# Patient Record
Sex: MALE | Race: WHITE | URBAN - METROPOLITAN AREA
[De-identification: names, ages, dates, MRNs, and addresses within clinical notes are randomized per-mention and may not be internally consistent; named-entity substitution may affect disease eponyms.]

---

## 2018-10-10 ENCOUNTER — APPOINTMENT (RX ONLY)
Dept: URBAN - METROPOLITAN AREA CLINIC 4 | Facility: CLINIC | Age: 53
Setting detail: DERMATOLOGY
End: 2018-10-10

## 2018-10-10 DIAGNOSIS — L81.4 OTHER MELANIN HYPERPIGMENTATION: ICD-10-CM

## 2018-10-10 DIAGNOSIS — L82.1 OTHER SEBORRHEIC KERATOSIS: ICD-10-CM

## 2018-10-10 DIAGNOSIS — D22 MELANOCYTIC NEVI: ICD-10-CM

## 2018-10-10 DIAGNOSIS — D18.0 HEMANGIOMA: ICD-10-CM

## 2018-10-10 DIAGNOSIS — L57.0 ACTINIC KERATOSIS: ICD-10-CM

## 2018-10-10 PROBLEM — D22.61 MELANOCYTIC NEVI OF RIGHT UPPER LIMB, INCLUDING SHOULDER: Status: ACTIVE | Noted: 2018-10-10

## 2018-10-10 PROBLEM — D18.01 HEMANGIOMA OF SKIN AND SUBCUTANEOUS TISSUE: Status: ACTIVE | Noted: 2018-10-10

## 2018-10-10 PROBLEM — D22.62 MELANOCYTIC NEVI OF LEFT UPPER LIMB, INCLUDING SHOULDER: Status: ACTIVE | Noted: 2018-10-10

## 2018-10-10 PROBLEM — D22.5 MELANOCYTIC NEVI OF TRUNK: Status: ACTIVE | Noted: 2018-10-10

## 2018-10-10 PROBLEM — D22.71 MELANOCYTIC NEVI OF RIGHT LOWER LIMB, INCLUDING HIP: Status: ACTIVE | Noted: 2018-10-10

## 2018-10-10 PROBLEM — D22.72 MELANOCYTIC NEVI OF LEFT LOWER LIMB, INCLUDING HIP: Status: ACTIVE | Noted: 2018-10-10

## 2018-10-10 PROCEDURE — ? SUNSCREEN RECOMMENDATIONS

## 2018-10-10 PROCEDURE — 99213 OFFICE O/P EST LOW 20 MIN: CPT | Mod: 25

## 2018-10-10 PROCEDURE — 17000 DESTRUCT PREMALG LESION: CPT

## 2018-10-10 PROCEDURE — ? COUNSELING

## 2018-10-10 PROCEDURE — 17003 DESTRUCT PREMALG LES 2-14: CPT

## 2018-10-10 PROCEDURE — ? LIQUID NITROGEN

## 2018-10-10 ASSESSMENT — LOCATION SIMPLE DESCRIPTION DERM
LOCATION SIMPLE: NOSE
LOCATION SIMPLE: RIGHT THIGH
LOCATION SIMPLE: RIGHT LOWER BACK
LOCATION SIMPLE: LEFT POSTERIOR UPPER ARM
LOCATION SIMPLE: LEFT HAND
LOCATION SIMPLE: ABDOMEN
LOCATION SIMPLE: LEFT EAR
LOCATION SIMPLE: RIGHT POSTERIOR UPPER ARM
LOCATION SIMPLE: LEFT THIGH
LOCATION SIMPLE: LEFT ANTERIOR NECK
LOCATION SIMPLE: LEFT UPPER BACK
LOCATION SIMPLE: RIGHT FOREARM
LOCATION SIMPLE: RIGHT HAND
LOCATION SIMPLE: LEFT FOREARM
LOCATION SIMPLE: LEFT CHEEK
LOCATION SIMPLE: RIGHT CHEEK
LOCATION SIMPLE: UPPER BACK

## 2018-10-10 ASSESSMENT — LOCATION DETAILED DESCRIPTION DERM
LOCATION DETAILED: RIGHT SUPERIOR MEDIAL MIDBACK
LOCATION DETAILED: LEFT INFERIOR ANTERIOR NECK
LOCATION DETAILED: RIGHT ANTERIOR PROXIMAL THIGH
LOCATION DETAILED: RIGHT CENTRAL MALAR CHEEK
LOCATION DETAILED: RIGHT DISTAL POSTERIOR UPPER ARM
LOCATION DETAILED: RIGHT RIB CAGE
LOCATION DETAILED: SUPERIOR THORACIC SPINE
LOCATION DETAILED: LEFT SUPERIOR MEDIAL UPPER BACK
LOCATION DETAILED: LEFT CENTRAL MALAR CHEEK
LOCATION DETAILED: RIGHT MEDIAL MALAR CHEEK
LOCATION DETAILED: LEFT ULNAR DORSAL HAND
LOCATION DETAILED: RIGHT RADIAL DORSAL HAND
LOCATION DETAILED: PERIUMBILICAL SKIN
LOCATION DETAILED: RIGHT PROXIMAL DORSAL FOREARM
LOCATION DETAILED: LEFT ANTERIOR PROXIMAL THIGH
LOCATION DETAILED: LEFT SUPERIOR MEDIAL MALAR CHEEK
LOCATION DETAILED: LEFT TRAGUS
LOCATION DETAILED: NASAL TIP
LOCATION DETAILED: LEFT PROXIMAL DORSAL FOREARM
LOCATION DETAILED: LEFT PROXIMAL POSTERIOR UPPER ARM

## 2018-10-10 ASSESSMENT — LOCATION ZONE DERM
LOCATION ZONE: EAR
LOCATION ZONE: ARM
LOCATION ZONE: TRUNK
LOCATION ZONE: HAND
LOCATION ZONE: NECK
LOCATION ZONE: FACE
LOCATION ZONE: NOSE
LOCATION ZONE: LEG

## 2018-10-10 NOTE — PROCEDURE: LIQUID NITROGEN
Render Post-Care Instructions In Note?: no
Detail Level: Simple
Post-Care Instructions: I reviewed with the patient in detail post-care instructions. Patient is to wear sunprotection, and avoid picking at any of the treated lesions. Pt may apply Vaseline to crusted or scabbing areas.
Consent: The patient's consent was obtained including but not limited to risks of crusting, scabbing, blistering, scarring, darker or lighter pigmentary change, recurrence, incomplete removal and infection.
Number Of Freeze-Thaw Cycles: 2 freeze-thaw cycles
Duration Of Freeze Thaw-Cycle (Seconds): 3

## 2019-09-04 ENCOUNTER — APPOINTMENT (RX ONLY)
Dept: URBAN - METROPOLITAN AREA CLINIC 4 | Facility: CLINIC | Age: 54
Setting detail: DERMATOLOGY
End: 2019-09-04

## 2019-09-04 DIAGNOSIS — L82.1 OTHER SEBORRHEIC KERATOSIS: ICD-10-CM

## 2019-09-04 PROCEDURE — ? COUNSELING

## 2019-09-04 PROCEDURE — 99212 OFFICE O/P EST SF 10 MIN: CPT

## 2019-09-04 PROCEDURE — ? ADDITIONAL NOTES

## 2019-09-04 ASSESSMENT — LOCATION SIMPLE DESCRIPTION DERM: LOCATION SIMPLE: RIGHT PRETIBIAL REGION

## 2019-09-04 ASSESSMENT — LOCATION ZONE DERM: LOCATION ZONE: LEG

## 2019-09-04 ASSESSMENT — LOCATION DETAILED DESCRIPTION DERM: LOCATION DETAILED: RIGHT DISTAL PRETIBIAL REGION

## 2019-09-04 NOTE — PROCEDURE: ADDITIONAL NOTES
Additional Notes: Lesion of concern on the leg, clinically consistent with a seborrheic keratosis. Patient will follow up with Blanca Francis as scheduled, he agrees to RTC sooner with any significant changes. Discussed biopsy, patient declines today.
Detail Level: Detailed

## 2019-09-04 NOTE — HPI: SKIN LESIONS
Is This A New Presentation, Or A Follow-Up?: Skin Lesion
How Severe Is Your Skin Lesion?: mild
Have Your Skin Lesions Been Treated?: not been treated
Additional History: Patient has scheduled FBE in December with RAJENDRA

## 2019-12-11 ENCOUNTER — APPOINTMENT (RX ONLY)
Dept: URBAN - METROPOLITAN AREA CLINIC 4 | Facility: CLINIC | Age: 54
Setting detail: DERMATOLOGY
End: 2019-12-11

## 2019-12-11 DIAGNOSIS — L98.8 OTHER SPECIFIED DISORDERS OF THE SKIN AND SUBCUTANEOUS TISSUE: ICD-10-CM

## 2019-12-11 DIAGNOSIS — L82.1 OTHER SEBORRHEIC KERATOSIS: ICD-10-CM

## 2019-12-11 DIAGNOSIS — D22 MELANOCYTIC NEVI: ICD-10-CM

## 2019-12-11 DIAGNOSIS — D18.0 HEMANGIOMA: ICD-10-CM

## 2019-12-11 DIAGNOSIS — L81.4 OTHER MELANIN HYPERPIGMENTATION: ICD-10-CM

## 2019-12-11 PROBLEM — D22.62 MELANOCYTIC NEVI OF LEFT UPPER LIMB, INCLUDING SHOULDER: Status: ACTIVE | Noted: 2019-12-11

## 2019-12-11 PROBLEM — D22.72 MELANOCYTIC NEVI OF LEFT LOWER LIMB, INCLUDING HIP: Status: ACTIVE | Noted: 2019-12-11

## 2019-12-11 PROBLEM — D22.5 MELANOCYTIC NEVI OF TRUNK: Status: ACTIVE | Noted: 2019-12-11

## 2019-12-11 PROBLEM — D18.01 HEMANGIOMA OF SKIN AND SUBCUTANEOUS TISSUE: Status: ACTIVE | Noted: 2019-12-11

## 2019-12-11 PROBLEM — D22.71 MELANOCYTIC NEVI OF RIGHT LOWER LIMB, INCLUDING HIP: Status: ACTIVE | Noted: 2019-12-11

## 2019-12-11 PROBLEM — D22.61 MELANOCYTIC NEVI OF RIGHT UPPER LIMB, INCLUDING SHOULDER: Status: ACTIVE | Noted: 2019-12-11

## 2019-12-11 PROCEDURE — 99213 OFFICE O/P EST LOW 20 MIN: CPT

## 2019-12-11 PROCEDURE — ? SUNSCREEN RECOMMENDATIONS

## 2019-12-11 PROCEDURE — ? COUNSELING

## 2019-12-11 PROCEDURE — ? PATIENT SPECIFIC COUNSELING

## 2019-12-11 ASSESSMENT — LOCATION ZONE DERM
LOCATION ZONE: ARM
LOCATION ZONE: FACE
LOCATION ZONE: FINGER
LOCATION ZONE: HAND
LOCATION ZONE: NECK
LOCATION ZONE: LEG
LOCATION ZONE: TRUNK

## 2019-12-11 ASSESSMENT — LOCATION SIMPLE DESCRIPTION DERM
LOCATION SIMPLE: LEFT CHEEK
LOCATION SIMPLE: LEFT HAND
LOCATION SIMPLE: RIGHT FOREARM
LOCATION SIMPLE: RIGHT HAND
LOCATION SIMPLE: RIGHT CHEEK
LOCATION SIMPLE: LEFT FOREARM
LOCATION SIMPLE: RIGHT THUMB
LOCATION SIMPLE: LEFT ANTERIOR NECK
LOCATION SIMPLE: ABDOMEN
LOCATION SIMPLE: LEFT UPPER BACK
LOCATION SIMPLE: RIGHT POSTERIOR UPPER ARM
LOCATION SIMPLE: UPPER BACK
LOCATION SIMPLE: LEFT POSTERIOR UPPER ARM
LOCATION SIMPLE: RIGHT THIGH
LOCATION SIMPLE: LEFT THIGH
LOCATION SIMPLE: RIGHT LOWER BACK

## 2019-12-11 ASSESSMENT — LOCATION DETAILED DESCRIPTION DERM
LOCATION DETAILED: RIGHT ANTERIOR PROXIMAL THIGH
LOCATION DETAILED: LEFT ULNAR DORSAL HAND
LOCATION DETAILED: PERIUMBILICAL SKIN
LOCATION DETAILED: RIGHT DISTAL POSTERIOR UPPER ARM
LOCATION DETAILED: LEFT INFERIOR ANTERIOR NECK
LOCATION DETAILED: RIGHT CENTRAL MALAR CHEEK
LOCATION DETAILED: RIGHT RIB CAGE
LOCATION DETAILED: LEFT SUPERIOR MEDIAL UPPER BACK
LOCATION DETAILED: RIGHT SUPERIOR MEDIAL MIDBACK
LOCATION DETAILED: SUPERIOR THORACIC SPINE
LOCATION DETAILED: RIGHT DISTAL DORSAL THUMB
LOCATION DETAILED: LEFT ANTERIOR PROXIMAL THIGH
LOCATION DETAILED: LEFT PROXIMAL DORSAL FOREARM
LOCATION DETAILED: RIGHT PROXIMAL DORSAL FOREARM
LOCATION DETAILED: LEFT PROXIMAL POSTERIOR UPPER ARM
LOCATION DETAILED: RIGHT RADIAL DORSAL HAND
LOCATION DETAILED: LEFT CENTRAL MALAR CHEEK

## 2020-09-27 ENCOUNTER — OFFICE VISIT (OUTPATIENT)
Dept: URGENT CARE | Facility: CLINIC | Age: 55
End: 2020-09-27
Payer: OTHER GOVERNMENT

## 2020-09-27 ENCOUNTER — HOSPITAL ENCOUNTER (OUTPATIENT)
Facility: MEDICAL CENTER | Age: 55
End: 2020-09-27
Attending: FAMILY MEDICINE
Payer: OTHER GOVERNMENT

## 2020-09-27 VITALS
RESPIRATION RATE: 16 BRPM | TEMPERATURE: 98 F | OXYGEN SATURATION: 96 % | DIASTOLIC BLOOD PRESSURE: 72 MMHG | HEART RATE: 91 BPM | SYSTOLIC BLOOD PRESSURE: 128 MMHG

## 2020-09-27 DIAGNOSIS — R68.89 FLU-LIKE SYMPTOMS: ICD-10-CM

## 2020-09-27 DIAGNOSIS — Z20.822 EXPOSURE TO COVID-19 VIRUS: ICD-10-CM

## 2020-09-27 DIAGNOSIS — R19.7 DIARRHEA, UNSPECIFIED TYPE: ICD-10-CM

## 2020-09-27 DIAGNOSIS — R51.9 NONINTRACTABLE HEADACHE, UNSPECIFIED CHRONICITY PATTERN, UNSPECIFIED HEADACHE TYPE: ICD-10-CM

## 2020-09-27 DIAGNOSIS — R53.83 FATIGUE, UNSPECIFIED TYPE: ICD-10-CM

## 2020-09-27 LAB
FLUAV+FLUBV AG SPEC QL IA: NORMAL
INT CON NEG: NEGATIVE
INT CON POS: POSITIVE

## 2020-09-27 PROCEDURE — 99203 OFFICE O/P NEW LOW 30 MIN: CPT | Mod: CS | Performed by: FAMILY MEDICINE

## 2020-09-27 PROCEDURE — U0003 INFECTIOUS AGENT DETECTION BY NUCLEIC ACID (DNA OR RNA); SEVERE ACUTE RESPIRATORY SYNDROME CORONAVIRUS 2 (SARS-COV-2) (CORONAVIRUS DISEASE [COVID-19]), AMPLIFIED PROBE TECHNIQUE, MAKING USE OF HIGH THROUGHPUT TECHNOLOGIES AS DESCRIBED BY CMS-2020-01-R: HCPCS

## 2020-09-27 PROCEDURE — 87804 INFLUENZA ASSAY W/OPTIC: CPT | Mod: CS | Performed by: FAMILY MEDICINE

## 2020-09-27 RX ORDER — LOSARTAN POTASSIUM 25 MG/1
25 TABLET ORAL DAILY
COMMUNITY
End: 2021-03-31

## 2020-09-27 RX ORDER — THYROID 180 MG
90 TABLET ORAL 2 TIMES DAILY
Status: ON HOLD | COMMUNITY
Start: 2020-09-14 | End: 2021-04-29

## 2020-09-28 DIAGNOSIS — R53.83 FATIGUE, UNSPECIFIED TYPE: ICD-10-CM

## 2020-09-28 DIAGNOSIS — R51.9 NONINTRACTABLE HEADACHE, UNSPECIFIED CHRONICITY PATTERN, UNSPECIFIED HEADACHE TYPE: ICD-10-CM

## 2020-09-28 DIAGNOSIS — R19.7 DIARRHEA, UNSPECIFIED TYPE: ICD-10-CM

## 2020-09-28 DIAGNOSIS — Z20.822 EXPOSURE TO COVID-19 VIRUS: ICD-10-CM

## 2020-09-28 LAB
COVID ORDER STATUS COVID19: NORMAL
SARS-COV-2 RNA RESP QL NAA+PROBE: NOTDETECTED
SPECIMEN SOURCE: NORMAL

## 2020-09-28 NOTE — PROGRESS NOTES
Chief Complaint:    Chief Complaint   Patient presents with   • Flu Like Symptoms     started tuesday with stomach pain and diarrhea states he feels warm but no active fever        History of Present Illness:    This is a new problem. On 9/22/2020, he started with stomach upset and diarrhea. These symptoms have fluctuated and persisted. He has used Pepto-Bismol with some temporary help. However, last night, he started with fatigue, fever up to 100 F, headache, and lower back starting to ache. No nasal symptoms, sore throat, nausea, or vomiting. Rare non-productive cough. No close contacts with similar symptoms. He feels like he could have influenza.      Review of Systems:    Constitutional: See HPI.   Eyes: Negative for change in vision, photophobia, pain, redness, and discharge.  ENT: Negative for ear pain, ear discharge, hearing loss, tinnitus, nasal congestion, nosebleeds, and sore throat.    Respiratory: See HPI.   Cardiovascular: Negative for chest pain, palpitations, orthopnea, claudication, leg swelling, and PND.   Gastrointestinal: See HPI.  Genitourinary: Negative for dysuria, urinary urgency, urinary frequency, hematuria, and flank pain.   Musculoskeletal: See HPI.  Skin: Negative for rash and itching.   Neurological: See HPI.   Endo: Negative for polydipsia.   Heme: Does not bruise/bleed easily.   Psychiatric/Behavioral: Negative for depression, suicidal ideas, hallucinations, memory loss and substance abuse. The patient is not nervous/anxious and does not have insomnia.        Past Medical History:    Past Medical History:   Diagnosis Date   • High cholesterol      Past Surgical History:    Past Surgical History:   Procedure Laterality Date   • KNEE RECONSTRUCTION      left knee orthoscopic     Social History:    Social History     Socioeconomic History   • Marital status: Single     Spouse name: Not on file   • Number of children: Not on file   • Years of education: Not on file   • Highest education  level: Not on file   Occupational History   • Not on file   Social Needs   • Financial resource strain: Not on file   • Food insecurity     Worry: Not on file     Inability: Not on file   • Transportation needs     Medical: Not on file     Non-medical: Not on file   Tobacco Use   • Smoking status: Never Smoker   • Smokeless tobacco: Never Used   Substance and Sexual Activity   • Alcohol use: Yes   • Drug use: No   • Sexual activity: Not on file   Lifestyle   • Physical activity     Days per week: Not on file     Minutes per session: Not on file   • Stress: Not on file   Relationships   • Social connections     Talks on phone: Not on file     Gets together: Not on file     Attends Hindu service: Not on file     Active member of club or organization: Not on file     Attends meetings of clubs or organizations: Not on file     Relationship status: Not on file   • Intimate partner violence     Fear of current or ex partner: Not on file     Emotionally abused: Not on file     Physically abused: Not on file     Forced sexual activity: Not on file   Other Topics Concern   • Not on file   Social History Narrative   • Not on file     Family History:    History reviewed. No pertinent family history.    Medications:    Current Outpatient Medications on File Prior to Visit   Medication Sig Dispense Refill   • ARMOUR THYROID 180 MG Tab Take 90 mg by mouth 2 Times a Day.     • losartan (COZAAR) 25 MG Tab Take 25 mg by mouth every day.       No current facility-administered medications on file prior to visit.      Allergies:    No Known Allergies      Vitals:    Vitals:    09/27/20 1921   BP: 128/72   Pulse: 91   Resp: 16   Temp: 36.7 °C (98 °F)   TempSrc: Temporal   SpO2: 96%       Physical Exam:    Constitutional: Vital signs reviewed. Appears well-developed and well-nourished. No acute distress.   Eyes: Sclera white, conjunctivae clear. PERRLA.  ENT: External ears normal. External auditory canals normal without discharge.  TMs translucent and non-bulging. Hearing normal. Nasal mucosa pink. Lips/teeth are normal. Oral mucosa pink and moist. Posterior pharynx: WNL.  Neck: Neck supple.   Cardiovascular: Regular rate and rhythm. No murmur.  Pulmonary/Chest: Respirations non-labored. Clear to auscultation bilaterally.  Abdomen: Bowel sounds are normal active. Soft, non-distended, and non-tender to palpation.  Lymph: Cervical nodes without tenderness or enlargement.  Musculoskeletal: Normal gait. Normal range of motion. No muscular atrophy or weakness.  Neurological: Alert and oriented to person, place, and time. CN 2-12 intact. Muscle tone normal. Coordination normal.   Skin: No rashes or lesions. Warm, dry, normal turgor.  Psychiatric: Normal mood and affect. Behavior is normal. Judgment and thought content normal.       Diagnostics:    POCT Influenza A/B  Order: [id number]  Status:  Final result   Visible to patient:  No (not released) Next appt:  None Dx:  Flu-like symptoms  Component 8:00 PM   Rapid Influenza A-B NEG    Internal Control Positive Positive    Internal Control Negative Negative          Specimen Collected: 09/27/20  8:00 PM Last Resulted: 09/27/20  8:30 PM             Assessment / Plan:    1. Flu-like symptoms  - POCT Influenza A/B    2. Fatigue, unspecified type  - COVID/SARS COV-2 PCR; Future    3. Nonintractable headache, unspecified chronicity pattern, unspecified headache type  - COVID/SARS COV-2 PCR; Future    4. Diarrhea, unspecified type  - COVID/SARS COV-2 PCR; Future    5. Exposure to COVID-19 virus  - COVID/SARS COV-2 PCR; Future      Discussed with him DDX, management options, and risks, benefits, and alternatives to treatment plan agreed upon.    Patient is clinically stable.    Declines Rx medication for symptomatic treatment.    May use OTC meds for symptoms prn.    Agreeable to COVID-19 test obtained.    Advised test result will show in [facility].    Patient will follow-up if needed while waiting for test  result.

## 2021-01-11 ENCOUNTER — APPOINTMENT (RX ONLY)
Dept: URBAN - METROPOLITAN AREA CLINIC 4 | Facility: CLINIC | Age: 56
Setting detail: DERMATOLOGY
End: 2021-01-11

## 2021-01-11 DIAGNOSIS — D18.0 HEMANGIOMA: ICD-10-CM

## 2021-01-11 DIAGNOSIS — D22 MELANOCYTIC NEVI: ICD-10-CM

## 2021-01-11 DIAGNOSIS — L81.4 OTHER MELANIN HYPERPIGMENTATION: ICD-10-CM

## 2021-01-11 DIAGNOSIS — L57.0 ACTINIC KERATOSIS: ICD-10-CM

## 2021-01-11 DIAGNOSIS — L82.1 OTHER SEBORRHEIC KERATOSIS: ICD-10-CM

## 2021-01-11 DIAGNOSIS — Z71.89 OTHER SPECIFIED COUNSELING: ICD-10-CM

## 2021-01-11 DIAGNOSIS — B07.8 OTHER VIRAL WARTS: ICD-10-CM

## 2021-01-11 PROBLEM — D22.9 MELANOCYTIC NEVI, UNSPECIFIED: Status: ACTIVE | Noted: 2021-01-11

## 2021-01-11 PROBLEM — D18.01 HEMANGIOMA OF SKIN AND SUBCUTANEOUS TISSUE: Status: ACTIVE | Noted: 2021-01-11

## 2021-01-11 PROCEDURE — ? COUNSELING

## 2021-01-11 PROCEDURE — ? LIQUID NITROGEN

## 2021-01-11 PROCEDURE — 17003 DESTRUCT PREMALG LES 2-14: CPT

## 2021-01-11 PROCEDURE — ? SUNSCREEN RECOMMENDATIONS

## 2021-01-11 PROCEDURE — 99213 OFFICE O/P EST LOW 20 MIN: CPT | Mod: 25

## 2021-01-11 PROCEDURE — 17000 DESTRUCT PREMALG LESION: CPT

## 2021-01-11 PROCEDURE — ? ADDITIONAL NOTES

## 2021-01-11 ASSESSMENT — LOCATION SIMPLE DESCRIPTION DERM
LOCATION SIMPLE: RIGHT ZYGOMA
LOCATION SIMPLE: RIGHT CHEEK
LOCATION SIMPLE: RIGHT THUMB
LOCATION SIMPLE: LEFT FOREHEAD
LOCATION SIMPLE: NOSE
LOCATION SIMPLE: LEFT CHEEK

## 2021-01-11 ASSESSMENT — LOCATION DETAILED DESCRIPTION DERM
LOCATION DETAILED: RIGHT CENTRAL ZYGOMA
LOCATION DETAILED: NASAL DORSUM
LOCATION DETAILED: RIGHT DISTAL ULNAR THUMB
LOCATION DETAILED: RIGHT SUPERIOR MEDIAL MALAR CHEEK
LOCATION DETAILED: RIGHT CENTRAL MALAR CHEEK
LOCATION DETAILED: LEFT CENTRAL MALAR CHEEK
LOCATION DETAILED: LEFT SUPERIOR FOREHEAD

## 2021-01-11 ASSESSMENT — LOCATION ZONE DERM
LOCATION ZONE: NOSE
LOCATION ZONE: FINGER
LOCATION ZONE: FACE

## 2021-03-18 ENCOUNTER — OFFICE VISIT (OUTPATIENT)
Dept: URGENT CARE | Facility: PHYSICIAN GROUP | Age: 56
End: 2021-03-18

## 2021-03-18 ENCOUNTER — HOSPITAL ENCOUNTER (OUTPATIENT)
Facility: MEDICAL CENTER | Age: 56
End: 2021-03-18
Attending: PHYSICIAN ASSISTANT
Payer: OTHER MISCELLANEOUS

## 2021-03-18 VITALS
SYSTOLIC BLOOD PRESSURE: 160 MMHG | BODY MASS INDEX: 25.05 KG/M2 | WEIGHT: 175 LBS | RESPIRATION RATE: 14 BRPM | TEMPERATURE: 97.7 F | HEART RATE: 77 BPM | HEIGHT: 70 IN | DIASTOLIC BLOOD PRESSURE: 96 MMHG | OXYGEN SATURATION: 96 %

## 2021-03-18 DIAGNOSIS — Z20.818 EXPOSURE TO STREP THROAT: ICD-10-CM

## 2021-03-18 DIAGNOSIS — Z20.822 CLOSE EXPOSURE TO COVID-19 VIRUS: ICD-10-CM

## 2021-03-18 DIAGNOSIS — R68.89 FLU-LIKE SYMPTOMS: ICD-10-CM

## 2021-03-18 LAB
COVID ORDER STATUS COVID19: NORMAL
INT CON NEG: NEGATIVE
INT CON POS: POSITIVE
S PYO AG THROAT QL: NEGATIVE
SARS-COV-2 RNA RESP QL NAA+PROBE: DETECTED
SPECIMEN SOURCE: ABNORMAL

## 2021-03-18 PROCEDURE — U0005 INFEC AGEN DETEC AMPLI PROBE: HCPCS

## 2021-03-18 PROCEDURE — U0003 INFECTIOUS AGENT DETECTION BY NUCLEIC ACID (DNA OR RNA); SEVERE ACUTE RESPIRATORY SYNDROME CORONAVIRUS 2 (SARS-COV-2) (CORONAVIRUS DISEASE [COVID-19]), AMPLIFIED PROBE TECHNIQUE, MAKING USE OF HIGH THROUGHPUT TECHNOLOGIES AS DESCRIBED BY CMS-2020-01-R: HCPCS

## 2021-03-18 PROCEDURE — 87880 STREP A ASSAY W/OPTIC: CPT | Performed by: PHYSICIAN ASSISTANT

## 2021-03-18 PROCEDURE — 99213 OFFICE O/P EST LOW 20 MIN: CPT | Mod: CS | Performed by: PHYSICIAN ASSISTANT

## 2021-03-18 RX ORDER — LEVOTHYROXINE, LIOTHYRONINE 57; 13.5 UG/1; UG/1
90 TABLET ORAL
COMMUNITY
Start: 2021-01-21 | End: 2021-03-31

## 2021-03-18 ASSESSMENT — ENCOUNTER SYMPTOMS
HEADACHES: 1
CHILLS: 1
VOMITING: 0
COUGH: 1
FEVER: 1
SHORTNESS OF BREATH: 0
DIARRHEA: 0
SORE THROAT: 1
NAUSEA: 0
MYALGIAS: 1
SPUTUM PRODUCTION: 0
ABDOMINAL PAIN: 0
DIZZINESS: 0

## 2021-03-18 NOTE — PATIENT INSTRUCTIONS
INSTRUCTIONS FOR COVID-19 OR ANY OTHER INFECTIOUS RESPIRATORY ILLNESSES    The Centers for Disease Control and Prevention (CDC) states that early indications for COVID-19 include cough, shortness of breath, difficulty breathing, or at least two of the following symptoms: chills, shaking with chills, muscle pain, headache, sore throat, and loss of taste or smell. Symptoms can range from mild to severe and may appear up to two weeks after exposure to the virus.    The practice of self-isolation and quarantine helps protect the public and your family by  preventing exposure to people who have or may have a contagious disease. Please follow the prevention steps below as based on CDC guidelines:    WHEN TO STOP ISOLATION: Persons with COVID-19 or any other infectious respiratory illness who have symptoms and were advised to care for themselves at home may discontinue home isolation under the following conditions:  · At least 24 hours have passed since recovery defined as resolution of fever without the use of fever-reducing medications; AND,  · Improvement in respiratory symptoms (e.g., cough, shortness of breath); AND,  · At least 10 days have passed since symptoms first appeared and have had no subsequent illness.    MONITOR YOUR SYMPTOMS: If your illness is worsening, seek prompt medical attention. If you have a medical emergency and need to call 911, notify the dispatch personnel that you have, or are being evaluated for confirmed or suspected COVID-19 or another infectious respiratory illness. Wear a facemask if possible.    ACTIVITY RESTRICTION: restrict activities outside your home, except for getting medical care. Do not go to work, school, or public areas. Avoid using public transportation, ride-sharing, or taxis.    SCHEDULED MEDICAL APPOINTMENTS: Notify your provider that you have, or are being evaluated for, confirmed or suspected COVID-19 or another infectious respiratory. This will help the healthcare  provider’s office safely take care of you and keep other people from getting exposed or infected.    FACEMASKS, when to wear: Anytime you are away from your home or around other people or pets. If you are unable to wear one, maintain a minimum of 6 feet distancing from others.    LIVING ENVIRONMENT: Stay in a separate room from other people and pets. If possible, use a separate bathroom, have someone else care for your pets and avoid sharing household items. Any items used should be washed thoroughly with soap and water. Clean all “high-touch” surfaces every day. Use a household cleaning spray or wipe, according to the label instructions. High touch surfaces include (but are not limited to) counters, tabletops, doorknobs, bathroom fixtures, toilets, phones, keyboards, tablets, and bedside tables.     HAND WASHING: Frequently wash hands with soap and water for at least 20 seconds,  especially after blowing your nose, coughing, or sneezing; going to the bathroom; before and after interacting with pets; and before and after eating or preparing food. If hands are visibly dirty use soap and water. If soap and water are not available, use an alcohol-based hand  with at least 60% alcohol. Avoid touching your eyes, nose, and mouth with unwashed hands. Cover your coughs and sneezes with a tissue. Throw used tissues in a lined trash can. Immediately wash your hands.    ACTIVE/FACILITATED SELF-MONITORING: Follow instructions provided by your local health department or health professionals, as appropriate. When working with your local health department check their available hours.    [facility]   Phone Number   [facility] [phone]   [facility] [place] [phone]   [place] Call 211   [place] [phone]     IF YOU HAVE CONFIRMED POSITIVE COVID-19:    Those who have completely recovered from COVID-19 may have immune-boosting antibodies in their plasma--called “convalescent plasma”--that could be  used to treat critically ill COVID19 patients.    [facility] is excited to begin working with [facility] on collecting convalescent plasma from  people who have recovered from COVID-19 as part of a program to treat patients infected with the virus. This FDA-approved “emergency investigational new drug” is a special blood product containing antibodies that may give patients an extra boost to fight the virus.    To be eligible to donate convalescent plasma, you must have a prior COVID-19 diagnosis documented by a laboratory test (or a positive test result for SARS-CoV-2 antibodies) and meet additional eligibility requirements.    If you are interested in donating convalescent plasma or have any additional questions, please contact the [facility] Convalescent Plasma  at [phone] or via e-mail at [email or web address].

## 2021-03-18 NOTE — PROGRESS NOTES
"Subjective:      Thong Arzola is a 55 y.o. male who presents with Headache (fever, chills, body aches, fatigue, wife tested positive for covid, x3 days )            HPI  Patient presents to urgent care reporting reporting headaches, body aches, chills, intermittent fevers, fatigue, sore throat, and dry cough starting yesterday. His wife tested positive for strep and covid-19 a few days ago. Highest measured fever around 100 F. No chest pain, SOB, or loss of taste/smell. No history of chronic lung disease or smoking.       Review of Systems   Constitutional: Positive for chills, fever and malaise/fatigue.   HENT: Positive for sore throat. Negative for congestion and ear pain.    Respiratory: Positive for cough. Negative for sputum production and shortness of breath.    Cardiovascular: Negative for chest pain.   Gastrointestinal: Negative for abdominal pain, diarrhea, nausea and vomiting.   Genitourinary: Negative.    Musculoskeletal: Positive for myalgias.   Neurological: Positive for headaches. Negative for dizziness.        Objective:     /96 (BP Location: Right arm, Patient Position: Sitting, BP Cuff Size: Adult)   Pulse 77   Temp 36.5 °C (97.7 °F) (Temporal)   Resp 14   Ht 1.778 m (5' 10\")   Wt 79.4 kg (175 lb)   SpO2 96%   BMI 25.11 kg/m²      Physical Exam  Vitals and nursing note reviewed.   Constitutional:       General: He is not in acute distress.     Appearance: Normal appearance. He is well-developed.   HENT:      Head: Normocephalic and atraumatic.      Right Ear: External ear normal.      Left Ear: External ear normal.      Nose: Nose normal.   Eyes:      Conjunctiva/sclera: Conjunctivae normal.   Cardiovascular:      Rate and Rhythm: Normal rate and regular rhythm.      Heart sounds: Normal heart sounds. No murmur.   Pulmonary:      Effort: Pulmonary effort is normal.      Breath sounds: Normal breath sounds. No wheezing or rales.   Musculoskeletal:         General: Normal range of " motion.      Cervical back: Normal range of motion.   Skin:     General: Skin is warm and dry.   Neurological:      Mental Status: He is alert and oriented to person, place, and time.   Psychiatric:         Behavior: Behavior normal.          PMH:  has a past medical history of High cholesterol.  MEDS:   Current Outpatient Medications:   •  NP THYROID 90 MG Tab, Take 90 mg by mouth., Disp: , Rfl:   •  ARMOUR THYROID 180 MG Tab, Take 90 mg by mouth 2 Times a Day., Disp: , Rfl:   •  losartan (COZAAR) 25 MG Tab, Take 25 mg by mouth every day., Disp: , Rfl:   ALLERGIES: No Known Allergies  SURGHX:   Past Surgical History:   Procedure Laterality Date   • KNEE RECONSTRUCTION      left knee orthoscopic     SOCHX:  reports that he has never smoked. He has never used smokeless tobacco. He reports current alcohol use. He reports that he does not use drugs.  FH: family history is not on file.       Assessment/Plan:        1. Flu-like symptoms      2. Close exposure to COVID-19 virus    - SARS-CoV-2 PCR (24 hour In-House): Collect NP swab in VTM; Future    3. Exposure to strep throat    - POCT Rapid Strep A: NEGATIVE     Advised patient symptoms are most likely viral in etiology. Increased fluids and rest. Discussed use of OTC cough and cold medication and Tylenol/Motrin for symptomatic relief.  Return for reevaluation or follow-up with PCP if symptoms persist or worsen. Supportive care, differential diagnoses, and indications for immediate follow-up discussed with patient.   Pathogenesis of diagnosis discussed including typical length and natural progression.  The patient demonstrated a good understanding and agreed with the treatment plan and has no further questions regarding care.   Vital signs stable, patient in no acute respiratory distress. Patient instructed to self-isolate/quarantine. Discharge handout given.      Discussed with patient at length importance of communal effort to help decrease the infection rate of  COVID 19. Patient advised to avoid large gatherings of people and practice good hand hygiene and respiratory precautions.       This patient is evaluated under [facility] isolation protocols in urgent care.  Out of an abundance of caution I am wearing a N95 mask, protective eye gear, gloves and gown through all interaction with patient.

## 2021-03-31 ENCOUNTER — APPOINTMENT (OUTPATIENT)
Dept: RADIOLOGY | Facility: MEDICAL CENTER | Age: 56
DRG: 023 | End: 2021-03-31
Attending: EMERGENCY MEDICINE
Payer: OTHER MISCELLANEOUS

## 2021-03-31 ENCOUNTER — HOSPITAL ENCOUNTER (INPATIENT)
Facility: MEDICAL CENTER | Age: 56
LOS: 29 days | DRG: 023 | End: 2021-04-29
Attending: EMERGENCY MEDICINE | Admitting: INTERNAL MEDICINE
Payer: OTHER MISCELLANEOUS

## 2021-03-31 DIAGNOSIS — G93.6 CEREBRAL EDEMA (HCC): ICD-10-CM

## 2021-03-31 DIAGNOSIS — I63.511 ACUTE RIGHT MCA STROKE (HCC): ICD-10-CM

## 2021-03-31 DIAGNOSIS — I63.511 CEREBROVASCULAR ACCIDENT (CVA) DUE TO OCCLUSION OF RIGHT MIDDLE CEREBRAL ARTERY (HCC): ICD-10-CM

## 2021-03-31 PROBLEM — I48.0 PAROXYSMAL ATRIAL FIBRILLATION (HCC): Status: ACTIVE | Noted: 2021-03-31

## 2021-03-31 PROBLEM — Z86.16 HISTORY OF COVID-19: Chronic | Status: ACTIVE | Noted: 2021-03-31

## 2021-03-31 PROBLEM — E03.9 ACQUIRED HYPOTHYROIDISM: Chronic | Status: ACTIVE | Noted: 2021-03-31

## 2021-03-31 LAB
ABO + RH BLD: NORMAL
ABO GROUP BLD: NORMAL
ALBUMIN SERPL BCP-MCNC: 3.5 G/DL (ref 3.2–4.9)
ALBUMIN/GLOB SERPL: 1.5 G/DL
ALP SERPL-CCNC: 58 U/L (ref 30–99)
ALT SERPL-CCNC: 31 U/L (ref 2–50)
ANION GAP SERPL CALC-SCNC: 9 MMOL/L (ref 7–16)
APTT PPP: 24.1 SEC (ref 24.7–36)
AST SERPL-CCNC: 23 U/L (ref 12–45)
BASOPHILS # BLD AUTO: 0.1 % (ref 0–1.8)
BASOPHILS # BLD: 0.01 K/UL (ref 0–0.12)
BILIRUB SERPL-MCNC: 0.4 MG/DL (ref 0.1–1.5)
BLD GP AB SCN SERPL QL: NORMAL
BUN SERPL-MCNC: 14 MG/DL (ref 8–22)
CALCIUM SERPL-MCNC: 8.6 MG/DL (ref 8.5–10.5)
CHLORIDE SERPL-SCNC: 110 MMOL/L (ref 96–112)
CO2 SERPL-SCNC: 21 MMOL/L (ref 20–33)
CREAT SERPL-MCNC: 1.03 MG/DL (ref 0.5–1.4)
EKG IMPRESSION: NORMAL
EOSINOPHIL # BLD AUTO: 0.01 K/UL (ref 0–0.51)
EOSINOPHIL NFR BLD: 0.1 % (ref 0–6.9)
ERYTHROCYTE [DISTWIDTH] IN BLOOD BY AUTOMATED COUNT: 35.7 FL (ref 35.9–50)
FLUAV AG SPEC QL IA: NEGATIVE
FLUBV AG SPEC QL IA: NEGATIVE
GLOBULIN SER CALC-MCNC: 2.4 G/DL (ref 1.9–3.5)
GLUCOSE BLD-MCNC: 106 MG/DL (ref 65–99)
GLUCOSE SERPL-MCNC: 108 MG/DL (ref 65–99)
HCT VFR BLD AUTO: 39.6 % (ref 42–52)
HGB BLD-MCNC: 14 G/DL (ref 14–18)
IMM GRANULOCYTES # BLD AUTO: 0.02 K/UL (ref 0–0.11)
IMM GRANULOCYTES NFR BLD AUTO: 0.2 % (ref 0–0.9)
INR PPP: 1.04 (ref 0.87–1.13)
LYMPHOCYTES # BLD AUTO: 0.79 K/UL (ref 1–4.8)
LYMPHOCYTES NFR BLD: 8.5 % (ref 22–41)
MAGNESIUM SERPL-MCNC: 2 MG/DL (ref 1.5–2.5)
MCH RBC QN AUTO: 29.4 PG (ref 27–33)
MCHC RBC AUTO-ENTMCNC: 35.4 G/DL (ref 33.7–35.3)
MCV RBC AUTO: 83.2 FL (ref 81.4–97.8)
MONOCYTES # BLD AUTO: 0.37 K/UL (ref 0–0.85)
MONOCYTES NFR BLD AUTO: 4 % (ref 0–13.4)
NEUTROPHILS # BLD AUTO: 8.13 K/UL (ref 1.82–7.42)
NEUTROPHILS NFR BLD: 87.1 % (ref 44–72)
NRBC # BLD AUTO: 0 K/UL
NRBC BLD-RTO: 0 /100 WBC
PLATELET # BLD AUTO: 200 K/UL (ref 164–446)
PMV BLD AUTO: 9.1 FL (ref 9–12.9)
POTASSIUM SERPL-SCNC: 4 MMOL/L (ref 3.6–5.5)
PROT SERPL-MCNC: 5.9 G/DL (ref 6–8.2)
PROTHROMBIN TIME: 14 SEC (ref 12–14.6)
RBC # BLD AUTO: 4.76 M/UL (ref 4.7–6.1)
RH BLD: NORMAL
SARS-COV+SARS-COV-2 AG RESP QL IA.RAPID: NOTDETECTED
SODIUM SERPL-SCNC: 140 MMOL/L (ref 135–145)
SPECIMEN SOURCE: NORMAL
TROPONIN T SERPL-MCNC: 11 NG/L (ref 6–19)
WBC # BLD AUTO: 9.3 K/UL (ref 4.8–10.8)

## 2021-03-31 PROCEDURE — U0005 INFEC AGEN DETEC AMPLI PROBE: HCPCS

## 2021-03-31 PROCEDURE — 86850 RBC ANTIBODY SCREEN: CPT

## 2021-03-31 PROCEDURE — 70498 CT ANGIOGRAPHY NECK: CPT

## 2021-03-31 PROCEDURE — 84484 ASSAY OF TROPONIN QUANT: CPT

## 2021-03-31 PROCEDURE — 0042T CT-CEREBRAL PERFUSION ANALYSIS: CPT

## 2021-03-31 PROCEDURE — 770022 HCHG ROOM/CARE - ICU (200)

## 2021-03-31 PROCEDURE — 99291 CRITICAL CARE FIRST HOUR: CPT | Performed by: INTERNAL MEDICINE

## 2021-03-31 PROCEDURE — 86901 BLOOD TYPING SEROLOGIC RH(D): CPT

## 2021-03-31 PROCEDURE — 71045 X-RAY EXAM CHEST 1 VIEW: CPT

## 2021-03-31 PROCEDURE — 700102 HCHG RX REV CODE 250 W/ 637 OVERRIDE(OP): Performed by: PSYCHIATRY & NEUROLOGY

## 2021-03-31 PROCEDURE — C9803 HOPD COVID-19 SPEC COLLECT: HCPCS | Performed by: EMERGENCY MEDICINE

## 2021-03-31 PROCEDURE — 80053 COMPREHEN METABOLIC PANEL: CPT

## 2021-03-31 PROCEDURE — 82962 GLUCOSE BLOOD TEST: CPT

## 2021-03-31 PROCEDURE — 700117 HCHG RX CONTRAST REV CODE 255: Performed by: EMERGENCY MEDICINE

## 2021-03-31 PROCEDURE — 85610 PROTHROMBIN TIME: CPT

## 2021-03-31 PROCEDURE — 96375 TX/PRO/DX INJ NEW DRUG ADDON: CPT

## 2021-03-31 PROCEDURE — 70450 CT HEAD/BRAIN W/O DYE: CPT

## 2021-03-31 PROCEDURE — 99291 CRITICAL CARE FIRST HOUR: CPT

## 2021-03-31 PROCEDURE — 70496 CT ANGIOGRAPHY HEAD: CPT

## 2021-03-31 PROCEDURE — 93005 ELECTROCARDIOGRAM TRACING: CPT | Performed by: EMERGENCY MEDICINE

## 2021-03-31 PROCEDURE — 87426 SARSCOV CORONAVIRUS AG IA: CPT

## 2021-03-31 PROCEDURE — 87400 INFLUENZA A/B EACH AG IA: CPT | Mod: 91

## 2021-03-31 PROCEDURE — 99291 CRITICAL CARE FIRST HOUR: CPT | Performed by: PSYCHIATRY & NEUROLOGY

## 2021-03-31 PROCEDURE — 700111 HCHG RX REV CODE 636 W/ 250 OVERRIDE (IP): Performed by: PSYCHIATRY & NEUROLOGY

## 2021-03-31 PROCEDURE — U0003 INFECTIOUS AGENT DETECTION BY NUCLEIC ACID (DNA OR RNA); SEVERE ACUTE RESPIRATORY SYNDROME CORONAVIRUS 2 (SARS-COV-2) (CORONAVIRUS DISEASE [COVID-19]), AMPLIFIED PROBE TECHNIQUE, MAKING USE OF HIGH THROUGHPUT TECHNOLOGIES AS DESCRIBED BY CMS-2020-01-R: HCPCS

## 2021-03-31 PROCEDURE — 83735 ASSAY OF MAGNESIUM: CPT

## 2021-03-31 PROCEDURE — A9270 NON-COVERED ITEM OR SERVICE: HCPCS | Performed by: PSYCHIATRY & NEUROLOGY

## 2021-03-31 PROCEDURE — 96365 THER/PROPH/DIAG IV INF INIT: CPT

## 2021-03-31 PROCEDURE — 86900 BLOOD TYPING SEROLOGIC ABO: CPT

## 2021-03-31 PROCEDURE — 85025 COMPLETE CBC W/AUTO DIFF WBC: CPT

## 2021-03-31 PROCEDURE — 85730 THROMBOPLASTIN TIME PARTIAL: CPT

## 2021-03-31 RX ORDER — PROCHLORPERAZINE EDISYLATE 5 MG/ML
5-10 INJECTION INTRAMUSCULAR; INTRAVENOUS EVERY 4 HOURS PRN
Status: DISCONTINUED | OUTPATIENT
Start: 2021-03-31 | End: 2021-04-29 | Stop reason: HOSPADM

## 2021-03-31 RX ORDER — ASPIRIN 81 MG/1
324 TABLET, CHEWABLE ORAL DAILY
Status: DISCONTINUED | OUTPATIENT
Start: 2021-04-01 | End: 2021-03-31

## 2021-03-31 RX ORDER — DIPHENHYDRAMINE HYDROCHLORIDE 50 MG/ML
25 INJECTION INTRAMUSCULAR; INTRAVENOUS ONCE
Status: COMPLETED | OUTPATIENT
Start: 2021-03-31 | End: 2021-03-31

## 2021-03-31 RX ORDER — METOCLOPRAMIDE HYDROCHLORIDE 5 MG/ML
10 INJECTION INTRAMUSCULAR; INTRAVENOUS ONCE
Status: COMPLETED | OUTPATIENT
Start: 2021-03-31 | End: 2021-03-31

## 2021-03-31 RX ORDER — AMOXICILLIN 250 MG
2 CAPSULE ORAL 2 TIMES DAILY
Status: DISCONTINUED | OUTPATIENT
Start: 2021-03-31 | End: 2021-04-03

## 2021-03-31 RX ORDER — ASPIRIN 81 MG/1
81 TABLET, CHEWABLE ORAL DAILY
Status: DISCONTINUED | OUTPATIENT
Start: 2021-04-01 | End: 2021-04-04

## 2021-03-31 RX ORDER — CARVEDILOL 12.5 MG/1
1 TABLET ORAL 2 TIMES DAILY
Status: ON HOLD | COMMUNITY
End: 2021-04-29

## 2021-03-31 RX ORDER — ACETAMINOPHEN 325 MG/1
650 TABLET ORAL EVERY 6 HOURS PRN
Status: DISCONTINUED | OUTPATIENT
Start: 2021-03-31 | End: 2021-04-03

## 2021-03-31 RX ORDER — ASPIRIN 300 MG/1
300 SUPPOSITORY RECTAL ONCE
Status: COMPLETED | OUTPATIENT
Start: 2021-03-31 | End: 2021-03-31

## 2021-03-31 RX ORDER — ATORVASTATIN CALCIUM 40 MG/1
80 TABLET, FILM COATED ORAL EVERY EVENING
Status: DISCONTINUED | OUTPATIENT
Start: 2021-04-01 | End: 2021-04-03

## 2021-03-31 RX ORDER — LABETALOL HYDROCHLORIDE 5 MG/ML
10-20 INJECTION, SOLUTION INTRAVENOUS EVERY 4 HOURS PRN
Status: DISCONTINUED | OUTPATIENT
Start: 2021-03-31 | End: 2021-04-03

## 2021-03-31 RX ORDER — AZITHROMYCIN 250 MG/1
250-500 TABLET, FILM COATED ORAL DAILY
Status: ON HOLD | COMMUNITY
End: 2021-04-29

## 2021-03-31 RX ORDER — PROMETHAZINE HYDROCHLORIDE 25 MG/1
12.5-25 TABLET ORAL EVERY 4 HOURS PRN
Status: DISCONTINUED | OUTPATIENT
Start: 2021-03-31 | End: 2021-04-03

## 2021-03-31 RX ORDER — BISACODYL 10 MG
10 SUPPOSITORY, RECTAL RECTAL
Status: DISCONTINUED | OUTPATIENT
Start: 2021-03-31 | End: 2021-04-03

## 2021-03-31 RX ORDER — SODIUM CHLORIDE 9 MG/ML
50 INJECTION, SOLUTION INTRAVENOUS ONCE
Status: CANCELLED | OUTPATIENT
Start: 2021-03-31 | End: 2021-04-01

## 2021-03-31 RX ORDER — ASPIRIN 325 MG
325 TABLET ORAL DAILY
Status: DISCONTINUED | OUTPATIENT
Start: 2021-04-01 | End: 2021-03-31

## 2021-03-31 RX ORDER — MAGNESIUM SULFATE HEPTAHYDRATE 40 MG/ML
2 INJECTION, SOLUTION INTRAVENOUS ONCE
Status: COMPLETED | OUTPATIENT
Start: 2021-03-31 | End: 2021-03-31

## 2021-03-31 RX ORDER — PROMETHAZINE HYDROCHLORIDE 25 MG/1
12.5-25 SUPPOSITORY RECTAL EVERY 4 HOURS PRN
Status: DISCONTINUED | OUTPATIENT
Start: 2021-03-31 | End: 2021-04-29 | Stop reason: HOSPADM

## 2021-03-31 RX ORDER — ATORVASTATIN CALCIUM 20 MG/1
40 TABLET, FILM COATED ORAL EVERY EVENING
Status: DISCONTINUED | OUTPATIENT
Start: 2021-03-31 | End: 2021-03-31

## 2021-03-31 RX ORDER — ONDANSETRON 2 MG/ML
4 INJECTION INTRAMUSCULAR; INTRAVENOUS EVERY 4 HOURS PRN
Status: DISCONTINUED | OUTPATIENT
Start: 2021-03-31 | End: 2021-04-29 | Stop reason: HOSPADM

## 2021-03-31 RX ORDER — ONDANSETRON 4 MG/1
4 TABLET, ORALLY DISINTEGRATING ORAL EVERY 4 HOURS PRN
Status: DISCONTINUED | OUTPATIENT
Start: 2021-03-31 | End: 2021-04-03

## 2021-03-31 RX ORDER — ASPIRIN 300 MG/1
300 SUPPOSITORY RECTAL DAILY
Status: DISCONTINUED | OUTPATIENT
Start: 2021-04-01 | End: 2021-04-02

## 2021-03-31 RX ORDER — MULTIVIT WITH MINERALS/LUTEIN
1 TABLET ORAL DAILY
Status: ON HOLD | COMMUNITY
End: 2021-04-29

## 2021-03-31 RX ORDER — POLYETHYLENE GLYCOL 3350 17 G/17G
1 POWDER, FOR SOLUTION ORAL
Status: DISCONTINUED | OUTPATIENT
Start: 2021-03-31 | End: 2021-04-03

## 2021-03-31 RX ORDER — HYDRALAZINE HYDROCHLORIDE 20 MG/ML
20 INJECTION INTRAMUSCULAR; INTRAVENOUS
Status: DISCONTINUED | OUTPATIENT
Start: 2021-03-31 | End: 2021-04-03

## 2021-03-31 RX ADMIN — IOHEXOL 80 ML: 350 INJECTION, SOLUTION INTRAVENOUS at 19:44

## 2021-03-31 RX ADMIN — ASPIRIN 300 MG: 300 SUPPOSITORY RECTAL at 21:32

## 2021-03-31 RX ADMIN — METOCLOPRAMIDE 10 MG: 5 INJECTION, SOLUTION INTRAMUSCULAR; INTRAVENOUS at 21:32

## 2021-03-31 RX ADMIN — DIPHENHYDRAMINE HYDROCHLORIDE 25 MG: 50 INJECTION INTRAMUSCULAR; INTRAVENOUS at 23:40

## 2021-03-31 RX ADMIN — MAGNESIUM SULFATE 2 G: 2 INJECTION INTRAVENOUS at 21:32

## 2021-03-31 RX ADMIN — IOHEXOL 40 ML: 350 INJECTION, SOLUTION INTRAVENOUS at 19:42

## 2021-03-31 ASSESSMENT — FIBROSIS 4 INDEX: FIB4 SCORE: 1.16

## 2021-04-01 ENCOUNTER — APPOINTMENT (OUTPATIENT)
Dept: CARDIOLOGY | Facility: MEDICAL CENTER | Age: 56
DRG: 023 | End: 2021-04-01
Attending: INTERNAL MEDICINE
Payer: OTHER MISCELLANEOUS

## 2021-04-01 ENCOUNTER — APPOINTMENT (OUTPATIENT)
Dept: RADIOLOGY | Facility: MEDICAL CENTER | Age: 56
DRG: 023 | End: 2021-04-01
Attending: PSYCHIATRY & NEUROLOGY
Payer: OTHER MISCELLANEOUS

## 2021-04-01 PROBLEM — R33.9 URINARY RETENTION: Status: ACTIVE | Noted: 2021-04-01

## 2021-04-01 LAB
ALBUMIN SERPL BCP-MCNC: 3.8 G/DL (ref 3.2–4.9)
ALBUMIN/GLOB SERPL: 1.5 G/DL
ALP SERPL-CCNC: 66 U/L (ref 30–99)
ALT SERPL-CCNC: 30 U/L (ref 2–50)
ANION GAP SERPL CALC-SCNC: 11 MMOL/L (ref 7–16)
AST SERPL-CCNC: 22 U/L (ref 12–45)
BASOPHILS # BLD AUTO: 0 % (ref 0–1.8)
BASOPHILS # BLD: 0 K/UL (ref 0–0.12)
BILIRUB SERPL-MCNC: 0.6 MG/DL (ref 0.1–1.5)
BUN SERPL-MCNC: 14 MG/DL (ref 8–22)
CALCIUM SERPL-MCNC: 9.2 MG/DL (ref 8.5–10.5)
CHLORIDE SERPL-SCNC: 106 MMOL/L (ref 96–112)
CHOLEST SERPL-MCNC: 169 MG/DL (ref 100–199)
CO2 SERPL-SCNC: 22 MMOL/L (ref 20–33)
CREAT SERPL-MCNC: 1.02 MG/DL (ref 0.5–1.4)
EOSINOPHIL # BLD AUTO: 0 K/UL (ref 0–0.51)
EOSINOPHIL NFR BLD: 0 % (ref 0–6.9)
ERYTHROCYTE [DISTWIDTH] IN BLOOD BY AUTOMATED COUNT: 36.5 FL (ref 35.9–50)
EST. AVERAGE GLUCOSE BLD GHB EST-MCNC: 123 MG/DL
GLOBULIN SER CALC-MCNC: 2.5 G/DL (ref 1.9–3.5)
GLUCOSE SERPL-MCNC: 106 MG/DL (ref 65–99)
HBA1C MFR BLD: 5.9 % (ref 4–5.6)
HCT VFR BLD AUTO: 41.6 % (ref 42–52)
HDLC SERPL-MCNC: 32 MG/DL
HGB BLD-MCNC: 14.8 G/DL (ref 14–18)
IMM GRANULOCYTES # BLD AUTO: 0.02 K/UL (ref 0–0.11)
IMM GRANULOCYTES NFR BLD AUTO: 0.3 % (ref 0–0.9)
LDLC SERPL CALC-MCNC: 112 MG/DL
LV EJECT FRACT  99904: 75
LYMPHOCYTES # BLD AUTO: 0.89 K/UL (ref 1–4.8)
LYMPHOCYTES NFR BLD: 12 % (ref 22–41)
MCH RBC QN AUTO: 29.5 PG (ref 27–33)
MCHC RBC AUTO-ENTMCNC: 35.6 G/DL (ref 33.7–35.3)
MCV RBC AUTO: 82.9 FL (ref 81.4–97.8)
MONOCYTES # BLD AUTO: 0.41 K/UL (ref 0–0.85)
MONOCYTES NFR BLD AUTO: 5.5 % (ref 0–13.4)
NEUTROPHILS # BLD AUTO: 6.12 K/UL (ref 1.82–7.42)
NEUTROPHILS NFR BLD: 82.2 % (ref 44–72)
NRBC # BLD AUTO: 0 K/UL
NRBC BLD-RTO: 0 /100 WBC
PLATELET # BLD AUTO: 262 K/UL (ref 164–446)
PMV BLD AUTO: 9.1 FL (ref 9–12.9)
POTASSIUM SERPL-SCNC: 4.6 MMOL/L (ref 3.6–5.5)
PROT SERPL-MCNC: 6.3 G/DL (ref 6–8.2)
RBC # BLD AUTO: 5.02 M/UL (ref 4.7–6.1)
SARS-COV-2 RNA RESP QL NAA+PROBE: DETECTED
SODIUM SERPL-SCNC: 139 MMOL/L (ref 135–145)
SPECIMEN SOURCE: ABNORMAL
T4 FREE SERPL-MCNC: 1.52 NG/DL (ref 0.93–1.7)
TRIGL SERPL-MCNC: 126 MG/DL (ref 0–149)
TSH SERPL DL<=0.005 MIU/L-ACNC: <0.005 UIU/ML (ref 0.38–5.33)
WBC # BLD AUTO: 7.4 K/UL (ref 4.8–10.8)

## 2021-04-01 PROCEDURE — 770001 HCHG ROOM/CARE - MED/SURG/GYN PRIV*

## 2021-04-01 PROCEDURE — 92610 EVALUATE SWALLOWING FUNCTION: CPT

## 2021-04-01 PROCEDURE — 70551 MRI BRAIN STEM W/O DYE: CPT

## 2021-04-01 PROCEDURE — 93306 TTE W/DOPPLER COMPLETE: CPT | Mod: 26 | Performed by: INTERNAL MEDICINE

## 2021-04-01 PROCEDURE — 51798 US URINE CAPACITY MEASURE: CPT

## 2021-04-01 PROCEDURE — 99291 CRITICAL CARE FIRST HOUR: CPT | Performed by: NURSE PRACTITIONER

## 2021-04-01 PROCEDURE — 700111 HCHG RX REV CODE 636 W/ 250 OVERRIDE (IP): Performed by: NURSE PRACTITIONER

## 2021-04-01 PROCEDURE — A9270 NON-COVERED ITEM OR SERVICE: HCPCS | Performed by: INTERNAL MEDICINE

## 2021-04-01 PROCEDURE — 700111 HCHG RX REV CODE 636 W/ 250 OVERRIDE (IP): Performed by: INTERNAL MEDICINE

## 2021-04-01 PROCEDURE — 700105 HCHG RX REV CODE 258: Performed by: NURSE PRACTITIONER

## 2021-04-01 PROCEDURE — 306565 RIGID MIT RESTRAINT(PAIR): Performed by: INTERNAL MEDICINE

## 2021-04-01 PROCEDURE — 83036 HEMOGLOBIN GLYCOSYLATED A1C: CPT

## 2021-04-01 PROCEDURE — 85025 COMPLETE CBC W/AUTO DIFF WBC: CPT

## 2021-04-01 PROCEDURE — 97166 OT EVAL MOD COMPLEX 45 MIN: CPT

## 2021-04-01 PROCEDURE — 84443 ASSAY THYROID STIM HORMONE: CPT

## 2021-04-01 PROCEDURE — 97163 PT EVAL HIGH COMPLEX 45 MIN: CPT

## 2021-04-01 PROCEDURE — 80061 LIPID PANEL: CPT

## 2021-04-01 PROCEDURE — 99233 SBSQ HOSP IP/OBS HIGH 50: CPT | Performed by: PSYCHIATRY & NEUROLOGY

## 2021-04-01 PROCEDURE — 700102 HCHG RX REV CODE 250 W/ 637 OVERRIDE(OP): Performed by: INTERNAL MEDICINE

## 2021-04-01 PROCEDURE — 93306 TTE W/DOPPLER COMPLETE: CPT

## 2021-04-01 PROCEDURE — 84439 ASSAY OF FREE THYROXINE: CPT

## 2021-04-01 PROCEDURE — 80053 COMPREHEN METABOLIC PANEL: CPT

## 2021-04-01 RX ORDER — DIPHENHYDRAMINE HYDROCHLORIDE 50 MG/ML
25 INJECTION INTRAMUSCULAR; INTRAVENOUS ONCE
Status: DISCONTINUED | OUTPATIENT
Start: 2021-04-01 | End: 2021-04-02

## 2021-04-01 RX ORDER — SODIUM CHLORIDE 9 MG/ML
INJECTION, SOLUTION INTRAVENOUS CONTINUOUS
Status: DISCONTINUED | OUTPATIENT
Start: 2021-04-01 | End: 2021-04-04

## 2021-04-01 RX ORDER — MAGNESIUM SULFATE HEPTAHYDRATE 40 MG/ML
2 INJECTION, SOLUTION INTRAVENOUS ONCE
Status: COMPLETED | OUTPATIENT
Start: 2021-04-01 | End: 2021-04-01

## 2021-04-01 RX ADMIN — SODIUM CHLORIDE 500 MG: 9 INJECTION, SOLUTION INTRAVENOUS at 12:30

## 2021-04-01 RX ADMIN — PROCHLORPERAZINE EDISYLATE 5 MG: 5 INJECTION INTRAMUSCULAR; INTRAVENOUS at 10:35

## 2021-04-01 RX ADMIN — SODIUM CHLORIDE: 9 INJECTION, SOLUTION INTRAVENOUS at 11:40

## 2021-04-01 RX ADMIN — MAGNESIUM SULFATE 2 G: 2 INJECTION INTRAVENOUS at 10:35

## 2021-04-01 RX ADMIN — ASPIRIN 300 MG: 300 SUPPOSITORY RECTAL at 05:13

## 2021-04-01 ASSESSMENT — ENCOUNTER SYMPTOMS
BACK PAIN: 0
FLANK PAIN: 0
CHILLS: 0
BLURRED VISION: 0
SENSORY CHANGE: 1
PALPITATIONS: 0
WEAKNESS: 1
DOUBLE VISION: 0
FOCAL WEAKNESS: 1
NAUSEA: 0
COUGH: 0
VOMITING: 0
HEADACHES: 1
FEVER: 0
SHORTNESS OF BREATH: 0
ABDOMINAL PAIN: 0

## 2021-04-01 ASSESSMENT — COGNITIVE AND FUNCTIONAL STATUS - GENERAL
HELP NEEDED FOR BATHING: A LOT
DAILY ACTIVITIY SCORE: 15
HELP NEEDED FOR BATHING: A LOT
CLIMB 3 TO 5 STEPS WITH RAILING: TOTAL
TURNING FROM BACK TO SIDE WHILE IN FLAT BAD: A LITTLE
TOILETING: A LOT
SUGGESTED CMS G CODE MODIFIER DAILY ACTIVITY: CK
STANDING UP FROM CHAIR USING ARMS: A LOT
PERSONAL GROOMING: A LITTLE
TURNING FROM BACK TO SIDE WHILE IN FLAT BAD: UNABLE
PERSONAL GROOMING: A LITTLE
DRESSING REGULAR UPPER BODY CLOTHING: A LOT
DRESSING REGULAR LOWER BODY CLOTHING: A LOT
EATING MEALS: A LITTLE
EATING MEALS: A LITTLE
MOVING TO AND FROM BED TO CHAIR: UNABLE
SUGGESTED CMS G CODE MODIFIER MOBILITY: CL
MOBILITY SCORE: 11
DRESSING REGULAR LOWER BODY CLOTHING: A LOT
SUGGESTED CMS G CODE MODIFIER MOBILITY: CM
MOVING FROM LYING ON BACK TO SITTING ON SIDE OF FLAT BED: A LOT
TOILETING: A LITTLE
WALKING IN HOSPITAL ROOM: TOTAL
WALKING IN HOSPITAL ROOM: TOTAL
STANDING UP FROM CHAIR USING ARMS: A LOT
MOVING FROM LYING ON BACK TO SITTING ON SIDE OF FLAT BED: UNABLE
DAILY ACTIVITIY SCORE: 14
MOBILITY SCORE: 7
SUGGESTED CMS G CODE MODIFIER DAILY ACTIVITY: CK
CLIMB 3 TO 5 STEPS WITH RAILING: TOTAL
MOVING TO AND FROM BED TO CHAIR: A LOT
DRESSING REGULAR UPPER BODY CLOTHING: A LOT

## 2021-04-01 ASSESSMENT — PATIENT HEALTH QUESTIONNAIRE - PHQ9
2. FEELING DOWN, DEPRESSED, IRRITABLE, OR HOPELESS: NOT AT ALL
SUM OF ALL RESPONSES TO PHQ9 QUESTIONS 1 AND 2: 0
1. LITTLE INTEREST OR PLEASURE IN DOING THINGS: NOT AT ALL

## 2021-04-01 ASSESSMENT — ACTIVITIES OF DAILY LIVING (ADL): TOILETING: INDEPENDENT

## 2021-04-01 ASSESSMENT — PAIN DESCRIPTION - PAIN TYPE
TYPE: ACUTE PAIN
TYPE: ACUTE PAIN

## 2021-04-01 ASSESSMENT — GAIT ASSESSMENTS: GAIT LEVEL OF ASSIST: UNABLE TO PARTICIPATE

## 2021-04-01 NOTE — ASSESSMENT & PLAN NOTE
-Diagnosed about 9 months ago  -Was reportedly taking Coreg, however stopped taking it when he went back into sinus rhythm  -has not been on anticoag  -has been treating holistically OP. Taking vitamins, including Vitamin K  -ECHO unremarkable  -telemetry - currently NSR  -Chadsvasc2 score is 2 with stroke, needs anticoagulation long term once stable. ASA started. Per Neurology, will consider anticoagulation with NOAC in ~2 weeks (around 4/14- repeat head CT prior to initiation), or outside acute edema development phase

## 2021-04-01 NOTE — ED PROVIDER NOTES
"ED Provider Note    CHIEF COMPLAINT  Chief Complaint   Patient presents with   • Possible Stroke     BIB [facility] for stroke like symptoms. Pt was last seen well by wife at 0630. He texted his wife at 1500 then took a bath. Pt was trying to get out of the bath when symptoms started. Presents with L hemipalegia, signifigant facial paralysis, R gaze deviation. Pt has hx afib and stated, \"my heart's been in afib since Fri.\" Seen by PCP on Mon for afib and started on metoprolol. Presents in SR-ST. + COVID test on 3/18 after flu like s/s x 3/15.       HPI  Thong Arzola is a 56 y.o. male who presents to the emergency room today with complaints of onset of left sided weakness possible CVA.  Patient was last seen normal in person by his wife at 6:30 AM, he had text messaged her at 3 PM.  Patient was found by family with left-sided weakness difficulty speaking facial droop and right lateral deviation of his eyes.  NIH scale of 21.  He has history apparently recent atrial fibrillation and was placed on metoprolol by his primary care physician he is not currently on any blood thinners.  Appear to be in severe distress secondary suspected CVA.  He was taken emergently to CT scan but was outside window for TPA and not considered a candidate as he is greater than 4.5 from suspected time of onset of symptoms.  He also had recent Covid infection approximately 2 weeks ago.    REVIEW OF SYSTEMS  See HPI for further details. All other systems are negative.     PAST MEDICAL HISTORY  Past Medical History:   Diagnosis Date   • Afib (HCC)    • High cholesterol        FAMILY HISTORY  [unfilled]    SOCIAL HISTORY  Social History     Socioeconomic History   • Marital status: Single     Spouse name: Not on file   • Number of children: Not on file   • Years of education: Not on file   • Highest education level: Not on file   Occupational History   • Not on file   Tobacco Use   • Smoking status: Never Smoker   • Smokeless tobacco: Never " "Used   Substance and Sexual Activity   • Alcohol use: Yes     Comment: occ   • Drug use: No   • Sexual activity: Not on file   Other Topics Concern   • Not on file   Social History Narrative   • Not on file     Social Determinants of Health     Financial Resource Strain:    • Difficulty of Paying Living Expenses:    Food Insecurity:    • Worried About Running Out of Food in the Last Year:    • Ran Out of Food in the Last Year:    Transportation Needs:    • Lack of Transportation (Medical):    • Lack of Transportation (Non-Medical):    Physical Activity:    • Days of Exercise per Week:    • Minutes of Exercise per Session:    Stress:    • Feeling of Stress :    Social Connections:    • Frequency of Communication with Friends and Family:    • Frequency of Social Gatherings with Friends and Family:    • Attends Religion Services:    • Active Member of Clubs or Organizations:    • Attends Club or Organization Meetings:    • Marital Status:    Intimate Partner Violence:    • Fear of Current or Ex-Partner:    • Emotionally Abused:    • Physically Abused:    • Sexually Abused:        SURGICAL HISTORY  Past Surgical History:   Procedure Laterality Date   • KNEE RECONSTRUCTION      left knee orthoscopic       CURRENT MEDICATIONS  Home Medications     Reviewed by Du Carias (Pharmacy Tech) on 03/31/21 at 2039  Med List Status: Complete   Medication Last Dose Status   ARMOUR THYROID 180 MG Tab 3/31/2021 Active   Ascorbic Acid (VITAMIN C) 1000 MG Tab 3/31/2021 Active   azithromycin (ZITHROMAX) 250 MG Tab 3/26/2021 Active   B Complex Vitamins (B COMPLEX 1 PO) 3/31/2021 Active   carvedilol (COREG) 12.5 MG Tab 3/31/2021 Active   metoprolol tartrate (LOPRESSOR) 25 MG Tab 3/31/2021 Active   VITAMIN D PO 3/31/2021 Active   VITAMIN K PO 3/31/2021 Active                ALLERGIES  No Known Allergies    PHYSICAL EXAM  VITAL SIGNS: /58   Pulse 81   Temp 36.5 °C (97.7 °F) (Temporal)   Resp 15   Ht 1.778 m (5' 10\")   " Wt 79.6 kg (175 lb 7.8 oz)   SpO2 95%   BMI 25.18 kg/m²       Constitutional: Well developed, Well nourished, severe distress, Non-toxic appearance.   HENT: Normocephalic, Atraumatic, Bilateral external ears normal, Oropharynx moist, No oral exudates, Nose normal.   Eyes: Right lateral gaze preference, neglect on the left side noted.  Neck: Normal range of motion, No tenderness, Supple, No stridor.   Lymphatic: No lymphadenopathy noted.   Cardiovascular: Normal heart rate, Normal rhythm, No murmurs, No rubs, No gallops.   Thorax & Lungs: Normal breath sounds, No respiratory distress, No wheezing, No chest tenderness.   Abdomen: Bowel sounds normal, Soft, No tenderness, No masses, No pulsatile masses.   Skin: Warm, Dry, No erythema, No rash.   Back: No tenderness, No CVA tenderness.   Genitalia: External genitalia appear normal, No masses or lesions. No discharge.      Extremities: Intact distal pulses, No edema, No tenderness, No cyanosis, No clubbing.   Musculoskeletal: Good range of motion in all major joints. No tenderness to palpation or major deformities noted.   Neurologic: Alert & oriented x 2 difficult as patient has some expressive aphasia dysarthria, he has right facial droop right lateral gaze preference, hemiparesis on the left NIH scale of 21.  Patient was not considered a candidate for TPA as he is outside the window of 4.5 hours.  Psychiatric:  unable to fully assess at this time    EKG  Normal sinus rhythm at 80 bpm, no ST elevation or depression, no widening of the QRS complex, good R wave progression, OR interval's are normal, this is twelve-lead EKG no previous EKG available to staff for comparison.    RADIOLOGY/PROCEDURES  DX-CHEST-PORTABLE (1 VIEW)   Final Result         1.  Interstitial pulmonary parenchymal prominence, compatible with interstitial edema and/or infiltrates.      CT-CTA NECK WITH & W/O-POST PROCESSING   Final Result      Acute occlusion of the right internal carotid artery  shortly after bifurcation. It is occluded up to the level of the carotid terminus.      CT-CTA HEAD WITH & W/O-POST PROCESS   Final Result      Acute right M1 occlusion.      Findings were discussed with RHONDA DIAZ on 3/31/2021 7:54 PM.      CT-CEREBRAL PERFUSION ANALYSIS   Final Result      1.  Cerebral blood flow less than 30% likely representing completed infarct = 134 mL.      2.  T Max more than 6 seconds likely representing combination of completed infarct and ischemia = 210 mL.      3.  Mismatched volume likely representing ischemic brain/penumbra = 76 mL.      4.  Please note that the cerebral perfusion was performed on the limited brain tissue around the basal ganglia region. Infarct/ischemia outside the CT perfusion sections can be missed in this study.      CT-HEAD W/O   Final Result   Addendum 1 of 1   In retrospect, there is subtle loss of gray-white matter differentiation    in the right MCA distribution, consistent with acute infarct.      Final      1.  No CT evidence of acute infarct, hemorrhage or mass.   2.  Mild paranasal sinus disease.      EC-ECHOCARDIOGRAM COMPLETE W/O CONT    (Results Pending)   MR-BRAIN-W/O    (Results Pending)         COURSE & MEDICAL DECISION MAKING  Pertinent Labs & Imaging studies reviewed. (See chart for details)  Patient has a large clot in the right MCA with extension into the carotid bifurcation on the right and was not considered a candidate for clot retraction because of this.  This may be from his recent atrial fibrillation or recent Covid infection causing to be thrombotic.  He will be admitted to the intensive care unit as I discussed case with neurologist Dr. Cruz, neurosurgery was notified Dr. Payton as well as the intensivist and hospitalist.  Patient in severe distress secondary to the MCV right thrombotic CVA with extension to the carotid.  Neurologist called for permissive hypertension, control of MCA edema and carotid watch.  Please see orders for  further planning care.    FINAL IMPRESSION  1.  Acute CVA, right middle cerebral artery with extension to the right carotid  2.  Acute left hemiplegia secondary #1  3.  History of proximal atrial fibrillation  4.  Patient history of Covid 19 infection  5.  Critical care time of 65 minutes; includes multiple conversations with hospitalist, discussions with neurosurgery, multiple discussions with neurologist, discussions with intensivist, review of records and discussion of treatment plan, interventions as discussed above, this does not include any procedures.  Please see orders for further plan and care.         Electronically signed by: Kerwin Davila D.O., 4/1/2021 2:03 AM

## 2021-04-01 NOTE — CONSULTS
"Neurology STROKE IR CODE H&P  Neurohospitalist Service, [facility]    Referring Physician: Kerwin Davila D.O.    STROKE CODE:   Chief Complaint   Patient presents with   • Possible Stroke     BIB [facility] for stroke like symptoms. Pt was last seen well by wife at 0630. He texted his wife at 1500 then took a bath. Pt was trying to get out of the bath when symptoms started. Presents with L hemipalegia, signifigant facial paralysis, R gaze deviation. Pt has hx afib and stated, \"my heart's been in afib since Fri.\" Seen by PCP on Mon for afib and started on metoprolol. Presents in SR-ST. + COVID test on 3/18 after flu like s/s x 3/15.     HPI: Thong Arzola is a 56 y.o. man with a history of afib off AC, HTN, and HLD presenting for whom neurology has been consulted for acute onset left sided face, arm, and leg weakness with right gaze deviation.  The patient was seen by wife at 0630 3/31/21 with symptoms discovered around 3pm during a bath, thus 1500 registered as LKN.  Wife describes that the patient was not on anticoagulation as he was being treated by a homeopathic \"doctor.\"  The patient admits headache.  It is located right frontally, dull in quality, and mild to moderate in intensity, with associated nausea and blurred vision, lasting hours.  The patient denies vertigo.  Is not aware of his deficits.    Review of systems: In addition to what is detailed in the HPI above, all other systems reviewed and are negative.    Past Medical History:    has a past medical history of Afib (HCC) and High cholesterol.    FHx:  No history of stroke in the young    SHx:   reports that he has never smoked. He has never used smokeless tobacco. He reports current alcohol use. He reports that he does not use drugs.    Allergies:  No Known Allergies    Medications:  No current facility-administered medications for this encounter.    Current Outpatient Medications:   •  Metoprolol-hydroCHLOROthiazide " "(METOPROLOL-HCTZ ER PO), Take  by mouth., Disp: , Rfl:   •  CARVEDILOL PO, Take  by mouth., Disp: , Rfl:   •  NP THYROID 90 MG Tab, Take 90 mg by mouth., Disp: , Rfl:   •  ARMOUR THYROID 180 MG Tab, Take 90 mg by mouth 2 Times a Day., Disp: , Rfl:   •  losartan (COZAAR) 25 MG Tab, Take 25 mg by mouth every day., Disp: , Rfl:     Physical Examination:    Vitals:    03/31/21 1900 03/31/21 1948 03/31/21 1950   BP:  160/96    Pulse:  100 98   Resp:  20 (!) 24   SpO2:  100% 97%   Weight: 79.4 kg (175 lb)     Height: 1.778 m (5' 10\")         General: Patient is lethargic but rousable  Eyes: examination of optic disks not indicated at this time given acuity of consult  CV: RRR    NEUROLOGICAL EXAM:     Mental status: Awake, alert and oriented, follows simple but not commands  Speech and language: speech is mildly dysarthric. The patient is able to name and repeat without aphasia  Cranial nerve exam: Pupils are equal, round and reactive to light bilaterally. Visual fields: L HHA. Extraocular muscles: forced right deviation, approximates but does not cross midline on tracking. Sensation in the face is diminished to light touch on the left. Face is notable for UMN pattern of weakness on the left. Due to mental status, unable to appropriately assess sensation in the face, hearing to finger rub, palate elevation, shoulder shrug, or tongue.  Motor exam: left sided hemiplegia, 1/5 in arm and 3/5 leg. Tone is mildly spastic on the left. No abnormal movements were seen on exam.  Sensory exam: triple flexion to noxious stim LLE, dense hemineglect with somatagnosia  Deep tendon reflexes: 2+ and symmetric. Toes down-going on right and UP on the left  Coordination: no ataxia   Gait: deferred given patient preference    NIH Stroke Scale:    1a. Level of Consciousness (Alert, drowsy, etc): 1= Drowsy    1b. LOC Questions (Month, age): 2= Incorrect    1c. LOC Commands (Open/close eyes make fist/let go): 2= Incorrect    2.   Best Gaze (Eyes " open - patient follows examiner's finger on face): 2= Forced deviation    3.   Visual Fields (introduce visual stimulus/threat to patient's field quadrants): 2= Complete Hemianopia  4.   Facial Paresis (Show teeth, raise eyebrows and squeeze eyes shut): 2 = Partial     5a. Motor Arm - Left (Elevate arm to 90 degrees if patient is sitting, 45 degrees if  supine): 4= No movement    5b. Motor Arm - Right (Elevate arm to 90 degrees if patient is sitting, 45 degrees if supine): 0= No drift    6a. Motor Leg - Left (Elevate leg 30 degrees with patient supine): 2= Can't resist gravity    6b. Motor Leg - Right  (Elevate leg 30 degrees with patient supine): 0= No drift    7.   Limb Ataxia (Finger-nose, heel down shin): 0= No ataxia    8.   Sensory (Pin prick to face, arm, trunk and leg - compare side to side): 2- Severe loss    9.  Best Language (Name item, describe a picture and read sentences): 0= No aphasia    10. Dysarthria (Evaluate speech clarity by patient repeating listed words): 1= Mild to moderate slurring    11. Extinction and Inattention (Use information from prior testing to identify neglect or  double simultaneous stimuli testing): 2= Complete neglect    Total NIH Score: 18    Modified Meghan Scale (MRS): 0 = No symptoms    Objective Data:    Labs:  Lab Results   Component Value Date/Time    PROTHROMBTM 10.7 (L) 04/03/2008 09:30 AM    INR 0.93 04/03/2008 09:30 AM      Lab Results   Component Value Date/Time    WBC 4.7 (L) 04/04/2008 03:05 AM    RBC 4.82 04/04/2008 03:05 AM    HEMOGLOBIN 14.8 04/04/2008 03:05 AM    HEMATOCRIT 40.0 (L) 04/04/2008 03:05 AM    MCV 83.1 04/04/2008 03:05 AM    MCH 30.7 04/04/2008 03:05 AM    MCHC 36.9 (H) 04/04/2008 03:05 AM    MPV 6.4 (L) 04/04/2008 03:05 AM    NEUTSPOLYS 55.4 04/04/2008 03:05 AM    LYMPHOCYTES 37.5 04/04/2008 03:05 AM    MONOCYTES 5.2 04/04/2008 03:05 AM    EOSINOPHILS 1.8 04/04/2008 03:05 AM    BASOPHILS 0.1 04/04/2008 03:05 AM      Lab Results   Component Value  Date/Time    SODIUM 139 04/04/2008 03:05 AM    POTASSIUM 4.4 04/04/2008 03:05 AM    CHLORIDE 106 04/04/2008 03:05 AM    CO2 27 04/04/2008 03:05 AM    GLUCOSE 98 04/04/2008 03:05 AM    BUN 19 04/04/2008 03:05 AM    CREATININE 1.3 04/04/2008 03:05 AM      Lab Results   Component Value Date/Time    CHOLSTRLTOT 197 04/04/2008 03:05 AM     (H) 04/04/2008 03:05 AM    HDL 34 (L) 04/04/2008 03:05 AM    TRIGLYCERIDE 164 (H) 04/04/2008 03:05 AM       No results found for: ALKPHOSPHAT, ASTSGOT, ALTSGPT, TBILIRUBIN     Imaging/Testing:    I interpreted and/or reviewed the patient's neuroimaging    CT-CTA NECK WITH & W/O-POST PROCESSING   Final Result      Acute occlusion of the right internal carotid artery shortly after bifurcation. It is occluded up to the level of the carotid terminus.      CT-CTA HEAD WITH & W/O-POST PROCESS   Final Result      Acute right M1 occlusion.      Findings were discussed with RHONDA DIAZ on 3/31/2021 7:54 PM.      CT-CEREBRAL PERFUSION ANALYSIS   Final Result      1.  Cerebral blood flow less than 30% likely representing completed infarct = 134 mL.      2.  T Max more than 6 seconds likely representing combination of completed infarct and ischemia = 210 mL.      3.  Mismatched volume likely representing ischemic brain/penumbra = 76 mL.      4.  Please note that the cerebral perfusion was performed on the limited brain tissue around the basal ganglia region. Infarct/ischemia outside the CT perfusion sections can be missed in this study.      CT-HEAD W/O   Final Result   Addendum 1 of 1   In retrospect, there is subtle loss of gray-white matter differentiation    in the right MCA distribution, consistent with acute infarct.      Final      1.  No CT evidence of acute infarct, hemorrhage or mass.   2.  Mild paranasal sinus disease.      DX-CHEST-PORTABLE (1 VIEW)    (Results Pending)       Assessment and Plan:    Thong Arzola is a 56 y.o. man with a history of afib off AC, HTN,  and HLD presenting for whom neurology has been consulted for acute onset left sided weakness and right gaze deviation.  The patient is out of window to be considered a candidate for tPA (beyond 4.5hr).  The CTA shows a R ICA complete occlussion; discussed with neuro-IR Pranav; thus not amendable to IR intervention.  Anticipate that the patient will suffer malignant R MCA edema with peak swelling window 3-5 days from now 3/31/21 ie 4/3-4/5/21.  The patient meets inclusion criteria for LUISITO DEVINE DESTINY, and thus should have neurosurgical expertise consult patient for haylie-craniectomy watch.  Intensivist service to guide indication for osmotic therapy pending decline in neurological examination, and/or radiographic evidence of significant mass effect.  The differential diagnosis as it pertains to etiology is likely cardioembolic ie afib off AC.  To be admitted to ICU in guarded condition.    Plan:    1. Acute R ICA territory infarct / stroke  - Admit to the hospital for further workup of etiology and to provide secondary stroke prevention measures  - NEUROSURGERY consult for hemicraniectomy watch; peak window for swelling is 4/3-4/5/21  - Neurology checks and vital signs per protocol  - Permissive HTN  - obtain normoglycemia and avoid hypo- or hyper -natremia; aim for normothermia  - secondary stroke prevention therapy with ASA qd and high intensity statin per SPARCL Trial  - serum studies for stroke risk factors: lipid panel & hemoglobin A1C  - To identify stroke territory, obtain MRI brain  - the results of the brain MRI AND neurosurgical candidacy for potential hemicraniectomy will guide appropriate timing for a transition from aspirin to anticoagulation for secondary stroke prevention  - consideration of osmotic therapy pending progression of clinical examination and serial imaging  - evaluate and treat with PT/OT/ST; physiatry consult    2. Secondary headache  - headache cocktail x1: 2g IV Magnesium,  10mg IV Reglan, 25mg IV Benadryl    The evaluation of the patient, and recommended management, was discussed with Dr. Davila (ERP), Dr. Rock (neuro-IR), and Dr. Long (ICU)    Mc Calvillo MD  Neurohospitalist, Acute Care Services   of Neurology    CRITICAL CARE    Upon my evaluation, this patient had a high probability of imminent or life-threatening deterioration due to cerebrovascular accident which required my direct attention, intervention, and personal management.  I personally provided 45 minutes of total critical care time outside of time spent on separately billable/documented procedures. Time includes: review of laboratory data, review of radiology studies, discussion with consultants, discussion with family/patient, determining candidacy for thrombolytics and/or IR intervention, and monitoring for potential decompensation.  Interventions were performed as documented in detail in the chart.

## 2021-04-01 NOTE — ASSESSMENT & PLAN NOTE
-Presented with left side weakness and facial paralysis. CT imaging showed Acute right [place]AM[place] territory stroke  -Outside the window for alteplase, not a candidate for thrombectomy per IR  -Underwent hemicraniotomy on 04/03 for increased ICP   -Patient had worsening facial edema due to cerebral edema with evolving infarct on 04/04. Received hypertonic saline bolus on 04/05   -Echo from 04/01 showed normal EF and no shunt     Plan:  -q4h neuro checks  -SBP goal acutely is 120-160, long-term goal is 110-130/60-80  -continue to wean hypertonic saline, normal Na goal 135-145  -Maintain normothermia, normoglycemia  -Enteral nutrition through tube feedings  -Elevate head of bed  -PT/OT/SLP  -PMNR  -Restarted aspirin and DVT prophylaxis as per neurology. No need for prophylactic anti-epileptics. However he needs long term anticoagulation for his atrial fibrillation; per Neurology, will consider anticoagulation with NOAC in ~2 weeks (around 4/14- repeat head CT prior to initiation), or outside acute edema development phase

## 2021-04-01 NOTE — PROGRESS NOTES
Patient transported from ED to R113 via gurney with this ACLS RN and critical care tech, VSS. Wife updated regarding POC for the night. MD notified of arrival to unit.

## 2021-04-01 NOTE — CONSULTS
Critical Care Consultation    Date of consult: 3/31/2021    Referring Physician  Gia Forman M.D.    Reason for Consultation  Acute right ICA territory stroke    History of Presenting Illness  56 y.o. male who presented 3/31/2021 with acute onset left-sided weakness, left facial droop and right gaze deviation.  Last known well around 1500.  At that time he had difficulty getting out of the bath and noted hemiplegia.  His wife got back to check on him around 1630 and found him in a strange position with facial droop and altered speech as well as hemiplegia and EMS was activated.  CTA showed a right ICA complete occlusion.  He was out of the window for alteplase and this was not amenable to IR thrombectomy per discussion with IR.  He has a past medical history of atrial fibrillation.  Apparently he had just returned from [place] and he had gone back into atrial fibrillation.  He was given some metoprolol by his PCP.  He was not on anticoagulation.  He has a recent history of COVID-19 infection with positive    Code Status  Full Code    Review of Systems  Review of Systems   Unable to perform ROS: Mental status change       Past Medical History   has a past medical history of Afib (HCC) and High cholesterol.    Surgical History   has a past surgical history that includes knee reconstruction.    Family History  family history is not on file.    Social History   reports that he has never smoked. He has never used smokeless tobacco. He reports current alcohol use. He reports that he does not use drugs.    Medications  Home Medications     Reviewed by Du Carias (Pharmacy Tech) on 03/31/21 at 2039  Med List Status: Complete   Medication Last Dose Status   ARMOUR THYROID 180 MG Tab 3/31/2021 Active   Ascorbic Acid (VITAMIN C) 1000 MG Tab 3/31/2021 Active   azithromycin (ZITHROMAX) 250 MG Tab 3/26/2021 Active   B Complex Vitamins (B COMPLEX 1 PO) 3/31/2021 Active   carvedilol (COREG) 12.5 MG Tab 3/31/2021 Active    metoprolol tartrate (LOPRESSOR) 25 MG Tab 3/31/2021 Active   VITAMIN D PO 3/31/2021 Active   VITAMIN K PO 3/31/2021 Active              Current Facility-Administered Medications   Medication Dose Route Frequency Provider Last Rate Last Admin   • [START ON 4/1/2021] aspirin (ASA) chewable tab 81 mg  81 mg Oral DAILY Mc Calvillo M.D.       • Pharmacy consult request - Allow for permissive hypertension: SBP up to 220 mmHg/DBP up to 120 mmHg x 48 hours   Other PHARMACY TO DOSE Gia Forman M.D.       • senna-docusate (PERICOLACE or SENOKOT S) 8.6-50 MG per tablet 2 tablet  2 tablet Oral BID Gia Forman M.D.        And   • polyethylene glycol/lytes (MIRALAX) PACKET 1 Packet  1 Packet Oral QDAY PRN Gia Forman M.D.        And   • magnesium hydroxide (MILK OF MAGNESIA) suspension 30 mL  30 mL Oral QDAY PRN Gia Forman M.D.        And   • bisacodyl (DULCOLAX) suppository 10 mg  10 mg Rectal QDAY PRN Gia Forman M.D.       • ondansetron (ZOFRAN) syringe/vial injection 4 mg  4 mg Intravenous Q4HRS PRN Gia Forman M.D.       • ondansetron (ZOFRAN ODT) dispertab 4 mg  4 mg Oral Q4HRS PRN Gia Forman M.D.       • promethazine (PHENERGAN) tablet 12.5-25 mg  12.5-25 mg Oral Q4HRS PRN Gia oFrman M.D.       • promethazine (PHENERGAN) suppository 12.5-25 mg  12.5-25 mg Rectal Q4HRS PRN Gia Forman M.D.       • prochlorperazine (COMPAZINE) injection 5-10 mg  5-10 mg Intravenous Q4HRS PRN Gia Forman M.D.       • acetaminophen (Tylenol) tablet 650 mg  650 mg Oral Q6HRS PRN Gia Forman M.D.       • [START ON 4/1/2021] atorvastatin (LIPITOR) tablet 80 mg  80 mg Oral Q EVENING Gia Forman M.D.       • [START ON 4/1/2021] aspirin (ASA) suppository 300 mg  300 mg Rectal DAILY Gia Forman M.D.       • magnesium sulfate IVPB premix 2 g  2 g Intravenous Once Mc Calvillo M.D. 25 mL/hr at 03/31/21 2132 2 g at 03/31/21 2132   • diphenhydrAMINE (BENADRYL) injection 25 mg  25 mg Intravenous Once Mc Calvillo M.D.          Current Outpatient Medications   Medication Sig Dispense Refill   • carvedilol (COREG) 12.5 MG Tab Take 1 tablet by mouth 2 times a day.     • metoprolol tartrate (LOPRESSOR) 25 MG Tab Take 25 mg by mouth 2 times a day.     • azithromycin (ZITHROMAX) 250 MG Tab Take 250-500 mg by mouth every day. Take as directed     • VITAMIN D PO Take 1 capsule by mouth every day.     • B Complex Vitamins (B COMPLEX 1 PO) Take 1 tablet by mouth every day.     • VITAMIN K PO Take 1 tablet by mouth every day.     • Ascorbic Acid (VITAMIN C) 1000 MG Tab Take 1 tablet by mouth every day.     • ARMOUR THYROID 180 MG Tab Take 90 mg by mouth 2 Times a Day.         Allergies  No Known Allergies    Vital Signs last 24 hours  Pulse:  [] 98  Resp:  [20-24] 24  BP: (160)/(96) 160/96  SpO2:  [97 %-100 %] 97 %    Physical Exam  Physical Exam  Vitals and nursing note reviewed.   HENT:      Head: Normocephalic and atraumatic.      Nose: Nose normal.      Mouth/Throat:      Mouth: Mucous membranes are moist.   Eyes:      Pupils: Pupils are equal, round, and reactive to light.   Cardiovascular:      Rate and Rhythm: Regular rhythm. Tachycardia present.   Pulmonary:      Effort: No respiratory distress.      Breath sounds: No wheezing or rales.   Abdominal:      General: There is no distension.      Palpations: Abdomen is soft.      Tenderness: There is no abdominal tenderness.   Musculoskeletal:         General: No swelling or deformity.      Cervical back: Neck supple.   Skin:     General: Skin is warm and dry.      Capillary Refill: Capillary refill takes 2 to 3 seconds.   Neurological:      Mental Status: He is alert.      Comments: Dense left hemiparesis, right hemineglect, left facial droop, arouses, follows commands appropriately and answers questions appropriately with dysarthria         Fluids  No intake or output data in the 24 hours ending 03/31/21 3259    Laboratory  Recent Results (from the past 48 hour(s))   CBC WITH  DIFFERENTIAL    Collection Time: 03/31/21  7:29 PM   Result Value Ref Range    WBC 9.3 4.8 - 10.8 K/uL    RBC 4.76 4.70 - 6.10 M/uL    Hemoglobin 14.0 14.0 - 18.0 g/dL    Hematocrit 39.6 (L) 42.0 - 52.0 %    MCV 83.2 81.4 - 97.8 fL    MCH 29.4 27.0 - 33.0 pg    MCHC 35.4 (H) 33.7 - 35.3 g/dL    RDW 35.7 (L) 35.9 - 50.0 fL    Platelet Count 200 164 - 446 K/uL    MPV 9.1 9.0 - 12.9 fL    Neutrophils-Polys 87.10 (H) 44.00 - 72.00 %    Lymphocytes 8.50 (L) 22.00 - 41.00 %    Monocytes 4.00 0.00 - 13.40 %    Eosinophils 0.10 0.00 - 6.90 %    Basophils 0.10 0.00 - 1.80 %    Immature Granulocytes 0.20 0.00 - 0.90 %    Nucleated RBC 0.00 /100 WBC    Neutrophils (Absolute) 8.13 (H) 1.82 - 7.42 K/uL    Lymphs (Absolute) 0.79 (L) 1.00 - 4.80 K/uL    Monos (Absolute) 0.37 0.00 - 0.85 K/uL    Eos (Absolute) 0.01 0.00 - 0.51 K/uL    Baso (Absolute) 0.01 0.00 - 0.12 K/uL    Immature Granulocytes (abs) 0.02 0.00 - 0.11 K/uL    NRBC (Absolute) 0.00 K/uL   COMP METABOLIC PANEL    Collection Time: 03/31/21  7:29 PM   Result Value Ref Range    Sodium 140 135 - 145 mmol/L    Potassium 4.0 3.6 - 5.5 mmol/L    Chloride 110 96 - 112 mmol/L    Co2 21 20 - 33 mmol/L    Anion Gap 9.0 7.0 - 16.0    Glucose 108 (H) 65 - 99 mg/dL    Bun 14 8 - 22 mg/dL    Creatinine 1.03 0.50 - 1.40 mg/dL    Calcium 8.6 8.5 - 10.5 mg/dL    AST(SGOT) 23 12 - 45 U/L    ALT(SGPT) 31 2 - 50 U/L    Alkaline Phosphatase 58 30 - 99 U/L    Total Bilirubin 0.4 0.1 - 1.5 mg/dL    Albumin 3.5 3.2 - 4.9 g/dL    Total Protein 5.9 (L) 6.0 - 8.2 g/dL    Globulin 2.4 1.9 - 3.5 g/dL    A-G Ratio 1.5 g/dL   PROTHROMBIN TIME    Collection Time: 03/31/21  7:29 PM   Result Value Ref Range    PT 14.0 12.0 - 14.6 sec    INR 1.04 0.87 - 1.13   APTT    Collection Time: 03/31/21  7:29 PM   Result Value Ref Range    APTT 24.1 (L) 24.7 - 36.0 sec   COD (ADULT)    Collection Time: 03/31/21  7:29 PM   Result Value Ref Range    ABO Grouping Only A     Rh Grouping Only POS     Antibody  Screen-Cod NEG    TROPONIN    Collection Time: 21  7:29 PM   Result Value Ref Range    Troponin T 11 6 - 19 ng/L   Magnesium    Collection Time: 21  7:29 PM   Result Value Ref Range    Magnesium 2.0 1.5 - 2.5 mg/dL   ESTIMATED GFR    Collection Time: 21  7:29 PM   Result Value Ref Range    GFR If African American >60 >60 mL/min/1.73 m 2    GFR If Non African American >60 >60 mL/min/1.73 m 2   ACCU-CHEK GLUCOSE    Collection Time: 21  7:30 PM   Result Value Ref Range    Glucose - Accu-Ck 106 (H) 65 - 99 mg/dL   EKG (NOW)    Collection Time: 21  7:49 PM   Result Value Ref Range    Report       [facility]    Test Date:  2021  Pt Name:    LUCY JOHNSON                 Department: ER  MRN:        [id number]                      Room:       [facility]  Gender:     Male                         Technician: [id number]  :        1965                   Requested By:RHONDA DIAZ  Order #:    [id number]                    Reading MD:    Measurements  Intervals                                Axis  Rate:       97                           P:          61  WI:         188                          QRS:        -14  QRSD:       84                           T:          19  QT:         380  QTc:        483    Interpretive Statements  SINUS RHYTHM  BORDERLINE LOW VOLTAGE IN FRONTAL LEADS  BORDERLINE PROLONGED QT INTERVAL  Compared to ECG 2008 09:01:33  Ventricular premature complex(es) no longer present     CoV, Flu A/B RAPID ANTIGEN: Collect one dry nasal swab AND NP swab in VTM    Collection Time: 21  8:05 PM    Specimen: Nasopharyngeal; Respirate   Result Value Ref Range    Influenza A AG by CRISTIAN Negative Negative    Influenza B AG by CRISTIAN Negative Negative    SARS-COV ANTIGEN CRISTIAN NotDetected Not-Detected    SARS-CoV-2 Source Nasal Swab    SARS-CoV-2 PCR (24 hour In-House): Collect NP swab in VTM    Collection Time: 21  8:05 PM    Specimen: Respirate    Result Value Ref Range    SARS-CoV-2 Source NP Swab    ABO Rh Confirm    Collection Time: 03/31/21  8:15 PM   Result Value Ref Range    ABO Rh Confirm A POS        Imaging  DX-CHEST-PORTABLE (1 VIEW)   Final Result         1.  Interstitial pulmonary parenchymal prominence, compatible with interstitial edema and/or infiltrates.      CT-CTA NECK WITH & W/O-POST PROCESSING   Final Result      Acute occlusion of the right internal carotid artery shortly after bifurcation. It is occluded up to the level of the carotid terminus.      CT-CTA HEAD WITH & W/O-POST PROCESS   Final Result      Acute right M1 occlusion.      Findings were discussed with RHONDA DIAZ on 3/31/2021 7:54 PM.      CT-CEREBRAL PERFUSION ANALYSIS   Final Result      1.  Cerebral blood flow less than 30% likely representing completed infarct = 134 mL.      2.  T Max more than 6 seconds likely representing combination of completed infarct and ischemia = 210 mL.      3.  Mismatched volume likely representing ischemic brain/penumbra = 76 mL.      4.  Please note that the cerebral perfusion was performed on the limited brain tissue around the basal ganglia region. Infarct/ischemia outside the CT perfusion sections can be missed in this study.      CT-HEAD W/O   Final Result   Addendum 1 of 1   In retrospect, there is subtle loss of gray-white matter differentiation    in the right MCA distribution, consistent with acute infarct.      Final      1.  No CT evidence of acute infarct, hemorrhage or mass.   2.  Mild paranasal sinus disease.      EC-ECHOCARDIOGRAM COMPLETE W/O CONT    (Results Pending)       Assessment/Plan  * Acute right MCA stroke (HCC)- (present on admission)  Assessment & Plan  Acute right LAMONT territory stroke  Outside the window for alteplase, not a candidate for thrombectomy per IR  Admit to neuro ICU, frequent neuro checks  Permissive hypertension  Follow-up CT, MRI brain  Neurosurgery consulted in ED, continue to follow daily CT  or stat with neuro change  Maximal risk for malignant edema 3-5 days, monitor closely need for decompressive hemicraniectomy  Continue glycemic control, maintain normal sodium level  Keep HOB elevated  Initiate anticoagulation antiplatelet therapy  Early initiation of osmotic therapy if needed  PT OT SLP as appropriate    Paroxysmal atrial fibrillation (HCC)  Assessment & Plan  Currently in sinus rhythm with recent history of AF not on chronic anticoagulation  Follow-up echocardiogram  Monitor on telemetry    History of COVID-19- (present on admission)  Assessment & Plan  Recent COVID-19 infection, positive PCR 3/18/2021   He is currently on room air, no respiratory distress  Repeat PCR, will be kept on COVID-19 isolation precautions at this time      Acquired hypothyroidism- (present on admission)  Assessment & Plan  On [place] Thyroid, follow-up TFTs, continue replacement as appropriate      Discussed patient condition and risk of morbidity and/or mortality with Hospitalist, RN, RT, Pharmacy and ERP, neurology.    The patient remains critically ill.  Critical care time = 45 minutes in directly providing and coordinating critical care and extensive data review.  No time overlap and excludes procedures.

## 2021-04-01 NOTE — CARE PLAN
Problem: Safety:  Goal: Risk of aspiration will decrease  Outcome: PROGRESSING AS EXPECTED  Note: Patient's head of bed at 30 degrees, currently NPO pending SLP eval in order to decrease risk of aspiration.     Problem: Mobility:  Goal: Capacity to carry out activities will improve  Outcome: PROGRESSING SLOWER THAN EXPECTED  Note: Patient currently displaying hemiplegia due to recent stroke. Minimal movement observed in left lower, left upper remains flaccid. Will continue to encourage participation in mobility with x2 assistance.

## 2021-04-01 NOTE — FLOWSHEET NOTE
Patient demonstrates severe left sided neglect, denies any sensation in left extremities. However, with minor stimulation forcefully withdraws left upper overcoming full resistance. Light touch to bottom of left foot also elicits significantly more movement than patient is capable of performing voluntarily.

## 2021-04-01 NOTE — PROGRESS NOTES
Patient scheduled for FEES tomorrow by Speech. Per Neuro NP, ok to hold off on Cortrak placement for today.

## 2021-04-01 NOTE — THERAPY
"Occupational Therapy   Initial Evaluation     Patient Name: Thong Arzola  Age:  56 y.o., Sex:  male  Medical Record #: [id number]  Today's Date: 4/1/2021     Precautions  Precautions: (P) Fall Risk, Swallow Precautions ( See Comments)  Comments: (P) L sided neglect    Assessment  Patient is 56 y.o. male who presents to acute w/ L sided weakness and facial droop. Pt was outside window for TPA. Pt found to have L sided neglect, required max verbal and tactile cueing to turn head to midline, could not track past midline. Pt's L UE flaccid, w/ sensory deficits. Pt required mod A for seated grooming, max A for LB dressing, and Max A for sit<>stand. Will continue to follow     Plan    Recommend Occupational Therapy 5 times per week until therapy goals are met for the following treatments:  Adaptive Equipment, Neuro Re-Education / Balance, Self Care/Activities of Daily Living, Therapeutic Activities and Therapeutic Exercises.    DC Equipment Recommendations: (P) Unable to determine at this time  Discharge Recommendations: (P) Recommend post-acute placement for additional occupational therapy services prior to discharge home . Please consider PM&R consult     Subjective    \"I hike and walk a lot\"     Objective       04/01/21 1603   Total Time Spent   Total Time Spent (Mins) 30   Charge Group   OT Evaluation OT Evaluation Mod   Initial Contact Note    Initial Contact Note Order Received and Verified, Occupational Therapy Evaluation in Progress with Full Report to Follow.   Prior Living Situation   Prior Services None   Housing / Facility 2 Story House   Bathroom Set up Bathtub / Shower Combination   Equipment Owned None   Lives with - Patient's Self Care Capacity Spouse   Comments spouse works during the day, reports she could take time off to assist in recovery   Prior Level of ADL Function   Self Feeding Independent   Grooming / Hygiene Independent   Bathing Independent   Dressing Independent   Toileting Independent "   Prior Level of IADL Function   Medication Management Independent   Laundry Independent   Kitchen Mobility Independent   Finances Independent   Home Management Independent   Shopping Independent   Prior Level Of Mobility Independent Without Device in Community;Independent Without Device in Home   Driving / Transportation Driving Independent   Occupation (Pre-Hospital Vocational) Not Employed  (reports in between jobs)   Precautions   Precautions Fall Risk;Swallow Precautions ( See Comments)   Comments L sided neglect   Vitals   O2 (LPM) 0   O2 Delivery Device None - Room Air   Pain 0 - 10 Group   Therapist Pain Assessment Post Activity Pain Same as Prior to Activity;Nurse Notified   Cognition    Cognition / Consciousness X   Speech/ Communication Delayed Responses   Level of Consciousness Alert   Comments increased processing time, L sided neglect   Passive ROM Upper Body   Passive ROM Upper Body WDL   Active ROM Upper Body   Active ROM Upper Body  X   Dominant Hand Right   Comments L UE flaccid   Strength Upper Body   Comments L UE flaccid   Sensation Upper Body   Upper Extremity Sensation  X   Lt Upper Extremity Light Touch Absent   Lt Upper Extremity Sharp / Dull Sensation Absent   Lt Upper Extremity Deep Pressure Sensation Absent   Lt Upper Extremity Proprioception Absent   Lt Upper Extremity Stereognosis Absent   Comments no reaction to pain    Coordination Upper Body   Coordination X   Fine Motor Coordination impaired L UE    Gross Motor Coordination impaired L UE   Balance Assessment   Sitting Balance (Static) Trace +   Sitting Balance (Dynamic) Trace   Standing Balance (Static) Trace   Standing Balance (Dynamic) Trace   Weight Shift Sitting Poor   Weight Shift Standing Absent   Comments pushed w/ R UE to L    Bed Mobility    Supine to Sit Maximal Assist   Sit to Supine Maximal Assist   Scooting Maximal Assist   ADL Assessment   Grooming Moderate Assist;Seated   Lower Body Dressing Maximal Assist  (socks)    How much help from another person does the patient currently need...   Putting on and taking off regular lower body clothing? 2   Bathing (including washing, rinsing, and drying)? 2   Toileting, which includes using a toilet, bedpan, or urinal? 2   Putting on and taking off regular upper body clothing? 2   Taking care of personal grooming such as brushing teeth? 3   Eating meals? 3   6 Clicks Daily Activity Score 14   Functional Mobility   Sit to Stand Maximal Assist   Mobility supine<>sit EOB   ICU Target Mobility Level   ICU Mobility - Targeted Level Level 3A   Visual Perception   Visual Perception  X   Comments L sided neglect   Activity Tolerance   Sitting in Chair NT   Sitting Edge of Bed 10 min   Standing 2 min   Patient / Family Goals   Patient / Family Goal #1 To return to PLOF   Short Term Goals   Short Term Goal # 1 Pt will sit EOB unsupported and perform grooming w/ SPV   Short Term Goal # 2 Pt will attend 3 objects on L side w/ only v/c's   Short Term Goal # 3 Pt will increase L UE strength to 2/5 grossly   Short Term Goal # 4 Pt will perform ADL txf w/ min A   Education Group   Role of Occupational Therapist Patient Response Patient;Acceptance;Explanation;Demonstration;Verbal Demonstration;Action Demonstration;Family   Problem List   Problem List Decreased Active Daily Living Skills;Decreased Homemaking Skills;Decreased Functional Mobility;Decreased Activity Tolerance;Impaired Postural Control / Balance;Decreased Upper Extremity Strength Left;Decreased Upper Extremity AROM Left;Impaired Coordination Left Upper Extremity;Impaired Sensation Left Upper Extremity   Anticipated Discharge Equipment and Recommendations   DC Equipment Recommendations Unable to determine at this time   Discharge Recommendations Recommend post-acute placement for additional occupational therapy services prior to discharge home   Interdisciplinary Plan of Care Collaboration   IDT Collaboration with  Nursing;Physical Therapist    Patient Position at End of Therapy Call Light within Reach;Tray Table within Reach;Phone within Reach;Bed Alarm On;In Bed   Collaboration Comments report given    Session Information   Date / Session Number  4/1, 1 (1/5, 4/7)   Priority 3

## 2021-04-01 NOTE — CONSULTS
DATE OF SERVICE:  04/01/2021     NEUROSURGERY CONSULT NOTE     REASON FOR CONSULTATION:  Right MCA stroke.     HISTORY OF PRESENT ILLNESS:  This is a 56-year-old gentleman who presented to   [facility] yesterday with a dense right MCA stroke who is   too far past the time for TPA and apparently was not a good candidate for   thrombectomy.  He is now in the ICU.  He has a dense left paraplegia and   otherwise is intact.  His CT scan did not show any shift yesterday.    Neurosurgery was consulted for hemicraniectomy watch.     REVIEW OF SYSTEMS:  A 12-point review of systems was negative for any form of   seizure or loss of consciousness.  He is a left-sided plegic.     PAST MEDICAL HISTORY:  Unknown.     PAST SURGICAL HISTORY:  Unknown.     MEDICATIONS:  Please see EMR.     PHYSICAL EXAMINATION:    GENERAL:  He is alert and oriented x3.  He is able to answer questions   appropriately.  No signs of dysarthria or aphasia.  HEENT:  He is atraumatic, normocephalic.  PULMONARY:  Normal work of breathing.  CARDIOVASCULAR:  2+ pulses x4.  NEUROLOGIC:  Motor examination:  He has full strength on the right side, he is   flaccid on the left side in the lower extremities, left upper extremity has   very minimal tone in the hand.     DIAGNOSTIC DATA:  CT head without contrast demonstrates right MCA   hyperdensity.  There is no significant vasogenic edema throughout the cerebral   cortex.     ASSESSMENT AND PLAN:  At this time, we would follow the patient with CT scans   daily.  We will follow remotely and leave all medical management up to ICU and   neurology staff.  The patient has findings of increased cerebral edema, this   is not appropriately treated with hyperosmolar therapy and begins to have   decline, then we would move forward with a right decompressive hemicraniectomy   if he develops large stroke territories.     A total of 50 minutes was spent in direct patient care, coordination and    consultation.        ______________________________  MD NINI Meneses/AYAN/KAYA    DD:  04/01/2021 09:35  DT:  04/01/2021 11:37    Job#:  [id number]

## 2021-04-01 NOTE — THERAPY
Physical Therapy   Initial Evaluation     Patient Name: Thong Arzola  Age:  56 y.o., Sex:  male  Medical Record #: [id number]  Today's Date: 4/1/2021     Precautions: Fall Risk, Swallow Precautions ( See Comments)    Assessment  Pt presents to PT s/p R LAMONT CVA with L deficits. He was able to demonstrate bed mobility with max A, EOB activity with physical assist, sit<>stands with mod A and pre-gait with max A and facilitation/cueing. He is primarily limited 2' to L neglect and L sided deficits, though was notable engaged and participatory throughout visit. Will continue to visit with details/recs as below.     Plan    Recommend Physical Therapy 5 times per week until therapy goals are met for the following treatments:  Bed Mobility, Community Re-integration, Equipment, Gait Training, Manual Therapy, Neuro Re-Education / Balance, Self Care/Home Evaluation, Stair Training, Therapeutic Activities and Therapeutic Exercises    DC Equipment Recommendations: Unable to determine at this time  Discharge Recommendations:  Recommend post-acute placement for additional physical therapy services prior to discharge home        04/01/21 1602   Prior Living Situation   Prior Services None   Housing / Facility 2 Story House   Steps In Home   (FOS with rails)   Bathroom Set up Bathtub / Shower Combination   Equipment Owned None   Lives with - Patient's Self Care Capacity Spouse   Comments spouse able to assist though does usually work   Prior Level of Functional Mobility   Bed Mobility Independent   Transfer Status Independent   Ambulation Independent   Distance Ambulation (Feet)   (community)   Assistive Devices Used None   Stairs Independent   Comments I with IADls and ADls prior   History of Falls   History of Falls No   Cognition    Cognition / Consciousness X   Speech/ Communication Delayed Responses  (1-2 word responses)   Level of Consciousness Alert   Safety Awareness Impaired   Attention Impaired   Comments notable L  neglect; does not track past midline and needs notable facilitation to attend to L side; delayed processing though as expected given nature of insult   Passive ROM Lower Body   Passive ROM Lower Body X   Comments grossly WFL, though at times slightly spastic; note L ankle to ~neutral and clonus initially   Active ROM Lower Body    Active ROM Lower Body  X   Comments RLE grossly WFL; LLE no AROM though did note trace push at hip extension with standing activity   Strength Lower Body   Lower Body Strength  X   Comments as above; RLE grossly WFL; slight trace hip movement after standing/approximation activities   Sensation Lower Body   Lower Extremity Sensation   X   Comments absent light touch/pressure at L side   Neurological Concerns   Neurological Concerns Yes   Comments given L deficits   Balance Assessment   Sitting Balance (Static) Poor -   Sitting Balance (Dynamic) Trace +   Standing Balance (Static) Trace +   Standing Balance (Dynamic) Dependent   Weight Shift Sitting Poor   Weight Shift Standing Absent   Comments sitting EOB with L lateral lean initially; noted after multiple stands, pt begins to push to L side; did best with external cueing to correct; standing wtih max A and total assist with pre-gait (with LLE stance with knee blocked); possible slight trace engagement of LLE with facilitation/approximation activities in standing   Gait Analysis   Gait Level Of Assist Unable to Participate   Weight Bearing Status no restriction   Bed Mobility    Supine to Sit Maximal Assist   Sit to Supine Total Assist   Scooting Maximal Assist   Comments HOB elevated + assist with trunk LE/facilitation and intiaition   Functional Mobility   Sit to Stand Moderate Assist  (x2 for safety)   Bed, Chair, Wheelchair Transfer Unable to Participate   Mobility bed mobility, EOB activities, sit<>stands, pre-gait as able   ICU Target Mobility Level   ICU Mobility - Targeted Level Level 2   How much difficulty does the patient  currently have...   Turning over in bed (including adjusting bedclothes, sheets and blankets)? 1   Sitting down on and standing up from a chair with arms (e.g., wheelchair, bedside commode, etc.) 1   Moving from lying on back to sitting on the side of the bed? 1   How much help from another person does the patient currently need...   Moving to and from a bed to a chair (including a wheelchair)? 2   Need to walk in a hospital room? 1   Climbing 3-5 steps with a railing? 1   6 clicks Mobility Score 7   Activity Tolerance   Sitting in Chair NT   Sitting Edge of Bed at least 15 mins   Standing ~2 mins total   Comments fatigues towards end of activity after ~25 mins or so; though notably particpatory and engaged/motivated prior to fatigue   Edema / Skin Assessment   Edema / Skin  Not Assessed   Patient / Family Goals    Patient / Family Goal #1 to return home   Short Term Goals    Short Term Goal # 1 Pt will be able to perform supine<>sit with bed features with min A in 6tx to promote fnx progression towards I    Short Term Goal # 2 Pt will be able to perform sit<>stand/transfers with hemiwalker with Taz in 6tx to promote fnx progression towards I    Short Term Goal # 3 Pt will be able to ambulate 25ft with hemiwalker with min A in 6tx to promote fnx progression towards I    Education Group   Education Provided Role of Physical Therapist;Cerebral Vascular Accident   Role of Physical Therapist Patient Response Patient;Significant Other;Acceptance;Explanation;Verbal Demonstration;Action Demonstration   CVA Patient Response Patient;Significant Other;Acceptance;Explanation;Verbal Demonstration;Action Demonstration;Reinforcement Needed   Additional Comments education re: positioing recs, acute healing process, activity/therex and sensory input recs to facilitate L attention/dec neglect and engage L side as able/tolerating

## 2021-04-01 NOTE — CONSULTS
Patient has dense right MCA stroke not a candidate for clot retrieval or TPA.  Neurosurgery notified due to the fact patient has a likely complete right MCA stroke and will swell.    At this time CT shows no cerebral edema and no midline shift.    Assessment and plan management per neurology please reconsult once patient has midline shift if there is concern for need for decompression.    We will follow remotely at this time no neurosurgical input no neurosurgical need at this time.    Arthur Payton MD     we will write a full consult note once neurosurgery is needed and we will evaluate the patient for neurosurgery intervention once midline shift developes decline. Please use maximum medical management prior to consideration for decompression and obtain daily Ct head to follow possible need for decompression

## 2021-04-01 NOTE — ED NOTES
Med rec complete per interview with pt at bedside and phone call with pt home pharmacies. Pt was started on metoprolol 25 mg 1 tab twice daily on Monday. Pt states that he had already been taking Carvedilol 12.5 mg 1 tab twice daily as a maintenance medication and states that he continued to take the carvedilol after starting the metoprolol taking both this morning. Allergies reviewed. Pt states that he finished a z-tete on 03/26/21.

## 2021-04-01 NOTE — ASSESSMENT & PLAN NOTE
-Positive PCR 3/18/2021  -Patient currently is asymptomatic, on room air, in no respiratory distress.  No indications to treat  -Repeat PCR 3/31 remains positive.  Per hospital infectious prevention, no longer requires isolation

## 2021-04-01 NOTE — H&P
"Hospital Medicine History & Physical Note    Date of Service  3/31/2021    Primary Care Physician  Venkat Cantu M.D.    Consultants  Neuro  Neurosurgery  ICU     Code Status  Full Code    Chief Complaint  Chief Complaint   Patient presents with   • Possible Stroke     BIB [facility] for stroke like symptoms. Pt was last seen well by wife at 0630. He texted his wife at 1500 then took a bath. Pt was trying to get out of the bath when symptoms started. Presents with L hemipalegia, signifigant facial paralysis, R gaze deviation. Pt has hx afib and stated, \"my heart's been in afib since Fri.\" Seen by PCP on Mon for afib and started on metoprolol. Presents in SR-ST. + COVID test on 3/18 after flu like s/s x 3/15.       History of Presenting Illness  56 y.o. male who presented 3/31/2021 as transfer from outside facility due to stroke like symptoms. Patient was last seen well by his wife at 0630 am. He texted his wife around 3PM then took a bath and when he tried to get out of the bath, it apparently seems like that's when his symptoms started. Time is unclear at this point. Patient noted to have left sided hemiplegia with left facial paralysis and was sent to [facility]. Due to unknown time, patient was not a candidate for tPA. Patient in ED appears confused. He states that he is in the ED due to his atrial fibrillation, which he does have history of, however is currently in sinus rhythm. CT head done shows complete occlusion of the right MCA and it was discussed with IR who states that he is not a candidate for thrombectomy. Neurology evaluated the patient and recommends ICU care as there is high risk for brain edema at this time.     Munira, his wife, seen at bedside. She states that they were in [place] last week where he drank cold water and went into afib. He was seen by a physician there and was given some medication. They returned last Friday and this Monday, he was seen by his PCP who prescribed him more " "medication for his afib. This morning as she was leaving for work, she did not notice anything unusual. She heard from the patient around 3PM that he was going to take a bath. Around 4:30 she found it unusual that she did not hear from him so went home to check on him and found him in the bathtub without any water in a \"strange\" position. She states that his right lip was dropped and his speech was altered. She tried to help him up however he was hemiplegic to the left so she called the EMS. Confirmed with wife that patient is FULL code.     Review of Systems  Review of Systems   Unable to perform ROS: Medical condition       Past Medical History   has a past medical history of Afib (HCC) and High cholesterol. hypohtyroidism    Surgical History   has a past surgical history that includes knee reconstruction.     Family History  Grandfather passed away from Heart attack at age 57, father passed away from heart attack at age 64    Social History   reports that he has never smoked. He has never used smokeless tobacco. He reports current alcohol use. He reports that he does not use drugs.    Allergies  No Known Allergies    Medications  Prior to Admission Medications   Prescriptions Last Dose Informant Patient Reported? Taking?   ARMOUR THYROID 180 MG Tab 3/31/2021 at AM  Yes No   Sig: Take 90 mg by mouth 2 Times a Day.   carvedilol (COREG) 12.5 MG Tab 3/31/2021 at AM  Yes Yes   Sig: Take 1 tablet by mouth every day.   metoprolol tartrate (LOPRESSOR) 25 MG Tab 3/31/2021 at AM  Yes Yes   Sig: Take 25 mg by mouth 2 times a day.      Facility-Administered Medications: None       Physical Exam  Pulse:  [] 98  Resp:  [20-24] 24  BP: (160)/(96) 160/96  SpO2:  [97 %-100 %] 97 %    Physical Exam  Vitals and nursing note reviewed.   Constitutional:       General: He is not in acute distress.     Appearance: Normal appearance. He is normal weight. He is not ill-appearing.   HENT:      Head: Normocephalic and atraumatic.     "  Mouth/Throat:      Mouth: Mucous membranes are moist.      Pharynx: Oropharynx is clear.   Eyes:      General: No scleral icterus.     Extraocular Movements: Extraocular movements intact.      Conjunctiva/sclera: Conjunctivae normal.      Pupils: Pupils are equal, round, and reactive to light.   Cardiovascular:      Rate and Rhythm: Regular rhythm. Tachycardia present.      Pulses: Normal pulses.      Heart sounds: Normal heart sounds.   Pulmonary:      Effort: Pulmonary effort is normal.      Breath sounds: Normal breath sounds. No rhonchi.   Abdominal:      General: Abdomen is flat. Bowel sounds are normal. There is no distension.      Palpations: Abdomen is soft.      Tenderness: There is no abdominal tenderness.   Musculoskeletal:         General: No swelling or tenderness.      Cervical back: Neck supple. No rigidity or tenderness.      Comments: Left side no range of motion   Skin:     General: Skin is warm and dry.   Neurological:      Mental Status: He is alert. He is disoriented.      Comments: Left side upper and lower extremity plegic, no power, right gaze preference, sensation is decreased in left side, appears to have left side neglect         Laboratory:  CBC normal H/H and wbc, platelet at 200  BMP shows sodium 140, K 4.0, Cl 110, CO2 21, Glucose 108, BUN/CR 14/1.03  Imaging:  DX-CHEST-PORTABLE (1 VIEW)   Final Result         1.  Interstitial pulmonary parenchymal prominence, compatible with interstitial edema and/or infiltrates.      CT-CTA NECK WITH & W/O-POST PROCESSING   Final Result      Acute occlusion of the right internal carotid artery shortly after bifurcation. It is occluded up to the level of the carotid terminus.      CT-CTA HEAD WITH & W/O-POST PROCESS   Final Result      Acute right M1 occlusion.      Findings were discussed with RHONDA DIAZ on 3/31/2021 7:54 PM.      CT-CEREBRAL PERFUSION ANALYSIS   Final Result      1.  Cerebral blood flow less than 30% likely representing  completed infarct = 134 mL.      2.  T Max more than 6 seconds likely representing combination of completed infarct and ischemia = 210 mL.      3.  Mismatched volume likely representing ischemic brain/penumbra = 76 mL.      4.  Please note that the cerebral perfusion was performed on the limited brain tissue around the basal ganglia region. Infarct/ischemia outside the CT perfusion sections can be missed in this study.      CT-HEAD W/O   Final Result   Addendum 1 of 1   In retrospect, there is subtle loss of gray-white matter differentiation    in the right MCA distribution, consistent with acute infarct.      Final      1.  No CT evidence of acute infarct, hemorrhage or mass.   2.  Mild paranasal sinus disease.      EC-ECHOCARDIOGRAM COMPLETE W/O CONT    (Results Pending)         Assessment/Plan:  I anticipate this patient will require at least two midnights for appropriate medical management, necessitating inpatient admission.    * Acute right MCA stroke (HCC)- (present on admission)  Assessment & Plan  -Q1 neuro check  -MRI brain in AM  -ECHO  -Permissive HTN  -Not a candidate for tPA or thrombectomy per neurology  -Neurosurgery consultation was requested in ED and they will follow, no immediate need for decompression  -Keep HOB elevated  -Monitor BMP q4 hours for sodium  -PT/OT eval  -SLP, NPO until SLP can evaluate  -Lipid and HbA1C  -ASA and lipitor once patient can swallow  -Will admit to ICU for neuro q1 hours checks due to high risk of edema    History of COVID-19- (present on admission)  Assessment & Plan  -Will repeat Covid swab as he was positive on the 18th  -Droplet, contact, and eye protection for now    Acquired hypothyroidism- (present on admission)  Assessment & Plan  -Resume home meds once he can tolerate PO intake      Critical care time spent: 30 minutes.

## 2021-04-01 NOTE — THERAPY
"Speech Language Pathology   Clinical Swallow Evaluation     Patient Name: Thong Arzola  AGE:  56 y.o., SEX:  male  Medical Record #: [id number]  Today's Date: 4/1/2021     Precautions  Precautions: (P) Swallow Precautions ( See Comments)    Assessment    55 YO male admitted 3/31 from OSH 3/3 stroke-like symptoms. PMHx: afib, high cholesterol and hypothyroidism. CMHx: acute R MCA CVA, PAF, hx of COVID-19, acquired hypothyroidism. CXR 3/31 \"Interstitial pulmonary parenchymal prominence, compatible with interstitial edema and/or infiltrates.\"    Pt seen this date for clinical swallow evaluation 2/2 R MCA CVA and L facial droop. Oral mech exam revealed left facial droop, mod-sev restricted ROM of left sided labial structures, and lingual deviation to the left upon protrusion. Dentition intact. Pt with preference for head turn to right, but able to rotate to midline with mod verbal cues. PO trials of ice, MTL, LQ3, PU4, SB6, and thins assessed. Hyolaryngeal elevation palpated as complete, timely to mildly delayed initiation of swallow appreciated. Anterior spillage from left labial structures appreciated with trials of MTL and thins due to impaired labial seal. Throat clearing appreciated in 1/10 trials of MTL, vocal quality remained clear. Prolonged mastication (<45 seconds) with substantial coughing fit while masticating appreciated with trials of soft and bite sized, which is concerning for loss of bolus control and subsequent possible penetration/aspiration. Wet vocal quality and throat clearing appreciated in 75% of trials of thins, which is concerning for possible penetration/aspiration.     Given left facial droop and overt s/sx of aspiration, recommend pt remain NPO pending FEES. Pt okay for 5-7 single ice chips per hour with nursing or trained family with HOB at 90* to reduce xerostomia. Pt and spouse verbalized good understanding of results/recs. RN and NP aware. SLP following.     Plan    Recommend Speech " Therapy 5 times per week until therapy goals are met for the following treatments:  Dysphagia Training and Patient / Family / Caregiver Education.    Discharge Recommendations: (P) Recommend post-acute placement for additional speech therapy services prior to discharge home    Subjective    Pt was alert, followed directions and participated fully in evaluation. Spouse present and supportive throughout evaluation.      Objective       04/01/21 1520   Oral Motor Eval    Is Patient Able to Complete Oral Motor Eval Yes but Impaired   Labial Function   Labial Structure At Rest Severe   Labial Vowel Production / I /, / U / Minimal   Labial Sequence / I /, / U / Minimal   Frown, Pucker Minimal   Lingual Function   Lingual Structure At Rest Within Functional Limits   Lingual Protrude Moderate   Lingual Retract Within Functional Limits   Elevate In Mouth Within Functional Limits   Elevate Outside Mouth Minimal   Lateralization Minimal Left;No Impairment Right   / Pa / 5X's Minimal   / Ta / 5X's Within Functional Limits   / Ka / 5X's Within Functional Limits   / [place] / 5X's Within Functional Limits   Jaw   Jaw Structure At Rest Within Functional Limits   Bite ([place]) Not Tested   Chew ([place]) Severe   Jaw Open / Resist Within Functional Limits   Jaw Close / Resist Within Functional Limits   Velar Function   Velar Structure At Rest Within Functional Limits   / A / Prolonged Within Functional Limits   / A /  5X's Within Functional Limits   Laryngeal Function   Voice Quality Within Functional Limits   Volutional Cough Minimal   Excursion Upon Swallow Complete   Oral Food Presentation   Ice Chips Within Functional Limits   Single Swallow Mildly Thick (2) - (Nectar Thick)  Minimal   Single Swallow Thin (0) Moderate   Liquidised (3) Within Functional Limits   Pureed (4) Within Functional Limits   Soft & Bite-Sized (6) - (Dysphagia III) Severe   Self Feeding Needs Assistance   Tracheostomy   Tracheostomy  No   Dysphagia  "Strategies / Recommendations   Strategies / Interventions Recommended (Yes / No) Yes   Compensatory Strategies Strict 1:1 Feeding;Head of Bed 90 Degrees During Eating / Drinking;To Be Assessed   Diet / Liquid Recommendation NPO;Pre-Feeding Trials with SLP Only   Medication Administration  Other (See Comments)  (non-oral method)   Therapy Interventions Dysphagia Therapy By Speech Language Pathologist;FEES Evaluation   Dysphagia Rating   Nutritional Liquid Intake Rating Scale Nothing by mouth   Nutritional Food Intake Rating Scale Nothing by mouth  (okay for 5-7 single ice chips per hour)   Patient / Family Goals   Patient / Family Goal #1 \"Yes, water\"   Short Term Goals   Short Term Goal # 1 Pt will participate in prefeeding trials with SLP 1:1 with no s/sx of aspiration and min cues.          "

## 2021-04-01 NOTE — INFECTION CONTROL
This patient is considered COVID RECOVERED.    Patient initially tested positive for COVID on 3/18/2021.  Patient meets non-hospitalized criteria for COVID recovery. Patient is at least 10 days from symptom(s) onset, no fever for at least 24 hours and COVID-related symptoms have improved.     Per the CDC guidance, this patient no longer requires transmission based precautions.  Patient may be placed on any unit per the bed assignment policy, including placement in a semi-private room with a roommate.

## 2021-04-01 NOTE — ASSESSMENT & PLAN NOTE
-TSH low, normal T4  -restart home [place] thyroid today, dose adjusted   -Needs outpatient follow up

## 2021-04-01 NOTE — PROGRESS NOTES
Neuro NP (Radha) notified of urine output trending down since dye placement. Per NP, patient was just started on IV fluids after not having any intake overnight and this AM, continue to monitor for now. No changes to plan of care.

## 2021-04-01 NOTE — PROGRESS NOTES
"Neurology Progress Note  Neurohospitalist Service, [facility]    Referring Physician: Gia Forman M.D.    Chief Complaint   Patient presents with   • Possible Stroke     [facility] for stroke like symptoms. Pt was last seen well by wife at 0630. He texted his wife at 1500 then took a bath. Pt was trying to get out of the bath when symptoms started. Presents with L hemipalegia, signifigant facial paralysis, R gaze deviation. Pt has hx afib and stated, \"my heart's been in afib since Fri.\" Seen by PCP on Mon for afib and started on metoprolol. Presents in SR-ST. + COVID test on 3/18 after flu like s/s x 3/15.       HPI: Refer to initial documented Neurology H&P, as detailed in the patient's chart.    Interval History April 1, 2021: No new events overnight.    Review of systems: In addition to what is detailed in the HPI and/or updated in the interval history, all other systems reviewed and are negative.    Past Medical History:    has a past medical history of Afib (HCC) and High cholesterol.    FHx:  family history is not on file.    SHx:   reports that he has never smoked. He has never used smokeless tobacco. He reports current alcohol use. He reports that he does not use drugs.    Medications:    Current Facility-Administered Medications:   •  aspirin (ASA) chewable tab 81 mg, 81 mg, Oral, DAILY, Mc Calvillo M.D., Stopped at 04/01/21 0600  •  Pharmacy consult request - Allow for permissive hypertension: SBP up to 220 mmHg/DBP up to 120 mmHg x 48 hours, , Other, PHARMACY TO DOSE, Gia Forman M.D.  •  senna-docusate (PERICOLACE or SENOKOT S) 8.6-50 MG per tablet 2 tablet, 2 tablet, Oral, BID, Stopped at 03/31/21 2100 **AND** polyethylene glycol/lytes (MIRALAX) PACKET 1 Packet, 1 Packet, Oral, QDAY PRN **AND** magnesium hydroxide (MILK OF MAGNESIA) suspension 30 mL, 30 mL, Oral, QDAY PRN **AND** bisacodyl (DULCOLAX) suppository 10 mg, 10 mg, Rectal, QDAY PRN, Gia Forman M.D.  •  " ondansetron (ZOFRAN) syringe/vial injection 4 mg, 4 mg, Intravenous, Q4HRS PRN, Gia Forman M.D.  •  ondansetron (ZOFRAN ODT) dispertab 4 mg, 4 mg, Oral, Q4HRS PRN, Gia Forman M.D.  •  promethazine (PHENERGAN) tablet 12.5-25 mg, 12.5-25 mg, Oral, Q4HRS PRN, Gia Forman M.D.  •  promethazine (PHENERGAN) suppository 12.5-25 mg, 12.5-25 mg, Rectal, Q4HRS PRN, Gia Forman M.D.  •  prochlorperazine (COMPAZINE) injection 5-10 mg, 5-10 mg, Intravenous, Q4HRS PRN, Gia Forman M.D.  •  acetaminophen (Tylenol) tablet 650 mg, 650 mg, Oral, Q6HRS PRN, Gia Forman M.D.  •  atorvastatin (LIPITOR) tablet 80 mg, 80 mg, Oral, Q EVENING, Gia Forman M.D.  •  [DISCONTINUED] aspirin (ASA) tablet 325 mg, 325 mg, Oral, DAILY **OR** [DISCONTINUED] aspirin (ASA) chewable tab 324 mg, 324 mg, Oral, DAILY **OR** aspirin (ASA) suppository 300 mg, 300 mg, Rectal, DAILY, Gia Forman M.D., 300 mg at 04/01/21 0513  •  labetalol (NORMODYNE/TRANDATE) injection 10-20 mg, 10-20 mg, Intravenous, Q4HRS PRN **OR** hydrALAZINE (APRESOLINE) injection 20 mg, 20 mg, Intravenous, Q2HRS PRN, Glenn Long M.D.    Physical Examination:     Vitals:    04/01/21 0300 04/01/21 0400 04/01/21 0500 04/01/21 0600   BP: 104/58 117/69 105/64 114/64   Pulse: 87 81 86 95   Resp: 15 16 17 16   Temp:  36.5 °C (97.7 °F)  36.6 °C (97.8 °F)   TempSrc:  Temporal  Temporal   SpO2: 94% 94% 94% 93%   Weight:       Height:           General: Patient is awake and in no acute distress  Eyes: examination of optic disks not indicated at this time  CV: RRR    NEUROLOGICAL EXAM:     Patient is awake and alert.  He knows he is in [facility].  Is able to tell me that he had a stroke.  No signs of aphasia.  Pupils are equal reactive.  However has a dense right gaze preference he is unable to overcome.  There is a left facial droop.  Motor examination he is plegic on the left side arm and leg.  Sensation is nonexistent on the left side.  He has a very dense neglect.  Does not recognize  his own hand.  The right side appears to be normal.    Objective Data:    Labs:  Lab Results   Component Value Date/Time    PROTHROMBTM 14.0 03/31/2021 07:29 PM    INR 1.04 03/31/2021 07:29 PM      Lab Results   Component Value Date/Time    WBC 7.4 04/01/2021 02:45 AM    RBC 5.02 04/01/2021 02:45 AM    HEMOGLOBIN 14.8 04/01/2021 02:45 AM    HEMATOCRIT 41.6 (L) 04/01/2021 02:45 AM    MCV 82.9 04/01/2021 02:45 AM    MCH 29.5 04/01/2021 02:45 AM    MCHC 35.6 (H) 04/01/2021 02:45 AM    MPV 9.1 04/01/2021 02:45 AM    NEUTSPOLYS 82.20 (H) 04/01/2021 02:45 AM    LYMPHOCYTES 12.00 (L) 04/01/2021 02:45 AM    MONOCYTES 5.50 04/01/2021 02:45 AM    EOSINOPHILS 0.00 04/01/2021 02:45 AM    BASOPHILS 0.00 04/01/2021 02:45 AM      Lab Results   Component Value Date/Time    SODIUM 139 04/01/2021 02:45 AM    POTASSIUM 4.6 04/01/2021 02:45 AM    CHLORIDE 106 04/01/2021 02:45 AM    CO2 22 04/01/2021 02:45 AM    GLUCOSE 106 (H) 04/01/2021 02:45 AM    BUN 14 04/01/2021 02:45 AM    CREATININE 1.02 04/01/2021 02:45 AM    CREATININE 1.3 04/04/2008 03:05 AM      Lab Results   Component Value Date/Time    CHOLSTRLTOT 197 04/04/2008 03:05 AM     (H) 04/04/2008 03:05 AM    HDL 34 (L) 04/04/2008 03:05 AM    TRIGLYCERIDE 164 (H) 04/04/2008 03:05 AM       Lab Results   Component Value Date/Time    ALKPHOSPHAT 66 04/01/2021 02:45 AM    ASTSGOT 22 04/01/2021 02:45 AM    ALTSGPT 30 04/01/2021 02:45 AM    TBILIRUBIN 0.6 04/01/2021 02:45 AM        Imaging/Testing:  DX-CHEST-PORTABLE (1 VIEW)   Final Result         1.  Interstitial pulmonary parenchymal prominence, compatible with interstitial edema and/or infiltrates.      CT-CTA NECK WITH & W/O-POST PROCESSING   Final Result      Acute occlusion of the right internal carotid artery shortly after bifurcation. It is occluded up to the level of the carotid terminus.      CT-CTA HEAD WITH & W/O-POST PROCESS   Final Result      Acute right M1 occlusion.      Findings were discussed with RHONDA RAMAN  EMILY on 3/31/2021 7:54 PM.      CT-CEREBRAL PERFUSION ANALYSIS   Final Result      1.  Cerebral blood flow less than 30% likely representing completed infarct = 134 mL.      2.  T Max more than 6 seconds likely representing combination of completed infarct and ischemia = 210 mL.      3.  Mismatched volume likely representing ischemic brain/penumbra = 76 mL.      4.  Please note that the cerebral perfusion was performed on the limited brain tissue around the basal ganglia region. Infarct/ischemia outside the CT perfusion sections can be missed in this study.      CT-HEAD W/O   Final Result   Addendum 1 of 1   In retrospect, there is subtle loss of gray-white matter differentiation    in the right MCA distribution, consistent with acute infarct.      Final      1.  No CT evidence of acute infarct, hemorrhage or mass.   2.  Mild paranasal sinus disease.      EC-ECHOCARDIOGRAM COMPLETE W/O CONT    (Results Pending)   MR-BRAIN-W/O    (Results Pending)         Assessment and Plan:    56-year-old male presenting with right MCA infarct.  Was not a TPA candidate due to outside the window.  Was not an IR candidate due to already having CT changes in stroke completion.  He does have a M1 thrombus on CTA.  Currently he is stable.  He is fully awake and alert.  He has a dense left hemiplegia and severe neglect.  He understands he had a stroke.  I informed him that there is risk of swelling in the next few days which he understood.  Went over the possibility of craniectomy with him.  I explained to him that his most likely outcome will be plegic on the left side.  He understood this and said he still would want to have a craniectomy if needed.  In the meantime we will closely monitor the patient if he does develop swelling we will initiate 3% saline at that time.  And if the swelling progresses despite best medical management we will reconsult neurosurgery.  Etiology most likely due to cardioembolic event from underlying  atrial fibrillation.    Plan:  1.  MRI brain is pending  2.  Stat CT head if change in overall status.  3.  Aspirin 81 mg daily  4.  Sodium is currently 139.  Okay with fluid restriction try to get the sodium in the 140s  5.  Patient most likely will need to be anticoagulated however this most likely will not be initiated until next week at the earliest.  Given the large territory infarct on CT head.          The evaluation of the patient, and recommended management, was discussed with the resident staff. I have performed a physical exam and reviewed and updated ROS and Plan today (4/1/2021). In review of yesterday's note (3/31/2021), there are no changes except as documented above.    This chart was partially generated using voice recognition technology and sound alike word replacement may be present, best efforts were made to make the chart accurate.    Arthur Solorzano MD  Board Certified Neurology, ABPN  t) [phone]

## 2021-04-01 NOTE — HOSPITAL COURSE
Mr. Arzola is a 56 year old male with PMH significant for hypothyroidism, COVID-19 (3/18/21), HTN, HLD, and PAF rate controlled with Coreg (recently started on Metoprolol) not on AC who presented 3/31 with left sided weakness, facial droop, and right gaze deviation. Neuro was consulted, patient was outside window for TPA. Initial NIH=18. The CTA showed R ICA complete occlusion. Discussed with neuro-IR Dr. Rubio - not amendable to IR intervention. Per Neuro, anticipate the patient will suffer malignant R MCA edema with peak swelling window 3-5 days from 3/31/21 ie 4/3-4/5/21.     4/1 - left-sided neglect, facial droop, and near-flaccidity (intermittent weak withdrawal to deep/painful stimulation LUE/LLE), headaches. ECHO (-) bubble EF 75%.  4/2 - Remains flaccid on left. Follows right. Left facial droop, decreased sensation and neglect. Verbal, A/O x 4. Ongoing headaches. Trial of Fioricet today passed swallow - minced/pureed

## 2021-04-01 NOTE — ED NOTES
Bedside report to Dixie KHALIL. Pt and family aware of POC, transported to floor with RN on monitor.

## 2021-04-01 NOTE — PROGRESS NOTES
2 RN Skin Check    2 RN skin check completed with Ivory MAXWELL   Devices in place: cardiac leads, BP cuff, pulse oximeter, SCDs.  Skin assessed under devices: Yes  No potential pressure ulcers found.  The following interventions in place: pillows in place for positioning, Q2H turns, autoregulating low air loss mattress, rotating device sites.

## 2021-04-01 NOTE — ASSESSMENT & PLAN NOTE
Likely consequence of COVID19 in 3/21 and hx of afib  Subsequent brain edema requiring right decompressive hemicraniectomy on 4/3/2021  Titration of bp meds due to hypotension - following  Patient weaned off hypertonic saline and on salt tablets  PT/OT/SLP  Aspiration precautions  Physiatry following  Vertigo improved on meclizine - resolved with resolution of hypotension - suspect was mulitfactorial

## 2021-04-01 NOTE — PROGRESS NOTES
Critical Care Progress Note    Date of admission  3/31/2021    Chief Complaint  Left-sided weakness, facial droop    Hospital Course  Mr. Arzola is a 56 year old male with PMH significant for hypothyroidism, COVID-19 (3/18/21), HTN, HLD, and PAF rate controlled with Coreg (recently started on Metoprolol) not on AC who presented 3/31 with left sided weakness, facial droop, and right gaze deviation. Neuro was consulted, patient was outside window for TPA. Initial NIH=18. The CTA showed R ICA complete occlusion. Discussed with neuro-IR Dr. Rubio - not amendable to IR intervention. Per Neuro, anticipate the patient will suffer malignant R MCA edema with peak swelling window 3-5 days from now 3/31/21 ie 4/3-4/5/21.     Interval Problem Update  Reviewed last 24 hour events:              - acute events overnight: none              - Tm: 97.8              - HR: 80-90's              - SBP: 105-120's              - Neuro: lethargic, awakens easily. Flaccid LUE/LLE, left-sided neglect, left-sided facial droop, verbal/oriented x 4. c/o 5-6/10 headaches. PEERL.               - GI: NPO pending swallow study.              - UOP: bladder scan 700mL - place dye              - Dye: orders to place today              - Lines: none              - PPx: not indicated              - CXR (personally reviewed): none today              - Antibiotic Day: none   - SAT/SBT: not indicated   - Mobility: 1   - Goals of Care: keep in ICU for q1 hour neuro checks and hemodynamic monitoring. Discussed plan of care with patient and wife at bedside.     Review of Systems  Review of Systems   Constitutional: Positive for malaise/fatigue. Negative for chills and fever.   HENT: Negative.    Eyes: Negative for blurred vision and double vision.   Respiratory: Negative for cough and shortness of breath.    Cardiovascular: Negative for chest pain and palpitations.   Gastrointestinal: Negative for abdominal pain, nausea and vomiting.   Genitourinary:  Negative for flank pain and hematuria.   Musculoskeletal: Negative for back pain and joint pain.   Neurological: Positive for sensory change, focal weakness, weakness and headaches.   All other systems reviewed and are negative.       Vital Signs for last 24 hours   Temp:  [36.4 °C (97.6 °F)-36.6 °C (97.8 °F)] 36.6 °C (97.8 °F)  Pulse:  [] 83  Resp:  [12-24] 14  BP: (104-163)/() 110/61  SpO2:  [93 %-100 %] 97 %    Hemodynamic parameters for last 24 hours       Respiratory Information for the last 24 hours       Physical Exam   Physical Exam  Vitals (Wife at bedside) and nursing note reviewed.   Constitutional:       General: He is not in acute distress.     Appearance: Normal appearance. He is not ill-appearing or toxic-appearing.   HENT:      Head: Normocephalic.      Right Ear: Hearing normal.      Left Ear: Hearing normal.      Nose: Nose normal.      Mouth/Throat:      Lips: Pink.      Mouth: Mucous membranes are moist.   Eyes:      General: Visual field deficit present.      Pupils: Pupils are equal, round, and reactive to light.   Cardiovascular:      Rate and Rhythm: Normal rate and regular rhythm.      Pulses: Normal pulses.      Heart sounds: Normal heart sounds.      Comments: Telemetry - Sinus Rhythm  Pulmonary:      Effort: Pulmonary effort is normal.      Breath sounds: Normal breath sounds. No wheezing.   Abdominal:      General: Bowel sounds are normal.      Palpations: Abdomen is soft.      Tenderness: There is no abdominal tenderness. There is no guarding or rebound.   Musculoskeletal:      Right lower leg: No edema.      Left lower leg: No edema.      Comments: Flaccid LUE/LLE     RUE/RLE 5/5   Skin:     General: Skin is warm and dry.      Capillary Refill: Capillary refill takes less than 2 seconds.   Neurological:      Mental Status: He is easily aroused. He is lethargic.      GCS: GCS eye subscore is 3. GCS verbal subscore is 5. GCS motor subscore is 6.      Sensory: Sensory deficit  present.      Motor: Weakness present.      Comments: Left sided flaccidy, left sided neglect  Left-sided facial droop  Right sided gaze preference       Psychiatric:         Mood and Affect: Affect is flat.         Speech: Speech normal.         Behavior: Behavior normal.         Cognition and Memory: Cognition normal.         Medications  Current Facility-Administered Medications   Medication Dose Route Frequency Provider Last Rate Last Admin   • diphenhydrAMINE (BENADRYL) injection 25 mg  25 mg Intravenous Once Radha Meredith, A.P.R.N.       • NS infusion   Intravenous Continuous Radha Meredith, A.P.R.N. 100 mL/hr at 04/01/21 1140 New Bag at 04/01/21 1140   • aspirin (ASA) chewable tab 81 mg  81 mg Oral DAILY Mc Calvillo M.D.   Stopped at 04/01/21 0600   • Pharmacy consult request - Allow for permissive hypertension: SBP up to 220 mmHg/DBP up to 120 mmHg x 48 hours   Other PHARMACY TO DOSE Gia Forman M.D.       • senna-docusate (PERICOLACE or SENOKOT S) 8.6-50 MG per tablet 2 tablet  2 tablet Oral BID Gia Forman M.D.   Stopped at 03/31/21 2100    And   • polyethylene glycol/lytes (MIRALAX) PACKET 1 Packet  1 Packet Oral QDAY PRN Gia Forman M.D.        And   • magnesium hydroxide (MILK OF MAGNESIA) suspension 30 mL  30 mL Oral QDAY PRN Gia Forman M.D.        And   • bisacodyl (DULCOLAX) suppository 10 mg  10 mg Rectal QDAY PRN Gia Forman M.D.       • ondansetron (ZOFRAN) syringe/vial injection 4 mg  4 mg Intravenous Q4HRS PRN Gia Forman M.D.       • ondansetron (ZOFRAN ODT) dispertab 4 mg  4 mg Oral Q4HRS PRN Gia Forman M.D.       • promethazine (PHENERGAN) tablet 12.5-25 mg  12.5-25 mg Oral Q4HRS PRN Gia Forman M.D.       • promethazine (PHENERGAN) suppository 12.5-25 mg  12.5-25 mg Rectal Q4HRS PRN Gia R Song, M.D.       • prochlorperazine (COMPAZINE) injection 5-10 mg  5-10 mg Intravenous Q4HRS PRN Gia Forman M.D.   5 mg at 04/01/21 1035   • acetaminophen (Tylenol) tablet 650 mg  650 mg  Oral Q6HRS PRN Gia Forman M.D.       • atorvastatin (LIPITOR) tablet 80 mg  80 mg Oral Q EVENING Gia Forman M.D.       • aspirin (ASA) suppository 300 mg  300 mg Rectal DAILY Gia Forman M.D.   300 mg at 04/01/21 0513   • labetalol (NORMODYNE/TRANDATE) injection 10-20 mg  10-20 mg Intravenous Q4HRS PRN Glenn Long M.D.        Or   • hydrALAZINE (APRESOLINE) injection 20 mg  20 mg Intravenous Q2HRS PRN Glenn Long M.D.           Fluids    Intake/Output Summary (Last 24 hours) at 4/1/2021 1352  Last data filed at 4/1/2021 1300  Gross per 24 hour   Intake 133.33 ml   Output 753 ml   Net -619.67 ml       Laboratory          Recent Labs     03/31/21 1929 04/01/21  0245   SODIUM 140 139   POTASSIUM 4.0 4.6   CHLORIDE 110 106   CO2 21 22   BUN 14 14   CREATININE 1.03 1.02   MAGNESIUM 2.0  --    CALCIUM 8.6 9.2     Recent Labs     03/31/21 1929 04/01/21  0245   ALTSGPT 31 30   ASTSGOT 23 22   ALKPHOSPHAT 58 66   TBILIRUBIN 0.4 0.6   GLUCOSE 108* 106*     Recent Labs     03/31/21 1929 04/01/21  0245   WBC 9.3 7.4   NEUTSPOLYS 87.10* 82.20*   LYMPHOCYTES 8.50* 12.00*   MONOCYTES 4.00 5.50   EOSINOPHILS 0.10 0.00   BASOPHILS 0.10 0.00   ASTSGOT 23 22   ALTSGPT 31 30   ALKPHOSPHAT 58 66   TBILIRUBIN 0.4 0.6     Recent Labs     03/31/21 1929 04/01/21  0245   RBC 4.76 5.02   HEMOGLOBIN 14.0 14.8   HEMATOCRIT 39.6* 41.6*   PLATELETCT 200 262   PROTHROMBTM 14.0  --    APTT 24.1*  --    INR 1.04  --        Imaging  EKG:  I have personally reviewed the images and compared with prior images.  CT:    Reviewed    Assessment/Plan  * Acute right MCA stroke (HCC)- (present on admission)  Assessment & Plan  -Acute right [place] territory stroke  -Outside the window for alteplase, not a candidate for thrombectomy per IR  -Admit to neuro ICU, [id number] checks  -Permissive hypertension  -Neurosurgery consulted.  Daily CT ordered stat with neuro change  -High risk for malignant edema in 3 to 5 days.  Monitor closely for need for  "decompressive hemicraniectomy  -Maintain normothermia, normoglycemia, normonatremia  -Elevate head of bed  -PT/OT/SLP  -PMNR  -per Neurology \"Patient most likely will need to be anticoagulated however this most likely will not be initiated until next week at the earliest.  Given the large territory infarct on CT head.\"    Urinary retention  Assessment & Plan  -Poor mobility, acute stroke  -Reportedly does not have at baseline  -place dye    Paroxysmal atrial fibrillation (HCC)  Assessment & Plan  -Diagnosed about 9 months ago  -Was reportedly taking Coreg, however stopped taking it when he went back into sinus rhythm  -Not currently on anticoagulation  -Per med rec, patient has been taking vitamin K.  Discussed stopping this with patient and wife today.  Patient has been following [facility] physician outpatient  -ECHO pending      History of COVID-19- (present on admission)  Assessment & Plan  -Positive PCR 3/18/2021  -Patient currently is asymptomatic, on room air, in no respiratory distress.  No indications to treat  -Repeat PCR 3/31 remains positive.  Per hospital infectious disease, no longer requires isolation      Acquired hypothyroidism- (present on admission)  Assessment & Plan  -On [place] Thyroid as OP  -Check TSH T4         VTE:  Contraindicated  Ulcer: Not Indicated  Lines: Dye Catheter  Place today    I have performed a physical exam and reviewed and updated ROS and Plan today (4/1/2021). In review of yesterday's note (3/31/2021), there are no changes except as documented above.     Discussed patient condition and risk of morbidity and/or mortality with Family, RN, Pharmacy, Charge nurse / hot rounds, Patient and neurology  The patient remains critically ill.  Critical care time = 64 minutes in directly providing and coordinating critical care and extensive data review.  No time overlap and excludes procedures.    Please note that this dictation was created using voice recognition software. " I have made every reasonable attempt to correct obvious errors, but there may be errors of grammar and possibly content that I did not discover before finalizing the note.    CRISTÓBAL Hampton.

## 2021-04-01 NOTE — ASSESSMENT & PLAN NOTE
[place] Thyroid 60mg BID   TSH on this admission less than 0.005 - therefore dose was decreased and he will need recheck TFTs in 6 weeks (prior to admit was on 90mg BID)

## 2021-04-01 NOTE — DISCHARGE PLANNING
[facility] Transitional Care Coordination    Referral from:  Dr. Calvillo  Insurance Provider on Facesheet: No provider, potential [employer]   Potential Rehab Diagnosis: Stroke protocol   D/C support:   Spouse   Physiatry consultation pending  per protocol.     Waiting on additional information to determine appropriateness for acute inpatient rehabilitation. Will continue to follow.      Thank you for the referral.

## 2021-04-01 NOTE — ED TRIAGE NOTES
"Thong Arzola  56 y.o. male  Chief Complaint   Patient presents with   • Possible Stroke     [facility] for stroke like symptoms. Pt was last seen well by wife at 0630. He texted his wife at 1500 then took a bath. Pt was trying to get out of the bath when symptoms started. Presents with L hemipalegia, signifigant facial paralysis, R gaze deviation. Pt has hx afib and stated, \"my heart's been in afib since Fri.\" Seen by PCP on Mon for afib and started on metoprolol. Presents in SR-ST. + COVID test on 3/18 after flu like s/s x 3/15.       Pt BIB EMS from home for above complaint. Wife came home from work and found pt in bath.     Hx: afib    NIHSS 21    /96   Pulse 98   Resp (!) 24   Ht 1.778 m (5' 10\")   Wt 79.4 kg (175 lb)   SpO2 97%   BMI 25.11 kg/m²     "

## 2021-04-02 LAB
ANION GAP SERPL CALC-SCNC: 11 MMOL/L (ref 7–16)
BASOPHILS # BLD AUTO: 0.1 % (ref 0–1.8)
BASOPHILS # BLD: 0.01 K/UL (ref 0–0.12)
BUN SERPL-MCNC: 25 MG/DL (ref 8–22)
CALCIUM SERPL-MCNC: 9.1 MG/DL (ref 8.5–10.5)
CHLORIDE SERPL-SCNC: 111 MMOL/L (ref 96–112)
CO2 SERPL-SCNC: 22 MMOL/L (ref 20–33)
CREAT SERPL-MCNC: 1.25 MG/DL (ref 0.5–1.4)
EOSINOPHIL # BLD AUTO: 0 K/UL (ref 0–0.51)
EOSINOPHIL NFR BLD: 0 % (ref 0–6.9)
ERYTHROCYTE [DISTWIDTH] IN BLOOD BY AUTOMATED COUNT: 36.8 FL (ref 35.9–50)
GLUCOSE SERPL-MCNC: 127 MG/DL (ref 65–99)
HCT VFR BLD AUTO: 41.3 % (ref 42–52)
HGB BLD-MCNC: 14 G/DL (ref 14–18)
IMM GRANULOCYTES # BLD AUTO: 0.03 K/UL (ref 0–0.11)
IMM GRANULOCYTES NFR BLD AUTO: 0.3 % (ref 0–0.9)
LYMPHOCYTES # BLD AUTO: 0.67 K/UL (ref 1–4.8)
LYMPHOCYTES NFR BLD: 7.3 % (ref 22–41)
MAGNESIUM SERPL-MCNC: 2.3 MG/DL (ref 1.5–2.5)
MCH RBC QN AUTO: 28.7 PG (ref 27–33)
MCHC RBC AUTO-ENTMCNC: 33.9 G/DL (ref 33.7–35.3)
MCV RBC AUTO: 84.6 FL (ref 81.4–97.8)
MONOCYTES # BLD AUTO: 0.59 K/UL (ref 0–0.85)
MONOCYTES NFR BLD AUTO: 6.5 % (ref 0–13.4)
NEUTROPHILS # BLD AUTO: 7.84 K/UL (ref 1.82–7.42)
NEUTROPHILS NFR BLD: 85.8 % (ref 44–72)
NRBC # BLD AUTO: 0 K/UL
NRBC BLD-RTO: 0 /100 WBC
PHOSPHATE SERPL-MCNC: 3.8 MG/DL (ref 2.5–4.5)
PLATELET # BLD AUTO: 254 K/UL (ref 164–446)
PMV BLD AUTO: 9.5 FL (ref 9–12.9)
POTASSIUM SERPL-SCNC: 4.7 MMOL/L (ref 3.6–5.5)
RBC # BLD AUTO: 4.88 M/UL (ref 4.7–6.1)
SODIUM SERPL-SCNC: 144 MMOL/L (ref 135–145)
WBC # BLD AUTO: 9.1 K/UL (ref 4.8–10.8)

## 2021-04-02 PROCEDURE — A9270 NON-COVERED ITEM OR SERVICE: HCPCS | Performed by: PHYSICAL MEDICINE & REHABILITATION

## 2021-04-02 PROCEDURE — 85025 COMPLETE CBC W/AUTO DIFF WBC: CPT

## 2021-04-02 PROCEDURE — 700105 HCHG RX REV CODE 258: Performed by: NURSE PRACTITIONER

## 2021-04-02 PROCEDURE — 80048 BASIC METABOLIC PNL TOTAL CA: CPT

## 2021-04-02 PROCEDURE — 99255 IP/OBS CONSLTJ NEW/EST HI 80: CPT | Performed by: PHYSICAL MEDICINE & REHABILITATION

## 2021-04-02 PROCEDURE — A9270 NON-COVERED ITEM OR SERVICE: HCPCS | Performed by: INTERNAL MEDICINE

## 2021-04-02 PROCEDURE — 83735 ASSAY OF MAGNESIUM: CPT

## 2021-04-02 PROCEDURE — 700102 HCHG RX REV CODE 250 W/ 637 OVERRIDE(OP): Performed by: NURSE PRACTITIONER

## 2021-04-02 PROCEDURE — 92612 ENDOSCOPY SWALLOW (FEES) VID: CPT

## 2021-04-02 PROCEDURE — A9270 NON-COVERED ITEM OR SERVICE: HCPCS | Performed by: PSYCHIATRY & NEUROLOGY

## 2021-04-02 PROCEDURE — 99233 SBSQ HOSP IP/OBS HIGH 50: CPT | Performed by: PSYCHIATRY & NEUROLOGY

## 2021-04-02 PROCEDURE — 770022 HCHG ROOM/CARE - ICU (200)

## 2021-04-02 PROCEDURE — 700102 HCHG RX REV CODE 250 W/ 637 OVERRIDE(OP): Performed by: INTERNAL MEDICINE

## 2021-04-02 PROCEDURE — A9270 NON-COVERED ITEM OR SERVICE: HCPCS | Performed by: NURSE PRACTITIONER

## 2021-04-02 PROCEDURE — 84100 ASSAY OF PHOSPHORUS: CPT

## 2021-04-02 PROCEDURE — 99291 CRITICAL CARE FIRST HOUR: CPT | Performed by: NURSE PRACTITIONER

## 2021-04-02 PROCEDURE — 700102 HCHG RX REV CODE 250 W/ 637 OVERRIDE(OP): Performed by: PSYCHIATRY & NEUROLOGY

## 2021-04-02 PROCEDURE — 700102 HCHG RX REV CODE 250 W/ 637 OVERRIDE(OP): Performed by: PHYSICAL MEDICINE & REHABILITATION

## 2021-04-02 RX ORDER — BACLOFEN 10 MG/1
10 TABLET ORAL 3 TIMES DAILY
Status: DISCONTINUED | OUTPATIENT
Start: 2021-04-02 | End: 2021-04-03

## 2021-04-02 RX ORDER — THYROID 30 MG/1
90 TABLET ORAL 2 TIMES DAILY
Status: DISCONTINUED | OUTPATIENT
Start: 2021-04-02 | End: 2021-04-03

## 2021-04-02 RX ORDER — BUTALBITAL, ACETAMINOPHEN AND CAFFEINE 50; 325; 40 MG/1; MG/1; MG/1
1-2 TABLET ORAL EVERY 6 HOURS PRN
Status: DISCONTINUED | OUTPATIENT
Start: 2021-04-02 | End: 2021-04-03

## 2021-04-02 RX ADMIN — ATORVASTATIN CALCIUM 80 MG: 40 TABLET, FILM COATED ORAL at 17:42

## 2021-04-02 RX ADMIN — BUTALBITAL, ACETAMINOPHEN, AND CAFFEINE 1 TABLET: 50; 325; 40 TABLET ORAL at 11:44

## 2021-04-02 RX ADMIN — SODIUM CHLORIDE: 9 INJECTION, SOLUTION INTRAVENOUS at 22:23

## 2021-04-02 RX ADMIN — DOCUSATE SODIUM 50 MG AND SENNOSIDES 8.6 MG 2 TABLET: 8.6; 5 TABLET, FILM COATED ORAL at 17:42

## 2021-04-02 RX ADMIN — THYROID, PORCINE 90 MG: 30 TABLET ORAL at 20:45

## 2021-04-02 RX ADMIN — THYROID, PORCINE 90 MG: 30 TABLET ORAL at 11:44

## 2021-04-02 RX ADMIN — BACLOFEN 10 MG: 10 TABLET ORAL at 17:42

## 2021-04-02 RX ADMIN — SODIUM CHLORIDE: 9 INJECTION, SOLUTION INTRAVENOUS at 10:32

## 2021-04-02 RX ADMIN — BUTALBITAL, ACETAMINOPHEN, AND CAFFEINE 2 TABLET: 50; 325; 40 TABLET ORAL at 19:27

## 2021-04-02 RX ADMIN — ASPIRIN 81 MG: 81 TABLET, CHEWABLE ORAL at 10:16

## 2021-04-02 ASSESSMENT — ENCOUNTER SYMPTOMS
SHORTNESS OF BREATH: 0
BLURRED VISION: 0
FOCAL WEAKNESS: 1
VOMITING: 0
SENSORY CHANGE: 1
WEAKNESS: 1
DOUBLE VISION: 0
CHILLS: 0
HEADACHES: 1
PALPITATIONS: 0
FEVER: 0
NERVOUS/ANXIOUS: 0
SPEECH CHANGE: 0
ABDOMINAL PAIN: 0
INSOMNIA: 0
BACK PAIN: 0
NAUSEA: 0
FLANK PAIN: 0
COUGH: 0

## 2021-04-02 ASSESSMENT — PAIN DESCRIPTION - PAIN TYPE
TYPE: ACUTE PAIN

## 2021-04-02 ASSESSMENT — PATIENT HEALTH QUESTIONNAIRE - PHQ9
SUM OF ALL RESPONSES TO PHQ9 QUESTIONS 1 AND 2: 0
2. FEELING DOWN, DEPRESSED, IRRITABLE, OR HOPELESS: NOT AT ALL
1. LITTLE INTEREST OR PLEASURE IN DOING THINGS: NOT AT ALL

## 2021-04-02 NOTE — PROGRESS NOTES
Neurology (Dr. Solorzano) at the bedside for assessment. RN clarified that patient has both regular and baby aspirin ordered. Per Neuro, patient should only be on baby aspirin.

## 2021-04-02 NOTE — DISCHARGE PLANNING
"TCC>  PMR consulted indicating \"pt  is a good candidate for inpatient rehab based on needs for PT, OT, and speech therapy.    PT lives in [place], [place] and would need to apply for [employer].  Unfortunately, [facility] is not contracted with [employer] and pt would need an IRF in [place].    Thank you for referral.       "

## 2021-04-02 NOTE — PROGRESS NOTES
Critical Care Progress Note    Date of admission  3/31/2021    Chief Complaint  Left-sided weakness, facial droop    Hospital Course  Mr. Arzola is a 56 year old male with PMH significant for hypothyroidism, COVID-19 (3/18/21), HTN, HLD, and PAF rate controlled with Coreg (recently started on Metoprolol) not on AC who presented 3/31 with left sided weakness, facial droop, and right gaze deviation. Neuro was consulted, patient was outside window for TPA. Initial NIH=18. The CTA showed R ICA complete occlusion. Discussed with neuro-IR Dr. Rubio - not amendable to IR intervention. Per Neuro, anticipate the patient will suffer malignant R MCA edema with peak swelling window 3-5 days from 3/31/21 ie 4/3-4/5/21.     4/1 - left-sided neglect, facial droop, and near-flaccidity (intermittent weak withdrawal to deep/painful stimulation LUE/LLE), headaches. ECHO (-) bubble EF 75%.    Interval Problem Update  Reviewed last 24 hour events:              - acute events overnight: none.              - Tm: 98.8              - HR: 70-90's SR              - SBP: 105-135              - Neuro: more alert today. Remains flaccid on left. Follows right. Left facial droop, decreased sensation and neglect. Verbal, A/O x 4. Ongoing headaches. Trial of Fioricet today              - GI: NPO pending FEES today.               - I/O: 2000/1200 (+800 since admit)               - Brewer: yes              - Lines: None              - PPx: not indicated              - CXR (personally reviewed): none today              - Antibiotic Day: none   - SAT/SBT: not applicable   - Mobility: 2   - Goals of Care: continue close ICU monitoring, q2h neuro checks. Wife at bedside - updated on plan of care.     Review of Systems  Review of Systems   Constitutional: Positive for malaise/fatigue. Negative for chills and fever.   HENT: Negative.    Eyes: Negative for blurred vision and double vision.   Respiratory: Negative for cough and shortness of breath.     Cardiovascular: Negative for chest pain and palpitations.   Gastrointestinal: Negative for abdominal pain, nausea and vomiting.   Genitourinary: Negative for flank pain and hematuria.   Musculoskeletal: Negative for back pain and joint pain.   Neurological: Positive for sensory change, focal weakness, weakness and headaches. Negative for speech change.   Psychiatric/Behavioral: The patient is not nervous/anxious and does not have insomnia.    All other systems reviewed and are negative.       Vital Signs for last 24 hours   Pulse:  [76-98] 87  Resp:  [14-22] 14  BP: (101-144)/(55-90) 128/75  SpO2:  [92 %-98 %] 94 %    Hemodynamic parameters for last 24 hours       Respiratory Information for the last 24 hours       Physical Exam   Physical Exam  Vitals (Wife at bedside) and nursing note reviewed.   Constitutional:       General: He is not in acute distress.     Appearance: Normal appearance. He is not ill-appearing or toxic-appearing.   HENT:      Head: Normocephalic.      Right Ear: Hearing normal.      Left Ear: Hearing normal.      Nose: Nose normal.      Mouth/Throat:      Lips: Pink.      Mouth: Mucous membranes are moist.   Eyes:      General: Visual field deficit present.      Pupils: Pupils are equal, round, and reactive to light.   Cardiovascular:      Rate and Rhythm: Normal rate and regular rhythm.      Pulses: Normal pulses.      Heart sounds: Normal heart sounds.      Comments: Telemetry - Sinus Rhythm  Pulmonary:      Effort: Pulmonary effort is normal.      Breath sounds: Normal breath sounds. No wheezing.   Abdominal:      General: Bowel sounds are normal.      Palpations: Abdomen is soft.      Tenderness: There is no abdominal tenderness. There is no guarding or rebound.   Musculoskeletal:      Right lower leg: No edema.      Left lower leg: No edema.      Comments: Flaccid LUE/LLE     RUE/RLE 5/5   Skin:     General: Skin is warm and dry.      Capillary Refill: Capillary refill takes less than 2  seconds.   Neurological:      Mental Status: He is easily aroused. He is lethargic.      GCS: GCS eye subscore is 3. GCS verbal subscore is 5. GCS motor subscore is 6.      Cranial Nerves: Cranial nerve deficit and facial asymmetry present. No dysarthria.      Sensory: Sensory deficit present.      Motor: Weakness present.      Comments: Left sided flaccidy, left sided neglect  Left-sided facial droop  Right sided gaze preference    Decreased sensation entire left side       Psychiatric:         Mood and Affect: Affect is flat.         Speech: Speech normal.         Behavior: Behavior normal.         Cognition and Memory: Cognition normal.         Medications  Current Facility-Administered Medications   Medication Dose Route Frequency Provider Last Rate Last Admin   • thyroid (ARMOUR THYROID) tablet 90 mg  90 mg Oral BID Kerwin Nunez M.D.       • butalbital/apap/caffeine -40 mg (Fioricet) per tablet 1-2 tablet  1-2 tablet Oral Q6HRS PRN Radha Meredith, A.P.R.N.       • NS infusion   Intravenous Continuous Radha Meredith, A.P.R.N. 100 mL/hr at 04/02/21 1032 New Bag at 04/02/21 1032   • aspirin (ASA) chewable tab 81 mg  81 mg Oral DAILY Mc Calvillo M.D.   81 mg at 04/02/21 1016   • Pharmacy consult request - Allow for permissive hypertension: SBP up to 220 mmHg/DBP up to 120 mmHg x 48 hours   Other PHARMACY TO DOSE Gia Forman M.D.       • senna-docusate (PERICOLACE or SENOKOT S) 8.6-50 MG per tablet 2 tablet  2 tablet Oral BID Gia Forman M.D.   Stopped at 03/31/21 2100    And   • polyethylene glycol/lytes (MIRALAX) PACKET 1 Packet  1 Packet Oral QDAY PRN Gia Forman M.D.        And   • magnesium hydroxide (MILK OF MAGNESIA) suspension 30 mL  30 mL Oral QDAY PRN Gia Forman M.D.        And   • bisacodyl (DULCOLAX) suppository 10 mg  10 mg Rectal QDAY PRN Gia Forman M.D.       • ondansetron (ZOFRAN) syringe/vial injection 4 mg  4 mg Intravenous Q4HRS PRN Gia Forman M.D.       • ondansetron  (ZOFRAN ODT) dispertab 4 mg  4 mg Oral Q4HRS PRN Gia Forman M.D.       • promethazine (PHENERGAN) tablet 12.5-25 mg  12.5-25 mg Oral Q4HRS PRN Gia Forman M.D.       • promethazine (PHENERGAN) suppository 12.5-25 mg  12.5-25 mg Rectal Q4HRS PRN Gia Forman M.D.       • prochlorperazine (COMPAZINE) injection 5-10 mg  5-10 mg Intravenous Q4HRS PRN Gia Forman M.D.   5 mg at 04/01/21 1035   • acetaminophen (Tylenol) tablet 650 mg  650 mg Oral Q6HRS PRN Gia Forman M.D.       • atorvastatin (LIPITOR) tablet 80 mg  80 mg Oral Q EVENING Gia Forman M.D.   Stopped at 04/01/21 1800   • labetalol (NORMODYNE/TRANDATE) injection 10-20 mg  10-20 mg Intravenous Q4HRS PRN Glenn Long M.D.        Or   • hydrALAZINE (APRESOLINE) injection 20 mg  20 mg Intravenous Q2HRS PRN Glenn Long M.D.           Fluids    Intake/Output Summary (Last 24 hours) at 4/2/2021 1050  Last data filed at 4/2/2021 1000  Gross per 24 hour   Intake 2503.33 ml   Output 1345 ml   Net 1158.33 ml       Laboratory          Recent Labs     03/31/21 1929 04/01/21 0245 04/02/21  0753   SODIUM 140 139 144   POTASSIUM 4.0 4.6 4.7   CHLORIDE 110 106 111   CO2 21 22 22   BUN 14 14 25*   CREATININE 1.03 1.02 1.25   MAGNESIUM 2.0  --  2.3   PHOSPHORUS  --   --  3.8   CALCIUM 8.6 9.2 9.1     Recent Labs     03/31/21 1929 04/01/21  0245 04/02/21  0753   ALTSGPT 31 30  --    ASTSGOT 23 22  --    ALKPHOSPHAT 58 66  --    TBILIRUBIN 0.4 0.6  --    GLUCOSE 108* 106* 127*     Recent Labs     03/31/21 1929 04/01/21 0245 04/02/21  0753   WBC 9.3 7.4 9.1   NEUTSPOLYS 87.10* 82.20* 85.80*   LYMPHOCYTES 8.50* 12.00* 7.30*   MONOCYTES 4.00 5.50 6.50   EOSINOPHILS 0.10 0.00 0.00   BASOPHILS 0.10 0.00 0.10   ASTSGOT 23 22  --    ALTSGPT 31 30  --    ALKPHOSPHAT 58 66  --    TBILIRUBIN 0.4 0.6  --      Recent Labs     03/31/21  1929 04/01/21  0245 04/02/21  0753   RBC 4.76 5.02 4.88   HEMOGLOBIN 14.0 14.8 14.0   HEMATOCRIT 39.6* 41.6* 41.3*   PLATELETCT 200 262 254  "  PROTHROMBTM 14.0  --   --    APTT 24.1*  --   --    INR 1.04  --   --        Imaging  Echo:   Reviewed  MRI:   Reviewed    Assessment/Plan  * Acute right MCA stroke (HCC)- (present on admission)  Assessment & Plan  -Acute right [place] territory stroke  -Outside the window for alteplase, not a candidate for thrombectomy per IR  -q2h neuro checks  -Permissive hypertension  -Neurosurgery following  -High risk for malignant edema in 3 to 5 days.  Monitor closely for need for decompressive hemicraniectomy  -Maintain normothermia, normoglycemia, normonatremia  -Elevate head of bed  -PT/OT/SLP  -PMNR  -per Neurology \"Patient most likely will need to be anticoagulated however this most likely will not be initiated until next week at the earliest.  Given the large territory infarct on CT head.\"  -AM head CT  -OK for baby ASA per Neurology    Urinary retention  Assessment & Plan  -Poor mobility, acute stroke  -Reportedly does not have at baseline  -dye    Paroxysmal atrial fibrillation (HCC)  Assessment & Plan  -Diagnosed about 9 months ago  -Was reportedly taking Coreg, however stopped taking it when he went back into sinus rhythm  -has not been on AC OP  -has been treating holistically OP. Taking vitamins, including Vitamin K  -ECHO unremarkable  -telemetry - currently NSR      History of COVID-19- (present on admission)  Assessment & Plan  -Positive PCR 3/18/2021  -Patient currently is asymptomatic, on room air, in no respiratory distress.  No indications to treat  -Repeat PCR 3/31 remains positive.  Per hospital infectious prevention, no longer requires isolation      Acquired hypothyroidism- (present on admission)  Assessment & Plan  -TSH low, normal T4  -restart home armour thyroid today         VTE:  Contraindicated  Ulcer: Not Indicated  Lines: Dye Catheter  Ongoing indication addressed    I have performed a physical exam and reviewed and updated ROS and Plan today (4/2/2021). In review of yesterday's note " (4/1/2021), there are no changes except as documented above.     Discussed patient condition and risk of morbidity and/or mortality with Family, RN, Therapies, Pharmacy, Charge nurse / hot rounds, Patient and neurology  The patient remains critically ill.  Critical care time = 50 minutes in directly providing and coordinating critical care and extensive data review.  No time overlap and excludes procedures.    Please note that this dictation was created using voice recognition software. I have made every reasonable attempt to correct obvious errors, but there may be errors of grammar and possibly content that I did not discover before finalizing the note.    CRISTÓBAL Hampton.

## 2021-04-02 NOTE — CARE PLAN
Problem: Communication  Goal: The ability to communicate needs accurately and effectively will improve  Outcome: PROGRESSING AS EXPECTED     Problem: Safety  Goal: Will remain free from injury  Outcome: PROGRESSING AS EXPECTED     Problem: Safety:  Goal: Will remain free from injury  Outcome: PROGRESSING AS EXPECTED

## 2021-04-02 NOTE — CARE PLAN
Problem: Communication  Goal: The ability to communicate needs accurately and effectively will improve  Outcome: PROGRESSING AS EXPECTED     Problem: Safety  Goal: Will remain free from injury  Outcome: PROGRESSING AS EXPECTED     Problem: Venous Thromboembolism (VTW)/Deep Vein Thrombosis (DVT) Prevention:  Goal: Patient will participate in Venous Thrombosis (VTE)/Deep Vein Thrombosis (DVT)Prevention Measures  Outcome: PROGRESSING AS EXPECTED     Problem: Safety:  Goal: Will remain free from injury  Outcome: PROGRESSING AS EXPECTED

## 2021-04-02 NOTE — THERAPY
"Speech Language Pathology   Fiberoptic Endoscopic Evaluation of Swallow     Patient Name: Thong Arzola  AGE:  56 y.o., SEX:  male  Medical Record #: [id number]  Today's Date: 4/2/2021     Precautions  Precautions: Fall Risk, Swallow Precautions ( See Comments)  Comments: L side neglect; MM5/MT2     Assessment  55 YO male admitted 3/31 from OSH 3/3 stroke-like symptoms. PMHx: afib, high cholesterol and hypothyroidism. CMHx: acute R MCA CVA, PAF, hx of COVID-19, acquired hypothyroidism. CXR 3/31 \"Interstitial pulmonary parenchymal prominence, compatible with interstitial edema and/or infiltrates.\" Brain MRI 4/1 \"Very large acute right MCA territory infarct as detailed above with small amount of petechial hemorrhage in the right insular region and right temporal lobe. Punctate right thalamic lacunar infarct. Right ICA and M1 MCA occlusion.\"    Pt seen this date for FEES diagnostic to rule out/confirm aspiration and further assess oropharyngeal swallow function. Discussed with the patient the risks, benefits, and alternatives of the FEES procedure. Patient acknowledges and agreeable to proceed with the procedure. Pt tolerated procedure with scope being advanced through right nostril. Presentation of PO included ice, MTL, liquidized, puree, minced and moist, soft and bite sized and thins by tsp. Please see below for full details regarding each consistency. Pt presenting with mild oropharyngeal dysphagia as evidenced loss of bolus control resulting in swallow initiation at the level of pyrifrom sinuses with subsequent deep penetration before the swallow with suspected aspiration during the swallow with cough response on thins. Pt demonstrated consistent loss of bolus control to level of pyriforms with MTL and thins L>R, suspect related to poor oral containment as a result of base of tongue weakness.    Pt cleared mild vallecular and trace pyriform sinus residue with spontaneous second swallow. Pt unable to " "successfully complete 3-second prep to improve swallow safety 2/2 poor bolus control. Left sided anterior spillage appreciated with trials of soft and bite size. No anterior spillage appreciated when PO trial was presented to right side of oral cavity. However, pt reports \"chewing the left side\" of his tongue. Suspect left sided pocketing, evidenced by peaches visualized sliding into left side of valleculae ~3 minutes after SB6 trials ceased.     1. Recommend initiation of a minced and moist/mildly thick liquid diet with adherence to the following: direct supervision by staff or trained family, upright at 90*, check for left sided pocketing, place PO trials of right side of oral cavity, single sips.   2. Float meds in puree  3. Okay for ice chips between meals with HOB at 90*.   4. Recommed swallow exercises targeting lingual ROM and base of tongue strength.  5. Recommend MBSS prior to advancement of diet.    SLP following      Plan    Recommend Speech Therapy 5 times per week until therapy goals are met for the following treatments:  Dysphagia Training and Patient / Family / Caregiver Education.    Discharge Recommendations: (P) Recommend post-acute placement for additional speech therapy services prior to discharge home    Subjective    Pt was alert, followed directions and participated fully in diagnostic. Pt's wife present for entirety of session and appears very supportive, pt and spouse verbalized good understanding of results/recs, all questions answered.        Objective       04/02/21 0935   FEES Evaluation Prior To Procedure   Respiratory Status Room Air   Onset Date Of Dysphagia 03/31/21   Dysphagia Symptoms Warranting Video Swallow left facial droop, throat clearing, wet vocal quality   Procedure Performed While Patient Sitting In Bed   Seated at (Degrees) 90   A Flexible Endoscope Was Introduced Transnasally In The Right Nare   FEES  Anatomy Assessment   Anatomy For A Functional Swallow: Other " (Comments)  (WNL)   FEES Laryngeal Adduction Assessment   Breath Holding Adequate   Clearing Throat Adequate   Cough Adequate   Phonation Adequate   Onset Of Swallow Inadequate   FEES Velopharyngeal Assessment    Velopharyngeal Closure: Adequate   FEES Voice Assessment    Voice: Other (Comments)  (WNL)   FEES Sensation Assessment    Presence of Scope Adequate   Presence of Residue Adequate   Presence of Penetration Delayed Response   Presence of Aspiration Delayed Response   FEES Swallow Function Assessment   Swallow Trigger Level of the Pyriform Sinuses   Base of Tongue Retraction Impaired   Pharyngeal Constrictor Movement Functional   White Out Phase Complete White Out   Epiglottic Movement Functional   Laryngeal Elevation Functional   Cricopharyngeal Function Functional   Swallow Observations / Symptoms   Swallow Observations / Symptoms Yes   Vallecular Residue Mildly Thick (2) - (Nectar Thick);Thin (0)   Mildly Thick (2) - (Nectar Thick) Teaspoon;Cup   Thin (0) Teaspoon   Pyriform Sinus Residue Pureed (4);Thin (0)   Thin (0) Teaspoon   Penetration Before the Swallow Liquidised (3);Pureed (4);Thin (0)   Thin (0) Teaspoon   Penetration Suspected During the Swallow Thin (0)   Thin (0) Teaspoon   Penetration After the Swallow Thin (0)   Thin (0) Teaspoon   Aspiration Suspected During the Swallow Thin (0)   Thin (0) Teaspoon   Compensatory Strategies Attempted   Compensatory Strategies Attempted Yes   Multiple Swallows cleared residue   Three Second Prep inconsistent ability to execute   Patient Ability To Protect Airway   Patient Ability To Protect Airway  Inconsistent   Penetration Aspiration Scale   Penetration Aspiration Scale 4 - Material contacts vocal folds but is ejected, no stasis   [place] Pharyngeal Residue Severity   Vallecular Residue Mild, epiglottic ligament visable   Pyriform Sinus Residue  Trace, trace coating   Dysphagia Rating   Nutritional Liquid Intake Rating Scale Thickened beverages (mildly thick  "unless otherwise specified)   Nutritional Food Intake Rating Scale Total oral diet with multiple consistencies but requiring special preparation or compensations   Problem List   Problem List Dysphagia   Evaluation Recommendations   Swallow Evaluations Recommendations (Yes / No) Yes   Diet / Liquid Recommendation Mildly Thick (2) - (Nectar Thick);Minced & Moist (5) - (Dysphagia II)   Medication Administration  Float Whole with Puree   Recommended Supervision Direct   Evaluation Recommended Techniques   Swallow Techniques Yes   Techniques Oral Stage Position Food Posterior In Right Oral Cavity;Range Of Motion Exercises For Tongue;Check For Pocketing   Techniques Pharyngeal Phase Thicken Liquids For Better Control Of Bolus To Consistency Of Nectar;Repeat Swallow 2-3 Times On Each Bolus To Clear Residue;No Straw Drinking;Pacing:Control Amount Of Presentation   Techniques Esophageal Stage Upright Positioning Or Side Lying During Feeding   Patient / Family Goals   Patient / Family Goal #1 \"Yes, water\"   Goal #1 Outcome Progressing as expected   Short Term Goals   Short Term Goal # 1 Pt will participate in prefeeding trials with SLP 1:1 with no s/sx of aspiration and min cues.    Goal Outcome # 1 Goal met, new goal added   Short Term Goal # 1 B  Pt will consume a diet of MM5/MT2 with no s/sx of aspiration and min cues.    Short Term Goal # 2 Pt will complete 10 reps each of lingual ROM and base of tongue exercises with \"good\" accuracy and min cues.          "

## 2021-04-02 NOTE — PROGRESS NOTES
"Neurology Progress Note  Neurohospitalist Service, [facility]    Referring Physician: Gia Forman M.D.    Chief Complaint   Patient presents with   • Possible Stroke     [facility] for stroke like symptoms. Pt was last seen well by wife at 0630. He texted his wife at 1500 then took a bath. Pt was trying to get out of the bath when symptoms started. Presents with L hemipalegia, signifigant facial paralysis, R gaze deviation. Pt has hx afib and stated, \"my heart's been in afib since Fri.\" Seen by PCP on Mon for afib and started on metoprolol. Presents in SR-ST. + COVID test on 3/18 after flu like s/s x 3/15.       HPI: Refer to initial documented Neurology H&P, as detailed in the patient's chart.    Interval History April 2, 2021: No new events overnight.    Review of systems: In addition to what is detailed in the HPI and/or updated in the interval history, all other systems reviewed and are negative.    Past Medical History:    has a past medical history of Afib (HCC) and High cholesterol.    FHx:  family history is not on file.    SHx:   reports that he has never smoked. He has never used smokeless tobacco. He reports current alcohol use. He reports that he does not use drugs.    Medications:    Current Facility-Administered Medications:   •  NS infusion, , Intravenous, Continuous, JAIDA Hampton.[email or web address], Last Rate: 100 mL/hr at 04/01/21 1140, New Bag at 04/01/21 1140  •  aspirin (ASA) chewable tab 81 mg, 81 mg, Oral, DAILY, Mc Calvillo M.D., Stopped at 04/01/21 0600  •  Pharmacy consult request - Allow for permissive hypertension: SBP up to 220 mmHg/DBP up to 120 mmHg x 48 hours, , Other, PHARMACY TO DOSE, Gia Forman M.D.  •  senna-docusate (PERICOLACE or SENOKOT S) 8.6-50 MG per tablet 2 tablet, 2 tablet, Oral, BID, Stopped at 03/31/21 2100 **AND** polyethylene glycol/lytes (MIRALAX) PACKET 1 Packet, 1 Packet, Oral, QDAY PRN **AND** magnesium hydroxide (MILK OF MAGNESIA) " suspension 30 mL, 30 mL, Oral, QDAY PRN **AND** bisacodyl (DULCOLAX) suppository 10 mg, 10 mg, Rectal, QDAY PRN, Gia Forman M.D.  •  ondansetron (ZOFRAN) syringe/vial injection 4 mg, 4 mg, Intravenous, Q4HRS PRN, Gia Forman M.D.  •  ondansetron (ZOFRAN ODT) dispertab 4 mg, 4 mg, Oral, Q4HRS PRN, Gia Forman M.D.  •  promethazine (PHENERGAN) tablet 12.5-25 mg, 12.5-25 mg, Oral, Q4HRS PRN, Gia Forman M.D.  •  promethazine (PHENERGAN) suppository 12.5-25 mg, 12.5-25 mg, Rectal, Q4HRS PRN, Gia Forman M.D.  •  prochlorperazine (COMPAZINE) injection 5-10 mg, 5-10 mg, Intravenous, Q4HRS PRN, Gia Forman M.D., 5 mg at 04/01/21 1035  •  acetaminophen (Tylenol) tablet 650 mg, 650 mg, Oral, Q6HRS PRN, Gia Forman M.D.  •  atorvastatin (LIPITOR) tablet 80 mg, 80 mg, Oral, Q EVENING, Gia Forman M.D., Stopped at 04/01/21 1800  •  labetalol (NORMODYNE/TRANDATE) injection 10-20 mg, 10-20 mg, Intravenous, Q4HRS PRN **OR** hydrALAZINE (APRESOLINE) injection 20 mg, 20 mg, Intravenous, Q2HRS PRN, Glenn Long M.D.    Physical Examination:     Vitals:    04/02/21 0400 04/02/21 0600 04/02/21 0700 04/02/21 0800   BP: 106/57  144/67 129/70   Pulse: 76  80 83   Resp: 17  19 20   Temp:       TempSrc: Bladder Bladder  Bladder   SpO2: 93%  94% 95%   Weight:       Height:           General: Patient is awake and in no acute distress  Eyes: examination of optic disks not indicated at this time  CV: RRR    NEUROLOGICAL EXAM:     Patient is awake and alert.  He knows he is in [facility].  Is able to tell me that he had a stroke.  No signs of aphasia.  Pupils are equal reactive.  However has a dense right gaze preference he is unable to overcome.  There is a left facial droop.  Motor examination he is plegic on the left side arm and leg.  Sensation is nonexistent on the left side.  He has a very dense neglect.  Today the neglect is better.  He is able to recognize his hand.  Is able to tell down tapping on his left side.  He will turn  his head to the left now.  The right side appears to be normal.    Objective Data:    Labs:  Lab Results   Component Value Date/Time    PROTHROMBTM 14.0 03/31/2021 07:29 PM    INR 1.04 03/31/2021 07:29 PM      Lab Results   Component Value Date/Time    WBC 9.1 04/02/2021 07:53 AM    RBC 4.88 04/02/2021 07:53 AM    HEMOGLOBIN 14.0 04/02/2021 07:53 AM    HEMATOCRIT 41.3 (L) 04/02/2021 07:53 AM    MCV 84.6 04/02/2021 07:53 AM    MCH 28.7 04/02/2021 07:53 AM    MCHC 33.9 04/02/2021 07:53 AM    MPV 9.5 04/02/2021 07:53 AM    NEUTSPOLYS 85.80 (H) 04/02/2021 07:53 AM    LYMPHOCYTES 7.30 (L) 04/02/2021 07:53 AM    MONOCYTES 6.50 04/02/2021 07:53 AM    EOSINOPHILS 0.00 04/02/2021 07:53 AM    BASOPHILS 0.10 04/02/2021 07:53 AM      Lab Results   Component Value Date/Time    SODIUM 139 04/01/2021 02:45 AM    POTASSIUM 4.6 04/01/2021 02:45 AM    CHLORIDE 106 04/01/2021 02:45 AM    CO2 22 04/01/2021 02:45 AM    GLUCOSE 106 (H) 04/01/2021 02:45 AM    BUN 14 04/01/2021 02:45 AM    CREATININE 1.02 04/01/2021 02:45 AM    CREATININE 1.3 04/04/2008 03:05 AM      Lab Results   Component Value Date/Time    CHOLSTRLTOT 169 04/01/2021 12:36 PM     (H) 04/01/2021 12:36 PM    HDL 32 (A) 04/01/2021 12:36 PM    TRIGLYCERIDE 126 04/01/2021 12:36 PM       Lab Results   Component Value Date/Time    ALKPHOSPHAT 66 04/01/2021 02:45 AM    ASTSGOT 22 04/01/2021 02:45 AM    ALTSGPT 30 04/01/2021 02:45 AM    TBILIRUBIN 0.6 04/01/2021 02:45 AM        Imaging/Testing:  MR-BRAIN-W/O   Final Result      Very large acute right MCA territory infarct as detailed above with small amount of petechial hemorrhage in the right insular region and right temporal lobe.      Punctate right thalamic lacunar infarct.      Right ICA and M1 MCA occlusion.      EC-ECHOCARDIOGRAM COMPLETE W/O CONT   Final Result      DX-CHEST-PORTABLE (1 VIEW)   Final Result         1.  Interstitial pulmonary parenchymal prominence, compatible with interstitial edema and/or  infiltrates.      CT-CTA NECK WITH & W/O-POST PROCESSING   Final Result      Acute occlusion of the right internal carotid artery shortly after bifurcation. It is occluded up to the level of the carotid terminus.      CT-CTA HEAD WITH & W/O-POST PROCESS   Final Result      Acute right M1 occlusion.      Findings were discussed with RHONDA DIAZ on 3/31/2021 7:54 PM.      CT-CEREBRAL PERFUSION ANALYSIS   Final Result      1.  Cerebral blood flow less than 30% likely representing completed infarct = 134 mL.      2.  T Max more than 6 seconds likely representing combination of completed infarct and ischemia = 210 mL.      3.  Mismatched volume likely representing ischemic brain/penumbra = 76 mL.      4.  Please note that the cerebral perfusion was performed on the limited brain tissue around the basal ganglia region. Infarct/ischemia outside the CT perfusion sections can be missed in this study.      CT-HEAD W/O   Final Result   Addendum 1 of 1   In retrospect, there is subtle loss of gray-white matter differentiation    in the right MCA distribution, consistent with acute infarct.      Final      1.  No CT evidence of acute infarct, hemorrhage or mass.   2.  Mild paranasal sinus disease.            Assessment and Plan:    56-year-old male presenting with right MCA infarct.  Was not a TPA candidate due to outside the window.  Was not an IR candidate due to already having CT changes in stroke completion.  He does have a M1 thrombus on CTA.  Currently he is stable.  He is fully awake and alert.  He has a dense left hemiplegia and severe neglect.  He understands he had a stroke.  I informed him that there is risk of swelling in the next few days which he understood.  Went over the possibility of craniectomy with him.  I explained to him that his most likely outcome will be plegic on the left side.  He understood this and said he still would want to have a craniectomy if needed.  In the meantime we will closely  monitor the patient if he does develop swelling we will initiate 3% saline at that time.  And if the swelling progresses despite best medical management we will reconsult neurosurgery.  Etiology most likely due to cardioembolic event from underlying atrial fibrillation.    Plan:  1.  MRI brain-shows large right parietal infarct.  LAMONT territory and basal ganglia area are spared on the right side.  2.  Stat CT head if change in overall status.  3.  Aspirin 81 mg daily  4.  Sodium is currently 139.  Okay with fluid restriction try to get the sodium in the 140s  5.  Patient most likely will need to be anticoagulated however this most likely will not be initiated until next week at the earliest.  Given the large territory infarct on CT head.  6.  Plain CT head tomorrow morning monitor for swelling.  7.  Start hypertonic saline if signs of swelling.          The evaluation of the patient, and recommended management, was discussed with the resident staff. I have performed a physical exam and reviewed and updated ROS and Plan today (4/2/2021). In review of yesterday's note (4/1/2021), there are no changes except as documented above.    This chart was partially generated using voice recognition technology and sound alike word replacement may be present, best efforts were made to make the chart accurate.    Arthur Solorzano MD  Board Certified Neurology, ABPN  (t) [phone]

## 2021-04-02 NOTE — PROGRESS NOTES
Neurosurgery Progress Note    Subjective:  - no events    Exam:  Moved right side  flaccid left side     BP  Min: 101/55  Max: 144/67  Pulse  Av.1  Min: 76  Max: 98  Resp  Av.7  Min: 14  Max: 22  Monitored Temp 2  Av.9 °C (98.5 °F)  Min: 35.9 °C (96.6 °F)  Max: 37.3 °C (99.1 °F)  SpO2  Av.3 %  Min: 92 %  Max: 98 %    No data recorded    Recent Labs     21  0245 21  0753   WBC 9.3 7.4 9.1   RBC 4.76 5.02 4.88   HEMOGLOBIN 14.0 14.8 14.0   HEMATOCRIT 39.6* 41.6* 41.3*   MCV 83.2 82.9 84.6   MCH 29.4 29.5 28.7   MCHC 35.4* 35.6* 33.9   RDW 35.7* 36.5 36.8   PLATELETCT 200 262 254   MPV 9.1 9.1 9.5     Recent Labs     215   SODIUM 140 139   POTASSIUM 4.0 4.6   CHLORIDE 110 106   CO2 21 22   GLUCOSE 108* 106*   BUN 14 14   CREATININE 1.03 1.02   CALCIUM 8.6 9.2     Recent Labs     21   APTT 24.1*   INR 1.04           Intake/Output       21 - 2159 21 - 2159      1900-0659 Total 3875-8103 7152-4144 Total       Intake    I.V.  683.3  1200 1883.3  200  -- 200    Magnesium Sulfate Volume 50 -- 50 -- -- --    Volume (mL) (NS infusion) 633.3 1200 1833.3 200 -- 200    IV Piggyback  100  0 100  --  -- --    Volume (mL) (methylPREDNISolone sod succ (SOLU-MEDROL) 500 mg in  mL IVPB) 100 0 100 -- -- --    Total Intake 783.3 1200 1983.3 200 -- 200       Output    Urine  850  330 1180  --  -- --    Output (mL) (Urethral Catheter Temperature probe 16 Fr.)  -- -- --    Stool  --  -- --  --  -- --    Number of Times Stooled 0 x -- 0 x -- -- --    Total Output  -- -- --       Net I/O     -66.7 870 803.3 200 -- 200            Intake/Output Summary (Last 24 hours) at 2021 0919  Last data filed at 2021 0800  Gross per 24 hour   Intake 2183.33 ml   Output 1180 ml   Net 1003.33 ml       $ Bladder Scan Results (mL): 15    • NS   Continuous   • aspirin  81 mg DAILY   •  Pharmacy   PHARMACY TO DOSE   • senna-docusate  2 tablet BID    And   • polyethylene glycol/lytes  1 Packet QDAY PRN    And   • magnesium hydroxide  30 mL QDAY PRN    And   • bisacodyl  10 mg QDAY PRN   • ondansetron  4 mg Q4HRS PRN   • ondansetron  4 mg Q4HRS PRN   • promethazine  12.5-25 mg Q4HRS PRN   • promethazine  12.5-25 mg Q4HRS PRN   • prochlorperazine  5-10 mg Q4HRS PRN   • acetaminophen  650 mg Q6HRS PRN   • atorvastatin  80 mg Q EVENING   • labetalol  10-20 mg Q4HRS PRN    Or   • hydrALAZINE  20 mg Q2HRS PRN       Assessment and Plan:  Hospital day #2  POD #na  Prophylactic anticoagulation: yes         Start date/time: 4/2/2021    Follow clinical exam   MRI shows minimal shift with large R MCA stroke  Q2 neuro checks   Keegan crani watch     30 mins in pt care

## 2021-04-02 NOTE — CONSULTS
"    Physical Medicine and Rehabilitation Consultation              Date of initial consultation: 4/2/2021  Consulting provider: Mc Calvillo MD  Reason for consultation: assess for acute inpatient rehab appropriateness  LOS: 2 Day(s)    Chief complaint: Stroke    HPI: The patient is a 56 y.o. right hand dominant male with a past medical history of hypertension, hyperlipidemia, atrial fibrillation not on anticoagulation, Covid positive on 3/18;  who presented on 3/31/2021  7:29 PM brought in by [facility] with left hemiplegia, facial droop, right gaze deviation.  CT head showed complete occlusion of right MCA, but unfortunately he was not a candidate for TPA or thrombectomy.  Seen by neurology, found to have a NIH score of 18.  Additional work-up with CTA shows right ICA complete occlusion.  Etiology is thought to be cardioembolic due to A. fib off of AC.  He is currently being followed closely by neurosurgery as he is expected to have peak brain swelling in the next 1 to 2 days and may require craniotomy.  Follow-up MRI shows minimal shift with large MCA stroke    Most of my visit is with Jims wife but also with him. He endorses left neglect and weakness, but does not endorse paresthesias at this time. I had a long meeting with his wife about expectations with this type and severity of stroke.     Social Hx:  2 SH  2-3 MAKENZIE, 1 flight of stairs with rails inside  With: Spouse    Per [facility] \"Anel [wife] tells me she is currently working full time, has flexible job and will take time off to provide 24/7 support when Thong returnes home.  They live 2 story home, 2 -3 steps to enter.  Master bedroom on ground floor.  Bathroom has tub/shower combo, no safety grab bars. \"    THERAPY:  PT: Functional mobility   4/1: Mod assist sit to stand with 2 people    OT: ADLs  4/1: Mod assist grooming, max assist lower body dressing    SLP:   4/1: N.p.o. pending fees  4/2: minced and moist/mildly thick liquid     IMAGING:  MR " "brain 4/1/2021  Very large acute right MCA territory infarct as detailed above with small amount of petechial hemorrhage in the right insular region and right temporal lobe.  Punctate right thalamic lacunar infarct.  Right ICA and M1 MCA occlusion.    PROCEDURES:  None    PMH:  Past Medical History:   Diagnosis Date   • Afib (HCC)    • High cholesterol        PSH:  Past Surgical History:   Procedure Laterality Date   • KNEE RECONSTRUCTION      left knee orthoscopic       FHX:  Non-pertinent to today's issues    Medications:  Current Facility-Administered Medications   Medication Dose   • thyroid (ARMOUR THYROID) tablet 90 mg  90 mg   • butalbital/apap/caffeine -40 mg (Fioricet) per tablet 1-2 tablet  1-2 tablet   • NS infusion     • aspirin (ASA) chewable tab 81 mg  81 mg   • Pharmacy consult request - Allow for permissive hypertension: SBP up to 220 mmHg/DBP up to 120 mmHg x 48 hours     • senna-docusate (PERICOLACE or SENOKOT S) 8.6-50 MG per tablet 2 tablet  2 tablet    And   • polyethylene glycol/lytes (MIRALAX) PACKET 1 Packet  1 Packet    And   • magnesium hydroxide (MILK OF MAGNESIA) suspension 30 mL  30 mL    And   • bisacodyl (DULCOLAX) suppository 10 mg  10 mg   • ondansetron (ZOFRAN) syringe/vial injection 4 mg  4 mg   • ondansetron (ZOFRAN ODT) dispertab 4 mg  4 mg   • promethazine (PHENERGAN) tablet 12.5-25 mg  12.5-25 mg   • promethazine (PHENERGAN) suppository 12.5-25 mg  12.5-25 mg   • prochlorperazine (COMPAZINE) injection 5-10 mg  5-10 mg   • acetaminophen (Tylenol) tablet 650 mg  650 mg   • atorvastatin (LIPITOR) tablet 80 mg  80 mg   • labetalol (NORMODYNE/TRANDATE) injection 10-20 mg  10-20 mg    Or   • hydrALAZINE (APRESOLINE) injection 20 mg  20 mg       Allergies:  No Known Allergies    Physical Exam:  Vitals: /67   Pulse 75   Temp 36.6 °C (97.8 °F) (Temporal)   Resp 17   Ht 1.778 m (5' 10\")   Wt 79.6 kg (175 lb 7.8 oz)   SpO2 93%   Gen: NAD  Head: NC/AT  Eyes/ Nose/ Mouth: " PERRLA, moist mucous membranes  Cardio: RRR, good distal perfusion, warm extremities  Pulm: normal respiratory effort, no cyanosis   Abd: Soft NTND, negative borborygmi   Ext: No peripheral edema. No calf tenderness. No clubbing.    Mental status: answers questions appropriately follows commands  Speech: fluent, no aphasia or dysarthria    CRANIAL NERVES:  2,3: LEFT VF cut, PERRL  3,4,6: EOMI in right VF, no nystagmus or diplopia  5: sensation intact to light touch bilaterally and symmetric  7: Left facial droop   8: hearing grossly intact  9,10: not seen  11: SCM/Trapezius strength 5/5right, 0/5 left  12: tongue protrudes left    Motor:      Upper Extremity  Myotome R L   Shoulder flexion C5 5 0/5   Elbow flexion C5 5 0/5   Wrist extension C6 5 0/5   Elbow extension C7 5 0/5   Finger flexion C8 5 1/5   Finger abduction T1 5 0/5     Lower Extremity Myotome R L   Hip flexion L2 5 1/5   Knee extension L3 5 0/5   Ankle dorsiflexion L4 5 0/5   Toe extension L5 5 0/5   Ankle plantarflexion S1 5 0/5     Sensory:   Altered sensation on left side       DTRs:  Right  Left    Brachioradialis  2+  2+   Patella tendon  2+ 2+     2-3 beats right ankle clonus, sustained clonus left ankle  Pos babinski left   Negative Castillo b/l     Tone: tight hamstrings bilaterally    Labs: Reviewed and significant for   Recent Labs     03/31/21 1929 04/01/21 0245 04/02/21  0753   RBC 4.76 5.02 4.88   HEMOGLOBIN 14.0 14.8 14.0   HEMATOCRIT 39.6* 41.6* 41.3*   PLATELETCT 200 262 254   PROTHROMBTM 14.0  --   --    APTT 24.1*  --   --    INR 1.04  --   --      Recent Labs     03/31/21 1929 04/01/21 0245 04/02/21  0753   SODIUM 140 139 144   POTASSIUM 4.0 4.6 4.7   CHLORIDE 110 106 111   CO2 21 22 22   GLUCOSE 108* 106* 127*   BUN 14 14 25*   CREATININE 1.03 1.02 1.25   CALCIUM 8.6 9.2 9.1     Recent Results (from the past 24 hour(s))   Lipid Profile    Collection Time: 04/01/21 12:36 PM   Result Value Ref Range    Cholesterol,Tot 169 100 - 199  mg/dL    Triglycerides 126 0 - 149 mg/dL    HDL 32 (A) >=40 mg/dL     (H) <100 mg/dL   TSH WITH REFLEX TO FT4    Collection Time: 04/01/21 12:36 PM   Result Value Ref Range    TSH <0.005 (L) 0.380 - 5.330 uIU/mL   FREE THYROXINE    Collection Time: 04/01/21 12:36 PM   Result Value Ref Range    Free T-4 1.52 0.93 - 1.70 ng/dL   EC-ECHOCARDIOGRAM COMPLETE W/O CONT    Collection Time: 04/01/21  3:25 PM   Result Value Ref Range    Left Ventrical Ejection Fraction 75    CBC WITH DIFFERENTIAL    Collection Time: 04/02/21  7:53 AM   Result Value Ref Range    WBC 9.1 4.8 - 10.8 K/uL    RBC 4.88 4.70 - 6.10 M/uL    Hemoglobin 14.0 14.0 - 18.0 g/dL    Hematocrit 41.3 (L) 42.0 - 52.0 %    MCV 84.6 81.4 - 97.8 fL    MCH 28.7 27.0 - 33.0 pg    MCHC 33.9 33.7 - 35.3 g/dL    RDW 36.8 35.9 - 50.0 fL    Platelet Count 254 164 - 446 K/uL    MPV 9.5 9.0 - 12.9 fL    Neutrophils-Polys 85.80 (H) 44.00 - 72.00 %    Lymphocytes 7.30 (L) 22.00 - 41.00 %    Monocytes 6.50 0.00 - 13.40 %    Eosinophils 0.00 0.00 - 6.90 %    Basophils 0.10 0.00 - 1.80 %    Immature Granulocytes 0.30 0.00 - 0.90 %    Nucleated RBC 0.00 /100 WBC    Neutrophils (Absolute) 7.84 (H) 1.82 - 7.42 K/uL    Lymphs (Absolute) 0.67 (L) 1.00 - 4.80 K/uL    Monos (Absolute) 0.59 0.00 - 0.85 K/uL    Eos (Absolute) 0.00 0.00 - 0.51 K/uL    Baso (Absolute) 0.01 0.00 - 0.12 K/uL    Immature Granulocytes (abs) 0.03 0.00 - 0.11 K/uL    NRBC (Absolute) 0.00 K/uL   Basic Metabolic Panel    Collection Time: 04/02/21  7:53 AM   Result Value Ref Range    Sodium 144 135 - 145 mmol/L    Potassium 4.7 3.6 - 5.5 mmol/L    Chloride 111 96 - 112 mmol/L    Co2 22 20 - 33 mmol/L    Glucose 127 (H) 65 - 99 mg/dL    Bun 25 (H) 8 - 22 mg/dL    Creatinine 1.25 0.50 - 1.40 mg/dL    Calcium 9.1 8.5 - 10.5 mg/dL    Anion Gap 11.0 7.0 - 16.0   MAGNESIUM    Collection Time: 04/02/21  7:53 AM   Result Value Ref Range    Magnesium 2.3 1.5 - 2.5 mg/dL   PHOSPHORUS    Collection Time: 04/02/21   7:53 AM   Result Value Ref Range    Phosphorus 3.8 2.5 - 4.5 mg/dL   ESTIMATED GFR    Collection Time: 04/02/21  7:53 AM   Result Value Ref Range    GFR If African American >60 >60 mL/min/1.73 m 2    GFR If Non African American 60 >60 mL/min/1.73 m 2         ASSESSMENT:  Patient is a 56 y.o. male admitted with large right MCA stroke and right ICA occlusion. He did not receive TPA or thrombectomy     [facility] Code / Diagnosis to Support: 0001.1 - Stroke: Left Body Involvement (Right Brain)    Rehabilitation: Impaired ADLs and mobility  Patient is a good candidate for inpatient rehab based on needs for PT, OT, and speech therapy.  Patient will also benefit from family training.  Patient has a good discharge situation which will be home with wife.     Barriers to transfer include: Insurance authorization/ no payor, TCCs to verify disposition, medical clearance and bed availability     Additional Recommendations:  - Some encouraging signs on exam today. He has 1/5 finger flexion on the left which is significant, any hand movement within 30 days is a positive prognostic indicator of a functional recovery. Also he has 1/5 leg movement at the hip which is key for walking. He does much better when forced to look at his left side which suggests that perhaps some of his functional deficits are due to neglect. He does have a long road to recovery and I prepared his wife Anel for possibly 2 years of 24/7 care, although we are all hopeful for a better outcome. The next few days will be crucial for swelling and he may loose function at this time.    Large right MCA ischemic stroke   - continue PT/OT and SLP   - Wife educated on stroke comorbidites  - Starting baclofen for early spasticity management   - Holding off on gabapentin at this time to avoid polypharmacy   - crani watch through 4/5 per NSG with serial CTs  - ASA/ statin  - Etiology throught to be cardioembolic in the setting of atrial fibrillation off of AC and in the setting  of COVID.  - PMR to continue to follow for rehab appropriateness    Medical Complexity:  Large right MCA stroke      DVT PPX: SCDs      Thank you for allowing us to participate in the care of this patient.     Patient was seen for 115 minutes on unit/floor of which > 50% of time was spent on counseling and coordination of care regarding the above, including prognosis, risk reduction, benefits of treatment, and options for next stage of care.    Christofer Eisenberg, DO   Physical Medicine and Rehabilitation

## 2021-04-02 NOTE — DISCHARGE PLANNING
[facility]    Physiatry to consult.  R MCA stroke.   COVID positive on admission,  leared by Infection Control 4/01/21.     Telephone call to spouse Anel Arzola to discuss IRF referral.   Explained specifics of inpatient rehab, answered questions/concerns.  Anel tells me she is currently working full time, has flexible job and will take time off to provide 24/7 support when Thong returnes home.  They live 2 story home, 2 -3 steps to enter.  Master bedroom on ground floor.  Bathroom has tub/shower combo, no safety grab bars.  Per Anel, patient is enrolled in health share insurance program - she will bring card to hospital.  They opted out of coverage for Thong on Anel's health insurance as cost was prohibitive - $800.00/month.  She will speak with her employer to see if still and option to add him.  Provided TCC contact information for any additional rehab related questions.        Will follow for PMR recommendations.

## 2021-04-03 ENCOUNTER — APPOINTMENT (OUTPATIENT)
Dept: RADIOLOGY | Facility: MEDICAL CENTER | Age: 56
DRG: 023 | End: 2021-04-03
Attending: INTERNAL MEDICINE
Payer: OTHER MISCELLANEOUS

## 2021-04-03 ENCOUNTER — ANESTHESIA EVENT (OUTPATIENT)
Dept: SURGERY | Facility: MEDICAL CENTER | Age: 56
DRG: 023 | End: 2021-04-03
Payer: OTHER MISCELLANEOUS

## 2021-04-03 ENCOUNTER — APPOINTMENT (OUTPATIENT)
Dept: RADIOLOGY | Facility: MEDICAL CENTER | Age: 56
DRG: 023 | End: 2021-04-03
Attending: PHYSICIAN ASSISTANT
Payer: OTHER MISCELLANEOUS

## 2021-04-03 ENCOUNTER — APPOINTMENT (OUTPATIENT)
Dept: RADIOLOGY | Facility: MEDICAL CENTER | Age: 56
DRG: 023 | End: 2021-04-03
Attending: NURSE PRACTITIONER
Payer: OTHER MISCELLANEOUS

## 2021-04-03 LAB
ANION GAP SERPL CALC-SCNC: 3 MMOL/L (ref 7–16)
BASOPHILS # BLD AUTO: 0.1 % (ref 0–1.8)
BASOPHILS # BLD: 0.01 K/UL (ref 0–0.12)
BUN SERPL-MCNC: 23 MG/DL (ref 8–22)
CALCIUM SERPL-MCNC: 8.5 MG/DL (ref 8.5–10.5)
CHLORIDE SERPL-SCNC: 107 MMOL/L (ref 96–112)
CO2 SERPL-SCNC: 26 MMOL/L (ref 20–33)
CREAT SERPL-MCNC: 1.03 MG/DL (ref 0.5–1.4)
EOSINOPHIL # BLD AUTO: 0 K/UL (ref 0–0.51)
EOSINOPHIL NFR BLD: 0 % (ref 0–6.9)
ERYTHROCYTE [DISTWIDTH] IN BLOOD BY AUTOMATED COUNT: 35.8 FL (ref 35.9–50)
GLUCOSE SERPL-MCNC: 123 MG/DL (ref 65–99)
HCT VFR BLD AUTO: 40.3 % (ref 42–52)
HGB BLD-MCNC: 14.1 G/DL (ref 14–18)
IMM GRANULOCYTES # BLD AUTO: 0.04 K/UL (ref 0–0.11)
IMM GRANULOCYTES NFR BLD AUTO: 0.5 % (ref 0–0.9)
LYMPHOCYTES # BLD AUTO: 0.7 K/UL (ref 1–4.8)
LYMPHOCYTES NFR BLD: 8.9 % (ref 22–41)
MCH RBC QN AUTO: 29 PG (ref 27–33)
MCHC RBC AUTO-ENTMCNC: 35 G/DL (ref 33.7–35.3)
MCV RBC AUTO: 82.9 FL (ref 81.4–97.8)
MONOCYTES # BLD AUTO: 0.61 K/UL (ref 0–0.85)
MONOCYTES NFR BLD AUTO: 7.8 % (ref 0–13.4)
NEUTROPHILS # BLD AUTO: 6.49 K/UL (ref 1.82–7.42)
NEUTROPHILS NFR BLD: 82.7 % (ref 44–72)
NRBC # BLD AUTO: 0 K/UL
NRBC BLD-RTO: 0 /100 WBC
PLATELET # BLD AUTO: 241 K/UL (ref 164–446)
PMV BLD AUTO: 9.4 FL (ref 9–12.9)
POTASSIUM SERPL-SCNC: 4.2 MMOL/L (ref 3.6–5.5)
RBC # BLD AUTO: 4.86 M/UL (ref 4.7–6.1)
SODIUM SERPL-SCNC: 136 MMOL/L (ref 135–145)
SODIUM SERPL-SCNC: 139 MMOL/L (ref 135–145)
WBC # BLD AUTO: 7.9 K/UL (ref 4.8–10.8)

## 2021-04-03 PROCEDURE — 700101 HCHG RX REV CODE 250: Performed by: NEUROLOGICAL SURGERY

## 2021-04-03 PROCEDURE — 700111 HCHG RX REV CODE 636 W/ 250 OVERRIDE (IP): Performed by: NURSE PRACTITIONER

## 2021-04-03 PROCEDURE — 85025 COMPLETE CBC W/AUTO DIFF WBC: CPT

## 2021-04-03 PROCEDURE — 160002 HCHG RECOVERY MINUTES (STAT): Performed by: NEUROLOGICAL SURGERY

## 2021-04-03 PROCEDURE — 501838 HCHG SUTURE GENERAL: Performed by: NEUROLOGICAL SURGERY

## 2021-04-03 PROCEDURE — A9270 NON-COVERED ITEM OR SERVICE: HCPCS | Performed by: PSYCHIATRY & NEUROLOGY

## 2021-04-03 PROCEDURE — 700111 HCHG RX REV CODE 636 W/ 250 OVERRIDE (IP): Performed by: PHYSICIAN ASSISTANT

## 2021-04-03 PROCEDURE — 70450 CT HEAD/BRAIN W/O DYE: CPT

## 2021-04-03 PROCEDURE — 80048 BASIC METABOLIC PNL TOTAL CA: CPT

## 2021-04-03 PROCEDURE — 700101 HCHG RX REV CODE 250: Performed by: PHYSICIAN ASSISTANT

## 2021-04-03 PROCEDURE — 84295 ASSAY OF SERUM SODIUM: CPT

## 2021-04-03 PROCEDURE — 160041 HCHG SURGERY MINUTES - EA ADDL 1 MIN LEVEL 4: Performed by: NEUROLOGICAL SURGERY

## 2021-04-03 PROCEDURE — 500889 HCHG PACK, NEURO: Performed by: NEUROLOGICAL SURGERY

## 2021-04-03 PROCEDURE — 00N00ZZ RELEASE BRAIN, OPEN APPROACH: ICD-10-PCS | Performed by: NEUROLOGICAL SURGERY

## 2021-04-03 PROCEDURE — 500368 HCHG DRAIN, 7MM FLAT-FLUTED: Performed by: NEUROLOGICAL SURGERY

## 2021-04-03 PROCEDURE — 700102 HCHG RX REV CODE 250 W/ 637 OVERRIDE(OP): Performed by: INTERNAL MEDICINE

## 2021-04-03 PROCEDURE — 700101 HCHG RX REV CODE 250: Performed by: ANESTHESIOLOGY

## 2021-04-03 PROCEDURE — C1781 MESH (IMPLANTABLE): HCPCS | Performed by: NEUROLOGICAL SURGERY

## 2021-04-03 PROCEDURE — 700105 HCHG RX REV CODE 258: Performed by: ANESTHESIOLOGY

## 2021-04-03 PROCEDURE — 160029 HCHG SURGERY MINUTES - 1ST 30 MINS LEVEL 4: Performed by: NEUROLOGICAL SURGERY

## 2021-04-03 PROCEDURE — 99233 SBSQ HOSP IP/OBS HIGH 50: CPT | Performed by: PSYCHIATRY & NEUROLOGY

## 2021-04-03 PROCEDURE — A9270 NON-COVERED ITEM OR SERVICE: HCPCS | Performed by: PHYSICAL MEDICINE & REHABILITATION

## 2021-04-03 PROCEDURE — 700102 HCHG RX REV CODE 250 W/ 637 OVERRIDE(OP): Performed by: NURSE PRACTITIONER

## 2021-04-03 PROCEDURE — 700102 HCHG RX REV CODE 250 W/ 637 OVERRIDE(OP): Performed by: PHYSICAL MEDICINE & REHABILITATION

## 2021-04-03 PROCEDURE — 700111 HCHG RX REV CODE 636 W/ 250 OVERRIDE (IP): Performed by: ANESTHESIOLOGY

## 2021-04-03 PROCEDURE — 770022 HCHG ROOM/CARE - ICU (200)

## 2021-04-03 PROCEDURE — 160035 HCHG PACU - 1ST 60 MINS PHASE I: Performed by: NEUROLOGICAL SURGERY

## 2021-04-03 PROCEDURE — 160036 HCHG PACU - EA ADDL 30 MINS PHASE I: Performed by: NEUROLOGICAL SURGERY

## 2021-04-03 PROCEDURE — 700101 HCHG RX REV CODE 250: Performed by: NURSE PRACTITIONER

## 2021-04-03 PROCEDURE — 99291 CRITICAL CARE FIRST HOUR: CPT | Performed by: NURSE PRACTITIONER

## 2021-04-03 PROCEDURE — 160048 HCHG OR STATISTICAL LEVEL 1-5: Performed by: NEUROLOGICAL SURGERY

## 2021-04-03 PROCEDURE — 110454 HCHG SHELL REV 250: Performed by: NEUROLOGICAL SURGERY

## 2021-04-03 PROCEDURE — 700105 HCHG RX REV CODE 258: Performed by: NURSE PRACTITIONER

## 2021-04-03 PROCEDURE — 700105 HCHG RX REV CODE 258: Performed by: PHYSICIAN ASSISTANT

## 2021-04-03 PROCEDURE — A9270 NON-COVERED ITEM OR SERVICE: HCPCS | Performed by: NURSE PRACTITIONER

## 2021-04-03 PROCEDURE — 700102 HCHG RX REV CODE 250 W/ 637 OVERRIDE(OP): Performed by: PSYCHIATRY & NEUROLOGY

## 2021-04-03 PROCEDURE — A9270 NON-COVERED ITEM OR SERVICE: HCPCS | Performed by: INTERNAL MEDICINE

## 2021-04-03 PROCEDURE — C1765 ADHESION BARRIER: HCPCS | Performed by: NEUROLOGICAL SURGERY

## 2021-04-03 PROCEDURE — 500075 HCHG BLADE, CLIPPER NEURO: Performed by: NEUROLOGICAL SURGERY

## 2021-04-03 PROCEDURE — 160009 HCHG ANES TIME/MIN: Performed by: NEUROLOGICAL SURGERY

## 2021-04-03 PROCEDURE — L4398 FOOT DROP SPLINT PRE OTS: HCPCS

## 2021-04-03 PROCEDURE — C9248 INJ, CLEVIDIPINE BUTYRATE: HCPCS | Performed by: ANESTHESIOLOGY

## 2021-04-03 PROCEDURE — 500331 HCHG COTTONOID, SURG PATTIE: Performed by: NEUROLOGICAL SURGERY

## 2021-04-03 DEVICE — DUREPAIR 4X5: Type: IMPLANTABLE DEVICE | Site: CRANIAL | Status: FUNCTIONAL

## 2021-04-03 RX ORDER — SCOLOPAMINE TRANSDERMAL SYSTEM 1 MG/1
1 PATCH, EXTENDED RELEASE TRANSDERMAL
Status: DISCONTINUED | OUTPATIENT
Start: 2021-04-03 | End: 2021-04-04

## 2021-04-03 RX ORDER — AMOXICILLIN 250 MG
1 CAPSULE ORAL NIGHTLY
Status: DISCONTINUED | OUTPATIENT
Start: 2021-04-03 | End: 2021-04-03

## 2021-04-03 RX ORDER — MEPERIDINE HYDROCHLORIDE 25 MG/ML
12.5 INJECTION INTRAMUSCULAR; INTRAVENOUS; SUBCUTANEOUS
Status: DISCONTINUED | OUTPATIENT
Start: 2021-04-03 | End: 2021-04-03 | Stop reason: HOSPADM

## 2021-04-03 RX ORDER — OXYCODONE HYDROCHLORIDE 10 MG/1
10 TABLET ORAL
Status: DISCONTINUED | OUTPATIENT
Start: 2021-04-03 | End: 2021-04-17

## 2021-04-03 RX ORDER — LIDOCAINE HYDROCHLORIDE 20 MG/ML
INJECTION, SOLUTION EPIDURAL; INFILTRATION; INTRACAUDAL; PERINEURAL PRN
Status: DISCONTINUED | OUTPATIENT
Start: 2021-04-03 | End: 2021-04-03 | Stop reason: SURG

## 2021-04-03 RX ORDER — ACETAMINOPHEN 325 MG/1
650 TABLET ORAL EVERY 6 HOURS
Status: DISPENSED | OUTPATIENT
Start: 2021-04-03 | End: 2021-04-09

## 2021-04-03 RX ORDER — CEFAZOLIN SODIUM 2 G/100ML
2 INJECTION, SOLUTION INTRAVENOUS EVERY 8 HOURS
Status: COMPLETED | OUTPATIENT
Start: 2021-04-03 | End: 2021-04-04

## 2021-04-03 RX ORDER — 3% SODIUM CHLORIDE 3 G/100ML
INJECTION, SOLUTION INTRAVENOUS CONTINUOUS
Status: DISCONTINUED | OUTPATIENT
Start: 2021-04-03 | End: 2021-04-04

## 2021-04-03 RX ORDER — HYDROMORPHONE HYDROCHLORIDE 1 MG/ML
0.5 INJECTION, SOLUTION INTRAMUSCULAR; INTRAVENOUS; SUBCUTANEOUS
Status: DISCONTINUED | OUTPATIENT
Start: 2021-04-03 | End: 2021-04-03 | Stop reason: HOSPADM

## 2021-04-03 RX ORDER — ATORVASTATIN CALCIUM 80 MG/1
80 TABLET, FILM COATED ORAL EVERY EVENING
Status: DISCONTINUED | OUTPATIENT
Start: 2021-04-03 | End: 2021-04-17

## 2021-04-03 RX ORDER — DIPHENHYDRAMINE HYDROCHLORIDE 50 MG/ML
12.5 INJECTION INTRAMUSCULAR; INTRAVENOUS
Status: DISCONTINUED | OUTPATIENT
Start: 2021-04-03 | End: 2021-04-03 | Stop reason: HOSPADM

## 2021-04-03 RX ORDER — BUPIVACAINE HYDROCHLORIDE AND EPINEPHRINE 5; 5 MG/ML; UG/ML
INJECTION, SOLUTION PERINEURAL
Status: DISCONTINUED | OUTPATIENT
Start: 2021-04-03 | End: 2021-04-03 | Stop reason: HOSPADM

## 2021-04-03 RX ORDER — DEXAMETHASONE SODIUM PHOSPHATE 4 MG/ML
4 INJECTION, SOLUTION INTRA-ARTICULAR; INTRALESIONAL; INTRAMUSCULAR; INTRAVENOUS; SOFT TISSUE
Status: DISCONTINUED | OUTPATIENT
Start: 2021-04-03 | End: 2021-04-08

## 2021-04-03 RX ORDER — DEXAMETHASONE SODIUM PHOSPHATE 4 MG/ML
INJECTION, SOLUTION INTRA-ARTICULAR; INTRALESIONAL; INTRAMUSCULAR; INTRAVENOUS; SOFT TISSUE PRN
Status: DISCONTINUED | OUTPATIENT
Start: 2021-04-03 | End: 2021-04-03 | Stop reason: SURG

## 2021-04-03 RX ORDER — HYDROMORPHONE HYDROCHLORIDE 1 MG/ML
0.2 INJECTION, SOLUTION INTRAMUSCULAR; INTRAVENOUS; SUBCUTANEOUS
Status: DISCONTINUED | OUTPATIENT
Start: 2021-04-03 | End: 2021-04-03 | Stop reason: HOSPADM

## 2021-04-03 RX ORDER — HYDROMORPHONE HYDROCHLORIDE 1 MG/ML
0.5 INJECTION, SOLUTION INTRAMUSCULAR; INTRAVENOUS; SUBCUTANEOUS
Status: DISCONTINUED | OUTPATIENT
Start: 2021-04-03 | End: 2021-04-03

## 2021-04-03 RX ORDER — CLONIDINE HYDROCHLORIDE 0.1 MG/1
0.1 TABLET ORAL EVERY 4 HOURS PRN
Status: DISCONTINUED | OUTPATIENT
Start: 2021-04-03 | End: 2021-04-03

## 2021-04-03 RX ORDER — LABETALOL HYDROCHLORIDE 5 MG/ML
INJECTION, SOLUTION INTRAVENOUS PRN
Status: DISCONTINUED | OUTPATIENT
Start: 2021-04-03 | End: 2021-04-03 | Stop reason: SURG

## 2021-04-03 RX ORDER — ONDANSETRON 2 MG/ML
INJECTION INTRAMUSCULAR; INTRAVENOUS PRN
Status: DISCONTINUED | OUTPATIENT
Start: 2021-04-03 | End: 2021-04-03 | Stop reason: SURG

## 2021-04-03 RX ORDER — THYROID 30 MG/1
90 TABLET ORAL 2 TIMES DAILY
Status: DISCONTINUED | OUTPATIENT
Start: 2021-04-03 | End: 2021-04-04

## 2021-04-03 RX ORDER — ONDANSETRON 2 MG/ML
4 INJECTION INTRAMUSCULAR; INTRAVENOUS EVERY 4 HOURS PRN
Status: DISCONTINUED | OUTPATIENT
Start: 2021-04-03 | End: 2021-04-03

## 2021-04-03 RX ORDER — AMOXICILLIN 250 MG
1 CAPSULE ORAL
Status: DISCONTINUED | OUTPATIENT
Start: 2021-04-03 | End: 2021-04-17

## 2021-04-03 RX ORDER — POLYETHYLENE GLYCOL 3350 17 G/17G
1 POWDER, FOR SOLUTION ORAL 2 TIMES DAILY PRN
Status: DISCONTINUED | OUTPATIENT
Start: 2021-04-03 | End: 2021-04-17

## 2021-04-03 RX ORDER — SODIUM CHLORIDE, SODIUM LACTATE, POTASSIUM CHLORIDE, CALCIUM CHLORIDE 600; 310; 30; 20 MG/100ML; MG/100ML; MG/100ML; MG/100ML
INJECTION, SOLUTION INTRAVENOUS CONTINUOUS
Status: DISCONTINUED | OUTPATIENT
Start: 2021-04-03 | End: 2021-04-03 | Stop reason: HOSPADM

## 2021-04-03 RX ORDER — ONDANSETRON 4 MG/1
4 TABLET, ORALLY DISINTEGRATING ORAL EVERY 4 HOURS PRN
Status: DISCONTINUED | OUTPATIENT
Start: 2021-04-03 | End: 2021-04-17

## 2021-04-03 RX ORDER — DIPHENHYDRAMINE HYDROCHLORIDE 50 MG/ML
25 INJECTION INTRAMUSCULAR; INTRAVENOUS EVERY 6 HOURS PRN
Status: DISCONTINUED | OUTPATIENT
Start: 2021-04-03 | End: 2021-04-06

## 2021-04-03 RX ORDER — SODIUM CHLORIDE, SODIUM GLUCONATE, SODIUM ACETATE, POTASSIUM CHLORIDE AND MAGNESIUM CHLORIDE 526; 502; 368; 37; 30 MG/100ML; MG/100ML; MG/100ML; MG/100ML; MG/100ML
INJECTION, SOLUTION INTRAVENOUS
Status: DISCONTINUED | OUTPATIENT
Start: 2021-04-03 | End: 2021-04-03 | Stop reason: SURG

## 2021-04-03 RX ORDER — ACETAMINOPHEN 325 MG/1
650 TABLET ORAL EVERY 6 HOURS PRN
Status: DISCONTINUED | OUTPATIENT
Start: 2021-04-03 | End: 2021-04-06

## 2021-04-03 RX ORDER — MANNITOL 20 G/100ML
INJECTION, SOLUTION INTRAVENOUS PRN
Status: DISCONTINUED | OUTPATIENT
Start: 2021-04-03 | End: 2021-04-03 | Stop reason: SURG

## 2021-04-03 RX ORDER — HYDRALAZINE HYDROCHLORIDE 20 MG/ML
10 INJECTION INTRAMUSCULAR; INTRAVENOUS
Status: DISCONTINUED | OUTPATIENT
Start: 2021-04-03 | End: 2021-04-03

## 2021-04-03 RX ORDER — SODIUM CHLORIDE, SODIUM LACTATE, POTASSIUM CHLORIDE, CALCIUM CHLORIDE 600; 310; 30; 20 MG/100ML; MG/100ML; MG/100ML; MG/100ML
INJECTION, SOLUTION INTRAVENOUS
Status: DISCONTINUED | OUTPATIENT
Start: 2021-04-03 | End: 2021-04-03 | Stop reason: SURG

## 2021-04-03 RX ORDER — AMOXICILLIN 250 MG
1 CAPSULE ORAL NIGHTLY
Status: DISCONTINUED | OUTPATIENT
Start: 2021-04-03 | End: 2021-04-17

## 2021-04-03 RX ORDER — HYDROMORPHONE HYDROCHLORIDE 1 MG/ML
0.4 INJECTION, SOLUTION INTRAMUSCULAR; INTRAVENOUS; SUBCUTANEOUS
Status: DISCONTINUED | OUTPATIENT
Start: 2021-04-03 | End: 2021-04-03 | Stop reason: HOSPADM

## 2021-04-03 RX ORDER — OXYCODONE HCL 5 MG/5 ML
5 SOLUTION, ORAL ORAL
Status: DISCONTINUED | OUTPATIENT
Start: 2021-04-03 | End: 2021-04-03 | Stop reason: HOSPADM

## 2021-04-03 RX ORDER — LABETALOL HYDROCHLORIDE 5 MG/ML
10 INJECTION, SOLUTION INTRAVENOUS
Status: DISCONTINUED | OUTPATIENT
Start: 2021-04-03 | End: 2021-04-04

## 2021-04-03 RX ORDER — AMOXICILLIN 250 MG
1 CAPSULE ORAL
Status: DISCONTINUED | OUTPATIENT
Start: 2021-04-03 | End: 2021-04-03

## 2021-04-03 RX ORDER — BISACODYL 10 MG
10 SUPPOSITORY, RECTAL RECTAL
Status: DISCONTINUED | OUTPATIENT
Start: 2021-04-03 | End: 2021-04-29 | Stop reason: HOSPADM

## 2021-04-03 RX ORDER — DOCUSATE SODIUM 100 MG/1
100 CAPSULE, LIQUID FILLED ORAL 2 TIMES DAILY
Status: DISCONTINUED | OUTPATIENT
Start: 2021-04-03 | End: 2021-04-03

## 2021-04-03 RX ORDER — HYDROMORPHONE HYDROCHLORIDE 1 MG/ML
0.5 INJECTION, SOLUTION INTRAMUSCULAR; INTRAVENOUS; SUBCUTANEOUS
Status: DISCONTINUED | OUTPATIENT
Start: 2021-04-03 | End: 2021-04-17

## 2021-04-03 RX ORDER — POLYETHYLENE GLYCOL 3350 17 G/17G
1 POWDER, FOR SOLUTION ORAL 2 TIMES DAILY PRN
Status: DISCONTINUED | OUTPATIENT
Start: 2021-04-03 | End: 2021-04-03

## 2021-04-03 RX ORDER — HYDRALAZINE HYDROCHLORIDE 20 MG/ML
5 INJECTION INTRAMUSCULAR; INTRAVENOUS
Status: DISCONTINUED | OUTPATIENT
Start: 2021-04-03 | End: 2021-04-03 | Stop reason: HOSPADM

## 2021-04-03 RX ORDER — DEXMEDETOMIDINE HYDROCHLORIDE 100 UG/ML
INJECTION, SOLUTION INTRAVENOUS PRN
Status: DISCONTINUED | OUTPATIENT
Start: 2021-04-03 | End: 2021-04-03 | Stop reason: SURG

## 2021-04-03 RX ORDER — CEFAZOLIN SODIUM 1 G/3ML
INJECTION, POWDER, FOR SOLUTION INTRAMUSCULAR; INTRAVENOUS PRN
Status: DISCONTINUED | OUTPATIENT
Start: 2021-04-03 | End: 2021-04-03 | Stop reason: SURG

## 2021-04-03 RX ORDER — ENEMA 19; 7 G/133ML; G/133ML
1 ENEMA RECTAL
Status: COMPLETED | OUTPATIENT
Start: 2021-04-03 | End: 2021-04-05

## 2021-04-03 RX ORDER — BUTALBITAL, ACETAMINOPHEN AND CAFFEINE 50; 325; 40 MG/1; MG/1; MG/1
1-2 TABLET ORAL EVERY 6 HOURS PRN
Status: DISCONTINUED | OUTPATIENT
Start: 2021-04-03 | End: 2021-04-04

## 2021-04-03 RX ORDER — HYDRALAZINE HYDROCHLORIDE 20 MG/ML
10 INJECTION INTRAMUSCULAR; INTRAVENOUS
Status: DISCONTINUED | OUTPATIENT
Start: 2021-04-03 | End: 2021-04-06

## 2021-04-03 RX ORDER — OXYCODONE HYDROCHLORIDE 5 MG/1
5 TABLET ORAL
Status: DISCONTINUED | OUTPATIENT
Start: 2021-04-03 | End: 2021-04-03

## 2021-04-03 RX ORDER — LABETALOL HYDROCHLORIDE 5 MG/ML
10 INJECTION, SOLUTION INTRAVENOUS
Status: DISCONTINUED | OUTPATIENT
Start: 2021-04-03 | End: 2021-04-03

## 2021-04-03 RX ORDER — OXYCODONE HYDROCHLORIDE 5 MG/1
5 TABLET ORAL
Status: DISCONTINUED | OUTPATIENT
Start: 2021-04-03 | End: 2021-04-17

## 2021-04-03 RX ORDER — OXYCODONE HCL 5 MG/5 ML
10 SOLUTION, ORAL ORAL
Status: DISCONTINUED | OUTPATIENT
Start: 2021-04-03 | End: 2021-04-03 | Stop reason: HOSPADM

## 2021-04-03 RX ORDER — LABETALOL HYDROCHLORIDE 5 MG/ML
5 INJECTION, SOLUTION INTRAVENOUS
Status: DISCONTINUED | OUTPATIENT
Start: 2021-04-03 | End: 2021-04-03 | Stop reason: HOSPADM

## 2021-04-03 RX ORDER — CLONIDINE HYDROCHLORIDE 0.1 MG/1
0.1 TABLET ORAL EVERY 4 HOURS PRN
Status: DISCONTINUED | OUTPATIENT
Start: 2021-04-03 | End: 2021-04-06

## 2021-04-03 RX ORDER — HYDRALAZINE HYDROCHLORIDE 20 MG/ML
INJECTION INTRAMUSCULAR; INTRAVENOUS PRN
Status: DISCONTINUED | OUTPATIENT
Start: 2021-04-03 | End: 2021-04-03 | Stop reason: SURG

## 2021-04-03 RX ORDER — HALOPERIDOL 5 MG/ML
1 INJECTION INTRAMUSCULAR
Status: DISCONTINUED | OUTPATIENT
Start: 2021-04-03 | End: 2021-04-03 | Stop reason: HOSPADM

## 2021-04-03 RX ORDER — OXYCODONE HYDROCHLORIDE 5 MG/1
10 TABLET ORAL
Status: DISCONTINUED | OUTPATIENT
Start: 2021-04-03 | End: 2021-04-03

## 2021-04-03 RX ORDER — PROMETHAZINE HYDROCHLORIDE 25 MG/1
12.5-25 TABLET ORAL EVERY 4 HOURS PRN
Status: DISCONTINUED | OUTPATIENT
Start: 2021-04-03 | End: 2021-04-17

## 2021-04-03 RX ORDER — BACLOFEN 10 MG/1
10 TABLET ORAL 3 TIMES DAILY
Status: DISCONTINUED | OUTPATIENT
Start: 2021-04-03 | End: 2021-04-05

## 2021-04-03 RX ADMIN — HYDRALAZINE HYDROCHLORIDE 20 MG: 20 INJECTION INTRAMUSCULAR; INTRAVENOUS at 08:38

## 2021-04-03 RX ADMIN — BACLOFEN 10 MG: 10 TABLET ORAL at 17:51

## 2021-04-03 RX ADMIN — SODIUM CHLORIDE, POTASSIUM CHLORIDE, SODIUM LACTATE AND CALCIUM CHLORIDE: 600; 310; 30; 20 INJECTION, SOLUTION INTRAVENOUS at 08:06

## 2021-04-03 RX ADMIN — FENTANYL CITRATE 100 MCG: 50 INJECTION, SOLUTION INTRAMUSCULAR; INTRAVENOUS at 08:21

## 2021-04-03 RX ADMIN — DOCUSATE SODIUM 50 MG AND SENNOSIDES 8.6 MG 1 TABLET: 8.6; 5 TABLET, FILM COATED ORAL at 20:17

## 2021-04-03 RX ADMIN — DOCUSATE SODIUM 50 MG AND SENNOSIDES 8.6 MG 2 TABLET: 8.6; 5 TABLET, FILM COATED ORAL at 05:15

## 2021-04-03 RX ADMIN — FENTANYL CITRATE 50 MCG: 50 INJECTION, SOLUTION INTRAMUSCULAR; INTRAVENOUS at 08:34

## 2021-04-03 RX ADMIN — LABETALOL HYDROCHLORIDE 5 MG: 5 INJECTION, SOLUTION INTRAVENOUS at 09:20

## 2021-04-03 RX ADMIN — SODIUM CHLORIDE, SODIUM GLUCONATE, SODIUM ACETATE, POTASSIUM CHLORIDE AND MAGNESIUM CHLORIDE: 526; 502; 368; 37; 30 INJECTION, SOLUTION INTRAVENOUS at 08:06

## 2021-04-03 RX ADMIN — ROCURONIUM BROMIDE 20 MG: 10 INJECTION, SOLUTION INTRAVENOUS at 09:06

## 2021-04-03 RX ADMIN — SODIUM CHLORIDE: 3 INJECTION, SOLUTION INTRAVENOUS at 14:38

## 2021-04-03 RX ADMIN — PROPOFOL 150 MG: 10 INJECTION, EMULSION INTRAVENOUS at 08:21

## 2021-04-03 RX ADMIN — SODIUM CHLORIDE 5 MG/HR: 9 INJECTION, SOLUTION INTRAVENOUS at 11:05

## 2021-04-03 RX ADMIN — THYROID, PORCINE 90 MG: 30 TABLET ORAL at 20:17

## 2021-04-03 RX ADMIN — MANNITOL 80 G: 20 INJECTION, SOLUTION INTRAVENOUS at 08:26

## 2021-04-03 RX ADMIN — ATORVASTATIN CALCIUM 80 MG: 80 TABLET, FILM COATED ORAL at 17:51

## 2021-04-03 RX ADMIN — SODIUM CHLORIDE 7.5 MG/HR: 9 INJECTION, SOLUTION INTRAVENOUS at 17:50

## 2021-04-03 RX ADMIN — CEFAZOLIN 2 G: 330 INJECTION, POWDER, FOR SOLUTION INTRAMUSCULAR; INTRAVENOUS at 08:26

## 2021-04-03 RX ADMIN — ASPIRIN 81 MG: 81 TABLET, CHEWABLE ORAL at 05:15

## 2021-04-03 RX ADMIN — DEXMEDETOMIDINE 25 MCG: 200 INJECTION, SOLUTION INTRAVENOUS at 09:20

## 2021-04-03 RX ADMIN — ONDANSETRON 4 MG: 2 INJECTION INTRAMUSCULAR; INTRAVENOUS at 09:14

## 2021-04-03 RX ADMIN — LIDOCAINE HYDROCHLORIDE 80 MG: 20 INJECTION, SOLUTION EPIDURAL; INFILTRATION; INTRACAUDAL at 08:21

## 2021-04-03 RX ADMIN — POLYETHYLENE GLYCOL 3350 1 PACKET: 17 POWDER, FOR SOLUTION ORAL at 16:27

## 2021-04-03 RX ADMIN — ROCURONIUM BROMIDE 50 MG: 10 INJECTION, SOLUTION INTRAVENOUS at 08:21

## 2021-04-03 RX ADMIN — CEFAZOLIN SODIUM 2 G: 2 INJECTION, SOLUTION INTRAVENOUS at 16:25

## 2021-04-03 RX ADMIN — OXYCODONE 5 MG: 5 TABLET ORAL at 17:00

## 2021-04-03 RX ADMIN — HYDRALAZINE HYDROCHLORIDE 10 MG: 20 INJECTION INTRAMUSCULAR; INTRAVENOUS at 15:54

## 2021-04-03 RX ADMIN — HYDROMORPHONE HYDROCHLORIDE 0.5 MG: 1 INJECTION, SOLUTION INTRAMUSCULAR; INTRAVENOUS; SUBCUTANEOUS at 15:54

## 2021-04-03 RX ADMIN — BUTALBITAL, ACETAMINOPHEN, AND CAFFEINE 2 TABLET: 50; 325; 40 TABLET ORAL at 02:11

## 2021-04-03 RX ADMIN — BACLOFEN 10 MG: 10 TABLET ORAL at 05:15

## 2021-04-03 RX ADMIN — DEXAMETHASONE SODIUM PHOSPHATE 10 MG: 4 INJECTION, SOLUTION INTRA-ARTICULAR; INTRALESIONAL; INTRAMUSCULAR; INTRAVENOUS; SOFT TISSUE at 08:26

## 2021-04-03 RX ADMIN — SODIUM CHLORIDE 10 MG/HR: 9 INJECTION, SOLUTION INTRAVENOUS at 15:34

## 2021-04-03 RX ADMIN — ACETAMINOPHEN 650 MG: 325 TABLET, FILM COATED ORAL at 16:26

## 2021-04-03 RX ADMIN — CLEVIPIDINE 5 MG/HR: 0.5 EMULSION INTRAVENOUS at 09:06

## 2021-04-03 RX ADMIN — SUGAMMADEX 200 MG: 100 INJECTION, SOLUTION INTRAVENOUS at 09:23

## 2021-04-03 RX ADMIN — SODIUM CHLORIDE: 9 INJECTION, SOLUTION INTRAVENOUS at 13:08

## 2021-04-03 RX ADMIN — HYDROMORPHONE HYDROCHLORIDE 0.5 MG: 1 INJECTION, SOLUTION INTRAMUSCULAR; INTRAVENOUS; SUBCUTANEOUS at 11:23

## 2021-04-03 RX ADMIN — CLONIDINE HYDROCHLORIDE 0.1 MG: 0.1 TABLET ORAL at 16:27

## 2021-04-03 ASSESSMENT — PAIN DESCRIPTION - PAIN TYPE
TYPE: ACUTE PAIN

## 2021-04-03 NOTE — PROGRESS NOTES
1300 neuro assessment; unable to get withdrawal on L side, uppers, lowers. Pt still grimacing with that painful stimuli. MD notified.     Prior to surgery and at 1100 and 1200, RN able to get withdrawal of L side extremities with pinching painful stimuli.

## 2021-04-03 NOTE — ANESTHESIA POSTPROCEDURE EVALUATION
Patient: Thong Arzola    Procedure Summary     Date: 04/03/21 Room / Location: [facility] 04 / SURGERY [facility]    Anesthesia Start: 0806 Anesthesia Stop: 0944    Procedure: CRANIOTOMY (Right Head) Diagnosis: (Right side stroke)    Surgeons: Arthur Payton M.D. Responsible Provider: Marlon Winters M.D.    Anesthesia Type: general ASA Status: 4 - Emergent          Final Anesthesia Type: general  Last vitals  BP   Blood Pressure: 112/64, Arterial BP: 135/61    Temp   36.4 °C (97.6 °F)    Pulse   80   Resp   14    SpO2   95 %      Anesthesia Post Evaluation    Patient location during evaluation: PACU  Patient participation: complete - patient cannot participate  Level of consciousness: sleepy but conscious    Airway patency: patent  Anesthetic complications: no  Cardiovascular status: hemodynamically stable  Respiratory status: acceptable  Hydration status: balanced    PONV: none          No complications documented.     Nurse Pain Score: 2 (NPRS)

## 2021-04-03 NOTE — PROGRESS NOTES
Neurosurgery Progress Note    Subjective:  -decompensated this am     Exam:  Moved right side  flaccid left side     BP  Min: 117/67  Max: 177/92  Pulse  Av.5  Min: 62  Max: 87  Resp  Av  Min: 13  Max: 20  Temp  Av.2 °C (99 °F)  Min: 36.9 °C (98.4 °F)  Max: 37.5 °C (99.5 °F)  Monitored Temp 2  Av.1 °C (98.7 °F)  Min: 36.1 °C (97 °F)  Max: 37.6 °C (99.7 °F)  SpO2  Av.9 %  Min: 91 %  Max: 97 %    No data recorded    Recent Labs     21  0245 21  0753 21  0359   WBC 7.4 9.1 7.9   RBC 5.02 4.88 4.86   HEMOGLOBIN 14.8 14.0 14.1   HEMATOCRIT 41.6* 41.3* 40.3*   MCV 82.9 84.6 82.9   MCH 29.5 28.7 29.0   MCHC 35.6* 33.9 35.0   RDW 36.5 36.8 35.8*   PLATELETCT 262 254 241   MPV 9.1 9.5 9.4     Recent Labs     21  0245 21  0753 21  0359   SODIUM 139 144 136   POTASSIUM 4.6 4.7 4.2   CHLORIDE 106 111 107   CO2 22 22 26   GLUCOSE 106* 127* 123*   BUN 14 25* 23*   CREATININE 1.02 1.25 1.03   CALCIUM 9.2 9.1 8.5     Recent Labs     21   APTT 24.1*   INR 1.04           Intake/Output       21 - 21 0659 21 07 - 21 0659       Total  Total       Intake    P.O.  660  100 760  --  -- --    P.O. 660 100 760 -- -- --    I.V.  1132.9  900 2032.9  --  -- --    Volume (mL) (NS infusion) 1132.9 900 2032.9 -- -- --    Total Intake 1792.9 1000 2792.9 -- -- --       Output    Urine  550  693 1243  --  -- --    Output (mL) (Urethral Catheter Temperature probe 16 Fr.)  -- -- --    Stool  --  -- --  --  -- --    Number of Times Stooled -- 0 x 0 x -- -- --    Total Output  -- -- --       Net I/O     1242.9 307 1549.9 -- -- --            Intake/Output Summary (Last 24 hours) at 4/3/2021 0723  Last data filed at 4/3/2021 0600  Gross per 24 hour   Intake 2792.92 ml   Output 1243 ml   Net 1549.92 ml            • thyroid  90 mg BID   • butalbital/apap/caffeine -40 mg  1-2 tablet Q6HRS  PRN   • baclofen  10 mg TID   • NS   Continuous   • aspirin  81 mg DAILY   • senna-docusate  2 tablet BID    And   • polyethylene glycol/lytes  1 Packet QDAY PRN    And   • magnesium hydroxide  30 mL QDAY PRN    And   • bisacodyl  10 mg QDAY PRN   • ondansetron  4 mg Q4HRS PRN   • ondansetron  4 mg Q4HRS PRN   • promethazine  12.5-25 mg Q4HRS PRN   • promethazine  12.5-25 mg Q4HRS PRN   • prochlorperazine  5-10 mg Q4HRS PRN   • acetaminophen  650 mg Q6HRS PRN   • atorvastatin  80 mg Q EVENING   • labetalol  10-20 mg Q4HRS PRN    Or   • hydrALAZINE  20 mg Q2HRS PRN       Assessment and Plan:  Hospital day #3  POD #na  Prophylactic anticoagulation: yes         Start date/time: 4/2/2021    OR for right decompressive craniectomy     30 mins in pt care

## 2021-04-03 NOTE — ANESTHESIA TIME REPORT
Anesthesia Start and Stop Event Times     Date Time Event    4/3/2021 0740 Ready for Procedure     0806 Anesthesia Start     0944 Anesthesia Stop        Responsible Staff  04/03/21    Name Role Begin End    Marlon Winters M.D. Anesth 0806 0944        Preop Diagnosis (Free Text):  Pre-op Diagnosis     Right side stroke        Preop Diagnosis (Codes):    Post op Diagnosis  Brain herniation (HCC)      Premium Reason  E. Weekend    Comments:

## 2021-04-03 NOTE — PROGRESS NOTES
Spiritual Care Note    Patient Information     Patient's Name: Thong Arzola   MRN: [id number]    YOB: 1965   Age and Gender: 56 y.o. male   Service Area: [facility]   Room (and Bed): [place] [address]   Ethnicity or Nationality:     Primary Language: English   Hindu/Spiritual preference: Moravian   Place of Residence: [place]   Family/Friends/Others Present: Yes, wife and son   Clinical Team Present: No   Medical Diagnosis(-es)/Procedure(s): Acute right MCA stroke   Code Status: Full Code    Date of Admission: 3/31/2021   Length of Stay: 3 days        Spiritual Care Provider Information:  Name of Spiritual Care Provider: Belinda Cazares  Title of Spiritual Care Provider: Associate   Phone Number: [phone]  E-mail: [email or web address]  Total time : 15 minutes    Encounter/Request Information  Encounter/Request Type   Visited With: Patient and family together  Nature of the Visit: Initial, On shift  Crisis Visit: Critical care  Referral From/ Origin of Request: Epic nursing    Religous Needs/Values  Hindu Needs Visit  Hindu Needs: Prayer    Spiritual Assessment     Spiritual Care Encounters    Observations/Symptoms: (Pt sleeping.)    Interaction/Conversation: Pt unresponsive.  Wife Anel and son Aden at bedside requested prayer after Anel described how hard it has been to be here by herself; she was only allowed to have Luke with her today. This  will refer the case to the  who normally covers this unit.    Assessment: Need, Distress    Need: Seeking Spiritual Assistance and Support    Distress: Coping    Interventions: Compassionate presence, active listening, prayer.    Outcomes: Spiritual Comfort    Plan: Visit Upon Request    Notes:

## 2021-04-03 NOTE — CARE PLAN
Problem: Communication  Goal: The ability to communicate needs accurately and effectively will improve  Outcome: PROGRESSING AS EXPECTED  Note: Pt. Is A&Ox4 and able to express his needs and wants. He does not call though and requires interrogation to find out what he wants/needs.      Problem: Safety  Goal: Will remain free from injury  Outcome: PROGRESSING AS EXPECTED  Goal: Will remain free from falls  Outcome: PROGRESSING AS EXPECTED     Problem: Infection  Goal: Will remain free from infection  Outcome: PROGRESSING AS EXPECTED     Problem: Venous Thromboembolism (VTW)/Deep Vein Thrombosis (DVT) Prevention:  Goal: Patient will participate in Venous Thrombosis (VTE)/Deep Vein Thrombosis (DVT)Prevention Measures  Outcome: PROGRESSING AS EXPECTED  Flowsheets  Taken 4/2/2021 2000 by Zenobia Ashraf RKAYA  Risk Assessment Score: 1  VTE RISK: Moderate  Mechanical Prophylaxis: SCDs, Sequential Compression Device  SCDs, Sequential Compression Device: On  Taken 4/1/2021 1200 by Vitor Julian R.N.  Pharmacologic Prophylaxis Used: (Will discuss with MD) --     Problem: Knowledge Deficit  Goal: Knowledge of disease process/condition, treatment plan, diagnostic tests, and medications will improve  Outcome: PROGRESSING AS EXPECTED  Note: Discussed pt. Condition and POC with the pt. And his wife. They expressed understanding.      Problem: Skin Integrity  Goal: Risk for impaired skin integrity will decrease  Outcome: PROGRESSING AS EXPECTED     Problem: Safety:  Goal: Will remain free from injury  Outcome: PROGRESSING AS EXPECTED     Problem: Infection:  Goal: Will remain free from infection  Outcome: PROGRESSING AS EXPECTED     Problem: Peripheral Vascular Perfusion:  Goal: Signs of adequate cerebral perfusion will increase  Outcome: PROGRESSING AS EXPECTED  Goal: Neurologic status will improve  Outcome: PROGRESSING AS EXPECTED  Goal: Will show no signs and symptoms of excessive bleeding  Outcome: PROGRESSING AS  EXPECTED  Note: Repeat CT scheduled for 4/2/10 AM     Problem: Nutritional:  Goal: Dysphagia will improve  Outcome: PROGRESSING AS EXPECTED     Problem: Urinary:  Goal: Ability to maintain continence will improve  Outcome: PROGRESSING AS EXPECTED     Problem: Mobility:  Goal: Range of joint motion will improve  Outcome: PROGRESSING AS EXPECTED     Problem: Physical Regulation:  Goal: Ability to maintain clinical measurements within normal limits will improve  Outcome: PROGRESSING AS EXPECTED  Goal: Complications related to the disease process, condition or treatment will be avoided or minimized  Outcome: PROGRESSING AS EXPECTED     Problem: Knowledge Deficit:  Goal: Knowledge of disease process/condition, treatment plan, diagnostic tests, and medications will improve  Outcome: PROGRESSING AS EXPECTED  Note: Discussed pt. Condition and POC with the pt. And his wife. They expressed understanding.      Problem: Health Behavior:  Goal: Ability to manage health-related needs will improve  Outcome: PROGRESSING AS EXPECTED     Problem: Respiratory:  Goal: Respiratory status will improve  Outcome: PROGRESSING AS EXPECTED     Problem: Pain Management  Goal: Pain level will decrease to patient's comfort goal  Outcome: PROGRESSING AS EXPECTED  Flowsheets  Taken 4/2/2021 2000 by Zenobia Ashraf R.N.  Pain Rating Scale (NPRS): 0  Taken 4/2/2021 1144 by Vitor Julian R.N.  Comfort Goal: 1  Taken 4/2/2021 0935 by Isai Aleman, STEPHANIE  Therapist Pain Assessment:   Post Activity Pain Same as Prior to Activity   Nurse Notified   0  Note: Pt. Reports fioricet working well for his headaches.      Problem: Bowel/Gastric:  Goal: Normal bowel function is maintained or improved  Outcome: PROGRESSING SLOWER THAN EXPECTED  Flowsheets  Taken 4/2/2021 1200 by Vitor Julian R.N.  Last BM: (PTA) --  Taken 4/1/2021 0800 by Mansi Maravilla, Andrez  Number of Times Stooled: 0  Note: Last MB PTA, bowl protocol in place.

## 2021-04-03 NOTE — ANESTHESIA PREPROCEDURE EVALUATION
Relevant Problems   PULMONARY   (+) History of COVID-19      NEURO   (+) Acute right MCA stroke (HCC)   (+) History of COVID-19      CARDIAC   (+) Paroxysmal atrial fibrillation (HCC)      ENDO   (+) Acquired hypothyroidism     Anes H&P:  PAST MEDICAL HISTORY:   56 y.o. male who presents for Procedure(s):  CRANIOTOMY.  He has current and past medical problems significant for:    Past Medical History:   Diagnosis Date   • Afib (HCC)    • High cholesterol        SMOKING/ALCOHOL/RECREATIONAL DRUG USE:  Social History     Tobacco Use   • Smoking status: Never Smoker   • Smokeless tobacco: Never Used   Substance Use Topics   • Alcohol use: Yes     Comment: occ   • Drug use: No     Social History     Substance and Sexual Activity   Drug Use No       PAST SURGICAL HISTORY:  Past Surgical History:   Procedure Laterality Date   • KNEE RECONSTRUCTION      left knee orthoscopic       ALLERGIES:   No Known Allergies    MEDICATIONS:  No current facility-administered medications on file prior to encounter.     Current Outpatient Medications on File Prior to Encounter   Medication Sig Dispense Refill   • carvedilol (COREG) 12.5 MG Tab Take 1 tablet by mouth 2 times a day.     • metoprolol tartrate (LOPRESSOR) 25 MG Tab Take 25 mg by mouth 2 times a day.     • azithromycin (ZITHROMAX) 250 MG Tab Take 250-500 mg by mouth every day. Take as directed     • VITAMIN D PO Take 1 capsule by mouth every day.     • B Complex Vitamins (B COMPLEX 1 PO) Take 1 tablet by mouth every day.     • VITAMIN K PO Take 1 tablet by mouth every day.     • Ascorbic Acid (VITAMIN C) 1000 MG Tab Take 1 tablet by mouth every day.     • ARMOUR THYROID 180 MG Tab Take 90 mg by mouth 2 Times a Day.         LABS:  Lab Results   Component Value Date/Time    HEMOGLOBIN 14.1 04/03/2021 0359    HEMATOCRIT 40.3 (L) 04/03/2021 0359    WBC 7.9 04/03/2021 0359     Lab Results   Component Value Date/Time    SODIUM 136 04/03/2021 0359    POTASSIUM 4.2 04/03/2021 0359     CHLORIDE 107 04/03/2021 0359    CO2 26 04/03/2021 0359    GLUCOSE 123 (H) 04/03/2021 0359    BUN 23 (H) 04/03/2021 0359    CALCIUM 8.5 04/03/2021 0359       SARS-CoV2 result: Negative      PREVIOUS ANESTHETICS: See EMR  __________________________________________      Physical Exam    Airway - unable to assess       Cardiovascular    Dental     Unable to assess dental       Pulmonary    Abdominal    Neurological - abnormal exam and sedated/unconcious                 Anesthesia Plan    ASA 4- EMERGENT   ASA physical status 4 criteria: CVA or TIA - recent (< 3 months)ASA physical status emergent criteria: neurologic compromise requiring immediate intervention    Plan - general       Airway plan will be ETT          Induction: intravenous and rapid sequence    Postoperative Plan: Postoperative administration of opioids is intended.    Pertinent diagnostic labs and testing reviewed    Informed Consent:  Emergent - Consent given by clinician

## 2021-04-03 NOTE — PROGRESS NOTES
Critical Care Progress Note    Date of admission  3/31/2021    Chief Complaint  Stroke    Hospital Course  Mr. Arzola is a 56 year old male with PMH significant for hypothyroidism, COVID-19 (3/18/21), HTN, HLD, and PAF rate controlled with Coreg (recently started on Metoprolol) not on AC who presented 3/31 with left sided weakness, facial droop, and right gaze deviation. Neuro was consulted, patient was outside window for TPA. Initial NIH=18. The CTA showed R ICA complete occlusion. Discussed with neuro-IR Dr. Rubio - not amendable to IR intervention. Per Neuro, anticipate the patient will suffer malignant R MCA edema with peak swelling window 3-5 days from 3/31/21 ie 4/3-4/5/21.     4/1 - left-sided neglect, facial droop, and near-flaccidity (intermittent weak withdrawal to deep/painful stimulation LUE/LLE), headaches. ECHO (-) bubble EF 75%.  4/2 - Remains flaccid on left. Follows right. Left facial droop, decreased sensation and neglect. Verbal, A/O x 4. Ongoing headaches. Trial of Fioricet todaypassed swallow - minced/pureed    Interval Problem Update  Reviewed last 24 hour events:              - acute events overnight: more lethargic this morning. Arouses to painful stimuli. Follows right, flaccid left. CT showing increased edema and shift. To OR.              - Tm: 99.5              - HR: 60-70's              - SBP: 120-150's              - Neuro: as above              - GI: NPO              - I/O: 3000/1600 (+2400 since admit)               - Dye: yes              - Lines: dye              - PPx: not indicated              - CXR (personally reviewed): none today              - Antibiotic Day: none   - SAT/SBT: not applicable   - Mobility: 1    Review of Systems  Review of Systems   Unable to perform ROS: Mental acuity        Vital Signs for last 24 hours   Temp:  [36.2 °C (97.1 °F)-37.5 °C (99.5 °F)] 36.4 °C (97.6 °F)  Pulse:  [60-95] 80  Resp:  [13-18] 14  BP: (108-177)/(62-97) 112/64  SpO2:  [91 %-97 %]  95 %    Hemodynamic parameters for last 24 hours       Respiratory Information for the last 24 hours       Physical Exam   Physical Exam  Vitals and nursing note reviewed.   Constitutional:       General: He is not in acute distress.     Appearance: Normal appearance. He is not ill-appearing or toxic-appearing.      Interventions: Face mask in place.      Comments: More lethargic today. Does not open eyes. Follow commands.   HENT:      Head: Normocephalic.      Comments: Midline surgical incision well approximated with staples. Without erythema or drainage   Hemovac with dark blood     Right Ear: Hearing normal.      Left Ear: Hearing normal.      Nose: Nose normal.      Mouth/Throat:      Lips: Pink.      Mouth: Mucous membranes are moist.   Eyes:      General: Visual field deficit present.      Pupils: Pupils are equal, round, and reactive to light.   Cardiovascular:      Rate and Rhythm: Normal rate and regular rhythm.      Pulses: Normal pulses.      Heart sounds: Normal heart sounds.      Comments: Telemetry - Sinus Rhythm    Right radial artline  Pulmonary:      Effort: Pulmonary effort is normal.      Breath sounds: Normal breath sounds. No wheezing or rhonchi.   Abdominal:      General: Bowel sounds are normal.      Palpations: Abdomen is soft.      Tenderness: There is no abdominal tenderness. There is no guarding or rebound.   Genitourinary:     Comments: Brewer with clear yellow  Musculoskeletal:      Right lower leg: No edema.      Left lower leg: No edema.      Comments: Flaccid LUE/LLE     RUE/RLE 3-4/5   Skin:     General: Skin is warm and dry.      Capillary Refill: Capillary refill takes less than 2 seconds.   Neurological:      Mental Status: He is lethargic.      GCS: GCS eye subscore is 3. GCS verbal subscore is 5. GCS motor subscore is 6.      Cranial Nerves: Cranial nerve deficit and facial asymmetry present. No dysarthria.      Sensory: Sensory deficit present.      Motor: Weakness present.       Comments: Follows right  Flaccid left    Decreased sensation entire left side       Psychiatric:         Mood and Affect: Affect is not flat.         Speech: Speech normal.         Behavior: Behavior normal.         Cognition and Memory: Cognition normal.         Medications  Current Facility-Administered Medications   Medication Dose Route Frequency Provider Last Rate Last Admin   • Pharmacy Consult Request ...Pain Management Review 1 Each  1 Each Other PHARMACY TO DOSE HANSA Morris.A.-C.       • MD ALERT...DO NOT ADMINISTER NSAIDS or ASPIRIN unless ORDERED By Neurosurgery 1 Each  1 Each Other PRN HANSA Morris.A.-C.       • dexamethasone (DECADRON) injection 4 mg  4 mg Intravenous Once PRN HANSA Morris.A.-C.       • diphenhydrAMINE (BENADRYL) injection 25 mg  25 mg Intravenous Q6HRS PRN HANSA Morris.A.-C.       • scopolamine (TRANSDERM-SCOP) patch 1 Patch  1 Patch Transdermal Q72HRS PRN HANSA Morris.A.-C.       • niCARdipine (CARDENE) 25 mg in  mL Infusion  0-15 mg/hr Intravenous Continuous Radha Meredith, A.P.R.N. 75 mL/hr at 04/03/21 1120 7.5 mg/hr at 04/03/21 1120   • docusate sodium (COLACE) capsule 100 mg  100 mg Oral BID Lilia Pizano, HANSA.A.-C.       • senna-docusate (PERICOLACE or SENOKOT S) 8.6-50 MG per tablet 1 tablet  1 tablet Oral Nightly HANSA Morris.A.-C.       • senna-docusate (PERICOLACE or SENOKOT S) 8.6-50 MG per tablet 1 tablet  1 tablet Oral Q24HRS PRN HANSA Morris.A.-C.       • polyethylene glycol/lytes (MIRALAX) PACKET 1 Packet  1 Packet Oral BID PRN HANSA Morris.A.-C.       • magnesium hydroxide (MILK OF MAGNESIA) suspension 30 mL  30 mL Oral QDAY PRN HANSA Morris.A.-C.       • bisacodyl (DULCOLAX) suppository 10 mg  10 mg Rectal Q24HRS PRN Lilia Pizano P.A.-C.       • fleet enema 133 mL  1 Each Rectal Once PRN Lilia Pizano, P.A.-C.       • artificial tears (EYE LUBRICANT) ophth ointment 1 Application   1 Application Both Eyes PRN HANSA Morris.A.-C.       • oxyCODONE immediate-release (ROXICODONE) tablet 5 mg  5 mg Oral Q3HRS PRN HANSA Morris.A.-C.        Or   • oxyCODONE immediate-release (ROXICODONE) tablet 10 mg  10 mg Oral Q3HRS PRN HANSA Morris.A.-C.        Or   • HYDROmorphone (Dilaudid) injection 0.5 mg  0.5 mg Intravenous Q3HRS PRN HANSA Morris.A.-C.   0.5 mg at 04/03/21 1123   • ceFAZolin in dextrose (ANCEF) IVPB premix 2 g  2 g Intravenous Q8HR HANSA Morris.A.-C.       • cloNIDine (CATAPRES) tablet 0.1 mg  0.1 mg Oral Q4HRS PRN HANSA Morris.A.-C.       • hydrALAZINE (APRESOLINE) injection 10 mg  10 mg Intravenous Q HOUR PRN HANSA Morris.A.-C.       • labetalol (NORMODYNE/TRANDATE) injection 10 mg  10 mg Intravenous Q HOUR PRN Lilia Pizano P.A.-C.       • thyroid (ARMOUR THYROID) tablet 90 mg  90 mg Oral BID Kerwin Nunez M.D.   90 mg at 04/02/21 2045   • butalbital/apap/caffeine -40 mg (Fioricet) per tablet 1-2 tablet  1-2 tablet Oral Q6HRS PRN Radha Meredith, A.P.R.N.   2 tablet at 04/03/21 0211   • baclofen (LIORESAL) tablet 10 mg  10 mg Oral TID Christofer Eisenberg D.O.   Stopped at 04/03/21 1200   • NS infusion   Intravenous Continuous Radha Meredith, A.P.R.N.   Stopped at 04/03/21 0800   • aspirin (ASA) chewable tab 81 mg  81 mg Oral DAILY Mc Calvillo M.D.   81 mg at 04/03/21 0515   • ondansetron (ZOFRAN) syringe/vial injection 4 mg  4 mg Intravenous Q4HRS PRN Gia R Song, M.D.       • ondansetron (ZOFRAN ODT) dispertab 4 mg  4 mg Oral Q4HRS PREDELMIRA Forman M.D.       • promethazine (PHENERGAN) tablet 12.5-25 mg  12.5-25 mg Oral Q4HRS PREDELMIRA Forman M.D.       • promethazine (PHENERGAN) suppository 12.5-25 mg  12.5-25 mg Rectal Q4HRS PREDELMIRA Forman M.D.       • prochlorperazine (COMPAZINE) injection 5-10 mg  5-10 mg Intravenous Q4HRS PREDELMIRA Forman M.D.   5 mg at 04/01/21 1035   • acetaminophen (Tylenol) tablet 650 mg  650  mg Oral Q6HRS PRN Gia Fomran M.D.   Stopped at 04/02/21 1011   • atorvastatin (LIPITOR) tablet 80 mg  80 mg Oral Q EVENING Gia Forman M.D.   80 mg at 04/02/21 1742       Fluids    Intake/Output Summary (Last 24 hours) at 4/3/2021 1130  Last data filed at 4/3/2021 1045  Gross per 24 hour   Intake 2772.92 ml   Output 1403 ml   Net 1369.92 ml       Laboratory          Recent Labs     03/31/21 1929 03/31/21 1929 04/01/21 0245 04/02/21 0753 04/03/21 0359   SODIUM 140   < > 139 144 136   POTASSIUM 4.0   < > 4.6 4.7 4.2   CHLORIDE 110   < > 106 111 107   CO2 21   < > 22 22 26   BUN 14   < > 14 25* 23*   CREATININE 1.03   < > 1.02 1.25 1.03   MAGNESIUM 2.0  --   --  2.3  --    PHOSPHORUS  --   --   --  3.8  --    CALCIUM 8.6   < > 9.2 9.1 8.5    < > = values in this interval not displayed.     Recent Labs     03/31/21 1929 03/31/21 1929 04/01/21 0245 04/02/21 0753 04/03/21 0359   ALTSGPT 31  --  30  --   --    ASTSGOT 23  --  22  --   --    ALKPHOSPHAT 58  --  66  --   --    TBILIRUBIN 0.4  --  0.6  --   --    GLUCOSE 108*   < > 106* 127* 123*    < > = values in this interval not displayed.     Recent Labs     03/31/21 1929 03/31/21 1929 04/01/21 0245 04/02/21 0753 04/03/21 0359   WBC 9.3   < > 7.4 9.1 7.9   NEUTSPOLYS 87.10*   < > 82.20* 85.80* 82.70*   LYMPHOCYTES 8.50*   < > 12.00* 7.30* 8.90*   MONOCYTES 4.00   < > 5.50 6.50 7.80   EOSINOPHILS 0.10   < > 0.00 0.00 0.00   BASOPHILS 0.10   < > 0.00 0.10 0.10   ASTSGOT 23  --  22  --   --    ALTSGPT 31  --  30  --   --    ALKPHOSPHAT 58  --  66  --   --    TBILIRUBIN 0.4  --  0.6  --   --     < > = values in this interval not displayed.     Recent Labs     03/31/21 1929 03/31/21  1929 04/01/21  0245 04/02/21  0753 04/03/21  0359   RBC 4.76   < > 5.02 4.88 4.86   HEMOGLOBIN 14.0   < > 14.8 14.0 14.1   HEMATOCRIT 39.6*   < > 41.6* 41.3* 40.3*   PLATELETCT 200   < > 262 254 241   PROTHROMBTM 14.0  --   --   --   --    APTT 24.1*  --   --   --   --    INR  "1.04  --   --   --   --     < > = values in this interval not displayed.       Imaging  CT:    Reviewed    Assessment/Plan  * Acute right MCA stroke (HCC)- (present on admission)  Assessment & Plan  -Acute right LAMONT territory stroke  -Outside the window for alteplase, not a candidate for thrombectomy per IR  -q2h neuro checks  -Permissive hypertension  -4/3 - increased lethargy. CT head showing increased edema & shift. To OR today for hemicraniectomy  -Maintain normothermia, normoglycemia, normonatremia  -Elevate head of bed  -PT/OT/SLP  -PMNR  -per Neurology \"Patient most likely will need to be anticoagulated however this most likely will not be initiated until next week at the earliest.  Given the large territory infarct on CT head.\"  -ASA and statin  -SBP goal <140 - Nicardipine drip    Urinary retention  Assessment & Plan  -Poor mobility, acute stroke  -Reportedly does not have at baseline  -dye    Paroxysmal atrial fibrillation (HCC)- (present on admission)  Assessment & Plan  -Diagnosed about 9 months ago  -Was reportedly taking Coreg, however stopped taking it when he went back into sinus rhythm  -has not been on AC OP  -has been treating holistically OP. Taking vitamins, including Vitamin K  -ECHO unremarkable  -telemetry - currently NSR      History of COVID-19- (present on admission)  Assessment & Plan  -Positive PCR 3/18/2021  -Patient currently is asymptomatic, on room air, in no respiratory distress.  No indications to treat  -Repeat PCR 3/31 remains positive.  Per hospital infectious prevention, no longer requires isolation      Acquired hypothyroidism- (present on admission)  Assessment & Plan  -TSH low, normal T4  -restart home armour thyroid today         VTE:  Contraindicated  Ulcer: Not Indicated  Lines: Arterial Line  Ongoing indication addressed and Dye Catheter  Ongoing indication addressed    I have performed a physical exam and reviewed and updated ROS and Plan today (4/3/2021). In review " of yesterday's note (4/2/2021), there are no changes except as documented above.     Discussed patient condition and risk of morbidity and/or mortality with Family, RN, Pharmacy, Charge nurse / hot rounds, Patient and neurology and neurosurgery  The patient remains critically ill.  Critical care time = 65 minutes in directly providing and coordinating critical care and extensive data review.  No time overlap and excludes procedures.    Please note that this dictation was created using voice recognition software. I have made every reasonable attempt to correct obvious errors, but there may be errors of grammar and possibly content that I did not discover before finalizing the note.    CRISTÓBAL Hampton.

## 2021-04-03 NOTE — PROGRESS NOTES
"Neurology Progress Note  Neurohospitalist Service, [facility]    Referring Physician: Gia Forman M.D.    Chief Complaint   Patient presents with   • Possible Stroke     BIB [facility] for stroke like symptoms. Pt was last seen well by wife at 0630. He texted his wife at 1500 then took a bath. Pt was trying to get out of the bath when symptoms started. Presents with L hemipalegia, signifigant facial paralysis, R gaze deviation. Pt has hx afib and stated, \"my heart's been in afib since Fri.\" Seen by PCP on Mon for afib and started on metoprolol. Presents in SR-ST. + COVID test on 3/18 after flu like s/s x 3/15.       HPI: Refer to initial documented Neurology H&P, as detailed in the patient's chart.    Interval History April 3, 2021: More lethargic this morning.  CT head shows increased shift with possible occlusion hydrocephalus.  Taken to the OR this morning for craniectomy.    Review of systems: In addition to what is detailed in the HPI and/or updated in the interval history, all other systems reviewed and are negative.    Past Medical History:    has a past medical history of Afib (HCC) and High cholesterol.    FHx:  family history is not on file.    SHx:   reports that he has never smoked. He has never used smokeless tobacco. He reports current alcohol use. He reports that he does not use drugs.    Medications:    Current Facility-Administered Medications:   •  [MAR Hold] thyroid (ARMOUR THYROID) tablet 90 mg, 90 mg, Oral, BID, Kerwin Nunez M.D., 90 mg at 04/02/21 2045  •  [MAR Hold] butalbital/apap/caffeine -40 mg (Fioricet) per tablet 1-2 tablet, 1-2 tablet, Oral, Q6HRS PRN, Radha Meredith, A.P.R.N., 2 tablet at 04/03/21 0211  •  [MAR Hold] baclofen (LIORESAL) tablet 10 mg, 10 mg, Oral, TID, Christofer Worchel, D.O., 10 mg at 04/03/21 0515  •  NS infusion, , Intravenous, Continuous, Radha Meredith A.P.R.N., Last Rate: 75 mL/hr at 04/02/21 2223, New Bag at 04/02/21 2223  •  [MAR " Hold] aspirin (ASA) chewable tab 81 mg, 81 mg, Oral, DAILY, Mc Calvillo M.D., 81 mg at 04/03/21 0515  •  [MAR Hold] senna-docusate (PERICOLACE or SENOKOT S) 8.6-50 MG per tablet 2 tablet, 2 tablet, Oral, BID, 2 tablet at 04/03/21 0515 **AND** [MAR Hold] polyethylene glycol/lytes (MIRALAX) PACKET 1 Packet, 1 Packet, Oral, QDAY PRN **AND** [MAR Hold] magnesium hydroxide (MILK OF MAGNESIA) suspension 30 mL, 30 mL, Oral, QDAY PRN **AND** [MAR Hold] bisacodyl (DULCOLAX) suppository 10 mg, 10 mg, Rectal, QDAY PRN, Gia Forman M.D.  •  [MAR Hold] ondansetron (ZOFRAN) syringe/vial injection 4 mg, 4 mg, Intravenous, Q4HRS PRN, Gia Forman M.D.  •  [MAR Hold] ondansetron (ZOFRAN ODT) dispertab 4 mg, 4 mg, Oral, Q4HRS PRN, Gia Forman M.D.  •  [MAR Hold] promethazine (PHENERGAN) tablet 12.5-25 mg, 12.5-25 mg, Oral, Q4HRS PRN, Gia Forman M.D.  •  [MAR Hold] promethazine (PHENERGAN) suppository 12.5-25 mg, 12.5-25 mg, Rectal, Q4HRS PRN, Gia Forman M.D.  •  [MAR Hold] prochlorperazine (COMPAZINE) injection 5-10 mg, 5-10 mg, Intravenous, Q4HRS PRN, Gia Forman M.D., 5 mg at 04/01/21 1035  •  [MAR Hold] acetaminophen (Tylenol) tablet 650 mg, 650 mg, Oral, Q6HRS PRN, Gia Forman M.D., Stopped at 04/02/21 1011  •  [MAR Hold] atorvastatin (LIPITOR) tablet 80 mg, 80 mg, Oral, Q EVENING, Gia Forman M.D., 80 mg at 04/02/21 1742  •  [MAR Hold] labetalol (NORMODYNE/TRANDATE) injection 10-20 mg, 10-20 mg, Intravenous, Q4HRS PRN **OR** [MAR Hold] hydrALAZINE (APRESOLINE) injection 20 mg, 20 mg, Intravenous, Q2HRS PRN, Glenn Long M.D.    Facility-Administered Medications Ordered in Other Encounters:   •  electrolyte-A (PLASMALYTE-A) infusion, , Intravenous, Intra-Op Continuous, Marlon Winters M.D., New Bag at 04/03/21 0806  •  Lactated Ringers, , Intravenous, Intra-Op Continuous, Marlon Winters M.D., New Bag at 04/03/21 0806  •  fentaNYL (SUBLIMAZE) injection, , Intravenous, PRN, Marlon Winters M.D., 50 mcg  at 04/03/21 0834  •  lidocaine PF (XYLOCAINE-MPF) 2 % injection PF, , Intravenous, PRN, Marlon Winters M.D., 80 mg at 04/03/21 0821  •  propofol (DIPRIVAN) injection, , Intravenous, PRN, Marlon Winters M.D., 150 mg at 04/03/21 0821  •  rocuronium (ZEMURON) injection, , Intravenous, PRNMarlon M.D., 50 mg at 04/03/21 0821  •  ceFAZolin (ANCEF) injection, , Intravenous, PRN, Marlon Winters M.D., 2 g at 04/03/21 0826  •  dexamethasone (DECADRON) injection, , Intravenous, PRN, Marlon Winters M.D., 10 mg at 04/03/21 0826  •  mannitol 20% infusion, , Intravenous, PRN, Marlon Winters M.D., 80 g at 04/03/21 0826  •  hydrALAZINE (APRESOLINE) injection, , Intravenous, PRN, Marlon Winters M.D., 20 mg at 04/03/21 0838    Physical Examination:     Vitals:    04/03/21 0300 04/03/21 0400 04/03/21 0500 04/03/21 0600   BP: 160/90 157/90 155/93 157/97   Pulse: 69 70 63 62   Resp: 17 17 15 14   Temp: 37.2 °C (99 °F) 37.2 °C (99 °F)  36.9 °C (98.4 °F)   TempSrc: Bladder Bladder  Temporal   SpO2: 91% 95% 91% 92%   Weight:       Height:           General: Patient is awake and in no acute distress  Eyes: examination of optic disks not indicated at this time  CV: RRR    NEUROLOGICAL EXAM:     Patient is more lethargic today compared to yesterday.  He still will awaken is alert to himself and place and situation.  Plegic on the left side still.  Has a right gaze preference.  Left facial droop.  Still moving the right arm and leg to antigravity least.      Objective Data:    Labs:  Lab Results   Component Value Date/Time    PROTHROMBTM 14.0 03/31/2021 07:29 PM    INR 1.04 03/31/2021 07:29 PM      Lab Results   Component Value Date/Time    WBC 7.9 04/03/2021 03:59 AM    RBC 4.86 04/03/2021 03:59 AM    HEMOGLOBIN 14.1 04/03/2021 03:59 AM    HEMATOCRIT 40.3 (L) 04/03/2021 03:59 AM    MCV 82.9 04/03/2021 03:59 AM    MCH 29.0 04/03/2021 03:59 AM    MCHC 35.0 04/03/2021 03:59 AM    MPV 9.4 04/03/2021  03:59 AM    NEUTSPOLYS 82.70 (H) 04/03/2021 03:59 AM    LYMPHOCYTES 8.90 (L) 04/03/2021 03:59 AM    MONOCYTES 7.80 04/03/2021 03:59 AM    EOSINOPHILS 0.00 04/03/2021 03:59 AM    BASOPHILS 0.10 04/03/2021 03:59 AM      Lab Results   Component Value Date/Time    SODIUM 136 04/03/2021 03:59 AM    POTASSIUM 4.2 04/03/2021 03:59 AM    CHLORIDE 107 04/03/2021 03:59 AM    CO2 26 04/03/2021 03:59 AM    GLUCOSE 123 (H) 04/03/2021 03:59 AM    BUN 23 (H) 04/03/2021 03:59 AM    CREATININE 1.03 04/03/2021 03:59 AM    CREATININE 1.3 04/04/2008 03:05 AM      Lab Results   Component Value Date/Time    CHOLSTRLTOT 169 04/01/2021 12:36 PM     (H) 04/01/2021 12:36 PM    HDL 32 (A) 04/01/2021 12:36 PM    TRIGLYCERIDE 126 04/01/2021 12:36 PM       Lab Results   Component Value Date/Time    ALKPHOSPHAT 66 04/01/2021 02:45 AM    ASTSGOT 22 04/01/2021 02:45 AM    ALTSGPT 30 04/01/2021 02:45 AM    TBILIRUBIN 0.6 04/01/2021 02:45 AM        Imaging/Testing:  CT-HEAD W/O   Final Result         1.  Low-density changes of the right hemisphere compatible with MCA distribution infarct with edema.   2.  New effacement of the bilateral ventricles, right greater than left, with 10 mm right-to-left midline shift.   3.  New dilatation of the left temporal horn ventricle, appearance favors component of obstructive hydrocephalus due to mass effect from infarct and edema.   4.  Hyperdensity in the right middle cerebral artery, likely thrombosis given associated findings.      These findings were discussed with the patient's clinician, Dr. Nunez, on 4/3/2021 7:11 AM.      MR-BRAIN-W/O   Final Result      Very large acute right MCA territory infarct as detailed above with small amount of petechial hemorrhage in the right insular region and right temporal lobe.      Punctate right thalamic lacunar infarct.      Right ICA and M1 MCA occlusion.      EC-ECHOCARDIOGRAM COMPLETE W/O CONT   Final Result      DX-CHEST-PORTABLE (1 VIEW)   Final Result          1.  Interstitial pulmonary parenchymal prominence, compatible with interstitial edema and/or infiltrates.      CT-CTA NECK WITH & W/O-POST PROCESSING   Final Result      Acute occlusion of the right internal carotid artery shortly after bifurcation. It is occluded up to the level of the carotid terminus.      CT-CTA HEAD WITH & W/O-POST PROCESS   Final Result      Acute right M1 occlusion.      Findings were discussed with RHONDA DIAZ on 3/31/2021 7:54 PM.      CT-CEREBRAL PERFUSION ANALYSIS   Final Result      1.  Cerebral blood flow less than 30% likely representing completed infarct = 134 mL.      2.  T Max more than 6 seconds likely representing combination of completed infarct and ischemia = 210 mL.      3.  Mismatched volume likely representing ischemic brain/penumbra = 76 mL.      4.  Please note that the cerebral perfusion was performed on the limited brain tissue around the basal ganglia region. Infarct/ischemia outside the CT perfusion sections can be missed in this study.      CT-HEAD W/O   Final Result   Addendum 1 of 1   In retrospect, there is subtle loss of gray-white matter differentiation    in the right MCA distribution, consistent with acute infarct.      Final      1.  No CT evidence of acute infarct, hemorrhage or mass.   2.  Mild paranasal sinus disease.            Assessment and Plan:    56-year-old male presenting with right MCA infarct.  Was not a TPA candidate due to outside the window.  Was not an IR candidate due to already having CT changes in stroke completion.  He does have a M1 thrombus on CTA.  Currently he is stable.  He is fully awake and alert.  He has a dense left hemiplegia and severe neglect.  He understands he had a stroke.  I informed him that there is risk of swelling in the next few days which he understood.  Went over the possibility of craniectomy with him.  I explained to him that his most likely outcome will be plegic on the left side.  He understood this and  said he still would want to have a craniectomy if needed.  In the meantime we will closely monitor the patient if he does develop swelling we will initiate 3% saline at that time.  And if the swelling progresses despite best medical management we will reconsult neurosurgery.  Etiology most likely due to cardioembolic event from underlying atrial fibrillation.        Update 4/3  Patient has decompensated this morning.  Repeat CT head shows worsening swelling.  Is been taken to the OR for craniectomy.    Plan:  1.  MRI brain-shows large right parietal infarct.  LAMONT territory and basal ganglia area are spared on the right side.  2.  Stat CT head if change in overall status.  3.  Aspirin 81 mg daily  4.  Sodium is currently 139.  Okay with fluid restriction try to get the sodium in the 140s  5.  Patient most likely will need to be anticoagulated however this most likely will not be initiated until 2 weeks from onset.  Given the large territory infarct on CT head.  6.  OR today for craniectomy.            The evaluation of the patient, and recommended management, was discussed with the resident staff. I have performed a physical exam and reviewed and updated ROS and Plan today (4/3/2021). In review of yesterday's note (4/2/2021), there are no changes except as documented above.    This chart was partially generated using voice recognition technology and sound alike word replacement may be present, best efforts were made to make the chart accurate.    Arthur Solorzano MD  Board Certified Neurology, ABPN  t) [phone]

## 2021-04-03 NOTE — OR NURSING
0902 Pt arrived to PACU bay from OR. Oral airway in place with simple mask to 10L. VSS. ART line in place. Hemovac in place. Incision CDI with scant drainage.     1015 Oral airway removed, pt switched to 6L oxymask. Oral suctioning done.    1030 Pt following commands on right side, withdrawing to pain on left side.     1040 Report called to Alondra KHALIL in RICU. Informed receiving RN to switch patient to nicardipine for BP management per Dr. Winters request. PT transported with two RN's on monitor, oxygen tank more than half full and tubing connected to patient and tank.

## 2021-04-03 NOTE — THERAPY
"Speech  Therapy Contact Note    Patient Name: Thong Arzola  Age:  56 y.o., Sex:  male  Medical Record #: [id number]  Today's Date: 4/3/2021    Change in status 4/3. Per neurosurgeon note 4/3: \"CT head shows increased shift with possible occlusion hydrocephalus. Taken to the OR this morning for craniectomy.\" Given change in status please place new orders when appropriate for repeat swallow evaluation. Per EMR pt is now NPO with Alli. SLP awaiting new orders and will complete as able and when pt appropriate.   "

## 2021-04-03 NOTE — PROGRESS NOTES
Patient arrived from PACU with 2 ACLS RNs, on monitor. Lines, gtts, orders verified and released. Neuro check complete. MD at bedside.

## 2021-04-03 NOTE — ANESTHESIA PROCEDURE NOTES
Arterial Line  Performed by: Marlon Winters M.D.  Authorized by: Marlon Winters M.D.     Localization: surface landmarks    Patient Location:  OR  Indication: continuous blood pressure monitoring        Catheter Size:  20 G  Seldinger Technique?: Yes    Laterality:  Right  Site:  Radial artery  Line Secured:  Antimicrobial disc, tape and transparent dressing  Events: patient tolerated procedure well with no complications

## 2021-04-03 NOTE — DIETARY
"Nutrition Support Assessment:  Day 3 of admit.  Thong Arzola is a 56 y.o. male with admitting DX of Acute ischemic right MCA stroke.      Current problem list:  1. Urinary retention  2. Acquired hypothyroidism  3. Hx of Covid-19 (3/18/21) - considered recovered.   4. Acute right MCA stroke  5. A-fib     Assessment:  Estimated Nutritional Needs based on:   Height: 177.8 cm (5' 10\")  Weight: 79.6 kg (175 lb 7.8 oz)  Weight to Use in Calculations: 79.6 kg (175 lb 7.8 oz)  Body mass index is 25.18 kg/m²., BMI classification: Overweight.     Calculation/Equation: MSJ x 1.1 = 1797 kcal/day.   Total Calories / day: 1800 - 2000 (Calories / k - 25)  Total Grams Protein / day: 96 - 119 (Grams Protein / k.2 - 1.5)     Evaluation:   1. Admit day 3.   2. Underwent FEES  - SLP recommended Level 5, Level 2 Liquids.   3. S/p craniectomy this AM d/t CT showing increased shift with possible occlusion hydrocephalus.   4. Made NPO today with plan for cortrak placement. Per order, SLP will reassess tomorrow ().   5. Labs: Na 136, glucose 123.   6. Meds: decadron, NS infusion @ 30 ml/hr.   7. No documented edema or wounds.   8. A standard formula is appropriate to meet pt's estimated needs.      Malnutrition Risk: No risks identified.      Recommendations/Plan:  1. Start Replete with Fiber @ 25 ml/hr, advance per protocol to goal of 75 ml/hr to provide 1800 kcal, 115 gm protein and 1494 ml free water.   2. Fluids per MD.   3. Diet advancement as able per MD/SLP.   4. Monitor weight.     RD Following.               "

## 2021-04-03 NOTE — PROGRESS NOTES
0730: Dr. Nunez aware of CT results. Patient obtunded, follows in RUE/RLE. Barely able to open R eye or say name. Withdraws to painful stimuli L side. PERRL 3 brisk. Dr. Payton bedside at 0745. [facility] completed.   0800: Pt to Pre-Op 10, on monitor, via ACLS RN and transport personnel, for decompressive craniectomy. Wife, Anel, made aware by Dr. Payton and consent signed via telephone with wife.

## 2021-04-03 NOTE — ANESTHESIA PROCEDURE NOTES
Airway    Date/Time: 4/3/2021 8:22 AM  Performed by: Marlon Winters M.D.  Authorized by: Marlon Winters M.D.     Location:  OR  Urgency:  Elective  Difficult Airway: No    Indications for Airway Management:  Anesthesia      Spontaneous Ventilation: absent    Sedation Level:  Deep  Preoxygenated: Yes    Patient Position:  Sniffing  Mask Difficulty Assessment:  0 - not attempted  Final Airway Type:  Endotracheal airway  Final Endotracheal Airway:  ETT  Cuffed: Yes    Technique Used for Successful ETT Placement:  Direct laryngoscopy    Insertion Site:  Oral  Blade Type:  Fernandez  Laryngoscope Blade/Videolaryngoscope Blade Size:  2  ETT Size (mm):  7.5  Measured from:  Teeth  ETT to Teeth (cm):  23  Placement Verified by: auscultation and capnometry    Cormack-Lehane Classification:  Grade I - full view of glottis  Number of Attempts at Approach:  1

## 2021-04-04 ENCOUNTER — APPOINTMENT (OUTPATIENT)
Dept: RADIOLOGY | Facility: MEDICAL CENTER | Age: 56
DRG: 023 | End: 2021-04-04
Attending: PHYSICIAN ASSISTANT
Payer: OTHER MISCELLANEOUS

## 2021-04-04 ENCOUNTER — APPOINTMENT (OUTPATIENT)
Dept: RADIOLOGY | Facility: MEDICAL CENTER | Age: 56
DRG: 023 | End: 2021-04-04
Attending: NEUROLOGICAL SURGERY
Payer: OTHER MISCELLANEOUS

## 2021-04-04 LAB
ANION GAP SERPL CALC-SCNC: 6 MMOL/L (ref 7–16)
BASOPHILS # BLD AUTO: 0 % (ref 0–1.8)
BASOPHILS # BLD: 0 K/UL (ref 0–0.12)
BUN SERPL-MCNC: 19 MG/DL (ref 8–22)
CALCIUM SERPL-MCNC: 8.3 MG/DL (ref 8.5–10.5)
CHLORIDE SERPL-SCNC: 107 MMOL/L (ref 96–112)
CO2 SERPL-SCNC: 27 MMOL/L (ref 20–33)
CREAT SERPL-MCNC: 0.99 MG/DL (ref 0.5–1.4)
CRP SERPL HS-MCNC: 0.42 MG/DL (ref 0–0.75)
EOSINOPHIL # BLD AUTO: 0 K/UL (ref 0–0.51)
EOSINOPHIL NFR BLD: 0 % (ref 0–6.9)
ERYTHROCYTE [DISTWIDTH] IN BLOOD BY AUTOMATED COUNT: 36.4 FL (ref 35.9–50)
GLUCOSE SERPL-MCNC: 128 MG/DL (ref 65–99)
HCT VFR BLD AUTO: 36.2 % (ref 42–52)
HGB BLD-MCNC: 12.5 G/DL (ref 14–18)
IMM GRANULOCYTES # BLD AUTO: 0.04 K/UL (ref 0–0.11)
IMM GRANULOCYTES NFR BLD AUTO: 0.4 % (ref 0–0.9)
LYMPHOCYTES # BLD AUTO: 0.85 K/UL (ref 1–4.8)
LYMPHOCYTES NFR BLD: 8.1 % (ref 22–41)
MCH RBC QN AUTO: 29.2 PG (ref 27–33)
MCHC RBC AUTO-ENTMCNC: 34.5 G/DL (ref 33.7–35.3)
MCV RBC AUTO: 84.6 FL (ref 81.4–97.8)
MONOCYTES # BLD AUTO: 1.1 K/UL (ref 0–0.85)
MONOCYTES NFR BLD AUTO: 10.5 % (ref 0–13.4)
NEUTROPHILS # BLD AUTO: 8.52 K/UL (ref 1.82–7.42)
NEUTROPHILS NFR BLD: 81 % (ref 44–72)
NRBC # BLD AUTO: 0 K/UL
NRBC BLD-RTO: 0 /100 WBC
PLATELET # BLD AUTO: 213 K/UL (ref 164–446)
PMV BLD AUTO: 9.4 FL (ref 9–12.9)
POTASSIUM SERPL-SCNC: 4.2 MMOL/L (ref 3.6–5.5)
PREALB SERPL-MCNC: 22.6 MG/DL (ref 18–38)
RBC # BLD AUTO: 4.28 M/UL (ref 4.7–6.1)
SODIUM SERPL-SCNC: 137 MMOL/L (ref 135–145)
SODIUM SERPL-SCNC: 140 MMOL/L (ref 135–145)
SODIUM SERPL-SCNC: 141 MMOL/L (ref 135–145)
WBC # BLD AUTO: 10.5 K/UL (ref 4.8–10.8)

## 2021-04-04 PROCEDURE — A9270 NON-COVERED ITEM OR SERVICE: HCPCS | Performed by: INTERNAL MEDICINE

## 2021-04-04 PROCEDURE — 700111 HCHG RX REV CODE 636 W/ 250 OVERRIDE (IP): Performed by: PHYSICIAN ASSISTANT

## 2021-04-04 PROCEDURE — 86140 C-REACTIVE PROTEIN: CPT

## 2021-04-04 PROCEDURE — 700102 HCHG RX REV CODE 250 W/ 637 OVERRIDE(OP): Performed by: HOSPITALIST

## 2021-04-04 PROCEDURE — 84134 ASSAY OF PREALBUMIN: CPT

## 2021-04-04 PROCEDURE — A9270 NON-COVERED ITEM OR SERVICE: HCPCS | Performed by: HOSPITALIST

## 2021-04-04 PROCEDURE — 99233 SBSQ HOSP IP/OBS HIGH 50: CPT | Performed by: HOSPITALIST

## 2021-04-04 PROCEDURE — 99291 CRITICAL CARE FIRST HOUR: CPT | Performed by: NURSE PRACTITIONER

## 2021-04-04 PROCEDURE — 700102 HCHG RX REV CODE 250 W/ 637 OVERRIDE(OP): Performed by: INTERNAL MEDICINE

## 2021-04-04 PROCEDURE — 770001 HCHG ROOM/CARE - MED/SURG/GYN PRIV*

## 2021-04-04 PROCEDURE — 700111 HCHG RX REV CODE 636 W/ 250 OVERRIDE (IP): Performed by: STUDENT IN AN ORGANIZED HEALTH CARE EDUCATION/TRAINING PROGRAM

## 2021-04-04 PROCEDURE — 85025 COMPLETE CBC W/AUTO DIFF WBC: CPT

## 2021-04-04 PROCEDURE — 84295 ASSAY OF SERUM SODIUM: CPT

## 2021-04-04 PROCEDURE — 70450 CT HEAD/BRAIN W/O DYE: CPT

## 2021-04-04 PROCEDURE — 700101 HCHG RX REV CODE 250: Performed by: PHYSICIAN ASSISTANT

## 2021-04-04 PROCEDURE — A9270 NON-COVERED ITEM OR SERVICE: HCPCS | Performed by: NURSE PRACTITIONER

## 2021-04-04 PROCEDURE — 99233 SBSQ HOSP IP/OBS HIGH 50: CPT | Performed by: PSYCHIATRY & NEUROLOGY

## 2021-04-04 PROCEDURE — 80048 BASIC METABOLIC PNL TOTAL CA: CPT

## 2021-04-04 PROCEDURE — 700102 HCHG RX REV CODE 250 W/ 637 OVERRIDE(OP): Performed by: NURSE PRACTITIONER

## 2021-04-04 RX ORDER — ASPIRIN 81 MG/1
81 TABLET, CHEWABLE ORAL DAILY
Status: DISCONTINUED | OUTPATIENT
Start: 2021-04-06 | End: 2021-04-05

## 2021-04-04 RX ORDER — SODIUM CHLORIDE 1 G/1
1 TABLET ORAL
Status: DISCONTINUED | OUTPATIENT
Start: 2021-04-04 | End: 2021-04-06

## 2021-04-04 RX ORDER — LABETALOL HYDROCHLORIDE 5 MG/ML
10 INJECTION, SOLUTION INTRAVENOUS EVERY 4 HOURS PRN
Status: DISCONTINUED | OUTPATIENT
Start: 2021-04-04 | End: 2021-04-06

## 2021-04-04 RX ORDER — THYROID 30 MG/1
60 TABLET ORAL 2 TIMES DAILY
Status: DISCONTINUED | OUTPATIENT
Start: 2021-04-04 | End: 2021-04-17

## 2021-04-04 RX ORDER — ENALAPRILAT 1.25 MG/ML
1.25 INJECTION INTRAVENOUS ONCE
Status: COMPLETED | OUTPATIENT
Start: 2021-04-04 | End: 2021-04-04

## 2021-04-04 RX ORDER — AMLODIPINE BESYLATE 5 MG/1
5 TABLET ORAL
Status: DISCONTINUED | OUTPATIENT
Start: 2021-04-04 | End: 2021-04-06

## 2021-04-04 RX ADMIN — OXYCODONE 5 MG: 5 TABLET ORAL at 00:03

## 2021-04-04 RX ADMIN — ATORVASTATIN CALCIUM 80 MG: 80 TABLET, FILM COATED ORAL at 17:13

## 2021-04-04 RX ADMIN — Medication 1 G: at 11:59

## 2021-04-04 RX ADMIN — LABETALOL HYDROCHLORIDE 10 MG: 5 INJECTION, SOLUTION INTRAVENOUS at 09:40

## 2021-04-04 RX ADMIN — CLONIDINE HYDROCHLORIDE 0.1 MG: 0.1 TABLET ORAL at 21:11

## 2021-04-04 RX ADMIN — THYROID, PORCINE 90 MG: 30 TABLET ORAL at 08:25

## 2021-04-04 RX ADMIN — BACLOFEN 10 MG: 10 TABLET ORAL at 05:52

## 2021-04-04 RX ADMIN — Medication 1 G: at 17:13

## 2021-04-04 RX ADMIN — POLYETHYLENE GLYCOL 3350 1 PACKET: 17 POWDER, FOR SOLUTION ORAL at 17:14

## 2021-04-04 RX ADMIN — ACETAMINOPHEN 650 MG: 325 TABLET, FILM COATED ORAL at 00:03

## 2021-04-04 RX ADMIN — ACETAMINOPHEN 650 MG: 325 TABLET, FILM COATED ORAL at 05:51

## 2021-04-04 RX ADMIN — LABETALOL HYDROCHLORIDE 10 MG: 5 INJECTION, SOLUTION INTRAVENOUS at 06:05

## 2021-04-04 RX ADMIN — LABETALOL HYDROCHLORIDE 10 MG: 5 INJECTION, SOLUTION INTRAVENOUS at 14:07

## 2021-04-04 RX ADMIN — OXYCODONE 5 MG: 5 TABLET ORAL at 03:33

## 2021-04-04 RX ADMIN — BACLOFEN 10 MG: 10 TABLET ORAL at 11:59

## 2021-04-04 RX ADMIN — BUTALBITAL, ACETAMINOPHEN, AND CAFFEINE 1 TABLET: 50; 325; 40 TABLET ORAL at 07:35

## 2021-04-04 RX ADMIN — AMLODIPINE BESYLATE 5 MG: 5 TABLET ORAL at 07:35

## 2021-04-04 RX ADMIN — HYDRALAZINE HYDROCHLORIDE 10 MG: 20 INJECTION INTRAMUSCULAR; INTRAVENOUS at 22:05

## 2021-04-04 RX ADMIN — OXYCODONE 5 MG: 5 TABLET ORAL at 15:05

## 2021-04-04 RX ADMIN — BACLOFEN 10 MG: 10 TABLET ORAL at 17:13

## 2021-04-04 RX ADMIN — CEFAZOLIN SODIUM 2 G: 2 INJECTION, SOLUTION INTRAVENOUS at 00:03

## 2021-04-04 RX ADMIN — LABETALOL HYDROCHLORIDE 10 MG: 5 INJECTION, SOLUTION INTRAVENOUS at 20:06

## 2021-04-04 RX ADMIN — METOPROLOL TARTRATE 25 MG: 25 TABLET, FILM COATED ORAL at 17:13

## 2021-04-04 RX ADMIN — DOCUSATE SODIUM 50 MG AND SENNOSIDES 8.6 MG 1 TABLET: 8.6; 5 TABLET, FILM COATED ORAL at 20:21

## 2021-04-04 RX ADMIN — THYROID, PORCINE 60 MG: 30 TABLET ORAL at 20:21

## 2021-04-04 RX ADMIN — OXYCODONE 5 MG: 5 TABLET ORAL at 09:28

## 2021-04-04 RX ADMIN — HYDRALAZINE HYDROCHLORIDE 10 MG: 20 INJECTION INTRAMUSCULAR; INTRAVENOUS at 21:05

## 2021-04-04 RX ADMIN — ENALAPRILAT 1.25 MG: 1.25 INJECTION INTRAVENOUS at 22:51

## 2021-04-04 RX ADMIN — ACETAMINOPHEN 650 MG: 325 TABLET, FILM COATED ORAL at 23:05

## 2021-04-04 RX ADMIN — HYDRALAZINE HYDROCHLORIDE 10 MG: 20 INJECTION INTRAMUSCULAR; INTRAVENOUS at 23:05

## 2021-04-04 RX ADMIN — ACETAMINOPHEN 650 MG: 325 TABLET, FILM COATED ORAL at 17:13

## 2021-04-04 RX ADMIN — ACETAMINOPHEN 650 MG: 325 TABLET, FILM COATED ORAL at 11:59

## 2021-04-04 ASSESSMENT — ENCOUNTER SYMPTOMS
NECK PAIN: 0
DOUBLE VISION: 0
STRIDOR: 0
MYALGIAS: 0
FEVER: 0
HEADACHES: 0
FOCAL WEAKNESS: 1
DIZZINESS: 0
COUGH: 0
BLURRED VISION: 0
NAUSEA: 0
VOMITING: 0
HEADACHES: 1
SORE THROAT: 0
SHORTNESS OF BREATH: 0
NERVOUS/ANXIOUS: 0
PALPITATIONS: 0
INSOMNIA: 0

## 2021-04-04 NOTE — CARE PLAN
Problem: Communication  Goal: The ability to communicate needs accurately and effectively will improve  Outcome: PROGRESSING AS EXPECTED  Note: Pt. Is A&Ox4 and lethargic, requires interrogation to assess his needs, but he answers appropriately.      Problem: Safety  Goal: Will remain free from injury  Outcome: PROGRESSING AS EXPECTED  Goal: Will remain free from falls  Outcome: PROGRESSING AS EXPECTED     Problem: Infection  Goal: Will remain free from infection  Outcome: PROGRESSING AS EXPECTED     Problem: Venous Thromboembolism (VTW)/Deep Vein Thrombosis (DVT) Prevention:  Goal: Patient will participate in Venous Thrombosis (VTE)/Deep Vein Thrombosis (DVT)Prevention Measures  Outcome: PROGRESSING AS EXPECTED  Flowsheets (Taken 4/3/2021 2000)  Risk Assessment Score: 4  VTE RISK: High  Mechanical Prophylaxis: SCDs, Sequential Compression Device  SCDs, Sequential Compression Device: On  Pharmacologic Prophylaxis Used: Contraindicated per MD     Problem: Knowledge Deficit  Goal: Knowledge of disease process/condition, treatment plan, diagnostic tests, and medications will improve  Outcome: PROGRESSING AS EXPECTED  Note: Discussed pt. Condition and POC with pt. and his wife, they expressed understanding.      Problem: Skin Integrity  Goal: Risk for impaired skin integrity will decrease  Outcome: PROGRESSING AS EXPECTED     Problem: Safety:  Goal: Will remain free from injury  Outcome: PROGRESSING AS EXPECTED     Problem: Infection:  Goal: Will remain free from infection  Outcome: PROGRESSING AS EXPECTED     Problem: Respiratory:  Goal: Ability to maintain a clear airway will improve  Outcome: PROGRESSING AS EXPECTED     Problem: Nutritional:  Goal: Dysphagia will improve  Outcome: PROGRESSING AS EXPECTED     Problem: Mobility:  Goal: Capacity to carry out activities will improve  Outcome: PROGRESSING AS EXPECTED     Problem: Physical Regulation:  Goal: Ability to maintain clinical measurements within normal limits  will improve  Outcome: PROGRESSING AS EXPECTED     Problem: Knowledge Deficit:  Goal: Knowledge of disease process/condition, treatment plan, diagnostic tests, and medications will improve  Outcome: PROGRESSING AS EXPECTED  Note: Discussed pt. Condition and POC with pt. and his wife, they expressed understanding.      Problem: Bowel/Gastric:  Goal: Normal bowel function is maintained or improved  Outcome: PROGRESSING SLOWER THAN EXPECTED  Flowsheets  Taken 4/3/2021 2000 by Zenobia Ashraf RKAYA  Number of Times Stooled: 0  Taken 4/2/2021 1200 by Vitor Julian R.N.  Last BM: (PTA) --  Note: Bowel protocol in place.

## 2021-04-04 NOTE — THERAPY
Missed Therapy     Patient Name: Thong Arzola  Age:  56 y.o., Sex:  male  Medical Record #: [id number]  Today's Date: 4/4/2021    Discussed missed therapy with RN       04/04/21 1333   Treatment Variance   Reason For Missed Therapy Medical - Other (Please Comment)   Total Time Spent   Total Time Spent (Mins) 2   Precautions   Precautions Fall Risk;Swallow Precautions ( See Comments)   Comments L side neglect   Interdisciplinary Plan of Care Collaboration   IDT Collaboration with  Nursing   Collaboration Comments Per RN, pt now is NPO with TF and pt's wife is requesting a swallow evaluation. Per RN, pt is lethargic and requesting SLP evaluate pt tomorrow. SLP to follow at that time,.

## 2021-04-04 NOTE — ASSESSMENT & PLAN NOTE
Dye catheter in place, trace hematuria (suspect from dye trauma on xarelto) no clots-  Trial without dye today  Continue prazosin

## 2021-04-04 NOTE — OP REPORT
DATE OF SERVICE:  04/03/2021     SURGEON:  Arthur Payton MD     FIRST ASSISTANT:  Physician assistant, Lilia Pizano.     PREOPERATIVE DIAGNOSES:  Right MCA dense stroke with intractable intracranial   pressure, midline shift of greater than 1 cm.     POSTOPERATIVE DIAGNOSES:  Right MCA dense stroke with intractable intracranial   pressure, midline shift of greater than 1 cm.     PROCEDURE:  Right decompressive hemicraniectomy greater than 14 cm with   expansile duraplasty.     COMPLICATIONS:  None.     SPECIMENS:  None.  The patient's bone flap was sent to the freezer for saving.     BRIEF HISTORY OF PRESENT ILLNESS:  This is a gentleman born in 1965 who has a   history of AFib, was on supratherapeutic amounts of vitamin K as a holistic   way of treating his AFib and subsequently made himself hypercoagulable giving   himself a dense right MCA stroke that was found to be past the time, at which   he was acceptable for a TPA salvage as well as thrombectomy by the time he got   to the hospital.  He was on hemicraniectomy watch for several days and then   found this morning to be more lethargic and nonresponsive.  CT scan   demonstrated the significant swelling and shift.  This was deemed appropriate   to take him emergently to the OR.     CONSENT:  Consent was obtained from the patient's wife apprising the risks,   complications, indications of surgery, which included but were not limited to   need for more surgery, infection, further strokes and she agreed and   consented.     PROCEDURE IN DETAIL:  The patient was brought to the operating room, was   placed under general anesthesia with endotracheal intubation.  His head was   then placed on a horseshoe.  The head of the bed was then turned 90 degrees,   exposing the right side of the patient's head.  He was then clipped, prepped   and draped in sterile usual fashion for a decompressive hemicraniectomy.  We   then infiltrated the patient's scalp with lidocaine  with epinephrine and then   I performed a surgical timeout confirming the correct side, site, procedure   and patient with all faculty and staff agreeing.  We then incised the   patient's dermis using a 10 blade surgical scalpel down to the bone in a   reverse question brianna incision and then elevated a myocutaneous flap   anteriorly until we exposed the keyhole and the root of the zygoma.  We then   made 4 bur holes, one at the parietal region, one at the root of zygoma, one   at the keyhole and one on the coronal suture approximately a centimeter off   midline.  We then used the B1 footplate to connect our bur holes to make a   greater than 14 cm craniectomy, rotated the bone flap out of the wound and   then used a Leksell to remove the lesser wing of the sphenoid and the   temporalis bone greater wing of the sphenoid that was over the middle fossa   down to the floor of the middle fossa.  We then smoothed this area, bone waxed   it and then opened the patient's dura in a cruciate fashion allowing the   brain to expand out.  There was one area of contusion that was down on the   inferior temporal lobe, which had some Gelfoam applied to it.  Additionally,   we placed some epidural tack-ups due to the fact the patient had some   epidurals run in after the dura was opened in a cruciate fashion.  Once the   epidural tack-ups were in place, we then placed two pieces of 4 x 5 inch dural   repair in its underlies, folded over the patient's native dura, irrigated the   wound thoroughly with bacitracin irrigation making sure that we had   appropriate hemostasis and then placed Seprafilm over the patient's dura to   ensure that we would be able to easily open the patient's skin for his   cranioplasty at a later date.  I placed a medium Hemovac and then closed the   wound in layers using 2-0 Vicryls on the galea followed by surgical staples.    All counts were correct x2.  My physician assistant was present and necessary   as  she assisted with opening, closure and irrigation of the wound, expediting   the case and ensuring safety of the patient's surgery.        ______________________________  MD NINI Meneses/TIMOTHY/GORGE    DD:  04/03/2021 18:13  DT:  04/03/2021 19:31    Job#:  [id number]

## 2021-04-04 NOTE — PROGRESS NOTES
Critical Care Progress Note    Date of admission  3/31/2021    Chief Complaint  Left sided weakness, neglect, facial droop    Hospital Course  Mr. Arzola is a 56 year old male with PMH significant for hypothyroidism, COVID-19 (3/18/21), HTN, HLD, and PAF rate controlled with Coreg (recently started on Metoprolol) not on AC who presented 3/31 with left sided weakness, facial droop, and right gaze deviation. Neuro was consulted, patient was outside window for TPA. Initial NIH=18. The CTA showed R ICA complete occlusion. Discussed with neuro-IR Dr. Rubio - not amendable to IR intervention. Per Neuro, anticipate the patient will suffer malignant R MCA edema with peak swelling window 3-5 days from 3/31/21 ie 4/3-4/5/21.     4/1 - left-sided neglect, facial droop, and near-flaccidity (intermittent weak withdrawal to deep/painful stimulation LUE/LLE), headaches. ECHO (-) bubble EF 75%.  4/2 - Remains flaccid on left. Follows right. Left facial droop, decreased sensation and neglect. Verbal, A/O x 4. Ongoing headaches. Trial of Fioricet today passed swallow - minced/pureed    Interval Problem Update  Reviewed last 24 hour events:              - acute events overnight: none              - Tm: 99.5              - HR: 60-80's              - SBP: 110-150's              - Neuro: more alert today. Increased sensation LUE and left thigh although remains flaccid. Oriented x 4.              - GI: Cortrack with tube feedings              - I/O: 2700/3500 (+1600 since admit)               - Brewer: yes              - Lines: artline - orders to DC today              - PPx: none              - CXR (personally reviewed): none today              - Antibiotic Day: none   - SAT/SBT: not applicable   - Mobility: 1   - Goals of Care: transfer to floor. Discussed with wife at bedside. Discussed with Hospitalist, Dr. Citlaly Wells.    Review of Systems  Review of Systems   Reason unable to perform ROS: Limited due to mental acuity.   Constitutional:  Negative for fever.   HENT: Negative for sore throat.    Eyes: Negative for blurred vision and double vision.   Respiratory: Negative for cough, shortness of breath and stridor.    Cardiovascular: Negative for chest pain and palpitations.   Gastrointestinal: Negative for nausea and vomiting.   Genitourinary: Negative for dysuria and urgency.   Musculoskeletal: Negative for myalgias and neck pain.   Neurological: Negative for dizziness and headaches.   Endo/Heme/Allergies: Negative.    Psychiatric/Behavioral: The patient is not nervous/anxious and does not have insomnia.    All other systems reviewed and are negative.       Vital Signs for last 24 hours   Temp:  [37 °C (98.6 °F)-37.5 °C (99.5 °F)] 37.5 °C (99.5 °F)  Pulse:  [65-82] 67  Resp:  [9-17] 12  BP: ()/(52-85) 121/70  SpO2:  [92 %-98 %] 96 %    Hemodynamic parameters for last 24 hours       Respiratory Information for the last 24 hours       Physical Exam   Physical Exam  Vitals and nursing note reviewed.   Constitutional:       General: He is not in acute distress.     Appearance: Normal appearance. He is not ill-appearing or toxic-appearing.      Comments: Slightly less lethargic today.     HENT:      Head: Normocephalic.      Comments: Midline surgical incision well approximated with staples. Without erythema or drainage        Right Ear: Hearing normal.      Left Ear: Hearing normal.      Nose: Nose normal.      Comments: Cortrack without breakdown     Mouth/Throat:      Lips: Pink.      Mouth: Mucous membranes are moist.   Eyes:      General: Visual field deficit present.      Pupils: Pupils are equal, round, and reactive to light.   Cardiovascular:      Rate and Rhythm: Normal rate and regular rhythm.      Pulses: Normal pulses.      Heart sounds: Normal heart sounds.      Comments: Telemetry - Sinus Rhythm    Right radial artline  Pulmonary:      Effort: Pulmonary effort is normal.      Breath sounds: Normal breath sounds. No wheezing or rhonchi.    Abdominal:      General: Bowel sounds are normal.      Palpations: Abdomen is soft.      Tenderness: There is no abdominal tenderness. There is no guarding or rebound.   Genitourinary:     Comments: Brewer with clear yellow  Musculoskeletal:      Right lower leg: No edema.      Left lower leg: No edema.      Comments: Flaccid LUE/LLE     RUE/RLE 3-4/5   Skin:     General: Skin is warm and dry.      Capillary Refill: Capillary refill takes less than 2 seconds.   Neurological:      Mental Status: He is lethargic.      GCS: GCS eye subscore is 3. GCS verbal subscore is 5. GCS motor subscore is 6.      Cranial Nerves: Cranial nerve deficit and facial asymmetry present. No dysarthria.      Sensory: Sensory deficit present.      Motor: Weakness present.      Comments: Follows right  Flaccid left    Decreased sensation entire left side - improved left upper arm and left thigh.       Psychiatric:         Mood and Affect: Affect is not flat.         Speech: Speech normal.         Behavior: Behavior normal.         Cognition and Memory: Cognition normal.         Medications  Current Facility-Administered Medications   Medication Dose Route Frequency Provider Last Rate Last Admin   • amLODIPine (NORVASC) tablet 5 mg  5 mg Enteral Tube Q DAY Radha Meredith A.P.R.N.   5 mg at 04/04/21 0735   • sodium chloride (SALT) tablet 1 g  1 g Enteral Tube TID WITH MEALS Radha Meredith A.P.R.N.   1 g at 04/04/21 1159   • [START ON 4/6/2021] aspirin (ASA) chewable tab 81 mg  81 mg Oral DAILY Radha Meredith A.P.R.N.       • thyroid (ARMOUR THYROID) tablet 60 mg  60 mg Enteral Tube BID King Spann M.D.       • Pharmacy Consult Request ...Pain Management Review 1 Each  1 Each Other PHARMACY TO DOSE Lilia Pizano P.A.-C.       • MD ALERT...DO NOT ADMINISTER NSAIDS or ASPIRIN unless ORDERED By Neurosurgery 1 Each  1 Each Other PRN Lilia Pizano P.A.-C.       • dexamethasone (DECADRON) injection 4 mg  4 mg Intravenous Once  PRN Lilia Pizano P.A.-C.       • diphenhydrAMINE (BENADRYL) injection 25 mg  25 mg Intravenous Q6HRS PRN HANSA Morris.A.-C.       • bisacodyl (DULCOLAX) suppository 10 mg  10 mg Rectal Q24HRS PRN Lilia Pizano P.A.-C.       • fleet enema 133 mL  1 Each Rectal Once PRN Lilia Pizano, P.A.-C.       • artificial tears (EYE LUBRICANT) ophth ointment 1 Application  1 Application Both Eyes PRN Lilia Pizano P.A.-C.       • hydrALAZINE (APRESOLINE) injection 10 mg  10 mg Intravenous Q HOUR PRN Lilia Pizano P.A.-C.   10 mg at 04/03/21 1554   • labetalol (NORMODYNE/TRANDATE) injection 10 mg  10 mg Intravenous Q HOUR PRN Lilia Pizano P.A.-C.   10 mg at 04/04/21 0940   • Pharmacy Consult: Enteral tube insertion - review meds/change route/product selection  1 Each Other PHARMACY TO DOSE Radha Meredith, A.P.R.N.       • atorvastatin (LIPITOR) tablet 80 mg  80 mg Enteral Tube Q EVENING Radha Meredith, A.P.R.N.   80 mg at 04/03/21 1751   • baclofen (LIORESAL) tablet 10 mg  10 mg Enteral Tube TID Radha Meredith, A.P.R.N.   10 mg at 04/04/21 1159   • senna-docusate (PERICOLACE or SENOKOT S) 8.6-50 MG per tablet 1 tablet  1 tablet Enteral Tube Nightly Radha Meredith, A.P.R.N.   1 tablet at 04/03/21 2017   • acetaminophen (Tylenol) tablet 650 mg  650 mg Enteral Tube Q6HRS PRN Radha Meredith, A.P.R.N.   650 mg at 04/04/21 0551   • butalbital/apap/caffeine -40 mg (Fioricet) per tablet 1-2 tablet  1-2 tablet Enteral Tube Q6HRS PRN Radha Meredith, A.P.R.N.   1 tablet at 04/04/21 0735   • cloNIDine (CATAPRES) tablet 0.1 mg  0.1 mg Enteral Tube Q4HRS PRN Radha Meredith A.P.R.N.   0.1 mg at 04/03/21 1627   • magnesium hydroxide (MILK OF MAGNESIA) suspension 30 mL  30 mL Enteral Tube QDAY PRN Radha Meredith A.P.R.N.       • ondansetron (ZOFRAN ODT) dispertab 4 mg  4 mg Enteral Tube Q4HRS PRN Radha Meredith, A.P.R.N.       • oxyCODONE immediate-release (ROXICODONE) tablet 5 mg  5 mg Enteral Tube  Q3HRS PRN Radha Meredith, A.P.R.N.   5 mg at 04/04/21 0928    Or   • oxyCODONE immediate-release (ROXICODONE) tablet 10 mg  10 mg Enteral Tube Q3HRS PRN Radha Meredith, A.P.R.N.        Or   • HYDROmorphone (Dilaudid) injection 0.5 mg  0.5 mg Intravenous Q3HRS PRN Radha Meredith, A.P.R.N.   0.5 mg at 04/03/21 1554   • polyethylene glycol/lytes (MIRALAX) PACKET 1 Packet  1 Packet Enteral Tube BID PRN Radha Meredith, A.P.R.N.   1 Packet at 04/03/21 1627   • promethazine (PHENERGAN) tablet 12.5-25 mg  12.5-25 mg Enteral Tube Q4HRS PREDELMIRA Meredith, A.P.R.N.       • senna-docusate (PERICOLACE or SENOKOT S) 8.6-50 MG per tablet 1 tablet  1 tablet Enteral Tube Q24HRS PREDELMIRA Meredith, A.P.R.N.       • acetaminophen (Tylenol) tablet 650 mg  650 mg Enteral Tube Q6HRS Kerwin Nunez M.D.   650 mg at 04/04/21 1159   • ondansetron (ZOFRAN) syringe/vial injection 4 mg  4 mg Intravenous Q4HRS NATY Forman M.D.       • promethazine (PHENERGAN) suppository 12.5-25 mg  12.5-25 mg Rectal Q4HRS NATY Forman M.D.       • prochlorperazine (COMPAZINE) injection 5-10 mg  5-10 mg Intravenous Q4HRS PREDELMIRA Forman M.D.   5 mg at 04/01/21 1035       Fluids    Intake/Output Summary (Last 24 hours) at 4/4/2021 1341  Last data filed at 4/4/2021 1321  Gross per 24 hour   Intake 2606.41 ml   Output 2405 ml   Net 201.41 ml       Laboratory          Recent Labs     04/02/21  0753 04/02/21  0753 04/03/21  0359 04/03/21  1800 04/04/21  0014 04/04/21  0604 04/04/21  1150   SODIUM 144   < > 136   < > 137 140 141   POTASSIUM 4.7  --  4.2  --   --  4.2  --    CHLORIDE 111  --  107  --   --  107  --    CO2 22  --  26  --   --  27  --    BUN 25*  --  23*  --   --  19  --    CREATININE 1.25  --  1.03  --   --  0.99  --    MAGNESIUM 2.3  --   --   --   --   --   --    PHOSPHORUS 3.8  --   --   --   --   --   --    CALCIUM 9.1  --  8.5  --   --  8.3*  --     < > = values in this interval not displayed.     Recent Labs     04/02/21  0754  "04/03/21  0359 04/04/21  0604   PREALBUMIN  --   --  22.6   GLUCOSE 127* 123* 128*     Recent Labs     04/02/21 0753 04/03/21  0359 04/04/21  0604   WBC 9.1 7.9 10.5   NEUTSPOLYS 85.80* 82.70* 81.00*   LYMPHOCYTES 7.30* 8.90* 8.10*   MONOCYTES 6.50 7.80 10.50   EOSINOPHILS 0.00 0.00 0.00   BASOPHILS 0.10 0.10 0.00     Recent Labs     04/02/21 0753 04/03/21  0359 04/04/21  0604   RBC 4.88 4.86 4.28*   HEMOGLOBIN 14.0 14.1 12.5*   HEMATOCRIT 41.3* 40.3* 36.2*   PLATELETCT 254 241 213       Imaging  CT:    Reviewed    Assessment/Plan  * Acute right MCA stroke (HCC)- (present on admission)  Assessment & Plan  -Acute right LAMONT territory stroke  -Outside the window for alteplase, not a candidate for thrombectomy per IR  -q2h neuro checks  -Permissive hypertension  -POD #1 craniectomy   -Maintain normothermia, normoglycemia, normonatremia  -Elevate head of bed  -PT/OT/SLP  -PMNR  -per Neurology \"Patient most likely will need to be anticoagulated however this most likely will not be initiated until next week at the earliest.  Given the large territory infarct on CT head.\"  -ASA and statin - OK for 81 mg ASA per Neurosurgery 4/7   -SBP goal <140 - Nicardipine drip    Urinary retention  Assessment & Plan  -Poor mobility, acute stroke  -Reportedly does not have at baseline  -marnie    Paroxysmal atrial fibrillation (HCC)- (present on admission)  Assessment & Plan  -Diagnosed about 9 months ago  -Was reportedly taking Coreg, however stopped taking it when he went back into sinus rhythm  -has not been on AC OP  -has been treating holistically OP. Taking vitamins, including Vitamin K  -ECHO unremarkable  -telemetry - currently NSR      History of COVID-19- (present on admission)  Assessment & Plan  -Positive PCR 3/18/2021  -Patient currently is asymptomatic, on room air, in no respiratory distress.  No indications to treat  -Repeat PCR 3/31 remains positive.  Per hospital infectious prevention, no longer requires " isolation      Acquired hypothyroidism- (present on admission)  Assessment & Plan  -TSH low, normal T4  -restart home armour thyroid today         VTE:  Contraindicated  Ulcer: Not Indicated  Lines: Arterial Line  Ongoing indication addressed and Brewer Catheter  Ongoing indication addressed    I have performed a physical exam and reviewed and updated ROS and Plan today (4/4/2021). In review of yesterday's note (4/3/2021), there are no changes except as documented above.     Discussed patient condition and risk of morbidity and/or mortality with Hospitalist, Family, RN, Pharmacy, Charge nurse / hot rounds, Patient and neurology and neurosurgery  The patient remains critically ill.  Critical care time = 55 minutes in directly providing and coordinating critical care and extensive data review.  No time overlap and excludes procedures.    Please note that this dictation was created using voice recognition software. I have made every reasonable attempt to correct obvious errors, but there may be errors of grammar and possibly content that I did not discover before finalizing the note.    CRISTÓBAL Hampton.

## 2021-04-04 NOTE — PROGRESS NOTES
Attempted IS with pt.. He was unable because he kept dozing off while attempting. Had pt. Instead take deep, slow breaths. Pt. was only able to do this about 50% of his attempts. Pt. continues to have shallow, low RR of 8-11. O2 sats are mid 90s on 1L NC.

## 2021-04-04 NOTE — ASSESSMENT & PLAN NOTE
Following  Rate controlled  Metoprolol, titrating down dose due to hypotension  On xarelto on aspirin per neuro recommendations  following

## 2021-04-04 NOTE — PROGRESS NOTES
Hospital Medicine Daily Progress Note    Date of Service  4/4/2021    Chief Complaint  56 y.o. male admitted 3/31/2021 with left-sided weakness    Hospital Course    56-year-old male with history of recent Covid infection on 3/18/2021 and paroxysmal atrial fibrillation presented with sudden onset left-sided weakness. The patient was evaluated by neurology he was outside the window of lytic therapy, he was also evaluated by neuro interventional radiology and was not felt to be candidate for thrombectomy. He was admitted to the intensive care unit for close clinical monitoring given his right MCA dense stroke he had brain edema with increased intracranial pressure and underwent right decompressive hemicraniectomy on 4/3/2021      Interval Problem Update    Patient is lethargic  He is oriented x3 and follows commands  He complains of mild headache  He is afebrile  In sinus rhythm      Consultants/Specialty  Neurology  Neurosurgery  Critical care    Code Status  Full Code    Disposition  Neurology floor  Likely SNF    Review of Systems  Review of Systems   Constitutional: Negative for fever.   Respiratory: Negative for cough and shortness of breath.    Cardiovascular: Negative for chest pain and palpitations.   Gastrointestinal: Negative for nausea and vomiting.   Neurological: Positive for focal weakness and headaches.   All other systems reviewed and are negative.       Physical Exam  Temp:  [37 °C (98.6 °F)-37.5 °C (99.5 °F)] 37.5 °C (99.5 °F)  Pulse:  [65-82] 73  Resp:  [9-17] 14  BP: ()/(52-85) 142/79  SpO2:  [92 %-98 %] 96 %    Physical Exam  Vitals and nursing note reviewed.   Constitutional:       Appearance: He is well-developed. He is not diaphoretic.   HENT:      Head: Normocephalic.      Comments: Surgical wound with staples in place no drainage or surrounding erythema     Mouth/Throat:      Pharynx: No oropharyngeal exudate.   Eyes:      General:         Right eye: No discharge.         Left eye: No  discharge.      Conjunctiva/sclera: Conjunctivae normal.      Pupils: Pupils are equal, round, and reactive to light.   Neck:      Vascular: No JVD.      Trachea: No tracheal deviation.   Cardiovascular:      Rate and Rhythm: Normal rate and regular rhythm.      Heart sounds: No murmur. No friction rub. No gallop.    Pulmonary:      Effort: Pulmonary effort is normal. No respiratory distress.      Breath sounds: Normal breath sounds. No stridor. No wheezing.   Chest:      Chest wall: No tenderness.   Abdominal:      General: Bowel sounds are normal. There is no distension.      Palpations: Abdomen is soft.      Tenderness: There is no abdominal tenderness. There is no rebound.   Musculoskeletal:         General: No tenderness.      Cervical back: Neck supple.   Skin:     General: Skin is warm and dry.      Nails: There is no clubbing.   Neurological:      Mental Status: He is oriented to person, place, and time.      Cranial Nerves: Cranial nerve deficit present.      Sensory: Sensory deficit present.      Motor: Weakness present. No abnormal muscle tone.      Comments: Left hemiplegia  Decreased sensation to light touch left upper and left lower extremities left hemineglect     Psychiatric:         Speech: Speech is delayed.         Behavior: Behavior is slowed.         Fluids    Intake/Output Summary (Last 24 hours) at 4/4/2021 1325  Last data filed at 4/4/2021 1321  Gross per 24 hour   Intake 2306.41 ml   Output 2405 ml   Net -98.59 ml       Laboratory  Recent Labs     04/02/21  0753 04/03/21  0359 04/04/21  0604   WBC 9.1 7.9 10.5   RBC 4.88 4.86 4.28*   HEMOGLOBIN 14.0 14.1 12.5*   HEMATOCRIT 41.3* 40.3* 36.2*   MCV 84.6 82.9 84.6   MCH 28.7 29.0 29.2   MCHC 33.9 35.0 34.5   RDW 36.8 35.8* 36.4   PLATELETCT 254 241 213   MPV 9.5 9.4 9.4     Recent Labs     04/02/21  0753 04/02/21  0753 04/03/21  0359 04/03/21  1800 04/04/21  0014 04/04/21  0604 04/04/21  1150   SODIUM 144   < > 136   < > 137 140 141   POTASSIUM  4.7  --  4.2  --   --  4.2  --    CHLORIDE 111  --  107  --   --  107  --    CO2 22  --  26  --   --  27  --    GLUCOSE 127*  --  123*  --   --  128*  --    BUN 25*  --  23*  --   --  19  --    CREATININE 1.25  --  1.03  --   --  0.99  --    CALCIUM 9.1  --  8.5  --   --  8.3*  --     < > = values in this interval not displayed.                   Imaging  CT-HEAD W/O   Final Result         Postsurgical change from right craniectomy. Redemonstration of large right MCA infarct with edema and bulging of the brain parenchyma through the craniectomy defect      Less mass effect on the right lateral ventricle. Less right to left midline shift of 3 mm, previously 10 mm.         DX-ABDOMEN FOR TUBE PLACEMENT   Final Result      Enteric tube terminates over the stomach.      CT-HEAD W/O   Final Result         1.  Low-density changes of the right hemisphere compatible with MCA distribution infarct with edema.   2.  New effacement of the bilateral ventricles, right greater than left, with 10 mm right-to-left midline shift.   3.  New dilatation of the left temporal horn ventricle, appearance favors component of obstructive hydrocephalus due to mass effect from infarct and edema.   4.  Hyperdensity in the right middle cerebral artery, likely thrombosis given associated findings.      These findings were discussed with the patient's clinician, Dr. Nunez, on 4/3/2021 7:11 AM.      MR-BRAIN-W/O   Final Result      Very large acute right MCA territory infarct as detailed above with small amount of petechial hemorrhage in the right insular region and right temporal lobe.      Punctate right thalamic lacunar infarct.      Right ICA and M1 MCA occlusion.      EC-ECHOCARDIOGRAM COMPLETE W/O CONT   Final Result      DX-CHEST-PORTABLE (1 VIEW)   Final Result         1.  Interstitial pulmonary parenchymal prominence, compatible with interstitial edema and/or infiltrates.      CT-CTA NECK WITH & W/O-POST PROCESSING   Final Result      Acute  occlusion of the right internal carotid artery shortly after bifurcation. It is occluded up to the level of the carotid terminus.      CT-CTA HEAD WITH & W/O-POST PROCESS   Final Result      Acute right M1 occlusion.      Findings were discussed with RHONDA DIAZ on 3/31/2021 7:54 PM.      CT-CEREBRAL PERFUSION ANALYSIS   Final Result      1.  Cerebral blood flow less than 30% likely representing completed infarct = 134 mL.      2.  T Max more than 6 seconds likely representing combination of completed infarct and ischemia = 210 mL.      3.  Mismatched volume likely representing ischemic brain/penumbra = 76 mL.      4.  Please note that the cerebral perfusion was performed on the limited brain tissue around the basal ganglia region. Infarct/ischemia outside the CT perfusion sections can be missed in this study.      CT-HEAD W/O   Final Result   Addendum 1 of 1   In retrospect, there is subtle loss of gray-white matter differentiation    in the right MCA distribution, consistent with acute infarct.      Final      1.  No CT evidence of acute infarct, hemorrhage or mass.   2.  Mild paranasal sinus disease.           Assessment/Plan  * Acute right MCA stroke (HCC)- (present on admission)  Assessment & Plan  With brain edema requiring right decompressiv hemicraniectomy on 4/3/2021    Neurosurgery signing off with recommendation to start aspirin 72 hours postop  Neurology following patient will benefit from long-term anticoagulation when bleeding risk is acceptable likely 1-2 weeks from event onset. Discussed with patient's wife  Continue statin  Blood pressure control keep SBP<140  Patient weaned off hypertonic saline and started on salt tablets  PT/OT/SLP  Aspiration precautions  Physiatry consultation  Surgery recommending starting pharmacologic DVT prophylaxis evening of 4/5/2021    Urinary retention  Assessment & Plan  Brewer catheter in place      Paroxysmal atrial fibrillation (HCC)- (present on  admission)  Assessment & Plan  Currently in sinus rhythm  Resume home dose of metoprolol  Discussed risk benefits and alternatives of long-term anticoagulation with patient and wife, plan to start DOAC when bleeding risk felt to be acceptable per neurology likely in 1 to 2 weeks    History of COVID-19- (present on admission)  Assessment & Plan  Clinically recovered patient symptom onset on 3/15/2021 with positive initial test on 3/18/2021    Acquired hypothyroidism- (present on admission)  Assessment & Plan  Restarted on [place] Thyroid  TSH is less than 0.005 will decrease dose and he will need recheck TFTs in 6 weeks       Plan of care reviewed with patient and wife at bedside and  discussed with nursing staff     VTE prophylaxis: SCD

## 2021-04-04 NOTE — PROGRESS NOTES
Neurosurgery Progress Note    Subjective:  -Decompressive right hemicraniectomy    Exam:  -Extubated.  Moves right side  Flaccid left side    BP  Min: 97/52  Max: 154/74  Pulse  Av  Min: 65  Max: 82  Resp  Av  Min: 9  Max: 17  Temp  Av.2 °C (98.9 °F)  Min: 36.4 °C (97.6 °F)  Max: 37.5 °C (99.5 °F)  Monitored Temp 2  Av.2 °C (98.9 °F)  Min: 36.9 °C (98.4 °F)  Max: 37.5 °C (99.5 °F)  SpO2  Av.6 %  Min: 92 %  Max: 98 %    No data recorded    Recent Labs     21  06   WBC 9.1 7.9 10.5   RBC 4.88 4.86 4.28*   HEMOGLOBIN 14.0 14.1 12.5*   HEMATOCRIT 41.3* 40.3* 36.2*   MCV 84.6 82.9 84.6   MCH 28.7 29.0 29.2   MCHC 33.9 35.0 34.5   RDW 36.8 35.8* 36.4   PLATELETCT 254 241 213   MPV 9.5 9.4 9.4     Recent Labs     21  1800 21  0014 21  0604   SODIUM 144   < > 136   < > 139 137 140   POTASSIUM 4.7  --  4.2  --   --   --  4.2   CHLORIDE 111  --  107  --   --   --  107   CO2 22  --  26  --   --   --  27   GLUCOSE 127*  --  123*  --   --   --  128*   BUN 25*  --  23*  --   --   --  19   CREATININE 1.25  --  1.03  --   --   --  0.99   CALCIUM 9.1  --  8.5  --   --   --  8.3*    < > = values in this interval not displayed.               Intake/Output       21 07 - 2159 21 07 - 04/05659 Total  Total       Intake    P.O.  --  0 0  --  -- --    P.O. -- 0 0 -- -- --    I.V.  1040.7  636.6 1677.3  --  -- --    Cardene Volume 456.7 29.6 486.3 -- -- --    NS  -- 320 320 -- -- --    Volume (mL) (lactated ringers infusion) 16.7 -- 16.7 -- -- --    Volume (mL) (3% sodium chloride (HYPERTONIC SALINE) 500mL infusion) 67.3 287 354.3 -- -- --    Volume (mL) (NS infusion) 150 -- 150 -- -- --    Volume (mL) (electrolyte-A (PLASMALYTE-A) infusion) 200 -- 200 -- -- --    Volume (mL) (Lactated Ringers) 150 -- 150 -- -- --    Other  300   240 540  --  -- --    Medications (PO/Enteral Liquids) 240 -- 240 -- -- --    Flush / Irrigation Volume (CorTrak) 60 240 300 -- -- --    NG/GT  0  400 400  100  -- 100    Intake (mL) (Enteral Tube 04/03/21 Cortrak - Gastric Right nare) 0 400 400 100 -- 100    IV Piggyback  100  0 100  --  -- --    Volume (mL) (ceFAZolin in dextrose (ANCEF) IVPB premix 2 g) 100 0 100 -- -- --    Enteral  --  -- --  30  -- 30    Free Water / Tube Flush -- -- -- 30 -- 30    Total Intake 1440.7 1276.6 2717.3 130 -- 130       Output    Urine  2500  880 3380  125  -- 125    Output (mL) (Urethral Catheter Temperature probe 16 Fr.) 2500 880 3380 125 -- 125    Drains  85  80 165  --  -- --    Output (mL) (Closed/Suction Drain 1 Right;Anterior Scalp Hemovac) 85 80 165 -- -- --    Stool  --  -- --  --  -- --    Number of Times Stooled -- 0 x 0 x -- -- --    Total Output 2585 960 3545 125 -- 125       Net I/O     -1144.3 316.6 -827.8 5 -- 5            Intake/Output Summary (Last 24 hours) at 4/4/2021 1021  Last data filed at 4/4/2021 0912  Gross per 24 hour   Intake 2347.25 ml   Output 3670 ml   Net -1322.75 ml            • amLODIPine  5 mg Q DAY   • sodium chloride  1 g TID WITH MEALS   • Pharmacy Consult Request  1 Each PHARMACY TO DOSE   • MD ALERT...DO NOT ADMINISTER NSAIDS or ASPIRIN unless ORDERED By Neurosurgery  1 Each PRN   • dexamethasone  4 mg Once PRN   • diphenhydrAMINE  25 mg Q6HRS PRN   • scopolamine  1 Patch Q72HRS PRN   • bisacodyl  10 mg Q24HRS PRN   • fleet  1 Each Once PRN   • artificial tears  1 Application PRN   • hydrALAZINE  10 mg Q HOUR PRN   • labetalol  10 mg Q HOUR PRN   • Pharmacy  1 Each PHARMACY TO DOSE   • atorvastatin  80 mg Q EVENING   • baclofen  10 mg TID   • senna-docusate  1 tablet Nightly   • thyroid  90 mg BID   • acetaminophen  650 mg Q6HRS PRN   • butalbital/apap/caffeine -40 mg  1-2 tablet Q6HRS PRN   • cloNIDine  0.1 mg Q4HRS PRN   • magnesium hydroxide  30 mL QDAY PRN   • ondansetron  4 mg  Q4HRS PRN   • oxyCODONE immediate-release  5 mg Q3HRS PRN    Or   • oxyCODONE immediate-release  10 mg Q3HRS PRN    Or   • HYDROmorphone  0.5 mg Q3HRS PRN   • polyethylene glycol/lytes  1 Packet BID PRN   • promethazine  12.5-25 mg Q4HRS PRN   • senna-docusate  1 tablet Q24HRS PRN   • acetaminophen  650 mg Q6HRS   • [Held by provider] aspirin  81 mg DAILY   • ondansetron  4 mg Q4HRS PRN   • promethazine  12.5-25 mg Q4HRS PRN   • prochlorperazine  5-10 mg Q4HRS PRN       Assessment and Plan:  Hospital day #4  POD #1  Prophylactic anticoagulation: yes         Start date/time: 4/5/2021    Hemovac removed this morning  CT head postop demonstrates midline brain structures no further midline shift with dense right MCA infarct no significant postop hemorrhage  Keppra 500 twice daily  Hold DVT prophylaxis until 4/5/2021 evening  Okay to start aspirin therapy 72 hours after surgery  Okay for every 4 hours neuro checks and transfer to floor    No further neurosurgical needs at this time we will remove staples 14 days after surgery and sign off at this time please reconsult for questions.    Arthur Payton MD

## 2021-04-04 NOTE — PROGRESS NOTES
"Neurology Progress Note  Neurohospitalist Service, [facility]    Referring Physician: Gia Forman M.D.    Chief Complaint   Patient presents with   • Possible Stroke     BIB [facility] for stroke like symptoms. Pt was last seen well by wife at 0630. He texted his wife at 1500 then took a bath. Pt was trying to get out of the bath when symptoms started. Presents with L hemipalegia, signifigant facial paralysis, R gaze deviation. Pt has hx afib and stated, \"my heart's been in afib since Fri.\" Seen by PCP on Mon for afib and started on metoprolol. Presents in SR-ST. + COVID test on 3/18 after flu like s/s x 3/15.       HPI: Refer to initial documented Neurology H&P, as detailed in the patient's chart.    Interval History April 4, 2021: Status post craniectomy yesterday.  Patient was started on hypertonic saline afterwards.  Stable.    Review of systems: In addition to what is detailed in the HPI and/or updated in the interval history, all other systems reviewed and are negative.    Past Medical History:    has a past medical history of Afib (HCC) and High cholesterol.    FHx:  family history is not on file.    SHx:   reports that he has never smoked. He has never used smokeless tobacco. He reports current alcohol use. He reports that he does not use drugs.    Medications:    Current Facility-Administered Medications:   •  amLODIPine (NORVASC) tablet 5 mg, 5 mg, Enteral Tube, Q DAY, SIA HamptonRYohanaN., 5 mg at 04/04/21 0735  •  Pharmacy Consult Request ...Pain Management Review 1 Each, 1 Each, Other, PHARMACY TO DOSE, Lilia Pizano, HANSA.A.-C.  •  MD ALERT...DO NOT ADMINISTER NSAIDS or ASPIRIN unless ORDERED By Neurosurgery 1 Each, 1 Each, Other, PRN, Lilia Pizano, HANSA.A.-C.  •  dexamethasone (DECADRON) injection 4 mg, 4 mg, Intravenous, Once PRN, HANSA Morris.A.-C.  •  diphenhydrAMINE (BENADRYL) injection 25 mg, 25 mg, Intravenous, Q6HRS PRN, Lilia Pizano P.A.-C.  •  " scopolamine (TRANSDERM-SCOP) patch 1 Patch, 1 Patch, Transdermal, Q72HRS PRN, HANSA Morris.A.-C.  •  niCARdipine (CARDENE) 25 mg in  mL Infusion, 0-15 mg/hr, Intravenous, Continuous, Radha Meredith A.P.R.N., Stopped at 04/03/21 1849  •  bisacodyl (DULCOLAX) suppository 10 mg, 10 mg, Rectal, Q24HRS PRN, Lilia Pizano, P.A.-C.  •  fleet enema 133 mL, 1 Each, Rectal, Once PRN, Lilia Pizano, P.A.-C.  •  artificial tears (EYE LUBRICANT) ophth ointment 1 Application, 1 Application, Both Eyes, PRN, Lilia Pizano, P.A.-C.  •  hydrALAZINE (APRESOLINE) injection 10 mg, 10 mg, Intravenous, Q HOUR PRN, Lilia Pizano P.A.-C., 10 mg at 04/03/21 1554  •  labetalol (NORMODYNE/TRANDATE) injection 10 mg, 10 mg, Intravenous, Q HOUR PRN, Lilia Pizano P.A.-C., 10 mg at 04/04/21 0940  •  3% sodium chloride (HYPERTONIC SALINE) 500mL infusion, , Intravenous, Continuous, Radha Meredith A.P.R.N., Last Rate: 30 mL/hr at 04/04/21 0703, Rate Verify at 04/04/21 0703  •  Pharmacy Consult: Enteral tube insertion - review meds/change route/product selection, 1 Each, Other, PHARMACY TO DOSE, Radha Meredith, A.P.R.N.  •  atorvastatin (LIPITOR) tablet 80 mg, 80 mg, Enteral Tube, Q EVENING, Radha Meredith A.P.R.N., 80 mg at 04/03/21 1751  •  baclofen (LIORESAL) tablet 10 mg, 10 mg, Enteral Tube, TID, Radha Meredith, A.P.R.N., 10 mg at 04/04/21 0552  •  senna-docusate (PERICOLACE or SENOKOT S) 8.6-50 MG per tablet 1 tablet, 1 tablet, Enteral Tube, Nightly, Radha Meredith, A.P.R.N., 1 tablet at 04/03/21 2017  •  thyroid (ARMOUR THYROID) tablet 90 mg, 90 mg, Enteral Tube, BID, Radha Meredith, A.P.R.N., 90 mg at 04/04/21 0825  •  acetaminophen (Tylenol) tablet 650 mg, 650 mg, Enteral Tube, Q6HRS PRN, Radha Meredith, A.P.R.N., 650 mg at 04/04/21 0551  •  butalbital/apap/caffeine -40 mg (Fioricet) per tablet 1-2 tablet, 1-2 tablet, Enteral Tube, Q6HRS PRN, Radha Meredith, A.P.R.N., 1 tablet at 04/04/21 0735  •   cloNIDine (CATAPRES) tablet 0.1 mg, 0.1 mg, Enteral Tube, Q4HRS PRN, Radha Meredith, A.P.R.N., 0.1 mg at 04/03/21 1627  •  magnesium hydroxide (MILK OF MAGNESIA) suspension 30 mL, 30 mL, Enteral Tube, QDAY PRN, Radha Meredith, A.P.R.N.  •  ondansetron (ZOFRAN ODT) dispertab 4 mg, 4 mg, Enteral Tube, Q4HRS PRN, Radha Meredith, A.P.R.N.  •  oxyCODONE immediate-release (ROXICODONE) tablet 5 mg, 5 mg, Enteral Tube, Q3HRS PRN, 5 mg at 04/04/21 0928 **OR** oxyCODONE immediate-release (ROXICODONE) tablet 10 mg, 10 mg, Enteral Tube, Q3HRS PRN **OR** HYDROmorphone (Dilaudid) injection 0.5 mg, 0.5 mg, Intravenous, Q3HRS PRN, Radha Meredith, A.P.R.N., 0.5 mg at 04/03/21 1554  •  polyethylene glycol/lytes (MIRALAX) PACKET 1 Packet, 1 Packet, Enteral Tube, BID PRN, Radha Meredith, A.P.R.N., 1 Packet at 04/03/21 1627  •  promethazine (PHENERGAN) tablet 12.5-25 mg, 12.5-25 mg, Enteral Tube, Q4HRS PRN, Radha Meredith, A.P.R.N.  •  senna-docusate (PERICOLACE or SENOKOT S) 8.6-50 MG per tablet 1 tablet, 1 tablet, Enteral Tube, Q24HRS PRN, Radha Meredith, A.P.R.N.  •  acetaminophen (Tylenol) tablet 650 mg, 650 mg, Enteral Tube, Q6HRS, Kerwin Nunez M.D., Stopped at 04/04/21 0600  •  NS infusion, , Intravenous, Continuous, Radha Meredith A.P.R.N., Last Rate: 20 mL/hr at 04/04/21 0341, Rate Change at 04/04/21 0341  •  [Held by provider] aspirin (ASA) chewable tab 81 mg, 81 mg, Oral, DAILY, Mc Calvillo M.D., 81 mg at 04/03/21 0515  •  ondansetron (ZOFRAN) syringe/vial injection 4 mg, 4 mg, Intravenous, Q4HRS PRN, Gia Forman M.D.  •  promethazine (PHENERGAN) suppository 12.5-25 mg, 12.5-25 mg, Rectal, Q4HRS PRN, Gia Forman M.D.  •  prochlorperazine (COMPAZINE) injection 5-10 mg, 5-10 mg, Intravenous, Q4HRS PRN, Gia Forman M.D., 5 mg at 04/01/21 1035    Physical Examination:     Vitals:    04/04/21 0600 04/04/21 0700 04/04/21 0800 04/04/21 0900   BP: 154/74 132/76 115/64 140/78   Pulse: 70 82 68 73   Resp: 12 13 13 17   Temp:  37.5 °C (99.5 °F)      TempSrc: Bladder Bladder Bladder Bladder   SpO2: 94% 95% 96% 96%   Weight:       Height:               NEUROLOGICAL EXAM:     Patient is awake and alert he is oriented to self place time and situation.  Is able to move his right side to antigravity leads.  Left side is no movement.  However he does have sensation.  He is able to tell me them touch in his left hand without any visual clues.  Cranial nerves he has a left facial droop.  He also has a right gaze preference.  He is not able to cross midline currently.      Objective Data:    Labs:  Lab Results   Component Value Date/Time    PROTHROMBTM 14.0 03/31/2021 07:29 PM    INR 1.04 03/31/2021 07:29 PM      Lab Results   Component Value Date/Time    WBC 10.5 04/04/2021 06:04 AM    RBC 4.28 (L) 04/04/2021 06:04 AM    HEMOGLOBIN 12.5 (L) 04/04/2021 06:04 AM    HEMATOCRIT 36.2 (L) 04/04/2021 06:04 AM    MCV 84.6 04/04/2021 06:04 AM    MCH 29.2 04/04/2021 06:04 AM    MCHC 34.5 04/04/2021 06:04 AM    MPV 9.4 04/04/2021 06:04 AM    NEUTSPOLYS 81.00 (H) 04/04/2021 06:04 AM    LYMPHOCYTES 8.10 (L) 04/04/2021 06:04 AM    MONOCYTES 10.50 04/04/2021 06:04 AM    EOSINOPHILS 0.00 04/04/2021 06:04 AM    BASOPHILS 0.00 04/04/2021 06:04 AM      Lab Results   Component Value Date/Time    SODIUM 140 04/04/2021 06:04 AM    POTASSIUM 4.2 04/04/2021 06:04 AM    CHLORIDE 107 04/04/2021 06:04 AM    CO2 27 04/04/2021 06:04 AM    GLUCOSE 128 (H) 04/04/2021 06:04 AM    BUN 19 04/04/2021 06:04 AM    CREATININE 0.99 04/04/2021 06:04 AM    CREATININE 1.3 04/04/2008 03:05 AM      Lab Results   Component Value Date/Time    CHOLSTRLTOT 169 04/01/2021 12:36 PM     (H) 04/01/2021 12:36 PM    HDL 32 (A) 04/01/2021 12:36 PM    TRIGLYCERIDE 126 04/01/2021 12:36 PM       Lab Results   Component Value Date/Time    ALKPHOSPHAT 66 04/01/2021 02:45 AM    ASTSGOT 22 04/01/2021 02:45 AM    ALTSGPT 30 04/01/2021 02:45 AM    TBILIRUBIN 0.6 04/01/2021 02:45 AM         Imaging/Testing:  CT-HEAD W/O   Final Result         Postsurgical change from right craniectomy. Redemonstration of large right MCA infarct with edema and bulging of the brain parenchyma through the craniectomy defect      Less mass effect on the right lateral ventricle. Less right to left midline shift of 3 mm, previously 10 mm.         DX-ABDOMEN FOR TUBE PLACEMENT   Final Result      Enteric tube terminates over the stomach.      CT-HEAD W/O   Final Result         1.  Low-density changes of the right hemisphere compatible with MCA distribution infarct with edema.   2.  New effacement of the bilateral ventricles, right greater than left, with 10 mm right-to-left midline shift.   3.  New dilatation of the left temporal horn ventricle, appearance favors component of obstructive hydrocephalus due to mass effect from infarct and edema.   4.  Hyperdensity in the right middle cerebral artery, likely thrombosis given associated findings.      These findings were discussed with the patient's clinician, Dr. Nunez, on 4/3/2021 7:11 AM.      MR-BRAIN-W/O   Final Result      Very large acute right MCA territory infarct as detailed above with small amount of petechial hemorrhage in the right insular region and right temporal lobe.      Punctate right thalamic lacunar infarct.      Right ICA and M1 MCA occlusion.      EC-ECHOCARDIOGRAM COMPLETE W/O CONT   Final Result      DX-CHEST-PORTABLE (1 VIEW)   Final Result         1.  Interstitial pulmonary parenchymal prominence, compatible with interstitial edema and/or infiltrates.      CT-CTA NECK WITH & W/O-POST PROCESSING   Final Result      Acute occlusion of the right internal carotid artery shortly after bifurcation. It is occluded up to the level of the carotid terminus.      CT-CTA HEAD WITH & W/O-POST PROCESS   Final Result      Acute right M1 occlusion.      Findings were discussed with RHONDA DIAZ on 3/31/2021 7:54 PM.      CT-CEREBRAL PERFUSION ANALYSIS   Final  Result      1.  Cerebral blood flow less than 30% likely representing completed infarct = 134 mL.      2.  T Max more than 6 seconds likely representing combination of completed infarct and ischemia = 210 mL.      3.  Mismatched volume likely representing ischemic brain/penumbra = 76 mL.      4.  Please note that the cerebral perfusion was performed on the limited brain tissue around the basal ganglia region. Infarct/ischemia outside the CT perfusion sections can be missed in this study.      CT-HEAD W/O   Final Result   Addendum 1 of 1   In retrospect, there is subtle loss of gray-white matter differentiation    in the right MCA distribution, consistent with acute infarct.      Final      1.  No CT evidence of acute infarct, hemorrhage or mass.   2.  Mild paranasal sinus disease.            Assessment and Plan:    56-year-old male presenting with right MCA infarct.  Was not a TPA candidate due to outside the window.  Was not an IR candidate due to already having CT changes in stroke completion.  He does have a M1 thrombus on CTA.  Currently he is stable.  He is fully awake and alert.  He has a dense left hemiplegia and severe neglect.  He understands he had a stroke.  I informed him that there is risk of swelling in the next few days which he understood.  Went over the possibility of craniectomy with him.  I explained to him that his most likely outcome will be plegic on the left side.  He understood this and said he still would want to have a craniectomy if needed.  In the meantime we will closely monitor the patient if he does develop swelling we will initiate 3% saline at that time.  And if the swelling progresses despite best medical management we will reconsult neurosurgery.  Etiology most likely due to cardioembolic event from underlying atrial fibrillation.        Update 4/3  Patient has decompensated this morning.  Repeat CT head shows worsening swelling.  Is been taken to the OR for craniectomy.    Update  4/4:  Status post craniectomy.  Patient neuro exam is stable.  Is awake and oriented.  Able to move the right side.  He is able to feel on the left side.  But plegic.  Currently on hypertonic saline.  CT head this morning shows improvement of the shift.      Plan:  1.  MRI brain-shows large right parietal infarct.  LAMONT territory and basal ganglia area are spared on the right side.  2.  Stat CT head if change in overall status.  3.  Aspirin 81 mg daily-72hrs after surgery per NS  4.  Sodium is currently 140.  Continue hypertonic saline every 6 hour sodium checks   5.  Patient most likely will need to be anticoagulated however this most likely will not be initiated until 1-2 weeks from onset.  Given the large territory infarct on CT head.              The evaluation of the patient, and recommended management, was discussed with the resident staff. I have performed a physical exam and reviewed and updated ROS and Plan today (4/4/2021). In review of yesterday's note (4/3/2021), there are no changes except as documented above.    This chart was partially generated using voice recognition technology and sound alike word replacement may be present, best efforts were made to make the chart accurate.    Arthur Solorzano MD  Board Certified Neurology, ABPN  t) [phone]

## 2021-04-05 ENCOUNTER — APPOINTMENT (OUTPATIENT)
Dept: RADIOLOGY | Facility: MEDICAL CENTER | Age: 56
DRG: 023 | End: 2021-04-05
Attending: PSYCHIATRY & NEUROLOGY
Payer: OTHER MISCELLANEOUS

## 2021-04-05 PROBLEM — D72.829 LEUCOCYTOSIS: Status: ACTIVE | Noted: 2021-04-05

## 2021-04-05 LAB
ANION GAP SERPL CALC-SCNC: 6 MMOL/L (ref 7–16)
ANION GAP SERPL CALC-SCNC: 6 MMOL/L (ref 7–16)
ANION GAP SERPL CALC-SCNC: 9 MMOL/L (ref 7–16)
ANION GAP SERPL CALC-SCNC: 9 MMOL/L (ref 7–16)
BASOPHILS # BLD AUTO: 0 % (ref 0–1.8)
BASOPHILS # BLD: 0 K/UL (ref 0–0.12)
BUN SERPL-MCNC: 18 MG/DL (ref 8–22)
BUN SERPL-MCNC: 19 MG/DL (ref 8–22)
BUN SERPL-MCNC: 19 MG/DL (ref 8–22)
BUN SERPL-MCNC: 22 MG/DL (ref 8–22)
CALCIUM SERPL-MCNC: 8.9 MG/DL (ref 8.5–10.5)
CALCIUM SERPL-MCNC: 8.9 MG/DL (ref 8.5–10.5)
CALCIUM SERPL-MCNC: 9 MG/DL (ref 8.5–10.5)
CALCIUM SERPL-MCNC: 9.1 MG/DL (ref 8.5–10.5)
CFT BLD TEG: 2.8 MIN (ref 5–10)
CFT P HPASE BLD TEG: 2.6 MIN (ref 5–10)
CHLORIDE SERPL-SCNC: 103 MMOL/L (ref 96–112)
CHLORIDE SERPL-SCNC: 105 MMOL/L (ref 96–112)
CHLORIDE SERPL-SCNC: 111 MMOL/L (ref 96–112)
CHLORIDE SERPL-SCNC: 111 MMOL/L (ref 96–112)
CLOT ANGLE BLD TEG: 70 DEGREES (ref 53–72)
CLOT ANGLE P HPASE BLD TEG: 71.8 DEGREES (ref 53–72)
CLOT INIT P HPASE BLD TEG: 1.2 MIN (ref 1–3)
CLOT LYSIS 30M P MA LENFR BLD TEG: 0.5 % (ref 0–8)
CLOT LYSIS 30M P MA LENFR BLD TEG: 1.9 % (ref 0–8)
CO2 SERPL-SCNC: 26 MMOL/L (ref 20–33)
CO2 SERPL-SCNC: 26 MMOL/L (ref 20–33)
CO2 SERPL-SCNC: 27 MMOL/L (ref 20–33)
CO2 SERPL-SCNC: 27 MMOL/L (ref 20–33)
CREAT SERPL-MCNC: 0.76 MG/DL (ref 0.5–1.4)
CREAT SERPL-MCNC: 0.79 MG/DL (ref 0.5–1.4)
CREAT SERPL-MCNC: 0.85 MG/DL (ref 0.5–1.4)
CREAT SERPL-MCNC: 0.85 MG/DL (ref 0.5–1.4)
CRP SERPL HS-MCNC: 7.26 MG/DL (ref 0–0.75)
CT.EXTRINSIC BLD ROTEM: 1.3 MIN (ref 1–3)
EOSINOPHIL # BLD AUTO: 0 K/UL (ref 0–0.51)
EOSINOPHIL NFR BLD: 0 % (ref 0–6.9)
ERYTHROCYTE [DISTWIDTH] IN BLOOD BY AUTOMATED COUNT: 37.1 FL (ref 35.9–50)
GLUCOSE SERPL-MCNC: 108 MG/DL (ref 65–99)
GLUCOSE SERPL-MCNC: 111 MG/DL (ref 65–99)
GLUCOSE SERPL-MCNC: 113 MG/DL (ref 65–99)
GLUCOSE SERPL-MCNC: 128 MG/DL (ref 65–99)
HCT VFR BLD AUTO: 36.5 % (ref 42–52)
HGB BLD-MCNC: 12.9 G/DL (ref 14–18)
IMM GRANULOCYTES # BLD AUTO: 0.06 K/UL (ref 0–0.11)
IMM GRANULOCYTES NFR BLD AUTO: 0.5 % (ref 0–0.9)
LYMPHOCYTES # BLD AUTO: 0.74 K/UL (ref 1–4.8)
LYMPHOCYTES NFR BLD: 5.7 % (ref 22–41)
MCF BLD TEG: 66.8 MM (ref 50–70)
MCF P HPASE BLD TEG: 65.5 MM (ref 50–70)
MCH RBC QN AUTO: 29.6 PG (ref 27–33)
MCHC RBC AUTO-ENTMCNC: 35.3 G/DL (ref 33.7–35.3)
MCV RBC AUTO: 83.7 FL (ref 81.4–97.8)
MONOCYTES # BLD AUTO: 1.23 K/UL (ref 0–0.85)
MONOCYTES NFR BLD AUTO: 9.5 % (ref 0–13.4)
NEUTROPHILS # BLD AUTO: 10.95 K/UL (ref 1.82–7.42)
NEUTROPHILS NFR BLD: 84.3 % (ref 44–72)
NRBC # BLD AUTO: 0 K/UL
NRBC BLD-RTO: 0 /100 WBC
PLATELET # BLD AUTO: 213 K/UL (ref 164–446)
PMV BLD AUTO: 9.3 FL (ref 9–12.9)
POTASSIUM SERPL-SCNC: 3.7 MMOL/L (ref 3.6–5.5)
POTASSIUM SERPL-SCNC: 3.9 MMOL/L (ref 3.6–5.5)
POTASSIUM SERPL-SCNC: 4 MMOL/L (ref 3.6–5.5)
POTASSIUM SERPL-SCNC: 4.1 MMOL/L (ref 3.6–5.5)
PREALB SERPL-MCNC: 17.6 MG/DL (ref 18–38)
RBC # BLD AUTO: 4.36 M/UL (ref 4.7–6.1)
SODIUM SERPL-SCNC: 138 MMOL/L (ref 135–145)
SODIUM SERPL-SCNC: 138 MMOL/L (ref 135–145)
SODIUM SERPL-SCNC: 144 MMOL/L (ref 135–145)
SODIUM SERPL-SCNC: 146 MMOL/L (ref 135–145)
TEG ALGORITHM TGALG: ABNORMAL
WBC # BLD AUTO: 13 K/UL (ref 4.8–10.8)

## 2021-04-05 PROCEDURE — B548ZZA ULTRASONOGRAPHY OF SUPERIOR VENA CAVA, GUIDANCE: ICD-10-PCS | Performed by: PSYCHIATRY & NEUROLOGY

## 2021-04-05 PROCEDURE — 700102 HCHG RX REV CODE 250 W/ 637 OVERRIDE(OP): Performed by: HOSPITALIST

## 2021-04-05 PROCEDURE — C1751 CATH, INF, PER/CENT/MIDLINE: HCPCS

## 2021-04-05 PROCEDURE — 85347 COAGULATION TIME ACTIVATED: CPT

## 2021-04-05 PROCEDURE — 700101 HCHG RX REV CODE 250: Performed by: PSYCHIATRY & NEUROLOGY

## 2021-04-05 PROCEDURE — 700102 HCHG RX REV CODE 250 W/ 637 OVERRIDE(OP): Performed by: NURSE PRACTITIONER

## 2021-04-05 PROCEDURE — 86140 C-REACTIVE PROTEIN: CPT

## 2021-04-05 PROCEDURE — 700111 HCHG RX REV CODE 636 W/ 250 OVERRIDE (IP)

## 2021-04-05 PROCEDURE — 84134 ASSAY OF PREALBUMIN: CPT

## 2021-04-05 PROCEDURE — 70450 CT HEAD/BRAIN W/O DYE: CPT

## 2021-04-05 PROCEDURE — 700111 HCHG RX REV CODE 636 W/ 250 OVERRIDE (IP): Performed by: STUDENT IN AN ORGANIZED HEALTH CARE EDUCATION/TRAINING PROGRAM

## 2021-04-05 PROCEDURE — 85384 FIBRINOGEN ACTIVITY: CPT

## 2021-04-05 PROCEDURE — 700101 HCHG RX REV CODE 250: Performed by: PHYSICIAN ASSISTANT

## 2021-04-05 PROCEDURE — 700102 HCHG RX REV CODE 250 W/ 637 OVERRIDE(OP): Performed by: INTERNAL MEDICINE

## 2021-04-05 PROCEDURE — 700111 HCHG RX REV CODE 636 W/ 250 OVERRIDE (IP): Performed by: INTERNAL MEDICINE

## 2021-04-05 PROCEDURE — 74018 RADEX ABDOMEN 1 VIEW: CPT

## 2021-04-05 PROCEDURE — A9270 NON-COVERED ITEM OR SERVICE: HCPCS | Performed by: PHYSICIAN ASSISTANT

## 2021-04-05 PROCEDURE — 700102 HCHG RX REV CODE 250 W/ 637 OVERRIDE(OP): Performed by: PSYCHIATRY & NEUROLOGY

## 2021-04-05 PROCEDURE — 36556 INSERT NON-TUNNEL CV CATH: CPT

## 2021-04-05 PROCEDURE — 80048 BASIC METABOLIC PNL TOTAL CA: CPT | Mod: 91

## 2021-04-05 PROCEDURE — A9270 NON-COVERED ITEM OR SERVICE: HCPCS | Performed by: INTERNAL MEDICINE

## 2021-04-05 PROCEDURE — 700111 HCHG RX REV CODE 636 W/ 250 OVERRIDE (IP): Performed by: PHYSICIAN ASSISTANT

## 2021-04-05 PROCEDURE — 99291 CRITICAL CARE FIRST HOUR: CPT | Mod: 25 | Performed by: INTERNAL MEDICINE

## 2021-04-05 PROCEDURE — 85576 BLOOD PLATELET AGGREGATION: CPT

## 2021-04-05 PROCEDURE — A9270 NON-COVERED ITEM OR SERVICE: HCPCS | Performed by: PSYCHIATRY & NEUROLOGY

## 2021-04-05 PROCEDURE — 700105 HCHG RX REV CODE 258: Performed by: INTERNAL MEDICINE

## 2021-04-05 PROCEDURE — A9270 NON-COVERED ITEM OR SERVICE: HCPCS | Performed by: HOSPITALIST

## 2021-04-05 PROCEDURE — 71045 X-RAY EXAM CHEST 1 VIEW: CPT

## 2021-04-05 PROCEDURE — 36556 INSERT NON-TUNNEL CV CATH: CPT | Performed by: PSYCHIATRY & NEUROLOGY

## 2021-04-05 PROCEDURE — 700105 HCHG RX REV CODE 258: Performed by: PSYCHIATRY & NEUROLOGY

## 2021-04-05 PROCEDURE — 700102 HCHG RX REV CODE 250 W/ 637 OVERRIDE(OP): Performed by: PHYSICIAN ASSISTANT

## 2021-04-05 PROCEDURE — A9270 NON-COVERED ITEM OR SERVICE: HCPCS | Performed by: NURSE PRACTITIONER

## 2021-04-05 PROCEDURE — 770022 HCHG ROOM/CARE - ICU (200)

## 2021-04-05 PROCEDURE — 02HV33Z INSERTION OF INFUSION DEVICE INTO SUPERIOR VENA CAVA, PERCUTANEOUS APPROACH: ICD-10-PCS | Performed by: PSYCHIATRY & NEUROLOGY

## 2021-04-05 PROCEDURE — 85025 COMPLETE CBC W/AUTO DIFF WBC: CPT

## 2021-04-05 RX ORDER — MANNITOL 250 MG/ML
75 INJECTION, SOLUTION INTRAVENOUS ONCE
Status: COMPLETED | OUTPATIENT
Start: 2021-04-05 | End: 2021-04-05

## 2021-04-05 RX ORDER — 3% SODIUM CHLORIDE 3 G/100ML
500 INJECTION, SOLUTION INTRAVENOUS CONTINUOUS
Status: DISCONTINUED | OUTPATIENT
Start: 2021-04-05 | End: 2021-04-07

## 2021-04-05 RX ORDER — ASPIRIN 81 MG/1
81 TABLET, CHEWABLE ORAL DAILY
Status: DISCONTINUED | OUTPATIENT
Start: 2021-04-05 | End: 2021-04-05

## 2021-04-05 RX ORDER — LEVETIRACETAM 500 MG/1
500 TABLET ORAL 2 TIMES DAILY
Status: DISCONTINUED | OUTPATIENT
Start: 2021-04-05 | End: 2021-04-05

## 2021-04-05 RX ORDER — ASPIRIN 81 MG/1
81 TABLET, CHEWABLE ORAL DAILY
Status: DISCONTINUED | OUTPATIENT
Start: 2021-04-05 | End: 2021-04-17

## 2021-04-05 RX ADMIN — THYROID, PORCINE 60 MG: 30 TABLET ORAL at 21:36

## 2021-04-05 RX ADMIN — FENTANYL CITRATE 25 MCG: 50 INJECTION, SOLUTION INTRAMUSCULAR; INTRAVENOUS at 08:08

## 2021-04-05 RX ADMIN — ENOXAPARIN SODIUM 40 MG: 40 INJECTION SUBCUTANEOUS at 13:23

## 2021-04-05 RX ADMIN — BISACODYL 10 MG: 10 SUPPOSITORY RECTAL at 15:23

## 2021-04-05 RX ADMIN — SODIUM CHLORIDE 5 MG/HR: 9 INJECTION, SOLUTION INTRAVENOUS at 10:14

## 2021-04-05 RX ADMIN — OXYCODONE 10 MG: 5 TABLET ORAL at 15:13

## 2021-04-05 RX ADMIN — DOCUSATE SODIUM 50 MG AND SENNOSIDES 8.6 MG 1 TABLET: 8.6; 5 TABLET, FILM COATED ORAL at 21:36

## 2021-04-05 RX ADMIN — HYDRALAZINE HYDROCHLORIDE 10 MG: 20 INJECTION INTRAMUSCULAR; INTRAVENOUS at 01:19

## 2021-04-05 RX ADMIN — OXYCODONE 10 MG: 5 TABLET ORAL at 08:55

## 2021-04-05 RX ADMIN — ONDANSETRON 4 MG: 2 INJECTION INTRAMUSCULAR; INTRAVENOUS at 15:22

## 2021-04-05 RX ADMIN — ONDANSETRON 4 MG: 2 INJECTION INTRAMUSCULAR; INTRAVENOUS at 00:36

## 2021-04-05 RX ADMIN — ASPIRIN 81 MG: 81 TABLET, CHEWABLE ORAL at 17:09

## 2021-04-05 RX ADMIN — HYDRALAZINE HYDROCHLORIDE 10 MG: 20 INJECTION INTRAMUSCULAR; INTRAVENOUS at 05:02

## 2021-04-05 RX ADMIN — METOPROLOL TARTRATE 25 MG: 25 TABLET, FILM COATED ORAL at 05:25

## 2021-04-05 RX ADMIN — PROCHLORPERAZINE EDISYLATE 10 MG: 5 INJECTION INTRAMUSCULAR; INTRAVENOUS at 09:13

## 2021-04-05 RX ADMIN — AMLODIPINE BESYLATE 5 MG: 5 TABLET ORAL at 05:25

## 2021-04-05 RX ADMIN — OXYCODONE 5 MG: 5 TABLET ORAL at 04:31

## 2021-04-05 RX ADMIN — METOPROLOL TARTRATE 25 MG: 25 TABLET, FILM COATED ORAL at 17:09

## 2021-04-05 RX ADMIN — SODIUM CHLORIDE 200 MEQ: 234 INJECTION, SOLUTION, CONCENTRATE INTRAVENOUS at 09:10

## 2021-04-05 RX ADMIN — ACETAMINOPHEN 650 MG: 325 TABLET, FILM COATED ORAL at 11:29

## 2021-04-05 RX ADMIN — SODIUM CHLORIDE 500 ML: 3 INJECTION, SOLUTION INTRAVENOUS at 15:55

## 2021-04-05 RX ADMIN — SODIUM CHLORIDE 5 MG/HR: 9 INJECTION, SOLUTION INTRAVENOUS at 18:22

## 2021-04-05 RX ADMIN — THYROID, PORCINE 60 MG: 30 TABLET ORAL at 08:54

## 2021-04-05 RX ADMIN — SODIUM CHLORIDE 5 MG/HR: 9 INJECTION, SOLUTION INTRAVENOUS at 14:09

## 2021-04-05 RX ADMIN — MANNITOL 75 G: 12.5 INJECTION, SOLUTION INTRAVENOUS at 01:58

## 2021-04-05 RX ADMIN — SODIUM PHOSPHATE 133 ML: 7; 19 ENEMA RECTAL at 11:29

## 2021-04-05 RX ADMIN — ACETAMINOPHEN 650 MG: 325 TABLET, FILM COATED ORAL at 05:24

## 2021-04-05 RX ADMIN — ATORVASTATIN CALCIUM 80 MG: 80 TABLET, FILM COATED ORAL at 17:08

## 2021-04-05 RX ADMIN — ACETAMINOPHEN 650 MG: 325 TABLET, FILM COATED ORAL at 17:08

## 2021-04-05 RX ADMIN — SODIUM CHLORIDE 500 ML: 3 INJECTION, SOLUTION INTRAVENOUS at 01:31

## 2021-04-05 ASSESSMENT — ENCOUNTER SYMPTOMS
DEPRESSION: 0
NECK PAIN: 0
PALPITATIONS: 0
VOMITING: 1
PHOTOPHOBIA: 0
HEADACHES: 1
BLURRED VISION: 0
WEIGHT LOSS: 0
TINGLING: 0
BACK PAIN: 0
NAUSEA: 1
ORTHOPNEA: 0
ABDOMINAL PAIN: 0
COUGH: 0
SPUTUM PRODUCTION: 0
MYALGIAS: 0
HEMOPTYSIS: 0
DIZZINESS: 0
CHILLS: 0
FEVER: 0
DOUBLE VISION: 0

## 2021-04-05 NOTE — PROGRESS NOTES
55yo M w/ Afib. Admitted on 3/18 with complaints of left sided weakness. Found to have R MCA stroke w/ edema and Increased intracranial pressure. Underwent R decompression hemicraniectomy on 4/3, now being monitored in the ICU. Notified by overnight nurse of right sided facial edema as well as worsening lethargy and AMS. Pt was previously AAOx4, now currently AAOx1. Dr. Payton (Neurosurgery) was contacted who recommended CT head. Results now returned w/ changes of eveolving infarct w/i right MCA distribution. Hyperdensity in the sulci of the R MCA distribution infarct, and suspected component of subarachnoid hemorrhage. Pt was evaluated by Dr. Payton at bedside.   - Mannitol and hypertonic saline per Neurosurgery recommendation  - have reached out to Neurology (Dr. Calvillo) tonight - no further recommendations at this time  - Intensivist (Dr. Fernandez) was consulted - will upgrade to ICU for further management

## 2021-04-05 NOTE — PROGRESS NOTES
56-year-old male with right MCA occlusion status post haylie-crani.     Called regarding worsening facial edema, CT with increased swelling and small amount of subarachnoid hemorrhage.  Plan to give hypertonic saline, per neurology/neurosurgery add mannitol.  TEG sent to ensure there is no coagulopathy in need of reversal.    Evaluation demonstrates critically ill male who is somnolent but moves the right side with weakness, flaccid left.  Good cough and gag, easily protecting airway.      The patient remains critically ill.  Critical care time = 31 minutes in directly providing and coordinating critical care and extensive data review.  No time overlap and excludes procedures.      Arthur Fernandez M.D.

## 2021-04-05 NOTE — PROGRESS NOTES
[facility] Progress Note      Admit Date: 3/31/2021    Resident(s): Johana Nelson M.D.   Attending:  GENE MURILLO/ Dr. Bell     Patient ID:    Name:  Thong Arzola   YOB: 1965  Age:  56 y.o.  male   MRN:  [id number]    Hospital Course (carried forward and updated):  Thong Arzola is a 56 y.o. male with medical history of Hyperlipidemia, Paroxysmal atrial fibrillation and hypothyroidism is admitted on 03/31 for left side weakness and facial paralysis found to have Right MCA stroke , not a candidate for Tpa or mechanical thrombectomy who subsequently underwent Hemicraniotomy on 04/03. Currently being monitored in ICU for worsening neurological events    Consultants:  Critical Care  Neurology  Neurosurgery     Interval Events:  Subjective: Overnight patient had worsening right facial edema and neurological status and repeat CT head showed evolving infarct and significantly worsening edema. He was started on Mannitol   -This morning he was lethargic but alert and oriented. He states he has headache and feels weak   -Hypertonic saline bolus given to achieve sodium goal of 150-155  -Will restart DVT prophylaxis and Aspirin as per neurology     Vitals Range last 24h:  Temp:  [37.6 °C (99.7 °F)-37.9 °C (100.2 °F)] 37.8 °C (100 °F)  Pulse:  [67-92] 76  Resp:  [12-23] 13  BP: (121-185)/() 126/69  SpO2:  [92 %-96 %] 95 %      Intake/Output Summary (Last 24 hours) at 4/5/2021 1145  Last data filed at 4/5/2021 1000  Gross per 24 hour   Intake 2119.5 ml   Output 2435 ml   Net -315.5 ml        Review of Systems   Constitutional: Negative for chills, fever and weight loss.   HENT: Negative for ear pain, hearing loss and tinnitus.    Eyes: Negative for blurred vision, double vision and photophobia.   Respiratory: Negative for cough, hemoptysis and sputum production.    Cardiovascular: Negative for chest pain, palpitations and orthopnea.   Gastrointestinal: Positive for nausea and vomiting.  Negative for abdominal pain.   Genitourinary: Negative for dysuria, frequency and urgency.   Musculoskeletal: Negative for back pain, myalgias and neck pain.   Skin: Negative for itching and rash.   Neurological: Positive for headaches. Negative for dizziness and tingling.   Psychiatric/Behavioral: Negative for depression and suicidal ideas.       PHYSICAL EXAM:  Vitals:    04/05/21 0800 04/05/21 0900 04/05/21 1000 04/05/21 1100   BP: 141/80  140/74 126/69   Pulse: 69 77 69 76   Resp: 14 13 13 13   Temp:       TempSrc: Bladder  Bladder    SpO2: 96% 96% 94% 95%   Weight:       Height:        Body mass index is 25.18 kg/m².    O2 therapy: Pulse Oximetry: 95 %, O2 (LPM): 2, O2 Delivery Device: Nasal Cannula    Date 04/05/21 0700 - 04/06/21 0659   Shift 8289-0382 6931-9812 6836-7817 24 Hour Total   INTAKE   I.V. 60   60     Volume (mL) (3% sodium chloride (HYPERTONIC SALINE) 500mL infusion 500 mL) 60   60   Other 30   30     Flush / Irrigation Volume (CorTrak) 30   30   NG/GT 0   0     Intake (mL) (Enteral Tube 04/03/21 Cortrak - Gastric Right nare) 0   0   IV Piggyback 50   50     Volume (mL) (sodium chloride 200 mEq in empty bag 50 mL ivpb) 50   50   Shift Total 140   140   OUTPUT   Urine 325   325     Output (mL) (Urethral Catheter Temperature probe 16 Fr.) 325   325   Emesis         Emesis - Number of Times 1 x   1 x   Shift Total 325   325   NET -185   -185        Physical Exam   Constitutional: He is oriented to person, place, and time.   HENT:   Head: Normocephalic and atraumatic.   Eyes: Pupils are equal, round, and reactive to light.   Cardiovascular: Normal rate, regular rhythm and normal heart sounds.   Pulmonary/Chest: Effort normal and breath sounds normal.   Abdominal: Soft. Bowel sounds are normal. He exhibits no distension. There is no abdominal tenderness.   Musculoskeletal:      Comments: Restricted due to weakness   Neurological: He is alert and oriented to person, place, and time.   Sensations  intact. Muscle strength 4/5 on right and flaccid on left   Skin: Skin is warm and dry.           Recent Labs     04/04/21  0604 04/04/21  0604 04/04/21  1150 04/05/21  0135 04/05/21  0910   SODIUM 140   < > 141 138 138   POTASSIUM 4.2  --   --  3.9 4.1   CHLORIDE 107  --   --  103 105   CO2 27  --   --  26 27   BUN 19  --   --  19 18   CREATININE 0.99  --   --  0.79 0.85   CALCIUM 8.3*  --   --  8.9 9.0    < > = values in this interval not displayed.     Recent Labs     04/04/21  0604 04/05/21  0135 04/05/21  0910   PREALBUMIN 22.6  --   --    GLUCOSE 128* 128* 113*     Recent Labs     04/03/21  0359 04/04/21  0604 04/05/21  0435   RBC 4.86 4.28* 4.36*   HEMOGLOBIN 14.1 12.5* 12.9*   HEMATOCRIT 40.3* 36.2* 36.5*   PLATELETCT 241 213 213     Recent Labs     04/03/21  0359 04/04/21  0604 04/05/21  0435   WBC 7.9 10.5 13.0*   NEUTSPOLYS 82.70* 81.00* 84.30*   LYMPHOCYTES 8.90* 8.10* 5.70*   MONOCYTES 7.80 10.50 9.50   EOSINOPHILS 0.00 0.00 0.00   BASOPHILS 0.10 0.00 0.00       Meds:  • 3% sodium chloride  500 mL 40 mL/hr at 04/05/21 1026   • metoprolol tartrate  25 mg     • niCARdipine infusion  0-15 mg/hr 5 mg/hr (04/05/21 1014)   • amLODIPine  5 mg     • [Held by provider] sodium chloride  1 g     • [Held by provider] aspirin  81 mg     • thyroid  60 mg     • labetalol  10 mg     • Pharmacy Consult Request  1 Each     • MD ALERT...DO NOT ADMINISTER NSAIDS or ASPIRIN unless ORDERED By Neurosurgery  1 Each     • dexamethasone  4 mg     • diphenhydrAMINE  25 mg     • bisacodyl  10 mg     • artificial tears  1 Application     • hydrALAZINE  10 mg     • Pharmacy  1 Each     • atorvastatin  80 mg     • senna-docusate  1 tablet     • acetaminophen  650 mg     • cloNIDine  0.1 mg     • magnesium hydroxide  30 mL     • ondansetron  4 mg     • oxyCODONE immediate-release  5 mg      Or   • oxyCODONE immediate-release  10 mg      Or   • HYDROmorphone  0.5 mg     • polyethylene glycol/lytes  1 Packet     • promethazine  12.5-25  mg     • senna-docusate  1 tablet     • acetaminophen  650 mg     • ondansetron  4 mg     • promethazine  12.5-25 mg     • prochlorperazine  5-10 mg          Procedures:  Central line on 04/05  Hemicraniotomy on 04/03    Imaging:  DX-CHEST-LIMITED (1 VIEW)   Final Result      1.  Right internal jugular catheter and enteric catheter appear appropriately located      2.  Minimal right basilar atelectasis      CT-HEAD W/O   Final Result         1.  Changes of evolving infarct within the right MCA distribution   2.  Hyperdensity in the sulci of the right MCA distribution infarct, largely appears related to thrombosed vessels, component of subarachnoid hemorrhage is suspected particularly in the right frontal lobe.   3.  Pneumocephalus, decreased since prior.      CT-HEAD W/O   Final Result         Postsurgical change from right craniectomy. Redemonstration of large right MCA infarct with edema and bulging of the brain parenchyma through the craniectomy defect      Less mass effect on the right lateral ventricle. Less right to left midline shift of 3 mm, previously 10 mm.         DX-ABDOMEN FOR TUBE PLACEMENT   Final Result      Enteric tube terminates over the stomach.      CT-HEAD W/O   Final Result         1.  Low-density changes of the right hemisphere compatible with MCA distribution infarct with edema.   2.  New effacement of the bilateral ventricles, right greater than left, with 10 mm right-to-left midline shift.   3.  New dilatation of the left temporal horn ventricle, appearance favors component of obstructive hydrocephalus due to mass effect from infarct and edema.   4.  Hyperdensity in the right middle cerebral artery, likely thrombosis given associated findings.      These findings were discussed with the patient's clinician, Dr. Nunez, on 4/3/2021 7:11 AM.      MR-BRAIN-W/O   Final Result      Very large acute right MCA territory infarct as detailed above with small amount of petechial hemorrhage in the  right insular region and right temporal lobe.      Punctate right thalamic lacunar infarct.      Right ICA and M1 MCA occlusion.      EC-ECHOCARDIOGRAM COMPLETE W/O CONT   Final Result      DX-CHEST-PORTABLE (1 VIEW)   Final Result         1.  Interstitial pulmonary parenchymal prominence, compatible with interstitial edema and/or infiltrates.      CT-CTA NECK WITH & W/O-POST PROCESSING   Final Result      Acute occlusion of the right internal carotid artery shortly after bifurcation. It is occluded up to the level of the carotid terminus.      CT-CTA HEAD WITH & W/O-POST PROCESS   Final Result      Acute right M1 occlusion.      Findings were discussed with RHONDA DIAZ on 3/31/2021 7:54 PM.      CT-CEREBRAL PERFUSION ANALYSIS   Final Result      1.  Cerebral blood flow less than 30% likely representing completed infarct = 134 mL.      2.  T Max more than 6 seconds likely representing combination of completed infarct and ischemia = 210 mL.      3.  Mismatched volume likely representing ischemic brain/penumbra = 76 mL.      4.  Please note that the cerebral perfusion was performed on the limited brain tissue around the basal ganglia region. Infarct/ischemia outside the CT perfusion sections can be missed in this study.      CT-HEAD W/O   Final Result   Addendum 1 of 1   In retrospect, there is subtle loss of gray-white matter differentiation    in the right MCA distribution, consistent with acute infarct.      Final      1.  No CT evidence of acute infarct, hemorrhage or mass.   2.  Mild paranasal sinus disease.          ASSESSEMENT and PLAN:    * Acute right MCA stroke (HCC)- (present on admission)  Assessment & Plan  -Presented with left side weakness and facial paralysis. CT imaging showed Acute right LAMONT territory stroke  -Outside the window for alteplase, not a candidate for thrombectomy per IR  -Underwent hemicraniotomy on 04/03 for increased ICP   -Patient had worsening facial edema due to cerebral edema  with evolving infarct on 04/04. Received hypertonic saline bolus on 04/05     Plan:  -q2h neuro checks  -Permissive hypertension , goal of BP<160  -Maintain normothermia, normoglycemia, normonatremia  -Elevate head of bed  -PT/OT/SLP  -PMNR  -SBP goal <140 - on Nicardipine drip  -On Hypertonic saline Goal sodium of 150-155  -Will restart aspirin and DVT prophylaxis today as per neurology. No need for prophylactic epileptics. However he needs long term anticoagulation for his atrial fibrillation once he is more stable e    Paroxysmal atrial fibrillation (HCC)- (present on admission)  Assessment & Plan  -Diagnosed about 9 months ago  -Was reportedly taking Coreg, however stopped taking it when he went back into sinus rhythm  -has not been on AC OP  -has been treating holistically OP. Taking vitamins, including Vitamin K  -ECHO unremarkable  -telemetry - currently NSR  -Chadsvasc2 score is 2 with stroke, needs anticoagulation long term once stable. Will do aspirin form today       History of COVID-19- (present on admission)  Assessment & Plan  -Positive PCR 3/18/2021  -Patient currently is asymptomatic, on room air, in no respiratory distress.  No indications to treat  -Repeat PCR 3/31 remains positive.  Per hospital infectious prevention, no longer requires isolation      Leucocytosis  Assessment & Plan  -Wbc count is 13, up trended from 10   -Most likely reactive, monitor labs    Urinary retention  Assessment & Plan  -secondary to acute stroke  -Reportedly does not have at baseline  -dye catheter in place    Acquired hypothyroidism- (present on admission)  Assessment & Plan  -TSH low, normal T4  -restart home armour thyroid today, dose adjusted   -Needs outpatient follow up        DISPO: ICU    CODE STATUS: Full code    Quality Measures:  Feeding: Enteral through tube feeds  Analgesia: tylenol  Sedation: none   Thromboprophylaxis: Lovenox  Head of bed: >30 degrees  Ulcer prophylaxis: none  Glycemic control:  none  Bowel care: bowel regimen: Enema today given constipation  Indwelling lines: Iv and central line  Deescalation of antibiotics: None       Johana Nelson M.D.

## 2021-04-05 NOTE — PROGRESS NOTES
2238: Notified MD of elevated systolic blood pressure despite administration of PRN medications. New orders received and carried out.   2324: Paged Neurosurgery  2332: Dr. Payton updated regarding new onset right facial edema accompanied by decreased LOC and elevated systolic blood pressures despite administration of PRNs. Stat head CT ordered. Directed by Fito to follow up with hospitalist MD.   2334: Updated hospitalist on pt. condition and discussion with Dr. Payton. Hospitalist to follow up post head CT.

## 2021-04-05 NOTE — PROGRESS NOTES
Patient has elevated temperature. Scheduled tylenol given. Ice packs and cool compresses on patient. Cool air blowing on patient. Patient and wife educated on importance of leaving off blankets because of elevated temperature. Verbalizes understanding.

## 2021-04-05 NOTE — CARE PLAN
Problem: Venous Thromboembolism (VTW)/Deep Vein Thrombosis (DVT) Prevention:  Goal: Patient will participate in Venous Thrombosis (VTE)/Deep Vein Thrombosis (DVT)Prevention Measures  Outcome: PROGRESSING AS EXPECTED     Problem: Skin Integrity  Goal: Risk for impaired skin integrity will decrease  Outcome: PROGRESSING AS EXPECTED     Problem: Infection:  Goal: Will remain free from infection  Outcome: PROGRESSING AS EXPECTED

## 2021-04-05 NOTE — CARE PLAN
Problem: Nutritional:  Goal: Nutrition support tolerated and meeting greater than 85% of estimated needs  Outcome: PROGRESSING SLOWER THAN EXPECTED  TF was previously at goal. TF was turned off due to emesis. RD will monitor to TF to be re-started.

## 2021-04-05 NOTE — PROCEDURES
Central Line Insertion    Date/Time: 4/5/2021 7:55 AM  Performed by: Cristhian Bell M.D.  Authorized by: Cristhian Bell M.D.     Consent:     Consent obtained:  Verbal    Consent given by:  Patient    Risks discussed:  Arterial puncture, incorrect placement, infection, bleeding, nerve damage and pneumothorax  Pre-procedure details:     Hand hygiene: Hand hygiene performed prior to insertion      Sterile barrier technique: All elements of maximal sterile technique followed      Skin preparation:  2% chlorhexidine    Skin preparation agent: Skin preparation agent completely dried prior to procedure    Anesthesia:     Anesthesia method:  Local infiltration    Local anesthetic:  Lidocaine 1% w/o epi  Procedure details:     Location:  R internal jugular    Patient position:  Flat    Procedural supplies:  Triple lumen    Catheter size:  7 Fr    Ultrasound guidance: yes      Sterile ultrasound techniques: Sterile gel and sterile probe covers were used      Number of attempts:  1    Successful placement: yes    Post-procedure details:     Post-procedure:  Dressing applied and line sutured    Assessment:  Blood return through all ports and free fluid flow    Patient tolerance of procedure:  Tolerated well, no immediate complications  Comments:      CXR pending for placement

## 2021-04-05 NOTE — DISCHARGE PLANNING
Anticipated Discharge Disposition: Rehab or SNF prior to home    Action: SW completed chart review, patient is from [place], [place], has [facility]. PT/OT pending helmet prior to additional  Assessments. Needs long term anticoagulation per MD notes, dye in place.     Barriers to Discharge: Patient does not appear to be medically clear. Goal is SNF/Rehab in [place].     Plan: Care coordination to follow for discharge needs. Patient to make choice for SNF/Rehab in [place].

## 2021-04-05 NOTE — PROGRESS NOTES
Head CT reviewed by neurosurgery. Dr. Payton at bedside. Recommendations received. Notified hospitalist. Patient to return to ICU status. Awaiting further orders.

## 2021-04-05 NOTE — CARE PLAN
Problem: Communication:  Goal: The ability to communicate needs accurately and effectively will improve  Outcome: PROGRESSING AS EXPECTED     Problem: Bowel/Gastric:  Goal: Normal bowel function is maintained or improved  Outcome: PROGRESSING SLOWER THAN EXPECTED

## 2021-04-05 NOTE — CARE PLAN
Problem: Communication  Goal: The ability to communicate needs accurately and effectively will improve  Outcome: PROGRESSING AS EXPECTED     Problem: Safety  Goal: Will remain free from injury  Outcome: PROGRESSING AS EXPECTED  Goal: Will remain free from falls  Outcome: PROGRESSING AS EXPECTED     Problem: Knowledge Deficit  Goal: Knowledge of disease process/condition, treatment plan, diagnostic tests, and medications will improve  Outcome: PROGRESSING AS EXPECTED     Problem: Safety:  Goal: Will remain free from injury  Outcome: PROGRESSING AS EXPECTED

## 2021-04-05 NOTE — PROGRESS NOTES
Neurosurgery Progress Note    Subjective:  -called back for decline in exam     Exam:  -Extubated.  Moves right side  Flaccid left side    BP  Min: 115/64  Max: 185/97  Pulse  Av.4  Min: 66  Max: 92  Resp  Avg: 15.9  Min: 10  Max: 23  Temp  Av.7 °C (99.9 °F)  Min: 37.6 °C (99.7 °F)  Max: 37.9 °C (100.2 °F)  Monitored Temp 2  Av.8 °C (100 °F)  Min: 37.4 °C (99.3 °F)  Max: 38.2 °C (100.8 °F)  SpO2  Av.7 %  Min: 92 %  Max: 96 %    No data recorded    Recent Labs     21  0621  0435   WBC 7.9 10.5 13.0*   RBC 4.86 4.28* 4.36*   HEMOGLOBIN 14.1 12.5* 12.9*   HEMATOCRIT 40.3* 36.2* 36.5*   MCV 82.9 84.6 83.7   MCH 29.0 29.2 29.6   MCHC 35.0 34.5 35.3   RDW 35.8* 36.4 37.1   PLATELETCT 241 213 213   MPV 9.4 9.4 9.3     Recent Labs     21  1800 21  0604 21  1150 21  0135   SODIUM 136   < > 140 141 138   POTASSIUM 4.2  --  4.2  --  3.9   CHLORIDE 107  --  107  --  103   CO2 26  --  27  --  26   GLUCOSE 123*  --  128*  --  128*   BUN 23*  --  19  --  19   CREATININE 1.03  --  0.99  --  0.79   CALCIUM 8.5  --  8.3*  --  8.9    < > = values in this interval not displayed.         Recent Labs     21  0135   REACTMIN 2.8*   CLOTKINET 1.3   CLOTANGL 70.0   MAXCLOTS 66.8   [id number] 0.5       Intake/Output       21 0700 - 21 0659 21 07 - 21 0659      5579-6199 8590-8851 Total  Total       Intake    I.V.  --  194.5 194.5  --  -- --    Volume (mL) (3% sodium chloride (HYPERTONIC SALINE) 500mL infusion) -- 120 120 -- -- --    Volume (mL) (3% sodium chloride (HYPERTONIC SALINE) 500mL infusion 500 mL) -- 74.5 74.5 -- -- --    Other  --  360 360  --  -- --    Medications (PO/Enteral Liquids) -- 270 270 -- -- --    Flush / Irrigation Volume (CorTrak) -- 90 90 -- -- --    NG/GT  400  975 1375  --  -- --    Intake (mL) (Enteral Tube 21 Cortrak - Gastric Right nare)  -- -- --    Enteral   90  -- 90  --  -- --    Free Water / Tube Flush 90 -- 90 -- -- --    Total Intake 490 1529.5 2019.5 -- -- --       Output    Urine  385  1850 2235  --  -- --    Output (mL) (Urethral Catheter Temperature probe 16 Fr.) 385 1850 2235 -- -- --    Stool  --  -- --  --  -- --    Number of Times Stooled -- 0 x 0 x -- -- --    Total Output 385 1850 2235 -- -- --       Net I/O     105 -320.5 -215.5 -- -- --            Intake/Output Summary (Last 24 hours) at 4/5/2021 0656  Last data filed at 4/5/2021 0600  Gross per 24 hour   Intake 2019.5 ml   Output 2235 ml   Net -215.5 ml            • 3% sodium chloride  500 mL Continuous   • amLODIPine  5 mg Q DAY   • [Held by provider] sodium chloride  1 g TID WITH MEALS   • [Held by provider] aspirin  81 mg DAILY   • thyroid  60 mg BID   • labetalol  10 mg Q4HRS PRN   • metoprolol tartrate  25 mg BID   • Pharmacy Consult Request  1 Each PHARMACY TO DOSE   • MD ALERT...DO NOT ADMINISTER NSAIDS or ASPIRIN unless ORDERED By Neurosurgery  1 Each PRN   • dexamethasone  4 mg Once PRN   • diphenhydrAMINE  25 mg Q6HRS PRN   • bisacodyl  10 mg Q24HRS PRN   • fleet  1 Each Once PRN   • artificial tears  1 Application PRN   • hydrALAZINE  10 mg Q HOUR PRN   • Pharmacy  1 Each PHARMACY TO DOSE   • atorvastatin  80 mg Q EVENING   • [Held by provider] baclofen  10 mg TID   • senna-docusate  1 tablet Nightly   • acetaminophen  650 mg Q6HRS PRN   • cloNIDine  0.1 mg Q4HRS PRN   • magnesium hydroxide  30 mL QDAY PRN   • ondansetron  4 mg Q4HRS PRN   • oxyCODONE immediate-release  5 mg Q3HRS PRN    Or   • oxyCODONE immediate-release  10 mg Q3HRS PRN    Or   • HYDROmorphone  0.5 mg Q3HRS PRN   • polyethylene glycol/lytes  1 Packet BID PRN   • promethazine  12.5-25 mg Q4HRS PRN   • senna-docusate  1 tablet Q24HRS PRN   • acetaminophen  650 mg Q6HRS   • ondansetron  4 mg Q4HRS PRN   • promethazine  12.5-25 mg Q4HRS PRN   • prochlorperazine  5-10 mg Q4HRS PRN       Assessment and Plan:  Hospital day  #5  POD #2  Prophylactic anticoagulation: yes         Start date/time: 4/5/2021  Ct head 4/5 shows significant increase in midline shift due to swelling in stroke   Keppra 500 twice daily  Hold DVT prophylaxis until 4/5/2021 evening  Okay to start aspirin therapy 72 hours after surgery  Med management per ICU and Crit care will need hypertonic therapy   No role for EVD will make shift worse     No further neurosurgical needs at this time we will remove staples 14 days after surgery and sign off at this time please reconsult for questions.    Arthur Payton MD

## 2021-04-05 NOTE — THERAPY
Missed Therapy     Patient Name: Thong Arzola  Age:  56 y.o., Sex:  male  Medical Record #: [id number]  Today's Date: 4/5/2021    Discussed missed therapy with paola       04/05/21 1050   Interdisciplinary Plan of Care Collaboration   IDT Collaboration with  Nursing   Collaboration Comments s/p hemicrani, no bone flap, needs helmet

## 2021-04-05 NOTE — THERAPY
Occupational Therapy Contact Note    Pt now s/p hemicraniectomy and w/o bone flap. Awaiting helmet to be fitted. Will attempt as able.    Anel Bermeo, OTR/L

## 2021-04-05 NOTE — PROGRESS NOTES
Neurology Progress Note  Neurohospitalist Service, [facility]    Referring Physician: Gia Forman M.D.      Interval History:  56M with malignant R MCA stroke on 3/31, s/p hemicraniectomy on 4/3.  Overnight with worsening facial edema, more lethargic, repeat CTH with worsening edema    Review of systems: In addition to what is detailed in the HPI and/or updated in the interval history, all other systems reviewed and are negative.    Past Medical History, Past Surgical History and Social History reviewed and unchanged from prior    Medications:    Current Facility-Administered Medications:   •  3% sodium chloride (HYPERTONIC SALINE) 500mL infusion 500 mL, 500 mL, Intravenous, Continuous, Arthur Fernandez M.D., Last Rate: 30 mL/hr at 04/05/21 0131, 500 mL at 04/05/21 0131  •  amLODIPine (NORVASC) tablet 5 mg, 5 mg, Enteral Tube, Q DAY, Radha Meredith A.P.R.N., 5 mg at 04/05/21 0525  •  [Held by provider] sodium chloride (SALT) tablet 1 g, 1 g, Enteral Tube, TID WITH MEALS, Radha Meredith A.P.R.N., 1 g at 04/04/21 1713  •  [Held by provider] aspirin (ASA) chewable tab 81 mg, 81 mg, Oral, DAILY, Radha Meredith A.P.R.N.  •  thyroid (ARMOUR THYROID) tablet 60 mg, 60 mg, Enteral Tube, BID, King Spann M.D., 60 mg at 04/04/21 2021  •  labetalol (NORMODYNE/TRANDATE) injection 10 mg, 10 mg, Intravenous, Q4HRS PRN, King Spann M.D., 10 mg at 04/04/21 2006  •  metoprolol tartrate (LOPRESSOR) tablet 25 mg, 25 mg, Oral, BID, King Spann M.D., 25 mg at 04/05/21 0525  •  Pharmacy Consult Request ...Pain Management Review 1 Each, 1 Each, Other, PHARMACY TO DOSE, Lilia Pizano P.A.-C.  •  MD ALERT...DO NOT ADMINISTER NSAIDS or ASPIRIN unless ORDERED By Neurosurgery 1 Each, 1 Each, Other, PRN, Lilia Pizano, P.A.-C.  •  dexamethasone (DECADRON) injection 4 mg, 4 mg, Intravenous, Once PRN, Lilia Pizano, P.A.-C.  •  diphenhydrAMINE (BENADRYL) injection 25 mg, 25 mg,  Intravenous, Q6HRS PRN, HANSA Morris.A.-C.  •  bisacodyl (DULCOLAX) suppository 10 mg, 10 mg, Rectal, Q24HRS PRN, HANSA Morris.A.-C.  •  fleet enema 133 mL, 1 Each, Rectal, Once PRN, Lilia Pizano, P.A.-C.  •  artificial tears (EYE LUBRICANT) ophth ointment 1 Application, 1 Application, Both Eyes, PRN, Lilia Pizano P.A.-C.  •  hydrALAZINE (APRESOLINE) injection 10 mg, 10 mg, Intravenous, Q HOUR PRN, HANSA Morris.A.-C., 10 mg at 04/05/21 0502  •  Pharmacy Consult: Enteral tube insertion - review meds/change route/product selection, 1 Each, Other, PHARMACY TO DOSE, Radha Meredith, A.P.R.N.  •  atorvastatin (LIPITOR) tablet 80 mg, 80 mg, Enteral Tube, Q EVENING, Radha Meredith, A.P.R.N., 80 mg at 04/04/21 1713  •  [Held by provider] baclofen (LIORESAL) tablet 10 mg, 10 mg, Enteral Tube, TID, Radha Meredith, A.P.R.N., 10 mg at 04/04/21 1713  •  senna-docusate (PERICOLACE or SENOKOT S) 8.6-50 MG per tablet 1 tablet, 1 tablet, Enteral Tube, Nightly, Radha Meredith, A.P.R.N., 1 tablet at 04/04/21 2021  •  acetaminophen (Tylenol) tablet 650 mg, 650 mg, Enteral Tube, Q6HRS PRN, Radha Meredith, A.P.R.N., 650 mg at 04/04/21 0551  •  cloNIDine (CATAPRES) tablet 0.1 mg, 0.1 mg, Enteral Tube, Q4HRS PRN, Radha Meredith, A.P.R.N., 0.1 mg at 04/04/21 2111  •  magnesium hydroxide (MILK OF MAGNESIA) suspension 30 mL, 30 mL, Enteral Tube, QDAY PRN, Radha Meredith, A.P.R.N.  •  ondansetron (ZOFRAN ODT) dispertab 4 mg, 4 mg, Enteral Tube, Q4HRS PRN, Radha Meredith A.P.R.N.  •  oxyCODONE immediate-release (ROXICODONE) tablet 5 mg, 5 mg, Enteral Tube, Q3HRS PRN, 5 mg at 04/05/21 0431 **OR** oxyCODONE immediate-release (ROXICODONE) tablet 10 mg, 10 mg, Enteral Tube, Q3HRS PRN **OR** HYDROmorphone (Dilaudid) injection 0.5 mg, 0.5 mg, Intravenous, Q3HRS PRN, Radha Meredith A.P.R.N., 0.5 mg at 04/03/21 1556  •  polyethylene glycol/lytes (MIRALAX) PACKET 1 Packet, 1 Packet, Enteral Tube, BID PRN, Radha PETERSON  RICHMOND MeredithP.RYohanaNYohana, 1 Packet at 04/04/21 1714  •  promethazine (PHENERGAN) tablet 12.5-25 mg, 12.5-25 mg, Enteral Tube, Q4HRS PRN, SIA HamptonRCLIFF.  •  senna-docusate (PERICOLACE or SENOKOT S) 8.6-50 MG per tablet 1 tablet, 1 tablet, Enteral Tube, Q24HRS PRN, SIA HamptonRCLIFF.  •  acetaminophen (Tylenol) tablet 650 mg, 650 mg, Enteral Tube, Q6HRS, Kerwin Nunez M.D., 650 mg at 04/05/21 0524  •  ondansetron (ZOFRAN) syringe/vial injection 4 mg, 4 mg, Intravenous, Q4HRS PRN, Gia Forman M.D., 4 mg at 04/05/21 0036  •  promethazine (PHENERGAN) suppository 12.5-25 mg, 12.5-25 mg, Rectal, Q4HRS PRN, Gia Forman M.D.  •  prochlorperazine (COMPAZINE) injection 5-10 mg, 5-10 mg, Intravenous, Q4HRS PRN, Gia Forman M.D., 5 mg at 04/01/21 1035    Physical Examination:     Vitals:    04/05/21 0100 04/05/21 0200 04/05/21 0300 04/05/21 0400   BP: 147/85 (!) 164/94 121/65 131/76   Pulse: 75 92 83 74   Resp: 17 20 16 15   Temp:  37.6 °C (99.7 °F)  37.7 °C (99.9 °F)   TempSrc:  Bladder  Bladder   SpO2: 92% 92% 94% 95%   Weight:       Height:         R flap- tense    NEUROLOGICAL EXAM:     Mental status: Lethargic, not able to open eyes, speaking and interactive but requires constant stimulus  Speech and language: Speech is mildly dysarthric. Able to follow commands  Cranial nerve exam:  R pupil reactive, but sluggish.  L visual field cut. Eyes midline. L face droop  Motor exam: RUE is antigravity, RLE is briefly antigravity- appears effort dependent, flaccid LUE/LLE  Sensory exam: Does not react to tactile on L hemibody  Coordination: no jacy ataxia on R side movements    Objective Data:    Labs:  Lab Results   Component Value Date/Time    PROTHROMBTM 14.0 03/31/2021 07:29 PM    INR 1.04 03/31/2021 07:29 PM      Lab Results   Component Value Date/Time    WBC 13.0 (H) 04/05/2021 04:35 AM    RBC 4.36 (L) 04/05/2021 04:35 AM    HEMOGLOBIN 12.9 (L) 04/05/2021 04:35 AM    HEMATOCRIT 36.5 (L) 04/05/2021 04:35 AM    MCV  83.7 04/05/2021 04:35 AM    MCH 29.6 04/05/2021 04:35 AM    MCHC 35.3 04/05/2021 04:35 AM    MPV 9.3 04/05/2021 04:35 AM    NEUTSPOLYS 84.30 (H) 04/05/2021 04:35 AM    LYMPHOCYTES 5.70 (L) 04/05/2021 04:35 AM    MONOCYTES 9.50 04/05/2021 04:35 AM    EOSINOPHILS 0.00 04/05/2021 04:35 AM    BASOPHILS 0.00 04/05/2021 04:35 AM      Lab Results   Component Value Date/Time    SODIUM 138 04/05/2021 01:35 AM    POTASSIUM 3.9 04/05/2021 01:35 AM    CHLORIDE 103 04/05/2021 01:35 AM    CO2 26 04/05/2021 01:35 AM    GLUCOSE 128 (H) 04/05/2021 01:35 AM    BUN 19 04/05/2021 01:35 AM    CREATININE 0.79 04/05/2021 01:35 AM    CREATININE 1.3 04/04/2008 03:05 AM      Lab Results   Component Value Date/Time    CHOLSTRLTOT 169 04/01/2021 12:36 PM     (H) 04/01/2021 12:36 PM    HDL 32 (A) 04/01/2021 12:36 PM    TRIGLYCERIDE 126 04/01/2021 12:36 PM       Lab Results   Component Value Date/Time    ALKPHOSPHAT 66 04/01/2021 02:45 AM    ASTSGOT 22 04/01/2021 02:45 AM    ALTSGPT 30 04/01/2021 02:45 AM    TBILIRUBIN 0.6 04/01/2021 02:45 AM        Imaging/Testing:    I interpreted and/or reviewed the patient's neuroimaging    CT-HEAD W/O   Final Result         1.  Changes of evolving infarct within the right MCA distribution   2.  Hyperdensity in the sulci of the right MCA distribution infarct, largely appears related to thrombosed vessels, component of subarachnoid hemorrhage is suspected particularly in the right frontal lobe.   3.  Pneumocephalus, decreased since prior.      CT-HEAD W/O   Final Result         Postsurgical change from right craniectomy. Redemonstration of large right MCA infarct with edema and bulging of the brain parenchyma through the craniectomy defect      Less mass effect on the right lateral ventricle. Less right to left midline shift of 3 mm, previously 10 mm.         DX-ABDOMEN FOR TUBE PLACEMENT   Final Result      Enteric tube terminates over the stomach.      CT-HEAD W/O   Final Result         1.   Low-density changes of the right hemisphere compatible with MCA distribution infarct with edema.   2.  New effacement of the bilateral ventricles, right greater than left, with 10 mm right-to-left midline shift.   3.  New dilatation of the left temporal horn ventricle, appearance favors component of obstructive hydrocephalus due to mass effect from infarct and edema.   4.  Hyperdensity in the right middle cerebral artery, likely thrombosis given associated findings.      These findings were discussed with the patient's clinician, Dr. Nunez, on 4/3/2021 7:11 AM.      MR-BRAIN-W/O   Final Result      Very large acute right MCA territory infarct as detailed above with small amount of petechial hemorrhage in the right insular region and right temporal lobe.      Punctate right thalamic lacunar infarct.      Right ICA and M1 MCA occlusion.      EC-ECHOCARDIOGRAM COMPLETE W/O CONT   Final Result      DX-CHEST-PORTABLE (1 VIEW)   Final Result         1.  Interstitial pulmonary parenchymal prominence, compatible with interstitial edema and/or infiltrates.      CT-CTA NECK WITH & W/O-POST PROCESSING   Final Result      Acute occlusion of the right internal carotid artery shortly after bifurcation. It is occluded up to the level of the carotid terminus.      CT-CTA HEAD WITH & W/O-POST PROCESS   Final Result      Acute right M1 occlusion.      Findings were discussed with RHONDA DIAZ on 3/31/2021 7:54 PM.      CT-CEREBRAL PERFUSION ANALYSIS   Final Result      1.  Cerebral blood flow less than 30% likely representing completed infarct = 134 mL.      2.  T Max more than 6 seconds likely representing combination of completed infarct and ischemia = 210 mL.      3.  Mismatched volume likely representing ischemic brain/penumbra = 76 mL.      4.  Please note that the cerebral perfusion was performed on the limited brain tissue around the basal ganglia region. Infarct/ischemia outside the CT perfusion sections can be missed in  this study.      CT-HEAD W/O   Final Result   Addendum 1 of 1   In retrospect, there is subtle loss of gray-white matter differentiation    in the right MCA distribution, consistent with acute infarct.      Final      1.  No CT evidence of acute infarct, hemorrhage or mass.   2.  Mild paranasal sinus disease.          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  He is now s/p decompressive hemicraniectomy on 4/3.  Stroke etiology invariably cardioembolic.  Overnight with facial edema, lethargy, repeat CTH with worsening midline shift and further edema development.  His flap is tense this morning.  He remains at risk for herniation, and further evolution of infarct due to vessel compression- would be more aggressive with hyperosmolar therapy.  Bleeding is as expected with a large infarct core, and should not preclude ASA and DVT ppx planned to start tonight.  Eventual need for long-term anticoagulation due to his atrial fibrillation history.    Plan:   - q2h neurochecks/NIHSS   - Agree with Na goal 150-155- consider PICC or CVC placement, given decline in exam and worsening shift- consider 23.4% bolus and increase basal 3% rate more aggressively, as Na levels if anything is declining on current regimen   - from Neurology perspective, no indication for prophylactic keppra therapy   - SBP goal acutely is 120-160, long-term goal is 110-130/60-80   - continue statin for LDL goal < 70   - agree with starting ASA 81mg daily tonight   - agree with starting lovenox for DVT ppx    - PT/OT/SLP as able    The evaluation of the patient, and recommended management, was discussed with the resident staff. I have performed a physical exam and reviewed and updated ROS and Plan today (4/5/2021).     CRITICAL CARE    Upon my evaluation, this patient had a high probability of imminent or  life-threatening deterioration due to cerebrovascular accident which required my direct attention, intervention, and personal management.  I personally provided 35 minutes of total critical care time outside of time spent on separately billable/documented procedures. Time includes: review of laboratory data, review of radiology studies, discussion with consultants, discussion with family/patient, monitoring for potential decompensation.  Interventions were performed as documented in the chart.      Arthur Jackson MD  Neurohospitalist, Acute Care Services

## 2021-04-06 LAB
ALBUMIN SERPL BCP-MCNC: 3.1 G/DL (ref 3.2–4.9)
ALBUMIN/GLOB SERPL: 1.1 G/DL
ALP SERPL-CCNC: 56 U/L (ref 30–99)
ALT SERPL-CCNC: 16 U/L (ref 2–50)
ANION GAP SERPL CALC-SCNC: 5 MMOL/L (ref 7–16)
ANION GAP SERPL CALC-SCNC: 7 MMOL/L (ref 7–16)
ANION GAP SERPL CALC-SCNC: 8 MMOL/L (ref 7–16)
AST SERPL-CCNC: 28 U/L (ref 12–45)
BASOPHILS # BLD AUTO: 0.1 % (ref 0–1.8)
BASOPHILS # BLD: 0.01 K/UL (ref 0–0.12)
BILIRUB SERPL-MCNC: 0.4 MG/DL (ref 0.1–1.5)
BUN SERPL-MCNC: 25 MG/DL (ref 8–22)
CALCIUM SERPL-MCNC: 8.7 MG/DL (ref 8.5–10.5)
CALCIUM SERPL-MCNC: 8.8 MG/DL (ref 8.5–10.5)
CALCIUM SERPL-MCNC: 9 MG/DL (ref 8.5–10.5)
CHLORIDE SERPL-SCNC: 112 MMOL/L (ref 96–112)
CHLORIDE SERPL-SCNC: 116 MMOL/L (ref 96–112)
CHLORIDE SERPL-SCNC: 118 MMOL/L (ref 96–112)
CO2 SERPL-SCNC: 26 MMOL/L (ref 20–33)
CO2 SERPL-SCNC: 27 MMOL/L (ref 20–33)
CO2 SERPL-SCNC: 29 MMOL/L (ref 20–33)
CREAT SERPL-MCNC: 0.82 MG/DL (ref 0.5–1.4)
CREAT SERPL-MCNC: 0.84 MG/DL (ref 0.5–1.4)
CREAT SERPL-MCNC: 0.95 MG/DL (ref 0.5–1.4)
EOSINOPHIL # BLD AUTO: 0 K/UL (ref 0–0.51)
EOSINOPHIL NFR BLD: 0 % (ref 0–6.9)
ERYTHROCYTE [DISTWIDTH] IN BLOOD BY AUTOMATED COUNT: 39.1 FL (ref 35.9–50)
GLOBULIN SER CALC-MCNC: 2.9 G/DL (ref 1.9–3.5)
GLUCOSE SERPL-MCNC: 113 MG/DL (ref 65–99)
GLUCOSE SERPL-MCNC: 124 MG/DL (ref 65–99)
GLUCOSE SERPL-MCNC: 124 MG/DL (ref 65–99)
HCT VFR BLD AUTO: 36 % (ref 42–52)
HGB BLD-MCNC: 12.1 G/DL (ref 14–18)
IMM GRANULOCYTES # BLD AUTO: 0.07 K/UL (ref 0–0.11)
IMM GRANULOCYTES NFR BLD AUTO: 0.8 % (ref 0–0.9)
LYMPHOCYTES # BLD AUTO: 0.68 K/UL (ref 1–4.8)
LYMPHOCYTES NFR BLD: 7.8 % (ref 22–41)
MAGNESIUM SERPL-MCNC: 2.1 MG/DL (ref 1.5–2.5)
MCH RBC QN AUTO: 29.2 PG (ref 27–33)
MCHC RBC AUTO-ENTMCNC: 33.6 G/DL (ref 33.7–35.3)
MCV RBC AUTO: 87 FL (ref 81.4–97.8)
MONOCYTES # BLD AUTO: 0.76 K/UL (ref 0–0.85)
MONOCYTES NFR BLD AUTO: 8.7 % (ref 0–13.4)
NEUTROPHILS # BLD AUTO: 7.21 K/UL (ref 1.82–7.42)
NEUTROPHILS NFR BLD: 82.6 % (ref 44–72)
NRBC # BLD AUTO: 0 K/UL
NRBC BLD-RTO: 0 /100 WBC
PHOSPHATE SERPL-MCNC: 3.3 MG/DL (ref 2.5–4.5)
PLATELET # BLD AUTO: 215 K/UL (ref 164–446)
PMV BLD AUTO: 9.3 FL (ref 9–12.9)
POTASSIUM SERPL-SCNC: 3.7 MMOL/L (ref 3.6–5.5)
POTASSIUM SERPL-SCNC: 3.8 MMOL/L (ref 3.6–5.5)
POTASSIUM SERPL-SCNC: 3.8 MMOL/L (ref 3.6–5.5)
PROT SERPL-MCNC: 6 G/DL (ref 6–8.2)
RBC # BLD AUTO: 4.14 M/UL (ref 4.7–6.1)
SODIUM SERPL-SCNC: 146 MMOL/L (ref 135–145)
SODIUM SERPL-SCNC: 150 MMOL/L (ref 135–145)
SODIUM SERPL-SCNC: 152 MMOL/L (ref 135–145)
WBC # BLD AUTO: 8.7 K/UL (ref 4.8–10.8)

## 2021-04-06 PROCEDURE — 700102 HCHG RX REV CODE 250 W/ 637 OVERRIDE(OP): Performed by: INTERNAL MEDICINE

## 2021-04-06 PROCEDURE — 700111 HCHG RX REV CODE 636 W/ 250 OVERRIDE (IP): Performed by: PSYCHIATRY & NEUROLOGY

## 2021-04-06 PROCEDURE — 302132 K THERMIA MOTOR: Performed by: PSYCHIATRY & NEUROLOGY

## 2021-04-06 PROCEDURE — 770022 HCHG ROOM/CARE - ICU (200)

## 2021-04-06 PROCEDURE — 700102 HCHG RX REV CODE 250 W/ 637 OVERRIDE(OP): Performed by: HOSPITALIST

## 2021-04-06 PROCEDURE — A8001 HARD PROTECT HELMET PREFAB: HCPCS

## 2021-04-06 PROCEDURE — A9270 NON-COVERED ITEM OR SERVICE: HCPCS | Performed by: HOSPITALIST

## 2021-04-06 PROCEDURE — 700105 HCHG RX REV CODE 258: Performed by: INTERNAL MEDICINE

## 2021-04-06 PROCEDURE — 700105 HCHG RX REV CODE 258: Performed by: PSYCHIATRY & NEUROLOGY

## 2021-04-06 PROCEDURE — 700102 HCHG RX REV CODE 250 W/ 637 OVERRIDE(OP): Performed by: NURSE PRACTITIONER

## 2021-04-06 PROCEDURE — 85025 COMPLETE CBC W/AUTO DIFF WBC: CPT

## 2021-04-06 PROCEDURE — 700102 HCHG RX REV CODE 250 W/ 637 OVERRIDE(OP): Performed by: PSYCHIATRY & NEUROLOGY

## 2021-04-06 PROCEDURE — 99291 CRITICAL CARE FIRST HOUR: CPT | Mod: GC | Performed by: PSYCHIATRY & NEUROLOGY

## 2021-04-06 PROCEDURE — A9270 NON-COVERED ITEM OR SERVICE: HCPCS | Performed by: PSYCHIATRY & NEUROLOGY

## 2021-04-06 PROCEDURE — 80048 BASIC METABOLIC PNL TOTAL CA: CPT

## 2021-04-06 PROCEDURE — 306637 HCHG MISC ORTHO ITEM RC 0274

## 2021-04-06 PROCEDURE — 84100 ASSAY OF PHOSPHORUS: CPT

## 2021-04-06 PROCEDURE — 700111 HCHG RX REV CODE 636 W/ 250 OVERRIDE (IP): Performed by: STUDENT IN AN ORGANIZED HEALTH CARE EDUCATION/TRAINING PROGRAM

## 2021-04-06 PROCEDURE — 80053 COMPREHEN METABOLIC PANEL: CPT

## 2021-04-06 PROCEDURE — 700111 HCHG RX REV CODE 636 W/ 250 OVERRIDE (IP): Performed by: INTERNAL MEDICINE

## 2021-04-06 PROCEDURE — 700101 HCHG RX REV CODE 250: Performed by: PSYCHIATRY & NEUROLOGY

## 2021-04-06 PROCEDURE — A9270 NON-COVERED ITEM OR SERVICE: HCPCS | Performed by: INTERNAL MEDICINE

## 2021-04-06 PROCEDURE — 83735 ASSAY OF MAGNESIUM: CPT

## 2021-04-06 PROCEDURE — A9270 NON-COVERED ITEM OR SERVICE: HCPCS | Performed by: NURSE PRACTITIONER

## 2021-04-06 RX ORDER — CLONIDINE HYDROCHLORIDE 0.1 MG/1
0.1 TABLET ORAL EVERY 4 HOURS PRN
Status: DISCONTINUED | OUTPATIENT
Start: 2021-04-06 | End: 2021-04-07

## 2021-04-06 RX ORDER — LABETALOL HYDROCHLORIDE 5 MG/ML
10 INJECTION, SOLUTION INTRAVENOUS EVERY 4 HOURS PRN
Status: DISCONTINUED | OUTPATIENT
Start: 2021-04-06 | End: 2021-04-07

## 2021-04-06 RX ORDER — AMLODIPINE BESYLATE 5 MG/1
10 TABLET ORAL
Status: DISCONTINUED | OUTPATIENT
Start: 2021-04-07 | End: 2021-04-17

## 2021-04-06 RX ORDER — AMLODIPINE BESYLATE 5 MG/1
5 TABLET ORAL ONCE
Status: COMPLETED | OUTPATIENT
Start: 2021-04-06 | End: 2021-04-06

## 2021-04-06 RX ORDER — HYDRALAZINE HYDROCHLORIDE 20 MG/ML
10 INJECTION INTRAMUSCULAR; INTRAVENOUS
Status: DISCONTINUED | OUTPATIENT
Start: 2021-04-06 | End: 2021-04-07

## 2021-04-06 RX ADMIN — THYROID, PORCINE 60 MG: 30 TABLET ORAL at 10:32

## 2021-04-06 RX ADMIN — SODIUM CHLORIDE 15 MG/HR: 9 INJECTION, SOLUTION INTRAVENOUS at 01:28

## 2021-04-06 RX ADMIN — OXYCODONE 5 MG: 5 TABLET ORAL at 10:36

## 2021-04-06 RX ADMIN — METOPROLOL TARTRATE 25 MG: 25 TABLET, FILM COATED ORAL at 17:44

## 2021-04-06 RX ADMIN — ASPIRIN 81 MG: 81 TABLET, CHEWABLE ORAL at 17:44

## 2021-04-06 RX ADMIN — AMLODIPINE BESYLATE 5 MG: 5 TABLET ORAL at 10:32

## 2021-04-06 RX ADMIN — ENOXAPARIN SODIUM 40 MG: 40 INJECTION SUBCUTANEOUS at 05:36

## 2021-04-06 RX ADMIN — SODIUM CHLORIDE 12.5 MG/HR: 9 INJECTION, SOLUTION INTRAVENOUS at 00:46

## 2021-04-06 RX ADMIN — SODIUM CHLORIDE 10 MG/HR: 9 INJECTION, SOLUTION INTRAVENOUS at 04:00

## 2021-04-06 RX ADMIN — METOPROLOL TARTRATE 25 MG: 25 TABLET, FILM COATED ORAL at 05:37

## 2021-04-06 RX ADMIN — ACETAMINOPHEN 650 MG: 325 TABLET, FILM COATED ORAL at 12:16

## 2021-04-06 RX ADMIN — THYROID, PORCINE 60 MG: 30 TABLET ORAL at 21:47

## 2021-04-06 RX ADMIN — OXYCODONE 10 MG: 5 TABLET ORAL at 04:05

## 2021-04-06 RX ADMIN — CLONIDINE HYDROCHLORIDE 0.1 MG: 0.1 TABLET ORAL at 12:16

## 2021-04-06 RX ADMIN — SODIUM CHLORIDE 12.5 MG/HR: 9 INJECTION, SOLUTION INTRAVENOUS at 06:36

## 2021-04-06 RX ADMIN — SODIUM CHLORIDE 500 ML: 3 INJECTION, SOLUTION INTRAVENOUS at 16:38

## 2021-04-06 RX ADMIN — ACETAMINOPHEN 650 MG: 325 TABLET, FILM COATED ORAL at 17:44

## 2021-04-06 RX ADMIN — SODIUM CHLORIDE 7.5 MG/HR: 9 INJECTION, SOLUTION INTRAVENOUS at 23:29

## 2021-04-06 RX ADMIN — SODIUM CHLORIDE 500 ML: 3 INJECTION, SOLUTION INTRAVENOUS at 06:29

## 2021-04-06 RX ADMIN — ATORVASTATIN CALCIUM 80 MG: 80 TABLET, FILM COATED ORAL at 17:44

## 2021-04-06 RX ADMIN — SODIUM CHLORIDE 200 MEQ: 234 INJECTION, SOLUTION, CONCENTRATE INTRAVENOUS at 07:34

## 2021-04-06 RX ADMIN — SODIUM CHLORIDE 5 MG/HR: 9 INJECTION, SOLUTION INTRAVENOUS at 10:14

## 2021-04-06 RX ADMIN — CLONIDINE HYDROCHLORIDE 0.1 MG: 0.1 TABLET ORAL at 17:45

## 2021-04-06 RX ADMIN — AMLODIPINE BESYLATE 5 MG: 5 TABLET ORAL at 05:37

## 2021-04-06 RX ADMIN — HYDRALAZINE HYDROCHLORIDE 10 MG: 20 INJECTION INTRAMUSCULAR; INTRAVENOUS at 16:26

## 2021-04-06 RX ADMIN — ACETAMINOPHEN 650 MG: 325 TABLET, FILM COATED ORAL at 00:48

## 2021-04-06 RX ADMIN — ACETAMINOPHEN 650 MG: 325 TABLET, FILM COATED ORAL at 05:36

## 2021-04-06 RX ADMIN — ONDANSETRON 4 MG: 2 INJECTION INTRAMUSCULAR; INTRAVENOUS at 04:05

## 2021-04-06 ASSESSMENT — ENCOUNTER SYMPTOMS
DEPRESSION: 0
NECK PAIN: 0
PHOTOPHOBIA: 0
SPUTUM PRODUCTION: 0
ABDOMINAL PAIN: 0
ORTHOPNEA: 0
WEIGHT LOSS: 0
BACK PAIN: 0
PALPITATIONS: 0
MYALGIAS: 0
VOMITING: 0
HEMOPTYSIS: 0
HEADACHES: 1
CHILLS: 0
FEVER: 0
DOUBLE VISION: 0
NAUSEA: 0
COUGH: 0

## 2021-04-06 NOTE — CARE PLAN
Problem: Bowel/Gastric:  Goal: Normal bowel function is maintained or improved  Outcome: PROGRESSING AS EXPECTED     Problem: Skin Integrity  Goal: Risk for impaired skin integrity will decrease  Outcome: PROGRESSING AS EXPECTED

## 2021-04-06 NOTE — PROGRESS NOTES
[facility] Progress Note      Admit Date: 3/31/2021    Resident(s): Johana Nelson M.D.   Attending:  GENE MURILLO/ Dr. Bell    Patient ID:    Name:  Thong Arzola   YOB: 1965  Age:  56 y.o.  male   MRN:  [id number]    Hospital Course (carried forward and updated):  Thong Arzola is a 56 y.o. male with medical history of Hyperlipidemia, Paroxysmal atrial fibrillation and hypothyroidism is admitted on 03/31 for left side weakness and facial paralysis found to have Right MCA stroke , not a candidate for Tpa or mechanical thrombectomy who subsequently underwent Hemicraniotomy on 04/03. Currently being monitored in ICU for worsening neurological events.       Consultants:  Critical Care  Neurology  Neurosurgery     Interval Events:  Patient had no acute events overnight. He had large bowel movement last night after enema. This morning patient subjectively has no acute concerns except for mild headache. He is alert and oriented x3.     -BP has been stable below 160, will discontinue nicardipine drip and increase Amlodipine dose. Has Prn's available   -No c/o nausea, can restart tube feedings   -Restarted Aspirin and DVT prophylaxis with Lovenox as per neuro   -Sodium is at 147, he received hypertonic saline bolus this morning, will continue the infusion and titrate as needed      Vitals Range last 24h:  Temp:  [37.4 °C (99.3 °F)-37.9 °C (100.2 °F)] 37.5 °C (99.5 °F)  Pulse:  [] 87  Resp:  [9-23] 14  BP: (112-152)/(57-90) 133/78  SpO2:  [92 %-98 %] 93 %      Intake/Output Summary (Last 24 hours) at 4/6/2021 0940  Last data filed at 4/6/2021 0800  Gross per 24 hour   Intake 3215.18 ml   Output 2740 ml   Net 475.18 ml        Review of Systems   Constitutional: Negative for chills, fever and weight loss.   Eyes: Negative for double vision and photophobia.   Respiratory: Negative for cough, hemoptysis and sputum production.    Cardiovascular: Negative for chest pain, palpitations and  orthopnea.   Gastrointestinal: Negative for abdominal pain, nausea and vomiting.   Genitourinary: Negative for dysuria, frequency and urgency.   Musculoskeletal: Negative for back pain, myalgias and neck pain.   Skin: Negative for itching and rash.   Neurological: Positive for headaches.   Psychiatric/Behavioral: Negative for depression and suicidal ideas.       PHYSICAL EXAM:  Vitals:    04/06/21 0830 04/06/21 0845 04/06/21 0900 04/06/21 0915   BP: 133/74 128/73 138/90 133/78   Pulse: 81 78 91 87   Resp: (!) 11 12 15 14   Temp:       TempSrc:       SpO2: 93% 93% 94% 93%   Weight:       Height:        Body mass index is 25.18 kg/m².    O2 therapy: Pulse Oximetry: 93 %, O2 (LPM): 1, O2 Delivery Device: Silicone Nasal Cannula    Date 04/06/21 0700 - 04/07/21 0659   Shift 9948-2398 4339-4701 4871-0685 24 Hour Total   INTAKE   I.V. 292.9   292.9     Cardene Volume 197.9   197.9     Volume (mL) (3% sodium chloride (HYPERTONIC SALINE) 500mL infusion 500 mL) 95   95   Other 30   30     Flush / Irrigation Volume (CorTrak) 30   30   NG/GT 0   0     Intake (mL) (Enteral Tube 04/03/21 Cortrak - Gastric Right nare) 0   0   IV Piggyback 50   50     Volume (mL) (sodium chloride 200 mEq in empty bag 50 mL ivpb) 0   0     Volume (mL) (sodium chloride 200 mEq in empty bag 50 mL ivpb) 50   50   Shift Total 372.9   372.9   OUTPUT   Urine 300   300     Output (mL) (Urethral Catheter Temperature probe 16 Fr.) 300   300   Shift Total 300   300   NET 72.9   72.9        Physical Exam   Constitutional: He is oriented to person, place, and time.   HENT:   Head: Normocephalic and atraumatic.   Eyes: Pupils are equal, round, and reactive to light. Conjunctivae are normal.   Cardiovascular: Normal rate and normal heart sounds.   Pulmonary/Chest: Effort normal and breath sounds normal. He has no wheezes.   Abdominal: Soft. Bowel sounds are normal. He exhibits no distension. There is no abdominal tenderness.   Musculoskeletal:      Cervical back:  Normal range of motion and neck supple.   Neurological: He is alert and oriented to person, place, and time.   Sensations intact. Motor strength 4/5 on right and flaccid on left   Skin: Skin is warm.           Recent Labs     04/05/21  1413 04/05/21  2155 04/06/21  0500   SODIUM 146* 144 146*   POTASSIUM 4.0 3.7 3.8   CHLORIDE 111 111 112   CO2 26 27 26   BUN 19 22 25*   CREATININE 0.85 0.76 0.84   MAGNESIUM  --   --  2.1   PHOSPHORUS  --   --  3.3   CALCIUM 8.9 9.1 8.7     Recent Labs     04/04/21  0604 04/05/21  0135 04/05/21 1413 04/05/21 2155 04/06/21  0500   ALTSGPT  --   --   --   --  16   ASTSGOT  --   --   --   --  28   ALKPHOSPHAT  --   --   --   --  56   TBILIRUBIN  --   --   --   --  0.4   PREALBUMIN 22.6  --  17.6*  --   --    GLUCOSE 128*   < > 108* 111* 124*    < > = values in this interval not displayed.     Recent Labs     04/04/21  0604 04/05/21  0435 04/06/21  0500   RBC 4.28* 4.36* 4.14*   HEMOGLOBIN 12.5* 12.9* 12.1*   HEMATOCRIT 36.2* 36.5* 36.0*   PLATELETCT 213 213 215     Recent Labs     04/04/21  0604 04/05/21  0435 04/06/21  0500   WBC 10.5 13.0* 8.7   NEUTSPOLYS 81.00* 84.30* 82.60*   LYMPHOCYTES 8.10* 5.70* 7.80*   MONOCYTES 10.50 9.50 8.70   EOSINOPHILS 0.00 0.00 0.00   BASOPHILS 0.00 0.00 0.10   ASTSGOT  --   --  28   ALTSGPT  --   --  16   ALKPHOSPHAT  --   --  56   TBILIRUBIN  --   --  0.4       Meds:  • cloNIDine  0.1 mg     • hydrALAZINE  10 mg     • labetalol  10 mg     • [START ON 4/7/2021] amLODIPine  10 mg     • 3% sodium chloride  500 mL 50 mL/hr at 04/06/21 0706   • metoprolol tartrate  25 mg     • enoxaparin (LOVENOX) injection  40 mg     • aspirin  81 mg     • thyroid  60 mg     • Pharmacy Consult Request  1 Each     • MD ALERT...DO NOT ADMINISTER NSAIDS or ASPIRIN unless ORDERED By Neurosurgery  1 Each     • dexamethasone  4 mg     • bisacodyl  10 mg     • artificial tears  1 Application     • Pharmacy  1 Each     • atorvastatin  80 mg     • senna-docusate  1 tablet     •  acetaminophen  650 mg     • magnesium hydroxide  30 mL     • ondansetron  4 mg     • oxyCODONE immediate-release  5 mg      Or   • oxyCODONE immediate-release  10 mg      Or   • HYDROmorphone  0.5 mg     • polyethylene glycol/lytes  1 Packet     • promethazine  12.5-25 mg     • senna-docusate  1 tablet     • acetaminophen  650 mg     • ondansetron  4 mg     • promethazine  12.5-25 mg     • prochlorperazine  5-10 mg          Procedures:  Central line on 04/05  Hemicraniotomy on 04/03    Imaging:  [id number]   Final Result      No evidence of bowel obstruction.                  DX-CHEST-LIMITED (1 VIEW)   Final Result      1.  Right internal jugular catheter and enteric catheter appear appropriately located      2.  Minimal right basilar atelectasis      CT-HEAD W/O   Final Result         1.  Changes of evolving infarct within the right MCA distribution   2.  Hyperdensity in the sulci of the right MCA distribution infarct, largely appears related to thrombosed vessels, component of subarachnoid hemorrhage is suspected particularly in the right frontal lobe.   3.  Pneumocephalus, decreased since prior.      CT-HEAD W/O   Final Result         Postsurgical change from right craniectomy. Redemonstration of large right MCA infarct with edema and bulging of the brain parenchyma through the craniectomy defect      Less mass effect on the right lateral ventricle. Less right to left midline shift of 3 mm, previously 10 mm.         DX-ABDOMEN FOR TUBE PLACEMENT   Final Result      Enteric tube terminates over the stomach.      CT-HEAD W/O   Final Result         1.  Low-density changes of the right hemisphere compatible with MCA distribution infarct with edema.   2.  New effacement of the bilateral ventricles, right greater than left, with 10 mm right-to-left midline shift.   3.  New dilatation of the left temporal horn ventricle, appearance favors component of obstructive hydrocephalus due to mass effect from infarct and  edema.   4.  Hyperdensity in the right middle cerebral artery, likely thrombosis given associated findings.      These findings were discussed with the patient's clinician, Dr. Nunez, on 4/3/2021 7:11 AM.      MR-BRAIN-W/O   Final Result      Very large acute right MCA territory infarct as detailed above with small amount of petechial hemorrhage in the right insular region and right temporal lobe.      Punctate right thalamic lacunar infarct.      Right ICA and M1 MCA occlusion.      EC-ECHOCARDIOGRAM COMPLETE W/O CONT   Final Result      DX-CHEST-PORTABLE (1 VIEW)   Final Result         1.  Interstitial pulmonary parenchymal prominence, compatible with interstitial edema and/or infiltrates.      CT-CTA NECK WITH & W/O-POST PROCESSING   Final Result      Acute occlusion of the right internal carotid artery shortly after bifurcation. It is occluded up to the level of the carotid terminus.      CT-CTA HEAD WITH & W/O-POST PROCESS   Final Result      Acute right M1 occlusion.      Findings were discussed with RHONDA DIAZ on 3/31/2021 7:54 PM.      CT-CEREBRAL PERFUSION ANALYSIS   Final Result      1.  Cerebral blood flow less than 30% likely representing completed infarct = 134 mL.      2.  T Max more than 6 seconds likely representing combination of completed infarct and ischemia = 210 mL.      3.  Mismatched volume likely representing ischemic brain/penumbra = 76 mL.      4.  Please note that the cerebral perfusion was performed on the limited brain tissue around the basal ganglia region. Infarct/ischemia outside the CT perfusion sections can be missed in this study.      CT-HEAD W/O   Final Result   Addendum 1 of 1   In retrospect, there is subtle loss of gray-white matter differentiation    in the right MCA distribution, consistent with acute infarct.      Final      1.  No CT evidence of acute infarct, hemorrhage or mass.   2.  Mild paranasal sinus disease.          ASSESSEMENT and PLAN:    * Acute right MCA  stroke (HCC)- (present on admission)  Assessment & Plan  -Presented with left side weakness and facial paralysis. CT imaging showed Acute right [place] territory stroke  -Outside the window for alteplase, not a candidate for thrombectomy per IR  -Underwent hemicraniotomy on 04/03 for increased ICP   -Patient had worsening facial edema due to cerebral edema with evolving infarct on 04/04. Received hypertonic saline bolus on 04/05   -Echo from 04/01 showed normal EF and no shunt     Plan:  -q2h neuro checks  -Permissive hypertension , goal of BP<160  -Maintain normothermia, normoglycemia  -Enteral nutrition through tube feedings  -Elevate head of bed  -PT/OT/SLP  -PMNR  -SBP goal <140 - discontinued nicardipine drip on 04/06 and increased amlodipine dose. Has Prn's available   -On Hypertonic saline Goal sodium of 150-155  -Resatrted aspirin and DVT prophylaxis today as per neurology. No need for prophylactic epileptics. However he needs long term anticoagulation for his atrial fibrillation once he is more stable     Paroxysmal atrial fibrillation (HCC)- (present on admission)  Assessment & Plan  -Diagnosed about 9 months ago  -Was reportedly taking Coreg, however stopped taking it when he went back into sinus rhythm  -has not been on AC OP  -has been treating holistically OP. Taking vitamins, including Vitamin K  -ECHO unremarkable  -telemetry - currently NSR  -Chadsvasc2 score is 2 with stroke, needs anticoagulation long term once stable. Will do aspirin form today       History of COVID-19- (present on admission)  Assessment & Plan  -Positive PCR 3/18/2021  -Patient currently is asymptomatic, on room air, in no respiratory distress.  No indications to treat  -Repeat PCR 3/31 remains positive.  Per hospital infectious prevention, no longer requires isolation      Leucocytosis  Assessment & Plan  -resolved   -Most likely reactive     Urinary retention  Assessment & Plan  -secondary to acute stroke  -Reportedly does not  have at baseline  -dye catheter in place    Acquired hypothyroidism- (present on admission)  Assessment & Plan  -TSH low, normal T4  -restart home [facility] thyroid today, dose adjusted   -Needs outpatient follow up        DISPO: ICU    CODE STATUS: Full code    Quality Measures:  Feeding: tube feedings  Analgesia: tylenol  Sedation: none  Thromboprophylaxis: lovenox  Head of bed: >30 degrees  Ulcer prophylaxis: none  Glycemic control: none  Bowel care: bowel regimen prn  Indwelling lines: Iv line and central line  Deescalation of antibiotics: none       Johana Nelson M.D.

## 2021-04-06 NOTE — DISCHARGE PLANNING
Hospital Care Management Team Assessment    In the case of an emergency, pt's NOK is Anel Arzola (Spouse) [phone].     This RNCM completed the following assessment via chart review.     Pt lives in a two story house with spouse. Pt uses the [facility] on [facility]. Prior to current hospitalization, pt was independent with ADLS/IADLS. Pt has no DME at home. Pt is unemployed and has financial support from spouse. Pt only has a healthcare sharing plan through [employer], which is not medical insurance. Pt has no history of substance abuse or psychiatric illness. Pt has support from spouse. Pt has no AD on file. Pt's discharge plan is likely IRF. This RN CM sent email to [employer] requesting a Medi-tarik screening/ application. RN CM will follow up.       Care Transition Team Assessment    Information Source  Orientation : Oriented x 4  Information Given By: Other (Comments)(chart)  Who is responsible for making decisions for patient? : Patient    Readmission Evaluation  Is this a readmission?: No    Elopement Risk  Legal Hold: No  Ambulatory or Self Mobile in Wheelchair: No-Not an Elopement Risk  Elopement Risk: Not at Risk for Elopement    Interdisciplinary Discharge Planning  Lives with - Patient's Self Care Capacity: Spouse  Patient or legal guardian wants to designate a caregiver: No  Support Systems: Spouse / Significant Other  Housing / Facility: 2 Story House  Prior Services: None  Durable Medical Equipment: Not Applicable    Discharge Preparedness  What is your plan after discharge?: Other (comment)(IRF)  What are your discharge supports?: Spouse  Prior Functional Level: Ambulatory, Independent with Activities of Daily Living  Difficulity with ADLs: None  Difficulity with IADLs: None    Functional Assesment  Prior Functional Level: Ambulatory, Independent with Activities of Daily Living    Finances  Financial Barriers to Discharge: No(Pt unemployed, living off spouse's income)  Prescription Coverage:  No  Prescription Coverage Comments: no medical coverage    Advance Directive  Advance Directive?: None    Domestic Abuse  Have you ever been the victim of abuse or violence?: No  Physical Abuse or Sexual Abuse: No  Verbal Abuse or Emotional Abuse: No  Possible Abuse/Neglect Reported to:: Not Applicable    Psychological Assessment  History of Substance Abuse: None  History of Psychiatric Problems: No  Non-compliant with Treatment: No  Newly Diagnosed Illness: Yes    Discharge Risks or Barriers  Discharge risks or barriers?: Uninsured / underinsured, Complex medical needs(PFA requested to complete medi-tarik application)    Anticipated Discharge Information  Discharge Disposition: Still a Patient (30)

## 2021-04-06 NOTE — PROGRESS NOTES
Custom order was submitted. To check the status of the order or if you have any questions, please call [facility] at [phone].

## 2021-04-06 NOTE — PROGRESS NOTES
Neurology Progress Note  Neurohospitalist Service, [facility]    Referring Physician: Gia Forman M.D.      Interval History:  56M with malignant R MCA stroke on 3/31, s/p hemicraniectomy on 4/3.  CVC placed, 23% bolus given.  Na 146 this AM, additional 3% bolus given.  Overall exam stable to improving.    Review of systems: In addition to what is detailed in the HPI and/or updated in the interval history, all other systems reviewed and are negative.    Past Medical History, Past Surgical History and Social History reviewed and unchanged from prior    Medications:    Current Facility-Administered Medications:   •  3% sodium chloride (HYPERTONIC SALINE) 500mL infusion 500 mL, 500 mL, Intravenous, Continuous, Arthur Fernandez M.D., Last Rate: 50 mL/hr at 04/06/21 0630, Rate Change at 04/06/21 0630  •  metoprolol tartrate (LOPRESSOR) tablet 25 mg, 25 mg, Enteral Tube, BID, Cristhian Bell M.D., 25 mg at 04/06/21 0537  •  niCARdipine (CARDENE) 25 mg in  mL Infusion, 0-15 mg/hr, Intravenous, Continuous, Cristhian Bell M.D., Last Rate: 125 mL/hr at 04/06/21 0636, 12.5 mg/hr at 04/06/21 0636  •  enoxaparin (LOVENOX) inj 40 mg, 40 mg, Subcutaneous, DAILY, Johana Nelson M.D., 40 mg at 04/06/21 0536  •  aspirin (ASA) chewable tab 81 mg, 81 mg, Enteral Tube, DAILY, Cristhian Bell M.D., 81 mg at 04/05/21 1709  •  amLODIPine (NORVASC) tablet 5 mg, 5 mg, Enteral Tube, Q DAY, Radha Meredith, A.P.R.N., 5 mg at 04/06/21 0537  •  [Held by provider] sodium chloride (SALT) tablet 1 g, 1 g, Enteral Tube, TID WITH MEALS, Radha Meredith, A.P.R.N., 1 g at 04/04/21 1713  •  thyroid (ARMOUR THYROID) tablet 60 mg, 60 mg, Enteral Tube, BID, King Spann M.D., 60 mg at 04/05/21 2136  •  labetalol (NORMODYNE/TRANDATE) injection 10 mg, 10 mg, Intravenous, Q4HRS PRN, King Spann M.D., 10 mg at 04/04/21 2006  •  Pharmacy Consult Request ...Pain Management Review 1 Each, 1 Each, Other,  PHARMACY TO DOSE, LEIGHA MorrisA.-C.  •  MD ALERT...DO NOT ADMINISTER NSAIDS or ASPIRIN unless ORDERED By Neurosurgery 1 Each, 1 Each, Other, PRN, HANSA Morris.A.-C.  •  dexamethasone (DECADRON) injection 4 mg, 4 mg, Intravenous, Once PRN, Lilia Pizano P.A.-C.  •  diphenhydrAMINE (BENADRYL) injection 25 mg, 25 mg, Intravenous, Q6HRS PRN, Lilia Pizano P.A.-C.  •  bisacodyl (DULCOLAX) suppository 10 mg, 10 mg, Rectal, Q24HRS PRN, HANSA Morris.A.-C., 10 mg at 04/05/21 1523  •  artificial tears (EYE LUBRICANT) ophth ointment 1 Application, 1 Application, Both Eyes, PRN, Lilia Pizano P.A.-C.  •  hydrALAZINE (APRESOLINE) injection 10 mg, 10 mg, Intravenous, Q HOUR PRN, Lilia Pizano P.A.-C., 10 mg at 04/05/21 0502  •  Pharmacy Consult: Enteral tube insertion - review meds/change route/product selection, 1 Each, Other, PHARMACY TO DOSE, Radha Meredith, A.P.R.N.  •  atorvastatin (LIPITOR) tablet 80 mg, 80 mg, Enteral Tube, Q EVENING, Radha Meredith, A.P.R.N., 80 mg at 04/05/21 1708  •  senna-docusate (PERICOLACE or SENOKOT S) 8.6-50 MG per tablet 1 tablet, 1 tablet, Enteral Tube, Nightly, Radha Meredith, A.P.R.N., 1 tablet at 04/05/21 2136  •  acetaminophen (Tylenol) tablet 650 mg, 650 mg, Enteral Tube, Q6HRS PRN, Radha Meredith, A.P.R.N., 650 mg at 04/04/21 0551  •  cloNIDine (CATAPRES) tablet 0.1 mg, 0.1 mg, Enteral Tube, Q4HRS PRN, ERVIN Hamptno.P.R.N., 0.1 mg at 04/04/21 2111  •  magnesium hydroxide (MILK OF MAGNESIA) suspension 30 mL, 30 mL, Enteral Tube, QDAY PRN, ERVIN Hampton.P.R.N.  •  ondansetron (ZOFRAN ODT) dispertab 4 mg, 4 mg, Enteral Tube, Q4HRS PRN, ERVIN Hampton.P.R.N.  •  oxyCODONE immediate-release (ROXICODONE) tablet 5 mg, 5 mg, Enteral Tube, Q3HRS PRN, 5 mg at 04/05/21 0431 **OR** oxyCODONE immediate-release (ROXICODONE) tablet 10 mg, 10 mg, Enteral Tube, Q3HRS PRN, 10 mg at 04/06/21 0405 **OR** HYDROmorphone (Dilaudid) injection 0.5 mg, 0.5 mg,  Intravenous, Q3HRS PRN, Radha Meredith, A.P.R.N., 0.5 mg at 04/03/21 1554  •  polyethylene glycol/lytes (MIRALAX) PACKET 1 Packet, 1 Packet, Enteral Tube, BID PRN, Radha Meredith, A.P.R.N., 1 Packet at 04/04/21 1714  •  promethazine (PHENERGAN) tablet 12.5-25 mg, 12.5-25 mg, Enteral Tube, Q4HRS PRN, Radha Meredith, A.P.R.N.  •  senna-docusate (PERICOLACE or SENOKOT S) 8.6-50 MG per tablet 1 tablet, 1 tablet, Enteral Tube, Q24HRS PRN, Radha Meredith, A.P.R.N.  •  acetaminophen (Tylenol) tablet 650 mg, 650 mg, Enteral Tube, Q6HRS, Kerwin Nunez M.D., 650 mg at 04/06/21 0536  •  ondansetron (ZOFRAN) syringe/vial injection 4 mg, 4 mg, Intravenous, Q4HRS PRN, Gia Forman M.D., 4 mg at 04/06/21 0405  •  promethazine (PHENERGAN) suppository 12.5-25 mg, 12.5-25 mg, Rectal, Q4HRS PRN, Gia Forman M.D.  •  prochlorperazine (COMPAZINE) injection 5-10 mg, 5-10 mg, Intravenous, Q4HRS PRN, Gia Forman M.D., 10 mg at 04/05/21 0913    Physical Examination:     Vitals:    04/06/21 0415 04/06/21 0430 04/06/21 0445 04/06/21 0500   BP: 151/78 140/76 140/76 141/74   Pulse: 97 92 88 91   Resp: 19 16 20 (!) 11   Temp:       TempSrc:       SpO2: 94% 92% 94% 93%   Weight:       Height:         R flap- full but less tense than prior day    NEUROLOGICAL EXAM:     Mental status: Lethargic, opens eyes to voice, speaking and interactive but requires constant stimulus  Speech and language: Speech is mildly dysarthric. Able to follow commands  Cranial nerve exam:  R pupil reactive, but sluggish.  L visual field cut. Eyes midline. L face droop  Motor exam: RUE is antigravity, RLE is briefly antigravity- appears effort dependent, flaccid LUE/LLE  Sensory exam: Does not react to tactile on L hemibody  Coordination: no jacy ataxia on R side movements    Objective Data:    Labs:  Lab Results   Component Value Date/Time    PROTHROMBTM 14.0 03/31/2021 07:29 PM    INR 1.04 03/31/2021 07:29 PM      Lab Results   Component Value Date/Time    WBC 8.7  04/06/2021 05:00 AM    RBC 4.14 (L) 04/06/2021 05:00 AM    HEMOGLOBIN 12.1 (L) 04/06/2021 05:00 AM    HEMATOCRIT 36.0 (L) 04/06/2021 05:00 AM    MCV 87.0 04/06/2021 05:00 AM    MCH 29.2 04/06/2021 05:00 AM    MCHC 33.6 (L) 04/06/2021 05:00 AM    MPV 9.3 04/06/2021 05:00 AM    NEUTSPOLYS 82.60 (H) 04/06/2021 05:00 AM    LYMPHOCYTES 7.80 (L) 04/06/2021 05:00 AM    MONOCYTES 8.70 04/06/2021 05:00 AM    EOSINOPHILS 0.00 04/06/2021 05:00 AM    BASOPHILS 0.10 04/06/2021 05:00 AM      Lab Results   Component Value Date/Time    SODIUM 146 (H) 04/06/2021 05:00 AM    POTASSIUM 3.8 04/06/2021 05:00 AM    CHLORIDE 112 04/06/2021 05:00 AM    CO2 26 04/06/2021 05:00 AM    GLUCOSE 124 (H) 04/06/2021 05:00 AM    BUN 25 (H) 04/06/2021 05:00 AM    CREATININE 0.84 04/06/2021 05:00 AM    CREATININE 1.3 04/04/2008 03:05 AM      Lab Results   Component Value Date/Time    CHOLSTRLTOT 169 04/01/2021 12:36 PM     (H) 04/01/2021 12:36 PM    HDL 32 (A) 04/01/2021 12:36 PM    TRIGLYCERIDE 126 04/01/2021 12:36 PM       Lab Results   Component Value Date/Time    ALKPHOSPHAT 56 04/06/2021 05:00 AM    ASTSGOT 28 04/06/2021 05:00 AM    ALTSGPT 16 04/06/2021 05:00 AM    TBILIRUBIN 0.4 04/06/2021 05:00 AM        Imaging/Testing:    I interpreted and/or reviewed the patient's neuroimaging    [id number] VIEW   Final Result      No evidence of bowel obstruction.                  DX-CHEST-LIMITED (1 VIEW)   Final Result      1.  Right internal jugular catheter and enteric catheter appear appropriately located      2.  Minimal right basilar atelectasis      CT-HEAD W/O   Final Result         1.  Changes of evolving infarct within the right MCA distribution   2.  Hyperdensity in the sulci of the right MCA distribution infarct, largely appears related to thrombosed vessels, component of subarachnoid hemorrhage is suspected particularly in the right frontal lobe.   3.  Pneumocephalus, decreased since prior.      CT-HEAD W/O   Final Result          Postsurgical change from right craniectomy. Redemonstration of large right MCA infarct with edema and bulging of the brain parenchyma through the craniectomy defect      Less mass effect on the right lateral ventricle. Less right to left midline shift of 3 mm, previously 10 mm.         DX-ABDOMEN FOR TUBE PLACEMENT   Final Result      Enteric tube terminates over the stomach.      CT-HEAD W/O   Final Result         1.  Low-density changes of the right hemisphere compatible with MCA distribution infarct with edema.   2.  New effacement of the bilateral ventricles, right greater than left, with 10 mm right-to-left midline shift.   3.  New dilatation of the left temporal horn ventricle, appearance favors component of obstructive hydrocephalus due to mass effect from infarct and edema.   4.  Hyperdensity in the right middle cerebral artery, likely thrombosis given associated findings.      These findings were discussed with the patient's clinician, Dr. Nunez, on 4/3/2021 7:11 AM.      MR-BRAIN-W/O   Final Result      Very large acute right MCA territory infarct as detailed above with small amount of petechial hemorrhage in the right insular region and right temporal lobe.      Punctate right thalamic lacunar infarct.      Right ICA and M1 MCA occlusion.      EC-ECHOCARDIOGRAM COMPLETE W/O CONT   Final Result      DX-CHEST-PORTABLE (1 VIEW)   Final Result         1.  Interstitial pulmonary parenchymal prominence, compatible with interstitial edema and/or infiltrates.      CT-CTA NECK WITH & W/O-POST PROCESSING   Final Result      Acute occlusion of the right internal carotid artery shortly after bifurcation. It is occluded up to the level of the carotid terminus.      CT-CTA HEAD WITH & W/O-POST PROCESS   Final Result      Acute right M1 occlusion.      Findings were discussed with RHONDA DIAZ on 3/31/2021 7:54 PM.      CT-CEREBRAL PERFUSION ANALYSIS   Final Result      1.  Cerebral blood flow less than 30% likely  representing completed infarct = 134 mL.      2.  T Max more than 6 seconds likely representing combination of completed infarct and ischemia = 210 mL.      3.  Mismatched volume likely representing ischemic brain/penumbra = 76 mL.      4.  Please note that the cerebral perfusion was performed on the limited brain tissue around the basal ganglia region. Infarct/ischemia outside the CT perfusion sections can be missed in this study.      CT-HEAD W/O   Final Result   Addendum 1 of 1   In retrospect, there is subtle loss of gray-white matter differentiation    in the right MCA distribution, consistent with acute infarct.      Final      1.  No CT evidence of acute infarct, hemorrhage or mass.   2.  Mild paranasal sinus disease.          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  He is now s/p decompressive hemicraniectomy on 4/3.  Stroke etiology invariably cardioembolic. Increased lethargy on 4/4 PM, repeat CTH with worsening midline shift and further edema development.  Initiated on more aggressive hypertonic therapy with marginal improvement in mental status.  Currently on ASA bridge to anticoagulation given high infarct burden and increased risk for immediate hemorrhagic conversion.      Plan:   - q2h neurochecks/NIHSS   - Agree with Na goal 150-155- titrate 3% to goal- ok to use 3% or 23% bolus to achieve Na goal   - SBP goal acutely is 120-160, long-term goal is 110-130/60-80   - continue statin for LDL goal < 70   - continue  ASA 81mg daily tonight, will consider anticoagulation with NOAC in ~2 weeks, or outside acute edema development phase   - lovenox 40mg SQ for DVT ppx    - PT/OT/SLP as able    The evaluation of the patient, and recommended management, was discussed with the resident staff. I have performed a physical exam and reviewed and updated ROS and Plan today  (4/6/2021).     CRITICAL CARE    Upon my evaluation, this patient had a high probability of imminent or life-threatening deterioration due to cerebrovascular accident which required my direct attention, intervention, and personal management.  I personally provided 35 minutes of total critical care time outside of time spent on separately billable/documented procedures. Time includes: review of laboratory data, review of radiology studies, discussion with consultants, discussion with family/patient, monitoring for potential decompensation.  Interventions were performed as documented in the chart.      Arthur Jackson MD  Neurohospitalist, Acute Care Services

## 2021-04-07 ENCOUNTER — APPOINTMENT (OUTPATIENT)
Dept: RADIOLOGY | Facility: MEDICAL CENTER | Age: 56
DRG: 023 | End: 2021-04-07
Attending: PSYCHIATRY & NEUROLOGY
Payer: OTHER MISCELLANEOUS

## 2021-04-07 PROBLEM — R50.9 FEVER: Status: ACTIVE | Noted: 2021-04-07

## 2021-04-07 LAB
ANION GAP SERPL CALC-SCNC: 10 MMOL/L (ref 7–16)
ANION GAP SERPL CALC-SCNC: 5 MMOL/L (ref 7–16)
ANION GAP SERPL CALC-SCNC: 5 MMOL/L (ref 7–16)
ANION GAP SERPL CALC-SCNC: 6 MMOL/L (ref 7–16)
ANION GAP SERPL CALC-SCNC: 8 MMOL/L (ref 7–16)
BASOPHILS # BLD AUTO: 0.1 % (ref 0–1.8)
BASOPHILS # BLD: 0.01 K/UL (ref 0–0.12)
BUN SERPL-MCNC: 25 MG/DL (ref 8–22)
BUN SERPL-MCNC: 27 MG/DL (ref 8–22)
BUN SERPL-MCNC: 27 MG/DL (ref 8–22)
BUN SERPL-MCNC: 29 MG/DL (ref 8–22)
BUN SERPL-MCNC: 29 MG/DL (ref 8–22)
CALCIUM SERPL-MCNC: 8.4 MG/DL (ref 8.5–10.5)
CALCIUM SERPL-MCNC: 8.7 MG/DL (ref 8.5–10.5)
CALCIUM SERPL-MCNC: 8.8 MG/DL (ref 8.5–10.5)
CHLORIDE SERPL-SCNC: 114 MMOL/L (ref 96–112)
CHLORIDE SERPL-SCNC: 115 MMOL/L (ref 96–112)
CHLORIDE SERPL-SCNC: 116 MMOL/L (ref 96–112)
CHLORIDE SERPL-SCNC: 117 MMOL/L (ref 96–112)
CHLORIDE SERPL-SCNC: 120 MMOL/L (ref 96–112)
CO2 SERPL-SCNC: 26 MMOL/L (ref 20–33)
CO2 SERPL-SCNC: 27 MMOL/L (ref 20–33)
CO2 SERPL-SCNC: 28 MMOL/L (ref 20–33)
CO2 SERPL-SCNC: 29 MMOL/L (ref 20–33)
CO2 SERPL-SCNC: 30 MMOL/L (ref 20–33)
CREAT SERPL-MCNC: 0.78 MG/DL (ref 0.5–1.4)
CREAT SERPL-MCNC: 0.81 MG/DL (ref 0.5–1.4)
CREAT SERPL-MCNC: 0.83 MG/DL (ref 0.5–1.4)
CREAT SERPL-MCNC: 0.83 MG/DL (ref 0.5–1.4)
CREAT SERPL-MCNC: 0.9 MG/DL (ref 0.5–1.4)
EOSINOPHIL # BLD AUTO: 0.01 K/UL (ref 0–0.51)
EOSINOPHIL NFR BLD: 0.1 % (ref 0–6.9)
ERYTHROCYTE [DISTWIDTH] IN BLOOD BY AUTOMATED COUNT: 40.4 FL (ref 35.9–50)
GLUCOSE SERPL-MCNC: 118 MG/DL (ref 65–99)
GLUCOSE SERPL-MCNC: 120 MG/DL (ref 65–99)
GLUCOSE SERPL-MCNC: 125 MG/DL (ref 65–99)
GLUCOSE SERPL-MCNC: 135 MG/DL (ref 65–99)
GLUCOSE SERPL-MCNC: 138 MG/DL (ref 65–99)
HCT VFR BLD AUTO: 37.6 % (ref 42–52)
HGB BLD-MCNC: 12.8 G/DL (ref 14–18)
IMM GRANULOCYTES # BLD AUTO: 0.04 K/UL (ref 0–0.11)
IMM GRANULOCYTES NFR BLD AUTO: 0.4 % (ref 0–0.9)
LYMPHOCYTES # BLD AUTO: 0.73 K/UL (ref 1–4.8)
LYMPHOCYTES NFR BLD: 7.4 % (ref 22–41)
MAGNESIUM SERPL-MCNC: 2 MG/DL (ref 1.5–2.5)
MCH RBC QN AUTO: 29.6 PG (ref 27–33)
MCHC RBC AUTO-ENTMCNC: 34 G/DL (ref 33.7–35.3)
MCV RBC AUTO: 86.8 FL (ref 81.4–97.8)
MONOCYTES # BLD AUTO: 0.72 K/UL (ref 0–0.85)
MONOCYTES NFR BLD AUTO: 7.3 % (ref 0–13.4)
NEUTROPHILS # BLD AUTO: 8.39 K/UL (ref 1.82–7.42)
NEUTROPHILS NFR BLD: 84.7 % (ref 44–72)
NRBC # BLD AUTO: 0 K/UL
NRBC BLD-RTO: 0 /100 WBC
PHOSPHATE SERPL-MCNC: 2.2 MG/DL (ref 2.5–4.5)
PLATELET # BLD AUTO: 262 K/UL (ref 164–446)
PMV BLD AUTO: 8.9 FL (ref 9–12.9)
POTASSIUM SERPL-SCNC: 3.5 MMOL/L (ref 3.6–5.5)
POTASSIUM SERPL-SCNC: 3.5 MMOL/L (ref 3.6–5.5)
POTASSIUM SERPL-SCNC: 3.8 MMOL/L (ref 3.6–5.5)
POTASSIUM SERPL-SCNC: 4 MMOL/L (ref 3.6–5.5)
POTASSIUM SERPL-SCNC: 4.3 MMOL/L (ref 3.6–5.5)
PROCALCITONIN SERPL-MCNC: <0.05 NG/ML
RBC # BLD AUTO: 4.33 M/UL (ref 4.7–6.1)
SODIUM SERPL-SCNC: 147 MMOL/L (ref 135–145)
SODIUM SERPL-SCNC: 149 MMOL/L (ref 135–145)
SODIUM SERPL-SCNC: 151 MMOL/L (ref 135–145)
SODIUM SERPL-SCNC: 154 MMOL/L (ref 135–145)
SODIUM SERPL-SCNC: 155 MMOL/L (ref 135–145)
WBC # BLD AUTO: 9.9 K/UL (ref 4.8–10.8)

## 2021-04-07 PROCEDURE — 80048 BASIC METABOLIC PNL TOTAL CA: CPT | Mod: 91

## 2021-04-07 PROCEDURE — A9270 NON-COVERED ITEM OR SERVICE: HCPCS | Performed by: INTERNAL MEDICINE

## 2021-04-07 PROCEDURE — 700102 HCHG RX REV CODE 250 W/ 637 OVERRIDE(OP): Performed by: PSYCHIATRY & NEUROLOGY

## 2021-04-07 PROCEDURE — 84145 PROCALCITONIN (PCT): CPT

## 2021-04-07 PROCEDURE — A9270 NON-COVERED ITEM OR SERVICE: HCPCS | Performed by: PHYSICAL MEDICINE & REHABILITATION

## 2021-04-07 PROCEDURE — 700102 HCHG RX REV CODE 250 W/ 637 OVERRIDE(OP): Performed by: STUDENT IN AN ORGANIZED HEALTH CARE EDUCATION/TRAINING PROGRAM

## 2021-04-07 PROCEDURE — 83735 ASSAY OF MAGNESIUM: CPT

## 2021-04-07 PROCEDURE — 700105 HCHG RX REV CODE 258: Performed by: STUDENT IN AN ORGANIZED HEALTH CARE EDUCATION/TRAINING PROGRAM

## 2021-04-07 PROCEDURE — 93970 EXTREMITY STUDY: CPT

## 2021-04-07 PROCEDURE — 700102 HCHG RX REV CODE 250 W/ 637 OVERRIDE(OP): Performed by: INTERNAL MEDICINE

## 2021-04-07 PROCEDURE — 700105 HCHG RX REV CODE 258: Performed by: INTERNAL MEDICINE

## 2021-04-07 PROCEDURE — 700105 HCHG RX REV CODE 258: Performed by: PSYCHIATRY & NEUROLOGY

## 2021-04-07 PROCEDURE — 99291 CRITICAL CARE FIRST HOUR: CPT | Mod: GC | Performed by: PSYCHIATRY & NEUROLOGY

## 2021-04-07 PROCEDURE — 700111 HCHG RX REV CODE 636 W/ 250 OVERRIDE (IP): Performed by: PSYCHIATRY & NEUROLOGY

## 2021-04-07 PROCEDURE — 99233 SBSQ HOSP IP/OBS HIGH 50: CPT | Performed by: PHYSICAL MEDICINE & REHABILITATION

## 2021-04-07 PROCEDURE — 700102 HCHG RX REV CODE 250 W/ 637 OVERRIDE(OP): Performed by: HOSPITALIST

## 2021-04-07 PROCEDURE — 84100 ASSAY OF PHOSPHORUS: CPT

## 2021-04-07 PROCEDURE — 71045 X-RAY EXAM CHEST 1 VIEW: CPT

## 2021-04-07 PROCEDURE — 92526 ORAL FUNCTION THERAPY: CPT

## 2021-04-07 PROCEDURE — A9270 NON-COVERED ITEM OR SERVICE: HCPCS | Performed by: PSYCHIATRY & NEUROLOGY

## 2021-04-07 PROCEDURE — 700111 HCHG RX REV CODE 636 W/ 250 OVERRIDE (IP): Performed by: STUDENT IN AN ORGANIZED HEALTH CARE EDUCATION/TRAINING PROGRAM

## 2021-04-07 PROCEDURE — 700102 HCHG RX REV CODE 250 W/ 637 OVERRIDE(OP): Performed by: NURSE PRACTITIONER

## 2021-04-07 PROCEDURE — 85025 COMPLETE CBC W/AUTO DIFF WBC: CPT

## 2021-04-07 PROCEDURE — 97112 NEUROMUSCULAR REEDUCATION: CPT

## 2021-04-07 PROCEDURE — A9270 NON-COVERED ITEM OR SERVICE: HCPCS | Performed by: NURSE PRACTITIONER

## 2021-04-07 PROCEDURE — A9270 NON-COVERED ITEM OR SERVICE: HCPCS | Performed by: STUDENT IN AN ORGANIZED HEALTH CARE EDUCATION/TRAINING PROGRAM

## 2021-04-07 PROCEDURE — 94669 MECHANICAL CHEST WALL OSCILL: CPT

## 2021-04-07 PROCEDURE — 700101 HCHG RX REV CODE 250: Performed by: PSYCHIATRY & NEUROLOGY

## 2021-04-07 PROCEDURE — 770022 HCHG ROOM/CARE - ICU (200)

## 2021-04-07 PROCEDURE — A9270 NON-COVERED ITEM OR SERVICE: HCPCS | Performed by: HOSPITALIST

## 2021-04-07 PROCEDURE — 700102 HCHG RX REV CODE 250 W/ 637 OVERRIDE(OP): Performed by: PHYSICAL MEDICINE & REHABILITATION

## 2021-04-07 PROCEDURE — 700111 HCHG RX REV CODE 636 W/ 250 OVERRIDE (IP): Performed by: INTERNAL MEDICINE

## 2021-04-07 PROCEDURE — 97530 THERAPEUTIC ACTIVITIES: CPT

## 2021-04-07 RX ORDER — FUROSEMIDE 10 MG/ML
20 INJECTION INTRAMUSCULAR; INTRAVENOUS ONCE
Status: COMPLETED | OUTPATIENT
Start: 2021-04-07 | End: 2021-04-07

## 2021-04-07 RX ORDER — CALCIUM CARBONATE 500 MG/1
500 TABLET, CHEWABLE ORAL EVERY 8 HOURS PRN
Status: DISCONTINUED | OUTPATIENT
Start: 2021-04-07 | End: 2021-04-17

## 2021-04-07 RX ORDER — 3% SODIUM CHLORIDE 3 G/100ML
500 INJECTION, SOLUTION INTRAVENOUS CONTINUOUS
Status: DISCONTINUED | OUTPATIENT
Start: 2021-04-07 | End: 2021-04-09

## 2021-04-07 RX ORDER — LISINOPRIL 20 MG/1
20 TABLET ORAL
Status: DISCONTINUED | OUTPATIENT
Start: 2021-04-07 | End: 2021-04-07

## 2021-04-07 RX ORDER — HYDRALAZINE HYDROCHLORIDE 20 MG/ML
10 INJECTION INTRAMUSCULAR; INTRAVENOUS
Status: DISCONTINUED | OUTPATIENT
Start: 2021-04-07 | End: 2021-04-29 | Stop reason: HOSPADM

## 2021-04-07 RX ORDER — LISINOPRIL 20 MG/1
20 TABLET ORAL ONCE
Status: COMPLETED | OUTPATIENT
Start: 2021-04-07 | End: 2021-04-07

## 2021-04-07 RX ORDER — BACLOFEN 10 MG/1
10 TABLET ORAL 3 TIMES DAILY
Status: DISCONTINUED | OUTPATIENT
Start: 2021-04-07 | End: 2021-04-09

## 2021-04-07 RX ORDER — CLONIDINE HYDROCHLORIDE 0.1 MG/1
0.1 TABLET ORAL EVERY 4 HOURS PRN
Status: DISCONTINUED | OUTPATIENT
Start: 2021-04-07 | End: 2021-04-17

## 2021-04-07 RX ORDER — LABETALOL HYDROCHLORIDE 5 MG/ML
10-20 INJECTION, SOLUTION INTRAVENOUS EVERY 4 HOURS PRN
Status: DISCONTINUED | OUTPATIENT
Start: 2021-04-07 | End: 2021-04-29 | Stop reason: HOSPADM

## 2021-04-07 RX ORDER — LISINOPRIL 20 MG/1
40 TABLET ORAL
Status: DISCONTINUED | OUTPATIENT
Start: 2021-04-08 | End: 2021-04-17

## 2021-04-07 RX ADMIN — SODIUM CHLORIDE 500 ML: 3 INJECTION, SOLUTION INTRAVENOUS at 05:16

## 2021-04-07 RX ADMIN — THYROID, PORCINE 60 MG: 30 TABLET ORAL at 08:38

## 2021-04-07 RX ADMIN — THYROID, PORCINE 60 MG: 30 TABLET ORAL at 20:38

## 2021-04-07 RX ADMIN — SODIUM CHLORIDE 500 ML: 3 INJECTION, SOLUTION INTRAVENOUS at 15:54

## 2021-04-07 RX ADMIN — ATORVASTATIN CALCIUM 80 MG: 80 TABLET, FILM COATED ORAL at 17:25

## 2021-04-07 RX ADMIN — BACLOFEN 10 MG: 10 TABLET ORAL at 12:52

## 2021-04-07 RX ADMIN — SODIUM CHLORIDE 5 MG/HR: 9 INJECTION, SOLUTION INTRAVENOUS at 11:19

## 2021-04-07 RX ADMIN — ANTACID TABLETS 500 MG: 500 TABLET, CHEWABLE ORAL at 15:01

## 2021-04-07 RX ADMIN — LABETALOL HYDROCHLORIDE 20 MG: 5 INJECTION, SOLUTION INTRAVENOUS at 20:37

## 2021-04-07 RX ADMIN — DOCUSATE SODIUM 50 MG AND SENNOSIDES 8.6 MG 1 TABLET: 8.6; 5 TABLET, FILM COATED ORAL at 20:38

## 2021-04-07 RX ADMIN — ONDANSETRON 4 MG: 2 INJECTION INTRAMUSCULAR; INTRAVENOUS at 00:28

## 2021-04-07 RX ADMIN — METOPROLOL TARTRATE 25 MG: 25 TABLET, FILM COATED ORAL at 05:44

## 2021-04-07 RX ADMIN — LISINOPRIL 20 MG: 20 TABLET ORAL at 07:22

## 2021-04-07 RX ADMIN — SODIUM CHLORIDE 12.5 MG/HR: 9 INJECTION, SOLUTION INTRAVENOUS at 04:55

## 2021-04-07 RX ADMIN — LABETALOL HYDROCHLORIDE 20 MG: 5 INJECTION, SOLUTION INTRAVENOUS at 12:50

## 2021-04-07 RX ADMIN — METOPROLOL TARTRATE 25 MG: 25 TABLET, FILM COATED ORAL at 17:25

## 2021-04-07 RX ADMIN — SODIUM BICARBONATE 100 MEQ: 84 INJECTION, SOLUTION INTRAVENOUS at 08:32

## 2021-04-07 RX ADMIN — CLONIDINE HYDROCHLORIDE 0.1 MG: 0.1 TABLET ORAL at 07:22

## 2021-04-07 RX ADMIN — ACETAMINOPHEN 650 MG: 325 TABLET, FILM COATED ORAL at 17:24

## 2021-04-07 RX ADMIN — ENOXAPARIN SODIUM 40 MG: 40 INJECTION SUBCUTANEOUS at 05:44

## 2021-04-07 RX ADMIN — BACLOFEN 10 MG: 10 TABLET ORAL at 17:25

## 2021-04-07 RX ADMIN — HYDRALAZINE HYDROCHLORIDE 10 MG: 20 INJECTION INTRAMUSCULAR; INTRAVENOUS at 11:16

## 2021-04-07 RX ADMIN — HYDRALAZINE HYDROCHLORIDE 10 MG: 20 INJECTION INTRAMUSCULAR; INTRAVENOUS at 08:39

## 2021-04-07 RX ADMIN — CLONIDINE HYDROCHLORIDE 0.1 MG: 0.1 TABLET ORAL at 14:23

## 2021-04-07 RX ADMIN — FUROSEMIDE 20 MG: 10 INJECTION, SOLUTION INTRAMUSCULAR; INTRAVENOUS at 11:16

## 2021-04-07 RX ADMIN — ASPIRIN 81 MG: 81 TABLET, CHEWABLE ORAL at 17:25

## 2021-04-07 RX ADMIN — POTASSIUM PHOSPHATE, MONOBASIC AND POTASSIUM PHOSPHATE, DIBASIC 15 MMOL: 224; 236 INJECTION, SOLUTION, CONCENTRATE INTRAVENOUS at 11:20

## 2021-04-07 RX ADMIN — ACETAMINOPHEN 650 MG: 325 TABLET, FILM COATED ORAL at 05:44

## 2021-04-07 RX ADMIN — AMLODIPINE BESYLATE 10 MG: 10 TABLET ORAL at 05:44

## 2021-04-07 RX ADMIN — ACETAMINOPHEN 650 MG: 325 TABLET, FILM COATED ORAL at 00:28

## 2021-04-07 RX ADMIN — LISINOPRIL 20 MG: 20 TABLET ORAL at 15:53

## 2021-04-07 RX ADMIN — SODIUM CHLORIDE 10 MG/HR: 9 INJECTION, SOLUTION INTRAVENOUS at 02:14

## 2021-04-07 RX ADMIN — OXYCODONE 5 MG: 5 TABLET ORAL at 14:23

## 2021-04-07 RX ADMIN — ACETAMINOPHEN 650 MG: 325 TABLET, FILM COATED ORAL at 11:26

## 2021-04-07 RX ADMIN — SODIUM CHLORIDE 12.5 MG/HR: 9 INJECTION, SOLUTION INTRAVENOUS at 07:34

## 2021-04-07 ASSESSMENT — COGNITIVE AND FUNCTIONAL STATUS - GENERAL
TOILETING: TOTAL
HELP NEEDED FOR BATHING: TOTAL
DRESSING REGULAR LOWER BODY CLOTHING: TOTAL
DRESSING REGULAR UPPER BODY CLOTHING: A LOT
MOVING FROM LYING ON BACK TO SITTING ON SIDE OF FLAT BED: UNABLE
TURNING FROM BACK TO SIDE WHILE IN FLAT BAD: UNABLE
SUGGESTED CMS G CODE MODIFIER MOBILITY: CM
MOBILITY SCORE: 7
EATING MEALS: TOTAL
STANDING UP FROM CHAIR USING ARMS: A LOT
WALKING IN HOSPITAL ROOM: TOTAL
PERSONAL GROOMING: A LOT
CLIMB 3 TO 5 STEPS WITH RAILING: TOTAL
DAILY ACTIVITIY SCORE: 8
MOVING TO AND FROM BED TO CHAIR: UNABLE
SUGGESTED CMS G CODE MODIFIER DAILY ACTIVITY: CM

## 2021-04-07 ASSESSMENT — ENCOUNTER SYMPTOMS
DOUBLE VISION: 0
HEMOPTYSIS: 0
ABDOMINAL PAIN: 0
COUGH: 0
MYALGIAS: 0
BACK PAIN: 0
PALPITATIONS: 0
NAUSEA: 0
DEPRESSION: 0
PHOTOPHOBIA: 0
HEADACHES: 0
FEVER: 1
DIZZINESS: 0
VOMITING: 0
BLURRED VISION: 0
WEIGHT LOSS: 0
ORTHOPNEA: 0
SPUTUM PRODUCTION: 0
TINGLING: 0
NECK PAIN: 0
CHILLS: 1

## 2021-04-07 ASSESSMENT — GAIT ASSESSMENTS: GAIT LEVEL OF ASSIST: UNABLE TO PARTICIPATE

## 2021-04-07 NOTE — PROGRESS NOTES
"    Physical Medicine and Rehabilitation Consultation              Date of initial consultation: 4/2/2021  Consulting provider: Mc Calvillo MD  Reason for consultation: assess for acute inpatient rehab appropriateness  LOS: 7 Day(s)    Chief complaint: Stroke    HPI: The patient is a 56 y.o. right hand dominant male with a past medical history of hypertension, hyperlipidemia, atrial fibrillation not on anticoagulation, Covid positive on 3/18;  who presented on 3/31/2021  7:29 PM brought in by care flight with left hemiplegia, facial droop, right gaze deviation.  CT head showed complete occlusion of right MCA, but unfortunately he was not a candidate for TPA or thrombectomy.  Seen by neurology, found to have a NIH score of 18.  Additional work-up with CTA shows right ICA complete occlusion.  Etiology is thought to be cardioembolic due to A. fib off of AC.  He is currently being followed closely by neurosurgery as he is expected to have peak brain swelling in the next 1 to 2 days and may require craniotomy.  Follow-up MRI shows minimal shift with large MCA stroke    Most of my visit is with Jims wife but also with him. He endorses left neglect and weakness, but does not endorse paresthesias at this time. I had a long meeting with his wife about expectations with this type and severity of stroke.     4/7/2021  I have been following patient's chart in the periphery, since my last visit he has undergone hemicraniectomy on 4/3 for increased cerebral swelling, and postop.  Has been complicated by continuing cerebral edema resulting in midline shift.  Neurology continuing to follow. Son at bedside. Patient still have severe left neglect. He continues to have trace left hand movement.     Social Hx:  2 SH  2-3 MAKENZIE, 1 flight of stairs with rails inside  With: Spouse    Per [facility] \"Anel [wife] tells me she is currently working full time, has flexible job and will take time off to provide 24/7 support when Thong " "returnes home.  They live 2 story home, 2 -3 steps to enter.  Master bedroom on ground floor.  Bathroom has tub/shower combo, no safety grab bars. \"    THERAPY:  PT: Functional mobility   4/1: Mod assist sit to stand with 2 people    OT: ADLs  4/1: Mod assist grooming, max assist lower body dressing    SLP:   4/1: N.p.o. pending fees  4/2: minced and moist/mildly thick liquid   4/7: NPO    IMAGING:  MR brain 4/1/2021  Very large acute right MCA territory infarct as detailed above with small amount of petechial hemorrhage in the right insular region and right temporal lobe.  Punctate right thalamic lacunar infarct.  Right ICA and M1 MCA occlusion.    PROCEDURES:  None    PMH:  Past Medical History:   Diagnosis Date   • Afib (HCC)    • High cholesterol        PSH:  Past Surgical History:   Procedure Laterality Date   • CRANIOTOMY Right 4/3/2021    Procedure: CRANIOTOMY;  Surgeon: Arthur Payton M.D.;  Location: SURGERY [facility];  Service: Neurosurgery   • KNEE RECONSTRUCTION      left knee orthoscopic       FHX:  Non-pertinent to today's issues    Medications:  Current Facility-Administered Medications   Medication Dose   • lisinopril (PRINIVIL) tablet 20 mg  20 mg   • hydrALAZINE (APRESOLINE) injection 10 mg  10 mg   • labetalol (NORMODYNE/TRANDATE) injection 10-20 mg  10-20 mg   • MD Alert...ICU Electrolyte Replacement per Pharmacy     • potassium phosphate 15 mmol in  mL ivpb  15 mmol   • cloNIDine (CATAPRES) tablet 0.1 mg  0.1 mg   • 3% sodium chloride (HYPERTONIC SALINE) 500mL infusion 500 mL  500 mL   • amLODIPine (NORVASC) tablet 10 mg  10 mg   • niCARdipine (CARDENE) 25 mg in  mL Infusion  0-15 mg/hr   • metoprolol tartrate (LOPRESSOR) tablet 25 mg  25 mg   • enoxaparin (LOVENOX) inj 40 mg  40 mg   • aspirin (ASA) chewable tab 81 mg  81 mg   • thyroid (ARMOUR THYROID) tablet 60 mg  60 mg   • Pharmacy Consult Request ...Pain Management Review 1 Each  1 Each   • MD ALERT...DO NOT ADMINISTER " "NSAIDS or ASPIRIN unless ORDERED By Neurosurgery 1 Each  1 Each   • dexamethasone (DECADRON) injection 4 mg  4 mg   • bisacodyl (DULCOLAX) suppository 10 mg  10 mg   • artificial tears (EYE LUBRICANT) ophth ointment 1 Application  1 Application   • Pharmacy Consult: Enteral tube insertion - review meds/change route/product selection  1 Each   • atorvastatin (LIPITOR) tablet 80 mg  80 mg   • senna-docusate (PERICOLACE or SENOKOT S) 8.6-50 MG per tablet 1 tablet  1 tablet   • magnesium hydroxide (MILK OF MAGNESIA) suspension 30 mL  30 mL   • ondansetron (ZOFRAN ODT) dispertab 4 mg  4 mg   • oxyCODONE immediate-release (ROXICODONE) tablet 5 mg  5 mg    Or   • oxyCODONE immediate-release (ROXICODONE) tablet 10 mg  10 mg    Or   • HYDROmorphone (Dilaudid) injection 0.5 mg  0.5 mg   • polyethylene glycol/lytes (MIRALAX) PACKET 1 Packet  1 Packet   • promethazine (PHENERGAN) tablet 12.5-25 mg  12.5-25 mg   • senna-docusate (PERICOLACE or SENOKOT S) 8.6-50 MG per tablet 1 tablet  1 tablet   • acetaminophen (Tylenol) tablet 650 mg  650 mg   • ondansetron (ZOFRAN) syringe/vial injection 4 mg  4 mg   • promethazine (PHENERGAN) suppository 12.5-25 mg  12.5-25 mg   • prochlorperazine (COMPAZINE) injection 5-10 mg  5-10 mg       Allergies:  No Known Allergies    Physical Exam:  Vitals: /64   Pulse 89   Temp (!) 38.2 °C (100.8 °F) (Bladder)   Resp 13   Ht 1.778 m (5' 10\")   Wt 79.6 kg (175 lb 7.8 oz)   SpO2 96%   Gen: NAD  Head: NC/AT  Eyes/ Nose/ Mouth: PERRLA, moist mucous membranes  Cardio: RRR, good distal perfusion, warm extremities  Pulm: normal respiratory effort, no cyanosis   Abd: Soft NTND, negative borborygmi   Ext: No peripheral edema. No calf tenderness. No clubbing.    Mental status: answers questions appropriately follows commands  Speech: fluent, no aphasia or dysarthria    CRANIAL NERVES:  2,3: LEFT VF cut, PERRL  3,4,6: EOMI in right VF, no nystagmus or diplopia  5: sensation intact to light touch " bilaterally and symmetric  7: Left facial droop   8: hearing grossly intact  9,10: not seen  11: SCM/Trapezius strength 5/5right, 0/5 left  12: tongue protrudes left    Motor:      Upper Extremity  Myotome R L   Shoulder flexion C5 5 0/5   Elbow flexion C5 5 0/5   Wrist extension C6 5 0/5   Elbow extension C7 5 0/5   Finger flexion C8 5 1/5   Finger abduction T1 5 0/5     Lower Extremity Myotome R L   Hip flexion L2 5 1/5   Knee extension L3 5 0/5   Ankle dorsiflexion L4 5 0/5   Toe extension L5 5 0/5   Ankle plantarflexion S1 5 0/5     Sensory:   Altered sensation on left side       DTRs:  Right  Left    Brachioradialis  2+  2+   Patella tendon  2+ 2+     2-3 beats right ankle clonus, sustained clonus left ankle  Pos babinski left   Negative Castillo b/l     Tone: tight hamstrings bilaterally    Labs: Reviewed and significant for   Recent Labs     04/05/21  0435 04/06/21  0500 04/07/21  0600   RBC 4.36* 4.14* 4.33*   HEMOGLOBIN 12.9* 12.1* 12.8*   HEMATOCRIT 36.5* 36.0* 37.6*   PLATELETCT 213 215 262     Recent Labs     04/07/21  0045 04/07/21  0600 04/07/21  0907   SODIUM 151* 147* 149*   POTASSIUM 3.5* 3.5* 3.8   CHLORIDE 117* 115* 114*   CO2 26 27 30   GLUCOSE 138* 135* 125*   BUN 25* 29* 29*   CREATININE 0.83 0.90 0.78   CALCIUM 8.8 8.7 8.4*     Recent Results (from the past 24 hour(s))   Basic Metabolic Panel    Collection Time: 04/06/21 12:27 PM   Result Value Ref Range    Sodium 152 (H) 135 - 145 mmol/L    Potassium 3.8 3.6 - 5.5 mmol/L    Chloride 118 (H) 96 - 112 mmol/L    Co2 29 20 - 33 mmol/L    Glucose 124 (H) 65 - 99 mg/dL    Bun 25 (H) 8 - 22 mg/dL    Creatinine 0.82 0.50 - 1.40 mg/dL    Calcium 8.8 8.5 - 10.5 mg/dL    Anion Gap 5.0 (L) 7.0 - 16.0   ESTIMATED GFR    Collection Time: 04/06/21 12:27 PM   Result Value Ref Range    GFR If African American >60 >60 mL/min/1.73 m 2    GFR If Non African American >60 >60 mL/min/1.73 m 2   Basic Metabolic Panel    Collection Time: 04/06/21  6:00 PM   Result  Value Ref Range    Sodium 150 (H) 135 - 145 mmol/L    Potassium 3.7 3.6 - 5.5 mmol/L    Chloride 116 (H) 96 - 112 mmol/L    Co2 27 20 - 33 mmol/L    Glucose 113 (H) 65 - 99 mg/dL    Bun 25 (H) 8 - 22 mg/dL    Creatinine 0.95 0.50 - 1.40 mg/dL    Calcium 9.0 8.5 - 10.5 mg/dL    Anion Gap 7.0 7.0 - 16.0   ESTIMATED GFR    Collection Time: 04/06/21  6:00 PM   Result Value Ref Range    GFR If African American >60 >60 mL/min/1.73 m 2    GFR If Non African American >60 >60 mL/min/1.73 m 2   Basic Metabolic Panel    Collection Time: 04/07/21 12:45 AM   Result Value Ref Range    Sodium 151 (H) 135 - 145 mmol/L    Potassium 3.5 (L) 3.6 - 5.5 mmol/L    Chloride 117 (H) 96 - 112 mmol/L    Co2 26 20 - 33 mmol/L    Glucose 138 (H) 65 - 99 mg/dL    Bun 25 (H) 8 - 22 mg/dL    Creatinine 0.83 0.50 - 1.40 mg/dL    Calcium 8.8 8.5 - 10.5 mg/dL    Anion Gap 8.0 7.0 - 16.0   ESTIMATED GFR    Collection Time: 04/07/21 12:45 AM   Result Value Ref Range    GFR If African American >60 >60 mL/min/1.73 m 2    GFR If Non African American >60 >60 mL/min/1.73 m 2   CBC WITH DIFFERENTIAL    Collection Time: 04/07/21  6:00 AM   Result Value Ref Range    WBC 9.9 4.8 - 10.8 K/uL    RBC 4.33 (L) 4.70 - 6.10 M/uL    Hemoglobin 12.8 (L) 14.0 - 18.0 g/dL    Hematocrit 37.6 (L) 42.0 - 52.0 %    MCV 86.8 81.4 - 97.8 fL    MCH 29.6 27.0 - 33.0 pg    MCHC 34.0 33.7 - 35.3 g/dL    RDW 40.4 35.9 - 50.0 fL    Platelet Count 262 164 - 446 K/uL    MPV 8.9 (L) 9.0 - 12.9 fL    Neutrophils-Polys 84.70 (H) 44.00 - 72.00 %    Lymphocytes 7.40 (L) 22.00 - 41.00 %    Monocytes 7.30 0.00 - 13.40 %    Eosinophils 0.10 0.00 - 6.90 %    Basophils 0.10 0.00 - 1.80 %    Immature Granulocytes 0.40 0.00 - 0.90 %    Nucleated RBC 0.00 /100 WBC    Neutrophils (Absolute) 8.39 (H) 1.82 - 7.42 K/uL    Lymphs (Absolute) 0.73 (L) 1.00 - 4.80 K/uL    Monos (Absolute) 0.72 0.00 - 0.85 K/uL    Eos (Absolute) 0.01 0.00 - 0.51 K/uL    Baso (Absolute) 0.01 0.00 - 0.12 K/uL    Immature  Granulocytes (abs) 0.04 0.00 - 0.11 K/uL    NRBC (Absolute) 0.00 K/uL   MAGNESIUM    Collection Time: 04/07/21  6:00 AM   Result Value Ref Range    Magnesium 2.0 1.5 - 2.5 mg/dL   PHOSPHORUS    Collection Time: 04/07/21  6:00 AM   Result Value Ref Range    Phosphorus 2.2 (L) 2.5 - 4.5 mg/dL   Basic Metabolic Panel    Collection Time: 04/07/21  6:00 AM   Result Value Ref Range    Sodium 147 (H) 135 - 145 mmol/L    Potassium 3.5 (L) 3.6 - 5.5 mmol/L    Chloride 115 (H) 96 - 112 mmol/L    Co2 27 20 - 33 mmol/L    Glucose 135 (H) 65 - 99 mg/dL    Bun 29 (H) 8 - 22 mg/dL    Creatinine 0.90 0.50 - 1.40 mg/dL    Calcium 8.7 8.5 - 10.5 mg/dL    Anion Gap 5.0 (L) 7.0 - 16.0   ESTIMATED GFR    Collection Time: 04/07/21  6:00 AM   Result Value Ref Range    GFR If African American >60 >60 mL/min/1.73 m 2    GFR If Non African American >60 >60 mL/min/1.73 m 2   Basic Metabolic Panel    Collection Time: 04/07/21  9:07 AM   Result Value Ref Range    Sodium 149 (H) 135 - 145 mmol/L    Potassium 3.8 3.6 - 5.5 mmol/L    Chloride 114 (H) 96 - 112 mmol/L    Co2 30 20 - 33 mmol/L    Glucose 125 (H) 65 - 99 mg/dL    Bun 29 (H) 8 - 22 mg/dL    Creatinine 0.78 0.50 - 1.40 mg/dL    Calcium 8.4 (L) 8.5 - 10.5 mg/dL    Anion Gap 5.0 (L) 7.0 - 16.0   PROCALCITONIN    Collection Time: 04/07/21  9:07 AM   Result Value Ref Range    Procalcitonin <0.05 <0.25 ng/mL   ESTIMATED GFR    Collection Time: 04/07/21  9:07 AM   Result Value Ref Range    GFR If African American >60 >60 mL/min/1.73 m 2    GFR If Non African American >60 >60 mL/min/1.73 m 2         ASSESSMENT:  Patient is a 56 y.o. male admitted with large right MCA stroke and right ICA occlusion. He did not receive TPA or thrombectomy     [facility] Code / Diagnosis to Support: 0001.1 - Stroke: Left Body Involvement (Right Brain)    Rehabilitation: Impaired ADLs and mobility  Patient is a good candidate for inpatient rehab based on needs for PT, OT, and speech therapy.  Patient will also benefit  from family training.  Patient has a good discharge situation which will be home with wife.     Barriers to transfer include: Insurance authorization/ no payor, TCCs to verify disposition, medical clearance and bed availability     Additional Recommendations:  - s/p hemicraniectomy on 4/3. Now with continued post op cerebellar swelling and fevers. He continues to have  1/5 finger flexion on the left which is a positive prognostic indicator of a functional recovery. Also he had 1/5 leg movement at the hip on itinial consult but this was not observed on follow up. Possibly due to neglect. He does much better when forced to look at his left side which suggests that perhaps some of his functional deficits are due to neglect. He does have a long road to recovery and I prepared his wife Anel for possibly 2 years of 24/7 care, although we are all hopeful for a better outcome.     Large right MCA ischemic stroke   - continue PT/OT and SLP   - Wife educated on stroke comorbidites on initial consult. Son updated today.   - Restarting baclofen 10mg TID for LUE spasticity, please continue. He will need a dose increase in a few days  - Holding off on gabapentin at this time to avoid polypharmacy   - ASA/ statin  - Etiology throught to be cardioembolic in the setting of atrial fibrillation off of AC and in the setting of COVID.  - PMR to continue to follow for rehab appropriateness    Medical Complexity:  Large right MCA stroke      DVT PPX: SCDs      Thank you for allowing us to participate in the care of this patient.     Patient was seen for 36 minutes on unit/floor of which > 50% of time was spent on counseling and coordination of care regarding the above, including prognosis, risk reduction, benefits of treatment, and options for next stage of care.    Christofer Eisenberg, DO   Physical Medicine and Rehabilitation

## 2021-04-07 NOTE — PROGRESS NOTES
Neurology Progress Note  Neurohospitalist Service, [facility]    Referring Physician: Gia Forman M.D.      Interval History:  56M with malignant R MCA stroke on 3/31, s/p hemicraniectomy on 4/3.  Post-op with interval progression of edema development resulting in midline shift.  Febrile overnight, on cooling blanket. Still a bit lethargic.    Review of systems: In addition to what is detailed in the HPI and/or updated in the interval history, all other systems reviewed and are negative.    Past Medical History, Past Surgical History and Social History reviewed and unchanged from prior    Medications:    Current Facility-Administered Medications:   •  lisinopril (PRINIVIL) tablet 20 mg, 20 mg, Enteral Tube, Q DAY, Cristhian Bell M.D.  •  sodium bicarbonate 8.4 % injection 100 mEq, 100 mEq, Intravenous, Once, Cristhian Bell M.D.  •  hydrALAZINE (APRESOLINE) injection 10 mg, 10 mg, Intravenous, Q HOUR PRN, Cristhian Bell M.D.  •  labetalol (NORMODYNE/TRANDATE) injection 10-20 mg, 10-20 mg, Intravenous, Q4HRS PRN, Cristhian Bell M.D.  •  cloNIDine (CATAPRES) tablet 0.1 mg, 0.1 mg, Enteral Tube, Q4HRS PRN, Cristhian Bell M.D., 0.1 mg at 04/06/21 1745  •  amLODIPine (NORVASC) tablet 10 mg, 10 mg, Enteral Tube, Q DAY, Johana Nelson M.D., 10 mg at 04/07/21 0544  •  niCARdipine (CARDENE) 25 mg in  mL Infusion, 0-15 mg/hr, Intravenous, Continuous, Cristhian Bell M.D., Last Rate: 125 mL/hr at 04/07/21 0455, 12.5 mg/hr at 04/07/21 0455  •  3% sodium chloride (HYPERTONIC SALINE) 500mL infusion 500 mL, 500 mL, Intravenous, Continuous, Arthur Fernandez M.D., Last Rate: 30 mL/hr at 04/07/21 0516, 500 mL at 04/07/21 0516  •  metoprolol tartrate (LOPRESSOR) tablet 25 mg, 25 mg, Enteral Tube, BID, Cristhian Bell M.D., 25 mg at 04/07/21 0544  •  enoxaparin (LOVENOX) inj 40 mg, 40 mg, Subcutaneous, DAILY, Johana Nelson M.D., 40 mg at 04/07/21 0544  •  aspirin (ASA) chewable tab 81  mg, 81 mg, Enteral Tube, DAILY, Cristhian Bell M.D., 81 mg at 04/06/21 1744  •  thyroid (ARMOUR THYROID) tablet 60 mg, 60 mg, Enteral Tube, BID, King Spann M.D., 60 mg at 04/06/21 2147  •  Pharmacy Consult Request ...Pain Management Review 1 Each, 1 Each, Other, PHARMACY TO DOSE, LEIGHA MorrisA.-C.  •  MD ALERT...DO NOT ADMINISTER NSAIDS or ASPIRIN unless ORDERED By Neurosurgery 1 Each, 1 Each, Other, PRN, HANSA Morris.A.-C.  •  dexamethasone (DECADRON) injection 4 mg, 4 mg, Intravenous, Once PRN, HANSA Morris.A.-C.  •  bisacodyl (DULCOLAX) suppository 10 mg, 10 mg, Rectal, Q24HRS PRN, HANSA Morris.A.-C., 10 mg at 04/05/21 1523  •  artificial tears (EYE LUBRICANT) ophth ointment 1 Application, 1 Application, Both Eyes, PRN, Lilia Pizano P.A.-C.  •  Pharmacy Consult: Enteral tube insertion - review meds/change route/product selection, 1 Each, Other, PHARMACY TO DOSE, Radha Meredith, A.P.R.N.  •  atorvastatin (LIPITOR) tablet 80 mg, 80 mg, Enteral Tube, Q EVENING, Radha Meredith A.P.R.N., 80 mg at 04/06/21 1744  •  senna-docusate (PERICOLACE or SENOKOT S) 8.6-50 MG per tablet 1 tablet, 1 tablet, Enteral Tube, Nightly, Radha Meredith, A.P.R.N., Stopped at 04/06/21 2100  •  magnesium hydroxide (MILK OF MAGNESIA) suspension 30 mL, 30 mL, Enteral Tube, QDAY PRN, Radha Meredith, A.P.R.N.  •  ondansetron (ZOFRAN ODT) dispertab 4 mg, 4 mg, Enteral Tube, Q4HRS PRN, Radha Meredith, A.P.R.N.  •  oxyCODONE immediate-release (ROXICODONE) tablet 5 mg, 5 mg, Enteral Tube, Q3HRS PRN, 5 mg at 04/06/21 1036 **OR** oxyCODONE immediate-release (ROXICODONE) tablet 10 mg, 10 mg, Enteral Tube, Q3HRS PRN, 10 mg at 04/06/21 0405 **OR** HYDROmorphone (Dilaudid) injection 0.5 mg, 0.5 mg, Intravenous, Q3HRS PRN, RICHMOND HamptonP.R.N., 0.5 mg at 04/03/21 9484  •  polyethylene glycol/lytes (MIRALAX) PACKET 1 Packet, 1 Packet, Enteral Tube, BID PRN, RICHMOND HamptonP.R.N., 1 Packet at 04/04/21  1714  •  promethazine (PHENERGAN) tablet 12.5-25 mg, 12.5-25 mg, Enteral Tube, Q4HRS PRN, Radha Meredith, A.P.R.N.  •  senna-docusate (PERICOLACE or SENOKOT S) 8.6-50 MG per tablet 1 tablet, 1 tablet, Enteral Tube, Q24HRS PRN, Radha Meredith A.P.R.N.  •  acetaminophen (Tylenol) tablet 650 mg, 650 mg, Enteral Tube, Q6HRS, Cristhian Bell M.D., 650 mg at 04/07/21 0544  •  ondansetron (ZOFRAN) syringe/vial injection 4 mg, 4 mg, Intravenous, Q4HRS PRN, Gia Forman M.D., 4 mg at 04/07/21 0028  •  promethazine (PHENERGAN) suppository 12.5-25 mg, 12.5-25 mg, Rectal, Q4HRS PRN, Gia Forman M.D.  •  prochlorperazine (COMPAZINE) injection 5-10 mg, 5-10 mg, Intravenous, Q4HRS PRN, Gia Forman M.D., 10 mg at 04/05/21 0913    Physical Examination:     Vitals:    04/07/21 0245 04/07/21 0300 04/07/21 0315 04/07/21 0645   BP: 153/83 142/81 148/77 142/84   Pulse: 99 79 96 85   Resp: (!) 23 12 17 13   Temp:       TempSrc:       SpO2: 94% 94% 94% 95%   Weight:       Height:         R flap- full, not tense    NEUROLOGICAL EXAM:     Mental status: Lethargic, opens eyes to voice, speaking and interactive but requires constant stimulus  Speech and language: Speech is mildly dysarthric. Able to follow commands  Cranial nerve exam:  L visual field cut. Eyes midline. L face droop  Motor exam: RUE is antigravity, RLE is briefly antigravity- appears effort dependent, flexion contracture LUE, no movement in LLE  Sensory exam: Does not react to tactile on L hemibody  Coordination: no jacy ataxia on R side movements    Objective Data:    Labs:  Lab Results   Component Value Date/Time    PROTHROMBTM 14.0 03/31/2021 07:29 PM    INR 1.04 03/31/2021 07:29 PM      Lab Results   Component Value Date/Time    WBC 9.9 04/07/2021 06:00 AM    RBC 4.33 (L) 04/07/2021 06:00 AM    HEMOGLOBIN 12.8 (L) 04/07/2021 06:00 AM    HEMATOCRIT 37.6 (L) 04/07/2021 06:00 AM    MCV 86.8 04/07/2021 06:00 AM    MCH 29.6 04/07/2021 06:00 AM    MCHC 34.0 04/07/2021 06:00  AM    MPV 8.9 (L) 04/07/2021 06:00 AM    NEUTSPOLYS 84.70 (H) 04/07/2021 06:00 AM    LYMPHOCYTES 7.40 (L) 04/07/2021 06:00 AM    MONOCYTES 7.30 04/07/2021 06:00 AM    EOSINOPHILS 0.10 04/07/2021 06:00 AM    BASOPHILS 0.10 04/07/2021 06:00 AM      Lab Results   Component Value Date/Time    SODIUM 147 (H) 04/07/2021 06:00 AM    POTASSIUM 3.5 (L) 04/07/2021 06:00 AM    CHLORIDE 115 (H) 04/07/2021 06:00 AM    CO2 27 04/07/2021 06:00 AM    GLUCOSE 135 (H) 04/07/2021 06:00 AM    BUN 29 (H) 04/07/2021 06:00 AM    CREATININE 0.90 04/07/2021 06:00 AM    CREATININE 1.3 04/04/2008 03:05 AM      Lab Results   Component Value Date/Time    CHOLSTRLTOT 169 04/01/2021 12:36 PM     (H) 04/01/2021 12:36 PM    HDL 32 (A) 04/01/2021 12:36 PM    TRIGLYCERIDE 126 04/01/2021 12:36 PM       Lab Results   Component Value Date/Time    ALKPHOSPHAT 56 04/06/2021 05:00 AM    ASTSGOT 28 04/06/2021 05:00 AM    ALTSGPT 16 04/06/2021 05:00 AM    TBILIRUBIN 0.4 04/06/2021 05:00 AM        Imaging/Testing:    I interpreted and/or reviewed the patient's neuroimaging    [id number] VIEW   Final Result      No evidence of bowel obstruction.                  DX-CHEST-LIMITED (1 VIEW)   Final Result      1.  Right internal jugular catheter and enteric catheter appear appropriately located      2.  Minimal right basilar atelectasis      CT-HEAD W/O   Final Result         1.  Changes of evolving infarct within the right MCA distribution   2.  Hyperdensity in the sulci of the right MCA distribution infarct, largely appears related to thrombosed vessels, component of subarachnoid hemorrhage is suspected particularly in the right frontal lobe.   3.  Pneumocephalus, decreased since prior.      CT-HEAD W/O   Final Result         Postsurgical change from right craniectomy. Redemonstration of large right MCA infarct with edema and bulging of the brain parenchyma through the craniectomy defect      Less mass effect on the right lateral ventricle. Less right  to left midline shift of 3 mm, previously 10 mm.         DX-ABDOMEN FOR TUBE PLACEMENT   Final Result      Enteric tube terminates over the stomach.      CT-HEAD W/O   Final Result         1.  Low-density changes of the right hemisphere compatible with MCA distribution infarct with edema.   2.  New effacement of the bilateral ventricles, right greater than left, with 10 mm right-to-left midline shift.   3.  New dilatation of the left temporal horn ventricle, appearance favors component of obstructive hydrocephalus due to mass effect from infarct and edema.   4.  Hyperdensity in the right middle cerebral artery, likely thrombosis given associated findings.      These findings were discussed with the patient's clinician, Dr. Nunez, on 4/3/2021 7:11 AM.      MR-BRAIN-W/O   Final Result      Very large acute right MCA territory infarct as detailed above with small amount of petechial hemorrhage in the right insular region and right temporal lobe.      Punctate right thalamic lacunar infarct.      Right ICA and M1 MCA occlusion.      EC-ECHOCARDIOGRAM COMPLETE W/O CONT   Final Result      DX-CHEST-PORTABLE (1 VIEW)   Final Result         1.  Interstitial pulmonary parenchymal prominence, compatible with interstitial edema and/or infiltrates.      CT-CTA NECK WITH & W/O-POST PROCESSING   Final Result      Acute occlusion of the right internal carotid artery shortly after bifurcation. It is occluded up to the level of the carotid terminus.      CT-CTA HEAD WITH & W/O-POST PROCESS   Final Result      Acute right M1 occlusion.      Findings were discussed with RHONDA DIAZ on 3/31/2021 7:54 PM.      CT-CEREBRAL PERFUSION ANALYSIS   Final Result      1.  Cerebral blood flow less than 30% likely representing completed infarct = 134 mL.      2.  T Max more than 6 seconds likely representing combination of completed infarct and ischemia = 210 mL.      3.  Mismatched volume likely representing ischemic brain/penumbra = 76 mL.       4.  Please note that the cerebral perfusion was performed on the limited brain tissue around the basal ganglia region. Infarct/ischemia outside the CT perfusion sections can be missed in this study.      CT-HEAD W/O   Final Result   Addendum 1 of 1   In retrospect, there is subtle loss of gray-white matter differentiation    in the right MCA distribution, consistent with acute infarct.      Final      1.  No CT evidence of acute infarct, hemorrhage or mass.   2.  Mild paranasal sinus disease.          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  He is now s/p decompressive hemicraniectomy on 4/3.  Stroke etiology invariably cardioembolic. Currently on ASA bridge to anticoagulation given high infarct burden and increased risk for immediate hemorrhagic conversion.  Would continue aggressive hyperosmolar therapy for 1-2 additional days      Plan:   - q2h neurochecks/NIHSS   - Agree with Na goal 150-155- titrate 3% to goal, bolus PRN   - SBP goal acutely is 120-160, long-term goal is 110-130/60-80; titrating PO meds, cardene PRN   - continue statin for LDL goal < 70   - continue  ASA 81mg daily tonight, will consider anticoagulation with NOAC in ~2 weeks, or outside acute edema development phase   - lovenox 40mg SQ for DVT ppx    - PT/OT/SLP as able    The evaluation of the patient, and recommended management, was discussed with the resident staff. I have performed a physical exam and reviewed and updated ROS and Plan today (4/7/2021).     CRITICAL CARE    Upon my evaluation, this patient had a high probability of imminent or life-threatening deterioration due to cerebrovascular accident which required my direct attention, intervention, and personal management.  I personally provided 35 minutes of total critical care time outside of time spent on separately billable/documented  procedures. Time includes: review of laboratory data, review of radiology studies, discussion with consultants, discussion with family/patient, monitoring for potential decompensation.  Interventions were performed as documented in the chart.      Arthur Jackson MD  Neurohospitalist, Acute Care Services

## 2021-04-07 NOTE — DISCHARGE PLANNING
"[facility] Transitional Care Coordination    Follow up for Rehab.  Telephone call to spouse Anel to inquire if any update on possibility of adding Thong to her insurance coverage.  No answer.  Left voice message request for return call to [facility].      Email to [facility] requesting screen for [employer] potential.      Will follow for updates.     1255 - Received return call from Anel.  She tells me she may have missed the open enrollment window with her insurance, states she is waiting to hear back from employer's \"insurance person.\"  She also reports she has been told she may be able to apply for insurance for Thong that would begin May 1, but the policy would be [employer] [place] and she is uncertain they would have benefit with [facility].  Anel stated she wants inpatient rehab in [place], nearer their home and her work.  Advised would continue to monitor for updates.  If she receives response from her insurance provider she will reach out to DESTINEE Vaughan or [facility].        "

## 2021-04-07 NOTE — THERAPY
Occupational Therapy  Daily Treatment     Patient Name: Thong Arzola  Age:  56 y.o., Sex:  male  Medical Record #: [id number]  Today's Date: 4/7/2021     Precautions  Precautions: Fall Risk, Swallow Precautions ( See Comments)  Comments: no bone flat R; helmet on OOB    Assessment    Pt is now s/p R hemicraniectomy, no bone flap. LUE with hypertonicity, no AROM noted. Severe L neglect and impaired visual scanning to the left. Difficulty holding head up and maintaining sitting balance EOB. Limited by weakness, fatigue, impaired balance, impaired cognition, impaired vision, and hypertonicity.    Plan    Treatment plan modified to 4 times per week     DC Equipment Recommendations: Unable to determine at this time  Discharge Recommendations: Recommend post-acute placement for additional occupational therapy services prior to discharge home    Subjective    Pleasant and cooperative     Objective       04/07/21 1014   Cognition    Cognition / Consciousness X   Speech/ Communication Delayed Responses   Level of Consciousness Alert   Safety Awareness Impaired   Attention Impaired   Comments pleasant and cooperative, L sided neglect   Active ROM Upper Body   Active ROM Upper Body  X   Dominant Hand Right   Comments LUE no AROM noted, hypertonicity   Strength Upper Body   Upper Body Strength  X   Comments LUE hypertonic, no AROM noted   Upper Body Muscle Tone   Upper Body Muscle Tone  X   Lt Upper Extremity Muscle Tone Non Functional;Rigidity;Hypertonic   Balance   Sitting Balance (Static) Poor -   Sitting Balance (Dynamic) Trace +   Standing Balance (Static) Trace   Standing Balance (Dynamic) Trace   Weight Shift Sitting Poor   Weight Shift Standing Absent   Comments w/ HHA x2, difficulty holding head upright and in midline   Bed Mobility    Supine to Sit Total Assist   Sit to Supine Total Assist   Scooting Total Assist   Activities of Daily Living   Upper Body Dressing Maximal Assist  (don/doff helmet)   Lower Body  Dressing Total Assist  (socks)   Functional Mobility   Sit to Stand Maximal Assist   Bed, Chair, Wheelchair Transfer Unable to Participate   Mobility EOB>STS x2>BTB   Comments HHA x2   Visual Perception   Visual Perception  X   Neglect Severe Left   Visual Scanning Impaired   Comments unable to scan past midline to the left   Patient / Family Goals   Patient / Family Goal #1 To return to PLOF   Goal #1 Outcome Goal not met   Short Term Goals   Short Term Goal # 1 Pt will sit EOB unsupported and perform grooming w/ SPV   Goal Outcome # 1 Goal not met   Short Term Goal # 2 Pt will attend 3 objects on L side w/ only v/c's   Goal Outcome # 2 Goal not met   Short Term Goal # 3 Pt will increase L UE strength to 2/5 grossly   Goal Outcome # 3 Goal not met   Short Term Goal # 4 Pt will perform ADL txf w/ min A   Goal Outcome # 4 Goal not met

## 2021-04-07 NOTE — THERAPY
"Speech Language Pathology  Daily Treatment     Patient Name: Thong Arzola  Age:  56 y.o., Sex:  male  Medical Record #: [id number]  Today's Date: 4/7/2021     Precautions  Precautions: Fall Risk, Swallow Precautions ( See Comments), Other (See Comments)(no R bone flap)  Comments: L side neglect, NPO/TF    Assessment    Pt underwent hemicraniotomy on 4/3. Head CT 4/5: \"Changes of evolving infarct within the right MCA distribution 2.  Hyperdensity in the sulci of the right MCA distribution infarct, largely appears related to thrombosed vessels, component of subarachnoid hemorrhage is suspected particularly in the right frontal lobe. 3 .  Pneumocephalus, decreased since prior.\"    Pt seen this date for swallow reassessment. Pt currently NPO/TF after craniectomy 4/2. With thermo-tacilte, tactile and verbal cues pt awakened and was able to answer orientation questions. Oral mech repeated and revealed left facial droop, reduced ROM of labial structures (R>L). Pt did not follow directives for lingual protrusion or jaw opening. Of note, pt presenting with hypernasality. Oral care provided prior to PO trials. PO trials of ice chips x2 assessed. Hyolaryngeal elevation palpated as weak, 5-15 seconds of oral holding appreciated prior to swallow initiation. Pt required mod verbal cues to trigger swallow. Delayed cough appreciated with ice chips, which is concerning for penetration/aspiration.    Given lethargy, poor direction following and overt s/sx of aspiration, recommend continuation of NPO/TF with diligent oral care. SLP following.     Plan    Continue current treatment plan.    Discharge Recommendations: Recommend post-acute placement for additional speech therapy services prior to discharge home    Subjective    Pt was lethargic, followed minimal directions and required max cues to maintain alertness.      Objective       04/07/21 0836   Dysphagia    Dysphagia X   Positioning / Behavior Modification Self " "Monitoring;Multiple Swallows;Modulate Rate or Bite Size;Alternate Solids and Liquids   Other Treatments PO trials of ice chips x2   Diet / Liquid Recommendation NPO;Pre-Feeding Trials with SLP Only   Nutritional Liquid Intake Rating Scale Nothing by mouth   Nutritional Food Intake Rating Scale Nothing by mouth   Skilled Intervention Compensatory Strategies;Verbal Cueing;Tactile Cueing   Recommended Route of Medication Administration   Medication Administration  Via Gastric Tube   Patient / Family Goals   Patient / Family Goal #1 \"Yes, water\"   Goal #1 Outcome Goal not met   Short Term Goals   Short Term Goal # 1 Pt will participate in prefeeding trials with SLP 1:1 with no s/sx of aspiration and min cues.    Goal Outcome # 1 Progressing slower than expected   Short Term Goal # 1 B  Pt will consume a diet of MM5/MT2 with no s/sx of aspiration and min cues.    Goal Outcome  # 1 B Other (see comments)  (discontinued, change in status, NPO/TF)   Short Term Goal # 2 Pt will complete 10 reps each of lingual ROM and base of tongue exercises with \"good\" accuracy and min cues.    Goal Outcome # 2  Goal not met         "

## 2021-04-07 NOTE — THERAPY
Missed Therapy     Patient Name: Thong Arzola  Age:  56 y.o., Sex:  male  Medical Record #: [id number]  Today's Date: 4/6/2021 04/06/21 0810   Interdisciplinary Plan of Care Collaboration   Collaboration Comments hemlet not present, continue to hold therapy until this arrives

## 2021-04-07 NOTE — CARE PLAN
Problem: Communication  Goal: The ability to communicate needs accurately and effectively will improve  Outcome: PROGRESSING AS EXPECTED     Problem: Communication:  Goal: The ability to communicate needs accurately and effectively will improve  Outcome: PROGRESSING AS EXPECTED

## 2021-04-07 NOTE — CARE PLAN
Problem: Nutritional:  Goal: Nutrition support tolerated and meeting greater than 85% of estimated needs  Outcome: MET  TF @ goal.

## 2021-04-07 NOTE — ASSESSMENT & PLAN NOTE
-Patient had temp of 100.8 on 04/07 and 04/06  -No leucocytosis, less likely infection.  It could also be neurogenic fever post stroke as per neuro  -Will monitor

## 2021-04-07 NOTE — PROGRESS NOTES
[facility] Progress Note      Admit Date: 3/31/2021    Resident(s): Johana Nelson M.D.   Attending:  GENE MURILLO/ Dr. Bell    Patient ID:    Name:  Thong Arzola   YOB: 1965  Age:  56 y.o.  male   MRN:  [id number]    Hospital Course (carried forward and updated):  Thong Arzola is a 56 y.o. male with medical history of Hyperlipidemia, Paroxysmal atrial fibrillation and hypothyroidism is admitted on 03/31 for left side weakness and facial paralysis found to have Right MCA stroke , not a candidate for Tpa or mechanical thrombectomy who subsequently underwent Hemicraniotomy on 04/03. Currently being monitored in ICU for worsening neurological events.    Consultants:  Critical Care  Neurology  Neurosurgery    Interval Events:  Patient has been more lethargic since last night. He also had temp of 100.8. However he does responds to questions this morning but not able to eyes properly.     -Pro calcitonin ordered for fever work up but it could be neurogenic fever as per neuro   -Increased hypertonic saline to 50cc/hr to achieve sodium goal of 150-155  -Added Lisinopril for BP control and continuing nicardipine drip ( which was restarted last night given high Bp)    Vitals Range last 24h:  Temp:  [37.7 °C (99.9 °F)-38.2 °C (100.8 °F)] 38.2 °C (100.8 °F)  Pulse:  [] 99  Resp:  [10-29] 15  BP: (116-166)/(63-93) 127/67  SpO2:  [92 %-98 %] 94 %      Intake/Output Summary (Last 24 hours) at 4/7/2021 0949  Last data filed at 4/7/2021 0800  Gross per 24 hour   Intake 4937.84 ml   Output 3455 ml   Net 1482.84 ml        Review of Systems   Constitutional: Positive for chills and fever. Negative for malaise/fatigue and weight loss.   HENT: Negative for ear pain, hearing loss and tinnitus.    Eyes: Negative for blurred vision, double vision and photophobia.   Respiratory: Negative for cough, hemoptysis and sputum production.    Cardiovascular: Negative for chest pain, palpitations and orthopnea.    Gastrointestinal: Negative for abdominal pain, nausea and vomiting.   Genitourinary: Negative for dysuria, frequency and urgency.   Musculoskeletal: Negative for back pain, myalgias and neck pain.   Skin: Negative for rash.   Neurological: Negative for dizziness, tingling and headaches.   Psychiatric/Behavioral: Negative for depression and suicidal ideas.       PHYSICAL EXAM:  Vitals:    04/07/21 0845 04/07/21 0900 04/07/21 0915 04/07/21 0930   BP: 143/82 118/68 127/67    Pulse: (!) 101 88 84 99   Resp: 17 (!) 21 (!) 24 15   Temp:       TempSrc:       SpO2: 93% 94% 94% 94%   Weight:       Height:        Body mass index is 25.18 kg/m².    O2 therapy: Pulse Oximetry: 94 %, O2 (LPM): 1, O2 Delivery Device: Silicone Nasal Cannula    Date 04/07/21 0700 - 04/08/21 0659   Shift 1520-8284 6277-2194 9016-7630 24 Hour Total   INTAKE   I.V. 315.8   315.8     Cardene Volume 239.2   239.2     Volume (mL) (3% sodium chloride (HYPERTONIC SALINE) 500mL infusion 500 mL) 76.7   76.7   Other 75   75     Medications (PO/Enteral Liquids) 45   45     Flush / Irrigation Volume (CorTrak) 30   30   NG/   150     Intake (mL) (Enteral Tube 04/03/21 Cortrak - Gastric Right nare) 150   150   Shift Total 540.8   540.8   OUTPUT   Urine 365   365     Output (mL) (Urethral Catheter Temperature probe 16 Fr.) 365   365   Stool         Number of Times Stooled 1 x   1 x   Shift Total 365   365   .8   175.8        Physical Exam   HENT:   Head: Normocephalic and atraumatic.   Eyes: Pupils are equal, round, and reactive to light. EOM are normal.   Cardiovascular: Normal rate and normal heart sounds.   Irregular rhythm    Pulmonary/Chest: Effort normal and breath sounds normal.   Abdominal: Soft. Bowel sounds are normal. He exhibits no distension. There is no abdominal tenderness.   Musculoskeletal:      Cervical back: Normal range of motion and neck supple.   Neurological: He is alert.   Flaccid on left. 4/5 on right   Skin: Skin is warm.            Recent Labs     04/06/21  0500 04/06/21  1227 04/06/21  1800 04/07/21  0045 04/07/21  0600   SODIUM 146*   < > 150* 151* 147*   POTASSIUM 3.8   < > 3.7 3.5* 3.5*   CHLORIDE 112   < > 116* 117* 115*   CO2 26   < > 27 26 27   BUN 25*   < > 25* 25* 29*   CREATININE 0.84   < > 0.95 0.83 0.90   MAGNESIUM 2.1  --   --   --  2.0   PHOSPHORUS 3.3  --   --   --  2.2*   CALCIUM 8.7   < > 9.0 8.8 8.7    < > = values in this interval not displayed.     Recent Labs     04/05/21  1413 04/05/21  2155 04/06/21  0500 04/06/21  1227 04/06/21  1800 04/07/21  0045 04/07/21  0600   ALTSGPT  --   --  16  --   --   --   --    ASTSGOT  --   --  28  --   --   --   --    ALKPHOSPHAT  --   --  56  --   --   --   --    TBILIRUBIN  --   --  0.4  --   --   --   --    PREALBUMIN 17.6*  --   --   --   --   --   --    GLUCOSE 108*   < > 124*   < > 113* 138* 135*    < > = values in this interval not displayed.     Recent Labs     04/05/21 0435 04/06/21  0500 04/07/21  0600   RBC 4.36* 4.14* 4.33*   HEMOGLOBIN 12.9* 12.1* 12.8*   HEMATOCRIT 36.5* 36.0* 37.6*   PLATELETCT 213 215 262     Recent Labs     04/05/21  0435 04/06/21  0500 04/07/21  0600   WBC 13.0* 8.7 9.9   NEUTSPOLYS 84.30* 82.60* 84.70*   LYMPHOCYTES 5.70* 7.80* 7.40*   MONOCYTES 9.50 8.70 7.30   EOSINOPHILS 0.00 0.00 0.10   BASOPHILS 0.00 0.10 0.10   ASTSGOT  --  28  --    ALTSGPT  --  16  --    ALKPHOSPHAT  --  56  --    TBILIRUBIN  --  0.4  --        Meds:  • lisinopril  20 mg     • hydrALAZINE  10 mg     • labetalol  10-20 mg     • MD Alert...Adult ICU Electrolyte Replacement per Pharmacy       • potassium phosphate IVPB (CUSTOM)  15 mmol     • cloNIDine  0.1 mg     • 3% sodium chloride  500 mL     • amLODIPine  10 mg     • niCARdipine infusion  0-15 mg/hr 5 mg/hr (04/07/21 0855)   • metoprolol tartrate  25 mg     • enoxaparin (LOVENOX) injection  40 mg     • aspirin  81 mg     • thyroid  60 mg     • Pharmacy Consult Request  1 Each     • MD ALERT...DO NOT ADMINISTER  NSAIDS or ASPIRIN unless ORDERED By Neurosurgery  1 Each     • dexamethasone  4 mg     • bisacodyl  10 mg     • artificial tears  1 Application     • Pharmacy  1 Each     • atorvastatin  80 mg     • senna-docusate  1 tablet     • magnesium hydroxide  30 mL     • ondansetron  4 mg     • oxyCODONE immediate-release  5 mg      Or   • oxyCODONE immediate-release  10 mg      Or   • HYDROmorphone  0.5 mg     • polyethylene glycol/lytes  1 Packet     • promethazine  12.5-25 mg     • senna-docusate  1 tablet     • acetaminophen  650 mg     • ondansetron  4 mg     • promethazine  12.5-25 mg     • prochlorperazine  5-10 mg          Procedures:  Hemicraniotomy on 04/03    Imaging:  [id number] [id number]   Final Result      No evidence of bowel obstruction.                  DX-CHEST-LIMITED (1 VIEW)   Final Result      1.  Right internal jugular catheter and enteric catheter appear appropriately located      2.  Minimal right basilar atelectasis      CT-HEAD W/O   Final Result         1.  Changes of evolving infarct within the right MCA distribution   2.  Hyperdensity in the sulci of the right MCA distribution infarct, largely appears related to thrombosed vessels, component of subarachnoid hemorrhage is suspected particularly in the right frontal lobe.   3.  Pneumocephalus, decreased since prior.      CT-HEAD W/O   Final Result         Postsurgical change from right craniectomy. Redemonstration of large right MCA infarct with edema and bulging of the brain parenchyma through the craniectomy defect      Less mass effect on the right lateral ventricle. Less right to left midline shift of 3 mm, previously 10 mm.         DX-ABDOMEN FOR TUBE PLACEMENT   Final Result      Enteric tube terminates over the stomach.      CT-HEAD W/O   Final Result         1.  Low-density changes of the right hemisphere compatible with MCA distribution infarct with edema.   2.  New effacement of the bilateral ventricles, right greater than left, with 10 mm  right-to-left midline shift.   3.  New dilatation of the left temporal horn ventricle, appearance favors component of obstructive hydrocephalus due to mass effect from infarct and edema.   4.  Hyperdensity in the right middle cerebral artery, likely thrombosis given associated findings.      These findings were discussed with the patient's clinician, Dr. Nunez, on 4/3/2021 7:11 AM.      MR-BRAIN-W/O   Final Result      Very large acute right MCA territory infarct as detailed above with small amount of petechial hemorrhage in the right insular region and right temporal lobe.      Punctate right thalamic lacunar infarct.      Right ICA and M1 MCA occlusion.      EC-ECHOCARDIOGRAM COMPLETE W/O CONT   Final Result      DX-CHEST-PORTABLE (1 VIEW)   Final Result         1.  Interstitial pulmonary parenchymal prominence, compatible with interstitial edema and/or infiltrates.      CT-CTA NECK WITH & W/O-POST PROCESSING   Final Result      Acute occlusion of the right internal carotid artery shortly after bifurcation. It is occluded up to the level of the carotid terminus.      CT-CTA HEAD WITH & W/O-POST PROCESS   Final Result      Acute right M1 occlusion.      Findings were discussed with RHONDA DIAZ on 3/31/2021 7:54 PM.      CT-CEREBRAL PERFUSION ANALYSIS   Final Result      1.  Cerebral blood flow less than 30% likely representing completed infarct = 134 mL.      2.  T Max more than 6 seconds likely representing combination of completed infarct and ischemia = 210 mL.      3.  Mismatched volume likely representing ischemic brain/penumbra = 76 mL.      4.  Please note that the cerebral perfusion was performed on the limited brain tissue around the basal ganglia region. Infarct/ischemia outside the CT perfusion sections can be missed in this study.      CT-HEAD W/O   Final Result   Addendum 1 of 1   In retrospect, there is subtle loss of gray-white matter differentiation    in the right MCA distribution, consistent  with acute infarct.      Final      1.  No CT evidence of acute infarct, hemorrhage or mass.   2.  Mild paranasal sinus disease.          ASSESSEMENT and PLAN:    * Acute right MCA stroke (HCC)- (present on admission)  Assessment & Plan  -Presented with left side weakness and facial paralysis. CT imaging showed Acute right LAM[place] territory stroke  -Outside the window for alteplase, not a candidate for thrombectomy per IR  -Underwent hemicraniotomy on 04/03 for increased ICP   -Patient had worsening facial edema due to cerebral edema with evolving infarct on 04/04. Received hypertonic saline bolus on 04/05   -Echo from 04/01 showed normal EF and no shunt     Plan:  -q2h neuro checks  -Permissive hypertension , goal of BP<160  -Maintain normothermia, normoglycemia  -Enteral nutrition through tube feedings  -Elevate head of bed  -PT/OT/SLP  -PMNR  -SBP goal <140 - on nicardipine drip, continue amlodipine and lisinopril . Has Prn's available   -On Hypertonic saline Goal sodium of 150-155  -Resatrted aspirin and DVT prophylaxis today as per neurology. No need for prophylactic epileptics. However he needs long term anticoagulation for his atrial fibrillation once he is more stable     Fever  Assessment & Plan  Patient had temp of 100.8 last night   No leucocytosis, less likely infection. Ordered pro calcitonin for fever work up   It could also be neurogenic fever post stroke as per neuro  -Will monitor     Paroxysmal atrial fibrillation (HCC)- (present on admission)  Assessment & Plan  -Diagnosed about 9 months ago  -Was reportedly taking Coreg, however stopped taking it when he went back into sinus rhythm  -has not been on AC OP  -has been treating holistically OP. Taking vitamins, including Vitamin K  -ECHO unremarkable  -telemetry - currently NSR  -Chadsvasc2 score is 2 with stroke, needs anticoagulation long term once stable. Will do aspirin form today       History of COVID-19- (present on admission)  Assessment &  Plan  -Positive PCR 3/18/2021  -Patient currently is asymptomatic, on room air, in no respiratory distress.  No indications to treat  -Repeat PCR 3/31 remains positive.  Per hospital infectious prevention, no longer requires isolation      Leucocytosis  Assessment & Plan  -resolved   -Most likely reactive     Urinary retention  Assessment & Plan  -secondary to acute stroke  -Reportedly does not have at baseline  -dye catheter in place    Acquired hypothyroidism- (present on admission)  Assessment & Plan  -TSH low, normal T4  -restart home armour thyroid today, dose adjusted   -Needs outpatient follow up        DISPO: ICU    CODE STATUS: Full code     Quality Measures:  Feeding: tube feeding  Analgesia: tylenol  Sedation: none   Thromboprophylaxis: lovenox  Head of bed: >30 degrees  Ulcer prophylaxis: none  Glycemic control: none  Bowel care: bowel regimen prn  Indwelling lines: iv and central line  Deescalation of antibiotics: none       Johana Nelson M.D.

## 2021-04-08 LAB
ANION GAP SERPL CALC-SCNC: 4 MMOL/L (ref 7–16)
ANION GAP SERPL CALC-SCNC: 6 MMOL/L (ref 7–16)
ANION GAP SERPL CALC-SCNC: 7 MMOL/L (ref 7–16)
ANION GAP SERPL CALC-SCNC: 8 MMOL/L (ref 7–16)
BASOPHILS # BLD AUTO: 0.1 % (ref 0–1.8)
BASOPHILS # BLD: 0.01 K/UL (ref 0–0.12)
BUN SERPL-MCNC: 26 MG/DL (ref 8–22)
BUN SERPL-MCNC: 28 MG/DL (ref 8–22)
BUN SERPL-MCNC: 30 MG/DL (ref 8–22)
BUN SERPL-MCNC: 32 MG/DL (ref 8–22)
CALCIUM SERPL-MCNC: 8.6 MG/DL (ref 8.5–10.5)
CALCIUM SERPL-MCNC: 8.7 MG/DL (ref 8.5–10.5)
CHLORIDE SERPL-SCNC: 115 MMOL/L (ref 96–112)
CHLORIDE SERPL-SCNC: 116 MMOL/L (ref 96–112)
CHLORIDE SERPL-SCNC: 120 MMOL/L (ref 96–112)
CHLORIDE SERPL-SCNC: 120 MMOL/L (ref 96–112)
CO2 SERPL-SCNC: 25 MMOL/L (ref 20–33)
CO2 SERPL-SCNC: 27 MMOL/L (ref 20–33)
CO2 SERPL-SCNC: 28 MMOL/L (ref 20–33)
CO2 SERPL-SCNC: 28 MMOL/L (ref 20–33)
CREAT SERPL-MCNC: 0.73 MG/DL (ref 0.5–1.4)
CREAT SERPL-MCNC: 0.74 MG/DL (ref 0.5–1.4)
CREAT SERPL-MCNC: 0.81 MG/DL (ref 0.5–1.4)
CREAT SERPL-MCNC: 0.86 MG/DL (ref 0.5–1.4)
EOSINOPHIL # BLD AUTO: 0.17 K/UL (ref 0–0.51)
EOSINOPHIL NFR BLD: 2.3 % (ref 0–6.9)
ERYTHROCYTE [DISTWIDTH] IN BLOOD BY AUTOMATED COUNT: 40.7 FL (ref 35.9–50)
GLUCOSE SERPL-MCNC: 124 MG/DL (ref 65–99)
GLUCOSE SERPL-MCNC: 134 MG/DL (ref 65–99)
GLUCOSE SERPL-MCNC: 138 MG/DL (ref 65–99)
GLUCOSE SERPL-MCNC: 148 MG/DL (ref 65–99)
HCT VFR BLD AUTO: 34.7 % (ref 42–52)
HGB BLD-MCNC: 11.5 G/DL (ref 14–18)
IMM GRANULOCYTES # BLD AUTO: 0.03 K/UL (ref 0–0.11)
IMM GRANULOCYTES NFR BLD AUTO: 0.4 % (ref 0–0.9)
LYMPHOCYTES # BLD AUTO: 0.9 K/UL (ref 1–4.8)
LYMPHOCYTES NFR BLD: 12.4 % (ref 22–41)
MAGNESIUM SERPL-MCNC: 2 MG/DL (ref 1.5–2.5)
MCH RBC QN AUTO: 29.2 PG (ref 27–33)
MCHC RBC AUTO-ENTMCNC: 33.1 G/DL (ref 33.7–35.3)
MCV RBC AUTO: 88.1 FL (ref 81.4–97.8)
MONOCYTES # BLD AUTO: 0.66 K/UL (ref 0–0.85)
MONOCYTES NFR BLD AUTO: 9.1 % (ref 0–13.4)
NEUTROPHILS # BLD AUTO: 5.47 K/UL (ref 1.82–7.42)
NEUTROPHILS NFR BLD: 75.7 % (ref 44–72)
NRBC # BLD AUTO: 0 K/UL
NRBC BLD-RTO: 0 /100 WBC
PHOSPHATE SERPL-MCNC: 3.2 MG/DL (ref 2.5–4.5)
PLATELET # BLD AUTO: 255 K/UL (ref 164–446)
PMV BLD AUTO: 9.2 FL (ref 9–12.9)
POTASSIUM SERPL-SCNC: 3.7 MMOL/L (ref 3.6–5.5)
POTASSIUM SERPL-SCNC: 3.9 MMOL/L (ref 3.6–5.5)
RBC # BLD AUTO: 3.94 M/UL (ref 4.7–6.1)
SODIUM SERPL-SCNC: 148 MMOL/L (ref 135–145)
SODIUM SERPL-SCNC: 148 MMOL/L (ref 135–145)
SODIUM SERPL-SCNC: 153 MMOL/L (ref 135–145)
SODIUM SERPL-SCNC: 155 MMOL/L (ref 135–145)
TROPONIN T SERPL-MCNC: 11 NG/L (ref 6–19)
WBC # BLD AUTO: 7.2 K/UL (ref 4.8–10.8)

## 2021-04-08 PROCEDURE — A9270 NON-COVERED ITEM OR SERVICE: HCPCS | Performed by: PSYCHIATRY & NEUROLOGY

## 2021-04-08 PROCEDURE — 700102 HCHG RX REV CODE 250 W/ 637 OVERRIDE(OP): Performed by: STUDENT IN AN ORGANIZED HEALTH CARE EDUCATION/TRAINING PROGRAM

## 2021-04-08 PROCEDURE — 84484 ASSAY OF TROPONIN QUANT: CPT

## 2021-04-08 PROCEDURE — 700102 HCHG RX REV CODE 250 W/ 637 OVERRIDE(OP): Performed by: NURSE PRACTITIONER

## 2021-04-08 PROCEDURE — 700102 HCHG RX REV CODE 250 W/ 637 OVERRIDE(OP): Performed by: PSYCHIATRY & NEUROLOGY

## 2021-04-08 PROCEDURE — 92526 ORAL FUNCTION THERAPY: CPT

## 2021-04-08 PROCEDURE — 80048 BASIC METABOLIC PNL TOTAL CA: CPT | Mod: 91

## 2021-04-08 PROCEDURE — 700105 HCHG RX REV CODE 258: Performed by: INTERNAL MEDICINE

## 2021-04-08 PROCEDURE — 94669 MECHANICAL CHEST WALL OSCILL: CPT

## 2021-04-08 PROCEDURE — 93005 ELECTROCARDIOGRAM TRACING: CPT | Performed by: PSYCHIATRY & NEUROLOGY

## 2021-04-08 PROCEDURE — 99291 CRITICAL CARE FIRST HOUR: CPT | Mod: GC | Performed by: PSYCHIATRY & NEUROLOGY

## 2021-04-08 PROCEDURE — 700111 HCHG RX REV CODE 636 W/ 250 OVERRIDE (IP): Performed by: STUDENT IN AN ORGANIZED HEALTH CARE EDUCATION/TRAINING PROGRAM

## 2021-04-08 PROCEDURE — 85025 COMPLETE CBC W/AUTO DIFF WBC: CPT

## 2021-04-08 PROCEDURE — A9270 NON-COVERED ITEM OR SERVICE: HCPCS | Performed by: NURSE PRACTITIONER

## 2021-04-08 PROCEDURE — A9270 NON-COVERED ITEM OR SERVICE: HCPCS | Performed by: STUDENT IN AN ORGANIZED HEALTH CARE EDUCATION/TRAINING PROGRAM

## 2021-04-08 PROCEDURE — 700102 HCHG RX REV CODE 250 W/ 637 OVERRIDE(OP): Performed by: HOSPITALIST

## 2021-04-08 PROCEDURE — 700111 HCHG RX REV CODE 636 W/ 250 OVERRIDE (IP): Performed by: PSYCHIATRY & NEUROLOGY

## 2021-04-08 PROCEDURE — 770022 HCHG ROOM/CARE - ICU (200)

## 2021-04-08 PROCEDURE — 84100 ASSAY OF PHOSPHORUS: CPT

## 2021-04-08 PROCEDURE — 83735 ASSAY OF MAGNESIUM: CPT

## 2021-04-08 PROCEDURE — 700102 HCHG RX REV CODE 250 W/ 637 OVERRIDE(OP): Performed by: PHYSICAL MEDICINE & REHABILITATION

## 2021-04-08 PROCEDURE — A9270 NON-COVERED ITEM OR SERVICE: HCPCS | Performed by: PHYSICAL MEDICINE & REHABILITATION

## 2021-04-08 PROCEDURE — A9270 NON-COVERED ITEM OR SERVICE: HCPCS | Performed by: HOSPITALIST

## 2021-04-08 RX ORDER — SODIUM CHLORIDE 1 G/1
1 TABLET ORAL
Status: DISCONTINUED | OUTPATIENT
Start: 2021-04-08 | End: 2021-04-10

## 2021-04-08 RX ADMIN — ASPIRIN 81 MG: 81 TABLET, CHEWABLE ORAL at 17:34

## 2021-04-08 RX ADMIN — BACLOFEN 10 MG: 10 TABLET ORAL at 11:25

## 2021-04-08 RX ADMIN — ACETAMINOPHEN 650 MG: 325 TABLET, FILM COATED ORAL at 05:15

## 2021-04-08 RX ADMIN — THYROID, PORCINE 60 MG: 30 TABLET ORAL at 11:29

## 2021-04-08 RX ADMIN — BACLOFEN 10 MG: 10 TABLET ORAL at 05:15

## 2021-04-08 RX ADMIN — SODIUM CHLORIDE 500 ML: 3 INJECTION, SOLUTION INTRAVENOUS at 04:10

## 2021-04-08 RX ADMIN — SODIUM CHLORIDE 500 ML: 3 INJECTION, SOLUTION INTRAVENOUS at 17:37

## 2021-04-08 RX ADMIN — ENOXAPARIN SODIUM 40 MG: 40 INJECTION SUBCUTANEOUS at 05:15

## 2021-04-08 RX ADMIN — ATORVASTATIN CALCIUM 80 MG: 80 TABLET, FILM COATED ORAL at 17:33

## 2021-04-08 RX ADMIN — ACETAMINOPHEN 650 MG: 325 TABLET, FILM COATED ORAL at 00:09

## 2021-04-08 RX ADMIN — METOPROLOL TARTRATE 25 MG: 25 TABLET, FILM COATED ORAL at 05:15

## 2021-04-08 RX ADMIN — METOPROLOL TARTRATE 25 MG: 25 TABLET, FILM COATED ORAL at 17:34

## 2021-04-08 RX ADMIN — LISINOPRIL 40 MG: 20 TABLET ORAL at 05:15

## 2021-04-08 RX ADMIN — AMLODIPINE BESYLATE 10 MG: 10 TABLET ORAL at 05:15

## 2021-04-08 RX ADMIN — CLONIDINE HYDROCHLORIDE 0.1 MG: 0.1 TABLET ORAL at 17:34

## 2021-04-08 RX ADMIN — HYDRALAZINE HYDROCHLORIDE 10 MG: 20 INJECTION INTRAMUSCULAR; INTRAVENOUS at 14:24

## 2021-04-08 RX ADMIN — DOCUSATE SODIUM 50 MG AND SENNOSIDES 8.6 MG 1 TABLET: 8.6; 5 TABLET, FILM COATED ORAL at 21:36

## 2021-04-08 RX ADMIN — POTASSIUM BICARBONATE 25 MEQ: 978 TABLET, EFFERVESCENT ORAL at 11:29

## 2021-04-08 RX ADMIN — HYDRALAZINE HYDROCHLORIDE 10 MG: 20 INJECTION INTRAMUSCULAR; INTRAVENOUS at 11:24

## 2021-04-08 RX ADMIN — THYROID, PORCINE 60 MG: 30 TABLET ORAL at 21:36

## 2021-04-08 RX ADMIN — ACETAMINOPHEN 650 MG: 325 TABLET, FILM COATED ORAL at 11:26

## 2021-04-08 RX ADMIN — ACETAMINOPHEN 650 MG: 325 TABLET, FILM COATED ORAL at 17:33

## 2021-04-08 RX ADMIN — BACLOFEN 10 MG: 10 TABLET ORAL at 17:34

## 2021-04-08 RX ADMIN — Medication 1 G: at 17:37

## 2021-04-08 ASSESSMENT — ENCOUNTER SYMPTOMS
MYALGIAS: 0
BLURRED VISION: 0
NAUSEA: 0
ORTHOPNEA: 0
TINGLING: 0
VOMITING: 0
HEMOPTYSIS: 0
NECK PAIN: 0
CHILLS: 0
WEIGHT LOSS: 0
COUGH: 0
DIZZINESS: 0
SPUTUM PRODUCTION: 0
FEVER: 0
DOUBLE VISION: 0
PHOTOPHOBIA: 0
ABDOMINAL PAIN: 0
HEADACHES: 0
PALPITATIONS: 0
BACK PAIN: 0

## 2021-04-08 NOTE — PROGRESS NOTES
Neurology Progress Note  Neurohospitalist Service, [facility]    Referring Physician: Gia Forman M.D.      Interval History:  56M with malignant R MCA stroke on 3/31, s/p hemicraniectomy on 4/3.  Post-op with interval progression of edema development resulting in midline shift.  Na at goal 155 throughout day and this morning.  Exam relatively stable    Review of systems: In addition to what is detailed in the HPI and/or updated in the interval history, all other systems reviewed and are negative.    Past Medical History, Past Surgical History and Social History reviewed and unchanged from prior    Medications:    Current Facility-Administered Medications:   •  hydrALAZINE (APRESOLINE) injection 10 mg, 10 mg, Intravenous, Q HOUR PRN, Cristhian Bell M.D., 10 mg at 04/07/21 1116  •  labetalol (NORMODYNE/TRANDATE) injection 10-20 mg, 10-20 mg, Intravenous, Q4HRS PRN, Cristhian Bell M.D., 20 mg at 04/07/21 2037  •  MD Alert...ICU Electrolyte Replacement per Pharmacy, , Other, PHARMACY TO DOSE, Johana Nelson M.D.  •  cloNIDine (CATAPRES) tablet 0.1 mg, 0.1 mg, Enteral Tube, Q4HRS PRN, Cristhian Bell M.D., 0.1 mg at 04/07/21 1423  •  3% sodium chloride (HYPERTONIC SALINE) 500mL infusion 500 mL, 500 mL, Intravenous, Continuous, Dl Hidalgo M.D., Last Rate: 30 mL/hr at 04/08/21 0410, 500 mL at 04/08/21 0410  •  baclofen (LIORESAL) tablet 10 mg, 10 mg, Enteral Tube, TID, Christofer Eisenberg, D.O., 10 mg at 04/08/21 0515  •  calcium carbonate (TUMS) chewable tab 500 mg, 500 mg, Enteral Tube, Q8HRS PRN, Cristhian Bell M.D., 500 mg at 04/07/21 1501  •  lisinopril (PRINIVIL) tablet 40 mg, 40 mg, Enteral Tube, Q DAY, Cristhian Bell M.D., 40 mg at 04/08/21 0515  •  amLODIPine (NORVASC) tablet 10 mg, 10 mg, Enteral Tube, Q DAY, Johana Nelson M.D., 10 mg at 04/08/21 0515  •  metoprolol tartrate (LOPRESSOR) tablet 25 mg, 25 mg, Enteral Tube, BID, Cristhian Bell M.D., 25 mg at 04/08/21 0515  •   enoxaparin (LOVENOX) inj 40 mg, 40 mg, Subcutaneous, DAILY, Johana Nelson M.D., 40 mg at 04/08/21 0515  •  aspirin (ASA) chewable tab 81 mg, 81 mg, Enteral Tube, DAILY, Cristhian Bell M.D., 81 mg at 04/07/21 1725  •  thyroid (ARMOUR THYROID) tablet 60 mg, 60 mg, Enteral Tube, BID, King Spann M.D., 60 mg at 04/07/21 2038  •  Pharmacy Consult Request ...Pain Management Review 1 Each, 1 Each, Other, PHARMACY TO DOSE, Lilia Pizano, P.A.-C.  •  MD ALERT...DO NOT ADMINISTER NSAIDS or ASPIRIN unless ORDERED By Neurosurgery 1 Each, 1 Each, Other, PRN, Lilia Pizano, P.A.-C.  •  dexamethasone (DECADRON) injection 4 mg, 4 mg, Intravenous, Once PRN, Lilia Pizano, P.A.-C.  •  bisacodyl (DULCOLAX) suppository 10 mg, 10 mg, Rectal, Q24HRS PRN, Lilia Pizano, P.A.-C., 10 mg at 04/05/21 1523  •  artificial tears (EYE LUBRICANT) ophth ointment 1 Application, 1 Application, Both Eyes, PRN, Lilia Pizano, P.A.-C.  •  Pharmacy Consult: Enteral tube insertion - review meds/change route/product selection, 1 Each, Other, PHARMACY TO DOSE, Radha Meredith, A.P.R.N.  •  atorvastatin (LIPITOR) tablet 80 mg, 80 mg, Enteral Tube, Q EVENING, Radha Meredith, A.P.R.N., 80 mg at 04/07/21 1725  •  senna-docusate (PERICOLACE or SENOKOT S) 8.6-50 MG per tablet 1 tablet, 1 tablet, Enteral Tube, Nightly, Radha Meredith, A.P.R.N., 1 tablet at 04/07/21 2038  •  magnesium hydroxide (MILK OF MAGNESIA) suspension 30 mL, 30 mL, Enteral Tube, QDAY PRN, RICHMOND HamptonP.R.N.  •  ondansetron (ZOFRAN ODT) dispertab 4 mg, 4 mg, Enteral Tube, Q4HRS PRN, RICHMOND HamptonP.R.N.  •  oxyCODONE immediate-release (ROXICODONE) tablet 5 mg, 5 mg, Enteral Tube, Q3HRS PRN, 5 mg at 04/07/21 1423 **OR** oxyCODONE immediate-release (ROXICODONE) tablet 10 mg, 10 mg, Enteral Tube, Q3HRS PRN, 10 mg at 04/06/21 0405 **OR** HYDROmorphone (Dilaudid) injection 0.5 mg, 0.5 mg, Intravenous, Q3HRS PRN, RICHMOND HamptonP.RYohanaN., 0.5 mg at  04/03/21 1554  •  polyethylene glycol/lytes (MIRALAX) PACKET 1 Packet, 1 Packet, Enteral Tube, BID PRN, Radha Meredith, A.P.R.N., 1 Packet at 04/04/21 1714  •  promethazine (PHENERGAN) tablet 12.5-25 mg, 12.5-25 mg, Enteral Tube, Q4HRS PRN, Radha Meredith, A.P.R.N.  •  senna-docusate (PERICOLACE or SENOKOT S) 8.6-50 MG per tablet 1 tablet, 1 tablet, Enteral Tube, Q24HRS PRN, Radha Meredith, A.P.R.N.  •  acetaminophen (Tylenol) tablet 650 mg, 650 mg, Enteral Tube, Q6HRS, Cristhian Bell M.D., 650 mg at 04/08/21 0515  •  ondansetron (ZOFRAN) syringe/vial injection 4 mg, 4 mg, Intravenous, Q4HRS PRN, Gia Forman M.D., 4 mg at 04/07/21 0028  •  promethazine (PHENERGAN) suppository 12.5-25 mg, 12.5-25 mg, Rectal, Q4HRS PRN, Gia Forman M.D.  •  prochlorperazine (COMPAZINE) injection 5-10 mg, 5-10 mg, Intravenous, Q4HRS PRN, Gia Forman M.D., 10 mg at 04/05/21 0913    Physical Examination:     Vitals:    04/08/21 0530 04/08/21 0545 04/08/21 0600 04/08/21 0615   BP: 159/89 126/66 135/79 111/67   Pulse: 72 62 63 62   Resp: 18 13 12 13   Temp:   37.6 °C (99.7 °F)    TempSrc:   Bladder    SpO2: 97% 97% 98% 97%   Weight:       Height:         R flap- full but softer than yesterday    NEUROLOGICAL EXAM:     Mental status: Lethargic, opens eyes to voice, speaking and interactive  Speech and language: Speech is mildly dysarthric. Able to follow commands  Cranial nerve exam:  L visual field cut. Eyes midline. L face droop  Motor exam: RUE is antigravity, RLE is briefly antigravity- appears effort dependent, flexion contracture LUE, no movement in LLE  Sensory exam: Does not react to tactile on L hemibody  Coordination: no jacy ataxia on R side movements    Objective Data:    Labs:  Lab Results   Component Value Date/Time    PROTHROMBTM 14.0 03/31/2021 07:29 PM    INR 1.04 03/31/2021 07:29 PM      Lab Results   Component Value Date/Time    WBC 9.9 04/07/2021 06:00 AM    RBC 4.33 (L) 04/07/2021 06:00 AM    HEMOGLOBIN 12.8 (L)  04/07/2021 06:00 AM    HEMATOCRIT 37.6 (L) 04/07/2021 06:00 AM    MCV 86.8 04/07/2021 06:00 AM    MCH 29.6 04/07/2021 06:00 AM    MCHC 34.0 04/07/2021 06:00 AM    MPV 8.9 (L) 04/07/2021 06:00 AM    NEUTSPOLYS 84.70 (H) 04/07/2021 06:00 AM    LYMPHOCYTES 7.40 (L) 04/07/2021 06:00 AM    MONOCYTES 7.30 04/07/2021 06:00 AM    EOSINOPHILS 0.10 04/07/2021 06:00 AM    BASOPHILS 0.10 04/07/2021 06:00 AM      Lab Results   Component Value Date/Time    SODIUM 155 (H) 04/08/2021 02:15 AM    POTASSIUM 3.7 04/08/2021 02:15 AM    CHLORIDE 120 (H) 04/08/2021 02:15 AM    CO2 28 04/08/2021 02:15 AM    GLUCOSE 148 (H) 04/08/2021 02:15 AM    BUN 28 (H) 04/08/2021 02:15 AM    CREATININE 0.74 04/08/2021 02:15 AM    CREATININE 1.3 04/04/2008 03:05 AM      Lab Results   Component Value Date/Time    CHOLSTRLTOT 169 04/01/2021 12:36 PM     (H) 04/01/2021 12:36 PM    HDL 32 (A) 04/01/2021 12:36 PM    TRIGLYCERIDE 126 04/01/2021 12:36 PM       Lab Results   Component Value Date/Time    ALKPHOSPHAT 56 04/06/2021 05:00 AM    ASTSGOT 28 04/06/2021 05:00 AM    ALTSGPT 16 04/06/2021 05:00 AM    TBILIRUBIN 0.4 04/06/2021 05:00 AM        Imaging/Testing:    I interpreted and/or reviewed the patient's neuroimaging    US-EXTREMITY VENOUS LOWER BILAT   Final Result      DX-CHEST-LIMITED (1 VIEW)   Final Result      Right basilar atelectasis. No focal consolidation or pleural effusions.      VD-[id number]-5 VIEW   Final Result      No evidence of bowel obstruction.                  DX-CHEST-LIMITED (1 VIEW)   Final Result      1.  Right internal jugular catheter and enteric catheter appear appropriately located      2.  Minimal right basilar atelectasis      CT-HEAD W/O   Final Result         1.  Changes of evolving infarct within the right MCA distribution   2.  Hyperdensity in the sulci of the right MCA distribution infarct, largely appears related to thrombosed vessels, component of subarachnoid hemorrhage is suspected particularly in the right  frontal lobe.   3.  Pneumocephalus, decreased since prior.      CT-HEAD W/O   Final Result         Postsurgical change from right craniectomy. Redemonstration of large right MCA infarct with edema and bulging of the brain parenchyma through the craniectomy defect      Less mass effect on the right lateral ventricle. Less right to left midline shift of 3 mm, previously 10 mm.         DX-ABDOMEN FOR TUBE PLACEMENT   Final Result      Enteric tube terminates over the stomach.      CT-HEAD W/O   Final Result         1.  Low-density changes of the right hemisphere compatible with MCA distribution infarct with edema.   2.  New effacement of the bilateral ventricles, right greater than left, with 10 mm right-to-left midline shift.   3.  New dilatation of the left temporal horn ventricle, appearance favors component of obstructive hydrocephalus due to mass effect from infarct and edema.   4.  Hyperdensity in the right middle cerebral artery, likely thrombosis given associated findings.      These findings were discussed with the patient's clinician, Dr. Nunez, on 4/3/2021 7:11 AM.      MR-BRAIN-W/O   Final Result      Very large acute right MCA territory infarct as detailed above with small amount of petechial hemorrhage in the right insular region and right temporal lobe.      Punctate right thalamic lacunar infarct.      Right ICA and M1 MCA occlusion.      EC-ECHOCARDIOGRAM COMPLETE W/O CONT   Final Result      DX-CHEST-PORTABLE (1 VIEW)   Final Result         1.  Interstitial pulmonary parenchymal prominence, compatible with interstitial edema and/or infiltrates.      CT-CTA NECK WITH & W/O-POST PROCESSING   Final Result      Acute occlusion of the right internal carotid artery shortly after bifurcation. It is occluded up to the level of the carotid terminus.      CT-CTA HEAD WITH & W/O-POST PROCESS   Final Result      Acute right M1 occlusion.      Findings were discussed with RHONDA DIAZ on 3/31/2021 7:54 PM.       CT-CEREBRAL PERFUSION ANALYSIS   Final Result      1.  Cerebral blood flow less than 30% likely representing completed infarct = 134 mL.      2.  T Max more than 6 seconds likely representing combination of completed infarct and ischemia = 210 mL.      3.  Mismatched volume likely representing ischemic brain/penumbra = 76 mL.      4.  Please note that the cerebral perfusion was performed on the limited brain tissue around the basal ganglia region. Infarct/ischemia outside the CT perfusion sections can be missed in this study.      CT-HEAD W/O   Final Result   Addendum 1 of 1   In retrospect, there is subtle loss of gray-white matter differentiation    in the right MCA distribution, consistent with acute infarct.      Final      1.  No CT evidence of acute infarct, hemorrhage or mass.   2.  Mild paranasal sinus disease.          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  He is now s/p decompressive hemicraniectomy on 4/3.  Stroke etiology invariably cardioembolic. Currently on ASA bridge to anticoagulation given high infarct burden and increased risk for immediate hemorrhagic conversion. Likely approaching end of peak edema window.   Recommend 1 additional day of Na therapy, followed by slow normalization.      Plan:   - q2h neurochecks/NIHSS   - Agree with Na goal 150-155- titrate 3% to goal, bolus PRN   - SBP goal acutely is 120-160, long-term goal is 110-130/60-80; titrating PO meds, cardene PRN   - continue statin for LDL goal < 70   - continue  ASA 81mg daily tonight, will consider anticoagulation with NOAC in ~2 weeks (around 4/14- repeat head CT prior to initiation), or outside acute edema development phase   - lovenox 40mg SQ for DVT ppx    - PT/OT/SLP as able    The evaluation of the patient, and recommended management, was discussed with the resident staff. I have  performed a physical exam and reviewed and updated ROS and Plan today (4/8/2021).     CRITICAL CARE    Upon my evaluation, this patient had a high probability of imminent or life-threatening deterioration due to cerebrovascular accident which required my direct attention, intervention, and personal management.  I personally provided 35 minutes of total critical care time outside of time spent on separately billable/documented procedures. Time includes: review of laboratory data, review of radiology studies, discussion with consultants, discussion with family/patient, monitoring for potential decompensation.  Interventions were performed as documented in the chart.      Arthur Jackson MD  Neurohospitalist, Acute Care Services

## 2021-04-08 NOTE — PROGRESS NOTES
1415: Patient mobilized to EOB with helmet. Patient able to sit on side of bed by holding onto rail with R hand. To stand, max 2 assist, patient tolerated but with difficulty, unable to get knees locked today and in a full standing position. Patient transferred back to bed and then transferred via slideboard to cardiac chair.    Patient taken to [facility], on monitor, via ACLS RN x 2 and CCT. Wife present. Patient tolerated well.     Cardiac chair x 2.5 hours.

## 2021-04-08 NOTE — CARE PLAN
Respiratory Update    Treatment modality: PEP/IS (Atelectasis on CXR)  Frequency: QID    60% - 1860 mLs  40% - 1240 mLs    ACTUAL - 750 mLs    Pt tolerating current treatments well with no adverse reactions.     Pt lethargic this evening, resulting in less than ideal effort.

## 2021-04-08 NOTE — PROGRESS NOTES
[facility] Progress Note      Admit Date: 3/31/2021    Resident(s): Johana Nelson M.D.   Attending:  GEEN MURILLO/ Dr. Bell    Patient ID:    Name:  Thong Arzola   YOB: 1965  Age:  56 y.o.  male   MRN:  [id number]    Hospital Course (carried forward and updated):  Thong Arzola is a 56 y.o. male with   medical history of Hyperlipidemia, Paroxysmal atrial fibrillation and hypothyroidism is admitted on 03/31 for left side weakness and facial paralysis found to have Right MCA stroke , not a candidate for Tpa or mechanical thrombectomy who subsequently underwent Hemicraniotomy on 04/03. He also had worsening cerebral edema and currently being monitored in ICU with hypertonic saline     Consultants:  Critical Care  Neurology   Neurosurgery    Interval Events:  No acute events overnight. Patient has waxing and waning level of consciousness, being lethargic some times and awake other times.   He is hemodynamically stable, off of nicardipine drip     -BP controlled on oral meds and Prn's  -Will continue hypertonic saline for more 24 hrs for sodium goal of 150-155   -Repeat speech evaluation today, on going tube feedings   -Lovenox for DVT prophylaxis and aspirin daily     Vitals Range last 24h:  Temp:  [37.2 °C (99 °F)-37.6 °C (99.7 °F)] 37.6 °C (99.7 °F)  Pulse:  [] 58  Resp:  [10-34] 22  BP: (111-178)/(62-97) 145/79  SpO2:  [95 %-99 %] 96 %      Intake/Output Summary (Last 24 hours) at 4/8/2021 1046  Last data filed at 4/8/2021 0800  Gross per 24 hour   Intake 3915 ml   Output 3380 ml   Net 535 ml        Review of Systems   Constitutional: Negative for chills, fever and weight loss.   HENT: Negative for ear pain, hearing loss and tinnitus.    Eyes: Negative for blurred vision, double vision and photophobia.   Respiratory: Negative for cough, hemoptysis and sputum production.    Cardiovascular: Negative for chest pain, palpitations and orthopnea.   Gastrointestinal: Negative for  abdominal pain, nausea and vomiting.   Genitourinary: Negative for dysuria, frequency and urgency.   Musculoskeletal: Negative for back pain, myalgias and neck pain.   Skin: Negative for itching and rash.   Neurological: Negative for dizziness, tingling and headaches.       PHYSICAL EXAM:  Vitals:    04/08/21 0845 04/08/21 0900 04/08/21 0915 04/08/21 0930   BP: 158/78 146/79 158/82 145/79   Pulse: 68 (!) 52 62 (!) 58   Resp: (!) 34 (!) 24 16 (!) 22   Temp:       TempSrc:       SpO2: 96% 95% 96% 96%   Weight:       Height:        Body mass index is 25.18 kg/m².    O2 therapy: Pulse Oximetry: 96 %, O2 (LPM): 0, O2 Delivery Device: Room air w/o2 available    Date 04/08/21 0700 - 04/09/21 0659   Shift 1978-2075 [id number] 24 Hour Total   INTAKE   I.V. 60   60     IV Volume (3%) 60   60   Other 30   30     Flush / Irrigation Volume (CorTrak) 30   30   NG/   150     Intake (mL) (Enteral Tube 04/03/21 Cortrak - Gastric Right nare) 150   150   IV Piggyback 0   0     Volume (mL) (potassium phosphate 15 mmol in  mL ivpb) 0   0   Shift Total 240   240   OUTPUT   Shift Total          240        Physical Exam   Constitutional: He is well-developed, well-nourished, and in no distress.   HENT:   Head: Normocephalic and atraumatic.   Eyes: Pupils are equal, round, and reactive to light. EOM are normal.   Cardiovascular: Normal rate and normal heart sounds.   Irregular rhythm   Pulmonary/Chest: Effort normal and breath sounds normal.   Abdominal: Soft. Bowel sounds are normal.   Musculoskeletal:      Cervical back: Normal range of motion and neck supple.   Neurological: He is alert.   Flaccid on left and 4/5 on right    Skin: Skin is warm.           Recent Labs     04/06/21  0500 04/06/21  1227 04/07/21  0600 04/07/21  0907 04/07/21  1835 04/08/21  0215 04/08/21  0657   SODIUM 146*   < > 147*   < > 155* 155* 148*   POTASSIUM 3.8   < > 3.5*   < > 4.3 3.7 3.7   CHLORIDE 112   < > 115*   < > 120* 120* 116*    CO2 26   < > 27   < > 29 28 28   BUN 25*   < > 29*   < > 27* 28* 32*   CREATININE 0.84   < > 0.90   < > 0.81 0.74 0.86   MAGNESIUM 2.1  --  2.0  --   --   --  2.0   PHOSPHORUS 3.3  --  2.2*  --   --   --  3.2   CALCIUM 8.7   < > 8.7   < > 8.7 8.7 8.6    < > = values in this interval not displayed.     Recent Labs     04/05/21  1413 04/05/21  2155 04/06/21  0500 04/06/21  1227 04/07/21  1835 04/08/21  0215 04/08/21  0657   ALTSGPT  --   --  16  --   --   --   --    ASTSGOT  --   --  28  --   --   --   --    ALKPHOSPHAT  --   --  56  --   --   --   --    TBILIRUBIN  --   --  0.4  --   --   --   --    PREALBUMIN 17.6*  --   --   --   --   --   --    GLUCOSE 108*   < > 124*   < > 118* 148* 134*    < > = values in this interval not displayed.     Recent Labs     04/06/21  0500 04/07/21  0600 04/08/21  0657   RBC 4.14* 4.33* 3.94*   HEMOGLOBIN 12.1* 12.8* 11.5*   HEMATOCRIT 36.0* 37.6* 34.7*   PLATELETCT 215 262 255     Recent Labs     04/06/21  0500 04/07/21  0600 04/08/21  0657   WBC 8.7 9.9 7.2   NEUTSPOLYS 82.60* 84.70* 75.70*   LYMPHOCYTES 7.80* 7.40* 12.40*   MONOCYTES 8.70 7.30 9.10   EOSINOPHILS 0.00 0.10 2.30   BASOPHILS 0.10 0.10 0.10   ASTSGOT 28  --   --    ALTSGPT 16  --   --    ALKPHOSPHAT 56  --   --    TBILIRUBIN 0.4  --   --        Meds:  • potassium bicarbonate  25 mEq     • hydrALAZINE  10 mg     • labetalol  10-20 mg     • MD Alert...Adult ICU Electrolyte Replacement per Pharmacy       • cloNIDine  0.1 mg     • 3% sodium chloride  500 mL 30 mL/hr at 04/08/21 0715   • baclofen  10 mg     • calcium carbonate  500 mg     • lisinopril  40 mg     • amLODIPine  10 mg     • metoprolol tartrate  25 mg     • enoxaparin (LOVENOX) injection  40 mg     • aspirin  81 mg     • thyroid  60 mg     • Pharmacy Consult Request  1 Each     • MD ALERT...DO NOT ADMINISTER NSAIDS or ASPIRIN unless ORDERED By Neurosurgery  1 Each     • bisacodyl  10 mg     • artificial tears  1 Application     • Pharmacy  1 Each     •  atorvastatin  80 mg     • senna-docusate  1 tablet     • magnesium hydroxide  30 mL     • ondansetron  4 mg     • oxyCODONE immediate-release  5 mg      Or   • oxyCODONE immediate-release  10 mg      Or   • HYDROmorphone  0.5 mg     • polyethylene glycol/lytes  1 Packet     • promethazine  12.5-25 mg     • senna-docusate  1 tablet     • acetaminophen  650 mg     • ondansetron  4 mg     • promethazine  12.5-25 mg     • prochlorperazine  5-10 mg          Procedures:  Hemicraniotomy on 04/03  Central line on 04/05    Imaging:  US-EXTREMITY VENOUS LOWER BILAT   Final Result      DX-CHEST-LIMITED (1 VIEW)   Final Result      Right basilar atelectasis. No focal consolidation or pleural effusions.      EK-VEEZUNA-3 VIEW   Final Result      No evidence of bowel obstruction.                  DX-CHEST-LIMITED (1 VIEW)   Final Result      1.  Right internal jugular catheter and enteric catheter appear appropriately located      2.  Minimal right basilar atelectasis      CT-HEAD W/O   Final Result         1.  Changes of evolving infarct within the right MCA distribution   2.  Hyperdensity in the sulci of the right MCA distribution infarct, largely appears related to thrombosed vessels, component of subarachnoid hemorrhage is suspected particularly in the right frontal lobe.   3.  Pneumocephalus, decreased since prior.      CT-HEAD W/O   Final Result         Postsurgical change from right craniectomy. Redemonstration of large right MCA infarct with edema and bulging of the brain parenchyma through the craniectomy defect      Less mass effect on the right lateral ventricle. Less right to left midline shift of 3 mm, previously 10 mm.         DX-ABDOMEN FOR TUBE PLACEMENT   Final Result      Enteric tube terminates over the stomach.      CT-HEAD W/O   Final Result         1.  Low-density changes of the right hemisphere compatible with MCA distribution infarct with edema.   2.  New effacement of the bilateral ventricles, right  greater than left, with 10 mm right-to-left midline shift.   3.  New dilatation of the left temporal horn ventricle, appearance favors component of obstructive hydrocephalus due to mass effect from infarct and edema.   4.  Hyperdensity in the right middle cerebral artery, likely thrombosis given associated findings.      These findings were discussed with the patient's clinician, Dr. Nunez, on 4/3/2021 7:11 AM.      MR-BRAIN-W/O   Final Result      Very large acute right MCA territory infarct as detailed above with small amount of petechial hemorrhage in the right insular region and right temporal lobe.      Punctate right thalamic lacunar infarct.      Right ICA and M1 MCA occlusion.      EC-ECHOCARDIOGRAM COMPLETE W/O CONT   Final Result      DX-CHEST-PORTABLE (1 VIEW)   Final Result         1.  Interstitial pulmonary parenchymal prominence, compatible with interstitial edema and/or infiltrates.      CT-CTA NECK WITH & W/O-POST PROCESSING   Final Result      Acute occlusion of the right internal carotid artery shortly after bifurcation. It is occluded up to the level of the carotid terminus.      CT-CTA HEAD WITH & W/O-POST PROCESS   Final Result      Acute right M1 occlusion.      Findings were discussed with RHONDA DIAZ on 3/31/2021 7:54 PM.      CT-CEREBRAL PERFUSION ANALYSIS   Final Result      1.  Cerebral blood flow less than 30% likely representing completed infarct = 134 mL.      2.  T Max more than 6 seconds likely representing combination of completed infarct and ischemia = 210 mL.      3.  Mismatched volume likely representing ischemic brain/penumbra = 76 mL.      4.  Please note that the cerebral perfusion was performed on the limited brain tissue around the basal ganglia region. Infarct/ischemia outside the CT perfusion sections can be missed in this study.      CT-HEAD W/O   Final Result   Addendum 1 of 1   In retrospect, there is subtle loss of gray-white matter differentiation    in the right  MCA distribution, consistent with acute infarct.      Final      1.  No CT evidence of acute infarct, hemorrhage or mass.   2.  Mild paranasal sinus disease.          ASSESSEMENT and PLAN:    * Acute right MCA stroke (HCC)- (present on admission)  Assessment & Plan  -Presented with left side weakness and facial paralysis. CT imaging showed Acute right [place] territory stroke  -Outside the window for alteplase, not a candidate for thrombectomy per IR  -Underwent hemicraniotomy on 04/03 for increased ICP   -Patient had worsening facial edema due to cerebral edema with evolving infarct on 04/04. Received hypertonic saline bolus on 04/05   -Echo from 04/01 showed normal EF and no shunt     Plan:  -q2h neuro checks  -Permissive hypertension , goal of BP<160  -Maintain normothermia, normoglycemia  -Enteral nutrition through tube feedings  -Elevate head of bed  -PT/OT/SLP  -PMNR  -SBP goal <140  continue amlodipine and lisinopril . Has Prn's available   -On Hypertonic saline Goal sodium of 150-155, will continue for one more day  -Resatrted aspirin and DVT prophylaxis today as per neurology. No need for prophylactic epileptics. However he needs long term anticoagulation for his atrial fibrillation once he is more stable     Fever  Assessment & Plan  Patient had temp of 100.8 on 04/07 and 04/06  No leucocytosis, less likely infection. Ordered pro calcitonin for fever work up   It could also be neurogenic fever post stroke as per neuro  -Will monitor     Paroxysmal atrial fibrillation (HCC)- (present on admission)  Assessment & Plan  -Diagnosed about 9 months ago  -Was reportedly taking Coreg, however stopped taking it when he went back into sinus rhythm  -has not been on AC OP  -has been treating holistically OP. Taking vitamins, including Vitamin K  -ECHO unremarkable  -telemetry - currently NSR  -Chadsvasc2 score is 2 with stroke, needs anticoagulation long term once stable. Will do aspirin form today       History of  COVID-19- (present on admission)  Assessment & Plan  -Positive PCR 3/18/2021  -Patient currently is asymptomatic, on room air, in no respiratory distress.  No indications to treat  -Repeat PCR 3/31 remains positive.  Per hospital infectious prevention, no longer requires isolation      Leucocytosis  Assessment & Plan  -resolved   -Most likely reactive     Urinary retention  Assessment & Plan  -secondary to acute stroke  -Reportedly does not have at baseline  -dye catheter in place    Acquired hypothyroidism- (present on admission)  Assessment & Plan  -TSH low, normal T4  -restart home [place] thyroid today, dose adjusted   -Needs outpatient follow up        DISPO: ICU    CODE STATUS: Full code     Quality Measures:  Feeding: tube feeding  Analgesia: tylenol  Sedation: none   Thromboprophylaxis: lovenox  Head of bed: >30 degrees  Ulcer prophylaxis: none   Glycemic control: none   Bowel care: bowel regimen prn  Indwelling lines: iv and central line  Deescalation of antibiotics: none       Johana Nelson M.D.

## 2021-04-08 NOTE — THERAPY
"Speech Language Pathology  Daily Treatment     Patient Name: Thong Arzola  Age:  56 y.o., Sex:  male  Medical Record #: [id number]  Today's Date: 4/8/2021     Precautions  Precautions: (P) Fall Risk, Swallow Precautions ( See Comments)  Comments: (P) no bone flap R, helmet on OOB, NPO/TF    Assessment    Pt seen this date for dysphagia intervention. Pt presenting with restricted lingual, labial and jaw ROM and weakness. Oral care provided prior to PO. PO trials of ice, MTL, LQ3 x1, and thins via tsp x2 assessed. Hyolaryngeal elevation palpated as complete but weak and delayed initiation of swallow appreciated. Throat clearing appreciated with trials of MTL. Delayed cough appreciated with trials of LQ3, and wet vocal quality with immediate cough appreciated with trials of thins, which is concerning for possible penetration/aspiration. Swallow exercises targeting lingual/labial ROM, base of tongue retraction, laryngeal elevation, and pharyngeal constriction taught and practiced, handout provided. Pt unable to maintain lingual protrusion with [place], but completed remainder of exercises with \"fair\" accuracy and min-mod verbal/visual cues.     1. Given overt s/sx of aspiration, fluctuating alertness, and weakness, pt is not appropriate for initiation of a PO diet at this time. Recommend pt remain NPO with TF.   2. Okay for 3-5 single ice chips per hour with nursing or trained family after oral care, when awake/alert and with HOB at 90*.   3. Please encourage completion of swallow exercises 3-5x/day, handout at bedside.   4. Pt will require repeat diagnostic swallow study (when appropriate) prior to initiation of a PO diet.    SLP following.     Plan    Continue current treatment plan.    Discharge Recommendations: (P) Recommend post-acute placement for additional speech therapy services prior to discharge home    Subjective  Pt was asleep upon entrance but awakened with verbal and tactile cues. Pt required mod cues " "throughout session to maintain eye-opening and alertness. Spouse present throughout session, appears supportive, and verbalized/demonstrated good understanding of recommendations.      Objective       04/08/21 1140   Dysphagia    Dysphagia X   Positioning / Behavior Modification Self Monitoring;Multiple Swallows;Modulate Rate or Bite Size   Oral / Pharyngeal / Laryngeal Exercises Labial Exercises;Lingual Exercises;Laryngeal Exercises;Base of Tongue Exercises;Pharyngeal Constriction Exercises   Other Treatments PO trials of ice, MTL, LQ3, thins   Diet / Liquid Recommendation NPO;Pre-Feeding Trials with SLP Only;Other (Comments)  (3-5 single ice chips)   Nutritional Liquid Intake Rating Scale Nothing by mouth   Nutritional Food Intake Rating Scale Nothing by mouth  (3-5 ice chips per hour)   Nursing Communication Swallow Precaution Sign Posted at Head of Bed   Skilled Intervention Compensatory Strategies;Verbal Cueing   Recommended Route of Medication Administration   Medication Administration  Via Gastric Tube   Patient / Family Goals   Patient / Family Goal #1 \"Yes, water\"   Goal #1 Outcome Goal not met   Short Term Goals   Short Term Goal # 1 Pt will participate in prefeeding trials with SLP 1:1 with no s/sx of aspiration and min cues.    Goal Outcome # 1 Progressing slower than expected   Short Term Goal # 2 Pt will complete 10 reps each of lingual ROM and base of tongue exercises with \"good\" accuracy and min cues.    Goal Outcome # 2  Progressing slower than expected         "

## 2021-04-08 NOTE — THERAPY
Physical Therapy   Daily Treatment     Patient Name: Thong Arzola  Age:  56 y.o., Sex:  male  Medical Record #: [id number]  Today's Date: 4/7/2021     Precautions: Fall Risk, Swallow Precautions ( See Comments)    Assessment    Pt continues to demonstrate left neglect, does not track past midline with tracking cues and cervical ROM to the left; utilizing patellar reflexes for quad control prior to stance, did feel some weight bearing control through LE when upright; very pleasant and cooperative; will follow, needs placement.     Plan    Continue current treatment plan.    DC Equipment Recommendations: Unable to determine at this time  Discharge Recommendations: Recommend post-acute placement for additional physical therapy services prior to discharge home      Abridged Subjective/Objective     04/07/21 1010   Cognition    Cognition / Consciousness X   Speech/ Communication Delayed Responses   Level of Consciousness Alert   Safety Awareness Impaired   Attention Impaired   Initiation Impaired   Comments pleasant and cooperative; visually tracks to midline, does not follow past midline during tracking; can attend to left when cervical ROM performed to left of range    Passive ROM Lower Body   Passive ROM Lower Body WDL   Comments left ankle in prafo throughout, clonus not checked    Strength Lower Body   Lower Body Strength  X   Comments left hip tone vs muscle recruitment felt into extension and abd, increased after standing; patellar reflex intact and utilized pre PROM without increased recruitment noted    Sensation Lower Body   Lower Extremity Sensation   X   Comments absent light touch/pressure in left LE    Balance   Sitting Balance (Static) Poor -   Sitting Balance (Dynamic) Trace +   Standing Balance (Static) Trace   Standing Balance (Dynamic) Trace   Weight Shift Sitting Poor   Weight Shift Standing Absent   Skilled Intervention Verbal Cuing;Tactile Cuing;Sequencing   Comments B Ue support faciltiated,  left UE toned and increased with stance, needs continued assist for head held midline and upright; can look to left with full faciltiation;    Gait Analysis   Gait Level Of Assist Unable to Participate   Bed Mobility    Supine to Sit Maximal Assist  (assisting with right LE )   Sit to Supine Total Assist   Functional Mobility   Sit to Stand Maximal Assist  (of 2 with faciltiation of left hip abd/add; x 2 reps)   Bed, Chair, Wheelchair Transfer Unable to Participate   Education Group   Role of Physical Therapist Patient Response Patient;Significant Other;Acceptance;Explanation;Verbal Demonstration;Action Demonstration   Anticipated Discharge Equipment and Recommendations   DC Equipment Recommendations Unable to determine at this time   Discharge Recommendations Recommend post-acute placement for additional physical therapy services prior to discharge home

## 2021-04-09 LAB
ANION GAP SERPL CALC-SCNC: 7 MMOL/L (ref 7–16)
ANION GAP SERPL CALC-SCNC: 7 MMOL/L (ref 7–16)
ANION GAP SERPL CALC-SCNC: 9 MMOL/L (ref 7–16)
ANION GAP SERPL CALC-SCNC: 9 MMOL/L (ref 7–16)
BASOPHILS # BLD AUTO: 0.1 % (ref 0–1.8)
BASOPHILS # BLD: 0.01 K/UL (ref 0–0.12)
BUN SERPL-MCNC: 24 MG/DL (ref 8–22)
BUN SERPL-MCNC: 25 MG/DL (ref 8–22)
BUN SERPL-MCNC: 26 MG/DL (ref 8–22)
BUN SERPL-MCNC: 29 MG/DL (ref 8–22)
CALCIUM SERPL-MCNC: 8.8 MG/DL (ref 8.5–10.5)
CALCIUM SERPL-MCNC: 8.9 MG/DL (ref 8.5–10.5)
CALCIUM SERPL-MCNC: 8.9 MG/DL (ref 8.5–10.5)
CALCIUM SERPL-MCNC: 9.1 MG/DL (ref 8.5–10.5)
CHLORIDE SERPL-SCNC: 115 MMOL/L (ref 96–112)
CHLORIDE SERPL-SCNC: 115 MMOL/L (ref 96–112)
CHLORIDE SERPL-SCNC: 117 MMOL/L (ref 96–112)
CHLORIDE SERPL-SCNC: 118 MMOL/L (ref 96–112)
CO2 SERPL-SCNC: 24 MMOL/L (ref 20–33)
CO2 SERPL-SCNC: 25 MMOL/L (ref 20–33)
CO2 SERPL-SCNC: 26 MMOL/L (ref 20–33)
CO2 SERPL-SCNC: 27 MMOL/L (ref 20–33)
CREAT SERPL-MCNC: 0.78 MG/DL (ref 0.5–1.4)
CREAT SERPL-MCNC: 0.81 MG/DL (ref 0.5–1.4)
CREAT SERPL-MCNC: 0.83 MG/DL (ref 0.5–1.4)
CREAT SERPL-MCNC: 0.84 MG/DL (ref 0.5–1.4)
EKG IMPRESSION: NORMAL
EOSINOPHIL # BLD AUTO: 0.16 K/UL (ref 0–0.51)
EOSINOPHIL NFR BLD: 2.3 % (ref 0–6.9)
ERYTHROCYTE [DISTWIDTH] IN BLOOD BY AUTOMATED COUNT: 40 FL (ref 35.9–50)
GLUCOSE SERPL-MCNC: 106 MG/DL (ref 65–99)
GLUCOSE SERPL-MCNC: 118 MG/DL (ref 65–99)
GLUCOSE SERPL-MCNC: 122 MG/DL (ref 65–99)
GLUCOSE SERPL-MCNC: 144 MG/DL (ref 65–99)
HCT VFR BLD AUTO: 37 % (ref 42–52)
HGB BLD-MCNC: 12.5 G/DL (ref 14–18)
IMM GRANULOCYTES # BLD AUTO: 0.03 K/UL (ref 0–0.11)
IMM GRANULOCYTES NFR BLD AUTO: 0.4 % (ref 0–0.9)
LYMPHOCYTES # BLD AUTO: 0.91 K/UL (ref 1–4.8)
LYMPHOCYTES NFR BLD: 13.3 % (ref 22–41)
MCH RBC QN AUTO: 29.2 PG (ref 27–33)
MCHC RBC AUTO-ENTMCNC: 33.8 G/DL (ref 33.7–35.3)
MCV RBC AUTO: 86.4 FL (ref 81.4–97.8)
MONOCYTES # BLD AUTO: 0.64 K/UL (ref 0–0.85)
MONOCYTES NFR BLD AUTO: 9.3 % (ref 0–13.4)
NEUTROPHILS # BLD AUTO: 5.1 K/UL (ref 1.82–7.42)
NEUTROPHILS NFR BLD: 74.6 % (ref 44–72)
NRBC # BLD AUTO: 0 K/UL
NRBC BLD-RTO: 0 /100 WBC
PLATELET # BLD AUTO: 284 K/UL (ref 164–446)
PMV BLD AUTO: 9.6 FL (ref 9–12.9)
POTASSIUM SERPL-SCNC: 3.5 MMOL/L (ref 3.6–5.5)
POTASSIUM SERPL-SCNC: 3.6 MMOL/L (ref 3.6–5.5)
POTASSIUM SERPL-SCNC: 3.7 MMOL/L (ref 3.6–5.5)
POTASSIUM SERPL-SCNC: 3.9 MMOL/L (ref 3.6–5.5)
RBC # BLD AUTO: 4.28 M/UL (ref 4.7–6.1)
SODIUM SERPL-SCNC: 146 MMOL/L (ref 135–145)
SODIUM SERPL-SCNC: 150 MMOL/L (ref 135–145)
SODIUM SERPL-SCNC: 151 MMOL/L (ref 135–145)
SODIUM SERPL-SCNC: 152 MMOL/L (ref 135–145)
WBC # BLD AUTO: 6.9 K/UL (ref 4.8–10.8)

## 2021-04-09 PROCEDURE — 700102 HCHG RX REV CODE 250 W/ 637 OVERRIDE(OP): Performed by: NURSE PRACTITIONER

## 2021-04-09 PROCEDURE — 97535 SELF CARE MNGMENT TRAINING: CPT

## 2021-04-09 PROCEDURE — 700111 HCHG RX REV CODE 636 W/ 250 OVERRIDE (IP): Performed by: STUDENT IN AN ORGANIZED HEALTH CARE EDUCATION/TRAINING PROGRAM

## 2021-04-09 PROCEDURE — 99233 SBSQ HOSP IP/OBS HIGH 50: CPT | Performed by: PHYSICAL MEDICINE & REHABILITATION

## 2021-04-09 PROCEDURE — 99291 CRITICAL CARE FIRST HOUR: CPT | Mod: GC | Performed by: PSYCHIATRY & NEUROLOGY

## 2021-04-09 PROCEDURE — 700102 HCHG RX REV CODE 250 W/ 637 OVERRIDE(OP): Performed by: PHYSICAL MEDICINE & REHABILITATION

## 2021-04-09 PROCEDURE — 700102 HCHG RX REV CODE 250 W/ 637 OVERRIDE(OP): Performed by: STUDENT IN AN ORGANIZED HEALTH CARE EDUCATION/TRAINING PROGRAM

## 2021-04-09 PROCEDURE — 93010 ELECTROCARDIOGRAM REPORT: CPT | Performed by: STUDENT IN AN ORGANIZED HEALTH CARE EDUCATION/TRAINING PROGRAM

## 2021-04-09 PROCEDURE — 700111 HCHG RX REV CODE 636 W/ 250 OVERRIDE (IP): Performed by: PSYCHIATRY & NEUROLOGY

## 2021-04-09 PROCEDURE — A9270 NON-COVERED ITEM OR SERVICE: HCPCS | Performed by: PHYSICAL MEDICINE & REHABILITATION

## 2021-04-09 PROCEDURE — A9270 NON-COVERED ITEM OR SERVICE: HCPCS | Performed by: PSYCHIATRY & NEUROLOGY

## 2021-04-09 PROCEDURE — A9270 NON-COVERED ITEM OR SERVICE: HCPCS | Performed by: HOSPITALIST

## 2021-04-09 PROCEDURE — 700102 HCHG RX REV CODE 250 W/ 637 OVERRIDE(OP): Performed by: HOSPITALIST

## 2021-04-09 PROCEDURE — 80048 BASIC METABOLIC PNL TOTAL CA: CPT | Mod: 91

## 2021-04-09 PROCEDURE — 97530 THERAPEUTIC ACTIVITIES: CPT

## 2021-04-09 PROCEDURE — 700101 HCHG RX REV CODE 250: Performed by: PSYCHIATRY & NEUROLOGY

## 2021-04-09 PROCEDURE — A9270 NON-COVERED ITEM OR SERVICE: HCPCS | Performed by: NURSE PRACTITIONER

## 2021-04-09 PROCEDURE — 700105 HCHG RX REV CODE 258: Performed by: INTERNAL MEDICINE

## 2021-04-09 PROCEDURE — 700102 HCHG RX REV CODE 250 W/ 637 OVERRIDE(OP): Performed by: PSYCHIATRY & NEUROLOGY

## 2021-04-09 PROCEDURE — 770022 HCHG ROOM/CARE - ICU (200)

## 2021-04-09 PROCEDURE — 85025 COMPLETE CBC W/AUTO DIFF WBC: CPT

## 2021-04-09 PROCEDURE — 700105 HCHG RX REV CODE 258: Performed by: STUDENT IN AN ORGANIZED HEALTH CARE EDUCATION/TRAINING PROGRAM

## 2021-04-09 PROCEDURE — 97112 NEUROMUSCULAR REEDUCATION: CPT

## 2021-04-09 PROCEDURE — A9270 NON-COVERED ITEM OR SERVICE: HCPCS | Performed by: STUDENT IN AN ORGANIZED HEALTH CARE EDUCATION/TRAINING PROGRAM

## 2021-04-09 RX ORDER — BACLOFEN 10 MG/1
15 TABLET ORAL 3 TIMES DAILY
Status: DISCONTINUED | OUTPATIENT
Start: 2021-04-09 | End: 2021-04-17

## 2021-04-09 RX ORDER — FUROSEMIDE 10 MG/ML
20 INJECTION INTRAMUSCULAR; INTRAVENOUS ONCE
Status: COMPLETED | OUTPATIENT
Start: 2021-04-09 | End: 2021-04-09

## 2021-04-09 RX ORDER — 3% SODIUM CHLORIDE 3 G/100ML
500 INJECTION, SOLUTION INTRAVENOUS CONTINUOUS
Status: DISCONTINUED | OUTPATIENT
Start: 2021-04-09 | End: 2021-04-10

## 2021-04-09 RX ORDER — MAGNESIUM SULFATE HEPTAHYDRATE 40 MG/ML
2 INJECTION, SOLUTION INTRAVENOUS ONCE
Status: COMPLETED | OUTPATIENT
Start: 2021-04-09 | End: 2021-04-09

## 2021-04-09 RX ADMIN — ENOXAPARIN SODIUM 40 MG: 40 INJECTION SUBCUTANEOUS at 05:15

## 2021-04-09 RX ADMIN — SODIUM CHLORIDE 200 MEQ: 234 INJECTION, SOLUTION, CONCENTRATE INTRAVENOUS at 08:00

## 2021-04-09 RX ADMIN — ACETAMINOPHEN 650 MG: 325 TABLET, FILM COATED ORAL at 00:06

## 2021-04-09 RX ADMIN — OXYCODONE 5 MG: 5 TABLET ORAL at 13:47

## 2021-04-09 RX ADMIN — HYDRALAZINE HYDROCHLORIDE 10 MG: 20 INJECTION INTRAMUSCULAR; INTRAVENOUS at 00:06

## 2021-04-09 RX ADMIN — FUROSEMIDE 20 MG: 10 INJECTION, SOLUTION INTRAMUSCULAR; INTRAVENOUS at 07:13

## 2021-04-09 RX ADMIN — OXYCODONE 5 MG: 5 TABLET ORAL at 16:49

## 2021-04-09 RX ADMIN — ACETAMINOPHEN 650 MG: 325 TABLET, FILM COATED ORAL at 05:15

## 2021-04-09 RX ADMIN — OXYCODONE 5 MG: 5 TABLET ORAL at 20:12

## 2021-04-09 RX ADMIN — HYDRALAZINE HYDROCHLORIDE 10 MG: 20 INJECTION INTRAMUSCULAR; INTRAVENOUS at 17:03

## 2021-04-09 RX ADMIN — ATORVASTATIN CALCIUM 80 MG: 80 TABLET, FILM COATED ORAL at 17:16

## 2021-04-09 RX ADMIN — ASPIRIN 81 MG: 81 TABLET, CHEWABLE ORAL at 17:16

## 2021-04-09 RX ADMIN — POTASSIUM BICARBONATE 50 MEQ: 978 TABLET, EFFERVESCENT ORAL at 10:18

## 2021-04-09 RX ADMIN — BACLOFEN 15 MG: 10 TABLET ORAL at 17:15

## 2021-04-09 RX ADMIN — METOPROLOL TARTRATE 25 MG: 25 TABLET, FILM COATED ORAL at 17:16

## 2021-04-09 RX ADMIN — HYDRALAZINE HYDROCHLORIDE 10 MG: 20 INJECTION INTRAMUSCULAR; INTRAVENOUS at 04:58

## 2021-04-09 RX ADMIN — LISINOPRIL 40 MG: 20 TABLET ORAL at 05:15

## 2021-04-09 RX ADMIN — THYROID, PORCINE 60 MG: 30 TABLET ORAL at 08:16

## 2021-04-09 RX ADMIN — Medication 1 G: at 07:13

## 2021-04-09 RX ADMIN — Medication 1 G: at 17:16

## 2021-04-09 RX ADMIN — Medication 1 G: at 10:18

## 2021-04-09 RX ADMIN — BACLOFEN 10 MG: 10 TABLET ORAL at 05:15

## 2021-04-09 RX ADMIN — SODIUM CHLORIDE 500 ML: 3 INJECTION, SOLUTION INTRAVENOUS at 05:31

## 2021-04-09 RX ADMIN — SODIUM CHLORIDE 500 ML: 3 INJECTION, SOLUTION INTRAVENOUS at 17:16

## 2021-04-09 RX ADMIN — AMLODIPINE BESYLATE 10 MG: 10 TABLET ORAL at 05:15

## 2021-04-09 RX ADMIN — BACLOFEN 10 MG: 10 TABLET ORAL at 11:19

## 2021-04-09 RX ADMIN — DOCUSATE SODIUM 50 MG AND SENNOSIDES 8.6 MG 1 TABLET: 8.6; 5 TABLET, FILM COATED ORAL at 20:13

## 2021-04-09 RX ADMIN — THYROID, PORCINE 60 MG: 30 TABLET ORAL at 20:12

## 2021-04-09 RX ADMIN — MAGNESIUM SULFATE 2 G: 2 INJECTION INTRAVENOUS at 15:44

## 2021-04-09 RX ADMIN — METOPROLOL TARTRATE 25 MG: 25 TABLET, FILM COATED ORAL at 05:15

## 2021-04-09 ASSESSMENT — ENCOUNTER SYMPTOMS
TINGLING: 0
PHOTOPHOBIA: 0
VOMITING: 0
PALPITATIONS: 0
CHILLS: 0
ORTHOPNEA: 0
HEADACHES: 0
NAUSEA: 0
WEIGHT LOSS: 0
BLURRED VISION: 0
DIZZINESS: 0
SPUTUM PRODUCTION: 0
DOUBLE VISION: 0
ABDOMINAL PAIN: 0
FEVER: 0
HEMOPTYSIS: 0
COUGH: 0

## 2021-04-09 ASSESSMENT — COGNITIVE AND FUNCTIONAL STATUS - GENERAL
SUGGESTED CMS G CODE MODIFIER DAILY ACTIVITY: CM
MOVING TO AND FROM BED TO CHAIR: UNABLE
TURNING FROM BACK TO SIDE WHILE IN FLAT BAD: UNABLE
DAILY ACTIVITIY SCORE: 8
PERSONAL GROOMING: A LOT
DRESSING REGULAR UPPER BODY CLOTHING: A LOT
MOBILITY SCORE: 6
MOVING FROM LYING ON BACK TO SITTING ON SIDE OF FLAT BED: UNABLE
HELP NEEDED FOR BATHING: TOTAL
DRESSING REGULAR LOWER BODY CLOTHING: TOTAL
STANDING UP FROM CHAIR USING ARMS: TOTAL
TOILETING: TOTAL
SUGGESTED CMS G CODE MODIFIER MOBILITY: CN
CLIMB 3 TO 5 STEPS WITH RAILING: TOTAL
WALKING IN HOSPITAL ROOM: TOTAL
EATING MEALS: TOTAL

## 2021-04-09 ASSESSMENT — GAIT ASSESSMENTS: GAIT LEVEL OF ASSIST: UNABLE TO PARTICIPATE

## 2021-04-09 NOTE — CARE PLAN
Problem: Skin Integrity  Goal: Risk for impaired skin integrity will decrease  Outcome: PROGRESSING AS EXPECTED  Note: Multiple precautions in place including preventative mepilex on coccyx, heels floated, medical devices rotated every 2  hours, and Q2 turns. Hourly rounding in place.       Problem: Psychosocial Needs:  Goal: Ability to verbalize feelings about condition will improve  Outcome: PROGRESSING SLOWER THAN EXPECTED  Note: Verbalizes yes/no responses when asked about feelings regarding condition, or supplies no response at all. Unable to elicit more information. Will continue to prompt patient to discuss feelings.

## 2021-04-09 NOTE — CARE PLAN
Problem: Self-Care:  Goal: Ability to participate in self-care as condition permits will improve  Outcome: PROGRESSING AS EXPECTED   Pt able to brush teeth with PT/OT today.     Problem: Psychosocial Needs:  Goal: Ability to identify and develop effective coping behavior will improve  Outcome: PROGRESSING AS EXPECTED  Problem: Psychosocial Needs:  Goal: Ability to identify appropriate support needs will improve  Outcome: PROGRESSING AS EXPECTED   Patient transported via cardiac chair to [facility] this AM with two ACLS RN and patient's wife. Patient has improved mood post trip.     Problem: Pain Management  Goal: Pain level will decrease to patient's comfort goal  Outcome: PROGRESSING AS EXPECTED   PRN Oxycodone and scheduled tylenol given.

## 2021-04-09 NOTE — THERAPY
Physical Therapy   Daily Treatment     Patient Name: Thong Arzola  Age:  56 y.o., Sex:  male  Medical Record #: [id number]  Today's Date: 4/9/2021     Precautions: Fall Risk, Swallow Precautions (See Comments)    Assessment    Pt with improved arousal today; practiced left sidelying/trunk rotation with gravity assisted left cervical side bending; discussed stim with wife for different textures/temps and ROM for left side of body; wife not to perform shoulder ROM without therapy present given significant tone and scapular facilitation needs; able to progress during session to sitting with anterior weight shifting with only CGA to facilitate left UE weight bearing and triceps; pt declined standing attempts today as he was going outside with RN staff via cardiac chair; will follow. Needs placement.     Plan    Continue current treatment plan.    DC Equipment Recommendations:  Unable to determine at this time  Discharge Recommendations:  Recommend post-acute placement for additional physical therapy services prior to discharge home      Abridged Subjective/Objective       04/09/21 1145   Cognition    Cognition / Consciousness X   Speech/ Communication Delayed Responses   Level of Consciousness Alert   Safety Awareness Impaired   Attention Impaired   Initiation Impaired   Comments pleasant and cooperative; appears more alert today;    Sitting Lower Body Exercises   Comments long arc quad x 3 with patellar reflex reinforcement    Vision   Vision Comments focus on left visual scanning with wife reinforce to the left;    Other Treatments   Other Treatments Provided right upper trap manual release along muscle below with upper cervical side bending to the left reinforced x 3 minutes;    Balance   Sitting Balance (Static) Poor   Sitting Balance (Dynamic) Poor -   Weight Shift Sitting Poor   Skilled Intervention Postural Facilitation;Tactile Cuing;Sequencing;Verbal Cuing   Comments B UE support faciltiated, left side lying  with left trunk rotation for gravity assist right upper trap stretch, manual facilitation of left cervical rotation toward bed/wife, holding positiong x 5 minutes; able to practice anterior shoulder shift and left UE weight bearing with tricep support, able to hold position x 1 min; losses balance posterior with upright head gaze     Gait Analysis   Gait Level Of Assist Unable to Participate   Bed Mobility    Supine to Sit Maximal Assist  (moving right LE and UE with VC )   Sit to Supine Total Assist   Functional Mobility   Sit to Stand Refused   Comments declined to stand today as going outside with RN staff after    Short Term Goals    Short Term Goal # 1 Pt will be able to perform supine<>sit with bed features with min A in 6tx to promote fnx progression towards I    Goal Outcome # 1 goal not met   Short Term Goal # 2 Pt will be able to perform sit<>stand/transfers with hemiwalker with [place] in 6tx to promote fnx progression towards I    Goal Outcome # 2 Goal not met   Short Term Goal # 3 Pt will be able to ambulate 25ft with hemiwalker with min A in 6tx to promote fnx progression towards I    Goal Outcome # 3 Goal not met   Education Group   Role of Physical Therapist Patient Response Patient;Significant Other;Acceptance;Explanation;Verbal Demonstration;Action Demonstration   Additional Comments discussed ROM restrictions with wife, to not perform shoulder ROM unless therapy present; utilizing different textures hot/cold on left side of body for stim;

## 2021-04-09 NOTE — DIETARY
Nutrition support weekly update:  Day 9 of admit.  Thong Arzola is a 56 y.o. male with admitting DX of Acute Ischemic right MCA stroke. Further Dx include Hx of COVID-19, & Acquired hypothyroidism.    Tube feeding initiated on 4/3. Current TF via gastric Cortrak is Replete Fiber @ 75 mL/hr providing 1800 kcals, 115 gm protein, and 1494 mL free water/day. TF currently @ goal.     Assessment:  Weight: 79.6 kg (175 lb 7.8 oz) - via the bed scale (3/31 - waiting new wt per RN)  Body mass index is 25.18 kg/m^2., BMI Classification: Overweight.       Re-estimation of nutritional needs not indicated at this time.     Evaluation:   1. Pt not appropriate for PO diet and remains NPO w/ TF per SLP on 4/8  2. Trace RLE + LLE edema w/ +3.5 L since admit  3. Labs: Sodium: 152, Chloride: 118, Glucose: 118, BUN: 29.  4. Medications: Sodium Chloride, Norvasc, Aspirin, Lipitor, Dulcolax, Catapres, Labetalol, Lisinopril, Bowel Protocol, Electrolyte Replacement, Lopressor, Oxycodone,  5. LBM: 4/7  6. Current feeding of Replete Fiber is appropriate to meet pt's nutritional needs.      Malnutrition risk: criteria not met.     Recommendations/Plan:  1. Continue Replete Fiber @ 75 mL/hr to provide 1800 kcals, 115 gm protein, and 1494 mL free water/day.  2. Advance to PO diet per SLP.   3. Fluids per MD.   4. Please obtain a measured weight when feasible.    RD following.

## 2021-04-09 NOTE — THERAPY
Occupational Therapy  Daily Treatment     Patient Name: Thong Arzola  Age:  56 y.o., Sex:  male  Medical Record #: [id number]  Today's Date: 4/9/2021     Precautions  Precautions: Fall Risk, Swallow Precautions ( See Comments)  Comments: no bone flat R, helmet on when OOB    Assessment    Today's session focused on scanning/looking to left as well as seated ADLs.  Making progress, remains limited by weakness, fatigue, impaired balance, hypertonicity, impaired cognition which impacts independence in self care and functional mobility.    Plan    Continue current treatment plan.    DC Equipment Recommendations: Unable to determine at this time  Discharge Recommendations: Recommend post-acute placement for additional occupational therapy services prior to discharge home    Subjective    Pleasant and cooperative     Objective       04/09/21 1144   Cognition    Cognition / Consciousness X   Speech/ Communication Delayed Responses   Level of Consciousness Alert   Safety Awareness Impaired   Attention Impaired   Initiation Impaired   Comments pleasant and cooperative, more alert today and vocal, severe L neglect   Upper Body Muscle Tone   Upper Body Muscle Tone  X   Comments less hypertonicity present today LUE   Other Treatments   Other Treatments Provided Worked on scanning past midline to L and gentle cervical rotation towards left   Balance   Sitting Balance (Static) Poor -   Sitting Balance (Dynamic) Trace +   Weight Shift Sitting Poor   Comments pt declined standing today   Bed Mobility    Supine to Sit Total Assist   Sit to Supine Total Assist   Scooting Maximal Assist   Activities of Daily Living   Grooming Moderate Assist;Seated  (assist for sitting balance, using RUE to brush)   Lower Body Dressing Total Assist   Comments pt perseverating on R side of mouth brushing very rapidly   Functional Mobility   Mobility EOB only today   Visual Perception   Visual Perception  X   Neglect Severe Left   Visual Scanning  Impaired   Comments improved ability to maintain midline but still unable to scan past midline to left   Patient / Family Goals   Patient / Family Goal #1 To return to PLOF   Short Term Goals   Short Term Goal # 1 Pt will sit EOB unsupported and perform grooming w/ SPV   Goal Outcome # 1 Goal not met   Short Term Goal # 2 Pt will attend 3 objects on L side w/ only v/c's   Goal Outcome # 2 Goal not met   Short Term Goal # 3 Pt will increase L UE strength to 2/5 grossly   Goal Outcome # 3 Goal not met   Short Term Goal # 4 Pt will perform ADL txf w/ min A   Goal Outcome # 4 Goal not met

## 2021-04-09 NOTE — PROGRESS NOTES
[facility] Progress Note      Admit Date: 3/31/2021    Resident(s): Johana Nelson M.D.   Attending:  GENE MURILLO/ Dr. Bell    Patient ID:    Name:  Thong Arzola   YOB: 1965  Age:  56 y.o.  male   MRN:  [id number]    Hospital Course (carried forward and updated):  Thong Arzola is a 56 y.o. male with  medical history of Hyperlipidemia, Paroxysmal atrial fibrillation and hypothyroidism is admitted on 03/31 for left side weakness and facial paralysis found to have Right MCA stroke , not a candidate for Tpa or mechanical thrombectomy who subsequently underwent Hemicraniotomy on 04/03. He also had worsening cerebral edema and currently being monitored in ICU with hypertonic saline     Consultants:  Critical Care  Neurology     Interval Events:  No acute events overnight. Patient is awake this morning, no acute concerns     -Will wean hypertonic saline slowly ( cut down by 10cc/hr every 12 hrs) as per neuro. On salt tablets  -Enteral nutrition with tube feedings   -Lovenox for DVT prophylaxis     Vitals Range last 24h:  Temp:  [37.4 °C (99.3 °F)-37.7 °C (99.9 °F)] 37.6 °C (99.7 °F)  Pulse:  [47-89] 72  Resp:  [11-63] 19  BP: (112-185)/(61-96) 154/83  SpO2:  [88 %-97 %] 95 %      Intake/Output Summary (Last 24 hours) at 4/9/2021 1042  Last data filed at 4/9/2021 1030  Gross per 24 hour   Intake 3635 ml   Output 4015 ml   Net -380 ml        Review of Systems   Constitutional: Negative for chills, fever and weight loss.   HENT: Negative for ear pain, hearing loss and tinnitus.    Eyes: Negative for blurred vision, double vision and photophobia.   Respiratory: Negative for cough, hemoptysis and sputum production.    Cardiovascular: Negative for chest pain, palpitations and orthopnea.   Gastrointestinal: Negative for abdominal pain, nausea and vomiting.   Genitourinary: Negative for dysuria, frequency and urgency.   Skin: Negative for itching and rash.   Neurological: Negative for dizziness,  tingling and headaches.       PHYSICAL EXAM:  Vitals:    04/09/21 0700 04/09/21 0800 04/09/21 0900 04/09/21 1000   BP: 120/77 151/75 154/78 154/83   Pulse: (!) 54 64 75 72   Resp: 15 18 16 19   Temp:  37.4 °C (99.3 °F)  37.6 °C (99.7 °F)   TempSrc:  Bladder  Bladder   SpO2: 96% 96% 95%    Weight:       Height:        Body mass index is 25.18 kg/m².    O2 therapy: Pulse Oximetry: 95 %, O2 Delivery Device: Room air w/o2 available    Date 04/09/21 0700 - 04/10/21 0659   Shift 8714-9403 5373-1204 7178-0211 24 Hour Total   INTAKE   I.V. 200   200     IV Volume (3%) 200   200   NG/   300     Intake (mL) (Enteral Tube 04/03/21 Cortrak - Gastric Right nare) 300   300   IV Piggyback 50   50     Volume (mL) (sodium chloride 200 mEq in empty bag 50 mL ivpb) 50   50   Shift Total 550   550   OUTPUT   Urine 1625   1625     Output (mL) (Urethral Catheter Temperature probe 16 Fr.) 1625   1625   Shift Total 1625   1625   NET -1075   -1075        Physical Exam   Constitutional: He is oriented to person, place, and time.   HENT:   Head: Normocephalic and atraumatic.   Eyes: Pupils are equal, round, and reactive to light.   Cardiovascular: Normal rate, regular rhythm and normal heart sounds.   Pulmonary/Chest: Effort normal and breath sounds normal.   Abdominal: Soft. Bowel sounds are normal.   Musculoskeletal:      Cervical back: Normal range of motion and neck supple.   Neurological: He is alert and oriented to person, place, and time.   Flaccid on left and 4/5 on right    Skin: Skin is warm.           Recent Labs     04/07/21  0600 04/07/21  0907 04/08/21  0657 04/08/21  1221 04/08/21  1810 04/09/21  0215 04/09/21  0930   SODIUM 147*   < > 148*   < > 153* 146* 152*   POTASSIUM 3.5*   < > 3.7   < > 3.7 3.5* 3.6   CHLORIDE 115*   < > 116*   < > 120* 115* 118*   CO2 27   < > 28   < > 25 24 27   BUN 29*   < > 32*   < > 26* 26* 29*   CREATININE 0.90   < > 0.86   < > 0.81 0.78 0.81   MAGNESIUM 2.0  --  2.0  --   --   --   --     PHOSPHORUS 2.2*  --  3.2  --   --   --   --    CALCIUM 8.7   < > 8.6   < > 8.7 8.8 8.9    < > = values in this interval not displayed.     Recent Labs     04/08/21  1810 04/09/21  0215 04/09/21  0930   GLUCOSE 124* 144* 118*     Recent Labs     04/07/21  0600 04/08/21  0657 04/09/21  0510   RBC 4.33* 3.94* 4.28*   HEMOGLOBIN 12.8* 11.5* 12.5*   HEMATOCRIT 37.6* 34.7* 37.0*   PLATELETCT 262 255 284     Recent Labs     04/07/21  0600 04/08/21  0657 04/09/21  0510   WBC 9.9 7.2 6.9   NEUTSPOLYS 84.70* 75.70* 74.60*   LYMPHOCYTES 7.40* 12.40* 13.30*   MONOCYTES 7.30 9.10 9.30   EOSINOPHILS 0.10 2.30 2.30   BASOPHILS 0.10 0.10 0.10       Meds:  • sodium chloride  1 g     • hydrALAZINE  10 mg     • labetalol  10-20 mg     • MD Alert...Adult ICU Electrolyte Replacement per Pharmacy       • cloNIDine  0.1 mg     • 3% sodium chloride  500 mL 50 mL/hr at 04/09/21 0702   • baclofen  10 mg     • calcium carbonate  500 mg     • lisinopril  40 mg     • amLODIPine  10 mg     • metoprolol tartrate  25 mg     • enoxaparin (LOVENOX) injection  40 mg     • aspirin  81 mg     • thyroid  60 mg     • Pharmacy Consult Request  1 Each     • MD ALERT...DO NOT ADMINISTER NSAIDS or ASPIRIN unless ORDERED By Neurosurgery  1 Each     • bisacodyl  10 mg     • artificial tears  1 Application     • Pharmacy  1 Each     • atorvastatin  80 mg     • senna-docusate  1 tablet     • magnesium hydroxide  30 mL     • ondansetron  4 mg     • oxyCODONE immediate-release  5 mg      Or   • oxyCODONE immediate-release  10 mg      Or   • HYDROmorphone  0.5 mg     • polyethylene glycol/lytes  1 Packet     • promethazine  12.5-25 mg     • senna-docusate  1 tablet     • ondansetron  4 mg     • promethazine  12.5-25 mg     • prochlorperazine  5-10 mg          Procedures:  Hemicraniotomy on 04/03  Central line on 04/05    Imaging:  US-EXTREMITY VENOUS LOWER BILAT   Final Result      DX-CHEST-LIMITED (1 VIEW)   Final Result      Right basilar atelectasis. No focal  consolidation or pleural effusions.      [id number] VIEW   Final Result      No evidence of bowel obstruction.                  DX-CHEST-LIMITED (1 VIEW)   Final Result      1.  Right internal jugular catheter and enteric catheter appear appropriately located      2.  Minimal right basilar atelectasis      CT-HEAD W/O   Final Result         1.  Changes of evolving infarct within the right MCA distribution   2.  Hyperdensity in the sulci of the right MCA distribution infarct, largely appears related to thrombosed vessels, component of subarachnoid hemorrhage is suspected particularly in the right frontal lobe.   3.  Pneumocephalus, decreased since prior.      CT-HEAD W/O   Final Result         Postsurgical change from right craniectomy. Redemonstration of large right MCA infarct with edema and bulging of the brain parenchyma through the craniectomy defect      Less mass effect on the right lateral ventricle. Less right to left midline shift of 3 mm, previously 10 mm.         DX-ABDOMEN FOR TUBE PLACEMENT   Final Result      Enteric tube terminates over the stomach.      CT-HEAD W/O   Final Result         1.  Low-density changes of the right hemisphere compatible with MCA distribution infarct with edema.   2.  New effacement of the bilateral ventricles, right greater than left, with 10 mm right-to-left midline shift.   3.  New dilatation of the left temporal horn ventricle, appearance favors component of obstructive hydrocephalus due to mass effect from infarct and edema.   4.  Hyperdensity in the right middle cerebral artery, likely thrombosis given associated findings.      These findings were discussed with the patient's clinician, Dr. Nunez, on 4/3/2021 7:11 AM.      MR-BRAIN-W/O   Final Result      Very large acute right MCA territory infarct as detailed above with small amount of petechial hemorrhage in the right insular region and right temporal lobe.      Punctate right thalamic lacunar infarct.      Right  ICA and M1 MCA occlusion.      EC-ECHOCARDIOGRAM COMPLETE W/O CONT   Final Result      DX-CHEST-PORTABLE (1 VIEW)   Final Result         1.  Interstitial pulmonary parenchymal prominence, compatible with interstitial edema and/or infiltrates.      CT-CTA NECK WITH & W/O-POST PROCESSING   Final Result      Acute occlusion of the right internal carotid artery shortly after bifurcation. It is occluded up to the level of the carotid terminus.      CT-CTA HEAD WITH & W/O-POST PROCESS   Final Result      Acute right M1 occlusion.      Findings were discussed with RHONDA DIAZ on 3/31/2021 7:54 PM.      CT-CEREBRAL PERFUSION ANALYSIS   Final Result      1.  Cerebral blood flow less than 30% likely representing completed infarct = 134 mL.      2.  T Max more than 6 seconds likely representing combination of completed infarct and ischemia = 210 mL.      3.  Mismatched volume likely representing ischemic brain/penumbra = 76 mL.      4.  Please note that the cerebral perfusion was performed on the limited brain tissue around the basal ganglia region. Infarct/ischemia outside the CT perfusion sections can be missed in this study.      CT-HEAD W/O   Final Result   Addendum 1 of 1   In retrospect, there is subtle loss of gray-white matter differentiation    in the right MCA distribution, consistent with acute infarct.      Final      1.  No CT evidence of acute infarct, hemorrhage or mass.   2.  Mild paranasal sinus disease.          ASSESSEMENT and PLAN:    * Acute right MCA stroke (HCC)- (present on admission)  Assessment & Plan  -Presented with left side weakness and facial paralysis. CT imaging showed Acute right LAM[place] territory stroke  -Outside the window for alteplase, not a candidate for thrombectomy per IR  -Underwent hemicraniotomy on 04/03 for increased ICP   -Patient had worsening facial edema due to cerebral edema with evolving infarct on 04/04. Received hypertonic saline bolus on 04/05   -Echo from 04/01 showed  normal EF and no shunt     Plan:  -q2h neuro checks  -Permissive hypertension , goal of BP<160  -Maintain normothermia, normoglycemia  -Enteral nutrition through tube feedings  -Elevate head of bed  -PT/OT/SLP  -PMNR  -SBP goal <140  continue amlodipine and lisinopril . Has Prn's available   -On Hypertonic saline Goal sodium of 150-155, will wean off slowly. On salt tablets  -Resatrted aspirin and DVT prophylaxis today as per neurology. No need for prophylactic epileptics. However he needs long term anticoagulation for his atrial fibrillation once he is more stable     Fever  Assessment & Plan  Patient had temp of 100.8 on 04/07 and 04/06  No leucocytosis, less likely infection. Ordered pro calcitonin for fever work up   It could also be neurogenic fever post stroke as per neuro  -Will monitor     Paroxysmal atrial fibrillation (HCC)- (present on admission)  Assessment & Plan  -Diagnosed about 9 months ago  -Was reportedly taking Coreg, however stopped taking it when he went back into sinus rhythm  -has not been on AC OP  -has been treating holistically OP. Taking vitamins, including Vitamin K  -ECHO unremarkable  -telemetry - currently NSR  -Chadsvasc2 score is 2 with stroke, needs anticoagulation long term once stable. Will do aspirin form today       History of COVID-19- (present on admission)  Assessment & Plan  -Positive PCR 3/18/2021  -Patient currently is asymptomatic, on room air, in no respiratory distress.  No indications to treat  -Repeat PCR 3/31 remains positive.  Per hospital infectious prevention, no longer requires isolation      Leucocytosis  Assessment & Plan  -resolved   -Most likely reactive     Urinary retention  Assessment & Plan  -secondary to acute stroke  -Reportedly does not have at baseline  -dye catheter in place    Acquired hypothyroidism- (present on admission)  Assessment & Plan  -TSH low, normal T4  -restart home armour thyroid today, dose adjusted   -Needs outpatient follow  up        DISPO: ICU    CODE STATUS: Full code    Quality Measures:  Feeding: tube feeds  Analgesia: tylenol  Sedation: none   Thromboprophylaxis: lovenox   Head of bed: >30 degrees  Ulcer prophylaxis: none   Glycemic control: sliding scale prn   Bowel care: bowel regimen Prn   Indwelling lines: iv line and central line   Deescalation of antibiotics: none       Johana Nelson M.D.

## 2021-04-09 NOTE — PROGRESS NOTES
Neurology Progress Note  Neurohospitalist Service, [facility] Neurosciences    Referring Physician: Gia Forman M.D.      Interval History:  Bradycardic overnight, Na 146 this AM, overall exam stable    Review of systems: In addition to what is detailed in the HPI and/or updated in the interval history, all other systems reviewed and are negative.    Past Medical History, Past Surgical History and Social History reviewed and unchanged from prior    Medications:    Current Facility-Administered Medications:   •  sodium chloride 200 mEq in empty bag 50 mL ivpb, 200 mEq, Intravenous, Once, Cristhian Bell M.D.  •  sodium chloride (SALT) tablet 1 g, 1 g, Enteral Tube, TID WITH MEALS, Cristhian Bell M.D., 1 g at 04/09/21 0713  •  hydrALAZINE (APRESOLINE) injection 10 mg, 10 mg, Intravenous, Q HOUR PRN, Cristhian Bell M.D., Stopped at 04/09/21 0740  •  labetalol (NORMODYNE/TRANDATE) injection 10-20 mg, 10-20 mg, Intravenous, Q4HRS PRN, Cristhian Bell M.D., 20 mg at 04/07/21 2037  •  MD Alert...ICU Electrolyte Replacement per Pharmacy, , Other, PHARMACY TO DOSE, Johana Nelson M.D.  •  cloNIDine (CATAPRES) tablet 0.1 mg, 0.1 mg, Enteral Tube, Q4HRS PRN, Cristhian Bell M.D., 0.1 mg at 04/08/21 1734  •  3% sodium chloride (HYPERTONIC SALINE) 500mL infusion 500 mL, 500 mL, Intravenous, Continuous, Dl Hidalgo M.D., Last Rate: 50 mL/hr at 04/09/21 0702, Rate Verify at 04/09/21 0702  •  baclofen (LIORESAL) tablet 10 mg, 10 mg, Enteral Tube, TID, Christofer Eisenberg D.O., 10 mg at 04/09/21 0515  •  calcium carbonate (TUMS) chewable tab 500 mg, 500 mg, Enteral Tube, Q8HRS PRN, Cristhian Bell M.D., 500 mg at 04/07/21 1501  •  lisinopril (PRINIVIL) tablet 40 mg, 40 mg, Enteral Tube, Q DAY, Cristhian Bell M.D., 40 mg at 04/09/21 0515  •  amLODIPine (NORVASC) tablet 10 mg, 10 mg, Enteral Tube, Q DAY, Johana Nelson M.D., 10 mg at 04/09/21 0515  •  metoprolol tartrate (LOPRESSOR) tablet 25 mg, 25 mg, Enteral  Tube, BID, Cristhian Bell M.D., 25 mg at 04/09/21 0515  •  enoxaparin (LOVENOX) inj 40 mg, 40 mg, Subcutaneous, DAILY, Johana Nelson M.D., 40 mg at 04/09/21 0515  •  aspirin (ASA) chewable tab 81 mg, 81 mg, Enteral Tube, DAILY, Cristhian Bell M.D., 81 mg at 04/08/21 1734  •  thyroid (ARMOUR THYROID) tablet 60 mg, 60 mg, Enteral Tube, BID, King Spann M.D., 60 mg at 04/08/21 2136  •  Pharmacy Consult Request ...Pain Management Review 1 Each, 1 Each, Other, PHARMACY TO DOSE, HANSA Morris.A.-C.  •  MD ALERT...DO NOT ADMINISTER NSAIDS or ASPIRIN unless ORDERED By Neurosurgery 1 Each, 1 Each, Other, PRN, HANSA Morris.A.-C.  •  bisacodyl (DULCOLAX) suppository 10 mg, 10 mg, Rectal, Q24HRS PRN, Lilia Pizano, P.A.-C., 10 mg at 04/05/21 1523  •  artificial tears (EYE LUBRICANT) ophth ointment 1 Application, 1 Application, Both Eyes, PRN, Lilia Pizano, P.A.-C.  •  Pharmacy Consult: Enteral tube insertion - review meds/change route/product selection, 1 Each, Other, PHARMACY TO DOSE, Radha Meredith, A.P.R.N.  •  atorvastatin (LIPITOR) tablet 80 mg, 80 mg, Enteral Tube, Q EVENING, Radha Meredith A.P.R.N., 80 mg at 04/08/21 1733  •  senna-docusate (PERICOLACE or SENOKOT S) 8.6-50 MG per tablet 1 tablet, 1 tablet, Enteral Tube, Nightly, Radha Meredith A.P.R.N., 1 tablet at 04/08/21 2136  •  magnesium hydroxide (MILK OF MAGNESIA) suspension 30 mL, 30 mL, Enteral Tube, QDAY PRN, Radha Meredith A.P.R.N.  •  ondansetron (ZOFRAN ODT) dispertab 4 mg, 4 mg, Enteral Tube, Q4HRS PRN, RICHMOND HamptonP.R.N.  •  oxyCODONE immediate-release (ROXICODONE) tablet 5 mg, 5 mg, Enteral Tube, Q3HRS PRN, 5 mg at 04/07/21 1423 **OR** oxyCODONE immediate-release (ROXICODONE) tablet 10 mg, 10 mg, Enteral Tube, Q3HRS PRN, 10 mg at 04/06/21 0405 **OR** HYDROmorphone (Dilaudid) injection 0.5 mg, 0.5 mg, Intravenous, Q3HRS PRN, RICHMOND HamptonP.R.N., 0.5 mg at 04/03/21 3902  •  polyethylene glycol/lytes  (MIRALAX) PACKET 1 Packet, 1 Packet, Enteral Tube, BID PRN, Radha Meredith, A.P.R.N., 1 Packet at 04/04/21 1714  •  promethazine (PHENERGAN) tablet 12.5-25 mg, 12.5-25 mg, Enteral Tube, Q4HRS PRN, Radha Meredith, A.P.R.N.  •  senna-docusate (PERICOLACE or SENOKOT S) 8.6-50 MG per tablet 1 tablet, 1 tablet, Enteral Tube, Q24HRS PRN, Radha Meredith, A.P.R.N.  •  ondansetron (ZOFRAN) syringe/vial injection 4 mg, 4 mg, Intravenous, Q4HRS PRN, Gia Forman M.D., 4 mg at 04/07/21 0028  •  promethazine (PHENERGAN) suppository 12.5-25 mg, 12.5-25 mg, Rectal, Q4HRS PRN, Gia Forman M.D.  •  prochlorperazine (COMPAZINE) injection 5-10 mg, 5-10 mg, Intravenous, Q4HRS PRN, Gia Forman M.D., 10 mg at 04/05/21 0913    Physical Examination:     Vitals:    04/09/21 0530 04/09/21 0545 04/09/21 0600 04/09/21 0700   BP: (!) 165/89  126/77 120/77   Pulse: 80 60 (!) 58 (!) 54   Resp: (!) 23 13 13 15   Temp:   37.5 °C (99.5 °F)    TempSrc:   Bladder    SpO2: 96% 94% 95% 96%   Weight:       Height:         R flap- soft    NEUROLOGICAL EXAM:     Mental status: More alert today, eyes open,  speaking and interactive  Speech and language: Speech is mildly dysarthric. Able to follow commands  Cranial nerve exam:  L visual field cut.R gaze preference, cannot cross midline L face droop  Motor exam: RUE is antigravity, RLE is briefly antigravity- appears effort dependent, flexion contracture LUE, no movement in LLE  Sensory exam: Does not react to tactile on L hemibody- does feel noxious stimuli on L hemibody  Coordination: no jacy ataxia on R side movements    Objective Data:    Labs:  Lab Results   Component Value Date/Time    PROTHROMBTM 14.0 03/31/2021 07:29 PM    INR 1.04 03/31/2021 07:29 PM      Lab Results   Component Value Date/Time    WBC 6.9 04/09/2021 05:10 AM    RBC 4.28 (L) 04/09/2021 05:10 AM    HEMOGLOBIN 12.5 (L) 04/09/2021 05:10 AM    HEMATOCRIT 37.0 (L) 04/09/2021 05:10 AM    MCV 86.4 04/09/2021 05:10 AM    MCH 29.2  04/09/2021 05:10 AM    MCHC 33.8 04/09/2021 05:10 AM    MPV 9.6 04/09/2021 05:10 AM    NEUTSPOLYS 74.60 (H) 04/09/2021 05:10 AM    LYMPHOCYTES 13.30 (L) 04/09/2021 05:10 AM    MONOCYTES 9.30 04/09/2021 05:10 AM    EOSINOPHILS 2.30 04/09/2021 05:10 AM    BASOPHILS 0.10 04/09/2021 05:10 AM      Lab Results   Component Value Date/Time    SODIUM 146 (H) 04/09/2021 02:15 AM    POTASSIUM 3.5 (L) 04/09/2021 02:15 AM    CHLORIDE 115 (H) 04/09/2021 02:15 AM    CO2 24 04/09/2021 02:15 AM    GLUCOSE 144 (H) 04/09/2021 02:15 AM    BUN 26 (H) 04/09/2021 02:15 AM    CREATININE 0.78 04/09/2021 02:15 AM    CREATININE 1.3 04/04/2008 03:05 AM      Lab Results   Component Value Date/Time    CHOLSTRLTOT 169 04/01/2021 12:36 PM     (H) 04/01/2021 12:36 PM    HDL 32 (A) 04/01/2021 12:36 PM    TRIGLYCERIDE 126 04/01/2021 12:36 PM       Lab Results   Component Value Date/Time    ALKPHOSPHAT 56 04/06/2021 05:00 AM    ASTSGOT 28 04/06/2021 05:00 AM    ALTSGPT 16 04/06/2021 05:00 AM    TBILIRUBIN 0.4 04/06/2021 05:00 AM        Imaging/Testing:    I interpreted and/or reviewed the patient's neuroimaging    US-EXTREMITY VENOUS LOWER BILAT   Final Result      DX-CHEST-LIMITED (1 VIEW)   Final Result      Right basilar atelectasis. No focal consolidation or pleural effusions.      [id number] VIEW   Final Result      No evidence of bowel obstruction.                  DX-CHEST-LIMITED (1 VIEW)   Final Result      1.  Right internal jugular catheter and enteric catheter appear appropriately located      2.  Minimal right basilar atelectasis      CT-HEAD W/O   Final Result         1.  Changes of evolving infarct within the right MCA distribution   2.  Hyperdensity in the sulci of the right MCA distribution infarct, largely appears related to thrombosed vessels, component of subarachnoid hemorrhage is suspected particularly in the right frontal lobe.   3.  Pneumocephalus, decreased since prior.      CT-HEAD W/O   Final Result          Postsurgical change from right craniectomy. Redemonstration of large right MCA infarct with edema and bulging of the brain parenchyma through the craniectomy defect      Less mass effect on the right lateral ventricle. Less right to left midline shift of 3 mm, previously 10 mm.         DX-ABDOMEN FOR TUBE PLACEMENT   Final Result      Enteric tube terminates over the stomach.      CT-HEAD W/O   Final Result         1.  Low-density changes of the right hemisphere compatible with MCA distribution infarct with edema.   2.  New effacement of the bilateral ventricles, right greater than left, with 10 mm right-to-left midline shift.   3.  New dilatation of the left temporal horn ventricle, appearance favors component of obstructive hydrocephalus due to mass effect from infarct and edema.   4.  Hyperdensity in the right middle cerebral artery, likely thrombosis given associated findings.      These findings were discussed with the patient's clinician, Dr. Nunez, on 4/3/2021 7:11 AM.      MR-BRAIN-W/O   Final Result      Very large acute right MCA territory infarct as detailed above with small amount of petechial hemorrhage in the right insular region and right temporal lobe.      Punctate right thalamic lacunar infarct.      Right ICA and M1 MCA occlusion.      EC-ECHOCARDIOGRAM COMPLETE W/O CONT   Final Result      DX-CHEST-PORTABLE (1 VIEW)   Final Result         1.  Interstitial pulmonary parenchymal prominence, compatible with interstitial edema and/or infiltrates.      CT-CTA NECK WITH & W/O-POST PROCESSING   Final Result      Acute occlusion of the right internal carotid artery shortly after bifurcation. It is occluded up to the level of the carotid terminus.      CT-CTA HEAD WITH & W/O-POST PROCESS   Final Result      Acute right M1 occlusion.      Findings were discussed with ROHNDA DIAZ on 3/31/2021 7:54 PM.      CT-CEREBRAL PERFUSION ANALYSIS   Final Result      1.  Cerebral blood flow less than 30% likely  representing completed infarct = 134 mL.      2.  T Max more than 6 seconds likely representing combination of completed infarct and ischemia = 210 mL.      3.  Mismatched volume likely representing ischemic brain/penumbra = 76 mL.      4.  Please note that the cerebral perfusion was performed on the limited brain tissue around the basal ganglia region. Infarct/ischemia outside the CT perfusion sections can be missed in this study.      CT-HEAD W/O   Final Result   Addendum 1 of 1   In retrospect, there is subtle loss of gray-white matter differentiation    in the right MCA distribution, consistent with acute infarct.      Final      1.  No CT evidence of acute infarct, hemorrhage or mass.   2.  Mild paranasal sinus disease.          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  He is now s/p decompressive hemicraniectomy on 4/3.  Stroke etiology invariably cardioembolic. Currently on ASA bridge to anticoagulation given high infarct burden and increased risk for immediate hemorrhagic conversion. He is now day 10 from his stroke and likely at end of peak edema window.  May SLOWLY liberalize Na goal- avoid significant drop in Na (he is prone to do this even while on 3% infusion), and do not actively correct- see below for possible titrate schedule.      Plan:   - q4h neurochecks/NIHSS   - Normal Na goal- do not actively correct- aim for 1-2 pt/day- would decrease rate of 3% by 10ml/hr every 12hrs.   - SBP goal acutely is 120-160, long-term goal is 110-130/60-80; titrating PO meds, cardene PRN   - continue statin for LDL goal < 70   - continue  ASA 81mg daily tonight, will consider anticoagulation with NOAC in ~2 weeks (around 4/14- repeat head CT prior to initiation), or outside acute edema development phase   - lovenox 40mg SQ for DVT ppx    - PT/OT/SLP as able    The evaluation  of the patient, and recommended management, was discussed with the resident staff. I have performed a physical exam and reviewed and updated ROS and Plan today (4/9/2021).     CRITICAL CARE    Upon my evaluation, this patient had a high probability of imminent or life-threatening deterioration due to cerebrovascular accident which required my direct attention, intervention, and personal management.  I personally provided 35 minutes of total critical care time outside of time spent on separately billable/documented procedures. Time includes: review of laboratory data, review of radiology studies, discussion with consultants, discussion with family/patient, monitoring for potential decompensation.  Interventions were performed as documented in the chart.      Arthur Jackson MD  Neurohospitalist, Acute Care Services

## 2021-04-09 NOTE — PROGRESS NOTES
Physical Medicine and Rehabilitation Consultation              Date of initial consultation: 4/2/2021  Consulting provider: Mc Calvillo MD  Reason for consultation: assess for acute inpatient rehab appropriateness  LOS: 9 Day(s)    Chief complaint: Stroke    HPI: The patient is a 56 y.o. right hand dominant male with a past medical history of hypertension, hyperlipidemia, atrial fibrillation not on anticoagulation, Covid positive on 3/18;  who presented on 3/31/2021  7:29 PM brought in by care flight with left hemiplegia, facial droop, right gaze deviation.  CT head showed complete occlusion of right MCA, but unfortunately he was not a candidate for TPA or thrombectomy.  Seen by neurology, found to have a NIH score of 18.  Additional work-up with CTA shows right ICA complete occlusion.  Etiology is thought to be cardioembolic due to A. fib off of AC.  He is currently being followed closely by neurosurgery as he is expected to have peak brain swelling in the next 1 to 2 days and may require craniotomy.  Follow-up MRI shows minimal shift with large MCA stroke    Most of my visit is with Jims wife but also with him. He endorses left neglect and weakness, but does not endorse paresthesias at this time. I had a long meeting with his wife about expectations with this type and severity of stroke.     4/7/2021  I have been following patient's chart in the periphery, since my last visit he has undergone hemicraniectomy on 4/3 for increased cerebral swelling, and postop.  Has been complicated by continuing cerebral edema resulting in midline shift.  Neurology continuing to follow. Son at bedside. Patient still have severe left neglect. He continues to have trace left hand movement.     4/9/2021  Patient continues to have severe left neglect and spasticity. ROM training given to wife at bedside. Patient is developing a functional torticollis from only looking right due to neglect. No hand movement observed today.  "Patient denies new complaints.     Social Hx:  2 SH  2-3 MAKENZIE, 1 flight of stairs with rails inside  With: Spouse    Per [facility] \"Anel [wife] tells me she is currently working full time, has flexible job and will take time off to provide 24/7 support when Thong returnes home.  They live 2 story home, 2 -3 steps to enter.  Master bedroom on ground floor.  Bathroom has tub/shower combo, no safety grab bars. \"    THERAPY:  PT: Functional mobility   4/1: Mod assist sit to stand with 2 people  4/7: Max Assist    OT: ADLs  4/1: Mod assist grooming, max assist lower body dressing  4/7: Total assist     SLP:   4/1: N.p.o. pending fees  4/2: minced and moist/mildly thick liquid   4/7: NPO  4/8: NPO with 3-5 ice chips per hour    IMAGING:  MR brain 4/1/2021  Very large acute right MCA territory infarct as detailed above with small amount of petechial hemorrhage in the right insular region and right temporal lobe.  Punctate right thalamic lacunar infarct.  Right ICA and M1 MCA occlusion.    PROCEDURES:  None    PMH:  Past Medical History:   Diagnosis Date   • Afib (HCC)    • High cholesterol        PSH:  Past Surgical History:   Procedure Laterality Date   • CRANIOTOMY Right 4/3/2021    Procedure: CRANIOTOMY;  Surgeon: Arthur Payton M.D.;  Location: SURGERY [facility];  Service: Neurosurgery   • KNEE RECONSTRUCTION      left knee orthoscopic       FHX:  Non-pertinent to today's issues    Medications:  Current Facility-Administered Medications   Medication Dose   • 3% sodium chloride (HYPERTONIC SALINE) 500mL infusion 500 mL  500 mL   • sodium chloride (SALT) tablet 1 g  1 g   • hydrALAZINE (APRESOLINE) injection 10 mg  10 mg   • labetalol (NORMODYNE/TRANDATE) injection 10-20 mg  10-20 mg   • MD Alert...ICU Electrolyte Replacement per Pharmacy     • cloNIDine (CATAPRES) tablet 0.1 mg  0.1 mg   • baclofen (LIORESAL) tablet 10 mg  10 mg   • calcium carbonate (TUMS) chewable tab 500 mg  500 mg   • lisinopril (PRINIVIL) " "tablet 40 mg  40 mg   • amLODIPine (NORVASC) tablet 10 mg  10 mg   • metoprolol tartrate (LOPRESSOR) tablet 25 mg  25 mg   • enoxaparin (LOVENOX) inj 40 mg  40 mg   • aspirin (ASA) chewable tab 81 mg  81 mg   • thyroid (ARMOUR THYROID) tablet 60 mg  60 mg   • Pharmacy Consult Request ...Pain Management Review 1 Each  1 Each   • MD ALERT...DO NOT ADMINISTER NSAIDS or ASPIRIN unless ORDERED By Neurosurgery 1 Each  1 Each   • bisacodyl (DULCOLAX) suppository 10 mg  10 mg   • artificial tears (EYE LUBRICANT) ophth ointment 1 Application  1 Application   • Pharmacy Consult: Enteral tube insertion - review meds/change route/product selection  1 Each   • atorvastatin (LIPITOR) tablet 80 mg  80 mg   • senna-docusate (PERICOLACE or SENOKOT S) 8.6-50 MG per tablet 1 tablet  1 tablet   • magnesium hydroxide (MILK OF MAGNESIA) suspension 30 mL  30 mL   • ondansetron (ZOFRAN ODT) dispertab 4 mg  4 mg   • oxyCODONE immediate-release (ROXICODONE) tablet 5 mg  5 mg    Or   • oxyCODONE immediate-release (ROXICODONE) tablet 10 mg  10 mg    Or   • HYDROmorphone (Dilaudid) injection 0.5 mg  0.5 mg   • polyethylene glycol/lytes (MIRALAX) PACKET 1 Packet  1 Packet   • promethazine (PHENERGAN) tablet 12.5-25 mg  12.5-25 mg   • senna-docusate (PERICOLACE or SENOKOT S) 8.6-50 MG per tablet 1 tablet  1 tablet   • ondansetron (ZOFRAN) syringe/vial injection 4 mg  4 mg   • promethazine (PHENERGAN) suppository 12.5-25 mg  12.5-25 mg   • prochlorperazine (COMPAZINE) injection 5-10 mg  5-10 mg       Allergies:  No Known Allergies    Physical Exam:  Vitals: /89   Pulse 86   Temp 37.6 °C (99.7 °F) (Bladder)   Resp (!) 21   Ht 1.778 m (5' 10\")   Wt 79.6 kg (175 lb 7.8 oz)   SpO2 95%   Gen: NAD  Head: NC/AT  Eyes/ Nose/ Mouth: PERRLA, moist mucous membranes  Cardio: RRR, good distal perfusion, warm extremities  Pulm: normal respiratory effort, no cyanosis   Abd: Soft NTND, negative borborygmi   Ext: No peripheral edema. No calf " tenderness. No clubbing.    Mental status: answers questions appropriately follows commands  Speech: fluent, no aphasia or dysarthria    CRANIAL NERVES:  2,3: LEFT VF cut, PERRL  3,4,6: EOMI in right VF, no nystagmus or diplopia  5: sensation intact to light touch bilaterally and symmetric  7: Left facial droop   8: hearing grossly intact  9,10: not seen  11: SCM/Trapezius strength 5/5right, 0/5 left  12: tongue protrudes left    Motor:      Upper Extremity  Myotome R L   Shoulder flexion C5 5 0/5   Elbow flexion C5 5 0/5   Wrist extension C6 5 0/5   Elbow extension C7 5 0/5   Finger flexion C8 5 1/5   Finger abduction T1 5 0/5     Lower Extremity Myotome R L   Hip flexion L2 5 1/5   Knee extension L3 5 0/5   Ankle dorsiflexion L4 5 0/5   Toe extension L5 5 0/5   Ankle plantarflexion S1 5 0/5     Sensory:   Altered sensation on left side       DTRs:  Right  Left    Brachioradialis  2+  2+   Patella tendon  2+ 2+     2-3 beats right ankle clonus, sustained clonus left ankle  Pos babinski left   Negative Castillo b/l     Tone: tight hamstrings bilaterally    Labs: Reviewed and significant for   Recent Labs     04/07/21  0600 04/08/21  0657 04/09/21  0510   RBC 4.33* 3.94* 4.28*   HEMOGLOBIN 12.8* 11.5* 12.5*   HEMATOCRIT 37.6* 34.7* 37.0*   PLATELETCT 262 255 284     Recent Labs     04/08/21  1810 04/09/21  0215 04/09/21  0930   SODIUM 153* 146* 152*   POTASSIUM 3.7 3.5* 3.6   CHLORIDE 120* 115* 118*   CO2 25 24 27   GLUCOSE 124* 144* 118*   BUN 26* 26* 29*   CREATININE 0.81 0.78 0.81   CALCIUM 8.7 8.8 8.9     Recent Results (from the past 24 hour(s))   Basic Metabolic Panel    Collection Time: 04/08/21 12:21 PM   Result Value Ref Range    Sodium 148 (H) 135 - 145 mmol/L    Potassium 3.9 3.6 - 5.5 mmol/L    Chloride 115 (H) 96 - 112 mmol/L    Co2 27 20 - 33 mmol/L    Glucose 138 (H) 65 - 99 mg/dL    Bun 30 (H) 8 - 22 mg/dL    Creatinine 0.73 0.50 - 1.40 mg/dL    Calcium 8.7 8.5 - 10.5 mg/dL    Anion Gap 6.0 (L) 7.0 -  16.0   ESTIMATED GFR    Collection Time: 21 12:21 PM   Result Value Ref Range    GFR If African American >60 >60 mL/min/1.73 m 2    GFR If Non African American >60 >60 mL/min/1.73 m 2   Basic Metabolic Panel    Collection Time: 21  6:10 PM   Result Value Ref Range    Sodium 153 (H) 135 - 145 mmol/L    Potassium 3.7 3.6 - 5.5 mmol/L    Chloride 120 (H) 96 - 112 mmol/L    Co2 25 20 - 33 mmol/L    Glucose 124 (H) 65 - 99 mg/dL    Bun 26 (H) 8 - 22 mg/dL    Creatinine 0.81 0.50 - 1.40 mg/dL    Calcium 8.7 8.5 - 10.5 mg/dL    Anion Gap 8.0 7.0 - 16.0   ESTIMATED GFR    Collection Time: 21  6:10 PM   Result Value Ref Range    GFR If African American >60 >60 mL/min/1.73 m 2    GFR If Non African American >60 >60 mL/min/1.73 m 2   EKG (IP)    Collection Time: 21  7:33 PM   Result Value Ref Range    Report       [facility]    Test Date:  2021  Pt Name:    LUCY JOHNSON                 Department: [facility]  MRN:        [id number]                      Room:       R113  Gender:     Male                         Technician: REMY  :        1965                   Requested By:JABIER APARICIO  Order #:    [id number]                    Reading MD: Lizeth Nieves MD    Measurements  Intervals                                Axis  Rate:       64                           P:          76  CT:         161                          QRS:        -30  QRSD:       95                           T:          -88  QT:         473  QTc:        488    Interpretive Statements  SINUS RHYTHM  PROBABLE LEFT ATRIAL ENLARGEMENT  LEFT AXIS DEVIATION  REPOL ABNRM SUGGESTS ISCHEMIA, ANTEROLATERAL  Compared to ECG 2021 19:49:33  Left-axis deviation now present  Early repolarization now present  Possible ischemia now present  Electronically Signed On 2021 7:15:35 PDT by Lizeth Nieves MD     TROPONIN    Collection Time: 21  7:45 PM   Result Value Ref Range    Troponin T 11 6 - 19 ng/L   Basic Metabolic Panel     Collection Time: 04/09/21  2:15 AM   Result Value Ref Range    Sodium 146 (H) 135 - 145 mmol/L    Potassium 3.5 (L) 3.6 - 5.5 mmol/L    Chloride 115 (H) 96 - 112 mmol/L    Co2 24 20 - 33 mmol/L    Glucose 144 (H) 65 - 99 mg/dL    Bun 26 (H) 8 - 22 mg/dL    Creatinine 0.78 0.50 - 1.40 mg/dL    Calcium 8.8 8.5 - 10.5 mg/dL    Anion Gap 7.0 7.0 - 16.0   ESTIMATED GFR    Collection Time: 04/09/21  2:15 AM   Result Value Ref Range    GFR If African American >60 >60 mL/min/1.73 m 2    GFR If Non African American >60 >60 mL/min/1.73 m 2   CBC WITH DIFFERENTIAL    Collection Time: 04/09/21  5:10 AM   Result Value Ref Range    WBC 6.9 4.8 - 10.8 K/uL    RBC 4.28 (L) 4.70 - 6.10 M/uL    Hemoglobin 12.5 (L) 14.0 - 18.0 g/dL    Hematocrit 37.0 (L) 42.0 - 52.0 %    MCV 86.4 81.4 - 97.8 fL    MCH 29.2 27.0 - 33.0 pg    MCHC 33.8 33.7 - 35.3 g/dL    RDW 40.0 35.9 - 50.0 fL    Platelet Count 284 164 - 446 K/uL    MPV 9.6 9.0 - 12.9 fL    Neutrophils-Polys 74.60 (H) 44.00 - 72.00 %    Lymphocytes 13.30 (L) 22.00 - 41.00 %    Monocytes 9.30 0.00 - 13.40 %    Eosinophils 2.30 0.00 - 6.90 %    Basophils 0.10 0.00 - 1.80 %    Immature Granulocytes 0.40 0.00 - 0.90 %    Nucleated RBC 0.00 /100 WBC    Neutrophils (Absolute) 5.10 1.82 - 7.42 K/uL    Lymphs (Absolute) 0.91 (L) 1.00 - 4.80 K/uL    Monos (Absolute) 0.64 0.00 - 0.85 K/uL    Eos (Absolute) 0.16 0.00 - 0.51 K/uL    Baso (Absolute) 0.01 0.00 - 0.12 K/uL    Immature Granulocytes (abs) 0.03 0.00 - 0.11 K/uL    NRBC (Absolute) 0.00 K/uL   Basic Metabolic Panel    Collection Time: 04/09/21  9:30 AM   Result Value Ref Range    Sodium 152 (H) 135 - 145 mmol/L    Potassium 3.6 3.6 - 5.5 mmol/L    Chloride 118 (H) 96 - 112 mmol/L    Co2 27 20 - 33 mmol/L    Glucose 118 (H) 65 - 99 mg/dL    Bun 29 (H) 8 - 22 mg/dL    Creatinine 0.81 0.50 - 1.40 mg/dL    Calcium 8.9 8.5 - 10.5 mg/dL    Anion Gap 7.0 7.0 - 16.0   ESTIMATED GFR    Collection Time: 04/09/21  9:30 AM   Result Value Ref Range     GFR If African American >60 >60 mL/min/1.73 m 2    GFR If Non African American >60 >60 mL/min/1.73 m 2         ASSESSMENT:  Patient is a 56 y.o. male admitted with large right MCA stroke and right ICA occlusion. He did not receive TPA or thrombectomy     [facility] Code / Diagnosis to Support: 0001.1 - Stroke: Left Body Involvement (Right Brain)    Rehabilitation: Impaired ADLs and mobility  Patient is a good candidate for inpatient rehab based on needs for PT, OT, and speech therapy.  Patient will also benefit from family training.  Patient has a good discharge situation which will be home with wife.     Barriers to transfer include: Insurance authorization/ no payor, TCCs to verify disposition, medical clearance and bed availability     Additional Recommendations:  - s/p hemicraniectomy on 4/3. Now with continued post op cerebellar swelling and fevers. He continues to have  1/5 finger flexion on the left which is a positive prognostic indicator of a functional recovery. Also he had 1/5 leg movement at the hip on itinial consult but this was not observed on follow up. Possibly due to neglect. He does much better when forced to look at his left side which suggests that perhaps some of his functional deficits are due to neglect. He does have a long road to recovery and I prepared his wife Anel for possibly 2 years of 24/7 care, although we are all hopeful for a better outcome.     Large right MCA ischemic stroke   - continue PT/OT and SLP   - Wife educated on stroke comorbidites on initial consult. Son updated on follow up.   - ROM training given to wife for torticollis prevention and contracture prevention  - INCREASED: baclofen 15mg TID for LUE spasticity  - Holding off on gabapentin at this time to avoid polypharmacy   - ASA/ statin  - Etiology throught to be cardioembolic in the setting of atrial fibrillation off of AC and in the setting of COVID.  - PMR to continue to follow for rehab appropriateness    Medical  Complexity:  Large right MCA stroke      DVT PPX: SCDs      Thank you for allowing us to participate in the care of this patient.     Patient was seen for 37 minutes on unit/floor of which > 50% of time was spent on counseling and coordination of care regarding the above, including prognosis, risk reduction, benefits of treatment, and options for next stage of care.    Christofer Eisenberg, DO   Physical Medicine and Rehabilitation

## 2021-04-09 NOTE — PROGRESS NOTES
Increasing bradycardia as low as 33 bpm. Slightly irregular. HTNsive then normotensive. HR 33-80s. STAT ECG placed.

## 2021-04-10 LAB
ANION GAP SERPL CALC-SCNC: 11 MMOL/L (ref 7–16)
ANION GAP SERPL CALC-SCNC: 5 MMOL/L (ref 7–16)
ANION GAP SERPL CALC-SCNC: 6 MMOL/L (ref 7–16)
ANION GAP SERPL CALC-SCNC: 7 MMOL/L (ref 7–16)
BASOPHILS # BLD AUTO: 0.3 % (ref 0–1.8)
BASOPHILS # BLD: 0.02 K/UL (ref 0–0.12)
BUN SERPL-MCNC: 25 MG/DL (ref 8–22)
BUN SERPL-MCNC: 27 MG/DL (ref 8–22)
BUN SERPL-MCNC: 29 MG/DL (ref 8–22)
BUN SERPL-MCNC: 29 MG/DL (ref 8–22)
CALCIUM SERPL-MCNC: 8.7 MG/DL (ref 8.5–10.5)
CALCIUM SERPL-MCNC: 8.8 MG/DL (ref 8.5–10.5)
CALCIUM SERPL-MCNC: 8.8 MG/DL (ref 8.5–10.5)
CALCIUM SERPL-MCNC: 9.1 MG/DL (ref 8.5–10.5)
CHLORIDE SERPL-SCNC: 112 MMOL/L (ref 96–112)
CHLORIDE SERPL-SCNC: 113 MMOL/L (ref 96–112)
CHLORIDE SERPL-SCNC: 114 MMOL/L (ref 96–112)
CHLORIDE SERPL-SCNC: 115 MMOL/L (ref 96–112)
CO2 SERPL-SCNC: 24 MMOL/L (ref 20–33)
CO2 SERPL-SCNC: 26 MMOL/L (ref 20–33)
CO2 SERPL-SCNC: 26 MMOL/L (ref 20–33)
CO2 SERPL-SCNC: 27 MMOL/L (ref 20–33)
CREAT SERPL-MCNC: 0.75 MG/DL (ref 0.5–1.4)
CREAT SERPL-MCNC: 0.76 MG/DL (ref 0.5–1.4)
CREAT SERPL-MCNC: 0.78 MG/DL (ref 0.5–1.4)
CREAT SERPL-MCNC: 0.85 MG/DL (ref 0.5–1.4)
EOSINOPHIL # BLD AUTO: 0.17 K/UL (ref 0–0.51)
EOSINOPHIL NFR BLD: 2.2 % (ref 0–6.9)
ERYTHROCYTE [DISTWIDTH] IN BLOOD BY AUTOMATED COUNT: 39 FL (ref 35.9–50)
GLUCOSE SERPL-MCNC: 121 MG/DL (ref 65–99)
GLUCOSE SERPL-MCNC: 121 MG/DL (ref 65–99)
GLUCOSE SERPL-MCNC: 124 MG/DL (ref 65–99)
GLUCOSE SERPL-MCNC: 130 MG/DL (ref 65–99)
HCT VFR BLD AUTO: 36 % (ref 42–52)
HGB BLD-MCNC: 12.2 G/DL (ref 14–18)
IMM GRANULOCYTES # BLD AUTO: 0.03 K/UL (ref 0–0.11)
IMM GRANULOCYTES NFR BLD AUTO: 0.4 % (ref 0–0.9)
LYMPHOCYTES # BLD AUTO: 1.02 K/UL (ref 1–4.8)
LYMPHOCYTES NFR BLD: 13.2 % (ref 22–41)
MCH RBC QN AUTO: 29 PG (ref 27–33)
MCHC RBC AUTO-ENTMCNC: 33.9 G/DL (ref 33.7–35.3)
MCV RBC AUTO: 85.5 FL (ref 81.4–97.8)
MONOCYTES # BLD AUTO: 0.56 K/UL (ref 0–0.85)
MONOCYTES NFR BLD AUTO: 7.3 % (ref 0–13.4)
NEUTROPHILS # BLD AUTO: 5.9 K/UL (ref 1.82–7.42)
NEUTROPHILS NFR BLD: 76.6 % (ref 44–72)
NRBC # BLD AUTO: 0 K/UL
NRBC BLD-RTO: 0 /100 WBC
PLATELET # BLD AUTO: 297 K/UL (ref 164–446)
PMV BLD AUTO: 9.4 FL (ref 9–12.9)
POTASSIUM SERPL-SCNC: 3.9 MMOL/L (ref 3.6–5.5)
POTASSIUM SERPL-SCNC: 4 MMOL/L (ref 3.6–5.5)
POTASSIUM SERPL-SCNC: 4 MMOL/L (ref 3.6–5.5)
POTASSIUM SERPL-SCNC: 4.2 MMOL/L (ref 3.6–5.5)
RBC # BLD AUTO: 4.21 M/UL (ref 4.7–6.1)
SODIUM SERPL-SCNC: 145 MMOL/L (ref 135–145)
SODIUM SERPL-SCNC: 146 MMOL/L (ref 135–145)
SODIUM SERPL-SCNC: 147 MMOL/L (ref 135–145)
SODIUM SERPL-SCNC: 148 MMOL/L (ref 135–145)
WBC # BLD AUTO: 7.7 K/UL (ref 4.8–10.8)

## 2021-04-10 PROCEDURE — 85025 COMPLETE CBC W/AUTO DIFF WBC: CPT

## 2021-04-10 PROCEDURE — A9270 NON-COVERED ITEM OR SERVICE: HCPCS | Performed by: STUDENT IN AN ORGANIZED HEALTH CARE EDUCATION/TRAINING PROGRAM

## 2021-04-10 PROCEDURE — 770022 HCHG ROOM/CARE - ICU (200)

## 2021-04-10 PROCEDURE — 700105 HCHG RX REV CODE 258: Performed by: STUDENT IN AN ORGANIZED HEALTH CARE EDUCATION/TRAINING PROGRAM

## 2021-04-10 PROCEDURE — 700102 HCHG RX REV CODE 250 W/ 637 OVERRIDE(OP): Performed by: STUDENT IN AN ORGANIZED HEALTH CARE EDUCATION/TRAINING PROGRAM

## 2021-04-10 PROCEDURE — 700102 HCHG RX REV CODE 250 W/ 637 OVERRIDE(OP): Performed by: NURSE PRACTITIONER

## 2021-04-10 PROCEDURE — 700111 HCHG RX REV CODE 636 W/ 250 OVERRIDE (IP): Performed by: PSYCHIATRY & NEUROLOGY

## 2021-04-10 PROCEDURE — A9270 NON-COVERED ITEM OR SERVICE: HCPCS | Performed by: PSYCHIATRY & NEUROLOGY

## 2021-04-10 PROCEDURE — 700102 HCHG RX REV CODE 250 W/ 637 OVERRIDE(OP): Performed by: PHYSICAL MEDICINE & REHABILITATION

## 2021-04-10 PROCEDURE — 99233 SBSQ HOSP IP/OBS HIGH 50: CPT | Mod: GC | Performed by: PSYCHIATRY & NEUROLOGY

## 2021-04-10 PROCEDURE — 700111 HCHG RX REV CODE 636 W/ 250 OVERRIDE (IP): Performed by: STUDENT IN AN ORGANIZED HEALTH CARE EDUCATION/TRAINING PROGRAM

## 2021-04-10 PROCEDURE — A9270 NON-COVERED ITEM OR SERVICE: HCPCS | Performed by: HOSPITALIST

## 2021-04-10 PROCEDURE — A9270 NON-COVERED ITEM OR SERVICE: HCPCS | Performed by: PHYSICAL MEDICINE & REHABILITATION

## 2021-04-10 PROCEDURE — 700101 HCHG RX REV CODE 250: Performed by: PSYCHIATRY & NEUROLOGY

## 2021-04-10 PROCEDURE — 80048 BASIC METABOLIC PNL TOTAL CA: CPT | Mod: 91

## 2021-04-10 PROCEDURE — 700102 HCHG RX REV CODE 250 W/ 637 OVERRIDE(OP): Performed by: HOSPITALIST

## 2021-04-10 PROCEDURE — 99233 SBSQ HOSP IP/OBS HIGH 50: CPT | Performed by: PSYCHIATRY & NEUROLOGY

## 2021-04-10 PROCEDURE — 700102 HCHG RX REV CODE 250 W/ 637 OVERRIDE(OP): Performed by: PSYCHIATRY & NEUROLOGY

## 2021-04-10 PROCEDURE — A9270 NON-COVERED ITEM OR SERVICE: HCPCS | Performed by: NURSE PRACTITIONER

## 2021-04-10 RX ORDER — HYDRALAZINE HYDROCHLORIDE 25 MG/1
25 TABLET, FILM COATED ORAL EVERY 8 HOURS
Status: DISCONTINUED | OUTPATIENT
Start: 2021-04-10 | End: 2021-04-17

## 2021-04-10 RX ORDER — SODIUM CHLORIDE 1 G/1
2 TABLET ORAL
Status: COMPLETED | OUTPATIENT
Start: 2021-04-10 | End: 2021-04-15

## 2021-04-10 RX ADMIN — BACLOFEN 15 MG: 10 TABLET ORAL at 11:30

## 2021-04-10 RX ADMIN — Medication 2 G: at 11:30

## 2021-04-10 RX ADMIN — OXYCODONE 5 MG: 5 TABLET ORAL at 10:00

## 2021-04-10 RX ADMIN — LABETALOL HYDROCHLORIDE 10 MG: 5 INJECTION, SOLUTION INTRAVENOUS at 00:10

## 2021-04-10 RX ADMIN — DOCUSATE SODIUM 50 MG AND SENNOSIDES 8.6 MG 1 TABLET: 8.6; 5 TABLET, FILM COATED ORAL at 21:05

## 2021-04-10 RX ADMIN — SODIUM CHLORIDE 500 ML: 3 INJECTION, SOLUTION INTRAVENOUS at 09:00

## 2021-04-10 RX ADMIN — HYDRALAZINE HYDROCHLORIDE 10 MG: 20 INJECTION INTRAMUSCULAR; INTRAVENOUS at 16:31

## 2021-04-10 RX ADMIN — LISINOPRIL 40 MG: 20 TABLET ORAL at 05:59

## 2021-04-10 RX ADMIN — AMLODIPINE BESYLATE 10 MG: 10 TABLET ORAL at 06:00

## 2021-04-10 RX ADMIN — POTASSIUM BICARBONATE 25 MEQ: 978 TABLET, EFFERVESCENT ORAL at 10:00

## 2021-04-10 RX ADMIN — LABETALOL HYDROCHLORIDE 10 MG: 5 INJECTION, SOLUTION INTRAVENOUS at 15:08

## 2021-04-10 RX ADMIN — ENOXAPARIN SODIUM 40 MG: 40 INJECTION SUBCUTANEOUS at 06:00

## 2021-04-10 RX ADMIN — HYDRALAZINE HYDROCHLORIDE 25 MG: 50 TABLET, FILM COATED ORAL at 16:30

## 2021-04-10 RX ADMIN — BACLOFEN 15 MG: 10 TABLET ORAL at 05:58

## 2021-04-10 RX ADMIN — LABETALOL HYDROCHLORIDE 10 MG: 5 INJECTION, SOLUTION INTRAVENOUS at 04:16

## 2021-04-10 RX ADMIN — ATORVASTATIN CALCIUM 80 MG: 80 TABLET, FILM COATED ORAL at 17:34

## 2021-04-10 RX ADMIN — HYDRALAZINE HYDROCHLORIDE 25 MG: 50 TABLET, FILM COATED ORAL at 21:05

## 2021-04-10 RX ADMIN — THYROID, PORCINE 60 MG: 30 TABLET ORAL at 08:51

## 2021-04-10 RX ADMIN — METOPROLOL TARTRATE 25 MG: 25 TABLET, FILM COATED ORAL at 05:59

## 2021-04-10 RX ADMIN — THYROID, PORCINE 60 MG: 30 TABLET ORAL at 17:34

## 2021-04-10 RX ADMIN — METOPROLOL TARTRATE 25 MG: 25 TABLET, FILM COATED ORAL at 17:33

## 2021-04-10 RX ADMIN — ASPIRIN 81 MG: 81 TABLET, CHEWABLE ORAL at 17:34

## 2021-04-10 RX ADMIN — BACLOFEN 15 MG: 10 TABLET ORAL at 17:33

## 2021-04-10 RX ADMIN — POLYETHYLENE GLYCOL 3350 1 PACKET: 17 POWDER, FOR SOLUTION ORAL at 10:00

## 2021-04-10 RX ADMIN — Medication 2 G: at 17:33

## 2021-04-10 RX ADMIN — Medication 1 G: at 06:35

## 2021-04-10 ASSESSMENT — ENCOUNTER SYMPTOMS
BACK PAIN: 0
FOCAL WEAKNESS: 1
DIZZINESS: 0
FEVER: 0
VOMITING: 0
HEADACHES: 0
SHORTNESS OF BREATH: 0
DEPRESSION: 0
SORE THROAT: 0
PHOTOPHOBIA: 0
CHILLS: 0
WEIGHT LOSS: 0
ABDOMINAL PAIN: 0
BLURRED VISION: 0
NERVOUS/ANXIOUS: 0
COUGH: 0
ORTHOPNEA: 0
HEMOPTYSIS: 0
PALPITATIONS: 0
NAUSEA: 0

## 2021-04-10 ASSESSMENT — FIBROSIS 4 INDEX: FIB4 SCORE: 1.38

## 2021-04-10 NOTE — CARE PLAN
Problem: Nutritional:  Goal: Maintenance of adequate nutrition will improve  Outcome: PROGRESSING AS EXPECTED  Intervention: Manage enteral feedings  Note: Pt remains on tube feeding (replete with fiber) at goal rate.  FEES pending.     Problem: Pain Management  Goal: Pain level will decrease to patient's comfort goal  Outcome: PROGRESSING AS EXPECTED  Intervention: Follow pain managment plan developed in collaboration with patient and Interdisciplinary Team  Note: PRN oxycodone given for headache     Problem: Urinary:  Goal: Ability to reestablish a normal urinary elimination pattern will improve  Outcome: PROGRESSING SLOWER THAN EXPECTED  Note: Brewer catheter discontinued, condom catheter placed will watch for s/s of urinary retention.

## 2021-04-10 NOTE — PROGRESS NOTES
[facility] Progress Note      Admit Date: 3/31/2021    Resident(s): Rocio Fernandez M.D.   Attending:  GENE MURILLO/ Dr. Bell    Patient ID:    Name:  Thong Arzola   YOB: 1965  Age:  56 y.o.  male   MRN:  [id number]    Hospital Course (carried forward and updated):  Thong Arzola is a 56 y.o. male with  medical history of Hyperlipidemia, Paroxysmal atrial fibrillation and hypothyroidism is admitted on 03/31 for left side weakness and facial paralysis found to have Right MCA stroke , not a candidate for Tpa or mechanical thrombectomy who subsequently underwent Hemicraniotomy on 04/03. He also had worsening cerebral edema and currently being monitored in ICU with hypertonic saline     Consultants:  Critical Care  Neurology     Interval Events:  No acute events overnight.Denies any new symptoms. Na 145.  Per Neuro recs: Will wean hypertonic saline slowly ( cut down by 10cc/hr every 12 hrs) On salt tablets. Goal Na 135-145  -Enteral nutrition with tube feedings   -Lovenox for DVT prophylaxis     Vitals Range last 24h:  Temp:  [37.4 °C (99.3 °F)-37.8 °C (100 °F)] 37.7 °C (99.9 °F)  Pulse:  [51-83] 62  Resp:  [10-28] 13  BP: (119-190)/() 165/95  SpO2:  [92 %-98 %] 94 %      Intake/Output Summary (Last 24 hours) at 4/10/2021 1137  Last data filed at 4/10/2021 1000  Gross per 24 hour   Intake 2556.83 ml   Output 2855 ml   Net -298.17 ml        Review of Systems   Constitutional: Negative for chills, fever and weight loss.   HENT: Negative for congestion and sore throat.    Eyes: Negative for blurred vision and photophobia.   Respiratory: Negative for cough, hemoptysis and shortness of breath.    Cardiovascular: Negative for chest pain, palpitations and orthopnea.   Gastrointestinal: Negative for abdominal pain, nausea and vomiting.   Genitourinary: Negative for dysuria, frequency and urgency.   Musculoskeletal: Negative for back pain and joint pain.   Skin: Negative for itching and rash.    Neurological: Positive for focal weakness. Negative for dizziness and headaches.   Psychiatric/Behavioral: Negative for depression. The patient is not nervous/anxious.        PHYSICAL EXAM:  Vitals:    04/10/21 0800 04/10/21 0900 04/10/21 1000 04/10/21 1100   BP: 149/75 155/85 (!) 165/95    Pulse: 67 61 74 62   Resp: (!) 25 (!) 27 (!) 25 13   Temp:       TempSrc:       SpO2: 93% 95% 94% 94%   Weight:       Height:        Body mass index is 24.39 kg/m².    O2 therapy: Pulse Oximetry: 94 %, O2 (LPM): 0, O2 Delivery Device: None - Room Air    Date 04/10/21 0700 - 04/11/21 0659   Shift 7219-9216 4568-5057 5215-5398 24 Hour Total   INTAKE   I.V. 120   120     Volume (mL) (3% sodium chloride (HYPERTONIC SALINE) 500mL infusion 500 mL) 120   120   NG/   300     Intake (mL) (Enteral Tube 04/03/21 Cortrak - Gastric Right nare) 300   300   Shift Total 420   420   OUTPUT   Urine 200   200     Output (mL) (Urethral Catheter Temperature probe 16 Fr.) 200   200   Stool         Number of Times Stooled 1 x   1 x   Shift Total 200   200      220        Physical Exam   Constitutional: He is oriented to person, place, and time.   HENT:   Head: Normocephalic and atraumatic.   Eyes: Pupils are equal, round, and reactive to light.   Dysconjugate gaze, right gaze preference   Cardiovascular: Normal rate, regular rhythm and normal heart sounds.   Pulmonary/Chest: Effort normal and breath sounds normal.   Abdominal: Soft. Bowel sounds are normal. He exhibits no distension. There is no abdominal tenderness.   Musculoskeletal:         General: No edema.      Cervical back: Normal range of motion and neck supple.   Neurological: He is alert and oriented to person, place, and time.   Left facial droop, Flaccid on left and 4/5 on right    Skin: Skin is warm.   Psychiatric: Mood and affect normal.           Recent Labs     04/08/21  0657 04/08/21  1221 04/09/21  1750 04/09/21  2352 04/10/21  0555   SODIUM 148*   < > 150* 148* 145    POTASSIUM 3.7   < > 3.7 4.0 3.9   CHLORIDE 116*   < > 115* 115* 114*   CO2 28   < > 26 26 26   BUN 32*   < > 24* 25* 29*   CREATININE 0.86   < > 0.84 0.76 0.75   MAGNESIUM 2.0  --   --   --   --    PHOSPHORUS 3.2  --   --   --   --    CALCIUM 8.6   < > 8.9 8.8 8.7    < > = values in this interval not displayed.     Recent Labs     04/09/21  1750 04/09/21  2352 04/10/21  0555   GLUCOSE 122* 121* 121*     Recent Labs     04/08/21  0657 04/09/21  0510 04/10/21  0555   RBC 3.94* 4.28* 4.21*   HEMOGLOBIN 11.5* 12.5* 12.2*   HEMATOCRIT 34.7* 37.0* 36.0*   PLATELETCT 255 284 297     Recent Labs     04/08/21  0657 04/09/21  0510 04/10/21  0555   WBC 7.2 6.9 7.7   NEUTSPOLYS 75.70* 74.60* 76.60*   LYMPHOCYTES 12.40* 13.30* 13.20*   MONOCYTES 9.10 9.30 7.30   EOSINOPHILS 2.30 2.30 2.20   BASOPHILS 0.10 0.10 0.30       Meds:  • sodium chloride  2 g     • 3% sodium chloride  500 mL 20 mL/hr at 04/10/21 1000   • baclofen  15 mg     • hydrALAZINE  10 mg     • labetalol  10-20 mg     • MD Alert...Adult ICU Electrolyte Replacement per Pharmacy       • cloNIDine  0.1 mg     • calcium carbonate  500 mg     • lisinopril  40 mg     • amLODIPine  10 mg     • metoprolol tartrate  25 mg     • enoxaparin (LOVENOX) injection  40 mg     • aspirin  81 mg     • thyroid  60 mg     • Pharmacy Consult Request  1 Each     • MD ALERT...DO NOT ADMINISTER NSAIDS or ASPIRIN unless ORDERED By Neurosurgery  1 Each     • bisacodyl  10 mg     • artificial tears  1 Application     • Pharmacy  1 Each     • atorvastatin  80 mg     • senna-docusate  1 tablet     • magnesium hydroxide  30 mL     • ondansetron  4 mg     • oxyCODONE immediate-release  5 mg      Or   • oxyCODONE immediate-release  10 mg      Or   • HYDROmorphone  0.5 mg     • polyethylene glycol/lytes  1 Packet     • promethazine  12.5-25 mg     • senna-docusate  1 tablet     • ondansetron  4 mg     • promethazine  12.5-25 mg     • prochlorperazine  5-10 mg          Procedures:  Hemicraniotomy  on 04/03  Central line on 04/05    Imaging:  US-EXTREMITY VENOUS LOWER BILAT   Final Result      DX-CHEST-LIMITED (1 VIEW)   Final Result      Right basilar atelectasis. No focal consolidation or pleural effusions.      [id number] VIEW   Final Result      No evidence of bowel obstruction.                  DX-CHEST-LIMITED (1 VIEW)   Final Result      1.  Right internal jugular catheter and enteric catheter appear appropriately located      2.  Minimal right basilar atelectasis      CT-HEAD W/O   Final Result         1.  Changes of evolving infarct within the right MCA distribution   2.  Hyperdensity in the sulci of the right MCA distribution infarct, largely appears related to thrombosed vessels, component of subarachnoid hemorrhage is suspected particularly in the right frontal lobe.   3.  Pneumocephalus, decreased since prior.      CT-HEAD W/O   Final Result         Postsurgical change from right craniectomy. Redemonstration of large right MCA infarct with edema and bulging of the brain parenchyma through the craniectomy defect      Less mass effect on the right lateral ventricle. Less right to left midline shift of 3 mm, previously 10 mm.         DX-ABDOMEN FOR TUBE PLACEMENT   Final Result      Enteric tube terminates over the stomach.      CT-HEAD W/O   Final Result         1.  Low-density changes of the right hemisphere compatible with MCA distribution infarct with edema.   2.  New effacement of the bilateral ventricles, right greater than left, with 10 mm right-to-left midline shift.   3.  New dilatation of the left temporal horn ventricle, appearance favors component of obstructive hydrocephalus due to mass effect from infarct and edema.   4.  Hyperdensity in the right middle cerebral artery, likely thrombosis given associated findings.      These findings were discussed with the patient's clinician, Dr. Nunez, on 4/3/2021 7:11 AM.      MR-BRAIN-W/O   Final Result      Very large acute right MCA territory  infarct as detailed above with small amount of petechial hemorrhage in the right insular region and right temporal lobe.      Punctate right thalamic lacunar infarct.      Right ICA and M1 MCA occlusion.      EC-ECHOCARDIOGRAM COMPLETE W/O CONT   Final Result      DX-CHEST-PORTABLE (1 VIEW)   Final Result         1.  Interstitial pulmonary parenchymal prominence, compatible with interstitial edema and/or infiltrates.      CT-CTA NECK WITH & W/O-POST PROCESSING   Final Result      Acute occlusion of the right internal carotid artery shortly after bifurcation. It is occluded up to the level of the carotid terminus.      CT-CTA HEAD WITH & W/O-POST PROCESS   Final Result      Acute right M1 occlusion.      Findings were discussed with RHONDA DIAZ on 3/31/2021 7:54 PM.      CT-CEREBRAL PERFUSION ANALYSIS   Final Result      1.  Cerebral blood flow less than 30% likely representing completed infarct = 134 mL.      2.  T Max more than 6 seconds likely representing combination of completed infarct and ischemia = 210 mL.      3.  Mismatched volume likely representing ischemic brain/penumbra = 76 mL.      4.  Please note that the cerebral perfusion was performed on the limited brain tissue around the basal ganglia region. Infarct/ischemia outside the CT perfusion sections can be missed in this study.      CT-HEAD W/O   Final Result   Addendum 1 of 1   In retrospect, there is subtle loss of gray-white matter differentiation    in the right MCA distribution, consistent with acute infarct.      Final      1.  No CT evidence of acute infarct, hemorrhage or mass.   2.  Mild paranasal sinus disease.          ASSESSEMENT and PLAN:    * Acute right MCA stroke (HCC)- (present on admission)  Assessment & Plan  -Presented with left side weakness and facial paralysis. CT imaging showed Acute right LAMONT territory stroke  -Outside the window for alteplase, not a candidate for thrombectomy per IR  -Underwent hemicraniotomy on 04/03  for increased ICP   -Patient had worsening facial edema due to cerebral edema with evolving infarct on 04/04. Received hypertonic saline bolus on 04/05   -Echo from 04/01 showed normal EF and no shunt     Plan:  -q4h neuro checks  -SBP goal acutely is 120-160, long-term goal is 110-130/60-80  -continue to wean hypertonic saline, normal Na goal 135-145  -Maintain normothermia, normoglycemia  -Enteral nutrition through tube feedings  -Elevate head of bed  -PT/OT/SLP  -PMNR  -Restarted aspirin and DVT prophylaxis as per neurology. No need for prophylactic anti-epileptics. However he needs long term anticoagulation for his atrial fibrillation; per Neurology, will consider anticoagulation with NOAC in ~2 weeks (around 4/14- repeat head CT prior to initiation), or outside acute edema development phase    Paroxysmal atrial fibrillation (HCC)- (present on admission)  Assessment & Plan  -Diagnosed about 9 months ago  -Was reportedly taking Coreg, however stopped taking it when he went back into sinus rhythm  -has not been on anticoag  -has been treating holistically OP. Taking vitamins, including Vitamin K  -ECHO unremarkable  -telemetry - currently NSR  -Chadsvasc2 score is 2 with stroke, needs anticoagulation long term once stable. ASA started. Per Neurology, will consider anticoagulation with NOAC in ~2 weeks (around 4/14- repeat head CT prior to initiation), or outside acute edema development phase    Fever  Assessment & Plan  -Patient had temp of 100.8 on 04/07 and 04/06  -No leucocytosis, less likely infection.  It could also be neurogenic fever post stroke as per neuro  -Will monitor     Leucocytosis  Assessment & Plan  -resolved   -Most likely reactive     Urinary retention  Assessment & Plan  -secondary to acute stroke  -Reportedly does not have at baseline  -dye catheter in place    History of COVID-19- (present on admission)  Assessment & Plan  -Positive PCR 3/18/2021  -Patient currently is asymptomatic, on room  air, in no respiratory distress.  No indications to treat  -Repeat PCR 3/31 remains positive.  Per hospital infectious prevention, no longer requires isolation    Acquired hypothyroidism- (present on admission)  Assessment & Plan  -TSH low, normal T4  [facility]   -Needs outpatient follow up      DISPO: ICU    CODE STATUS: Full code    Quality Measures:  Feeding: tube feeds  Analgesia: tylenol  Sedation: none   Thromboprophylaxis: lovenox   Head of bed: >30 degrees  Ulcer prophylaxis: none   Glycemic control: sliding scale prn   Bowel care: bowel regimen Prn   Indwelling lines: iv line and central line   Deescalation of antibiotics: none       oRcio Fernandez M.D.

## 2021-04-10 NOTE — PROGRESS NOTES
Neurology Progress Note  Neurohospitalist Service, [facility]    Referring Physician: Gia Forman M.D.      Interval History:  Stable exam, weaning 3%%, Na 145 this AM, more alert today    Review of systems: In addition to what is detailed in the HPI and/or updated in the interval history, all other systems reviewed and are negative.    Past Medical History, Past Surgical History and Social History reviewed and unchanged from prior    Medications:    Current Facility-Administered Medications:   •  3% sodium chloride (HYPERTONIC SALINE) 500mL infusion 500 mL, 500 mL, Intravenous, Continuous, Johana Nelson M.D., Last Rate: 30 mL/hr at 04/09/21 2241, Rate Change at 04/09/21 2241  •  baclofen (LIORESAL) tablet 15 mg, 15 mg, Enteral Tube, TID, Christofer Eisenberg D.O., 15 mg at 04/10/21 0558  •  sodium chloride (SALT) tablet 1 g, 1 g, Enteral Tube, TID WITH MEALS, Cristhian Bell M.D., 1 g at 04/09/21 1716  •  hydrALAZINE (APRESOLINE) injection 10 mg, 10 mg, Intravenous, Q HOUR PRN, Cristhian Bell M.D., 10 mg at 04/09/21 1703  •  labetalol (NORMODYNE/TRANDATE) injection 10-20 mg, 10-20 mg, Intravenous, Q4HRS PRN, Cristhian Bell M.D., 10 mg at 04/10/21 0416  •  MD Alert...ICU Electrolyte Replacement per Pharmacy, , Other, PHARMACY TO DOSE, Jhoana Nelson M.D.  •  cloNIDine (CATAPRES) tablet 0.1 mg, 0.1 mg, Enteral Tube, Q4HRS PRN, Cristhian Bell M.D., 0.1 mg at 04/08/21 1734  •  calcium carbonate (TUMS) chewable tab 500 mg, 500 mg, Enteral Tube, Q8HRS PRN, Cristhian Bell M.D., 500 mg at 04/07/21 1501  •  lisinopril (PRINIVIL) tablet 40 mg, 40 mg, Enteral Tube, Q DAY, Cristhian Bell M.D., 40 mg at 04/10/21 0559  •  amLODIPine (NORVASC) tablet 10 mg, 10 mg, Enteral Tube, Q DAY, Johana Nelson M.D., 10 mg at 04/10/21 0600  •  metoprolol tartrate (LOPRESSOR) tablet 25 mg, 25 mg, Enteral Tube, BID, Cristhian Bell M.D., 25 mg at 04/10/21 0559  •  enoxaparin (LOVENOX) inj 40 mg, 40  mg, Subcutaneous, DAILY, Johana Nelson M.D., 40 mg at 04/10/21 0600  •  aspirin (ASA) chewable tab 81 mg, 81 mg, Enteral Tube, DAILY, Cristhian Bell M.D., 81 mg at 04/09/21 1716  •  thyroid (ARMOUR THYROID) tablet 60 mg, 60 mg, Enteral Tube, BID, King Spann M.D., 60 mg at 04/09/21 2012  •  Pharmacy Consult Request ...Pain Management Review 1 Each, 1 Each, Other, PHARMACY TO DOSE, HANSA Morris.A.-C.  •  MD ALERT...DO NOT ADMINISTER NSAIDS or ASPIRIN unless ORDERED By Neurosurgery 1 Each, 1 Each, Other, PRN, HANSA Morris.A.-C.  •  bisacodyl (DULCOLAX) suppository 10 mg, 10 mg, Rectal, Q24HRS PRN, HANSA Morris.A.-C., 10 mg at 04/05/21 1523  •  artificial tears (EYE LUBRICANT) ophth ointment 1 Application, 1 Application, Both Eyes, PRN, Lilia Pizano, P.A.-C.  •  Pharmacy Consult: Enteral tube insertion - review meds/change route/product selection, 1 Each, Other, PHARMACY TO DOSE, Radha Meredith, A.P.R.N.  •  atorvastatin (LIPITOR) tablet 80 mg, 80 mg, Enteral Tube, Q EVENING, Radha Meredith, A.P.R.N., 80 mg at 04/09/21 1716  •  senna-docusate (PERICOLACE or SENOKOT S) 8.6-50 MG per tablet 1 tablet, 1 tablet, Enteral Tube, Nightly, Radha Meredith, A.P.R.N., 1 tablet at 04/09/21 2013  •  magnesium hydroxide (MILK OF MAGNESIA) suspension 30 mL, 30 mL, Enteral Tube, QDAY PRN, Radha Meredith, A.P.R.N.  •  ondansetron (ZOFRAN ODT) dispertab 4 mg, 4 mg, Enteral Tube, Q4HRS PRN, Radha Meredith A.P.R.N.  •  oxyCODONE immediate-release (ROXICODONE) tablet 5 mg, 5 mg, Enteral Tube, Q3HRS PRN, 5 mg at 04/09/21 2012 **OR** oxyCODONE immediate-release (ROXICODONE) tablet 10 mg, 10 mg, Enteral Tube, Q3HRS PRN, 10 mg at 04/06/21 0405 **OR** HYDROmorphone (Dilaudid) injection 0.5 mg, 0.5 mg, Intravenous, Q3HRS PRN, RICHMOND HamptonPYohanaR.N., 0.5 mg at 04/03/21 1554  •  polyethylene glycol/lytes (MIRALAX) PACKET 1 Packet, 1 Packet, Enteral Tube, BID PRN, RICHMOND HamptonPYohanaR.N., 1 Packet at  04/04/21 1714  •  promethazine (PHENERGAN) tablet 12.5-25 mg, 12.5-25 mg, Enteral Tube, Q4HRS PRN, Radha Meredith A.P.R.N.  •  senna-docusate (PERICOLACE or SENOKOT S) 8.6-50 MG per tablet 1 tablet, 1 tablet, Enteral Tube, Q24HRS PRN, Radha Meredith A.P.R.N.  •  ondansetron (ZOFRAN) syringe/vial injection 4 mg, 4 mg, Intravenous, Q4HRS PRN, Gia Forman M.D., 4 mg at 04/07/21 0028  •  promethazine (PHENERGAN) suppository 12.5-25 mg, 12.5-25 mg, Rectal, Q4HRS PRN, Gia Forman M.D.  •  prochlorperazine (COMPAZINE) injection 5-10 mg, 5-10 mg, Intravenous, Q4HRS PRN, Gia Forman M.D., 10 mg at 04/05/21 0913    Physical Examination:     Vitals:    04/10/21 0405 04/10/21 0445 04/10/21 0500 04/10/21 0615   BP: (!) 174/97 136/80 145/81 149/82   Pulse: 69 61 64 66   Resp: 18 15 (!) 11 18   Temp:       TempSrc:       SpO2: 96%  96% 97%   Weight:       Height:         R flap- soft    NEUROLOGICAL EXAM:     Mental status: Alert today, eyes open,  speaking and interactive  Speech and language: Speech is mildly dysarthric. Able to follow commands  Cranial nerve exam:  L visual field cut.R gaze preference, cannot cross midline L face droop  Motor exam: RUE is antigravity, RLE is briefly antigravity- appears effort dependent, flexion contracture LUE, no movement in LLE  Sensory exam: Does not react to tactile on L hemibody- does feel noxious stimuli on L hemibody  Coordination: no jacy ataxia on R side movements    Objective Data:    Labs:  Lab Results   Component Value Date/Time    PROTHROMBTM 14.0 03/31/2021 07:29 PM    INR 1.04 03/31/2021 07:29 PM      Lab Results   Component Value Date/Time    WBC 7.7 04/10/2021 05:55 AM    RBC 4.21 (L) 04/10/2021 05:55 AM    HEMOGLOBIN 12.2 (L) 04/10/2021 05:55 AM    HEMATOCRIT 36.0 (L) 04/10/2021 05:55 AM    MCV 85.5 04/10/2021 05:55 AM    MCH 29.0 04/10/2021 05:55 AM    MCHC 33.9 04/10/2021 05:55 AM    MPV 9.4 04/10/2021 05:55 AM    NEUTSPOLYS 76.60 (H) 04/10/2021 05:55 AM     LYMPHOCYTES 13.20 (L) 04/10/2021 05:55 AM    MONOCYTES 7.30 04/10/2021 05:55 AM    EOSINOPHILS 2.20 04/10/2021 05:55 AM    BASOPHILS 0.30 04/10/2021 05:55 AM      Lab Results   Component Value Date/Time    SODIUM 145 04/10/2021 05:55 AM    POTASSIUM 3.9 04/10/2021 05:55 AM    CHLORIDE 114 (H) 04/10/2021 05:55 AM    CO2 26 04/10/2021 05:55 AM    GLUCOSE 121 (H) 04/10/2021 05:55 AM    BUN 29 (H) 04/10/2021 05:55 AM    CREATININE 0.75 04/10/2021 05:55 AM    CREATININE 1.3 04/04/2008 03:05 AM      Lab Results   Component Value Date/Time    CHOLSTRLTOT 169 04/01/2021 12:36 PM     (H) 04/01/2021 12:36 PM    HDL 32 (A) 04/01/2021 12:36 PM    TRIGLYCERIDE 126 04/01/2021 12:36 PM       Lab Results   Component Value Date/Time    ALKPHOSPHAT 56 04/06/2021 05:00 AM    ASTSGOT 28 04/06/2021 05:00 AM    ALTSGPT 16 04/06/2021 05:00 AM    TBILIRUBIN 0.4 04/06/2021 05:00 AM        Imaging/Testing:    I interpreted and/or reviewed the patient's neuroimaging    US-EXTREMITY VENOUS LOWER BILAT   Final Result      DX-CHEST-LIMITED (1 VIEW)   Final Result      Right basilar atelectasis. No focal consolidation or pleural effusions.      [id number] VIEW   Final Result      No evidence of bowel obstruction.                  DX-CHEST-LIMITED (1 VIEW)   Final Result      1.  Right internal jugular catheter and enteric catheter appear appropriately located      2.  Minimal right basilar atelectasis      CT-HEAD W/O   Final Result         1.  Changes of evolving infarct within the right MCA distribution   2.  Hyperdensity in the sulci of the right MCA distribution infarct, largely appears related to thrombosed vessels, component of subarachnoid hemorrhage is suspected particularly in the right frontal lobe.   3.  Pneumocephalus, decreased since prior.      CT-HEAD W/O   Final Result         Postsurgical change from right craniectomy. Redemonstration of large right MCA infarct with edema and bulging of the brain parenchyma through the  craniectomy defect      Less mass effect on the right lateral ventricle. Less right to left midline shift of 3 mm, previously 10 mm.         DX-ABDOMEN FOR TUBE PLACEMENT   Final Result      Enteric tube terminates over the stomach.      CT-HEAD W/O   Final Result         1.  Low-density changes of the right hemisphere compatible with MCA distribution infarct with edema.   2.  New effacement of the bilateral ventricles, right greater than left, with 10 mm right-to-left midline shift.   3.  New dilatation of the left temporal horn ventricle, appearance favors component of obstructive hydrocephalus due to mass effect from infarct and edema.   4.  Hyperdensity in the right middle cerebral artery, likely thrombosis given associated findings.      These findings were discussed with the patient's clinician, Dr. Nunez, on 4/3/2021 7:11 AM.      MR-BRAIN-W/O   Final Result      Very large acute right MCA territory infarct as detailed above with small amount of petechial hemorrhage in the right insular region and right temporal lobe.      Punctate right thalamic lacunar infarct.      Right ICA and M1 MCA occlusion.      EC-ECHOCARDIOGRAM COMPLETE W/O CONT   Final Result      DX-CHEST-PORTABLE (1 VIEW)   Final Result         1.  Interstitial pulmonary parenchymal prominence, compatible with interstitial edema and/or infiltrates.      CT-CTA NECK WITH & W/O-POST PROCESSING   Final Result      Acute occlusion of the right internal carotid artery shortly after bifurcation. It is occluded up to the level of the carotid terminus.      CT-CTA HEAD WITH & W/O-POST PROCESS   Final Result      Acute right M1 occlusion.      Findings were discussed with RHONDA DIAZ on 3/31/2021 7:54 PM.      CT-CEREBRAL PERFUSION ANALYSIS   Final Result      1.  Cerebral blood flow less than 30% likely representing completed infarct = 134 mL.      2.  T Max more than 6 seconds likely representing combination of completed infarct and ischemia = 210  mL.      3.  Mismatched volume likely representing ischemic brain/penumbra = 76 mL.      4.  Please note that the cerebral perfusion was performed on the limited brain tissue around the basal ganglia region. Infarct/ischemia outside the CT perfusion sections can be missed in this study.      CT-HEAD W/O   Final Result   Addendum 1 of 1   In retrospect, there is subtle loss of gray-white matter differentiation    in the right MCA distribution, consistent with acute infarct.      Final      1.  No CT evidence of acute infarct, hemorrhage or mass.   2.  Mild paranasal sinus disease.          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  Stroke etiology invariably cardioembolic. Currently on ASA bridge to anticoagulation given high infarct burden and increased risk for immediate hemorrhagic conversion.       Plan:   - q4h neurochecks/NIHSS   - continue to wean Na, normal Na goal 135-145   - SBP goal acutely is 120-160, long-term goal is 110-130/60-80; titrating PO meds, anti-HTN PRN   - continue statin for LDL goal < 70   - continue  ASA 81mg daily tonight, will consider anticoagulation with NOAC in ~2 weeks (around 4/14- repeat head CT prior to initiation), or outside acute edema development phase   - lovenox 40mg SQ for DVT ppx    - PT/OT/SLP as able   - please call Neurology with any further questions or concerns    The evaluation of the patient, and recommended management, was discussed with the resident staff. I have performed a physical exam and reviewed and updated ROS and Plan today (4/10/2021).     CRITICAL CARE    Upon my evaluation, this patient had a high probability of imminent or life-threatening deterioration due to cerebrovascular accident which required my direct attention, intervention, and personal management.  I personally  provided 35 minutes of total critical care time outside of time spent on separately billable/documented procedures. Time includes: review of laboratory data, review of radiology studies, discussion with consultants, discussion with family/patient, monitoring for potential decompensation.  Interventions were performed as documented in the chart.      Arthur Jackson MD  Neurohospitalist, Acute Care Services

## 2021-04-10 NOTE — CARE PLAN
Problem: Safety  Goal: Will remain free from injury  Outcome: PROGRESSING AS EXPECTED     Problem: Infection  Goal: Will remain free from infection  Outcome: PROGRESSING AS EXPECTED     Problem: Safety:  Goal: Will remain free from injury  Outcome: PROGRESSING AS EXPECTED     Problem: Infection:  Goal: Will remain free from infection  Outcome: PROGRESSING AS EXPECTED     Problem: Psychosocial Needs:  Goal: Ability to verbalize feelings about condition will improve  Outcome: PROGRESSING AS EXPECTED     Problem: Peripheral Vascular Perfusion:  Goal: Neurologic status will improve  Outcome: PROGRESSING AS EXPECTED     Problem: Respiratory:  Goal: Ability to maintain a clear airway will improve  Outcome: PROGRESSING AS EXPECTED

## 2021-04-11 LAB
ANION GAP SERPL CALC-SCNC: 10 MMOL/L (ref 7–16)
BUN SERPL-MCNC: 29 MG/DL (ref 8–22)
CALCIUM SERPL-MCNC: 9.1 MG/DL (ref 8.5–10.5)
CHLORIDE SERPL-SCNC: 109 MMOL/L (ref 96–112)
CO2 SERPL-SCNC: 25 MMOL/L (ref 20–33)
CREAT SERPL-MCNC: 0.84 MG/DL (ref 0.5–1.4)
GLUCOSE SERPL-MCNC: 132 MG/DL (ref 65–99)
POTASSIUM SERPL-SCNC: 4 MMOL/L (ref 3.6–5.5)
SODIUM SERPL-SCNC: 144 MMOL/L (ref 135–145)

## 2021-04-11 PROCEDURE — 700102 HCHG RX REV CODE 250 W/ 637 OVERRIDE(OP): Performed by: HOSPITALIST

## 2021-04-11 PROCEDURE — 51798 US URINE CAPACITY MEASURE: CPT

## 2021-04-11 PROCEDURE — 80048 BASIC METABOLIC PNL TOTAL CA: CPT

## 2021-04-11 PROCEDURE — A9270 NON-COVERED ITEM OR SERVICE: HCPCS | Performed by: PSYCHIATRY & NEUROLOGY

## 2021-04-11 PROCEDURE — 700102 HCHG RX REV CODE 250 W/ 637 OVERRIDE(OP): Performed by: PSYCHIATRY & NEUROLOGY

## 2021-04-11 PROCEDURE — 700111 HCHG RX REV CODE 636 W/ 250 OVERRIDE (IP): Performed by: STUDENT IN AN ORGANIZED HEALTH CARE EDUCATION/TRAINING PROGRAM

## 2021-04-11 PROCEDURE — A9270 NON-COVERED ITEM OR SERVICE: HCPCS | Performed by: STUDENT IN AN ORGANIZED HEALTH CARE EDUCATION/TRAINING PROGRAM

## 2021-04-11 PROCEDURE — 700102 HCHG RX REV CODE 250 W/ 637 OVERRIDE(OP): Performed by: PHYSICIAN ASSISTANT

## 2021-04-11 PROCEDURE — A9270 NON-COVERED ITEM OR SERVICE: HCPCS | Performed by: HOSPITALIST

## 2021-04-11 PROCEDURE — 99232 SBSQ HOSP IP/OBS MODERATE 35: CPT | Performed by: HOSPITALIST

## 2021-04-11 PROCEDURE — A9270 NON-COVERED ITEM OR SERVICE: HCPCS | Performed by: NURSE PRACTITIONER

## 2021-04-11 PROCEDURE — 700102 HCHG RX REV CODE 250 W/ 637 OVERRIDE(OP): Performed by: NURSE PRACTITIONER

## 2021-04-11 PROCEDURE — A9270 NON-COVERED ITEM OR SERVICE: HCPCS | Performed by: PHYSICIAN ASSISTANT

## 2021-04-11 PROCEDURE — A9270 NON-COVERED ITEM OR SERVICE: HCPCS | Performed by: PHYSICAL MEDICINE & REHABILITATION

## 2021-04-11 PROCEDURE — 700102 HCHG RX REV CODE 250 W/ 637 OVERRIDE(OP): Performed by: PHYSICAL MEDICINE & REHABILITATION

## 2021-04-11 PROCEDURE — 770020 HCHG ROOM/CARE - TELE (206)

## 2021-04-11 PROCEDURE — 700102 HCHG RX REV CODE 250 W/ 637 OVERRIDE(OP): Performed by: STUDENT IN AN ORGANIZED HEALTH CARE EDUCATION/TRAINING PROGRAM

## 2021-04-11 RX ORDER — PRAZOSIN HYDROCHLORIDE 1 MG/1
1 CAPSULE ORAL EVERY EVENING
Status: DISCONTINUED | OUTPATIENT
Start: 2021-04-11 | End: 2021-04-14

## 2021-04-11 RX ADMIN — THYROID, PORCINE 60 MG: 30 TABLET ORAL at 17:49

## 2021-04-11 RX ADMIN — BISACODYL 10 MG: 10 SUPPOSITORY RECTAL at 18:11

## 2021-04-11 RX ADMIN — OXYCODONE 5 MG: 5 TABLET ORAL at 17:49

## 2021-04-11 RX ADMIN — ENOXAPARIN SODIUM 40 MG: 40 INJECTION SUBCUTANEOUS at 05:56

## 2021-04-11 RX ADMIN — HYDRALAZINE HYDROCHLORIDE 25 MG: 50 TABLET, FILM COATED ORAL at 14:39

## 2021-04-11 RX ADMIN — LISINOPRIL 40 MG: 20 TABLET ORAL at 05:56

## 2021-04-11 RX ADMIN — Medication 2 G: at 12:08

## 2021-04-11 RX ADMIN — Medication 2 G: at 06:22

## 2021-04-11 RX ADMIN — OXYCODONE 5 MG: 5 TABLET ORAL at 08:09

## 2021-04-11 RX ADMIN — HYDRALAZINE HYDROCHLORIDE 25 MG: 50 TABLET, FILM COATED ORAL at 22:07

## 2021-04-11 RX ADMIN — HYDRALAZINE HYDROCHLORIDE 25 MG: 50 TABLET, FILM COATED ORAL at 05:56

## 2021-04-11 RX ADMIN — THYROID, PORCINE 60 MG: 30 TABLET ORAL at 06:22

## 2021-04-11 RX ADMIN — BACLOFEN 15 MG: 10 TABLET ORAL at 05:56

## 2021-04-11 RX ADMIN — AMLODIPINE BESYLATE 10 MG: 10 TABLET ORAL at 05:56

## 2021-04-11 RX ADMIN — ATORVASTATIN CALCIUM 80 MG: 80 TABLET, FILM COATED ORAL at 17:50

## 2021-04-11 RX ADMIN — Medication 2 G: at 17:49

## 2021-04-11 RX ADMIN — METOPROLOL TARTRATE 25 MG: 25 TABLET, FILM COATED ORAL at 17:50

## 2021-04-11 RX ADMIN — BACLOFEN 15 MG: 10 TABLET ORAL at 17:50

## 2021-04-11 RX ADMIN — ASPIRIN 81 MG: 81 TABLET, CHEWABLE ORAL at 17:49

## 2021-04-11 RX ADMIN — MAGNESIUM HYDROXIDE 30 ML: 400 SUSPENSION ORAL at 06:22

## 2021-04-11 RX ADMIN — BACLOFEN 15 MG: 10 TABLET ORAL at 12:08

## 2021-04-11 RX ADMIN — METOPROLOL TARTRATE 25 MG: 25 TABLET, FILM COATED ORAL at 05:56

## 2021-04-11 RX ADMIN — PHENYTOIN 1 MG: 125 SUSPENSION ORAL at 17:49

## 2021-04-11 RX ADMIN — OXYCODONE 5 MG: 5 TABLET ORAL at 12:53

## 2021-04-11 RX ADMIN — POLYETHYLENE GLYCOL 3350 1 PACKET: 17 POWDER, FOR SOLUTION ORAL at 06:21

## 2021-04-11 RX ADMIN — DOCUSATE SODIUM 50 MG AND SENNOSIDES 8.6 MG 1 TABLET: 8.6; 5 TABLET, FILM COATED ORAL at 22:06

## 2021-04-11 ASSESSMENT — ENCOUNTER SYMPTOMS
NERVOUS/ANXIOUS: 0
SPEECH CHANGE: 0
STRIDOR: 0
ABDOMINAL PAIN: 0
PALPITATIONS: 0
FEVER: 0
FOCAL WEAKNESS: 1
HEADACHES: 1
SHORTNESS OF BREATH: 0
BLURRED VISION: 0
COUGH: 0
NAUSEA: 0
BACK PAIN: 0
SENSORY CHANGE: 1
CHILLS: 0

## 2021-04-11 ASSESSMENT — FIBROSIS 4 INDEX: FIB4 SCORE: 1.32

## 2021-04-11 NOTE — PROGRESS NOTES
Hospital Medicine Daily Progress Note    Date of Service  4/11/2021    Chief Complaint  Left sided weakness    Hospital Course  56 y.o. male admitted 3/31/2021 with acute stroke.  He was not a candidate for tPA nor thrombectomy.  He has a history of paroxysmal atrial fibrillation, dyslipidemia, hypothyroidism. During admit he underwent a hemicraniectomy for right sided decompression from right MCA dense stroke with intractable intracranial pressure.     Interval Problem Update  4/11: Patient verbal and oriented with some slurred speech.  Left hemiplegia. Met with the wife and explained to her we likely will need a G-tube at some point.  Also alerted the both of them we would need a SNF at some point.    Consultants/Specialty  Neurology  Neuro-intensivist (signing off 4/11)  Neuro surgery    Code Status  Full Code    Disposition  4/11 Transfer to neurosurgery.  Will need SNF/Rehab     Review of Systems  Review of Systems   Constitutional: Negative for chills and fever.   Eyes: Negative for blurred vision.   Respiratory: Negative for cough, shortness of breath and stridor.    Cardiovascular: Negative for chest pain, palpitations and leg swelling.   Gastrointestinal: Negative for abdominal pain and nausea.   Genitourinary: Negative for dysuria and hematuria.   Musculoskeletal: Negative for back pain and joint pain.   Skin: Negative for rash.   Neurological: Positive for sensory change (left side), focal weakness (left side) and headaches (right temporal region). Negative for speech change.   Psychiatric/Behavioral: The patient is not nervous/anxious.         Physical Exam  Temp:  [36.4 °C (97.6 °F)-37.2 °C (99 °F)] 36.9 °C (98.4 °F)  Pulse:  [59-73] 62  Resp:  [11-25] 11  BP: (124-178)/() 131/76  SpO2:  [93 %-97 %] 94 %    Physical Exam  Vitals reviewed.   Constitutional:       Appearance: Normal appearance. He is not diaphoretic.   HENT:      Head: Normocephalic and atraumatic.      Nose: Nose normal.       Mouth/Throat:      Mouth: Mucous membranes are moist.      Pharynx: No oropharyngeal exudate.   Eyes:      General: No scleral icterus.        Right eye: No discharge.         Left eye: No discharge.      Extraocular Movements: Extraocular movements intact.      Conjunctiva/sclera: Conjunctivae normal.   Cardiovascular:      Rate and Rhythm: Normal rate and regular rhythm.      Pulses:           Radial pulses are 2+ on the right side and 2+ on the left side.        Dorsalis pedis pulses are 2+ on the right side and 2+ on the left side.      Heart sounds: No murmur.   Pulmonary:      Effort: Pulmonary effort is normal. No respiratory distress.      Breath sounds: Normal breath sounds. No wheezing or rales.   Abdominal:      General: Bowel sounds are normal. There is no distension.      Palpations: Abdomen is soft.   Musculoskeletal:         General: No swelling or tenderness.      Cervical back: Neck supple. No muscular tenderness.      Right lower leg: No edema.      Left lower leg: No edema.   Lymphadenopathy:      Cervical: No cervical adenopathy.   Skin:     Coloration: Skin is not jaundiced or pale.   Neurological:      Mental Status: He is alert and oriented to person, place, and time.      Cranial Nerves: Cranial nerve deficit present.      Sensory: Sensory deficit present.      Coordination: Coordination abnormal.      Gait: Gait abnormal.   Psychiatric:         Mood and Affect: Mood normal.         Behavior: Behavior normal.         Fluids    Intake/Output Summary (Last 24 hours) at 4/11/2021 1231  Last data filed at 4/11/2021 1200  Gross per 24 hour   Intake 1780 ml   Output 2950 ml   Net -1170 ml       Laboratory  Recent Labs     04/09/21  0510 04/10/21  0555   WBC 6.9 7.7   RBC 4.28* 4.21*   HEMOGLOBIN 12.5* 12.2*   HEMATOCRIT 37.0* 36.0*   MCV 86.4 85.5   MCH 29.2 29.0   MCHC 33.8 33.9   RDW 40.0 39.0   PLATELETCT 284 297   MPV 9.6 9.4     Recent Labs     04/10/21  1130 04/10/21  1715 04/11/21  0330    SODIUM 146* 147* 144   POTASSIUM 4.2 4.0 4.0   CHLORIDE 113* 112 109   CO2 27 24 25   GLUCOSE 124* 130* 132*   BUN 29* 27* 29*   CREATININE 0.78 0.85 0.84   CALCIUM 8.8 9.1 9.1                   Imaging  US-EXTREMITY VENOUS LOWER BILAT   Final Result      DX-CHEST-LIMITED (1 VIEW)   Final Result      Right basilar atelectasis. No focal consolidation or pleural effusions.      [id number] VIEW   Final Result      No evidence of bowel obstruction.                  DX-CHEST-LIMITED (1 VIEW)   Final Result      1.  Right internal jugular catheter and enteric catheter appear appropriately located      2.  Minimal right basilar atelectasis      CT-HEAD W/O   Final Result         1.  Changes of evolving infarct within the right MCA distribution   2.  Hyperdensity in the sulci of the right MCA distribution infarct, largely appears related to thrombosed vessels, component of subarachnoid hemorrhage is suspected particularly in the right frontal lobe.   3.  Pneumocephalus, decreased since prior.      CT-HEAD W/O   Final Result         Postsurgical change from right craniectomy. Redemonstration of large right MCA infarct with edema and bulging of the brain parenchyma through the craniectomy defect      Less mass effect on the right lateral ventricle. Less right to left midline shift of 3 mm, previously 10 mm.         DX-ABDOMEN FOR TUBE PLACEMENT   Final Result      Enteric tube terminates over the stomach.      CT-HEAD W/O   Final Result         1.  Low-density changes of the right hemisphere compatible with MCA distribution infarct with edema.   2.  New effacement of the bilateral ventricles, right greater than left, with 10 mm right-to-left midline shift.   3.  New dilatation of the left temporal horn ventricle, appearance favors component of obstructive hydrocephalus due to mass effect from infarct and edema.   4.  Hyperdensity in the right middle cerebral artery, likely thrombosis given associated findings.      These  findings were discussed with the patient's clinician, Dr. Nunez, on 4/3/2021 7:11 AM.      MR-BRAIN-W/O   Final Result      Very large acute right MCA territory infarct as detailed above with small amount of petechial hemorrhage in the right insular region and right temporal lobe.      Punctate right thalamic lacunar infarct.      Right ICA and M1 MCA occlusion.      EC-ECHOCARDIOGRAM COMPLETE W/O CONT   Final Result      DX-CHEST-PORTABLE (1 VIEW)   Final Result         1.  Interstitial pulmonary parenchymal prominence, compatible with interstitial edema and/or infiltrates.      CT-CTA NECK WITH & W/O-POST PROCESSING   Final Result      Acute occlusion of the right internal carotid artery shortly after bifurcation. It is occluded up to the level of the carotid terminus.      CT-CTA HEAD WITH & W/O-POST PROCESS   Final Result      Acute right M1 occlusion.      Findings were discussed with RHONDA DIAZ on 3/31/2021 7:54 PM.      CT-CEREBRAL PERFUSION ANALYSIS   Final Result      1.  Cerebral blood flow less than 30% likely representing completed infarct = 134 mL.      2.  T Max more than 6 seconds likely representing combination of completed infarct and ischemia = 210 mL.      3.  Mismatched volume likely representing ischemic brain/penumbra = 76 mL.      4.  Please note that the cerebral perfusion was performed on the limited brain tissue around the basal ganglia region. Infarct/ischemia outside the CT perfusion sections can be missed in this study.      CT-HEAD W/O   Final Result   Addendum 1 of 1   In retrospect, there is subtle loss of gray-white matter differentiation    in the right MCA distribution, consistent with acute infarct.      Final      1.  No CT evidence of acute infarct, hemorrhage or mass.   2.  Mild paranasal sinus disease.           Assessment/Plan  * Acute right MCA stroke (HCC)- (present on admission)  Assessment & Plan  Likely consequence of COVID19 in 3/21 and hx of afib  Subsequent  brain edema requiring right decompressive hemicraniectomy on 4/3/2021  Neurology following patient will benefit from long-term anticoagulation when bleeding risk is acceptable likely 1-2 weeks from event onset.   Blood pressure control keep SBP<140 (on metoprolol 25mg BID, amlodipine 10mg, hydralazine 25mg TID, lisinopril 40mg daily)  Patient weaned off hypertonic saline and started on salt tablets  PT/OT/SLP  Aspiration precautions  Physiatry consultation    Paroxysmal atrial fibrillation (HCC)- (present on admission)  Assessment & Plan  Monitor vitals and on tele  Metoprolol 25mg BID w/ parameters  Discussed risk benefits and alternatives of long-term anticoagulation with patient and wife, plan to start DOAC when bleeding risk felt to be acceptable per neurology likely in 1 to 2 weeks    Leucocytosis  Assessment & Plan  Resolved but continue to monitor  4/11 WBC:7.7    Urinary retention  Assessment & Plan  Brewer catheter in place  Watch for urinary retention    History of COVID-19- (present on admission)  Assessment & Plan  Clinically recovered patient symptom onset on 3/15/2021 with positive initial test on 3/18/2021    Acquired hypothyroidism- (present on admission)  Assessment & Plan  [place] Thyroid 60mg BID   TSH is less than 0.005 will decrease dose and he will need recheck TFTs in 6 weeks (prior to admit was on 90mg BID)  TSH: <0.005 in past week.       VTE prophylaxis: Enoxaparin 40mg SQ daily

## 2021-04-11 NOTE — CARE PLAN
Problem: Communication  Goal: The ability to communicate needs accurately and effectively will improve  Outcome: PROGRESSING AS EXPECTED     Problem: Skin Integrity  Goal: Risk for impaired skin integrity will decrease  Outcome: PROGRESSING AS EXPECTED     Problem: Psychosocial Needs:  Goal: Ability to identify and develop effective coping behavior will improve  Outcome: PROGRESSING AS EXPECTED     Problem: Pain Management  Goal: Pain level will decrease to patient's comfort goal  Outcome: PROGRESSING AS EXPECTED

## 2021-04-11 NOTE — CARE PLAN
Problem: Psychosocial Needs:  Goal: Ability to verbalize feelings about condition will improve  Note: Patient calm and cooperative with care. Family at bedside providing support.      Problem: Nutritional:  Goal: Dysphagia will improve  Note: Tube feed infusing at goal. Patient tolerating well.

## 2021-04-12 LAB
ANION GAP SERPL CALC-SCNC: 10 MMOL/L (ref 7–16)
BUN SERPL-MCNC: 39 MG/DL (ref 8–22)
CALCIUM SERPL-MCNC: 9 MG/DL (ref 8.5–10.5)
CHLORIDE SERPL-SCNC: 105 MMOL/L (ref 96–112)
CO2 SERPL-SCNC: 25 MMOL/L (ref 20–33)
CREAT SERPL-MCNC: 0.98 MG/DL (ref 0.5–1.4)
CRP SERPL HS-MCNC: 0.65 MG/DL (ref 0–0.75)
GLUCOSE SERPL-MCNC: 111 MG/DL (ref 65–99)
POTASSIUM SERPL-SCNC: 4.2 MMOL/L (ref 3.6–5.5)
PREALB SERPL-MCNC: 27.8 MG/DL (ref 18–38)
SODIUM SERPL-SCNC: 140 MMOL/L (ref 135–145)

## 2021-04-12 PROCEDURE — 700102 HCHG RX REV CODE 250 W/ 637 OVERRIDE(OP): Performed by: PHYSICAL MEDICINE & REHABILITATION

## 2021-04-12 PROCEDURE — A9270 NON-COVERED ITEM OR SERVICE: HCPCS | Performed by: NURSE PRACTITIONER

## 2021-04-12 PROCEDURE — 700102 HCHG RX REV CODE 250 W/ 637 OVERRIDE(OP): Performed by: PSYCHIATRY & NEUROLOGY

## 2021-04-12 PROCEDURE — 700102 HCHG RX REV CODE 250 W/ 637 OVERRIDE(OP): Performed by: HOSPITALIST

## 2021-04-12 PROCEDURE — 80048 BASIC METABOLIC PNL TOTAL CA: CPT

## 2021-04-12 PROCEDURE — A9270 NON-COVERED ITEM OR SERVICE: HCPCS | Performed by: HOSPITALIST

## 2021-04-12 PROCEDURE — 84134 ASSAY OF PREALBUMIN: CPT

## 2021-04-12 PROCEDURE — 97110 THERAPEUTIC EXERCISES: CPT

## 2021-04-12 PROCEDURE — A9270 NON-COVERED ITEM OR SERVICE: HCPCS | Performed by: PSYCHIATRY & NEUROLOGY

## 2021-04-12 PROCEDURE — 700102 HCHG RX REV CODE 250 W/ 637 OVERRIDE(OP): Performed by: STUDENT IN AN ORGANIZED HEALTH CARE EDUCATION/TRAINING PROGRAM

## 2021-04-12 PROCEDURE — 700111 HCHG RX REV CODE 636 W/ 250 OVERRIDE (IP): Performed by: STUDENT IN AN ORGANIZED HEALTH CARE EDUCATION/TRAINING PROGRAM

## 2021-04-12 PROCEDURE — 97535 SELF CARE MNGMENT TRAINING: CPT

## 2021-04-12 PROCEDURE — 86140 C-REACTIVE PROTEIN: CPT

## 2021-04-12 PROCEDURE — 700102 HCHG RX REV CODE 250 W/ 637 OVERRIDE(OP): Performed by: NURSE PRACTITIONER

## 2021-04-12 PROCEDURE — 770020 HCHG ROOM/CARE - TELE (206)

## 2021-04-12 PROCEDURE — 97530 THERAPEUTIC ACTIVITIES: CPT

## 2021-04-12 PROCEDURE — 99232 SBSQ HOSP IP/OBS MODERATE 35: CPT | Performed by: HOSPITALIST

## 2021-04-12 PROCEDURE — A9270 NON-COVERED ITEM OR SERVICE: HCPCS | Performed by: PHYSICAL MEDICINE & REHABILITATION

## 2021-04-12 PROCEDURE — A9270 NON-COVERED ITEM OR SERVICE: HCPCS | Performed by: STUDENT IN AN ORGANIZED HEALTH CARE EDUCATION/TRAINING PROGRAM

## 2021-04-12 PROCEDURE — 36415 COLL VENOUS BLD VENIPUNCTURE: CPT

## 2021-04-12 PROCEDURE — 97112 NEUROMUSCULAR REEDUCATION: CPT

## 2021-04-12 RX ADMIN — HYDRALAZINE HYDROCHLORIDE 25 MG: 50 TABLET, FILM COATED ORAL at 16:09

## 2021-04-12 RX ADMIN — METOPROLOL TARTRATE 25 MG: 25 TABLET, FILM COATED ORAL at 18:38

## 2021-04-12 RX ADMIN — AMLODIPINE BESYLATE 10 MG: 10 TABLET ORAL at 05:55

## 2021-04-12 RX ADMIN — BACLOFEN 15 MG: 10 TABLET ORAL at 05:53

## 2021-04-12 RX ADMIN — ATORVASTATIN CALCIUM 80 MG: 80 TABLET, FILM COATED ORAL at 18:38

## 2021-04-12 RX ADMIN — DOCUSATE SODIUM 50 MG AND SENNOSIDES 8.6 MG 1 TABLET: 8.6; 5 TABLET, FILM COATED ORAL at 20:59

## 2021-04-12 RX ADMIN — METOPROLOL TARTRATE 25 MG: 25 TABLET, FILM COATED ORAL at 05:55

## 2021-04-12 RX ADMIN — OXYCODONE 5 MG: 5 TABLET ORAL at 10:57

## 2021-04-12 RX ADMIN — HYDRALAZINE HYDROCHLORIDE 25 MG: 50 TABLET, FILM COATED ORAL at 20:59

## 2021-04-12 RX ADMIN — HYDRALAZINE HYDROCHLORIDE 25 MG: 50 TABLET, FILM COATED ORAL at 05:54

## 2021-04-12 RX ADMIN — LISINOPRIL 40 MG: 20 TABLET ORAL at 05:54

## 2021-04-12 RX ADMIN — Medication 2 G: at 08:34

## 2021-04-12 RX ADMIN — OXYCODONE 5 MG: 5 TABLET ORAL at 05:52

## 2021-04-12 RX ADMIN — BACLOFEN 15 MG: 10 TABLET ORAL at 18:38

## 2021-04-12 RX ADMIN — OXYCODONE 5 MG: 5 TABLET ORAL at 16:09

## 2021-04-12 RX ADMIN — Medication 2 G: at 10:57

## 2021-04-12 RX ADMIN — ASPIRIN 81 MG: 81 TABLET, CHEWABLE ORAL at 18:38

## 2021-04-12 RX ADMIN — ENOXAPARIN SODIUM 40 MG: 40 INJECTION SUBCUTANEOUS at 05:55

## 2021-04-12 RX ADMIN — THYROID, PORCINE 60 MG: 30 TABLET ORAL at 18:39

## 2021-04-12 RX ADMIN — PHENYTOIN 1 MG: 125 SUSPENSION ORAL at 18:39

## 2021-04-12 RX ADMIN — Medication 2 G: at 18:38

## 2021-04-12 RX ADMIN — BACLOFEN 15 MG: 10 TABLET ORAL at 12:00

## 2021-04-12 RX ADMIN — THYROID, PORCINE 60 MG: 30 TABLET ORAL at 05:54

## 2021-04-12 ASSESSMENT — ENCOUNTER SYMPTOMS
BACK PAIN: 0
HEADACHES: 1
SPEECH CHANGE: 0
SHORTNESS OF BREATH: 0
STRIDOR: 0
FOCAL WEAKNESS: 1
NAUSEA: 0
SENSORY CHANGE: 1
ABDOMINAL PAIN: 0
FEVER: 0
NERVOUS/ANXIOUS: 0
COUGH: 0
PALPITATIONS: 0
CHILLS: 0
BLURRED VISION: 0

## 2021-04-12 ASSESSMENT — COGNITIVE AND FUNCTIONAL STATUS - GENERAL
DAILY ACTIVITIY SCORE: 8
SUGGESTED CMS G CODE MODIFIER MOBILITY: CN
SUGGESTED CMS G CODE MODIFIER DAILY ACTIVITY: CM
EATING MEALS: A LOT
MOBILITY SCORE: 6
DRESSING REGULAR UPPER BODY CLOTHING: TOTAL
PERSONAL GROOMING: A LOT
CLIMB 3 TO 5 STEPS WITH RAILING: TOTAL
TURNING FROM BACK TO SIDE WHILE IN FLAT BAD: UNABLE
TOILETING: TOTAL
DRESSING REGULAR LOWER BODY CLOTHING: TOTAL
STANDING UP FROM CHAIR USING ARMS: TOTAL
WALKING IN HOSPITAL ROOM: TOTAL
MOVING TO AND FROM BED TO CHAIR: UNABLE
MOVING FROM LYING ON BACK TO SITTING ON SIDE OF FLAT BED: UNABLE
HELP NEEDED FOR BATHING: TOTAL

## 2021-04-12 ASSESSMENT — LIFESTYLE VARIABLES
EVER HAD A DRINK FIRST THING IN THE MORNING TO STEADY YOUR NERVES TO GET RID OF A HANGOVER: NO
HOW MANY TIMES IN THE PAST YEAR HAVE YOU HAD 5 OR MORE DRINKS IN A DAY: 0
TOTAL SCORE: 0
ON A TYPICAL DAY WHEN YOU DRINK ALCOHOL HOW MANY DRINKS DO YOU HAVE: 0
EVER FELT BAD OR GUILTY ABOUT YOUR DRINKING: NO
DOES PATIENT WANT TO STOP DRINKING: NO
AVERAGE NUMBER OF DAYS PER WEEK YOU HAVE A DRINK CONTAINING ALCOHOL: 0
HAVE YOU EVER FELT YOU SHOULD CUT DOWN ON YOUR DRINKING: NO
CONSUMPTION TOTAL: NEGATIVE
TOTAL SCORE: 0
TOTAL SCORE: 0
HAVE PEOPLE ANNOYED YOU BY CRITICIZING YOUR DRINKING: NO
ALCOHOL_USE: NO

## 2021-04-12 ASSESSMENT — GAIT ASSESSMENTS: GAIT LEVEL OF ASSIST: UNABLE TO PARTICIPATE

## 2021-04-12 NOTE — THERAPY
Physical Therapy   Daily Treatment     Patient Name: Thong Arzola  Age:  56 y.o., Sex:  male  Medical Record #: [id number]  Today's Date: 4/12/2021     Precautions: Fall Risk, Swallow Precautions ( See Comments), Other (See Comments)  Comments: no bone flap R side, helmet when EOB    Assessment    Pt seen for follow up PT tx. Pts wife at bedside, spoke with her about importance of positioning, wear of prafo boot, and ROM. Pt continues to be limited by L sided weakness, severe L sided neglect, and poor postural control. Pt participated in bed mobility, seated balance, and LE PROM/AROM. PT will cont while in acute care setting.     Plan    Continue current treatment plan.    DC Equipment Recommendations: Unable to determine at this time  Discharge Recommendations: Recommend post-acute placement for additional physical therapy services prior to discharge home         04/12/21 1049   Cognition    Cognition / Consciousness X   Speech/ Communication Delayed Responses   Level of Consciousness Alert   Ability To Follow Commands 1 Step   Safety Awareness Impaired   New Learning Impaired   Attention Impaired   Initiation Impaired   Comments severe L neglect, fatigued with activity   Neuro-Muscular Treatments   Neuro-Muscular Treatments Anterior weight shift;Compensatory Strategies;Weight Shift Left;Weight Shift Right;Verbal Cuing;Tapping;Tactile Cuing;Sequencing;Postural Facilitation;Postural Changes   Comments sitting EOB   Vision   Vision Comments attempted to facilitate L visual tracking   Other Treatments   Other Treatments Provided seated EOB L LE PROM and R LE AROM   Neurological Concerns   Neurological Concerns Yes   Comments   (L deficits)   Balance   Sitting Balance (Static) Poor -   Sitting Balance (Dynamic) Trace +   Weight Shift Sitting Poor   Weight Shift Standing Absent   Skilled Intervention Verbal Cuing;Tactile Cuing;Sequencing;Inhibition;Compensatory Strategies;Postural Facilitation   Comments pushes with  R UE   Gait Analysis   Gait Level Of Assist Unable to Participate   Bed Mobility    Supine to Sit Maximal Assist   Sit to Supine Total Assist   Scooting Maximal Assist   Skilled Intervention Verbal Cuing;Tactile Cuing;Compensatory Strategies;Postural Facilitation   Comments HOB elevated   Functional Mobility   Sit to Stand Unable to Participate   Bed, Chair, Wheelchair Transfer Unable to Participate   Toilet Transfers Unable to Participate   Mobility EOB only   Short Term Goals    Short Term Goal # 1 Pt will be able to perform supine<>sit with bed features with min A in 6tx to promote fnx progression towards I    Goal Outcome # 1 goal not met   Short Term Goal # 2 Pt will be able to perform sit<>stand/transfers with hemiwalker with Taz in 6tx to promote fnx progression towards I    Goal Outcome # 2 Goal not met   Short Term Goal # 3 Pt will be able to ambulate 25ft with hemiwalker with min A in 6tx to promote fnx progression towards I    Goal Outcome # 3 Goal not met   Anticipated Discharge Equipment and Recommendations   DC Equipment Recommendations Unable to determine at this time   Discharge Recommendations Recommend post-acute placement for additional physical therapy services prior to discharge home

## 2021-04-12 NOTE — THERAPY
"Occupational Therapy  Daily Treatment     Patient Name: Thong Arzola  Age:  56 y.o., Sex:  male  Medical Record #: [id number]  Today's Date: 4/12/2021     Precautions  Precautions: Fall Risk, Swallow Precautions ( See Comments), Other (See Comments)  Comments: no bone flap R helmet on EOB     Assessment  Pt demonstrating flaccid LUE, severe L visual neglect, and impaired postural control. Pt did report feeling of pain to pinch on LUE and was able to use his RUE to grab and position his LUE. Wife was present through out and very supportive. OT will follow in this setting.     Plan  Treatment plan modified to 5 times per week until therapy goals are met for the following treatments:  Adaptive Equipment, Cognitive Skill Development, Manual Therapy Techniques, Self Care/Activities of Daily Living, Therapeutic Activities and Therapeutic Exercises.    DC Equipment Recommendations: Unable to determine at this time  Discharge Recommendations: Recommend post-acute placement for additional occupational therapy services prior to discharge home    Subjective    \"I want ice chips with lemon\"      Objective     04/12/21 1052   Precautions   Precautions Fall Risk;Swallow Precautions ( See Comments);Other (See Comments)   Comments no bone flap R helmet on EOB    Pain 0 - 10 Group   Therapist Pain Assessment During Activity;Nurse Notified;4   Cognition    Cognition / Consciousness X   Speech/ Communication Delayed Responses   Level of Consciousness Alert   Ability To Follow Commands 1 Step   Safety Awareness Impaired   New Learning Impaired   Attention Impaired   Initiation Impaired   Comments Pleasant and cooperative, severe L neglect , did fatigue w/activity, able to follow cues w/RUE    Passive ROM Upper Body   Passive ROM Upper Body WDL   Active ROM Upper Body   Active ROM Upper Body  X   Dominant Hand Right   Comments LUE remains flaccid    Strength Upper Body   Upper Body Strength  X   Comments LUE flaccid w/1 finger " subluxation    Sensation Upper Body   Upper Extremity Sensation  X   Lt Upper Extremity Light Touch Impaired   Lt Upper Extremity Sharp / Dull Sensation Impaired   Lt Upper Extremity Deep Pressure Sensation Impaired   Lt Upper Extremity Proprioception Impaired   Lt Upper Extremity Stereognosis Impaired   Comments had reports of pain and temp    Upper Body Muscle Tone   Upper Body Muscle Tone  X   Lt Upper Extremity Muscle Tone Hypotonic;Non Functional   Comments no tone observed LUE was flaccid through out    Supine Upper Body Exercises   Comments wife completeing distal ROM, encouraged pt to use RUE to place LUE in midline    Other Treatments   Other Treatments Provided Focused on oral care w/use of RUE, faciliating vision to midline with verbal and tactile cues as well as midline seated postural control, encouaraged pt and wife to facilitate use of RUE to positon LUE into midline and reviewed providing sensory input to L-side.    Balance   Sitting Balance (Static) Poor +   Sitting Balance (Dynamic) Poor   Weight Shift Sitting Poor   Weight Shift Standing Absent   Skilled Intervention Verbal Cuing;Tactile Cuing;Postural Facilitation;Facilitation;Compensatory Strategies   Comments tends to push w/RUE    Bed Mobility    Supine to Sit Maximal Assist   Sit to Supine Total Assist   Scooting Maximal Assist   Skilled Intervention Verbal Cuing;Tactile Cuing;Facilitation;Compensatory Strategies   Activities of Daily Living   Grooming Moderate Assist;Seated   Upper Body Dressing Maximal Assist   Lower Body Dressing Maximal Assist   Toileting Total Assist   Skilled Intervention Verbal Cuing;Tactile Cuing;Compensatory Strategies;Facilitation   Comments incontient of bowel in bed, tends to miss L side of face and mouth during grooming    How much help from another person does the patient currently need...   6 Clicks Daily Activity Score 8   Functional Mobility   Sit to Stand Unable to Participate   Mobility EOB    Skilled  Intervention Tactile Cuing;Postural Facilitation;Compensatory Strategies   Visual Perception   Visual Perception  X   Neglect Severe Left   Visual Scanning Impaired   Comments was able to come to midline 1x, but tends to remain in L visual field, discussed having pt on his left side towards wife to allow for assisted midline vision    Activity Tolerance   Comments fatigues w/activity    Patient / Family Goals   Patient / Family Goal #1 To return to PLOF   Goal #1 Outcome Progressing slower than expected   Short Term Goals   Short Term Goal # 1 Pt will sit EOB unsupported and perform grooming w/ SPV   Goal Outcome # 1 Progressing slower than expected   Short Term Goal # 2 Pt will attend 3 objects on L side w/ only v/c's   Goal Outcome # 2 Progressing slower than expected   Short Term Goal # 3 Pt will increase L UE strength to 2/5 grossly   Goal Outcome # 3 Progressing slower than expected   Short Term Goal # 4 Pt will perform ADL txf w/ min A   Goal Outcome # 4 Progressing slower than expected   Education Group   Role of Occupational Therapist Patient Response Patient;Acceptance;Explanation;Verbal Demonstration;Action Demonstration;Reinforcement Needed;Family   ADL Patient Response Patient;Acceptance;Explanation;Verbal Demonstration;Action Demonstration;Reinforcement Needed   Anticipated Discharge Equipment and Recommendations   DC Equipment Recommendations Unable to determine at this time   Discharge Recommendations Recommend post-acute placement for additional occupational therapy services prior to discharge home   Interdisciplinary Plan of Care Collaboration   IDT Collaboration with  Liaison / Rehab Coordinator   Patient Position at End of Therapy In Bed;Call Light within Reach;Tray Table within Reach;Phone within Reach   Collaboration Comments RN aware of session    Session Information   Date / Session Number  4/12 #4 (2/5, 4/14)   Priority 3

## 2021-04-12 NOTE — PROGRESS NOTES
Patient transported to tele by Flex RN on a zolle. Report received from ICU RNMiriam. Handoff report received from flex RN.  Assumed patient care. PT is reclined in bed, AAOx4, no complaints of pain or discomfort. Tele box is on, POC discussed with PT with no questions asked. Safety precautions in place. Call light and personal belongings within reach. Educated to call for assistance if needed.

## 2021-04-12 NOTE — CARE PLAN
Problem: Communication  Goal: The ability to communicate needs accurately and effectively will improve  Outcome: PROGRESSING AS EXPECTED  Note: Patient educated to utilize call light. Patient  oriented to hospital room. Patient encouraged to ask questions about plan of care. Patient effectively demonstrates use of call light and verbalizes understanding.     Problem: Safety  Goal: Will remain free from injury  Outcome: PROGRESSING AS EXPECTED  Note: Patient's risk for injury and falls assessed. Appropriate safety precautions in place. Patient educated to utilize call light for needs. Patient verbalizes understanding.

## 2021-04-12 NOTE — DISCHARGE PLANNING
[facility]    Follow up to Rehab.  No insurance provider for post acute care services at this time.  Will continue to monitor for updates.

## 2021-04-12 NOTE — CARE PLAN
Problem: Communication  Goal: The ability to communicate needs accurately and effectively will improve  Outcome: PROGRESSING AS EXPECTED     Problem: Safety  Goal: Will remain free from injury  Outcome: PROGRESSING AS EXPECTED     Problem: Skin Integrity  Goal: Risk for impaired skin integrity will decrease  Outcome: PROGRESSING AS EXPECTED     Problem: Safety:  Goal: Will remain free from injury  Outcome: PROGRESSING AS EXPECTED

## 2021-04-12 NOTE — PROGRESS NOTES
Patient and spouse transferred to [facility] on monitor with ACLS RN. Bedside report given to SIMRAN Arshad. NIHSS performed at [facility].

## 2021-04-12 NOTE — PROGRESS NOTES
Spouse Anel at bedside. Updated on over night events. Further updates on POC following MD rounding.

## 2021-04-12 NOTE — THERAPY
Missed Therapy     Patient Name: Thong Arzola  Age:  56 y.o., Sex:  male  Medical Record #: [id number]  Today's Date: 4/12/2021    Discussed missed therapy with RN.       04/12/21 1411   Treatment Variance   Reason For Missed Therapy Non-Medical - Other (Please Comment)   Interdisciplinary Plan of Care Collaboration   Collaboration Comments Attempted to see patient for a cognitive-linguistic evaluation however patient transferring to neuro at this time.

## 2021-04-12 NOTE — PROGRESS NOTES
Hospital Medicine Daily Progress Note    Date of Service  4/12/2021    Chief Complaint  Left sided weakness    Hospital Course  56 y.o. male admitted 3/31/2021 with acute stroke.  He was not a candidate for tPA nor thrombectomy.  He has a history of paroxysmal atrial fibrillation, dyslipidemia, hypothyroidism. During admit he underwent a hemicraniectomy for right sided decompression from right MCA dense stroke with intractable intracranial pressure.     Interval Problem Update  4/12: Patient verbal and oriented with some slurred speech.  Left hemiplegia. On 4/11 Dr. Leo met with the wife and explained to her we likely will need a G-tube at some point.  Also alerted the both of them we would need a SNF at some point.  He reports that his pain is reasonably well controlled at this juncture, he is describing some thirst.  Free water flushes will be increased from 30 mL to 90 mL every 6 hours.  This can be backed off if sodium begins to drop.    Consultants/Specialty  Neurology  Neuro-intensivist (signing off 4/11)  Neuro surgery    Code Status  Full Code    Disposition  4/11 Transfer to neurosurgery.  Will need SNF/Rehab     Review of Systems  Review of Systems   Constitutional: Negative for chills and fever.   Eyes: Negative for blurred vision.   Respiratory: Negative for cough, shortness of breath and stridor.    Cardiovascular: Negative for chest pain, palpitations and leg swelling.   Gastrointestinal: Negative for abdominal pain and nausea.   Genitourinary: Negative for dysuria and hematuria.   Musculoskeletal: Negative for back pain and joint pain.   Skin: Negative for rash.   Neurological: Positive for sensory change (left side), focal weakness (left side) and headaches (right temporal region). Negative for speech change.   Psychiatric/Behavioral: The patient is not nervous/anxious.         Physical Exam  Temp:  [36.6 °C (97.8 °F)-37.7 °C (99.8 °F)] 37.3 °C (99.1 °F)  Pulse:  [74-99] 77  Resp:  [12-26] 17  BP:  (110-152)/(69-90) 135/77  SpO2:  [91 %-96 %] 94 %    Physical Exam  Vitals reviewed.   Constitutional:       Appearance: Normal appearance. He is not diaphoretic.   HENT:      Head: Normocephalic and atraumatic.      Nose: Nose normal.      Mouth/Throat:      Mouth: Mucous membranes are moist.      Pharynx: No oropharyngeal exudate.   Eyes:      General: No scleral icterus.        Right eye: No discharge.         Left eye: No discharge.      Extraocular Movements: Extraocular movements intact.      Conjunctiva/sclera: Conjunctivae normal.   Cardiovascular:      Rate and Rhythm: Normal rate and regular rhythm.      Pulses:           Radial pulses are 2+ on the right side and 2+ on the left side.        Dorsalis pedis pulses are 2+ on the right side and 2+ on the left side.      Heart sounds: No murmur.   Pulmonary:      Effort: Pulmonary effort is normal. No respiratory distress.      Breath sounds: Normal breath sounds. No wheezing or rales.   Abdominal:      General: Bowel sounds are normal. There is no distension.      Palpations: Abdomen is soft.   Musculoskeletal:         General: No swelling or tenderness.      Cervical back: Neck supple. No muscular tenderness.      Right lower leg: No edema.      Left lower leg: No edema.   Lymphadenopathy:      Cervical: No cervical adenopathy.   Skin:     Coloration: Skin is not jaundiced or pale.   Neurological:      Mental Status: He is alert and oriented to person, place, and time.      Cranial Nerves: Cranial nerve deficit present.      Sensory: Sensory deficit present.      Coordination: Coordination abnormal.      Gait: Gait abnormal.   Psychiatric:         Mood and Affect: Mood normal.         Behavior: Behavior normal.         Current Facility-Administered Medications:   •  prazosin (MINIPRESS) capsule 1 mg, 1 mg, Enteral Tube, Q EVENING, Jose Leo D.O., 1 mg at 04/11/21 6868  •  sodium chloride (SALT) tablet 2 g, 2 g, Enteral Tube, TID WITH MEALS, Cristhian MIKE  RADHA Bell, 2 g at 04/12/21 1057  •  hydrALAZINE (APRESOLINE) tablet 25 mg, 25 mg, Enteral Tube, Q8HRS, Cristhian Bell M.D., 25 mg at 04/12/21 0554  •  baclofen (LIORESAL) tablet 15 mg, 15 mg, Enteral Tube, TID, Christofer Woryarelil, D.O., 15 mg at 04/12/21 1200  •  hydrALAZINE (APRESOLINE) injection 10 mg, 10 mg, Intravenous, Q HOUR PRN, Cristhian Bell M.D., 10 mg at 04/10/21 1631  •  labetalol (NORMODYNE/TRANDATE) injection 10-20 mg, 10-20 mg, Intravenous, Q4HRS PRN, Cristhian Bell M.D., 10 mg at 04/10/21 1508  •  cloNIDine (CATAPRES) tablet 0.1 mg, 0.1 mg, Enteral Tube, Q4HRS PRN, Cristhian Bell M.D., 0.1 mg at 04/08/21 1734  •  calcium carbonate (TUMS) chewable tab 500 mg, 500 mg, Enteral Tube, Q8HRS PRN, Cristhian Bell M.D., 500 mg at 04/07/21 1501  •  lisinopril (PRINIVIL) tablet 40 mg, 40 mg, Enteral Tube, Q DAY, Cristhian Bell M.D., 40 mg at 04/12/21 0554  •  amLODIPine (NORVASC) tablet 10 mg, 10 mg, Enteral Tube, Q DAY, Johana Nelson M.D., 10 mg at 04/12/21 0555  •  metoprolol tartrate (LOPRESSOR) tablet 25 mg, 25 mg, Enteral Tube, BID, Cristhian Bell M.D., 25 mg at 04/12/21 0555  •  enoxaparin (LOVENOX) inj 40 mg, 40 mg, Subcutaneous, DAILY, Johana Nelson M.D., 40 mg at 04/12/21 0555  •  aspirin (ASA) chewable tab 81 mg, 81 mg, Enteral Tube, DAILY, Cristhian Bell M.D., 81 mg at 04/11/21 1749  •  thyroid (ARMOUR THYROID) tablet 60 mg, 60 mg, Enteral Tube, BID, King Spann M.D., 60 mg at 04/12/21 0554  •  Pharmacy Consult Request ...Pain Management Review 1 Each, 1 Each, Other, PHARMACY TO DOSE, Lilia Pizano, P.A.-C.  •  MD ALERT...DO NOT ADMINISTER NSAIDS or ASPIRIN unless ORDERED By Neurosurgery 1 Each, 1 Each, Other, PRN, Lilia Pizano, P.A.-C.  •  bisacodyl (DULCOLAX) suppository 10 mg, 10 mg, Rectal, Q24HRS PRN, Lilia Pizano, P.A.-C., 10 mg at 04/11/21 1811  •  artificial tears (EYE LUBRICANT) ophth ointment 1 Application, 1 Application, Both Eyes, PRN, Lilia TYSON  BRIAN Pizano.  •  Pharmacy Consult: Enteral tube insertion - review meds/change route/product selection, 1 Each, Other, PHARMACY TO DOSE, Radha Meredith A.P.R.N.  •  atorvastatin (LIPITOR) tablet 80 mg, 80 mg, Enteral Tube, Q EVENING, Radha Meredith, A.P.R.N., 80 mg at 04/11/21 1750  •  senna-docusate (PERICOLACE or SENOKOT S) 8.6-50 MG per tablet 1 tablet, 1 tablet, Enteral Tube, Nightly, Radha Meredith A.P.R.N., 1 tablet at 04/11/21 2206  •  magnesium hydroxide (MILK OF MAGNESIA) suspension 30 mL, 30 mL, Enteral Tube, QDAY PRN, Radha Meredith A.P.R.N., 30 mL at 04/11/21 0622  •  ondansetron (ZOFRAN ODT) dispertab 4 mg, 4 mg, Enteral Tube, Q4HRS PRN, Radha Meredith, A.P.R.N.  •  oxyCODONE immediate-release (ROXICODONE) tablet 5 mg, 5 mg, Enteral Tube, Q3HRS PRN, 5 mg at 04/12/21 1057 **OR** oxyCODONE immediate-release (ROXICODONE) tablet 10 mg, 10 mg, Enteral Tube, Q3HRS PRN, 10 mg at 04/06/21 0405 **OR** HYDROmorphone (Dilaudid) injection 0.5 mg, 0.5 mg, Intravenous, Q3HRS PRN, Radha Meredith, A.P.R.N., 0.5 mg at 04/03/21 1554  •  polyethylene glycol/lytes (MIRALAX) PACKET 1 Packet, 1 Packet, Enteral Tube, BID PRN, Radha Meredith, A.P.R.N., 1 Packet at 04/11/21 0621  •  promethazine (PHENERGAN) tablet 12.5-25 mg, 12.5-25 mg, Enteral Tube, Q4HRS PRN, Radha Meredith, A.P.R.N.  •  senna-docusate (PERICOLACE or SENOKOT S) 8.6-50 MG per tablet 1 tablet, 1 tablet, Enteral Tube, Q24HRS PRN, CRISTÓBAL [place].  •  ondansetron (ZOFRAN) syringe/vial injection 4 mg, 4 mg, Intravenous, Q4HRS PRN, Gia Forman M.D., 4 mg at 04/07/21 0028  •  promethazine (PHENERGAN) suppository 12.5-25 mg, 12.5-25 mg, Rectal, Q4HRS PRN, Gia Forman M.D.  •  prochlorperazine (COMPAZINE) injection 5-10 mg, 5-10 mg, Intravenous, Q4HRS PRN, Gia Forman M.D., 10 mg at 04/05/21 0913      Fluids    Intake/Output Summary (Last 24 hours) at 4/12/2021 1525  Last data filed at 4/12/2021 1200  Gross per 24 hour   Intake 810 ml   Output 1880  ml   Net -1070 ml       Laboratory  Recent Labs     04/10/21  0555   WBC 7.7   RBC 4.21*   HEMOGLOBIN 12.2*   HEMATOCRIT 36.0*   MCV 85.5   MCH 29.0   MCHC 33.9   RDW 39.0   PLATELETCT 297   MPV 9.4     Recent Labs     04/10/21  1715 04/11/21  0330 04/12/21  0905   SODIUM 147* 144 140   POTASSIUM 4.0 4.0 4.2   CHLORIDE 112 109 105   CO2 24 25 25   GLUCOSE 130* 132* 111*   BUN 27* 29* 39*   CREATININE 0.85 0.84 0.98   CALCIUM 9.1 9.1 9.0                   Imaging  US-EXTREMITY VENOUS LOWER BILAT   Final Result      DX-CHEST-LIMITED (1 VIEW)   Final Result      Right basilar atelectasis. No focal consolidation or pleural effusions.      [id number] VIEW   Final Result      No evidence of bowel obstruction.                  DX-CHEST-LIMITED (1 VIEW)   Final Result      1.  Right internal jugular catheter and enteric catheter appear appropriately located      2.  Minimal right basilar atelectasis      CT-HEAD W/O   Final Result         1.  Changes of evolving infarct within the right MCA distribution   2.  Hyperdensity in the sulci of the right MCA distribution infarct, largely appears related to thrombosed vessels, component of subarachnoid hemorrhage is suspected particularly in the right frontal lobe.   3.  Pneumocephalus, decreased since prior.      CT-HEAD W/O   Final Result         Postsurgical change from right craniectomy. Redemonstration of large right MCA infarct with edema and bulging of the brain parenchyma through the craniectomy defect      Less mass effect on the right lateral ventricle. Less right to left midline shift of 3 mm, previously 10 mm.         DX-ABDOMEN FOR TUBE PLACEMENT   Final Result      Enteric tube terminates over the stomach.      CT-HEAD W/O   Final Result         1.  Low-density changes of the right hemisphere compatible with MCA distribution infarct with edema.   2.  New effacement of the bilateral ventricles, right greater than left, with 10 mm right-to-left midline shift.   3.   New dilatation of the left temporal horn ventricle, appearance favors component of obstructive hydrocephalus due to mass effect from infarct and edema.   4.  Hyperdensity in the right middle cerebral artery, likely thrombosis given associated findings.      These findings were discussed with the patient's clinician, Dr. Nunez, on 4/3/2021 7:11 AM.      MR-BRAIN-W/O   Final Result      Very large acute right MCA territory infarct as detailed above with small amount of petechial hemorrhage in the right insular region and right temporal lobe.      Punctate right thalamic lacunar infarct.      Right ICA and M1 MCA occlusion.      EC-ECHOCARDIOGRAM COMPLETE W/O CONT   Final Result      DX-CHEST-PORTABLE (1 VIEW)   Final Result         1.  Interstitial pulmonary parenchymal prominence, compatible with interstitial edema and/or infiltrates.      CT-CTA NECK WITH & W/O-POST PROCESSING   Final Result      Acute occlusion of the right internal carotid artery shortly after bifurcation. It is occluded up to the level of the carotid terminus.      CT-CTA HEAD WITH & W/O-POST PROCESS   Final Result      Acute right M1 occlusion.      Findings were discussed with RHONDA DIAZ on 3/31/2021 7:54 PM.      CT-CEREBRAL PERFUSION ANALYSIS   Final Result      1.  Cerebral blood flow less than 30% likely representing completed infarct = 134 mL.      2.  T Max more than 6 seconds likely representing combination of completed infarct and ischemia = 210 mL.      3.  Mismatched volume likely representing ischemic brain/penumbra = 76 mL.      4.  Please note that the cerebral perfusion was performed on the limited brain tissue around the basal ganglia region. Infarct/ischemia outside the CT perfusion sections can be missed in this study.      CT-HEAD W/O   Final Result   Addendum 1 of 1   In retrospect, there is subtle loss of gray-white matter differentiation    in the right MCA distribution, consistent with acute infarct.      Final       1.  No CT evidence of acute infarct, hemorrhage or mass.   2.  Mild paranasal sinus disease.           Assessment/Plan  * Acute right MCA stroke (HCC)- (present on admission)  Assessment & Plan  Likely consequence of COVID19 in 3/21 and hx of afib  Subsequent brain edema requiring right decompressive hemicraniectomy on 4/3/2021  Neurology following patient will benefit from long-term anticoagulation when bleeding risk is acceptable likely 1-2 weeks from event onset.   Blood pressure control keep SBP<140 (on metoprolol 25mg BID, amlodipine 10mg, hydralazine 25mg TID, lisinopril 40mg daily)  Patient weaned off hypertonic saline and started on salt tablets  PT/OT/SLP  Aspiration precautions  Physiatry consultation    Paroxysmal atrial fibrillation (HCC)- (present on admission)  Assessment & Plan  Monitor vitals and on tele  Metoprolol 25mg BID w/ parameters  Discussed risk benefits and alternatives of long-term anticoagulation with patient and wife, plan to start DOAC when bleeding risk felt to be acceptable per neurology likely in 1 to 2 weeks    Leucocytosis  Assessment & Plan  Resolved but continue to monitor  4/11 WBC:7.7    Urinary retention  Assessment & Plan  Brewer catheter in place  Watch for urinary retention  4/11 initiated prazosin    History of COVID-19- (present on admission)  Assessment & Plan  Clinically recovered patient symptom onset on 3/15/2021 with positive initial test on 3/18/2021    Acquired hypothyroidism- (present on admission)  Assessment & Plan  [place] Thyroid 60mg BID   TSH is less than 0.005 will decrease dose and he will need recheck TFTs in 6 weeks (prior to admit was on 90mg BID)  TSH: <0.005 in past week.       VTE prophylaxis: Enoxaparin 40mg SQ daily

## 2021-04-12 NOTE — PROGRESS NOTES
2 RN skin check complete.   Devices in place Brewer catheter, SCD's, Drop boot.  Skin assessed under devices Yes.  Confirmed pressure ulcers found on NA.  New potential pressure ulcers noted on NA. Wound consult placed NA.  The following interventions in place, Pillows for positioning, mepilex to sacrum.    All skin assessed: Skin is CDI without evidence of breakdown.

## 2021-04-12 NOTE — PROGRESS NOTES
Patient transported to [facility] via bed with telemetry RN present. Report given to Maritza Breaux RN via telephone. Wife, Anel called and updated regarding patient condition and room transfer.

## 2021-04-13 LAB
ANION GAP SERPL CALC-SCNC: 9 MMOL/L (ref 7–16)
BUN SERPL-MCNC: 45 MG/DL (ref 8–22)
CALCIUM SERPL-MCNC: 8.7 MG/DL (ref 8.5–10.5)
CHLORIDE SERPL-SCNC: 104 MMOL/L (ref 96–112)
CO2 SERPL-SCNC: 26 MMOL/L (ref 20–33)
CREAT SERPL-MCNC: 1.01 MG/DL (ref 0.5–1.4)
ERYTHROCYTE [DISTWIDTH] IN BLOOD BY AUTOMATED COUNT: 38.2 FL (ref 35.9–50)
GLUCOSE SERPL-MCNC: 131 MG/DL (ref 65–99)
HCT VFR BLD AUTO: 36.5 % (ref 42–52)
HGB BLD-MCNC: 12.3 G/DL (ref 14–18)
MCH RBC QN AUTO: 29.2 PG (ref 27–33)
MCHC RBC AUTO-ENTMCNC: 33.7 G/DL (ref 33.7–35.3)
MCV RBC AUTO: 86.7 FL (ref 81.4–97.8)
PLATELET # BLD AUTO: 336 K/UL (ref 164–446)
PMV BLD AUTO: 10.3 FL (ref 9–12.9)
POTASSIUM SERPL-SCNC: 4 MMOL/L (ref 3.6–5.5)
RBC # BLD AUTO: 4.21 M/UL (ref 4.7–6.1)
SODIUM SERPL-SCNC: 139 MMOL/L (ref 135–145)
WBC # BLD AUTO: 7.9 K/UL (ref 4.8–10.8)

## 2021-04-13 PROCEDURE — 700102 HCHG RX REV CODE 250 W/ 637 OVERRIDE(OP): Performed by: HOSPITALIST

## 2021-04-13 PROCEDURE — 99233 SBSQ HOSP IP/OBS HIGH 50: CPT | Performed by: PHYSICAL MEDICINE & REHABILITATION

## 2021-04-13 PROCEDURE — A9270 NON-COVERED ITEM OR SERVICE: HCPCS | Performed by: HOSPITALIST

## 2021-04-13 PROCEDURE — A9270 NON-COVERED ITEM OR SERVICE: HCPCS | Performed by: NURSE PRACTITIONER

## 2021-04-13 PROCEDURE — 97112 NEUROMUSCULAR REEDUCATION: CPT

## 2021-04-13 PROCEDURE — 700102 HCHG RX REV CODE 250 W/ 637 OVERRIDE(OP): Performed by: STUDENT IN AN ORGANIZED HEALTH CARE EDUCATION/TRAINING PROGRAM

## 2021-04-13 PROCEDURE — 770020 HCHG ROOM/CARE - TELE (206)

## 2021-04-13 PROCEDURE — 97530 THERAPEUTIC ACTIVITIES: CPT | Mod: CQ

## 2021-04-13 PROCEDURE — 700111 HCHG RX REV CODE 636 W/ 250 OVERRIDE (IP): Performed by: STUDENT IN AN ORGANIZED HEALTH CARE EDUCATION/TRAINING PROGRAM

## 2021-04-13 PROCEDURE — 92612 ENDOSCOPY SWALLOW (FEES) VID: CPT

## 2021-04-13 PROCEDURE — A9270 NON-COVERED ITEM OR SERVICE: HCPCS | Performed by: STUDENT IN AN ORGANIZED HEALTH CARE EDUCATION/TRAINING PROGRAM

## 2021-04-13 PROCEDURE — 97112 NEUROMUSCULAR REEDUCATION: CPT | Mod: CQ

## 2021-04-13 PROCEDURE — 700102 HCHG RX REV CODE 250 W/ 637 OVERRIDE(OP): Performed by: PHYSICAL MEDICINE & REHABILITATION

## 2021-04-13 PROCEDURE — A9270 NON-COVERED ITEM OR SERVICE: HCPCS | Performed by: PSYCHIATRY & NEUROLOGY

## 2021-04-13 PROCEDURE — 85027 COMPLETE CBC AUTOMATED: CPT

## 2021-04-13 PROCEDURE — 80048 BASIC METABOLIC PNL TOTAL CA: CPT

## 2021-04-13 PROCEDURE — A9270 NON-COVERED ITEM OR SERVICE: HCPCS | Performed by: PHYSICAL MEDICINE & REHABILITATION

## 2021-04-13 PROCEDURE — 36415 COLL VENOUS BLD VENIPUNCTURE: CPT

## 2021-04-13 PROCEDURE — 700102 HCHG RX REV CODE 250 W/ 637 OVERRIDE(OP): Performed by: PSYCHIATRY & NEUROLOGY

## 2021-04-13 PROCEDURE — 99232 SBSQ HOSP IP/OBS MODERATE 35: CPT | Performed by: INTERNAL MEDICINE

## 2021-04-13 PROCEDURE — 700102 HCHG RX REV CODE 250 W/ 637 OVERRIDE(OP): Performed by: NURSE PRACTITIONER

## 2021-04-13 PROCEDURE — 92526 ORAL FUNCTION THERAPY: CPT

## 2021-04-13 RX ADMIN — LISINOPRIL 40 MG: 20 TABLET ORAL at 04:41

## 2021-04-13 RX ADMIN — THYROID, PORCINE 60 MG: 30 TABLET ORAL at 17:42

## 2021-04-13 RX ADMIN — METOPROLOL TARTRATE 25 MG: 25 TABLET, FILM COATED ORAL at 17:41

## 2021-04-13 RX ADMIN — HYDRALAZINE HYDROCHLORIDE 25 MG: 50 TABLET, FILM COATED ORAL at 13:06

## 2021-04-13 RX ADMIN — THYROID, PORCINE 60 MG: 30 TABLET ORAL at 04:41

## 2021-04-13 RX ADMIN — BACLOFEN 15 MG: 10 TABLET ORAL at 13:06

## 2021-04-13 RX ADMIN — BACLOFEN 15 MG: 10 TABLET ORAL at 04:42

## 2021-04-13 RX ADMIN — HYDRALAZINE HYDROCHLORIDE 25 MG: 50 TABLET, FILM COATED ORAL at 04:41

## 2021-04-13 RX ADMIN — HYDRALAZINE HYDROCHLORIDE 25 MG: 50 TABLET, FILM COATED ORAL at 20:34

## 2021-04-13 RX ADMIN — DOCUSATE SODIUM 50 MG AND SENNOSIDES 8.6 MG 1 TABLET: 8.6; 5 TABLET, FILM COATED ORAL at 20:34

## 2021-04-13 RX ADMIN — BACLOFEN 15 MG: 10 TABLET ORAL at 17:42

## 2021-04-13 RX ADMIN — METOPROLOL TARTRATE 25 MG: 25 TABLET, FILM COATED ORAL at 04:42

## 2021-04-13 RX ADMIN — ASPIRIN 81 MG: 81 TABLET, CHEWABLE ORAL at 17:43

## 2021-04-13 RX ADMIN — Medication 2 G: at 17:41

## 2021-04-13 RX ADMIN — Medication 2 G: at 06:11

## 2021-04-13 RX ADMIN — AMLODIPINE BESYLATE 10 MG: 10 TABLET ORAL at 04:41

## 2021-04-13 RX ADMIN — ENOXAPARIN SODIUM 40 MG: 40 INJECTION SUBCUTANEOUS at 04:42

## 2021-04-13 RX ADMIN — PHENYTOIN 1 MG: 125 SUSPENSION ORAL at 17:41

## 2021-04-13 RX ADMIN — Medication 2 G: at 13:06

## 2021-04-13 RX ADMIN — ATORVASTATIN CALCIUM 80 MG: 80 TABLET, FILM COATED ORAL at 17:42

## 2021-04-13 ASSESSMENT — COGNITIVE AND FUNCTIONAL STATUS - GENERAL
WALKING IN HOSPITAL ROOM: TOTAL
CLIMB 3 TO 5 STEPS WITH RAILING: TOTAL
MOVING TO AND FROM BED TO CHAIR: UNABLE
TURNING FROM BACK TO SIDE WHILE IN FLAT BAD: UNABLE
HELP NEEDED FOR BATHING: TOTAL
MOVING FROM LYING ON BACK TO SITTING ON SIDE OF FLAT BED: UNABLE
SUGGESTED CMS G CODE MODIFIER MOBILITY: CN
TOILETING: TOTAL
DAILY ACTIVITIY SCORE: 8
STANDING UP FROM CHAIR USING ARMS: TOTAL
PERSONAL GROOMING: A LOT
MOBILITY SCORE: 6
DRESSING REGULAR UPPER BODY CLOTHING: TOTAL
EATING MEALS: A LOT
DRESSING REGULAR LOWER BODY CLOTHING: TOTAL
SUGGESTED CMS G CODE MODIFIER DAILY ACTIVITY: CM

## 2021-04-13 ASSESSMENT — ENCOUNTER SYMPTOMS
SENSORY CHANGE: 1
SHORTNESS OF BREATH: 0
ABDOMINAL PAIN: 0
STRIDOR: 0
COUGH: 0
HEADACHES: 1
BACK PAIN: 0
CHILLS: 0
BLURRED VISION: 0
SPEECH CHANGE: 0
PALPITATIONS: 0
FOCAL WEAKNESS: 1
FEVER: 0

## 2021-04-13 ASSESSMENT — GAIT ASSESSMENTS: GAIT LEVEL OF ASSIST: UNABLE TO PARTICIPATE

## 2021-04-13 NOTE — PROGRESS NOTES
Monitor Summary: SR 58-72, WV 0.16, QRS 0.08, QT 0.44, with rare PVCs, per strip from monitor room.

## 2021-04-13 NOTE — PROGRESS NOTES
Physical Medicine and Rehabilitation Consultation              Date of initial consultation: 4/2/2021  Consulting provider: Mc Calvillo MD  Reason for consultation: assess for acute inpatient rehab appropriateness  LOS: 13 Day(s)    Chief complaint: Stroke    HPI: The patient is a 56 y.o. right hand dominant male with a past medical history of hypertension, hyperlipidemia, atrial fibrillation not on anticoagulation, Covid positive on 3/18;  who presented on 3/31/2021  7:29 PM brought in by care flight with left hemiplegia, facial droop, right gaze deviation.  CT head showed complete occlusion of right MCA, but unfortunately he was not a candidate for TPA or thrombectomy.  Seen by neurology, found to have a NIH score of 18.  Additional work-up with CTA shows right ICA complete occlusion.  Etiology is thought to be cardioembolic due to A. fib off of AC.  He is currently being followed closely by neurosurgery as he is expected to have peak brain swelling in the next 1 to 2 days and may require craniotomy.  Follow-up MRI shows minimal shift with large MCA stroke    Most of my visit is with Jims wife but also with him. He endorses left neglect and weakness, but does not endorse paresthesias at this time. I had a long meeting with his wife about expectations with this type and severity of stroke.     4/7/2021  I have been following patient's chart in the periphery, since my last visit he has undergone hemicraniectomy on 4/3 for increased cerebral swelling, and postop.  Has been complicated by continuing cerebral edema resulting in midline shift.  Neurology continuing to follow. Son at bedside. Patient still have severe left neglect. He continues to have trace left hand movement.     4/9/2021  Patient continues to have severe left neglect and spasticity. ROM training given to wife at bedside. Patient is developing a functional torticollis from only looking right due to neglect. No hand movement observed today.  "Patient denies new complaints.     4/13/2021  Patient is tired today, wife reports a \"loud night\".  Patient is doing slightly better with his neglect, his neck is still very much turned to the right, but he is able to locate his left hand with his right hand.  Discussed course for therapy and rehab admission with patient's wife and therapists.  Wife is considering paying out-of-pocket, we will try to accommodate her as best we can and also work with the patient as much as possible in the acute setting.     Social Hx:  2 SH  2-3 MAKENZIE, 1 flight of stairs with rails inside  With: Spouse    Per [facility] \"Anel [wife] tells me she is currently working full time, has flexible job and will take time off to provide 24/7 support when Thong returnes home.  They live 2 story home, 2 -3 steps to enter.  Master bedroom on ground floor.  Bathroom has tub/shower combo, no safety grab bars. \"    THERAPY:  PT: Functional mobility   4/1: Mod assist sit to stand with 2 people  4/7: Max Assist  4/12: Max assist supine to sit, total assist sit to supine    OT: ADLs  4/1: Mod assist grooming, max assist lower body dressing  4/7: Total assist   4/12: Max assist dressing, total assist toileting    SLP:   4/1: N.p.o. pending fees  4/2: minced and moist/mildly thick liquid   4/7: NPO  4/8: NPO with 3-5 ice chips per hour    IMAGING:  MR brain 4/1/2021  Very large acute right MCA territory infarct as detailed above with small amount of petechial hemorrhage in the right insular region and right temporal lobe.  Punctate right thalamic lacunar infarct.  Right ICA and M1 MCA occlusion.    PROCEDURES:  None    PMH:  Past Medical History:   Diagnosis Date   • Afib (HCC)    • High cholesterol        PSH:  Past Surgical History:   Procedure Laterality Date   • CRANIOTOMY Right 4/3/2021    Procedure: CRANIOTOMY;  Surgeon: Arthur Payton M.D.;  Location: SURGERY [facility];  Service: Neurosurgery   • KNEE RECONSTRUCTION      left knee orthoscopic "       FHX:  Non-pertinent to today's issues    Medications:  Current Facility-Administered Medications   Medication Dose   • prazosin (MINIPRESS) capsule 1 mg  1 mg   • sodium chloride (SALT) tablet 2 g  2 g   • hydrALAZINE (APRESOLINE) tablet 25 mg  25 mg   • baclofen (LIORESAL) tablet 15 mg  15 mg   • hydrALAZINE (APRESOLINE) injection 10 mg  10 mg   • labetalol (NORMODYNE/TRANDATE) injection 10-20 mg  10-20 mg   • cloNIDine (CATAPRES) tablet 0.1 mg  0.1 mg   • calcium carbonate (TUMS) chewable tab 500 mg  500 mg   • lisinopril (PRINIVIL) tablet 40 mg  40 mg   • amLODIPine (NORVASC) tablet 10 mg  10 mg   • metoprolol tartrate (LOPRESSOR) tablet 25 mg  25 mg   • enoxaparin (LOVENOX) inj 40 mg  40 mg   • aspirin (ASA) chewable tab 81 mg  81 mg   • thyroid (ARMOUR THYROID) tablet 60 mg  60 mg   • Pharmacy Consult Request ...Pain Management Review 1 Each  1 Each   • MD ALERT...DO NOT ADMINISTER NSAIDS or ASPIRIN unless ORDERED By Neurosurgery 1 Each  1 Each   • bisacodyl (DULCOLAX) suppository 10 mg  10 mg   • artificial tears (EYE LUBRICANT) ophth ointment 1 Application  1 Application   • Pharmacy Consult: Enteral tube insertion - review meds/change route/product selection  1 Each   • atorvastatin (LIPITOR) tablet 80 mg  80 mg   • senna-docusate (PERICOLACE or SENOKOT S) 8.6-50 MG per tablet 1 tablet  1 tablet   • magnesium hydroxide (MILK OF MAGNESIA) suspension 30 mL  30 mL   • ondansetron (ZOFRAN ODT) dispertab 4 mg  4 mg   • oxyCODONE immediate-release (ROXICODONE) tablet 5 mg  5 mg    Or   • oxyCODONE immediate-release (ROXICODONE) tablet 10 mg  10 mg    Or   • HYDROmorphone (Dilaudid) injection 0.5 mg  0.5 mg   • polyethylene glycol/lytes (MIRALAX) PACKET 1 Packet  1 Packet   • promethazine (PHENERGAN) tablet 12.5-25 mg  12.5-25 mg   • senna-docusate (PERICOLACE or SENOKOT S) 8.6-50 MG per tablet 1 tablet  1 tablet   • ondansetron (ZOFRAN) syringe/vial injection 4 mg  4 mg   • promethazine (PHENERGAN)  "suppository 12.5-25 mg  12.5-25 mg   • prochlorperazine (COMPAZINE) injection 5-10 mg  5-10 mg       Allergies:  No Known Allergies    Physical Exam:  Vitals: /69   Pulse 60   Temp 36.7 °C (98.1 °F) (Temporal)   Resp 17   Ht 1.778 m (5' 10\")   Wt 76.1 kg (167 lb 12.3 oz)   SpO2 95%   Gen: NAD  Head: NC/AT  Eyes/ Nose/ Mouth: PERRLA, moist mucous membranes  Cardio: RRR, good distal perfusion, warm extremities  Pulm: normal respiratory effort, no cyanosis   Abd: Soft NTND, negative borborygmi   Ext: No peripheral edema. No calf tenderness. No clubbing.    Mental status: answers questions appropriately follows commands  Speech: fluent, no aphasia or dysarthria    CRANIAL NERVES:  2,3: LEFT VF cut, PERRL  3,4,6: EOMI in right VF, no nystagmus or diplopia  5: sensation intact to light touch bilaterally and symmetric  7: Left facial droop   8: hearing grossly intact  9,10: not seen  11: SCM/Trapezius strength 5/5right, 0/5 left  12: tongue protrudes left    Motor:      Upper Extremity  Myotome R L   Shoulder flexion C5 5 0/5   Elbow flexion C5 5 0/5   Wrist extension C6 5 0/5   Elbow extension C7 5 0/5   Finger flexion C8 5 1/5   Finger abduction T1 5 0/5     Lower Extremity Myotome R L   Hip flexion L2 5 1/5   Knee extension L3 5 0/5   Ankle dorsiflexion L4 5 0/5   Toe extension L5 5 0/5   Ankle plantarflexion S1 5 0/5     Sensory:   Altered sensation on left side       DTRs:  Right  Left    Brachioradialis  2+  2+   Patella tendon  2+ 2+     2-3 beats right ankle clonus, sustained clonus left ankle  Pos babinski left   Negative Castillo b/l     Tone: tight hamstrings bilaterally    Labs: Reviewed and significant for   Recent Labs     04/13/21  0307   RBC 4.21*   HEMOGLOBIN 12.3*   HEMATOCRIT 36.5*   PLATELETCT 336     Recent Labs     04/11/21  0330 04/12/21  0905 04/13/21  0307   SODIUM 144 140 139   POTASSIUM 4.0 4.2 4.0   CHLORIDE 109 105 104   CO2 25 25 26   GLUCOSE 132* 111* 131*   BUN 29* 39* 45* "   CREATININE 0.84 0.98 1.01   CALCIUM 9.1 9.0 8.7     Recent Results (from the past 24 hour(s))   Basic Metabolic Panel    Collection Time: 04/13/21  3:07 AM   Result Value Ref Range    Sodium 139 135 - 145 mmol/L    Potassium 4.0 3.6 - 5.5 mmol/L    Chloride 104 96 - 112 mmol/L    Co2 26 20 - 33 mmol/L    Glucose 131 (H) 65 - 99 mg/dL    Bun 45 (H) 8 - 22 mg/dL    Creatinine 1.01 0.50 - 1.40 mg/dL    Calcium 8.7 8.5 - 10.5 mg/dL    Anion Gap 9.0 7.0 - 16.0   CBC WITHOUT DIFFERENTIAL    Collection Time: 04/13/21  3:07 AM   Result Value Ref Range    WBC 7.9 4.8 - 10.8 K/uL    RBC 4.21 (L) 4.70 - 6.10 M/uL    Hemoglobin 12.3 (L) 14.0 - 18.0 g/dL    Hematocrit 36.5 (L) 42.0 - 52.0 %    MCV 86.7 81.4 - 97.8 fL    MCH 29.2 27.0 - 33.0 pg    MCHC 33.7 33.7 - 35.3 g/dL    RDW 38.2 35.9 - 50.0 fL    Platelet Count 336 164 - 446 K/uL    MPV 10.3 9.0 - 12.9 fL   ESTIMATED GFR    Collection Time: 04/13/21  3:07 AM   Result Value Ref Range    GFR If African American >60 >60 mL/min/1.73 m 2    GFR If Non African American >60 >60 mL/min/1.73 m 2         ASSESSMENT:  Patient is a 56 y.o. male admitted with large right MCA stroke and right ICA occlusion. He did not receive TPA or thrombectomy     [facility] Code / Diagnosis to Support: 0001.1 - Stroke: Left Body Involvement (Right Brain)    Rehabilitation: Impaired ADLs and mobility  Patient is a good candidate for inpatient rehab based on needs for PT, OT, and speech therapy.  Patient will also benefit from family training.  Patient has a good discharge situation which will be home with wife.     Barriers to transfer include: Insurance authorization/ no payor, TCCs to verify disposition, medical clearance and bed availability     Additional Recommendations:  - s/p hemicraniectomy on 4/3. Now with continued post op cerebellar swelling and fevers. He continues to have  1/5 finger flexion on the left which is a positive prognostic indicator of a functional recovery. Also he had 1/5 leg  movement at the hip on itinial consult but this was not observed on follow up. Possibly due to neglect. He does much better when forced to look at his left side which suggests that perhaps some of his functional deficits are due to neglect. He does have a long road to recovery and I prepared his wife Anel for possibly 2 years of 24/7 care, although we are all hopeful for a better outcome.     Large right MCA ischemic stroke   - continue PT/OT and SLP   - Wife educated on stroke comorbidites on initial consult. Son updated on follow up.   - ROM training given to wife for torticollis prevention and contracture prevention  -Continue baclofen 15mg TID for LUE spasticity  - Holding off on gabapentin at this time to avoid polypharmacy   - ASA/ statin  - Etiology throught to be cardioembolic in the setting of atrial fibrillation off of AC and in the setting of COVID.  - PMR to continue to follow for rehab appropriateness    Dispo: Discussed timing of rehab with CM and PT, patient will benefit most from IPR when he is able to stand and/or take a few steps. His neglect is severe and needs to improve some as well.     Medical Complexity:  Large right MCA stroke      DVT PPX: SCDs      Thank you for allowing us to participate in the care of this patient.     Patient was seen for 36 minutes on unit/floor of which > 50% of time was spent on counseling and coordination of care regarding the above, including prognosis, risk reduction, benefits of treatment, and options for next stage of care.    Christofer Eisenberg, DO   Physical Medicine and Rehabilitation

## 2021-04-13 NOTE — THERAPY
"Speech Language Pathology   Fiberoptic Endoscopic Evaluation of Swallow     Patient Name: Thong Arzola  AGE:  56 y.o., SEX:  male  Medical Record #: [id number]  Today's Date: 4/13/2021     Precautions  Precautions: Fall Risk, Swallow Precautions ( See Comments)  Comments: no bone flap R; helmet EOB    Assessment    Patient is a 57 YO male admitted 3/31 from OSH 3/3 stroke-like symptoms. PMHx: afib, high cholesterol and hypothyroidism. CMHx: acute R MCA CVA, PAF, hx of COVID-19, acquired hypothyroidism. Pt underwent hemicraniotomy on 4/3. Head CT 4/5: \"Changes of evolving infarct within the right MCA distribution 2.  Hyperdensity in the sulci of the right MCA distribution infarct, largely appears related to thrombosed vessels, component of subarachnoid hemorrhage is suspected particularly in the right frontal lobe. 3 .  Pneumocephalus, decreased since prior.\"    Discussed with the patient the risks, benefits, and alternatives of the FEES procedure. Patient acknowledges and agreeable to proceed with the procedure. Pt tolerated the procedure well. Upon insertion of the scope, pt was noted to achieve complete VF closure w/ voicing and throat clearing tasks; pt did not follow directions for a breath hold. Pt was presented with ice chips, thins via tsp/cup, mildly thick liquids via tsp/cup/straw, liquidized, pureed, minced/moist, soft/bite sized and regular solids. Overall, pt presents with a moderate oropharyngeal dysphagia, most notable for impaired oral control and delayed swallow onset resulting in silent aspiration of thin liquids and consistent high penetration before the swallow with solids.     Oral phase was notable for reduced mastication and oral ceareance of chewable solids as evidenced by chunks of soft peaches carried with a mildly thick liquid rinse from the oral cavity into the pharynx. Pt had premature spillage of thins and cup/straw sips of mildly thick liquids to the pyriforms before the swallow was " initiated. All solids spilled over the rim of the epiglottis before the swallow was initiated. Reduced tongue base retraction and pharyngeal squeeze resulted in trace-mild vallecular residue w/ pudding and trace residue in the valleculae, PPW and pyriforms with all other textures. A spontaneous 2nd swallow was effective to clear this residue. Reduced sip size was effective to limit premature spillage of mildly thick liquids to the level of the valleculae. Pt was not able to follow directions for a 3-sec prep. Pt had silent aspiration of a cup sip of thin liquids, suspected during the swallow, as evidenced by blue in the laryngeal vestibule and below the VFs on the anterior aspect of the trachea. With a cued cough, pt partially cleared blue from these areas, though not completely. No aspiration was seen with other textures, though pt did have consistent high penetration before the swallow with solids trialed. Recommend initiating a diet of Minced and Moist Solids (MM5) and Mildly Thick Liquids (MT2) with strict 1:1 supervision for the following strategies: upright and alert for all PO intake, small bites/sips, slow rate of intake, double swallow. Crush pills in puree. Please maintain the Cortrak for supplemental nutrition/hydration and consider removal once pt is consistently eating 3 meals a day without s/sx of aspiration.        Plan    Minced and Moist Solids (MM5) and Mildly Thick Liquids (MT2) with strict 1:1 supervision for the following strategies: upright and alert for all PO intake, small bites/sips, slow rate of intake, double swallow.   Crush pills in puree.   Please maintain the Cortrak for supplemental nutrition/hydration and consider removal once pt is consistently eating 3 meals a day without s/sx of aspiration.    Recommend Speech Therapy 5 times per week until therapy goals are met for the following treatments:  Dysphagia Training and Patient / Family / Caregiver Education.    Discharge  Recommendations: (P) Recommend post-acute placement for additional speech therapy services prior to discharge home    Subjective    Pt and spouse verbalized good understanding of education provided.      Objective       04/13/21 1406   FEES Evaluation Prior To Procedure   Dysphagia Symptoms Warranting Video Swallow recent R crani, L facial droop, ? aspiration of thins on previous FEES 4/2   Procedure Performed While Patient Sitting In Bed   Seated at (Degrees) 90   A Flexible Endoscope Was Introduced Transnasally In The Right Nare   FEES Laryngeal Adduction Assessment   Breath Holding Other (Comments)  (pt did not follow)   Clearing Throat Adequate   Cough Adequate   Phonation Adequate   Onset Of Swallow Other (Comments)  (Delayed)   FEES Velopharyngeal Assessment    Velopharyngeal Closure: Adequate   FEES Voice Assessment    Voice: Other (Comments)  (WNL)   FEES Sensation Assessment    Presence of Scope Adequate   Presence of Residue Adequate   Presence of Penetration Delayed Response   Presence of Aspiration Immediate Response   FEES Swallow Function Assessment   Swallow Trigger Level of the Valleculae;Level of the Pyriform Sinuses   Base of Tongue Retraction Impaired   Pharyngeal Constrictor Movement Functional   White Out Phase Complete White Out   Epiglottic Movement Impaired   Laryngeal Elevation Impaired   Cricopharyngeal Function Functional   Swallow Observations / Symptoms   Vallecular Residue Pureed (4);Mildly Thick (2) - (Nectar Thick);Thin (0);Minced & Moist (5) - (Dysphagia II)  (trace; mild w/ pudding)   Pyriform Sinus Residue Mildly Thick (2) - (Nectar Thick);Thin (0);Liquidised (3);Pureed (4);Minced & Moist (5) - (Dysphagia II);Soft & Bite-Sized (6) - (Dysphagia III)  (trace)   Posterior Pharyngeal Wall Residue Minced & Moist (5) - (Dysphagia II)  (trace)   Penetration Before the Swallow Thin (0);Liquidised (3);Pureed (4);Minced & Moist (5) - (Dysphagia II)   Penetration Suspected During the Swallow  "Mildly Thick (2) - (Nectar Thick);Soft & Bite-Sized (6) - (Dysphagia III)   Aspiration Suspected During the Swallow Thin (0)   Compensatory Strategies Attempted   Multiple Swallows effective to clear pharyngeal residue   Controlled Bolus Size effective to minimize prespillage   Cough / Clear After Swallow partially effective to minimize blue residue in the laryngeal vestibule but did not completely clear blue from below the VFs w/ thins   Three Second Prep not effective as pt was not able to execute   Patient Ability To Protect Airway   Patient Ability To Protect Airway  Inconsistent   Penetration Aspiration Scale   Penetration Aspiration Scale 7 - Material passes glottis but is not ejected from airway, visible subglottic stasis despite patient's response   Rosy Pharyngeal Residue Severity   Vallecular Residue Trace, trace coating   Pyriform Sinus Residue  Trace, trace coating   Evaluation Recommendations   Diet / Liquid Recommendation Minced & Moist (5) - (Dysphagia II);Mildly Thick (2) - (Nectar Thick)   Medication Administration  Crush all Medications in Puree   Recommended Supervision Direct   Evaluation Recommended Techniques   Techniques Oral Stage Check For Pocketing   Techniques Pharyngeal Phase Repeat Swallow 2-3 Times On Each Bolus To Clear Residue;Pacing:Control Amount Of Presentation;Pacing:Control Rate Of Presentation;Thicken Liquids For Better Control Of Bolus To Consistency Of Nectar   Short Term Goals   Short Term Goal # 1 Pt will participate in prefeeding trials with SLP 1:1 with no s/sx of aspiration and min cues.    Goal Outcome # 1 Goal met   Short Term Goal # 1 B  Resume 4/13: Pt will consume a diet of MM5/MT2 with no s/sx of aspiration and min cues.    Short Term Goal # 2 Pt will complete 10 reps each of lingual ROM and base of tongue exercises with \"good\" accuracy and min cues.    Goal Outcome # 2    (not targeted during this evaluation)         "

## 2021-04-13 NOTE — CARE PLAN
Problem: Venous Thromboembolism (VTW)/Deep Vein Thrombosis (DVT) Prevention:  Goal: Patient will participate in Venous Thrombosis (VTE)/Deep Vein Thrombosis (DVT)Prevention Measures  Outcome: PROGRESSING AS EXPECTED  Flowsheets (Taken 4/12/2021 2000)  SCDs, Sequential Compression Device: On  Pharmacologic Prophylaxis Used: LMWH: Enoxaparin(Lovenox)     Problem: Knowledge Deficit  Goal: Knowledge of disease process/condition, treatment plan, diagnostic tests, and medications will improve  Outcome: PROGRESSING AS EXPECTED  Note: Education provided on plan of care. All questions answered. Call light is within reach.

## 2021-04-13 NOTE — DISCHARGE PLANNING
Care Transition Team Discharge Planning    Anticipated Discharge Disposition: TBD     Action: Lauro met with patient's spouse who was sitting at his bedside. She reported that the patient has [employer], but not other form of insurance.   She reported that Dr. Arthur Rebolledo is his homeopathic doctor. His last visit was on March 29, 2021.     The spouse is in agreement with the patient getting rehab and is willing to be a private pay if the fees can be reduced. She prefers for the patient to go to [facility] rehab.    Lauro contacted [employer] to see if they could assist this family?    Although the patient lives in [place], [place], they have a home here in [place], [place]. The address is [address] [place] [id number]    Barriers to Discharge: no insurance, pt not medically cleared    Plan: Lauro will work with medical team on DC planning.

## 2021-04-13 NOTE — THERAPY
Physical Therapy   Daily Treatment     Patient Name: Thong Arzola  Age:  56 y.o., Sex:  male  Medical Record #: [id number]  Today's Date: 4/13/2021     Precautions: Fall Risk, Swallow Precautions ( See Comments)    Assessment    Pt's wife BS visiting w/pt. Pt's head turned to his Rt, unable to turn head/eyes past midline. Pt conts to require max assist w/bed mobility and his functional transfers. Did initiate stand pivot transfer to the cardiac chair today, strong stand through his Rt LE. Pt inconsist w/following of commands, significant Lft side neglect.     Plan    Continue current treatment plan.    DC Equipment Recommendations: Unable to determine at this time  Discharge Recommendations: Recommend post-acute placement for additional physical therapy services prior to discharge home     Objective       04/13/21 1034   Other Treatments   Other Treatments Provided Worked on rolling/bridging to nathalie pj bottoms. EOB x 5 mins w/max assist for midline while working on head/eyes holding midline. Wife shown how to have pt read signs on Lft side, initially starting w/pt's chair facing forward and progressing towards chair past the sign.    Balance   Sitting Balance (Static) Poor   Sitting Balance (Dynamic) Poor -   Standing Balance (Static) Trace +   Standing Balance (Dynamic) Trace   Weight Shift Sitting Poor   Weight Shift Standing Absent   Gait Analysis   Gait Level Of Assist Unable to Participate   Bed Mobility    Supine to Sit Maximal Assist  (HOB flat, rail support, from Lft side of the bed)   Sit to Supine   (pt left seated in cardiac chair)   Scooting Total Assist  (seated)   Rolling Total Assist to Rt.;Maximum Assist to Lt.  (w/railing)   Functional Mobility   Sit to Stand Maximal Assist   Bed, Chair, Wheelchair Transfer Maximal Assist  (bed->cardiac chair)   Transfer Method Stand Pivot   Skilled Intervention Verbal Cuing;Postural Facilitation;Compensatory Strategies   Comments Initiated stand pivot transfer  from EOB->cardiac chair, going toward his Rt side. Pt did take wt through his LE's to assist w/the transfer.    How much difficulty does the patient currently have...   Turning over in bed (including adjusting bedclothes, sheets and blankets)? 1   Sitting down on and standing up from a chair with arms (e.g., wheelchair, bedside commode, etc.) 1   Moving from lying on back to sitting on the side of the bed? 1   How much help from another person does the patient currently need...   Moving to and from a bed to a chair (including a wheelchair)? 1   Need to walk in a hospital room? 1   Climbing 3-5 steps with a railing? 1   6 clicks Mobility Score 6   Short Term Goals    Short Term Goal # 1 Pt will be able to perform supine<>sit with bed features with min A in 6tx to promote fnx progression towards I    Goal Outcome # 1 goal not met   Short Term Goal # 2 Pt will be able to perform sit<>stand/transfers with hemiwalker with Taz in 6tx to promote fnx progression towards I    Goal Outcome # 2 Goal not met   Short Term Goal # 3 Pt will be able to ambulate 25ft with hemiwalker with min A in 6tx to promote fnx progression towards I    Goal Outcome # 3 Goal not met

## 2021-04-13 NOTE — THERAPY
Speech Language Pathology  Daily Treatment     Patient Name: Thong Arzola  Age:  56 y.o., Sex:  male  Medical Record #: [id number]  Today's Date: 4/13/2021     Precautions  Precautions: (P) Fall Risk, Swallow Precautions ( See Comments), Nasogastric Tube  Comments: (P) no bone flap R; helmet EOB    Assessment    Patient was seen for dysphagia tx with therapeutic PO trials of ice chips, mildly thick liquids via 1/2-full tsp, liquidized solids via 1/2-full tsp. Pt was alert, presenting w/ R gaze preference and L neglect, flat affect. Spouse, Anel, was at bedside and supportive. Pt continues to present w/ L labiofacial and lingual weakness. Exercises targeting aforementioned deficits, as well as laryngeal/pharyngeal weakness were reviewed and demonstrates w/ pt needing mod verbal/tactile/visual cues to execute with fair accuracy. Pt had significant difficulty moving tongue to L cheek, likely due to both weakness and L inattention. However, oral phase was functional for mastication of ice chips, bolus formation and A-P lingual transit with no anterior loss or pocketing. Pharyngeal swallow trigger was timely. Hyolaryngeal excursion was palpated as reduced. No overt s/sx of aspiration occurred w/ PO intake and vocal quality remained strong/clear. Recommend repeat FEES prior to initiating a PO diet to objectively assess swallow function and r/o aspiration. SLP will plan to complete FEES today.    Plan    NPO/TF pending repeat FEES  Ok for 5 ice chips/hr w/ RN or spouse while alert and upright    Continue current treatment plan.    Discharge Recommendations: (P) Recommend post-acute placement for additional speech therapy services prior to discharge home    Subjective    Pt denies sensation of any food sticking in his throat or entering airway.      Objective       04/13/21 1152   Dysphagia    Positioning / Behavior Modification Modulate Rate or Bite Size;Self Monitoring   Oral / Pharyngeal / Laryngeal Exercises  "Laryngeal Exercises;Base of Tongue Exercises;Pharyngeal Constriction Exercises;Lingual Exercises;Labial Exercises   Other Treatments PO trials of ice chips, MTL, LQ3   Diet / Liquid Recommendation NPO;Pre-Feeding Trials with SLP Only;Other (Comments)  (pending repeat FEES)   Recommended Route of Medication Administration   Medication Administration  Via Gastric Tube   Patient / Family Goals   Patient / Family Goal #1 \"Yes, water\"   Goal #1 Outcome Goal not met   Short Term Goals   Short Term Goal # 1 Pt will participate in prefeeding trials with SLP 1:1 with no s/sx of aspiration and min cues.    Goal Outcome # 1 Progressing as expected   Short Term Goal # 2 Pt will complete 10 reps each of lingual ROM and base of tongue exercises with \"good\" accuracy and min cues.    Goal Outcome # 2  Progressing as expected         "

## 2021-04-13 NOTE — PROGRESS NOTES
Hospital Medicine Daily Progress Note    Date of Service  4/13/2021    Chief Complaint  Left sided weakness    Hospital Course  56 y.o. male admitted 3/31/2021 with acute stroke.  He was not a candidate for tPA nor thrombectomy.  He has a history of paroxysmal atrial fibrillation, dyslipidemia, hypothyroidism. During admit he underwent a hemicraniectomy for right sided decompression from right MCA dense stroke with intractable intracranial pressure.     Interval Problem Update  4/12: Patient verbal and oriented with some slurred speech.  Left hemiplegia. On 4/11 Dr. Leo met with the wife and explained to her we likely will need a G-tube at some point.  Also alerted the both of them we would need a SNF at some point.  He reports that his pain is reasonably well controlled at this juncture, he is describing some thirst.  Free water flushes will be increased from 30 mL to 90 mL every 6 hours.  This can be backed off if sodium begins to drop.    4/13 physical therapy seen the patient.  On tube feeding still.  Speech therapy to follow.   to follow for discharge planning.  Consultants/Specialty  Neurology  Neuro-intensivist (signing off 4/11)  Neuro surgery    Code Status  Full Code    Disposition  Remain on the floor    Review of Systems  Review of Systems   Constitutional: Negative for chills and fever.   Eyes: Negative for blurred vision.   Respiratory: Negative for cough, shortness of breath and stridor.    Cardiovascular: Negative for chest pain, palpitations and leg swelling.   Gastrointestinal: Negative for abdominal pain.   Genitourinary: Negative for dysuria and hematuria.   Musculoskeletal: Negative for back pain.   Skin: Negative for rash.   Neurological: Positive for sensory change (left side), focal weakness (left side) and headaches (right temporal region). Negative for speech change.        Physical Exam  Temp:  [36.7 °C (98 °F)-37.7 °C (99.8 °F)] 36.7 °C (98.1 °F)  Pulse:  [66-82] 72  Resp:   [16-18] 16  BP: (104-140)/(60-85) 120/69  SpO2:  [92 %-95 %] 95 %    Physical Exam  Vitals reviewed.   Constitutional:       Appearance: Normal appearance. He is not diaphoretic.   HENT:      Head: Normocephalic and atraumatic.      Nose: Nose normal.      Mouth/Throat:      Mouth: Mucous membranes are moist.      Pharynx: No oropharyngeal exudate.   Eyes:      Extraocular Movements: Extraocular movements intact.      Conjunctiva/sclera: Conjunctivae normal.   Cardiovascular:      Rate and Rhythm: Normal rate and regular rhythm.      Pulses:           Radial pulses are 2+ on the right side and 2+ on the left side.        Dorsalis pedis pulses are 2+ on the right side and 2+ on the left side.      Heart sounds: No murmur.   Pulmonary:      Effort: Pulmonary effort is normal. No respiratory distress.      Breath sounds: Normal breath sounds.   Abdominal:      General: Bowel sounds are normal.      Palpations: Abdomen is soft.   Musculoskeletal:         General: No swelling or tenderness.      Cervical back: Neck supple. No muscular tenderness.      Right lower leg: No edema.      Left lower leg: No edema.   Lymphadenopathy:      Cervical: No cervical adenopathy.   Skin:     Coloration: Skin is not jaundiced or pale.   Neurological:      Mental Status: He is alert and oriented to person, place, and time.      Cranial Nerves: Cranial nerve deficit present.      Sensory: Sensory deficit present.      Coordination: Coordination abnormal.      Gait: Gait abnormal.         Current Facility-Administered Medications:   •  prazosin (MINIPRESS) capsule 1 mg, 1 mg, Enteral Tube, Q EVENING, Jose Leo D.O., 1 mg at 04/12/21 1839  •  sodium chloride (SALT) tablet 2 g, 2 g, Enteral Tube, TID WITH MEALS, Cristhian Bell M.D., 2 g at 04/13/21 0611  •  hydrALAZINE (APRESOLINE) tablet 25 mg, 25 mg, Enteral Tube, Q8HRS, Cristhian Bell M.D., 25 mg at 04/13/21 0441  •  baclofen (LIORESAL) tablet 15 mg, 15 mg, Enteral Tube, TID,  Christofer Eisenberg D.O., 15 mg at 04/13/21 0442  •  hydrALAZINE (APRESOLINE) injection 10 mg, 10 mg, Intravenous, Q HOUR PRN, Cristhian Bell M.D., 10 mg at 04/10/21 1631  •  labetalol (NORMODYNE/TRANDATE) injection 10-20 mg, 10-20 mg, Intravenous, Q4HRS PRN, Cristhian Bell M.D., 10 mg at 04/10/21 1508  •  cloNIDine (CATAPRES) tablet 0.1 mg, 0.1 mg, Enteral Tube, Q4HRS PRN, Cristhian Bell M.D., 0.1 mg at 04/08/21 1734  •  calcium carbonate (TUMS) chewable tab 500 mg, 500 mg, Enteral Tube, Q8HRS PRN, Cristhian Bell M.D., 500 mg at 04/07/21 1501  •  lisinopril (PRINIVIL) tablet 40 mg, 40 mg, Enteral Tube, Q DAY, Cristhian Bell M.D., 40 mg at 04/13/21 0441  •  amLODIPine (NORVASC) tablet 10 mg, 10 mg, Enteral Tube, Q DAY, Johana Nelson M.D., 10 mg at 04/13/21 0441  •  metoprolol tartrate (LOPRESSOR) tablet 25 mg, 25 mg, Enteral Tube, BID, Cristhian Bell M.D., 25 mg at 04/13/21 0442  •  enoxaparin (LOVENOX) inj 40 mg, 40 mg, Subcutaneous, DAILY, Johana Nelson M.D., 40 mg at 04/13/21 0442  •  aspirin (ASA) chewable tab 81 mg, 81 mg, Enteral Tube, DAILY, Cristhian Bell M.D., 81 mg at 04/12/21 1838  •  thyroid (ARMOUR THYROID) tablet 60 mg, 60 mg, Enteral Tube, BID, King Spann M.D., 60 mg at 04/13/21 0441  •  Pharmacy Consult Request ...Pain Management Review 1 Each, 1 Each, Other, PHARMACY TO DOSE, VIVIAN MorrisC.  •  MD ALERT...DO NOT ADMINISTER NSAIDS or ASPIRIN unless ORDERED By Neurosurgery 1 Each, 1 Each, Other, PRN, Lilia Pizano P.A.-C.  •  bisacodyl (DULCOLAX) suppository 10 mg, 10 mg, Rectal, Q24HRS PRN, Lilia Pizano P.A.-C., 10 mg at 04/11/21 1811  •  artificial tears (EYE LUBRICANT) ophth ointment 1 Application, 1 Application, Both Eyes, PRN, CRISTI Morris-C.  •  Pharmacy Consult: Enteral tube insertion - review meds/change route/product selection, 1 Each, Other, PHARMACY TO DOSE, CRISTÓBAL [place].  •  atorvastatin (LIPITOR) tablet 80 mg, 80 mg,  Enteral Tube, Q EVENING, Radha Meredith, A.P.R.N., 80 mg at 04/12/21 1838  •  senna-docusate (PERICOLACE or SENOKOT S) 8.6-50 MG per tablet 1 tablet, 1 tablet, Enteral Tube, Nightly, Radha Meredith, A.P.R.N., 1 tablet at 04/12/21 2059  •  magnesium hydroxide (MILK OF MAGNESIA) suspension 30 mL, 30 mL, Enteral Tube, QDAY PRN, Radha Meredith, A.P.R.N., 30 mL at 04/11/21 0622  •  ondansetron (ZOFRAN ODT) dispertab 4 mg, 4 mg, Enteral Tube, Q4HRS PRN, Radha Meredith, A.P.R.N.  •  oxyCODONE immediate-release (ROXICODONE) tablet 5 mg, 5 mg, Enteral Tube, Q3HRS PRN, 5 mg at 04/12/21 1609 **OR** oxyCODONE immediate-release (ROXICODONE) tablet 10 mg, 10 mg, Enteral Tube, Q3HRS PRN, 10 mg at 04/06/21 0405 **OR** HYDROmorphone (Dilaudid) injection 0.5 mg, 0.5 mg, Intravenous, Q3HRS PRN, Radha Meredith, A.P.R.N., 0.5 mg at 04/03/21 1554  •  polyethylene glycol/lytes (MIRALAX) PACKET 1 Packet, 1 Packet, Enteral Tube, BID PRN, Radha Meredith, A.P.R.N., 1 Packet at 04/11/21 0621  •  promethazine (PHENERGAN) tablet 12.5-25 mg, 12.5-25 mg, Enteral Tube, Q4HRS PRN, Radha Meredith, A.P.R.N.  •  senna-docusate (PERICOLACE or SENOKOT S) 8.6-50 MG per tablet 1 tablet, 1 tablet, Enteral Tube, Q24HRS PRN, Radha Meredith A.P.R.N., Stopped at 04/12/21 1738  •  ondansetron (ZOFRAN) syringe/vial injection 4 mg, 4 mg, Intravenous, Q4HRS PRN, Gia Forman M.D., 4 mg at 04/07/21 0028  •  promethazine (PHENERGAN) suppository 12.5-25 mg, 12.5-25 mg, Rectal, Q4HRS PRN, Gia Forman M.D.  •  prochlorperazine (COMPAZINE) injection 5-10 mg, 5-10 mg, Intravenous, Q4HRS PRN, Gia Forman M.D., 10 mg at 04/05/21 0913      Fluids    Intake/Output Summary (Last 24 hours) at 4/13/2021 0740  Last data filed at 4/13/2021 0600  Gross per 24 hour   Intake 210 ml   Output 1800 ml   Net -1590 ml       Laboratory  Recent Labs     04/13/21  0307   WBC 7.9   RBC 4.21*   HEMOGLOBIN 12.3*   HEMATOCRIT 36.5*   MCV 86.7   MCH 29.2   MCHC 33.7   RDW 38.2   PLATELETCT 336    MPV 10.3     Recent Labs     04/11/21  0330 04/12/21  0905 04/13/21  0307   SODIUM 144 140 139   POTASSIUM 4.0 4.2 4.0   CHLORIDE 109 105 104   CO2 25 25 26   GLUCOSE 132* 111* 131*   BUN 29* 39* 45*   CREATININE 0.84 0.98 1.01   CALCIUM 9.1 9.0 8.7                   Imaging  US-EXTREMITY VENOUS LOWER BILAT   Final Result      DX-CHEST-LIMITED (1 VIEW)   Final Result      Right basilar atelectasis. No focal consolidation or pleural effusions.      EA-MFBDLSE-3 VIEW   Final Result      No evidence of bowel obstruction.                  DX-CHEST-LIMITED (1 VIEW)   Final Result      1.  Right internal jugular catheter and enteric catheter appear appropriately located      2.  Minimal right basilar atelectasis      CT-HEAD W/O   Final Result         1.  Changes of evolving infarct within the right MCA distribution   2.  Hyperdensity in the sulci of the right MCA distribution infarct, largely appears related to thrombosed vessels, component of subarachnoid hemorrhage is suspected particularly in the right frontal lobe.   3.  Pneumocephalus, decreased since prior.      CT-HEAD W/O   Final Result         Postsurgical change from right craniectomy. Redemonstration of large right MCA infarct with edema and bulging of the brain parenchyma through the craniectomy defect      Less mass effect on the right lateral ventricle. Less right to left midline shift of 3 mm, previously 10 mm.         DX-ABDOMEN FOR TUBE PLACEMENT   Final Result      Enteric tube terminates over the stomach.      CT-HEAD W/O   Final Result         1.  Low-density changes of the right hemisphere compatible with MCA distribution infarct with edema.   2.  New effacement of the bilateral ventricles, right greater than left, with 10 mm right-to-left midline shift.   3.  New dilatation of the left temporal horn ventricle, appearance favors component of obstructive hydrocephalus due to mass effect from infarct and edema.   4.  Hyperdensity in the right middle  cerebral artery, likely thrombosis given associated findings.      These findings were discussed with the patient's clinician, Dr. Nunez, on 4/3/2021 7:11 AM.      MR-BRAIN-W/O   Final Result      Very large acute right MCA territory infarct as detailed above with small amount of petechial hemorrhage in the right insular region and right temporal lobe.      Punctate right thalamic lacunar infarct.      Right ICA and M1 MCA occlusion.      EC-ECHOCARDIOGRAM COMPLETE W/O CONT   Final Result      DX-CHEST-PORTABLE (1 VIEW)   Final Result         1.  Interstitial pulmonary parenchymal prominence, compatible with interstitial edema and/or infiltrates.      CT-CTA NECK WITH & W/O-POST PROCESSING   Final Result      Acute occlusion of the right internal carotid artery shortly after bifurcation. It is occluded up to the level of the carotid terminus.      CT-CTA HEAD WITH & W/O-POST PROCESS   Final Result      Acute right M1 occlusion.      Findings were discussed with RHONDA DIAZ on 3/31/2021 7:54 PM.      CT-CEREBRAL PERFUSION ANALYSIS   Final Result      1.  Cerebral blood flow less than 30% likely representing completed infarct = 134 mL.      2.  T Max more than 6 seconds likely representing combination of completed infarct and ischemia = 210 mL.      3.  Mismatched volume likely representing ischemic brain/penumbra = 76 mL.      4.  Please note that the cerebral perfusion was performed on the limited brain tissue around the basal ganglia region. Infarct/ischemia outside the CT perfusion sections can be missed in this study.      CT-HEAD W/O   Final Result   Addendum 1 of 1   In retrospect, there is subtle loss of gray-white matter differentiation    in the right MCA distribution, consistent with acute infarct.      Final      1.  No CT evidence of acute infarct, hemorrhage or mass.   2.  Mild paranasal sinus disease.           Assessment/Plan  * Acute right MCA stroke (HCC)- (present on admission)  Assessment &  Plan  Likely consequence of COVID19 in 3/21 and hx of afib  Subsequent brain edema requiring right decompressive hemicraniectomy on 4/3/2021  Neurology following patient will benefit from long-term anticoagulation when bleeding risk is acceptable likely 1-2 weeks from event onset.   Blood pressure control keep SBP<140 (on metoprolol 25mg BID, amlodipine 10mg, hydralazine 25mg TID, lisinopril 40mg daily)  Patient weaned off hypertonic saline and started on salt tablets  PT/OT/SLP  Aspiration precautions  Physiatry consultation   for discharge planning    Paroxysmal atrial fibrillation (HCC)- (present on admission)  Assessment & Plan  Monitor vitals and on tele  Metoprolol 25mg BID w/ parameters  Discussed risk benefits and alternatives of long-term anticoagulation with patient and wife, plan to start DOAC when bleeding risk felt to be acceptable per neurology likely in 1 to 2 weeks    Leucocytosis  Assessment & Plan  Resolved but continue to monitor  4/11 WBC:7.7    Urinary retention  Assessment & Plan  Brewer catheter in place  Watch for urinary retention  4/11 initiated prazosin    History of COVID-19- (present on admission)  Assessment & Plan  Clinically recovered patient symptom onset on 3/15/2021 with positive initial test on 3/18/2021    Acquired hypothyroidism- (present on admission)  Assessment & Plan  [place] Thyroid 60mg BID   TSH is less than 0.005 will decrease dose and he will need recheck TFTs in 6 weeks (prior to admit was on 90mg BID)  TSH: <0.005 in past week.       VTE prophylaxis: Enoxaparin 40mg SQ daily

## 2021-04-13 NOTE — CARE PLAN
Problem: Communication  Goal: The ability to communicate needs accurately and effectively will improve  Outcome: PROGRESSING AS EXPECTED     Problem: Safety  Goal: Will remain free from injury  Outcome: PROGRESSING AS EXPECTED  Goal: Will remain free from falls  Outcome: PROGRESSING AS EXPECTED     Problem: Infection  Goal: Will remain free from infection  Outcome: PROGRESSING AS EXPECTED     Problem: Bowel/Gastric:  Goal: Normal bowel function is maintained or improved  Outcome: PROGRESSING AS EXPECTED  Goal: Will not experience complications related to bowel motility  Outcome: PROGRESSING AS EXPECTED     Problem: Knowledge Deficit  Goal: Knowledge of disease process/condition, treatment plan, diagnostic tests, and medications will improve  Outcome: PROGRESSING AS EXPECTED  Goal: Knowledge of the prescribed therapeutic regimen will improve  Outcome: PROGRESSING AS EXPECTED     Problem: Safety:  Goal: Will remain free from injury  Outcome: PROGRESSING AS EXPECTED     Problem: Infection:  Goal: Will remain free from infection  Outcome: PROGRESSING AS EXPECTED     Problem: Knowledge Deficit:  Goal: Knowledge of disease process/condition, treatment plan, diagnostic tests, and medications will improve  Outcome: PROGRESSING AS EXPECTED

## 2021-04-14 LAB
ANION GAP SERPL CALC-SCNC: 7 MMOL/L (ref 7–16)
BUN SERPL-MCNC: 45 MG/DL (ref 8–22)
CALCIUM SERPL-MCNC: 8.8 MG/DL (ref 8.5–10.5)
CHLORIDE SERPL-SCNC: 101 MMOL/L (ref 96–112)
CO2 SERPL-SCNC: 26 MMOL/L (ref 20–33)
CREAT SERPL-MCNC: 0.89 MG/DL (ref 0.5–1.4)
GLUCOSE SERPL-MCNC: 97 MG/DL (ref 65–99)
POTASSIUM SERPL-SCNC: 4.5 MMOL/L (ref 3.6–5.5)
SODIUM SERPL-SCNC: 134 MMOL/L (ref 135–145)

## 2021-04-14 PROCEDURE — 97530 THERAPEUTIC ACTIVITIES: CPT | Mod: CQ

## 2021-04-14 PROCEDURE — A9270 NON-COVERED ITEM OR SERVICE: HCPCS | Performed by: HOSPITALIST

## 2021-04-14 PROCEDURE — 700102 HCHG RX REV CODE 250 W/ 637 OVERRIDE(OP): Performed by: HOSPITALIST

## 2021-04-14 PROCEDURE — 700102 HCHG RX REV CODE 250 W/ 637 OVERRIDE(OP): Performed by: PSYCHIATRY & NEUROLOGY

## 2021-04-14 PROCEDURE — 770020 HCHG ROOM/CARE - TELE (206)

## 2021-04-14 PROCEDURE — 97112 NEUROMUSCULAR REEDUCATION: CPT | Mod: CQ

## 2021-04-14 PROCEDURE — 99232 SBSQ HOSP IP/OBS MODERATE 35: CPT | Performed by: INTERNAL MEDICINE

## 2021-04-14 PROCEDURE — A9270 NON-COVERED ITEM OR SERVICE: HCPCS | Performed by: STUDENT IN AN ORGANIZED HEALTH CARE EDUCATION/TRAINING PROGRAM

## 2021-04-14 PROCEDURE — 97112 NEUROMUSCULAR REEDUCATION: CPT | Mod: CO

## 2021-04-14 PROCEDURE — 700102 HCHG RX REV CODE 250 W/ 637 OVERRIDE(OP): Performed by: PHYSICAL MEDICINE & REHABILITATION

## 2021-04-14 PROCEDURE — 700111 HCHG RX REV CODE 636 W/ 250 OVERRIDE (IP): Performed by: STUDENT IN AN ORGANIZED HEALTH CARE EDUCATION/TRAINING PROGRAM

## 2021-04-14 PROCEDURE — 700102 HCHG RX REV CODE 250 W/ 637 OVERRIDE(OP): Performed by: STUDENT IN AN ORGANIZED HEALTH CARE EDUCATION/TRAINING PROGRAM

## 2021-04-14 PROCEDURE — A9270 NON-COVERED ITEM OR SERVICE: HCPCS | Performed by: NURSE PRACTITIONER

## 2021-04-14 PROCEDURE — 700102 HCHG RX REV CODE 250 W/ 637 OVERRIDE(OP): Performed by: NURSE PRACTITIONER

## 2021-04-14 PROCEDURE — 700111 HCHG RX REV CODE 636 W/ 250 OVERRIDE (IP): Performed by: INTERNAL MEDICINE

## 2021-04-14 PROCEDURE — A9270 NON-COVERED ITEM OR SERVICE: HCPCS | Performed by: INTERNAL MEDICINE

## 2021-04-14 PROCEDURE — 97535 SELF CARE MNGMENT TRAINING: CPT | Mod: CO

## 2021-04-14 PROCEDURE — A9270 NON-COVERED ITEM OR SERVICE: HCPCS | Performed by: PSYCHIATRY & NEUROLOGY

## 2021-04-14 PROCEDURE — 36415 COLL VENOUS BLD VENIPUNCTURE: CPT

## 2021-04-14 PROCEDURE — 80048 BASIC METABOLIC PNL TOTAL CA: CPT

## 2021-04-14 PROCEDURE — 51798 US URINE CAPACITY MEASURE: CPT

## 2021-04-14 PROCEDURE — 92526 ORAL FUNCTION THERAPY: CPT

## 2021-04-14 PROCEDURE — A9270 NON-COVERED ITEM OR SERVICE: HCPCS | Performed by: PHYSICAL MEDICINE & REHABILITATION

## 2021-04-14 PROCEDURE — 700102 HCHG RX REV CODE 250 W/ 637 OVERRIDE(OP): Performed by: INTERNAL MEDICINE

## 2021-04-14 RX ORDER — PRAZOSIN HYDROCHLORIDE 1 MG/1
1 CAPSULE ORAL EVERY EVENING
Status: DISCONTINUED | OUTPATIENT
Start: 2021-04-14 | End: 2021-04-17

## 2021-04-14 RX ORDER — ACETAMINOPHEN 325 MG/1
650 TABLET ORAL EVERY 6 HOURS PRN
Status: DISCONTINUED | OUTPATIENT
Start: 2021-04-14 | End: 2021-04-17

## 2021-04-14 RX ORDER — TAMSULOSIN HYDROCHLORIDE 0.4 MG/1
0.4 CAPSULE ORAL
Status: DISCONTINUED | OUTPATIENT
Start: 2021-04-14 | End: 2021-04-14

## 2021-04-14 RX ADMIN — Medication 2 G: at 17:04

## 2021-04-14 RX ADMIN — THYROID, PORCINE 60 MG: 30 TABLET ORAL at 17:04

## 2021-04-14 RX ADMIN — ENOXAPARIN SODIUM 40 MG: 40 INJECTION SUBCUTANEOUS at 04:51

## 2021-04-14 RX ADMIN — BACLOFEN 15 MG: 10 TABLET ORAL at 12:19

## 2021-04-14 RX ADMIN — HYDRALAZINE HYDROCHLORIDE 25 MG: 50 TABLET, FILM COATED ORAL at 21:30

## 2021-04-14 RX ADMIN — ATORVASTATIN CALCIUM 80 MG: 80 TABLET, FILM COATED ORAL at 17:04

## 2021-04-14 RX ADMIN — PHENYTOIN 1 MG: 125 SUSPENSION ORAL at 18:00

## 2021-04-14 RX ADMIN — ONDANSETRON 4 MG: 2 INJECTION INTRAMUSCULAR; INTRAVENOUS at 12:29

## 2021-04-14 RX ADMIN — AMLODIPINE BESYLATE 10 MG: 10 TABLET ORAL at 04:47

## 2021-04-14 RX ADMIN — METOPROLOL TARTRATE 25 MG: 25 TABLET, FILM COATED ORAL at 17:04

## 2021-04-14 RX ADMIN — DOCUSATE SODIUM 50 MG AND SENNOSIDES 8.6 MG 1 TABLET: 8.6; 5 TABLET, FILM COATED ORAL at 21:30

## 2021-04-14 RX ADMIN — ACETAMINOPHEN 650 MG: 325 TABLET, FILM COATED ORAL at 17:04

## 2021-04-14 RX ADMIN — THYROID, PORCINE 60 MG: 30 TABLET ORAL at 04:47

## 2021-04-14 RX ADMIN — Medication 2 G: at 05:44

## 2021-04-14 RX ADMIN — ASPIRIN 81 MG: 81 TABLET, CHEWABLE ORAL at 17:04

## 2021-04-14 RX ADMIN — HYDRALAZINE HYDROCHLORIDE 25 MG: 50 TABLET, FILM COATED ORAL at 14:54

## 2021-04-14 RX ADMIN — METOPROLOL TARTRATE 25 MG: 25 TABLET, FILM COATED ORAL at 04:46

## 2021-04-14 RX ADMIN — HYDRALAZINE HYDROCHLORIDE 25 MG: 50 TABLET, FILM COATED ORAL at 04:47

## 2021-04-14 RX ADMIN — LISINOPRIL 40 MG: 20 TABLET ORAL at 04:47

## 2021-04-14 RX ADMIN — BACLOFEN 15 MG: 10 TABLET ORAL at 04:47

## 2021-04-14 RX ADMIN — BACLOFEN 15 MG: 10 TABLET ORAL at 17:04

## 2021-04-14 RX ADMIN — Medication 2 G: at 12:19

## 2021-04-14 ASSESSMENT — COGNITIVE AND FUNCTIONAL STATUS - GENERAL
SUGGESTED CMS G CODE MODIFIER MOBILITY: CN
TURNING FROM BACK TO SIDE WHILE IN FLAT BAD: UNABLE
PERSONAL GROOMING: A LOT
DRESSING REGULAR LOWER BODY CLOTHING: TOTAL
DAILY ACTIVITIY SCORE: 8
HELP NEEDED FOR BATHING: TOTAL
SUGGESTED CMS G CODE MODIFIER DAILY ACTIVITY: CM
TOILETING: TOTAL
DRESSING REGULAR UPPER BODY CLOTHING: TOTAL
MOVING TO AND FROM BED TO CHAIR: UNABLE
MOVING FROM LYING ON BACK TO SITTING ON SIDE OF FLAT BED: UNABLE
WALKING IN HOSPITAL ROOM: TOTAL
EATING MEALS: A LOT
CLIMB 3 TO 5 STEPS WITH RAILING: TOTAL
STANDING UP FROM CHAIR USING ARMS: TOTAL
MOBILITY SCORE: 6

## 2021-04-14 ASSESSMENT — ENCOUNTER SYMPTOMS
ABDOMINAL PAIN: 0
PALPITATIONS: 0
STRIDOR: 0
FOCAL WEAKNESS: 1
CHILLS: 0
BACK PAIN: 0
COUGH: 0
SENSORY CHANGE: 1
HEADACHES: 1
SPEECH CHANGE: 0
BLURRED VISION: 0
FEVER: 0

## 2021-04-14 ASSESSMENT — GAIT ASSESSMENTS: GAIT LEVEL OF ASSIST: UNABLE TO PARTICIPATE

## 2021-04-14 NOTE — THERAPY
Occupational Therapy  Daily Treatment     Patient Name: Thong Arzola  Age:  56 y.o., Sex:  male  Medical Record #: [id number]  Today's Date: 4/13/2021     Precautions  Precautions: Fall Risk, Swallow Precautions ( See Comments)  Comments: no bone flap R, helmet EOB    Assessment    Session focused on seated balance and visual scanning. Pt required TotalA for bed mobility and maxA to maintain seated balance. Positioned pt in weight bearing through left elbow and practice weight shift in all directions. Pt able to initiate moving back into midline position using RUE to pull with visual cues from mirror, verbal cues, and tactile cues. Practiced scanning and locating objects in midline and towards left side of room. Pt able to find visual midline with verbal cues but unable to maintain midline visual attention for more than a few seconds.     Plan    Continue current treatment plan.    DC Equipment Recommendations: Unable to determine at this time  Discharge Recommendations: Recommend post-acute placement for additional occupational therapy services prior to discharge home    Subjective    Pt alert and cooperative. No c/o pain.      Objective       04/13/21 1705   Cognition    Cognition / Consciousness X   Speech/ Communication Delayed Responses  (1-2 word responses, appropriate)   Level of Consciousness Alert   Ability To Follow Commands 1 Step   New Learning Impaired   Attention Impaired   Initiation Impaired   Comments pleasant, cooperative, severe L neglect, able to follow cues appropriately   Upper Body Muscle Tone   Upper Body Muscle Tone  X   Lt Upper Extremity Muscle Tone Non Functional;Hypotonic   Comments flaccid LUE   Neuro-Muscular Treatments   Neuro-Muscular Treatments Anterior weight shift;Weight Shift Left;Postural Changes;Postural Facilitation;Joint Approximation   Other Treatments   Other Treatments Provided Practiced scanning towards midline and left. Visual cues as targets. Focused on cervical  rotation in all directions.    Bed Mobility    Supine to Sit Maximal Assist   Comments initiates with RUE/RLE   Functional Mobility   Mobility EOB only   Visual Perception   Visual Perception  X   Right / Left Discrimination Impaired   Neglect Severe Left   Visual Scanning Impaired   Comments able to come to midline with verbal cues, unable to maintain gaze in midline, able to slightly scan towards left but unable to locate objects   Activity Tolerance   Comments wife reports improved arousal in morning versus afternoon   Patient / Family Goals   Patient / Family Goal #1 To return to PLOF   Short Term Goals   Short Term Goal # 1 Pt will sit EOB unsupported and perform grooming w/ SPV   Goal Outcome # 1 Progressing slower than expected   Short Term Goal # 2 Pt will attend 3 objects on L side w/ only v/c's   Goal Outcome # 2 Progressing slower than expected   Short Term Goal # 3 Pt will increase L UE strength to 2/5 grossly   Goal Outcome # 3 Progressing slower than expected   Short Term Goal # 4 Pt will perform ADL txf w/ min A   Goal Outcome # 4 Progressing slower than expected

## 2021-04-14 NOTE — CARE PLAN
Problem: Skin Integrity  Goal: Risk for impaired skin integrity will decrease  Outcome: PROGRESSING AS EXPECTED  Note: Q2 hr turns in place. Waffle cushion in place. Incisions to head are CDI.      Problem: Safety:  Goal: Risk of aspiration will decrease  Outcome: PROGRESSING AS EXPECTED  Note: Diet ordered today. Education provided to sit at a 90 degree angle when eating/drinking. Cortrak in place for additional continuous nutrition. HOB at at least 30 degrees at all times.

## 2021-04-14 NOTE — PROGRESS NOTES
2RN skin check done. Slight blanchable pink noted on L buttock and L scapula. Waffle cushion applied to bed. Pt turned onto right side. Head sutures and IJ site noted, WDL.

## 2021-04-14 NOTE — DISCHARGE PLANNING
Care Transition Team Discharge Planning    Anticipated Discharge Disposition: TBD    Action:Lsw was informed at the IDT that the spouse will get medical insurance for the patient in May 2021. Lsw received phone call from [facility] who advised this Lsw to contact Rosalee Anton at [phone] to discuss payment options.    Lsw contacted Rosalee Anton to discuss a payment plan so that that the patient can receive [facility]. Rosalee will provide this Lsw a rate the following day to share with the patient (spouse).     Barriers to Discharge: Awaiting medical clearance and the patient does not have any medical insurance.    Plan: Lsw will work with medical team to assist with DC planning.

## 2021-04-14 NOTE — DIETARY
NUTRITION SERVICES: UPDATE  Minced and moist diet and mildly thick liquid ordered yesterday. This RD went to visit pt today and visualized tube feeding is being held. Please continue to hold tube feed for about 48 hours to stimulate appetite and encourage pt to consume PO adequately. SLP was at bedside and breakfast tray visualized with about 50% consumed and pt was still eating meal. SLP unsure of PO intake for dinner last night as another SLP fed pt. RN/CNA to please document intake of all meals as % taken in ADL's to provide interdisciplinary communication across all shifts.    RD following.

## 2021-04-14 NOTE — DISCHARGE PLANNING
[facility] Transitional Care Coordination    Follow up for Rehab.  Call from ALEXIA Mares requesting private pay rate for [facility].   Request forwarded to [facility]  Noel Zaldivar for assistance.

## 2021-04-14 NOTE — PROGRESS NOTES
Monitor Summary: Pt was in SR with no ectopy.  HR 60-85, ND 0.16, QRS 0.08, QT 0.44 per strips from monitor room.

## 2021-04-14 NOTE — PROGRESS NOTES
Hospital Medicine Daily Progress Note    Date of Service  4/14/2021    Chief Complaint  Left sided weakness    Hospital Course  56 y.o. male admitted 3/31/2021 with acute stroke.  He was not a candidate for tPA nor thrombectomy.  He has a history of paroxysmal atrial fibrillation, dyslipidemia, hypothyroidism. During admit he underwent a hemicraniectomy for right sided decompression from right MCA dense stroke with intractable intracranial pressure.     Interval Problem Update  4/12: Patient verbal and oriented with some slurred speech.  Left hemiplegia. On 4/11 Dr. Leo met with the wife and explained to her we likely will need a G-tube at some point.  Also alerted the both of them we would need a SNF at some point.  He reports that his pain is reasonably well controlled at this juncture, he is describing some thirst.  Free water flushes will be increased from 30 mL to 90 mL every 6 hours.  This can be backed off if sodium begins to drop.    4/13 physical therapy seen the patient.  On tube feeding still.  Speech therapy to follow.   to follow for discharge planning.    4/14: The patient was retaining urine this morning and will try to get a straight cath.  Also discussed with the wife and she said that she will try to get insurance starting May 1 for him.  I also started for Flomax  Consultants/Specialty  Neurology  Neuro-intensivist (signing off 4/11)  Neuro surgery    Code Status  Full Code    Disposition  Remain on the floor    Review of Systems  Review of Systems   Constitutional: Positive for malaise/fatigue. Negative for chills and fever.   Eyes: Negative for blurred vision.   Respiratory: Negative for cough and stridor.    Cardiovascular: Negative for chest pain and palpitations.   Gastrointestinal: Negative for abdominal pain.   Genitourinary: Negative for dysuria and hematuria.   Musculoskeletal: Negative for back pain.   Skin: Negative for rash.   Neurological: Positive for sensory change  (left side), focal weakness (left side) and headaches (right temporal region). Negative for speech change.        Physical Exam  Temp:  [36.2 °C (97.1 °F)-37.1 °C (98.8 °F)] 37.1 °C (98.8 °F)  Pulse:  [60-89] 89  Resp:  [16-17] 16  BP: (110-140)/(55-83) 140/71  SpO2:  [94 %-98 %] 96 %    Physical Exam  Vitals reviewed.   Constitutional:       Appearance: Normal appearance. He is not diaphoretic.   HENT:      Head: Normocephalic and atraumatic.      Nose: Nose normal.      Mouth/Throat:      Mouth: Mucous membranes are moist.      Pharynx: No oropharyngeal exudate.   Eyes:      Extraocular Movements: Extraocular movements intact.      Conjunctiva/sclera: Conjunctivae normal.   Cardiovascular:      Rate and Rhythm: Normal rate and regular rhythm.      Pulses:           Radial pulses are 2+ on the right side and 2+ on the left side.        Dorsalis pedis pulses are 2+ on the right side and 2+ on the left side.      Heart sounds: No murmur.   Pulmonary:      Effort: Pulmonary effort is normal. No respiratory distress.      Breath sounds: Normal breath sounds.   Abdominal:      General: Bowel sounds are normal.      Palpations: Abdomen is soft.   Musculoskeletal:         General: No swelling or tenderness.      Cervical back: Neck supple. No muscular tenderness.      Right lower leg: No edema.      Left lower leg: No edema.   Lymphadenopathy:      Cervical: No cervical adenopathy.   Neurological:      Mental Status: He is alert.      Cranial Nerves: Cranial nerve deficit present.      Sensory: Sensory deficit present.      Coordination: Coordination abnormal.      Gait: Gait abnormal.         Current Facility-Administered Medications:   •  prazosin (MINIPRESS) capsule 1 mg, 1 mg, Enteral Tube, Q EVENING, Jose Leo D.O., 1 mg at 04/13/21 1741  •  sodium chloride (SALT) tablet 2 g, 2 g, Enteral Tube, TID WITH MEALS, Cristhian Bell M.D., 2 g at 04/14/21 0536  •  hydrALAZINE (APRESOLINE) tablet 25 mg, 25 mg, Enteral  Tube, Q8HRS, Cristhian Bell M.D., 25 mg at 04/14/21 0447  •  baclofen (LIORESAL) tablet 15 mg, 15 mg, Enteral Tube, TID, Christofer Eisenberg D.OYohana, 15 mg at 04/14/21 0447  •  hydrALAZINE (APRESOLINE) injection 10 mg, 10 mg, Intravenous, Q HOUR PRN, Cristhian Bell M.D., 10 mg at 04/10/21 1631  •  labetalol (NORMODYNE/TRANDATE) injection 10-20 mg, 10-20 mg, Intravenous, Q4HRS PRN, Cristhian Bell M.D., 10 mg at 04/10/21 1508  •  cloNIDine (CATAPRES) tablet 0.1 mg, 0.1 mg, Enteral Tube, Q4HRS PRN, Cristhian Bell M.D., 0.1 mg at 04/08/21 1734  •  calcium carbonate (TUMS) chewable tab 500 mg, 500 mg, Enteral Tube, Q8HRS PRN, Cristhian Bell M.D., 500 mg at 04/07/21 1501  •  lisinopril (PRINIVIL) tablet 40 mg, 40 mg, Enteral Tube, Q DAY, Cristhian Bell M.D., 40 mg at 04/14/21 0447  •  amLODIPine (NORVASC) tablet 10 mg, 10 mg, Enteral Tube, Q DAY, Johana Nelson M.D., 10 mg at 04/14/21 0447  •  metoprolol tartrate (LOPRESSOR) tablet 25 mg, 25 mg, Enteral Tube, BID, Cristhian Bell M.D., 25 mg at 04/14/21 0446  •  enoxaparin (LOVENOX) inj 40 mg, 40 mg, Subcutaneous, DAILY, Johana Nelson M.D., 40 mg at 04/14/21 0451  •  aspirin (ASA) chewable tab 81 mg, 81 mg, Enteral Tube, DAILY, Cristhian Bell M.D., 81 mg at 04/13/21 1743  •  thyroid (ARMOUR THYROID) tablet 60 mg, 60 mg, Enteral Tube, BID, King Spann M.D., 60 mg at 04/14/21 9687  •  Pharmacy Consult Request ...Pain Management Review 1 Each, 1 Each, Other, PHARMACY TO DOSE, NADEEN Morris.ANDREW.  •  MD ALERT...DO NOT ADMINISTER NSAIDS or ASPIRIN unless ORDERED By Neurosurgery 1 Each, 1 Each, Other, PRN, HANSA Morris.A.-C.  •  bisacodyl (DULCOLAX) suppository 10 mg, 10 mg, Rectal, Q24HRS PRN, HANSA Morris.A.-C., 10 mg at 04/11/21 1811  •  artificial tears (EYE LUBRICANT) ophth ointment 1 Application, 1 Application, Both Eyes, PRN, Lilia Pizano, HANSA.A.-C.  •  Pharmacy Consult: Enteral tube insertion - review meds/change  route/product selection, 1 Each, Other, PHARMACY TO DOSE, Radha Meredith A.P.R.N.  •  atorvastatin (LIPITOR) tablet 80 mg, 80 mg, Enteral Tube, Q EVENING, Radha Meredith A.P.R.N., 80 mg at 04/13/21 1742  •  senna-docusate (PERICOLACE or SENOKOT S) 8.6-50 MG per tablet 1 tablet, 1 tablet, Enteral Tube, Nightly, Radha Meredith A.P.R.N., 1 tablet at 04/13/21 2034  •  magnesium hydroxide (MILK OF MAGNESIA) suspension 30 mL, 30 mL, Enteral Tube, QDAY PRN, Radha Meredith A.P.R.N., 30 mL at 04/11/21 0622  •  ondansetron (ZOFRAN ODT) dispertab 4 mg, 4 mg, Enteral Tube, Q4HRS PRN, Radha Meredith A.P.R.N.  •  oxyCODONE immediate-release (ROXICODONE) tablet 5 mg, 5 mg, Enteral Tube, Q3HRS PRN, 5 mg at 04/12/21 1609 **OR** oxyCODONE immediate-release (ROXICODONE) tablet 10 mg, 10 mg, Enteral Tube, Q3HRS PRN, 10 mg at 04/06/21 0405 **OR** HYDROmorphone (Dilaudid) injection 0.5 mg, 0.5 mg, Intravenous, Q3HRS PRN, Radha Meredith A.P.R.N., 0.5 mg at 04/03/21 1554  •  polyethylene glycol/lytes (MIRALAX) PACKET 1 Packet, 1 Packet, Enteral Tube, BID PRN, Radha Meredith A.P.R.N., 1 Packet at 04/11/21 0621  •  promethazine (PHENERGAN) tablet 12.5-25 mg, 12.5-25 mg, Enteral Tube, Q4HRS PRN, Radha Meredith, A.P.R.N.  •  senna-docusate (PERICOLACE or SENOKOT S) 8.6-50 MG per tablet 1 tablet, 1 tablet, Enteral Tube, Q24HRS PRN, SHERRIE Hampton, Stopped at 04/12/21 1738  •  ondansetron (ZOFRAN) syringe/vial injection 4 mg, 4 mg, Intravenous, Q4HRS PRN, Gia Forman M.D., 4 mg at 04/07/21 0028  •  promethazine (PHENERGAN) suppository 12.5-25 mg, 12.5-25 mg, Rectal, Q4HRS PRN, Gia Forman M.D.  •  prochlorperazine (COMPAZINE) injection 5-10 mg, 5-10 mg, Intravenous, Q4HRS PRN, Gia Forman M.D., 10 mg at 04/05/21 0913      Fluids    Intake/Output Summary (Last 24 hours) at 4/14/2021 0738  Last data filed at 4/14/2021 0400  Gross per 24 hour   Intake 90 ml   Output 1800 ml   Net -1710 ml       Laboratory  Recent Labs      04/13/21  0307   WBC 7.9   RBC 4.21*   HEMOGLOBIN 12.3*   HEMATOCRIT 36.5*   MCV 86.7   MCH 29.2   MCHC 33.7   RDW 38.2   PLATELETCT 336   MPV 10.3     Recent Labs     04/12/21  0905 04/13/21  0307 04/14/21  0511   SODIUM 140 139 134*   POTASSIUM 4.2 4.0 4.5   CHLORIDE 105 104 101   CO2 25 26 26   GLUCOSE 111* 131* 97   BUN 39* 45* 45*   CREATININE 0.98 1.01 0.89   CALCIUM 9.0 8.7 8.8                   Imaging  US-EXTREMITY VENOUS LOWER BILAT   Final Result      DX-CHEST-LIMITED (1 VIEW)   Final Result      Right basilar atelectasis. No focal consolidation or pleural effusions.      VB-ZTMIOIX-1 VIEW   Final Result      No evidence of bowel obstruction.                  DX-CHEST-LIMITED (1 VIEW)   Final Result      1.  Right internal jugular catheter and enteric catheter appear appropriately located      2.  Minimal right basilar atelectasis      CT-HEAD W/O   Final Result         1.  Changes of evolving infarct within the right MCA distribution   2.  Hyperdensity in the sulci of the right MCA distribution infarct, largely appears related to thrombosed vessels, component of subarachnoid hemorrhage is suspected particularly in the right frontal lobe.   3.  Pneumocephalus, decreased since prior.      CT-HEAD W/O   Final Result         Postsurgical change from right craniectomy. Redemonstration of large right MCA infarct with edema and bulging of the brain parenchyma through the craniectomy defect      Less mass effect on the right lateral ventricle. Less right to left midline shift of 3 mm, previously 10 mm.         DX-ABDOMEN FOR TUBE PLACEMENT   Final Result      Enteric tube terminates over the stomach.      CT-HEAD W/O   Final Result         1.  Low-density changes of the right hemisphere compatible with MCA distribution infarct with edema.   2.  New effacement of the bilateral ventricles, right greater than left, with 10 mm right-to-left midline shift.   3.  New dilatation of the left temporal horn ventricle,  appearance favors component of obstructive hydrocephalus due to mass effect from infarct and edema.   4.  Hyperdensity in the right middle cerebral artery, likely thrombosis given associated findings.      These findings were discussed with the patient's clinician, Dr. uNnez, on 4/3/2021 7:11 AM.      MR-BRAIN-W/O   Final Result      Very large acute right MCA territory infarct as detailed above with small amount of petechial hemorrhage in the right insular region and right temporal lobe.      Punctate right thalamic lacunar infarct.      Right ICA and M1 MCA occlusion.      EC-ECHOCARDIOGRAM COMPLETE W/O CONT   Final Result      DX-CHEST-PORTABLE (1 VIEW)   Final Result         1.  Interstitial pulmonary parenchymal prominence, compatible with interstitial edema and/or infiltrates.      CT-CTA NECK WITH & W/O-POST PROCESSING   Final Result      Acute occlusion of the right internal carotid artery shortly after bifurcation. It is occluded up to the level of the carotid terminus.      CT-CTA HEAD WITH & W/O-POST PROCESS   Final Result      Acute right M1 occlusion.      Findings were discussed with RHONDA DIAZ on 3/31/2021 7:54 PM.      CT-CEREBRAL PERFUSION ANALYSIS   Final Result      1.  Cerebral blood flow less than 30% likely representing completed infarct = 134 mL.      2.  T Max more than 6 seconds likely representing combination of completed infarct and ischemia = 210 mL.      3.  Mismatched volume likely representing ischemic brain/penumbra = 76 mL.      4.  Please note that the cerebral perfusion was performed on the limited brain tissue around the basal ganglia region. Infarct/ischemia outside the CT perfusion sections can be missed in this study.      CT-HEAD W/O   Final Result   Addendum 1 of 1   In retrospect, there is subtle loss of gray-white matter differentiation    in the right MCA distribution, consistent with acute infarct.      Final      1.  No CT evidence of acute infarct, hemorrhage or  mass.   2.  Mild paranasal sinus disease.           Assessment/Plan  * Acute right MCA stroke (HCC)- (present on admission)  Assessment & Plan  Likely consequence of COVID19 in 3/21 and hx of afib  Subsequent brain edema requiring right decompressive hemicraniectomy on 4/3/2021  Neurology following patient will benefit from long-term anticoagulation when bleeding risk is acceptable likely 1-2 weeks from event onset.   Blood pressure control keep SBP<140 (on metoprolol 25mg BID, amlodipine 10mg, hydralazine 25mg TID, lisinopril 40mg daily)  Patient weaned off hypertonic saline and started on salt tablets  PT/OT/SLP  Aspiration precautions  Physiatry consultation   for discharge planning    Paroxysmal atrial fibrillation (HCC)- (present on admission)  Assessment & Plan  Monitor vitals and on tele  Metoprolol 25mg BID w/ parameters  Discussed risk benefits and alternatives of long-term anticoagulation with patient and wife, plan to start DOAC when bleeding risk felt to be acceptable per neurology likely in 1 to 2 weeks    Leucocytosis  Assessment & Plan  Resolved but continue to monitor  4/11 WBC:7.7    Urinary retention  Assessment & Plan  Brewer catheter in place  Watch for urinary retention  flomax    History of COVID-19- (present on admission)  Assessment & Plan  Clinically recovered patient symptom onset on 3/15/2021 with positive initial test on 3/18/2021    Acquired hypothyroidism- (present on admission)  Assessment & Plan  [place] Thyroid 60mg BID   TSH is less than 0.005 will decrease dose and he will need recheck TFTs in 6 weeks (prior to admit was on 90mg BID)  TSH: <0.005 in past week.       VTE prophylaxis: Enoxaparin 40mg SQ daily

## 2021-04-14 NOTE — THERAPY
Physical Therapy   Daily Treatment     Patient Name: Thong Arzola  Age:  56 y.o., Sex:  male  Medical Record #: [id number]  Today's Date: 4/14/2021     Precautions: (P) Fall Risk, Swallow Precautions ( See Comments), Nasogastric Tube    Assessment    Pt awake upon entry into the room, wife BS. Noted improvement w/lifting of head off pillow, still very delayed w/tracking to/past midline into Lft field of vision. No active movement noted of Lft UE/LE. Static sitting still requiring max assist. Pt left seated in cardiac chair for wife to work on feeding. Pt inconsistent w/following commands.     Plan    Continue current treatment plan.    DC Equipment Recommendations: Unable to determine at this time  Discharge Recommendations: Recommend post-acute placement for additional physical therapy services prior to discharge home     Objective       04/14/21 1417   Other Treatments   Other Treatments Provided Rolling/bridging to nathalie pj bottoms. Pt unable to achieve single leg bridge w/Lft LE. EOB w/max assist while PARKS worked on tracking/scanning to midline->Lft. Time needed between efforts, does not hold midline.    Balance   Sitting Balance (Static) Trace   Sitting Balance (Dynamic) Dependent   Standing Balance (Static) Trace +   Standing Balance (Dynamic) Dependent   Weight Shift Sitting Poor   Weight Shift Standing Absent   Gait Analysis   Gait Level Of Assist Unable to Participate   Bed Mobility    Supine to Sit Maximal Assist  (HOB flat, no railing, from his LFt side)   Sit to Supine   (pt left seated in cardiac chair)   Scooting Maximal Assist  (seated)   Rolling Moderate Assist to Lt.;Total Assist to Rt.   Skilled Intervention Verbal Cuing;Postural Facilitation;Compensatory Strategies   Functional Mobility   Sit to Stand Maximal Assist  (from EOB)   Bed, Chair, Wheelchair Transfer Maximal Assist  (bed->cardiac chair)   Transfer Method Stand Pivot   Comments Stand pivot to his Rt side, transferring to cardiac  chair.    How much difficulty does the patient currently have...   Turning over in bed (including adjusting bedclothes, sheets and blankets)? 1   Sitting down on and standing up from a chair with arms (e.g., wheelchair, bedside commode, etc.) 1   Moving from lying on back to sitting on the side of the bed? 1   How much help from another person does the patient currently need...   Moving to and from a bed to a chair (including a wheelchair)? 1   Need to walk in a hospital room? 1   Climbing 3-5 steps with a railing? 1   6 clicks Mobility Score 6   Short Term Goals    Short Term Goal # 1 Pt will be able to perform supine<>sit with bed features with min A in 6tx to promote fnx progression towards I    Goal Outcome # 1 goal not met   Short Term Goal # 2 Pt will be able to perform sit<>stand/transfers with hemiwalker with Taz in 6tx to promote fnx progression towards I    Goal Outcome # 2 Goal not met   Short Term Goal # 3 Pt will be able to ambulate 25ft with hemiwalker with min A in 6tx to promote fnx progression towards I    Goal Outcome # 3 Goal not met

## 2021-04-14 NOTE — THERAPY
Occupational Therapy  Daily Treatment     Patient Name: Thong Arzola  Age:  56 y.o., Sex:  male  Medical Record #: [id number]  Today's Date: 4/14/2021       Precautions: Fall Risk, Swallow Precautions ( See Comments), Nasogastric Tube  Comments: No bone flap R, helmet on when EOB. Lft side deficits/neglect.     Assessment    Pt seen for OT tx. Continues to be limited by decreased activity tolerance, balance deficits, inattention, poor safety awareness, L side weakness and L neglect impacting ability to complete ADLs and ADL transfers independently. Donned helmet EOB. Pt tracking objects to L w/ max cues, tracking past midline from R > L, cues to turn head to L. Provided pt w/ single handled w/ education on scanning to L to find to initiate self-feeding. Will continue to benefit from OT services while in house.     Plan    Continue current treatment plan.    DC Equipment Recommendations: Unable to determine at this time  Discharge Recommendations: Recommend post-acute placement for additional occupational therapy services prior to discharge home       04/14/21 1220   Cognition    Cognition / Consciousness X   Safety Awareness Impaired   New Learning Impaired   Attention Impaired   Initiation Impaired   Active ROM Upper Body   Active ROM Upper Body  X   Comments LUE flaccid    Strength Upper Body   Upper Body Strength  X   Comments LUE flaccid w/ no active or purposeful movement    Sensation Upper Body   Upper Extremity Sensation  X   Comments no sensation of LUE    Balance   Sitting Balance (Static) Trace   Sitting Balance (Dynamic) Dependent   Standing Balance (Static) Trace +   Standing Balance (Dynamic) Dependent   Weight Shift Sitting Poor   Weight Shift Standing Absent   Bed Mobility    Supine to Sit Maximal Assist   Scooting Maximal Assist   Rolling Moderate Assist to Lt.;Total Assist to Rt.   Activities of Daily Living   Grooming Moderate Assist;Seated  (using RUE )   Upper Body Dressing Maximal Assist    Lower Body Dressing Maximal Assist   Toileting Total Assist   Functional Mobility   Sit to Stand Maximal Assist   Bed, Chair, Wheelchair Transfer Maximal Assist   Short Term Goals   Short Term Goal # 1 Pt will sit EOB unsupported and perform grooming w/ SPV   Goal Outcome # 1 Progressing slower than expected   Short Term Goal # 2 Pt will attend 3 objects on L side w/ only v/c's   Goal Outcome # 2 Progressing as expected   Short Term Goal # 3 Pt will increase L UE strength to 2/5 grossly   Goal Outcome # 3 Progressing slower than expected   Short Term Goal # 4 Pt will perform ADL txf w/ min A   Goal Outcome # 4 Progressing slower than expected   Anticipated Discharge Equipment and Recommendations   DC Equipment Recommendations Unable to determine at this time   Discharge Recommendations Recommend post-acute placement for additional occupational therapy services prior to discharge home

## 2021-04-14 NOTE — THERAPY
Speech Language Pathology  Daily Treatment     Patient Name: Thong Arzola  Age:  56 y.o., Sex:  male  Medical Record #: [id number]  Today's Date: 4/14/2021     Precautions  Precautions: (P) Fall Risk, Swallow Precautions ( See Comments), Nasogastric Tube  Comments: (P) no bone flap R, helmet EOB    Assessment    Pt seen on this date for dysphagia therapy. Pt awake upon entry and oriented to all spheres. He consumed ~75% of breakfast with cough x1. Vocal quality was clear and swallow trigger delayed up to 8 seconds. He spontanously triggered a double swallow, but had difficulty following commands for volitional double swallow. He c/o residue in mouth and requested alternating bite of food with sip of liquid, which he endorsed was better (no significant oral residue appreciated following this strategy). He required cues for small sips of liquids as anterior loss of bolus appreciated in ~70% of cup sip trials. At this time, recommend continuation of minced and moist (MM5)/MTL diet with direct 1:1 supervision during all meals and assistance with feeding PRN. Dysphagia/role of SLP and strategy recommendations discussed with pt and his wife and they verbalized understanding. SLP following.    Plan    recommend continuation of minced and moist (MM5)/MTL diet with direct 1:1 supervision during all meals and assistance with feed PRN.    Continue current treatment plan.    Discharge Recommendations: (P) Recommend post-acute placement for additional speech therapy services prior to discharge home       Objective       04/14/21 0922   Vitals   O2 (LPM) 0   O2 Delivery Device None - Room Air   Pain 0 - 10 Group   Therapist Pain Assessment Post Activity Pain Same as Prior to Activity;Nurse Notified;0   Written Expression   Dominant Hand Right   Cognitive-Linguistic   Level of Consciousness Alert   Orientation Level Oriented x 4   Dysphagia    Dysphagia X   Positioning / Behavior Modification Modulate Rate or Bite Size;Self  Monitoring;Multiple Swallows   Other Treatments PO trials of pt's MM5/MT2 breakfast   Diet / Liquid Recommendation Minced & Moist (5) - (Dysphagia II);Mildly Thick (2) - (Nectar Thick)   Nutritional Liquid Intake Rating Scale Thickened beverages (mildly thick unless otherwise specified)   Nutritional Food Intake Rating Scale Total oral diet with multiple consistencies but requiring special preparation or compensations   Nursing Communication Swallow Precaution Sign Posted at Head of Bed   Skilled Intervention Compensatory Strategies;Verbal Cueing   Recommended Route of Medication Administration   Medication Administration  Crush all Medications in Puree   Short Term Goals   Short Term Goal # 1 B  Resume 4/13: Pt will consume a diet of MM5/MT2 with no s/sx of aspiration and min cues.    Goal Outcome  # 1 B Progressing as expected

## 2021-04-15 LAB
ANION GAP SERPL CALC-SCNC: 9 MMOL/L (ref 7–16)
BUN SERPL-MCNC: 40 MG/DL (ref 8–22)
CALCIUM SERPL-MCNC: 8.9 MG/DL (ref 8.5–10.5)
CHLORIDE SERPL-SCNC: 102 MMOL/L (ref 96–112)
CO2 SERPL-SCNC: 25 MMOL/L (ref 20–33)
CREAT SERPL-MCNC: 1.04 MG/DL (ref 0.5–1.4)
GLUCOSE SERPL-MCNC: 99 MG/DL (ref 65–99)
POTASSIUM SERPL-SCNC: 4.4 MMOL/L (ref 3.6–5.5)
SODIUM SERPL-SCNC: 136 MMOL/L (ref 135–145)

## 2021-04-15 PROCEDURE — A9270 NON-COVERED ITEM OR SERVICE: HCPCS | Performed by: PSYCHIATRY & NEUROLOGY

## 2021-04-15 PROCEDURE — 700102 HCHG RX REV CODE 250 W/ 637 OVERRIDE(OP): Performed by: HOSPITALIST

## 2021-04-15 PROCEDURE — 700102 HCHG RX REV CODE 250 W/ 637 OVERRIDE(OP): Performed by: STUDENT IN AN ORGANIZED HEALTH CARE EDUCATION/TRAINING PROGRAM

## 2021-04-15 PROCEDURE — A9270 NON-COVERED ITEM OR SERVICE: HCPCS | Performed by: PHYSICAL MEDICINE & REHABILITATION

## 2021-04-15 PROCEDURE — A9270 NON-COVERED ITEM OR SERVICE: HCPCS | Performed by: INTERNAL MEDICINE

## 2021-04-15 PROCEDURE — 770020 HCHG ROOM/CARE - TELE (206)

## 2021-04-15 PROCEDURE — 97112 NEUROMUSCULAR REEDUCATION: CPT | Mod: CQ

## 2021-04-15 PROCEDURE — A9270 NON-COVERED ITEM OR SERVICE: HCPCS | Performed by: NURSE PRACTITIONER

## 2021-04-15 PROCEDURE — 99232 SBSQ HOSP IP/OBS MODERATE 35: CPT | Performed by: INTERNAL MEDICINE

## 2021-04-15 PROCEDURE — A9270 NON-COVERED ITEM OR SERVICE: HCPCS | Performed by: STUDENT IN AN ORGANIZED HEALTH CARE EDUCATION/TRAINING PROGRAM

## 2021-04-15 PROCEDURE — 700111 HCHG RX REV CODE 636 W/ 250 OVERRIDE (IP): Performed by: STUDENT IN AN ORGANIZED HEALTH CARE EDUCATION/TRAINING PROGRAM

## 2021-04-15 PROCEDURE — 97535 SELF CARE MNGMENT TRAINING: CPT | Mod: CO

## 2021-04-15 PROCEDURE — 80048 BASIC METABOLIC PNL TOTAL CA: CPT

## 2021-04-15 PROCEDURE — 700102 HCHG RX REV CODE 250 W/ 637 OVERRIDE(OP): Performed by: PSYCHIATRY & NEUROLOGY

## 2021-04-15 PROCEDURE — 700102 HCHG RX REV CODE 250 W/ 637 OVERRIDE(OP): Performed by: NURSE PRACTITIONER

## 2021-04-15 PROCEDURE — 99232 SBSQ HOSP IP/OBS MODERATE 35: CPT | Performed by: PHYSICAL MEDICINE & REHABILITATION

## 2021-04-15 PROCEDURE — 97530 THERAPEUTIC ACTIVITIES: CPT | Mod: CQ

## 2021-04-15 PROCEDURE — 700102 HCHG RX REV CODE 250 W/ 637 OVERRIDE(OP): Performed by: INTERNAL MEDICINE

## 2021-04-15 PROCEDURE — 97110 THERAPEUTIC EXERCISES: CPT | Mod: CQ

## 2021-04-15 PROCEDURE — 36415 COLL VENOUS BLD VENIPUNCTURE: CPT

## 2021-04-15 PROCEDURE — 700102 HCHG RX REV CODE 250 W/ 637 OVERRIDE(OP): Performed by: PHYSICAL MEDICINE & REHABILITATION

## 2021-04-15 PROCEDURE — A9270 NON-COVERED ITEM OR SERVICE: HCPCS | Performed by: HOSPITALIST

## 2021-04-15 RX ORDER — SCOLOPAMINE TRANSDERMAL SYSTEM 1 MG/1
1 PATCH, EXTENDED RELEASE TRANSDERMAL
Status: DISCONTINUED | OUTPATIENT
Start: 2021-04-15 | End: 2021-04-16

## 2021-04-15 RX ADMIN — ENOXAPARIN SODIUM 40 MG: 40 INJECTION SUBCUTANEOUS at 05:34

## 2021-04-15 RX ADMIN — BACLOFEN 15 MG: 10 TABLET ORAL at 16:36

## 2021-04-15 RX ADMIN — Medication 2 G: at 05:34

## 2021-04-15 RX ADMIN — HYDRALAZINE HYDROCHLORIDE 25 MG: 50 TABLET, FILM COATED ORAL at 05:31

## 2021-04-15 RX ADMIN — METOPROLOL TARTRATE 25 MG: 25 TABLET, FILM COATED ORAL at 05:30

## 2021-04-15 RX ADMIN — AMLODIPINE BESYLATE 10 MG: 10 TABLET ORAL at 05:29

## 2021-04-15 RX ADMIN — THYROID, PORCINE 60 MG: 30 TABLET ORAL at 16:37

## 2021-04-15 RX ADMIN — SCOPALAMINE 1 PATCH: 1 PATCH, EXTENDED RELEASE TRANSDERMAL at 11:59

## 2021-04-15 RX ADMIN — DOCUSATE SODIUM 50 MG AND SENNOSIDES 8.6 MG 1 TABLET: 8.6; 5 TABLET, FILM COATED ORAL at 22:00

## 2021-04-15 RX ADMIN — ATORVASTATIN CALCIUM 80 MG: 80 TABLET, FILM COATED ORAL at 16:37

## 2021-04-15 RX ADMIN — THYROID, PORCINE 60 MG: 30 TABLET ORAL at 05:30

## 2021-04-15 RX ADMIN — BACLOFEN 15 MG: 10 TABLET ORAL at 05:30

## 2021-04-15 RX ADMIN — PHENYTOIN 1 MG: 125 SUSPENSION ORAL at 16:37

## 2021-04-15 RX ADMIN — LISINOPRIL 40 MG: 20 TABLET ORAL at 05:30

## 2021-04-15 RX ADMIN — HYDRALAZINE HYDROCHLORIDE 25 MG: 50 TABLET, FILM COATED ORAL at 16:37

## 2021-04-15 RX ADMIN — METOPROLOL TARTRATE 25 MG: 25 TABLET, FILM COATED ORAL at 16:37

## 2021-04-15 RX ADMIN — HYDRALAZINE HYDROCHLORIDE 25 MG: 50 TABLET, FILM COATED ORAL at 21:59

## 2021-04-15 RX ADMIN — ASPIRIN 81 MG: 81 TABLET, CHEWABLE ORAL at 16:37

## 2021-04-15 RX ADMIN — BACLOFEN 15 MG: 10 TABLET ORAL at 11:57

## 2021-04-15 ASSESSMENT — COGNITIVE AND FUNCTIONAL STATUS - GENERAL
TOILETING: TOTAL
SUGGESTED CMS G CODE MODIFIER DAILY ACTIVITY: CM
DRESSING REGULAR UPPER BODY CLOTHING: TOTAL
DAILY ACTIVITIY SCORE: 8
DRESSING REGULAR LOWER BODY CLOTHING: TOTAL
EATING MEALS: A LOT
HELP NEEDED FOR BATHING: TOTAL
PERSONAL GROOMING: A LOT

## 2021-04-15 ASSESSMENT — ENCOUNTER SYMPTOMS
BLURRED VISION: 0
SENSORY CHANGE: 1
VOMITING: 0
PALPITATIONS: 0
ABDOMINAL PAIN: 0
FOCAL WEAKNESS: 1
HEADACHES: 1
STRIDOR: 0
NAUSEA: 0
COUGH: 0
BACK PAIN: 0

## 2021-04-15 NOTE — PROGRESS NOTES
Monitor summary: SB-SR 55-74, CO .18, QRS .08, QT .40 with rare PVCs per strip from monitor room.

## 2021-04-15 NOTE — CARE PLAN
Problem: Safety:  Goal: Risk of aspiration will decrease  Outcome: PROGRESSING AS EXPECTED   Patient at risk of aspiration due to stroke. Patient is on safest diet recommended by SLP. HOB up to 90 degrees for feeding. Patient encouraged to take small bites and sips.  Problem: Urinary:  Goal: Ability to reestablish a normal urinary elimination pattern will improve  Outcome: PROGRESSING SLOWER THAN EXPECTED   Patient at risk for retention due to stroke. Patient currently requiring dye catheter for retention. Medication started for treatment of urinary retention.

## 2021-04-15 NOTE — PROGRESS NOTES
Physical Medicine and Rehabilitation Consultation              Date of initial consultation: 4/2/2021  Consulting provider: Mc Calvillo MD  Reason for consultation: assess for acute inpatient rehab appropriateness  LOS: 15 Day(s)    Chief complaint: Stroke    HPI: The patient is a 56 y.o. right hand dominant male with a past medical history of hypertension, hyperlipidemia, atrial fibrillation not on anticoagulation, Covid positive on 3/18;  who presented on 3/31/2021  7:29 PM brought in by care flight with left hemiplegia, facial droop, right gaze deviation.  CT head showed complete occlusion of right MCA, but unfortunately he was not a candidate for TPA or thrombectomy.  Seen by neurology, found to have a NIH score of 18.  Additional work-up with CTA shows right ICA complete occlusion.  Etiology is thought to be cardioembolic due to A. fib off of AC.  He is currently being followed closely by neurosurgery as he is expected to have peak brain swelling in the next 1 to 2 days and may require craniotomy.  Follow-up MRI shows minimal shift with large MCA stroke    Most of my visit is with Jims wife but also with him. He endorses left neglect and weakness, but does not endorse paresthesias at this time. I had a long meeting with his wife about expectations with this type and severity of stroke.     4/7/2021  I have been following patient's chart in the periphery, since my last visit he has undergone hemicraniectomy on 4/3 for increased cerebral swelling, and postop.  Has been complicated by continuing cerebral edema resulting in midline shift.  Neurology continuing to follow. Son at bedside. Patient still have severe left neglect. He continues to have trace left hand movement.     4/9/2021  Patient continues to have severe left neglect and spasticity. ROM training given to wife at bedside. Patient is developing a functional torticollis from only looking right due to neglect. No hand movement observed today.  "Patient denies new complaints.     4/13/2021  Patient is tired today, wife reports a \"loud night\".  Patient is doing slightly better with his neglect, his neck is still very much turned to the right, but he is able to locate his left hand with his right hand.  Discussed course for therapy and rehab admission with patient's wife and therapists.  Wife is considering paying out-of-pocket, we will try to accommodate her as best we can and also work with the patient as much as possible in the acute setting.     4/15/2021  Patient seen in follow-up, resting comfortably in bed.  Discussed progress with wife who reports some nausea with therapy related to his neglect.  Discussed adding a scopolamine patch.  Also discussed adding Flomax for urinary retention symptoms, possibly removing Brewer between now and Monday and continuing of bladder scans.  Also discussed rehab placement when patient is doing better    Social Hx:  2 SH  2-3 MAKENZIE, 1 flight of stairs with rails inside  With: Spouse    Per [facility] \"Anel [wife] tells me she is currently working full time, has flexible job and will take time off to provide 24/7 support when Thong returnes home.  They live 2 story home, 2 -3 steps to enter.  Master bedroom on ground floor.  Bathroom has tub/shower combo, no safety grab bars. \"    THERAPY:  PT: Functional mobility   4/1: Mod assist sit to stand with 2 people  4/7: Max Assist  4/12: Max assist supine to sit, total assist sit to supine  4/14: Max assist sit to stand    OT: ADLs  4/1: Mod assist grooming, max assist lower body dressing  4/7: Total assist   4/12: Max assist dressing, total assist toileting  4/14: Total assist toileting    SLP:   4/1: N.p.o. pending fees  4/2: minced and moist/mildly thick liquid   4/7: NPO  4/8: NPO with 3-5 ice chips per hour  4/14: Minced and moist diet with direct one-to-one supervision    IMAGING:  MR brain 4/1/2021  Very large acute right MCA territory infarct as detailed above with " small amount of petechial hemorrhage in the right insular region and right temporal lobe.  Punctate right thalamic lacunar infarct.  Right ICA and M1 MCA occlusion.    PROCEDURES:  None    PMH:  Past Medical History:   Diagnosis Date   • Afib (HCC)    • High cholesterol        PSH:  Past Surgical History:   Procedure Laterality Date   • CRANIOTOMY Right 4/3/2021    Procedure: CRANIOTOMY;  Surgeon: Arthur Payton M.D.;  Location: SURGERY [facility];  Service: Neurosurgery   • KNEE RECONSTRUCTION      left knee orthoscopic       FHX:  Non-pertinent to today's issues    Medications:  Current Facility-Administered Medications   Medication Dose   • acetaminophen (Tylenol) tablet 650 mg  650 mg   • prazosin (MINIPRESS) capsule 1 mg  1 mg   • hydrALAZINE (APRESOLINE) tablet 25 mg  25 mg   • baclofen (LIORESAL) tablet 15 mg  15 mg   • hydrALAZINE (APRESOLINE) injection 10 mg  10 mg   • labetalol (NORMODYNE/TRANDATE) injection 10-20 mg  10-20 mg   • cloNIDine (CATAPRES) tablet 0.1 mg  0.1 mg   • calcium carbonate (TUMS) chewable tab 500 mg  500 mg   • lisinopril (PRINIVIL) tablet 40 mg  40 mg   • amLODIPine (NORVASC) tablet 10 mg  10 mg   • metoprolol tartrate (LOPRESSOR) tablet 25 mg  25 mg   • enoxaparin (LOVENOX) inj 40 mg  40 mg   • aspirin (ASA) chewable tab 81 mg  81 mg   • thyroid (ARMOUR THYROID) tablet 60 mg  60 mg   • Pharmacy Consult Request ...Pain Management Review 1 Each  1 Each   • MD ALERT...DO NOT ADMINISTER NSAIDS or ASPIRIN unless ORDERED By Neurosurgery 1 Each  1 Each   • bisacodyl (DULCOLAX) suppository 10 mg  10 mg   • artificial tears (EYE LUBRICANT) ophth ointment 1 Application  1 Application   • Pharmacy Consult: Enteral tube insertion - review meds/change route/product selection  1 Each   • atorvastatin (LIPITOR) tablet 80 mg  80 mg   • senna-docusate (PERICOLACE or SENOKOT S) 8.6-50 MG per tablet 1 tablet  1 tablet   • magnesium hydroxide (MILK OF MAGNESIA) suspension 30 mL  30 mL   •  "ondansetron (ZOFRAN ODT) dispertab 4 mg  4 mg   • oxyCODONE immediate-release (ROXICODONE) tablet 5 mg  5 mg    Or   • oxyCODONE immediate-release (ROXICODONE) tablet 10 mg  10 mg    Or   • HYDROmorphone (Dilaudid) injection 0.5 mg  0.5 mg   • polyethylene glycol/lytes (MIRALAX) PACKET 1 Packet  1 Packet   • promethazine (PHENERGAN) tablet 12.5-25 mg  12.5-25 mg   • senna-docusate (PERICOLACE or SENOKOT S) 8.6-50 MG per tablet 1 tablet  1 tablet   • ondansetron (ZOFRAN) syringe/vial injection 4 mg  4 mg   • promethazine (PHENERGAN) suppository 12.5-25 mg  12.5-25 mg   • prochlorperazine (COMPAZINE) injection 5-10 mg  5-10 mg       Allergies:  No Known Allergies    Physical Exam:  Vitals: /88   Pulse 60   Temp 36.4 °C (97.5 °F) (Temporal)   Resp 16   Ht 1.778 m (5' 10\")   Wt 76.1 kg (167 lb 12.3 oz)   SpO2 95%   Gen: NAD  Head: NC/AT  Eyes/ Nose/ Mouth: PERRLA, moist mucous membranes  Cardio: RRR, good distal perfusion, warm extremities  Pulm: normal respiratory effort, no cyanosis   Abd: Soft NTND, negative borborygmi   Ext: No peripheral edema. No calf tenderness. No clubbing.    Mental status: answers questions appropriately follows commands  Speech: fluent, no aphasia or dysarthria    CRANIAL NERVES:  2,3: LEFT VF cut, PERRL  3,4,6: EOMI in right VF, no nystagmus or diplopia  5: sensation intact to light touch bilaterally and symmetric  7: Left facial droop   8: hearing grossly intact  9,10: not seen  11: SCM/Trapezius strength 5/5right, 0/5 left  12: tongue protrudes left    Motor:      Upper Extremity  Myotome R L   Shoulder flexion C5 5 0/5   Elbow flexion C5 5 0/5   Wrist extension C6 5 0/5   Elbow extension C7 5 0/5   Finger flexion C8 5 1/5   Finger abduction T1 5 0/5     Lower Extremity Myotome R L   Hip flexion L2 5 1/5   Knee extension L3 5 0/5   Ankle dorsiflexion L4 5 0/5   Toe extension L5 5 0/5   Ankle plantarflexion S1 5 0/5     Sensory:   Altered sensation on left side       DTRs:  " Right  Left    Brachioradialis  2+  2+   Patella tendon  2+ 2+     2-3 beats right ankle clonus, sustained clonus left ankle  Pos babinski left   Negative Castillo b/l     Tone: tight hamstrings bilaterally    Labs: Reviewed and significant for   Recent Labs     04/13/21  0307   RBC 4.21*   HEMOGLOBIN 12.3*   HEMATOCRIT 36.5*   PLATELETCT 336     Recent Labs     04/13/21  0307 04/14/21  0511 04/15/21  0144   SODIUM 139 134* 136   POTASSIUM 4.0 4.5 4.4   CHLORIDE 104 101 102   CO2 26 26 25   GLUCOSE 131* 97 99   BUN 45* 45* 40*   CREATININE 1.01 0.89 1.04   CALCIUM 8.7 8.8 8.9     Recent Results (from the past 24 hour(s))   Basic Metabolic Panel    Collection Time: 04/15/21  1:44 AM   Result Value Ref Range    Sodium 136 135 - 145 mmol/L    Potassium 4.4 3.6 - 5.5 mmol/L    Chloride 102 96 - 112 mmol/L    Co2 25 20 - 33 mmol/L    Glucose 99 65 - 99 mg/dL    Bun 40 (H) 8 - 22 mg/dL    Creatinine 1.04 0.50 - 1.40 mg/dL    Calcium 8.9 8.5 - 10.5 mg/dL    Anion Gap 9.0 7.0 - 16.0   ESTIMATED GFR    Collection Time: 04/15/21  1:44 AM   Result Value Ref Range    GFR If African American >60 >60 mL/min/1.73 m 2    GFR If Non African American >60 >60 mL/min/1.73 m 2         ASSESSMENT:  Patient is a 56 y.o. male admitted with large right MCA stroke and right ICA occlusion. He did not receive TPA or thrombectomy     [facility] Code / Diagnosis to Support: 0001.1 - Stroke: Left Body Involvement (Right Brain)    Rehabilitation: Impaired ADLs and mobility  Patient is a good candidate for inpatient rehab based on needs for PT, OT, and speech therapy.  Patient will also benefit from family training.  Patient has a good discharge situation which will be home with wife.     Barriers to transfer include: Insurance authorization/ no payor, TCCs to verify disposition, medical clearance and bed availability     Additional Recommendations:  - s/p hemicraniectomy on 4/3. Now with continued post op cerebellar swelling and fevers. He continues to  have  1/5 finger flexion on the left which is a positive prognostic indicator of a functional recovery. Also he had 1/5 leg movement at the hip on itinial consult but this was not observed on follow up. Possibly due to neglect. He does much better when forced to look at his left side which suggests that perhaps some of his functional deficits are due to neglect. He does have a long road to recovery and I prepared his wife Anel for possibly 2 years of 24/7 care, although we are all hopeful for a better outcome.     Large right MCA ischemic stroke   - continue PT/OT and SLP   - Wife educated on stroke comorbidites on initial consult. Son updated on follow up.   - ROM training given to wife for torticollis prevention and contracture prevention  -Continue baclofen 15mg TID for LUE spasticity -plan to increase to 20 mg 3 times daily if needed next week  - Holding off on gabapentin at this time to avoid polypharmacy   - ASA/ statin  - Etiology throught to be cardioembolic in the setting of atrial fibrillation off of AC and in the setting of COVID.  -Scopolamine patch for nausea secondary to visual field deficits  -On prazosin for urinary retention  - PMR to continue to follow for rehab appropriateness    Dispo: Patient will benefit most from IPR when he is able to stand and/or take a few steps. His neglect is severe and needs to improve some as well.     Medical Complexity:  Large right MCA stroke      DVT PPX: SCDs      Thank you for allowing us to participate in the care of this patient.     Patient was seen for 29 minutes on unit/floor of which > 50% of time was spent on counseling and coordination of care regarding the above, including prognosis, risk reduction, benefits of treatment, and options for next stage of care.    Christofer Eisenberg, DO   Physical Medicine and Rehabilitation

## 2021-04-15 NOTE — PROGRESS NOTES
Hospital Medicine Daily Progress Note    Date of Service  4/15/2021    Chief Complaint  Left sided weakness    Hospital Course  56 y.o. male admitted 3/31/2021 with acute stroke.  He was not a candidate for tPA nor thrombectomy.  He has a history of paroxysmal atrial fibrillation, dyslipidemia, hypothyroidism. During admit he underwent a hemicraniectomy for right sided decompression from right MCA dense stroke with intractable intracranial pressure.     Interval Problem Update  4/12: Patient verbal and oriented with some slurred speech.  Left hemiplegia. On 4/11 Dr. Leo met with the wife and explained to her we likely will need a G-tube at some point.  Also alerted the both of them we would need a SNF at some point.  He reports that his pain is reasonably well controlled at this juncture, he is describing some thirst.  Free water flushes will be increased from 30 mL to 90 mL every 6 hours.  This can be backed off if sodium begins to drop.    4/13 physical therapy seen the patient.  On tube feeding still.  Speech therapy to follow.   to follow for discharge planning.    4/14: The patient was retaining urine this morning and will try to get a straight cath.  Also discussed with the wife and she said that she will try to get insurance starting May 1 for him.  I also started for Flomax    4/15: Discharge planning by . No acute issue overnight.  Consultants/Specialty  Neurology  Neuro-intensivist (signing off 4/11)  Neuro surgery    Code Status  Full Code    Disposition  Remain on the floor    Review of Systems  Review of Systems   Constitutional: Positive for malaise/fatigue.   Eyes: Negative for blurred vision.   Respiratory: Negative for cough and stridor.    Cardiovascular: Negative for chest pain and palpitations.   Gastrointestinal: Negative for abdominal pain, nausea and vomiting.   Genitourinary: Negative for dysuria and hematuria.   Musculoskeletal: Negative for back pain.   Skin:  Negative for rash.   Neurological: Positive for sensory change (left side), focal weakness (left side) and headaches (right temporal region).        Physical Exam  Temp:  [36.1 °C (97 °F)-37.2 °C (99 °F)] 36.5 °C (97.7 °F)  Pulse:  [60-74] 70  Resp:  [16-17] 16  BP: (109-135)/(71-92) 135/92  SpO2:  [92 %-95 %] 95 %    Physical Exam  Vitals reviewed.   Constitutional:       Appearance: Normal appearance. He is not diaphoretic.   HENT:      Head: Normocephalic and atraumatic.      Nose: Nose normal.      Mouth/Throat:      Mouth: Mucous membranes are moist.      Pharynx: No oropharyngeal exudate.   Eyes:      Extraocular Movements: Extraocular movements intact.      Conjunctiva/sclera: Conjunctivae normal.   Cardiovascular:      Rate and Rhythm: Normal rate and regular rhythm.      Pulses:           Radial pulses are 2+ on the right side and 2+ on the left side.        Dorsalis pedis pulses are 2+ on the right side and 2+ on the left side.      Heart sounds: No murmur.   Pulmonary:      Effort: Pulmonary effort is normal. No respiratory distress.      Breath sounds: Normal breath sounds.   Abdominal:      General: Bowel sounds are normal.      Palpations: Abdomen is soft.   Musculoskeletal:         General: No swelling or tenderness.      Cervical back: Neck supple. No muscular tenderness.      Right lower leg: No edema.      Left lower leg: No edema.   Lymphadenopathy:      Cervical: No cervical adenopathy.   Neurological:      Mental Status: He is alert.      Cranial Nerves: Cranial nerve deficit present.      Sensory: Sensory deficit present.      Coordination: Coordination abnormal.      Gait: Gait abnormal.         Current Facility-Administered Medications:   •  acetaminophen (Tylenol) tablet 650 mg, 650 mg, Enteral Tube, Q6HRS PRN, Golden Pearson M.D., 650 mg at 04/14/21 1704  •  prazosin (MINIPRESS) capsule 1 mg, 1 mg, Enteral Tube, Q EVENING, Golden Pearson M.D., 1 mg at 04/14/21 1800  •  hydrALAZINE (APRESOLINE)  tablet 25 mg, 25 mg, Enteral Tube, Q8HRS, Cristhian Bell M.D., 25 mg at 04/15/21 0531  •  baclofen (LIORESAL) tablet 15 mg, 15 mg, Enteral Tube, TID, Christofer Eisenberg, D.O., 15 mg at 04/15/21 0530  •  hydrALAZINE (APRESOLINE) injection 10 mg, 10 mg, Intravenous, Q HOUR PRN, Cristhian Bell M.D., 10 mg at 04/10/21 1631  •  labetalol (NORMODYNE/TRANDATE) injection 10-20 mg, 10-20 mg, Intravenous, Q4HRS PRN, Cristhian Bell M.D., 10 mg at 04/10/21 1508  •  cloNIDine (CATAPRES) tablet 0.1 mg, 0.1 mg, Enteral Tube, Q4HRS PRN, Cristhian Bell M.D., 0.1 mg at 04/08/21 1734  •  calcium carbonate (TUMS) chewable tab 500 mg, 500 mg, Enteral Tube, Q8HRS PRN, Cristhian Bell M.D., 500 mg at 04/07/21 1501  •  lisinopril (PRINIVIL) tablet 40 mg, 40 mg, Enteral Tube, Q DAY, Cristhian Bell M.D., 40 mg at 04/15/21 0530  •  amLODIPine (NORVASC) tablet 10 mg, 10 mg, Enteral Tube, Q DAY, Johana Nelson M.D., 10 mg at 04/15/21 0529  •  metoprolol tartrate (LOPRESSOR) tablet 25 mg, 25 mg, Enteral Tube, BID, Cristhian Bell M.D., 25 mg at 04/15/21 0530  •  enoxaparin (LOVENOX) inj 40 mg, 40 mg, Subcutaneous, DAILY, Johana Nelson M.D., 40 mg at 04/15/21 0534  •  aspirin (ASA) chewable tab 81 mg, 81 mg, Enteral Tube, DAILY, Cristhian Bell M.D., 81 mg at 04/14/21 1704  •  thyroid (ARMOUR THYROID) tablet 60 mg, 60 mg, Enteral Tube, BID, King F Citlaly Merhi, M.D., 60 mg at 04/15/21 0530  •  Pharmacy Consult Request ...Pain Management Review 1 Each, 1 Each, Other, PHARMACY TO DOSE, Lilia Pizano P.A.-C.  •  MD ALERT...DO NOT ADMINISTER NSAIDS or ASPIRIN unless ORDERED By Neurosurgery 1 Each, 1 Each, Other, PRN, NADEEN Morris.-CARLA.  •  bisacodyl (DULCOLAX) suppository 10 mg, 10 mg, Rectal, Q24HRS PRN, HANSA Morris.A.-C., 10 mg at 04/11/21 1811  •  artificial tears (EYE LUBRICANT) ophth ointment 1 Application, 1 Application, Both Eyes, PRN, HANSA Morris.A.-C.  •  Pharmacy Consult: Enteral tube insertion -  review meds/change route/product selection, 1 Each, Other, PHARMACY TO DOSE, Radha Meredith A.P.R.N.  •  atorvastatin (LIPITOR) tablet 80 mg, 80 mg, Enteral Tube, Q EVENING, Radha Meredith A.P.R.N., 80 mg at 04/14/21 1704  •  senna-docusate (PERICOLACE or SENOKOT S) 8.6-50 MG per tablet 1 tablet, 1 tablet, Enteral Tube, Nightly, Radha Meredith A.P.R.N., 1 tablet at 04/14/21 2130  •  magnesium hydroxide (MILK OF MAGNESIA) suspension 30 mL, 30 mL, Enteral Tube, QDAY PRN, Radha Meredith A.P.R.N., 30 mL at 04/11/21 0622  •  ondansetron (ZOFRAN ODT) dispertab 4 mg, 4 mg, Enteral Tube, Q4HRS PRN, Radha Meredith A.P.R.N.  •  oxyCODONE immediate-release (ROXICODONE) tablet 5 mg, 5 mg, Enteral Tube, Q3HRS PRN, 5 mg at 04/12/21 1609 **OR** oxyCODONE immediate-release (ROXICODONE) tablet 10 mg, 10 mg, Enteral Tube, Q3HRS PRN, 10 mg at 04/06/21 0405 **OR** HYDROmorphone (Dilaudid) injection 0.5 mg, 0.5 mg, Intravenous, Q3HRS PRN, Radha Meredith A.P.R.N., 0.5 mg at 04/03/21 1554  •  polyethylene glycol/lytes (MIRALAX) PACKET 1 Packet, 1 Packet, Enteral Tube, BID PRN, Radha Meredith A.P.R.N., 1 Packet at 04/11/21 0621  •  promethazine (PHENERGAN) tablet 12.5-25 mg, 12.5-25 mg, Enteral Tube, Q4HRS PRN, Rahda Meredith, A.P.R.N.  •  senna-docusate (PERICOLACE or SENOKOT S) 8.6-50 MG per tablet 1 tablet, 1 tablet, Enteral Tube, Q24HRS PRN, SHERRIE Hampton, Stopped at 04/12/21 1738  •  ondansetron (ZOFRAN) syringe/vial injection 4 mg, 4 mg, Intravenous, Q4HRS PRN, Gia Forman M.D., 4 mg at 04/14/21 1229  •  promethazine (PHENERGAN) suppository 12.5-25 mg, 12.5-25 mg, Rectal, Q4HRS PRN, Gia Forman M.D.  •  prochlorperazine (COMPAZINE) injection 5-10 mg, 5-10 mg, Intravenous, Q4HRS PRN, Gia Forman M.D., 10 mg at 04/05/21 0913      Fluids    Intake/Output Summary (Last 24 hours) at 4/15/2021 0742  Last data filed at 4/14/2021 1700  Gross per 24 hour   Intake 800 ml   Output --   Net 800 ml       Laboratory  Recent  Labs     04/13/21  0307   WBC 7.9   RBC 4.21*   HEMOGLOBIN 12.3*   HEMATOCRIT 36.5*   MCV 86.7   MCH 29.2   MCHC 33.7   RDW 38.2   PLATELETCT 336   MPV 10.3     Recent Labs     04/13/21  0307 04/14/21  0511 04/15/21  0144   SODIUM 139 134* 136   POTASSIUM 4.0 4.5 4.4   CHLORIDE 104 101 102   CO2 26 26 25   GLUCOSE 131* 97 99   BUN 45* 45* 40*   CREATININE 1.01 0.89 1.04   CALCIUM 8.7 8.8 8.9                   Imaging  US-EXTREMITY VENOUS LOWER BILAT   Final Result      DX-CHEST-LIMITED (1 VIEW)   Final Result      Right basilar atelectasis. No focal consolidation or pleural effusions.      [id number] VIEW   Final Result      No evidence of bowel obstruction.                  DX-CHEST-LIMITED (1 VIEW)   Final Result      1.  Right internal jugular catheter and enteric catheter appear appropriately located      2.  Minimal right basilar atelectasis      CT-HEAD W/O   Final Result         1.  Changes of evolving infarct within the right MCA distribution   2.  Hyperdensity in the sulci of the right MCA distribution infarct, largely appears related to thrombosed vessels, component of subarachnoid hemorrhage is suspected particularly in the right frontal lobe.   3.  Pneumocephalus, decreased since prior.      CT-HEAD W/O   Final Result         Postsurgical change from right craniectomy. Redemonstration of large right MCA infarct with edema and bulging of the brain parenchyma through the craniectomy defect      Less mass effect on the right lateral ventricle. Less right to left midline shift of 3 mm, previously 10 mm.         DX-ABDOMEN FOR TUBE PLACEMENT   Final Result      Enteric tube terminates over the stomach.      CT-HEAD W/O   Final Result         1.  Low-density changes of the right hemisphere compatible with MCA distribution infarct with edema.   2.  New effacement of the bilateral ventricles, right greater than left, with 10 mm right-to-left midline shift.   3.  New dilatation of the left temporal horn  ventricle, appearance favors component of obstructive hydrocephalus due to mass effect from infarct and edema.   4.  Hyperdensity in the right middle cerebral artery, likely thrombosis given associated findings.      These findings were discussed with the patient's clinician, Dr. Nunez, on 4/3/2021 7:11 AM.      MR-BRAIN-W/O   Final Result      Very large acute right MCA territory infarct as detailed above with small amount of petechial hemorrhage in the right insular region and right temporal lobe.      Punctate right thalamic lacunar infarct.      Right ICA and M1 MCA occlusion.      EC-ECHOCARDIOGRAM COMPLETE W/O CONT   Final Result      DX-CHEST-PORTABLE (1 VIEW)   Final Result         1.  Interstitial pulmonary parenchymal prominence, compatible with interstitial edema and/or infiltrates.      CT-CTA NECK WITH & W/O-POST PROCESSING   Final Result      Acute occlusion of the right internal carotid artery shortly after bifurcation. It is occluded up to the level of the carotid terminus.      CT-CTA HEAD WITH & W/O-POST PROCESS   Final Result      Acute right M1 occlusion.      Findings were discussed with RHONDA DIAZ on 3/31/2021 7:54 PM.      CT-CEREBRAL PERFUSION ANALYSIS   Final Result      1.  Cerebral blood flow less than 30% likely representing completed infarct = 134 mL.      2.  T Max more than 6 seconds likely representing combination of completed infarct and ischemia = 210 mL.      3.  Mismatched volume likely representing ischemic brain/penumbra = 76 mL.      4.  Please note that the cerebral perfusion was performed on the limited brain tissue around the basal ganglia region. Infarct/ischemia outside the CT perfusion sections can be missed in this study.      CT-HEAD W/O   Final Result   Addendum 1 of 1   In retrospect, there is subtle loss of gray-white matter differentiation    in the right MCA distribution, consistent with acute infarct.      Final      1.  No CT evidence of acute infarct,  hemorrhage or mass.   2.  Mild paranasal sinus disease.           Assessment/Plan  * Acute right MCA stroke (HCC)- (present on admission)  Assessment & Plan  Likely consequence of COVID19 in 3/21 and hx of afib  Subsequent brain edema requiring right decompressive hemicraniectomy on 4/3/2021  Neurology following patient will benefit from long-term anticoagulation when bleeding risk is acceptable likely 1-2 weeks from event onset.   Blood pressure control keep SBP<140 (on metoprolol 25mg BID, amlodipine 10mg, hydralazine 25mg TID, lisinopril 40mg daily)  Patient weaned off hypertonic saline and started on salt tablets  PT/OT/SLP  Aspiration precautions  Physiatry consultation   for discharge planning    Paroxysmal atrial fibrillation (HCC)- (present on admission)  Assessment & Plan  Monitor vitals and on tele  Metoprolol 25mg BID w/ parameters  Discussed risk benefits and alternatives of long-term anticoagulation with patient and wife, plan to start DOAC when bleeding risk felt to be acceptable per neurology likely in 1 to 2 weeks    Leucocytosis  Assessment & Plan  Resolved but continue to monitor  4/11 WBC:7.7    Urinary retention  Assessment & Plan  Brewer catheter in place  Watch for urinary retention  prazosin    History of COVID-19- (present on admission)  Assessment & Plan  Clinically recovered patient symptom onset on 3/15/2021 with positive initial test on 3/18/2021    Acquired hypothyroidism- (present on admission)  Assessment & Plan  [place] Thyroid 60mg BID   TSH is less than 0.005 will decrease dose and he will need recheck TFTs in 6 weeks (prior to admit was on 90mg BID)  TSH: <0.005 in past week.       VTE prophylaxis: Enoxaparin 40mg SQ daily

## 2021-04-15 NOTE — CARE PLAN
Problem: Nutritional:  Goal: Achieve adequate nutritional intake  Description: Patient will consume >50% of meals  Outcome: PROGRESSING AS EXPECTED   Per RN pt's Cortrak has been removed. RD d/c'd TF order.   PO intake per flowsheets 25-50% x2 meals yesterday, % of breakfast this morning.     RD continues to follow for adequate PO intake.

## 2021-04-16 LAB
ANION GAP SERPL CALC-SCNC: 8 MMOL/L (ref 7–16)
BUN SERPL-MCNC: 35 MG/DL (ref 8–22)
CALCIUM SERPL-MCNC: 8.9 MG/DL (ref 8.5–10.5)
CHLORIDE SERPL-SCNC: 103 MMOL/L (ref 96–112)
CO2 SERPL-SCNC: 25 MMOL/L (ref 20–33)
CREAT SERPL-MCNC: 0.94 MG/DL (ref 0.5–1.4)
GLUCOSE SERPL-MCNC: 104 MG/DL (ref 65–99)
POTASSIUM SERPL-SCNC: 4.2 MMOL/L (ref 3.6–5.5)
SODIUM SERPL-SCNC: 136 MMOL/L (ref 135–145)

## 2021-04-16 PROCEDURE — 700102 HCHG RX REV CODE 250 W/ 637 OVERRIDE(OP): Performed by: STUDENT IN AN ORGANIZED HEALTH CARE EDUCATION/TRAINING PROGRAM

## 2021-04-16 PROCEDURE — 99232 SBSQ HOSP IP/OBS MODERATE 35: CPT | Performed by: INTERNAL MEDICINE

## 2021-04-16 PROCEDURE — 700102 HCHG RX REV CODE 250 W/ 637 OVERRIDE(OP): Performed by: HOSPITALIST

## 2021-04-16 PROCEDURE — 97535 SELF CARE MNGMENT TRAINING: CPT | Mod: CO

## 2021-04-16 PROCEDURE — 700111 HCHG RX REV CODE 636 W/ 250 OVERRIDE (IP): Performed by: STUDENT IN AN ORGANIZED HEALTH CARE EDUCATION/TRAINING PROGRAM

## 2021-04-16 PROCEDURE — A9270 NON-COVERED ITEM OR SERVICE: HCPCS | Performed by: HOSPITALIST

## 2021-04-16 PROCEDURE — A9270 NON-COVERED ITEM OR SERVICE: HCPCS | Performed by: PSYCHIATRY & NEUROLOGY

## 2021-04-16 PROCEDURE — 36415 COLL VENOUS BLD VENIPUNCTURE: CPT

## 2021-04-16 PROCEDURE — A9270 NON-COVERED ITEM OR SERVICE: HCPCS | Performed by: STUDENT IN AN ORGANIZED HEALTH CARE EDUCATION/TRAINING PROGRAM

## 2021-04-16 PROCEDURE — 700111 HCHG RX REV CODE 636 W/ 250 OVERRIDE (IP): Performed by: NURSE PRACTITIONER

## 2021-04-16 PROCEDURE — A9270 NON-COVERED ITEM OR SERVICE: HCPCS | Performed by: NURSE PRACTITIONER

## 2021-04-16 PROCEDURE — 700111 HCHG RX REV CODE 636 W/ 250 OVERRIDE (IP): Performed by: INTERNAL MEDICINE

## 2021-04-16 PROCEDURE — 80048 BASIC METABOLIC PNL TOTAL CA: CPT

## 2021-04-16 PROCEDURE — A9270 NON-COVERED ITEM OR SERVICE: HCPCS | Performed by: INTERNAL MEDICINE

## 2021-04-16 PROCEDURE — 700102 HCHG RX REV CODE 250 W/ 637 OVERRIDE(OP): Performed by: PHYSICAL MEDICINE & REHABILITATION

## 2021-04-16 PROCEDURE — 700102 HCHG RX REV CODE 250 W/ 637 OVERRIDE(OP): Performed by: INTERNAL MEDICINE

## 2021-04-16 PROCEDURE — 700102 HCHG RX REV CODE 250 W/ 637 OVERRIDE(OP): Performed by: PSYCHIATRY & NEUROLOGY

## 2021-04-16 PROCEDURE — 770020 HCHG ROOM/CARE - TELE (206)

## 2021-04-16 PROCEDURE — 97535 SELF CARE MNGMENT TRAINING: CPT | Mod: CQ

## 2021-04-16 PROCEDURE — 700102 HCHG RX REV CODE 250 W/ 637 OVERRIDE(OP): Performed by: NURSE PRACTITIONER

## 2021-04-16 PROCEDURE — A9270 NON-COVERED ITEM OR SERVICE: HCPCS | Performed by: PHYSICAL MEDICINE & REHABILITATION

## 2021-04-16 RX ADMIN — PHENYTOIN 1 MG: 125 SUSPENSION ORAL at 18:20

## 2021-04-16 RX ADMIN — LISINOPRIL 40 MG: 20 TABLET ORAL at 06:13

## 2021-04-16 RX ADMIN — ONDANSETRON 4 MG: 2 INJECTION INTRAMUSCULAR; INTRAVENOUS at 09:10

## 2021-04-16 RX ADMIN — HYDRALAZINE HYDROCHLORIDE 25 MG: 50 TABLET, FILM COATED ORAL at 06:14

## 2021-04-16 RX ADMIN — ASPIRIN 81 MG: 81 TABLET, CHEWABLE ORAL at 18:20

## 2021-04-16 RX ADMIN — METOPROLOL TARTRATE 25 MG: 25 TABLET, FILM COATED ORAL at 18:19

## 2021-04-16 RX ADMIN — ONDANSETRON 4 MG: 4 TABLET, ORALLY DISINTEGRATING ORAL at 18:27

## 2021-04-16 RX ADMIN — ENOXAPARIN SODIUM 40 MG: 40 INJECTION SUBCUTANEOUS at 06:13

## 2021-04-16 RX ADMIN — THYROID, PORCINE 60 MG: 30 TABLET ORAL at 18:22

## 2021-04-16 RX ADMIN — BACLOFEN 15 MG: 10 TABLET ORAL at 06:13

## 2021-04-16 RX ADMIN — METOPROLOL TARTRATE 25 MG: 25 TABLET, FILM COATED ORAL at 06:13

## 2021-04-16 RX ADMIN — AMLODIPINE BESYLATE 10 MG: 10 TABLET ORAL at 06:13

## 2021-04-16 RX ADMIN — ATORVASTATIN CALCIUM 80 MG: 80 TABLET, FILM COATED ORAL at 18:20

## 2021-04-16 RX ADMIN — BACLOFEN 15 MG: 10 TABLET ORAL at 12:40

## 2021-04-16 RX ADMIN — THYROID, PORCINE 60 MG: 30 TABLET ORAL at 06:14

## 2021-04-16 RX ADMIN — BACLOFEN 15 MG: 10 TABLET ORAL at 18:19

## 2021-04-16 ASSESSMENT — COGNITIVE AND FUNCTIONAL STATUS - GENERAL
MOVING FROM LYING ON BACK TO SITTING ON SIDE OF FLAT BED: UNABLE
WALKING IN HOSPITAL ROOM: TOTAL
MOBILITY SCORE: 6
TURNING FROM BACK TO SIDE WHILE IN FLAT BAD: UNABLE
STANDING UP FROM CHAIR USING ARMS: TOTAL
MOVING TO AND FROM BED TO CHAIR: UNABLE
CLIMB 3 TO 5 STEPS WITH RAILING: TOTAL
SUGGESTED CMS G CODE MODIFIER MOBILITY: CN

## 2021-04-16 ASSESSMENT — ENCOUNTER SYMPTOMS
ABDOMINAL PAIN: 0
BACK PAIN: 0
STRIDOR: 0
FOCAL WEAKNESS: 1
PALPITATIONS: 0
COUGH: 0
SENSORY CHANGE: 1
VOMITING: 0
HEADACHES: 1

## 2021-04-16 NOTE — PROGRESS NOTES
Hospital Medicine Daily Progress Note    Date of Service  4/16/2021    Chief Complaint  Left sided weakness    Hospital Course  56 y.o. male admitted 3/31/2021 with acute stroke.  He was not a candidate for tPA nor thrombectomy.  He has a history of paroxysmal atrial fibrillation, dyslipidemia, hypothyroidism. During admit he underwent a hemicraniectomy for right sided decompression from right MCA dense stroke with intractable intracranial pressure.     Interval Problem Update  4/12: Patient verbal and oriented with some slurred speech.  Left hemiplegia. On 4/11 Dr. Leo met with the wife and explained to her we likely will need a G-tube at some point.  Also alerted the both of them we would need a SNF at some point.  He reports that his pain is reasonably well controlled at this juncture, he is describing some thirst.  Free water flushes will be increased from 30 mL to 90 mL every 6 hours.  This can be backed off if sodium begins to drop.    4/13 physical therapy seen the patient.  On tube feeding still.  Speech therapy to follow.   to follow for discharge planning.    4/14: The patient was retaining urine this morning and will try to get a straight cath.  Also discussed with the wife and she said that she will try to get insurance starting May 1 for him.  I also started for Flomax    4/15: Discharge planning by . No acute issue overnight.    4/16 no acute issue overnight.  Discharge planning by   Consultants/Specialty  Neurology  Neuro-intensivist (signing off 4/11)  Neuro surgery    Code Status  Full Code    Disposition  Remain on the floor    Review of Systems  Review of Systems   Constitutional: Positive for malaise/fatigue.   Respiratory: Negative for cough and stridor.    Cardiovascular: Negative for palpitations.   Gastrointestinal: Negative for abdominal pain and vomiting.   Genitourinary: Negative for hematuria.   Musculoskeletal: Negative for back pain.   Skin:  Negative for rash.   Neurological: Positive for sensory change (left side), focal weakness (left side) and headaches (right temporal region).        Physical Exam  Temp:  [36.1 °C (97 °F)-36.7 °C (98 °F)] 36.5 °C (97.7 °F)  Pulse:  [60-78] 69  Resp:  [14-18] 16  BP: (100-136)/(62-88) 116/71  SpO2:  [92 %-96 %] 94 %    Physical Exam  Vitals reviewed.   Constitutional:       Appearance: Normal appearance. He is not diaphoretic.   HENT:      Head: Normocephalic and atraumatic.      Nose: Nose normal.      Mouth/Throat:      Mouth: Mucous membranes are moist.      Pharynx: No oropharyngeal exudate.   Eyes:      Extraocular Movements: Extraocular movements intact.      Conjunctiva/sclera: Conjunctivae normal.   Cardiovascular:      Rate and Rhythm: Normal rate and regular rhythm.      Pulses:           Radial pulses are 2+ on the right side and 2+ on the left side.        Dorsalis pedis pulses are 2+ on the right side and 2+ on the left side.   Pulmonary:      Effort: Pulmonary effort is normal.      Breath sounds: Normal breath sounds.   Abdominal:      General: Bowel sounds are normal.   Musculoskeletal:         General: No swelling or tenderness.      Cervical back: Neck supple. No muscular tenderness.      Right lower leg: No edema.      Left lower leg: No edema.   Lymphadenopathy:      Cervical: No cervical adenopathy.   Neurological:      Mental Status: He is alert.      Cranial Nerves: Cranial nerve deficit present.      Sensory: Sensory deficit present.      Coordination: Coordination abnormal.      Gait: Gait abnormal.         Current Facility-Administered Medications:   •  scopolamine (TRANSDERM-SCOP) patch 1 Patch, 1 Patch, Transdermal, Q72HRS, SELENA Harden.MIREYA, 1 Patch at 04/15/21 1159  •  acetaminophen (Tylenol) tablet 650 mg, 650 mg, Enteral Tube, Q6HRS PRN, Golden Pearson M.D., 650 mg at 04/14/21 1704  •  prazosin (MINIPRESS) capsule 1 mg, 1 mg, Enteral Tube, Q EVENING, Golden Pearson M.D., 1 mg at 04/15/21  1637  •  hydrALAZINE (APRESOLINE) tablet 25 mg, 25 mg, Enteral Tube, Q8HRS, Cristhian Bell M.D., 25 mg at 04/16/21 0614  •  baclofen (LIORESAL) tablet 15 mg, 15 mg, Enteral Tube, TID, Christofer Lealchel, D.O., 15 mg at 04/16/21 0613  •  hydrALAZINE (APRESOLINE) injection 10 mg, 10 mg, Intravenous, Q HOUR PRN, Cristhian Bell M.D., 10 mg at 04/10/21 1631  •  labetalol (NORMODYNE/TRANDATE) injection 10-20 mg, 10-20 mg, Intravenous, Q4HRS PRN, Cristhian Bell M.D., 10 mg at 04/10/21 1508  •  cloNIDine (CATAPRES) tablet 0.1 mg, 0.1 mg, Enteral Tube, Q4HRS PRN, Cristhian Bell M.D., 0.1 mg at 04/08/21 1734  •  calcium carbonate (TUMS) chewable tab 500 mg, 500 mg, Enteral Tube, Q8HRS PRN, Cristhian Bell M.D., 500 mg at 04/07/21 1501  •  lisinopril (PRINIVIL) tablet 40 mg, 40 mg, Enteral Tube, Q DAY, Cristhian Bell M.D., 40 mg at 04/16/21 0613  •  amLODIPine (NORVASC) tablet 10 mg, 10 mg, Enteral Tube, Q DAY, Johana Nelson M.D., 10 mg at 04/16/21 0613  •  metoprolol tartrate (LOPRESSOR) tablet 25 mg, 25 mg, Enteral Tube, BID, Cristhian Bell M.D., 25 mg at 04/16/21 0613  •  enoxaparin (LOVENOX) inj 40 mg, 40 mg, Subcutaneous, DAILY, Johana Nelson M.D., 40 mg at 04/16/21 0613  •  aspirin (ASA) chewable tab 81 mg, 81 mg, Enteral Tube, DAILY, Cristhian Bell M.D., 81 mg at 04/15/21 1637  •  thyroid (ARMOUR THYROID) tablet 60 mg, 60 mg, Enteral Tube, BID, King Spann M.D., 60 mg at 04/16/21 0614  •  Pharmacy Consult Request ...Pain Management Review 1 Each, 1 Each, Other, PHARMACY TO DOSE, Lilia Pizano, LEIGHAA.-C.  •  MD ALERT...DO NOT ADMINISTER NSAIDS or ASPIRIN unless ORDERED By Neurosurgery 1 Each, 1 Each, Other, PRN, Lilia Pizano, P.A.-C.  •  bisacodyl (DULCOLAX) suppository 10 mg, 10 mg, Rectal, Q24HRS PRN, Lilia Pizano, P.A.-C., 10 mg at 04/11/21 1811  •  artificial tears (EYE LUBRICANT) ophth ointment 1 Application, 1 Application, Both Eyes, PRN, Lilia Pizano, P.A.-C.  •  Pharmacy  Consult: Enteral tube insertion - review meds/change route/product selection, 1 Each, Other, PHARMACY TO DOSE, Radha Meredith A.P.R.N.  •  atorvastatin (LIPITOR) tablet 80 mg, 80 mg, Enteral Tube, Q EVENING, Radha Meredith A.P.R.N., 80 mg at 04/15/21 1637  •  senna-docusate (PERICOLACE or SENOKOT S) 8.6-50 MG per tablet 1 tablet, 1 tablet, Enteral Tube, Nightly, Radha Meredith A.P.R.N., 1 tablet at 04/15/21 2200  •  magnesium hydroxide (MILK OF MAGNESIA) suspension 30 mL, 30 mL, Enteral Tube, QDAY PRN, Radha Meredith A.P.R.N., 30 mL at 04/11/21 0622  •  ondansetron (ZOFRAN ODT) dispertab 4 mg, 4 mg, Enteral Tube, Q4HRS PRN, Radha Meredith A.P.R.N.  •  oxyCODONE immediate-release (ROXICODONE) tablet 5 mg, 5 mg, Enteral Tube, Q3HRS PRN, 5 mg at 04/12/21 1609 **OR** oxyCODONE immediate-release (ROXICODONE) tablet 10 mg, 10 mg, Enteral Tube, Q3HRS PRN, 10 mg at 04/06/21 0405 **OR** HYDROmorphone (Dilaudid) injection 0.5 mg, 0.5 mg, Intravenous, Q3HRS PRN, Radha Mereidth A.P.R.N., 0.5 mg at 04/03/21 1554  •  polyethylene glycol/lytes (MIRALAX) PACKET 1 Packet, 1 Packet, Enteral Tube, BID PRN, Radha Meredith A.P.R.N., 1 Packet at 04/11/21 0621  •  promethazine (PHENERGAN) tablet 12.5-25 mg, 12.5-25 mg, Enteral Tube, Q4HRS PRN, Radha Meredith, A.P.R.N.  •  senna-docusate (PERICOLACE or SENOKOT S) 8.6-50 MG per tablet 1 tablet, 1 tablet, Enteral Tube, Q24HRS PRN, SHERRIE Hampton, Stopped at 04/12/21 1738  •  ondansetron (ZOFRAN) syringe/vial injection 4 mg, 4 mg, Intravenous, Q4HRS PRN, Gia Forman M.D., 4 mg at 04/14/21 1229  •  promethazine (PHENERGAN) suppository 12.5-25 mg, 12.5-25 mg, Rectal, Q4HRS PRN, Gia Forman M.D.  •  prochlorperazine (COMPAZINE) injection 5-10 mg, 5-10 mg, Intravenous, Q4HRS PRN, Gia Forman M.D., 10 mg at 04/05/21 0913      Fluids    Intake/Output Summary (Last 24 hours) at 4/16/2021 0704  Last data filed at 4/16/2021 0600  Gross per 24 hour   Intake 650 ml   Output 4700 ml    Net -4050 ml       Laboratory      Recent Labs     04/14/21  0511 04/15/21  0144   SODIUM 134* 136   POTASSIUM 4.5 4.4   CHLORIDE 101 102   CO2 26 25   GLUCOSE 97 99   BUN 45* 40*   CREATININE 0.89 1.04   CALCIUM 8.8 8.9                   Imaging  US-EXTREMITY VENOUS LOWER BILAT   Final Result      DX-CHEST-LIMITED (1 VIEW)   Final Result      Right basilar atelectasis. No focal consolidation or pleural effusions.      [id number] VIEW   Final Result      No evidence of bowel obstruction.                  DX-CHEST-LIMITED (1 VIEW)   Final Result      1.  Right internal jugular catheter and enteric catheter appear appropriately located      2.  Minimal right basilar atelectasis      CT-HEAD W/O   Final Result         1.  Changes of evolving infarct within the right MCA distribution   2.  Hyperdensity in the sulci of the right MCA distribution infarct, largely appears related to thrombosed vessels, component of subarachnoid hemorrhage is suspected particularly in the right frontal lobe.   3.  Pneumocephalus, decreased since prior.      CT-HEAD W/O   Final Result         Postsurgical change from right craniectomy. Redemonstration of large right MCA infarct with edema and bulging of the brain parenchyma through the craniectomy defect      Less mass effect on the right lateral ventricle. Less right to left midline shift of 3 mm, previously 10 mm.         DX-ABDOMEN FOR TUBE PLACEMENT   Final Result      Enteric tube terminates over the stomach.      CT-HEAD W/O   Final Result         1.  Low-density changes of the right hemisphere compatible with MCA distribution infarct with edema.   2.  New effacement of the bilateral ventricles, right greater than left, with 10 mm right-to-left midline shift.   3.  New dilatation of the left temporal horn ventricle, appearance favors component of obstructive hydrocephalus due to mass effect from infarct and edema.   4.  Hyperdensity in the right middle cerebral artery, likely  thrombosis given associated findings.      These findings were discussed with the patient's clinician, Dr. Nunez, on 4/3/2021 7:11 AM.      MR-BRAIN-W/O   Final Result      Very large acute right MCA territory infarct as detailed above with small amount of petechial hemorrhage in the right insular region and right temporal lobe.      Punctate right thalamic lacunar infarct.      Right ICA and M1 MCA occlusion.      EC-ECHOCARDIOGRAM COMPLETE W/O CONT   Final Result      DX-CHEST-PORTABLE (1 VIEW)   Final Result         1.  Interstitial pulmonary parenchymal prominence, compatible with interstitial edema and/or infiltrates.      CT-CTA NECK WITH & W/O-POST PROCESSING   Final Result      Acute occlusion of the right internal carotid artery shortly after bifurcation. It is occluded up to the level of the carotid terminus.      CT-CTA HEAD WITH & W/O-POST PROCESS   Final Result      Acute right M1 occlusion.      Findings were discussed with RHONDA DIAZ on 3/31/2021 7:54 PM.      CT-CEREBRAL PERFUSION ANALYSIS   Final Result      1.  Cerebral blood flow less than 30% likely representing completed infarct = 134 mL.      2.  T Max more than 6 seconds likely representing combination of completed infarct and ischemia = 210 mL.      3.  Mismatched volume likely representing ischemic brain/penumbra = 76 mL.      4.  Please note that the cerebral perfusion was performed on the limited brain tissue around the basal ganglia region. Infarct/ischemia outside the CT perfusion sections can be missed in this study.      CT-HEAD W/O   Final Result   Addendum 1 of 1   In retrospect, there is subtle loss of gray-white matter differentiation    in the right MCA distribution, consistent with acute infarct.      Final      1.  No CT evidence of acute infarct, hemorrhage or mass.   2.  Mild paranasal sinus disease.           Assessment/Plan  * Acute right MCA stroke (HCC)- (present on admission)  Assessment & Plan  Likely consequence  of COVID19 in 3/21 and hx of afib  Subsequent brain edema requiring right decompressive hemicraniectomy on 4/3/2021  Neurology following patient will benefit from long-term anticoagulation when bleeding risk is acceptable likely 1-2 weeks from event onset.   Blood pressure control keep SBP<140 (on metoprolol 25mg BID, amlodipine 10mg, hydralazine 25mg TID, lisinopril 40mg daily)  Patient weaned off hypertonic saline and started on salt tablets  PT/OT/SLP  Aspiration precautions  Physiatry consultation   for discharge planning    Paroxysmal atrial fibrillation (HCC)- (present on admission)  Assessment & Plan  Monitor vitals and on tele  Metoprolol 25mg BID w/ parameters  Discussed risk benefits and alternatives of long-term anticoagulation with patient and wife, plan to start DOAC when bleeding risk felt to be acceptable per neurology likely in 1 to 2 weeks    Leucocytosis  Assessment & Plan  Resolved but continue to monitor  4/11 WBC:7.7    Urinary retention  Assessment & Plan  Brewer catheter in place  Watch for urinary retention  prazosin    History of COVID-19- (present on admission)  Assessment & Plan  Clinically recovered patient symptom onset on 3/15/2021 with positive initial test on 3/18/2021    Acquired hypothyroidism- (present on admission)  Assessment & Plan  Hamler Thyroid 60mg BID   TSH is less than 0.005 will decrease dose and he will need recheck TFTs in 6 weeks (prior to admit was on 90mg BID)  TSH: <0.005 in past week.     No change of plan compared to yesterday  VTE prophylaxis: Enoxaparin 40mg SQ daily

## 2021-04-16 NOTE — PROGRESS NOTES
0900: complaints that scopolamine patch applied yesterday has exasperated his nausea and not helped it. Asked MD if we should remove his patch, medicated with zofran at this time.   -MD ordered to have patch removed.     1300: helmet fitting company came by at this time to refit patients helmet

## 2021-04-16 NOTE — THERAPY
04/16/21 1244   Other Treatments   Other Treatments Provided Pt declining PT 2* nausea. Call made to [employer] to adjust helmet. Wife instructed when pt is feeling better to sit the HOB completely upright having pt work on his head control wo/pillow, to get pt up over the weekend and Lft LE ROM/HC stretch.  PT will resume next week. Re-arranged pt's bed so it is facing the window to encourage turning head and tracking to midline.

## 2021-04-16 NOTE — CARE PLAN
Problem: Communication  Goal: The ability to communicate needs accurately and effectively will improve  Outcome: PROGRESSING AS EXPECTED     Problem: Safety  Goal: Will remain free from injury  Outcome: PROGRESSING AS EXPECTED     Problem: Skin Integrity  Goal: Risk for impaired skin integrity will decrease  Outcome: PROGRESSING AS EXPECTED     Problem: Communication:  Goal: The ability to communicate needs accurately and effectively will improve  Outcome: PROGRESSING AS EXPECTED     Problem: Safety:  Goal: Will remain free from injury  Outcome: PROGRESSING AS EXPECTED

## 2021-04-16 NOTE — DISCHARGE PLANNING
Case remains under Administrative review.  Would appreciate PT/OT evals on Sunday for review on Monday.  Suzan DUKE & Thong MERAN.

## 2021-04-17 ENCOUNTER — APPOINTMENT (OUTPATIENT)
Dept: RADIOLOGY | Facility: MEDICAL CENTER | Age: 56
DRG: 023 | End: 2021-04-17
Attending: STUDENT IN AN ORGANIZED HEALTH CARE EDUCATION/TRAINING PROGRAM
Payer: OTHER MISCELLANEOUS

## 2021-04-17 LAB
ANION GAP SERPL CALC-SCNC: 8 MMOL/L (ref 7–16)
BUN SERPL-MCNC: 30 MG/DL (ref 8–22)
CALCIUM SERPL-MCNC: 8.9 MG/DL (ref 8.5–10.5)
CHLORIDE SERPL-SCNC: 100 MMOL/L (ref 96–112)
CO2 SERPL-SCNC: 25 MMOL/L (ref 20–33)
CREAT SERPL-MCNC: 1.03 MG/DL (ref 0.5–1.4)
GLUCOSE SERPL-MCNC: 102 MG/DL (ref 65–99)
POTASSIUM SERPL-SCNC: 4.4 MMOL/L (ref 3.6–5.5)
SODIUM SERPL-SCNC: 133 MMOL/L (ref 135–145)

## 2021-04-17 PROCEDURE — A9270 NON-COVERED ITEM OR SERVICE: HCPCS | Performed by: NURSE PRACTITIONER

## 2021-04-17 PROCEDURE — 36415 COLL VENOUS BLD VENIPUNCTURE: CPT

## 2021-04-17 PROCEDURE — 70450 CT HEAD/BRAIN W/O DYE: CPT

## 2021-04-17 PROCEDURE — 700102 HCHG RX REV CODE 250 W/ 637 OVERRIDE(OP): Performed by: HOSPITALIST

## 2021-04-17 PROCEDURE — 700102 HCHG RX REV CODE 250 W/ 637 OVERRIDE(OP): Performed by: NURSE PRACTITIONER

## 2021-04-17 PROCEDURE — A9270 NON-COVERED ITEM OR SERVICE: HCPCS | Performed by: INTERNAL MEDICINE

## 2021-04-17 PROCEDURE — 99232 SBSQ HOSP IP/OBS MODERATE 35: CPT | Performed by: INTERNAL MEDICINE

## 2021-04-17 PROCEDURE — 700111 HCHG RX REV CODE 636 W/ 250 OVERRIDE (IP): Performed by: STUDENT IN AN ORGANIZED HEALTH CARE EDUCATION/TRAINING PROGRAM

## 2021-04-17 PROCEDURE — 770020 HCHG ROOM/CARE - TELE (206)

## 2021-04-17 PROCEDURE — A9270 NON-COVERED ITEM OR SERVICE: HCPCS | Performed by: PSYCHIATRY & NEUROLOGY

## 2021-04-17 PROCEDURE — 700111 HCHG RX REV CODE 636 W/ 250 OVERRIDE (IP): Performed by: INTERNAL MEDICINE

## 2021-04-17 PROCEDURE — 700102 HCHG RX REV CODE 250 W/ 637 OVERRIDE(OP): Performed by: INTERNAL MEDICINE

## 2021-04-17 PROCEDURE — 80048 BASIC METABOLIC PNL TOTAL CA: CPT

## 2021-04-17 PROCEDURE — 700102 HCHG RX REV CODE 250 W/ 637 OVERRIDE(OP): Performed by: PHYSICAL MEDICINE & REHABILITATION

## 2021-04-17 PROCEDURE — 700102 HCHG RX REV CODE 250 W/ 637 OVERRIDE(OP): Performed by: PSYCHIATRY & NEUROLOGY

## 2021-04-17 PROCEDURE — A9270 NON-COVERED ITEM OR SERVICE: HCPCS | Performed by: HOSPITALIST

## 2021-04-17 PROCEDURE — A9270 NON-COVERED ITEM OR SERVICE: HCPCS | Performed by: PHYSICAL MEDICINE & REHABILITATION

## 2021-04-17 RX ORDER — PRAZOSIN HYDROCHLORIDE 1 MG/1
1 CAPSULE ORAL EVERY EVENING
Status: DISCONTINUED | OUTPATIENT
Start: 2021-04-18 | End: 2021-04-29 | Stop reason: HOSPADM

## 2021-04-17 RX ORDER — LISINOPRIL 20 MG/1
40 TABLET ORAL
Status: DISCONTINUED | OUTPATIENT
Start: 2021-04-18 | End: 2021-04-25

## 2021-04-17 RX ORDER — ATORVASTATIN CALCIUM 80 MG/1
80 TABLET, FILM COATED ORAL EVERY EVENING
Status: DISCONTINUED | OUTPATIENT
Start: 2021-04-18 | End: 2021-04-29 | Stop reason: HOSPADM

## 2021-04-17 RX ORDER — ACETAMINOPHEN 325 MG/1
650 TABLET ORAL EVERY 6 HOURS PRN
Status: DISCONTINUED | OUTPATIENT
Start: 2021-04-17 | End: 2021-04-29 | Stop reason: HOSPADM

## 2021-04-17 RX ORDER — HYDROMORPHONE HYDROCHLORIDE 1 MG/ML
0.5 INJECTION, SOLUTION INTRAMUSCULAR; INTRAVENOUS; SUBCUTANEOUS
Status: DISCONTINUED | OUTPATIENT
Start: 2021-04-17 | End: 2021-04-29 | Stop reason: HOSPADM

## 2021-04-17 RX ORDER — AMLODIPINE BESYLATE 5 MG/1
10 TABLET ORAL
Status: DISCONTINUED | OUTPATIENT
Start: 2021-04-18 | End: 2021-04-25

## 2021-04-17 RX ORDER — HYDRALAZINE HYDROCHLORIDE 25 MG/1
25 TABLET, FILM COATED ORAL EVERY 8 HOURS
Status: DISCONTINUED | OUTPATIENT
Start: 2021-04-18 | End: 2021-04-27

## 2021-04-17 RX ORDER — ASPIRIN 81 MG/1
81 TABLET, CHEWABLE ORAL DAILY
Status: DISCONTINUED | OUTPATIENT
Start: 2021-04-18 | End: 2021-04-29 | Stop reason: HOSPADM

## 2021-04-17 RX ORDER — OXYCODONE HYDROCHLORIDE 5 MG/1
5 TABLET ORAL
Status: DISCONTINUED | OUTPATIENT
Start: 2021-04-17 | End: 2021-04-29 | Stop reason: HOSPADM

## 2021-04-17 RX ORDER — CLONIDINE HYDROCHLORIDE 0.1 MG/1
0.1 TABLET ORAL EVERY 4 HOURS PRN
Status: DISCONTINUED | OUTPATIENT
Start: 2021-04-17 | End: 2021-04-29 | Stop reason: HOSPADM

## 2021-04-17 RX ORDER — OXYCODONE HYDROCHLORIDE 10 MG/1
10 TABLET ORAL
Status: DISCONTINUED | OUTPATIENT
Start: 2021-04-17 | End: 2021-04-29 | Stop reason: HOSPADM

## 2021-04-17 RX ORDER — AMOXICILLIN 250 MG
1 CAPSULE ORAL
Status: DISCONTINUED | OUTPATIENT
Start: 2021-04-17 | End: 2021-04-29 | Stop reason: HOSPADM

## 2021-04-17 RX ORDER — ONDANSETRON 4 MG/1
4 TABLET, ORALLY DISINTEGRATING ORAL EVERY 4 HOURS PRN
Status: DISCONTINUED | OUTPATIENT
Start: 2021-04-17 | End: 2021-04-29 | Stop reason: HOSPADM

## 2021-04-17 RX ORDER — POLYETHYLENE GLYCOL 3350 17 G/17G
1 POWDER, FOR SOLUTION ORAL 2 TIMES DAILY PRN
Status: DISCONTINUED | OUTPATIENT
Start: 2021-04-17 | End: 2021-04-29 | Stop reason: HOSPADM

## 2021-04-17 RX ORDER — CALCIUM CARBONATE 500 MG/1
500 TABLET, CHEWABLE ORAL EVERY 8 HOURS PRN
Status: DISCONTINUED | OUTPATIENT
Start: 2021-04-17 | End: 2021-04-29 | Stop reason: HOSPADM

## 2021-04-17 RX ORDER — THYROID 30 MG/1
60 TABLET ORAL 2 TIMES DAILY
Status: DISCONTINUED | OUTPATIENT
Start: 2021-04-18 | End: 2021-04-29 | Stop reason: HOSPADM

## 2021-04-17 RX ORDER — PROMETHAZINE HYDROCHLORIDE 25 MG/1
12.5-25 TABLET ORAL EVERY 4 HOURS PRN
Status: DISCONTINUED | OUTPATIENT
Start: 2021-04-17 | End: 2021-04-29 | Stop reason: HOSPADM

## 2021-04-17 RX ORDER — AMOXICILLIN 250 MG
1 CAPSULE ORAL NIGHTLY
Status: DISCONTINUED | OUTPATIENT
Start: 2021-04-18 | End: 2021-04-29 | Stop reason: HOSPADM

## 2021-04-17 RX ORDER — BACLOFEN 10 MG/1
15 TABLET ORAL 3 TIMES DAILY
Status: DISCONTINUED | OUTPATIENT
Start: 2021-04-18 | End: 2021-04-29 | Stop reason: HOSPADM

## 2021-04-17 RX ADMIN — HYDRALAZINE HYDROCHLORIDE 25 MG: 50 TABLET, FILM COATED ORAL at 15:10

## 2021-04-17 RX ADMIN — METOPROLOL TARTRATE 25 MG: 25 TABLET, FILM COATED ORAL at 05:36

## 2021-04-17 RX ADMIN — PROMETHAZINE HYDROCHLORIDE 12.5 MG: 25 TABLET ORAL at 12:34

## 2021-04-17 RX ADMIN — ATORVASTATIN CALCIUM 80 MG: 80 TABLET, FILM COATED ORAL at 18:27

## 2021-04-17 RX ADMIN — METOPROLOL TARTRATE 25 MG: 25 TABLET, FILM COATED ORAL at 18:28

## 2021-04-17 RX ADMIN — THYROID, PORCINE 60 MG: 30 TABLET ORAL at 05:39

## 2021-04-17 RX ADMIN — PROCHLORPERAZINE EDISYLATE 5 MG: 5 INJECTION INTRAMUSCULAR; INTRAVENOUS at 12:34

## 2021-04-17 RX ADMIN — THYROID, PORCINE 60 MG: 30 TABLET ORAL at 18:28

## 2021-04-17 RX ADMIN — BACLOFEN 15 MG: 10 TABLET ORAL at 05:36

## 2021-04-17 RX ADMIN — BACLOFEN 15 MG: 10 TABLET ORAL at 12:34

## 2021-04-17 RX ADMIN — LISINOPRIL 40 MG: 20 TABLET ORAL at 05:36

## 2021-04-17 RX ADMIN — BACLOFEN 15 MG: 10 TABLET ORAL at 18:28

## 2021-04-17 RX ADMIN — ONDANSETRON 4 MG: 2 INJECTION INTRAMUSCULAR; INTRAVENOUS at 18:35

## 2021-04-17 RX ADMIN — ENOXAPARIN SODIUM 40 MG: 40 INJECTION SUBCUTANEOUS at 05:37

## 2021-04-17 RX ADMIN — ASPIRIN 81 MG: 81 TABLET, CHEWABLE ORAL at 18:28

## 2021-04-17 RX ADMIN — RIVAROXABAN 20 MG: 20 TABLET, FILM COATED ORAL at 18:27

## 2021-04-17 RX ADMIN — PHENYTOIN 1 MG: 125 SUSPENSION ORAL at 18:28

## 2021-04-17 ASSESSMENT — ENCOUNTER SYMPTOMS
HEADACHES: 1
SENSORY CHANGE: 1
VOMITING: 0
PALPITATIONS: 0
BACK PAIN: 0
COUGH: 0
STRIDOR: 0
ABDOMINAL PAIN: 0
FOCAL WEAKNESS: 1

## 2021-04-17 ASSESSMENT — CHA2DS2 SCORE
SEX: FEMALE
PRIOR STROKE OR TIA OR THROMBOEMBOLISM: YES
HYPERTENSION: YES
DIABETES: NO
VASCULAR DISEASE: NO
AGE 75 OR GREATER: NO
CHA2DS2 VASC SCORE: 4
AGE 65 TO 74: NO
CHF OR LEFT VENTRICULAR DYSFUNCTION: NO

## 2021-04-17 NOTE — PROGRESS NOTES
Hospital Medicine Daily Progress Note    Date of Service  4/17/2021    Chief Complaint  Left sided weakness    Hospital Course  56 y.o. male admitted 3/31/2021 with acute stroke.  He was not a candidate for tPA nor thrombectomy.  He has a history of paroxysmal atrial fibrillation, dyslipidemia, hypothyroidism. During admit he underwent a hemicraniectomy for right sided decompression from right MCA dense stroke with intractable intracranial pressure.     Interval Problem Update  4/12: Patient verbal and oriented with some slurred speech.  Left hemiplegia. On 4/11 Dr. Leo met with the wife and explained to her we likely will need a G-tube at some point.  Also alerted the both of them we would need a SNF at some point.  He reports that his pain is reasonably well controlled at this juncture, he is describing some thirst.  Free water flushes will be increased from 30 mL to 90 mL every 6 hours.  This can be backed off if sodium begins to drop.    4/13 physical therapy seen the patient.  On tube feeding still.  Speech therapy to follow.   to follow for discharge planning.    4/14: The patient was retaining urine this morning and will try to get a straight cath.  Also discussed with the wife and she said that she will try to get insurance starting May 1 for him.  I also started for Flomax    4/15: Discharge planning by . No acute issue overnight.    4/16 no acute issue overnight.  Discharge planning by     4/17: the patient is sleeping comfortably on the bed. No acute issue overnight.  Consultants/Specialty  Neurology  Neuro-intensivist (signing off 4/11)  Neuro surgery    Code Status  Full Code    Disposition  Remain on the floor    Review of Systems  Review of Systems   Constitutional: Positive for malaise/fatigue.   Respiratory: Negative for cough and stridor.    Cardiovascular: Negative for chest pain and palpitations.   Gastrointestinal: Negative for abdominal pain and vomiting.    Genitourinary: Negative for frequency and hematuria.   Musculoskeletal: Negative for back pain.   Skin: Negative for rash.   Neurological: Positive for sensory change (left side), focal weakness (left side) and headaches (right temporal region).        Physical Exam  Temp:  [36.5 °C (97.7 °F)-37.1 °C (98.8 °F)] 37 °C (98.6 °F)  Pulse:  [65-81] 65  Resp:  [16] 16  BP: ()/(51-69) 103/66  SpO2:  [92 %-95 %] 93 %    Physical Exam  Vitals reviewed.   Constitutional:       Appearance: Normal appearance. He is not diaphoretic.   HENT:      Head: Normocephalic and atraumatic.      Nose: Nose normal.      Mouth/Throat:      Mouth: Mucous membranes are moist.      Pharynx: No oropharyngeal exudate.   Eyes:      Extraocular Movements: Extraocular movements intact.      Conjunctiva/sclera: Conjunctivae normal.   Cardiovascular:      Rate and Rhythm: Normal rate and regular rhythm.      Pulses: Normal pulses.           Radial pulses are 2+ on the right side and 2+ on the left side.        Dorsalis pedis pulses are 2+ on the right side and 2+ on the left side.      Heart sounds: Normal heart sounds.   Pulmonary:      Effort: Pulmonary effort is normal.   Abdominal:      General: Bowel sounds are normal.   Musculoskeletal:         General: No swelling or tenderness.      Cervical back: Neck supple. No muscular tenderness.      Right lower leg: No edema.      Left lower leg: No edema.   Lymphadenopathy:      Cervical: No cervical adenopathy.   Neurological:      Mental Status: He is alert.      Cranial Nerves: Cranial nerve deficit present.      Sensory: Sensory deficit present.      Coordination: Coordination abnormal.      Gait: Gait abnormal.         Current Facility-Administered Medications:   •  acetaminophen (Tylenol) tablet 650 mg, 650 mg, Enteral Tube, Q6HRS PRN, Golden Pearson M.D., 650 mg at 04/14/21 1704  •  prazosin (MINIPRESS) capsule 1 mg, 1 mg, Enteral Tube, Q EVENING, Golden Pearson M.D., 1 mg at 04/16/21 1820  •   hydrALAZINE (APRESOLINE) tablet 25 mg, 25 mg, Enteral Tube, Q8HRS, Cristhian Bell M.D., Stopped at 04/16/21 1400  •  baclofen (LIORESAL) tablet 15 mg, 15 mg, Enteral Tube, TID, Christofer Eisenberg, D.O., 15 mg at 04/17/21 0536  •  hydrALAZINE (APRESOLINE) injection 10 mg, 10 mg, Intravenous, Q HOUR PRN, Cristhian Bell M.D., 10 mg at 04/10/21 1631  •  labetalol (NORMODYNE/TRANDATE) injection 10-20 mg, 10-20 mg, Intravenous, Q4HRS PRN, Cristhian Bell M.D., 10 mg at 04/10/21 1508  •  cloNIDine (CATAPRES) tablet 0.1 mg, 0.1 mg, Enteral Tube, Q4HRS PRN, Cristhian Bell M.D., 0.1 mg at 04/08/21 1734  •  calcium carbonate (TUMS) chewable tab 500 mg, 500 mg, Enteral Tube, Q8HRS PRN, Cristhian Bell M.D., 500 mg at 04/07/21 1501  •  lisinopril (PRINIVIL) tablet 40 mg, 40 mg, Enteral Tube, Q DAY, Cristhian Bell M.D., 40 mg at 04/17/21 0536  •  amLODIPine (NORVASC) tablet 10 mg, 10 mg, Enteral Tube, Q DAY, Johana Nelson M.D., Stopped at 04/17/21 0528  •  metoprolol tartrate (LOPRESSOR) tablet 25 mg, 25 mg, Enteral Tube, BID, Cristhian Bell M.D., 25 mg at 04/17/21 0536  •  enoxaparin (LOVENOX) inj 40 mg, 40 mg, Subcutaneous, DAILY, Johana Nelson M.D., 40 mg at 04/17/21 0537  •  aspirin (ASA) chewable tab 81 mg, 81 mg, Enteral Tube, DAILY, Cristhian Bell M.D., 81 mg at 04/16/21 1820  •  thyroid (ARMOUR THYROID) tablet 60 mg, 60 mg, Enteral Tube, BID, King Spann M.D., 60 mg at 04/17/21 0539  •  Pharmacy Consult Request ...Pain Management Review 1 Each, 1 Each, Other, PHARMACY TO DOSE, CRISTI Morris-C.  •  MD ALERT...DO NOT ADMINISTER NSAIDS or ASPIRIN unless ORDERED By Neurosurgery 1 Each, 1 Each, Other, PRN, Lilia Pizano, HANSA.A.-C.  •  bisacodyl (DULCOLAX) suppository 10 mg, 10 mg, Rectal, Q24HRS PRN, HANSA Morris.A.-C., 10 mg at 04/11/21 1811  •  artificial tears (EYE LUBRICANT) ophth ointment 1 Application, 1 Application, Both Eyes, PRN, Lilia Pizano, HANSA.A.-C.  •  Pharmacy  Consult: Enteral tube insertion - review meds/change route/product selection, 1 Each, Other, PHARMACY TO DOSE, Radha Meredith, A.P.R.N.  •  atorvastatin (LIPITOR) tablet 80 mg, 80 mg, Enteral Tube, Q EVENING, Radha Meredith A.P.R.N., 80 mg at 04/16/21 1820  •  senna-docusate (PERICOLACE or SENOKOT S) 8.6-50 MG per tablet 1 tablet, 1 tablet, Enteral Tube, Nightly, Radha Meredith A.P.R.N., 1 tablet at 04/15/21 2200  •  magnesium hydroxide (MILK OF MAGNESIA) suspension 30 mL, 30 mL, Enteral Tube, QDAY PRN, Radha Meredith A.P.R.N., 30 mL at 04/11/21 0622  •  ondansetron (ZOFRAN ODT) dispertab 4 mg, 4 mg, Enteral Tube, Q4HRS PRN, Radha Meredith A.P.R.N., 4 mg at 04/16/21 1827  •  oxyCODONE immediate-release (ROXICODONE) tablet 5 mg, 5 mg, Enteral Tube, Q3HRS PRN, 5 mg at 04/12/21 1609 **OR** oxyCODONE immediate-release (ROXICODONE) tablet 10 mg, 10 mg, Enteral Tube, Q3HRS PRN, 10 mg at 04/06/21 0405 **OR** HYDROmorphone (Dilaudid) injection 0.5 mg, 0.5 mg, Intravenous, Q3HRS PRN, Radha Meredith, A.P.R.N., 0.5 mg at 04/03/21 1554  •  polyethylene glycol/lytes (MIRALAX) PACKET 1 Packet, 1 Packet, Enteral Tube, BID PRN, Radha Meredith A.P.R.N., 1 Packet at 04/11/21 0621  •  promethazine (PHENERGAN) tablet 12.5-25 mg, 12.5-25 mg, Enteral Tube, Q4HRS PRN, Radha Meredith, A.P.R.N.  •  senna-docusate (PERICOLACE or SENOKOT S) 8.6-50 MG per tablet 1 tablet, 1 tablet, Enteral Tube, Q24HRS PRN, SHERRIE Hampton, Stopped at 04/12/21 1738  •  ondansetron (ZOFRAN) syringe/vial injection 4 mg, 4 mg, Intravenous, Q4HRS PRN, Gia Forman M.D., 4 mg at 04/16/21 0910  •  promethazine (PHENERGAN) suppository 12.5-25 mg, 12.5-25 mg, Rectal, Q4HRS PRN, Gia Forman M.D.  •  prochlorperazine (COMPAZINE) injection 5-10 mg, 5-10 mg, Intravenous, Q4HRS PRN, Gia Forman M.D., 10 mg at 04/05/21 0913      Fluids    Intake/Output Summary (Last 24 hours) at 4/17/2021 1052  Last data filed at 4/17/2021 0600  Gross per 24 hour   Intake --    Output 1050 ml   Net -1050 ml       Laboratory      Recent Labs     04/15/21  0144 04/16/21  0647 04/17/21  0241   SODIUM 136 136 133*   POTASSIUM 4.4 4.2 4.4   CHLORIDE 102 103 100   CO2 25 25 25   GLUCOSE 99 104* 102*   BUN 40* 35* 30*   CREATININE 1.04 0.94 1.03   CALCIUM 8.9 8.9 8.9                   Imaging  CT-HEAD W/O   Final Result         1.  Evolving area of infarct within the right MCA distribution.   2.  Ribbonlike gyriform hyperdensity at the area of prior infarct, appearance likely corresponding with gyriform laminar necrosis, component of subtle subarachnoid hemorrhage cannot be definitively excluded radiographically.   3.  Minimal pneumocephalus, decreased since prior study.   4.  Atherosclerosis.      US-EXTREMITY VENOUS LOWER BILAT   Final Result      DX-CHEST-LIMITED (1 VIEW)   Final Result      Right basilar atelectasis. No focal consolidation or pleural effusions.      [id number] VIEW   Final Result      No evidence of bowel obstruction.                  DX-CHEST-LIMITED (1 VIEW)   Final Result      1.  Right internal jugular catheter and enteric catheter appear appropriately located      2.  Minimal right basilar atelectasis      CT-HEAD W/O   Final Result         1.  Changes of evolving infarct within the right MCA distribution   2.  Hyperdensity in the sulci of the right MCA distribution infarct, largely appears related to thrombosed vessels, component of subarachnoid hemorrhage is suspected particularly in the right frontal lobe.   3.  Pneumocephalus, decreased since prior.      CT-HEAD W/O   Final Result         Postsurgical change from right craniectomy. Redemonstration of large right MCA infarct with edema and bulging of the brain parenchyma through the craniectomy defect      Less mass effect on the right lateral ventricle. Less right to left midline shift of 3 mm, previously 10 mm.         DX-ABDOMEN FOR TUBE PLACEMENT   Final Result      Enteric tube terminates over the stomach.       CT-HEAD W/O   Final Result         1.  Low-density changes of the right hemisphere compatible with MCA distribution infarct with edema.   2.  New effacement of the bilateral ventricles, right greater than left, with 10 mm right-to-left midline shift.   3.  New dilatation of the left temporal horn ventricle, appearance favors component of obstructive hydrocephalus due to mass effect from infarct and edema.   4.  Hyperdensity in the right middle cerebral artery, likely thrombosis given associated findings.      These findings were discussed with the patient's clinician, Dr. Nunez, on 4/3/2021 7:11 AM.      MR-BRAIN-W/O   Final Result      Very large acute right MCA territory infarct as detailed above with small amount of petechial hemorrhage in the right insular region and right temporal lobe.      Punctate right thalamic lacunar infarct.      Right ICA and M1 MCA occlusion.      EC-ECHOCARDIOGRAM COMPLETE W/O CONT   Final Result      DX-CHEST-PORTABLE (1 VIEW)   Final Result         1.  Interstitial pulmonary parenchymal prominence, compatible with interstitial edema and/or infiltrates.      CT-CTA NECK WITH & W/O-POST PROCESSING   Final Result      Acute occlusion of the right internal carotid artery shortly after bifurcation. It is occluded up to the level of the carotid terminus.      CT-CTA HEAD WITH & W/O-POST PROCESS   Final Result      Acute right M1 occlusion.      Findings were discussed with RHONDA DIAZ on 3/31/2021 7:54 PM.      CT-CEREBRAL PERFUSION ANALYSIS   Final Result      1.  Cerebral blood flow less than 30% likely representing completed infarct = 134 mL.      2.  T Max more than 6 seconds likely representing combination of completed infarct and ischemia = 210 mL.      3.  Mismatched volume likely representing ischemic brain/penumbra = 76 mL.      4.  Please note that the cerebral perfusion was performed on the limited brain tissue around the basal ganglia region. Infarct/ischemia outside  the CT perfusion sections can be missed in this study.      CT-HEAD W/O   Final Result   Addendum 1 of 1   In retrospect, there is subtle loss of gray-white matter differentiation    in the right MCA distribution, consistent with acute infarct.      Final      1.  No CT evidence of acute infarct, hemorrhage or mass.   2.  Mild paranasal sinus disease.           Assessment/Plan  * Acute right MCA stroke (HCC)- (present on admission)  Assessment & Plan  Likely consequence of COVID19 in 3/21 and hx of afib  Subsequent brain edema requiring right decompressive hemicraniectomy on 4/3/2021  Neurology following patient will benefit from long-term anticoagulation when bleeding risk is acceptable likely 1-2 weeks from event onset.   Blood pressure control keep SBP<140 (on metoprolol 25mg BID, amlodipine 10mg, hydralazine 25mg TID, lisinopril 40mg daily)  Patient weaned off hypertonic saline and started on salt tablets  PT/OT/SLP  Aspiration precautions  Physiatry consultation   for discharge planning: repeat PT/OT eval again tomorrow    Paroxysmal atrial fibrillation (HCC)- (present on admission)  Assessment & Plan  Monitor vitals and on tele  Metoprolol 25mg BID w/ parameters  Discussed risk benefits and alternatives of long-term anticoagulation with patient and wife, plan to start DOAC when bleeding risk felt to be acceptable per neurology likely in 1 to 2 weeks    Leucocytosis  Assessment & Plan  Resolved but continue to monitor  4/11 WBC:7.7    Urinary retention  Assessment & Plan  Brewer catheter in place  Watch for urinary retention  prazosin    History of COVID-19- (present on admission)  Assessment & Plan  Clinically recovered patient symptom onset on 3/15/2021 with positive initial test on 3/18/2021    Acquired hypothyroidism- (present on admission)  Assessment & Plan  [place] Thyroid 60mg BID   TSH is less than 0.005 will decrease dose and he will need recheck TFTs in 6 weeks (prior to admit was on 90mg  BID)  TSH: <0.005 in past week.       VTE prophylaxis: Enoxaparin 40mg SQ daily

## 2021-04-17 NOTE — PROGRESS NOTES
Noted small amount of swelling to R side of head/ procedure area/ missing bone flap site. No changes observed on neuro checks, complaint of minor HA. Notified hospitalist on call, order for CT received. Scan completed and resulted- update called to neuro hospitalist on call. Will pass on to day shift for follow up

## 2021-04-17 NOTE — CARE PLAN
Problem: Communication  Goal: The ability to communicate needs accurately and effectively will improve  Outcome: PROGRESSING AS EXPECTED  Effectively communicates needs with staff     Problem: Safety  Goal: Will remain free from injury  Outcome: PROGRESSING AS EXPECTED  Free from injury/fall; precautions in place     Problem: Knowledge Deficit  Goal: Knowledge of disease process/condition, treatment plan, diagnostic tests, and medications will improve  Outcome: PROGRESSING AS EXPECTED  Discussed POC and disease process with patient and mother at bedside, Verbalized understanding

## 2021-04-18 LAB
ANION GAP SERPL CALC-SCNC: 12 MMOL/L (ref 7–16)
BUN SERPL-MCNC: 27 MG/DL (ref 8–22)
CALCIUM SERPL-MCNC: 8.9 MG/DL (ref 8.5–10.5)
CHLORIDE SERPL-SCNC: 98 MMOL/L (ref 96–112)
CO2 SERPL-SCNC: 23 MMOL/L (ref 20–33)
CREAT SERPL-MCNC: 0.9 MG/DL (ref 0.5–1.4)
GLUCOSE SERPL-MCNC: 113 MG/DL (ref 65–99)
POTASSIUM SERPL-SCNC: 4.3 MMOL/L (ref 3.6–5.5)
SODIUM SERPL-SCNC: 133 MMOL/L (ref 135–145)

## 2021-04-18 PROCEDURE — 700111 HCHG RX REV CODE 636 W/ 250 OVERRIDE (IP): Performed by: INTERNAL MEDICINE

## 2021-04-18 PROCEDURE — A9270 NON-COVERED ITEM OR SERVICE: HCPCS | Performed by: NURSE PRACTITIONER

## 2021-04-18 PROCEDURE — 700102 HCHG RX REV CODE 250 W/ 637 OVERRIDE(OP): Performed by: INTERNAL MEDICINE

## 2021-04-18 PROCEDURE — 700102 HCHG RX REV CODE 250 W/ 637 OVERRIDE(OP): Performed by: NURSE PRACTITIONER

## 2021-04-18 PROCEDURE — 770020 HCHG ROOM/CARE - TELE (206)

## 2021-04-18 PROCEDURE — 36415 COLL VENOUS BLD VENIPUNCTURE: CPT

## 2021-04-18 PROCEDURE — A9270 NON-COVERED ITEM OR SERVICE: HCPCS | Performed by: INTERNAL MEDICINE

## 2021-04-18 PROCEDURE — 80048 BASIC METABOLIC PNL TOTAL CA: CPT

## 2021-04-18 PROCEDURE — 99232 SBSQ HOSP IP/OBS MODERATE 35: CPT | Performed by: INTERNAL MEDICINE

## 2021-04-18 RX ORDER — MECLIZINE HYDROCHLORIDE 25 MG/1
25 TABLET ORAL 3 TIMES DAILY PRN
Status: DISCONTINUED | OUTPATIENT
Start: 2021-04-18 | End: 2021-04-22

## 2021-04-18 RX ADMIN — ASPIRIN 81 MG: 81 TABLET, CHEWABLE ORAL at 17:57

## 2021-04-18 RX ADMIN — MECLIZINE HYDROCHLORIDE 25 MG: 25 TABLET ORAL at 12:13

## 2021-04-18 RX ADMIN — RIVAROXABAN 20 MG: 20 TABLET, FILM COATED ORAL at 17:57

## 2021-04-18 RX ADMIN — THYROID, PORCINE 60 MG: 30 TABLET ORAL at 05:22

## 2021-04-18 RX ADMIN — METOPROLOL TARTRATE 25 MG: 25 TABLET, FILM COATED ORAL at 17:57

## 2021-04-18 RX ADMIN — HYDRALAZINE HYDROCHLORIDE 25 MG: 25 TABLET, FILM COATED ORAL at 15:55

## 2021-04-18 RX ADMIN — PHENYTOIN 1 MG: 125 SUSPENSION ORAL at 17:56

## 2021-04-18 RX ADMIN — ONDANSETRON 4 MG: 2 INJECTION INTRAMUSCULAR; INTRAVENOUS at 18:32

## 2021-04-18 RX ADMIN — LISINOPRIL 40 MG: 20 TABLET ORAL at 05:23

## 2021-04-18 RX ADMIN — ATORVASTATIN CALCIUM 80 MG: 80 TABLET, FILM COATED ORAL at 17:57

## 2021-04-18 RX ADMIN — AMLODIPINE BESYLATE 10 MG: 5 TABLET ORAL at 05:23

## 2021-04-18 RX ADMIN — METOPROLOL TARTRATE 25 MG: 25 TABLET, FILM COATED ORAL at 05:23

## 2021-04-18 RX ADMIN — THYROID, PORCINE 60 MG: 30 TABLET ORAL at 17:56

## 2021-04-18 RX ADMIN — BACLOFEN 15 MG: 10 TABLET ORAL at 17:57

## 2021-04-18 RX ADMIN — ONDANSETRON 4 MG: 2 INJECTION INTRAMUSCULAR; INTRAVENOUS at 09:24

## 2021-04-18 RX ADMIN — BACLOFEN 15 MG: 10 TABLET ORAL at 12:12

## 2021-04-18 RX ADMIN — HYDRALAZINE HYDROCHLORIDE 25 MG: 25 TABLET, FILM COATED ORAL at 05:22

## 2021-04-18 RX ADMIN — BACLOFEN 15 MG: 10 TABLET ORAL at 05:24

## 2021-04-18 ASSESSMENT — ENCOUNTER SYMPTOMS
ABDOMINAL PAIN: 0
FOCAL WEAKNESS: 1
DIZZINESS: 1
DIARRHEA: 0
BACK PAIN: 0
COUGH: 0
SENSORY CHANGE: 1
HEADACHES: 0
PALPITATIONS: 0
VOMITING: 0
STRIDOR: 0

## 2021-04-18 NOTE — PROGRESS NOTES
Hospital Medicine Daily Progress Note    Date of Service  4/18/2021    Chief Complaint  Left sided weakness    Hospital Course  56 y.o. male admitted 3/31/2021 with acute stroke.  He was not a candidate for tPA nor thrombectomy.  He has a history of paroxysmal atrial fibrillation, dyslipidemia, hypothyroidism. During admit he underwent a hemicraniectomy for right sided decompression from right MCA dense stroke with intractable intracranial pressure.     Interval Problem Update  4/12: Patient verbal and oriented with some slurred speech.  Left hemiplegia. On 4/11 Dr. Leo met with the wife and explained to her we likely will need a G-tube at some point.  Also alerted the both of them we would need a SNF at some point.  He reports that his pain is reasonably well controlled at this juncture, he is describing some thirst.  Free water flushes will be increased from 30 mL to 90 mL every 6 hours.  This can be backed off if sodium begins to drop.    4/13 physical therapy seen the patient.  On tube feeding still.  Speech therapy to follow.   to follow for discharge planning.    4/14: The patient was retaining urine this morning and will try to get a straight cath.  Also discussed with the wife and she said that she will try to get insurance starting May 1 for him.  I also started for Flomax    4/15: Discharge planning by . No acute issue overnight.    4/16 no acute issue overnight.  Discharge planning by     4/17: the patient is sleeping comfortably on the bed. No acute issue overnight.    4/18 the patient is having vertigo symptom.  I will start her on meclizine and see how he does.  Consultants/Specialty  Neurology  Neuro-intensivist (signing off 4/11)  Neuro surgery    Code Status  Full Code    Disposition  Remain on the floor    Review of Systems  Review of Systems   Constitutional: Positive for malaise/fatigue.   Respiratory: Negative for cough and stridor.    Cardiovascular:  Negative for chest pain and palpitations.   Gastrointestinal: Negative for abdominal pain, diarrhea and vomiting.   Genitourinary: Negative for frequency, hematuria and urgency.   Musculoskeletal: Negative for back pain.   Skin: Negative for rash.   Neurological: Positive for dizziness, sensory change (left side) and focal weakness (left side). Negative for headaches (right temporal region).        Physical Exam  Temp:  [36.4 °C (97.5 °F)-37 °C (98.6 °F)] 36.9 °C (98.5 °F)  Pulse:  [80-99] 80  Resp:  [14-18] 16  BP: (105-128)/(65-85) 127/75  SpO2:  [92 %-95 %] 93 %    Physical Exam  Vitals reviewed.   Constitutional:       Appearance: Normal appearance. He is not diaphoretic.   HENT:      Head: Normocephalic and atraumatic.      Nose: Nose normal.      Mouth/Throat:      Mouth: Mucous membranes are moist.      Pharynx: No oropharyngeal exudate.   Eyes:      Extraocular Movements: Extraocular movements intact.      Conjunctiva/sclera: Conjunctivae normal.   Cardiovascular:      Rate and Rhythm: Normal rate and regular rhythm.      Pulses: Normal pulses.           Radial pulses are 2+ on the right side and 2+ on the left side.        Dorsalis pedis pulses are 2+ on the right side and 2+ on the left side.      Heart sounds: Normal heart sounds.   Pulmonary:      Effort: Pulmonary effort is normal.      Breath sounds: Normal breath sounds.   Abdominal:      General: Bowel sounds are normal.      Palpations: Abdomen is soft.   Musculoskeletal:         General: No swelling or tenderness.      Cervical back: Neck supple. No muscular tenderness.      Right lower leg: No edema.      Left lower leg: No edema.   Lymphadenopathy:      Cervical: No cervical adenopathy.   Neurological:      Mental Status: He is alert.      Cranial Nerves: Cranial nerve deficit present.      Sensory: Sensory deficit present.      Coordination: Coordination abnormal.      Gait: Gait abnormal.         Current Facility-Administered Medications:   •   ondansetron (ZOFRAN ODT) dispertab 4 mg, 4 mg, Oral, Q4HRS PRN, Radha Meredith, A.P.R.N.  •  acetaminophen (Tylenol) tablet 650 mg, 650 mg, Oral, Q6HRS PRN, Radha Meredith, A.P.R.N.  •  metoprolol tartrate (LOPRESSOR) tablet 25 mg, 25 mg, Oral, BID, Radha Meredith, A.P.R.N., 25 mg at 04/18/21 0523  •  senna-docusate (PERICOLACE or SENOKOT S) 8.6-50 MG per tablet 1 tablet, 1 tablet, Oral, Nightly, Radha Meredith, A.P.R.N.  •  oxyCODONE immediate-release (ROXICODONE) tablet 5 mg, 5 mg, Oral, Q3HRS PRN **OR** oxyCODONE immediate release (ROXICODONE) tablet 10 mg, 10 mg, Oral, Q3HRS PRN **OR** HYDROmorphone (Dilaudid) injection 0.5 mg, 0.5 mg, Intravenous, Q3HRS PRN, Radha Meredith, A.P.R.N.  •  polyethylene glycol/lytes (MIRALAX) PACKET 1 Packet, 1 Packet, Oral, BID PRN, Radha Meredith, A.P.R.N.  •  prazosin (MINIPRESS) capsule 1 mg, 1 mg, Oral, Q EVENING, Radha Meredith, A.P.R.N.  •  senna-docusate (PERICOLACE or SENOKOT S) 8.6-50 MG per tablet 1 tablet, 1 tablet, Oral, Q24HRS PRN, Radha Meredith, A.P.R.N.  •  rivaroxaban (XARELTO) tablet 20 mg, 20 mg, Oral, PM MEAL, Radha Meredith, A.P.R.N.  •  promethazine (PHENERGAN) tablet 12.5-25 mg, 12.5-25 mg, Oral, Q4HRS PRN, Radha Meredith, A.P.R.N.  •  magnesium hydroxide (MILK OF MAGNESIA) suspension 30 mL, 30 mL, Oral, QDAY PRN, Radha Meredith A.P.R.N.  •  lisinopril (PRINIVIL) tablet 40 mg, 40 mg, Oral, Q DAY, Radha Meredith A.P.R.N., 40 mg at 04/18/21 0523  •  thyroid (ARMOUR THYROID) tablet 60 mg, 60 mg, Oral, BID, Radha Meredith A.P.R.N., 60 mg at 04/18/21 0522  •  calcium carbonate (TUMS) chewable tab 500 mg, 500 mg, Oral, Q8HRS PRN, Radha Meredith A.P.R.N.  •  baclofen (LIORESAL) tablet 15 mg, 15 mg, Oral, TID, Radha Meredith A.P.R.N., 15 mg at 04/18/21 0524  •  amLODIPine (NORVASC) tablet 10 mg, 10 mg, Oral, Q DAY, Radha Meredith A.P.R.N., Stopped at 04/18/21 0600  •  aspirin (ASA) chewable tab 81 mg, 81 mg, Oral, DAILY, Radha Meredith A.P.R.N.  •   atorvastatin (LIPITOR) tablet 80 mg, 80 mg, Oral, Q EVENING, SHERRIE Hampton  •  cloNIDine (CATAPRES) tablet 0.1 mg, 0.1 mg, Oral, Q4HRS PRN, Golden Pearson M.D.  •  hydrALAZINE (APRESOLINE) tablet 25 mg, 25 mg, Oral, Q8HRS, Golden Pearson M.D., 25 mg at 04/18/21 0522  •  hydrALAZINE (APRESOLINE) injection 10 mg, 10 mg, Intravenous, Q HOUR PRN, Cristhian Bell M.D., 10 mg at 04/10/21 1631  •  labetalol (NORMODYNE/TRANDATE) injection 10-20 mg, 10-20 mg, Intravenous, Q4HRS PRN, Cristhian Bell M.D., 10 mg at 04/10/21 1508  •  Pharmacy Consult Request ...Pain Management Review 1 Each, 1 Each, Other, PHARMACY TO DOSE, Lilia Pizano P.A.-C.  •  MD ALERT...DO NOT ADMINISTER NSAIDS or ASPIRIN unless ORDERED By Neurosurgery 1 Each, 1 Each, Other, PRN, LEIGHA MorrisA.-C.  •  bisacodyl (DULCOLAX) suppository 10 mg, 10 mg, Rectal, Q24HRS PRN, HANSA Morris.A.-C., 10 mg at 04/11/21 1811  •  artificial tears (EYE LUBRICANT) ophth ointment 1 Application, 1 Application, Both Eyes, PRN, HANSA Morris.A.-C.  •  ondansetron (ZOFRAN) syringe/vial injection 4 mg, 4 mg, Intravenous, Q4HRS PRN, Gia Forman M.D., 4 mg at 04/17/21 1835  •  promethazine (PHENERGAN) suppository 12.5-25 mg, 12.5-25 mg, Rectal, Q4HRS PRN, Gia Forman M.D.  •  prochlorperazine (COMPAZINE) injection 5-10 mg, 5-10 mg, Intravenous, Q4HRS PRN, Gia Forman M.D., 5 mg at 04/17/21 1234      Fluids    Intake/Output Summary (Last 24 hours) at 4/18/2021 0807  Last data filed at 4/18/2021 0526  Gross per 24 hour   Intake --   Output 675 ml   Net -675 ml       Laboratory      Recent Labs     04/16/21  0647 04/17/21  0241 04/18/21  0229   SODIUM 136 133* 133*   POTASSIUM 4.2 4.4 4.3   CHLORIDE 103 100 98   CO2 25 25 23   GLUCOSE 104* 102* 113*   BUN 35* 30* 27*   CREATININE 0.94 1.03 0.90   CALCIUM 8.9 8.9 8.9                   Imaging  CT-HEAD W/O   Final Result         1.  Evolving area of infarct within the right MCA distribution.   2.   Ribbonlike gyriform hyperdensity at the area of prior infarct, appearance likely corresponding with gyriform laminar necrosis, component of subtle subarachnoid hemorrhage cannot be definitively excluded radiographically.   3.  Minimal pneumocephalus, decreased since prior study.   4.  Atherosclerosis.      US-EXTREMITY VENOUS LOWER BILAT   Final Result      DX-CHEST-LIMITED (1 VIEW)   Final Result      Right basilar atelectasis. No focal consolidation or pleural effusions.      [id number] VIEW   Final Result      No evidence of bowel obstruction.                  DX-CHEST-LIMITED (1 VIEW)   Final Result      1.  Right internal jugular catheter and enteric catheter appear appropriately located      2.  Minimal right basilar atelectasis      CT-HEAD W/O   Final Result         1.  Changes of evolving infarct within the right MCA distribution   2.  Hyperdensity in the sulci of the right MCA distribution infarct, largely appears related to thrombosed vessels, component of subarachnoid hemorrhage is suspected particularly in the right frontal lobe.   3.  Pneumocephalus, decreased since prior.      CT-HEAD W/O   Final Result         Postsurgical change from right craniectomy. Redemonstration of large right MCA infarct with edema and bulging of the brain parenchyma through the craniectomy defect      Less mass effect on the right lateral ventricle. Less right to left midline shift of 3 mm, previously 10 mm.         DX-ABDOMEN FOR TUBE PLACEMENT   Final Result      Enteric tube terminates over the stomach.      CT-HEAD W/O   Final Result         1.  Low-density changes of the right hemisphere compatible with MCA distribution infarct with edema.   2.  New effacement of the bilateral ventricles, right greater than left, with 10 mm right-to-left midline shift.   3.  New dilatation of the left temporal horn ventricle, appearance favors component of obstructive hydrocephalus due to mass effect from infarct and edema.   4.   Hyperdensity in the right middle cerebral artery, likely thrombosis given associated findings.      These findings were discussed with the patient's clinician, Dr. Nunez, on 4/3/2021 7:11 AM.      MR-BRAIN-W/O   Final Result      Very large acute right MCA territory infarct as detailed above with small amount of petechial hemorrhage in the right insular region and right temporal lobe.      Punctate right thalamic lacunar infarct.      Right ICA and M1 MCA occlusion.      EC-ECHOCARDIOGRAM COMPLETE W/O CONT   Final Result      DX-CHEST-PORTABLE (1 VIEW)   Final Result         1.  Interstitial pulmonary parenchymal prominence, compatible with interstitial edema and/or infiltrates.      CT-CTA NECK WITH & W/O-POST PROCESSING   Final Result      Acute occlusion of the right internal carotid artery shortly after bifurcation. It is occluded up to the level of the carotid terminus.      CT-CTA HEAD WITH & W/O-POST PROCESS   Final Result      Acute right M1 occlusion.      Findings were discussed with RHONDA DIAZ on 3/31/2021 7:54 PM.      CT-CEREBRAL PERFUSION ANALYSIS   Final Result      1.  Cerebral blood flow less than 30% likely representing completed infarct = 134 mL.      2.  T Max more than 6 seconds likely representing combination of completed infarct and ischemia = 210 mL.      3.  Mismatched volume likely representing ischemic brain/penumbra = 76 mL.      4.  Please note that the cerebral perfusion was performed on the limited brain tissue around the basal ganglia region. Infarct/ischemia outside the CT perfusion sections can be missed in this study.      CT-HEAD W/O   Final Result   Addendum 1 of 1   In retrospect, there is subtle loss of gray-white matter differentiation    in the right MCA distribution, consistent with acute infarct.      Final      1.  No CT evidence of acute infarct, hemorrhage or mass.   2.  Mild paranasal sinus disease.           Assessment/Plan  * Acute right MCA stroke (HCC)-  (present on admission)  Assessment & Plan  Likely consequence of COVID19 in 3/21 and hx of afib  Subsequent brain edema requiring right decompressive hemicraniectomy on 4/3/2021  Neurology following patient will benefit from long-term anticoagulation when bleeding risk is acceptable likely 1-2 weeks from event onset.   Blood pressure control keep SBP<140 (on metoprolol 25mg BID, amlodipine 10mg, hydralazine 25mg TID, lisinopril 40mg daily)  Patient weaned off hypertonic saline and started on salt tablets  PT/OT/SLP  Aspiration precautions  Physiatry consultation   for discharge planning: repeat PT/OT eval again tomorrow  Now having vertigo  Started on meclizine    Paroxysmal atrial fibrillation (HCC)- (present on admission)  Assessment & Plan  Monitor vitals and on tele  Metoprolol 25mg BID w/ parameters  Discussed risk benefits and alternatives of long-term anticoagulation with patient and wife, plan to start DOAC when bleeding risk felt to be acceptable per neurology likely in 1 to 2 weeks    Leucocytosis  Assessment & Plan  Resolved but continue to monitor  4/11 WBC:7.7    Urinary retention  Assessment & Plan  Brewer catheter in place  Watch for urinary retention  prazosin    History of COVID-19- (present on admission)  Assessment & Plan  Clinically recovered patient symptom onset on 3/15/2021 with positive initial test on 3/18/2021    Acquired hypothyroidism- (present on admission)  Assessment & Plan  [place] Thyroid 60mg BID   TSH is less than 0.005 will decrease dose and he will need recheck TFTs in 6 weeks (prior to admit was on 90mg BID)  TSH: <0.005 in past week.       VTE prophylaxis: Enoxaparin 40mg SQ daily

## 2021-04-19 LAB
ANION GAP SERPL CALC-SCNC: 9 MMOL/L (ref 7–16)
BUN SERPL-MCNC: 31 MG/DL (ref 8–22)
CALCIUM SERPL-MCNC: 9.4 MG/DL (ref 8.5–10.5)
CHLORIDE SERPL-SCNC: 99 MMOL/L (ref 96–112)
CO2 SERPL-SCNC: 25 MMOL/L (ref 20–33)
CREAT SERPL-MCNC: 0.99 MG/DL (ref 0.5–1.4)
CRP SERPL HS-MCNC: 2.25 MG/DL (ref 0–0.75)
GLUCOSE SERPL-MCNC: 95 MG/DL (ref 65–99)
POTASSIUM SERPL-SCNC: 4.5 MMOL/L (ref 3.6–5.5)
PREALB SERPL-MCNC: 23.1 MG/DL (ref 18–38)
SODIUM SERPL-SCNC: 133 MMOL/L (ref 135–145)

## 2021-04-19 PROCEDURE — 700102 HCHG RX REV CODE 250 W/ 637 OVERRIDE(OP): Performed by: INTERNAL MEDICINE

## 2021-04-19 PROCEDURE — 97535 SELF CARE MNGMENT TRAINING: CPT | Mod: CO

## 2021-04-19 PROCEDURE — 99233 SBSQ HOSP IP/OBS HIGH 50: CPT | Performed by: PHYSICAL MEDICINE & REHABILITATION

## 2021-04-19 PROCEDURE — 86140 C-REACTIVE PROTEIN: CPT

## 2021-04-19 PROCEDURE — 700102 HCHG RX REV CODE 250 W/ 637 OVERRIDE(OP): Performed by: NURSE PRACTITIONER

## 2021-04-19 PROCEDURE — 97112 NEUROMUSCULAR REEDUCATION: CPT | Mod: CO

## 2021-04-19 PROCEDURE — 97530 THERAPEUTIC ACTIVITIES: CPT

## 2021-04-19 PROCEDURE — 36415 COLL VENOUS BLD VENIPUNCTURE: CPT

## 2021-04-19 PROCEDURE — 99232 SBSQ HOSP IP/OBS MODERATE 35: CPT | Performed by: INTERNAL MEDICINE

## 2021-04-19 PROCEDURE — A9270 NON-COVERED ITEM OR SERVICE: HCPCS | Performed by: NURSE PRACTITIONER

## 2021-04-19 PROCEDURE — 770020 HCHG ROOM/CARE - TELE (206)

## 2021-04-19 PROCEDURE — 80048 BASIC METABOLIC PNL TOTAL CA: CPT

## 2021-04-19 PROCEDURE — 97112 NEUROMUSCULAR REEDUCATION: CPT

## 2021-04-19 PROCEDURE — 84134 ASSAY OF PREALBUMIN: CPT

## 2021-04-19 PROCEDURE — A9270 NON-COVERED ITEM OR SERVICE: HCPCS | Performed by: INTERNAL MEDICINE

## 2021-04-19 RX ADMIN — LISINOPRIL 40 MG: 20 TABLET ORAL at 05:16

## 2021-04-19 RX ADMIN — BACLOFEN 15 MG: 10 TABLET ORAL at 12:59

## 2021-04-19 RX ADMIN — THYROID, PORCINE 60 MG: 30 TABLET ORAL at 17:41

## 2021-04-19 RX ADMIN — HYDRALAZINE HYDROCHLORIDE 25 MG: 25 TABLET, FILM COATED ORAL at 05:17

## 2021-04-19 RX ADMIN — ATORVASTATIN CALCIUM 80 MG: 80 TABLET, FILM COATED ORAL at 17:41

## 2021-04-19 RX ADMIN — ACETAMINOPHEN 650 MG: 325 TABLET, FILM COATED ORAL at 09:30

## 2021-04-19 RX ADMIN — BACLOFEN 15 MG: 10 TABLET ORAL at 17:52

## 2021-04-19 RX ADMIN — METOPROLOL TARTRATE 25 MG: 25 TABLET, FILM COATED ORAL at 17:41

## 2021-04-19 RX ADMIN — THYROID, PORCINE 60 MG: 30 TABLET ORAL at 05:16

## 2021-04-19 RX ADMIN — DOCUSATE SODIUM 50 MG AND SENNOSIDES 8.6 MG 1 TABLET: 8.6; 5 TABLET, FILM COATED ORAL at 20:54

## 2021-04-19 RX ADMIN — METOPROLOL TARTRATE 25 MG: 25 TABLET, FILM COATED ORAL at 05:16

## 2021-04-19 RX ADMIN — PHENYTOIN 1 MG: 125 SUSPENSION ORAL at 17:40

## 2021-04-19 RX ADMIN — RIVAROXABAN 20 MG: 20 TABLET, FILM COATED ORAL at 17:41

## 2021-04-19 RX ADMIN — BACLOFEN 15 MG: 10 TABLET ORAL at 05:17

## 2021-04-19 RX ADMIN — MECLIZINE HYDROCHLORIDE 25 MG: 25 TABLET ORAL at 09:31

## 2021-04-19 RX ADMIN — ASPIRIN 81 MG: 81 TABLET, CHEWABLE ORAL at 17:41

## 2021-04-19 ASSESSMENT — COGNITIVE AND FUNCTIONAL STATUS - GENERAL
MOVING TO AND FROM BED TO CHAIR: UNABLE
DAILY ACTIVITIY SCORE: 8
CLIMB 3 TO 5 STEPS WITH RAILING: TOTAL
PERSONAL GROOMING: A LOT
SUGGESTED CMS G CODE MODIFIER MOBILITY: CN
STANDING UP FROM CHAIR USING ARMS: TOTAL
WALKING IN HOSPITAL ROOM: TOTAL
HELP NEEDED FOR BATHING: TOTAL
TURNING FROM BACK TO SIDE WHILE IN FLAT BAD: UNABLE
MOVING FROM LYING ON BACK TO SITTING ON SIDE OF FLAT BED: UNABLE
TOILETING: TOTAL
DRESSING REGULAR LOWER BODY CLOTHING: TOTAL
DRESSING REGULAR UPPER BODY CLOTHING: TOTAL
EATING MEALS: A LOT
MOBILITY SCORE: 6
SUGGESTED CMS G CODE MODIFIER DAILY ACTIVITY: CM

## 2021-04-19 ASSESSMENT — ENCOUNTER SYMPTOMS
PALPITATIONS: 0
ABDOMINAL PAIN: 0
VOMITING: 0
FOCAL WEAKNESS: 1
NECK PAIN: 0
DIZZINESS: 1
SENSORY CHANGE: 1
STRIDOR: 0
DIARRHEA: 0
HEMOPTYSIS: 0
COUGH: 0
BACK PAIN: 0
HEADACHES: 0

## 2021-04-19 ASSESSMENT — GAIT ASSESSMENTS: GAIT LEVEL OF ASSIST: UNABLE TO PARTICIPATE

## 2021-04-19 NOTE — CARE PLAN
Problem: Safety  Goal: Will remain free from injury  Outcome: PROGRESSING AS EXPECTED  Goal: Will remain free from falls  Outcome: PROGRESSING AS EXPECTED     Problem: Venous Thromboembolism (VTW)/Deep Vein Thrombosis (DVT) Prevention:  Goal: Patient will participate in Venous Thrombosis (VTE)/Deep Vein Thrombosis (DVT)Prevention Measures  Outcome: PROGRESSING AS EXPECTED     Problem: Safety:  Goal: Will remain free from injury  Outcome: PROGRESSING AS EXPECTED     Problem: Nutritional:  Goal: Dysphagia will improve  Outcome: PROGRESSING AS EXPECTED  Goal: Maintenance of adequate nutrition will improve  Outcome: PROGRESSING AS EXPECTED

## 2021-04-19 NOTE — THERAPY
Occupational Therapy  Daily Treatment     Patient Name: Thong Arzola  Age:  56 y.o., Sex:  male  Medical Record #: [id number]  Today's Date: 4/19/2021       Precautions: Fall Risk, Swallow Precautions ( See Comments)  Comments: L neglect, helmet on when OOB, no bone flap     Assessment    Pt seen for OT tx. Continues to be limited by decreased activity tolerance, balance deficits, inattention, L neglect and LUE weakness impacting ability to complete ADLs and ADL transfers independently. Pt continues to have L neglect, unable to track past midline but compensate by moving head to L. Improvement in visually scanning to L w/ mod cues from therapist. LUE continues to be flaccid w/ subluxation. Pt reports impaired sensation, only able to feel deep pressure but not consistently. Will continue to benefit from OT services while in house.     Plan    Continue current treatment plan.    DC Equipment Recommendations: Unable to determine at this time  Discharge Recommendations: Recommend post-acute placement for additional occupational therapy services prior to discharge home       04/19/21 1235   Cognition    Cognition / Consciousness X   Safety Awareness Impaired   New Learning Impaired   Attention Impaired   Initiation Impaired   Comments severe L neglect, eyes don't cross midline but compensates by turning head    Active ROM Upper Body   Active ROM Upper Body  X   Comments LUE flaccid w/ no active or purposeful movement    Strength Upper Body   Upper Body Strength  X   Comments LUE flaccid    Balance   Sitting Balance (Static) Trace +   Sitting Balance (Dynamic) Trace   Standing Balance (Static) Dependent   Standing Balance (Dynamic) Dependent   Weight Shift Sitting Poor   Weight Shift Standing Absent   Bed Mobility    Supine to Sit Maximal Assist   Sit to Supine Maximal Assist   Scooting Maximal Assist   Rolling Moderate Assist to Lt.;Total Assist to Rt.   Activities of Daily Living   Grooming Moderate Assist;Seated    Upper Body Dressing Maximal Assist   Lower Body Dressing Maximal Assist   Toileting Total Assist   Functional Mobility   Sit to Stand Maximal Assist   Bed, Chair, Wheelchair Transfer Maximal Assist   Short Term Goals   Short Term Goal # 1 Pt will sit EOB unsupported and perform grooming w/ SPV   Goal Outcome # 1 Progressing slower than expected   Short Term Goal # 2 Pt will attend 3 objects on L side w/ only v/c's   Goal Outcome # 2 Progressing as expected   Short Term Goal # 3 Pt will increase L UE strength to 2/5 grossly   Goal Outcome # 3 Progressing slower than expected   Anticipated Discharge Equipment and Recommendations   DC Equipment Recommendations Unable to determine at this time   Discharge Recommendations Recommend post-acute placement for additional occupational therapy services prior to discharge home

## 2021-04-19 NOTE — THERAPY
Physical Therapy   Daily Treatment     Patient Name: Thong Arzola  Age:  56 y.o., Sex:  male  Medical Record #: [id number]  Today's Date: 4/19/2021     Precautions: Fall Risk, Swallow Precautions ( See Comments)    Assessment    Pushing to L persists. Different strategies to attempt to decrease pushing including lateral prop on R elbow, R hand crossing midline to rest on L, and resting hand in lap. Pt not successful when attempting to use R UE to pull self to midline, continues to push. Postural facilitation for thoracic and cervical extension, activation of rhomboids noted. Work on attention to L side, pt has extreme difficulty crossing midline. Does best when cued to look at therapist's eyes, otherwise has difficulty finding targets.     Plan    Continue current treatment plan.    DC Equipment Recommendations: Unable to determine at this time  Discharge Recommendations: Recommend post-acute placement for additional physical therapy services prior to discharge home           Objective       04/19/21 1158   Cognition    Level of Consciousness Alert   Ability To Follow Commands 1 Step   Safety Awareness Impaired   New Learning Impaired   Attention Impaired   Comments severe L neglect persists   Neuro-Muscular Treatments   Neuro-Muscular Treatments Weight Shift Left;Weight Shift Right;Verbal Cuing;Tapping;Tactile Cuing;Sequencing;Joint Approximation;Anterior weight shift;Postural Facilitation   Comments Pt is pusher with R UE (pushes to L). Different strategies to attempt to decrease pushing including lateral prop on R elbow, R hand crossing midline to rest on L, and resting hand in lap. Pt not successful when attempting to use R UE to pull self to midline, continues to push. Postural facilitation for thoracic and cervical extension, activation of rhomboids noted. Work on attention to L side, pt has extreme difficulty crossing midline. Does best when cued to look at therapist's eyes, otherwise has difficulty  finding targets.    Balance   Sitting Balance (Static) Trace +   Sitting Balance (Dynamic) Trace   Standing Balance (Static) Dependent   Weight Shift Sitting Poor   Gait Analysis   Gait Level Of Assist Unable to Participate   Bed Mobility    Supine to Sit Maximal Assist   Sit to Supine Maximal Assist   Scooting Maximal Assist   Short Term Goals    Short Term Goal # 1 Pt will be able to perform supine<>sit with bed features with min A in 6tx to promote fnx progression towards I    Goal Outcome # 1 goal not met   Short Term Goal # 2 Pt will be able to perform sit<>stand/transfers with hemiwalker with Taz in 6tx to promote fnx progression towards I    Goal Outcome # 2 Goal not met   Short Term Goal # 3 Pt will be able to ambulate 25ft with hemiwalker with min A in 6tx to promote fnx progression towards I    Goal Outcome # 3 Goal not met

## 2021-04-19 NOTE — DIETARY
"Nutrition Services: Update   Day 19 of admit.  Thong Arzola is a 56 y.o. male with admitting DX of Acute ischemic right MCA stroke.     Pt is currently on minced and moist diet w/ mildly thick liquids and 1:1 supervision. PO intake per flowsheets was % x2 meals on 4/15,  no meals documented on 4/16 - 4/17, <25% x3 meals yesterday, 0% of breakfast this morning. RD observed several \"Muscle Milk\" protein drinks at bedside ~50% consumed (~80 kcal).       Wt 4/11: 76.1 kg via bed scale - this is decreased by 3.5 kg since admit wt 79.6 kg on 3/31. No new wt in chart x8 days, unable to assess overall trend at this time. Noted pt is currently -7.2 L since admit.    RD spoke with pt and family member at bedside. Family members notes pt is not eating much, though did say pt really enjoyed the \"berry puree\" this morning. Pt mentions minced and moist foods cause nausea, however, states he does well with thickened liquids. Pt's family member mentioned bringing in yogurt and protein shakes for pt. RD offered to add yogurt and provide thickened Boost supplements with meals. Pt agreeable. RD emphasized Boost Plus drinks will optimize nutrition by providing a concentrated form of calories/protein/nutrients.     Pt appeared thin though had face covered with towel and room dim at time of visit. Unable to observe any obvious signs of muscle/fat wasting.     Malnutrition Risk: At risk with recent poor PO, though note pt was receiving adequate nutrition via TF from 4/3 - 4/14. Unable to accurately assess wt trend w/o recent measured wt in chart.     Recommendations/Plan:  1. RD to add high protein supplements TID and yogurt TID with meals.    2. Encourage intake of meals, supplements  3. Document intake of all meals, supplements as % taken in ADL's to provide interdisciplinary communication across all shifts.   4. Advance diet as appropriate per SLP  5. Monitor weight. Please obtain new wt.   6. Nutrition rep will continue " to see patient for ongoing meal and snack preferences.    RD following

## 2021-04-19 NOTE — CARE PLAN
Problem: Nutritional:  Goal: Achieve adequate nutritional intake  Description: Patient will consume >50% of meals, supplements  Outcome: PROGRESSING SLOWER THAN EXPECTED   See RD note

## 2021-04-19 NOTE — PROGRESS NOTES
Hospital Medicine Daily Progress Note    Date of Service  4/19/2021    Chief Complaint  Left sided weakness    Hospital Course  56 y.o. male admitted 3/31/2021 with acute stroke.  He was not a candidate for tPA nor thrombectomy.  He has a history of paroxysmal atrial fibrillation, dyslipidemia, hypothyroidism. During admit he underwent a hemicraniectomy for right sided decompression from right MCA dense stroke with intractable intracranial pressure.     Interval Problem Update  4/12: Patient verbal and oriented with some slurred speech.  Left hemiplegia. On 4/11 Dr. Leo met with the wife and explained to her we likely will need a G-tube at some point.  Also alerted the both of them we would need a SNF at some point.  He reports that his pain is reasonably well controlled at this juncture, he is describing some thirst.  Free water flushes will be increased from 30 mL to 90 mL every 6 hours.  This can be backed off if sodium begins to drop.    4/13 physical therapy seen the patient.  On tube feeding still.  Speech therapy to follow.   to follow for discharge planning.    4/14: The patient was retaining urine this morning and will try to get a straight cath.  Also discussed with the wife and she said that she will try to get insurance starting May 1 for him.  I also started for Flomax    4/15: Discharge planning by . No acute issue overnight.    4/16 no acute issue overnight.  Discharge planning by     4/17: the patient is sleeping comfortably on the bed. No acute issue overnight.    4/18 the patient is having vertigo symptom.  I will start her on meclizine and see how he does.    4/19: The patient is sleeping comfortably on the bed.  His vertigo is improving with meclizine.  PT OT to evaluate him today.  Consultants/Specialty  Neurology  Neuro-intensivist (signing off 4/11)  Neuro surgery    Code Status  Full Code    Disposition  Remain on the floor    Review of Systems  Review  of Systems   Constitutional: Positive for malaise/fatigue.   Respiratory: Negative for cough, hemoptysis and stridor.    Cardiovascular: Negative for chest pain and palpitations.   Gastrointestinal: Negative for abdominal pain, diarrhea and vomiting.   Genitourinary: Negative for dysuria, frequency and urgency.   Musculoskeletal: Negative for back pain and neck pain.   Skin: Negative for rash.   Neurological: Positive for dizziness, sensory change (left side) and focal weakness (left side). Negative for headaches (right temporal region).        Physical Exam  Temp:  [35.8 °C (96.5 °F)-36.4 °C (97.5 °F)] 36.4 °C (97.5 °F)  Pulse:  [] 72  Resp:  [16-18] 16  BP: ()/(56-78) 108/71  SpO2:  [92 %-96 %] 95 %    Physical Exam  Vitals reviewed.   Constitutional:       Appearance: Normal appearance. He is diaphoretic.   HENT:      Head: Normocephalic and atraumatic.      Nose: Nose normal.      Mouth/Throat:      Mouth: Mucous membranes are moist.   Eyes:      Extraocular Movements: Extraocular movements intact.      Conjunctiva/sclera: Conjunctivae normal.   Cardiovascular:      Rate and Rhythm: Normal rate.      Pulses: Normal pulses.           Radial pulses are 2+ on the right side and 2+ on the left side.        Dorsalis pedis pulses are 2+ on the right side and 2+ on the left side.      Heart sounds: Normal heart sounds.   Pulmonary:      Effort: Pulmonary effort is normal.   Abdominal:      General: Bowel sounds are normal.      Palpations: Abdomen is soft.   Musculoskeletal:         General: No swelling or tenderness.      Cervical back: Neck supple. No muscular tenderness.      Right lower leg: No edema.      Left lower leg: No edema.   Lymphadenopathy:      Cervical: No cervical adenopathy.   Neurological:      Mental Status: He is alert.      Cranial Nerves: Cranial nerve deficit present.      Sensory: Sensory deficit present.      Coordination: Coordination abnormal.      Gait: Gait abnormal.          Current Facility-Administered Medications:   •  meclizine (ANTIVERT) tablet 25 mg, 25 mg, Oral, TID PRN, Golden Pearson M.D., 25 mg at 04/18/21 1213  •  ondansetron (ZOFRAN ODT) dispertab 4 mg, 4 mg, Oral, Q4HRS PRN, Radha Meredith, A.P.R.N.  •  acetaminophen (Tylenol) tablet 650 mg, 650 mg, Oral, Q6HRS PRN, Radha Meredith, A.P.R.N.  •  metoprolol tartrate (LOPRESSOR) tablet 25 mg, 25 mg, Oral, BID, Radha Meredith, A.P.R.N., 25 mg at 04/19/21 0516  •  senna-docusate (PERICOLACE or SENOKOT S) 8.6-50 MG per tablet 1 tablet, 1 tablet, Oral, Nightly, Radha Mreedith, A.P.R.N., Stopped at 04/18/21 2100  •  oxyCODONE immediate-release (ROXICODONE) tablet 5 mg, 5 mg, Oral, Q3HRS PRN **OR** oxyCODONE immediate release (ROXICODONE) tablet 10 mg, 10 mg, Oral, Q3HRS PRN **OR** HYDROmorphone (Dilaudid) injection 0.5 mg, 0.5 mg, Intravenous, Q3HRS PRN, Radha Meredith, A.P.R.N.  •  polyethylene glycol/lytes (MIRALAX) PACKET 1 Packet, 1 Packet, Oral, BID PRN, Radha Meredith, A.P.R.N.  •  prazosin (MINIPRESS) capsule 1 mg, 1 mg, Oral, Q EVENING, Radha Meredith, A.P.R.N., 1 mg at 04/18/21 1166  •  senna-docusate (PERICOLACE or SENOKOT S) 8.6-50 MG per tablet 1 tablet, 1 tablet, Oral, Q24HRS PRN, Radha Meredith, A.P.R.N.  •  rivaroxaban (XARELTO) tablet 20 mg, 20 mg, Oral, PM MEAL, Radha Meredith, A.P.R.N., 20 mg at 04/18/21 1757  •  promethazine (PHENERGAN) tablet 12.5-25 mg, 12.5-25 mg, Oral, Q4HRS PRN, Radha Meredith A.P.R.N.  •  magnesium hydroxide (MILK OF MAGNESIA) suspension 30 mL, 30 mL, Oral, QDAY PRN, Radha Meredith A.P.R.N.  •  lisinopril (PRINIVIL) tablet 40 mg, 40 mg, Oral, Q DAY, Radha Meredith A.P.R.N., 40 mg at 04/19/21 0516  •  thyroid (ARMOUR THYROID) tablet 60 mg, 60 mg, Oral, BID, Radha Meredith A.P.R.N., 60 mg at 04/19/21 0516  •  calcium carbonate (TUMS) chewable tab 500 mg, 500 mg, Oral, Q8HRS PRN, Radha Meredith A.P.R.N.  •  baclofen (LIORESAL) tablet 15 mg, 15 mg, Oral, TID, Radha Meredith A.P.R.N.,  15 mg at 04/19/21 0517  •  amLODIPine (NORVASC) tablet 10 mg, 10 mg, Oral, Q DAY, Radha Meredith, A.P.R.N., Stopped at 04/18/21 0600  •  aspirin (ASA) chewable tab 81 mg, 81 mg, Oral, DAILY, Radha Meredith, A.P.R.N., 81 mg at 04/18/21 1757  •  atorvastatin (LIPITOR) tablet 80 mg, 80 mg, Oral, Q EVENING, Radha Meredith, A.P.R.N., 80 mg at 04/18/21 1757  •  cloNIDine (CATAPRES) tablet 0.1 mg, 0.1 mg, Oral, Q4HRS PRN, Golden Pearson M.D.  •  hydrALAZINE (APRESOLINE) tablet 25 mg, 25 mg, Oral, Q8HRS, Golden Pearson M.D., 25 mg at 04/19/21 0517  •  hydrALAZINE (APRESOLINE) injection 10 mg, 10 mg, Intravenous, Q HOUR PRN, Cristhian Bell M.D., 10 mg at 04/10/21 1631  •  labetalol (NORMODYNE/TRANDATE) injection 10-20 mg, 10-20 mg, Intravenous, Q4HRS PRN, Cristhian Bell M.D., 10 mg at 04/10/21 1508  •  Pharmacy Consult Request ...Pain Management Review 1 Each, 1 Each, Other, PHARMACY TO DOSE, Lilia Pizano P.A.-C.  •  MD ALERT...DO NOT ADMINISTER NSAIDS or ASPIRIN unless ORDERED By Neurosurgery 1 Each, 1 Each, Other, PRN, NADEEN Morris.-C.  •  bisacodyl (DULCOLAX) suppository 10 mg, 10 mg, Rectal, Q24HRS PRN, NADEEN Morris.-C., 10 mg at 04/11/21 1811  •  artificial tears (EYE LUBRICANT) ophth ointment 1 Application, 1 Application, Both Eyes, PRN, CRISTI Morris-C.  •  ondansetron (ZOFRAN) syringe/vial injection 4 mg, 4 mg, Intravenous, Q4HRS PRN, Gia Forman M.D., 4 mg at 04/18/21 1832  •  promethazine (PHENERGAN) suppository 12.5-25 mg, 12.5-25 mg, Rectal, Q4HRS PRN, Gia Forman M.D.  •  prochlorperazine (COMPAZINE) injection 5-10 mg, 5-10 mg, Intravenous, Q4HRS PRN, Gia Forman M.D., 5 mg at 04/17/21 1234      Fluids    Intake/Output Summary (Last 24 hours) at 4/19/2021 0736  Last data filed at 4/19/2021 0500  Gross per 24 hour   Intake --   Output 1025 ml   Net -1025 ml       Laboratory      Recent Labs     04/17/21  0241 04/18/21  0229   SODIUM 133* 133*   POTASSIUM 4.4 4.3   CHLORIDE 100  98   CO2 25 23   GLUCOSE 102* 113*   BUN 30* 27*   CREATININE 1.03 0.90   CALCIUM 8.9 8.9                   Imaging  CT-HEAD W/O   Final Result         1.  Evolving area of infarct within the right MCA distribution.   2.  Ribbonlike gyriform hyperdensity at the area of prior infarct, appearance likely corresponding with gyriform laminar necrosis, component of subtle subarachnoid hemorrhage cannot be definitively excluded radiographically.   3.  Minimal pneumocephalus, decreased since prior study.   4.  Atherosclerosis.      US-EXTREMITY VENOUS LOWER BILAT   Final Result      DX-CHEST-LIMITED (1 VIEW)   Final Result      Right basilar atelectasis. No focal consolidation or pleural effusions.      [id number] VIEW   Final Result      No evidence of bowel obstruction.                  DX-CHEST-LIMITED (1 VIEW)   Final Result      1.  Right internal jugular catheter and enteric catheter appear appropriately located      2.  Minimal right basilar atelectasis      CT-HEAD W/O   Final Result         1.  Changes of evolving infarct within the right MCA distribution   2.  Hyperdensity in the sulci of the right MCA distribution infarct, largely appears related to thrombosed vessels, component of subarachnoid hemorrhage is suspected particularly in the right frontal lobe.   3.  Pneumocephalus, decreased since prior.      CT-HEAD W/O   Final Result         Postsurgical change from right craniectomy. Redemonstration of large right MCA infarct with edema and bulging of the brain parenchyma through the craniectomy defect      Less mass effect on the right lateral ventricle. Less right to left midline shift of 3 mm, previously 10 mm.         DX-ABDOMEN FOR TUBE PLACEMENT   Final Result      Enteric tube terminates over the stomach.      CT-HEAD W/O   Final Result         1.  Low-density changes of the right hemisphere compatible with MCA distribution infarct with edema.   2.  New effacement of the bilateral ventricles, right  greater than left, with 10 mm right-to-left midline shift.   3.  New dilatation of the left temporal horn ventricle, appearance favors component of obstructive hydrocephalus due to mass effect from infarct and edema.   4.  Hyperdensity in the right middle cerebral artery, likely thrombosis given associated findings.      These findings were discussed with the patient's clinician, Dr. Nunez, on 4/3/2021 7:11 AM.      MR-BRAIN-W/O   Final Result      Very large acute right MCA territory infarct as detailed above with small amount of petechial hemorrhage in the right insular region and right temporal lobe.      Punctate right thalamic lacunar infarct.      Right ICA and M1 MCA occlusion.      EC-ECHOCARDIOGRAM COMPLETE W/O CONT   Final Result      DX-CHEST-PORTABLE (1 VIEW)   Final Result         1.  Interstitial pulmonary parenchymal prominence, compatible with interstitial edema and/or infiltrates.      CT-CTA NECK WITH & W/O-POST PROCESSING   Final Result      Acute occlusion of the right internal carotid artery shortly after bifurcation. It is occluded up to the level of the carotid terminus.      CT-CTA HEAD WITH & W/O-POST PROCESS   Final Result      Acute right M1 occlusion.      Findings were discussed with RHONDA DIAZ on 3/31/2021 7:54 PM.      CT-CEREBRAL PERFUSION ANALYSIS   Final Result      1.  Cerebral blood flow less than 30% likely representing completed infarct = 134 mL.      2.  T Max more than 6 seconds likely representing combination of completed infarct and ischemia = 210 mL.      3.  Mismatched volume likely representing ischemic brain/penumbra = 76 mL.      4.  Please note that the cerebral perfusion was performed on the limited brain tissue around the basal ganglia region. Infarct/ischemia outside the CT perfusion sections can be missed in this study.      CT-HEAD W/O   Final Result   Addendum 1 of 1   In retrospect, there is subtle loss of gray-white matter differentiation    in the right  MCA distribution, consistent with acute infarct.      Final      1.  No CT evidence of acute infarct, hemorrhage or mass.   2.  Mild paranasal sinus disease.           Assessment/Plan  * Acute right MCA stroke (HCC)- (present on admission)  Assessment & Plan  Likely consequence of COVID19 in 3/21 and hx of afib  Subsequent brain edema requiring right decompressive hemicraniectomy on 4/3/2021  Neurology following patient will benefit from long-term anticoagulation when bleeding risk is acceptable likely 1-2 weeks from event onset.   Blood pressure control keep SBP<140 (on metoprolol 25mg BID, amlodipine 10mg, hydralazine 25mg TID, lisinopril 40mg daily)  Patient weaned off hypertonic saline and started on salt tablets  PT/OT/SLP  Aspiration precautions  Physiatry consultation   for discharge planning: repeat PT/OT eval again today  Now having vertigo  Started on meclizine and improving    Paroxysmal atrial fibrillation (HCC)- (present on admission)  Assessment & Plan  Monitor vitals and on tele  Metoprolol 25mg BID w/ parameters  Discussed risk benefits and alternatives of long-term anticoagulation with patient and wife, plan to start DOAC when bleeding risk felt to be acceptable per neurology likely in 1 to 2 weeks    Leucocytosis  Assessment & Plan  Resolved but continue to monitor  4/11 WBC:7.7    Urinary retention  Assessment & Plan  Brewer catheter in place  Watch for urinary retention  prazosin    History of COVID-19- (present on admission)  Assessment & Plan  Clinically recovered patient symptom onset on 3/15/2021 with positive initial test on 3/18/2021    Acquired hypothyroidism- (present on admission)  Assessment & Plan  Onset Thyroid 60mg BID   TSH is less than 0.005 will decrease dose and he will need recheck TFTs in 6 weeks (prior to admit was on 90mg BID)  TSH: <0.005 in past week.       VTE prophylaxis: Enoxaparin 40mg SQ daily

## 2021-04-19 NOTE — PROGRESS NOTES
Physical Medicine and Rehabilitation Consultation              Date of initial consultation: 4/2/2021  Consulting provider: Mc Calvillo MD  Reason for consultation: assess for acute inpatient rehab appropriateness  LOS: 19 Day(s)    Chief complaint: Stroke    HPI: The patient is a 56 y.o. right hand dominant male with a past medical history of hypertension, hyperlipidemia, atrial fibrillation not on anticoagulation, Covid positive on 3/18;  who presented on 3/31/2021  7:29 PM brought in by care flight with left hemiplegia, facial droop, right gaze deviation.  CT head showed complete occlusion of right MCA, but unfortunately he was not a candidate for TPA or thrombectomy.  Seen by neurology, found to have a NIH score of 18.  Additional work-up with CTA shows right ICA complete occlusion.  Etiology is thought to be cardioembolic due to A. fib off of AC.  He is currently being followed closely by neurosurgery as he is expected to have peak brain swelling in the next 1 to 2 days and may require craniotomy.  Follow-up MRI shows minimal shift with large MCA stroke    Most of my visit is with Jims wife but also with him. He endorses left neglect and weakness, but does not endorse paresthesias at this time. I had a long meeting with his wife about expectations with this type and severity of stroke.     4/7/2021  I have been following patient's chart in the periphery, since my last visit he has undergone hemicraniectomy on 4/3 for increased cerebral swelling, and postop.  Has been complicated by continuing cerebral edema resulting in midline shift.  Neurology continuing to follow. Son at bedside. Patient still have severe left neglect. He continues to have trace left hand movement.     4/9/2021  Patient continues to have severe left neglect and spasticity. ROM training given to wife at bedside. Patient is developing a functional torticollis from only looking right due to neglect. No hand movement observed today.  "Patient denies new complaints.     4/13/2021  Patient is tired today, wife reports a \"loud night\".  Patient is doing slightly better with his neglect, his neck is still very much turned to the right, but he is able to locate his left hand with his right hand.  Discussed course for therapy and rehab admission with patient's wife and therapists.  Wife is considering paying out-of-pocket, we will try to accommodate her as best we can and also work with the patient as much as possible in the acute setting.     4/15/2021  Patient seen in follow-up, resting comfortably in bed.  Discussed progress with wife who reports some nausea with therapy related to his neglect.  Discussed adding a scopolamine patch.  Also discussed adding Flomax for urinary retention symptoms, possibly removing Brewer between now and Monday and continuing of bladder scans.  Also discussed rehab placement when patient is doing better    4/19/2021  Much more alert today, answering questions in short phrases.  Neglect appears to be a little better today.  Spasticity improved, except in neck which is still firm.  Discussed with son at bedside overall plan of care.    Social Hx:  2 SH  2-3 MAKENZIE, 1 flight of stairs with rails inside  With: Spouse    Per [facility] \"Anel [wife] tells me she is currently working full time, has flexible job and will take time off to provide 24/7 support when Thong returnes home.  They live 2 story home, 2 -3 steps to enter.  Master bedroom on ground floor.  Bathroom has tub/shower combo, no safety grab bars. \"    THERAPY:  PT: Functional mobility   4/1: Mod assist sit to stand with 2 people  4/7: Max Assist  4/12: Max assist supine to sit, total assist sit to supine  4/14: Max assist sit to stand    OT: ADLs  4/1: Mod assist grooming, max assist lower body dressing  4/7: Total assist   4/12: Max assist dressing, total assist toileting  4/14: Total assist toileting    SLP:   4/1: N.p.o. pending fees  4/2: minced and " moist/mildly thick liquid   4/7: NPO  4/8: NPO with 3-5 ice chips per hour  4/14: Minced and moist diet with direct one-to-one supervision    IMAGING:  MR brain 4/1/2021  Very large acute right MCA territory infarct as detailed above with small amount of petechial hemorrhage in the right insular region and right temporal lobe.  Punctate right thalamic lacunar infarct.  Right ICA and M1 MCA occlusion.    PROCEDURES:  None    PMH:  Past Medical History:   Diagnosis Date   • Afib (HCC)    • High cholesterol        PSH:  Past Surgical History:   Procedure Laterality Date   • CRANIOTOMY Right 4/3/2021    Procedure: CRANIOTOMY;  Surgeon: Arthur Payton M.D.;  Location: SURGERY [facility];  Service: Neurosurgery   • KNEE RECONSTRUCTION      left knee orthoscopic       FHX:  Non-pertinent to today's issues    Medications:  Current Facility-Administered Medications   Medication Dose   • meclizine (ANTIVERT) tablet 25 mg  25 mg   • ondansetron (ZOFRAN ODT) dispertab 4 mg  4 mg   • acetaminophen (Tylenol) tablet 650 mg  650 mg   • metoprolol tartrate (LOPRESSOR) tablet 25 mg  25 mg   • senna-docusate (PERICOLACE or SENOKOT S) 8.6-50 MG per tablet 1 tablet  1 tablet   • oxyCODONE immediate release (ROXICODONE) tablet 10 mg  10 mg    Or   • oxyCODONE immediate-release (ROXICODONE) tablet 5 mg  5 mg    Or   • HYDROmorphone (Dilaudid) injection 0.5 mg  0.5 mg   • polyethylene glycol/lytes (MIRALAX) PACKET 1 Packet  1 Packet   • prazosin (MINIPRESS) capsule 1 mg  1 mg   • senna-docusate (PERICOLACE or SENOKOT S) 8.6-50 MG per tablet 1 tablet  1 tablet   • rivaroxaban (XARELTO) tablet 20 mg  20 mg   • promethazine (PHENERGAN) tablet 12.5-25 mg  12.5-25 mg   • magnesium hydroxide (MILK OF MAGNESIA) suspension 30 mL  30 mL   • lisinopril (PRINIVIL) tablet 40 mg  40 mg   • thyroid (ARMOUR THYROID) tablet 60 mg  60 mg   • calcium carbonate (TUMS) chewable tab 500 mg  500 mg   • baclofen (LIORESAL) tablet 15 mg  15 mg   • amLODIPine  "(NORVASC) tablet 10 mg  10 mg   • aspirin (ASA) chewable tab 81 mg  81 mg   • atorvastatin (LIPITOR) tablet 80 mg  80 mg   • cloNIDine (CATAPRES) tablet 0.1 mg  0.1 mg   • hydrALAZINE (APRESOLINE) tablet 25 mg  25 mg   • hydrALAZINE (APRESOLINE) injection 10 mg  10 mg   • labetalol (NORMODYNE/TRANDATE) injection 10-20 mg  10-20 mg   • Pharmacy Consult Request ...Pain Management Review 1 Each  1 Each   • MD ALERT...DO NOT ADMINISTER NSAIDS or ASPIRIN unless ORDERED By Neurosurgery 1 Each  1 Each   • bisacodyl (DULCOLAX) suppository 10 mg  10 mg   • artificial tears (EYE LUBRICANT) ophth ointment 1 Application  1 Application   • ondansetron (ZOFRAN) syringe/vial injection 4 mg  4 mg   • promethazine (PHENERGAN) suppository 12.5-25 mg  12.5-25 mg   • prochlorperazine (COMPAZINE) injection 5-10 mg  5-10 mg       Allergies:  No Known Allergies    Physical Exam:  Vitals: /71   Pulse 72   Temp 36.4 °C (97.5 °F) (Temporal)   Resp 16   Ht 1.778 m (5' 10\")   Wt 76.1 kg (167 lb 12.3 oz)   SpO2 95%   Gen: NAD  Head: NC/AT  Eyes/ Nose/ Mouth: PERRLA, moist mucous membranes  Cardio: RRR, good distal perfusion, warm extremities  Pulm: normal respiratory effort, no cyanosis   Abd: Soft NTND, negative borborygmi   Ext: No peripheral edema. No calf tenderness. No clubbing.    Mental status: answers questions appropriately follows commands  Speech: fluent, no aphasia or dysarthria    CRANIAL NERVES:  2,3: LEFT VF cut, PERRL  3,4,6: EOMI in right VF, no nystagmus or diplopia  5: sensation intact to light touch bilaterally and symmetric  7: Left facial droop   8: hearing grossly intact  9,10: not seen  11: SCM/Trapezius strength 5/5right, 0/5 left  12: tongue protrudes left    Motor:      Upper Extremity  Myotome R L   Shoulder flexion C5 5 0/5   Elbow flexion C5 5 0/5   Wrist extension C6 5 0/5   Elbow extension C7 5 0/5   Finger flexion C8 5 1/5   Finger abduction T1 5 0/5     Lower Extremity Myotome R L   Hip flexion L2 5 " 1/5   Knee extension L3 5 0/5   Ankle dorsiflexion L4 5 0/5   Toe extension L5 5 0/5   Ankle plantarflexion S1 5 0/5     Sensory:   Altered sensation on left side       DTRs:  Right  Left    Brachioradialis  2+  2+   Patella tendon  2+ 2+     2-3 beats right ankle clonus, sustained clonus left ankle  Pos babinski left   Negative Castillo b/l     Tone: tight hamstrings bilaterally    Labs: Reviewed and significant for   No results for input(s): RBC, HEMOGLOBIN, HEMATOCRIT, PLATELETCT, PROTHROMBTM, APTT, INR, IRON, FERRITIN, TOTIRONBC in the last 72 hours.  Recent Labs     04/17/21  0241 04/18/21  0229 04/19/21  0638   SODIUM 133* 133* 133*   POTASSIUM 4.4 4.3 4.5   CHLORIDE 100 98 99   CO2 25 23 25   GLUCOSE 102* 113* 95   BUN 30* 27* 31*   CREATININE 1.03 0.90 0.99   CALCIUM 8.9 8.9 9.4     Recent Results (from the past 24 hour(s))   Basic Metabolic Panel    Collection Time: 04/19/21  6:38 AM   Result Value Ref Range    Sodium 133 (L) 135 - 145 mmol/L    Potassium 4.5 3.6 - 5.5 mmol/L    Chloride 99 96 - 112 mmol/L    Co2 25 20 - 33 mmol/L    Glucose 95 65 - 99 mg/dL    Bun 31 (H) 8 - 22 mg/dL    Creatinine 0.99 0.50 - 1.40 mg/dL    Calcium 9.4 8.5 - 10.5 mg/dL    Anion Gap 9.0 7.0 - 16.0   CRP Quantitive (Non-Cardiac)    Collection Time: 04/19/21  6:38 AM   Result Value Ref Range    Stat C-Reactive Protein 2.25 (H) 0.00 - 0.75 mg/dL   Prealbumin    Collection Time: 04/19/21  6:38 AM   Result Value Ref Range    Pre-Albumin 23.1 18.0 - 38.0 mg/dL   ESTIMATED GFR    Collection Time: 04/19/21  6:38 AM   Result Value Ref Range    GFR If African American >60 >60 mL/min/1.73 m 2    GFR If Non African American >60 >60 mL/min/1.73 m 2         ASSESSMENT:  Patient is a 56 y.o. male admitted with large right MCA stroke and right ICA occlusion. He did not receive TPA or thrombectomy     [facility] Code / Diagnosis to Support: 0001.1 - Stroke: Left Body Involvement (Right Brain)    Rehabilitation: Impaired ADLs and mobility  Continuing  to consider [facility] for IPR.  He has dense left hemiplegia which is difficult to recover from, however it can be managed.  Patient has no insurance benefit for IPR and I have discussed with the family appropriate timing of rehab given his limited benefit and severe deficits.    Additional Recommendations:  - s/p hemicraniectomy on 4/3. Now with continued post op cerebellar swelling and fevers. He continues to have  1/5 finger flexion on the left which is a positive prognostic indicator of a functional recovery. Also he had 1/5 leg movement at the hip on itinial consult but this was not observed on follow up. Possibly due to neglect. He does have a long road to recovery and I prepared his wife Anel for possibly 2 years of 24/7 care, although we are all hopeful for a better outcome.     Large right MCA ischemic stroke   - continue PT/OT and SLP   -Family educated on stroke comorbidites on initial consult.  - ROM training given to wife for torticollis prevention and contracture prevention.  Requested room change with nurse manager to provide patient with window on left side to combat neglect.  -Continue baclofen 15mg TID for LUE spasticity -patient is stable at this dose  - Holding off on gabapentin at this time to avoid polypharmacy   - ASA/ statin  - Etiology throught to be cardioembolic in the setting of atrial fibrillation off of AC and in the setting of COVID.  -Primary team using meclizine for nausea control  -On prazosin for urinary retention  - PMR to continue to follow for rehab appropriateness    Dispo: Patient will benefit most from IPR when he is able to stand and/or take a few steps.  We will continue to explore other options if patient is unable to qualify for IPR    Medical Complexity:  Large right MCA stroke      DVT PPX: SCDs      Thank you for allowing us to participate in the care of this patient.     Patient was seen for 36 minutes on unit/floor of which > 50% of time was spent on counseling and  coordination of care regarding the above, including prognosis, risk reduction, benefits of treatment, and options for next stage of care.    Christofer Eisenberg, DO   Physical Medicine and Rehabilitation

## 2021-04-20 LAB
ANION GAP SERPL CALC-SCNC: 7 MMOL/L (ref 7–16)
BUN SERPL-MCNC: 29 MG/DL (ref 8–22)
CALCIUM SERPL-MCNC: 9.8 MG/DL (ref 8.5–10.5)
CHLORIDE SERPL-SCNC: 96 MMOL/L (ref 96–112)
CO2 SERPL-SCNC: 29 MMOL/L (ref 20–33)
CREAT SERPL-MCNC: 1.06 MG/DL (ref 0.5–1.4)
GLUCOSE SERPL-MCNC: 108 MG/DL (ref 65–99)
POTASSIUM SERPL-SCNC: 4.5 MMOL/L (ref 3.6–5.5)
SODIUM SERPL-SCNC: 132 MMOL/L (ref 135–145)

## 2021-04-20 PROCEDURE — A9270 NON-COVERED ITEM OR SERVICE: HCPCS | Performed by: INTERNAL MEDICINE

## 2021-04-20 PROCEDURE — 80048 BASIC METABOLIC PNL TOTAL CA: CPT

## 2021-04-20 PROCEDURE — 92523 SPEECH SOUND LANG COMPREHEN: CPT

## 2021-04-20 PROCEDURE — 51798 US URINE CAPACITY MEASURE: CPT

## 2021-04-20 PROCEDURE — 700111 HCHG RX REV CODE 636 W/ 250 OVERRIDE (IP): Performed by: NURSE PRACTITIONER

## 2021-04-20 PROCEDURE — 92526 ORAL FUNCTION THERAPY: CPT

## 2021-04-20 PROCEDURE — A9270 NON-COVERED ITEM OR SERVICE: HCPCS | Performed by: NURSE PRACTITIONER

## 2021-04-20 PROCEDURE — 700102 HCHG RX REV CODE 250 W/ 637 OVERRIDE(OP): Performed by: NURSE PRACTITIONER

## 2021-04-20 PROCEDURE — 700102 HCHG RX REV CODE 250 W/ 637 OVERRIDE(OP): Performed by: INTERNAL MEDICINE

## 2021-04-20 PROCEDURE — 99232 SBSQ HOSP IP/OBS MODERATE 35: CPT | Performed by: HOSPITALIST

## 2021-04-20 PROCEDURE — 770020 HCHG ROOM/CARE - TELE (206)

## 2021-04-20 PROCEDURE — 36415 COLL VENOUS BLD VENIPUNCTURE: CPT

## 2021-04-20 RX ADMIN — MECLIZINE HYDROCHLORIDE 25 MG: 25 TABLET ORAL at 16:56

## 2021-04-20 RX ADMIN — ATORVASTATIN CALCIUM 80 MG: 80 TABLET, FILM COATED ORAL at 16:56

## 2021-04-20 RX ADMIN — HYDRALAZINE HYDROCHLORIDE 25 MG: 25 TABLET, FILM COATED ORAL at 21:49

## 2021-04-20 RX ADMIN — AMLODIPINE BESYLATE 10 MG: 5 TABLET ORAL at 04:35

## 2021-04-20 RX ADMIN — ASPIRIN 81 MG: 81 TABLET, CHEWABLE ORAL at 16:58

## 2021-04-20 RX ADMIN — HYDRALAZINE HYDROCHLORIDE 25 MG: 25 TABLET, FILM COATED ORAL at 13:58

## 2021-04-20 RX ADMIN — BACLOFEN 15 MG: 10 TABLET ORAL at 04:35

## 2021-04-20 RX ADMIN — THYROID, PORCINE 60 MG: 30 TABLET ORAL at 16:58

## 2021-04-20 RX ADMIN — ONDANSETRON 4 MG: 4 TABLET, ORALLY DISINTEGRATING ORAL at 16:55

## 2021-04-20 RX ADMIN — HYDRALAZINE HYDROCHLORIDE 25 MG: 25 TABLET, FILM COATED ORAL at 04:35

## 2021-04-20 RX ADMIN — METOPROLOL TARTRATE 25 MG: 25 TABLET, FILM COATED ORAL at 04:35

## 2021-04-20 RX ADMIN — BACLOFEN 15 MG: 10 TABLET ORAL at 12:43

## 2021-04-20 RX ADMIN — RIVAROXABAN 20 MG: 20 TABLET, FILM COATED ORAL at 16:58

## 2021-04-20 RX ADMIN — ACETAMINOPHEN 650 MG: 325 TABLET, FILM COATED ORAL at 10:24

## 2021-04-20 RX ADMIN — METOPROLOL TARTRATE 25 MG: 25 TABLET, FILM COATED ORAL at 16:58

## 2021-04-20 RX ADMIN — LISINOPRIL 40 MG: 20 TABLET ORAL at 04:35

## 2021-04-20 RX ADMIN — THYROID, PORCINE 60 MG: 30 TABLET ORAL at 04:35

## 2021-04-20 RX ADMIN — BACLOFEN 15 MG: 10 TABLET ORAL at 16:56

## 2021-04-20 ASSESSMENT — ENCOUNTER SYMPTOMS
COUGH: 0
BACK PAIN: 0
PALPITATIONS: 0
HEMOPTYSIS: 0
NECK PAIN: 0
ABDOMINAL PAIN: 0
SENSORY CHANGE: 1
DIARRHEA: 0
FOCAL WEAKNESS: 1
STRIDOR: 0
HEADACHES: 0
DIZZINESS: 1
VOMITING: 0

## 2021-04-20 NOTE — THERAPY
"Speech Language Pathology  Daily Treatment     Patient Name: Thong Arzola  Age:  56 y.o., Sex:  male  Medical Record #: [id number]  Today's Date: 4/20/2021     Precautions  Precautions: (P) Fall Risk, Swallow Precautions ( See Comments)  Comments: L neglect, helmet on when OOB, no bone flap     Assessment    Pt was seen for dysphagia tx focused on therapeutic trials of soft/bite sized solids with prescribed mildly thick liquids and compensatory strategy training w/ pt and caregiver (spouse). Pt had consistent mild pocketing in L lateral sulcus w/ soft solids, needing mod-max verbal/tactile/gestural cues to clear using lingual sweeps. Trialed mirror for biofeedback w/ spouse's suggestion though this was minimally beneficial due to L neglect. Cough x1 occurred following consecutive straw sips of mildly thick liquids. No s/sx of aspiration occurred w/ soft/bite sized solids or single straw sips of mildly thick liquids. Recommend a diet upgrade to Soft & Bite Sized (SB6) solids and Mildly Thick Liquids (MT2) with direct meal supervision and the following strategies: small bites/single sips, upright for all PO intake, check for L sided pocketing and clear, oral care after meals. Float pills in puree.     Plan    Soft & Bite Sized (SB6) solids and Mildly Thick Liquids (MT2) with direct meal supervision and the following strategies: small bites/single sips, upright for all PO intake, check for L sided pocketing and clear, oral care after meals.   Float pills in puree.     Continue current treatment plan.    Discharge Recommendations: (P) Recommend post-acute placement for additional speech therapy services prior to discharge home    Subjective    \"Not to have food stuck in my mouth.\" (pt's goal)     Objective       04/20/21 1545   Dysphagia    Positioning / Behavior Modification Modulate Rate or Bite Size;Multiple Swallows;Self Monitoring;Other (see Comments)  (check for L sided pocketing and clear)   Oral / Pharyngeal " "/ Laryngeal Exercises Bolus Manipulation Exercises   Other Treatments PO trials soft/bite sized, prescribed mildly thick liquids   Diet / Liquid Recommendation Soft & Bite-Sized (6) - (Dysphagia III);Mildly Thick (2) - (Nectar Thick)   Recommended Route of Medication Administration   Medication Administration  Float Whole with Puree   Patient / Family Goals   Patient / Family Goal #1 \"Yes, water\"   Goal #1 Outcome Progressing as expected   Short Term Goals   Short Term Goal # 1 NEW 4/20: Patient will consume meals of soft/bite sized solids and mildly thick liquids with no s/sx of aspiration given direct meal supervision for safe swallow strategies.   Short Term Goal # 1 B  Resume 4/13: Pt will consume a diet of MM5/MT2 with no s/sx of aspiration and min cues.    Goal Outcome  # 1 B Goal met   Short Term Goal # 2 Pt will complete 10 reps each of lingual ROM and base of tongue exercises with \"good\" accuracy and min cues.    Goal Outcome # 2  Progressing as expected         "

## 2021-04-20 NOTE — CARE PLAN
Problem: Safety  Goal: Will remain free from injury  Outcome: PROGRESSING AS EXPECTED  Goal: Will remain free from falls  Outcome: PROGRESSING AS EXPECTED     Problem: Infection  Goal: Will remain free from infection  Outcome: PROGRESSING AS EXPECTED     Problem: Bowel/Gastric:  Goal: Normal bowel function is maintained or improved  Outcome: PROGRESSING AS EXPECTED  Goal: Will not experience complications related to bowel motility  Outcome: PROGRESSING AS EXPECTED     Problem: Skin Integrity  Goal: Risk for impaired skin integrity will decrease  Outcome: PROGRESSING AS EXPECTED     Problem: Safety:  Goal: Will remain free from injury  Outcome: PROGRESSING AS EXPECTED     Problem: Infection:  Goal: Will remain free from infection  Outcome: PROGRESSING AS EXPECTED

## 2021-04-20 NOTE — PROGRESS NOTES
Hospital Medicine Daily Progress Note    Date of Service  4/20/2021    Chief Complaint  Left sided weakness    Hospital Course  56 y.o. male admitted 3/31/2021 with acute stroke.  He was not a candidate for tPA nor thrombectomy.  He has a history of paroxysmal atrial fibrillation, dyslipidemia, hypothyroidism. During admit he underwent a hemicraniectomy for right sided decompression from right MCA dense stroke with intractable intracranial pressure.     Interval Problem Update  4/12: Patient verbal and oriented with some slurred speech.  Left hemiplegia. On 4/11 Dr. Leo met with the wife and explained to her we likely will need a G-tube at some point.  Also alerted the both of them we would need a SNF at some point.  He reports that his pain is reasonably well controlled at this juncture, he is describing some thirst.  Free water flushes will be increased from 30 mL to 90 mL every 6 hours.  This can be backed off if sodium begins to drop.    4/13 physical therapy seen the patient.  On tube feeding still.  Speech therapy to follow.   to follow for discharge planning.    4/14: The patient was retaining urine this morning and will try to get a straight cath.  Also discussed with the wife and she said that she will try to get insurance starting May 1 for him.  I also started for Flomax    4/15: Discharge planning by . No acute issue overnight.    4/16 no acute issue overnight.  Discharge planning by     4/17: the patient is sleeping comfortably on the bed. No acute issue overnight.    4/18 the patient is having vertigo symptom.  I will start her on meclizine and see how he does.    4/19: The patient is sleeping comfortably on the bed.  His vertigo is improving with meclizine.  PT OT to evaluate him today.    4/20: No acute overnight events.  Wife at bedside, discussed possibility of a voiding trial today.      Consultants/Specialty  Neurology  Neuro-intensivist (signing off  4/11)  Neuro surgery    Code Status  Full Code    Disposition  Remain on the floor    Review of Systems  Review of Systems   Constitutional: Positive for malaise/fatigue.   Respiratory: Negative for cough, hemoptysis and stridor.    Cardiovascular: Negative for chest pain and palpitations.   Gastrointestinal: Negative for abdominal pain, diarrhea and vomiting.   Genitourinary: Negative for dysuria, frequency and urgency.   Musculoskeletal: Negative for back pain and neck pain.   Skin: Negative for rash.   Neurological: Positive for dizziness, sensory change (left side) and focal weakness (left side). Negative for headaches (right temporal region).        Physical Exam  Temp:  [36 °C (96.8 °F)-36.5 °C (97.7 °F)] 36.2 °C (97.1 °F)  Pulse:  [63-80] 74  Resp:  [15-18] 16  BP: ()/(54-86) 111/76  SpO2:  [92 %-98 %] 93 %    Physical Exam  Vitals reviewed.   Constitutional:       Appearance: Normal appearance. He is diaphoretic.   HENT:      Head: Normocephalic and atraumatic.      Nose: Nose normal.      Mouth/Throat:      Mouth: Mucous membranes are moist.   Eyes:      Extraocular Movements: Extraocular movements intact.      Conjunctiva/sclera: Conjunctivae normal.   Cardiovascular:      Rate and Rhythm: Normal rate.      Pulses: Normal pulses.           Radial pulses are 2+ on the right side and 2+ on the left side.        Dorsalis pedis pulses are 2+ on the right side and 2+ on the left side.      Heart sounds: Normal heart sounds.   Pulmonary:      Effort: Pulmonary effort is normal.   Abdominal:      General: Bowel sounds are normal.      Palpations: Abdomen is soft.   Musculoskeletal:         General: No swelling or tenderness.      Cervical back: Neck supple. No muscular tenderness.      Right lower leg: No edema.      Left lower leg: No edema.   Lymphadenopathy:      Cervical: No cervical adenopathy.   Neurological:      Mental Status: He is alert.      Cranial Nerves: Cranial nerve deficit present.       Sensory: Sensory deficit present.      Coordination: Coordination abnormal.      Gait: Gait abnormal.         Current Facility-Administered Medications:   •  meclizine (ANTIVERT) tablet 25 mg, 25 mg, Oral, TID PRN, Golden Pearson M.D., 25 mg at 04/19/21 0931  •  ondansetron (ZOFRAN ODT) dispertab 4 mg, 4 mg, Oral, Q4HRS PRN, Radha Meredith, A.P.R.N.  •  acetaminophen (Tylenol) tablet 650 mg, 650 mg, Oral, Q6HRS PRN, Radha Meredith, A.P.R.N., 650 mg at 04/20/21 1024  •  metoprolol tartrate (LOPRESSOR) tablet 25 mg, 25 mg, Oral, BID, Radha Meredith, A.P.R.N., 25 mg at 04/20/21 0435  •  senna-docusate (PERICOLACE or SENOKOT S) 8.6-50 MG per tablet 1 tablet, 1 tablet, Oral, Nightly, Radha Meredith, A.P.R.N., 1 tablet at 04/19/21 2054  •  oxyCODONE immediate-release (ROXICODONE) tablet 5 mg, 5 mg, Oral, Q3HRS PRN **OR** oxyCODONE immediate release (ROXICODONE) tablet 10 mg, 10 mg, Oral, Q3HRS PRN **OR** HYDROmorphone (Dilaudid) injection 0.5 mg, 0.5 mg, Intravenous, Q3HRS PRN, Radha Meredith, A.P.R.N.  •  polyethylene glycol/lytes (MIRALAX) PACKET 1 Packet, 1 Packet, Oral, BID PRN, Radha Meredith, A.P.R.N.  •  prazosin (MINIPRESS) capsule 1 mg, 1 mg, Oral, Q EVENING, Radha Meredith, A.P.R.N., 1 mg at 04/19/21 1740  •  senna-docusate (PERICOLACE or SENOKOT S) 8.6-50 MG per tablet 1 tablet, 1 tablet, Oral, Q24HRS PRN, Radha Meredith, A.P.R.N.  •  rivaroxaban (XARELTO) tablet 20 mg, 20 mg, Oral, PM MEAL, Radha Meredith, A.P.R.N., 20 mg at 04/19/21 1741  •  promethazine (PHENERGAN) tablet 12.5-25 mg, 12.5-25 mg, Oral, Q4HRS PRN, Radha Meredith, A.P.R.N.  •  magnesium hydroxide (MILK OF MAGNESIA) suspension 30 mL, 30 mL, Oral, QDAY PRN, Radha Meredith, A.P.R.N.  •  lisinopril (PRINIVIL) tablet 40 mg, 40 mg, Oral, Q DAY, Radha Meredith, A.P.R.N., 40 mg at 04/20/21 0435  •  thyroid (ARMOUR THYROID) tablet 60 mg, 60 mg, Oral, BID, Radha Meredith, A.P.R.N., 60 mg at 04/20/21 0435  •  calcium carbonate (TUMS) chewable tab 500 mg, 500  mg, Oral, Q8HRS PRN, Radha Meredith, A.P.R.N.  •  baclofen (LIORESAL) tablet 15 mg, 15 mg, Oral, TID, Radha Meredith, A.P.R.N., 15 mg at 04/20/21 1243  •  amLODIPine (NORVASC) tablet 10 mg, 10 mg, Oral, Q DAY, Radha Meredith, A.P.R.N., 10 mg at 04/20/21 0435  •  aspirin (ASA) chewable tab 81 mg, 81 mg, Oral, DAILY, Radha Meredith, A.P.R.N., 81 mg at 04/19/21 1741  •  atorvastatin (LIPITOR) tablet 80 mg, 80 mg, Oral, Q EVENING, Radha Meredith, A.P.R.N., 80 mg at 04/19/21 1741  •  cloNIDine (CATAPRES) tablet 0.1 mg, 0.1 mg, Oral, Q4HRS PRN, Golden Pearson M.D.  •  hydrALAZINE (APRESOLINE) tablet 25 mg, 25 mg, Oral, Q8HRS, Golden Pearson M.D., 25 mg at 04/20/21 1358  •  hydrALAZINE (APRESOLINE) injection 10 mg, 10 mg, Intravenous, Q HOUR PRN, Cristhian Bell M.D., 10 mg at 04/10/21 1631  •  labetalol (NORMODYNE/TRANDATE) injection 10-20 mg, 10-20 mg, Intravenous, Q4HRS PRN, Cristhian Bell M.D., 10 mg at 04/10/21 1508  •  Pharmacy Consult Request ...Pain Management Review 1 Each, 1 Each, Other, PHARMACY TO DOSE, Lilia Pizano P.A.-C.  •  MD ALERT...DO NOT ADMINISTER NSAIDS or ASPIRIN unless ORDERED By Neurosurgery 1 Each, 1 Each, Other, PRN, Lilia Pizano P.A.-C.  •  bisacodyl (DULCOLAX) suppository 10 mg, 10 mg, Rectal, Q24HRS PRN, CRISTI Morris-C., 10 mg at 04/11/21 1811  •  artificial tears (EYE LUBRICANT) ophth ointment 1 Application, 1 Application, Both Eyes, PRN, Lilia Pizano P.A.-C.  •  ondansetron (ZOFRAN) syringe/vial injection 4 mg, 4 mg, Intravenous, Q4HRS PRN, Gia Formna M.D., 4 mg at 04/18/21 1832  •  promethazine (PHENERGAN) suppository 12.5-25 mg, 12.5-25 mg, Rectal, Q4HRS PRN, Gia Forman M.D.  •  prochlorperazine (COMPAZINE) injection 5-10 mg, 5-10 mg, Intravenous, Q4HRS PRN, Gia Forman M.D., 5 mg at 04/17/21 1234      Fluids    Intake/Output Summary (Last 24 hours) at 4/20/2021 1458  Last data filed at 4/20/2021 0800  Gross per 24 hour   Intake --   Output 1725 ml   Net -1725  ml       Laboratory      Recent Labs     04/18/21  0229 04/19/21  0638 04/20/21  0457   SODIUM 133* 133* 132*   POTASSIUM 4.3 4.5 4.5   CHLORIDE 98 99 96   CO2 23 25 29   GLUCOSE 113* 95 108*   BUN 27* 31* 29*   CREATININE 0.90 0.99 1.06   CALCIUM 8.9 9.4 9.8                   Imaging  CT-HEAD W/O   Final Result         1.  Evolving area of infarct within the right MCA distribution.   2.  Ribbonlike gyriform hyperdensity at the area of prior infarct, appearance likely corresponding with gyriform laminar necrosis, component of subtle subarachnoid hemorrhage cannot be definitively excluded radiographically.   3.  Minimal pneumocephalus, decreased since prior study.   4.  Atherosclerosis.      US-EXTREMITY VENOUS LOWER BILAT   Final Result      DX-CHEST-LIMITED (1 VIEW)   Final Result      Right basilar atelectasis. No focal consolidation or pleural effusions.      RU-YXKOUEH-0 VIEW   Final Result      No evidence of bowel obstruction.                  DX-CHEST-LIMITED (1 VIEW)   Final Result      1.  Right internal jugular catheter and enteric catheter appear appropriately located      2.  Minimal right basilar atelectasis      CT-HEAD W/O   Final Result         1.  Changes of evolving infarct within the right MCA distribution   2.  Hyperdensity in the sulci of the right MCA distribution infarct, largely appears related to thrombosed vessels, component of subarachnoid hemorrhage is suspected particularly in the right frontal lobe.   3.  Pneumocephalus, decreased since prior.      CT-HEAD W/O   Final Result         Postsurgical change from right craniectomy. Redemonstration of large right MCA infarct with edema and bulging of the brain parenchyma through the craniectomy defect      Less mass effect on the right lateral ventricle. Less right to left midline shift of 3 mm, previously 10 mm.         DX-ABDOMEN FOR TUBE PLACEMENT   Final Result      Enteric tube terminates over the stomach.      CT-HEAD W/O   Final Result          1.  Low-density changes of the right hemisphere compatible with MCA distribution infarct with edema.   2.  New effacement of the bilateral ventricles, right greater than left, with 10 mm right-to-left midline shift.   3.  New dilatation of the left temporal horn ventricle, appearance favors component of obstructive hydrocephalus due to mass effect from infarct and edema.   4.  Hyperdensity in the right middle cerebral artery, likely thrombosis given associated findings.      These findings were discussed with the patient's clinician, Dr. Nunez, on 4/3/2021 7:11 AM.      MR-BRAIN-W/O   Final Result      Very large acute right MCA territory infarct as detailed above with small amount of petechial hemorrhage in the right insular region and right temporal lobe.      Punctate right thalamic lacunar infarct.      Right ICA and M1 MCA occlusion.      EC-ECHOCARDIOGRAM COMPLETE W/O CONT   Final Result      DX-CHEST-PORTABLE (1 VIEW)   Final Result         1.  Interstitial pulmonary parenchymal prominence, compatible with interstitial edema and/or infiltrates.      CT-CTA NECK WITH & W/O-POST PROCESSING   Final Result      Acute occlusion of the right internal carotid artery shortly after bifurcation. It is occluded up to the level of the carotid terminus.      CT-CTA HEAD WITH & W/O-POST PROCESS   Final Result      Acute right M1 occlusion.      Findings were discussed with RHONDA DIAZ on 3/31/2021 7:54 PM.      CT-CEREBRAL PERFUSION ANALYSIS   Final Result      1.  Cerebral blood flow less than 30% likely representing completed infarct = 134 mL.      2.  T Max more than 6 seconds likely representing combination of completed infarct and ischemia = 210 mL.      3.  Mismatched volume likely representing ischemic brain/penumbra = 76 mL.      4.  Please note that the cerebral perfusion was performed on the limited brain tissue around the basal ganglia region. Infarct/ischemia outside the CT perfusion sections can  be missed in this study.      CT-HEAD W/O   Final Result   Addendum 1 of 1   In retrospect, there is subtle loss of gray-white matter differentiation    in the right MCA distribution, consistent with acute infarct.      Final      1.  No CT evidence of acute infarct, hemorrhage or mass.   2.  Mild paranasal sinus disease.           Assessment/Plan  * Acute right MCA stroke (HCC)- (present on admission)  Assessment & Plan  Likely consequence of COVID19 in 3/21 and hx of afib  Subsequent brain edema requiring right decompressive hemicraniectomy on 4/3/2021  Neurology following patient will benefit from long-term anticoagulation when bleeding risk is acceptable likely 1-2 weeks from event onset.   Blood pressure control keep SBP<140 (on metoprolol 25mg BID, amlodipine 10mg, hydralazine 25mg TID, lisinopril 40mg daily)  Patient weaned off hypertonic saline and started on salt tablets  PT/OT/SLP  Aspiration precautions  Physiatry consultation   for discharge planning: repeat PT/OT eval again today  Now having vertigo  Started on meclizine and improving    Paroxysmal atrial fibrillation (HCC)- (present on admission)  Assessment & Plan  Monitor vitals and on tele  Metoprolol 25mg BID w/ parameters  Discussed risk benefits and alternatives of long-term anticoagulation with patient and wife, plan to start DOAC when bleeding risk felt to be acceptable per neurology likely in 1 to 2 weeks    Leucocytosis  Assessment & Plan  Resolved but continue to monitor  4/11 WBC:7.7    Urinary retention  Assessment & Plan  Brewer catheter in place  Watch for urinary retention  prazosin    History of COVID-19- (present on admission)  Assessment & Plan  Clinically recovered patient symptom onset on 3/15/2021 with positive initial test on 3/18/2021    Acquired hypothyroidism- (present on admission)  Assessment & Plan  [place] Thyroid 60mg BID   TSH is less than 0.005 will decrease dose and he will need recheck TFTs in 6 weeks (prior  to admit was on 90mg BID)  TSH: <0.005 in past week.       VTE prophylaxis: Enoxaparin 40mg SQ daily

## 2021-04-20 NOTE — PROGRESS NOTES
0715- Report received from NOC RN. Pt in bed resting. No acute needs at this time.     0800- Assessment complete.     1040- Brewer removed, Salty is DTV by 1640. Patient has urinal.     1200- 60 staples removed from patients crani site, wound well approximated.     1300- Report given to Lorri KHALIL, Patient transferring to room s193-2. All belongings with patient.

## 2021-04-20 NOTE — PROGRESS NOTES
Monitor summary: SR rate 72-93, MD .20, QRS .08, QT .38, and 15 beats of VTACH per strip from monitor room.

## 2021-04-21 LAB
ANION GAP SERPL CALC-SCNC: 9 MMOL/L (ref 7–16)
BUN SERPL-MCNC: 25 MG/DL (ref 8–22)
CALCIUM SERPL-MCNC: 9.7 MG/DL (ref 8.5–10.5)
CHLORIDE SERPL-SCNC: 98 MMOL/L (ref 96–112)
CO2 SERPL-SCNC: 25 MMOL/L (ref 20–33)
CREAT SERPL-MCNC: 1.06 MG/DL (ref 0.5–1.4)
GLUCOSE SERPL-MCNC: 104 MG/DL (ref 65–99)
POTASSIUM SERPL-SCNC: 4.2 MMOL/L (ref 3.6–5.5)
SODIUM SERPL-SCNC: 132 MMOL/L (ref 135–145)

## 2021-04-21 PROCEDURE — 97530 THERAPEUTIC ACTIVITIES: CPT | Mod: CQ

## 2021-04-21 PROCEDURE — 700111 HCHG RX REV CODE 636 W/ 250 OVERRIDE (IP): Performed by: INTERNAL MEDICINE

## 2021-04-21 PROCEDURE — 97535 SELF CARE MNGMENT TRAINING: CPT | Mod: CO

## 2021-04-21 PROCEDURE — 770020 HCHG ROOM/CARE - TELE (206)

## 2021-04-21 PROCEDURE — 80048 BASIC METABOLIC PNL TOTAL CA: CPT

## 2021-04-21 PROCEDURE — A9270 NON-COVERED ITEM OR SERVICE: HCPCS | Performed by: NURSE PRACTITIONER

## 2021-04-21 PROCEDURE — A9270 NON-COVERED ITEM OR SERVICE: HCPCS | Performed by: PHYSICAL MEDICINE & REHABILITATION

## 2021-04-21 PROCEDURE — 97112 NEUROMUSCULAR REEDUCATION: CPT | Mod: CQ

## 2021-04-21 PROCEDURE — A9270 NON-COVERED ITEM OR SERVICE: HCPCS | Performed by: INTERNAL MEDICINE

## 2021-04-21 PROCEDURE — 700102 HCHG RX REV CODE 250 W/ 637 OVERRIDE(OP): Performed by: PHYSICAL MEDICINE & REHABILITATION

## 2021-04-21 PROCEDURE — 99232 SBSQ HOSP IP/OBS MODERATE 35: CPT | Performed by: PHYSICAL MEDICINE & REHABILITATION

## 2021-04-21 PROCEDURE — 700102 HCHG RX REV CODE 250 W/ 637 OVERRIDE(OP): Performed by: NURSE PRACTITIONER

## 2021-04-21 PROCEDURE — 99232 SBSQ HOSP IP/OBS MODERATE 35: CPT | Performed by: HOSPITALIST

## 2021-04-21 PROCEDURE — 36415 COLL VENOUS BLD VENIPUNCTURE: CPT

## 2021-04-21 PROCEDURE — 97112 NEUROMUSCULAR REEDUCATION: CPT | Mod: CO

## 2021-04-21 PROCEDURE — 700102 HCHG RX REV CODE 250 W/ 637 OVERRIDE(OP): Performed by: INTERNAL MEDICINE

## 2021-04-21 RX ORDER — MIRTAZAPINE 15 MG/1
15 TABLET, FILM COATED ORAL
Status: DISCONTINUED | OUTPATIENT
Start: 2021-04-21 | End: 2021-04-29 | Stop reason: HOSPADM

## 2021-04-21 RX ADMIN — ONDANSETRON 4 MG: 2 INJECTION INTRAMUSCULAR; INTRAVENOUS at 10:46

## 2021-04-21 RX ADMIN — METOPROLOL TARTRATE 25 MG: 25 TABLET, FILM COATED ORAL at 16:37

## 2021-04-21 RX ADMIN — THYROID, PORCINE 60 MG: 30 TABLET ORAL at 16:36

## 2021-04-21 RX ADMIN — HYDRALAZINE HYDROCHLORIDE 25 MG: 25 TABLET, FILM COATED ORAL at 04:49

## 2021-04-21 RX ADMIN — MECLIZINE HYDROCHLORIDE 25 MG: 25 TABLET ORAL at 10:46

## 2021-04-21 RX ADMIN — LISINOPRIL 40 MG: 20 TABLET ORAL at 04:49

## 2021-04-21 RX ADMIN — ASPIRIN 81 MG: 81 TABLET, CHEWABLE ORAL at 16:36

## 2021-04-21 RX ADMIN — AMLODIPINE BESYLATE 10 MG: 5 TABLET ORAL at 04:48

## 2021-04-21 RX ADMIN — MIRTAZAPINE 15 MG: 15 TABLET, FILM COATED ORAL at 20:21

## 2021-04-21 RX ADMIN — ONDANSETRON 4 MG: 2 INJECTION INTRAMUSCULAR; INTRAVENOUS at 16:44

## 2021-04-21 RX ADMIN — BACLOFEN 15 MG: 10 TABLET ORAL at 10:46

## 2021-04-21 RX ADMIN — THYROID, PORCINE 60 MG: 30 TABLET ORAL at 04:48

## 2021-04-21 RX ADMIN — METOPROLOL TARTRATE 25 MG: 25 TABLET, FILM COATED ORAL at 04:48

## 2021-04-21 RX ADMIN — ATORVASTATIN CALCIUM 80 MG: 80 TABLET, FILM COATED ORAL at 16:36

## 2021-04-21 RX ADMIN — DOCUSATE SODIUM 50 MG AND SENNOSIDES 8.6 MG 1 TABLET: 8.6; 5 TABLET, FILM COATED ORAL at 20:21

## 2021-04-21 RX ADMIN — BACLOFEN 15 MG: 10 TABLET ORAL at 16:36

## 2021-04-21 RX ADMIN — RIVAROXABAN 20 MG: 20 TABLET, FILM COATED ORAL at 16:36

## 2021-04-21 RX ADMIN — MECLIZINE HYDROCHLORIDE 25 MG: 25 TABLET ORAL at 00:17

## 2021-04-21 RX ADMIN — BACLOFEN 15 MG: 10 TABLET ORAL at 04:48

## 2021-04-21 ASSESSMENT — COGNITIVE AND FUNCTIONAL STATUS - GENERAL
TOILETING: TOTAL
PERSONAL GROOMING: A LOT
DRESSING REGULAR LOWER BODY CLOTHING: TOTAL
SUGGESTED CMS G CODE MODIFIER DAILY ACTIVITY: CL
DRESSING REGULAR UPPER BODY CLOTHING: A LOT
MOVING FROM LYING ON BACK TO SITTING ON SIDE OF FLAT BED: UNABLE
CLIMB 3 TO 5 STEPS WITH RAILING: TOTAL
MOBILITY SCORE: 6
SUGGESTED CMS G CODE MODIFIER MOBILITY: CN
HELP NEEDED FOR BATHING: TOTAL
MOVING TO AND FROM BED TO CHAIR: UNABLE
TURNING FROM BACK TO SIDE WHILE IN FLAT BAD: UNABLE
EATING MEALS: A LOT
DAILY ACTIVITIY SCORE: 9
WALKING IN HOSPITAL ROOM: TOTAL
STANDING UP FROM CHAIR USING ARMS: TOTAL

## 2021-04-21 ASSESSMENT — ENCOUNTER SYMPTOMS
DIARRHEA: 0
PALPITATIONS: 0
SENSORY CHANGE: 1
ABDOMINAL PAIN: 0
DIZZINESS: 1
FOCAL WEAKNESS: 1
COUGH: 0
VOMITING: 0
HEADACHES: 0
HEMOPTYSIS: 0
NECK PAIN: 0
STRIDOR: 0
BACK PAIN: 0

## 2021-04-21 ASSESSMENT — GAIT ASSESSMENTS: GAIT LEVEL OF ASSIST: UNABLE TO PARTICIPATE

## 2021-04-21 NOTE — PROGRESS NOTES
Monitor summary: SR 68-87, UT .18, QRS .08, QT .36 with rare PAC, rare PVC per strip from monitor room.

## 2021-04-21 NOTE — PROGRESS NOTES
Physical Medicine and Rehabilitation Consultation              Date of initial consultation: 4/2/2021  Consulting provider: Mc Calvillo MD  Reason for consultation: assess for acute inpatient rehab appropriateness  LOS: 21 Day(s)    Chief complaint: Stroke    HPI: The patient is a 56 y.o. right hand dominant male with a past medical history of hypertension, hyperlipidemia, atrial fibrillation not on anticoagulation, Covid positive on 3/18;  who presented on 3/31/2021  7:29 PM brought in by care flight with left hemiplegia, facial droop, right gaze deviation.  CT head showed complete occlusion of right MCA, but unfortunately he was not a candidate for TPA or thrombectomy.  Seen by neurology, found to have a NIH score of 18.  Additional work-up with CTA shows right ICA complete occlusion.  Etiology is thought to be cardioembolic due to A. fib off of AC.  He is currently being followed closely by neurosurgery as he is expected to have peak brain swelling in the next 1 to 2 days and may require craniotomy.  Follow-up MRI shows minimal shift with large MCA stroke    Most of my visit is with Jims wife but also with him. He endorses left neglect and weakness, but does not endorse paresthesias at this time. I had a long meeting with his wife about expectations with this type and severity of stroke.     4/7/2021  I have been following patient's chart in the periphery, since my last visit he has undergone hemicraniectomy on 4/3 for increased cerebral swelling, and postop.  Has been complicated by continuing cerebral edema resulting in midline shift.  Neurology continuing to follow. Son at bedside. Patient still have severe left neglect. He continues to have trace left hand movement.     4/9/2021  Patient continues to have severe left neglect and spasticity. ROM training given to wife at bedside. Patient is developing a functional torticollis from only looking right due to neglect. No hand movement observed today.  "Patient denies new complaints.     4/13/2021  Patient is tired today, wife reports a \"loud night\".  Patient is doing slightly better with his neglect, his neck is still very much turned to the right, but he is able to locate his left hand with his right hand.  Discussed course for therapy and rehab admission with patient's wife and therapists.  Wife is considering paying out-of-pocket, we will try to accommodate her as best we can and also work with the patient as much as possible in the acute setting.     4/15/2021  Patient seen in follow-up, resting comfortably in bed.  Discussed progress with wife who reports some nausea with therapy related to his neglect.  Discussed adding a scopolamine patch.  Also discussed adding Flomax for urinary retention symptoms, possibly removing Brewer between now and Monday and continuing of bladder scans.  Also discussed rehab placement when patient is doing better    4/19/2021  Much more alert today, answering questions in short phrases.  Neglect appears to be a little better today.  Spasticity improved, except in neck which is still firm.  Discussed with son at bedside overall plan of care.    4/21/2021  Patient complaining of poor appetite and insomnia. Starting Remeron tonight. Also discussed flap replacement, date unknown at this time. Patient would benefit from family training but is not improving from a motor strength point of view. He is more alert and neglect is improving. Some spasticity in LUE.    Social Hx:  2 SH  2-3 MAKENZIE, 1 flight of stairs with rails inside  With: Spouse    Per [facility] \"Anel [wife] tells me she is currently working full time, has flexible job and will take time off to provide 24/7 support when Thong returnes home.  They live 2 story home, 2 -3 steps to enter.  Master bedroom on ground floor.  Bathroom has tub/shower combo, no safety grab bars. \"    THERAPY:  PT: Functional mobility   4/1: Mod assist sit to stand with 2 people  4/7: Max Assist  4/12: " Max assist supine to sit, total assist sit to supine  4/14: Max assist sit to stand    OT: ADLs  4/1: Mod assist grooming, max assist lower body dressing  4/7: Total assist   4/12: Max assist dressing, total assist toileting  4/14: Total assist toileting    SLP:   4/1: N.p.o. pending fees  4/2: minced and moist/mildly thick liquid   4/7: NPO  4/8: NPO with 3-5 ice chips per hour  4/14: Minced and moist diet with direct one-to-one supervision    IMAGING:  MR brain 4/1/2021  Very large acute right MCA territory infarct as detailed above with small amount of petechial hemorrhage in the right insular region and right temporal lobe.  Punctate right thalamic lacunar infarct.  Right ICA and M1 MCA occlusion.    PROCEDURES:  None    PMH:  Past Medical History:   Diagnosis Date   • Afib (HCC)    • High cholesterol        PSH:  Past Surgical History:   Procedure Laterality Date   • CRANIOTOMY Right 4/3/2021    Procedure: CRANIOTOMY;  Surgeon: Arthur Payton M.D.;  Location: SURGERY [facility];  Service: Neurosurgery   • KNEE RECONSTRUCTION      left knee orthoscopic       FHX:  Non-pertinent to today's issues    Medications:  Current Facility-Administered Medications   Medication Dose   • meclizine (ANTIVERT) tablet 25 mg  25 mg   • ondansetron (ZOFRAN ODT) dispertab 4 mg  4 mg   • acetaminophen (Tylenol) tablet 650 mg  650 mg   • metoprolol tartrate (LOPRESSOR) tablet 25 mg  25 mg   • senna-docusate (PERICOLACE or SENOKOT S) 8.6-50 MG per tablet 1 tablet  1 tablet   • oxyCODONE immediate release (ROXICODONE) tablet 10 mg  10 mg    Or   • oxyCODONE immediate-release (ROXICODONE) tablet 5 mg  5 mg    Or   • HYDROmorphone (Dilaudid) injection 0.5 mg  0.5 mg   • polyethylene glycol/lytes (MIRALAX) PACKET 1 Packet  1 Packet   • prazosin (MINIPRESS) capsule 1 mg  1 mg   • senna-docusate (PERICOLACE or SENOKOT S) 8.6-50 MG per tablet 1 tablet  1 tablet   • rivaroxaban (XARELTO) tablet 20 mg  20 mg   • promethazine (PHENERGAN)  "tablet 12.5-25 mg  12.5-25 mg   • magnesium hydroxide (MILK OF MAGNESIA) suspension 30 mL  30 mL   • lisinopril (PRINIVIL) tablet 40 mg  40 mg   • thyroid (ARMOUR THYROID) tablet 60 mg  60 mg   • calcium carbonate (TUMS) chewable tab 500 mg  500 mg   • baclofen (LIORESAL) tablet 15 mg  15 mg   • amLODIPine (NORVASC) tablet 10 mg  10 mg   • aspirin (ASA) chewable tab 81 mg  81 mg   • atorvastatin (LIPITOR) tablet 80 mg  80 mg   • cloNIDine (CATAPRES) tablet 0.1 mg  0.1 mg   • hydrALAZINE (APRESOLINE) tablet 25 mg  25 mg   • hydrALAZINE (APRESOLINE) injection 10 mg  10 mg   • labetalol (NORMODYNE/TRANDATE) injection 10-20 mg  10-20 mg   • Pharmacy Consult Request ...Pain Management Review 1 Each  1 Each   • MD ALERT...DO NOT ADMINISTER NSAIDS or ASPIRIN unless ORDERED By Neurosurgery 1 Each  1 Each   • bisacodyl (DULCOLAX) suppository 10 mg  10 mg   • artificial tears (EYE LUBRICANT) ophth ointment 1 Application  1 Application   • ondansetron (ZOFRAN) syringe/vial injection 4 mg  4 mg   • promethazine (PHENERGAN) suppository 12.5-25 mg  12.5-25 mg   • prochlorperazine (COMPAZINE) injection 5-10 mg  5-10 mg       Allergies:  No Known Allergies    Physical Exam:  Vitals: BP (!) 96/64   Pulse 69   Temp 36.1 °C (97 °F) (Temporal)   Resp 16   Ht 1.778 m (5' 10\")   Wt 76.1 kg (167 lb 12.3 oz)   SpO2 93%   Gen: NAD  Head: right hemicraniectomy  Eyes/ Nose/ Mouth: PERRLA, moist mucous membranes  Cardio: RRR, good distal perfusion, warm extremities  Pulm: normal respiratory effort, no cyanosis   Abd: Soft NTND, negative borborygmi   Ext: No peripheral edema. No calf tenderness. No clubbing.    Mental status: answers questions appropriately follows commands  Speech: fluent, no aphasia or dysarthria    CRANIAL NERVES:  2,3: LEFT VF cut, PERRL  3,4,6: EOMI in right VF, no nystagmus or diplopia  5: sensation intact to light touch bilaterally and symmetric  7: Left facial droop   8: hearing grossly intact  9,10: not seen  11: " SCM/Trapezius strength 5/5right, 0/5 left  12: tongue protrudes left    Motor:      Upper Extremity  Myotome R L   Shoulder flexion C5 5 0/5   Elbow flexion C5 5 0/5   Wrist extension C6 5 0/5   Elbow extension C7 5 0/5   Finger flexion C8 5 1/5   Finger abduction T1 5 0/5     Lower Extremity Myotome R L   Hip flexion L2 5 1/5   Knee extension L3 5 0/5   Ankle dorsiflexion L4 5 0/5   Toe extension L5 5 0/5   Ankle plantarflexion S1 5 0/5     Sensory:   Altered sensation on left side       DTRs:  Right  Left    Brachioradialis  2+  2+   Patella tendon  2+ 2+     2-3 beats right ankle clonus, sustained clonus left ankle  Pos babinski left   Negative Castillo b/l     Tone: tight hamstrings bilaterally    Labs: Reviewed and significant for   No results for input(s): RBC, HEMOGLOBIN, HEMATOCRIT, PLATELETCT, PROTHROMBTM, APTT, INR, IRON, FERRITIN, TOTIRONBC in the last 72 hours.  Recent Labs     04/19/21  0638 04/20/21  0457 04/21/21  0252   SODIUM 133* 132* 132*   POTASSIUM 4.5 4.5 4.2   CHLORIDE 99 96 98   CO2 25 29 25   GLUCOSE 95 108* 104*   BUN 31* 29* 25*   CREATININE 0.99 1.06 1.06   CALCIUM 9.4 9.8 9.7     Recent Results (from the past 24 hour(s))   Basic Metabolic Panel    Collection Time: 04/21/21  2:52 AM   Result Value Ref Range    Sodium 132 (L) 135 - 145 mmol/L    Potassium 4.2 3.6 - 5.5 mmol/L    Chloride 98 96 - 112 mmol/L    Co2 25 20 - 33 mmol/L    Glucose 104 (H) 65 - 99 mg/dL    Bun 25 (H) 8 - 22 mg/dL    Creatinine 1.06 0.50 - 1.40 mg/dL    Calcium 9.7 8.5 - 10.5 mg/dL    Anion Gap 9.0 7.0 - 16.0   ESTIMATED GFR    Collection Time: 04/21/21  2:52 AM   Result Value Ref Range    GFR If African American >60 >60 mL/min/1.73 m 2    GFR If Non African American >60 >60 mL/min/1.73 m 2         ASSESSMENT:  Patient is a 56 y.o. male admitted with large right MCA stroke and right ICA occlusion. He did not receive TPA or thrombectomy     [facility] Code / Diagnosis to Support: 0001.1 - Stroke: Left Body Involvement  (Right Brain)    Rehabilitation: Impaired ADLs and mobility  Continuing to consider Thong for IPR.  He has dense left hemiplegia which is difficult to recover from, however it can be managed.  Patient has no insurance benefit for IPR and I have discussed with the family appropriate timing of rehab given his limited benefit and severe deficits.    Additional Recommendations:    Large right MCA ischemic stroke s/p hemicraniectomy on 4/3  - continue PT/OT and SLP   -Family educated on stroke comorbidites on initial consult.  - ROM training given to wife for torticollis prevention and contracture prevention.  Requested room change with nurse manager to provide patient with window on left side to combat neglect.  -Continue baclofen 15mg TID for LUE spasticity -patient is stable at this dose  - Holding off on gabapentin at this time to avoid polypharmacy   - ASA/ statin  - Etiology throught to be cardioembolic in the setting of atrial fibrillation off of AC and in the setting of COVID.  -Primary team using meclizine for nausea control  -On prazosin for urinary retention  - Starting Remeron 15mg for sleep and appetite  - PMR to continue to follow for rehab appropriateness    Dispo: Rehab admin aware of case and working with the financial department to determine an out of pocket daily rate.     Medical Complexity:  Large right MCA stroke      DVT PPX: SCDs      Thank you for allowing us to participate in the care of this patient.     Patient was seen for 28 minutes on unit/floor of which > 50% of time was spent on counseling and coordination of care regarding the above, including prognosis, risk reduction, benefits of treatment, and options for next stage of care.    Christofer Eisenberg, DO   Physical Medicine and Rehabilitation

## 2021-04-21 NOTE — THERAPY
Physical Therapy   Daily Treatment     Patient Name: Thong Arzola  Age:  56 y.o., Sex:  male  Medical Record #: [id number]  Today's Date: 4/21/2021     Precautions: (P) Fall Risk, Swallow Precautions ( See Comments)    Assessment    Pt visiting w/brother upon entry into the room. Pt more conversant and tracked past midline->Lft when supine in the bed. While seated, still struggles w/head control and holding head/eyes midline. Improvement w/getting seated EOB from his Rt side. Transfers remain max assist. Pt w/ongoing nausea during his therapy session.     Plan    Continue current treatment plan.    DC Equipment Recommendations: Unable to determine at this time  Discharge Recommendations: Recommend post-acute placement for additional physical therapy services prior to discharge home     Objective     04/21/21 1039   Other Treatments   Other Treatments Provided Rolling to nathalie pj bottoms. EOB w/max assist for static sit working on tracking and sitting balance. Seated: facilitation of Lft UE and cueing/placement of Rt UE to prevent pushing.    Balance   Sitting Balance (Static) Poor -   Sitting Balance (Dynamic) Trace   Standing Balance (Static) Dependent   Standing Balance (Dynamic) Dependent   Weight Shift Sitting Poor   Weight Shift Standing Absent   Gait Analysis   Gait Level Of Assist Unable to Participate   Bed Mobility    Supine to Sit Moderate Assist  (HOB flat from Rt side)   Sit to Supine   (pt left seated in cardiac chair)   Scooting Maximal Assist  (seated)   Rolling Total Assist to Rt.;Moderate Assist to Lt.   Skilled Intervention Verbal Cuing;Postural Facilitation;Compensatory Strategies   Comments Improvement w/getting seated EOB. Pt initiated Rt LE off the bed and once propped on Rt UE, finished the task to get seated.    Functional Mobility   Sit to Stand Maximal Assist   Bed, Chair, Wheelchair Transfer Maximal Assist   Transfer Method Stand Pivot   Skilled Intervention Verbal Cuing;Postural  Facilitation;Compensatory Strategies   Comments Pt tolerated 2 sit<->stands from the EOB w/his last stand a transfer to the cardiac chair, going toward his Lft side. Static stand conts to require max assist w/Lft LE facilitation.    How much difficulty does the patient currently have...   Turning over in bed (including adjusting bedclothes, sheets and blankets)? 1   Sitting down on and standing up from a chair with arms (e.g., wheelchair, bedside commode, etc.) 1   Moving from lying on back to sitting on the side of the bed? 1   How much help from another person does the patient currently need...   Moving to and from a bed to a chair (including a wheelchair)? 1   Need to walk in a hospital room? 1   Climbing 3-5 steps with a railing? 1   6 clicks Mobility Score 6   Short Term Goals    Short Term Goal # 1 Pt will be able to perform supine<>sit with bed features with min A in 6tx to promote fnx progression towards I    Goal Outcome # 1 goal not met   Short Term Goal # 2 Pt will be able to perform sit<>stand/transfers with hemiwalker with Taz in 6tx to promote fnx progression towards I    Goal Outcome # 2 Goal not met   Short Term Goal # 3 Pt will be able to ambulate 25ft with hemiwalker with min A in 6tx to promote fnx progression towards I    Goal Outcome # 3 Goal not met

## 2021-04-21 NOTE — THERAPY
"Occupational Therapy  Daily Treatment     Patient Name: Thong Arzola  Age:  56 y.o., Sex:  male  Medical Record #: [id number]  Today's Date: 4/21/2021     Precautions  Precautions: Fall Risk, Swallow Precautions ( See Comments)  Comments: no bone flap on Rt, helmet on when EOB. Lft neglect.     Assessment    Pt seen for OT tx. Continues to be limited by decreased activity tolerance, balance deficits, L inattention and LUE weakness impacting ability to complete ADLs and ADL transfers independently. Increased tone in L bicep during session w/ extra time and stretching to come into full elbow extension. Improved visual scanning and eye crossing midline when looking to L. Continues to push w/ RUE while seated EOB into L lateral lean. Will continue to benefit from OT services while in house.     Plan    Continue current treatment plan.    DC Equipment Recommendations: Unable to determine at this time  Discharge Recommendations: Recommend post-acute placement for additional occupational therapy services prior to discharge home       04/21/21 1001   Cognition    Cognition / Consciousness X   Safety Awareness Impaired   New Learning Impaired   Attention Impaired   Comments L neglect, improving from previous session.    Passive ROM Upper Body   Passive ROM Upper Body X   Comments increased tone in L bicep    Active ROM Upper Body   Active ROM Upper Body  X   Comments LUE no active or purposeful movement    Strength Upper Body   Upper Body Strength  X   Comments LUE no functional movement, increased tone in bicep    Sensation Upper Body   Upper Extremity Sensation  X   Comments reports L arm is \"numb\" reports sensation w/ deep pressure but inconsistent   Balance   Sitting Balance (Static) Poor -   Sitting Balance (Dynamic) Trace   Standing Balance (Static) Dependent   Standing Balance (Dynamic) Dependent   Weight Shift Sitting Poor   Weight Shift Standing Absent   Bed Mobility    Supine to Sit Moderate Assist   Scooting " Maximal Assist   Rolling Total Assist to Rt.;Moderate Assist to Lt.   Activities of Daily Living   Grooming Moderate Assist;Seated  (cues to scan for objects on L side )   Upper Body Dressing Moderate Assist   Lower Body Dressing Maximal Assist   Toileting Maximal Assist   Functional Mobility   Sit to Stand Maximal Assist   Bed, Chair, Wheelchair Transfer Maximal Assist   Short Term Goals   Short Term Goal # 1 Pt will sit EOB unsupported and perform grooming w/ SPV   Goal Outcome # 1 Goal not met   Short Term Goal # 2 Pt will attend 3 objects on L side w/ only v/c's   Goal Outcome # 2 Progressing as expected   Short Term Goal # 3 Pt will increase L UE strength to 2/5 grossly   Goal Outcome # 3 Progressing slower than expected   Short Term Goal # 4 Pt will perform ADL txf w/ min A   Goal Outcome # 4 Goal not met   Anticipated Discharge Equipment and Recommendations   DC Equipment Recommendations Unable to determine at this time   Discharge Recommendations Recommend post-acute placement for additional occupational therapy services prior to discharge home

## 2021-04-21 NOTE — CARE PLAN
Problem: Urinary:  Goal: Ability to reestablish a normal urinary elimination pattern will improve  Outcome: PROGRESSING SLOWER THAN EXPECTED     Problem: Communication  Goal: The ability to communicate needs accurately and effectively will improve  Outcome: PROGRESSING AS EXPECTED     Problem: Safety  Goal: Will remain free from injury  Outcome: PROGRESSING AS EXPECTED  Goal: Will remain free from falls  Outcome: PROGRESSING AS EXPECTED  Note: Bed alarm on. Bed in lowest, locked position. Call light and belongings within reach. Pt educated on risk and need to call appropriately. Will continue hourly rounding.      Problem: Venous Thromboembolism (VTW)/Deep Vein Thrombosis (DVT) Prevention:  Goal: Patient will participate in Venous Thrombosis (VTE)/Deep Vein Thrombosis (DVT)Prevention Measures  Outcome: PROGRESSING AS EXPECTED  Note: SCDs in place. ROM performed.      Problem: Knowledge Deficit  Goal: Knowledge of disease process/condition, treatment plan, diagnostic tests, and medications will improve  Outcome: PROGRESSING AS EXPECTED     Problem: Skin Integrity  Goal: Risk for impaired skin integrity will decrease  Outcome: PROGRESSING AS EXPECTED  Note: TAPS in place with q2 turns.      Problem: Communication:  Goal: The ability to communicate needs accurately and effectively will improve  Outcome: PROGRESSING AS EXPECTED     Problem: Safety:  Goal: Will remain free from injury  Outcome: PROGRESSING AS EXPECTED     Problem: Psychosocial Needs:  Goal: Ability to identify appropriate support needs will improve  Outcome: PROGRESSING AS EXPECTED  Goal: Ability to verbalize feelings about condition will improve  Outcome: PROGRESSING AS EXPECTED     Problem: Peripheral Vascular Perfusion:  Goal: Neurologic status will improve  Outcome: PROGRESSING AS EXPECTED  Goal: Will show no signs and symptoms of excessive bleeding  Outcome: PROGRESSING AS EXPECTED     Problem: Respiratory:  Goal: Ability to maintain a clear  airway will improve  Outcome: PROGRESSING AS EXPECTED     Problem: Knowledge Deficit:  Goal: Knowledge of disease process/condition, treatment plan, diagnostic tests, and medications will improve  Outcome: PROGRESSING AS EXPECTED

## 2021-04-21 NOTE — THERAPY
"Speech Language Pathology   Initial Assessment     Patient Name: Thong Arzola  AGE:  56 y.o., SEX:  male  Medical Record #: [id number]  Today's Date: 4/20/2021     Precautions  Precautions: Fall Risk, Swallow Precautions ( See Comments)  Comments: L neglect, helmet on when OOB, no bone flap     Assessment    Patient is a 57 YO male admitted 3/31 from OSH 3/3 stroke-like symptoms. PMHx: afib, high cholesterol and hypothyroidism. CMHx: acute R MCA CVA, PAF, hx of COVID-19, acquired hypothyroidism. Pt underwent hemicraniotomy on 4/3. Head CT 4/5: \"Changes of evolving infarct within the right MCA distribution 2.  Hyperdensity in the sulci of the right MCA distribution infarct, largely appears related to thrombosed vessels, component of subarachnoid hemorrhage is suspected particularly in the right frontal lobe. 3 .  Pneumocephalus, decreased since prior.\"    Formal cognitive-linguistic testing was completed at bedside using the Cognitive-Linguistic Quick Test (CLQT) and portions of the Cognistat. Pt scored as Severe for Attention, Memory, Executive Functions and Visuospatial Skill domains and Mild for Memory of the CLQT. The CLQT Clock Drawing was 5/13 = Severe. He scored as Mild for Reasoning- Similarities and Judgement and Average for Calculation subtests of the Cognistat. Overall, pt presents with a significant L neglect that negatively impacts his performance on all visually based tasks. Short term recall of a short story was a relative strength, as well as verbal expression and auditory comprehension skills. Pt did have difficulty generating solutions to functional problems and identifying similarities among objects. Recommend intensive SLP intervention to address the aforementioned deficits. Recommend 24/7 supervision.     Plan    Recommend intensive SLP intervention  Recommend 24/7 supervision.     Recommend Speech Therapy 5 times per week until therapy goals are met for the following treatments:  " "Cognitive-Linguistic Training and Patient / Family / Caregiver Education.    Discharge Recommendations: (P) Recommend post-acute placement for additional speech therapy services prior to discharge home    Subjective    \"I'm having a hard time performing simple basic tasks.\"     Objective       04/20/21 1631   Verbal Expression   Verbal Output Functional Within Functional Limits (6-7)   Naming Within Functional Limits (6-7)   Dysarthria Supervision (5)   Comments generative naming was limited   Auditory Comprehension   Auditory Comprehension (WDL) WDL   Reading Comprehension   Comments needs assessment; limited by L neglect   Written Expression   Comments needs assessment; likely limited by L neglect   Cognitive-Linguistic   Level of Consciousness Alert   Orientation Level Oriented x 4   Sustained Attention Moderate (3)   Visual Scanning / Cancellation Skills Profound (1)   Short Term Memory Moderate (3)   Simple Reasoning / Problem Solving Moderate (3)   [place] Reasoning Minimal (4)   Abstract Reasoning Moderate (3)   Insight into Deficits Moderate (3)   Executive Functioning / Organization Severe (2)   Auditory Math Minimal (4)   Clock Drawing Left Neglect;Poor Planning;Omissions;Impaired Hand Placement;Perseveration    Social / Pragmatic Communication   Comments Flat affect   Outcome Measures   Outcome Measures Utilized CLQT   CLQT (Cognitive Linguistic Quick Test)   Attention 1   Memory 1   Executive Function 1   Language 3   Visuospatial 1   Total 1.4   Composite Severity Rating Severe   Clock Drawing Severity Rating Severe   Patient / Family Goals   Patient / Family Goal #1 \"Get out of here and function normally.\"   Short Term Goals   Short Term Goal # 3 New 4/20: Patient will attend to objects and body parts on L side in 4/5 opportunities given mod-max cues.   Short Term Goal # 4 New 4/20: Patient will complete basic reasoning and problem solving tasks with >80% accuracy given mod cues.         "

## 2021-04-22 LAB
ANION GAP SERPL CALC-SCNC: 10 MMOL/L (ref 7–16)
BUN SERPL-MCNC: 29 MG/DL (ref 8–22)
CALCIUM SERPL-MCNC: 10 MG/DL (ref 8.5–10.5)
CHLORIDE SERPL-SCNC: 100 MMOL/L (ref 96–112)
CO2 SERPL-SCNC: 26 MMOL/L (ref 20–33)
CREAT SERPL-MCNC: 1.21 MG/DL (ref 0.5–1.4)
GLUCOSE SERPL-MCNC: 103 MG/DL (ref 65–99)
POTASSIUM SERPL-SCNC: 4.6 MMOL/L (ref 3.6–5.5)
SODIUM SERPL-SCNC: 136 MMOL/L (ref 135–145)

## 2021-04-22 PROCEDURE — 36415 COLL VENOUS BLD VENIPUNCTURE: CPT

## 2021-04-22 PROCEDURE — 700102 HCHG RX REV CODE 250 W/ 637 OVERRIDE(OP): Performed by: INTERNAL MEDICINE

## 2021-04-22 PROCEDURE — A9270 NON-COVERED ITEM OR SERVICE: HCPCS | Performed by: NURSE PRACTITIONER

## 2021-04-22 PROCEDURE — A9270 NON-COVERED ITEM OR SERVICE: HCPCS | Performed by: PHYSICAL MEDICINE & REHABILITATION

## 2021-04-22 PROCEDURE — 99232 SBSQ HOSP IP/OBS MODERATE 35: CPT | Performed by: PHYSICAL MEDICINE & REHABILITATION

## 2021-04-22 PROCEDURE — 700102 HCHG RX REV CODE 250 W/ 637 OVERRIDE(OP): Performed by: PHYSICAL MEDICINE & REHABILITATION

## 2021-04-22 PROCEDURE — 99232 SBSQ HOSP IP/OBS MODERATE 35: CPT | Performed by: HOSPITALIST

## 2021-04-22 PROCEDURE — A9270 NON-COVERED ITEM OR SERVICE: HCPCS | Performed by: INTERNAL MEDICINE

## 2021-04-22 PROCEDURE — 700102 HCHG RX REV CODE 250 W/ 637 OVERRIDE(OP): Performed by: NURSE PRACTITIONER

## 2021-04-22 PROCEDURE — 700111 HCHG RX REV CODE 636 W/ 250 OVERRIDE (IP): Performed by: INTERNAL MEDICINE

## 2021-04-22 PROCEDURE — 770020 HCHG ROOM/CARE - TELE (206)

## 2021-04-22 PROCEDURE — 92526 ORAL FUNCTION THERAPY: CPT

## 2021-04-22 PROCEDURE — 80048 BASIC METABOLIC PNL TOTAL CA: CPT

## 2021-04-22 RX ORDER — MECLIZINE HYDROCHLORIDE 25 MG/1
25 TABLET ORAL 3 TIMES DAILY
Status: DISCONTINUED | OUTPATIENT
Start: 2021-04-22 | End: 2021-04-25

## 2021-04-22 RX ADMIN — BACLOFEN 15 MG: 10 TABLET ORAL at 04:27

## 2021-04-22 RX ADMIN — MECLIZINE HYDROCHLORIDE 25 MG: 25 TABLET ORAL at 17:30

## 2021-04-22 RX ADMIN — ASPIRIN 81 MG: 81 TABLET, CHEWABLE ORAL at 17:31

## 2021-04-22 RX ADMIN — ATORVASTATIN CALCIUM 80 MG: 80 TABLET, FILM COATED ORAL at 17:30

## 2021-04-22 RX ADMIN — MIRTAZAPINE 15 MG: 15 TABLET, FILM COATED ORAL at 20:53

## 2021-04-22 RX ADMIN — METOPROLOL TARTRATE 25 MG: 25 TABLET, FILM COATED ORAL at 04:28

## 2021-04-22 RX ADMIN — METOPROLOL TARTRATE 25 MG: 25 TABLET, FILM COATED ORAL at 17:30

## 2021-04-22 RX ADMIN — THYROID, PORCINE 60 MG: 30 TABLET ORAL at 04:28

## 2021-04-22 RX ADMIN — MECLIZINE HYDROCHLORIDE 25 MG: 25 TABLET ORAL at 09:45

## 2021-04-22 RX ADMIN — BACLOFEN 15 MG: 10 TABLET ORAL at 17:31

## 2021-04-22 RX ADMIN — LISINOPRIL 40 MG: 20 TABLET ORAL at 04:28

## 2021-04-22 RX ADMIN — THYROID, PORCINE 60 MG: 30 TABLET ORAL at 17:31

## 2021-04-22 RX ADMIN — RIVAROXABAN 20 MG: 20 TABLET, FILM COATED ORAL at 17:30

## 2021-04-22 RX ADMIN — ONDANSETRON 4 MG: 2 INJECTION INTRAMUSCULAR; INTRAVENOUS at 12:06

## 2021-04-22 RX ADMIN — HYDRALAZINE HYDROCHLORIDE 25 MG: 25 TABLET, FILM COATED ORAL at 04:29

## 2021-04-22 RX ADMIN — PHENYTOIN 1 MG: 125 SUSPENSION ORAL at 17:30

## 2021-04-22 RX ADMIN — AMLODIPINE BESYLATE 10 MG: 5 TABLET ORAL at 04:27

## 2021-04-22 RX ADMIN — BACLOFEN 15 MG: 10 TABLET ORAL at 12:03

## 2021-04-22 ASSESSMENT — ENCOUNTER SYMPTOMS
PALPITATIONS: 0
COUGH: 0
NECK PAIN: 0
VOMITING: 0
ABDOMINAL PAIN: 0
FOCAL WEAKNESS: 1
BACK PAIN: 0
SENSORY CHANGE: 1
STRIDOR: 0
DIARRHEA: 0
HEMOPTYSIS: 0
HEADACHES: 0
DIZZINESS: 1

## 2021-04-22 ASSESSMENT — FIBROSIS 4 INDEX: FIB4 SCORE: 1.166666666666666667

## 2021-04-22 NOTE — PROGRESS NOTES
Physical Medicine and Rehabilitation Consultation              Date of initial consultation: 4/2/2021  Consulting provider: Mc Calvillo MD  Reason for consultation: assess for acute inpatient rehab appropriateness  LOS: 22 Day(s)    Chief complaint: Stroke    HPI: The patient is a 56 y.o. right hand dominant male with a past medical history of hypertension, hyperlipidemia, atrial fibrillation not on anticoagulation, Covid positive on 3/18;  who presented on 3/31/2021  7:29 PM brought in by care flight with left hemiplegia, facial droop, right gaze deviation.  CT head showed complete occlusion of right MCA, but unfortunately he was not a candidate for TPA or thrombectomy.  Seen by neurology, found to have a NIH score of 18.  Additional work-up with CTA shows right ICA complete occlusion.  Etiology is thought to be cardioembolic due to A. fib off of AC.  He is currently being followed closely by neurosurgery as he is expected to have peak brain swelling in the next 1 to 2 days and may require craniotomy.  Follow-up MRI shows minimal shift with large MCA stroke    Most of my visit is with Jims wife but also with him. He endorses left neglect and weakness, but does not endorse paresthesias at this time. I had a long meeting with his wife about expectations with this type and severity of stroke.     4/7/2021  I have been following patient's chart in the periphery, since my last visit he has undergone hemicraniectomy on 4/3 for increased cerebral swelling, and postop.  Has been complicated by continuing cerebral edema resulting in midline shift.  Neurology continuing to follow. Son at bedside. Patient still have severe left neglect. He continues to have trace left hand movement.     4/9/2021  Patient continues to have severe left neglect and spasticity. ROM training given to wife at bedside. Patient is developing a functional torticollis from only looking right due to neglect. No hand movement observed today.  "Patient denies new complaints.     4/13/2021  Patient is tired today, wife reports a \"loud night\".  Patient is doing slightly better with his neglect, his neck is still very much turned to the right, but he is able to locate his left hand with his right hand.  Discussed course for therapy and rehab admission with patient's wife and therapists.  Wife is considering paying out-of-pocket, we will try to accommodate her as best we can and also work with the patient as much as possible in the acute setting.     4/15/2021  Patient seen in follow-up, resting comfortably in bed.  Discussed progress with wife who reports some nausea with therapy related to his neglect.  Discussed adding a scopolamine patch.  Also discussed adding Flomax for urinary retention symptoms, possibly removing Brewer between now and Monday and continuing of bladder scans.  Also discussed rehab placement when patient is doing better    4/19/2021  Much more alert today, answering questions in short phrases.  Neglect appears to be a little better today.  Spasticity improved, except in neck which is still firm.  Discussed with son at bedside overall plan of care.    4/21/2021  Patient complaining of poor appetite and insomnia. Starting Remeron tonight. Also discussed flap replacement, date unknown at this time. Patient would benefit from family training but is not improving from a motor strength point of view. He is more alert and neglect is improving. Some spasticity in LUE.    4/22/2021  Discussed plan of care with wife and sister. Rehab is still working out what a daily rate would be, but it will likely be around 50k or more for his stay. There may be a better option to explore with using those funds for continued outpatient therapy and hired help at home. He has dense left hemiplegia and is not expected to recover much motor function at rehab. He would benefit from family training and continued rehab for trunk control and independence with WC " "ambulation. Still awaiting word on when the flap will be replaced. A possible option to consider would be providing family training here and DC home after flap replacement. We discussed a possible care conference with PFA, rehab, medicine and therapy to evaluate this option early next week. Wife supplied her email for updates: [email or web address]    Social Hx:  2 SH  2-3 MAKENZIE, 1 flight of stairs with rails inside  With: Spouse    Per [facility] \"Anel [wife] tells me she is currently working full time, has flexible job and will take time off to provide 24/7 support when Thong returnes home.  They live 2 story home, 2 -3 steps to enter.  Master bedroom on ground floor.  Bathroom has tub/shower combo, no safety grab bars. \"    THERAPY:  PT: Functional mobility   4/1: Mod assist sit to stand with 2 people  4/7: Max Assist  4/12: Max assist supine to sit, total assist sit to supine  4/14: Max assist sit to stand    OT: ADLs  4/1: Mod assist grooming, max assist lower body dressing  4/7: Total assist   4/12: Max assist dressing, total assist toileting  4/14: Total assist toileting    SLP:   4/1: N.p.o. pending fees  4/2: minced and moist/mildly thick liquid   4/7: NPO  4/8: NPO with 3-5 ice chips per hour  4/14: Minced and moist diet with direct one-to-one supervision    IMAGING:    MR brain 4/1/2021  Very large acute right MCA territory infarct as detailed above with small amount of petechial hemorrhage in the right insular region and right temporal lobe.  Punctate right thalamic lacunar infarct.  Right ICA and M1 MCA occlusion.    PROCEDURES:  None    PMH:  Past Medical History:   Diagnosis Date   • Afib (HCC)    • High cholesterol        PSH:  Past Surgical History:   Procedure Laterality Date   • CRANIOTOMY Right 4/3/2021    Procedure: CRANIOTOMY;  Surgeon: Arthur Payton M.D.;  Location: SURGERY [facility];  Service: Neurosurgery   • KNEE RECONSTRUCTION      left knee orthoscopic       FHX:  Non-pertinent to " today's issues    Medications:  Current Facility-Administered Medications   Medication Dose   • mirtazapine (Remeron) tablet 15 mg  15 mg   • meclizine (ANTIVERT) tablet 25 mg  25 mg   • ondansetron (ZOFRAN ODT) dispertab 4 mg  4 mg   • acetaminophen (Tylenol) tablet 650 mg  650 mg   • metoprolol tartrate (LOPRESSOR) tablet 25 mg  25 mg   • senna-docusate (PERICOLACE or SENOKOT S) 8.6-50 MG per tablet 1 tablet  1 tablet   • oxyCODONE immediate release (ROXICODONE) tablet 10 mg  10 mg    Or   • oxyCODONE immediate-release (ROXICODONE) tablet 5 mg  5 mg    Or   • HYDROmorphone (Dilaudid) injection 0.5 mg  0.5 mg   • polyethylene glycol/lytes (MIRALAX) PACKET 1 Packet  1 Packet   • prazosin (MINIPRESS) capsule 1 mg  1 mg   • senna-docusate (PERICOLACE or SENOKOT S) 8.6-50 MG per tablet 1 tablet  1 tablet   • rivaroxaban (XARELTO) tablet 20 mg  20 mg   • promethazine (PHENERGAN) tablet 12.5-25 mg  12.5-25 mg   • magnesium hydroxide (MILK OF MAGNESIA) suspension 30 mL  30 mL   • lisinopril (PRINIVIL) tablet 40 mg  40 mg   • thyroid (ARMOUR THYROID) tablet 60 mg  60 mg   • calcium carbonate (TUMS) chewable tab 500 mg  500 mg   • baclofen (LIORESAL) tablet 15 mg  15 mg   • amLODIPine (NORVASC) tablet 10 mg  10 mg   • aspirin (ASA) chewable tab 81 mg  81 mg   • atorvastatin (LIPITOR) tablet 80 mg  80 mg   • cloNIDine (CATAPRES) tablet 0.1 mg  0.1 mg   • hydrALAZINE (APRESOLINE) tablet 25 mg  25 mg   • hydrALAZINE (APRESOLINE) injection 10 mg  10 mg   • labetalol (NORMODYNE/TRANDATE) injection 10-20 mg  10-20 mg   • Pharmacy Consult Request ...Pain Management Review 1 Each  1 Each   • MD ALERT...DO NOT ADMINISTER NSAIDS or ASPIRIN unless ORDERED By Neurosurgery 1 Each  1 Each   • bisacodyl (DULCOLAX) suppository 10 mg  10 mg   • artificial tears (EYE LUBRICANT) ophth ointment 1 Application  1 Application   • ondansetron (ZOFRAN) syringe/vial injection 4 mg  4 mg   • promethazine (PHENERGAN) suppository 12.5-25 mg  12.5-25 mg  "  • prochlorperazine (COMPAZINE) injection 5-10 mg  5-10 mg       Allergies:  No Known Allergies    Physical Exam:  Vitals: /82   Pulse 80   Temp 36.4 °C (97.6 °F) (Temporal)   Resp 17   Ht 1.778 m (5' 10\")   Wt 77.9 kg (171 lb 11.8 oz)   SpO2 94%   Gen: NAD  Head: right hemicraniectomy  Eyes/ Nose/ Mouth: PERRLA, moist mucous membranes  Cardio: RRR, good distal perfusion, warm extremities  Pulm: normal respiratory effort, no cyanosis   Abd: Soft NTND, negative borborygmi   Ext: No peripheral edema. No calf tenderness. No clubbing.    Mental status: answers questions appropriately follows commands  Speech: fluent, no aphasia or dysarthria    CRANIAL NERVES:  2,3: LEFT VF cut, PERRL  3,4,6: EOMI in right VF, no nystagmus or diplopia  5: sensation intact to light touch bilaterally and symmetric  7: Left facial droop   8: hearing grossly intact  9,10: not seen  11: SCM/Trapezius strength 5/5right, 0/5 left  12: tongue protrudes left    Motor:      Upper Extremity  Myotome R L   Shoulder flexion C5 5 0/5   Elbow flexion C5 5 0/5   Wrist extension C6 5 0/5   Elbow extension C7 5 0/5   Finger flexion C8 5 1/5   Finger abduction T1 5 0/5     Lower Extremity Myotome R L   Hip flexion L2 5 1/5   Knee extension L3 5 0/5   Ankle dorsiflexion L4 5 0/5   Toe extension L5 5 0/5   Ankle plantarflexion S1 5 0/5     Sensory:   Altered sensation on left side       DTRs:  Right  Left    Brachioradialis  2+  2+   Patella tendon  2+ 2+     2-3 beats right ankle clonus, sustained clonus left ankle  Pos babinski left   Negative Castillo b/l     Tone: tight hamstrings bilaterally    Labs: Reviewed and significant for   No results for input(s): RBC, HEMOGLOBIN, HEMATOCRIT, PLATELETCT, PROTHROMBTM, APTT, INR, IRON, FERRITIN, TOTIRONBC in the last 72 hours.  Recent Labs     04/20/21  0457 04/21/21  0252 04/22/21  0401   SODIUM 132* 132* 136   POTASSIUM 4.5 4.2 4.6   CHLORIDE 96 98 100   CO2 29 25 26   GLUCOSE 108* 104* 103*   BUN " 29* 25* 29*   CREATININE 1.06 1.06 1.21   CALCIUM 9.8 9.7 10.0     Recent Results (from the past 24 hour(s))   Basic Metabolic Panel    Collection Time: 04/22/21  4:01 AM   Result Value Ref Range    Sodium 136 135 - 145 mmol/L    Potassium 4.6 3.6 - 5.5 mmol/L    Chloride 100 96 - 112 mmol/L    Co2 26 20 - 33 mmol/L    Glucose 103 (H) 65 - 99 mg/dL    Bun 29 (H) 8 - 22 mg/dL    Creatinine 1.21 0.50 - 1.40 mg/dL    Calcium 10.0 8.5 - 10.5 mg/dL    Anion Gap 10.0 7.0 - 16.0   ESTIMATED GFR    Collection Time: 04/22/21  4:01 AM   Result Value Ref Range    GFR If African American >60 >60 mL/min/1.73 m 2    GFR If Non African American >60 >60 mL/min/1.73 m 2         ASSESSMENT:  Patient is a 56 y.o. male admitted with large right MCA stroke and right ICA occlusion. He did not receive TPA or thrombectomy     [facility] Code / Diagnosis to Support: 0001.1 - Stroke: Left Body Involvement (Right Brain)    Rehabilitation: Impaired ADLs and mobility  Continuing to consider Thong for IPR.  He has dense left hemiplegia which is difficult to recover from, however it can be managed.  Patient has no insurance benefit for IPR and I have discussed with the family appropriate timing of rehab given his limited benefit and severe deficits.    Additional Recommendations:    Large right MCA ischemic stroke s/p hemicraniectomy on 4/3  - continue PT/OT and SLP   -Family educated on stroke comorbidites on initial consult.  - ROM training given to wife for torticollis prevention and contracture prevention.  Requested room change with nurse manager to provide patient with window on left side to combat neglect.  -Continue baclofen 15mg TID for LUE spasticity -patient is stable at this dose  - Holding off on gabapentin at this time to avoid polypharmacy   - ASA/ statin  - Etiology throught to be cardioembolic in the setting of atrial fibrillation off of AC and in the setting of COVID.  -Primary team using meclizine for nausea control  -On prazosin for  urinary retention  - Continue Remeron 15mg for sleep and appetite  - Meclizine changed to scheduled  - PMR to continue to follow for rehab appropriateness    Dispo: Rehab admin aware of case and working with the financial department to determine an out of pocket daily rate.     Medical Complexity:  Large right MCA stroke      DVT PPX: SCDs      Thank you for allowing us to participate in the care of this patient.     Patient was seen for 27 minutes on unit/floor of which > 50% of time was spent on counseling and coordination of care regarding the above, including prognosis, risk reduction, benefits of treatment, and options for next stage of care.    Christofer Eisenberg, DO   Physical Medicine and Rehabilitation

## 2021-04-22 NOTE — PROGRESS NOTES
Hospital Medicine Daily Progress Note    Date of Service  4/22/2021    Chief Complaint  Left sided weakness    Hospital Course  56 y.o. male admitted 3/31/2021 with acute stroke.  He was not a candidate for tPA nor thrombectomy.  He has a history of paroxysmal atrial fibrillation, dyslipidemia, hypothyroidism. During admit he underwent a hemicraniectomy for right sided decompression from right MCA dense stroke with intractable intracranial pressure.     Interval Problem Update  4/12: Patient verbal and oriented with some slurred speech.  Left hemiplegia. On 4/11 Dr. Leo met with the wife and explained to her we likely will need a G-tube at some point.  Also alerted the both of them we would need a SNF at some point.  He reports that his pain is reasonably well controlled at this juncture, he is describing some thirst.  Free water flushes will be increased from 30 mL to 90 mL every 6 hours.  This can be backed off if sodium begins to drop.    4/13 physical therapy seen the patient.  On tube feeding still.  Speech therapy to follow.   to follow for discharge planning.    4/14: The patient was retaining urine this morning and will try to get a straight cath.  Also discussed with the wife and she said that she will try to get insurance starting May 1 for him.  I also started for Flomax    4/15: Discharge planning by . No acute issue overnight.    4/16 no acute issue overnight.  Discharge planning by     4/17: the patient is sleeping comfortably on the bed. No acute issue overnight.    4/18 the patient is having vertigo symptom.  I will start her on meclizine and see how he does.    4/19: The patient is sleeping comfortably on the bed.  His vertigo is improving with meclizine.  PT OT to evaluate him today.    4/20: No acute overnight events.  Wife at bedside, discussed possibility of a voiding trial today.      4/21: No acute overnight events, brother at bedside.  Case discussed  with physical medicine and rehabilitation physician.  Unclear of the planned timeline for cranioplasty, will touch base with neurosurgery shortly.    4/22: No acute overnight events.  Wife is at bedside requesting around-the-clock antivertigo medications.  This is not unreasonable for a few days duration.  Assistance of physical medicine and rehab greatly appreciated.    Consultants/Specialty  Neurology  Neuro-intensivist (signing off 4/11)  Neuro surgery    Code Status  Full Code    Disposition  Remain on the floor    Review of Systems  Review of Systems   Constitutional: Positive for malaise/fatigue.   Respiratory: Negative for cough, hemoptysis and stridor.    Cardiovascular: Negative for chest pain and palpitations.   Gastrointestinal: Negative for abdominal pain, diarrhea and vomiting.   Genitourinary: Negative for dysuria, frequency and urgency.   Musculoskeletal: Negative for back pain and neck pain.   Skin: Negative for rash.   Neurological: Positive for dizziness, sensory change (left side) and focal weakness (left side). Negative for headaches (right temporal region).        Physical Exam  Temp:  [36.4 °C (97.5 °F)-36.7 °C (98 °F)] 36.4 °C (97.5 °F)  Pulse:  [67-80] 72  Resp:  [16-18] 18  BP: ()/(60-82) 147/69  SpO2:  [93 %-94 %] 94 %    Physical Exam  Vitals reviewed.   Constitutional:       Appearance: Normal appearance. He is diaphoretic.   HENT:      Head: Normocephalic and atraumatic.      Nose: Nose normal.      Mouth/Throat:      Mouth: Mucous membranes are moist.   Eyes:      Extraocular Movements: Extraocular movements intact.      Conjunctiva/sclera: Conjunctivae normal.   Cardiovascular:      Rate and Rhythm: Normal rate.      Pulses: Normal pulses.           Radial pulses are 2+ on the right side and 2+ on the left side.        Dorsalis pedis pulses are 2+ on the right side and 2+ on the left side.      Heart sounds: Normal heart sounds.   Pulmonary:      Effort: Pulmonary effort is  normal.   Abdominal:      General: Bowel sounds are normal.      Palpations: Abdomen is soft.   Musculoskeletal:         General: No swelling or tenderness.      Cervical back: Neck supple. No muscular tenderness.      Right lower leg: No edema.      Left lower leg: No edema.   Lymphadenopathy:      Cervical: No cervical adenopathy.   Neurological:      Mental Status: He is alert.      Cranial Nerves: Cranial nerve deficit present.      Sensory: Sensory deficit present.      Coordination: Coordination abnormal.      Gait: Gait abnormal.         Current Facility-Administered Medications:   •  meclizine (ANTIVERT) tablet 25 mg, 25 mg, Oral, TID, Christofer Worchel, D.O.  •  mirtazapine (Remeron) tablet 15 mg, 15 mg, Oral, QHS, Christofer Lealchel, D.O., 15 mg at 04/21/21 2021  •  ondansetron (ZOFRAN ODT) dispertab 4 mg, 4 mg, Oral, Q4HRS PRN, Radha Meredith, A.P.R.N., 4 mg at 04/20/21 1655  •  acetaminophen (Tylenol) tablet 650 mg, 650 mg, Oral, Q6HRS PRN, Radha Meredith, A.P.R.N., 650 mg at 04/20/21 1024  •  metoprolol tartrate (LOPRESSOR) tablet 25 mg, 25 mg, Oral, BID, Radha Meredith, A.P.R.N., 25 mg at 04/22/21 0428  •  senna-docusate (PERICOLACE or SENOKOT S) 8.6-50 MG per tablet 1 tablet, 1 tablet, Oral, Nightly, Radha Meredith, A.P.R.N., 1 tablet at 04/21/21 2021  •  oxyCODONE immediate-release (ROXICODONE) tablet 5 mg, 5 mg, Oral, Q3HRS PRN **OR** oxyCODONE immediate release (ROXICODONE) tablet 10 mg, 10 mg, Oral, Q3HRS PRN **OR** HYDROmorphone (Dilaudid) injection 0.5 mg, 0.5 mg, Intravenous, Q3HRS PRN, Radha Meredith, A.P.R.N.  •  polyethylene glycol/lytes (MIRALAX) PACKET 1 Packet, 1 Packet, Oral, BID PRN, ERVIN Hampton.P.R.N.  •  prazosin (MINIPRESS) capsule 1 mg, 1 mg, Oral, Q EVENING, Radha Meredith A.P.R.N., Stopped at 04/20/21 1620  •  senna-docusate (PERICOLACE or SENOKOT S) 8.6-50 MG per tablet 1 tablet, 1 tablet, Oral, Q24HRS PRN, Radha Meredith A.P.R.N.  •  rivaroxaban (XARELTO) tablet 20 mg, 20 mg,  Oral, PM MEAL, Radha Meredith, A.P.R.N., 20 mg at 04/21/21 1636  •  promethazine (PHENERGAN) tablet 12.5-25 mg, 12.5-25 mg, Oral, Q4HRS PRN, Radha Meredith, A.P.R.N.  •  magnesium hydroxide (MILK OF MAGNESIA) suspension 30 mL, 30 mL, Oral, QDAY PRN, Radha Meredith, A.P.R.N.  •  lisinopril (PRINIVIL) tablet 40 mg, 40 mg, Oral, Q DAY, Radha Meredith, A.P.R.N., 40 mg at 04/22/21 0428  •  thyroid (ARMOUR THYROID) tablet 60 mg, 60 mg, Oral, BID, Radha Meredith, A.P.R.N., 60 mg at 04/22/21 0428  •  calcium carbonate (TUMS) chewable tab 500 mg, 500 mg, Oral, Q8HRS PRN, Radha Meredith, A.P.R.N.  •  baclofen (LIORESAL) tablet 15 mg, 15 mg, Oral, TID, Radha Meredith, A.P.R.N., 15 mg at 04/22/21 1203  •  amLODIPine (NORVASC) tablet 10 mg, 10 mg, Oral, Q DAY, Radha Meredith, A.P.R.N., 10 mg at 04/22/21 0427  •  aspirin (ASA) chewable tab 81 mg, 81 mg, Oral, DAILY, Radha Meredith, A.P.R.N., 81 mg at 04/21/21 1636  •  atorvastatin (LIPITOR) tablet 80 mg, 80 mg, Oral, Q EVENING, Radha Meredith, A.P.R.N., 80 mg at 04/21/21 1636  •  cloNIDine (CATAPRES) tablet 0.1 mg, 0.1 mg, Oral, Q4HRS PRN, Golden Pearson M.D.  •  hydrALAZINE (APRESOLINE) tablet 25 mg, 25 mg, Oral, Q8HRS, Golden Pearson M.D., Stopped at 04/22/21 1400  •  hydrALAZINE (APRESOLINE) injection 10 mg, 10 mg, Intravenous, Q HOUR PRN, Cristhian Bell M.D., 10 mg at 04/10/21 1631  •  labetalol (NORMODYNE/TRANDATE) injection 10-20 mg, 10-20 mg, Intravenous, Q4HRS PRN, Cristhian Bell M.D., 10 mg at 04/10/21 1508  •  Pharmacy Consult Request ...Pain Management Review 1 Each, 1 Each, Other, PHARMACY TO DOSE, Lilia Pizano P.A.-C.  •  MD ALERT...DO NOT ADMINISTER NSAIDS or ASPIRIN unless ORDERED By Neurosurgery 1 Each, 1 Each, Other, PRN, Lilia Pizano, LEIGHAA.-C.  •  bisacodyl (DULCOLAX) suppository 10 mg, 10 mg, Rectal, Q24HRS PRN, LEIGHA MorrisA.-C., 10 mg at 04/11/21 1811  •  artificial tears (EYE LUBRICANT) ophth ointment 1 Application, 1 Application, Both Eyes,  DARWINN, Lilia Pizano P.A.-C.  •  ondansetron (ZOFRAN) syringe/vial injection 4 mg, 4 mg, Intravenous, Q4HRS PRN, Gia Forman M.D., 4 mg at 04/22/21 1206  •  promethazine (PHENERGAN) suppository 12.5-25 mg, 12.5-25 mg, Rectal, Q4HRS PRN, Gia Forman M.D.  •  prochlorperazine (COMPAZINE) injection 5-10 mg, 5-10 mg, Intravenous, Q4HRS PRN, Gia Forman M.D., 5 mg at 04/17/21 1234      Fluids    Intake/Output Summary (Last 24 hours) at 4/22/2021 1642  Last data filed at 4/22/2021 1200  Gross per 24 hour   Intake 700 ml   Output 1150 ml   Net -450 ml       Laboratory      Recent Labs     04/20/21  0457 04/21/21  0252 04/22/21  0401   SODIUM 132* 132* 136   POTASSIUM 4.5 4.2 4.6   CHLORIDE 96 98 100   CO2 29 25 26   GLUCOSE 108* 104* 103*   BUN 29* 25* 29*   CREATININE 1.06 1.06 1.21   CALCIUM 9.8 9.7 10.0                   Imaging  CT-HEAD W/O   Final Result         1.  Evolving area of infarct within the right MCA distribution.   2.  Ribbonlike gyriform hyperdensity at the area of prior infarct, appearance likely corresponding with gyriform laminar necrosis, component of subtle subarachnoid hemorrhage cannot be definitively excluded radiographically.   3.  Minimal pneumocephalus, decreased since prior study.   4.  Atherosclerosis.      US-EXTREMITY VENOUS LOWER BILAT   Final Result      DX-CHEST-LIMITED (1 VIEW)   Final Result      Right basilar atelectasis. No focal consolidation or pleural effusions.      [id number] VIEW   Final Result      No evidence of bowel obstruction.                  DX-CHEST-LIMITED (1 VIEW)   Final Result      1.  Right internal jugular catheter and enteric catheter appear appropriately located      2.  Minimal right basilar atelectasis      CT-HEAD W/O   Final Result         1.  Changes of evolving infarct within the right MCA distribution   2.  Hyperdensity in the sulci of the right MCA distribution infarct, largely appears related to thrombosed vessels, component of subarachnoid  hemorrhage is suspected particularly in the right frontal lobe.   3.  Pneumocephalus, decreased since prior.      CT-HEAD W/O   Final Result         Postsurgical change from right craniectomy. Redemonstration of large right MCA infarct with edema and bulging of the brain parenchyma through the craniectomy defect      Less mass effect on the right lateral ventricle. Less right to left midline shift of 3 mm, previously 10 mm.         DX-ABDOMEN FOR TUBE PLACEMENT   Final Result      Enteric tube terminates over the stomach.      CT-HEAD W/O   Final Result         1.  Low-density changes of the right hemisphere compatible with MCA distribution infarct with edema.   2.  New effacement of the bilateral ventricles, right greater than left, with 10 mm right-to-left midline shift.   3.  New dilatation of the left temporal horn ventricle, appearance favors component of obstructive hydrocephalus due to mass effect from infarct and edema.   4.  Hyperdensity in the right middle cerebral artery, likely thrombosis given associated findings.      These findings were discussed with the patient's clinician, Dr. Nunez, on 4/3/2021 7:11 AM.      MR-BRAIN-W/O   Final Result      Very large acute right MCA territory infarct as detailed above with small amount of petechial hemorrhage in the right insular region and right temporal lobe.      Punctate right thalamic lacunar infarct.      Right ICA and M1 MCA occlusion.      EC-ECHOCARDIOGRAM COMPLETE W/O CONT   Final Result      DX-CHEST-PORTABLE (1 VIEW)   Final Result         1.  Interstitial pulmonary parenchymal prominence, compatible with interstitial edema and/or infiltrates.      CT-CTA NECK WITH & W/O-POST PROCESSING   Final Result      Acute occlusion of the right internal carotid artery shortly after bifurcation. It is occluded up to the level of the carotid terminus.      CT-CTA HEAD WITH & W/O-POST PROCESS   Final Result      Acute right M1 occlusion.      Findings were  discussed with RHONDA DIAZ on 3/31/2021 7:54 PM.      CT-CEREBRAL PERFUSION ANALYSIS   Final Result      1.  Cerebral blood flow less than 30% likely representing completed infarct = 134 mL.      2.  T Max more than 6 seconds likely representing combination of completed infarct and ischemia = 210 mL.      3.  Mismatched volume likely representing ischemic brain/penumbra = 76 mL.      4.  Please note that the cerebral perfusion was performed on the limited brain tissue around the basal ganglia region. Infarct/ischemia outside the CT perfusion sections can be missed in this study.      CT-HEAD W/O   Final Result   Addendum 1 of 1   In retrospect, there is subtle loss of gray-white matter differentiation    in the right MCA distribution, consistent with acute infarct.      Final      1.  No CT evidence of acute infarct, hemorrhage or mass.   2.  Mild paranasal sinus disease.           Assessment/Plan  * Acute right MCA stroke (HCC)- (present on admission)  Assessment & Plan  Likely consequence of COVID19 in 3/21 and hx of afib  Subsequent brain edema requiring right decompressive hemicraniectomy on 4/3/2021  Neurology following patient will benefit from long-term anticoagulation when bleeding risk is acceptable likely 1-2 weeks from event onset.   Blood pressure control keep SBP<140 (on metoprolol 25mg BID, amlodipine 10mg, hydralazine 25mg TID, lisinopril 40mg daily)  Patient weaned off hypertonic saline and started on salt tablets  PT/OT/SLP  Aspiration precautions  Physiatry consultation   for discharge planning: repeat PT/OT eval again today  Now having vertigo  Started on meclizine and improving    Paroxysmal atrial fibrillation (HCC)- (present on admission)  Assessment & Plan  Monitor vitals and on tele  Metoprolol 25mg BID w/ parameters  Discussed risk benefits and alternatives of long-term anticoagulation with patient and wife, plan to start DOAC when bleeding risk felt to be acceptable per  neurology likely in 1 to 2 weeks    Leucocytosis  Assessment & Plan  Resolved but continue to monitor  4/11 WBC:7.7    Urinary retention  Assessment & Plan  Brewer catheter in place  Watch for urinary retention  prazosin    History of COVID-19- (present on admission)  Assessment & Plan  Clinically recovered patient symptom onset on 3/15/2021 with positive initial test on 3/18/2021    Acquired hypothyroidism- (present on admission)  Assessment & Plan  [place] Thyroid 60mg BID   TSH is less than 0.005 will decrease dose and he will need recheck TFTs in 6 weeks (prior to admit was on 90mg BID)  TSH: <0.005 in past week.       VTE prophylaxis: Enoxaparin 40mg SQ daily

## 2021-04-22 NOTE — CARE PLAN
Problem: Nutritional:  Goal: Achieve adequate nutritional intake  Description: Patient will consume >50% of meals  Outcome: PROGRESSING AS EXPECTED     Diet order upgraded on 4/20 to soft and bite sized with mildly thick liquids. PO 0-75% x 6 meals and <25-50% x 2 supplements per flow sheet since 4/19. Pt continues to receive yogurt and Boost Plus with meals. Wife at bedside reports pt is completely consuming yogurt and Boost Plus.     New weight requested from RN/CNA.

## 2021-04-22 NOTE — THERAPY
Speech Language Pathology  Daily Treatment     Patient Name: Thong Arzola  Age:  56 y.o., Sex:  male  Medical Record #: [id number]  Today's Date: 4/22/2021     Precautions: Fall Risk, Swallow Precautions ( See Comments)  Comments: no bone flap on Rt, helmet on when EOB. Lft neglect.     Assessment    Patient was seen on this date for dysphagia treatment. Sister at bedside for session. Pt requesting nausea medication prior to PO intake and reported minimal intake of solids due to same, mostly consuming items consistent with a mildly thick full liquid diet. PO consisted of trials of small sips of un-thickened water (x6), 6-8 oz MTL, 3-4 oz yogurt, and 2 bites of soft solid (peaches). Pt declined all other items from soft & bite size meal tray. No overt s/sx of aspiration appreciated with all trials tested including sips of thin liquids (although at risk for silent aspiration as seen during FEES on 4/13). Pt independently performed lingual sweep following soft solids and no oral residue noted; however, trials were minimal. Education provided to pt, sister and SO who arrived after session regarding current status, role of SLP, and SLP POC.     Plan    Recommend continue Soft & Bite Sized (SB6) solids and Mildly Thick Liquids (MT2) with direct meal supervision and the following strategies: small bites/single sips, upright for all PO intake, check for L sided pocketing and clear, oral care after meals.   Float pills in puree.     Continue current treatment plan.    Discharge Recommendations: Recommend post-acute placement for additional speech therapy services prior to discharge home     Objective       04/22/21 1229   Vitals   O2 Delivery Device None - Room Air   Dysphagia    Positioning / Behavior Modification Modulate Rate or Bite Size;Multiple Swallows;Self Monitoring  (check for L sided pocketing and clear)   Other Treatments PO trials of items from SB6/MT2 diet    Diet / Liquid Recommendation Soft & Bite-Sized  (6) - (Dysphagia III);Mildly Thick (2) - (Nectar Thick)   Skilled Intervention Compensatory Strategies;Verbal Cueing   Recommended Route of Medication Administration   Medication Administration  Float Whole with Puree   Short Term Goals   Short Term Goal # 1 NEW 4/20: Patient will consume meals of soft/bite sized solids and mildly thick liquids with no s/sx of aspiration given direct meal supervision for safe swallow strategies.   Goal Outcome # 1 Progressing slower than expected

## 2021-04-22 NOTE — PROGRESS NOTES
Hospital Medicine Daily Progress Note    Date of Service  4/21/2021    Chief Complaint  Left sided weakness    Hospital Course  56 y.o. male admitted 3/31/2021 with acute stroke.  He was not a candidate for tPA nor thrombectomy.  He has a history of paroxysmal atrial fibrillation, dyslipidemia, hypothyroidism. During admit he underwent a hemicraniectomy for right sided decompression from right MCA dense stroke with intractable intracranial pressure.     Interval Problem Update  4/12: Patient verbal and oriented with some slurred speech.  Left hemiplegia. On 4/11 Dr. Leo met with the wife and explained to her we likely will need a G-tube at some point.  Also alerted the both of them we would need a SNF at some point.  He reports that his pain is reasonably well controlled at this juncture, he is describing some thirst.  Free water flushes will be increased from 30 mL to 90 mL every 6 hours.  This can be backed off if sodium begins to drop.    4/13 physical therapy seen the patient.  On tube feeding still.  Speech therapy to follow.   to follow for discharge planning.    4/14: The patient was retaining urine this morning and will try to get a straight cath.  Also discussed with the wife and she said that she will try to get insurance starting May 1 for him.  I also started for Flomax    4/15: Discharge planning by . No acute issue overnight.    4/16 no acute issue overnight.  Discharge planning by     4/17: the patient is sleeping comfortably on the bed. No acute issue overnight.    4/18 the patient is having vertigo symptom.  I will start her on meclizine and see how he does.    4/19: The patient is sleeping comfortably on the bed.  His vertigo is improving with meclizine.  PT OT to evaluate him today.    4/20: No acute overnight events.  Wife at bedside, discussed possibility of a voiding trial today.      4/21: No acute overnight events, brother at bedside.  Case discussed  with physical medicine and rehabilitation physician.  Unclear of the planned timeline for cranioplasty, will touch base with neurosurgery shortly.    Consultants/Specialty  Neurology  Neuro-intensivist (signing off 4/11)  Neuro surgery    Code Status  Full Code    Disposition  Remain on the floor    Review of Systems  Review of Systems   Constitutional: Positive for malaise/fatigue.   Respiratory: Negative for cough, hemoptysis and stridor.    Cardiovascular: Negative for chest pain and palpitations.   Gastrointestinal: Negative for abdominal pain, diarrhea and vomiting.   Genitourinary: Negative for dysuria, frequency and urgency.   Musculoskeletal: Negative for back pain and neck pain.   Skin: Negative for rash.   Neurological: Positive for dizziness, sensory change (left side) and focal weakness (left side). Negative for headaches (right temporal region).        Physical Exam  Temp:  [36.1 °C (97 °F)-36.6 °C (97.9 °F)] 36.6 °C (97.8 °F)  Pulse:  [69-84] 75  Resp:  [16-17] 16  BP: ()/(64-86) 109/75  SpO2:  [93 %-96 %] 95 %    Physical Exam  Vitals reviewed.   Constitutional:       Appearance: Normal appearance. He is diaphoretic.   HENT:      Head: Normocephalic and atraumatic.      Nose: Nose normal.      Mouth/Throat:      Mouth: Mucous membranes are moist.   Eyes:      Extraocular Movements: Extraocular movements intact.      Conjunctiva/sclera: Conjunctivae normal.   Cardiovascular:      Rate and Rhythm: Normal rate.      Pulses: Normal pulses.           Radial pulses are 2+ on the right side and 2+ on the left side.        Dorsalis pedis pulses are 2+ on the right side and 2+ on the left side.      Heart sounds: Normal heart sounds.   Pulmonary:      Effort: Pulmonary effort is normal.   Abdominal:      General: Bowel sounds are normal.      Palpations: Abdomen is soft.   Musculoskeletal:         General: No swelling or tenderness.      Cervical back: Neck supple. No muscular tenderness.      Right  lower leg: No edema.      Left lower leg: No edema.   Lymphadenopathy:      Cervical: No cervical adenopathy.   Neurological:      Mental Status: He is alert.      Cranial Nerves: Cranial nerve deficit present.      Sensory: Sensory deficit present.      Coordination: Coordination abnormal.      Gait: Gait abnormal.         Current Facility-Administered Medications:   •  mirtazapine (Remeron) tablet 15 mg, 15 mg, Oral, QHS, Christofer Eisenberg D.O.  •  meclizine (ANTIVERT) tablet 25 mg, 25 mg, Oral, TID PRN, Golden Pearson M.D., 25 mg at 04/21/21 1046  •  ondansetron (ZOFRAN ODT) dispertab 4 mg, 4 mg, Oral, Q4HRS PRN, Radha Meredith, A.P.R.N., 4 mg at 04/20/21 1655  •  acetaminophen (Tylenol) tablet 650 mg, 650 mg, Oral, Q6HRS PRN, Radha Meredith, A.P.R.N., 650 mg at 04/20/21 1024  •  metoprolol tartrate (LOPRESSOR) tablet 25 mg, 25 mg, Oral, BID, Radha Meredith, A.P.R.N., 25 mg at 04/21/21 1637  •  senna-docusate (PERICOLACE or SENOKOT S) 8.6-50 MG per tablet 1 tablet, 1 tablet, Oral, Nightly, Radha Meredith, A.P.R.N., Stopped at 04/20/21 2100  •  oxyCODONE immediate-release (ROXICODONE) tablet 5 mg, 5 mg, Oral, Q3HRS PRN **OR** oxyCODONE immediate release (ROXICODONE) tablet 10 mg, 10 mg, Oral, Q3HRS PRN **OR** HYDROmorphone (Dilaudid) injection 0.5 mg, 0.5 mg, Intravenous, Q3HRS PRN, Radha Meredith, A.P.R.N.  •  polyethylene glycol/lytes (MIRALAX) PACKET 1 Packet, 1 Packet, Oral, BID PRN, Radha Meredith, A.P.R.N.  •  prazosin (MINIPRESS) capsule 1 mg, 1 mg, Oral, Q EVENING, Radha Meredith, A.P.R.N., Stopped at 04/20/21 1620  •  senna-docusate (PERICOLACE or SENOKOT S) 8.6-50 MG per tablet 1 tablet, 1 tablet, Oral, Q24HRS PRN, Radha Meredith, A.P.R.N.  •  rivaroxaban (XARELTO) tablet 20 mg, 20 mg, Oral, PM MEAL, Radha Meredith, A.P.R.N., 20 mg at 04/21/21 1636  •  promethazine (PHENERGAN) tablet 12.5-25 mg, 12.5-25 mg, Oral, Q4HRS PRN, Radha Meredith, A.P.R.N.  •  magnesium hydroxide (MILK OF MAGNESIA) suspension 30 mL,  30 mL, Oral, QDAY PRN, Radha Meredith, A.P.R.N.  •  lisinopril (PRINIVIL) tablet 40 mg, 40 mg, Oral, Q DAY, Radha Meredith, A.P.R.N., 40 mg at 04/21/21 0449  •  thyroid (ARMOUR THYROID) tablet 60 mg, 60 mg, Oral, BID, Radha Meredith, A.P.R.N., 60 mg at 04/21/21 1636  •  calcium carbonate (TUMS) chewable tab 500 mg, 500 mg, Oral, Q8HRS PRN, Radha Meredith, A.P.R.N.  •  baclofen (LIORESAL) tablet 15 mg, 15 mg, Oral, TID, Radha Meredith, A.P.R.N., 15 mg at 04/21/21 1636  •  amLODIPine (NORVASC) tablet 10 mg, 10 mg, Oral, Q DAY, Radha Meredith, A.P.R.N., 10 mg at 04/21/21 0448  •  aspirin (ASA) chewable tab 81 mg, 81 mg, Oral, DAILY, Radha Meredith, A.P.R.N., 81 mg at 04/21/21 1636  •  atorvastatin (LIPITOR) tablet 80 mg, 80 mg, Oral, Q EVENING, Radha Meredith, A.P.R.N., 80 mg at 04/21/21 1636  •  cloNIDine (CATAPRES) tablet 0.1 mg, 0.1 mg, Oral, Q4HRS PRN, Golden Pearson M.D.  •  hydrALAZINE (APRESOLINE) tablet 25 mg, 25 mg, Oral, Q8HRS, Golden Pearson M.D., Stopped at 04/21/21 1400  •  hydrALAZINE (APRESOLINE) injection 10 mg, 10 mg, Intravenous, Q HOUR PRN, Cristhian Bell M.D., 10 mg at 04/10/21 1631  •  labetalol (NORMODYNE/TRANDATE) injection 10-20 mg, 10-20 mg, Intravenous, Q4HRS PRN, Cristhian Bell M.D., 10 mg at 04/10/21 1508  •  Pharmacy Consult Request ...Pain Management Review 1 Each, 1 Each, Other, PHARMACY TO DOSE, Lilia Pizano P.A.-C.  •  MD ALERT...DO NOT ADMINISTER NSAIDS or ASPIRIN unless ORDERED By Neurosurgery 1 Each, 1 Each, Other, PRN, VIVIAN MorrisC.  •  bisacodyl (DULCOLAX) suppository 10 mg, 10 mg, Rectal, Q24HRS PRN, LEIGHA MorrisAYohana-CARLA., 10 mg at 04/11/21 1811  •  artificial tears (EYE LUBRICANT) ophth ointment 1 Application, 1 Application, Both Eyes, PRN, LEIGHA MorrisA.-C.  •  ondansetron (ZOFRAN) syringe/vial injection 4 mg, 4 mg, Intravenous, Q4HRS PRN, Gia Forman M.D., 4 mg at 04/21/21 1046  •  promethazine (PHENERGAN) suppository 12.5-25 mg, 12.5-25 mg,  Rectal, Q4HRS PRN, Gia Forman M.D.  •  prochlorperazine (COMPAZINE) injection 5-10 mg, 5-10 mg, Intravenous, Q4HRS PRN, Gia Forman M.D., 5 mg at 04/17/21 1234      Fluids    Intake/Output Summary (Last 24 hours) at 4/21/2021 1712  Last data filed at 4/21/2021 0600  Gross per 24 hour   Intake --   Output 950 ml   Net -950 ml       Laboratory      Recent Labs     04/19/21  0638 04/20/21  0457 04/21/21  0252   SODIUM 133* 132* 132*   POTASSIUM 4.5 4.5 4.2   CHLORIDE 99 96 98   CO2 25 29 25   GLUCOSE 95 108* 104*   BUN 31* 29* 25*   CREATININE 0.99 1.06 1.06   CALCIUM 9.4 9.8 9.7                   Imaging  CT-HEAD W/O   Final Result         1.  Evolving area of infarct within the right MCA distribution.   2.  Ribbonlike gyriform hyperdensity at the area of prior infarct, appearance likely corresponding with gyriform laminar necrosis, component of subtle subarachnoid hemorrhage cannot be definitively excluded radiographically.   3.  Minimal pneumocephalus, decreased since prior study.   4.  Atherosclerosis.      US-EXTREMITY VENOUS LOWER BILAT   Final Result      DX-CHEST-LIMITED (1 VIEW)   Final Result      Right basilar atelectasis. No focal consolidation or pleural effusions.      [id number] VIEW   Final Result      No evidence of bowel obstruction.                  DX-CHEST-LIMITED (1 VIEW)   Final Result      1.  Right internal jugular catheter and enteric catheter appear appropriately located      2.  Minimal right basilar atelectasis      CT-HEAD W/O   Final Result         1.  Changes of evolving infarct within the right MCA distribution   2.  Hyperdensity in the sulci of the right MCA distribution infarct, largely appears related to thrombosed vessels, component of subarachnoid hemorrhage is suspected particularly in the right frontal lobe.   3.  Pneumocephalus, decreased since prior.      CT-HEAD W/O   Final Result         Postsurgical change from right craniectomy. Redemonstration of large right MCA  infarct with edema and bulging of the brain parenchyma through the craniectomy defect      Less mass effect on the right lateral ventricle. Less right to left midline shift of 3 mm, previously 10 mm.         DX-ABDOMEN FOR TUBE PLACEMENT   Final Result      Enteric tube terminates over the stomach.      CT-HEAD W/O   Final Result         1.  Low-density changes of the right hemisphere compatible with MCA distribution infarct with edema.   2.  New effacement of the bilateral ventricles, right greater than left, with 10 mm right-to-left midline shift.   3.  New dilatation of the left temporal horn ventricle, appearance favors component of obstructive hydrocephalus due to mass effect from infarct and edema.   4.  Hyperdensity in the right middle cerebral artery, likely thrombosis given associated findings.      These findings were discussed with the patient's clinician, Dr. Nunez, on 4/3/2021 7:11 AM.      MR-BRAIN-W/O   Final Result      Very large acute right MCA territory infarct as detailed above with small amount of petechial hemorrhage in the right insular region and right temporal lobe.      Punctate right thalamic lacunar infarct.      Right ICA and M1 MCA occlusion.      EC-ECHOCARDIOGRAM COMPLETE W/O CONT   Final Result      DX-CHEST-PORTABLE (1 VIEW)   Final Result         1.  Interstitial pulmonary parenchymal prominence, compatible with interstitial edema and/or infiltrates.      CT-CTA NECK WITH & W/O-POST PROCESSING   Final Result      Acute occlusion of the right internal carotid artery shortly after bifurcation. It is occluded up to the level of the carotid terminus.      CT-CTA HEAD WITH & W/O-POST PROCESS   Final Result      Acute right M1 occlusion.      Findings were discussed with RHONDA DIAZ on 3/31/2021 7:54 PM.      CT-CEREBRAL PERFUSION ANALYSIS   Final Result      1.  Cerebral blood flow less than 30% likely representing completed infarct = 134 mL.      2.  T Max more than 6 seconds  likely representing combination of completed infarct and ischemia = 210 mL.      3.  Mismatched volume likely representing ischemic brain/penumbra = 76 mL.      4.  Please note that the cerebral perfusion was performed on the limited brain tissue around the basal ganglia region. Infarct/ischemia outside the CT perfusion sections can be missed in this study.      CT-HEAD W/O   Final Result   Addendum 1 of 1   In retrospect, there is subtle loss of gray-white matter differentiation    in the right MCA distribution, consistent with acute infarct.      Final      1.  No CT evidence of acute infarct, hemorrhage or mass.   2.  Mild paranasal sinus disease.           Assessment/Plan  * Acute right MCA stroke (HCC)- (present on admission)  Assessment & Plan  Likely consequence of COVID19 in 3/21 and hx of afib  Subsequent brain edema requiring right decompressive hemicraniectomy on 4/3/2021  Neurology following patient will benefit from long-term anticoagulation when bleeding risk is acceptable likely 1-2 weeks from event onset.   Blood pressure control keep SBP<140 (on metoprolol 25mg BID, amlodipine 10mg, hydralazine 25mg TID, lisinopril 40mg daily)  Patient weaned off hypertonic saline and started on salt tablets  PT/OT/SLP  Aspiration precautions  Physiatry consultation   for discharge planning: repeat PT/OT eval again today  Now having vertigo  Started on meclizine and improving    Paroxysmal atrial fibrillation (HCC)- (present on admission)  Assessment & Plan  Monitor vitals and on tele  Metoprolol 25mg BID w/ parameters  Discussed risk benefits and alternatives of long-term anticoagulation with patient and wife, plan to start DOAC when bleeding risk felt to be acceptable per neurology likely in 1 to 2 weeks    Leucocytosis  Assessment & Plan  Resolved but continue to monitor  4/11 WBC:7.7    Urinary retention  Assessment & Plan  Brewer catheter in place  Watch for urinary retention  prazosin    History of  COVID-19- (present on admission)  Assessment & Plan  Clinically recovered patient symptom onset on 3/15/2021 with positive initial test on 3/18/2021    Acquired hypothyroidism- (present on admission)  Assessment & Plan  [place] Thyroid 60mg BID   TSH is less than 0.005 will decrease dose and he will need recheck TFTs in 6 weeks (prior to admit was on 90mg BID)  TSH: <0.005 in past week.       VTE prophylaxis: Enoxaparin 40mg SQ daily

## 2021-04-22 NOTE — CARE PLAN
Problem: Safety  Goal: Will remain free from falls  Outcome: PROGRESSING AS EXPECTED  Note: Bed alarm on, nonslip socks in use. Patient educated to not get out of bed without assistance, verbalizes understanding.     Problem: Venous Thromboembolism (VTW)/Deep Vein Thrombosis (DVT) Prevention:  Goal: Patient will participate in Venous Thrombosis (VTE)/Deep Vein Thrombosis (DVT)Prevention Measures  Outcome: PROGRESSING AS EXPECTED  Flowsheets (Taken 4/21/2021 2000)  Risk Assessment Score: 2  VTE RISK: Moderate  Mechanical Prophylaxis: SCDs, Sequential Compression Device  SCDs, Sequential Compression Device: On  Pharmacologic Prophylaxis Used: LMWH: Enoxaparin(Lovenox)  Note: Encourage ROM exercises. Administer VTE prophylaxis.

## 2021-04-22 NOTE — DISCHARGE PLANNING
Anticipated Discharge Disposition:   SNF   vs   Home with Home Health    Action:    Pt admitted with right MCA stroke, parox a fib, brain edema, s/p hemicraniectomy, vertigo  + Covid 3-18-21  NIH 17 on 4-15-21  Max assist  Soft bite sized, mildy thick liquid diet    RN CM met with patient and spouse Anel at bedside.  Pt c/o of tightness in right gluteal area (knot) and resolved with spouse providing massage.  Pt stated, That helped and gave a thumbs up.       SIMRAN KirkpatrickAnel then went to the conference room to discuss dc plan. She stated that she and pt rent a room from her sister-in-law here in [place].  Explained SNF and home health.  RN CM provided choice form for [place] SNFs.  Anel stated she believes the bone flap will be replaced in 3 months.  She doesn't want patient to stay in the hospital that long.       Anel stated that she received a notification from her HR department that she is able to add patient to her insurance.  The form was emailed to SIMRAN DUKE and Anel was able to fill it out.  It was securely emailed to her HR department, c/o Anel Cece with Benefits/.  She informed RN CM and patient's spouse via email that patient was added to her insurance.  Plan is [employer] 2021 PPO Gold 15/1000/20%.  Effective date 4-1-21.  Plan will show as effective within approximately 2 business days.    Plan information emailed to [facility].    Barriers to Discharge:    Choices    Plan:    F/U with patient's spouse for choices to SNF or home health.

## 2021-04-22 NOTE — PROGRESS NOTES
Monitor summary: SR 67-79, AR 0.18, QRS 0.08, QT 0.36, with frequent PACs and per strip from monitor room.

## 2021-04-23 LAB
ANION GAP SERPL CALC-SCNC: 10 MMOL/L (ref 7–16)
BUN SERPL-MCNC: 32 MG/DL (ref 8–22)
CALCIUM SERPL-MCNC: 9.9 MG/DL (ref 8.5–10.5)
CHLORIDE SERPL-SCNC: 100 MMOL/L (ref 96–112)
CO2 SERPL-SCNC: 26 MMOL/L (ref 20–33)
CREAT SERPL-MCNC: 1.32 MG/DL (ref 0.5–1.4)
GLUCOSE SERPL-MCNC: 122 MG/DL (ref 65–99)
POTASSIUM SERPL-SCNC: 4.3 MMOL/L (ref 3.6–5.5)
SODIUM SERPL-SCNC: 136 MMOL/L (ref 135–145)

## 2021-04-23 PROCEDURE — 700102 HCHG RX REV CODE 250 W/ 637 OVERRIDE(OP): Performed by: NURSE PRACTITIONER

## 2021-04-23 PROCEDURE — 770020 HCHG ROOM/CARE - TELE (206)

## 2021-04-23 PROCEDURE — A9270 NON-COVERED ITEM OR SERVICE: HCPCS | Performed by: PHYSICAL MEDICINE & REHABILITATION

## 2021-04-23 PROCEDURE — A9270 NON-COVERED ITEM OR SERVICE: HCPCS | Performed by: NURSE PRACTITIONER

## 2021-04-23 PROCEDURE — 97112 NEUROMUSCULAR REEDUCATION: CPT | Mod: CQ

## 2021-04-23 PROCEDURE — 700111 HCHG RX REV CODE 636 W/ 250 OVERRIDE (IP): Performed by: INTERNAL MEDICINE

## 2021-04-23 PROCEDURE — 700102 HCHG RX REV CODE 250 W/ 637 OVERRIDE(OP): Performed by: PHYSICAL MEDICINE & REHABILITATION

## 2021-04-23 PROCEDURE — 700102 HCHG RX REV CODE 250 W/ 637 OVERRIDE(OP): Performed by: INTERNAL MEDICINE

## 2021-04-23 PROCEDURE — 97110 THERAPEUTIC EXERCISES: CPT | Mod: CQ

## 2021-04-23 PROCEDURE — 99232 SBSQ HOSP IP/OBS MODERATE 35: CPT | Performed by: HOSPITALIST

## 2021-04-23 PROCEDURE — 36415 COLL VENOUS BLD VENIPUNCTURE: CPT

## 2021-04-23 PROCEDURE — 80048 BASIC METABOLIC PNL TOTAL CA: CPT

## 2021-04-23 PROCEDURE — A9270 NON-COVERED ITEM OR SERVICE: HCPCS | Performed by: INTERNAL MEDICINE

## 2021-04-23 PROCEDURE — 97530 THERAPEUTIC ACTIVITIES: CPT | Mod: CQ

## 2021-04-23 RX ADMIN — BACLOFEN 15 MG: 10 TABLET ORAL at 17:54

## 2021-04-23 RX ADMIN — MECLIZINE HYDROCHLORIDE 25 MG: 25 TABLET ORAL at 17:54

## 2021-04-23 RX ADMIN — RIVAROXABAN 20 MG: 20 TABLET, FILM COATED ORAL at 17:55

## 2021-04-23 RX ADMIN — DOCUSATE SODIUM 50 MG AND SENNOSIDES 8.6 MG 1 TABLET: 8.6; 5 TABLET, FILM COATED ORAL at 20:58

## 2021-04-23 RX ADMIN — MECLIZINE HYDROCHLORIDE 25 MG: 25 TABLET ORAL at 04:49

## 2021-04-23 RX ADMIN — LISINOPRIL 40 MG: 20 TABLET ORAL at 04:48

## 2021-04-23 RX ADMIN — MIRTAZAPINE 15 MG: 15 TABLET, FILM COATED ORAL at 20:58

## 2021-04-23 RX ADMIN — THYROID, PORCINE 60 MG: 30 TABLET ORAL at 04:48

## 2021-04-23 RX ADMIN — THYROID, PORCINE 60 MG: 30 TABLET ORAL at 17:55

## 2021-04-23 RX ADMIN — METOPROLOL TARTRATE 25 MG: 25 TABLET, FILM COATED ORAL at 04:49

## 2021-04-23 RX ADMIN — ONDANSETRON 4 MG: 2 INJECTION INTRAMUSCULAR; INTRAVENOUS at 11:17

## 2021-04-23 RX ADMIN — MECLIZINE HYDROCHLORIDE 25 MG: 25 TABLET ORAL at 11:08

## 2021-04-23 RX ADMIN — BACLOFEN 15 MG: 10 TABLET ORAL at 04:48

## 2021-04-23 RX ADMIN — BACLOFEN 15 MG: 10 TABLET ORAL at 11:08

## 2021-04-23 RX ADMIN — ACETAMINOPHEN 650 MG: 325 TABLET, FILM COATED ORAL at 11:17

## 2021-04-23 RX ADMIN — ASPIRIN 81 MG: 81 TABLET, CHEWABLE ORAL at 17:54

## 2021-04-23 RX ADMIN — ATORVASTATIN CALCIUM 80 MG: 80 TABLET, FILM COATED ORAL at 17:54

## 2021-04-23 RX ADMIN — DOCUSATE SODIUM 50 MG AND SENNOSIDES 8.6 MG 1 TABLET: 8.6; 5 TABLET, FILM COATED ORAL at 11:17

## 2021-04-23 RX ADMIN — HYDRALAZINE HYDROCHLORIDE 25 MG: 25 TABLET, FILM COATED ORAL at 20:58

## 2021-04-23 RX ADMIN — AMLODIPINE BESYLATE 10 MG: 5 TABLET ORAL at 04:48

## 2021-04-23 RX ADMIN — HYDRALAZINE HYDROCHLORIDE 25 MG: 25 TABLET, FILM COATED ORAL at 04:49

## 2021-04-23 ASSESSMENT — COGNITIVE AND FUNCTIONAL STATUS - GENERAL
STANDING UP FROM CHAIR USING ARMS: TOTAL
SUGGESTED CMS G CODE MODIFIER MOBILITY: CN
MOVING TO AND FROM BED TO CHAIR: UNABLE
MOVING FROM LYING ON BACK TO SITTING ON SIDE OF FLAT BED: UNABLE
MOBILITY SCORE: 6
WALKING IN HOSPITAL ROOM: TOTAL
CLIMB 3 TO 5 STEPS WITH RAILING: TOTAL
TURNING FROM BACK TO SIDE WHILE IN FLAT BAD: UNABLE

## 2021-04-23 ASSESSMENT — ENCOUNTER SYMPTOMS
HEADACHES: 0
HEMOPTYSIS: 0
BACK PAIN: 0
DIZZINESS: 1
FOCAL WEAKNESS: 1
VOMITING: 0
DIARRHEA: 0
ABDOMINAL PAIN: 0
STRIDOR: 0
COUGH: 0
PALPITATIONS: 0
SENSORY CHANGE: 1
NECK PAIN: 0

## 2021-04-23 ASSESSMENT — GAIT ASSESSMENTS: GAIT LEVEL OF ASSIST: UNABLE TO PARTICIPATE

## 2021-04-23 NOTE — THERAPY
Physical Therapy   Daily Treatment     Patient Name: Thong Arzola  Age:  56 y.o., Sex:  male  Medical Record #: [id number]  Today's Date: 4/23/2021     Precautions: (P) Fall Risk, Swallow Precautions ( See Comments)    Assessment    Pt still struggling w/nausea during his therapy session, becomes more pronounced w/sitting and standing. Pt still uses Rt UE to push, time needed to break his pushing to allow for static sit. Pt could not hold head up today 2* increasing his nausea. Will initiated w/c transfers and work in the // bars next week. Pt not approp for home w/HHS from PT standpoint, requiring too much assist. Overall tx efforts are limited by his nausea.     Plan    Continue current treatment plan.    DC Equipment Recommendations: Unable to determine at this time  Discharge Recommendations: Recommend post-acute placement for additional physical therapy services prior to discharge home     Objective       04/23/21 1122   Other Treatments   Other Treatments Provided AA-PROM Lft UE/LE. Rolling each direction x 3 w/rail assist to the Lft and Lft UE management w/roll to the Rt. EOB w/min<->mod assist, does better wo/Rt UE prop. Pt sidelying on Rt elbow to break his pushing, is able to hold midline for short time. While EOB worked on scap squeeze and holding head up. Pt keeps head down 2* nausea. Providing information regarding SNF, encouraging wife to go visit/tour the facility of choice. Pt is not ready for d/c home w/HHS.    Balance   Sitting Balance (Static) Poor -   Sitting Balance (Dynamic) Trace   Standing Balance (Static) Poor -   Standing Balance (Dynamic) Dependent   Weight Shift Sitting Poor   Weight Shift Standing Absent   Gait Analysis   Gait Level Of Assist Unable to Participate   Comments Will initiated pre-gait mobility in the // bars next week.    Bed Mobility    Supine to Sit Moderate Assist  (HOB flat from his Rt side. )   Sit to Supine   (pt left seated in cardiac chair)   Scooting Maximal  Assist  (seated)   Rolling Maximal Assist to Rt.;Minimum Assist to Lt.  (w/railing)   Skilled Intervention Verbal Cuing;Postural Facilitation;Compensatory Strategies   Comments Introduced using Rt LE to assist Lft LE off the bed and once onto elbow could finish getting seated EOB.    Functional Mobility   Sit to Stand Maximal Assist   Bed, Chair, Wheelchair Transfer Maximal Assist  (bed->cardiac chair)   Transfer Method Stand Pivot   Skilled Intervention Verbal Cuing;Postural Facilitation;Compensatory Strategies   Comments Improvement w/pts sit->stands today, still does not hold head up 2* nausea. Improvement w/taking wt through Lft LE. Transfers remain max assist.    How much difficulty does the patient currently have...   Turning over in bed (including adjusting bedclothes, sheets and blankets)? 1   Sitting down on and standing up from a chair with arms (e.g., wheelchair, bedside commode, etc.) 1   Moving from lying on back to sitting on the side of the bed? 1   How much help from another person does the patient currently need...   Moving to and from a bed to a chair (including a wheelchair)? 1   Need to walk in a hospital room? 1   Climbing 3-5 steps with a railing? 1   6 clicks Mobility Score 6   Short Term Goals    Short Term Goal # 1 Pt will be able to perform supine<>sit with bed features with min A in 6tx to promote fnx progression towards I    Goal Outcome # 1 goal not met   Short Term Goal # 2 Pt will be able to perform sit<>stand/transfers with hemiwalker with Taz in 6tx to promote fnx progression towards I    Goal Outcome # 2 Goal not met   Short Term Goal # 3 Pt will be able to ambulate 25ft with hemiwalker with min A in 6tx to promote fnx progression towards I    Goal Outcome # 3 Goal not met

## 2021-04-23 NOTE — PROGRESS NOTES
Hospital Medicine Daily Progress Note    Date of Service  4/23/2021    Chief Complaint  Left sided weakness    Hospital Course  56 y.o. male admitted 3/31/2021 with acute stroke.  He was not a candidate for tPA nor thrombectomy.  He has a history of paroxysmal atrial fibrillation, dyslipidemia, hypothyroidism. During admit he underwent a hemicraniectomy for right sided decompression from right MCA dense stroke with intractable intracranial pressure.     Interval Problem Update  4/12: Patient verbal and oriented with some slurred speech.  Left hemiplegia. On 4/11 Dr. Leo met with the wife and explained to her we likely will need a G-tube at some point.  Also alerted the both of them we would need a SNF at some point.  He reports that his pain is reasonably well controlled at this juncture, he is describing some thirst.  Free water flushes will be increased from 30 mL to 90 mL every 6 hours.  This can be backed off if sodium begins to drop.    4/13 physical therapy seen the patient.  On tube feeding still.  Speech therapy to follow.   to follow for discharge planning.    4/14: The patient was retaining urine this morning and will try to get a straight cath.  Also discussed with the wife and she said that she will try to get insurance starting May 1 for him.  I also started for Flomax    4/15: Discharge planning by . No acute issue overnight.    4/16 no acute issue overnight.  Discharge planning by     4/17: the patient is sleeping comfortably on the bed. No acute issue overnight.    4/18 the patient is having vertigo symptom.  I will start her on meclizine and see how he does.    4/19: The patient is sleeping comfortably on the bed.  His vertigo is improving with meclizine.  PT OT to evaluate him today.    4/20: No acute overnight events.  Wife at bedside, discussed possibility of a voiding trial today.      4/21: No acute overnight events, brother at bedside.  Case discussed  with physical medicine and rehabilitation physician.  Unclear of the planned timeline for cranioplasty, will touch base with neurosurgery shortly.    4/22: No acute overnight events.  Wife is at bedside requesting around-the-clock antivertigo medications.  This is not unreasonable for a few days duration.  Assistance of physical medicine and rehab greatly appreciated.    4/23: No acute overnight events,    Consultants/Specialty  Neurology  Neuro-intensivist (signing off 4/11)  Neuro surgery    Code Status  Full Code    Disposition  Remain on the floor    Review of Systems  Review of Systems   Constitutional: Positive for malaise/fatigue.   Respiratory: Negative for cough, hemoptysis and stridor.    Cardiovascular: Negative for chest pain and palpitations.   Gastrointestinal: Negative for abdominal pain, diarrhea and vomiting.   Genitourinary: Negative for dysuria, frequency and urgency.   Musculoskeletal: Negative for back pain and neck pain.   Skin: Negative for rash.   Neurological: Positive for dizziness, sensory change (left side) and focal weakness (left side). Negative for headaches (right temporal region).        Physical Exam  Temp:  [36.1 °C (97 °F)-36.7 °C (98.1 °F)] 36.2 °C (97.1 °F)  Pulse:  [72-93] 92  Resp:  [16-20] 20  BP: ()/(61-80) 94/61  SpO2:  [94 %-96 %] 95 %    Physical Exam  Vitals reviewed.   Constitutional:       Appearance: Normal appearance. He is diaphoretic.   HENT:      Head: Normocephalic and atraumatic.      Nose: Nose normal.      Mouth/Throat:      Mouth: Mucous membranes are moist.   Eyes:      Extraocular Movements: Extraocular movements intact.      Conjunctiva/sclera: Conjunctivae normal.   Cardiovascular:      Rate and Rhythm: Normal rate.      Pulses: Normal pulses.           Radial pulses are 2+ on the right side and 2+ on the left side.        Dorsalis pedis pulses are 2+ on the right side and 2+ on the left side.      Heart sounds: Normal heart sounds.   Pulmonary:       Effort: Pulmonary effort is normal.   Abdominal:      General: Bowel sounds are normal.      Palpations: Abdomen is soft.   Musculoskeletal:         General: No swelling or tenderness.      Cervical back: Neck supple. No muscular tenderness.      Right lower leg: No edema.      Left lower leg: No edema.   Lymphadenopathy:      Cervical: No cervical adenopathy.   Neurological:      Mental Status: He is alert.      Cranial Nerves: Cranial nerve deficit present.      Sensory: Sensory deficit present.      Coordination: Coordination abnormal.      Gait: Gait abnormal.         Current Facility-Administered Medications:   •  meclizine (ANTIVERT) tablet 25 mg, 25 mg, Oral, TID, Christofer Worchel, D.O., 25 mg at 04/23/21 1108  •  mirtazapine (Remeron) tablet 15 mg, 15 mg, Oral, QHS, Christofer Worchel, D.O., 15 mg at 04/22/21 2053  •  ondansetron (ZOFRAN ODT) dispertab 4 mg, 4 mg, Oral, Q4HRS PRN, Radha Meredith, A.P.R.N., 4 mg at 04/20/21 1655  •  acetaminophen (Tylenol) tablet 650 mg, 650 mg, Oral, Q6HRS PRN, Radha Meredith, A.P.R.N., 650 mg at 04/23/21 1117  •  metoprolol tartrate (LOPRESSOR) tablet 25 mg, 25 mg, Oral, BID, Radha Meredith, A.P.R.N., 25 mg at 04/23/21 0449  •  senna-docusate (PERICOLACE or SENOKOT S) 8.6-50 MG per tablet 1 tablet, 1 tablet, Oral, Nightly, Radha Meredith, A.P.R.N., Stopped at 04/22/21 2100  •  oxyCODONE immediate-release (ROXICODONE) tablet 5 mg, 5 mg, Oral, Q3HRS PRN **OR** oxyCODONE immediate release (ROXICODONE) tablet 10 mg, 10 mg, Oral, Q3HRS PRN **OR** HYDROmorphone (Dilaudid) injection 0.5 mg, 0.5 mg, Intravenous, Q3HRS PRN, Radha Meredith, A.P.R.N.  •  polyethylene glycol/lytes (MIRALAX) PACKET 1 Packet, 1 Packet, Oral, BID PRN, RICHMOND HamptonP.R.N.  •  prazosin (MINIPRESS) capsule 1 mg, 1 mg, Oral, Q EVENING, ERVIN Hampton.P.R.N., 1 mg at 04/22/21 1730  •  senna-docusate (PERICOLACE or SENOKOT S) 8.6-50 MG per tablet 1 tablet, 1 tablet, Oral, Q24HRS PRN, ERVIN Hampton.P.R.N.,  1 tablet at 04/23/21 1117  •  rivaroxaban (XARELTO) tablet 20 mg, 20 mg, Oral, PM MEAL, Radha Meredith, A.P.R.N., 20 mg at 04/22/21 1730  •  promethazine (PHENERGAN) tablet 12.5-25 mg, 12.5-25 mg, Oral, Q4HRS PRN, Radha Meredith, A.P.R.N.  •  magnesium hydroxide (MILK OF MAGNESIA) suspension 30 mL, 30 mL, Oral, QDAY PRN, Radha Meredith, A.P.R.N.  •  lisinopril (PRINIVIL) tablet 40 mg, 40 mg, Oral, Q DAY, Radha Meredith, A.P.R.N., 40 mg at 04/23/21 0448  •  thyroid (ARMOUR THYROID) tablet 60 mg, 60 mg, Oral, BID, Radha Meredith, A.P.R.N., 60 mg at 04/23/21 0448  •  calcium carbonate (TUMS) chewable tab 500 mg, 500 mg, Oral, Q8HRS PRN, Radha Meredith, A.P.R.N.  •  baclofen (LIORESAL) tablet 15 mg, 15 mg, Oral, TID, Radha Meredith, A.P.R.N., 15 mg at 04/23/21 1108  •  amLODIPine (NORVASC) tablet 10 mg, 10 mg, Oral, Q DAY, Radha Meredith, A.P.R.N., 10 mg at 04/23/21 0448  •  aspirin (ASA) chewable tab 81 mg, 81 mg, Oral, DAILY, Radha Meredith, A.P.R.N., 81 mg at 04/22/21 1731  •  atorvastatin (LIPITOR) tablet 80 mg, 80 mg, Oral, Q EVENING, Radha Meredith, A.P.R.N., 80 mg at 04/22/21 1730  •  cloNIDine (CATAPRES) tablet 0.1 mg, 0.1 mg, Oral, Q4HRS PRN, Golden Pearson M.D.  •  hydrALAZINE (APRESOLINE) tablet 25 mg, 25 mg, Oral, Q8HRS, Golden Pearson M.D., 25 mg at 04/23/21 0449  •  hydrALAZINE (APRESOLINE) injection 10 mg, 10 mg, Intravenous, Q HOUR PRN, Cristhian Bell M.D., 10 mg at 04/10/21 1631  •  labetalol (NORMODYNE/TRANDATE) injection 10-20 mg, 10-20 mg, Intravenous, Q4HRS PRN, Cristhian Bell M.D., 10 mg at 04/10/21 1508  •  Pharmacy Consult Request ...Pain Management Review 1 Each, 1 Each, Other, PHARMACY TO DOSE, Lilia Pizano, HANSA.A.-C.  •  MD ALERT...DO NOT ADMINISTER NSAIDS or ASPIRIN unless ORDERED By Neurosurgery 1 Each, 1 Each, Other, PRN, Lilia Pizano, HANSA.A.-C.  •  bisacodyl (DULCOLAX) suppository 10 mg, 10 mg, Rectal, Q24HRS PRN, Lilia Pizano, P.A.-C., 10 mg at 04/11/21 1811  •  artificial tears  (EYE LUBRICANT) ophth ointment 1 Application, 1 Application, Both Eyes, PRN, Lilia Pizano P.A.-C.  •  ondansetron (ZOFRAN) syringe/vial injection 4 mg, 4 mg, Intravenous, Q4HRS PRN, Gia Forman M.D., 4 mg at 04/23/21 1117  •  promethazine (PHENERGAN) suppository 12.5-25 mg, 12.5-25 mg, Rectal, Q4HRS PRN, Gia Forman M.D.  •  prochlorperazine (COMPAZINE) injection 5-10 mg, 5-10 mg, Intravenous, Q4HRS PRN, Gia Forman M.D., 5 mg at 04/17/21 1234      Fluids    Intake/Output Summary (Last 24 hours) at 4/23/2021 1323  Last data filed at 4/23/2021 1000  Gross per 24 hour   Intake 500 ml   Output 635 ml   Net -135 ml       Laboratory      Recent Labs     04/21/21  0252 04/22/21  0401 04/23/21  0139   SODIUM 132* 136 136   POTASSIUM 4.2 4.6 4.3   CHLORIDE 98 100 100   CO2 25 26 26   GLUCOSE 104* 103* 122*   BUN 25* 29* 32*   CREATININE 1.06 1.21 1.32   CALCIUM 9.7 10.0 9.9                   Imaging  CT-HEAD W/O   Final Result         1.  Evolving area of infarct within the right MCA distribution.   2.  Ribbonlike gyriform hyperdensity at the area of prior infarct, appearance likely corresponding with gyriform laminar necrosis, component of subtle subarachnoid hemorrhage cannot be definitively excluded radiographically.   3.  Minimal pneumocephalus, decreased since prior study.   4.  Atherosclerosis.      US-EXTREMITY VENOUS LOWER BILAT   Final Result      DX-CHEST-LIMITED (1 VIEW)   Final Result      Right basilar atelectasis. No focal consolidation or pleural effusions.      [id number] VIEW   Final Result      No evidence of bowel obstruction.                  DX-CHEST-LIMITED (1 VIEW)   Final Result      1.  Right internal jugular catheter and enteric catheter appear appropriately located      2.  Minimal right basilar atelectasis      CT-HEAD W/O   Final Result         1.  Changes of evolving infarct within the right MCA distribution   2.  Hyperdensity in the sulci of the right MCA distribution infarct,  largely appears related to thrombosed vessels, component of subarachnoid hemorrhage is suspected particularly in the right frontal lobe.   3.  Pneumocephalus, decreased since prior.      CT-HEAD W/O   Final Result         Postsurgical change from right craniectomy. Redemonstration of large right MCA infarct with edema and bulging of the brain parenchyma through the craniectomy defect      Less mass effect on the right lateral ventricle. Less right to left midline shift of 3 mm, previously 10 mm.         DX-ABDOMEN FOR TUBE PLACEMENT   Final Result      Enteric tube terminates over the stomach.      CT-HEAD W/O   Final Result         1.  Low-density changes of the right hemisphere compatible with MCA distribution infarct with edema.   2.  New effacement of the bilateral ventricles, right greater than left, with 10 mm right-to-left midline shift.   3.  New dilatation of the left temporal horn ventricle, appearance favors component of obstructive hydrocephalus due to mass effect from infarct and edema.   4.  Hyperdensity in the right middle cerebral artery, likely thrombosis given associated findings.      These findings were discussed with the patient's clinician, Dr. Nunez, on 4/3/2021 7:11 AM.      MR-BRAIN-W/O   Final Result      Very large acute right MCA territory infarct as detailed above with small amount of petechial hemorrhage in the right insular region and right temporal lobe.      Punctate right thalamic lacunar infarct.      Right ICA and M1 MCA occlusion.      EC-ECHOCARDIOGRAM COMPLETE W/O CONT   Final Result      DX-CHEST-PORTABLE (1 VIEW)   Final Result         1.  Interstitial pulmonary parenchymal prominence, compatible with interstitial edema and/or infiltrates.      CT-CTA NECK WITH & W/O-POST PROCESSING   Final Result      Acute occlusion of the right internal carotid artery shortly after bifurcation. It is occluded up to the level of the carotid terminus.      CT-CTA HEAD WITH & W/O-POST PROCESS    Final Result      Acute right M1 occlusion.      Findings were discussed with RHONDA DIAZ on 3/31/2021 7:54 PM.      CT-CEREBRAL PERFUSION ANALYSIS   Final Result      1.  Cerebral blood flow less than 30% likely representing completed infarct = 134 mL.      2.  T Max more than 6 seconds likely representing combination of completed infarct and ischemia = 210 mL.      3.  Mismatched volume likely representing ischemic brain/penumbra = 76 mL.      4.  Please note that the cerebral perfusion was performed on the limited brain tissue around the basal ganglia region. Infarct/ischemia outside the CT perfusion sections can be missed in this study.      CT-HEAD W/O   Final Result   Addendum 1 of 1   In retrospect, there is subtle loss of gray-white matter differentiation    in the right MCA distribution, consistent with acute infarct.      Final      1.  No CT evidence of acute infarct, hemorrhage or mass.   2.  Mild paranasal sinus disease.           Assessment/Plan  * Acute right MCA stroke (HCC)- (present on admission)  Assessment & Plan  Likely consequence of COVID19 in 3/21 and hx of afib  Subsequent brain edema requiring right decompressive hemicraniectomy on 4/3/2021  Neurology following patient will benefit from long-term anticoagulation when bleeding risk is acceptable likely 1-2 weeks from event onset.   Blood pressure control keep SBP<140 (on metoprolol 25mg BID, amlodipine 10mg, hydralazine 25mg TID, lisinopril 40mg daily)  Patient weaned off hypertonic saline and started on salt tablets  PT/OT/SLP  Aspiration precautions  Physiatry consultation   for discharge planning: repeat PT/OT eval again today  Now having vertigo  Started on meclizine and improving    Paroxysmal atrial fibrillation (HCC)- (present on admission)  Assessment & Plan  Monitor vitals and on tele  Metoprolol 25mg BID w/ parameters  Discussed risk benefits and alternatives of long-term anticoagulation with patient and wife,  plan to start DOAC when bleeding risk felt to be acceptable per neurology likely in 1 to 2 weeks    Leucocytosis  Assessment & Plan  Resolved but continue to monitor  4/11 WBC:7.7    Urinary retention  Assessment & Plan  Brewer catheter in place  Watch for urinary retention  prazosin    History of COVID-19- (present on admission)  Assessment & Plan  Clinically recovered patient symptom onset on 3/15/2021 with positive initial test on 3/18/2021    Acquired hypothyroidism- (present on admission)  Assessment & Plan  [place] Thyroid 60mg BID   TSH is less than 0.005 will decrease dose and he will need recheck TFTs in 6 weeks (prior to admit was on 90mg BID)  TSH: <0.005 in past week.       VTE prophylaxis: Enoxaparin 40mg SQ daily

## 2021-04-23 NOTE — CARE PLAN
Problem: Safety  Goal: Will remain free from falls  Outcome: PROGRESSING AS EXPECTED  Note: Bed alarm in use. X3 side rails up for safety. Patient educated not to get out of bed without assistance from staff, verbalizes understanding.     Problem: Bowel/Gastric:  Goal: Normal bowel function is maintained or improved  Outcome: PROGRESSING AS EXPECTED     Problem: Knowledge Deficit  Goal: Knowledge of disease process/condition, treatment plan, diagnostic tests, and medications will improve  Outcome: PROGRESSING AS EXPECTED     Problem: Knowledge Deficit:  Goal: Knowledge of disease process/condition, treatment plan, diagnostic tests, and medications will improve  Outcome: PROGRESSING AS EXPECTED

## 2021-04-23 NOTE — PROGRESS NOTES
Monitor summary: SR 70-97, SD 0.16, QRS 0.08, QT 0.40, with frequent PVCs  per strip from monitor room.

## 2021-04-23 NOTE — CARE PLAN
Problem: Communication  Goal: The ability to communicate needs accurately and effectively will improve  Outcome: PROGRESSING AS EXPECTED     Problem: Safety  Goal: Will remain free from injury  Outcome: PROGRESSING AS EXPECTED  Goal: Will remain free from falls  Outcome: PROGRESSING AS EXPECTED     Problem: Infection  Goal: Will remain free from infection  Outcome: PROGRESSING AS EXPECTED     Problem: Venous Thromboembolism (VTW)/Deep Vein Thrombosis (DVT) Prevention:  Goal: Patient will participate in Venous Thrombosis (VTE)/Deep Vein Thrombosis (DVT)Prevention Measures  Outcome: PROGRESSING AS EXPECTED     Problem: Bowel/Gastric:  Goal: Normal bowel function is maintained or improved  Outcome: PROGRESSING AS EXPECTED  Goal: Will not experience complications related to bowel motility  Outcome: PROGRESSING AS EXPECTED     Problem: Knowledge Deficit  Goal: Knowledge of disease process/condition, treatment plan, diagnostic tests, and medications will improve  Outcome: PROGRESSING AS EXPECTED  Goal: Knowledge of the prescribed therapeutic regimen will improve  Outcome: PROGRESSING AS EXPECTED     Problem: Discharge Barriers/Planning  Goal: Patient's continuum of care needs will be met  Outcome: PROGRESSING AS EXPECTED     Problem: Skin Integrity  Goal: Risk for impaired skin integrity will decrease  Outcome: PROGRESSING AS EXPECTED     Problem: Communication:  Goal: The ability to communicate needs accurately and effectively will improve  Outcome: PROGRESSING AS EXPECTED     Problem: Safety:  Goal: Will remain free from injury  Outcome: PROGRESSING AS EXPECTED  Goal: Risk of aspiration will decrease  Outcome: PROGRESSING AS EXPECTED     Problem: Infection:  Goal: Will remain free from infection  Outcome: PROGRESSING AS EXPECTED     Problem: Psychosocial Needs:  Goal: Ability to identify and develop effective coping behavior will improve  Outcome: PROGRESSING AS EXPECTED  Goal: Ability to identify appropriate support  needs will improve  Outcome: PROGRESSING AS EXPECTED  Goal: Ability to verbalize feelings about condition will improve  Outcome: PROGRESSING AS EXPECTED  Goal: Communication of feelings of control over situation will improve  Outcome: PROGRESSING AS EXPECTED  Goal: Ability to verbalize positive feelings about self will improve  Outcome: PROGRESSING AS EXPECTED  Goal: Ability to reevaluate and adapt role responsibilities will improve  Outcome: PROGRESSING AS EXPECTED     Problem: Peripheral Vascular Perfusion:  Goal: Signs of adequate cerebral perfusion will increase  Outcome: PROGRESSING AS EXPECTED  Goal: Neurologic status will improve  Outcome: PROGRESSING AS EXPECTED  Goal: Will show no signs and symptoms of excessive bleeding  Outcome: PROGRESSING AS EXPECTED     Problem: Respiratory:  Goal: Ability to maintain a clear airway will improve  Outcome: PROGRESSING AS EXPECTED  Goal: Ability to maintain adequate ventilation will improve  Outcome: PROGRESSING AS EXPECTED     Problem: Nutritional:  Goal: Dysphagia will improve  Outcome: PROGRESSING AS EXPECTED  Goal: Maintenance of adequate nutrition will improve  Outcome: PROGRESSING AS EXPECTED     Problem: Urinary:  Goal: Ability to maintain continence will improve  Outcome: PROGRESSING AS EXPECTED  Goal: Ability to reestablish a normal urinary elimination pattern will improve  Outcome: PROGRESSING AS EXPECTED     Problem: Mobility:  Goal: Capacity to carry out activities will improve  Outcome: PROGRESSING AS EXPECTED  Goal: Mobility will improve  Outcome: PROGRESSING AS EXPECTED  Goal: Range of joint motion will improve  Outcome: PROGRESSING AS EXPECTED     Problem: Self-Care:  Goal: Ability to participate in self-care as condition permits will improve  Outcome: PROGRESSING AS EXPECTED  Goal: Verbalization of feelings and concerns over difficulty with self-care will improve  Outcome: PROGRESSING AS EXPECTED     Problem: Physical Regulation:  Goal: Ability to  maintain clinical measurements within normal limits will improve  Outcome: PROGRESSING AS EXPECTED  Goal: Complications related to the disease process, condition or treatment will be avoided or minimized  Outcome: PROGRESSING AS EXPECTED     Problem: Knowledge Deficit:  Goal: Knowledge of disease process/condition, treatment plan, diagnostic tests, and medications will improve  Outcome: PROGRESSING AS EXPECTED  Goal: Ability to state lifestyle or environmental changes necessary to maintain safety will improve  Outcome: PROGRESSING AS EXPECTED     Problem: Health Behavior:  Goal: Ability to manage health-related needs will improve  Outcome: PROGRESSING AS EXPECTED     Problem: Respiratory:  Goal: Respiratory status will improve  Outcome: PROGRESSING AS EXPECTED     Problem: Pain Management  Goal: Pain level will decrease to patient's comfort goal  Outcome: PROGRESSING AS EXPECTED     Problem: Fluid Volume:  Goal: Will maintain balanced intake and output  Outcome: PROGRESSING AS EXPECTED

## 2021-04-24 LAB
ANION GAP SERPL CALC-SCNC: 11 MMOL/L (ref 7–16)
APPEARANCE UR: ABNORMAL
BACTERIA #/AREA URNS HPF: ABNORMAL /HPF
BILIRUB UR QL STRIP.AUTO: ABNORMAL
BUN SERPL-MCNC: 32 MG/DL (ref 8–22)
CALCIUM SERPL-MCNC: 9.8 MG/DL (ref 8.5–10.5)
CHLORIDE SERPL-SCNC: 103 MMOL/L (ref 96–112)
CO2 SERPL-SCNC: 25 MMOL/L (ref 20–33)
COLOR UR: ABNORMAL
CREAT SERPL-MCNC: 1.25 MG/DL (ref 0.5–1.4)
GLUCOSE BLD-MCNC: 142 MG/DL (ref 65–99)
GLUCOSE SERPL-MCNC: 100 MG/DL (ref 65–99)
GLUCOSE UR STRIP.AUTO-MCNC: NEGATIVE MG/DL
KETONES UR STRIP.AUTO-MCNC: NEGATIVE MG/DL
LEUKOCYTE ESTERASE UR QL STRIP.AUTO: ABNORMAL
MICRO URNS: ABNORMAL
NITRITE UR QL STRIP.AUTO: POSITIVE
PH UR STRIP.AUTO: 6 [PH] (ref 5–8)
POTASSIUM SERPL-SCNC: 4.3 MMOL/L (ref 3.6–5.5)
PROT UR QL STRIP: NEGATIVE MG/DL
RBC # URNS HPF: >150 /HPF
RBC UR QL AUTO: ABNORMAL
SODIUM SERPL-SCNC: 139 MMOL/L (ref 135–145)
SP GR UR STRIP.AUTO: 1.02
UROBILINOGEN UR STRIP.AUTO-MCNC: 1 MG/DL
WBC #/AREA URNS HPF: ABNORMAL /HPF

## 2021-04-24 PROCEDURE — A9270 NON-COVERED ITEM OR SERVICE: HCPCS | Performed by: PHYSICAL MEDICINE & REHABILITATION

## 2021-04-24 PROCEDURE — 81001 URINALYSIS AUTO W/SCOPE: CPT

## 2021-04-24 PROCEDURE — A9270 NON-COVERED ITEM OR SERVICE: HCPCS | Performed by: INTERNAL MEDICINE

## 2021-04-24 PROCEDURE — 99232 SBSQ HOSP IP/OBS MODERATE 35: CPT | Performed by: HOSPITALIST

## 2021-04-24 PROCEDURE — 700102 HCHG RX REV CODE 250 W/ 637 OVERRIDE(OP): Performed by: PHYSICAL MEDICINE & REHABILITATION

## 2021-04-24 PROCEDURE — 700105 HCHG RX REV CODE 258: Performed by: STUDENT IN AN ORGANIZED HEALTH CARE EDUCATION/TRAINING PROGRAM

## 2021-04-24 PROCEDURE — 700102 HCHG RX REV CODE 250 W/ 637 OVERRIDE(OP): Performed by: NURSE PRACTITIONER

## 2021-04-24 PROCEDURE — 82962 GLUCOSE BLOOD TEST: CPT

## 2021-04-24 PROCEDURE — 700102 HCHG RX REV CODE 250 W/ 637 OVERRIDE(OP): Performed by: INTERNAL MEDICINE

## 2021-04-24 PROCEDURE — 80048 BASIC METABOLIC PNL TOTAL CA: CPT

## 2021-04-24 PROCEDURE — 770020 HCHG ROOM/CARE - TELE (206)

## 2021-04-24 PROCEDURE — A9270 NON-COVERED ITEM OR SERVICE: HCPCS | Performed by: NURSE PRACTITIONER

## 2021-04-24 PROCEDURE — 36415 COLL VENOUS BLD VENIPUNCTURE: CPT

## 2021-04-24 RX ORDER — SODIUM CHLORIDE 9 MG/ML
500 INJECTION, SOLUTION INTRAVENOUS ONCE
Status: COMPLETED | OUTPATIENT
Start: 2021-04-24 | End: 2021-04-24

## 2021-04-24 RX ADMIN — BACLOFEN 15 MG: 10 TABLET ORAL at 11:49

## 2021-04-24 RX ADMIN — LISINOPRIL 40 MG: 20 TABLET ORAL at 05:45

## 2021-04-24 RX ADMIN — AMLODIPINE BESYLATE 10 MG: 5 TABLET ORAL at 05:46

## 2021-04-24 RX ADMIN — MECLIZINE HYDROCHLORIDE 25 MG: 25 TABLET ORAL at 11:50

## 2021-04-24 RX ADMIN — ASPIRIN 81 MG: 81 TABLET, CHEWABLE ORAL at 18:26

## 2021-04-24 RX ADMIN — ATORVASTATIN CALCIUM 80 MG: 80 TABLET, FILM COATED ORAL at 18:26

## 2021-04-24 RX ADMIN — BACLOFEN 15 MG: 10 TABLET ORAL at 18:26

## 2021-04-24 RX ADMIN — THYROID, PORCINE 60 MG: 30 TABLET ORAL at 18:26

## 2021-04-24 RX ADMIN — MECLIZINE HYDROCHLORIDE 25 MG: 25 TABLET ORAL at 05:46

## 2021-04-24 RX ADMIN — MECLIZINE HYDROCHLORIDE 25 MG: 25 TABLET ORAL at 18:26

## 2021-04-24 RX ADMIN — HYDRALAZINE HYDROCHLORIDE 25 MG: 25 TABLET, FILM COATED ORAL at 14:27

## 2021-04-24 RX ADMIN — DOCUSATE SODIUM 50 MG AND SENNOSIDES 8.6 MG 1 TABLET: 8.6; 5 TABLET, FILM COATED ORAL at 21:11

## 2021-04-24 RX ADMIN — METOPROLOL TARTRATE 25 MG: 25 TABLET, FILM COATED ORAL at 18:26

## 2021-04-24 RX ADMIN — BACLOFEN 15 MG: 10 TABLET ORAL at 05:45

## 2021-04-24 RX ADMIN — RIVAROXABAN 20 MG: 20 TABLET, FILM COATED ORAL at 18:26

## 2021-04-24 RX ADMIN — PHENYTOIN 1 MG: 125 SUSPENSION ORAL at 18:26

## 2021-04-24 RX ADMIN — HYDRALAZINE HYDROCHLORIDE 25 MG: 25 TABLET, FILM COATED ORAL at 05:46

## 2021-04-24 RX ADMIN — MIRTAZAPINE 15 MG: 15 TABLET, FILM COATED ORAL at 21:11

## 2021-04-24 RX ADMIN — THYROID, PORCINE 60 MG: 30 TABLET ORAL at 05:46

## 2021-04-24 RX ADMIN — METOPROLOL TARTRATE 25 MG: 25 TABLET, FILM COATED ORAL at 05:45

## 2021-04-24 RX ADMIN — SODIUM CHLORIDE 500 ML: 9 INJECTION, SOLUTION INTRAVENOUS at 22:13

## 2021-04-24 ASSESSMENT — ENCOUNTER SYMPTOMS
DIARRHEA: 0
BACK PAIN: 0
DIZZINESS: 1
FOCAL WEAKNESS: 1
COUGH: 0
NECK PAIN: 0
STRIDOR: 0
VOMITING: 0
HEADACHES: 0
SENSORY CHANGE: 1
ABDOMINAL PAIN: 0
HEMOPTYSIS: 0
PALPITATIONS: 0

## 2021-04-24 NOTE — PROGRESS NOTES
Hospital Medicine Daily Progress Note    Date of Service  4/24/2021    Chief Complaint  Left sided weakness    Hospital Course  56 y.o. male admitted 3/31/2021 with acute stroke.  He was not a candidate for tPA nor thrombectomy.  He has a history of paroxysmal atrial fibrillation, dyslipidemia, hypothyroidism. During admit he underwent a hemicraniectomy for right sided decompression from right MCA dense stroke with intractable intracranial pressure.     Interval Problem Update  4/12: Patient verbal and oriented with some slurred speech.  Left hemiplegia. On 4/11 Dr. Leo met with the wife and explained to her we likely will need a G-tube at some point.  Also alerted the both of them we would need a SNF at some point.  He reports that his pain is reasonably well controlled at this juncture, he is describing some thirst.  Free water flushes will be increased from 30 mL to 90 mL every 6 hours.  This can be backed off if sodium begins to drop.    4/13 physical therapy seen the patient.  On tube feeding still.  Speech therapy to follow.   to follow for discharge planning.    4/14: The patient was retaining urine this morning and will try to get a straight cath.  Also discussed with the wife and she said that she will try to get insurance starting May 1 for him.  I also started for Flomax    4/15: Discharge planning by . No acute issue overnight.    4/16 no acute issue overnight.  Discharge planning by     4/17: the patient is sleeping comfortably on the bed. No acute issue overnight.    4/18 the patient is having vertigo symptom.  I will start her on meclizine and see how he does.    4/19: The patient is sleeping comfortably on the bed.  His vertigo is improving with meclizine.  PT OT to evaluate him today.    4/20: No acute overnight events.  Wife at bedside, discussed possibility of a voiding trial today.      4/21: No acute overnight events, brother at bedside.  Case discussed  with physical medicine and rehabilitation physician.  Unclear of the planned timeline for cranioplasty, will touch base with neurosurgery shortly.    4/22: No acute overnight events.  Wife is at bedside requesting around-the-clock antivertigo medications.  This is not unreasonable for a few days duration.  Assistance of physical medicine and rehab greatly appreciated.    4/23: No acute overnight events,    4/24/2021, urine to Brewer catheter developing a reddish discoloration, UA with some benign pyuria, no leukocytosis or fevers.  Anticoagulation is probably contributing.  Periodic monitoring of hemoglobin    Consultants/Specialty  Neurology  Neuro-intensivist (signing off 4/11)  Neuro surgery    Code Status  Full Code    Disposition  Remain on the floor    Review of Systems  Review of Systems   Constitutional: Positive for malaise/fatigue.   Respiratory: Negative for cough, hemoptysis and stridor.    Cardiovascular: Negative for chest pain and palpitations.   Gastrointestinal: Negative for abdominal pain, diarrhea and vomiting.   Genitourinary: Negative for dysuria, frequency and urgency.   Musculoskeletal: Negative for back pain and neck pain.   Skin: Negative for rash.   Neurological: Positive for dizziness, sensory change (left side) and focal weakness (left side). Negative for headaches (right temporal region).        Physical Exam  Temp:  [36.4 °C (97.5 °F)-36.8 °C (98.3 °F)] 36.7 °C (98.1 °F)  Pulse:  [78-92] 81  Resp:  [16-18] 18  BP: ()/(49-82) 120/77  SpO2:  [91 %-95 %] 95 %    Physical Exam  Vitals reviewed.   Constitutional:       Appearance: Normal appearance. He is diaphoretic.   HENT:      Head: Normocephalic and atraumatic.      Nose: Nose normal.      Mouth/Throat:      Mouth: Mucous membranes are moist.   Eyes:      Extraocular Movements: Extraocular movements intact.      Conjunctiva/sclera: Conjunctivae normal.   Cardiovascular:      Rate and Rhythm: Normal rate.      Pulses: Normal pulses.            Radial pulses are 2+ on the right side and 2+ on the left side.        Dorsalis pedis pulses are 2+ on the right side and 2+ on the left side.      Heart sounds: Normal heart sounds.   Pulmonary:      Effort: Pulmonary effort is normal.   Abdominal:      General: Bowel sounds are normal.      Palpations: Abdomen is soft.   Musculoskeletal:         General: No swelling or tenderness.      Cervical back: Neck supple. No muscular tenderness.      Right lower leg: No edema.      Left lower leg: No edema.   Lymphadenopathy:      Cervical: No cervical adenopathy.   Neurological:      Mental Status: He is alert.      Cranial Nerves: Cranial nerve deficit present.      Sensory: Sensory deficit present.      Coordination: Coordination abnormal.      Gait: Gait abnormal.         Current Facility-Administered Medications:   •  meclizine (ANTIVERT) tablet 25 mg, 25 mg, Oral, TID, Christofer Worchel, D.O., 25 mg at 04/24/21 1150  •  mirtazapine (Remeron) tablet 15 mg, 15 mg, Oral, QHS, Christofer Worchel, D.O., 15 mg at 04/23/21 2058  •  ondansetron (ZOFRAN ODT) dispertab 4 mg, 4 mg, Oral, Q4HRS PRN, Radha Meredith, A.P.R.N., 4 mg at 04/20/21 1655  •  acetaminophen (Tylenol) tablet 650 mg, 650 mg, Oral, Q6HRS PRN, Radha Meredith, A.P.R.N., 650 mg at 04/23/21 1117  •  metoprolol tartrate (LOPRESSOR) tablet 25 mg, 25 mg, Oral, BID, Radha Meredith, A.P.R.N., 25 mg at 04/24/21 0545  •  senna-docusate (PERICOLACE or SENOKOT S) 8.6-50 MG per tablet 1 tablet, 1 tablet, Oral, Nightly, Radha Meredith, A.P.R.N., 1 tablet at 04/23/21 2058  •  oxyCODONE immediate-release (ROXICODONE) tablet 5 mg, 5 mg, Oral, Q3HRS PRN **OR** oxyCODONE immediate release (ROXICODONE) tablet 10 mg, 10 mg, Oral, Q3HRS PRN **OR** HYDROmorphone (Dilaudid) injection 0.5 mg, 0.5 mg, Intravenous, Q3HRS PRN, Radha Meredith, A.P.R.N.  •  polyethylene glycol/lytes (MIRALAX) PACKET 1 Packet, 1 Packet, Oral, BID PRN, Radha Meredith, A.P.R.N.  •  prazosin (MINIPRESS)  capsule 1 mg, 1 mg, Oral, Q EVENING, Radha Meredith, A.P.R.N., Stopped at 04/23/21 1800  •  senna-docusate (PERICOLACE or SENOKOT S) 8.6-50 MG per tablet 1 tablet, 1 tablet, Oral, Q24HRS PRN, Radha Meredith, A.P.R.N., 1 tablet at 04/23/21 1117  •  rivaroxaban (XARELTO) tablet 20 mg, 20 mg, Oral, PM MEAL, Radha Meredith, A.P.R.N., 20 mg at 04/23/21 1755  •  promethazine (PHENERGAN) tablet 12.5-25 mg, 12.5-25 mg, Oral, Q4HRS PRN, Radha Meredith, A.P.R.N.  •  magnesium hydroxide (MILK OF MAGNESIA) suspension 30 mL, 30 mL, Oral, QDAY PRN, Radha Meredith, A.P.R.N.  •  lisinopril (PRINIVIL) tablet 40 mg, 40 mg, Oral, Q DAY, Radha Meredith, A.P.R.N., 40 mg at 04/24/21 0545  •  thyroid (ARMOUR THYROID) tablet 60 mg, 60 mg, Oral, BID, Radha Meredith, A.P.R.N., 60 mg at 04/24/21 0546  •  calcium carbonate (TUMS) chewable tab 500 mg, 500 mg, Oral, Q8HRS PRN, Radha Meredith, A.P.R.N.  •  baclofen (LIORESAL) tablet 15 mg, 15 mg, Oral, TID, Radha Meredith, A.P.R.N., 15 mg at 04/24/21 1149  •  amLODIPine (NORVASC) tablet 10 mg, 10 mg, Oral, Q DAY, Radha Meredith, A.P.R.N., 10 mg at 04/24/21 0546  •  aspirin (ASA) chewable tab 81 mg, 81 mg, Oral, DAILY, Radha Meredith, A.P.R.N., 81 mg at 04/23/21 1754  •  atorvastatin (LIPITOR) tablet 80 mg, 80 mg, Oral, Q EVENING, CRISTÓBAL [place]., 80 mg at 04/23/21 1754  •  cloNIDine (CATAPRES) tablet 0.1 mg, 0.1 mg, Oral, Q4HRS PRN, Golden Pearson M.D.  •  hydrALAZINE (APRESOLINE) tablet 25 mg, 25 mg, Oral, Q8HRS, Golden Pearson M.D., 25 mg at 04/24/21 1427  •  hydrALAZINE (APRESOLINE) injection 10 mg, 10 mg, Intravenous, Q HOUR PRN, Cristhian Bell M.D., 10 mg at 04/10/21 1631  •  labetalol (NORMODYNE/TRANDATE) injection 10-20 mg, 10-20 mg, Intravenous, Q4HRS PRN, Cristhian Bell M.D., 10 mg at 04/10/21 1508  •  Pharmacy Consult Request ...Pain Management Review 1 Each, 1 Each, Other, PHARMACY TO DOSE, Lilia Pizano P.A.-C.  •  MD ALERT...DO NOT ADMINISTER NSAIDS or ASPIRIN unless  ORDERED By Neurosurgery 1 Each, 1 Each, Other, PRN, Lilia Pizano P.A.-C.  •  bisacodyl (DULCOLAX) suppository 10 mg, 10 mg, Rectal, Q24HRS PRN, CRISTI Morris-RAJESH, 10 mg at 04/11/21 1811  •  artificial tears (EYE LUBRICANT) ophth ointment 1 Application, 1 Application, Both Eyes, PRN, CRISTI Morris-CYohana  •  ondansetron (ZOFRAN) syringe/vial injection 4 mg, 4 mg, Intravenous, Q4HRS PRN, Gia Forman M.D., 4 mg at 04/23/21 1117  •  promethazine (PHENERGAN) suppository 12.5-25 mg, 12.5-25 mg, Rectal, Q4HRS PRN, Gia Forman M.D.  •  prochlorperazine (COMPAZINE) injection 5-10 mg, 5-10 mg, Intravenous, Q4HRS PRN, Gia Forman M.D., 5 mg at 04/17/21 1234      Fluids    Intake/Output Summary (Last 24 hours) at 4/24/2021 1458  Last data filed at 4/24/2021 1400  Gross per 24 hour   Intake 375 ml   Output 480 ml   Net -105 ml       Laboratory      Recent Labs     04/22/21  0401 04/23/21  0139 04/24/21  0245   SODIUM 136 136 139   POTASSIUM 4.6 4.3 4.3   CHLORIDE 100 100 103   CO2 26 26 25   GLUCOSE 103* 122* 100*   BUN 29* 32* 32*   CREATININE 1.21 1.32 1.25   CALCIUM 10.0 9.9 9.8                   Imaging  CT-HEAD W/O   Final Result         1.  Evolving area of infarct within the right MCA distribution.   2.  Ribbonlike gyriform hyperdensity at the area of prior infarct, appearance likely corresponding with gyriform laminar necrosis, component of subtle subarachnoid hemorrhage cannot be definitively excluded radiographically.   3.  Minimal pneumocephalus, decreased since prior study.   4.  Atherosclerosis.      US-EXTREMITY VENOUS LOWER BILAT   Final Result      DX-CHEST-LIMITED (1 VIEW)   Final Result      Right basilar atelectasis. No focal consolidation or pleural effusions.      [id number] VIEW   Final Result      No evidence of bowel obstruction.                  DX-CHEST-LIMITED (1 VIEW)   Final Result      1.  Right internal jugular catheter and enteric catheter appear appropriately located       2.  Minimal right basilar atelectasis      CT-HEAD W/O   Final Result         1.  Changes of evolving infarct within the right MCA distribution   2.  Hyperdensity in the sulci of the right MCA distribution infarct, largely appears related to thrombosed vessels, component of subarachnoid hemorrhage is suspected particularly in the right frontal lobe.   3.  Pneumocephalus, decreased since prior.      CT-HEAD W/O   Final Result         Postsurgical change from right craniectomy. Redemonstration of large right MCA infarct with edema and bulging of the brain parenchyma through the craniectomy defect      Less mass effect on the right lateral ventricle. Less right to left midline shift of 3 mm, previously 10 mm.         DX-ABDOMEN FOR TUBE PLACEMENT   Final Result      Enteric tube terminates over the stomach.      CT-HEAD W/O   Final Result         1.  Low-density changes of the right hemisphere compatible with MCA distribution infarct with edema.   2.  New effacement of the bilateral ventricles, right greater than left, with 10 mm right-to-left midline shift.   3.  New dilatation of the left temporal horn ventricle, appearance favors component of obstructive hydrocephalus due to mass effect from infarct and edema.   4.  Hyperdensity in the right middle cerebral artery, likely thrombosis given associated findings.      These findings were discussed with the patient's clinician, Dr. Nunez, on 4/3/2021 7:11 AM.      MR-BRAIN-W/O   Final Result      Very large acute right MCA territory infarct as detailed above with small amount of petechial hemorrhage in the right insular region and right temporal lobe.      Punctate right thalamic lacunar infarct.      Right ICA and M1 MCA occlusion.      EC-ECHOCARDIOGRAM COMPLETE W/O CONT   Final Result      DX-CHEST-PORTABLE (1 VIEW)   Final Result         1.  Interstitial pulmonary parenchymal prominence, compatible with interstitial edema and/or infiltrates.      CT-CTA NECK WITH &  W/O-POST PROCESSING   Final Result      Acute occlusion of the right internal carotid artery shortly after bifurcation. It is occluded up to the level of the carotid terminus.      CT-CTA HEAD WITH & W/O-POST PROCESS   Final Result      Acute right M1 occlusion.      Findings were discussed with RHONDA DIAZ on 3/31/2021 7:54 PM.      CT-CEREBRAL PERFUSION ANALYSIS   Final Result      1.  Cerebral blood flow less than 30% likely representing completed infarct = 134 mL.      2.  T Max more than 6 seconds likely representing combination of completed infarct and ischemia = 210 mL.      3.  Mismatched volume likely representing ischemic brain/penumbra = 76 mL.      4.  Please note that the cerebral perfusion was performed on the limited brain tissue around the basal ganglia region. Infarct/ischemia outside the CT perfusion sections can be missed in this study.      CT-HEAD W/O   Final Result   Addendum 1 of 1   In retrospect, there is subtle loss of gray-white matter differentiation    in the right MCA distribution, consistent with acute infarct.      Final      1.  No CT evidence of acute infarct, hemorrhage or mass.   2.  Mild paranasal sinus disease.           Assessment/Plan  * Acute right MCA stroke (HCC)- (present on admission)  Assessment & Plan  Likely consequence of COVID19 in 3/21 and hx of afib  Subsequent brain edema requiring right decompressive hemicraniectomy on 4/3/2021  Neurology following patient will benefit from long-term anticoagulation when bleeding risk is acceptable likely 1-2 weeks from event onset.   Blood pressure control keep SBP<140 (on metoprolol 25mg BID, amlodipine 10mg, hydralazine 25mg TID, lisinopril 40mg daily)  Patient weaned off hypertonic saline and started on salt tablets  PT/OT/SLP  Aspiration precautions  Physiatry consultation  Now having vertigo, improving on meclizine     Paroxysmal atrial fibrillation (HCC)- (present on admission)  Assessment & Plan  Monitor vitals and  on tele  Metoprolol 25mg BID w/ parameters  Discussed risk benefits and alternatives of long-term anticoagulation with patient and wife, plan to started DOAC when bleeding risk felt to be acceptable per neuro.  This was initiated, some mild hematuria thereafter.      Leucocytosis  Assessment & Plan  Resolved but continue to monitor  4/11 WBC:7.7    Urinary retention  Assessment & Plan  Brewer catheter in place  Watch for urinary retention  prazosin    History of COVID-19- (present on admission)  Assessment & Plan  Clinically recovered patient symptom onset on 3/15/2021 with positive initial test on 3/18/2021    Acquired hypothyroidism- (present on admission)  Assessment & Plan  [place] Thyroid 60mg BID   TSH is less than 0.005 will decrease dose and he will need recheck TFTs in 6 weeks (prior to admit was on 90mg BID)  TSH: <0.005 in past week.       VTE prophylaxis: Enoxaparin 40mg SQ daily

## 2021-04-24 NOTE — CARE PLAN
Problem: Communication  Goal: The ability to communicate needs accurately and effectively will improve  Outcome: PROGRESSING AS EXPECTED     Problem: Safety  Goal: Will remain free from injury  Outcome: PROGRESSING AS EXPECTED     Problem: Infection  Goal: Will remain free from infection  Outcome: PROGRESSING AS EXPECTED     Problem: Venous Thromboembolism (VTW)/Deep Vein Thrombosis (DVT) Prevention:  Goal: Patient will participate in Venous Thrombosis (VTE)/Deep Vein Thrombosis (DVT)Prevention Measures  Outcome: PROGRESSING AS EXPECTED     Problem: Bowel/Gastric:  Goal: Normal bowel function is maintained or improved  Outcome: PROGRESSING AS EXPECTED     Problem: Knowledge Deficit  Goal: Knowledge of disease process/condition, treatment plan, diagnostic tests, and medications will improve  Outcome: PROGRESSING AS EXPECTED     Problem: Safety:  Goal: Will remain free from injury  Outcome: PROGRESSING AS EXPECTED     Problem: Infection:  Goal: Will remain free from infection  Outcome: PROGRESSING AS EXPECTED     Problem: Knowledge Deficit:  Goal: Knowledge of disease process/condition, treatment plan, diagnostic tests, and medications will improve  Outcome: PROGRESSING AS EXPECTED

## 2021-04-24 NOTE — CARE PLAN
Problem: Communication  Goal: The ability to communicate needs accurately and effectively will improve  Outcome: PROGRESSING AS EXPECTED  Effectively communicates needs with staff     Problem: Knowledge Deficit  Goal: Knowledge of disease process/condition, treatment plan, diagnostic tests, and medications will improve  Outcome: PROGRESSING AS EXPECTED  Verbalizes understanding of disease process and treatment plan    Problem: Skin Integrity  Goal: Risk for impaired skin integrity will decrease  Outcome: PROGRESSING AS EXPECTED  Q2H turns     Problem: Safety:  Goal: Will remain free from injury  Outcome: PROGRESSING AS EXPECTED  Free from injury/fall; precautions in place

## 2021-04-25 LAB
ALBUMIN SERPL BCP-MCNC: 3.5 G/DL (ref 3.2–4.9)
ALBUMIN/GLOB SERPL: 1.1 G/DL
ALP SERPL-CCNC: 94 U/L (ref 30–99)
ALT SERPL-CCNC: 30 U/L (ref 2–50)
ANION GAP SERPL CALC-SCNC: 10 MMOL/L (ref 7–16)
AST SERPL-CCNC: 22 U/L (ref 12–45)
BASOPHILS # BLD AUTO: 0.3 % (ref 0–1.8)
BASOPHILS # BLD: 0.03 K/UL (ref 0–0.12)
BILIRUB SERPL-MCNC: 0.6 MG/DL (ref 0.1–1.5)
BUN SERPL-MCNC: 25 MG/DL (ref 8–22)
CALCIUM SERPL-MCNC: 9.6 MG/DL (ref 8.5–10.5)
CHLORIDE SERPL-SCNC: 103 MMOL/L (ref 96–112)
CO2 SERPL-SCNC: 25 MMOL/L (ref 20–33)
CREAT SERPL-MCNC: 1.13 MG/DL (ref 0.5–1.4)
EKG IMPRESSION: NORMAL
EOSINOPHIL # BLD AUTO: 0.09 K/UL (ref 0–0.51)
EOSINOPHIL NFR BLD: 0.9 % (ref 0–6.9)
ERYTHROCYTE [DISTWIDTH] IN BLOOD BY AUTOMATED COUNT: 39.8 FL (ref 35.9–50)
GLOBULIN SER CALC-MCNC: 3.2 G/DL (ref 1.9–3.5)
GLUCOSE SERPL-MCNC: 98 MG/DL (ref 65–99)
HCT VFR BLD AUTO: 40.3 % (ref 42–52)
HGB BLD-MCNC: 13.6 G/DL (ref 14–18)
IMM GRANULOCYTES # BLD AUTO: 0.04 K/UL (ref 0–0.11)
IMM GRANULOCYTES NFR BLD AUTO: 0.4 % (ref 0–0.9)
LYMPHOCYTES # BLD AUTO: 1.19 K/UL (ref 1–4.8)
LYMPHOCYTES NFR BLD: 12.4 % (ref 22–41)
MAGNESIUM SERPL-MCNC: 1.9 MG/DL (ref 1.5–2.5)
MCH RBC QN AUTO: 28.8 PG (ref 27–33)
MCHC RBC AUTO-ENTMCNC: 33.7 G/DL (ref 33.7–35.3)
MCV RBC AUTO: 85.4 FL (ref 81.4–97.8)
MONOCYTES # BLD AUTO: 0.77 K/UL (ref 0–0.85)
MONOCYTES NFR BLD AUTO: 8 % (ref 0–13.4)
NEUTROPHILS # BLD AUTO: 7.5 K/UL (ref 1.82–7.42)
NEUTROPHILS NFR BLD: 78 % (ref 44–72)
NRBC # BLD AUTO: 0 K/UL
NRBC BLD-RTO: 0 /100 WBC
PLATELET # BLD AUTO: 446 K/UL (ref 164–446)
PMV BLD AUTO: 9.3 FL (ref 9–12.9)
POTASSIUM SERPL-SCNC: 4.3 MMOL/L (ref 3.6–5.5)
PROT SERPL-MCNC: 6.7 G/DL (ref 6–8.2)
RBC # BLD AUTO: 4.72 M/UL (ref 4.7–6.1)
SODIUM SERPL-SCNC: 138 MMOL/L (ref 135–145)
WBC # BLD AUTO: 9.6 K/UL (ref 4.8–10.8)

## 2021-04-25 PROCEDURE — 99232 SBSQ HOSP IP/OBS MODERATE 35: CPT | Performed by: HOSPITALIST

## 2021-04-25 PROCEDURE — 700102 HCHG RX REV CODE 250 W/ 637 OVERRIDE(OP): Performed by: NURSE PRACTITIONER

## 2021-04-25 PROCEDURE — 93005 ELECTROCARDIOGRAM TRACING: CPT | Performed by: HOSPITALIST

## 2021-04-25 PROCEDURE — 700102 HCHG RX REV CODE 250 W/ 637 OVERRIDE(OP): Performed by: PHYSICAL MEDICINE & REHABILITATION

## 2021-04-25 PROCEDURE — 36415 COLL VENOUS BLD VENIPUNCTURE: CPT

## 2021-04-25 PROCEDURE — 85025 COMPLETE CBC W/AUTO DIFF WBC: CPT

## 2021-04-25 PROCEDURE — 770020 HCHG ROOM/CARE - TELE (206)

## 2021-04-25 PROCEDURE — A9270 NON-COVERED ITEM OR SERVICE: HCPCS | Performed by: PHYSICAL MEDICINE & REHABILITATION

## 2021-04-25 PROCEDURE — 83735 ASSAY OF MAGNESIUM: CPT

## 2021-04-25 PROCEDURE — 80053 COMPREHEN METABOLIC PANEL: CPT

## 2021-04-25 PROCEDURE — A9270 NON-COVERED ITEM OR SERVICE: HCPCS | Performed by: NURSE PRACTITIONER

## 2021-04-25 PROCEDURE — 93010 ELECTROCARDIOGRAM REPORT: CPT | Performed by: INTERNAL MEDICINE

## 2021-04-25 RX ORDER — MECLIZINE HYDROCHLORIDE 25 MG/1
25 TABLET ORAL 3 TIMES DAILY PRN
Status: DISCONTINUED | OUTPATIENT
Start: 2021-04-25 | End: 2021-04-29 | Stop reason: HOSPADM

## 2021-04-25 RX ORDER — LISINOPRIL 20 MG/1
20 TABLET ORAL
Status: DISCONTINUED | OUTPATIENT
Start: 2021-04-26 | End: 2021-04-27

## 2021-04-25 RX ORDER — AMLODIPINE BESYLATE 5 MG/1
5 TABLET ORAL
Status: DISCONTINUED | OUTPATIENT
Start: 2021-04-26 | End: 2021-04-27

## 2021-04-25 RX ADMIN — DOCUSATE SODIUM 50 MG AND SENNOSIDES 8.6 MG 1 TABLET: 8.6; 5 TABLET, FILM COATED ORAL at 20:10

## 2021-04-25 RX ADMIN — AMLODIPINE BESYLATE 10 MG: 5 TABLET ORAL at 05:40

## 2021-04-25 RX ADMIN — LISINOPRIL 40 MG: 20 TABLET ORAL at 05:40

## 2021-04-25 RX ADMIN — THYROID, PORCINE 60 MG: 30 TABLET ORAL at 18:15

## 2021-04-25 RX ADMIN — METOPROLOL TARTRATE 25 MG: 25 TABLET, FILM COATED ORAL at 05:40

## 2021-04-25 RX ADMIN — BACLOFEN 15 MG: 10 TABLET ORAL at 18:15

## 2021-04-25 RX ADMIN — RIVAROXABAN 20 MG: 20 TABLET, FILM COATED ORAL at 18:15

## 2021-04-25 RX ADMIN — BACLOFEN 15 MG: 10 TABLET ORAL at 05:40

## 2021-04-25 RX ADMIN — MECLIZINE HYDROCHLORIDE 25 MG: 25 TABLET ORAL at 05:40

## 2021-04-25 RX ADMIN — ATORVASTATIN CALCIUM 80 MG: 80 TABLET, FILM COATED ORAL at 18:15

## 2021-04-25 RX ADMIN — PHENYTOIN 1 MG: 125 SUSPENSION ORAL at 18:15

## 2021-04-25 RX ADMIN — THYROID, PORCINE 60 MG: 30 TABLET ORAL at 05:40

## 2021-04-25 RX ADMIN — ASPIRIN 81 MG: 81 TABLET, CHEWABLE ORAL at 18:15

## 2021-04-25 RX ADMIN — BACLOFEN 15 MG: 10 TABLET ORAL at 12:36

## 2021-04-25 RX ADMIN — MIRTAZAPINE 15 MG: 15 TABLET, FILM COATED ORAL at 20:10

## 2021-04-25 RX ADMIN — METOPROLOL TARTRATE 25 MG: 25 TABLET, FILM COATED ORAL at 18:15

## 2021-04-25 ASSESSMENT — ENCOUNTER SYMPTOMS
COUGH: 0
HEMOPTYSIS: 0
DIZZINESS: 1
FOCAL WEAKNESS: 1
SENSORY CHANGE: 1
NECK PAIN: 0
DIARRHEA: 0
BACK PAIN: 0
ABDOMINAL PAIN: 0
VOMITING: 0
PALPITATIONS: 0
STRIDOR: 0
HEADACHES: 0

## 2021-04-25 NOTE — PROGRESS NOTES
Monitor Summary: Pt is in SR to ST woth rare to occasional PVCs. HR , WA 0.18, QRS 0.08, QT 0.36 per strip from monitor room.

## 2021-04-25 NOTE — PROGRESS NOTES
Monitor Summary: SR/ST 85 - 118, ME .16, QRS .08, QT .28, with PACs per strip from monitor room.

## 2021-04-25 NOTE — CARE PLAN
Problem: Communication  Goal: The ability to communicate needs accurately and effectively will improve  Outcome: PROGRESSING AS EXPECTED  Effectively communicates needs with staff     Problem: Safety  Goal: Will remain free from injury  Outcome: PROGRESSING AS EXPECTED   Free from injury/fall; precautions in place    Problem: Pain Management  Goal: Pain level will decrease to patient's comfort goal  Outcome: PROGRESSING AS EXPECTED  Denies pain/discomfort

## 2021-04-25 NOTE — CODE DOCUMENTATION
Dr. Chance at bedside to assess pt. Pt more alert at this time. Now at baseline vitals and neuro status. Dr. Chance to put in lab orders.

## 2021-04-25 NOTE — PROGRESS NOTES
Rapid response was called.  Per the nurse, patient slumped on left side while trying to use bedside commode.  Patient was noticed with a fixed left-sided gaze, pupils are fixed and dilated and was unresponsive.  Patient gained back consciousness quickly.  He complained dizziness.  Vital signs were checked, HR 86, BP 75/56, RR 16, SaO2 92% on room air.  .  neurologic at baseline. A/o x4.  He denies chest pain, nausea, vomiting.  Neuro check at baseline.     His symptoms could be due to orthostatic hypotension vs seizure.  Ordered normal saline bolus 500ML, CMP, CBC, mag, EKG, EEG    Patient signed out to Dr. Cheng.  Continued close monitor and follow up labs.

## 2021-04-25 NOTE — CARE PLAN
Problem: Communication  Goal: The ability to communicate needs accurately and effectively will improve  Outcome: PROGRESSING AS EXPECTED     Problem: Safety  Goal: Will remain free from injury  Outcome: PROGRESSING AS EXPECTED  Goal: Will remain free from falls  Outcome: PROGRESSING AS EXPECTED     Problem: Safety:  Goal: Will remain free from injury  Outcome: PROGRESSING AS EXPECTED     Problem: Peripheral Vascular Perfusion:  Goal: Will show no signs and symptoms of excessive bleeding  Outcome: PROGRESSING AS EXPECTED     Problem: Peripheral Vascular Perfusion:  Goal: Signs of adequate cerebral perfusion will increase  Outcome: PROGRESSING SLOWER THAN EXPECTED  Note: Pt was unable to tolerate sitting on commode last night- see note from previous shift.  MD aware, medications adjusted

## 2021-04-25 NOTE — PROGRESS NOTES
Hospital Medicine Daily Progress Note    Date of Service  4/25/2021    Chief Complaint  Left sided weakness    Hospital Course  56 y.o. male admitted 3/31/2021 with acute stroke.  He was not a candidate for tPA nor thrombectomy.  He has a history of paroxysmal atrial fibrillation, dyslipidemia, hypothyroidism. During admit he underwent a hemicraniectomy for right sided decompression from right MCA dense stroke with intractable intracranial pressure.     Interval Problem Update  4/12: Patient verbal and oriented with some slurred speech.  Left hemiplegia. On 4/11 Dr. Leo met with the wife and explained to her we likely will need a G-tube at some point.  Also alerted the both of them we would need a SNF at some point.  He reports that his pain is reasonably well controlled at this juncture, he is describing some thirst.  Free water flushes will be increased from 30 mL to 90 mL every 6 hours.  This can be backed off if sodium begins to drop.    4/13 physical therapy seen the patient.  On tube feeding still.  Speech therapy to follow.   to follow for discharge planning.    4/14: The patient was retaining urine this morning and will try to get a straight cath.  Also discussed with the wife and she said that she will try to get insurance starting May 1 for him.  I also started for Flomax    4/15: Discharge planning by . No acute issue overnight.    4/16 no acute issue overnight.  Discharge planning by     4/17: the patient is sleeping comfortably on the bed. No acute issue overnight.    4/18 the patient is having vertigo symptom.  I will start her on meclizine and see how he does.    4/19: The patient is sleeping comfortably on the bed.  His vertigo is improving with meclizine.  PT OT to evaluate him today.    4/20: No acute overnight events.  Wife at bedside, discussed possibility of a voiding trial today.      4/21: No acute overnight events, brother at bedside.  Case discussed  with physical medicine and rehabilitation physician.  Unclear of the planned timeline for cranioplasty, will touch base with neurosurgery shortly.    4/22: No acute overnight events.  Wife is at bedside requesting around-the-clock antivertigo medications.  This is not unreasonable for a few days duration.  Assistance of physical medicine and rehab greatly appreciated.    4/23: No acute overnight events,    4/24/2021, urine to Brewer catheter developing a reddish discoloration, UA with some benign pyuria, no leukocytosis or fevers.  Anticoagulation is probably contributing.  Periodic monitoring of hemoglobin    4/25/2021, no acute overnight events.    Consultants/Specialty  Neurology  Neuro-intensivist (signing off 4/11)  Neuro surgery    Code Status  Full Code    Disposition  Remain on the floor    Review of Systems  Review of Systems   Constitutional: Positive for malaise/fatigue.   Respiratory: Negative for cough, hemoptysis and stridor.    Cardiovascular: Negative for chest pain and palpitations.   Gastrointestinal: Negative for abdominal pain, diarrhea and vomiting.   Genitourinary: Negative for dysuria, frequency and urgency.   Musculoskeletal: Negative for back pain and neck pain.   Skin: Negative for rash.   Neurological: Positive for dizziness, sensory change (left side) and focal weakness (left side). Negative for headaches (right temporal region).        Physical Exam  Temp:  [35.8 °C (96.5 °F)-37.1 °C (98.7 °F)] 35.8 °C (96.5 °F)  Pulse:  [] 81  Resp:  [14-18] 16  BP: ()/(53-75) 121/71  SpO2:  [90 %-95 %] 95 %    Physical Exam  Vitals reviewed.   Constitutional:       Appearance: Normal appearance. He is diaphoretic.   HENT:      Head: Normocephalic and atraumatic.      Nose: Nose normal.      Mouth/Throat:      Mouth: Mucous membranes are moist.   Eyes:      Extraocular Movements: Extraocular movements intact.      Conjunctiva/sclera: Conjunctivae normal.   Cardiovascular:      Rate and  Rhythm: Normal rate.      Pulses: Normal pulses.           Radial pulses are 2+ on the right side and 2+ on the left side.        Dorsalis pedis pulses are 2+ on the right side and 2+ on the left side.      Heart sounds: Normal heart sounds.   Pulmonary:      Effort: Pulmonary effort is normal.   Abdominal:      General: Bowel sounds are normal.      Palpations: Abdomen is soft.   Musculoskeletal:         General: No swelling or tenderness.      Cervical back: Neck supple. No muscular tenderness.      Right lower leg: No edema.      Left lower leg: No edema.   Lymphadenopathy:      Cervical: No cervical adenopathy.   Neurological:      Mental Status: He is alert.      Cranial Nerves: Cranial nerve deficit present.      Sensory: Sensory deficit present.      Coordination: Coordination abnormal.      Gait: Gait abnormal.         Current Facility-Administered Medications:   •  meclizine (ANTIVERT) tablet 25 mg, 25 mg, Oral, TID PRN, Campbell Huff M.D.  •  [START ON 4/26/2021] amLODIPine (NORVASC) tablet 5 mg, 5 mg, Oral, Q DAY, Campbell Huff M.D.  •  [START ON 4/26/2021] lisinopril (PRINIVIL) tablet 20 mg, 20 mg, Oral, Q DAY, Campbell Huff M.D.  •  mirtazapine (Remeron) tablet 15 mg, 15 mg, Oral, QHS, Christofer Eisenberg D.JAYLA., 15 mg at 04/24/21 2111  •  ondansetron (ZOFRAN ODT) dispertab 4 mg, 4 mg, Oral, Q4HRS PRN, Radha Meredith, A.P.R.N., 4 mg at 04/20/21 1655  •  acetaminophen (Tylenol) tablet 650 mg, 650 mg, Oral, Q6HRS PRN, Radha Meredith, A.P.R.N., 650 mg at 04/23/21 1117  •  metoprolol tartrate (LOPRESSOR) tablet 25 mg, 25 mg, Oral, BID, Radha Meredith, A.P.R.N., 25 mg at 04/25/21 0540  •  senna-docusate (PERICOLACE or SENOKOT S) 8.6-50 MG per tablet 1 tablet, 1 tablet, Oral, Nightly, SHERRIE Hampton, 1 tablet at 04/24/21 2111  •  oxyCODONE immediate-release (ROXICODONE) tablet 5 mg, 5 mg, Oral, Q3HRS PRN **OR** oxyCODONE immediate release (ROXICODONE) tablet 10 mg, 10 mg, Oral, Q3HRS PRN **OR**  HYDROmorphone (Dilaudid) injection 0.5 mg, 0.5 mg, Intravenous, Q3HRS PRN, Radha Meredith, A.P.R.N.  •  polyethylene glycol/lytes (MIRALAX) PACKET 1 Packet, 1 Packet, Oral, BID PRN, Radha Meredith, A.P.R.N.  •  prazosin (MINIPRESS) capsule 1 mg, 1 mg, Oral, Q EVENING, Radha Meredith, A.P.R.N., 1 mg at 04/24/21 1826  •  senna-docusate (PERICOLACE or SENOKOT S) 8.6-50 MG per tablet 1 tablet, 1 tablet, Oral, Q24HRS PRN, Radha Meredith, A.P.R.N., 1 tablet at 04/23/21 1117  •  rivaroxaban (XARELTO) tablet 20 mg, 20 mg, Oral, PM MEAL, Radha Meredith, A.P.R.N., 20 mg at 04/24/21 1826  •  promethazine (PHENERGAN) tablet 12.5-25 mg, 12.5-25 mg, Oral, Q4HRS PRN, Radha Meredith, A.P.R.N.  •  magnesium hydroxide (MILK OF MAGNESIA) suspension 30 mL, 30 mL, Oral, QDAY PRN, Radha Meredith, A.P.R.N.  •  thyroid (ARMOUR THYROID) tablet 60 mg, 60 mg, Oral, BID, Radha Meredith, A.P.R.N., 60 mg at 04/25/21 0540  •  calcium carbonate (TUMS) chewable tab 500 mg, 500 mg, Oral, Q8HRS PRN, Radha Meredith, A.P.R.N.  •  baclofen (LIORESAL) tablet 15 mg, 15 mg, Oral, TID, Radha Meredith, A.P.R.N., 15 mg at 04/25/21 1236  •  aspirin (ASA) chewable tab 81 mg, 81 mg, Oral, DAILY, Radha Meredith, A.P.R.N., 81 mg at 04/24/21 1826  •  atorvastatin (LIPITOR) tablet 80 mg, 80 mg, Oral, Q EVENING, CRISTÓBAL Hampton., 80 mg at 04/24/21 1826  •  cloNIDine (CATAPRES) tablet 0.1 mg, 0.1 mg, Oral, Q4HRS PRN, Golden Pearson M.D.  •  hydrALAZINE (APRESOLINE) tablet 25 mg, 25 mg, Oral, Q8HRS, Golden Pearson M.D., Stopped at 04/24/21 2200  •  hydrALAZINE (APRESOLINE) injection 10 mg, 10 mg, Intravenous, Q HOUR PRN, Cristhian Bell M.D., 10 mg at 04/10/21 1631  •  labetalol (NORMODYNE/TRANDATE) injection 10-20 mg, 10-20 mg, Intravenous, Q4HRS PRN, Cristhian Bell M.D., 10 mg at 04/10/21 1508  •  Pharmacy Consult Request ...Pain Management Review 1 Each, 1 Each, Other, PHARMACY TO DOSE, Lilia Pizano P.A.-C.  •  MD ALERT...DO NOT ADMINISTER NSAIDS or ASPIRIN  unless ORDERED By Neurosurgery 1 Each, 1 Each, Other, PRN, LEIGHA MorrisAYohana-C.  •  bisacodyl (DULCOLAX) suppository 10 mg, 10 mg, Rectal, Q24HRS PRN, LEIGHA MorrisA.-C., 10 mg at 04/11/21 1811  •  artificial tears (EYE LUBRICANT) ophth ointment 1 Application, 1 Application, Both Eyes, PRN, LEIGHA MorrisA.-CYohana  •  ondansetron (ZOFRAN) syringe/vial injection 4 mg, 4 mg, Intravenous, Q4HRS PRN, Gia Forman M.D., 4 mg at 04/23/21 1117  •  promethazine (PHENERGAN) suppository 12.5-25 mg, 12.5-25 mg, Rectal, Q4HRS PRN, Gia Forman M.D.  •  prochlorperazine (COMPAZINE) injection 5-10 mg, 5-10 mg, Intravenous, Q4HRS PRN, Gia Forman M.D., 5 mg at 04/17/21 1234      Fluids    Intake/Output Summary (Last 24 hours) at 4/25/2021 1544  Last data filed at 4/25/2021 1150  Gross per 24 hour   Intake 780 ml   Output 900 ml   Net -120 ml       Laboratory  Recent Labs     04/25/21  1501   WBC 9.6   RBC 4.72   HEMOGLOBIN 13.6*   HEMATOCRIT 40.3*   MCV 85.4   MCH 28.8   MCHC 33.7   RDW 39.8   PLATELETCT 446   MPV 9.3     Recent Labs     04/23/21  0139 04/24/21  0245   SODIUM 136 139   POTASSIUM 4.3 4.3   CHLORIDE 100 103   CO2 26 25   GLUCOSE 122* 100*   BUN 32* 32*   CREATININE 1.32 1.25   CALCIUM 9.9 9.8                   Imaging  CT-HEAD W/O   Final Result         1.  Evolving area of infarct within the right MCA distribution.   2.  Ribbonlike gyriform hyperdensity at the area of prior infarct, appearance likely corresponding with gyriform laminar necrosis, component of subtle subarachnoid hemorrhage cannot be definitively excluded radiographically.   3.  Minimal pneumocephalus, decreased since prior study.   4.  Atherosclerosis.      US-EXTREMITY VENOUS LOWER BILAT   Final Result      DX-CHEST-LIMITED (1 VIEW)   Final Result      Right basilar atelectasis. No focal consolidation or pleural effusions.      [id number] VIEW   Final Result      No evidence of bowel obstruction.                   DX-CHEST-LIMITED (1 VIEW)   Final Result      1.  Right internal jugular catheter and enteric catheter appear appropriately located      2.  Minimal right basilar atelectasis      CT-HEAD W/O   Final Result         1.  Changes of evolving infarct within the right MCA distribution   2.  Hyperdensity in the sulci of the right MCA distribution infarct, largely appears related to thrombosed vessels, component of subarachnoid hemorrhage is suspected particularly in the right frontal lobe.   3.  Pneumocephalus, decreased since prior.      CT-HEAD W/O   Final Result         Postsurgical change from right craniectomy. Redemonstration of large right MCA infarct with edema and bulging of the brain parenchyma through the craniectomy defect      Less mass effect on the right lateral ventricle. Less right to left midline shift of 3 mm, previously 10 mm.         DX-ABDOMEN FOR TUBE PLACEMENT   Final Result      Enteric tube terminates over the stomach.      CT-HEAD W/O   Final Result         1.  Low-density changes of the right hemisphere compatible with MCA distribution infarct with edema.   2.  New effacement of the bilateral ventricles, right greater than left, with 10 mm right-to-left midline shift.   3.  New dilatation of the left temporal horn ventricle, appearance favors component of obstructive hydrocephalus due to mass effect from infarct and edema.   4.  Hyperdensity in the right middle cerebral artery, likely thrombosis given associated findings.      These findings were discussed with the patient's clinician, Dr. Nunez, on 4/3/2021 7:11 AM.      MR-BRAIN-W/O   Final Result      Very large acute right MCA territory infarct as detailed above with small amount of petechial hemorrhage in the right insular region and right temporal lobe.      Punctate right thalamic lacunar infarct.      Right ICA and M1 MCA occlusion.      EC-ECHOCARDIOGRAM COMPLETE W/O CONT   Final Result      DX-CHEST-PORTABLE (1 VIEW)   Final Result          1.  Interstitial pulmonary parenchymal prominence, compatible with interstitial edema and/or infiltrates.      CT-CTA NECK WITH & W/O-POST PROCESSING   Final Result      Acute occlusion of the right internal carotid artery shortly after bifurcation. It is occluded up to the level of the carotid terminus.      CT-CTA HEAD WITH & W/O-POST PROCESS   Final Result      Acute right M1 occlusion.      Findings were discussed with RHONDA DIAZ on 3/31/2021 7:54 PM.      CT-CEREBRAL PERFUSION ANALYSIS   Final Result      1.  Cerebral blood flow less than 30% likely representing completed infarct = 134 mL.      2.  T Max more than 6 seconds likely representing combination of completed infarct and ischemia = 210 mL.      3.  Mismatched volume likely representing ischemic brain/penumbra = 76 mL.      4.  Please note that the cerebral perfusion was performed on the limited brain tissue around the basal ganglia region. Infarct/ischemia outside the CT perfusion sections can be missed in this study.      CT-HEAD W/O   Final Result   Addendum 1 of 1   In retrospect, there is subtle loss of gray-white matter differentiation    in the right MCA distribution, consistent with acute infarct.      Final      1.  No CT evidence of acute infarct, hemorrhage or mass.   2.  Mild paranasal sinus disease.           Assessment/Plan  * Acute right MCA stroke (HCC)- (present on admission)  Assessment & Plan  Likely consequence of COVID19 in 3/21 and hx of afib  Subsequent brain edema requiring right decompressive hemicraniectomy on 4/3/2021  Neurology following patient will benefit from long-term anticoagulation when bleeding risk is acceptable likely 1-2 weeks from event onset.   Blood pressure control keep SBP<140 (on metoprolol 25mg BID, amlodipine 10mg, hydralazine 25mg TID, lisinopril 40mg daily)  Patient weaned off hypertonic saline and started on salt tablets  PT/OT/SLP  Aspiration precautions  Physiatry consultation  Now  having vertigo, improving on meclizine     Paroxysmal atrial fibrillation (HCC)- (present on admission)  Assessment & Plan  Monitor vitals and on tele  Metoprolol 25mg BID w/ parameters  Discussed risk benefits and alternatives of long-term anticoagulation with patient and wife, plan to started DOAC when bleeding risk felt to be acceptable per neuro.  This was initiated, some mild hematuria thereafter.      Leucocytosis  Assessment & Plan  Resolved but continue to monitor  4/11 WBC:7.7    Urinary retention  Assessment & Plan  Brewer catheter in place  Watch for urinary retention  prazosin    History of COVID-19- (present on admission)  Assessment & Plan  Clinically recovered patient symptom onset on 3/15/2021 with positive initial test on 3/18/2021    Acquired hypothyroidism- (present on admission)  Assessment & Plan  Kirkman Thyroid 60mg BID   TSH is less than 0.005 will decrease dose and he will need recheck TFTs in 6 weeks (prior to admit was on 90mg BID)  TSH: <0.005 in past week.       VTE prophylaxis: Enoxaparin 40mg SQ daily

## 2021-04-25 NOTE — PROGRESS NOTES
Assisted pt OOB with max assist of 3 (this writer, another RN and CNA). Once on commode, pt began to slump left with a fixed, left-sided gaze, pupils fixed and dilated and was unresponsive. Rapidly returned pt back to bed, gasping breaths at this time; assisted respirations with ambu bag until vital signs obtained. o2 sat 92-94%, pupils 2mm and reactive, BP 80s/50s. RRT and hospitalist at bedside. Pt rapidly became alert and oriented to baseline (4). Neuro assessment baseline as well. MD to input orders including labs and bolus

## 2021-04-26 LAB
ANION GAP SERPL CALC-SCNC: 12 MMOL/L (ref 7–16)
BUN SERPL-MCNC: 24 MG/DL (ref 8–22)
CALCIUM SERPL-MCNC: 9.5 MG/DL (ref 8.5–10.5)
CHLORIDE SERPL-SCNC: 102 MMOL/L (ref 96–112)
CO2 SERPL-SCNC: 23 MMOL/L (ref 20–33)
CREAT SERPL-MCNC: 1.06 MG/DL (ref 0.5–1.4)
CRP SERPL HS-MCNC: 2.57 MG/DL (ref 0–0.75)
GLUCOSE SERPL-MCNC: 116 MG/DL (ref 65–99)
POTASSIUM SERPL-SCNC: 4.4 MMOL/L (ref 3.6–5.5)
PREALB SERPL-MCNC: 21.6 MG/DL (ref 18–38)
SODIUM SERPL-SCNC: 137 MMOL/L (ref 135–145)

## 2021-04-26 PROCEDURE — 99232 SBSQ HOSP IP/OBS MODERATE 35: CPT | Performed by: HOSPITALIST

## 2021-04-26 PROCEDURE — 80048 BASIC METABOLIC PNL TOTAL CA: CPT

## 2021-04-26 PROCEDURE — A9270 NON-COVERED ITEM OR SERVICE: HCPCS | Performed by: PHYSICAL MEDICINE & REHABILITATION

## 2021-04-26 PROCEDURE — 770020 HCHG ROOM/CARE - TELE (206)

## 2021-04-26 PROCEDURE — 84134 ASSAY OF PREALBUMIN: CPT

## 2021-04-26 PROCEDURE — A9270 NON-COVERED ITEM OR SERVICE: HCPCS | Performed by: NURSE PRACTITIONER

## 2021-04-26 PROCEDURE — 700111 HCHG RX REV CODE 636 W/ 250 OVERRIDE (IP): Performed by: NURSE PRACTITIONER

## 2021-04-26 PROCEDURE — 4A00X4Z MEASUREMENT OF CENTRAL NERVOUS ELECTRICAL ACTIVITY, EXTERNAL APPROACH: ICD-10-PCS | Performed by: PSYCHIATRY & NEUROLOGY

## 2021-04-26 PROCEDURE — 700102 HCHG RX REV CODE 250 W/ 637 OVERRIDE(OP): Performed by: NURSE PRACTITIONER

## 2021-04-26 PROCEDURE — 36415 COLL VENOUS BLD VENIPUNCTURE: CPT

## 2021-04-26 PROCEDURE — 95819 EEG AWAKE AND ASLEEP: CPT | Mod: 26 | Performed by: PSYCHIATRY & NEUROLOGY

## 2021-04-26 PROCEDURE — A9270 NON-COVERED ITEM OR SERVICE: HCPCS | Performed by: HOSPITALIST

## 2021-04-26 PROCEDURE — 700102 HCHG RX REV CODE 250 W/ 637 OVERRIDE(OP): Performed by: HOSPITALIST

## 2021-04-26 PROCEDURE — 86140 C-REACTIVE PROTEIN: CPT

## 2021-04-26 PROCEDURE — A9270 NON-COVERED ITEM OR SERVICE: HCPCS | Performed by: INTERNAL MEDICINE

## 2021-04-26 PROCEDURE — 700102 HCHG RX REV CODE 250 W/ 637 OVERRIDE(OP): Performed by: PHYSICAL MEDICINE & REHABILITATION

## 2021-04-26 PROCEDURE — 95819 EEG AWAKE AND ASLEEP: CPT | Performed by: PSYCHIATRY & NEUROLOGY

## 2021-04-26 PROCEDURE — 700102 HCHG RX REV CODE 250 W/ 637 OVERRIDE(OP): Performed by: INTERNAL MEDICINE

## 2021-04-26 RX ADMIN — MIRTAZAPINE 15 MG: 15 TABLET, FILM COATED ORAL at 20:48

## 2021-04-26 RX ADMIN — LISINOPRIL 20 MG: 20 TABLET ORAL at 05:08

## 2021-04-26 RX ADMIN — ATORVASTATIN CALCIUM 80 MG: 80 TABLET, FILM COATED ORAL at 17:39

## 2021-04-26 RX ADMIN — ASPIRIN 81 MG: 81 TABLET, CHEWABLE ORAL at 17:39

## 2021-04-26 RX ADMIN — ACETAMINOPHEN 650 MG: 325 TABLET, FILM COATED ORAL at 15:03

## 2021-04-26 RX ADMIN — METOPROLOL TARTRATE 25 MG: 25 TABLET, FILM COATED ORAL at 17:38

## 2021-04-26 RX ADMIN — ONDANSETRON 4 MG: 4 TABLET, ORALLY DISINTEGRATING ORAL at 09:41

## 2021-04-26 RX ADMIN — METOPROLOL TARTRATE 25 MG: 25 TABLET, FILM COATED ORAL at 05:08

## 2021-04-26 RX ADMIN — BACLOFEN 15 MG: 10 TABLET ORAL at 17:33

## 2021-04-26 RX ADMIN — MECLIZINE HYDROCHLORIDE 25 MG: 25 TABLET ORAL at 09:41

## 2021-04-26 RX ADMIN — BACLOFEN 15 MG: 10 TABLET ORAL at 12:36

## 2021-04-26 RX ADMIN — RIVAROXABAN 20 MG: 20 TABLET, FILM COATED ORAL at 17:39

## 2021-04-26 RX ADMIN — DOCUSATE SODIUM 50 MG AND SENNOSIDES 8.6 MG 1 TABLET: 8.6; 5 TABLET, FILM COATED ORAL at 20:48

## 2021-04-26 RX ADMIN — THYROID, PORCINE 60 MG: 30 TABLET ORAL at 05:08

## 2021-04-26 RX ADMIN — THYROID, PORCINE 60 MG: 30 TABLET ORAL at 17:38

## 2021-04-26 RX ADMIN — PHENYTOIN 1 MG: 125 SUSPENSION ORAL at 17:37

## 2021-04-26 RX ADMIN — AMLODIPINE BESYLATE 5 MG: 5 TABLET ORAL at 05:08

## 2021-04-26 RX ADMIN — BACLOFEN 15 MG: 10 TABLET ORAL at 05:08

## 2021-04-26 RX ADMIN — HYDRALAZINE HYDROCHLORIDE 25 MG: 25 TABLET, FILM COATED ORAL at 16:24

## 2021-04-26 ASSESSMENT — ENCOUNTER SYMPTOMS
SENSORY CHANGE: 1
BACK PAIN: 0
FOCAL WEAKNESS: 1
HEMOPTYSIS: 0
STRIDOR: 0
DIZZINESS: 1
COUGH: 0
VOMITING: 0
DIARRHEA: 0
NECK PAIN: 0
PALPITATIONS: 0
ABDOMINAL PAIN: 0
HEADACHES: 0

## 2021-04-26 NOTE — CARE PLAN
Problem: Communication  Goal: The ability to communicate needs accurately and effectively will improve  Outcome: PROGRESSING AS EXPECTED     Problem: Safety  Goal: Will remain free from injury  Outcome: PROGRESSING AS EXPECTED     Problem: Infection  Goal: Will remain free from infection  Outcome: PROGRESSING AS EXPECTED     Problem: Safety:  Goal: Will remain free from injury  Outcome: PROGRESSING AS EXPECTED     Problem: Infection:  Goal: Will remain free from infection  Outcome: PROGRESSING AS EXPECTED

## 2021-04-26 NOTE — DIETARY
"Nutrition Services: Update   Day 26 of admit.  Thong Arzola is a 56 y.o. male with admitting DX of Acute ischemic right MCA stroke.     Pt is currently on soft and bite sized diet w/ thick liquids. Per flowsheets PO remains variable 0-100% of meals. Avg = ~40%. Recent PO intake has improved from 0-<25% of meals 1 week ago.    RD spoke with pt and his son at bedside today. Pt notes doing \"ok\" with new diet textures, still seems to do better with liquids. Continues to enjoy the Boost and yogurt. Pt notes he enjoys strawberry Boost and would prefer this flavor all meals. RD observed pt consumed 25-50% of Boost and yogurt at bedside on lunch tray. Feels he is improving. Per pt's son notes pt's wife reported \"slight improvement\" to pt's intake.     Per MAR Remeron was added on 4/21.     Wt 4/22: 77.9 kg via bed scale - this is slightly decreased by 1.7 kg from admit wt of 79.6 kg on 3/31.     Malnutrition Risk: at risk with inadequate PO intake for ~10 days.     Recommendations/Plan:  1. RD to update supplement flavor preferences per pt.   2. Continue Remeron daily.   3. Encourage intake of meals, supplements  4. Document intake of all meals, supplements as % taken in ADL's to provide interdisciplinary communication across all shifts.   5. Monitor weight.  6. Nutrition rep will continue to see patient for ongoing meal and snack preferences.    RD following    "

## 2021-04-26 NOTE — PROGRESS NOTES
Monitor Summary: SR-ST , NM .18, QRS .08, QT .32 with occasional PAC,rare PVC per strip from monitor room

## 2021-04-26 NOTE — THERAPY
Missed Therapy     Patient Name: Thong Arzola  Age:  56 y.o., Sex:  male  Medical Record #: [id number]  Today's Date: 4/26/2021 04/26/21 1635   Treatment Variance   Reason For Missed Therapy Non-Medical - Patient Refused   Interdisciplinary Plan of Care Collaboration   IDT Collaboration with  Nursing   Collaboration Comments Attempted OT tx session, however pt refused stating he is too tired. Pt requested therapist come back tomorrow after breakfast. Will attempt again as able.

## 2021-04-26 NOTE — PROCEDURES
VIDEO ELECTROENCEPHALOGRAM REPORT      Referring provider: Dr. Chance    DOS:  04/26/21  (total recording of 25 minutes).     INDICATION:  Thong Arzola 56 y.o. male presenting with stroke.     CURRENT ANTIEPILEPTIC REGIMEN: none     TECHNIQUE: 30 channel video electroencephalogram (EEG) was performed in accordance with the international 10-20 system. The study was reviewed in bipolar and referential montages. The recording examined the patient during awake, drowsy and sleep states    DESCRIPTION OF THE RECORD:  During wakefulness, the background consisted of asymmetric theta range slowing at 8 Hz, better seen on the left hemisphere. No well-formed posterior dominant rhythm or anterior-posterior gradient was seen.  With drowsiness, there was attenuation of the background theta activity and increased fast frequency. As the patient enters into sleep, rudimentary vertex waves and asymmetrical spindles were noted, again better seen on the left hemisphere. Loss of fast frequency was noted over the right hemisphere.     ACTIVATION PROCEDURE:  hyperventilation was not performed    Intermittent Photic stimulation was performed in a stepwise fashion from 1 to 30 Hz and elicited no photic driving response.     ICTAL AND/OR INTERICTAL FINDINGS:   No focal or generalized epileptiform activity noted. Right hemispheric focal delta slowing was seen during this routine study. No clinical events or seizures were reported or recorded during the study.     EKG: sampling of the EKG recording demonstrated sinus rhythm.     EVENTS: none     INTERPRETATION:    This is an abnormal video EEG recording in the awake, drowsy/sleep state(s). The diffuse background slowing is consistent with mild encephalopathy. The presence of asymmetric PDR and sleep spindle along with the loss of fast frequency over the right hemisphere are consistent with known focal structural lesion. No epileptiform discharges or seizures were seen. This does not  preclude a diagnosis of epilepsy.      Brent Naik MD  Diplomate in Neurology&Epilepsy  Office: [phone]  Fax: [phone]

## 2021-04-26 NOTE — CARE PLAN
Problem: Communication  Goal: The ability to communicate needs accurately and effectively will improve  Outcome: PROGRESSING AS EXPECTED  Effectively communicates needs with staff     Problem: Safety  Goal: Will remain free from injury  Outcome: PROGRESSING AS EXPECTED  Free from injury/fall; precautions in place     Problem: Knowledge Deficit  Goal: Knowledge of disease process/condition, treatment plan, diagnostic tests, and medications will improve  Outcome: PROGRESSING AS EXPECTED  Verbalizes understanding of disease, plan of care and medications

## 2021-04-27 LAB
ANION GAP SERPL CALC-SCNC: 8 MMOL/L (ref 7–16)
BUN SERPL-MCNC: 29 MG/DL (ref 8–22)
CALCIUM SERPL-MCNC: 9.6 MG/DL (ref 8.5–10.5)
CHLORIDE SERPL-SCNC: 98 MMOL/L (ref 96–112)
CO2 SERPL-SCNC: 25 MMOL/L (ref 20–33)
CREAT SERPL-MCNC: 1.14 MG/DL (ref 0.5–1.4)
ERYTHROCYTE [DISTWIDTH] IN BLOOD BY AUTOMATED COUNT: 39.7 FL (ref 35.9–50)
GLUCOSE SERPL-MCNC: 105 MG/DL (ref 65–99)
HCT VFR BLD AUTO: 38.6 % (ref 42–52)
HGB BLD-MCNC: 13.4 G/DL (ref 14–18)
MCH RBC QN AUTO: 29.2 PG (ref 27–33)
MCHC RBC AUTO-ENTMCNC: 34.7 G/DL (ref 33.7–35.3)
MCV RBC AUTO: 84.1 FL (ref 81.4–97.8)
PLATELET # BLD AUTO: 361 K/UL (ref 164–446)
PMV BLD AUTO: 9.5 FL (ref 9–12.9)
POTASSIUM SERPL-SCNC: 4 MMOL/L (ref 3.6–5.5)
RBC # BLD AUTO: 4.59 M/UL (ref 4.7–6.1)
SODIUM SERPL-SCNC: 131 MMOL/L (ref 135–145)
WBC # BLD AUTO: 10.9 K/UL (ref 4.8–10.8)

## 2021-04-27 PROCEDURE — 80048 BASIC METABOLIC PNL TOTAL CA: CPT

## 2021-04-27 PROCEDURE — 700102 HCHG RX REV CODE 250 W/ 637 OVERRIDE(OP): Performed by: HOSPITALIST

## 2021-04-27 PROCEDURE — 97112 NEUROMUSCULAR REEDUCATION: CPT | Mod: CO

## 2021-04-27 PROCEDURE — 700102 HCHG RX REV CODE 250 W/ 637 OVERRIDE(OP): Performed by: PHYSICAL MEDICINE & REHABILITATION

## 2021-04-27 PROCEDURE — 99232 SBSQ HOSP IP/OBS MODERATE 35: CPT | Performed by: PHYSICAL MEDICINE & REHABILITATION

## 2021-04-27 PROCEDURE — 36415 COLL VENOUS BLD VENIPUNCTURE: CPT

## 2021-04-27 PROCEDURE — 700102 HCHG RX REV CODE 250 W/ 637 OVERRIDE(OP): Performed by: NURSE PRACTITIONER

## 2021-04-27 PROCEDURE — 700111 HCHG RX REV CODE 636 W/ 250 OVERRIDE (IP): Performed by: NURSE PRACTITIONER

## 2021-04-27 PROCEDURE — 97535 SELF CARE MNGMENT TRAINING: CPT | Mod: CO

## 2021-04-27 PROCEDURE — A9270 NON-COVERED ITEM OR SERVICE: HCPCS | Performed by: HOSPITALIST

## 2021-04-27 PROCEDURE — 770020 HCHG ROOM/CARE - TELE (206)

## 2021-04-27 PROCEDURE — 97530 THERAPEUTIC ACTIVITIES: CPT | Mod: CQ

## 2021-04-27 PROCEDURE — A9270 NON-COVERED ITEM OR SERVICE: HCPCS | Performed by: NURSE PRACTITIONER

## 2021-04-27 PROCEDURE — A9270 NON-COVERED ITEM OR SERVICE: HCPCS | Performed by: PHYSICAL MEDICINE & REHABILITATION

## 2021-04-27 PROCEDURE — 99232 SBSQ HOSP IP/OBS MODERATE 35: CPT | Performed by: HOSPITALIST

## 2021-04-27 PROCEDURE — 97112 NEUROMUSCULAR REEDUCATION: CPT | Mod: CQ

## 2021-04-27 PROCEDURE — 97110 THERAPEUTIC EXERCISES: CPT | Mod: CQ

## 2021-04-27 PROCEDURE — 85027 COMPLETE CBC AUTOMATED: CPT

## 2021-04-27 RX ORDER — LISINOPRIL 5 MG/1
2.5 TABLET ORAL
Status: DISCONTINUED | OUTPATIENT
Start: 2021-04-28 | End: 2021-04-29 | Stop reason: HOSPADM

## 2021-04-27 RX ADMIN — POLYETHYLENE GLYCOL 3350 1 PACKET: 17 POWDER, FOR SOLUTION ORAL at 16:31

## 2021-04-27 RX ADMIN — ATORVASTATIN CALCIUM 80 MG: 80 TABLET, FILM COATED ORAL at 18:02

## 2021-04-27 RX ADMIN — DOCUSATE SODIUM 50 MG AND SENNOSIDES 8.6 MG 1 TABLET: 8.6; 5 TABLET, FILM COATED ORAL at 21:25

## 2021-04-27 RX ADMIN — THYROID, PORCINE 60 MG: 30 TABLET ORAL at 04:47

## 2021-04-27 RX ADMIN — THYROID, PORCINE 60 MG: 30 TABLET ORAL at 18:00

## 2021-04-27 RX ADMIN — MIRTAZAPINE 15 MG: 15 TABLET, FILM COATED ORAL at 21:25

## 2021-04-27 RX ADMIN — BACLOFEN 15 MG: 10 TABLET ORAL at 12:21

## 2021-04-27 RX ADMIN — DOCUSATE SODIUM 50 MG AND SENNOSIDES 8.6 MG 1 TABLET: 8.6; 5 TABLET, FILM COATED ORAL at 16:31

## 2021-04-27 RX ADMIN — ACETAMINOPHEN 650 MG: 325 TABLET, FILM COATED ORAL at 16:31

## 2021-04-27 RX ADMIN — METOPROLOL TARTRATE 12.5 MG: 25 TABLET, FILM COATED ORAL at 18:01

## 2021-04-27 RX ADMIN — ONDANSETRON 4 MG: 4 TABLET, ORALLY DISINTEGRATING ORAL at 09:30

## 2021-04-27 RX ADMIN — BACLOFEN 15 MG: 10 TABLET ORAL at 04:47

## 2021-04-27 RX ADMIN — ASPIRIN 81 MG: 81 TABLET, CHEWABLE ORAL at 18:01

## 2021-04-27 RX ADMIN — RIVAROXABAN 20 MG: 20 TABLET, FILM COATED ORAL at 18:02

## 2021-04-27 RX ADMIN — BACLOFEN 15 MG: 10 TABLET ORAL at 18:01

## 2021-04-27 RX ADMIN — PHENYTOIN 1 MG: 125 SUSPENSION ORAL at 18:01

## 2021-04-27 RX ADMIN — MECLIZINE HYDROCHLORIDE 25 MG: 25 TABLET ORAL at 09:30

## 2021-04-27 ASSESSMENT — ENCOUNTER SYMPTOMS
CHILLS: 0
ABDOMINAL PAIN: 0
WEAKNESS: 0
MUSCULOSKELETAL NEGATIVE: 1
SEIZURES: 0
DIZZINESS: 1
FEVER: 0
VOMITING: 0
PSYCHIATRIC NEGATIVE: 1
CONSTITUTIONAL NEGATIVE: 1
WEIGHT LOSS: 0
CARDIOVASCULAR NEGATIVE: 1
HEADACHES: 0
SHORTNESS OF BREATH: 0
NAUSEA: 0
DEPRESSION: 0
RESPIRATORY NEGATIVE: 1
GASTROINTESTINAL NEGATIVE: 1
DIAPHORESIS: 0
BLURRED VISION: 0
BRUISES/BLEEDS EASILY: 0
COUGH: 0
PALPITATIONS: 0
MYALGIAS: 0
EYES NEGATIVE: 1
SORE THROAT: 0
FOCAL WEAKNESS: 1

## 2021-04-27 ASSESSMENT — COGNITIVE AND FUNCTIONAL STATUS - GENERAL
SUGGESTED CMS G CODE MODIFIER MOBILITY: CM
CLIMB 3 TO 5 STEPS WITH RAILING: TOTAL
WALKING IN HOSPITAL ROOM: A LOT
HELP NEEDED FOR BATHING: TOTAL
MOBILITY SCORE: 8
MOVING TO AND FROM BED TO CHAIR: UNABLE
DRESSING REGULAR UPPER BODY CLOTHING: A LOT
DRESSING REGULAR LOWER BODY CLOTHING: TOTAL
TURNING FROM BACK TO SIDE WHILE IN FLAT BAD: UNABLE
PERSONAL GROOMING: A LOT
EATING MEALS: A LOT
DAILY ACTIVITIY SCORE: 9
SUGGESTED CMS G CODE MODIFIER DAILY ACTIVITY: CL
STANDING UP FROM CHAIR USING ARMS: A LOT
TOILETING: TOTAL
MOVING FROM LYING ON BACK TO SITTING ON SIDE OF FLAT BED: UNABLE

## 2021-04-27 ASSESSMENT — LIFESTYLE VARIABLES: SUBSTANCE_ABUSE: 0

## 2021-04-27 ASSESSMENT — GAIT ASSESSMENTS: GAIT LEVEL OF ASSIST: UNABLE TO PARTICIPATE

## 2021-04-27 NOTE — PROGRESS NOTES
Physical Medicine and Rehabilitation Consultation              Date of initial consultation: 4/2/2021  Consulting provider: Mc Calvillo MD  Reason for consultation: assess for acute inpatient rehab appropriateness  LOS: 27 Day(s)    Chief complaint: Stroke    HPI: The patient is a 56 y.o. right hand dominant male with a past medical history of hypertension, hyperlipidemia, atrial fibrillation not on anticoagulation, Covid positive on 3/18;  who presented on 3/31/2021  7:29 PM brought in by care flight with left hemiplegia, facial droop, right gaze deviation.  CT head showed complete occlusion of right MCA, but unfortunately he was not a candidate for TPA or thrombectomy.  Seen by neurology, found to have a NIH score of 18.  Additional work-up with CTA shows right ICA complete occlusion.  Etiology is thought to be cardioembolic due to A. fib off of AC.  He is currently being followed closely by neurosurgery as he is expected to have peak brain swelling in the next 1 to 2 days and may require craniotomy.  Follow-up MRI shows minimal shift with large MCA stroke    Most of my visit is with Jims wife but also with him. He endorses left neglect and weakness, but does not endorse paresthesias at this time. I had a long meeting with his wife about expectations with this type and severity of stroke.     4/7/2021  I have been following patient's chart in the periphery, since my last visit he has undergone hemicraniectomy on 4/3 for increased cerebral swelling, and postop.  Has been complicated by continuing cerebral edema resulting in midline shift.  Neurology continuing to follow. Son at bedside. Patient still have severe left neglect. He continues to have trace left hand movement.     4/9/2021  Patient continues to have severe left neglect and spasticity. ROM training given to wife at bedside. Patient is developing a functional torticollis from only looking right due to neglect. No hand movement observed today.  "Patient denies new complaints.     4/13/2021  Patient is tired today, wife reports a \"loud night\".  Patient is doing slightly better with his neglect, his neck is still very much turned to the right, but he is able to locate his left hand with his right hand.  Discussed course for therapy and rehab admission with patient's wife and therapists.  Wife is considering paying out-of-pocket, we will try to accommodate her as best we can and also work with the patient as much as possible in the acute setting.     4/15/2021  Patient seen in follow-up, resting comfortably in bed.  Discussed progress with wife who reports some nausea with therapy related to his neglect.  Discussed adding a scopolamine patch.  Also discussed adding Flomax for urinary retention symptoms, possibly removing Brewer between now and Monday and continuing of bladder scans.  Also discussed rehab placement when patient is doing better    4/19/2021  Much more alert today, answering questions in short phrases.  Neglect appears to be a little better today.  Spasticity improved, except in neck which is still firm.  Discussed with son at bedside overall plan of care.    4/21/2021  Patient complaining of poor appetite and insomnia. Starting Remeron tonight. Also discussed flap replacement, date unknown at this time. Patient would benefit from family training but is not improving from a motor strength point of view. He is more alert and neglect is improving. Some spasticity in LUE.    4/22/2021  Discussed plan of care with wife and sister. Rehab is still working out what a daily rate would be, but it will likely be around 50k or more for his stay. There may be a better option to explore with using those funds for continued outpatient therapy and hired help at home. He has dense left hemiplegia and is not expected to recover much motor function at rehab. He would benefit from family training and continued rehab for trunk control and independence with WC " "ambulation. Still awaiting word on when the flap will be replaced. A possible option to consider would be providing family training here and DC home after flap replacement. We discussed a possible care conference with PFA, rehab, medicine and therapy to evaluate this option early next week. Wife supplied her email for updates: [email or web address]    4/27/2021  Patient is doing well today, spasticity improved.  Patient has 1 out of 5 strength in the left hand which is new.  Neglect is improved.  I have also been updated that he now has insurance.  TCC's are looking into this to verify.    Social Hx:  2 SH  2-3 MAKENZIE, 1 flight of stairs with rails inside  With: Spouse    Per [facility] Sloane \"Anel [wife] tells me she is currently working full time, has flexible job and will take time off to provide 24/7 support when Thong returnes home.  They live 2 story home, 2 -3 steps to enter.  Master bedroom on ground floor.  Bathroom has tub/shower combo, no safety grab bars. \"    THERAPY:  PT: Functional mobility   4/1: Mod assist sit to stand with 2 people  4/7: Max Assist  4/12: Max assist supine to sit, total assist sit to supine  4/14: Max assist sit to stand    OT: ADLs  4/1: Mod assist grooming, max assist lower body dressing  4/7: Total assist   4/12: Max assist dressing, total assist toileting  4/14: Total assist toileting    SLP:   4/1: N.p.o. pending fees  4/2: minced and moist/mildly thick liquid   4/7: NPO  4/8: NPO with 3-5 ice chips per hour  4/14: Minced and moist diet with direct one-to-one supervision    IMAGING:    MR brain 4/1/2021  Very large acute right MCA territory infarct as detailed above with small amount of petechial hemorrhage in the right insular region and right temporal lobe.  Punctate right thalamic lacunar infarct.  Right ICA and M1 MCA occlusion.    PROCEDURES:  None    PMH:  Past Medical History:   Diagnosis Date   • Afib (HCC)    • High cholesterol        PSH:  Past Surgical History:   Procedure " Laterality Date   • CRANIOTOMY Right 4/3/2021    Procedure: CRANIOTOMY;  Surgeon: Arthur Payton M.D.;  Location: SURGERY [facility];  Service: Neurosurgery   • KNEE RECONSTRUCTION      left knee orthoscopic       FHX:  Non-pertinent to today's issues    Medications:  Current Facility-Administered Medications   Medication Dose   • [START ON 4/28/2021] lisinopril (PRINIVIL) tablet 2.5 mg  2.5 mg   • metoprolol tartrate (LOPRESSOR) tablet 12.5 mg  12.5 mg   • meclizine (ANTIVERT) tablet 25 mg  25 mg   • mirtazapine (Remeron) tablet 15 mg  15 mg   • ondansetron (ZOFRAN ODT) dispertab 4 mg  4 mg   • acetaminophen (Tylenol) tablet 650 mg  650 mg   • senna-docusate (PERICOLACE or SENOKOT S) 8.6-50 MG per tablet 1 tablet  1 tablet   • oxyCODONE immediate release (ROXICODONE) tablet 10 mg  10 mg    Or   • oxyCODONE immediate-release (ROXICODONE) tablet 5 mg  5 mg    Or   • HYDROmorphone (Dilaudid) injection 0.5 mg  0.5 mg   • polyethylene glycol/lytes (MIRALAX) PACKET 1 Packet  1 Packet   • prazosin (MINIPRESS) capsule 1 mg  1 mg   • senna-docusate (PERICOLACE or SENOKOT S) 8.6-50 MG per tablet 1 tablet  1 tablet   • rivaroxaban (XARELTO) tablet 20 mg  20 mg   • promethazine (PHENERGAN) tablet 12.5-25 mg  12.5-25 mg   • magnesium hydroxide (MILK OF MAGNESIA) suspension 30 mL  30 mL   • thyroid (ARMOUR THYROID) tablet 60 mg  60 mg   • calcium carbonate (TUMS) chewable tab 500 mg  500 mg   • baclofen (LIORESAL) tablet 15 mg  15 mg   • aspirin (ASA) chewable tab 81 mg  81 mg   • atorvastatin (LIPITOR) tablet 80 mg  80 mg   • cloNIDine (CATAPRES) tablet 0.1 mg  0.1 mg   • hydrALAZINE (APRESOLINE) injection 10 mg  10 mg   • labetalol (NORMODYNE/TRANDATE) injection 10-20 mg  10-20 mg   • Pharmacy Consult Request ...Pain Management Review 1 Each  1 Each   • MD ALERT...DO NOT ADMINISTER NSAIDS or ASPIRIN unless ORDERED By Neurosurgery 1 Each  1 Each   • bisacodyl (DULCOLAX) suppository 10 mg  10 mg   • artificial tears (EYE  "LUBRICANT) ophth ointment 1 Application  1 Application   • ondansetron (ZOFRAN) syringe/vial injection 4 mg  4 mg   • promethazine (PHENERGAN) suppository 12.5-25 mg  12.5-25 mg   • prochlorperazine (COMPAZINE) injection 5-10 mg  5-10 mg       Allergies:  No Known Allergies    Physical Exam:  Vitals: /75   Pulse 88   Temp 36.8 °C (98.2 °F) (Temporal)   Resp 17   Ht 1.778 m (5' 10\")   Wt 77.9 kg (171 lb 11.8 oz)   SpO2 94%   Gen: NAD  Head: right hemicraniectomy  Eyes/ Nose/ Mouth: PERRLA, moist mucous membranes  Cardio: RRR, good distal perfusion, warm extremities  Pulm: normal respiratory effort, no cyanosis   Abd: Soft NTND, negative borborygmi   Ext: No peripheral edema. No calf tenderness. No clubbing.    Mental status: answers questions appropriately follows commands  Speech: fluent, no aphasia or dysarthria    CRANIAL NERVES:  2,3: LEFT VF cut, PERRL  3,4,6: EOMI in right VF, no nystagmus or diplopia  5: sensation intact to light touch bilaterally and symmetric  7: Left facial droop   8: hearing grossly intact  9,10: not seen  11: SCM/Trapezius strength 5/5right, 0/5 left  12: tongue protrudes left    Motor:      Upper Extremity  Myotome R L   Shoulder flexion C5 5 0/5   Elbow flexion C5 5 0/5   Wrist extension C6 5 0/5   Elbow extension C7 5 0/5   Finger flexion C8 5 1/5   Finger abduction T1 5 0/5     Lower Extremity Myotome R L   Hip flexion L2 5 1/5   Knee extension L3 5 0/5   Ankle dorsiflexion L4 5 0/5   Toe extension L5 5 0/5   Ankle plantarflexion S1 5 0/5     Sensory:   Altered sensation on left side       DTRs:  Right  Left    Brachioradialis  2+  2+   Patella tendon  2+ 2+     2-3 beats right ankle clonus, sustained clonus left ankle  Pos babinski left   Negative Castillo b/l     Tone: tight hamstrings bilaterally    Labs: Reviewed and significant for   Recent Labs     04/25/21  1501 04/27/21  0338   RBC 4.72 4.59*   HEMOGLOBIN 13.6* 13.4*   HEMATOCRIT 40.3* 38.6*   PLATELETCT 543 713 "     Recent Labs     04/25/21  1501 04/26/21  0408 04/27/21  0338   SODIUM 138 137 131*   POTASSIUM 4.3 4.4 4.0   CHLORIDE 103 102 98   CO2 25 23 25   GLUCOSE 98 116* 105*   BUN 25* 24* 29*   CREATININE 1.13 1.06 1.14   CALCIUM 9.6 9.5 9.6     Recent Results (from the past 24 hour(s))   Basic Metabolic Panel    Collection Time: 04/27/21  3:38 AM   Result Value Ref Range    Sodium 131 (L) 135 - 145 mmol/L    Potassium 4.0 3.6 - 5.5 mmol/L    Chloride 98 96 - 112 mmol/L    Co2 25 20 - 33 mmol/L    Glucose 105 (H) 65 - 99 mg/dL    Bun 29 (H) 8 - 22 mg/dL    Creatinine 1.14 0.50 - 1.40 mg/dL    Calcium 9.6 8.5 - 10.5 mg/dL    Anion Gap 8.0 7.0 - 16.0   CBC WITHOUT DIFFERENTIAL    Collection Time: 04/27/21  3:38 AM   Result Value Ref Range    WBC 10.9 (H) 4.8 - 10.8 K/uL    RBC 4.59 (L) 4.70 - 6.10 M/uL    Hemoglobin 13.4 (L) 14.0 - 18.0 g/dL    Hematocrit 38.6 (L) 42.0 - 52.0 %    MCV 84.1 81.4 - 97.8 fL    MCH 29.2 27.0 - 33.0 pg    MCHC 34.7 33.7 - 35.3 g/dL    RDW 39.7 35.9 - 50.0 fL    Platelet Count 361 164 - 446 K/uL    MPV 9.5 9.0 - 12.9 fL   ESTIMATED GFR    Collection Time: 04/27/21  3:38 AM   Result Value Ref Range    GFR If African American >60 >60 mL/min/1.73 m 2    GFR If Non African American >60 >60 mL/min/1.73 m 2         ASSESSMENT:  Patient is a 56 y.o. male admitted with large right MCA stroke and right ICA occlusion. He did not receive TPA or thrombectomy     [facility] Code / Diagnosis to Support: 0001.1 - Stroke: Left Body Involvement (Right Brain)    Rehabilitation: Impaired ADLs and mobility  Continuing to consider [facility] for [facility].  He has dense left hemiplegia which is difficult to recover from, however it can be managed.  Patient has no insurance benefit for IPR and I have discussed with the family appropriate timing of rehab given his limited benefit and severe deficits.    Additional Recommendations:    Large right MCA ischemic stroke s/p hemicraniectomy on 4/3  - continue PT/OT and SLP   -Family  educated on stroke comorbidites on initial consult.  - ROM training given to wife for torticollis prevention and contracture prevention.  Requested room change with nurse manager to provide patient with window on left side to combat neglect.  -Continue baclofen 15mg TID for LUE spasticity -patient is stable at this dose  - Holding off on gabapentin at this time to avoid polypharmacy   - ASA/ statin  - Etiology throught to be cardioembolic in the setting of atrial fibrillation off of AC and in the setting of COVID.  -Primary team using meclizine for nausea control.  Change to scheduled on my last visit, now back to as needed  -On prazosin for urinary retention  - Continue Remeron 15mg for sleep and appetite  - PMR to continue to follow for rehab appropriateness    Dispo: Rehab TCC is looking into insurance eligibility.  Although his function is not likely to change in the short amount of time that we will have him at [facility], he would benefit from family training and continued PT and OT for trunk control and independence with ADLs at a wheelchair level.  He will also benefit from SLP for neglect and swallow training    Medical Complexity:  Large right MCA stroke      DVT PPX: SCDs      Thank you for allowing us to participate in the care of this patient.     Patient was seen for 26 minutes on unit/floor of which > 50% of time was spent on counseling and coordination of care regarding the above, including prognosis, risk reduction, benefits of treatment, and options for next stage of care.    Christofer Eisenberg, DO   Physical Medicine and Rehabilitation

## 2021-04-27 NOTE — PROGRESS NOTES
[facility] Medicine Daily Progress Note    Date of Service  4/27/2021    Chief Complaint  56 y.o. male admitted 3/31/2021 with left sided weakness     Hospital Course  Patient is a pleasant 56 year old with history of paroxysmal atrial fibrillation, dyslipidemia, and hypothyroidism. He presented to the ER with acute left sided weakness and was found to have a right MCA stroke and required a decompression.    Interval Problem Update  His wife is at bedside, he is axox3, reports some itching of craniotomy site, denies h/a.  Reportedly had some urethral trauma from dye a few days ago with secondary bleeding - this has improved but not resolved, no clots.  Ongoing left sided weakness.  Dizziness this am, bp was low at the time. ROS otherwise negative    Consultants/Specialty  Neurosurgery  Neurology  Critical care    Code Status  Full Code    Disposition  tbd    Review of Systems  Review of Systems   Constitutional: Negative.  Negative for chills, diaphoresis, fever, malaise/fatigue and weight loss.   HENT: Negative.  Negative for sore throat.    Eyes: Negative.  Negative for blurred vision.   Respiratory: Negative.  Negative for cough and shortness of breath.    Cardiovascular: Negative.  Negative for chest pain, palpitations and leg swelling.   Gastrointestinal: Negative.  Negative for abdominal pain, nausea and vomiting.   Genitourinary: Negative.  Negative for dysuria.   Musculoskeletal: Negative.  Negative for myalgias.   Skin: Positive for itching. Negative for rash.   Neurological: Positive for dizziness and focal weakness. Negative for seizures, weakness and headaches.   Endo/Heme/Allergies: Negative.  Does not bruise/bleed easily.   Psychiatric/Behavioral: Negative.  Negative for depression, substance abuse and suicidal ideas.   All other systems reviewed and are negative.       Physical Exam  Temp:  [36.4 °C (97.5 °F)-36.8 °C (98.2 °F)] 36.8 °C (98.2 °F)  Pulse:  [] 88  Resp:  [17] 17  BP:  ()/(55-75) 110/75  SpO2:  [94 %-96 %] 94 %    Physical Exam  Vitals and nursing note reviewed. Exam conducted with a chaperone present.   Constitutional:       General: He is not in acute distress.     Appearance: Normal appearance. He is not diaphoretic.   HENT:      Head: Normocephalic.      Comments: Deficit of skull right sided  Craniotomy site cdi     Nose: Nose normal.      Mouth/Throat:      Mouth: Mucous membranes are moist.   Eyes:      Pupils: Pupils are equal, round, and reactive to light.      Comments: nystagmus with left gaze   Cardiovascular:      Rate and Rhythm: Normal rate and regular rhythm.      Pulses: Normal pulses.      Heart sounds: Normal heart sounds.   Pulmonary:      Effort: Pulmonary effort is normal.      Breath sounds: Normal breath sounds.   Abdominal:      General: Abdomen is flat. Bowel sounds are normal.      Palpations: Abdomen is soft.   Musculoskeletal:         General: No swelling or deformity. Normal range of motion.   Skin:     General: Skin is warm and dry.      Capillary Refill: Capillary refill takes less than 2 seconds.   Neurological:      General: No focal deficit present.      Mental Status: He is alert and oriented to person, place, and time.      Cranial Nerves: No cranial nerve deficit.   Psychiatric:         Mood and Affect: Mood normal.         Behavior: Behavior normal.         Fluids    Intake/Output Summary (Last 24 hours) at 4/27/2021 1612  Last data filed at 4/27/2021 0544  Gross per 24 hour   Intake 50 ml   Output 650 ml   Net -600 ml       Laboratory  Recent Labs     04/25/21  1501 04/27/21  0338   WBC 9.6 10.9*   RBC 4.72 4.59*   HEMOGLOBIN 13.6* 13.4*   HEMATOCRIT 40.3* 38.6*   MCV 85.4 84.1   MCH 28.8 29.2   MCHC 33.7 34.7   RDW 39.8 39.7   PLATELETCT 446 361   MPV 9.3 9.5     Recent Labs     04/25/21  1501 04/26/21  0408 04/27/21  0338   SODIUM 138 137 131*   POTASSIUM 4.3 4.4 4.0   CHLORIDE 103 102 98   CO2 25 23 25   GLUCOSE 98 116* 105*   BUN  25* 24* 29*   CREATININE 1.13 1.06 1.14   CALCIUM 9.6 9.5 9.6                   Imaging  CT-HEAD W/O   Final Result         1.  Evolving area of infarct within the right MCA distribution.   2.  Ribbonlike gyriform hyperdensity at the area of prior infarct, appearance likely corresponding with gyriform laminar necrosis, component of subtle subarachnoid hemorrhage cannot be definitively excluded radiographically.   3.  Minimal pneumocephalus, decreased since prior study.   4.  Atherosclerosis.      US-EXTREMITY VENOUS LOWER BILAT   Final Result      DX-CHEST-LIMITED (1 VIEW)   Final Result      Right basilar atelectasis. No focal consolidation or pleural effusions.      [id number] VIEW   Final Result      No evidence of bowel obstruction.                  DX-CHEST-LIMITED (1 VIEW)   Final Result      1.  Right internal jugular catheter and enteric catheter appear appropriately located      2.  Minimal right basilar atelectasis      CT-HEAD W/O   Final Result         1.  Changes of evolving infarct within the right MCA distribution   2.  Hyperdensity in the sulci of the right MCA distribution infarct, largely appears related to thrombosed vessels, component of subarachnoid hemorrhage is suspected particularly in the right frontal lobe.   3.  Pneumocephalus, decreased since prior.      CT-HEAD W/O   Final Result         Postsurgical change from right craniectomy. Redemonstration of large right MCA infarct with edema and bulging of the brain parenchyma through the craniectomy defect      Less mass effect on the right lateral ventricle. Less right to left midline shift of 3 mm, previously 10 mm.         DX-ABDOMEN FOR TUBE PLACEMENT   Final Result      Enteric tube terminates over the stomach.      CT-HEAD W/O   Final Result         1.  Low-density changes of the right hemisphere compatible with MCA distribution infarct with edema.   2.  New effacement of the bilateral ventricles, right greater than left, with 10 mm  right-to-left midline shift.   3.  New dilatation of the left temporal horn ventricle, appearance favors component of obstructive hydrocephalus due to mass effect from infarct and edema.   4.  Hyperdensity in the right middle cerebral artery, likely thrombosis given associated findings.      These findings were discussed with the patient's clinician, Dr. Nunez, on 4/3/2021 7:11 AM.      MR-BRAIN-W/O   Final Result      Very large acute right MCA territory infarct as detailed above with small amount of petechial hemorrhage in the right insular region and right temporal lobe.      Punctate right thalamic lacunar infarct.      Right ICA and M1 MCA occlusion.      EC-ECHOCARDIOGRAM COMPLETE W/O CONT   Final Result      DX-CHEST-PORTABLE (1 VIEW)   Final Result         1.  Interstitial pulmonary parenchymal prominence, compatible with interstitial edema and/or infiltrates.      CT-CTA NECK WITH & W/O-POST PROCESSING   Final Result      Acute occlusion of the right internal carotid artery shortly after bifurcation. It is occluded up to the level of the carotid terminus.      CT-CTA HEAD WITH & W/O-POST PROCESS   Final Result      Acute right M1 occlusion.      Findings were discussed with RHONDA DIAZ on 3/31/2021 7:54 PM.      CT-CEREBRAL PERFUSION ANALYSIS   Final Result      1.  Cerebral blood flow less than 30% likely representing completed infarct = 134 mL.      2.  T Max more than 6 seconds likely representing combination of completed infarct and ischemia = 210 mL.      3.  Mismatched volume likely representing ischemic brain/penumbra = 76 mL.      4.  Please note that the cerebral perfusion was performed on the limited brain tissue around the basal ganglia region. Infarct/ischemia outside the CT perfusion sections can be missed in this study.      CT-HEAD W/O   Final Result   Addendum 1 of 1   In retrospect, there is subtle loss of gray-white matter differentiation    in the right MCA distribution, consistent  with acute infarct.      Final      1.  No CT evidence of acute infarct, hemorrhage or mass.   2.  Mild paranasal sinus disease.           Assessment/Plan  * Acute right MCA stroke (HCC)- (present on admission)  Assessment & Plan  Likely consequence of COVID19 in 3/21 and hx of afib  Subsequent brain edema requiring right decompressive hemicraniectomy on 4/3/2021  Neurology following patient will benefit from long-term anticoagulation when bleeding risk is acceptable likely 1-2 weeks from event onset.   Blood pressure control keep SBP<140 (on metoprolol 25mg BID, amlodipine 10mg, hydralazine 25mg TID, lisinopril 40mg daily)  Patient weaned off hypertonic saline and started on salt tablets  PT/OT/SLP  Aspiration precautions  Physiatry consultation  Now having vertigo, improving on meclizine     Paroxysmal atrial fibrillation (HCC)- (present on admission)  Assessment & Plan  Monitor vitals and on tele  Metoprolol 25mg BID w/ parameters  Discussed risk benefits and alternatives of long-term anticoagulation with patient and wife, plan to started DOAC when bleeding risk felt to be acceptable per neuro.  This was initiated, some mild hematuria thereafter.      Leucocytosis  Assessment & Plan  Resolved but continue to monitor  4/11 WBC:7.7    Urinary retention  Assessment & Plan  Brewer catheter in place  Watch for urinary retention  prazosin    History of COVID-19- (present on admission)  Assessment & Plan  Clinically recovered patient symptom onset on 3/15/2021 with positive initial test on 3/18/2021    Acquired hypothyroidism- (present on admission)  Assessment & Plan  [place] Thyroid 60mg BID   TSH is less than 0.005 will decrease dose and he will need recheck TFTs in 6 weeks (prior to admit was on 90mg BID)  TSH: <0.005 in past week.         VTE prophylaxis: xarelto

## 2021-04-27 NOTE — PROGRESS NOTES
Hospital Medicine Daily Progress Note    Date of Service  4/26/2021    Chief Complaint  Left sided weakness    Hospital Course  56 y.o. male admitted 3/31/2021 with acute stroke.  He was not a candidate for tPA nor thrombectomy.  He has a history of paroxysmal atrial fibrillation, dyslipidemia, hypothyroidism. During admit he underwent a hemicraniectomy for right sided decompression from right MCA dense stroke with intractable intracranial pressure.     Interval Problem Update  4/12: Patient verbal and oriented with some slurred speech.  Left hemiplegia. On 4/11 Dr. Leo met with the wife and explained to her we likely will need a G-tube at some point.  Also alerted the both of them we would need a SNF at some point.  He reports that his pain is reasonably well controlled at this juncture, he is describing some thirst.  Free water flushes will be increased from 30 mL to 90 mL every 6 hours.  This can be backed off if sodium begins to drop.    4/13 physical therapy seen the patient.  On tube feeding still.  Speech therapy to follow.   to follow for discharge planning.    4/14: The patient was retaining urine this morning and will try to get a straight cath.  Also discussed with the wife and she said that she will try to get insurance starting May 1 for him.  I also started for Flomax    4/15: Discharge planning by . No acute issue overnight.    4/16 no acute issue overnight.  Discharge planning by     4/17: the patient is sleeping comfortably on the bed. No acute issue overnight.    4/18 the patient is having vertigo symptom.  I will start her on meclizine and see how he does.    4/19: The patient is sleeping comfortably on the bed.  His vertigo is improving with meclizine.  PT OT to evaluate him today.    4/20: No acute overnight events.  Wife at bedside, discussed possibility of a voiding trial today.      4/21: No acute overnight events, brother at bedside.  Case discussed  with physical medicine and rehabilitation physician.  Unclear of the planned timeline for cranioplasty, will touch base with neurosurgery shortly.    4/22: No acute overnight events.  Wife is at bedside requesting around-the-clock antivertigo medications.  This is not unreasonable for a few days duration.  Assistance of physical medicine and rehab greatly appreciated.    4/23: No acute overnight events,    4/24/2021, urine to Brewer catheter developing a reddish discoloration, UA with some benign pyuria, no leukocytosis or fevers.  Anticoagulation is probably contributing.  Periodic monitoring of hemoglobin    4/25/2021, some hypotension noted blood pressure medication is backed off slightly.  Continue to monitor.    4/26/2021, reddish discoloration is clearing to the Brewer.  H&H has been stable.  No acute overnight events.  Patient remains responsive and alert.  Answering questions appropriately.    Consultants/Specialty  Neurology  Neuro-intensivist (signing off 4/11)  Neuro surgery    Code Status  Full Code    Disposition  Remain on the floor    Review of Systems  Review of Systems   Constitutional: Positive for malaise/fatigue.   Respiratory: Negative for cough, hemoptysis and stridor.    Cardiovascular: Negative for chest pain and palpitations.   Gastrointestinal: Negative for abdominal pain, diarrhea and vomiting.   Genitourinary: Negative for dysuria, frequency and urgency.   Musculoskeletal: Negative for back pain and neck pain.   Skin: Negative for rash.   Neurological: Positive for dizziness, sensory change (left side) and focal weakness (left side). Negative for headaches (right temporal region).        Physical Exam  Temp:  [36 °C (96.8 °F)-37.5 °C (99.5 °F)] 36.7 °C (98 °F)  Pulse:  [67-92] 78  Resp:  [14-18] 18  BP: ()/(54-78) 114/75  SpO2:  [93 %-97 %] 95 %    Physical Exam  Vitals reviewed.   Constitutional:       Appearance: Normal appearance. He is diaphoretic.   HENT:      Head: Normocephalic  and atraumatic.      Nose: Nose normal.      Mouth/Throat:      Mouth: Mucous membranes are moist.   Eyes:      Extraocular Movements: Extraocular movements intact.      Conjunctiva/sclera: Conjunctivae normal.   Cardiovascular:      Rate and Rhythm: Normal rate.      Pulses: Normal pulses.           Radial pulses are 2+ on the right side and 2+ on the left side.        Dorsalis pedis pulses are 2+ on the right side and 2+ on the left side.      Heart sounds: Normal heart sounds.   Pulmonary:      Effort: Pulmonary effort is normal.   Abdominal:      General: Bowel sounds are normal.      Palpations: Abdomen is soft.   Musculoskeletal:         General: No swelling or tenderness.      Cervical back: Neck supple. No muscular tenderness.      Right lower leg: No edema.      Left lower leg: No edema.   Lymphadenopathy:      Cervical: No cervical adenopathy.   Neurological:      Mental Status: He is alert.      Cranial Nerves: Cranial nerve deficit present.      Sensory: Sensory deficit present.      Coordination: Coordination abnormal.      Gait: Gait abnormal.         Current Facility-Administered Medications:   •  meclizine (ANTIVERT) tablet 25 mg, 25 mg, Oral, TID PRN, Campbell Huff M.D., 25 mg at 04/26/21 0941  •  amLODIPine (NORVASC) tablet 5 mg, 5 mg, Oral, Q DAY, Campbell Huff M.D., 5 mg at 04/26/21 0508  •  lisinopril (PRINIVIL) tablet 20 mg, 20 mg, Oral, Q DAY, Campbell Huff M.D., 20 mg at 04/26/21 0508  •  mirtazapine (Remeron) tablet 15 mg, 15 mg, Oral, QHS, SELENA Harden.MIREYA, 15 mg at 04/25/21 2010  •  ondansetron (ZOFRAN ODT) dispertab 4 mg, 4 mg, Oral, Q4HRS PRN, Radha Meredith, A.P.R.N., 4 mg at 04/26/21 0941  •  acetaminophen (Tylenol) tablet 650 mg, 650 mg, Oral, Q6HRS PRN, Radha Meredith, A.P.R.N., 650 mg at 04/26/21 1503  •  metoprolol tartrate (LOPRESSOR) tablet 25 mg, 25 mg, Oral, BID, Radha Meredith, A.P.R.N., 25 mg at 04/26/21 0508  •  senna-docusate (PERICOLACE or SENOKOT S) 8.6-50 MG per  tablet 1 tablet, 1 tablet, Oral, Nightly, Radha Meredith, A.P.R.N., 1 tablet at 04/25/21 2010  •  oxyCODONE immediate-release (ROXICODONE) tablet 5 mg, 5 mg, Oral, Q3HRS PRN **OR** oxyCODONE immediate release (ROXICODONE) tablet 10 mg, 10 mg, Oral, Q3HRS PRN **OR** HYDROmorphone (Dilaudid) injection 0.5 mg, 0.5 mg, Intravenous, Q3HRS PRN, Radha Meredith, A.P.R.N.  •  polyethylene glycol/lytes (MIRALAX) PACKET 1 Packet, 1 Packet, Oral, BID PRN, Radha Meredith, A.P.R.N.  •  prazosin (MINIPRESS) capsule 1 mg, 1 mg, Oral, Q EVENING, Radha Meredith, A.P.R.N., 1 mg at 04/25/21 1815  •  senna-docusate (PERICOLACE or SENOKOT S) 8.6-50 MG per tablet 1 tablet, 1 tablet, Oral, Q24HRS PRN, Radha Meredith, A.P.R.N., 1 tablet at 04/23/21 1117  •  rivaroxaban (XARELTO) tablet 20 mg, 20 mg, Oral, PM MEAL, Radha Meredith, A.P.R.N., 20 mg at 04/25/21 1815  •  promethazine (PHENERGAN) tablet 12.5-25 mg, 12.5-25 mg, Oral, Q4HRS PRN, Radha Meredith, A.P.R.N.  •  magnesium hydroxide (MILK OF MAGNESIA) suspension 30 mL, 30 mL, Oral, QDAY PRN, Radha Meredith, A.P.R.N.  •  thyroid (ARMOUR THYROID) tablet 60 mg, 60 mg, Oral, BID, Radha Meredith, A.P.R.N., 60 mg at 04/26/21 0508  •  calcium carbonate (TUMS) chewable tab 500 mg, 500 mg, Oral, Q8HRS PRN, Radha Meredith, A.P.R.N.  •  baclofen (LIORESAL) tablet 15 mg, 15 mg, Oral, TID, Radha Meredith, A.P.R.N., 15 mg at 04/26/21 1236  •  aspirin (ASA) chewable tab 81 mg, 81 mg, Oral, DAILY, Radha Meredith, A.P.R.N., 81 mg at 04/25/21 1815  •  atorvastatin (LIPITOR) tablet 80 mg, 80 mg, Oral, Q EVENING, Radha Meredith, A.P.R.N., 80 mg at 04/25/21 1815  •  cloNIDine (CATAPRES) tablet 0.1 mg, 0.1 mg, Oral, Q4HRS PRN, Golden Pearson M.D.  •  hydrALAZINE (APRESOLINE) tablet 25 mg, 25 mg, Oral, Q8HRS, Golden Pearson M.D., 25 mg at 04/26/21 1624  •  hydrALAZINE (APRESOLINE) injection 10 mg, 10 mg, Intravenous, Q HOUR PRN, Cristhian Bell M.D., 10 mg at 04/10/21 1631  •  labetalol (NORMODYNE/TRANDATE)  injection 10-20 mg, 10-20 mg, Intravenous, Q4HRS PRN, Cristhian Bell M.D., 10 mg at 04/10/21 1508  •  Pharmacy Consult Request ...Pain Management Review 1 Each, 1 Each, Other, PHARMACY TO DOSE, HANSA Morris.A.-C.  •  MD ALERT...DO NOT ADMINISTER NSAIDS or ASPIRIN unless ORDERED By Neurosurgery 1 Each, 1 Each, Other, PRN, HANSA Morris.A.-C.  •  bisacodyl (DULCOLAX) suppository 10 mg, 10 mg, Rectal, Q24HRS PRN, HANSA Morris.A.-C., 10 mg at 04/11/21 1811  •  artificial tears (EYE LUBRICANT) ophth ointment 1 Application, 1 Application, Both Eyes, PRN, HANSA Morris.A.-C.  •  ondansetron (ZOFRAN) syringe/vial injection 4 mg, 4 mg, Intravenous, Q4HRS PRN, Gia Forman M.D., 4 mg at 04/23/21 1117  •  promethazine (PHENERGAN) suppository 12.5-25 mg, 12.5-25 mg, Rectal, Q4HRS PRN, Gia Forman M.D.  •  prochlorperazine (COMPAZINE) injection 5-10 mg, 5-10 mg, Intravenous, Q4HRS PRN, Gia Forman M.D., 5 mg at 04/17/21 1234      Fluids    Intake/Output Summary (Last 24 hours) at 4/26/2021 1707  Last data filed at 4/26/2021 0518  Gross per 24 hour   Intake --   Output 900 ml   Net -900 ml       Laboratory  Recent Labs     04/25/21  1501   WBC 9.6   RBC 4.72   HEMOGLOBIN 13.6*   HEMATOCRIT 40.3*   MCV 85.4   MCH 28.8   MCHC 33.7   RDW 39.8   PLATELETCT 446   MPV 9.3     Recent Labs     04/24/21  0245 04/25/21  1501 04/26/21  0408   SODIUM 139 138 137   POTASSIUM 4.3 4.3 4.4   CHLORIDE 103 103 102   CO2 25 25 23   GLUCOSE 100* 98 116*   BUN 32* 25* 24*   CREATININE 1.25 1.13 1.06   CALCIUM 9.8 9.6 9.5                   Imaging  CT-HEAD W/O   Final Result         1.  Evolving area of infarct within the right MCA distribution.   2.  Ribbonlike gyriform hyperdensity at the area of prior infarct, appearance likely corresponding with gyriform laminar necrosis, component of subtle subarachnoid hemorrhage cannot be definitively excluded radiographically.   3.  Minimal pneumocephalus, decreased since prior  study.   4.  Atherosclerosis.      US-EXTREMITY VENOUS LOWER BILAT   Final Result      DX-CHEST-LIMITED (1 VIEW)   Final Result      Right basilar atelectasis. No focal consolidation or pleural effusions.      [id number] VIEW   Final Result      No evidence of bowel obstruction.                  DX-CHEST-LIMITED (1 VIEW)   Final Result      1.  Right internal jugular catheter and enteric catheter appear appropriately located      2.  Minimal right basilar atelectasis      CT-HEAD W/O   Final Result         1.  Changes of evolving infarct within the right MCA distribution   2.  Hyperdensity in the sulci of the right MCA distribution infarct, largely appears related to thrombosed vessels, component of subarachnoid hemorrhage is suspected particularly in the right frontal lobe.   3.  Pneumocephalus, decreased since prior.      CT-HEAD W/O   Final Result         Postsurgical change from right craniectomy. Redemonstration of large right MCA infarct with edema and bulging of the brain parenchyma through the craniectomy defect      Less mass effect on the right lateral ventricle. Less right to left midline shift of 3 mm, previously 10 mm.         DX-ABDOMEN FOR TUBE PLACEMENT   Final Result      Enteric tube terminates over the stomach.      CT-HEAD W/O   Final Result         1.  Low-density changes of the right hemisphere compatible with MCA distribution infarct with edema.   2.  New effacement of the bilateral ventricles, right greater than left, with 10 mm right-to-left midline shift.   3.  New dilatation of the left temporal horn ventricle, appearance favors component of obstructive hydrocephalus due to mass effect from infarct and edema.   4.  Hyperdensity in the right middle cerebral artery, likely thrombosis given associated findings.      These findings were discussed with the patient's clinician, Dr. Nunez, on 4/3/2021 7:11 AM.      MR-BRAIN-W/O   Final Result      Very large acute right MCA territory infarct as  detailed above with small amount of petechial hemorrhage in the right insular region and right temporal lobe.      Punctate right thalamic lacunar infarct.      Right ICA and M1 MCA occlusion.      EC-ECHOCARDIOGRAM COMPLETE W/O CONT   Final Result      DX-CHEST-PORTABLE (1 VIEW)   Final Result         1.  Interstitial pulmonary parenchymal prominence, compatible with interstitial edema and/or infiltrates.      CT-CTA NECK WITH & W/O-POST PROCESSING   Final Result      Acute occlusion of the right internal carotid artery shortly after bifurcation. It is occluded up to the level of the carotid terminus.      CT-CTA HEAD WITH & W/O-POST PROCESS   Final Result      Acute right M1 occlusion.      Findings were discussed with RHONDA DIAZ on 3/31/2021 7:54 PM.      CT-CEREBRAL PERFUSION ANALYSIS   Final Result      1.  Cerebral blood flow less than 30% likely representing completed infarct = 134 mL.      2.  T Max more than 6 seconds likely representing combination of completed infarct and ischemia = 210 mL.      3.  Mismatched volume likely representing ischemic brain/penumbra = 76 mL.      4.  Please note that the cerebral perfusion was performed on the limited brain tissue around the basal ganglia region. Infarct/ischemia outside the CT perfusion sections can be missed in this study.      CT-HEAD W/O   Final Result   Addendum 1 of 1   In retrospect, there is subtle loss of gray-white matter differentiation    in the right MCA distribution, consistent with acute infarct.      Final      1.  No CT evidence of acute infarct, hemorrhage or mass.   2.  Mild paranasal sinus disease.           Assessment/Plan  * Acute right MCA stroke (HCC)- (present on admission)  Assessment & Plan  Likely consequence of COVID19 in 3/21 and hx of afib  Subsequent brain edema requiring right decompressive hemicraniectomy on 4/3/2021  Neurology following patient will benefit from long-term anticoagulation when bleeding risk is acceptable  likely 1-2 weeks from event onset.   Blood pressure control keep SBP<140 (on metoprolol 25mg BID, amlodipine 10mg, hydralazine 25mg TID, lisinopril 40mg daily)  Patient weaned off hypertonic saline and started on salt tablets  PT/OT/SLP  Aspiration precautions  Physiatry consultation  Now having vertigo, improving on meclizine     Paroxysmal atrial fibrillation (HCC)- (present on admission)  Assessment & Plan  Monitor vitals and on tele  Metoprolol 25mg BID w/ parameters  Discussed risk benefits and alternatives of long-term anticoagulation with patient and wife, plan to started DOAC when bleeding risk felt to be acceptable per neuro.  This was initiated, some mild hematuria thereafter.      Leucocytosis  Assessment & Plan  Resolved but continue to monitor  4/11 WBC:7.7    Urinary retention  Assessment & Plan  Brewer catheter in place  Watch for urinary retention  prazosin    History of COVID-19- (present on admission)  Assessment & Plan  Clinically recovered patient symptom onset on 3/15/2021 with positive initial test on 3/18/2021    Acquired hypothyroidism- (present on admission)  Assessment & Plan  Pamplico Thyroid 60mg BID   TSH is less than 0.005 will decrease dose and he will need recheck TFTs in 6 weeks (prior to admit was on 90mg BID)  TSH: <0.005 in past week.       VTE prophylaxis: Enoxaparin 40mg SQ daily

## 2021-04-27 NOTE — THERAPY
"Occupational Therapy  Daily Treatment     Patient Name: Thong Arzola  Age:  56 y.o., Sex:  male  Medical Record #: [id number]  Today's Date: 4/27/2021       Precautions: Fall Risk, Swallow Precautions ( See Comments)  Comments: no bone flap on R, helmet on when OOB     Assessment    Pt seen for OT tx. Continues to be limited by decreased activity tolerance, balance deficits, inattention, LUE weakness and L inattention impacting ability to complete ADLs and ADL transfers independently. LUE continues to have no active or purposeful movement, LUE w/ increased hypertonicity in L bicep. Tolerated ROM and able to get to full elbow extension. While seated EOB pt continues to push w/ RUE. /73 and seated EOB, 81/65. Pt returned to supine. Will continue to benefit from OT services while in house.     Plan    Continue current treatment plan.    DC Equipment Recommendations: Unable to determine at this time  Discharge Recommendations: Recommend post-acute placement for additional occupational therapy services prior to discharge home       04/27/21 0901   Pain 0 - 10 Group   Therapist Pain Assessment During Activity;Nurse Notified  (no c/o pain, c/o nausea)   Cognition    Cognition / Consciousness X   New Learning Impaired   Attention Impaired   Initiation Impaired   Comments L neglect, improving from previous sessions   Passive ROM Upper Body   Comments increased tone in L bicep, able to tolerate PROM into full elbow extension on L    Active ROM Upper Body   Active ROM Upper Body  X   Comments LUE no active or purposeful movement   Strength Upper Body   Upper Body Strength  X   Comments LUE no functional movement   Sensation Upper Body   Comments continues to report \"numb\" feeling in L arm, can feel deep pressure    Balance   Sitting Balance (Static) Poor -   Sitting Balance (Dynamic) Trace   Weight Shift Sitting Poor   Bed Mobility    Supine to Sit Moderate Assist   Sit to Supine Moderate Assist   Scooting Maximal " Assist   Rolling Minimum Assist to Lt.;Maximal Assist to Rt.   Activities of Daily Living   Grooming Moderate Assist;Seated   Upper Body Dressing Moderate Assist   Lower Body Dressing Maximal Assist   Toileting Maximal Assist   Functional Mobility   Comments did not assess this session   Short Term Goals   Short Term Goal # 1 Pt will sit EOB unsupported and perform grooming w/ SPV   Goal Outcome # 1 Goal not met   Short Term Goal # 2 Pt will attend 3 objects on L side w/ only v/c's   Goal Outcome # 2 Progressing as expected   Short Term Goal # 3 Pt will increase L UE strength to 2/5 grossly   Goal Outcome # 3 Progressing slower than expected   Short Term Goal # 4 Pt will perform ADL txf w/ min A   Goal Outcome # 4 Goal not met   Anticipated Discharge Equipment and Recommendations   DC Equipment Recommendations Unable to determine at this time   Discharge Recommendations Recommend post-acute placement for additional occupational therapy services prior to discharge home

## 2021-04-27 NOTE — DISCHARGE PLANNING
Would appreciate an updated PT eval once appropriate.  [facility] PAR looking into post acute benefits.  Spoke with Anel, spouse regarding [facility], D/C resources/support & a Transitional Candidate. She is currently working PT and will be off of work in June & July.  She will go back to FT in August.  She states they have a 2LV home with 2ST to enter (entry level) in [place].  She is looking into a ramp.  The other option is to stay here in [place] @ her sisters home which is a 2LV home with 2ST to enter (entry level).  He sister, nephew, and 2 sons will be able to assist some.  She will be the primary Caregiver.  I discussed the option of Thong possibly being a Transitional candidate. She was open to the idea.  Currently awaiting an updated PT eval and verification of benefits.  She was appreciative of the time provided.  Gretchen DUKE is aware.

## 2021-04-27 NOTE — PROGRESS NOTES
Monitor Summary: SR-ST , LA .18, QRS .08, QT .38 with occasional PAC,rare PVC per strip from monitor room.

## 2021-04-27 NOTE — THERAPY
Physical Therapy   Daily Treatment     Patient Name: Thong Arzola  Age:  56 y.o., Sex:  male  Medical Record #: [id number]  Today's Date: 4/27/2021     Precautions: (P) Fall Risk, Swallow Precautions ( See Comments)    Assessment    Pt visiting w/wife, still c/o nausea. Per MD, pt currently having orthostatic symptoms w/med change. Pt conts to require mod<->max assist w/bed mobility and his functional transfers. Improvement w/his sit->stands from the EOB today. Unable to get OOB to the w/c 2* hypotensive (see BP's below), nrsg notified. PT called [facility] to assist w/helmet adjustments. Improvement w/pts tracking midline->Lft field in seated position.     Plan    Continue current treatment plan.    DC Equipment Recommendations: Unable to determine at this time  Discharge Recommendations: Recommend post-acute placement for additional physical therapy services prior to discharge home     Objective       04/27/21 1050   Other Treatments   Other Treatments Provided BP's: 116/73 w/HOB slightly elevated, 115/74 w/elevated HOB, /75->82/68. Pt returned to supine w/c/o nausea. Pt symptomatic w/his EOB pressures. Call made to [facility] to assess helmet. Currently pt's helmet falls over his eyes when upright.    Balance   Sitting Balance (Static) Poor -   Sitting Balance (Dynamic) Trace   Standing Balance (Static) Poor   Standing Balance (Dynamic) Dependent   Weight Shift Sitting Poor   Weight Shift Standing Absent   Gait Analysis   Gait Level Of Assist Unable to Participate   Bed Mobility    Supine to Sit Moderate Assist  (HOB elevated, from Rt side of the bed)   Sit to Supine Moderate Assist  (HOB flat and no railing)   Scooting Maximal Assist  (seated)   Rolling Minimum Assist to Lt.;Maximal Assist to Rt.   Comments Cont to cue pt on using his Rt LE to manage his Lft LE w/sup<->sit.    Functional Mobility   Sit to Stand Moderate Assist  (from EOB)   Bed, Chair, Wheelchair Transfer Unable to Participate    Comments Unable to get pt OOB today 2* symptomatic BP's.    How much difficulty does the patient currently have...   Turning over in bed (including adjusting bedclothes, sheets and blankets)? 1   Sitting down on and standing up from a chair with arms (e.g., wheelchair, bedside commode, etc.) 1   Moving from lying on back to sitting on the side of the bed? 1   How much help from another person does the patient currently need...   Moving to and from a bed to a chair (including a wheelchair)? 2   Need to walk in a hospital room? 2   Climbing 3-5 steps with a railing? 1   6 clicks Mobility Score 8   Short Term Goals    Short Term Goal # 1 Pt will be able to perform supine<>sit with bed features with min A in 6tx to promote fnx progression towards I    Goal Outcome # 1 goal not met   Short Term Goal # 2 Pt will be able to perform sit<>stand/transfers with hemiwalker with Taz in 6tx to promote fnx progression towards I    Goal Outcome # 2 Goal not met   Short Term Goal # 3 Pt will be able to ambulate 25ft with hemiwalker with min A in 6tx to promote fnx progression towards I    Goal Outcome # 3 Goal not met

## 2021-04-27 NOTE — PROGRESS NOTES
Monitor summary: SR-ST , FL .16, QRS .08, QT .36 with frequent PACs per strip from monitor room.

## 2021-04-27 NOTE — CARE PLAN
Problem: Communication  Goal: The ability to communicate needs accurately and effectively will improve  Outcome: PROGRESSING AS EXPECTED  Effectively communicates needs with staff     Problem: Safety  Goal: Will remain free from injury  Outcome: PROGRESSING AS EXPECTED  Free from injury/fall; precautions in place     Problem: Knowledge Deficit  Goal: Knowledge of disease process/condition, treatment plan, diagnostic tests, and medications will improve  Outcome: PROGRESSING AS EXPECTED  Verbalizes understanding of disease process and treatment plan/medications

## 2021-04-28 LAB
ANION GAP SERPL CALC-SCNC: 12 MMOL/L (ref 7–16)
BUN SERPL-MCNC: 22 MG/DL (ref 8–22)
CALCIUM SERPL-MCNC: 9.7 MG/DL (ref 8.5–10.5)
CHLORIDE SERPL-SCNC: 101 MMOL/L (ref 96–112)
CO2 SERPL-SCNC: 23 MMOL/L (ref 20–33)
CREAT SERPL-MCNC: 1.12 MG/DL (ref 0.5–1.4)
GLUCOSE SERPL-MCNC: 111 MG/DL (ref 65–99)
POTASSIUM SERPL-SCNC: 4.4 MMOL/L (ref 3.6–5.5)
SODIUM SERPL-SCNC: 136 MMOL/L (ref 135–145)

## 2021-04-28 PROCEDURE — A9270 NON-COVERED ITEM OR SERVICE: HCPCS | Performed by: PHYSICAL MEDICINE & REHABILITATION

## 2021-04-28 PROCEDURE — 99232 SBSQ HOSP IP/OBS MODERATE 35: CPT | Performed by: HOSPITALIST

## 2021-04-28 PROCEDURE — 700102 HCHG RX REV CODE 250 W/ 637 OVERRIDE(OP): Performed by: PHYSICAL MEDICINE & REHABILITATION

## 2021-04-28 PROCEDURE — 80048 BASIC METABOLIC PNL TOTAL CA: CPT

## 2021-04-28 PROCEDURE — 770020 HCHG ROOM/CARE - TELE (206)

## 2021-04-28 PROCEDURE — 36415 COLL VENOUS BLD VENIPUNCTURE: CPT

## 2021-04-28 PROCEDURE — 92526 ORAL FUNCTION THERAPY: CPT

## 2021-04-28 PROCEDURE — A9270 NON-COVERED ITEM OR SERVICE: HCPCS | Performed by: HOSPITALIST

## 2021-04-28 PROCEDURE — 51798 US URINE CAPACITY MEASURE: CPT

## 2021-04-28 PROCEDURE — 700102 HCHG RX REV CODE 250 W/ 637 OVERRIDE(OP): Performed by: NURSE PRACTITIONER

## 2021-04-28 PROCEDURE — A9270 NON-COVERED ITEM OR SERVICE: HCPCS | Performed by: NURSE PRACTITIONER

## 2021-04-28 PROCEDURE — 700102 HCHG RX REV CODE 250 W/ 637 OVERRIDE(OP): Performed by: HOSPITALIST

## 2021-04-28 RX ADMIN — THYROID, PORCINE 60 MG: 30 TABLET ORAL at 17:27

## 2021-04-28 RX ADMIN — PHENYTOIN 1 MG: 125 SUSPENSION ORAL at 17:28

## 2021-04-28 RX ADMIN — ASPIRIN 81 MG: 81 TABLET, CHEWABLE ORAL at 17:28

## 2021-04-28 RX ADMIN — LISINOPRIL 2.5 MG: 5 TABLET ORAL at 05:02

## 2021-04-28 RX ADMIN — RIVAROXABAN 20 MG: 20 TABLET, FILM COATED ORAL at 17:28

## 2021-04-28 RX ADMIN — ATORVASTATIN CALCIUM 80 MG: 80 TABLET, FILM COATED ORAL at 17:28

## 2021-04-28 RX ADMIN — METOPROLOL TARTRATE 12.5 MG: 25 TABLET, FILM COATED ORAL at 04:55

## 2021-04-28 RX ADMIN — MIRTAZAPINE 15 MG: 15 TABLET, FILM COATED ORAL at 21:05

## 2021-04-28 RX ADMIN — BACLOFEN 15 MG: 10 TABLET ORAL at 17:28

## 2021-04-28 RX ADMIN — BACLOFEN 15 MG: 10 TABLET ORAL at 12:15

## 2021-04-28 RX ADMIN — METOPROLOL TARTRATE 12.5 MG: 25 TABLET, FILM COATED ORAL at 17:28

## 2021-04-28 RX ADMIN — THYROID, PORCINE 60 MG: 30 TABLET ORAL at 04:54

## 2021-04-28 RX ADMIN — BACLOFEN 15 MG: 10 TABLET ORAL at 04:56

## 2021-04-28 ASSESSMENT — ENCOUNTER SYMPTOMS
DIZZINESS: 0
FOCAL WEAKNESS: 1
MUSCULOSKELETAL NEGATIVE: 1
HEADACHES: 0
COUGH: 0
PSYCHIATRIC NEGATIVE: 1
BLURRED VISION: 0
CONSTITUTIONAL NEGATIVE: 1
DEPRESSION: 0
SEIZURES: 0
SORE THROAT: 0
PALPITATIONS: 0
ABDOMINAL PAIN: 0
VOMITING: 0
SHORTNESS OF BREATH: 0
RESPIRATORY NEGATIVE: 1
WEAKNESS: 0
WEIGHT LOSS: 0
DIAPHORESIS: 0
EYES NEGATIVE: 1
FEVER: 0
GASTROINTESTINAL NEGATIVE: 1
MYALGIAS: 0
CHILLS: 0
CARDIOVASCULAR NEGATIVE: 1
BRUISES/BLEEDS EASILY: 0
NAUSEA: 0

## 2021-04-28 ASSESSMENT — LIFESTYLE VARIABLES: SUBSTANCE_ABUSE: 0

## 2021-04-28 NOTE — PROGRESS NOTES
[facility] Medicine Daily Progress Note    Date of Service  4/28/2021    Chief Complaint  56 y.o. male admitted 3/31/2021 with left sided weakness     Hospital Course  Patient is a pleasant 56 year old with history of paroxysmal atrial fibrillation, dyslipidemia, and hypothyroidism. He presented to the ER with acute left sided weakness and was found to have a right MCA stroke and required a decompression.    Interval Problem Update  He is axox3, wife at bedside, bp much improved after medication adjustment yesterday - pt denies dizziness today.  He denies pain. Discussed with speech and they would like to do a MBS.  ROS otherwise negative.  Discussed with nursing and case mgt    Consultants/Specialty  Neurosurgery  Neurology  Critical care    Code Status  Full Code    Disposition  Snf/ vs acute rehab pending    Review of Systems  Review of Systems   Constitutional: Negative.  Negative for chills, diaphoresis, fever, malaise/fatigue and weight loss.   HENT: Negative.  Negative for sore throat.    Eyes: Negative.  Negative for blurred vision.   Respiratory: Negative.  Negative for cough and shortness of breath.    Cardiovascular: Negative.  Negative for chest pain, palpitations and leg swelling.   Gastrointestinal: Negative.  Negative for abdominal pain, nausea and vomiting.   Genitourinary: Negative.  Negative for dysuria.   Musculoskeletal: Negative.  Negative for myalgias.   Skin: Negative for itching and rash.   Neurological: Positive for focal weakness. Negative for dizziness, seizures, weakness and headaches.   Endo/Heme/Allergies: Negative.  Does not bruise/bleed easily.   Psychiatric/Behavioral: Negative.  Negative for depression, substance abuse and suicidal ideas.   All other systems reviewed and are negative.       Physical Exam  Temp:  [36.1 °C (96.9 °F)-36.9 °C (98.5 °F)] 36.1 °C (96.9 °F)  Pulse:  [] 68  Resp:  [17] 17  BP: ()/(60-76) 100/60  SpO2:  [94 %-95 %] 95 %    Physical Exam  Vitals and  nursing note reviewed. Exam conducted with a chaperone present.   Constitutional:       General: He is not in acute distress.     Appearance: Normal appearance. He is not diaphoretic.   HENT:      Head: Normocephalic.      Comments: Deficit of skull right sided  Craniotomy site cdi     Nose: Nose normal.      Mouth/Throat:      Mouth: Mucous membranes are moist.   Eyes:      Pupils: Pupils are equal, round, and reactive to light.   Cardiovascular:      Rate and Rhythm: Normal rate and regular rhythm.      Pulses: Normal pulses.      Heart sounds: Normal heart sounds.   Pulmonary:      Effort: Pulmonary effort is normal.      Breath sounds: Normal breath sounds.   Abdominal:      General: Abdomen is flat. Bowel sounds are normal.      Palpations: Abdomen is soft.   Musculoskeletal:         General: No swelling or deformity. Normal range of motion.   Skin:     General: Skin is warm and dry.      Capillary Refill: Capillary refill takes less than 2 seconds.   Neurological:      General: No focal deficit present.      Mental Status: He is alert and oriented to person, place, and time.      Cranial Nerves: No cranial nerve deficit.   Psychiatric:         Mood and Affect: Mood normal.         Behavior: Behavior normal.         Fluids    Intake/Output Summary (Last 24 hours) at 4/28/2021 0830  Last data filed at 4/28/2021 0600  Gross per 24 hour   Intake --   Output 1150 ml   Net -1150 ml       Laboratory  Recent Labs     04/25/21  1501 04/27/21  0338   WBC 9.6 10.9*   RBC 4.72 4.59*   HEMOGLOBIN 13.6* 13.4*   HEMATOCRIT 40.3* 38.6*   MCV 85.4 84.1   MCH 28.8 29.2   MCHC 33.7 34.7   RDW 39.8 39.7   PLATELETCT 446 361   MPV 9.3 9.5     Recent Labs     04/26/21  0408 04/27/21  0338 04/28/21  0403   SODIUM 137 131* 136   POTASSIUM 4.4 4.0 4.4   CHLORIDE 102 98 101   CO2 23 25 23   GLUCOSE 116* 105* 111*   BUN 24* 29* 22   CREATININE 1.06 1.14 1.12   CALCIUM 9.5 9.6 9.7                   Imaging  CT-HEAD W/O   Final Result          1.  Evolving area of infarct within the right MCA distribution.   2.  Ribbonlike gyriform hyperdensity at the area of prior infarct, appearance likely corresponding with gyriform laminar necrosis, component of subtle subarachnoid hemorrhage cannot be definitively excluded radiographically.   3.  Minimal pneumocephalus, decreased since prior study.   4.  Atherosclerosis.      US-EXTREMITY VENOUS LOWER BILAT   Final Result      DX-CHEST-LIMITED (1 VIEW)   Final Result      Right basilar atelectasis. No focal consolidation or pleural effusions.      [id number] VIEW   Final Result      No evidence of bowel obstruction.                  DX-CHEST-LIMITED (1 VIEW)   Final Result      1.  Right internal jugular catheter and enteric catheter appear appropriately located      2.  Minimal right basilar atelectasis      CT-HEAD W/O   Final Result         1.  Changes of evolving infarct within the right MCA distribution   2.  Hyperdensity in the sulci of the right MCA distribution infarct, largely appears related to thrombosed vessels, component of subarachnoid hemorrhage is suspected particularly in the right frontal lobe.   3.  Pneumocephalus, decreased since prior.      CT-HEAD W/O   Final Result         Postsurgical change from right craniectomy. Redemonstration of large right MCA infarct with edema and bulging of the brain parenchyma through the craniectomy defect      Less mass effect on the right lateral ventricle. Less right to left midline shift of 3 mm, previously 10 mm.         DX-ABDOMEN FOR TUBE PLACEMENT   Final Result      Enteric tube terminates over the stomach.      CT-HEAD W/O   Final Result         1.  Low-density changes of the right hemisphere compatible with MCA distribution infarct with edema.   2.  New effacement of the bilateral ventricles, right greater than left, with 10 mm right-to-left midline shift.   3.  New dilatation of the left temporal horn ventricle, appearance favors component of  obstructive hydrocephalus due to mass effect from infarct and edema.   4.  Hyperdensity in the right middle cerebral artery, likely thrombosis given associated findings.      These findings were discussed with the patient's clinician, Dr. Nunez, on 4/3/2021 7:11 AM.      MR-BRAIN-W/O   Final Result      Very large acute right MCA territory infarct as detailed above with small amount of petechial hemorrhage in the right insular region and right temporal lobe.      Punctate right thalamic lacunar infarct.      Right ICA and M1 MCA occlusion.      EC-ECHOCARDIOGRAM COMPLETE W/O CONT   Final Result      DX-CHEST-PORTABLE (1 VIEW)   Final Result         1.  Interstitial pulmonary parenchymal prominence, compatible with interstitial edema and/or infiltrates.      CT-CTA NECK WITH & W/O-POST PROCESSING   Final Result      Acute occlusion of the right internal carotid artery shortly after bifurcation. It is occluded up to the level of the carotid terminus.      CT-CTA HEAD WITH & W/O-POST PROCESS   Final Result      Acute right M1 occlusion.      Findings were discussed with RHONDA DIAZ on 3/31/2021 7:54 PM.      CT-CEREBRAL PERFUSION ANALYSIS   Final Result      1.  Cerebral blood flow less than 30% likely representing completed infarct = 134 mL.      2.  T Max more than 6 seconds likely representing combination of completed infarct and ischemia = 210 mL.      3.  Mismatched volume likely representing ischemic brain/penumbra = 76 mL.      4.  Please note that the cerebral perfusion was performed on the limited brain tissue around the basal ganglia region. Infarct/ischemia outside the CT perfusion sections can be missed in this study.      CT-HEAD W/O   Final Result   Addendum 1 of 1   In retrospect, there is subtle loss of gray-white matter differentiation    in the right MCA distribution, consistent with acute infarct.      Final      1.  No CT evidence of acute infarct, hemorrhage or mass.   2.  Mild paranasal sinus  disease.           Assessment/Plan  * Acute right MCA stroke (HCC)- (present on admission)  Assessment & Plan  Likely consequence of COVID19 in 3/21 and hx of afib  Subsequent brain edema requiring right decompressive hemicraniectomy on 4/3/2021  Neurology following patient will benefit from long-term anticoagulation when bleeding risk is acceptable likely 1-2 weeks from event onset.   Titration of bp meds due to hypotension  Patient weaned off hypertonic saline and started on salt tablets  PT/OT/SLP  Aspiration precautions  Physiatry consultation  Vertigo improved on meclizine, discussed trial of epley's with OT, hypotension also contributing and being addressed    Paroxysmal atrial fibrillation (HCC)- (present on admission)  Assessment & Plan  Following  Rate controlled  Metoprolol, titrating down dose due to hypotension  On xarelto on aspirin per neuro recommendations  following    Leucocytosis  Assessment & Plan  Resolved but continue to monitor  4/11 WBC:7.7    Urinary retention  Assessment & Plan  Dye catheter in place, trace hematuria (suspect from dye trauma on xarelto) no clots, following  Continue prazosin    History of COVID-19- (present on admission)  Assessment & Plan  Clinically recovered patient symptom onset on 3/15/2021 with positive initial test on 3/18/2021    Acquired hypothyroidism- (present on admission)  Assessment & Plan  [place] Thyroid 60mg BID   TSH on this admission less than 0.005 will decrease dose and he will need recheck TFTs in 6 weeks (prior to admit was on 90mg BID)         VTE prophylaxis: xarelto

## 2021-04-28 NOTE — CARE PLAN
Problem: Communication  Goal: The ability to communicate needs accurately and effectively will improve  Outcome: PROGRESSING AS EXPECTED  Note: Pt is able to communicate effectively and accurately, all needs assessed     Problem: Safety  Goal: Will remain free from falls  Outcome: PROGRESSING AS EXPECTED  Note: Treaded slipper socks on, bed in lowest and locked position, bed alarm on, educated on the need to call for assistance, call light within reach

## 2021-04-28 NOTE — CARE PLAN
Problem: Communication  Goal: The ability to communicate needs accurately and effectively will improve  Outcome: PROGRESSING AS EXPECTED     Problem: Safety  Goal: Will remain free from injury  Outcome: PROGRESSING AS EXPECTED     Problem: Safety:  Goal: Will remain free from injury  Outcome: PROGRESSING AS EXPECTED     Problem: Psychosocial Needs:  Goal: Ability to identify and develop effective coping behavior will improve  Outcome: PROGRESSING AS EXPECTED     Problem: Urinary:  Goal: Ability to maintain continence will improve  Outcome: PROGRESSING SLOWER THAN EXPECTED  Goal: Ability to reestablish a normal urinary elimination pattern will improve  Outcome: PROGRESSING SLOWER THAN EXPECTED

## 2021-04-28 NOTE — THERAPY
"Speech Language Pathology  Daily Treatment     Patient Name: Thong Arzola  Age:  56 y.o., Sex:  male  Medical Record #: [id number]  Today's Date: 4/28/2021     Precautions: Fall Risk, Swallow Precautions ( See Comments)  Comments: no bone flap on Rt, helmet on when OOB    Assessment    Patient was seen on this date for dysphagia treatment. Pt awake/alert and eager for diet upgrade. PO trials consisted of 4-5 oz un-thickened water (tsp/cup), dry solids (marcelino crackers), and items from SB6/MT2 meal tray (peaches in yogurt and thickened boost). Pt had immediate coughing response with first tsp of thin liquids. Otherwise no other s/sx of aspiration with remaining trials or any trials presented this session. Pt declined hot food items from tray due distaste and wishing to consume more \"regular\" and appetizing food items. Trials of dry solids resulted in mild residue in left sulcus despite independent use of lingual sweep and liquid wash. No oral residue noted with soft solids. Education provided to pt and later brother at bedside regarding current status and SLP recs.     Plan    Recommend continue Soft & Bite Sized (SB6) solids and Mildly Thick Liquids (MT2) with direct meal supervision and the following strategies: small bites/single sips, upright for all PO intake, check for L sided pocketing and clear, oral care after meals. Float pills in puree.     Pt with hx of silent aspiration of thin liquids as seen on FEES (4/13/21) and repeat diagnostic indicated prior to diet upgrade. Obtained order from MD for modified barium swallow study to be completed 4/29/21, as appropriate.     Continue current treatment plan.    Discharge Recommendations: Recommend post-acute placement for additional speech therapy services prior to discharge home     Objective       04/28/21 1217   Vitals   O2 Delivery Device None - Room Air   Dysphagia    Positioning / Behavior Modification Self Monitoring;Modulate Rate or Bite Size;Multiple " Swallows;Alternate Solids and Liquids  (Lingual sweep; ck for left sided pocketing)   Other Treatments PO trials of thin liquids and items from SB6/MT2 meal tray   Diet / Liquid Recommendation Soft & Bite-Sized (6) - (Dysphagia III);Mildly Thick (2) - (Nectar Thick)   Skilled Intervention Compensatory Strategies;Verbal Cueing   Recommended Route of Medication Administration   Medication Administration  Float Whole with Puree   Short Term Goals   Short Term Goal # 1 NEW 4/20: Patient will consume meals of soft/bite sized solids and mildly thick liquids with no s/sx of aspiration given direct meal supervision for safe swallow strategies.   Goal Outcome # 1 Progressing as expected

## 2021-04-29 ENCOUNTER — HOSPITAL ENCOUNTER (INPATIENT)
Facility: REHABILITATION | Age: 56
LOS: 36 days | DRG: 057 | End: 2021-06-04
Attending: PHYSICAL MEDICINE & REHABILITATION | Admitting: PHYSICAL MEDICINE & REHABILITATION
Payer: COMMERCIAL

## 2021-04-29 ENCOUNTER — APPOINTMENT (OUTPATIENT)
Dept: RADIOLOGY | Facility: MEDICAL CENTER | Age: 56
DRG: 023 | End: 2021-04-29
Attending: HOSPITALIST
Payer: OTHER MISCELLANEOUS

## 2021-04-29 VITALS
TEMPERATURE: 97.8 F | WEIGHT: 171.74 LBS | HEART RATE: 81 BPM | RESPIRATION RATE: 16 BRPM | DIASTOLIC BLOOD PRESSURE: 70 MMHG | BODY MASS INDEX: 24.59 KG/M2 | SYSTOLIC BLOOD PRESSURE: 105 MMHG | HEIGHT: 70 IN | OXYGEN SATURATION: 98 %

## 2021-04-29 DIAGNOSIS — Z98.890 PERSONAL HISTORY OF SURGERY TO HEART AND GREAT VESSELS, PRESENTING HAZARDS TO HEALTH: ICD-10-CM

## 2021-04-29 PROBLEM — D72.829 LEUCOCYTOSIS: Status: RESOLVED | Noted: 2021-04-05 | Resolved: 2021-04-29

## 2021-04-29 PROBLEM — R25.2 SPASTICITY AS LATE EFFECT OF CEREBROVASCULAR ACCIDENT (CVA): Status: ACTIVE | Noted: 2021-04-29

## 2021-04-29 PROBLEM — R50.9 FEVER: Status: RESOLVED | Noted: 2021-04-07 | Resolved: 2021-04-29

## 2021-04-29 PROBLEM — F32.9 REACTIVE DEPRESSION: Status: ACTIVE | Noted: 2021-04-29

## 2021-04-29 PROBLEM — I69.398 SPASTICITY AS LATE EFFECT OF CEREBROVASCULAR ACCIDENT (CVA): Status: ACTIVE | Noted: 2021-04-29

## 2021-04-29 PROCEDURE — A9270 NON-COVERED ITEM OR SERVICE: HCPCS | Performed by: PHYSICAL MEDICINE & REHABILITATION

## 2021-04-29 PROCEDURE — A9270 NON-COVERED ITEM OR SERVICE: HCPCS | Performed by: HOSPITALIST

## 2021-04-29 PROCEDURE — 99223 1ST HOSP IP/OBS HIGH 75: CPT | Performed by: PHYSICAL MEDICINE & REHABILITATION

## 2021-04-29 PROCEDURE — 94760 N-INVAS EAR/PLS OXIMETRY 1: CPT

## 2021-04-29 PROCEDURE — 700102 HCHG RX REV CODE 250 W/ 637 OVERRIDE(OP): Performed by: HOSPITALIST

## 2021-04-29 PROCEDURE — 51798 US URINE CAPACITY MEASURE: CPT

## 2021-04-29 PROCEDURE — 99239 HOSP IP/OBS DSCHRG MGMT >30: CPT | Performed by: HOSPITALIST

## 2021-04-29 PROCEDURE — A9270 NON-COVERED ITEM OR SERVICE: HCPCS | Performed by: NURSE PRACTITIONER

## 2021-04-29 PROCEDURE — 700102 HCHG RX REV CODE 250 W/ 637 OVERRIDE(OP): Performed by: PHYSICAL MEDICINE & REHABILITATION

## 2021-04-29 PROCEDURE — 700102 HCHG RX REV CODE 250 W/ 637 OVERRIDE(OP): Performed by: NURSE PRACTITIONER

## 2021-04-29 PROCEDURE — 770010 HCHG ROOM/CARE - REHAB SEMI PRIVAT*

## 2021-04-29 RX ORDER — TRAZODONE HYDROCHLORIDE 50 MG/1
50 TABLET ORAL
Status: DISCONTINUED | OUTPATIENT
Start: 2021-04-29 | End: 2021-06-04 | Stop reason: HOSPADM

## 2021-04-29 RX ORDER — THYROID 60 MG/1
60 TABLET ORAL 2 TIMES DAILY
Qty: 30 TABLET | Refills: 3 | Status: ON HOLD
Start: 2021-04-29 | End: 2021-06-03

## 2021-04-29 RX ORDER — ACETAMINOPHEN 325 MG/1
650 TABLET ORAL EVERY 4 HOURS PRN
Status: DISCONTINUED | OUTPATIENT
Start: 2021-04-29 | End: 2021-06-04 | Stop reason: HOSPADM

## 2021-04-29 RX ORDER — HYDRALAZINE HYDROCHLORIDE 10 MG/1
10 TABLET, FILM COATED ORAL EVERY 8 HOURS PRN
Status: DISCONTINUED | OUTPATIENT
Start: 2021-04-29 | End: 2021-06-04 | Stop reason: HOSPADM

## 2021-04-29 RX ORDER — THYROID 30 MG/1
60 TABLET ORAL 2 TIMES DAILY
Status: DISCONTINUED | OUTPATIENT
Start: 2021-04-29 | End: 2021-06-04 | Stop reason: HOSPADM

## 2021-04-29 RX ORDER — THYROID 30 MG/1
60 TABLET ORAL 2 TIMES DAILY
Status: CANCELLED | OUTPATIENT
Start: 2021-04-29

## 2021-04-29 RX ORDER — LACTULOSE 20 G/30ML
30 SOLUTION ORAL
Status: DISCONTINUED | OUTPATIENT
Start: 2021-04-29 | End: 2021-06-04 | Stop reason: HOSPADM

## 2021-04-29 RX ORDER — PRAZOSIN HYDROCHLORIDE 1 MG/1
1 CAPSULE ORAL EVERY EVENING
Qty: 30 CAPSULE | Refills: 3 | Status: ON HOLD
Start: 2021-04-29 | End: 2021-06-03

## 2021-04-29 RX ORDER — MIDAZOLAM HYDROCHLORIDE 5 MG/ML
5 INJECTION INTRAMUSCULAR; INTRAVENOUS PRN
Status: DISCONTINUED | OUTPATIENT
Start: 2021-04-29 | End: 2021-06-04 | Stop reason: HOSPADM

## 2021-04-29 RX ORDER — PRAZOSIN HYDROCHLORIDE 1 MG/1
1 CAPSULE ORAL EVERY EVENING
Status: DISCONTINUED | OUTPATIENT
Start: 2021-04-29 | End: 2021-04-29

## 2021-04-29 RX ORDER — BACLOFEN 10 MG/1
15 TABLET ORAL 3 TIMES DAILY
Status: CANCELLED | OUTPATIENT
Start: 2021-04-29

## 2021-04-29 RX ORDER — MIRTAZAPINE 15 MG/1
15 TABLET, FILM COATED ORAL
Status: CANCELLED | OUTPATIENT
Start: 2021-04-29

## 2021-04-29 RX ORDER — ASPIRIN 81 MG/1
81 TABLET, CHEWABLE ORAL DAILY
Status: DISCONTINUED | OUTPATIENT
Start: 2021-04-29 | End: 2021-05-20

## 2021-04-29 RX ORDER — AMOXICILLIN 250 MG
1 CAPSULE ORAL DAILY
Refills: 0 | Status: ON HOLD
Start: 2021-04-29 | End: 2021-06-03

## 2021-04-29 RX ORDER — POLYVINYL ALCOHOL 14 MG/ML
1 SOLUTION/ DROPS OPHTHALMIC PRN
Status: DISCONTINUED | OUTPATIENT
Start: 2021-04-29 | End: 2021-05-25

## 2021-04-29 RX ORDER — ATORVASTATIN CALCIUM 80 MG/1
80 TABLET, FILM COATED ORAL EVERY EVENING
Status: CANCELLED | OUTPATIENT
Start: 2021-04-29

## 2021-04-29 RX ORDER — PRAZOSIN HYDROCHLORIDE 1 MG/1
1 CAPSULE ORAL EVERY EVENING
Status: CANCELLED | OUTPATIENT
Start: 2021-04-29

## 2021-04-29 RX ORDER — MIRTAZAPINE 15 MG/1
15 TABLET, FILM COATED ORAL
Qty: 30 TABLET | Status: ON HOLD
Start: 2021-04-29 | End: 2021-06-03

## 2021-04-29 RX ORDER — ONDANSETRON 4 MG/1
4 TABLET, ORALLY DISINTEGRATING ORAL 4 TIMES DAILY PRN
Status: DISCONTINUED | OUTPATIENT
Start: 2021-04-29 | End: 2021-06-04 | Stop reason: HOSPADM

## 2021-04-29 RX ORDER — ALUMINA, MAGNESIA, AND SIMETHICONE 2400; 2400; 240 MG/30ML; MG/30ML; MG/30ML
20 SUSPENSION ORAL
Status: DISCONTINUED | OUTPATIENT
Start: 2021-04-29 | End: 2021-06-04 | Stop reason: HOSPADM

## 2021-04-29 RX ORDER — BACLOFEN 5 MG/1
15 TABLET ORAL 3 TIMES DAILY
Qty: 90 TABLET | Status: ON HOLD
Start: 2021-04-29 | End: 2021-06-03

## 2021-04-29 RX ORDER — ENEMA 19; 7 G/133ML; G/133ML
1 ENEMA RECTAL
Status: DISCONTINUED | OUTPATIENT
Start: 2021-04-29 | End: 2021-06-04 | Stop reason: HOSPADM

## 2021-04-29 RX ORDER — ATORVASTATIN CALCIUM 80 MG/1
80 TABLET, FILM COATED ORAL EVERY EVENING
Qty: 30 TABLET | Status: ON HOLD
Start: 2021-04-29 | End: 2021-06-03

## 2021-04-29 RX ORDER — LISINOPRIL 2.5 MG/1
2.5 TABLET ORAL DAILY
Qty: 30 TABLET | Status: ON HOLD
Start: 2021-04-30 | End: 2021-06-03

## 2021-04-29 RX ORDER — LISINOPRIL 5 MG/1
2.5 TABLET ORAL
Status: DISCONTINUED | OUTPATIENT
Start: 2021-04-30 | End: 2021-04-30

## 2021-04-29 RX ORDER — ASPIRIN 81 MG/1
81 TABLET, CHEWABLE ORAL DAILY
Qty: 100 TABLET | Status: ON HOLD
Start: 2021-04-29 | End: 2021-06-03

## 2021-04-29 RX ORDER — MECLIZINE HYDROCHLORIDE 25 MG/1
25 TABLET ORAL 3 TIMES DAILY PRN
Qty: 30 TABLET | Refills: 0 | Status: ON HOLD
Start: 2021-04-29 | End: 2021-06-03

## 2021-04-29 RX ORDER — ACETAMINOPHEN 325 MG/1
650 TABLET ORAL EVERY 6 HOURS PRN
Qty: 30 TABLET | Refills: 0 | Status: ON HOLD
Start: 2021-04-29 | End: 2021-06-03

## 2021-04-29 RX ORDER — PROMETHAZINE HYDROCHLORIDE 12.5 MG/1
12.5-25 SUPPOSITORY RECTAL EVERY 4 HOURS PRN
Qty: 10 SUPPOSITORY | Refills: 0 | Status: ON HOLD | OUTPATIENT
Start: 2021-04-29 | End: 2021-06-03

## 2021-04-29 RX ORDER — BISACODYL 10 MG
10 SUPPOSITORY, RECTAL RECTAL
Refills: 0 | Status: ON HOLD
Start: 2021-04-29 | End: 2021-06-03

## 2021-04-29 RX ORDER — ONDANSETRON 4 MG/1
4 TABLET, ORALLY DISINTEGRATING ORAL EVERY 4 HOURS PRN
Qty: 10 TABLET | Refills: 0 | Status: ON HOLD
Start: 2021-04-29 | End: 2021-06-03

## 2021-04-29 RX ORDER — MIRTAZAPINE 15 MG/1
15 TABLET, ORALLY DISINTEGRATING ORAL
Status: DISCONTINUED | OUTPATIENT
Start: 2021-04-29 | End: 2021-06-04 | Stop reason: HOSPADM

## 2021-04-29 RX ORDER — MECLIZINE HYDROCHLORIDE 25 MG/1
25 TABLET ORAL 3 TIMES DAILY PRN
Status: DISCONTINUED | OUTPATIENT
Start: 2021-04-29 | End: 2021-05-01

## 2021-04-29 RX ORDER — LANOLIN ALCOHOL/MO/W.PET/CERES
3 CREAM (GRAM) TOPICAL NIGHTLY PRN
Status: DISCONTINUED | OUTPATIENT
Start: 2021-04-29 | End: 2021-05-06

## 2021-04-29 RX ORDER — ATORVASTATIN CALCIUM 40 MG/1
80 TABLET, FILM COATED ORAL EVERY EVENING
Status: DISCONTINUED | OUTPATIENT
Start: 2021-04-29 | End: 2021-05-18

## 2021-04-29 RX ORDER — BACLOFEN 20 MG/1
20 TABLET ORAL 3 TIMES DAILY
Status: DISCONTINUED | OUTPATIENT
Start: 2021-04-29 | End: 2021-05-03

## 2021-04-29 RX ORDER — TAMSULOSIN HYDROCHLORIDE 0.4 MG/1
0.4 CAPSULE ORAL
Status: DISCONTINUED | OUTPATIENT
Start: 2021-04-29 | End: 2021-05-05

## 2021-04-29 RX ORDER — ONDANSETRON 2 MG/ML
4 INJECTION INTRAMUSCULAR; INTRAVENOUS 4 TIMES DAILY PRN
Status: DISCONTINUED | OUTPATIENT
Start: 2021-04-29 | End: 2021-06-04 | Stop reason: HOSPADM

## 2021-04-29 RX ORDER — BACLOFEN 10 MG/1
15 TABLET ORAL 3 TIMES DAILY
Status: DISCONTINUED | OUTPATIENT
Start: 2021-04-29 | End: 2021-04-29

## 2021-04-29 RX ORDER — LISINOPRIL 5 MG/1
2.5 TABLET ORAL
Status: CANCELLED | OUTPATIENT
Start: 2021-04-30

## 2021-04-29 RX ORDER — MECLIZINE HYDROCHLORIDE 25 MG/1
25 TABLET ORAL 3 TIMES DAILY PRN
Status: CANCELLED | OUTPATIENT
Start: 2021-04-29

## 2021-04-29 RX ORDER — OXYCODONE HYDROCHLORIDE 5 MG/1
5 TABLET ORAL
Qty: 30 TABLET | Refills: 0 | Status: ON HOLD
Start: 2021-04-29 | End: 2021-06-03

## 2021-04-29 RX ORDER — HYDROXYZINE HYDROCHLORIDE 25 MG/1
50 TABLET, FILM COATED ORAL EVERY 6 HOURS PRN
Status: DISCONTINUED | OUTPATIENT
Start: 2021-04-29 | End: 2021-06-04 | Stop reason: HOSPADM

## 2021-04-29 RX ORDER — ECHINACEA PURPUREA EXTRACT 125 MG
2 TABLET ORAL PRN
Status: DISCONTINUED | OUTPATIENT
Start: 2021-04-29 | End: 2021-06-04 | Stop reason: HOSPADM

## 2021-04-29 RX ORDER — ASPIRIN 81 MG/1
81 TABLET, CHEWABLE ORAL DAILY
Status: CANCELLED | OUTPATIENT
Start: 2021-04-29

## 2021-04-29 RX ORDER — POLYETHYLENE GLYCOL 3350 17 G/17G
17 POWDER, FOR SOLUTION ORAL 2 TIMES DAILY PRN
Refills: 3 | Status: ON HOLD
Start: 2021-04-29 | End: 2021-06-03

## 2021-04-29 RX ADMIN — LACTULOSE 30 ML: 20 SOLUTION ORAL at 21:37

## 2021-04-29 RX ADMIN — TAMSULOSIN HYDROCHLORIDE 0.4 MG: 0.4 CAPSULE ORAL at 21:31

## 2021-04-29 RX ADMIN — THYROID, PORCINE 60 MG: 30 TABLET ORAL at 21:31

## 2021-04-29 RX ADMIN — ATORVASTATIN CALCIUM 80 MG: 40 TABLET, FILM COATED ORAL at 21:31

## 2021-04-29 RX ADMIN — MECLIZINE HYDROCHLORIDE 25 MG: 25 TABLET ORAL at 09:29

## 2021-04-29 RX ADMIN — METOPROLOL TARTRATE 12.5 MG: 25 TABLET, FILM COATED ORAL at 04:27

## 2021-04-29 RX ADMIN — ACETAMINOPHEN 650 MG: 325 TABLET, FILM COATED ORAL at 09:29

## 2021-04-29 RX ADMIN — MIRTAZAPINE 15 MG: 15 TABLET, ORALLY DISINTEGRATING ORAL at 21:31

## 2021-04-29 RX ADMIN — BACLOFEN 20 MG: 20 TABLET ORAL at 21:31

## 2021-04-29 RX ADMIN — BACLOFEN 15 MG: 10 TABLET ORAL at 15:49

## 2021-04-29 RX ADMIN — ASPIRIN 81 MG CHEWABLE TABLET 81 MG: 81 TABLET CHEWABLE at 17:35

## 2021-04-29 RX ADMIN — LISINOPRIL 2.5 MG: 5 TABLET ORAL at 04:26

## 2021-04-29 RX ADMIN — BACLOFEN 15 MG: 10 TABLET ORAL at 04:26

## 2021-04-29 RX ADMIN — METOPROLOL TARTRATE 12.5 MG: 25 TABLET, FILM COATED ORAL at 21:31

## 2021-04-29 RX ADMIN — THYROID, PORCINE 60 MG: 30 TABLET ORAL at 04:26

## 2021-04-29 RX ADMIN — RIVAROXABAN 20 MG: 20 TABLET, FILM COATED ORAL at 17:35

## 2021-04-29 ASSESSMENT — LIFESTYLE VARIABLES
TOTAL SCORE: 0
CONSUMPTION TOTAL: INCOMPLETE
HAVE YOU EVER FELT YOU SHOULD CUT DOWN ON YOUR DRINKING: NO
TOTAL SCORE: 0
HAVE PEOPLE ANNOYED YOU BY CRITICIZING YOUR DRINKING: NO
TOTAL SCORE: 0
EVER FELT BAD OR GUILTY ABOUT YOUR DRINKING: NO
EVER HAD A DRINK FIRST THING IN THE MORNING TO STEADY YOUR NERVES TO GET RID OF A HANGOVER: NO
EVER_SMOKED: NEVER
ALCOHOL_USE: YES

## 2021-04-29 ASSESSMENT — PATIENT HEALTH QUESTIONNAIRE - PHQ9
SUM OF ALL RESPONSES TO PHQ QUESTIONS 1-9: 5
8. MOVING OR SPEAKING SO SLOWLY THAT OTHER PEOPLE COULD HAVE NOTICED. OR THE OPPOSITE, BEING SO FIGETY OR RESTLESS THAT YOU HAVE BEEN MOVING AROUND A LOT MORE THAN USUAL: NOT AT ALL
3. TROUBLE FALLING OR STAYING ASLEEP OR SLEEPING TOO MUCH: SEVERAL DAYS
SUM OF ALL RESPONSES TO PHQ9 QUESTIONS 1 AND 2: 1
2. FEELING DOWN, DEPRESSED, IRRITABLE, OR HOPELESS: NOT AT ALL
9. THOUGHTS THAT YOU WOULD BE BETTER OFF DEAD, OR OF HURTING YOURSELF: NOT AT ALL
7. TROUBLE CONCENTRATING ON THINGS, SUCH AS READING THE NEWSPAPER OR WATCHING TELEVISION: SEVERAL DAYS
5. POOR APPETITE OR OVEREATING: SEVERAL DAYS
4. FEELING TIRED OR HAVING LITTLE ENERGY: SEVERAL DAYS
6. FEELING BAD ABOUT YOURSELF - OR THAT YOU ARE A FAILURE OR HAVE LET YOURSELF OR YOUR FAMILY DOWN: NOT AL ALL
1. LITTLE INTEREST OR PLEASURE IN DOING THINGS: SEVERAL DAYS

## 2021-04-29 ASSESSMENT — CHA2DS2 SCORE
SEX: FEMALE
DIABETES: NO
CHF OR LEFT VENTRICULAR DYSFUNCTION: NO
PRIOR STROKE OR TIA OR THROMBOEMBOLISM: YES
VASCULAR DISEASE: NO
CHA2DS2 VASC SCORE: 4
AGE 75 OR GREATER: NO
HYPERTENSION: YES
AGE 65 TO 74: NO

## 2021-04-29 ASSESSMENT — PAIN DESCRIPTION - PAIN TYPE: TYPE: ACUTE PAIN

## 2021-04-29 ASSESSMENT — FIBROSIS 4 INDEX: FIB4 SCORE: 0.62

## 2021-04-29 NOTE — CARE PLAN
Problem: Safety  Goal: Will remain free from injury  Outcome: PROGRESSING AS EXPECTED  Goal: Will remain free from falls  Outcome: PROGRESSING AS EXPECTED     Problem: Venous Thromboembolism (VTW)/Deep Vein Thrombosis (DVT) Prevention:  Goal: Patient will participate in Venous Thrombosis (VTE)/Deep Vein Thrombosis (DVT)Prevention Measures  Outcome: PROGRESSING AS EXPECTED     Problem: Bowel/Gastric:  Goal: Normal bowel function is maintained or improved  Outcome: PROGRESSING AS EXPECTED  Goal: Will not experience complications related to bowel motility  Outcome: PROGRESSING AS EXPECTED     Problem: Safety:  Goal: Will remain free from injury  Outcome: PROGRESSING AS EXPECTED     Problem: Urinary:  Goal: Ability to maintain continence will improve  Outcome: PROGRESSING SLOWER THAN EXPECTED  Goal: Ability to reestablish a normal urinary elimination pattern will improve  Outcome: PROGRESSING SLOWER THAN EXPECTED

## 2021-04-29 NOTE — PROGRESS NOTES
0800: Wife Anel at bedside, updated on pt condition and plan of care, all questions answered    1225: Report given to Alecia RN with [facility]    1240: Discharge instructions given to patient and Anel at bedside, all questions answered. Pt taken via gurney with [facility], COBRA given to [facility] employee. All belongings taken by Anel.

## 2021-04-29 NOTE — H&P
REHABILITATION HISTORY AND PHYSICAL/POST ADMISSION EVALUATION    4/29/2021  3:29 PM  Thong Arzola  [id number]  Admission  4/29/2021  1:22 PM  [facility] Code/Reason for admission: 0001.1 - Stroke: Left Body Involvement (Right Brain)   Etiologic diagnosis/problem: Acute right MCA stroke (HCC)  Chief Complaint: left sided weakness    HPI:  The patient is a 56 y.o. male with a past medical history of COVID-19 3/18/2021, paroxysmal atrial fibrillation not on anti-coagulation, hypothyroidism; now admitted for acute inpatient rehabilitation with severe functional debility after and acute stroke.    On admission the patient and medical record report he presented to the hospital on 3/31 with left sided weakness. Imaging with right ICA and MCA occlusion. Perfusion scan with 210 ml completed infarction/ischemia, 76 ml of penumbra. Patient was outside window for tPA and was also not a candidate for thrombectomy. ECHO EF 75%.     Patient required craniectomy with Dr. Payton on 4/3, bone flap placed in freezer. Helmet in place. Acute stay complicated by acute urinary retention, traumatic catheter placement required coude and causing hematuria, started on Prazosin. Voiding trial attempted prior to admission to rehab, unsuccessful and Brewer was replaced.      Patient was started on baclofen for developing spasticity. EEG on 4/26 with mild encephalopathy, no evidence of seizures.     Patient current reports left sided weakness, without any improvement since admission. He also reports impaired sensation on the left side. He is right hand dominant. He denies any visual changes. He denies any pain. His mood is a little down due to his large stroke. He moved his bowels this morning. Meds floated whole in puree.     Patient was evaluated by Rehab Medicine physician and Physical Therapy, Occupational Therapy and Speech Therapy and determined to be appropriate for acute inpatient rehab and was transferred to [facility] on  4/29/2021  1:22 PM.    With this acute therapeutic intervention, this patient hopes to improve his functional status, and return to independent living with the supportive care of spouse.    REVIEW OF SYSTEMS:     A complete review of systems was performed and was negative in detail with the exception of items mentioned elsewhere in this document.    PMH:  Past Medical History:   Diagnosis Date   • Afib (HCC)    • COVID-19    • High cholesterol        PSH:  Past Surgical History:   Procedure Laterality Date   • CRANIOTOMY Right 4/3/2021    Procedure: CRANIOTOMY;  Surgeon: Arthur Payton M.D.;  Location: SURGERY [facility];  Service: Neurosurgery   • KNEE RECONSTRUCTION      left knee orthoscopic       History reviewed. No pertinent family history.     MEDICATIONS:  Current Facility-Administered Medications   Medication Dose   • hydrOXYzine HCl (ATARAX) tablet 50 mg  50 mg   • melatonin tablet 3 mg  3 mg   • Respiratory Therapy Consult     • Pharmacy Consult Request ...Pain Management Review 1 Each  1 Each   • hydrALAZINE (APRESOLINE) tablet 10 mg  10 mg   • acetaminophen (Tylenol) tablet 650 mg  650 mg   • lactulose 20 GM/30ML solution 30 mL  30 mL   • docusate sodium (ENEMEEZ) enema 283 mg  283 mg   • fleet enema 133 mL  1 Each   • artificial tears ophthalmic solution 1 Drop  1 Drop   • benzocaine-menthol (CEPACOL) lozenge 1 Lozenge  1 Lozenge   • mag hydrox-al hydrox-simeth (MAALOX PLUS ES or MYLANTA DS) suspension 20 mL  20 mL   • ondansetron (ZOFRAN ODT) dispertab 4 mg  4 mg    Or   • ondansetron (ZOFRAN) syringe/vial injection 4 mg  4 mg   • traZODone (DESYREL) tablet 50 mg  50 mg   • sodium chloride (OCEAN) 0.65 % nasal spray 2 Spray  2 Spray   • midazolam (VERSED) 5 mg/mL (1 mL vial)  5 mg   • atorvastatin (LIPITOR) tablet 80 mg  80 mg   • aspirin (ASA) chewable tab 81 mg  81 mg   • baclofen (LIORESAL) tablet 15 mg  15 mg   • [START ON 4/30/2021] lisinopril (PRINIVIL) tablet 2.5 mg  2.5 mg   • meclizine  "(ANTIVERT) tablet 25 mg  25 mg   • metoprolol tartrate (LOPRESSOR) tablet 12.5 mg  12.5 mg   • mirtazapine (Remeron) orally disintegrating tab 15 mg  15 mg   • prazosin (MINIPRESS) capsule 1 mg  1 mg   • rivaroxaban (XARELTO) tablet 20 mg  20 mg   • thyroid (ARMOUR THYROID) tablet 60 mg  60 mg       ALLERGIES:  Patient has no known allergies.    PSYCHOSOCIAL HISTORY:  2 SH  2-3 MAKENZIE, 1 flight of stairs with rails inside  With: Spouse     Per [facility] \"Anel [wife] tells me she is currently working full time, has flexible job and will take time off to provide 24/7 support when Thong returnes home.  They live 2 story home, 2 -3 steps to enter.  Master bedroom on ground floor.  Bathroom has tub/shower combo, no safety grab bars. \"    Patient has been  to Anel since 1992. They have 4 grown sons, aged 28, 27, 25, 22, and also have 4 grandchildren. 3 of his sons live here in [place].    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     No tobacco, no drugs, alcohol on the weekends.     LEVEL OF FUNCTION PRIOR TO DISABILTY:  Independent    LEVEL OF FUNCTION PRIOR TO ADMISSION to [facility]:  PT:     04/27/21 1050   Other Treatments   Other Treatments Provided BP's: 116/73 w/HOB slightly elevated, 115/74 w/elevated HOB, /75->82/68. Pt returned to supine w/c/o nausea. Pt symptomatic w/his EOB pressures. Call made to [facility] to assess helmet. Currently pt's helmet falls over his eyes when upright.    Balance   Sitting Balance (Static) Poor -   Sitting Balance (Dynamic) Trace   Standing Balance (Static) Poor   Standing Balance (Dynamic) Dependent   Weight Shift Sitting Poor   Weight Shift Standing Absent   Gait Analysis   Gait Level Of Assist Unable to Participate   Bed Mobility    Supine to Sit Moderate Assist  (HOB elevated, from Rt side of the bed)   Sit to Supine Moderate Assist  (HOB flat and no railing)   Scooting Maximal Assist  (seated)   Rolling " "Minimum Assist to Lt.;Maximal Assist to Rt.   Comments Cont to cue pt on using his Rt LE to manage his Lft LE w/sup<->sit.    Functional Mobility   Sit to Stand Moderate Assist  (from EOB)   Bed, Chair, Wheelchair Transfer Unable to Participate   Comments Unable to get pt OOB today 2* symptomatic BP's.          OT:     04/27/21 0901   Pain 0 - 10 Group   Therapist Pain Assessment During Activity;Nurse Notified  (no c/o pain, c/o nausea)   Cognition    Cognition / Consciousness X   New Learning Impaired   Attention Impaired   Initiation Impaired   Comments L neglect, improving from previous sessions   Passive ROM Upper Body   Comments increased tone in L bicep, able to tolerate PROM into full elbow extension on L    Active ROM Upper Body   Active ROM Upper Body  X   Comments LUE no active or purposeful movement   Strength Upper Body   Upper Body Strength  X   Comments LUE no functional movement   Sensation Upper Body   Comments continues to report \"numb\" feeling in L arm, can feel deep pressure    Balance   Sitting Balance (Static) Poor -   Sitting Balance (Dynamic) Trace   Weight Shift Sitting Poor   Bed Mobility    Supine to Sit Moderate Assist   Sit to Supine Moderate Assist   Scooting Maximal Assist   Rolling Minimum Assist to Lt.;Maximal Assist to Rt.   Activities of Daily Living   Grooming Moderate Assist;Seated   Upper Body Dressing Moderate Assist   Lower Body Dressing Maximal Assist   Toileting Maximal Assist         SLP:   04/28/21 1217   Vitals   O2 Delivery Device None - Room Air   Dysphagia    Positioning / Behavior Modification Self Monitoring;Modulate Rate or Bite Size;Multiple Swallows;Alternate Solids and Liquids  (Lingual sweep; ck for left sided pocketing)   Other Treatments PO trials of thin liquids and items from SB6/MT2 meal tray   Diet / Liquid Recommendation Soft & Bite-Sized (6) - (Dysphagia III);Mildly Thick (2) - (Nectar Thick)   Skilled Intervention Compensatory Strategies;Verbal Cueing " "  Recommended Route of Medication Administration   Medication Administration  Float Whole with Puree         CURRENT LEVEL OF FUNCTION:   Same as level of function prior to admission to [facility]    PHYSICAL EXAM:     VITAL SIGNS:   height is 1.778 m (5' 10\") and weight is 76.5 kg (168 lb 10.4 oz). His oral temperature is 36.8 °C (98.3 °F). His blood pressure is 116/70 and his pulse is 85. His respiration is 16 and oxygen saturation is 95%.     GENERAL: No apparent distress  HEENT: left craniectomy depression, moist mucous membranes  CARDIAC: Regular rhythm, mild tachycardia, normal S1, S2, no murmurs, no peripheral edema   LUNGS: Clear to auscultation, normal respiratory effort, on room air   ABDOMINAL: bowel sounds present, soft, nontender and nondistended    EXTREMITIES: no edema, severe spasticity on the left  MSK: No joint swelling    NEURO:    Mental status:  A&Ox4 (person, place, date, situation) answers questions appropriately follows commands  Speech: fluent, no aphasia or dysarthria    CRANIAL NERVES:  2,3: visual acuity grossly intact, PERRL  3,4,6: EOMI bilaterally, no nystagmus or diplopia  5: intact in all branches  7: mild left facial droop  8: hearing grossly intact  9,10: symmetric palate elevation  11: SCM/Trapezius strength 5/5 bilaterally  12: tongue protrudes midline    Motor:  Shoulder flexors:  Right -  5/5, Left -  0/5  Elbow flexors:  Right -  5/5, Left -  0/5  Elbow extensors:  Right -  5/5, Left -  0/5  No  on left  Hip flexors:  Right -  5/5, Left -  0/5  Knee ext:  Right -  5/5, Left -  0/5  Dorsiflexors:  Right -  5/5, Left -  0/5  EHL:  Right -  5/5, Left - 2/5  Plantar flexors:  Right -  5/5, Left -  0/5     Sensory:   Decreased to light touch on the left arm and leg    DTRs:   3+ LUE and LLE  1+ RUE and RLE  Clonus at left ankle  Positive left babinski  Positive left Castillo    Tone: MAS 3 on LUE, 2 on LLE    RADIOLOGY:              Results for orders placed " during the hospital encounter of 03/31/21   MR-BRAIN-W/O    Impression Very large acute right MCA territory infarct as detailed above with small amount of petechial hemorrhage in the right insular region and right temporal lobe.    Punctate right thalamic lacunar infarct.    Right ICA and M1 MCA occlusion.                                                                                                                           Results for orders placed during the hospital encounter of 03/31/21   CT-CTA NECK WITH & W/O-POST PROCESSING    Impression Acute occlusion of the right internal carotid artery shortly after bifurcation. It is occluded up to the level of the carotid terminus.                                                                    LABS:  Recent Labs     04/27/21  0338 04/28/21  0403   SODIUM 131* 136   POTASSIUM 4.0 4.4   CHLORIDE 98 101   CO2 25 23   GLUCOSE 105* 111*   BUN 29* 22   CREATININE 1.14 1.12   CALCIUM 9.6 9.7     Recent Labs     04/27/21  0338   WBC 10.9*   RBC 4.59*   HEMOGLOBIN 13.4*   HEMATOCRIT 38.6*   MCV 84.1   MCH 29.2   MCHC 34.7   RDW 39.7   PLATELETCT 361   MPV 9.5         PRIMARY REHAB DIAGNOSIS:    This patient is a 56 y.o. male admitted for acute inpatient rehabilitation with Acute right MCA stroke (HCC).    IMPAIRMENTS:   Cognitive  ADLs/IADLs  Mobility  Swallow    SECONDARY DIAGNOSIS/MEDICAL CO-MORBIDITIES AFFECTING FUNCTION:    Spasticity  Atrial fibrillation  Urinary retention  Hypothyroidism   Hypertension  Reactive depression  History of COVID-19      RELEVANT CHANGES SINCE PREADMISSION EVALUATION:    Status unchanged    The patient's rehabilitation potential is Fair  The patient's medical prognosis is good    PLAN:   Discussion and Recommendations, discussed with the patient and/or family:   1. The patient requires an acute inpatient rehabilitation program with a coordinated program of care at an intensity and frequency not available at a lower level of care. This  recommendation is substantiated by the patient's medical physicians who recommend that the patient's intervention and assessment of medical issues needs to be done at an acute level of care for patient's safety and maximum outcome.     2. A coordinated program of care will be supplied by an interdisciplinary team of physical therapy, occupational therapy, rehab physician, rehab nursing, and, if needed, speech therapy and rehab psychology. Rehab team presents a patient-specific rehabilitation and education program concentrating on prevention of future problems related to accessibility, mobility, skin, bowel, bladder, sexuality, and psychosocial and medical/surgical problems.     3. Need for Rehabilitation Physician: The rehab physician will be evaluating the patient on a multi-weekly basis to help coordinate the program of care. The rehab physician communicates between medical physicians, therapists, and nurses to maximize the patient's potential outcome. Specific areas in which the rehab physician will be providing daily assessment include the following:   A. Assessing the patient's heart rate and blood pressure response (vitals monitoring) to activity and making adjustments in medications or conservative measures as needed.   B. The rehab physician will be assessing the frequency at which the program can be increased to allow the patient to reach optimal functional outcome.   C. The rehab physician will also provide assessments in daily skin care, especially in light of patient's impairments in mobility.   D. The rehab physician will provide special expertise in understanding how to work with functional impairment and recommend appropriate interventions, compensatory techniques, and education that will facilitate the patient's outcome.     4. Rehab R.N.   The rehab RN will be working with patient to carry over in room mobility and activities of daily living when the patient is not in 3 hours of skilled therapy.  Rehab nursing will be working in conjunction with rehab physician to address all the medical issues above and continue to assess laboratory work and discuss abnormalities with the treating physicians, assess vitals, and response to activity, and discuss and report abnormalities with the rehab physician. Rehab RN will also continue daily skin care, supervise bladder/bowel program, instruct in medication administration, and ensure patient safety.     5. Therapies to treat at intensity and frequency of (may change after completion of evaluation by all therapeutic disciplines):       PT:  Physical therapy to address mobility, transfer, gait training and evaluation for adaptive equipment needs 1hour/day at least 5 days/week for the duration of the ELOS (see below)       OT:  Occupational therapy to address ADLs, self-care, home management training, functional mobility/transfers and assistive device evaluation, and community re-integration 1hour/day at least 5 days/week for the duration of the ELOS (see below).        ST/Dysphagia:  Speech therapy to address speech, language, and cognitive deficits as well as swallowing difficulties with retraining/dysphagia management and community re-integration with comprehension, expression, cognitive training 1hour/day at least 5 days/week for the duration of the ELOS (see below).     6. Medical management / Rehabilitation Issues/Adverse Potential affecting function as part of rehabilitation plan.    Large right ICA/MCA ischemic stroke  Start full rehab program  PT/OT/SLP, 1 hr each discipline, 5 days per week  Aspirin? - per Dr. Calvillo does not need both AP and NOAC if there isn't CAD/stent, confirming with hospitalist and will discontinue for now  Xarelot  Statin    Spasticity  Increase baclofen  Would benefit from Botox, will try to get approval to do as an inpatient    Atrial fibrillation  Metoprolol  Xarelto    Urinary retention  Change Prazosin to Flomax  Voiding trial next  week  Failed recent trial/history of traumatic placement/hematuris    Hypothyroidism   [place] thyroid  Check am labs    Hypertension  Lisinopril  Metoprolol  Monitor for orthostasis on Flomax    Reactive depression  Mirtazapine     History of COVID-19  Without sequelae     I performed a complete drug regimen review and did not identify any potential clinically significant medication issues.    The patient's CODE STATUS was confirmed as FULL CODE on admission, with the patient and/or family at bedside.    REHABILITATION ISSUES/ADVERSE POTENTIAL:  1.  CVA (Cerebrovascular Accident): Continue Xarelto for secondary prophylaxis as well as lipid and blood pressure management. Patient demonstrates functional deficits in strength, balance, coordination, and ADL's. Patient is admitted to [facility] for comprehensive rehabilitation therapy as described below.   Rehabilitation nursing monitors bowel and bladder control, educates on medication administration, co-morbidities and monitors patient safety.    2.  DVT prophylaxis:  Patient is on Xarelto for anticoagulation upon transfer. Encourage OOB. Monitor daily for signs and symptoms of DVT including but not limited to swelling and pain to prevent the development of DVT that may interfere with therapies.    3.  Pain: No issues with pain currently / Controlled with as needed oral analgesics.    4.  Nutrition/Dysphagia: Dietician monitors nutrient intake, recommend supplements prn and provide nutrition education to pt/family to promote optimal nutrition for wound healing/recovery.     5.  Bladder/bowel:  Start bowel and bladder program, to prevent constipation, urinary retention (which may lead to UTI), and urinary incontinence (which will impact upon pt's functional independence).   - TV Q3h while awake with post void bladder scans, I&O cath for PVRs >400  - up to commode after meal     6.  Skin/dermal ulcer prophylaxis: Monitor for new skin conditions with  q.2 h. turns as required to prevent the development of skin breakdown.     7.  Cognition/Behavior:  Psychologist Dr. Rod provides adjustment counseling to illness and psychosocial barriers that may be potential barriers to rehabilitation.     8. Respiratory therapy: RT performs O2 management prn, breathing retraining, pulmonary hygiene and bronchospasm management prn to optimize participation in therapies.    Pt was seen today for 73 min, and entire time spent in face-to-face contact was >50% in counseling and coordination of care as detailed in A/P above.        GOALS/EXPECTED LEVEL OF FUNCTION BASED ON CURRENT MEDICAL AND FUNCTIONAL STATUS (may change based on patient's medical status and rate of impairment recovery):  Transfers:   Moderate Assistance  Mobility/Gait:   Moderate Assistance  ADL's:   Moderate Assistance  Cognition:  Least Verbal cues  Swallowing:  Regular with thins    DISPOSITION: Discharge to pre-morbid independent living setting with the supportive care of patient's spouse.      ELOS: 14-21 days, pending progress or any return of function on the left, if not, will focus on family training    Radha Monge M.D.  Physical Medicine and Rehabilitation

## 2021-04-29 NOTE — PREADMISSION SCREENING NOTE
"  Pre-Admission Screening Form    Patient Information:   Name: Thong Arzola     MRN: [id number]       : 1965      Age: 56 y.o.   Gender: male      Race: White [7]       Marital Status:  [2]  Family Contact: Anel Arzola        Relationship: Spouse [17]  Home Phone: [phone]           Cell Phone: [phone]  Advanced Directives: None  Code Status:  FULL  Current Attending Provider: eGnet Murrell M.D.  Referring Physician: Dr. Calvillo   Physiatrist Consult: Dr. Eisenberg    Referral Date: 21  Primary Payor Source:  MISCELLANEOUS  Secondary Payor Source:  [employer]    Medical Information:   Date of Admission to Acute Care Setting:3/31/2021  Room Number: S195/02  Rehabilitation Diagnosis: 0001.1 - Stroke: Left Body Involvement (Right Brain)  Immunization History   Administered Date(s) Administered   • Influenza Vaccine Quad Inj (Preserved) 10/20/2015   • Tdap Vaccine 10/05/2010     No Known Allergies  Past Medical History:   Diagnosis Date   • Afib (HCC)    • High cholesterol      Past Surgical History:   Procedure Laterality Date   • CRANIOTOMY Right 4/3/2021    Procedure: CRANIOTOMY;  Surgeon: Arthur Payton M.D.;  Location: SURGERY [facility];  Service: Neurosurgery   • KNEE RECONSTRUCTION      left knee orthoscopic       History Leading to Admission, Conditions that Caused the Need for Rehab (CMS):     Dr. Forman H&P:  Chief Complaint   Patient presents with   • Possible Stroke       BIB [facility] for stroke like symptoms. Pt was last seen well by wife at 0630. He texted his wife at 1500 then took a bath. Pt was trying to get out of the bath when symptoms started. Presents with L hemipalegia, signifigant facial paralysis, R gaze deviation. Pt has hx afib and stated, \"my heart's been in afib since Fri.\" Seen by PCP on Mon for afib and started on metoprolol. Presents in SR-ST. + COVID test on 3/18 after flu like s/s x 3/15.         History of Presenting Illness  56 y.o. male who " "presented 3/31/2021 as transfer from outside facility due to stroke like symptoms. Patient was last seen well by his wife at 0630 am. He texted his wife around 3PM then took a bath and when he tried to get out of the bath, it apparently seems like that's when his symptoms started. Time is unclear at this point. Patient noted to have left sided hemiplegia with left facial paralysis and was sent to [facility]. Due to unknown time, patient was not a candidate for tPA. Patient in ED appears confused. He states that he is in the ED due to his atrial fibrillation, which he does have history of, however is currently in sinus rhythm. CT head done shows complete occlusion of the right MCA and it was discussed with IR who states that he is not a candidate for thrombectomy. Neurology evaluated the patient and recommends ICU care as there is high risk for brain edema at this time.      Munira, his wife, seen at bedside. She states that they were in [place] last week where he drank cold water and went into afib. He was seen by a physician there and was given some medication. They returned last Friday and this Monday, he was seen by his PCP who prescribed him more medication for his afib. This morning as she was leaving for work, she did not notice anything unusual. She heard from the patient around 3PM that he was going to take a bath. Around 4:30 she found it unusual that she did not hear from him so went home to check on him and found him in the bathtub without any water in a \"strange\" position. She states that his right lip was dropped and his speech was altered. She tried to help him up however he was hemiplegic to the left so she called the EMS. Confirmed with wife that patient is FULL code  Assessment/Plan:  I anticipate this patient will require at least two midnights for appropriate medical management, necessitating inpatient admission.     * Acute right MCA stroke (HCC)- (present on admission)  Assessment & Plan  -Q1 neuro " check  -MRI brain in AM  -ECHO  -Permissive HTN  -Not a candidate for tPA or thrombectomy per neurology  -Neurosurgery consultation was requested in ED and they will follow, no immediate need for decompression  -Keep HOB elevated  -Monitor BMP q4 hours for sodium  -PT/OT eval  -SLP, NPO until SLP can evaluate  -Lipid and HbA1C  -ASA and lipitor once patient can swallow  -Will admit to ICU for neuro q1 hours checks due to high risk of edema     History of COVID-19- (present on admission)  Assessment & Plan  -Will repeat Covid swab as he was positive on the 18th  -Droplet, contact, and eye protection for now     Acquired hypothyroidism- (present on admission)  Assessment & Plan  -Resume home meds once he can tolerate PO intake     Dr. Eisenberg (Physiatry) recommendations:                                                  Physical Medicine and Rehabilitation Consultation                                                                                  Date of initial consultation: 4/2/2021  Consulting provider: Mc Calvillo MD  Reason for consultation: assess for acute inpatient rehab appropriateness  LOS: 27 Day(s)     Chief complaint: Stroke     HPI: The patient is a 56 y.o. right hand dominant male with a past medical history of hypertension, hyperlipidemia, atrial fibrillation not on anticoagulation, Covid positive on 3/18;  who presented on 3/31/2021  7:29 PM brought in [facility] with left hemiplegia, facial droop, right gaze deviation.  CT head showed complete occlusion of right MCA, but unfortunately he was not a candidate for TPA or thrombectomy.  Seen by neurology, found to have a NIH score of 18.  Additional work-up with CTA shows right ICA complete occlusion.  Etiology is thought to be cardioembolic due to A. fib off of AC.  He is currently being followed closely by neurosurgery as he is expected to have peak brain swelling in the next 1 to 2 days and may require craniotomy.  Follow-up MRI shows minimal  "shift with large MCA stroke     Most of my visit is with Andrey wife but also with him. He endorses left neglect and weakness, but does not endorse paresthesias at this time. I had a long meeting with his wife about expectations with this type and severity of stroke.      4/7/2021  I have been following patient's chart in the periphery, since my last visit he has undergone hemicraniectomy on 4/3 for increased cerebral swelling, and postop.  Has been complicated by continuing cerebral edema resulting in midline shift.  Neurology continuing to follow. Son at bedside. Patient still have severe left neglect. He continues to have trace left hand movement.      4/9/2021  Patient continues to have severe left neglect and spasticity. ROM training given to wife at bedside. Patient is developing a functional torticollis from only looking right due to neglect. No hand movement observed today. Patient denies new complaints.      4/13/2021  Patient is tired today, wife reports a \"loud night\".  Patient is doing slightly better with his neglect, his neck is still very much turned to the right, but he is able to locate his left hand with his right hand.  Discussed course for therapy and rehab admission with patient's wife and therapists.  Wife is considering paying out-of-pocket, we will try to accommodate her as best we can and also work with the patient as much as possible in the acute setting.      4/15/2021  Patient seen in follow-up, resting comfortably in bed.  Discussed progress with wife who reports some nausea with therapy related to his neglect.  Discussed adding a scopolamine patch.  Also discussed adding Flomax for urinary retention symptoms, possibly removing Brewer between now and Monday and continuing of bladder scans.  Also discussed rehab placement when patient is doing better     4/19/2021  Much more alert today, answering questions in short phrases.  Neglect appears to be a little better today.  Spasticity improved, " "except in neck which is still firm.  Discussed with son at bedside overall plan of care.     4/21/2021  Patient complaining of poor appetite and insomnia. Starting Remeron tonight. Also discussed flap replacement, date unknown at this time. Patient would benefit from family training but is not improving from a motor strength point of view. He is more alert and neglect is improving. Some spasticity in LUE.     4/22/2021  Discussed plan of care with wife and sister. Rehab is still working out what a daily rate would be, but it will likely be around 50k or more for his stay. There may be a better option to explore with using those funds for continued outpatient therapy and hired help at home. He has dense left hemiplegia and is not expected to recover much motor function at rehab. He would benefit from family training and continued rehab for trunk control and independence with WC ambulation. Still awaiting word on when the flap will be replaced. A possible option to consider would be providing family training here and DC home after flap replacement. We discussed a possible care conference with PFA, rehab, medicine and therapy to evaluate this option early next week. Wife supplied her email for updates: [email or web address]     4/27/2021  Patient is doing well today, spasticity improved.  Patient has 1 out of 5 strength in the left hand which is new.  Neglect is improved.  I have also been updated that he now has insurance.  TCC's are looking into this to verify.     Social Hx:  2 SH  2-3 MAKENZIE, 1 flight of stairs with rails inside  With: Spouse     Per [facility] Sloane \"Anel [wife] tells me she is currently working full time, has flexible job and will take time off to provide 24/7 support when Thong returnes home.  They live 2 story home, 2 -3 steps to enter.  Master bedroom on ground floor.  Bathroom has tub/shower combo, no safety grab bars. \"     THERAPY:  PT: Functional mobility   4/1: Mod assist sit to stand with 2 " people  4/7: Max Assist  4/12: Max assist supine to sit, total assist sit to supine  4/14: Max assist sit to stand     OT: ADLs  4/1: Mod assist grooming, max assist lower body dressing  4/7: Total assist   4/12: Max assist dressing, total assist toileting  4/14: Total assist toileting     SLP:   4/1: N.p.o. pending fees  4/2: minced and moist/mildly thick liquid   4/7: NPO  4/8: NPO with 3-5 ice chips per hour  4/14: Minced and moist diet with direct one-to-one supervision     IMAGING:    MR brain 4/1/2021  Very large acute right MCA territory infarct as detailed above with small amount of petechial hemorrhage in the right insular region and right temporal lobe.  Punctate right thalamic lacunar infarct.  Right ICA and M1 MCA occlusion.     PROCEDURES:  None     PMH:  Past Medical History        Past Medical History:   Diagnosis Date   • Afib (HCC)     • High cholesterol              PSH:  Past Surgical History         Past Surgical History:   Procedure Laterality Date   • CRANIOTOMY Right 4/3/2021     Procedure: CRANIOTOMY;  Surgeon: Arthur Payton M.D.;  Location: SURGERY [facility];  Service: Neurosurgery   • KNEE RECONSTRUCTION         left knee orthoscopic            FHX:  Non-pertinent to today's issues     Medications:       Current Facility-Administered Medications   Medication Dose   • [START ON 4/28/2021] lisinopril (PRINIVIL) tablet 2.5 mg  2.5 mg   • metoprolol tartrate (LOPRESSOR) tablet 12.5 mg  12.5 mg   • meclizine (ANTIVERT) tablet 25 mg  25 mg   • mirtazapine (Remeron) tablet 15 mg  15 mg   • ondansetron (ZOFRAN ODT) dispertab 4 mg  4 mg   • acetaminophen (Tylenol) tablet 650 mg  650 mg   • senna-docusate (PERICOLACE or SENOKOT S) 8.6-50 MG per tablet 1 tablet  1 tablet   • oxyCODONE immediate release (ROXICODONE) tablet 10 mg  10 mg     Or   • oxyCODONE immediate-release (ROXICODONE) tablet 5 mg  5 mg     Or   • HYDROmorphone (Dilaudid) injection 0.5 mg  0.5 mg   • polyethylene glycol/lytes  "(MIRALAX) PACKET 1 Packet  1 Packet   • prazosin (MINIPRESS) capsule 1 mg  1 mg   • senna-docusate (PERICOLACE or SENOKOT S) 8.6-50 MG per tablet 1 tablet  1 tablet   • rivaroxaban (XARELTO) tablet 20 mg  20 mg   • promethazine (PHENERGAN) tablet 12.5-25 mg  12.5-25 mg   • magnesium hydroxide (MILK OF MAGNESIA) suspension 30 mL  30 mL   • thyroid (ARMOUR THYROID) tablet 60 mg  60 mg   • calcium carbonate (TUMS) chewable tab 500 mg  500 mg   • baclofen (LIORESAL) tablet 15 mg  15 mg   • aspirin (ASA) chewable tab 81 mg  81 mg   • atorvastatin (LIPITOR) tablet 80 mg  80 mg   • cloNIDine (CATAPRES) tablet 0.1 mg  0.1 mg   • hydrALAZINE (APRESOLINE) injection 10 mg  10 mg   • labetalol (NORMODYNE/TRANDATE) injection 10-20 mg  10-20 mg   • Pharmacy Consult Request ...Pain Management Review 1 Each  1 Each   • MD ALERT...DO NOT ADMINISTER NSAIDS or ASPIRIN unless ORDERED By Neurosurgery 1 Each  1 Each   • bisacodyl (DULCOLAX) suppository 10 mg  10 mg   • artificial tears (EYE LUBRICANT) ophth ointment 1 Application  1 Application   • ondansetron (ZOFRAN) syringe/vial injection 4 mg  4 mg   • promethazine (PHENERGAN) suppository 12.5-25 mg  12.5-25 mg   • prochlorperazine (COMPAZINE) injection 5-10 mg  5-10 mg         Allergies:  No Known Allergies     Physical Exam:  Vitals: /75   Pulse 88   Temp 36.8 °C (98.2 °F) (Temporal)   Resp 17   Ht 1.778 m (5' 10\")   Wt 77.9 kg (171 lb 11.8 oz)   SpO2 94%   Gen: NAD  Head: right hemicraniectomy  Eyes/ Nose/ Mouth: PERRLA, moist mucous membranes  Cardio: RRR, good distal perfusion, warm extremities  Pulm: normal respiratory effort, no cyanosis   Abd: Soft NTND, negative borborygmi   Ext: No peripheral edema. No calf tenderness. No clubbing.     Mental status: answers questions appropriately follows commands  Speech: fluent, no aphasia or dysarthria     CRANIAL NERVES:  2,3: LEFT VF cut, PERRL  3,4,6: EOMI in right VF, no nystagmus or diplopia  5: sensation intact to " light touch bilaterally and symmetric  7: Left facial droop   8: hearing grossly intact  9,10: not seen  11: SCM/Trapezius strength 5/5right, 0/5 left  12: tongue protrudes left     Motor:                            Upper Extremity  Myotome R L   Shoulder flexion C5 5 0/5   Elbow flexion C5 5 0/5   Wrist extension C6 5 0/5   Elbow extension C7 5 0/5   Finger flexion C8 5 1/5   Finger abduction T1 5 0/5      Lower Extremity Myotome R L   Hip flexion L2 5 1/5   Knee extension L3 5 0/5   Ankle dorsiflexion L4 5 0/5   Toe extension L5 5 0/5   Ankle plantarflexion S1 5 0/5      Sensory:   Altered sensation on left side         DTRs:  Right  Left    Brachioradialis  2+  2+   Patella tendon  2+ 2+      2-3 beats right ankle clonus, sustained clonus left ankle  Pos babinski left   Negative Castillo b/l      Tone: tight hamstrings bilaterally     Labs: Reviewed and significant for        Recent Labs     04/25/21  1501 04/27/21  0338   RBC 4.72 4.59*   HEMOGLOBIN 13.6* 13.4*   HEMATOCRIT 40.3* 38.6*   PLATELETCT 446 361            Recent Labs     04/25/21  1501 04/26/21  0408 04/27/21  0338   SODIUM 138 137 131*   POTASSIUM 4.3 4.4 4.0   CHLORIDE 103 102 98   CO2 25 23 25   GLUCOSE 98 116* 105*   BUN 25* 24* 29*   CREATININE 1.13 1.06 1.14   CALCIUM 9.6 9.5 9.6      Recent Results         Recent Results (from the past 24 hour(s))   Basic Metabolic Panel     Collection Time: 04/27/21  3:38 AM   Result Value Ref Range     Sodium 131 (L) 135 - 145 mmol/L     Potassium 4.0 3.6 - 5.5 mmol/L     Chloride 98 96 - 112 mmol/L     Co2 25 20 - 33 mmol/L     Glucose 105 (H) 65 - 99 mg/dL     Bun 29 (H) 8 - 22 mg/dL     Creatinine 1.14 0.50 - 1.40 mg/dL     Calcium 9.6 8.5 - 10.5 mg/dL     Anion Gap 8.0 7.0 - 16.0   CBC WITHOUT DIFFERENTIAL     Collection Time: 04/27/21  3:38 AM   Result Value Ref Range     WBC 10.9 (H) 4.8 - 10.8 K/uL     RBC 4.59 (L) 4.70 - 6.10 M/uL     Hemoglobin 13.4 (L) 14.0 - 18.0 g/dL     Hematocrit 38.6 (L) 42.0 -  52.0 %     MCV 84.1 81.4 - 97.8 fL     MCH 29.2 27.0 - 33.0 pg     MCHC 34.7 33.7 - 35.3 g/dL     RDW 39.7 35.9 - 50.0 fL     Platelet Count 361 164 - 446 K/uL     MPV 9.5 9.0 - 12.9 fL   ESTIMATED GFR     Collection Time: 04/27/21  3:38 AM   Result Value Ref Range     GFR If African American >60 >60 mL/min/1.73 m 2     GFR If Non African American >60 >60 mL/min/1.73 m 2               ASSESSMENT:  Patient is a 56 y.o. male admitted with large right MCA stroke and right ICA occlusion. He did not receive TPA or thrombectomy      [facility] Code / Diagnosis to Support: 0001.1 - Stroke: Left Body Involvement (Right Brain)     Rehabilitation: Impaired ADLs and mobility  Continuing to consider Thong for IPR.  He has dense left hemiplegia which is difficult to recover from, however it can be managed.  Patient has no insurance benefit for IPR and I have discussed with the family appropriate timing of rehab given his limited benefit and severe deficits.     Additional Recommendations:     Large right MCA ischemic stroke s/p hemicraniectomy on 4/3  - continue PT/OT and SLP   -Family educated on stroke comorbidites on initial consult.  - ROM training given to wife for torticollis prevention and contracture prevention.  Requested room change with nurse manager to provide patient with window on left side to combat neglect.  -Continue baclofen 15mg TID for LUE spasticity -patient is stable at this dose  - Holding off on gabapentin at this time to avoid polypharmacy   - ASA/ statin  - Etiology throught to be cardioembolic in the setting of atrial fibrillation off of AC and in the setting of COVID.  -Primary team using meclizine for nausea control.  Change to scheduled on my last visit, now back to as needed  -On prazosin for urinary retention  - Continue Remeron 15mg for sleep and appetite  - PMR to continue to follow for rehab appropriateness     Dispo: Rehab TCC is looking into insurance eligibility.  Although his function is not  likely to change in the short amount of time that we will have him at [facility], he would benefit from family training and continued PT and OT for trunk control and independence with ADLs at a wheelchair level.  He will also benefit from SLP for neglect and swallow training     Medical Complexity:  Large right MCA stroke        DVT PPX: SCDs    Dr. Payton (Surgery Neurosurgery) recommendations:  ASSESSMENT AND PLAN:  At this time, we would follow the patient with CT scans   daily.  We will follow remotely and leave all medical management up to ICU and   neurology staff.  The patient has findings of increased cerebral edema, this   is not appropriately treated with hyperosmolar therapy and begins to have   decline, then we would move forward with a right decompressive hemicraniectomy   if he develops large stroke territories.  Patient has dense right MCA stroke not a candidate for clot retrieval or TPA.  Neurosurgery notified due to the fact patient has a likely complete right MCA stroke and will swell.     At this time CT shows no cerebral edema and no midline shift.     Assessment and plan management per neurology please reconsult once patient has midline shift if there is concern for need for decompression.     We will follow remotely at this time no neurosurgical input no neurosurgical need at this time.     MD Dr. Rajinder Luther (Neurology) recommendations:  Assessment and Plan:     Thong Arzola is a 56 y.o. man with a history of afib off AC, HTN, and HLD presenting for whom neurology has been consulted for acute onset left sided weakness and right gaze deviation.  The patient is out of window to be considered a candidate for tPA (beyond 4.5hr).  The CTA shows a R ICA complete occlussion; discussed with neuro-IR Pranav; thus not amendable to IR intervention.  Anticipate that the patient will suffer malignant R MCA edema with peak swelling window 3-5 days from now 3/31/21 ie 4/3-4/5/21.  The  patient meets inclusion criteria for LUISITO DEVINE DESTINY, and thus should have neurosurgical expertise consult patient for haylie-craniectomy watch.  Intensivist service to guide indication for osmotic therapy pending decline in neurological examination, and/or radiographic evidence of significant mass effect.  The differential diagnosis as it pertains to etiology is likely cardioembolic ie afib off AC.  To be admitted to ICU in guarded condition.     Plan:     1. Acute R ICA territory infarct / stroke  - Admit to the hospital for further workup of etiology and to provide secondary stroke prevention measures  - NEUROSURGERY consult for hemicraniectomy watch; peak window for swelling is 4/3-4/5/21  - Neurology checks and vital signs per protocol  - Permissive HTN  - obtain normoglycemia and avoid hypo- or hyper -natremia; aim for normothermia  - secondary stroke prevention therapy with ASA qd and high intensity statin per SPARCL Trial  - serum studies for stroke risk factors: lipid panel & hemoglobin A1C  - To identify stroke territory, obtain MRI brain  - the results of the brain MRI AND neurosurgical candidacy for potential hemicraniectomy will guide appropriate timing for a transition from aspirin to anticoagulation for secondary stroke prevention  - consideration of osmotic therapy pending progression of clinical examination and serial imaging  - evaluate and treat with PT/OT/ST; physiatry consult     2. Secondary headache  - headache cocktail x1: 2g IV Magnesium, 10mg IV Reglan, 25mg IV Benadryl     The evaluation of the patient, and recommended management, was discussed with Dr. Davila (ERP), Dr. Rock (neuro-IR), and Dr. Long (ICU)    Arthur Payton M.D.   Physician   Surgery Neurosurgery   OP Report      Signed   Date of Service:  4/3/2021 12:00 AM                    []Hide copied text    []Hover for details  DATE OF SERVICE:  04/03/2021      SURGEON:  Arthur Payton MD     FIRST ASSISTANT:   Physician assistant, Lilia Pizano.     PREOPERATIVE DIAGNOSES:  Right MCA dense stroke with intractable intracranial   pressure, midline shift of greater than 1 cm.     POSTOPERATIVE DIAGNOSES:  Right MCA dense stroke with intractable intracranial   pressure, midline shift of greater than 1 cm.     PROCEDURE:  Right decompressive hemicraniectomy greater than 14 cm with   expansile duraplasty.     COMPLICATIONS:  None.     SPECIMENS:  None.  The patient's bone flap was sent to the freezer for saving.     BRIEF HISTORY OF PRESENT ILLNESS:  This is a gentleman born in 1965 who has a   history of AFib, was on supratherapeutic amounts of vitamin K as a holistic   way of treating his AFib and subsequently made himself hypercoagulable giving   himself a dense right MCA stroke that was found to be past the time, at which   he was acceptable for a TPA salvage as well as thrombectomy by the time he got   to the hospital.  He was on hemicraniectomy watch for several days and then   found this morning to be more lethargic and nonresponsive.  CT scan   demonstrated the significant swelling and shift.  This was deemed appropriate   to take him emergently to the OR.        Brain MRI 04-01-21:  Very large acute right MCA territory infarct as detailed above with small amount of petechial hemorrhage in the right insular region and right temporal lobe.     Punctate right thalamic lacunar infarct.     Right ICA and M1 MCA occlusion.    Co-morbidities: See PMH  Potential Risk - Complications: Aphasia, Cognitive Impairment, Contractures, Deep Vein Thrombosis, Dysphagia, Incontinence, Malnutrition, Pain, Paralysis, Perceptual Impairment, Pneumonia, Pressure Ulcer, Seizures and Urinary Tract Infection  Level of Risk: High    Ongoing Medical Management Needed (Medical/Nursing Needs):   Patient Active Problem List    Diagnosis Date Noted   • Acute right MCA stroke (HCC) 03/31/2021   • Paroxysmal atrial fibrillation (HCC) 03/31/2021   •  "Fever 04/07/2021   • Leucocytosis 04/05/2021   • Urinary retention 04/01/2021   • Acquired hypothyroidism 03/31/2021   • History of COVID-19 03/31/2021     A & O with periods of forgetfulness.    Current Vital Signs:   Temperature: 36.5 °C (97.7 °F) Pulse: 77 Respiration: 18 Blood Pressure: 111/76  Weight: 77.9 kg (171 lb 11.8 oz) Height: 177.8 cm (5' 10\")  Pulse Oximetry: 94 % O2 (LPM): 0      Completed Laboratory Reports:  Recent Labs     04/27/21  0338 04/28/21  0403   WBC 10.9*  --    HEMOGLOBIN 13.4*  --    HEMATOCRIT 38.6*  --    PLATELETCT 361  --    SODIUM 131* 136   POTASSIUM 4.0 4.4   BUN 29* 22   CREATININE 1.14 1.12   GLUCOSE 105* 111*     Additional Labs: Not Applicable    Prior Living Situation:   Housing / Facility: 2 Story House  Steps In Home: (FOS with rails)  Lives with - Patient's Self Care Capacity: Spouse  Equipment Owned: None    Prior Level of Function / Living Situation:   Physical Therapy: Prior Services: None  Housing / Facility: 2 Story House  Steps In Home: (FOS with rails)  Bathroom Set up: Bathtub / Shower Combination  Equipment Owned: None  Lives with - Patient's Self Care Capacity: Spouse  Bed Mobility: Independent  Transfer Status: Independent  Ambulation: Independent  Distance Ambulation (Feet): (community)  Assistive Devices Used: None  Stairs: Independent  Current Level of Function:   Gait Level Of Assist: Unable to Participate  Weight Bearing Status: no restriction  Supine to Sit: Moderate Assist(HOB elevated, from Rt side of the bed)  Sit to Supine: Moderate Assist(HOB flat and no railing)  Scooting: Maximal Assist(seated)  Rolling: Minimum Assist to Lt., Maximal Assist to Rt.  Skilled Intervention: Verbal Cuing, Tactile Cuing, Facilitation  Comments: Cont to cue pt on using his Rt LE to manage his Lft LE w/sup<->sit.   Sit to Stand: Moderate Assist(from EOB)  Bed, Chair, Wheelchair Transfer: Unable to Participate  Toilet Transfers: Unable to Participate  Transfer Method: Stand " Pivot  Skilled Intervention: Verbal Cuing, Postural Facilitation, Compensatory Strategies  Sitting in Chair: NT  Sitting Edge of Bed: 20 mins  Standing: NT  Occupational Therapy:   Self Feeding: Independent  Grooming / Hygiene: Independent  Bathing: Independent  Dressing: Independent  Toileting: Independent  Medication Management: Independent  Laundry: Independent  Kitchen Mobility: Independent  Finances: Independent  Home Management: Independent  Shopping: Independent  Prior Level Of Mobility: Independent Without Device in Community, Independent Without Device in Home  Driving / Transportation: Driving Independent  Prior Services: None  Housing / Facility: [address]  Occupation (Pre-Hospital Vocational): Not Employed  Current Level of Function:   Eating: Moderate Assist  Upper Body Dressing: Moderate Assist  Lower Body Dressing: Maximal Assist  Toileting: Maximal Assist  Skilled Intervention: Verbal Cuing, Tactile Cuing, Facilitation  Comments: incontient of bowel in bed, tends to miss L side of face and mouth during grooming   Speech Language Pathology:   Problem List: Dysphagia, Cognitive-Linguistic Deficits  Diet / Liquid Recommendation: Soft & Bite-Sized (6) - (Dysphagia III), Mildly Thick (2) - ([place] Thick)  Rehabilitation Prognosis/Potential: Fair  Estimated Length of Stay: 14-21 days    Nursing:   Orientation : Oriented x 4  Brewer in Place    Scope/Intensity of Services Recommended:  Physical Therapy: 1 hr / day  5 days / week. Therapeutic Interventions Required: Maximize Endurance, Mobility, Strength and Safety  Occupational Therapy: 1 hr / day 5 days / week. Therapeutic Interventions Required: Maximize Self Care, ADLs, IADLs and Energy Conservation  Speech & Language Pathology: 1 hr / day 5 days / week. Therapeutic Interventions Required: Maximize Cognition, Swallowing and Safety  Rehabilitation Nursin/7. Therapeutic Interventions Required: Monitor Pain, Skin, Wound(s), Vital Signs, Intake and  Output, Labs, Safety, Aspiration Risk and Family Training  Rehabilitation Physician: 3 - 5 days / week. Therapeutic Interventions Required: Medical Management    He requires 24-hour rehabilitation nursing to manage bowel and bladder function, skin care, surgical incision, wound, nutrition and fluid intake, pain control, safety, medication management and patient/family goals. In addition, rehabilitation nursing will reiterate and reinforce therapy skills and equipment use, including ADLs, as well as provide education to the patient and family. Thong Arzola is willing to participate in and is able to tolerate the proposed plan of care.    Rehabilitation Goals and Plan (Expected frequency & duration of treatment in the IRF):   Return to the Community, Maximum Assist Level of Care and Family Able to Provide 24/7 Assistance  Anticipated Date of Rehabilitation Admission: 04-29-21  Patient/Family oriented IRF level of care/facility/plan: Yes  Patient/Family willing to participate in IRF care/facility/plan: Yes  Patient able to tolerate IRF level of care proposed: Yes  Patient has potential to benefit IRF level of care proposed: Yes  Comments: Not Applicable    Special Needs or Precautions - Medical Necessity:  Safety Concerns/Precautions:  Fall Risk / High Risk for Falls, Balance, Cognition and Bed / Chair Alarm  Complex Wound Care: Surgical  Pain Management  IV Site: Peripheral  Current Medications:    Current Facility-Administered Medications Ordered in Epic   Medication Dose Route Frequency Provider Last Rate Last Admin   • lisinopril (PRINIVIL) tablet 2.5 mg  2.5 mg Oral Q DAY Genet Murrell M.D.   2.5 mg at 04/29/21 0426   • metoprolol tartrate (LOPRESSOR) tablet 12.5 mg  12.5 mg Oral BID Genet Murrell M.D.   12.5 mg at 04/29/21 0427   • meclizine (ANTIVERT) tablet 25 mg  25 mg Oral TID PRN Campbell Huff M.D.   25 mg at 04/29/21 0929   • mirtazapine (Remeron) tablet 15 mg  15 mg Oral QHS Christofer Eisenberg D.O.    15 mg at 04/28/21 2105   • ondansetron (ZOFRAN ODT) dispertab 4 mg  4 mg Oral Q4HRS PRN Radha Meredith, A.P.R.N.   4 mg at 04/27/21 0930   • acetaminophen (Tylenol) tablet 650 mg  650 mg Oral Q6HRS PRN Radha Meredith, A.P.R.N.   650 mg at 04/29/21 0929   • senna-docusate (PERICOLACE or SENOKOT S) 8.6-50 MG per tablet 1 tablet  1 tablet Oral Nightly Radha Meredith, A.P.R.N.   Stopped at 04/28/21 2100   • oxyCODONE immediate release (ROXICODONE) tablet 10 mg  10 mg Oral Q3HRS PRN Radha Meredith, A.P.R.N.        Or   • oxyCODONE immediate-release (ROXICODONE) tablet 5 mg  5 mg Oral Q3HRS PRN Radha Meredith, A.P.R.N.        Or   • HYDROmorphone (Dilaudid) injection 0.5 mg  0.5 mg Intravenous Q3HRS PRN Radha Meredith, A.P.R.N.       • polyethylene glycol/lytes (MIRALAX) PACKET 1 Packet  1 Packet Oral BID PRN Radha Meredith, A.P.R.N.   1 Packet at 04/27/21 1631   • prazosin (MINIPRESS) capsule 1 mg  1 mg Oral Q EVENING Radha Meredith, A.P.R.N.   1 mg at 04/28/21 1728   • senna-docusate (PERICOLACE or SENOKOT S) 8.6-50 MG per tablet 1 tablet  1 tablet Oral Q24HRS PRN Radha Meredith, A.P.R.N.   1 tablet at 04/23/21 1117   • rivaroxaban (XARELTO) tablet 20 mg  20 mg Oral PM MEAL Radha Meredith, A.P.R.N.   20 mg at 04/28/21 1728   • promethazine (PHENERGAN) tablet 12.5-25 mg  12.5-25 mg Oral Q4HRS PRN Radha Meredith, A.P.R.N.       • magnesium hydroxide (MILK OF MAGNESIA) suspension 30 mL  30 mL Oral QDAY PRN Radha Meredith, A.P.R.N.       • thyroid (ARMOUR THYROID) tablet 60 mg  60 mg Oral BID Radha Meredith, A.P.R.N.   60 mg at 04/29/21 0426   • calcium carbonate (TUMS) chewable tab 500 mg  500 mg Oral Q8HRS PRN Radha Meredith, A.P.R.N.       • baclofen (LIORESAL) tablet 15 mg  15 mg Oral TID Radha Meredith, A.P.R.N.   15 mg at 04/29/21 0426   • aspirin (ASA) chewable tab 81 mg  81 mg Oral DAILY Radha Meredith, A.P.R.N.   81 mg at 04/28/21 1728   • atorvastatin (LIPITOR) tablet 80 mg  80 mg Oral Q EVENING Radha Meredith,  A.P.R.N.   80 mg at 04/28/21 1728   • cloNIDine (CATAPRES) tablet 0.1 mg  0.1 mg Oral Q4HRS PRN Golden Pearson M.D.       • hydrALAZINE (APRESOLINE) injection 10 mg  10 mg Intravenous Q HOUR PRN Cristhian Bell M.D.   10 mg at 04/10/21 1631   • labetalol (NORMODYNE/TRANDATE) injection 10-20 mg  10-20 mg Intravenous Q4HRS PRN Cristhian Bell M.D.   10 mg at 04/10/21 1508   • Pharmacy Consult Request ...Pain Management Review 1 Each  1 Each Other PHARMACY TO DOSE NADEEN Morris.-C.       • MD ALERT...DO NOT ADMINISTER NSAIDS or ASPIRIN unless ORDERED By Neurosurgery 1 Each  1 Each Other PRN LEIGHA MorrisA.-C.       • bisacodyl (DULCOLAX) suppository 10 mg  10 mg Rectal Q24HRS PRN LEIGHA MorrisA.-CYohana   10 mg at 04/11/21 1811   • artificial tears (EYE LUBRICANT) ophth ointment 1 Application  1 Application Both Eyes PRN LEIGHA MorrisA.-C.       • ondansetron (ZOFRAN) syringe/vial injection 4 mg  4 mg Intravenous Q4HRS PRN Gia Forman M.D.   4 mg at 04/23/21 1117   • promethazine (PHENERGAN) suppository 12.5-25 mg  12.5-25 mg Rectal Q4HRS PRN Gia Forman M.D.       • prochlorperazine (COMPAZINE) injection 5-10 mg  5-10 mg Intravenous Q4HRS PRN Gia Forman M.D.   5 mg at 04/17/21 1234     No current Epic-ordered outpatient medications on file.     Diet:   DIET ORDERS (From admission to next 24h)     Start     Ordered    04/20/21 1655  Diet Order Diet: Level 6 - Soft and Bite Sized (float pills in puree); Liquid level: Level 2 - Mildly Thick; Tray Modifications (optional): SLP - 1:1 Supervision by Nursing, SLP - Deliver to Nursing Station  ALL MEALS     Question Answer Comment   Diet: Level 6 - Soft and Bite Sized float pills in puree   Liquid level Level 2 - Mildly Thick    Tray Modifications (optional) SLP - 1:1 Supervision by Nursing    Tray Modifications (optional) SLP - Deliver to Nursing Station        04/20/21 4235    04/19/21 1352  Supplements  ALL MEALS     Question:  Which Supplement   Answer:  BOOST PLUS    04/19/21 3765                Anticipated Discharge Destination / Patient/Family Goal:  Destination: Home with Assistance Support System: Spouse and Family   Anticipated home health services: OT, PT, SLP, Nursing, Social Work and Aide  Previously used HH service/ provider: Not Applicable  Anticipated DME Needs: Wheelchair, Ramp, Commode, Hospital Bed and Life Line  Outpatient Services: OT, PT and SLP  Alternative resources to address additional identified needs:     Pre-Screen Completed: 4/29/2021 10:21 AM Rubén Mai L.P.N.

## 2021-04-29 NOTE — DISCHARGE INSTRUCTIONS
Discharge Instructions    Discharged to other by medical transportation with escort. Discharged via ambulance, hospital escort: Yes.  Special equipment needed: Not Applicable    Be sure to schedule a follow-up appointment with your primary care doctor or any specialists as instructed.     Discharge Plan:   Diet Plan: Discussed  Activity Level: Discussed  Confirmed Follow up Appointment: Appointment Scheduled  Confirmed Symptoms Management: Discussed  Medication Reconciliation Updated: Yes    I understand that a diet low in cholesterol, fat, and sodium is recommended for good health. Unless I have been given specific instructions below for another diet, I accept this instruction as my diet prescription.   Other diet: soft and bite sized thickened liquids    Special Instructions:     Stroke/CVA/TIA/Hemorrhagic Ischemia Discharge Instructions  You have had a stroke. Your risk factors have been identified as follows:  Age - Over 55  Gender - Men are at a higher risk than women  Atrial Fibrillation  Other: COVID  It is important that you reduce your risk factors to avoid another stroke in the future. Here are some general guidelines to follow:  · Eat healthy - avoid food high in fat.  · Get regular exercise.  · Maintain a healthy weight.  · Avoid smoking.  · Avoid alcohol and illegal drug use.  · Take your medications as directed.  For more information regarding risk factors, refer to pages 17-19 in your Stroke Patient Education Guide. Stroke Education Guide was given to patient and significant other.    Warning signs of a stroke include (which can also be found on page 3 of your Stroke Patient Education Guide):  · Sudden numbness of weakness of the face, arm or leg (especially on one side of the body).  · Sudden confusion, trouble speaking or understanding.  · Sudden trouble seeing in one or both eyes.  · Sudden trouble walking, dizziness, loss of balance or coordination.  · Sudden severe headache with no known  cause.  It is very important to get treatment quickly when a stroke occurs. If you experience any of the above warning signs, call [phone] immediately.     Some patients who have had a stroke will be going home on a blood thinner medication called Warfarin (Coumadin).  This medication requires very close monitoring and follow up.  This follow up can be provided by either your Primary Care Physician or by [facility].  The Outpatient Anticoagulation Service is located at the [place] [facility] at [facility] ([facility]).  If you do not know when your follow up appointment is scheduled, call [phone] to verify your appointment time.      · Is patient discharged on Warfarin / Coumadin?   No     Depression / Suicide Risk    As you are discharged from this [facility], it is important to learn how to keep safe from harming yourself.    Recognize the warning signs:  · Abrupt changes in personality, positive or negative- including increase in energy   · Giving away possessions  · Change in eating patterns- significant weight changes-  positive or negative  · Change in sleeping patterns- unable to sleep or sleeping all the time   · Unwillingness or inability to communicate  · Depression  · Unusual sadness, discouragement and loneliness  · Talk of wanting to die  · Neglect of personal appearance   · Rebelliousness- reckless behavior  · Withdrawal from people/activities they love  · Confusion- inability to concentrate     If you or a loved one observes any of these behaviors or has concerns about self-harm, here's what you can do:  · Talk about it- your feelings and reasons for harming yourself  · Remove any means that you might use to hurt yourself (examples: pills, rope, extension cords, firearm)  · Get professional help from the community (Mental Health, Substance Abuse, psychological counseling)  · Do not be alone:Call your Safe  Contact- someone whom you trust who will be there for you.  · Call your local CRISIS HOTLINE 958-1940 or 813-127-8614  · Call your local Children's Mobile Crisis Response Team [place] (615) 576-1596 or www.[employer]  · Call the toll free National Suicide Prevention Hotlines   · National Suicide Prevention Lifeline 408-557-DAUJ (2708)  · National [employer] Line Network 800-SUICIDE (036-8434)

## 2021-04-29 NOTE — PROGRESS NOTES
Admitted per w/c from [facility] via hospital transport a/o x4 with Dx- Right MCA stroke, s/p craniotomy right side of scalp without bone flap, site indented, f/c intact with yellow urine. Denies pain on admission but occasionally experiences headache and lower back pain. Oriented to hospital routine, admission care done

## 2021-04-29 NOTE — DISCHARGE SUMMARY
"Discharge Summary    CHIEF COMPLAINT ON ADMISSION  Chief Complaint   Patient presents with   • Possible Stroke     BIB [facility] for stroke like symptoms. Pt was last seen well by wife at 0630. He texted his wife at 1500 then took a bath. Pt was trying to get out of the bath when symptoms started. Presents with L hemipalegia, signifigant facial paralysis, R gaze deviation. Pt has hx afib and stated, \"my heart's been in afib since Fri.\" Seen by PCP on Mon for afib and started on metoprolol. Presents in SR-ST. + COVID test on 3/18 after flu like s/s x 3/15.       Reason for Admission  Stroke     CODE STATUS  Full Code    HPI & HOSPITAL COURSE  Patient is a pleasant 56 year old with history of paroxysmal atrial fibrillation, dyslipidemia, and hypothyroidism. He presented to the ER with acute left sided weakness and was found to have a right MCA stroke and required a decompression. He was followed closely by neurology and neurosurgery and at this point has been restarted on anticoagulation and low dose aspirin.  he has been tolerating this well.  He did develop urinary retention and due to the blood thinners did develop mild hematuria that has essentially resolved, no clots at this time and hbg has been stable.  A trial with out dye yesterday was unsuccessful so a dye is in again at this point.    Post operatively, he also experienced some vertigo.  This appeared to be multifactorial, with suspected BPV and hypotension.  With adjustment of blood pressure medications and resolution of hypotension, his symptoms have now resolved.   He was also found to have a suppressed TsH at 0.005 so his chronic dose of thyroid replacement was decreased and he will require repeat thyroid function tests in 4 weeks.  At this time he has ongoing left sided hemiparesis and is doing well with therapy. He is tolerating a level 6 diet.  He is being transferred to acute rehab for further treatment and therapy.    Therefore, he is " discharged in fair and stable condition to an inpatient rehabilitation hospital.    The patient met 2-midnight criteria for an inpatient stay at the time of discharge.      FOLLOW UP ITEMS POST DISCHARGE  Acute rehab  neurosurgery    DISCHARGE DIAGNOSES  Principal Problem:    Acute right MCA stroke (HCC) POA: Yes  Active Problems:    Paroxysmal atrial fibrillation (HCC) POA: Yes    Acquired hypothyroidism (Chronic) POA: Yes    History of COVID-19 (Chronic) POA: Yes    Urinary retention POA: Unknown  Resolved Problems:    Leucocytosis POA: Unknown    Fever POA: Unknown      FOLLOW UP  Future Appointments   Date Time Provider Department Center   4/29/2021  2:00 PM [facility]  [facility]     No follow-up provider specified.    MEDICATIONS ON DISCHARGE     Medication List      ASK your doctor about these medications      Instructions   [place] Thyroid 180 MG Tabs  Generic drug: thyroid  Ask about: Which instructions should I use?   Take 90 mg by mouth 2 Times a Day.  Dose: 90 mg     azithromycin 250 MG Tabs  Commonly known as: ZITHROMAX   Take 250-500 mg by mouth every day. Take as directed  Dose: 250-500 mg     B COMPLEX 1 PO   Take 1 tablet by mouth every day.  Dose: 1 tablet     carvedilol 12.5 MG Tabs  Commonly known as: COREG   Take 1 tablet by mouth 2 times a day.  Dose: 1 tablet     metoprolol tartrate 25 MG Tabs  Commonly known as: LOPRESSOR   Take 25 mg by mouth 2 times a day.  Dose: 25 mg     Vitamin C 1000 MG Tabs   Take 1 tablet by mouth every day.  Dose: 1 tablet     VITAMIN D PO   Take 1 capsule by mouth every day.  Dose: 1 capsule     VITAMIN K PO   Take 1 tablet by mouth every day.  Dose: 1 tablet            Allergies  No Known Allergies    DIET  Orders Placed This Encounter   Procedures   • Diet Order Diet: Level 6 - Soft and Bite Sized (float pills in puree); Liquid level: Level 2 - Mildly Thick; Tray Modifications (optional): SLP - 1:1 Supervision by Nursing, SLP - Deliver to Nursing Station      Standing Status:   Standing     Number of Occurrences:   1     Order Specific Question:   Diet:     Answer:   Level 6 - Soft and Bite Sized [23]     Comments:   float pills in puree     Order Specific Question:   Liquid level     Answer:   Level 2 - Mildly Thick     Order Specific Question:   Tray Modifications (optional)     Answer:   SLP - 1:1 Supervision by Nursing     Order Specific Question:   Tray Modifications (optional)     Answer:   SLP - Deliver to Nursing Station       ACTIVITY  As tolerated and directed by rehab.      LINES, DRAINS, AND WOUNDS  This is an automated list. Peripheral IVs will be removed prior to discharge.  Peripheral IV 04/27/21 20 G Anterior;Right Forearm (Active)   Site Assessment Clean;Dry;Intact 04/29/21 0845   Dressing Type Transparent 04/29/21 0845   Line Status Saline locked;Scrubbed the hub prior to access;Flushed 04/29/21 0845   Dressing Status Clean;Dry;Intact 04/29/21 0845   Dressing Intervention N/A 04/29/21 0845   Infiltration Grading ([facility] [place] [facility]) 0 04/29/21 0845   Phlebitis Scale ([facility] Only) 0 04/29/21 0845     Urethral Catheter Coude 16 Fr. (Active)   Site Assessment Skin intact;Bleeding 04/29/21 0845   Collection Container Standard drainage bag 04/29/21 0845   Urinary Catheter Care Tamper Evident Seal in Place;Drainage Tube Extended;Drainage Bag Not Overfilled;Drainage Tubing Properly Secured;Drainage Bag Below Bladder Level and Not on Floor;Cath Care Done with Soap & Water 04/29/21 0845   Securement Method Securing device (Describe) 04/29/21 0845   Output (mL) 700 mL 04/29/21 0000      Wound 04/03/21 Incision Head Right Skin staples, bacitracin. (Active)   Site Assessment Clean;Dry;Intact;Pink 04/29/21 0845   Periwound Assessment Clean;Dry;Intact;Pink 04/29/21 0845   Margins Attached edges 04/29/21 0845   Closure Open to air 04/29/21 0845   Drainage Amount None 04/29/21 0845   Drainage Description Other (Comment) 04/12/21 0800   Treatments Other (Comment) 04/07/21  2000   Wound Cleansing Not Applicable 04/22/21 2000   Dressing Cleansing/Solutions Antibiotic Ointment 04/13/21 0722   Dressing Options Open to Air 04/29/21 0845   Dressing Changed Observed 04/29/21 0845   Dressing Status Open to Air 04/29/21 0845       Peripheral IV 04/27/21 20 G Anterior;Right Forearm (Active)   Site Assessment Clean;Dry;Intact 04/29/21 0845   Dressing Type Transparent 04/29/21 0845   Line Status Saline locked;Scrubbed the hub prior to access;Flushed 04/29/21 0845   Dressing Status Clean;Dry;Intact 04/29/21 0845   Dressing Intervention N/A 04/29/21 0845   Infiltration Grading ([facility], [facility]) 0 04/29/21 0845   [facility] ([facility]) 0 04/29/21 0845           Urethral Catheter Coude 16 Fr. (Active)   Site Assessment Skin intact;Bleeding 04/29/21 0845   Collection Container Standard drainage bag 04/29/21 0845   Urinary Catheter Care Tamper Evident Seal in Place;Drainage Tube Extended;Drainage Bag Not Overfilled;Drainage Tubing Properly Secured;Drainage Bag Below Bladder Level and Not on Floor;Cath Care Done with Soap & Water 04/29/21 0845   Securement Method Securing device (Describe) 04/29/21 0845   Output (mL) 700 mL 04/29/21 0000        MENTAL STATUS ON TRANSFER  Level of Consciousness: Lethargic  Orientation : Oriented x 4  Speech: Speech Clear    CONSULTATIONS  Neurosurgery  Neurology  Critical care    PROCEDURES  CT-HEAD W/O   Final Result         1.  Evolving area of infarct within the right MCA distribution.   2.  Ribbonlike gyriform hyperdensity at the area of prior infarct, appearance likely corresponding with gyriform laminar necrosis, component of subtle subarachnoid hemorrhage cannot be definitively excluded radiographically.   3.  Minimal pneumocephalus, decreased since prior study.   4.  Atherosclerosis.      US-EXTREMITY VENOUS LOWER BILAT   Final Result      DX-CHEST-LIMITED (1 VIEW)   Final Result      Right basilar atelectasis. No focal consolidation or pleural effusions.       [id number] VIEW   Final Result      No evidence of bowel obstruction.                  DX-CHEST-LIMITED (1 VIEW)   Final Result      1.  Right internal jugular catheter and enteric catheter appear appropriately located      2.  Minimal right basilar atelectasis      CT-HEAD W/O   Final Result         1.  Changes of evolving infarct within the right MCA distribution   2.  Hyperdensity in the sulci of the right MCA distribution infarct, largely appears related to thrombosed vessels, component of subarachnoid hemorrhage is suspected particularly in the right frontal lobe.   3.  Pneumocephalus, decreased since prior.      CT-HEAD W/O   Final Result         Postsurgical change from right craniectomy. Redemonstration of large right MCA infarct with edema and bulging of the brain parenchyma through the craniectomy defect      Less mass effect on the right lateral ventricle. Less right to left midline shift of 3 mm, previously 10 mm.         DX-ABDOMEN FOR TUBE PLACEMENT   Final Result      Enteric tube terminates over the stomach.      CT-HEAD W/O   Final Result         1.  Low-density changes of the right hemisphere compatible with MCA distribution infarct with edema.   2.  New effacement of the bilateral ventricles, right greater than left, with 10 mm right-to-left midline shift.   3.  New dilatation of the left temporal horn ventricle, appearance favors component of obstructive hydrocephalus due to mass effect from infarct and edema.   4.  Hyperdensity in the right middle cerebral artery, likely thrombosis given associated findings.      These findings were discussed with the patient's clinician, Dr. Nunez, on 4/3/2021 7:11 AM.      MR-BRAIN-W/O   Final Result      Very large acute right MCA territory infarct as detailed above with small amount of petechial hemorrhage in the right insular region and right temporal lobe.      Punctate right thalamic lacunar infarct.      Right ICA and M1 MCA occlusion.       EC-ECHOCARDIOGRAM COMPLETE W/O CONT   Final Result      DX-CHEST-PORTABLE (1 VIEW)   Final Result         1.  Interstitial pulmonary parenchymal prominence, compatible with interstitial edema and/or infiltrates.      CT-CTA NECK WITH & W/O-POST PROCESSING   Final Result      Acute occlusion of the right internal carotid artery shortly after bifurcation. It is occluded up to the level of the carotid terminus.      CT-CTA HEAD WITH & W/O-POST PROCESS   Final Result      Acute right M1 occlusion.      Findings were discussed with RHONDA DIAZ on 3/31/2021 7:54 PM.      CT-CEREBRAL PERFUSION ANALYSIS   Final Result      1.  Cerebral blood flow less than 30% likely representing completed infarct = 134 mL.      2.  T Max more than 6 seconds likely representing combination of completed infarct and ischemia = 210 mL.      3.  Mismatched volume likely representing ischemic brain/penumbra = 76 mL.      4.  Please note that the cerebral perfusion was performed on the limited brain tissue around the basal ganglia region. Infarct/ischemia outside the CT perfusion sections can be missed in this study.      CT-HEAD W/O   Final Result   Addendum 1 of 1   In retrospect, there is subtle loss of gray-white matter differentiation    in the right MCA distribution, consistent with acute infarct.      Final      1.  No CT evidence of acute infarct, hemorrhage or mass.   2.  Mild paranasal sinus disease.      DX-ESOPHAGUS - [id number]    (Results Pending)         LABORATORY  Lab Results   Component Value Date    SODIUM 136 04/28/2021    POTASSIUM 4.4 04/28/2021    CHLORIDE 101 04/28/2021    CO2 23 04/28/2021    GLUCOSE 111 (H) 04/28/2021    BUN 22 04/28/2021    CREATININE 1.12 04/28/2021    CREATININE 1.3 04/04/2008        Lab Results   Component Value Date    WBC 10.9 (H) 04/27/2021    HEMOGLOBIN 13.4 (L) 04/27/2021    HEMATOCRIT 38.6 (L) 04/27/2021    PLATELETCT 361 04/27/2021        Total time of the discharge process exceeds  40 minutes.

## 2021-04-29 NOTE — DISCHARGE PLANNING
Face sheet reflects [facility]. Msg left for Maura with [facility] @ [phone] to look into  Benefits.  Will discuss this case again with the Admissions Team this morning. Msg placed to Dr. Murrell-seeking medical clearance.     1018-Dr. Murrell has medically cleared.  Spoke with Maura @ [facility] and she has authorized.  Case is under review by Dr. Monge.     1108-Dr. Monge has accepted.  Transport has been arranged via GMT between [id number].  Msg placed to Dr. Murrell, Gretchen DUKE & Anel caballero.  Nicolette HSINE is aware.

## 2021-04-29 NOTE — DISCHARGE PLANNING
Anticipated Discharge Disposition:   [facility]    Action:    Pt accepted by Dr. Monge to [facility] and transport via [facility] at  per [facility].  Pts spouse agreeable.  Verbal for Cobra obtained.      Cobra signed by Dr. Murrell.    Cobra form placed with patient's hard chart.    Barriers to Discharge:    None    Plan:    DC

## 2021-04-30 ENCOUNTER — APPOINTMENT (OUTPATIENT)
Dept: RADIOLOGY | Facility: REHABILITATION | Age: 56
DRG: 057 | End: 2021-04-30
Attending: PHYSICAL MEDICINE & REHABILITATION
Payer: COMMERCIAL

## 2021-04-30 ENCOUNTER — APPOINTMENT (OUTPATIENT)
Dept: PAIN MANAGEMENT | Facility: REHABILITATION | Age: 56
DRG: 057 | End: 2021-04-30
Attending: PHYSICAL MEDICINE & REHABILITATION
Payer: COMMERCIAL

## 2021-04-30 PROBLEM — D64.9 NORMOCYTIC ANEMIA: Status: ACTIVE | Noted: 2021-04-30

## 2021-04-30 PROBLEM — I95.9 HYPOTENSION: Status: ACTIVE | Noted: 2021-04-30

## 2021-04-30 LAB
ALBUMIN SERPL BCP-MCNC: 3.4 G/DL (ref 3.2–4.9)
ALBUMIN/GLOB SERPL: 1 G/DL
ALP SERPL-CCNC: 91 U/L (ref 30–99)
ALT SERPL-CCNC: 25 U/L (ref 2–50)
ANION GAP SERPL CALC-SCNC: 10 MMOL/L (ref 7–16)
AST SERPL-CCNC: 25 U/L (ref 12–45)
BASOPHILS # BLD AUTO: 0.2 % (ref 0–1.8)
BASOPHILS # BLD: 0.01 K/UL (ref 0–0.12)
BILIRUB SERPL-MCNC: 0.3 MG/DL (ref 0.1–1.5)
BUN SERPL-MCNC: 18 MG/DL (ref 8–22)
CALCIUM SERPL-MCNC: 9.8 MG/DL (ref 8.5–10.5)
CHLORIDE SERPL-SCNC: 100 MMOL/L (ref 96–112)
CO2 SERPL-SCNC: 26 MMOL/L (ref 20–33)
CREAT SERPL-MCNC: 1.04 MG/DL (ref 0.5–1.4)
EOSINOPHIL # BLD AUTO: 0.19 K/UL (ref 0–0.51)
EOSINOPHIL NFR BLD: 3.4 % (ref 0–6.9)
ERYTHROCYTE [DISTWIDTH] IN BLOOD BY AUTOMATED COUNT: 38.9 FL (ref 35.9–50)
GLOBULIN SER CALC-MCNC: 3.4 G/DL (ref 1.9–3.5)
GLUCOSE SERPL-MCNC: 106 MG/DL (ref 65–99)
HCT VFR BLD AUTO: 39.9 % (ref 42–52)
HGB BLD-MCNC: 13.8 G/DL (ref 14–18)
IMM GRANULOCYTES # BLD AUTO: 0.02 K/UL (ref 0–0.11)
IMM GRANULOCYTES NFR BLD AUTO: 0.4 % (ref 0–0.9)
LYMPHOCYTES # BLD AUTO: 1.42 K/UL (ref 1–4.8)
LYMPHOCYTES NFR BLD: 25.3 % (ref 22–41)
MAGNESIUM SERPL-MCNC: 2 MG/DL (ref 1.5–2.5)
MCH RBC QN AUTO: 29.6 PG (ref 27–33)
MCHC RBC AUTO-ENTMCNC: 34.6 G/DL (ref 33.7–35.3)
MCV RBC AUTO: 85.6 FL (ref 81.4–97.8)
MONOCYTES # BLD AUTO: 0.45 K/UL (ref 0–0.85)
MONOCYTES NFR BLD AUTO: 8 % (ref 0–13.4)
NEUTROPHILS # BLD AUTO: 3.53 K/UL (ref 1.82–7.42)
NEUTROPHILS NFR BLD: 62.7 % (ref 44–72)
NRBC # BLD AUTO: 0 K/UL
NRBC BLD-RTO: 0 /100 WBC
PLATELET # BLD AUTO: 331 K/UL (ref 164–446)
PMV BLD AUTO: 9.6 FL (ref 9–12.9)
POTASSIUM SERPL-SCNC: 4.2 MMOL/L (ref 3.6–5.5)
PROT SERPL-MCNC: 6.8 G/DL (ref 6–8.2)
RBC # BLD AUTO: 4.66 M/UL (ref 4.7–6.1)
SODIUM SERPL-SCNC: 136 MMOL/L (ref 135–145)
TSH SERPL DL<=0.005 MIU/L-ACNC: 0.67 UIU/ML (ref 0.38–5.33)
WBC # BLD AUTO: 5.6 K/UL (ref 4.8–10.8)

## 2021-04-30 PROCEDURE — 92610 EVALUATE SWALLOWING FUNCTION: CPT

## 2021-04-30 PROCEDURE — 36415 COLL VENOUS BLD VENIPUNCTURE: CPT

## 2021-04-30 PROCEDURE — 97162 PT EVAL MOD COMPLEX 30 MIN: CPT

## 2021-04-30 PROCEDURE — 92523 SPEECH SOUND LANG COMPREHEN: CPT

## 2021-04-30 PROCEDURE — 84443 ASSAY THYROID STIM HORMONE: CPT

## 2021-04-30 PROCEDURE — 99254 IP/OBS CNSLTJ NEW/EST MOD 60: CPT | Performed by: HOSPITALIST

## 2021-04-30 PROCEDURE — 97167 OT EVAL HIGH COMPLEX 60 MIN: CPT

## 2021-04-30 PROCEDURE — 97530 THERAPEUTIC ACTIVITIES: CPT

## 2021-04-30 PROCEDURE — 770010 HCHG ROOM/CARE - REHAB SEMI PRIVAT*

## 2021-04-30 PROCEDURE — 85025 COMPLETE CBC W/AUTO DIFF WBC: CPT

## 2021-04-30 PROCEDURE — 700105 HCHG RX REV CODE 258: Performed by: HOSPITALIST

## 2021-04-30 PROCEDURE — 97535 SELF CARE MNGMENT TRAINING: CPT

## 2021-04-30 PROCEDURE — 99233 SBSQ HOSP IP/OBS HIGH 50: CPT | Performed by: PHYSICAL MEDICINE & REHABILITATION

## 2021-04-30 PROCEDURE — 82306 VITAMIN D 25 HYDROXY: CPT

## 2021-04-30 PROCEDURE — 700102 HCHG RX REV CODE 250 W/ 637 OVERRIDE(OP): Performed by: PHYSICAL MEDICINE & REHABILITATION

## 2021-04-30 PROCEDURE — 83735 ASSAY OF MAGNESIUM: CPT

## 2021-04-30 PROCEDURE — A9270 NON-COVERED ITEM OR SERVICE: HCPCS | Performed by: PHYSICAL MEDICINE & REHABILITATION

## 2021-04-30 PROCEDURE — 80053 COMPREHEN METABOLIC PANEL: CPT

## 2021-04-30 RX ORDER — SODIUM CHLORIDE 9 MG/ML
1000 INJECTION, SOLUTION INTRAVENOUS ONCE
Status: COMPLETED | OUTPATIENT
Start: 2021-04-30 | End: 2021-05-01

## 2021-04-30 RX ORDER — DIAZEPAM 2 MG/1
2 TABLET ORAL
Status: DISCONTINUED | OUTPATIENT
Start: 2021-04-30 | End: 2021-05-04

## 2021-04-30 RX ADMIN — BACLOFEN 20 MG: 20 TABLET ORAL at 09:22

## 2021-04-30 RX ADMIN — ASPIRIN 81 MG CHEWABLE TABLET 81 MG: 81 TABLET CHEWABLE at 17:42

## 2021-04-30 RX ADMIN — METOPROLOL TARTRATE 12.5 MG: 25 TABLET, FILM COATED ORAL at 20:11

## 2021-04-30 RX ADMIN — MECLIZINE HYDROCHLORIDE 25 MG: 25 TABLET ORAL at 05:15

## 2021-04-30 RX ADMIN — MECLIZINE HYDROCHLORIDE 25 MG: 25 TABLET ORAL at 09:30

## 2021-04-30 RX ADMIN — ATORVASTATIN CALCIUM 80 MG: 40 TABLET, FILM COATED ORAL at 20:11

## 2021-04-30 RX ADMIN — THYROID, PORCINE 60 MG: 30 TABLET ORAL at 09:27

## 2021-04-30 RX ADMIN — LISINOPRIL 2.5 MG: 5 TABLET ORAL at 05:15

## 2021-04-30 RX ADMIN — METOPROLOL TARTRATE 12.5 MG: 25 TABLET, FILM COATED ORAL at 09:22

## 2021-04-30 RX ADMIN — MECLIZINE HYDROCHLORIDE 25 MG: 25 TABLET ORAL at 14:14

## 2021-04-30 RX ADMIN — THYROID, PORCINE 60 MG: 30 TABLET ORAL at 20:11

## 2021-04-30 RX ADMIN — TAMSULOSIN HYDROCHLORIDE 0.4 MG: 0.4 CAPSULE ORAL at 20:11

## 2021-04-30 RX ADMIN — MIRTAZAPINE 15 MG: 15 TABLET, ORALLY DISINTEGRATING ORAL at 20:11

## 2021-04-30 RX ADMIN — BACLOFEN 20 MG: 20 TABLET ORAL at 15:11

## 2021-04-30 RX ADMIN — DIAZEPAM 2 MG: 2 TABLET ORAL at 20:11

## 2021-04-30 RX ADMIN — ACETAMINOPHEN 650 MG: 325 TABLET, FILM COATED ORAL at 14:14

## 2021-04-30 RX ADMIN — SODIUM CHLORIDE 1000 ML: 9 INJECTION, SOLUTION INTRAVENOUS at 14:09

## 2021-04-30 RX ADMIN — RIVAROXABAN 20 MG: 20 TABLET, FILM COATED ORAL at 17:42

## 2021-04-30 RX ADMIN — BACLOFEN 20 MG: 20 TABLET ORAL at 20:11

## 2021-04-30 ASSESSMENT — BRIEF INTERVIEW FOR MENTAL STATUS (BIMS)
ASKED TO RECALL BED: YES, NO CUE REQUIRED
WHAT MONTH IS IT: ACCURATE WITHIN 5 DAYS
ASKED TO RECALL BLUE: YES, NO CUE REQUIRED
WHAT YEAR IS IT: CORRECT
BIMS SUMMARY SCORE: 15
ASKED TO RECALL SOCK: YES, NO CUE REQUIRED
INITIAL REPETITION OF BED BLUE SOCK - FIRST ATTEMPT: 3
WHAT DAY OF THE WEEK IS IT: CORRECT

## 2021-04-30 ASSESSMENT — GAIT ASSESSMENTS: GAIT LEVEL OF ASSIST: UNABLE TO PARTICIPATE

## 2021-04-30 ASSESSMENT — PATIENT HEALTH QUESTIONNAIRE - PHQ9
2. FEELING DOWN, DEPRESSED, IRRITABLE, OR HOPELESS: NOT AT ALL
SUM OF ALL RESPONSES TO PHQ9 QUESTIONS 1 AND 2: 1
1. LITTLE INTEREST OR PLEASURE IN DOING THINGS: SEVERAL DAYS

## 2021-04-30 ASSESSMENT — PAIN DESCRIPTION - PAIN TYPE: TYPE: ACUTE PAIN

## 2021-04-30 ASSESSMENT — ACTIVITIES OF DAILY LIVING (ADL): TOILETING: INDEPENDENT

## 2021-04-30 NOTE — PROGRESS NOTES
"Rehab Progress Note     Date of Service: 4/30/2021  Chief Complaint: follow up stroke    Interval Events (Subjective)    Patient seen and examined today in his room. He reports he didn't sleep well last night due to noises and hearing the staff.     He is slightly dizzy this morning after sitting up for only 5 minutes with therapy. Will not be able to tolerate MBS today, will need to reschedule. Hospitalist consulted for assistance with his BP.    Objective:  VITAL SIGNS: /78   Pulse 95   Temp 36.8 °C (98.3 °F) (Oral)   Resp 17   Ht 1.778 m (5' 10\")   Wt 76.5 kg (168 lb 10.4 oz)   SpO2 96%   BMI 24.20 kg/m²   Gen: alert, no apparent distress  HEENT: left craniectomy defect  Neuro: notable for spastic left hemiplegia      Recent Results (from the past 72 hour(s))   Basic Metabolic Panel    Collection Time: 04/28/21  4:03 AM   Result Value Ref Range    Sodium 136 135 - 145 mmol/L    Potassium 4.4 3.6 - 5.5 mmol/L    Chloride 101 96 - 112 mmol/L    Co2 23 20 - 33 mmol/L    Glucose 111 (H) 65 - 99 mg/dL    Bun 22 8 - 22 mg/dL    Creatinine 1.12 0.50 - 1.40 mg/dL    Calcium 9.7 8.5 - 10.5 mg/dL    Anion Gap 12.0 7.0 - 16.0   ESTIMATED GFR    Collection Time: 04/28/21  4:03 AM   Result Value Ref Range    GFR If African American >60 >60 mL/min/1.73 m 2    GFR If Non African American >60 >60 mL/min/1.73 m 2   CBC with Differential    Collection Time: 04/30/21  6:14 AM   Result Value Ref Range    WBC 5.6 4.8 - 10.8 K/uL    RBC 4.66 (L) 4.70 - 6.10 M/uL    Hemoglobin 13.8 (L) 14.0 - 18.0 g/dL    Hematocrit 39.9 (L) 42.0 - 52.0 %    MCV 85.6 81.4 - 97.8 fL    MCH 29.6 27.0 - 33.0 pg    MCHC 34.6 33.7 - 35.3 g/dL    RDW 38.9 35.9 - 50.0 fL    Platelet Count 331 164 - 446 K/uL    MPV 9.6 9.0 - 12.9 fL    Neutrophils-Polys 62.70 44.00 - 72.00 %    Lymphocytes 25.30 22.00 - 41.00 %    Monocytes 8.00 0.00 - 13.40 %    Eosinophils 3.40 0.00 - 6.90 %    Basophils 0.20 0.00 - 1.80 %    Immature Granulocytes 0.40 0.00 - " 0.90 %    Nucleated RBC 0.00 /100 WBC    Neutrophils (Absolute) 3.53 1.82 - 7.42 K/uL    Lymphs (Absolute) 1.42 1.00 - 4.80 K/uL    Monos (Absolute) 0.45 0.00 - 0.85 K/uL    Eos (Absolute) 0.19 0.00 - 0.51 K/uL    Baso (Absolute) 0.01 0.00 - 0.12 K/uL    Immature Granulocytes (abs) 0.02 0.00 - 0.11 K/uL    NRBC (Absolute) 0.00 K/uL   Comp Metabolic Panel (CMP)    Collection Time: 04/30/21  6:14 AM   Result Value Ref Range    Sodium 136 135 - 145 mmol/L    Potassium 4.2 3.6 - 5.5 mmol/L    Chloride 100 96 - 112 mmol/L    Co2 26 20 - 33 mmol/L    Anion Gap 10.0 7.0 - 16.0    Glucose 106 (H) 65 - 99 mg/dL    Bun 18 8 - 22 mg/dL    Creatinine 1.04 0.50 - 1.40 mg/dL    Calcium 9.8 8.5 - 10.5 mg/dL    AST(SGOT) 25 12 - 45 U/L    ALT(SGPT) 25 2 - 50 U/L    Alkaline Phosphatase 91 30 - 99 U/L    Total Bilirubin 0.3 0.1 - 1.5 mg/dL    Albumin 3.4 3.2 - 4.9 g/dL    Total Protein 6.8 6.0 - 8.2 g/dL    Globulin 3.4 1.9 - 3.5 g/dL    A-G Ratio 1.0 g/dL   Magnesium    Collection Time: 04/30/21  6:14 AM   Result Value Ref Range    Magnesium 2.0 1.5 - 2.5 mg/dL   TSH WITH REFLEX TO FT4    Collection Time: 04/30/21  6:14 AM   Result Value Ref Range    TSH 0.670 0.380 - 5.330 uIU/mL   ESTIMATED GFR    Collection Time: 04/30/21  6:14 AM   Result Value Ref Range    GFR If African American >60 >60 mL/min/1.73 m 2    GFR If Non African American >60 >60 mL/min/1.73 m 2       Current Facility-Administered Medications   Medication Frequency   • hydrOXYzine HCl (ATARAX) tablet 50 mg Q6HRS PRN   • melatonin tablet 3 mg HS PRN   • Respiratory Therapy Consult Continuous RT   • Pharmacy Consult Request ...Pain Management Review 1 Each PHARMACY TO DOSE   • hydrALAZINE (APRESOLINE) tablet 10 mg Q8HRS PRN   • acetaminophen (Tylenol) tablet 650 mg Q4HRS PRN   • lactulose 20 GM/30ML solution 30 mL QDAY PRN   • docusate sodium (ENEMEEZ) enema 283 mg QDAY PRN   • fleet enema 133 mL QDAY PRN   • artificial tears ophthalmic solution 1 Drop PRN   •  benzocaine-menthol (CEPACOL) lozenge 1 Lozenge Q2HRS PRN   • mag hydrox-al hydrox-simeth (MAALOX PLUS ES or MYLANTA DS) suspension 20 mL Q2HRS PRN   • ondansetron (ZOFRAN ODT) dispertab 4 mg 4X/DAY PRN    Or   • ondansetron (ZOFRAN) syringe/vial injection 4 mg 4X/DAY PRN   • traZODone (DESYREL) tablet 50 mg QHS PRN   • sodium chloride (OCEAN) 0.65 % nasal spray 2 Spray PRN   • midazolam (VERSED) 5 mg/mL (1 mL vial) PRN   • atorvastatin (LIPITOR) tablet 80 mg Q EVENING   • aspirin (ASA) chewable tab 81 mg DAILY   • meclizine (ANTIVERT) tablet 25 mg TID PRN   • metoprolol tartrate (LOPRESSOR) tablet 12.5 mg BID   • mirtazapine (Remeron) orally disintegrating tab 15 mg QHS   • rivaroxaban (XARELTO) tablet 20 mg PM MEAL   • thyroid (ARMOUR THYROID) tablet 60 mg BID   • tamsulosin (FLOMAX) capsule 0.4 mg QHS   • baclofen (LIORESAL) tablet 20 mg TID       Orders Placed This Encounter   Procedures   • Diet Order Diet: Level 6 - Soft and Bite Sized (float pills in puree); Liquid level: Level 2 - Mildly Thick; Tray Modifications (optional): SLP - 1:1 Supervision by Nursing, SLP - Deliver to Nursing Station     Standing Status:   Standing     Number of Occurrences:   1     Order Specific Question:   Diet:     Answer:   Level 6 - Soft and Bite Sized [23]     Comments:   float pills in puree     Order Specific Question:   Liquid level     Answer:   Level 2 - Mildly Thick     Order Specific Question:   Tray Modifications (optional)     Answer:   SLP - 1:1 Supervision by Nursing     Order Specific Question:   Tray Modifications (optional)     Answer:   SLP - Deliver to Nursing Station       Assessment:  Active Hospital Problems    Diagnosis    • *Acute right MCA stroke (HCC)    • Paroxysmal atrial fibrillation (HCC)    • Urinary retention    • Acquired hypothyroidism    • History of COVID-19    • Hypotension    • Normocytic anemia    • Spasticity as late effect of cerebrovascular accident (CVA)    • Reactive depression         Medical Decision Making and Plan:    Large right ICA/MCA ischemic stroke  Continue full rehab program  PT/OT/SLP, 1 hr each discipline, 5 days per week    Xarelot  Statin     Spasticity  Increased baclofen 15 mg TID to 20 mg TID, continue to titrate  Start valium at night, may also help with insomnia  Would benefit from Botox, will try to get approval to do as an inpatient     Atrial fibrillation  Metoprolol  Xarelto     Urinary retention  Change Prazosin to Flomax  Voiding trial next week  Failed recent trial/history of traumatic placement/hematuris     Hypothyroidism   Loris thyroid     Hypertension  Hypotension  Lisinopril discontinued  Metoprolol  Monitor for orthostasis on Flomax  Hospitalist consulted  Teds and abdominal binder    Insomnia  Already on mirtazapine  Start trial of Valium, may also help with spasticity     Reactive depression  Mirtazapine  Consider psychology consult if needed      History of COVID-19  Without sequelae     Normocytic anemia  Mild, monitor    Bowel program  Continue bowel medications  Last  4/30    Bladder program  Urinary retention  Change Prazosin to Flomax  Voiding trial next week  Failed recent trial/history of traumatic placement/hematuris  Continue Brewer for now    DVT prophylaxis  Xarelto    Total time:  35 minutes.  I spent greater than 50% of the time for patient care, counseling, and coordination on this date, including patient face-to face time, unit/floor time with review of records/pertinent lab data and studies, as well as discussing diagnostic evaluation/work up, planned therapeutic interventions, and future disposition of care, as per the interval events/subjective and the assessment and plan as noted above.      Radha Monge M.D.   Physical Medicine and Rehabilitation

## 2021-04-30 NOTE — CARE PLAN
Problem: Urinary Elimination:  Goal: Ability to reestablish a normal urinary elimination pattern will improve  Outcome: PROGRESSING SLOWER THAN EXPECTED  Note: Pt with coude dye catheter draining adequate amount  of clear annalisa urine. Dye care provided by staff.

## 2021-04-30 NOTE — CARE PLAN
Problem: Safety  Goal: Will remain free from injury  Outcome: PROGRESSING AS EXPECTED  Note: Pt uses call light  appropriately. Waits for assistance and does not attempt self transfer this shift. Able to verbalize needs.      Problem: Bowel/Gastric:  Goal: Normal bowel function is maintained or improved  Outcome: PROGRESSING SLOWER THAN EXPECTED  Note: Pt incontinent of bowel this shift. Cleansed and kept dry throughout the night. Will continue to monitor.

## 2021-04-30 NOTE — CONSULTS
HOSPITAL MEDICINE CONSULTATION    Requesting Physician:  Dr. Monge    Reason for Consult:  Hypertension with low blood pressure    History of Present Illness:  The patient is a 56-year-old  male with past medical history significant for recent COVID-19, atrial fibrillation premorbidly not on anticoagulation, and hypothyroidism.  He was admitted to [facility] on 3/31/21 for left sided weakness.  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  He underwent decompressive craniectomy for increased intracranial pressure on 4/3/21 by Dr. Payton.  The patient was ultimately placed on anticoagulation, antiplatelet, and statin therapy.  Due to his ongoing functional debility, the patient was transferred to [facility] on 4/29/21.  Hospital Medicine consultation is requested to assist in the management of this patient's labile blood pressures.    Review of Systems:  Review of Systems   Constitutional: Negative for chills and fever.   Eyes: Negative.    Respiratory: Negative for cough and shortness of breath.    Cardiovascular: Negative for chest pain and palpitations.   Gastrointestinal: Negative for abdominal pain, nausea and vomiting.   Musculoskeletal:        Wound pain    Skin: Negative for itching and rash.   Neurological: Positive for focal weakness.   Endo/Heme/Allergies: Negative for polydipsia. Does not bruise/bleed easily.   All other systems reviewed and are negative.      Allergies:  No Known Allergies    Medications:    Current Facility-Administered Medications:   •  baclofen (LIORESAL) tablet 30 mg, 30 mg, Oral, TID, Radha Monge M.D., 30 mg at 05/03/21 2027  •  meclizine (ANTIVERT) tablet 25 mg, 25 mg, Oral, TID, Christofer Eisenberg D.O., 25 mg at 05/03/21 2021  •  diazePAM (VALIUM) tablet 2 mg, 2 mg, Oral, QHS, Radha Monge M.D., 2 mg at 05/03/21 2020  •  hydrOXYzine HCl (ATARAX) tablet 50  mg, 50 mg, Oral, Q6HRS PRN, Radha Monge M.D.  •  melatonin tablet 3 mg, 3 mg, Oral, HS PRN, Radha Monge M.D.  •  Respiratory Therapy Consult, , Nebulization, Continuous RT, Radha Monge M.D.  •  Pharmacy Consult Request ...Pain Management Review 1 Each, 1 Each, Other, PHARMACY TO DOSE, Radha Monge M.D.  •  hydrALAZINE (APRESOLINE) tablet 10 mg, 10 mg, Oral, Q8HRS PRN, Radha Monge M.D.  •  acetaminophen (Tylenol) tablet 650 mg, 650 mg, Oral, Q4HRS PRN, Radha Monge M.D., 650 mg at 04/30/21 1414  •  lactulose 20 GM/30ML solution 30 mL, 30 mL, Oral, QDAY PRN, Radha Monge M.D., 30 mL at 04/29/21 2137  •  docusate sodium (ENEMEEZ) enema 283 mg, 283 mg, Rectal, QDAY PRN, Radha Monge M.D.  •  fleet enema 133 mL, 1 Each, Rectal, QDAY PRN, Radha Monge M.D.  •  artificial tears ophthalmic solution 1 Drop, 1 Drop, Both Eyes, PRN, Radha Monge M.D.  •  benzocaine-menthol (CEPACOL) lozenge 1 Lozenge, 1 Lozenge, Mouth/Throat, Q2HRS PRN, Radha Monge M.D.  •  mag hydrox-al hydrox-simeth (MAALOX PLUS ES or MYLANTA DS) suspension 20 mL, 20 mL, Oral, Q2HRS PRN, Radha Monge M.D.  •  ondansetron (ZOFRAN ODT) dispertab 4 mg, 4 mg, Oral, 4X/DAY PRN **OR** ondansetron (ZOFRAN) syringe/vial injection 4 mg, 4 mg, Intramuscular, 4X/DAY PRN, Radha Monge M.D.  •  traZODone (DESYREL) tablet 50 mg, 50 mg, Oral, QHS PRN, Radha Monge M.D.  •  sodium chloride (OCEAN) 0.65 % nasal spray 2 Spray, 2 Spray, Nasal, PRN, Radha Monge M.D.  •  midazolam (VERSED) 5 mg/mL (1 mL vial), 5 mg, Nasal, PRN, Radha Monge M.D.  •  atorvastatin (LIPITOR) tablet 80 mg, 80 mg, Oral, Q EVENING, Radha Monge M.D., 80 mg at 05/03/21 2021  •  aspirin (ASA) chewable tab 81 mg, 81 mg, Oral, DAILY, Radha Monge M.D., 81 mg at 05/03/21 1715  •  metoprolol tartrate (LOPRESSOR) tablet 12.5 mg, 12.5 mg, Oral, BID, Radha Monge M.D., 12.5 mg at 05/03/21  2021  •  mirtazapine (Remeron) orally disintegrating tab 15 mg, 15 mg, Oral, QHS, Radha Monge M.D., 15 mg at 05/03/21 2021  •  rivaroxaban (XARELTO) tablet 20 mg, 20 mg, Oral, PM MEAL, Radha Monge M.D., 20 mg at 05/03/21 1715  •  thyroid (ARMOUR THYROID) tablet 60 mg, 60 mg, Oral, BID, Radha Monge M.D., 60 mg at 05/03/21 2020  •  tamsulosin (FLOMAX) capsule 0.4 mg, 0.4 mg, Oral, QHS, Radha Monge M.D., 0.4 mg at 05/03/21 2021    Past Medical/Surgical History:  Past Medical History:   Diagnosis Date   • Afib (HCC)    • COVID-19    • High cholesterol      Past Surgical History:   Procedure Laterality Date   • CRANIOTOMY Right 4/3/2021    Procedure: CRANIOTOMY;  Surgeon: Arthur Payton M.D.;  Location: SURGERY [facility];  Service: Neurosurgery   • KNEE RECONSTRUCTION      left knee orthoscopic       Social History:  Social History     Socioeconomic History   • Marital status:      Spouse name: Not on file   • Number of children: Not on file   • Years of education: Not on file   • Highest education level: Not on file   Occupational History   • Not on file   Tobacco Use   • Smoking status: Never Smoker   • Smokeless tobacco: Never Used   Substance and Sexual Activity   • Alcohol use: Yes     Comment: occ   • Drug use: No   • Sexual activity: Not on file   Other Topics Concern   • Not on file   Social History Narrative   • Not on file     Social Determinants of Health     Financial Resource Strain:    • Difficulty of Paying Living Expenses:    Food Insecurity:    • Worried About Running Out of Food in the Last Year:    • Ran Out of Food in the Last Year:    Transportation Needs:    • Lack of Transportation (Medical):    • Lack of Transportation (Non-Medical):    Physical Activity:    • Days of Exercise per Week:    • Minutes of Exercise per Session:    Stress:    • Feeling of Stress :    Social Connections:    • Frequency of Communication with Friends and Family:    • Frequency of  Social Gatherings with Friends and Family:    • Attends [employer] Services:    • Active Member of Clubs or Organizations:    • Attends Club or Organization Meetings:    • Marital Status:    Intimate Partner Violence:    • Fear of Current or Ex-Partner:    • Emotionally Abused:    • Physically Abused:    • Sexually Abused:        Family History  History reviewed. No pertinent family history.    Physical Examination:   Vitals:    05/03/21 0700 05/03/21 1400 05/03/21 1853 05/03/21 2021   BP: 114/78 113/73 115/79 115/79   Pulse: 77 82 (!) 101 (!) 101   Resp: 18 18 18    Temp: 36.4 °C (97.5 °F)  36.8 °C (98.2 °F)    TempSrc: Temporal  Oral    SpO2: 95% 97% 97%    Weight:       Height:           Physical Exam   Constitutional: No distress.   HENT:   Right Ear: External ear normal.   Left Ear: External ear normal.   Right skull defect   Eyes: Conjunctivae and EOM are normal. Right eye exhibits no discharge. Left eye exhibits no discharge.   Neck: No tracheal deviation present.   Cardiovascular: Normal rate and regular rhythm.   Pulmonary/Chest: Effort normal and breath sounds normal. No stridor. No respiratory distress. He has no wheezes.   Abdominal: Soft. Bowel sounds are normal. He exhibits no distension. There is no abdominal tenderness.   Musculoskeletal:         General: No tenderness or edema.      Cervical back: Normal range of motion and neck supple.   Neurological: He is alert.   Left sided weakness   Skin: Skin is warm and dry. He is not diaphoretic.   Vitals reviewed.      Laboratory Data:  Recent Labs     05/02/21  0525   WBC 5.1   RBC 4.58*   HEMOGLOBIN 13.3*   HEMATOCRIT 39.7*   MCV 86.7   MCH 29.0   MCHC 33.5*   RDW 39.4   PLATELETCT 273   MPV 9.2     Recent Labs     05/02/21  0525   SODIUM 142   POTASSIUM 4.0   CHLORIDE 104   CO2 27   GLUCOSE 105*   BUN 18   CREATININE 0.99   CALCIUM 9.5           Impressions/Recommendations:  Reactive depression  On Remeron    Paroxysmal atrial fibrillation (HCC)  Echo EF  >75%  Rate controlled on Metoprolol  Anticoagulated on Xarelto  Give IVF for tachycardia and hypotension  Monitor for orthostatic hypotension on Flomax    History of COVID-19  SARS-CoV-2 PCR positive 3/18/21    Acquired hypothyroidism  Euthyroid on [place] Thyroid    Acute right MCA stroke (HCC)  Presented w/ left hemiparesis  S/P decompressive craniectomy for increased ICP done on 4/3/21 by Dr. Payton  Wound care and pain control  Helmet when OOB    Dyslipidemia  On Lipitor    Full Code    Thank you for the opportunity to assist in this patient's care.  We will continue to follow along with you.

## 2021-04-30 NOTE — THERAPY
Physical Therapy   Initial Evaluation     Patient Name: Thong Arzola  Age:  56 y.o., Sex:  male  Medical Record #: [id number]  Today's Date: 4/30/2021     Subjective    Patient laying in bed upon arrival, agreeable to therapy. Patient states he is very tired from morning therapies. Spouse is present for session.      Objective       04/30/21 1001   Cosignature   Documentation Review Approved with modifications made by preceptor in flowsheet   Prior Living Situation   Prior Services Home-Independent   Housing / Facility 2 Story House  (bedroom on first floor; 2nd floor loft)   Steps Into Home 2   Steps In Home   (One flight )   Rail Both Rail (Steps in Home);Both Rail (Steps into Home)   Equipment Owned None   Lives with - Patient's Self Care Capacity Spouse   Prior Level of Functional Mobility   Bed Mobility Independent   Transfer Status Independent   Ambulation Independent   Distance Ambulation (Feet)   (Community distances)   Assistive Devices Used None   Stairs Independent   Prior Functioning: Everyday Activities   Self Care Independent   Indoor Mobility (Ambulation) Independent   Stairs Independent   Functional Cognition Independent   Prior Device Use None of the given options   Vitals   Pulse 95   Patient BP Position Supine   Blood Pressure 105/78   Pulse Oximetry 96 %   O2 Delivery Device None - Room Air   Vitals Comments Lightheaded upon sitting EOB, unable to maintain >15sec   Pain 0 - 10 Group   Therapist Pain Assessment Post Activity Pain Same as Prior to Activity;Nurse Notified;0   Cognition    Level of Consciousness Alert   Passive ROM Lower Body   Passive ROM Lower Body WDL   Active ROM Lower Body    Active ROM Lower Body  X   Flaccid Lower Extremity Left Lower Extremity Flaccid   Strength Lower Body   Lt Hip Flexion Strength 0 (Zero)   Lt Knee Flexion Strength 1 (T)   Lt Knee Extension Strength 0 (Zero)   Lt Ankle Dorsiflexion Strength 0 (Zero)   Lt Ankle Plantar Flexion Strength 0 (Zero)    Comments   (Tested supine )   Sensation Lower Body   Lower Extremity Sensation   X   Rt Lower Extremity Light Touch   (Intact)   Rt Lower Extremity Deep Pressure Sensation   (Intact)   Rt Lower Extremity Proprioception   (Intact)   Lt Lower Extremity Light Touch Absent   Lt Lower Extremity Deep Pressure Sensation Impaired   Lt Lower Extremity Proprioception   (Intact)   Lower Body Muscle Tone   Lt Lower Extremity Muscle Tone Hypertonic  (Minimal resistance with knee flex/ext)   Balance Assessment   Sitting Balance (Static) Trace +   Bed Mobility    Supine to Sit Maximal Assist  (with 2nd person supporting trunk )   Sit to Supine Maximal Assist  (with 2nd person supporting trunk )   Sit to Stand Unable to Participate   Rolling Maximum Assist to Lt.;Maximal Assist to Rt.   Neurological Concerns   Clonus Intermittent  (1 beat with forced DF)   Roll Left and Right   Assistance Needed Physical assistance   Physical Assistance Level 76% or more   CARE Score 2   Roll Left and Right Discharge Goal   Discharge Goal 3   Sit to Lying   Assistance Needed Physical assistance   Physical Assistance Level 76% or more   CARE Score 2   Sit to Lying Discharge Goal   Discharge Goal 3   Lying to Sitting on Side of Bed   Assistance Needed Physical assistance   Physical Assistance Level 76% or more   CARE Score 2   Lying to Sitting on Side of Bed Discharge Goal   Discharge Goal 3   Sit to Stand   Reason if not Attempted Safety concerns   CARE Score 88   Sit to Stand Discharge Goal   Discharge Goal 3   Chair/Bed-to-Chair Transfer   Reason if not Attempted Safety concerns   CARE Score 88   Chair/Bed-to-Chair Transfer Discharge Goal   Discharge Goal 3   Car Transfer   Reason if not Attempted Safety concerns   CARE Score 88   Car Transfer Discharge Goal   Discharge Goal 3   Walk 10 Feet   Reason if not Attempted Safety concerns   CARE Score 88   Walk 10 Feet Discharge Goal   Discharge Goal 3   Walk 50 Feet with Two Turns   Reason if not  Attempted Safety concerns   CARE Score 88   Walk 50 Feet with Two Turns Discharge Goal   Discharge Goal 3   Walk 150 Feet   Reason if not Attempted Safety concerns   CARE Score 88   Walk 150 Feet Discharge Goal   Discharge Goal 3   Walking 10 Feet on Uneven Surfaces   Reason if not Attempted Safety concerns   CARE Score 88   Walking 10 Feet on Uneven Surfaces Discharge Goal   Discharge Goal 3   1 Step (Curb)   Reason if not Attempted Safety concerns   CARE Score 88   1 Step (Curb) Discharge Goal   Discharge Goal 3   4 Steps   Reason if not Attempted Safety concerns   CARE Score 88   4 Steps Discharge Goal   Discharge Goal 1   12 Steps   Reason if not Attempted Safety concerns   CARE Score 88   12 Steps Discharge Goal   Discharge Goal 1   Picking Up Object   Reason if not Attempted Safety concerns   CARE Score 88   Picking Up Object Discharge Goal   Discharge Goal 1   Wheel 50 Feet with Two Turns   Reason if not Attempted Safety concerns   CARE Score 88   Type of Wheelchair/Scooter Manual   Wheel 50 Feet with Two Turns Discharge Goal   Discharge Goal 6   Wheel 150 Feet   Reason if not Attempted Safety concerns   CARE Score 88   Type of Wheelchair/Scooter Manual   Wheel 150 Feet Discharge Goal   Discharge Goal 6   Gait Functional Level of Assist    Gait Level Of Assist Unable to Participate  (d/t low BP)   Wheelchair Functional Level of Assist   Wheelchair Assist   (Unable to participate/safety concerns)   Stairs Functional Level of Assist   Level of Assist with Stairs Unable to Participate   Transfer Functional Level of Assist   Bed, Chair, Wheelchair Transfer Unable to Participate  (d/t low BP)   Problem List    Problems Impaired Bed Mobility;Impaired Transfers;Functional Strength Deficit;Impaired Balance;Decreased Activity Tolerance;Impaired Ambulation   Precautions   Precautions Fall Risk;Swallow Precautions ( See Comments);Other (See Comments)   Comments Helmet on OOB; L UE w/ 2-3 Modified Jeremiah Scale;  orthostasis   Current Discharge Plan   Current Discharge Plan Return to Prior Living Situation   Interdisciplinary Plan of Care Collaboration   IDT Collaboration with  Physician;Occupational Therapist;Family / Caregiver   Patient Position at End of Therapy In Bed;Chair Alarm On;Call Light within Reach;Tray Table within Reach;Phone within Reach;Family / Friend in Room   Collaboration Comments othostasis; rehab orientation   Benefit   Therapy Benefit Patient Would Benefit from Inpatient Rehabilitation Physical Therapy to Maximize Functional [place] with ADLs, IADLs and Mobility.   Strengths & Barriers   Strengths Alert and oriented;Effective communication skills;Independent prior level of function;Motivated for self care and independence;Pleasant and cooperative;Supportive family;Willingly participates in therapeutic activities   Barriers Hemiparesis;Home accessibility;Orthostatic hypotension;Impaired activity tolerance;Impaired balance;Spasticity;Limited mobility  (L haylie; dizzziness)   PT Total Time Spent   PT Individual Total Time Spent (Mins) 60   PT Charge Group   PT Therapeutic Activities 1   PT Evaluation PT Evaluation Mod       Received a tilt-in-space wheelchair with a Roho pressure reducing cushion - placed in corned of room. Anticipate slide board transfer due to L hemiparesis and dizzy/lightheaded symptoms.    Assessment  Patient is 56 y.o. male with a diagnosis of R CVA.  Additional factors influencing patient status / progress (ie: cognitive factors, co-morbidities, social support, etc): lives with spouse, independent PLOF, hx of COVID.    Plan  Recommend Physical Therapy  minutes per day 5-7 days per week for 14-21 days for the following treatments:  PT Group Therapy, PT E Stim Attended, PT Orthotics Training, PT Gait Training, PT Self Care/Home Eval, PT Therapeutic Exercises, PT Neuro Re-Ed/Balance, PT Therapeutic Activity and PT Evaluation.    Passport items to be completed:  Get in/out of  bed safely, in/out of a vehicle, safely use mobility device, walk or wheel around home/community, navigate up and down stairs, show how to get up/down from the ground, ensure home is accessible, demonstrate HEP, complete caregiver training    Goals:  Long term and short term goals have been discussed with patient and spouse and they are in agreement.    Physical Therapy Problems      There are no active problems.

## 2021-04-30 NOTE — THERAPY
"Speech Language Pathology   Initial Assessment     Patient Name: Thong Arzola  AGE:  56 y.o., SEX:  male  Medical Record #: [id number]  Today's Date: 4/30/2021     Subjective    Per H&P: \"The patient is a 56 y.o. male with a past medical history of COVID-19 3/18/2021, paroxysmal atrial fibrillation not on anti-coagulation, hypothyroidism; now admitted for acute inpatient rehabilitation with severe functional debility after and acute stroke.     On admission the patient and medical record report he presented to the hospital on 3/31 with left sided weakness. Imaging with right ICA and MCA occlusion. Perfusion scan with 210 ml completed infarction/ischemia, 76 ml of penumbra. Patient was outside window for tPA and was also not a candidate for thrombectomy. ECHO EF 75%.      Patient required craniectomy with Dr. Payton on 4/3, bone flap placed in freezer. Helmet in place. Acute stay complicated by acute urinary retention, traumatic catheter placement required coude and causing hematuria, started on Prazosin. Voiding trial attempted prior to admission to rehab, unsuccessful and Brewer was replaced.       Patient was started on baclofen for developing spasticity. EEG on 4/26 with mild encephalopathy, no evidence of seizures.      Patient current reports left sided weakness, without any improvement since admission. He also reports impaired sensation on the left side. He is right hand dominant. He denies any visual changes. He denies any pain. His mood is a little down due to his large stroke. He moved his bowels this morning. Meds floated whole in puree.      Patient was evaluated by Rehab Medicine physician and Physical Therapy, Occupational Therapy and Speech Therapy and determined to be appropriate for acute inpatient rehab and was transferred to [facility] on 4/29/2021  1:22 PM.     With this acute therapeutic intervention, this patient hopes to improve his functional status, and return to " "independent living with the supportive care of spouse.\"     Objective       04/30/21 0801   Precautions   Precautions Fall Risk;Swallow Precautions ( See Comments);Other (See Comments)   Comments Helmet on OOB; L UE w/ 2-3 Modified Jeremiah Scale; orthostasis, 90 degrees at meal times   Prior Living Situation   Prior Services Home-Independent   Housing / Facility 2 Story House   Lives with - Patient's Self Care Capacity Spouse   Prior Level Of Function   Communication Within Functional Limits   Swallow Within Functional Limits   Dentition Intact   Dentures None   Hearing Within Functional Limits for Evaluation   Hearing Aid None   Vision Within Functional Limits for Evaluation;Reading    Patient's Primary Language English   Education Completed College   Occupation (Pre-Hospital Vocational) Unable To Determine At This Time   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[place],     Receptive Language / Auditory Comprehension   Receptive Language / Auditory Comprehension X   Answers Yes / No Personal Questions Within Functional Limits (6-7)   Follows One Unit Commands Within Functional Limits (6-7)   Understands Simple, Structured Conversation  Within Functional Limits (6-7)   Understands Complex Conversation Supervision (5)   Expressive Language   Expressive Language (WDL) X   Automatic Language Appropriate Within Functional Limits (6-7)   Sustain Dialogue Within Given Topic Within Functional Limits (6-7)   Reading Comprehension    Reading Comprehension (WDL)   (To be assessed )   Written Language Expression   Written Language Expression (WDL)   (To be assessed )   Dominant Hand Right   Cognition   Cognitive-Linguistic (WDL) X   Attention to Task Minimal (4)   Simple Attention Minimal (4)   Orientation  Within Functional Limits (6-7)   Topic Maintenance  Supervision (5)   Insight into Deficits Within Functional Limits (6-7)   Initiation Minimal (4)   ABS (Agitated Behavior Scale)   Agitated Behavior " "Scale Performed No   Cognitive Pattern Assessment   Cognitive Pattern Assessment Used BIMS   Brief Interview for Mental Status (BIMS)   Repetition of Three Words (First Attempt) 3   Temporal Orientation: Year Correct   Temporal Orientation: Month Accurate within 5 days   Temporal Orientation: Day Correct   Recall: \"Sock\" Yes, no cue required   Recall: \"Blue\" Yes, no cue required   Recall: \"Bed\" Yes, no cue required   BIMS Summary Score 15   Social / Pragmatic Communication   Social / Pragmatic Communication WDL   Tracheostomy   Tracheostomy No   Speech Mechanisms / Voice Production   Speech Mechanisms / Voice Production (WDL) X   Facial Weakness Right Minimal (4)   Oral Movements Are Voluntary And Coordinated Moderate (3)   Labial Function   Labial Function (WDL) X   Labial Vowel Production / I /, / U / Minimal   Labial Sequence / I /, / U / Minimal   Lingual Function   Lingual Function (WDL) X   Lingual Protrude Minimal   Elevate In Mouth Minimal   Lateralization Minimal Left   Lick Lips (Circular) Minimal   Jaw Function   Jaw Function (WDL) X   Jaw Structure At Rest Within Functional Limits   Jaw Open / Resist Minimal   Velar Function   Velar Function (WDL) X   Velar Structure At Rest Minimal   / A /  5X's Minimal   Laryngeal Function   Laryngeal Function (WDL) X   Voice Quality Within Functional Limits   Volutional Cough Within Functional Limits   Swallowing   Swallowing (WDL) WDL   Dysphagia Strategies / Recommendations   Strategies / Interventions Recommended (Yes / No) Yes   Compensatory Strategies Direct Supervision During Meals;Head of Bed 90 Degrees During Eating / Drinking;Assistance Needed for Meal Tray Set-up;No Talking During Eating / Drinking   Diet / Liquid Recommendation Soft & Bite-Sized (6) - (Dysphagia III);Mildly Thick (2) - (Nectar Thick)   Medication Administration  Float Whole with Puree   Therapy Interventions Dysphagia Therapy By Speech Language Pathologist;MBSS Evaluation;Oral Motor " Exercises   Barriers To Oral Feeding   Barriers To Oral Feeding Generalized Weakness;Impulsivity / Impaired Safety   Cervical Ausculatation Not Tested   Pre-Feeding Oral Stimulation Trial Not Tested   Swallowing/Nutritional Status   Swallowing/Nutritional Status Modified food consistency   Eating   Assistance Needed Set-up / clean-up;Supervision;Verbal cues   Physical Assistance Level No physical assistance   CARE Score 4   Eating Discharge Goal   Discharge Goal 6   Functional Level of Assist   Eating Supervision   Eating Description Increased time;Modified diet;Set-up of equipment or meal/tube feeding;Supervision for safety;Verbal cueing   Comprehension Modified Independent   Comprehension Description Glasses   Expression Independent   Social Interaction Independent   Problem Solving Minimal Assist   Problem Solving Description Bed/chair alarm;Increased time;Seat belt;Therapy schedule;Verbal cueing   Memory Minimal Assist   Memory Description Bed/chair alarm;Increased time;Seat belt;Therapy schedule;Verbal cueing   Outcome Measures   [facility]   Problem List   Problem List Dysphagia;Cognitive-Linguistic Deficits   Current Discharge Plan   Current Discharge Plan Return to Prior Living Situation   Benefit   Therapy Benefit Patient would benefit from Inpatient Rehab Speech-Language Pathology to address above identified deficits.   Interdisciplinary Plan of Care Collaboration   IDT Collaboration with  Family / Caregiver;Physician;Certified Nursing Assistant   Patient Position at End of Therapy In Bed;Bed Alarm On;Call Light within Reach;Tray Table within Reach   Collaboration Comments bed to w/c transfer   Strengths & Barriers   Strengths Able to follow instructions;Pleasant and cooperative;Supportive family;Willingly participates in therapeutic activities   Barriers Aspiration risk;Impaired activity tolerance;Impaired appetite/intake;Impaired insight/denial of deficits   Speech Language  "Pathologist Assigned   Assigned SLP / Extension CL/MP NO SPLIT    SLP Total Time Spent   SLP Individual Total Time Spent (Mins) 90   Evaluation Charges   Charges Yes   SLP Speech Language Evaluation Speech Sound Language Comprehension   SLP Oral Pharyngeal Evaluation Oral Pharyngeal Evaluation       Assessment    Patient is 56 y.o. male with a diagnosis of Acute right MCA stroke.  Additional factors influencing patient status/progress (ie: cognitive factors, co-morbidities, social support, etc): motivated for indep, able to follow directions, supportive family, with dysphagia and indep PLOF.    Clinical swallow evaluation completed. Oral mech examination significant for minimal right facial droop, jaw, velar, and tongue deviation to the right, and mild difficulties with ROM and coordination of oral structures. Pt assessed during breakfast with current diet (6- Soft & Bite Sized and 2- Mildly Thick liquids) with no overt s/sx of aspiration noted. Pt reported he is \"not a breakfast eater\" and has a decreased appetite; therefore, pt did not consume much of breakfast.  Attempted to transfer pt to wheelchair (sat up on the side of the bed) for MBSS evaluation; however, due to reports of dizziness and low BP (107/65) pt unable to participate in MBSS study at this time. Recommend continuing to reassess pt for appropriateness for therapeutic dining program. Recommend pt continue with current diet of 6- Soft & Bite Sized and 2- Mildly Thick liquids, medications floated whole in puree due to unable to assess 0-Thin liquids or 7- Regular Textures during eval. Trial of 7- Regular Textures scheduled at lunch 5/1.    Cog-Ling Eval: Discussed pt's PLOF. Pt reported he indep managed medications (pill box) and shared financial responsibilities with wife (online banking). Pt reported \"slower processing and speech\" since stroke. Initiated SCCAN assessment; however, did not complete due to time constraints. Full scores will be " available upon completion. Cognition evaluation to be completed.     Pt's wife educated regarding pt's current diet and recommendations for swallow study- pt's wife agreeable. Pt's wife reported she will be present every day, except Monday's and Wednesday's, to sit in room with pt during meal times. Pt reported he would prefer to be in room with wife during meal times if possible. Pt 's wife encouraged to speak with doctor regarding specific questions related to medications, vertigo, and blood pressure.    Plan  Recommend Speech Therapy  minutes per day 5-7 days per week for 2 weeks for the following treatments:  SLP Speech Language Treatment, SLP Swallowing Dysfunction Treatment, SLP Oral Pharyngeal Evaluation, SLP Video Swallow Evaluation, SLP Self Care / ADL Training , SLP Cognitive Skill Development and SLP Group Treatment.    Passport items to be completed:  Passport items to be completed:  Express basic needs, understand food/liquid recommendations, consistently follow swallow precautions, manage finances, manage medications, arrive to therapy appointments on time, complete daily memory log entries, solve problems related to safety situations, review education related to hospitalization, complete caregiver training     Goals:  Long term and short term goals have been discussed with patient and spouse and they are in agreement.    Speech Therapy Problems     Problem: Problem Solving STGs     Dates: Start: 04/30/21       Goal: STG-Within one week, patient will     Dates: Start: 04/30/21       Description: 1) Individualized goal:  complete SCCAN with goals to be added as appropriate  2) Interventions:  SLP Speech Language Treatment, SLP Self Care / ADL Training , SLP Cognitive Skill Development, and SLP Group Treatment                Problem: Speech/Swallowing LTGs     Dates: Start: 04/30/21       Goal: LTG-By discharge, patient will solve complex problem     Dates: Start: 04/30/21       Description: 1)  Individualized goal:  related to IADL's with mod I for safe d/c home.  2) Interventions:  SLP Speech Language Treatment, SLP Swallowing Dysfunction Treatment, SLP Oral Pharyngeal Evaluation, SLP Video Swallow Evaluation, SLP Self Care / ADL Training , SLP Cognitive Skill Development, and SLP Group Treatment              Goal: LTG-By discharge, patient will     Dates: Start: 04/30/21       Description: 1) Individualized goal:  tolerate 7- Regular Textures and 0-Thin liquids with no overt s/sx of aspiration noted   2) Interventions:  SLP Speech Language Treatment, SLP Swallowing Dysfunction Treatment, SLP Oral Pharyngeal Evaluation, SLP Video Swallow Evaluation, and SLP Group Treatment                        Problem: Swallowing STGs     Dates: Start: 04/30/21       Goal: STG-Within one week, patient will     Dates: Start: 04/30/21       Description: 1) Individualized goal:  complete MBSS with goals to be added as appropriate  2) Interventions:  SLP Swallowing Dysfunction Treatment, SLP Oral Pharyngeal Evaluation, and SLP Video Swallow Evaluation              Goal: STG-Within one week, patient will     Dates: Start: 04/30/21       Description: 1) Individualized goal:  trial 0-Thin liquids in isolation with no overt s/sx of aspiration noted   2) Interventions:  SLP Swallowing Dysfunction Treatment, SLP Oral Pharyngeal Evaluation, SLP Video Swallow Evaluation, SLP Self Care / ADL Training , and SLP Group Treatment              Goal: STG-Within one week, patient will safely swallow     Dates: Start: 04/30/21       Description: 1) Individualized goal:  trials of 7- Regular Textures with no overt s/sx of aspiration noted   2) Interventions:  SLP Swallowing Dysfunction Treatment, SLP Oral Pharyngeal Evaluation, SLP Video Swallow Evaluation, SLP Self Care / ADL Training , and SLP Group Treatment

## 2021-04-30 NOTE — FLOWSHEET NOTE
04/29/21 1834   Events/Summary/Plan   Events/Summary/Plan RT Assessment   Vital Signs   Pulse (!) 108   Respiration 16   Pulse Oximetry 98 %   $ Pulse Oximetry (Spot Check) Yes   Respiratory Assessment   Respiratory Pattern Within Normal Limits   Level of Consciousness Alert   Secretions   Cough Non Productive   Oxygen   O2 Delivery Device None - Room Air   Smoking History   Have you ever smoked Never

## 2021-04-30 NOTE — FLOWSHEET NOTE
04/29/21 1833   Patient History   Pulmonary Diagnosis None   Procedures Relevant to Respiratory Status None   Home O2 No   Nocturnal CPAP No   Home Treatments/Frequency No   COPD Risk Screening   Do you have a history of COPD? No   Protocol Pathways   Protocol Pathways None

## 2021-04-30 NOTE — DISCHARGE PLANNING
"CASE MANAGEMENT INITIAL ASSESSMENT    Admit Date:  4/29/2021     I met with patient  'Bridget" and his  spouse Anel  to discuss role of case management / discharge planning / team conference.   Spouse provided information for assessment as pt was dozing off at times.  Patient is a  56 y.o. male transferred from [facility] where he was hospitalized from 3/31 to 4/29/2021.    Accepting MD to [facility] is Dr Radha Monge    Diagnosis: Ischemic stroke (HCC) [I63.9] - Right Dense stroke intractable intracranial   pressure, midline shift of greater than 1 cm.    S/P  Right decompressive hemicraniectomy greater than 14 cm with expansile duraplasty on 4/3/2021.     Co-morbidities:   Patient Active Problem List    Diagnosis Date Noted   • Acute right MCA stroke (HCC) 03/31/2021   • Paroxysmal atrial fibrillation (HCC) 03/31/2021   • Urinary retention 04/01/2021   • Acquired hypothyroidism 03/31/2021   • History of COVID-19 03/31/2021   • Spasticity as late effect of cerebrovascular accident (CVA) 04/29/2021   • Reactive depression 04/29/2021     Prior Living Situation:  Housing / Facility: PT/spouse live in a 2 Story House(2nd floor is a loft) with 2-3 steps to enter in [place], [place].  Bath/shower combo downstairs and walk in shower upstairs in [facility].   DC disposition will be at her sister's home in [place];  2SH  home with 2ST to enter;     Lives with - Patient's Self Care Capacity: Spouse Anel Arzola  who is employed as a counselor.      Prior Level of Function:  Medication Management: Independent  Finances: Independent  Home Management: Independent  Shopping: Independent  Prior Level Of Mobility: Independent Without Device in Community  Driving / Transportation: Driving Independent    Support Systems:  Primary : Anel Arzola   Other support systems: 3 adult sons who reside in [place]  Advance Directives: No  Power of  (Name & Phone): none    Previous Services " Utilized:   Equipment Owned: None  Prior Services: Home-Independent    Other Information:  Occupation: unemployed    Primary Payor Source: [employer]  Primary Care Practitioner : Dr. Arthur Rebolledo / Homeopathic MD  Other MDs: Dr Arthur Payton Neurosurgeon    Patient / Family Goal:  Spouse indicates dc plan will be for pt and spouse to stay at a family members  home in [place].  They plan to install a ramp @ [place] home first and then install one at the home in [place].  Spouse indicates right now her focus is on stabilizing blood pressure and decrease dizziness. She confirms pt paresh have 24 hour care @ home.  Spouse is off work month of June and July.           Additional Case Management Questions:  Have you ever received case management services for yourself or a family member? yes    Do you feel you have and an understanding of what services  provide? yes    Do you have any additional questions regarding case management?   no      CASE MANAGEMENT PLAN OF CARE   Individualized Goals:   1. Return to family member's  home in [place].    2. Stabilize blood pressure and reduce dizziness.        Barriers:   1. Functional status  2. Cognitive status  3. Medical management.     Plan:  1. Continue to follow patient through hospitalization and provide discharge planning in collaboration with patient, family, physicians and ancillary services.     2. Utilize community resources to ensure a safe discharge.

## 2021-04-30 NOTE — THERAPY
Occupational Therapy   Initial Evaluation     Patient Name: Thong Arzola  Age:  56 y.o., Sex:  male  Medical Record #: [id number]  Today's Date: 4/30/2021     Subjective    Patient laying in bed awake.  Agreeable to OT evaluation.  Complained of generalized discomfort due to being in bed so much.  Stated he was lightheaded upon sitting up and needed to lay back down.     Objective       04/30/21 0701   Prior Living Situation   Prior Services Home-Independent   Housing / Facility 2 Story House  (2nd floor is a loft)   Steps In Home 16  (8 and 8 with a landing in the middle)   Bathroom Set up Bathtub / Shower Combination  (tub/shower is downstairs (master bath is in the loft))   Equipment Owned None   Lives with - Patient's Self Care Capacity Spouse   Comments Lives in [place]; pt has three adult boys who live in [place], spouse works in [place]   Prior Level of ADL Function   Self Feeding Independent   Grooming / Hygiene Independent   Bathing Independent   Dressing Independent   Toileting Independent   Prior Level of IADL Function   Medication Management Independent   Laundry Independent   Kitchen Mobility Independent   Finances Independent   Home Management Independent   Shopping Independent   Prior Level Of Mobility Independent Without Device in Community   Driving / Transportation Driving Independent   Occupation (Pre-Hospital Vocational) Other (Comments)   Leisure Interests Other (Comments)  (hiking, camping)   Prior Functioning: Everyday Activities   Self Care Independent   Indoor Mobility (Ambulation) Independent   Stairs Independent   Functional Cognition Independent   Prior Device Use None of the given options   Vitals   Patient BP Position Supine  (HOB elevated to 15 degrees)   Blood Pressure 115/87   Vitals Comments became lightheaded at EOB right away and unable to stay sitting for more than 15-20 seconds before requesting to lay back down.  Unable to get a BP in sitting   Pain 0 - 10 Group   Location  Back  (and generalized discomfort from being in bed )   Therapist Pain Assessment Prior to Activity   Cognition    Level of Consciousness Alert   Vision Screen   Vision Not tested   Passive ROM Upper Body   Passive ROM Upper Body WDL   Active ROM Upper Body   Active ROM Upper Body  X   Dominant Hand Right   Comments No active L UE movement except did demonstrate slight left shoulder shrug in supine   Strength Upper Body   Upper Body Strength  X   Comments 0/5 throughout L UE with exception of 1-2/5 left shoulder shrug   Sensation Upper Body   Upper Extremity Sensation  Not Tested   Upper Body Muscle Tone   Upper Body Muscle Tone  X   Lt Upper Extremity Muscle Tone Non Functional;Hypertonic;Spastic;Rigidity   Comments Mod. Jeremiah Scale = 3   Balance Assessment   Sitting Balance (Static) Trace +   Sitting Balance (Dynamic) Dependent   Standing Balance (Static)   (n/t)   Standing Balance (Dynamic)   (n/t)   Weight Shift Sitting Absent   Weight Shift Standing   (n/t)   Bed Mobility    Supine to Sit Maximal Assist   Sit to Supine Maximal Assist   Scooting Moderate Assist   Coordination Upper Body   Coordination X   Fine Motor Coordination absent L UE   Gross Motor Coordination absent L UE   Oral Hygiene   Reason if not Attempted Refused to perform   CARE Score 7   Oral Hygiene Discharge Goal   Discharge Goal 5   Shower/Bathe Self   Reason if not Attempted Medical concerns   CARE Score 88   Shower/Bathe Self Discharge Goal   Discharge Goal 3   Upper Body Dressing   Assistance Needed Physical assistance;Set-up / clean-up;Supervision;Verbal cues   Physical Assistance Level 51%-75%   CARE Score 2   Upper Body Dressing Discharge Goal   Discharge Goal 5   Lower Body Dressing   Assistance Needed Physical assistance;Verbal cues;Supervision   Physical Assistance Level 51%-75%   CARE Score 2   Lower Body Dressing Discharge Goal   Discharge Goal 3   Putting On/Taking Off Footwear   Assistance Needed Physical assistance   Physical  Assistance Level Total assistance   CARE Score 1   Putting On/Taking Off Footwear Discharge Goal   Discharge Goal 3   Toileting Hygiene   Assistance Needed Physical assistance   Physical Assistance Level Total assistance   CARE Score 1   Toileting Hygiene Discharge Goal   Discharge Goal 2   Toilet Transfer   Reason if not Attempted Medical concerns   CARE Score 88   Toilet Transfer Discharge Goal   Discharge Goal 3   Hearing, Speech, and Vision   Expression of Ideas and Wants Without difficulty   Understanding Verbal and Non-Verbal Content Understands   Functional Level of Assist   Grooming   (n/t; declined)   Bathing   (n/t due to medical concerns)   Upper Body Dressing Maximal Assist   Upper Body Dressing Description Set-up of equipment;Supervision for safety;Verbal cueing  (assist w/ 3/4 tasks)   Lower Body Dressing Total Assist   Toileting   (n/t as patient did not need to toilet)   Bed, Chair, Wheelchair Transfer   (n/t as patient had orthostasis)   Toilet Transfers Unable to Participate   Tub / Shower Transfers Unable to Participate   Problem List   Problem List Decreased Active Daily Living Skills;Decreased Homemaking Skills;Decreased Upper Extremity Strength Left;Decreased Upper Extremity AROM Left;Decreased Functional Mobility;Decreased Activity Tolerance;Safety Awareness Deficits / Cognition;Impaired Posture / Trunk Alignment;Impaired Coordination Left Upper Extremity;Impaired Sensation Left Upper Extremity;Impaired Cognitive Function;Impaired Upper Extremity Tone Left;Impaired Postural Control / Balance;Limited Knowledge of Post Op Precautions   Precautions   Precautions Fall Risk;Swallow Precautions ( See Comments);Other (See Comments)   Comments Helmet on OOB; L UE w/ 2-3 Modified Jeremiah Scale; orthostasis   Current Discharge Plan   Current Discharge Plan Return to Prior Living Situation   Benefit    Therapy Benefit Patient Would Benefit from Inpatient Rehab Occupational Therapy to Maximize  [place] with ADLs, IADLs and Functional Mobility.   Interdisciplinary Plan of Care Collaboration   IDT Collaboration with  Physician;Physical Therapist;Speech Therapist   Patient Position at End of Therapy In Bed;Call Light within Reach;Tray Table within Reach;Phone within Reach   Collaboration Comments re: orthostasis, tilt in space w/c; L UE spasticity   Equipment Needs   Assistive Device / DME Wheelchair;Slideboard;Brace / Splint;Shower Chair;Bedside Commode;Grab Bars In Shower / Tub;Grab Bars By Toilet  (tilt in space w/c and shower chair)   Adaptive Equipment None  (at this time)   Strengths & Barriers   Strengths Able to follow instructions;Alert and oriented;Effective communication skills;Good insight into deficits/needs;Independent prior level of function;Manages pain appropriately;Motivated for self care and independence;Pleasant and cooperative;Supportive family;Willingly participates in therapeutic activities   Barriers Bowel incontinence;Decreased endurance;Fatigue;Generalized weakness;Hemiplegia;Home accessibility;Orthostatic hypotension;Impaired activity tolerance;Impaired balance;Limited mobility;Spasticity   OT Total Time Spent   OT Individual Total Time Spent (Mins) 60   OT Charge Group   Charges Yes   OT Self Care / ADL 1   OT Evaluation OT Evaluation High       Assessment  Patient is 56 y.o. male with a diagnosis of R CVA.  Additional factors influencing patient status / progress (ie: cognitive factors, co-morbidities, social support, etc): lives with spouse, independent PLOF, hx of COVID.      Plan  Recommend Occupational Therapy  minutes per day 5-7 days per week for 3-4 weeks for the following treatments:  OT E Stim Attended, OT Self Care/ADL, OT Cognitive Skill Dev, OT Manual Ther Technique, OT Neuro Re-Ed/Balance, OT Sensory Int Techniques, OT Therapeutic Activity, OT Evaluation and OT Therapeutic Exercise.    Passport items to be completed:  Passport items to be  completed:  Perform bathroom transfers, complete dressing, complete feeding, get ready for the day, prepare a simple meal, participate in household tasks, adapt home for safety needs, demonstrate home exercise program, complete caregiver training     Goals:  Long term and short term goals have been discussed with patient and they are in agreement.    Occupational Therapy Goals     Problem: Dressing     Dates: Start: 04/30/21       Goal: STG-Within one week, patient will dress UB     Dates: Start: 04/30/21       Description: 1) Individualized Goal:  with mod A with verbal cues for haylie-technique.  2) Interventions:  OT E Stim Attended, OT Self Care/ADL, OT Cognitive Skill Dev, OT Manual Ther Technique, OT Neuro Re-Ed/Balance, OT Sensory Int Techniques, OT Therapeutic Activity, OT Evaluation, and OT Therapeutic Exercise          Goal: STG-Within one week, patient will dress LB     Dates: Start: 04/30/21       Description: 1) Individualized Goal:  with max A using AE/AD/techniques as needed  2) Interventions:  OT E Stim Attended, OT Self Care/ADL, OT Cognitive Skill Dev, OT Manual Ther Technique, OT Neuro Re-Ed/Balance, OT Sensory Int Techniques, OT Therapeutic Activity, OT Evaluation, and OT Therapeutic Exercise                Problem: Grooming     Dates: Start: 04/30/21       Goal: STG-Within one week, patient will complete grooming     Dates: Start: 04/30/21       Description: 1) Individualized Goal:  with min A using AE/AD/techniques as needed  2) Interventions:  OT E Stim Attended, OT Self Care/ADL, OT Cognitive Skill Dev, OT Manual Ther Technique, OT Neuro Re-Ed/Balance, OT Sensory Int Techniques, OT Therapeutic Activity, OT Evaluation, and OT Therapeutic Exercise                  Problem: OT Long Term Goals     Dates: Start: 04/30/21       Goal: LTG-By discharge, patient will complete basic self care tasks     Dates: Start: 04/30/21       Description: 1) Individualized Goal:  with min to mod A using  AE/AD/techniques as needed  2) Interventions:  OT E Stim Attended, OT Self Care/ADL, OT Cognitive Skill Dev, OT Manual Ther Technique, OT Neuro Re-Ed/Balance, OT Sensory Int Techniques, OT Therapeutic Activity, OT Evaluation, and OT Therapeutic Exercise          Goal: LTG-By discharge, patient will perform bathroom transfers     Dates: Start: 04/30/21       Description: 1) Individualized Goal:  with max A x 1 person using slideboard versus squat pivot to the right  2) Interventions:  OT E Stim Attended, OT Self Care/ADL, OT Cognitive Skill Dev, OT Manual Ther Technique, OT Neuro Re-Ed/Balance, OT Sensory Int Techniques, OT Therapeutic Activity, OT Evaluation, and OT Therapeutic Exercise

## 2021-05-01 ENCOUNTER — ANCILLARY PROCEDURE (OUTPATIENT)
Dept: CARDIOLOGY | Facility: REHABILITATION | Age: 56
DRG: 057 | End: 2021-05-01
Attending: HOSPITALIST
Payer: COMMERCIAL

## 2021-05-01 LAB — 25(OH)D3 SERPL-MCNC: 92 NG/ML (ref 30–80)

## 2021-05-01 PROCEDURE — A9270 NON-COVERED ITEM OR SERVICE: HCPCS | Performed by: PHYSICAL MEDICINE & REHABILITATION

## 2021-05-01 PROCEDURE — 700102 HCHG RX REV CODE 250 W/ 637 OVERRIDE(OP): Performed by: PHYSICAL MEDICINE & REHABILITATION

## 2021-05-01 PROCEDURE — 770010 HCHG ROOM/CARE - REHAB SEMI PRIVAT*

## 2021-05-01 PROCEDURE — 99232 SBSQ HOSP IP/OBS MODERATE 35: CPT | Performed by: HOSPITALIST

## 2021-05-01 PROCEDURE — 93971 EXTREMITY STUDY: CPT | Mod: LT

## 2021-05-01 PROCEDURE — 92508 TX SP LANG VOICE COMM GROUP: CPT

## 2021-05-01 PROCEDURE — 92526 ORAL FUNCTION THERAPY: CPT

## 2021-05-01 RX ORDER — MECLIZINE HYDROCHLORIDE 25 MG/1
25 TABLET ORAL 3 TIMES DAILY
Status: DISCONTINUED | OUTPATIENT
Start: 2021-05-01 | End: 2021-05-20

## 2021-05-01 RX ADMIN — THYROID, PORCINE 60 MG: 30 TABLET ORAL at 08:52

## 2021-05-01 RX ADMIN — TAMSULOSIN HYDROCHLORIDE 0.4 MG: 0.4 CAPSULE ORAL at 21:19

## 2021-05-01 RX ADMIN — METOPROLOL TARTRATE 12.5 MG: 25 TABLET, FILM COATED ORAL at 08:52

## 2021-05-01 RX ADMIN — BACLOFEN 20 MG: 20 TABLET ORAL at 15:43

## 2021-05-01 RX ADMIN — METOPROLOL TARTRATE 12.5 MG: 25 TABLET, FILM COATED ORAL at 21:19

## 2021-05-01 RX ADMIN — ASPIRIN 81 MG CHEWABLE TABLET 81 MG: 81 TABLET CHEWABLE at 17:27

## 2021-05-01 RX ADMIN — MECLIZINE HYDROCHLORIDE 25 MG: 25 TABLET ORAL at 05:49

## 2021-05-01 RX ADMIN — MIRTAZAPINE 15 MG: 15 TABLET, ORALLY DISINTEGRATING ORAL at 21:19

## 2021-05-01 RX ADMIN — BACLOFEN 20 MG: 20 TABLET ORAL at 08:52

## 2021-05-01 RX ADMIN — MECLIZINE HYDROCHLORIDE 25 MG: 25 TABLET ORAL at 15:43

## 2021-05-01 RX ADMIN — ATORVASTATIN CALCIUM 80 MG: 40 TABLET, FILM COATED ORAL at 21:18

## 2021-05-01 RX ADMIN — BACLOFEN 20 MG: 20 TABLET ORAL at 21:18

## 2021-05-01 RX ADMIN — RIVAROXABAN 20 MG: 20 TABLET, FILM COATED ORAL at 17:27

## 2021-05-01 RX ADMIN — THYROID, PORCINE 60 MG: 30 TABLET ORAL at 21:17

## 2021-05-01 RX ADMIN — MECLIZINE HYDROCHLORIDE 25 MG: 25 TABLET ORAL at 21:17

## 2021-05-01 RX ADMIN — DIAZEPAM 2 MG: 2 TABLET ORAL at 21:18

## 2021-05-01 ASSESSMENT — PAIN DESCRIPTION - PAIN TYPE
TYPE: ACUTE PAIN
TYPE: ACUTE PAIN

## 2021-05-01 NOTE — THERAPY
"Speech Language Pathology  Daily Treatment     Patient Name: Thong Arzola  Age:  56 y.o., Sex:  male  Medical Record #: [id number]  Today's Date: 5/1/2021     Precautions  Precautions: Fall Risk, Swallow Precautions ( See Comments), Other (See Comments)  Comments: Helmet on OOB; L UE w/ 2-3 Modified Jeremiah Scale; orthostasis    Subjective    Pt pleasant and cooperative during tx group.      Objective       05/01/21 0801   Group Therapy    Group Topic Dysphagia   Reason for Group Therapy To Enhance Motivation;To Validate Increased [place] with Skills;To Increase Patient Interaction during Physical Recovery;To Facilitate Goal Discussion ;To Reinforce Strengthening and Movement Patterns through Group Format Demonstration;To Decrease Anxiety through New Skill Performance   Group Treatment Provided deit tolerance, trials of thin liquid   SLP Total Time Spent   SLP Group Total Time Spent (Mins) 30   Treatment Charges   SLP Treatment - Group Speech Language Treatment - Group       Assessment    Pt assessed with level 6 (SBS) and level 2 (NTL).  Pt tolerated current diet without overt s/s of aspiration.  Pt presented with coughing episode following 1 of 4 trials of thin liquid by cup.  Pt stated that his sip was \"too big.\"     Strengths: Able to follow instructions, Pleasant and cooperative, Supportive family, Willingly participates in therapeutic activities  Barriers: Aspiration risk, Impaired activity tolerance, Impaired appetite/intake, Impaired insight/denial of deficits, Impaired functional cognition    Plan    Target regular texture, and thin liquid trials .        Speech Therapy Problems     Problem: Problem Solving STGs     Dates: Start: 04/30/21       Goal: STG-Within one week, patient will     Dates: Start: 04/30/21       Description: 1) Individualized goal:  complete SCCAN with goals to be added as appropriate  2) Interventions:  SLP Speech Language Treatment, SLP Self Care / ADL Training , SLP " Cognitive Skill Development, and SLP Group Treatment                Problem: Speech/Swallowing LTGs     Dates: Start: 04/30/21       Goal: LTG-By discharge, patient will solve complex problem     Dates: Start: 04/30/21       Description: 1) Individualized goal:  related to IADL's with mod I for safe d/c home.  2) Interventions:  SLP Speech Language Treatment, SLP Swallowing Dysfunction Treatment, SLP Oral Pharyngeal Evaluation, SLP Video Swallow Evaluation, SLP Self Care / ADL Training , SLP Cognitive Skill Development, and SLP Group Treatment              Goal: LTG-By discharge, patient will     Dates: Start: 04/30/21       Description: 1) Individualized goal:  tolerate 7- Regular Textures and 0-Thin liquids with no overt s/sx of aspiration noted   2) Interventions:  SLP Speech Language Treatment, SLP Swallowing Dysfunction Treatment, SLP Oral Pharyngeal Evaluation, SLP Video Swallow Evaluation, and SLP Group Treatment                        Problem: Swallowing STGs     Dates: Start: 04/30/21       Goal: STG-Within one week, patient will     Dates: Start: 04/30/21       Description: 1) Individualized goal:  complete MBSS with goals to be added as appropriate  2) Interventions:  SLP Swallowing Dysfunction Treatment, SLP Oral Pharyngeal Evaluation, and SLP Video Swallow Evaluation              Goal: STG-Within one week, patient will     Dates: Start: 04/30/21       Description: 1) Individualized goal:  trial 0-Thin liquids in isolation with no overt s/sx of aspiration noted   2) Interventions:  SLP Swallowing Dysfunction Treatment, SLP Oral Pharyngeal Evaluation, SLP Video Swallow Evaluation, SLP Self Care / ADL Training , and SLP Group Treatment              Goal: STG-Within one week, patient will safely swallow     Dates: Start: 04/30/21       Description: 1) Individualized goal:  trials of 7- Regular Textures with no overt s/sx of aspiration noted   2) Interventions:  SLP Swallowing Dysfunction Treatment, SLP Oral  Pharyngeal Evaluation, SLP Video Swallow Evaluation, SLP Self Care / ADL Training , and SLP Group Treatment

## 2021-05-01 NOTE — PROGRESS NOTES
Received shift report regarding patient and assumed care. Patient is sleeing, calm and stable, currently positioned in bed for comfort and safety; call light within reach. No s/s of pain or distress. Will continue to monitor.

## 2021-05-01 NOTE — THERAPY
Speech Language Pathology  Daily Treatment     Patient Name: Thong Arzola  Age:  56 y.o., Sex:  male  Medical Record #: [id number]  Today's Date: 5/1/2021     Precautions  Precautions: Fall Risk, Swallow Precautions ( See Comments), Other (See Comments)  Comments: Helmet on OOB; L UE w/ 2-3 Modified Jeremiah Scale; orthostasis    Subjective    Pt pleasant and cooperative during tx.  Spouse present during tx.      Objective       05/01/21 1101   Dysphagia    Other Treatments diet tolerance, use of strategies     Interdisciplinary Plan of Care Collaboration   IDT Collaboration with  Family / Caregiver   Collaboration Comments spouse present during tx.     SLP Total Time Spent   SLP Individual Total Time Spent (Mins) 60   Treatment Charges   SLP Swallowing Dysfunction Treatment Swallowing Dysfunction Treatment       Assessment    Pt assessed with regular textures trials, and NTL.  Pt tolerated regular textures without overt s/s of aspiration.  Pt presented with pocketing in L lateral sulcus.  SLP removed pocketed food with spoon.  Pt and spouse educated regarding importance of checking for pocketing at completion of meal.  They verbalized understanding.  Pt required mod verbal cues to decrease bite size and rate of intake.  Spouse demonstrated ability to provide appropriate cues for feeding.     Strengths: Able to follow instructions, Pleasant and cooperative, Supportive family, Willingly participates in therapeutic activities  Barriers: Aspiration risk, Impaired activity tolerance, Impaired appetite/intake, Impaired insight/denial of deficits, Impaired functional cognition    Plan    Trial regular textures/thin liquids, use of swallow strategies, complete SCCAN        Speech Therapy Problems     Problem: Problem Solving STGs     Dates: Start: 04/30/21       Goal: STG-Within one week, patient will     Dates: Start: 04/30/21       Description: 1) Individualized goal:  complete SCCAN with goals to be added as  appropriate  2) Interventions:  SLP Speech Language Treatment, SLP Self Care / ADL Training , SLP Cognitive Skill Development, and SLP Group Treatment                Problem: Speech/Swallowing LTGs     Dates: Start: 04/30/21       Goal: LTG-By discharge, patient will solve complex problem     Dates: Start: 04/30/21       Description: 1) Individualized goal:  related to IADL's with mod I for safe d/c home.  2) Interventions:  SLP Speech Language Treatment, SLP Swallowing Dysfunction Treatment, SLP Oral Pharyngeal Evaluation, SLP Video Swallow Evaluation, SLP Self Care / ADL Training , SLP Cognitive Skill Development, and SLP Group Treatment              Goal: LTG-By discharge, patient will     Dates: Start: 04/30/21       Description: 1) Individualized goal:  tolerate 7- Regular Textures and 0-Thin liquids with no overt s/sx of aspiration noted   2) Interventions:  SLP Speech Language Treatment, SLP Swallowing Dysfunction Treatment, SLP Oral Pharyngeal Evaluation, SLP Video Swallow Evaluation, and SLP Group Treatment                        Problem: Swallowing STGs     Dates: Start: 04/30/21       Goal: STG-Within one week, patient will     Dates: Start: 04/30/21       Description: 1) Individualized goal:  complete MBSS with goals to be added as appropriate  2) Interventions:  SLP Swallowing Dysfunction Treatment, SLP Oral Pharyngeal Evaluation, and SLP Video Swallow Evaluation              Goal: STG-Within one week, patient will     Dates: Start: 04/30/21       Description: 1) Individualized goal:  trial 0-Thin liquids in isolation with no overt s/sx of aspiration noted   2) Interventions:  SLP Swallowing Dysfunction Treatment, SLP Oral Pharyngeal Evaluation, SLP Video Swallow Evaluation, SLP Self Care / ADL Training , and SLP Group Treatment              Goal: STG-Within one week, patient will safely swallow     Dates: Start: 04/30/21       Description: 1) Individualized goal:  trials of 7- Regular Textures with no  overt s/sx of aspiration noted   2) Interventions:  SLP Swallowing Dysfunction Treatment, SLP Oral Pharyngeal Evaluation, SLP Video Swallow Evaluation, SLP Self Care / ADL Training , and SLP Group Treatment

## 2021-05-01 NOTE — CARE PLAN
Problem: Safety  Goal: Will remain free from injury  Note: Pt remains free of accidental injury at this time. Able to verbalize needs and does not attempt self transfer. Bed rails x2 secured for safety. Call light and personal belongings within reach.      Problem: Bowel/Gastric:  Goal: Normal bowel function is maintained or improved  Flowsheets (Taken 4/30/2021 1900 by Marion Laguna V, C.N.A.)  Last BM: 04/30/21  Note: Continent of bowels. Denies s/s of constipation

## 2021-05-02 LAB
ANION GAP SERPL CALC-SCNC: 11 MMOL/L (ref 7–16)
BUN SERPL-MCNC: 18 MG/DL (ref 8–22)
CALCIUM SERPL-MCNC: 9.5 MG/DL (ref 8.5–10.5)
CHLORIDE SERPL-SCNC: 104 MMOL/L (ref 96–112)
CO2 SERPL-SCNC: 27 MMOL/L (ref 20–33)
CREAT SERPL-MCNC: 0.99 MG/DL (ref 0.5–1.4)
ERYTHROCYTE [DISTWIDTH] IN BLOOD BY AUTOMATED COUNT: 39.4 FL (ref 35.9–50)
GLUCOSE BLD-MCNC: 120 MG/DL (ref 65–99)
GLUCOSE SERPL-MCNC: 105 MG/DL (ref 65–99)
HCT VFR BLD AUTO: 39.7 % (ref 42–52)
HGB BLD-MCNC: 13.3 G/DL (ref 14–18)
MCH RBC QN AUTO: 29 PG (ref 27–33)
MCHC RBC AUTO-ENTMCNC: 33.5 G/DL (ref 33.7–35.3)
MCV RBC AUTO: 86.7 FL (ref 81.4–97.8)
PLATELET # BLD AUTO: 273 K/UL (ref 164–446)
PMV BLD AUTO: 9.2 FL (ref 9–12.9)
POTASSIUM SERPL-SCNC: 4 MMOL/L (ref 3.6–5.5)
RBC # BLD AUTO: 4.58 M/UL (ref 4.7–6.1)
SODIUM SERPL-SCNC: 142 MMOL/L (ref 135–145)
WBC # BLD AUTO: 5.1 K/UL (ref 4.8–10.8)

## 2021-05-02 PROCEDURE — A9270 NON-COVERED ITEM OR SERVICE: HCPCS | Performed by: PHYSICAL MEDICINE & REHABILITATION

## 2021-05-02 PROCEDURE — 97535 SELF CARE MNGMENT TRAINING: CPT

## 2021-05-02 PROCEDURE — 97112 NEUROMUSCULAR REEDUCATION: CPT

## 2021-05-02 PROCEDURE — 97130 THER IVNTJ EA ADDL 15 MIN: CPT

## 2021-05-02 PROCEDURE — 92526 ORAL FUNCTION THERAPY: CPT

## 2021-05-02 PROCEDURE — 82962 GLUCOSE BLOOD TEST: CPT

## 2021-05-02 PROCEDURE — 770010 HCHG ROOM/CARE - REHAB SEMI PRIVAT*

## 2021-05-02 PROCEDURE — 99231 SBSQ HOSP IP/OBS SF/LOW 25: CPT | Performed by: PHYSICAL MEDICINE & REHABILITATION

## 2021-05-02 PROCEDURE — 97530 THERAPEUTIC ACTIVITIES: CPT

## 2021-05-02 PROCEDURE — 700102 HCHG RX REV CODE 250 W/ 637 OVERRIDE(OP): Performed by: PHYSICAL MEDICINE & REHABILITATION

## 2021-05-02 PROCEDURE — 92508 TX SP LANG VOICE COMM GROUP: CPT

## 2021-05-02 PROCEDURE — 99232 SBSQ HOSP IP/OBS MODERATE 35: CPT | Performed by: HOSPITALIST

## 2021-05-02 PROCEDURE — 80048 BASIC METABOLIC PNL TOTAL CA: CPT

## 2021-05-02 PROCEDURE — 97129 THER IVNTJ 1ST 15 MIN: CPT

## 2021-05-02 PROCEDURE — 36415 COLL VENOUS BLD VENIPUNCTURE: CPT

## 2021-05-02 PROCEDURE — 85027 COMPLETE CBC AUTOMATED: CPT

## 2021-05-02 RX ADMIN — THYROID, PORCINE 60 MG: 30 TABLET ORAL at 09:10

## 2021-05-02 RX ADMIN — MECLIZINE HYDROCHLORIDE 25 MG: 25 TABLET ORAL at 21:53

## 2021-05-02 RX ADMIN — BACLOFEN 20 MG: 20 TABLET ORAL at 21:55

## 2021-05-02 RX ADMIN — RIVAROXABAN 20 MG: 20 TABLET, FILM COATED ORAL at 17:46

## 2021-05-02 RX ADMIN — MIRTAZAPINE 15 MG: 15 TABLET, ORALLY DISINTEGRATING ORAL at 21:55

## 2021-05-02 RX ADMIN — METOPROLOL TARTRATE 12.5 MG: 25 TABLET, FILM COATED ORAL at 09:10

## 2021-05-02 RX ADMIN — BACLOFEN 20 MG: 20 TABLET ORAL at 15:05

## 2021-05-02 RX ADMIN — ASPIRIN 81 MG CHEWABLE TABLET 81 MG: 81 TABLET CHEWABLE at 17:46

## 2021-05-02 RX ADMIN — DIAZEPAM 2 MG: 2 TABLET ORAL at 21:54

## 2021-05-02 RX ADMIN — BACLOFEN 20 MG: 20 TABLET ORAL at 09:10

## 2021-05-02 RX ADMIN — THYROID, PORCINE 60 MG: 30 TABLET ORAL at 21:54

## 2021-05-02 RX ADMIN — ATORVASTATIN CALCIUM 80 MG: 40 TABLET, FILM COATED ORAL at 21:54

## 2021-05-02 RX ADMIN — MECLIZINE HYDROCHLORIDE 25 MG: 25 TABLET ORAL at 15:05

## 2021-05-02 RX ADMIN — METOPROLOL TARTRATE 12.5 MG: 25 TABLET, FILM COATED ORAL at 21:55

## 2021-05-02 RX ADMIN — TAMSULOSIN HYDROCHLORIDE 0.4 MG: 0.4 CAPSULE ORAL at 21:55

## 2021-05-02 RX ADMIN — MECLIZINE HYDROCHLORIDE 25 MG: 25 TABLET ORAL at 09:10

## 2021-05-02 ASSESSMENT — ACTIVITIES OF DAILY LIVING (ADL)
BED_CHAIR_WHEELCHAIR_TRANSFER_DESCRIPTION: ADAPTIVE EQUIPMENT;INCREASED TIME;SLIDEBOARD TRANSFER FROM BED TO WHEELCHAIR;SET-UP OF EQUIPMENT;SUPERVISION FOR SAFETY;VERBAL CUEING

## 2021-05-02 ASSESSMENT — FIBROSIS 4 INDEX: FIB4 SCORE: 1.025641025641025641

## 2021-05-02 ASSESSMENT — PAIN DESCRIPTION - PAIN TYPE: TYPE: ACUTE PAIN

## 2021-05-02 NOTE — PROGRESS NOTES
"Rehab Progress Note     CC: stroke     Interval Events (Subjective)  Patient is doing well. No complaints. Patient reports pain is controlled, sleeping well, and overall feeling ok. No new issues. Patient and family are glad to be here, states they are making progress.       Objective:  VITAL SIGNS: /77   Pulse 100 Comment: radial pulse  Temp 36.8 °C (98.2 °F) (Oral)   Resp 18   Ht 1.778 m (5' 10\")   Wt 67.2 kg (148 lb 2.4 oz)   SpO2 97%   BMI 21.26 kg/m²   Gen: NAD  Psych: Mood & Affect appropriate   CV: RRR, No cyanosis  Resp: CTAB  Abd: NTND  Skin: No visible rashes or lesions  Neuro: Answers questions appropriately, Following commands     Recent Results (from the past 72 hour(s))   CBC with Differential    Collection Time: 04/30/21  6:14 AM   Result Value Ref Range    WBC 5.6 4.8 - 10.8 K/uL    RBC 4.66 (L) 4.70 - 6.10 M/uL    Hemoglobin 13.8 (L) 14.0 - 18.0 g/dL    Hematocrit 39.9 (L) 42.0 - 52.0 %    MCV 85.6 81.4 - 97.8 fL    MCH 29.6 27.0 - 33.0 pg    MCHC 34.6 33.7 - 35.3 g/dL    RDW 38.9 35.9 - 50.0 fL    Platelet Count 331 164 - 446 K/uL    MPV 9.6 9.0 - 12.9 fL    Neutrophils-Polys 62.70 44.00 - 72.00 %    Lymphocytes 25.30 22.00 - 41.00 %    Monocytes 8.00 0.00 - 13.40 %    Eosinophils 3.40 0.00 - 6.90 %    Basophils 0.20 0.00 - 1.80 %    Immature Granulocytes 0.40 0.00 - 0.90 %    Nucleated RBC 0.00 /100 WBC    Neutrophils (Absolute) 3.53 1.82 - 7.42 K/uL    Lymphs (Absolute) 1.42 1.00 - 4.80 K/uL    Monos (Absolute) 0.45 0.00 - 0.85 K/uL    Eos (Absolute) 0.19 0.00 - 0.51 K/uL    Baso (Absolute) 0.01 0.00 - 0.12 K/uL    Immature Granulocytes (abs) 0.02 0.00 - 0.11 K/uL    NRBC (Absolute) 0.00 K/uL   Comp Metabolic Panel (CMP)    Collection Time: 04/30/21  6:14 AM   Result Value Ref Range    Sodium 136 135 - 145 mmol/L    Potassium 4.2 3.6 - 5.5 mmol/L    Chloride 100 96 - 112 mmol/L    Co2 26 20 - 33 mmol/L    Anion Gap 10.0 7.0 - 16.0    Glucose 106 (H) 65 - 99 mg/dL    Bun 18 8 - 22 " mg/dL    Creatinine 1.04 0.50 - 1.40 mg/dL    Calcium 9.8 8.5 - 10.5 mg/dL    AST(SGOT) 25 12 - 45 U/L    ALT(SGPT) 25 2 - 50 U/L    Alkaline Phosphatase 91 30 - 99 U/L    Total Bilirubin 0.3 0.1 - 1.5 mg/dL    Albumin 3.4 3.2 - 4.9 g/dL    Total Protein 6.8 6.0 - 8.2 g/dL    Globulin 3.4 1.9 - 3.5 g/dL    A-G Ratio 1.0 g/dL   Magnesium    Collection Time: 04/30/21  6:14 AM   Result Value Ref Range    Magnesium 2.0 1.5 - 2.5 mg/dL   Vitamin D, 25-hydroxy (blood)    Collection Time: 04/30/21  6:14 AM   Result Value Ref Range    25-Hydroxy   Vitamin D 25 92 (H) 30 - 80 ng/mL   TSH WITH REFLEX TO FT4    Collection Time: 04/30/21  6:14 AM   Result Value Ref Range    TSH 0.670 0.380 - 5.330 uIU/mL   ESTIMATED GFR    Collection Time: 04/30/21  6:14 AM   Result Value Ref Range    GFR If African American >60 >60 mL/min/1.73 m 2    GFR If Non African American >60 >60 mL/min/1.73 m 2   CBC WITHOUT DIFFERENTIAL    Collection Time: 05/02/21  5:25 AM   Result Value Ref Range    WBC 5.1 4.8 - 10.8 K/uL    RBC 4.58 (L) 4.70 - 6.10 M/uL    Hemoglobin 13.3 (L) 14.0 - 18.0 g/dL    Hematocrit 39.7 (L) 42.0 - 52.0 %    MCV 86.7 81.4 - 97.8 fL    MCH 29.0 27.0 - 33.0 pg    MCHC 33.5 (L) 33.7 - 35.3 g/dL    RDW 39.4 35.9 - 50.0 fL    Platelet Count 273 164 - 446 K/uL    MPV 9.2 9.0 - 12.9 fL   Basic Metabolic Panel    Collection Time: 05/02/21  5:25 AM   Result Value Ref Range    Sodium 142 135 - 145 mmol/L    Potassium 4.0 3.6 - 5.5 mmol/L    Chloride 104 96 - 112 mmol/L    Co2 27 20 - 33 mmol/L    Glucose 105 (H) 65 - 99 mg/dL    Bun 18 8 - 22 mg/dL    Creatinine 0.99 0.50 - 1.40 mg/dL    Calcium 9.5 8.5 - 10.5 mg/dL    Anion Gap 11.0 7.0 - 16.0   ESTIMATED GFR    Collection Time: 05/02/21  5:25 AM   Result Value Ref Range    GFR If African American >60 >60 mL/min/1.73 m 2    GFR If Non African American >60 >60 mL/min/1.73 m 2       Current Facility-Administered Medications   Medication Frequency   • meclizine (ANTIVERT) tablet 25 mg  TID   • diazePAM (VALIUM) tablet 2 mg QHS   • hydrOXYzine HCl (ATARAX) tablet 50 mg Q6HRS PRN   • melatonin tablet 3 mg HS PRN   • Respiratory Therapy Consult Continuous RT   • Pharmacy Consult Request ...Pain Management Review 1 Each PHARMACY TO DOSE   • hydrALAZINE (APRESOLINE) tablet 10 mg Q8HRS PRN   • acetaminophen (Tylenol) tablet 650 mg Q4HRS PRN   • lactulose 20 GM/30ML solution 30 mL QDAY PRN   • docusate sodium (ENEMEEZ) enema 283 mg QDAY PRN   • fleet enema 133 mL QDAY PRN   • artificial tears ophthalmic solution 1 Drop PRN   • benzocaine-menthol (CEPACOL) lozenge 1 Lozenge Q2HRS PRN   • mag hydrox-al hydrox-simeth (MAALOX PLUS ES or MYLANTA DS) suspension 20 mL Q2HRS PRN   • ondansetron (ZOFRAN ODT) dispertab 4 mg 4X/DAY PRN    Or   • ondansetron (ZOFRAN) syringe/vial injection 4 mg 4X/DAY PRN   • traZODone (DESYREL) tablet 50 mg QHS PRN   • sodium chloride (OCEAN) 0.65 % nasal spray 2 Spray PRN   • midazolam (VERSED) 5 mg/mL (1 mL vial) PRN   • atorvastatin (LIPITOR) tablet 80 mg Q EVENING   • aspirin (ASA) chewable tab 81 mg DAILY   • metoprolol tartrate (LOPRESSOR) tablet 12.5 mg BID   • mirtazapine (Remeron) orally disintegrating tab 15 mg QHS   • rivaroxaban (XARELTO) tablet 20 mg PM MEAL   • thyroid (ARMOUR THYROID) tablet 60 mg BID   • tamsulosin (FLOMAX) capsule 0.4 mg QHS   • baclofen (LIORESAL) tablet 20 mg TID       Orders Placed This Encounter   Procedures   • Diet Order Diet: Level 6 - Soft and Bite Sized (float pills in puree); Liquid level: Level 2 - Mildly Thick; Tray Modifications (optional): SLP - 1:1 Supervision by Nursing, SLP - Deliver to Nursing Station     Standing Status:   Standing     Number of Occurrences:   1     Order Specific Question:   Diet:     Answer:   Level 6 - Soft and Bite Sized [23]     Comments:   float pills in puree     Order Specific Question:   Liquid level     Answer:   Level 2 - Mildly Thick     Order Specific Question:   Tray Modifications (optional)      Answer:   SLP - 1:1 Supervision by Nursing     Order Specific Question:   Tray Modifications (optional)     Answer:   SLP - Deliver to Nursing Station       Assessment:  Active Hospital Problems    Diagnosis    • *Acute right MCA stroke (HCC)    • Paroxysmal atrial fibrillation (HCC)    • Urinary retention    • Acquired hypothyroidism    • History of COVID-19    • Hypotension    • Normocytic anemia    • Spasticity as late effect of cerebrovascular accident (CVA)    • Reactive depression        Medical Decision Making and Plan:  Patient is admitted to [facility] for ongoing PT, OT and/or SLP.  Continue medical management per primary team.      In addition:   No changes     Total time:  15 minutes.  I spent greater than 50% of the time for patient care and coordination on this date, including unit/floor time, and face-to-face time with the patient as per assessment and plan above.      Christofer Eisenberg, DO   Physical Medicine and Rehabilitation

## 2021-05-02 NOTE — CARE PLAN
Problem: Communication  Goal: The ability to communicate needs accurately and effectively will improve  Outcome: PROGRESSING AS EXPECTED  Pt doesn't always initiates conversation but is able to answer questions, ask questions when prompted to and carry on short conversation/ pt is able to make needs known.     Problem: Safety  Goal: Will remain free from injury  Outcome: PROGRESSING AS EXPECTED  Takes meds in applesauce and does quite well with this. Currently going to T-dine   Goal: Will remain free from falls  Outcome: PROGRESSING AS EXPECTED  No falls not injury denies pain. Wears a helmet whenever out of bed.

## 2021-05-02 NOTE — THERAPY
Occupational Therapy  Daily Treatment     Patient Name: Thong Arzola  Age:  56 y.o., Sex:  male  Medical Record #: [id number]  Today's Date: 5/2/2021     Precautions  Precautions: (P) Fall Risk, Swallow Precautions ( See Comments), Other (See Comments)  Comments: (P) Helmet on OOB; L UE w/ 2-3 Modified Jeremiah Scale; orthostasis         Subjective    Patient laying in bed stating he is tired.  Agreeable to get up and work on posture and sitting balance.  Spouse Anel present.     Objective       05/02/21 1031   Precautions   Precautions Fall Risk;Swallow Precautions ( See Comments);Other (See Comments)   Comments Helmet on OOB; L UE w/ 2-3 Modified Jeremiah Scale; orthostasis   Vitals   Patient BP Position Sitting   Blood Pressure 114/84   Cognition    Cognition / Consciousness X   Orientation Level Oriented x 4   Attention Impaired   Functional Level of Assist   Lower Body Dressing Total Assist   Lower Body Dressing Description   (to doff socks and don shoes w/ laces)   Bed, Chair, Wheelchair Transfer Total Assist   Bed Chair Wheelchair Transfer Description Verbal cueing;Supervision for safety;Slideboard transfer from bed to wheelchair;Set-up of equipment;Initial preparation for task  (2 person min A slideboard transfer to the right w/ cues)   Neuro-Muscular Treatments   Neuro-Muscular Treatments Facilitation;Joint Approximation;Postural Changes;Postural Facilitation;Tactile Cuing;Verbal Cuing;Weight Shift Right   Comments EOM sitting w/ mirror anterior of him for visual feedback of posture and midline.  Required SBA (for two seconds at a time) to mod/max A for balance with continuous verbal cues for posture.  Completed 1 x 10 posterior leans and 1 x 10 right lateral leans followed by upright sitting for core strengthening.     Balance   Sitting Balance (Static) Trace +   Sitting Balance (Dynamic) Trace +   Comments able to sit unsupported for 2 seconds at a time EOM   Bed Mobility    Supine to Sit Maximal  Assist   Scooting Moderate Assist   Interdisciplinary Plan of Care Collaboration   IDT Collaboration with  Family / Caregiver;Therapy Tech;Physical Therapist   Patient Position at End of Therapy Seated;Chair Alarm On;Self Releasing Lap Belt Applied  (tech took patient to t dine)   Collaboration Comments CLOF, spouse Anel present, tech assisted OT   OT Total Time Spent   OT Individual Total Time Spent (Mins) 60   OT Charge Group   OT Self Care / ADL 1   OT Neuromuscular Re-education / Balance 1   OT Therapy Activity 2     Educated spouse on stretching left fingers, wrist and elbow into extension.  Informed spouse that PRAFO should be on patient's left foot at all times when in bed and wrote it on white board.  Min A x 2 people slideboard transfer to the right from w/c <> mat with cues.    Assessment    Patient able to tolerate sitting up and being out of bed for OT.  BP remained stable from supine to sitting with minimal dizziness.  Patient with improving sitting balance and core strength, but midline orientation remains poor.  Spouse was very encouraged with patient's progress thus far.  Strengths: Able to follow instructions, Alert and oriented, Effective communication skills, Good insight into deficits/needs, Independent prior level of function, Manages pain appropriately, Motivated for self care and independence, Pleasant and cooperative, Supportive family, Willingly participates in therapeutic activities  Barriers: Bowel incontinence, Decreased endurance, Fatigue, Generalized weakness, Hemiplegia, Home accessibility, Orthostatic hypotension, Impaired activity tolerance, Impaired balance, Limited mobility, Spasticity    Plan    Sitting balance/core strengthening/midline orientation, ADL retraining, slideboard transfers progressing to lateral scoots or squat pivots to the right, L UE neuro re-ed/stretching, family training with spouse Anel    Occupational Therapy Goals     Problem: Dressing     Dates: Start:  04/30/21       Goal: STG-Within one week, patient will dress UB     Dates: Start: 04/30/21       Description: 1) Individualized Goal:  with mod A with verbal cues for haylie-technique.  2) Interventions:  OT E Stim Attended, OT Self Care/ADL, OT Cognitive Skill Dev, OT Manual Ther Technique, OT Neuro Re-Ed/Balance, OT Sensory Int Techniques, OT Therapeutic Activity, OT Evaluation, and OT Therapeutic Exercise          Goal: STG-Within one week, patient will dress LB     Dates: Start: 04/30/21       Description: 1) Individualized Goal:  with max A using AE/AD/techniques as needed  2) Interventions:  OT E Stim Attended, OT Self Care/ADL, OT Cognitive Skill Dev, OT Manual Ther Technique, OT Neuro Re-Ed/Balance, OT Sensory Int Techniques, OT Therapeutic Activity, OT Evaluation, and OT Therapeutic Exercise                Problem: Grooming     Dates: Start: 04/30/21       Goal: STG-Within one week, patient will complete grooming     Dates: Start: 04/30/21       Description: 1) Individualized Goal:  with min A using AE/AD/techniques as needed  2) Interventions:  OT E Stim Attended, OT Self Care/ADL, OT Cognitive Skill Dev, OT Manual Ther Technique, OT Neuro Re-Ed/Balance, OT Sensory Int Techniques, OT Therapeutic Activity, OT Evaluation, and OT Therapeutic Exercise                  Problem: OT Long Term Goals     Dates: Start: 04/30/21       Goal: LTG-By discharge, patient will complete basic self care tasks     Dates: Start: 04/30/21       Description: 1) Individualized Goal:  with min to mod A using AE/AD/techniques as needed  2) Interventions:  OT E Stim Attended, OT Self Care/ADL, OT Cognitive Skill Dev, OT Manual Ther Technique, OT Neuro Re-Ed/Balance, OT Sensory Int Techniques, OT Therapeutic Activity, OT Evaluation, and OT Therapeutic Exercise          Goal: LTG-By discharge, patient will perform bathroom transfers     Dates: Start: 04/30/21       Description: 1) Individualized Goal:  with max A x 1 person using  slideboard versus squat pivot to the right  2) Interventions:  OT E Stim Attended, OT Self Care/ADL, OT Cognitive Skill Dev, OT Manual Ther Technique, OT Neuro Re-Ed/Balance, OT Sensory Int Techniques, OT Therapeutic Activity, OT Evaluation, and OT Therapeutic Exercise

## 2021-05-02 NOTE — THERAPY
Speech Language Pathology  Daily Treatment     Patient Name: Thong Arzola  Age:  56 y.o., Sex:  male  Medical Record #: [id number]  Today's Date: 5/2/2021     Precautions  Precautions: Fall Risk, Swallow Precautions ( See Comments), Other (See Comments)  Comments: Helmet on OOB; L UE w/ 2-3 Modified Jeremiah Scale; orthostasis    Subjective    Pt pleasant and cooperative during tx.  Spouse present and supportive.      Objective       05/02/21 1501   Dysphagia    Other Treatments thin liquid trials    SCCAN (Scales of Cognitive and Communicative Ability for Neurorehabilitation)   Oral Expression - Raw Score 17   Oral Expression - Scale Performance Score 89   Speech Comprehension - Raw Score 11   Speech Comprehension - Scale Performance Score 85   Reading Comprehension - Raw Score 5   Reading Comprehension - Scale Performance Score 42   Writing - Raw Score 7   Writing - Scale Performance Score 100   Attention - Raw Score 5   Attention - Scale Performance Score 31   Problem Solving - Raw Score 13   Problem Solving - Scale Performance Score 57   Interdisciplinary Plan of Care Collaboration   IDT Collaboration with  Family / Caregiver   Collaboration Comments spouse present during tx.     SLP Total Time Spent   SLP Individual Total Time Spent (Mins) 60   Treatment Charges   SLP Swallowing Dysfunction Treatment Swallowing Dysfunction Treatment   SLP Cognitive Skill Development First 15 Minutes 1   SLP Cognitive Skill Development Additional 15 Minutes 1       Assessment    SCCAN continued.  Pt presented with deficits in attn, and visual deficits to L side.  Pt presented with occasional dizziness, which resolved after sips of water.  Pt tolerated thin liquid trials (small sips) without overt s/s of aspiration.      Strengths: Able to follow instructions, Pleasant and cooperative, Supportive family, Willingly participates in therapeutic activities  Barriers: Aspiration risk, Impaired activity tolerance, Impaired  appetite/intake, Impaired insight/denial of deficits, Impaired functional cognition    Plan    Complete delayed recall portion of SCCAN.  Trial regular textures and thin liquids at breakfast (5/3/21).          Speech Therapy Problems     Problem: Problem Solving STGs     Dates: Start: 04/30/21       Goal: STG-Within one week, patient will     Dates: Start: 04/30/21       Description: 1) Individualized goal:  complete SCCAN with goals to be added as appropriate  2) Interventions:  SLP Speech Language Treatment, SLP Self Care / ADL Training , SLP Cognitive Skill Development, and SLP Group Treatment                Problem: Speech/Swallowing LTGs     Dates: Start: 04/30/21       Goal: LTG-By discharge, patient will solve complex problem     Dates: Start: 04/30/21       Description: 1) Individualized goal:  related to IADL's with mod I for safe d/c home.  2) Interventions:  SLP Speech Language Treatment, SLP Swallowing Dysfunction Treatment, SLP Oral Pharyngeal Evaluation, SLP Video Swallow Evaluation, SLP Self Care / ADL Training , SLP Cognitive Skill Development, and SLP Group Treatment              Goal: LTG-By discharge, patient will     Dates: Start: 04/30/21       Description: 1) Individualized goal:  tolerate 7- Regular Textures and 0-Thin liquids with no overt s/sx of aspiration noted   2) Interventions:  SLP Speech Language Treatment, SLP Swallowing Dysfunction Treatment, SLP Oral Pharyngeal Evaluation, SLP Video Swallow Evaluation, and SLP Group Treatment                        Problem: Swallowing STGs     Dates: Start: 04/30/21       Goal: STG-Within one week, patient will     Dates: Start: 04/30/21       Description: 1) Individualized goal:  complete MBSS with goals to be added as appropriate  2) Interventions:  SLP Swallowing Dysfunction Treatment, SLP Oral Pharyngeal Evaluation, and SLP Video Swallow Evaluation              Goal: STG-Within one week, patient will     Dates: Start: 04/30/21        Description: 1) Individualized goal:  trial 0-Thin liquids in isolation with no overt s/sx of aspiration noted   2) Interventions:  SLP Swallowing Dysfunction Treatment, SLP Oral Pharyngeal Evaluation, SLP Video Swallow Evaluation, SLP Self Care / ADL Training , and SLP Group Treatment              Goal: STG-Within one week, patient will safely swallow     Dates: Start: 04/30/21       Description: 1) Individualized goal:  trials of 7- Regular Textures with no overt s/sx of aspiration noted   2) Interventions:  SLP Swallowing Dysfunction Treatment, SLP Oral Pharyngeal Evaluation, SLP Video Swallow Evaluation, SLP Self Care / ADL Training , and SLP Group Treatment

## 2021-05-02 NOTE — THERAPY
Speech Language Pathology  Daily Treatment     Patient Name: Thong Arzola  Age:  56 y.o., Sex:  male  Medical Record #: [id number]  Today's Date: 5/2/2021     Precautions  Precautions: Fall Risk, Swallow Precautions ( See Comments), Other (See Comments)  Comments: Helmet on OOB; L UE w/ 2-3 Modified Jeremiah Scale; orthostasis    Subjective    Pt participated in dysphagia tx group.       Objective       05/02/21 1131   Group Therapy    Group Topic Dysphagia   Reason for Group Therapy To Enhance Motivation;To Validate Increased [place] with Skills;To Increase Patient Interaction during Physical Recovery;To Facilitate Goal Discussion ;To Reinforce Strengthening and Movement Patterns through Group Format Demonstration;To Decrease Anxiety through New Skill Performance   Group Treatment Provided use of strategies, thin liquid trials    SLP Total Time Spent   SLP Group Total Time Spent (Mins) 30   Treatment Charges   SLP Treatment - Group Speech Language Treatment - Group       Assessment    Pt benefits from min verbal cues to decrease rate of intake.  Pt tolerated thin liquid trials without overt s/s of aspiration.     Strengths: Able to follow instructions, Pleasant and cooperative, Supportive family, Willingly participates in therapeutic activities  Barriers: Aspiration risk, Impaired activity tolerance, Impaired appetite/intake, Impaired insight/denial of deficits, Impaired functional cognition    Plan    Thin liquid, regular texture trials.         Speech Therapy Problems     Problem: Problem Solving STGs     Dates: Start: 04/30/21       Goal: STG-Within one week, patient will     Dates: Start: 04/30/21       Description: 1) Individualized goal:  complete SCCAN with goals to be added as appropriate  2) Interventions:  SLP Speech Language Treatment, SLP Self Care / ADL Training , SLP Cognitive Skill Development, and SLP Group Treatment                Problem: Speech/Swallowing LTGs     Dates: Start: 04/30/21        Goal: LTG-By discharge, patient will solve complex problem     Dates: Start: 04/30/21       Description: 1) Individualized goal:  related to IADL's with mod I for safe d/c home.  2) Interventions:  SLP Speech Language Treatment, SLP Swallowing Dysfunction Treatment, SLP Oral Pharyngeal Evaluation, SLP Video Swallow Evaluation, SLP Self Care / ADL Training , SLP Cognitive Skill Development, and SLP Group Treatment              Goal: LTG-By discharge, patient will     Dates: Start: 04/30/21       Description: 1) Individualized goal:  tolerate 7- Regular Textures and 0-Thin liquids with no overt s/sx of aspiration noted   2) Interventions:  SLP Speech Language Treatment, SLP Swallowing Dysfunction Treatment, SLP Oral Pharyngeal Evaluation, SLP Video Swallow Evaluation, and SLP Group Treatment                        Problem: Swallowing STGs     Dates: Start: 04/30/21       Goal: STG-Within one week, patient will     Dates: Start: 04/30/21       Description: 1) Individualized goal:  complete MBSS with goals to be added as appropriate  2) Interventions:  SLP Swallowing Dysfunction Treatment, SLP Oral Pharyngeal Evaluation, and SLP Video Swallow Evaluation              Goal: STG-Within one week, patient will     Dates: Start: 04/30/21       Description: 1) Individualized goal:  trial 0-Thin liquids in isolation with no overt s/sx of aspiration noted   2) Interventions:  SLP Swallowing Dysfunction Treatment, SLP Oral Pharyngeal Evaluation, SLP Video Swallow Evaluation, SLP Self Care / ADL Training , and SLP Group Treatment              Goal: STG-Within one week, patient will safely swallow     Dates: Start: 04/30/21       Description: 1) Individualized goal:  trials of 7- Regular Textures with no overt s/sx of aspiration noted   2) Interventions:  SLP Swallowing Dysfunction Treatment, SLP Oral Pharyngeal Evaluation, SLP Video Swallow Evaluation, SLP Self Care / ADL Training , and SLP Group Treatment

## 2021-05-02 NOTE — CARE PLAN
Problem: Pain Management  Goal: Pain level will decrease to patient's comfort goal  Note: Pt denies pain or discomfort this shift. Advised to alert staff if pain occurs. PRN pain medications available if needed. Will continue to assess and monitor for pain.       Problem: Urinary Elimination:  Goal: Ability to reestablish a normal urinary elimination pattern will improve  Note: Pt with coude dye catheter draining moderate amount of urine. Dye catheter care provided by staff

## 2021-05-02 NOTE — THERAPY
Physical Therapy   Daily Treatment     Patient Name: Thong Arzola  Age:  56 y.o., Sex:  male  Medical Record #: [id number]  Today's Date: 5/2/2021     Precautions: Fall Risk, Swallow Precautions ( See Comments), Other (See Comments)  Comments: Helmet on OOB; L UE w/ 2-3 Modified Jeremiah Scale; orthostasis    Subjective    Patient seated in T-dine upon arrival, agreeable to therapy. Patient reports he has been sitting in w/c for approx 20 minutes. Patient reporting he is dizzy/nauseous this morning. Patient reports he has not experience room spinning dizziness since his arrival. Spouse is present at end of session.      Objective       05/02/21 0831   Precautions   Precautions Fall Risk;Swallow Precautions ( See Comments);Other (See Comments)   Comments Helmet on OOB; L UE w/ 2-3 Modified Jeremiah Scale; orthostasis   Vitals   Pulse (!) 113   Patient BP Position Sitting   Blood Pressure 127/85   Vitals Comments Pt reporting lightheadedness    Pain 0 - 10 Group   Location Abdomen   Location Orientation Left   Description Burning   Therapist Pain Assessment During Activity  (Sitting balance on mat table)   Wheelchair Functional Level of Assist   Wheelchair Assist Stand by Assist  (Keegan-technique )   Distance Wheelchair (Feet or Distance) 100   Wheelchair Description Adaptive equipment;Verbal cueing;Supervision for safety;Safety concerns   Transfer Functional Level of Assist   Bed, Chair, Wheelchair Transfer Maximal Assist  (maxA x2; w/c to bed and w/c mat table)   Bed Chair Wheelchair Transfer Description Adaptive equipment;Increased time;Slideboard transfer from bed to wheelchair;Set-up of equipment;Supervision for safety;Verbal cueing   Bed Mobility    Sit to Supine Maximal Assist  (2nd person supporting trunk)   Scooting Moderate Assist   Rolling Maximum Assist to Lt.   Neuro-Muscular Treatments   Neuro-Muscular Treatments Facilitation;Postural Facilitation;Postural Changes;Sequencing;Verbal Cuing;Weight Shift  Right;Weight Shift Left   Comments Seated EOM with mirror for visual feedback to attain midline position. Required mod/maxA of 2nd person to maintain balance in seated. Demonstrates a significant posterior L lean. Able to tolerate EOM for 6 minutes.    Interdisciplinary Plan of Care Collaboration   IDT Collaboration with  Family / Caregiver;Occupational Therapist;Nursing   Patient Position at End of Therapy In Bed;Call Light within Reach;Tray Table within Reach;Phone within Reach;Family / Friend in Room   Collaboration Comments CLOF; RN providing meds   Strengths & Barriers   Strengths Alert and oriented;Effective communication skills;Independent prior level of function;Motivated for self care and independence;Pleasant and cooperative;Supportive family;Willingly participates in therapeutic activities   Barriers Hemiparesis;Home accessibility;Orthostatic hypotension;Impaired activity tolerance;Impaired balance;Spasticity;Limited mobility  (L haylie; dizzy )   PT Total Time Spent   PT Individual Total Time Spent (Mins) 60   PT Charge Group   PT Neuromuscular Re-Education / Balance 1   PT Therapeutic Activities 3       Assessment    Patient was able to tolerate sitting EOM with no increase in dizziness. Mod/Max assistance of a 2nd person required to maintain balance sitting EOM. Patient able to initiate right weight shift with visual feedback using mirror. Patient demonstrates good sequencing of slide board transfer.    Strengths: (P) Alert and oriented, Effective communication skills, Independent prior level of function, Motivated for self care and independence, Pleasant and cooperative, Supportive family, Willingly participates in therapeutic activities  Barriers: (P) Hemiparesis, Home accessibility, Orthostatic hypotension, Impaired activity tolerance, Impaired balance, Spasticity, Limited mobility(L haylie; dizzy )    Plan    Midline orientation EOM, sitting balance, slide board transfer, bed mobility, w/c mobility,   LE trial, family training with spouse    Passport items to be completed:  Get in/out of bed safely, in/out of a vehicle, safely use mobility device, walk or wheel around home/community, navigate up and down stairs, show how to get up/down from the ground, ensure home is accessible, demonstrate HEP, complete caregiver training    Physical Therapy Problems     Problem: Mobility     Dates: Start: 04/30/21       Goal: STG-Within one week, patient will propel wheelchair household distances     Dates: Start: 04/30/21       Description: 1) Individualized goal:  25ft with haylie technique with SBA on even surfaces  2) Interventions:  PT Group Therapy, PT E Stim Attended, PT Orthotics Training, PT Gait Training, PT Self Care/Home Eval, PT Therapeutic Exercises, PT Neuro Re-Ed/Balance, PT Therapeutic Activity, and PT Evaluation                  Problem: Mobility Transfers     Dates: Start: 04/30/21       Goal: STG-Within one week, patient will perform bed mobility     Dates: Start: 04/30/21       Description: 1) Individualized goal:  sit <> supine with modA   2) Interventions:  PT Group Therapy, PT E Stim Attended, PT Orthotics Training, PT Gait Training, PT Self Care/Home Eval, PT Therapeutic Exercises, PT Neuro Re-Ed/Balance, PT Therapeutic Activity, and PT Evaluation            Goal: STG-Within one week, patient will transfer bed to chair     Dates: Start: 04/30/21       Description: 1) Individualized goal:  transfer with slide board and modA  2) Interventions:  PT Group Therapy, PT E Stim Attended, PT Orthotics Training, PT Gait Training, PT Self Care/Home Eval, PT Therapeutic Exercises, PT Neuro Re-Ed/Balance, PT Therapeutic Activity, and PT Evaluation                  Problem: PT-Long Term Goals     Dates: Start: 04/30/21       Goal: LTG-By discharge, patient will propel wheelchair     Dates: Start: 04/30/21       Description: 1) Individualized goal:  100ft with haylie technique and Anastacio on even/uneven surfaces  2)  Interventions:  PT Group Therapy, PT E Stim Attended, PT Orthotics Training, PT Gait Training, PT Self Care/Home Eval, PT Therapeutic Exercises, PT Neuro Re-Ed/Balance, PT Therapeutic Activity, and PT Evaluation          Goal: LTG-By discharge, patient will transfer one surface to another     Dates: Start: 04/30/21       Description: 1) Individualized goal:  SQPT with Taz   2) Interventions:  PT Group Therapy, PT E Stim Attended, PT Orthotics Training, PT Gait Training, PT Self Care/Home Eval, PT Therapeutic Exercises, PT Neuro Re-Ed/Balance, PT Therapeutic Activity, and PT Evaluation            Goal: LTG-By discharge, patient will     Dates: Start: 04/30/21       Description: 1) Individualized goal: perform bed mobility (supine<>sit) with Taz   2) Interventions:  PT Group Therapy, PT E Stim Attended, PT Orthotics Training, PT Gait Training, PT Self Care/Home Eval, PT Therapeutic Exercises, PT Neuro Re-Ed/Balance, PT Therapeutic Activity, and PT Evaluation

## 2021-05-03 PROCEDURE — 97530 THERAPEUTIC ACTIVITIES: CPT

## 2021-05-03 PROCEDURE — 97112 NEUROMUSCULAR REEDUCATION: CPT

## 2021-05-03 PROCEDURE — 700102 HCHG RX REV CODE 250 W/ 637 OVERRIDE(OP): Performed by: PHYSICAL MEDICINE & REHABILITATION

## 2021-05-03 PROCEDURE — 99233 SBSQ HOSP IP/OBS HIGH 50: CPT | Performed by: PHYSICAL MEDICINE & REHABILITATION

## 2021-05-03 PROCEDURE — 99232 SBSQ HOSP IP/OBS MODERATE 35: CPT | Performed by: HOSPITALIST

## 2021-05-03 PROCEDURE — 97032 APPL MODALITY 1+ESTIM EA 15: CPT

## 2021-05-03 PROCEDURE — 97129 THER IVNTJ 1ST 15 MIN: CPT

## 2021-05-03 PROCEDURE — 770010 HCHG ROOM/CARE - REHAB SEMI PRIVAT*

## 2021-05-03 PROCEDURE — 92526 ORAL FUNCTION THERAPY: CPT

## 2021-05-03 PROCEDURE — 97535 SELF CARE MNGMENT TRAINING: CPT

## 2021-05-03 PROCEDURE — A9270 NON-COVERED ITEM OR SERVICE: HCPCS | Performed by: PHYSICAL MEDICINE & REHABILITATION

## 2021-05-03 PROCEDURE — 97130 THER IVNTJ EA ADDL 15 MIN: CPT

## 2021-05-03 RX ORDER — BACLOFEN 20 MG/1
30 TABLET ORAL 3 TIMES DAILY
Status: DISCONTINUED | OUTPATIENT
Start: 2021-05-03 | End: 2021-05-13

## 2021-05-03 RX ADMIN — MECLIZINE HYDROCHLORIDE 25 MG: 25 TABLET ORAL at 07:54

## 2021-05-03 RX ADMIN — ASPIRIN 81 MG CHEWABLE TABLET 81 MG: 81 TABLET CHEWABLE at 17:15

## 2021-05-03 RX ADMIN — METOPROLOL TARTRATE 12.5 MG: 25 TABLET, FILM COATED ORAL at 20:21

## 2021-05-03 RX ADMIN — BACLOFEN 30 MG: 20 TABLET ORAL at 20:27

## 2021-05-03 RX ADMIN — RIVAROXABAN 20 MG: 20 TABLET, FILM COATED ORAL at 17:15

## 2021-05-03 RX ADMIN — MECLIZINE HYDROCHLORIDE 25 MG: 25 TABLET ORAL at 14:29

## 2021-05-03 RX ADMIN — MIRTAZAPINE 15 MG: 15 TABLET, ORALLY DISINTEGRATING ORAL at 20:21

## 2021-05-03 RX ADMIN — THYROID, PORCINE 60 MG: 30 TABLET ORAL at 07:54

## 2021-05-03 RX ADMIN — THYROID, PORCINE 60 MG: 30 TABLET ORAL at 20:20

## 2021-05-03 RX ADMIN — ATORVASTATIN CALCIUM 80 MG: 40 TABLET, FILM COATED ORAL at 20:21

## 2021-05-03 RX ADMIN — BACLOFEN 20 MG: 20 TABLET ORAL at 14:29

## 2021-05-03 RX ADMIN — BACLOFEN 20 MG: 20 TABLET ORAL at 07:54

## 2021-05-03 RX ADMIN — MECLIZINE HYDROCHLORIDE 25 MG: 25 TABLET ORAL at 20:21

## 2021-05-03 RX ADMIN — METOPROLOL TARTRATE 12.5 MG: 25 TABLET, FILM COATED ORAL at 07:54

## 2021-05-03 RX ADMIN — TAMSULOSIN HYDROCHLORIDE 0.4 MG: 0.4 CAPSULE ORAL at 20:21

## 2021-05-03 RX ADMIN — DIAZEPAM 2 MG: 2 TABLET ORAL at 20:20

## 2021-05-03 NOTE — THERAPY
Physical Therapy   Daily Treatment     Patient Name: Thong Arzola  Age:  56 y.o., Sex:  male  Medical Record #: [id number]  Today's Date: 5/3/2021     Precautions  Precautions: (P) Fall Risk, Swallow Precautions ( See Comments), Other (See Comments)  Comments: (P) Helmet on OOB; L UE w/ 2-3 Modified Jeremiah Scale; orthostasis    Subjective    Pt was agreeable to trial , with no complaints during estim.      Objective       05/03/21 1431   Precautions   Precautions Fall Risk;Swallow Precautions ( See Comments);Other (See Comments)   Comments Helmet on OOB; L UE w/ 2-3 Modified Jeremiah Scale; orthostasis   Transfer Functional Level of Assist   Bed, Chair, Wheelchair Transfer Total Assist X 2   Bed Chair Wheelchair Transfer Description Slideboard transfer from bed to wheelchair;Set-up of equipment;Verbal cueing;Adaptive equipment   Bed Mobility    Supine to Sit Maximal Assist   Scooting Total Assist X 2  (to left, aone slide board)   Rolling Maximal Assist to Rt.   Neuro-Muscular Treatments   Neuro-Muscular Treatments Weight Shift Right   Comments Pillow behind left side/ posterior to patient to assist with midline positining.  LE cycle profile created. Electrodes placed on quads, glutes, ant tib, gastroc, and hams. Palpation to each muscle group for optimal contraction to tolerance. Skin inspected, with no irritation noted.  x 16 min SPV. Roho cushion obtained to trial tomorrow for improved seated pressure relief.     Interdisciplinary Plan of Care Collaboration   IDT Collaboration with  Family / Caregiver;Speech Therapist;Therapy Tech   Patient Position at End of Therapy Seated;Self Releasing Lap Belt Applied;Family / Friend in Room  (Helmet donned)   Collaboration Comments SLP- pt progressed to thin liquids; water ok, slow/ small sips. Spouse educated to sit on patient's left side when visiting, and educated on rationale of . Tech assisted with slideboard transfer.    Strengths & Barriers    Strengths Alert and oriented;Effective communication skills;Independent prior level of function;Motivated for self care and independence;Pleasant and cooperative;Supportive family;Willingly participates in therapeutic activities   Barriers Hemiparesis;Home accessibility;Orthostatic hypotension;Impaired activity tolerance;Impaired balance;Spasticity;Limited mobility   PT Total Time Spent   PT Individual Total Time Spent (Mins) 60   PT Charge Group   PT Electrical Stimulation Attended 1   PT Neuromuscular Re-Education / Balance 1   PT Therapeutic Activities 2     Pt educated on rationale of .      Assessment    Pt required total A x 2 during this afternoons slid board transfer; suspect increased fatigue. Pt tolerated 16 min of , with <1 min off motor assist. Pt completed 1.58 miles, and power output of 0.3W, 0.3kCal/hr.      Strengths: (P) Alert and oriented, Effective communication skills, Independent prior level of function, Motivated for self care and independence, Pleasant and cooperative, Supportive family, Willingly participates in therapeutic activities  Barriers: (P) Hemiparesis, Home accessibility, Orthostatic hypotension, Impaired activity tolerance, Impaired balance, Spasticity, Limited mobility    Plan    Midline orientation EOM, sitting balance, slide board transfer (trial Gaurav board), bed mobility, w/c mobility,  LE (progress to adjunct after 2nd trial), family training with spouse     Passport items to be completed:  Get in/out of bed safely, in/out of a vehicle, safely use mobility device, walk or wheel around home/community, navigate up and down stairs, show how to get up/down from the ground, ensure home is accessible, demonstrate HEP, complete caregiver training    Physical Therapy Problems     Problem: Mobility     Dates: Start: 04/30/21       Goal: STG-Within one week, patient will propel wheelchair household distances     Dates: Start: 04/30/21       Description: 1)  Individualized goal:  25ft with haylie technique with SBA on even surfaces  2) Interventions:  PT Group Therapy, PT E Stim Attended, PT Orthotics Training, PT Gait Training, PT Self Care/Home Eval, PT Therapeutic Exercises, PT Neuro Re-Ed/Balance, PT Therapeutic Activity, and PT Evaluation                  Problem: Mobility Transfers     Dates: Start: 04/30/21       Goal: STG-Within one week, patient will perform bed mobility     Dates: Start: 04/30/21       Description: 1) Individualized goal:  sit <> supine with modA   2) Interventions:  PT Group Therapy, PT E Stim Attended, PT Orthotics Training, PT Gait Training, PT Self Care/Home Eval, PT Therapeutic Exercises, PT Neuro Re-Ed/Balance, PT Therapeutic Activity, and PT Evaluation            Goal: STG-Within one week, patient will transfer bed to chair     Dates: Start: 04/30/21       Description: 1) Individualized goal:  transfer with slide board and modA  2) Interventions:  PT Group Therapy, PT E Stim Attended, PT Orthotics Training, PT Gait Training, PT Self Care/Home Eval, PT Therapeutic Exercises, PT Neuro Re-Ed/Balance, PT Therapeutic Activity, and PT Evaluation                  Problem: PT-Long Term Goals     Dates: Start: 04/30/21       Goal: LTG-By discharge, patient will propel wheelchair     Dates: Start: 04/30/21       Description: 1) Individualized goal:  100ft with haylie technique and Anastacio on even/uneven surfaces  2) Interventions:  PT Group Therapy, PT E Stim Attended, PT Orthotics Training, PT Gait Training, PT Self Care/Home Eval, PT Therapeutic Exercises, PT Neuro Re-Ed/Balance, PT Therapeutic Activity, and PT Evaluation          Goal: LTG-By discharge, patient will transfer one surface to another     Dates: Start: 04/30/21       Description: 1) Individualized goal:  SQPT with Taz   2) Interventions:  PT Group Therapy, PT E Stim Attended, PT Orthotics Training, PT Gait Training, PT Self Care/Home Eval, PT Therapeutic Exercises, PT Neuro  Re-Ed/Balance, PT Therapeutic Activity, and PT Evaluation            Goal: LTG-By discharge, patient will     Dates: Start: 04/30/21       Description: 1) Individualized goal: perform bed mobility (supine<>sit) with Taz   2) Interventions:  PT Group Therapy, PT E Stim Attended, PT Orthotics Training, PT Gait Training, PT Self Care/Home Eval, PT Therapeutic Exercises, PT Neuro Re-Ed/Balance, PT Therapeutic Activity, and PT Evaluation

## 2021-05-03 NOTE — PROGRESS NOTES
Received shift report and assumed care of patient.  Patient awake, calm and stable. Pt up to wheelchair for Tdine. Helmet in place. Replaced TEDs. Morning meds given with applesauce. Abdominal binder on. Brewer patent with clear yellow urine output. Denies pain or discomfort at this time.  Will continue to monitor.

## 2021-05-03 NOTE — THERAPY
Speech Language Pathology  Daily Treatment     Patient Name: Thong Arzola  Age:  56 y.o., Sex:  male  Medical Record #: [id number]  Today's Date: 5/3/2021     Precautions  Precautions: Fall Risk, Swallow Precautions ( See Comments), Other (See Comments)  Comments: Helmet on OOB; L UE w/ 2-3 Modified Jeremiah Scale; orthostasis    Subjective    Pt completed 2 treatment sessions: 8-830, [id number].      Objective       05/03/21 0801   Dysphagia    Other Treatments trials of regular textures/thin liquids.   SCCAN (Scales of Cognitive and Communicative Ability for Neurorehabilitation)   Memory - Raw Score 12   Memory - Scale Performance Score 63   Interdisciplinary Plan of Care Collaboration   IDT Collaboration with  Family / Caregiver   Collaboration Comments spouse present during tx.     SLP Total Time Spent   SLP Individual Total Time Spent (Mins) 60   Treatment Charges   SLP Swallowing Dysfunction Treatment Swallowing Dysfunction Treatment   SLP Cognitive Skill Development First 15 Minutes 1   SLP Cognitive Skill Development Additional 15 Minutes 1       Assessment    Pt assessed with regular textures and thin liquid trials.  Pt tolerated thin liquids, and regular textures without overt s/s of aspiration or oral residue.  Pt requires min verbal cues to decrease rate of intake.  Continue tdine to assess diet tolerance and use of strategies.     Single target cancellation: 1/18 independently; 4/18 given line guide on L.  SCCAN completed.  Pt scored 68/94, characteristic of moderate cognitive deficits.  Pt presented with greatest deficits in attn, memory, problem solving and reading comprehension.  Some deficits are due to L inattention.      Strengths: Able to follow instructions, Pleasant and cooperative, Supportive family, Willingly participates in therapeutic activities  Barriers: Aspiration risk, Impaired activity tolerance, Impaired appetite/intake, Impaired insight/denial of deficits, Impaired functional  cognition    Plan    Target visual scanning, diet tolerance, use of strategies, recall, memory book        Speech Therapy Problems     Problem: Problem Solving STGs     Dates: Start: 04/30/21       Goal: STG-Within one week, patient will     Dates: Start: 04/30/21       Description: 1) Individualized goal:  complete SCCAN with goals to be added as appropriate  2) Interventions:  SLP Speech Language Treatment, SLP Self Care / ADL Training , SLP Cognitive Skill Development, and SLP Group Treatment                Problem: Speech/Swallowing LTGs     Dates: Start: 04/30/21       Goal: LTG-By discharge, patient will solve complex problem     Dates: Start: 04/30/21       Description: 1) Individualized goal:  related to IADL's with mod I for safe d/c home.  2) Interventions:  SLP Speech Language Treatment, SLP Swallowing Dysfunction Treatment, SLP Oral Pharyngeal Evaluation, SLP Video Swallow Evaluation, SLP Self Care / ADL Training , SLP Cognitive Skill Development, and SLP Group Treatment              Goal: LTG-By discharge, patient will     Dates: Start: 04/30/21       Description: 1) Individualized goal:  tolerate 7- Regular Textures and 0-Thin liquids with no overt s/sx of aspiration noted   2) Interventions:  SLP Speech Language Treatment, SLP Swallowing Dysfunction Treatment, SLP Oral Pharyngeal Evaluation, SLP Video Swallow Evaluation, and SLP Group Treatment                        Problem: Swallowing STGs     Dates: Start: 04/30/21       Goal: STG-Within one week, patient will     Dates: Start: 04/30/21       Description: 1) Individualized goal:  complete MBSS with goals to be added as appropriate  2) Interventions:  SLP Swallowing Dysfunction Treatment, SLP Oral Pharyngeal Evaluation, and SLP Video Swallow Evaluation              Goal: STG-Within one week, patient will     Dates: Start: 04/30/21       Description: 1) Individualized goal:  trial 0-Thin liquids in isolation with no overt s/sx of aspiration noted    2) Interventions:  SLP Swallowing Dysfunction Treatment, SLP Oral Pharyngeal Evaluation, SLP Video Swallow Evaluation, SLP Self Care / ADL Training , and SLP Group Treatment              Goal: STG-Within one week, patient will safely swallow     Dates: Start: 04/30/21       Description: 1) Individualized goal:  trials of 7- Regular Textures with no overt s/sx of aspiration noted   2) Interventions:  SLP Swallowing Dysfunction Treatment, SLP Oral Pharyngeal Evaluation, SLP Video Swallow Evaluation, SLP Self Care / ADL Training , and SLP Group Treatment

## 2021-05-03 NOTE — THERAPY
Occupational Therapy  Daily Treatment     Patient Name: Thong Arzola  Age:  56 y.o., Sex:  male  Medical Record #: [id number]  Today's Date: 5/3/2021     Precautions  Precautions: (P) Fall Risk, Other (See Comments), Swallow Precautions ( See Comments)  Comments: (P) Helmet on OOB; L UE w/ 2-3 Modified Jeremiah Scale; orthostasis         Subjective    Patient asleep in bed, but easily awoken.  Agreeable to work on grooming and sitting balance/core strength.     Objective       05/03/21 1031   Precautions   Precautions Fall Risk;Other (See Comments);Swallow Precautions ( See Comments)   Comments Helmet on OOB; L UE w/ 2-3 Modified Jeremiah Scale; orthostasis   Functional Level of Assist   Grooming Moderate Assist;Seated   Grooming Description Increased time;Seated in wheelchair at sink;Set-up of equipment;Supervision for safety;Verbal cueing  (min A (to wash left hand), assist w/ sit balance, )   Bathing   (patient declined offer to shower)   Lower Body Dressing Total Assist   Lower Body Dressing Description   (total A to doff socks and don shoes w/ laces)   Bed, Chair, Wheelchair Transfer Total Assist   Bed Chair Wheelchair Transfer Description Slideboard transfer from bed to wheelchair;Set-up of equipment;Supervision for safety;Verbal cueing;Adaptive equipment  (min A x 2 slideboard bed to w/c w/ cues)   Tub / Shower Transfers Refused   Neuro-Muscular Treatments   Neuro-Muscular Treatments Facilitation;Joint Approximation;Postural Changes;Postural Facilitation;Tactile Cuing;Verbal Cuing;Weight Shift Right   Comments EOM sitting w/ mirror anterior of him for visual feedback of posture and midline.  Required SBA (for two seconds at a time) to mod/max A for balance with continuous verbal cues for posture.  Completed 1 x 10 posterior leans and 1 right lateral lean followed by upright sitting for core strengthening.     Balance   Sitting Balance (Static) Trace +   Sitting Balance (Dynamic) Trace +   Skilled  Intervention Verbal Cuing;Tactile Cuing;Sequencing;Postural Facilitation   Bed Mobility    Supine to Sit Maximal Assist   Scooting Moderate Assist   Interdisciplinary Plan of Care Collaboration   IDT Collaboration with  Therapy Tech   Patient Position at End of Therapy Seated;Chair Alarm On;Self Releasing Lap Belt Applied  (in t dine)   Collaboration Comments assisted with session   OT Total Time Spent   OT Individual Total Time Spent (Mins) 60   OT Charge Group   OT Self Care / ADL 1   OT Neuromuscular Re-education / Balance 2   OT Therapy Activity 1     OT donned left air splint over elbow to keep in extension for stretching bicep.    Assessment    Patient continues to improve with slideboard transfers and does not show much impulsivity, though continues to have more midline orientation and sitting balance/core control.  Strengths: Able to follow instructions, Alert and oriented, Effective communication skills, Good insight into deficits/needs, Independent prior level of function, Manages pain appropriately, Motivated for self care and independence, Pleasant and cooperative, Supportive family, Willingly participates in therapeutic activities  Barriers: Bowel incontinence, Decreased endurance, Fatigue, Generalized weakness, Hemiplegia, Home accessibility, Orthostatic hypotension, Impaired activity tolerance, Impaired balance, Limited mobility, Spasticity    Plan    Sitting balance/core strengthening/midline orientation, ADL retraining, slideboard transfers progressing to lateral scoots or squat pivots to the right, L UE neuro re-ed/stretching, family training with spouse Anle      Occupational Therapy Goals     Problem: Dressing     Dates: Start: 04/30/21       Goal: STG-Within one week, patient will dress UB     Dates: Start: 04/30/21       Description: 1) Individualized Goal:  with mod A with verbal cues for haylie-technique.  2) Interventions:  OT E Stim Attended, OT Self Care/ADL, OT Cognitive Skill Dev, OT  Manual Ther Technique, OT Neuro Re-Ed/Balance, OT Sensory Int Techniques, OT Therapeutic Activity, OT Evaluation, and OT Therapeutic Exercise          Goal: STG-Within one week, patient will dress LB     Dates: Start: 04/30/21       Description: 1) Individualized Goal:  with max A using AE/AD/techniques as needed  2) Interventions:  OT E Stim Attended, OT Self Care/ADL, OT Cognitive Skill Dev, OT Manual Ther Technique, OT Neuro Re-Ed/Balance, OT Sensory Int Techniques, OT Therapeutic Activity, OT Evaluation, and OT Therapeutic Exercise                Problem: Grooming     Dates: Start: 04/30/21       Goal: STG-Within one week, patient will complete grooming     Dates: Start: 04/30/21       Description: 1) Individualized Goal:  with min A using AE/AD/techniques as needed  2) Interventions:  OT E Stim Attended, OT Self Care/ADL, OT Cognitive Skill Dev, OT Manual Ther Technique, OT Neuro Re-Ed/Balance, OT Sensory Int Techniques, OT Therapeutic Activity, OT Evaluation, and OT Therapeutic Exercise                  Problem: OT Long Term Goals     Dates: Start: 04/30/21       Goal: LTG-By discharge, patient will complete basic self care tasks     Dates: Start: 04/30/21       Description: 1) Individualized Goal:  with min to mod A using AE/AD/techniques as needed  2) Interventions:  OT E Stim Attended, OT Self Care/ADL, OT Cognitive Skill Dev, OT Manual Ther Technique, OT Neuro Re-Ed/Balance, OT Sensory Int Techniques, OT Therapeutic Activity, OT Evaluation, and OT Therapeutic Exercise          Goal: LTG-By discharge, patient will perform bathroom transfers     Dates: Start: 04/30/21       Description: 1) Individualized Goal:  with max A x 1 person using slideboard versus squat pivot to the right  2) Interventions:  OT E Stim Attended, OT Self Care/ADL, OT Cognitive Skill Dev, OT Manual Ther Technique, OT Neuro Re-Ed/Balance, OT Sensory Int Techniques, OT Therapeutic Activity, OT Evaluation, and OT Therapeutic Exercise

## 2021-05-03 NOTE — CARE PLAN
Dye care intact, draining urine with blood, flushed sterile water for irrigation. Output cleared , cont monitored.    Problem: Communication  Goal: The ability to communicate needs accurately and effectively will improve  Outcome: PROGRESSING AS EXPECTED  Intervention: Educate patient and significant other/support system about the plan of care, procedures, treatments, medications and allow for questions  Note: Plan of care reviewed , verbalized understanding.Able to verbalize needs , responds appropriately .     Problem: Safety  Goal: Will remain free from falls  Outcome: PROGRESSING AS EXPECTED  Intervention: Implement fall precautions  Note: Safety measures enforced, pt with left side neglect , turned and repositioned every 2 hrs. Pt approached on the left side . Aspiration precautions observed, head of bed elevated during med pass, meds floated in apple sauce , tolerated.Able to swallow without difficulty.     Problem: Bowel/Gastric:  Goal: Will not experience complications related to bowel motility  Outcome: PROGRESSING AS EXPECTED  Intervention: Assess baseline bowel pattern  Note: Pt incontinent of stool , cleansed , alta care and dye care done,kept dry and clean.     Problem: Pain Management  Goal: Pain level will decrease to patient's comfort goal  Outcome: PROGRESSING AS EXPECTED

## 2021-05-03 NOTE — PROGRESS NOTES
"Rehab Progress Note     Date of Service: 5/3/2021  Chief Complaint: follow up stroke    Interval Events (Subjective)    Patient seen and examined today in his bathroom, working with OT on his ADLs. Spasticity on the left side continues to be severe, no change since last week per OT.    Patient reports he's sleeping well. He denies any pain. To have voiding trial tomorrow am.    Discussed spasticity management with Dr. Dumont. May need Botox at some point, potentially as an inpatient.     ROS: No changes to bowel, bladder, pain, mood, or sleep.       Objective:  VITAL SIGNS: /73   Pulse 82   Temp 36.4 °C (97.5 °F) (Temporal)   Resp 18   Ht 1.778 m (5' 10\")   Wt 67.2 kg (148 lb 2.4 oz)   SpO2 97%   BMI 21.26 kg/m²   Gen: alert, no apparent distress  HEENT: right craniectomy deficit  Neuro: notable for spastic left hemiplegia      Recent Results (from the past 72 hour(s))   CBC WITHOUT DIFFERENTIAL    Collection Time: 05/02/21  5:25 AM   Result Value Ref Range    WBC 5.1 4.8 - 10.8 K/uL    RBC 4.58 (L) 4.70 - 6.10 M/uL    Hemoglobin 13.3 (L) 14.0 - 18.0 g/dL    Hematocrit 39.7 (L) 42.0 - 52.0 %    MCV 86.7 81.4 - 97.8 fL    MCH 29.0 27.0 - 33.0 pg    MCHC 33.5 (L) 33.7 - 35.3 g/dL    RDW 39.4 35.9 - 50.0 fL    Platelet Count 273 164 - 446 K/uL    MPV 9.2 9.0 - 12.9 fL   Basic Metabolic Panel    Collection Time: 05/02/21  5:25 AM   Result Value Ref Range    Sodium 142 135 - 145 mmol/L    Potassium 4.0 3.6 - 5.5 mmol/L    Chloride 104 96 - 112 mmol/L    Co2 27 20 - 33 mmol/L    Glucose 105 (H) 65 - 99 mg/dL    Bun 18 8 - 22 mg/dL    Creatinine 0.99 0.50 - 1.40 mg/dL    Calcium 9.5 8.5 - 10.5 mg/dL    Anion Gap 11.0 7.0 - 16.0   ESTIMATED GFR    Collection Time: 05/02/21  5:25 AM   Result Value Ref Range    GFR If African American >60 >60 mL/min/1.73 m 2    GFR If Non African American >60 >60 mL/min/1.73 m 2   POCT glucose device results    Collection Time: 05/02/21  9:50 PM   Result Value Ref Range    Glucose " - Accu-Ck 120 (H) 65 - 99 mg/dL       Current Facility-Administered Medications   Medication Frequency   • meclizine (ANTIVERT) tablet 25 mg TID   • diazePAM (VALIUM) tablet 2 mg QHS   • hydrOXYzine HCl (ATARAX) tablet 50 mg Q6HRS PRN   • melatonin tablet 3 mg HS PRN   • Respiratory Therapy Consult Continuous RT   • Pharmacy Consult Request ...Pain Management Review 1 Each PHARMACY TO DOSE   • hydrALAZINE (APRESOLINE) tablet 10 mg Q8HRS PRN   • acetaminophen (Tylenol) tablet 650 mg Q4HRS PRN   • lactulose 20 GM/30ML solution 30 mL QDAY PRN   • docusate sodium (ENEMEEZ) enema 283 mg QDAY PRN   • fleet enema 133 mL QDAY PRN   • artificial tears ophthalmic solution 1 Drop PRN   • benzocaine-menthol (CEPACOL) lozenge 1 Lozenge Q2HRS PRN   • mag hydrox-al hydrox-simeth (MAALOX PLUS ES or MYLANTA DS) suspension 20 mL Q2HRS PRN   • ondansetron (ZOFRAN ODT) dispertab 4 mg 4X/DAY PRN    Or   • ondansetron (ZOFRAN) syringe/vial injection 4 mg 4X/DAY PRN   • traZODone (DESYREL) tablet 50 mg QHS PRN   • sodium chloride (OCEAN) 0.65 % nasal spray 2 Spray PRN   • midazolam (VERSED) 5 mg/mL (1 mL vial) PRN   • atorvastatin (LIPITOR) tablet 80 mg Q EVENING   • aspirin (ASA) chewable tab 81 mg DAILY   • metoprolol tartrate (LOPRESSOR) tablet 12.5 mg BID   • mirtazapine (Remeron) orally disintegrating tab 15 mg QHS   • rivaroxaban (XARELTO) tablet 20 mg PM MEAL   • thyroid (ARMOUR THYROID) tablet 60 mg BID   • tamsulosin (FLOMAX) capsule 0.4 mg QHS   • baclofen (LIORESAL) tablet 20 mg TID       Orders Placed This Encounter   Procedures   • Diet Order Diet: Level 7 - Easy to Chew (float pills in puree); Liquid level: Level 0 - Thin; Tray Modifications (optional): SLP - 1:1 Supervision by Nursing, SLP - Deliver to Nursing Station     Standing Status:   Standing     Number of Occurrences:   1     Order Specific Question:   Diet:     Answer:   Level 7 - Easy to Chew [22]     Comments:   float pills in puree     Order Specific Question:    Liquid level     Answer:   Level 0 - Thin     Order Specific Question:   Tray Modifications (optional)     Answer:   SLP - 1:1 Supervision by Nursing     Order Specific Question:   Tray Modifications (optional)     Answer:   SLP - Deliver to Nursing Station       Assessment:  Active Hospital Problems    Diagnosis    • *Acute right MCA stroke (HCC)    • Paroxysmal atrial fibrillation (HCC)    • Urinary retention    • Acquired hypothyroidism    • History of COVID-19    • Hypotension    • Normocytic anemia    • Spasticity as late effect of cerebrovascular accident (CVA)    • Reactive depression      This patient is a 56 y.o. male admitted for acute inpatient rehabilitation with Acute right MCA stroke (HCC).    Therapy notes reviewed. Requiring significant amount of assistance.     We will have his first weekly conference on Weds to discuss a discharge date.     Medical Decision Making and Plan:    Large right ICA/MCA ischemic stroke  Continue full rehab program  PT/OT/SLP, 1 hr each discipline, 5 days per week    Xarelto  Statin    Outpatient follow up with stroke bridge clinic, Dr. Dumont    Dizziness  Scheduled Meclizine  Will try scopolamine if meclizine ineffective  Will have PT check for BPPV     Spasticity  Increased baclofen 15 mg TID to 20 mg TID 4/30 --> 30 mg TID 5/3  Started valium at night 2 mg 4/30, may also help with insomnia  Would benefit from Botox, will try to get approval to do as an inpatient, discussed with Dr. Dumont today     Atrial fibrillation  Metoprolol  Xarelto  Hospitalist consulted, appreciate assistance       Hypothyroidism   [place] thyroid     Hypertension  Hypotension  Lisinopril discontinued  Metoprolol  Monitor for orthostasis on Flomax  Hospitalist consulted, appreciate assistance  Teds and abdominal binder    Insomnia, improved  Already on mirtazapine  Started trial of Valium 5/2, may also help with spasticity     Reactive depression  Mirtazapine  Consider psychology consult if needed    Mood currently stable     History of COVID-19  Without sequelae     Normocytic anemia  Mild, monitor    Bowel program  Continue bowel medications  Last BM 5/3    Bladder program  Urinary retention  Changed Prazosin to Flomax  Voiding trial tomorrow am  Failed recent trial/history of traumatic placement/hematuris    DVT prophylaxis  Xarelto    Total time:  37 minutes.  I spent greater than 50% of the time for patient care, counseling, and coordination on this date, including patient face-to face time, unit/floor time with review of records/pertinent lab data and studies, as well as discussing diagnostic evaluation/work up, planned therapeutic interventions, and future disposition of care, as per the interval events/subjective and the assessment and plan as noted above.    I have performed a physical exam, reviewed and updated ROS, as well as the assessment and plan today 5/3/2021. In review of note from 4/30/2021 there are no new changes except as documented above.            Radha Monge M.D.   Physical Medicine and Rehabilitation

## 2021-05-03 NOTE — CARE PLAN
Problem: Venous Thromboembolism (VTW)/Deep Vein Thrombosis (DVT) Prevention:  Goal: Patient will participate in Venous Thrombosis (VTE)/Deep Vein Thrombosis (DVT)Prevention Measures  Outcome: PROGRESSING AS EXPECTED  Flowsheets (Taken 5/3/2021 1049)  AV Foot Pumps: Off  JONNY Hose (Graduated Compression Stockings): On  SCDs, Sequential Compression Device: Off  Note: Pt is up to wheelchair, participates in therapies, and up to dining room for all meals.      Problem: Bowel/Gastric:  Goal: Normal bowel function is maintained or improved  Outcome: PROGRESSING AS EXPECTED  Goal: Will not experience complications related to bowel motility  Outcome: PROGRESSING AS EXPECTED  Note: Patient having regular bowel movements; last BM 5/3/21.  Denies s/s constipation; bowel meds available if needed.  Will continue to monitor.

## 2021-05-04 DIAGNOSIS — I63.511 ACUTE RIGHT MCA STROKE (HCC): ICD-10-CM

## 2021-05-04 DIAGNOSIS — R25.2 SPASTICITY AS LATE EFFECT OF CEREBROVASCULAR ACCIDENT (CVA): ICD-10-CM

## 2021-05-04 DIAGNOSIS — I69.398 SPASTICITY AS LATE EFFECT OF CEREBROVASCULAR ACCIDENT (CVA): ICD-10-CM

## 2021-05-04 PROBLEM — E78.5 DYSLIPIDEMIA: Status: ACTIVE | Noted: 2021-05-04

## 2021-05-04 PROBLEM — R42 DIZZINESS: Status: ACTIVE | Noted: 2021-05-04

## 2021-05-04 PROBLEM — M79.89 ARM SWELLING: Status: ACTIVE | Noted: 2021-05-04

## 2021-05-04 PROCEDURE — 97112 NEUROMUSCULAR REEDUCATION: CPT

## 2021-05-04 PROCEDURE — 700102 HCHG RX REV CODE 250 W/ 637 OVERRIDE(OP): Performed by: PHYSICAL MEDICINE & REHABILITATION

## 2021-05-04 PROCEDURE — A9270 NON-COVERED ITEM OR SERVICE: HCPCS | Performed by: PHYSICAL MEDICINE & REHABILITATION

## 2021-05-04 PROCEDURE — 97129 THER IVNTJ 1ST 15 MIN: CPT

## 2021-05-04 PROCEDURE — 99232 SBSQ HOSP IP/OBS MODERATE 35: CPT | Performed by: HOSPITALIST

## 2021-05-04 PROCEDURE — 97130 THER IVNTJ EA ADDL 15 MIN: CPT

## 2021-05-04 PROCEDURE — 99232 SBSQ HOSP IP/OBS MODERATE 35: CPT | Performed by: PHYSICAL MEDICINE & REHABILITATION

## 2021-05-04 PROCEDURE — 97530 THERAPEUTIC ACTIVITIES: CPT

## 2021-05-04 PROCEDURE — 770010 HCHG ROOM/CARE - REHAB SEMI PRIVAT*

## 2021-05-04 PROCEDURE — 97110 THERAPEUTIC EXERCISES: CPT

## 2021-05-04 RX ADMIN — TAMSULOSIN HYDROCHLORIDE 0.4 MG: 0.4 CAPSULE ORAL at 20:33

## 2021-05-04 RX ADMIN — METOPROLOL TARTRATE 12.5 MG: 25 TABLET, FILM COATED ORAL at 08:31

## 2021-05-04 RX ADMIN — RIVAROXABAN 20 MG: 20 TABLET, FILM COATED ORAL at 17:25

## 2021-05-04 RX ADMIN — ATORVASTATIN CALCIUM 80 MG: 40 TABLET, FILM COATED ORAL at 20:32

## 2021-05-04 RX ADMIN — MIRTAZAPINE 15 MG: 15 TABLET, ORALLY DISINTEGRATING ORAL at 20:33

## 2021-05-04 RX ADMIN — MECLIZINE HYDROCHLORIDE 25 MG: 25 TABLET ORAL at 15:08

## 2021-05-04 RX ADMIN — MECLIZINE HYDROCHLORIDE 25 MG: 25 TABLET ORAL at 20:33

## 2021-05-04 RX ADMIN — THYROID, PORCINE 60 MG: 30 TABLET ORAL at 20:32

## 2021-05-04 RX ADMIN — METOPROLOL TARTRATE 12.5 MG: 25 TABLET, FILM COATED ORAL at 20:33

## 2021-05-04 RX ADMIN — BACLOFEN 30 MG: 20 TABLET ORAL at 08:31

## 2021-05-04 RX ADMIN — MECLIZINE HYDROCHLORIDE 25 MG: 25 TABLET ORAL at 08:31

## 2021-05-04 RX ADMIN — BACLOFEN 30 MG: 20 TABLET ORAL at 20:32

## 2021-05-04 RX ADMIN — THYROID, PORCINE 60 MG: 30 TABLET ORAL at 08:30

## 2021-05-04 RX ADMIN — BACLOFEN 30 MG: 20 TABLET ORAL at 15:08

## 2021-05-04 RX ADMIN — ASPIRIN 81 MG CHEWABLE TABLET 81 MG: 81 TABLET CHEWABLE at 17:25

## 2021-05-04 ASSESSMENT — ENCOUNTER SYMPTOMS
PALPITATIONS: 0
EYES NEGATIVE: 1
DIARRHEA: 0
CHILLS: 0
BRUISES/BLEEDS EASILY: 0
FOCAL WEAKNESS: 1
NERVOUS/ANXIOUS: 0
FEVER: 0
DEPRESSION: 1
SHORTNESS OF BREATH: 0
POLYDIPSIA: 0
COUGH: 0
VOMITING: 0
ABDOMINAL PAIN: 0
NAUSEA: 0

## 2021-05-04 ASSESSMENT — ACTIVITIES OF DAILY LIVING (ADL)
BED_CHAIR_WHEELCHAIR_TRANSFER_DESCRIPTION: SLIDEBOARD TRANSFER FROM BED TO WHEELCHAIR;SET-UP OF EQUIPMENT;SUPERVISION FOR SAFETY;VERBAL CUEING

## 2021-05-04 NOTE — THERAPY
Physical Therapy   Daily Treatment     Patient Name: Thong Arzola  Age:  56 y.o., Sex:  male  Medical Record #: [id number]  Today's Date: 5/4/2021     Precautions  Precautions: Fall Risk, Swallow Precautions ( See Comments)  Comments: Helmet on OOB; L UE w/ 2-3 Modified Jeremiah Scale; orthostasis    Subjective    Pt agreeable to interventions; reporting only fatigue upon conclusion of session.      Objective       05/04/21 0831   Precautions   Precautions Fall Risk;Swallow Precautions ( See Comments)   Comments Helmet on OOB; L UE w/ 2-3 Modified Jeremiah Scale; orthostasis   Wheelchair Functional Level of Assist   Wheelchair Assist Minimal Assist  (haylie technique- demonstrated recall)   Distance Wheelchair (Feet or Distance) 150  (manual standard wc, with firm back support/ laterals)   Wheelchair Description Adaptive equipment;Assistance with steering;Extra time;Requires incidental assist  (impaired left sided awareness, min A to steer away as needed)   Transfer Functional Level of Assist   Bed, Chair, Wheelchair Transfer Total Assist X 2  (2nd person for safety)   Bed Chair Wheelchair Transfer Description Slideboard transfer from bed to wheelchair;Non-hospital bed;Assist with two limbs;Adaptive equipment;Set-up of equipment;Verbal cueing  (Gaurav Board wc<> standard board/ hospital bed)   Supine Lower Body Exercise   Supine Lower Body Exercises Yes   Bridges Two Legged;2 sets of 10   Hip Abduction Hook Lying;2 sets of 10;Bilateral;Light Resistance Theraband  (MIn A to support hooklying on L side, tapping to faciliate)   Hip Adduction  2 sets of 10;Bilateral  (pillow squeezes, facilitation to hooklying position LLE)   Sitting Lower Body Exercises   Comments Seated abdominals, minimal ROM, posterior lean <> upright sitting 10x.    Bed Mobility    Supine to Sit Maximal Assist   Sit to Supine Maximal Assist   Scooting Total Assist X 2  (Safety during Gaurav Board transfer lef/ right )   Rolling Moderate Assist to  Rt.;Moderate Assist to Lt.   Neuro-Muscular Treatments   Neuro-Muscular Treatments Weight Shift Left;Weight Shift Right;Verbal Cuing;Tactile Cuing;Sequencing   Comments Seated balance, using therapist to orient to midline, with RUE support, and CGA/min A.    Interdisciplinary Plan of Care Collaboration   IDT Collaboration with  Therapy Tech  (Therapy Student )   Patient Position at End of Therapy In Bed;Tray Table within Reach;Phone within Reach;Call Light within Reach  (Keegan UE supported with pillow)   Collaboration Comments Transfer assist, firm positioning wc back added to manual wc for inc postural support, with lateral supports. Roho Cushion provided.     Strengths & Barriers   Strengths Alert and oriented;Effective communication skills;Independent prior level of function;Motivated for self care and independence;Pleasant and cooperative;Supportive family;Willingly participates in therapeutic activities   Barriers Hemiparesis;Home accessibility;Orthostatic hypotension;Impaired activity tolerance;Impaired balance;Spasticity;Limited mobility   PT Total Time Spent   PT Individual Total Time Spent (Mins) 60   PT Charge Group   PT Therapeutic Exercise 1   PT Neuromuscular Re-Education / Balance 1   PT Therapeutic Activities 2       Assessment    Pt progressed to core/ hip therapeutic exercise. Pt was provided with manual wc, positioning backrest with lateral support, and Roho cushion. Patient may benefit from pummel if prone to sliding. Transfer ease improved in both directions with the addition of Gaurav board transfers.      Strengths: Alert and oriented, Effective communication skills, Independent prior level of function, Motivated for self care and independence, Pleasant and cooperative, Supportive family, Willingly participates in therapeutic activities  Barriers: Hemiparesis, Home accessibility, Orthostatic hypotension, Impaired activity tolerance, Impaired balance, Spasticity, Limited  mobility    Plan    Midline orientation EOM, sitting balance, slide board vs. Gaurav board transfer to progress to 1 person assist consistently, bed mobility, w/c mobility,  LE (progress to adjunct after 2nd trial), family training with spouse as appropriate. Reassess wheelchair positioning; pt may need a pommel to support sitting posture.     Passport items to be completed:  Get in/out of bed safely, in/out of a vehicle, safely use mobility device, walk or wheel around home/community, navigate up and down stairs, show how to get up/down from the ground, ensure home is accessible, demonstrate HEP, complete caregiver training    Physical Therapy Problems     Problem: Mobility     Dates: Start: 04/30/21       Goal: STG-Within one week, patient will propel wheelchair household distances     Dates: Start: 04/30/21       Description: 1) Individualized goal:  25ft with haylie technique with SBA on even surfaces  2) Interventions:  PT Group Therapy, PT E Stim Attended, PT Orthotics Training, PT Gait Training, PT Self Care/Home Eval, PT Therapeutic Exercises, PT Neuro Re-Ed/Balance, PT Therapeutic Activity, and PT Evaluation                  Problem: Mobility Transfers     Dates: Start: 04/30/21       Goal: STG-Within one week, patient will perform bed mobility     Dates: Start: 04/30/21       Description: 1) Individualized goal:  sit <> supine with modA   2) Interventions:  PT Group Therapy, PT E Stim Attended, PT Orthotics Training, PT Gait Training, PT Self Care/Home Eval, PT Therapeutic Exercises, PT Neuro Re-Ed/Balance, PT Therapeutic Activity, and PT Evaluation            Goal: STG-Within one week, patient will transfer bed to chair     Dates: Start: 04/30/21       Description: 1) Individualized goal:  transfer with slide board and modA  2) Interventions:  PT Group Therapy, PT E Stim Attended, PT Orthotics Training, PT Gait Training, PT Self Care/Home Eval, PT Therapeutic Exercises, PT Neuro Re-Ed/Balance, PT  Therapeutic Activity, and PT Evaluation                  Problem: PT-Long Term Goals     Dates: Start: 04/30/21       Goal: LTG-By discharge, patient will propel wheelchair     Dates: Start: 04/30/21       Description: 1) Individualized goal:  100ft with haylie technique and Anastacio on even/uneven surfaces  2) Interventions:  PT Group Therapy, PT E Stim Attended, PT Orthotics Training, PT Gait Training, PT Self Care/Home Eval, PT Therapeutic Exercises, PT Neuro Re-Ed/Balance, PT Therapeutic Activity, and PT Evaluation          Goal: LTG-By discharge, patient will transfer one surface to another     Dates: Start: 04/30/21       Description: 1) Individualized goal:  SQPT with Taz   2) Interventions:  PT Group Therapy, PT E Stim Attended, PT Orthotics Training, PT Gait Training, PT Self Care/Home Eval, PT Therapeutic Exercises, PT Neuro Re-Ed/Balance, PT Therapeutic Activity, and PT Evaluation            Goal: LTG-By discharge, patient will     Dates: Start: 04/30/21       Description: 1) Individualized goal: perform bed mobility (supine<>sit) with Taz   2) Interventions:  PT Group Therapy, PT E Stim Attended, PT Orthotics Training, PT Gait Training, PT Self Care/Home Eval, PT Therapeutic Exercises, PT Neuro Re-Ed/Balance, PT Therapeutic Activity, and PT Evaluation

## 2021-05-04 NOTE — PROGRESS NOTES
Pt unable to void. Bladder scanned for 301. Straight cath performed but only got 150cc of clear yellow urine out. Pt tolerated well.

## 2021-05-04 NOTE — PROGRESS NOTES
Received shift report and assumed care of patient.  Patient awake, calm and stable, getting up to wheelchair for Tdine.  Denies pain or discomfort at this time. Pt knows to call after voiding in order to scan him. Denies needs at this time. Will continue to monitor.

## 2021-05-04 NOTE — ASSESSMENT & PLAN NOTE
Presented w/ left hemiparesis  S/P decompressive craniectomy for increased ICP done on 4/3/21 by Dr. Payton  On Xarelto, ASA, and Lipitor  Helmet

## 2021-05-04 NOTE — PROGRESS NOTES
"Rehab Progress Note     Date of Service: 5/4/2021  Chief Complaint: follow up stroke    Interval Events (Subjective)    Patient seen and examined today in his room. He is working with PT and PTA sitting edge of bed, often falling over to the left.    He denies any pain. Brewer catheter removed this morning. Patient is unsure if he has urinating yet.     Discussed possible need for Botox with patient.     He has no complaints today.     ROS: No changes to bowel, bladder, pain, mood, or sleep.         Objective:  VITAL SIGNS: /76   Pulse 82   Temp 36.7 °C (98.1 °F) (Oral)   Resp 18   Ht 1.778 m (5' 10\")   Wt 67.2 kg (148 lb 2.4 oz)   SpO2 98%   BMI 21.26 kg/m²   Gen: alert, no apparent distress  HEENT: helmet in place  Neuro: notable for spastic left hemiplegia, hyperreflexic, non-sustained clonus at the left ankle, tone worse in the left fingers/elbow/shoulder as compared to the leg        Recent Results (from the past 72 hour(s))   CBC WITHOUT DIFFERENTIAL    Collection Time: 05/02/21  5:25 AM   Result Value Ref Range    WBC 5.1 4.8 - 10.8 K/uL    RBC 4.58 (L) 4.70 - 6.10 M/uL    Hemoglobin 13.3 (L) 14.0 - 18.0 g/dL    Hematocrit 39.7 (L) 42.0 - 52.0 %    MCV 86.7 81.4 - 97.8 fL    MCH 29.0 27.0 - 33.0 pg    MCHC 33.5 (L) 33.7 - 35.3 g/dL    RDW 39.4 35.9 - 50.0 fL    Platelet Count 273 164 - 446 K/uL    MPV 9.2 9.0 - 12.9 fL   Basic Metabolic Panel    Collection Time: 05/02/21  5:25 AM   Result Value Ref Range    Sodium 142 135 - 145 mmol/L    Potassium 4.0 3.6 - 5.5 mmol/L    Chloride 104 96 - 112 mmol/L    Co2 27 20 - 33 mmol/L    Glucose 105 (H) 65 - 99 mg/dL    Bun 18 8 - 22 mg/dL    Creatinine 0.99 0.50 - 1.40 mg/dL    Calcium 9.5 8.5 - 10.5 mg/dL    Anion Gap 11.0 7.0 - 16.0   ESTIMATED GFR    Collection Time: 05/02/21  5:25 AM   Result Value Ref Range    GFR If African American >60 >60 mL/min/1.73 m 2    GFR If Non African American >60 >60 mL/min/1.73 m 2   POCT glucose device results    " Collection Time: 05/02/21  9:50 PM   Result Value Ref Range    Glucose - Accu-Ck 120 (H) 65 - 99 mg/dL       Current Facility-Administered Medications   Medication Frequency   • baclofen (LIORESAL) tablet 30 mg TID   • meclizine (ANTIVERT) tablet 25 mg TID   • hydrOXYzine HCl (ATARAX) tablet 50 mg Q6HRS PRN   • melatonin tablet 3 mg HS PRN   • Respiratory Therapy Consult Continuous RT   • Pharmacy Consult Request ...Pain Management Review 1 Each PHARMACY TO DOSE   • hydrALAZINE (APRESOLINE) tablet 10 mg Q8HRS PRN   • acetaminophen (Tylenol) tablet 650 mg Q4HRS PRN   • lactulose 20 GM/30ML solution 30 mL QDAY PRN   • docusate sodium (ENEMEEZ) enema 283 mg QDAY PRN   • fleet enema 133 mL QDAY PRN   • artificial tears ophthalmic solution 1 Drop PRN   • benzocaine-menthol (CEPACOL) lozenge 1 Lozenge Q2HRS PRN   • mag hydrox-al hydrox-simeth (MAALOX PLUS ES or MYLANTA DS) suspension 20 mL Q2HRS PRN   • ondansetron (ZOFRAN ODT) dispertab 4 mg 4X/DAY PRN    Or   • ondansetron (ZOFRAN) syringe/vial injection 4 mg 4X/DAY PRN   • traZODone (DESYREL) tablet 50 mg QHS PRN   • sodium chloride (OCEAN) 0.65 % nasal spray 2 Spray PRN   • midazolam (VERSED) 5 mg/mL (1 mL vial) PRN   • atorvastatin (LIPITOR) tablet 80 mg Q EVENING   • aspirin (ASA) chewable tab 81 mg DAILY   • metoprolol tartrate (LOPRESSOR) tablet 12.5 mg BID   • mirtazapine (Remeron) orally disintegrating tab 15 mg QHS   • rivaroxaban (XARELTO) tablet 20 mg PM MEAL   • thyroid (ARMOUR THYROID) tablet 60 mg BID   • tamsulosin (FLOMAX) capsule 0.4 mg QHS       Orders Placed This Encounter   Procedures   • Diet Order Diet: Level 7 - Easy to Chew (float pills in puree); Liquid level: Level 0 - Thin; Tray Modifications (optional): SLP - 1:1 Supervision by Nursing, SLP - Deliver to Nursing Station     Standing Status:   Standing     Number of Occurrences:   1     Order Specific Question:   Diet:     Answer:   Level 7 - Easy to Chew [22]     Comments:   float pills in  puree     Order Specific Question:   Liquid level     Answer:   Level 0 - Thin     Order Specific Question:   Tray Modifications (optional)     Answer:   SLP - 1:1 Supervision by Nursing     Order Specific Question:   Tray Modifications (optional)     Answer:   SLP - Deliver to Nursing Station       Assessment:  Active Hospital Problems    Diagnosis    • *Acute right MCA stroke (HCC)    • Paroxysmal atrial fibrillation (HCC)    • Urinary retention    • Acquired hypothyroidism    • History of COVID-19    • Hypotension    • Normocytic anemia    • Spasticity as late effect of cerebrovascular accident (CVA)    • Reactive depression      This patient is a 56 y.o. male admitted for acute inpatient rehabilitation with Acute right MCA stroke (HCC).    We will have his first weekly conference on Weds to discuss a discharge date.     Medical Decision Making and Plan:    Large right ICA/MCA ischemic stroke  Continue full rehab program  PT/OT/SLP, 1 hr each discipline, 5 days per week    Xarelto  Statin    Outpatient follow up with stroke bridge clinic, Dr. Dumont, referrals made    Dizziness, improved  Scheduled Meclizine  Will try scopolamine if meclizine ineffective  Will have PT check for BPPV     Spasticity  Increased baclofen 15 mg TID to 20 mg TID 4/30 --> 30 mg TID 5/3  Started valium at night 2 mg 4/30, discontinued 5/3, doesn't seem to make much difference  Consider adding Zanaflex tomorrow  Would benefit from Botox, will try to get approval to do as an inpatient, discussed with Dr. Dumont today     Atrial fibrillation  Metoprolol  Xarelto  Hospitalist consulted, appreciate assistance     Hypothyroidism   [place] thyroid     Hypertension  Hypotension  Lisinopril discontinued  Metoprolol  Monitor for orthostasis on Flomax  Hospitalist consulted, appreciate assistance  Teds and abdominal binder    Insomnia, improved  Already on mirtazapine  Started trial of Valium 4/30, discontinued 5/4     Reactive  depression  Mirtazapine  Consider psychology consult if needed   Mood currently stable     History of COVID-19  Without sequelae     Normocytic anemia  Mild, monitor    Bowel program  Continue bowel medications  Last BM 5/4    Bladder program  Urinary retention  Changed Prazosin to Flomax  Voiding trial today  IC for over 400 cc  Check PVRs  Failed recent trial/history of traumatic placement/hematuris    DVT prophylaxis  Xarelto      Total time:  26 minutes.  I spent greater than 50% of the time for patient care, counseling, and coordination on this date, including patient face-to face time, unit/floor time with review of records/pertinent lab data and studies, as well as discussing diagnostic evaluation/work up, planned therapeutic interventions, and future disposition of care, as per the interval events/subjective and the assessment and plan as noted above.    I have performed a physical exam, reviewed and updated ROS, as well as the assessment and plan today 5/4/2021. In review of note from 5/3/2021 there are no new changes except as documented above.                  Radha Monge M.D.   Physical Medicine and Rehabilitation

## 2021-05-04 NOTE — CARE PLAN
Problem: Grooming  Goal: STG-Within one week, patient will complete grooming  Description: 1) Individualized Goal:  with min A using AE/AD/techniques as needed  2) Interventions:  OT E Stim Attended, OT Self Care/ADL, OT Cognitive Skill Dev, OT Manual Ther Technique, OT Neuro Re-Ed/Balance, OT Sensory Int Techniques, OT Therapeutic Activity, OT Evaluation, and OT Therapeutic Exercise    Outcome: NOT MET  Note: Mod A     Problem: Dressing  Goal: STG-Within one week, patient will dress UB  Description: 1) Individualized Goal:  with mod A with verbal cues for haylie-technique.  2) Interventions:  OT E Stim Attended, OT Self Care/ADL, OT Cognitive Skill Dev, OT Manual Ther Technique, OT Neuro Re-Ed/Balance, OT Sensory Int Techniques, OT Therapeutic Activity, OT Evaluation, and OT Therapeutic Exercise  Outcome: NOT MET  Note: Max A  Goal: STG-Within one week, patient will dress LB  Description: 1) Individualized Goal:  with max A using AE/AD/techniques as needed  2) Interventions:  OT E Stim Attended, OT Self Care/ADL, OT Cognitive Skill Dev, OT Manual Ther Technique, OT Neuro Re-Ed/Balance, OT Sensory Int Techniques, OT Therapeutic Activity, OT Evaluation, and OT Therapeutic Exercise  Outcome: NOT MET  Note: Total A

## 2021-05-04 NOTE — THERAPY
"Physical Therapy   Daily Treatment     Patient Name: Thong Arzola  Age:  56 y.o., Sex:  male  Medical Record #: [id number]  Today's Date: 5/4/2021     Precautions  Precautions: Fall Risk, Swallow Precautions ( See Comments), Other (See Comments)  Comments: Helmet on OOB; L UE w/ 2-3 Modified Jeremiah Scale; orthostasis    Subjective    Pt sleepingin bed, awoken easily and he was agreeable to PT session    \"im just so tired, I want to lay back down\"     Objective       05/04/21 1001   Vitals   Pulse 82   Patient BP Position Supine   Blood Pressure 113/76   Room Air Oximetry 97   O2 (LPM) 0   O2 Delivery Device None - Room Air   Vitals Comments seated /76, hr increased to 101   Bed Mobility    Supine to Sit Maximal Assist  (modA of 2 to manage trunk and LE's)   Scooting Maximal Assist   Rolling Maximum Assist to Lt.   Neuro-Muscular Treatments   Neuro-Muscular Treatments Weight Shift Right;Weight Shift Left;Anterior weight shift;Postural Facilitation   Comments unsupported sitting balance at EOB facilitation for a/p and lateral weight shifting, forearm wbing on R LE, reaching with R UE across midline. postural re education requiring patient to lean out of TRAVIS and return to midline. pt sat EOB x 15 minutes with max to CGA to maintain sitting posture and midline orientation, pt cont with L lateral lean in seated    Interdisciplinary Plan of Care Collaboration   IDT Collaboration with  Therapy Tech;Physician   Patient Position at End of Therapy In Bed;Call Light within Reach   Collaboration Comments tech assisted with tx, MD observed patient during tx   PT Total Time Spent   PT Individual Total Time Spent (Mins) 30   PT Charge Group   PT Neuromuscular Re-Education / Balance 1   PT Therapeutic Activities 1   Supervising Physical Therapist Ivory Cintron       Assessment    Pt with good tolerance to tx session, he required maxA to prevent and correct left posterior LOB and with as little as CGA once in midline " however unable to maintain for more that 3 seconds.   Strengths: Alert and oriented, Effective communication skills, Independent prior level of function, Motivated for self care and independence, Pleasant and cooperative, Supportive family, Willingly participates in therapeutic activities  Barriers: Hemiparesis, Home accessibility, Orthostatic hypotension, Impaired activity tolerance, Impaired balance, Spasticity, Limited mobility    Plan    Midline orientation EOM, sitting balance, slide board transfer (trial Gaurav board), bed mobility, w/c mobility,  LE (progress to adjunct after 2nd trial), family training with spouse     Passport items to be completed:  Get in/out of bed safely, in/out of a vehicle, safely use mobility device, walk or wheel around home/community, navigate up and down stairs, show how to get up/down from the ground, ensure home is accessible, demonstrate HEP, complete caregiver training       Physical Therapy Problems     Problem: Mobility     Dates: Start: 04/30/21       Goal: STG-Within one week, patient will propel wheelchair household distances     Dates: Start: 04/30/21       Description: 1) Individualized goal:  25ft with haylie technique with SBA on even surfaces  2) Interventions:  PT Group Therapy, PT E Stim Attended, PT Orthotics Training, PT Gait Training, PT Self Care/Home Eval, PT Therapeutic Exercises, PT Neuro Re-Ed/Balance, PT Therapeutic Activity, and PT Evaluation                  Problem: Mobility Transfers     Dates: Start: 04/30/21       Goal: STG-Within one week, patient will perform bed mobility     Dates: Start: 04/30/21       Description: 1) Individualized goal:  sit <> supine with modA   2) Interventions:  PT Group Therapy, PT E Stim Attended, PT Orthotics Training, PT Gait Training, PT Self Care/Home Eval, PT Therapeutic Exercises, PT Neuro Re-Ed/Balance, PT Therapeutic Activity, and PT Evaluation            Goal: STG-Within one week, patient will transfer bed to  chair     Dates: Start: 04/30/21       Description: 1) Individualized goal:  transfer with slide board and modA  2) Interventions:  PT Group Therapy, PT E Stim Attended, PT Orthotics Training, PT Gait Training, PT Self Care/Home Eval, PT Therapeutic Exercises, PT Neuro Re-Ed/Balance, PT Therapeutic Activity, and PT Evaluation                  Problem: PT-Long Term Goals     Dates: Start: 04/30/21       Goal: LTG-By discharge, patient will propel wheelchair     Dates: Start: 04/30/21       Description: 1) Individualized goal:  100ft with haylie technique and Anastacio on even/uneven surfaces  2) Interventions:  PT Group Therapy, PT E Stim Attended, PT Orthotics Training, PT Gait Training, PT Self Care/Home Eval, PT Therapeutic Exercises, PT Neuro Re-Ed/Balance, PT Therapeutic Activity, and PT Evaluation          Goal: LTG-By discharge, patient will transfer one surface to another     Dates: Start: 04/30/21       Description: 1) Individualized goal:  SQPT with Taz   2) Interventions:  PT Group Therapy, PT E Stim Attended, PT Orthotics Training, PT Gait Training, PT Self Care/Home Eval, PT Therapeutic Exercises, PT Neuro Re-Ed/Balance, PT Therapeutic Activity, and PT Evaluation            Goal: LTG-By discharge, patient will     Dates: Start: 04/30/21       Description: 1) Individualized goal: perform bed mobility (supine<>sit) with Taz   2) Interventions:  PT Group Therapy, PT E Stim Attended, PT Orthotics Training, PT Gait Training, PT Self Care/Home Eval, PT Therapeutic Exercises, PT Neuro Re-Ed/Balance, PT Therapeutic Activity, and PT Evaluation

## 2021-05-04 NOTE — PROGRESS NOTES
Hospital Medicine Daily Progress Note      Chief Complaint  Hypotension    Interval Problem Update  Pt visiting w/ wife at bedside.  Labs reviewed and unremarkable.    Review of Systems  Review of Systems   Constitutional: Negative for chills and fever.   Eyes: Negative.    Respiratory: Negative for cough and shortness of breath.    Cardiovascular: Negative for chest pain and palpitations.   Gastrointestinal: Negative for abdominal pain, nausea and vomiting.   Musculoskeletal:        Wound pain    Skin: Negative for itching and rash.   Neurological: Positive for focal weakness.   Endo/Heme/Allergies: Negative for polydipsia. Does not bruise/bleed easily.   Psychiatric/Behavioral: Positive for depression.        Physical Exam  Temp:  [36.4 °C (97.5 °F)-36.8 °C (98.2 °F)] 36.8 °C (98.2 °F)  Pulse:  [] 101  Resp:  [18] 18  BP: (113-115)/(73-79) 115/79  SpO2:  [95 %-97 %] 97 %    Physical Exam  Vitals reviewed.   Constitutional:       General: He is not in acute distress.     Appearance: Normal appearance. He is not ill-appearing.   HENT:      Head:      Comments: R skull defect     Right Ear: External ear normal.      Left Ear: External ear normal.      Nose: Nose normal.      Mouth/Throat:      Pharynx: Oropharynx is clear.   Eyes:      General:         Right eye: No discharge.         Left eye: No discharge.      Extraocular Movements: Extraocular movements intact.      Conjunctiva/sclera: Conjunctivae normal.   Cardiovascular:      Rate and Rhythm: Normal rate and regular rhythm.   Pulmonary:      Effort: Pulmonary effort is normal. No respiratory distress.      Breath sounds: Normal breath sounds. No wheezing.   Abdominal:      General: Bowel sounds are normal. There is no distension.      Palpations: Abdomen is soft.      Tenderness: There is no abdominal tenderness.   Musculoskeletal:         General: Swelling present.      Cervical back: Normal range of motion and neck supple.      Comments: LUE 1+ edema    Skin:     General: Skin is warm and dry.   Neurological:      Mental Status: He is alert.      Comments: Awake and alert         Fluids    Intake/Output Summary (Last 24 hours) at 5/4/2021 0035  Last data filed at 5/3/2021 2033  Gross per 24 hour   Intake 480 ml   Output 1450 ml   Net -970 ml       Laboratory  Recent Labs     05/02/21  0525   WBC 5.1   RBC 4.58*   HEMOGLOBIN 13.3*   HEMATOCRIT 39.7*   MCV 86.7   MCH 29.0   MCHC 33.5*   RDW 39.4   PLATELETCT 273   MPV 9.2     Recent Labs     05/02/21  0525   SODIUM 142   POTASSIUM 4.0   CHLORIDE 104   CO2 27   GLUCOSE 105*   BUN 18   CREATININE 0.99   CALCIUM 9.5                 Assessment/Plan  * Acute right MCA stroke (HCC)- (present on admission)  Assessment & Plan  Presented w/ left hemiparesis  S/P decompressive craniectomy for increased ICP done on 4/3/21 by Dr. Payton  Wound care and pain control  Helmet when OOB    Paroxysmal atrial fibrillation (HCC)- (present on admission)  Assessment & Plan  Echo EF >75%  Rate controlled on Metoprolol  Anticoagulated on Xarelto  Tachycardia and hypotension improved w/ IVF  Monitor for orthostatic hypotension on Flomax    Arm swelling  Assessment & Plan  U/S LUE negative DVT    Dyslipidemia  Assessment & Plan  On Lipitor    Reactive depression- (present on admission)  Assessment & Plan  On Remeron    History of COVID-19- (present on admission)  Assessment & Plan  SARS-CoV-2 PCR positive 3/18/21    Acquired hypothyroidism- (present on admission)  Assessment & Plan  Euthyroid on [place] Thyroid     Full Code

## 2021-05-04 NOTE — PROGRESS NOTES
Hospital Medicine Daily Progress Note      Chief Complaint:  Hypertension  Afib    Interval History:  No significant events or changes since last visit    Review of Systems  Review of Systems   Constitutional: Negative for chills and fever.   Respiratory: Negative for shortness of breath.    Cardiovascular: Negative for chest pain.   Gastrointestinal: Negative for abdominal pain, diarrhea, nausea and vomiting.   Psychiatric/Behavioral: The patient is not nervous/anxious.         Physical Exam  Temp:  [36.7 °C (98.1 °F)-36.8 °C (98.2 °F)] 36.7 °C (98.1 °F)  Pulse:  [] 74  Resp:  [18] 18  BP: (113-123)/(73-80) 123/80  SpO2:  [97 %-98 %] 98 %    Physical Exam  Vitals and nursing note reviewed.   Constitutional:       Appearance: Normal appearance.   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  Eyes:      Conjunctiva/sclera: Conjunctivae normal.      Pupils: Pupils are equal, round, and reactive to light.   Cardiovascular:      Rate and Rhythm: Normal rate and regular rhythm.      Heart sounds: No murmur.   Pulmonary:      Effort: Pulmonary effort is normal.      Breath sounds: No stridor. No wheezing or rales.   Abdominal:      General: There is no distension.      Palpations: Abdomen is soft.      Tenderness: There is no abdominal tenderness.   Musculoskeletal:         General: Swelling present.      Cervical back: Normal range of motion and neck supple.      Right lower leg: No edema.      Left lower leg: No edema.      Comments: Has some LUE swelling.   Skin:     General: Skin is warm and dry.      Findings: No rash.   Neurological:      Mental Status: He is alert and oriented to person, place, and time.   Psychiatric:         Mood and Affect: Mood normal.         Behavior: Behavior normal.         Fluids    Intake/Output Summary (Last 24 hours) at 5/4/2021 0908  Last data filed at 5/4/2021 0539  Gross per 24 hour   Intake 240 ml   Output 1250 ml   Net -1010 ml        Laboratory  Recent Labs     05/02/21  0525   WBC 5.1   RBC 4.58*   HEMOGLOBIN 13.3*   HEMATOCRIT 39.7*   MCV 86.7   MCH 29.0   MCHC 33.5*   RDW 39.4   PLATELETCT 273   MPV 9.2     Recent Labs     05/02/21  0525   SODIUM 142   POTASSIUM 4.0   CHLORIDE 104   CO2 27   GLUCOSE 105*   BUN 18   CREATININE 0.99   CALCIUM 9.5                 Assessment/Plan  * Acute right MCA stroke (HCC)- (present on admission)  Assessment & Plan  S/P decompressive craniectomy for increased ICP (4/3)    Paroxysmal atrial fibrillation (HCC)- (present on admission)  Assessment & Plan  HR ok but occ rises up  Echo: EF > 75%  On Lopressor  On Xarelto  Consider increasing Lopressor dose  Cont to monitor    Dizziness  Assessment & Plan  On Antivert    Arm swelling  Assessment & Plan  U/S LUE negative DVT  Likely 2nd to stroke and imobility    Dyslipidemia  Assessment & Plan  On Lipitor    Reactive depression- (present on admission)  Assessment & Plan  On Remeron    History of COVID-19- (present on admission)  Assessment & Plan  Had Covid 3/18/21    Acquired hypothyroidism- (present on admission)  Assessment & Plan  On [place] Thyroid

## 2021-05-04 NOTE — CARE PLAN
Problem: Knowledge Deficit  Goal: Knowledge of disease process/condition, treatment plan, diagnostic tests, and medications will improve  Outcome: PROGRESSING AS EXPECTED  Note: Pt participates in education and is knowledgeable about his medications and therapy plans.       Problem: Skin Integrity  Goal: Risk for impaired skin integrity will decrease  Outcome: PROGRESSING AS EXPECTED  Note: Patients skin remains intact and free from new or accidental injury this shift.  Will continue to monitor for s/s of infection: redness of area, fever, pain.

## 2021-05-04 NOTE — CARE PLAN
Dye catheter dc'd at 0600 hrs,  Next shift RN   made aware .  Problem: Safety  Goal: Will remain free from injury  Outcome: PROGRESSING AS EXPECTED     Problem: Infection  Goal: Will remain free from infection  Outcome: PROGRESSING AS EXPECTED  Intervention: Assess signs and symptoms of infection  Note: Afebrile, head incision line  healed, vital signs stable, no s/s of infection noted.     Problem: Pain Management  Goal: Pain level will decrease to patient's comfort goal  Outcome: PROGRESSING AS EXPECTED  Intervention: Educate and implement non-pharmacologic comfort measures. Examples: relaxation, distration, play therapy, activity therapy, massage, etc.  Note: Denies pain, verbalizing feeling okay.     Problem: Urinary Elimination:  Goal: Ability to reestablish a normal urinary elimination pattern will improve  Outcome: PROGRESSING AS EXPECTED  Intervention: Encourage scheduled voiding  Note: Pt with dye catheter , draining annalisa urine, to be discontinue in am.

## 2021-05-04 NOTE — THERAPY
Occupational Therapy  Daily Treatment     Patient Name: Thong Arzola  Age:  56 y.o., Sex:  male  Medical Record #: [id number]  Today's Date: 5/4/2021     Precautions  Precautions: (P) Fall Risk, Swallow Precautions ( See Comments)  Comments: (P) Helmet on OOB; L UE w/ 2-3 Modified Jeremiah Scale; orthostasis         Subjective    Patient found in room with therapy tech and a physical therapist who stated patient was falling out of chair until they arrived and prevented the fall.  Patient complaining of discomfort on right side of buttock. States he is very tired.     Objective       05/04/21 1231   Precautions   Precautions Fall Risk;Swallow Precautions ( See Comments)   Comments Helmet on OOB; L UE w/ 2-3 Modified Jeremiah Scale; orthostasis   Pain 0 - 10 Group   Location Buttock   Location Orientation Right   Functional Level of Assist   Lower Body Dressing Total Assist  (to doff shoes)   Bed, Chair, Wheelchair Transfer Total Assist   Bed Chair Wheelchair Transfer Description Slideboard transfer from bed to wheelchair;Set-up of equipment;Supervision for safety;Verbal cueing  (min A x 2 people for safety)   Neuro-Muscular Treatments   Neuro-Muscular Treatments Anterior weight shift;Facilitation;Postural Changes;Postural Facilitation;Verbal Cuing;Weight Shift Right;Weight Shift Left   Comments seated EOM with R UE support on mat prn with mirror anterior of patient for visual feedback. Emphasis on midline posture and sitting balance.  Assistance ranged from SBA to max A with consistent verbal cues.  R and L lateral leans x 45 seconds each for pressure reliefs.   Bed Mobility    Sit to Supine Total Assist X 2   Interdisciplinary Plan of Care Collaboration   IDT Collaboration with  Physical Therapist;Family / Caregiver;Therapy Tech   Patient Position at End of Therapy In Bed;Call Light within Reach;Tray Table within Reach;Family / Friend in Room   Collaboration Comments tech assisted with session, spouse present at  end of session; consulted with PT about w/c   OT Total Time Spent   OT Individual Total Time Spent (Mins) 60   OT Charge Group   OT Neuromuscular Re-education / Balance 2   OT Therapy Activity 2     Min/mod A x 2 slideboard transfers from w/c <> mat table.    Removed w/c from room that patient was using this afternoon due to poor fit for this patient.      Assessment    Patient to now use the red tilt in space w/c in his room to prevent falls from w/c and to assist with pressure reliefs.  Improved sitting balance and posture for short bouts of time this afternoon compared to previous sessions.  Strengths: Able to follow instructions, Alert and oriented, Effective communication skills, Good insight into deficits/needs, Independent prior level of function, Manages pain appropriately, Motivated for self care and independence, Pleasant and cooperative, Supportive family, Willingly participates in therapeutic activities  Barriers: Bowel incontinence, Decreased endurance, Fatigue, Generalized weakness, Hemiplegia, Home accessibility, Orthostatic hypotension, Impaired activity tolerance, Impaired balance, Limited mobility, Spasticity    Plan    Sitting balance/core strengthening/midline orientation, ADL retraining, slideboard transfers progressing to lateral scoots or squat pivots to the right, L UE neuro re-ed/stretching, family training with spouse Anel      Occupational Therapy Goals     Problem: Dressing     Dates: Start: 04/30/21       Goal: STG-Within one week, patient will dress UB     Dates: Start: 04/30/21       Description: 1) Individualized Goal:  with mod A with verbal cues for haylie-technique.  2) Interventions:  OT E Stim Attended, OT Self Care/ADL, OT Cognitive Skill Dev, OT Manual Ther Technique, OT Neuro Re-Ed/Balance, OT Sensory Int Techniques, OT Therapeutic Activity, OT Evaluation, and OT Therapeutic Exercise          Goal: STG-Within one week, patient will dress LB     Dates: Start: 04/30/21        Description: 1) Individualized Goal:  with max A using AE/AD/techniques as needed  2) Interventions:  OT E Stim Attended, OT Self Care/ADL, OT Cognitive Skill Dev, OT Manual Ther Technique, OT Neuro Re-Ed/Balance, OT Sensory Int Techniques, OT Therapeutic Activity, OT Evaluation, and OT Therapeutic Exercise                Problem: Grooming     Dates: Start: 04/30/21       Goal: STG-Within one week, patient will complete grooming     Dates: Start: 04/30/21       Description: 1) Individualized Goal:  with min A using AE/AD/techniques as needed  2) Interventions:  OT E Stim Attended, OT Self Care/ADL, OT Cognitive Skill Dev, OT Manual Ther Technique, OT Neuro Re-Ed/Balance, OT Sensory Int Techniques, OT Therapeutic Activity, OT Evaluation, and OT Therapeutic Exercise                  Problem: OT Long Term Goals     Dates: Start: 04/30/21       Goal: LTG-By discharge, patient will complete basic self care tasks     Dates: Start: 04/30/21       Description: 1) Individualized Goal:  with min to mod A using AE/AD/techniques as needed  2) Interventions:  OT E Stim Attended, OT Self Care/ADL, OT Cognitive Skill Dev, OT Manual Ther Technique, OT Neuro Re-Ed/Balance, OT Sensory Int Techniques, OT Therapeutic Activity, OT Evaluation, and OT Therapeutic Exercise          Goal: LTG-By discharge, patient will perform bathroom transfers     Dates: Start: 04/30/21       Description: 1) Individualized Goal:  with max A x 1 person using slideboard versus squat pivot to the right  2) Interventions:  OT E Stim Attended, OT Self Care/ADL, OT Cognitive Skill Dev, OT Manual Ther Technique, OT Neuro Re-Ed/Balance, OT Sensory Int Techniques, OT Therapeutic Activity, OT Evaluation, and OT Therapeutic Exercise

## 2021-05-04 NOTE — THERAPY
"Speech Language Pathology  Daily Treatment     Patient Name: Thong Arzola  Age:  56 y.o., Sex:  male  Medical Record #: [id number]  Today's Date: 5/4/2021     Precautions  Precautions: Fall Risk, Swallow Precautions ( See Comments)  Comments: Helmet on OOB; L UE w/ 2-3 Modified Jeremiah Scale; orthostasis    Subjective    Pt's wife present during ST; pt seen at bedside, willing to participate in ST      Objective       05/04/21 1401   Interdisciplinary Plan of Care Collaboration   IDT Collaboration with  Family / Caregiver   Patient Position at End of Therapy In Bed;Bed Alarm On;Call Light within Reach;Tray Table within Reach;Phone within Reach;Family / Friend in Room   Collaboration Comments pt's wife present during ST    SLP Total Time Spent   SLP Individual Total Time Spent (Mins) 60   Treatment Charges   SLP Cognitive Skill Development First 15 Minutes 1   SLP Cognitive Skill Development Additional 15 Minutes 3       Assessment    Pt completed visual scanning tasks to target attention and left neglect. Pt required mod A to complete activities, benefiting from verbal cues to \"look left\" and placing left hand on the edge of the paper (pt cued to start at his hand and scan the page to the right to find target). Pt reported this strategy was helpful. Pt completed 3 matching and identification activities requiring mod A (start scanning from the left, verbal cues to look left and find left hand at edge of the page, continue down to the end of the page, attention to line number). Pt reported \"I cannot believe how challenging this is for me, what is going on?\" Pt and pt's wife provided with reassurance and education regarding pt's injury and the recovery process. Pt required verbal cues to look left at therapist during conversation.     Strengths: Able to follow instructions, Pleasant and cooperative, Supportive family, Willingly participates in therapeutic activities  Barriers: Aspiration risk, Impaired activity " tolerance, Impaired appetite/intake, Impaired insight/denial of deficits, Impaired functional cognition    Plan    Continue to target simple attention and visual scanning, see at a meal to assess current diet tolerance and target left visual scanning tasks (Pt frequently leaving left half of the plate full of food).         Speech Therapy Problems       Problem: Problem Solving STGs       Dates: Start: 04/30/21         Goal: STG-Within one week, patient will       Dates: Start: 04/30/21       Description: 1) Individualized goal:  complete SCCAN with goals to be added as appropriate  2) Interventions:  SLP Speech Language Treatment, SLP Self Care / ADL Training , SLP Cognitive Skill Development, and SLP Group Treatment                      Problem: Speech/Swallowing LTGs       Dates: Start: 04/30/21         Goal: LTG-By discharge, patient will solve complex problem       Dates: Start: 04/30/21       Description: 1) Individualized goal:  related to IADL's with mod I for safe d/c home.  2) Interventions:  SLP Speech Language Treatment, SLP Swallowing Dysfunction Treatment, SLP Oral Pharyngeal Evaluation, SLP Video Swallow Evaluation, SLP Self Care / ADL Training , SLP Cognitive Skill Development, and SLP Group Treatment                  Goal: LTG-By discharge, patient will       Dates: Start: 04/30/21       Description: 1) Individualized goal:  tolerate 7- Regular Textures and 0-Thin liquids with no overt s/sx of aspiration noted   2) Interventions:  SLP Speech Language Treatment, SLP Swallowing Dysfunction Treatment, SLP Oral Pharyngeal Evaluation, SLP Video Swallow Evaluation, and SLP Group Treatment                              Problem: Swallowing STGs       Dates: Start: 04/30/21         Goal: STG-Within one week, patient will       Dates: Start: 04/30/21       Description: 1) Individualized goal:  complete MBSS with goals to be added as appropriate  2) Interventions:  SLP Swallowing Dysfunction Treatment, SLP Oral  Pharyngeal Evaluation, and SLP Video Swallow Evaluation                  Goal: STG-Within one week, patient will       Dates: Start: 04/30/21       Description: 1) Individualized goal:  trial 0-Thin liquids in isolation with no overt s/sx of aspiration noted   2) Interventions:  SLP Swallowing Dysfunction Treatment, SLP Oral Pharyngeal Evaluation, SLP Video Swallow Evaluation, SLP Self Care / ADL Training , and SLP Group Treatment                  Goal: STG-Within one week, patient will safely swallow       Dates: Start: 04/30/21       Description: 1) Individualized goal:  trials of 7- Regular Textures with no overt s/sx of aspiration noted   2) Interventions:  SLP Swallowing Dysfunction Treatment, SLP Oral Pharyngeal Evaluation, SLP Video Swallow Evaluation, SLP Self Care / ADL Training , and SLP Group Treatment

## 2021-05-04 NOTE — PROGRESS NOTES
Hospital Medicine Daily Progress Note      Chief Complaint  Hypotension    Interval Problem Update  No new complaints.  Vital signs better after IVF.    Review of Systems  Review of Systems   Constitutional: Negative for chills and fever.   Eyes: Negative.    Respiratory: Negative for cough and shortness of breath.    Cardiovascular: Negative for chest pain and palpitations.   Gastrointestinal: Negative for abdominal pain, nausea and vomiting.   Musculoskeletal:        Wound pain    Skin: Negative for itching and rash.   Neurological: Positive for focal weakness.   Endo/Heme/Allergies: Negative for polydipsia. Does not bruise/bleed easily.   Psychiatric/Behavioral: Positive for depression.        Physical Exam  Temp:  [36.4 °C (97.5 °F)-36.8 °C (98.2 °F)] 36.8 °C (98.2 °F)  Pulse:  [] 101  Resp:  [18] 18  BP: (113-115)/(73-79) 115/79  SpO2:  [95 %-97 %] 97 %    Physical Exam  Vitals reviewed.   Constitutional:       General: He is not in acute distress.     Appearance: Normal appearance. He is not ill-appearing.   HENT:      Head:      Comments: R skull defect     Right Ear: External ear normal.      Left Ear: External ear normal.      Nose: Nose normal.      Mouth/Throat:      Pharynx: Oropharynx is clear.   Eyes:      General:         Right eye: No discharge.         Left eye: No discharge.      Extraocular Movements: Extraocular movements intact.      Conjunctiva/sclera: Conjunctivae normal.   Cardiovascular:      Rate and Rhythm: Normal rate and regular rhythm.   Pulmonary:      Effort: Pulmonary effort is normal. No respiratory distress.      Breath sounds: Normal breath sounds. No wheezing.   Abdominal:      General: Bowel sounds are normal. There is no distension.      Palpations: Abdomen is soft.      Tenderness: There is no abdominal tenderness.   Musculoskeletal:         General: Swelling present.      Cervical back: Normal range of motion and neck supple.      Comments: LUE 1+ edema   Skin:      General: Skin is warm and dry.   Neurological:      Mental Status: He is alert.      Comments: Awake and alert         Fluids    Intake/Output Summary (Last 24 hours) at 5/4/2021 0029  Last data filed at 5/3/2021 2033  Gross per 24 hour   Intake 480 ml   Output 1450 ml   Net -970 ml       Laboratory  Recent Labs     05/02/21  0525   WBC 5.1   RBC 4.58*   HEMOGLOBIN 13.3*   HEMATOCRIT 39.7*   MCV 86.7   MCH 29.0   MCHC 33.5*   RDW 39.4   PLATELETCT 273   MPV 9.2     Recent Labs     05/02/21  0525   SODIUM 142   POTASSIUM 4.0   CHLORIDE 104   CO2 27   GLUCOSE 105*   BUN 18   CREATININE 0.99   CALCIUM 9.5                 Assessment/Plan  * Acute right MCA stroke (HCC)- (present on admission)  Assessment & Plan  Presented w/ left hemiparesis  S/P decompressive craniectomy for increased ICP done on 4/3/21 by Dr. Payton  Wound care and pain control  Helmet when OOB    Paroxysmal atrial fibrillation (HCC)- (present on admission)  Assessment & Plan  Echo EF >75%  Rate controlled on Metoprolol  Anticoagulated on Xarelto  Tachycardia and hypotension improved w/ IVF  Monitor for orthostatic hypotension on Flomax    Arm swelling  Assessment & Plan  Request U/S LUE R/O DVT  Check F/U labs in am     Dyslipidemia  Assessment & Plan  On Lipitor    Reactive depression- (present on admission)  Assessment & Plan  On Remeron    History of COVID-19- (present on admission)  Assessment & Plan  SARS-CoV-2 PCR positive 3/18/21    Acquired hypothyroidism- (present on admission)  Assessment & Plan  Euthyroid on [place] Thyroid     Full Code

## 2021-05-04 NOTE — PROGRESS NOTES
Hospital Medicine Daily Progress Note      Chief Complaint  Hypotension    Interval Problem Update  Doing well, denies complaints.    Review of Systems  Review of Systems   Constitutional: Negative for chills and fever.   Eyes: Negative.    Respiratory: Negative for cough and shortness of breath.    Cardiovascular: Negative for chest pain and palpitations.   Gastrointestinal: Negative for abdominal pain, nausea and vomiting.   Musculoskeletal:        Wound pain    Skin: Negative for itching and rash.   Neurological: Positive for focal weakness.   Endo/Heme/Allergies: Negative for polydipsia. Does not bruise/bleed easily.   Psychiatric/Behavioral: Positive for depression.        Physical Exam  Temp:  [36.4 °C (97.5 °F)-36.8 °C (98.2 °F)] 36.8 °C (98.2 °F)  Pulse:  [] 101  Resp:  [18] 18  BP: (113-115)/(73-79) 115/79  SpO2:  [95 %-97 %] 97 %    Physical Exam  Vitals reviewed.   Constitutional:       General: He is not in acute distress.     Appearance: Normal appearance. He is not ill-appearing.   HENT:      Head:      Comments: R skull defect     Right Ear: External ear normal.      Left Ear: External ear normal.      Nose: Nose normal.      Mouth/Throat:      Pharynx: Oropharynx is clear.   Eyes:      General:         Right eye: No discharge.         Left eye: No discharge.      Extraocular Movements: Extraocular movements intact.      Conjunctiva/sclera: Conjunctivae normal.   Cardiovascular:      Rate and Rhythm: Normal rate and regular rhythm.   Pulmonary:      Effort: Pulmonary effort is normal. No respiratory distress.      Breath sounds: Normal breath sounds. No wheezing.   Abdominal:      General: Bowel sounds are normal. There is no distension.      Palpations: Abdomen is soft.      Tenderness: There is no abdominal tenderness.   Musculoskeletal:         General: Swelling present.      Cervical back: Normal range of motion and neck supple.      Comments: LUE 1+ edema   Skin:     General: Skin is warm  and dry.   Neurological:      Mental Status: He is alert.      Comments: Awake and alert         Fluids    Intake/Output Summary (Last 24 hours) at 5/4/2021 0038  Last data filed at 5/3/2021 2033  Gross per 24 hour   Intake 480 ml   Output 1450 ml   Net -970 ml       Laboratory  Recent Labs     05/02/21  0525   WBC 5.1   RBC 4.58*   HEMOGLOBIN 13.3*   HEMATOCRIT 39.7*   MCV 86.7   MCH 29.0   MCHC 33.5*   RDW 39.4   PLATELETCT 273   MPV 9.2     Recent Labs     05/02/21  0525   SODIUM 142   POTASSIUM 4.0   CHLORIDE 104   CO2 27   GLUCOSE 105*   BUN 18   CREATININE 0.99   CALCIUM 9.5                 Assessment/Plan  * Acute right MCA stroke (HCC)- (present on admission)  Assessment & Plan  Presented w/ left hemiparesis  S/P decompressive craniectomy for increased ICP done on 4/3/21 by Dr. Payton  Wound care and pain control  Helmet when OOB    Paroxysmal atrial fibrillation (HCC)- (present on admission)  Assessment & Plan  Echo EF >75%  Rate controlled on Metoprolol  Anticoagulated on Xarelto  Tachycardia and hypotension improved w/ IVF  Monitor for orthostatic hypotension on Flomax    Arm swelling  Assessment & Plan  U/S LUE negative DVT    Dyslipidemia  Assessment & Plan  On Lipitor    Reactive depression- (present on admission)  Assessment & Plan  On Remeron    History of COVID-19- (present on admission)  Assessment & Plan  SARS-CoV-2 PCR positive 3/18/21    Acquired hypothyroidism- (present on admission)  Assessment & Plan  Euthyroid on [place] Thyroid     Full Code    Patient seen and examined.  Chart reviewed.  Assessment and Plan as above.  No changes today.

## 2021-05-05 LAB
ALBUMIN SERPL BCP-MCNC: 3.2 G/DL (ref 3.2–4.9)
ALP SERPL-CCNC: 81 U/L (ref 30–99)
ALT SERPL-CCNC: 73 U/L (ref 2–50)
AST SERPL-CCNC: 36 U/L (ref 12–45)
BILIRUB CONJ SERPL-MCNC: <0.2 MG/DL (ref 0.1–0.5)
BILIRUB INDIRECT SERPL-MCNC: ABNORMAL MG/DL (ref 0–1)
BILIRUB SERPL-MCNC: 0.3 MG/DL (ref 0.1–1.5)
PROT SERPL-MCNC: 6 G/DL (ref 6–8.2)

## 2021-05-05 PROCEDURE — 97535 SELF CARE MNGMENT TRAINING: CPT

## 2021-05-05 PROCEDURE — 80076 HEPATIC FUNCTION PANEL: CPT

## 2021-05-05 PROCEDURE — 99233 SBSQ HOSP IP/OBS HIGH 50: CPT | Performed by: PHYSICAL MEDICINE & REHABILITATION

## 2021-05-05 PROCEDURE — A9270 NON-COVERED ITEM OR SERVICE: HCPCS | Performed by: PHYSICAL MEDICINE & REHABILITATION

## 2021-05-05 PROCEDURE — 770010 HCHG ROOM/CARE - REHAB SEMI PRIVAT*

## 2021-05-05 PROCEDURE — 36415 COLL VENOUS BLD VENIPUNCTURE: CPT

## 2021-05-05 PROCEDURE — 700102 HCHG RX REV CODE 250 W/ 637 OVERRIDE(OP): Performed by: PHYSICAL MEDICINE & REHABILITATION

## 2021-05-05 PROCEDURE — 99232 SBSQ HOSP IP/OBS MODERATE 35: CPT | Performed by: HOSPITALIST

## 2021-05-05 PROCEDURE — 97112 NEUROMUSCULAR REEDUCATION: CPT

## 2021-05-05 PROCEDURE — 97530 THERAPEUTIC ACTIVITIES: CPT

## 2021-05-05 PROCEDURE — 97032 APPL MODALITY 1+ESTIM EA 15: CPT

## 2021-05-05 RX ORDER — TAMSULOSIN HYDROCHLORIDE 0.4 MG/1
0.8 CAPSULE ORAL
Status: DISCONTINUED | OUTPATIENT
Start: 2021-05-05 | End: 2021-05-12

## 2021-05-05 RX ADMIN — BACLOFEN 30 MG: 20 TABLET ORAL at 20:09

## 2021-05-05 RX ADMIN — RIVAROXABAN 20 MG: 20 TABLET, FILM COATED ORAL at 17:39

## 2021-05-05 RX ADMIN — METOPROLOL TARTRATE 12.5 MG: 25 TABLET, FILM COATED ORAL at 20:09

## 2021-05-05 RX ADMIN — MIRTAZAPINE 15 MG: 15 TABLET, ORALLY DISINTEGRATING ORAL at 20:11

## 2021-05-05 RX ADMIN — BACLOFEN 30 MG: 20 TABLET ORAL at 15:06

## 2021-05-05 RX ADMIN — TAMSULOSIN HYDROCHLORIDE 0.8 MG: 0.4 CAPSULE ORAL at 20:30

## 2021-05-05 RX ADMIN — BACLOFEN 30 MG: 20 TABLET ORAL at 08:36

## 2021-05-05 RX ADMIN — MECLIZINE HYDROCHLORIDE 25 MG: 25 TABLET ORAL at 08:37

## 2021-05-05 RX ADMIN — THYROID, PORCINE 60 MG: 30 TABLET ORAL at 20:08

## 2021-05-05 RX ADMIN — ASPIRIN 81 MG CHEWABLE TABLET 81 MG: 81 TABLET CHEWABLE at 17:39

## 2021-05-05 RX ADMIN — ACETAMINOPHEN 650 MG: 325 TABLET, FILM COATED ORAL at 22:13

## 2021-05-05 RX ADMIN — MECLIZINE HYDROCHLORIDE 25 MG: 25 TABLET ORAL at 15:06

## 2021-05-05 RX ADMIN — MECLIZINE HYDROCHLORIDE 25 MG: 25 TABLET ORAL at 20:09

## 2021-05-05 RX ADMIN — METOPROLOL TARTRATE 12.5 MG: 25 TABLET, FILM COATED ORAL at 08:37

## 2021-05-05 RX ADMIN — THYROID, PORCINE 60 MG: 30 TABLET ORAL at 08:37

## 2021-05-05 RX ADMIN — ATORVASTATIN CALCIUM 80 MG: 40 TABLET, FILM COATED ORAL at 20:09

## 2021-05-05 ASSESSMENT — ENCOUNTER SYMPTOMS
DIZZINESS: 0
HALLUCINATIONS: 0
FEVER: 0
PALPITATIONS: 0
BLURRED VISION: 0
SHORTNESS OF BREATH: 0
NAUSEA: 0
VOMITING: 0
HEADACHES: 0

## 2021-05-05 ASSESSMENT — GAIT ASSESSMENTS: GAIT LEVEL OF ASSIST: UNABLE TO PARTICIPATE

## 2021-05-05 NOTE — THERAPY
"Occupational Therapy  Daily Treatment     Patient Name: Thong Arzola  Age:  56 y.o., Sex:  male  Medical Record #: [id number]  Today's Date: 5/5/2021     Precautions  Precautions: (P) Fall Risk, Swallow Precautions ( See Comments)  Comments: (P) Helmet on OOB; L UE w/ 2-3 Modified Jeremiah Scale; orthostasis         Subjective    \"The mirror helps a lot.\" pt reported about sitting balance and keeping self in straight, upright posture      Objective       05/05/21 0931   Precautions   Precautions Fall Risk;Swallow Precautions ( See Comments)   Comments Helmet on OOB; L UE w/ 2-3 Modified Jeremiah Scale; orthostasis   Pain   Intervention Declines   Pain 0 - 10 Group   Pain Rating Scale (NPRS) 0   Therapist Pain Assessment Post Activity Pain Same as Prior to Activity   Cognition    Level of Consciousness Alert   Neuro-Muscular Treatments   Neuro-Muscular Treatments Facilitation;Joint Approximation;Tactile Cuing   Comments see note for details   Interdisciplinary Plan of Care Collaboration   Patient Position at End of Therapy Seated;Self Releasing Lap Belt Applied;Other (Comments)  (therapy tech brought pt back to room)   OT Total Time Spent   OT Individual Total Time Spent (Mins) 30   OT Charge Group   OT Neuromuscular Re-education / Balance 2     Pt transferred from w/c to EOM with SB mod a- 2 people present for safety but only one person assisted with xfer. Seated at EOM with mirror in front of pt- pt engaged in postural control using mirror as guide to keep upright posture in midline. Pt required intermittent min a for postural facilitation and mod cues for leaning to R side. Pt using RUE for support on mat table. Pt tolerated sitting EOM ~5-6 minutes and then reported fatigue. Mod a for sit to supine. Pt engaged in AAROM 2 sets x 10 reps for shoulder flexion with min a from therapist to facilitate elbow extension on LUE.     PROM for LUE in shoulder flexion/extension and abduction/adduction- pt actively " bringing arm into adduction with min a from therapist to guide arm while in supine 5x.     Mod a for supine to sit, mod a for SB xfer back to w/c for trunk stability- pt able to scoot himself on board back into chair.     Assessment    Pt tolerated session well. Pt very motivated to work on sitting balance at EOM. Noted improvement in sitting balance from previous session, which required mod-max a for sitting balance, only intermittent min a from behind during sitting and pt able to self correct with cues to lean to R side 50% of the time. Pt only required mod a from therapist for SB xfer today to/from w/c to mat table, 2 people present for safety.     Strengths: Able to follow instructions, Alert and oriented, Effective communication skills, Good insight into deficits/needs, Independent prior level of function, Manages pain appropriately, Motivated for self care and independence, Pleasant and cooperative, Supportive family, Willingly participates in therapeutic activities  Barriers: Bowel incontinence, Decreased endurance, Fatigue, Generalized weakness, Hemiplegia, Home accessibility, Orthostatic hypotension, Impaired activity tolerance, Impaired balance, Limited mobility, Spasticity    Plan    Sitting balance/core strengthening/midline orientation, ADL retraining, slideboard transfers progressing to lateral scoots or squat pivots to the right, L UE neuro re-ed/stretching, family training with spouse Anel       Occupational Therapy Goals     Problem: Dressing     Dates: Start: 04/30/21       Goal: STG-Within one week, patient will dress UB     Dates: Start: 04/30/21       Description: 1) Individualized Goal:  with mod A with verbal cues for haylie-technique.  2) Interventions:  OT E Stim Attended, OT Self Care/ADL, OT Cognitive Skill Dev, OT Manual Ther Technique, OT Neuro Re-Ed/Balance, OT Sensory Int Techniques, OT Therapeutic Activity, OT Evaluation, and OT Therapeutic Exercise    Note:     Goal Note filed on  05/04/21 1459 by Aj Lovett, OT/L    Max A                  Goal: STG-Within one week, patient will dress LB     Dates: Start: 04/30/21       Description: 1) Individualized Goal:  with max A using AE/AD/techniques as needed  2) Interventions:  OT E Stim Attended, OT Self Care/ADL, OT Cognitive Skill Dev, OT Manual Ther Technique, OT Neuro Re-Ed/Balance, OT Sensory Int Techniques, OT Therapeutic Activity, OT Evaluation, and OT Therapeutic Exercise    Note:     Goal Note filed on 05/04/21 1459 by Aj Lovett, OT/L    Total A                        Problem: Grooming     Dates: Start: 04/30/21       Goal: STG-Within one week, patient will complete grooming     Dates: Start: 04/30/21       Description: 1) Individualized Goal:  with min A using AE/AD/techniques as needed  2) Interventions:  OT E Stim Attended, OT Self Care/ADL, OT Cognitive Skill Dev, OT Manual Ther Technique, OT Neuro Re-Ed/Balance, OT Sensory Int Techniques, OT Therapeutic Activity, OT Evaluation, and OT Therapeutic Exercise      Note:     Goal Note filed on 05/04/21 1459 by Aj Lovett, OT/L    Mod A                        Problem: OT Long Term Goals     Dates: Start: 04/30/21       Goal: LTG-By discharge, patient will complete basic self care tasks     Dates: Start: 04/30/21       Description: 1) Individualized Goal:  with min to mod A using AE/AD/techniques as needed  2) Interventions:  OT E Stim Attended, OT Self Care/ADL, OT Cognitive Skill Dev, OT Manual Ther Technique, OT Neuro Re-Ed/Balance, OT Sensory Int Techniques, OT Therapeutic Activity, OT Evaluation, and OT Therapeutic Exercise          Goal: LTG-By discharge, patient will perform bathroom transfers     Dates: Start: 04/30/21       Description: 1) Individualized Goal:  with max A x 1 person using slideboard versus squat pivot to the right  2) Interventions:  OT E Stim Attended, OT Self Care/ADL, OT Cognitive Skill Dev, OT Manual Ther Technique, OT Neuro Re-Ed/Balance,  OT Sensory Int Techniques, OT Therapeutic Activity, OT Evaluation, and OT Therapeutic Exercise

## 2021-05-05 NOTE — CARE PLAN
Problem: Mobility  Goal: STG-Within one week, patient will propel wheelchair household distances  Description: 1) Individualized goal:  25ft with haylie technique with SBA on even surfaces  2) Interventions:  PT Group Therapy, PT E Stim Attended, PT Orthotics Training, PT Gait Training, PT Self Care/Home Eval, PT Therapeutic Exercises, PT Neuro Re-Ed/Balance, PT Therapeutic Activity, and PT Evaluation    Outcome: NOT MET  Note: Variable performance, min-max A to steer   Fatigues easily, L neglect     Problem: Mobility Transfers  Goal: STG-Within one week, patient will perform bed mobility  Description: 1) Individualized goal:  sit <> supine with modA   2) Interventions:  PT Group Therapy, PT E Stim Attended, PT Orthotics Training, PT Gait Training, PT Self Care/Home Eval, PT Therapeutic Exercises, PT Neuro Re-Ed/Balance, PT Therapeutic Activity, and PT Evaluation    Outcome: NOT MET  Note: Variable performance, mod A - 2 person  Fatigues easily   Goal: STG-Within one week, patient will transfer bed to chair  Description: 1) Individualized goal:  transfer with slide board and modA  2) Interventions:  PT Group Therapy, PT E Stim Attended, PT Orthotics Training, PT Gait Training, PT Self Care/Home Eval, PT Therapeutic Exercises, PT Neuro Re-Ed/Balance, PT Therapeutic Activity, and PT Evaluation    Outcome: NOT MET  Note: Variable performance - 1-2 person assist with slide board  Fatigues easily, haylie, neglect

## 2021-05-05 NOTE — THERAPY
Occupational Therapy  Daily Treatment     Patient Name: Thong Arzola  Age:  56 y.o., Sex:  male  Medical Record #: [id number]  Today's Date: 5/5/2021     Precautions  Precautions: (P) Fall Risk, Swallow Precautions ( See Comments)  Comments: (P) Helmet on OOB; L UE w/ 2-3 Modified Jeremiah Scale; orthostasis    Subjective    Patient declined to participate in shower today however stated he is agreeable to participate tomorrow w/ primary OT.     Objective     05/05/21 1401   Precautions   Precautions Fall Risk;Swallow Precautions ( See Comments)   Comments Helmet on OOB; L UE w/ 2-3 Modified Jeremiah Scale; orthostasis   Vitals   O2 Delivery Device None - Room Air   Cognition    Level of Consciousness Alert   Functional Level of Assist   Bed, Chair, Wheelchair Transfer Moderate Assist  (Mod to min assist for bed > w/c txfr w/ txfr board )   Interdisciplinary Plan of Care Collaboration   IDT Collaboration with  Certified Nursing Assistant;Therapy Tech   Patient Position at End of Therapy Seated;Self Releasing Lap Belt Applied;Call Light within Reach   Collaboration Comments Transferred care to CNA, Tech provided CGA to SBA for bed txfr and assisted with patient's sitting balance EOB    OT Total Time Spent   OT Individual Total Time Spent (Mins) 60   OT Charge Group   OT Self Care / ADL 2   OT Neuromuscular Re-education / Balance 2     Supine therex to facilitate attention to LUE, forced use, and spasticity management. Shoulder flexion, chest press, elbow flexion/extension, and wrist extension exercises completed (SROM/ PROM techniques used) w/ vibration applied.     Patient required mod assist to maintain seated balance EOM, has tendency to lean to L side.     Min- mod assist for wc <> mat txfr (downhill) w/ txfr board. Mod -max assist for bed mobility (supine <> EOM).     Assessment    Patient tolerated OT session well with focus on L side attention/neuro re-ed, seated balance, and functional txfrs (w/ txfr  board). Patient presents with significant L neglect, poor midline orientation/seated balance, and decreased attention to task. Participated in OT to the best of his ability however requires consistent cues for safety and attention to task/L side. Tolerated vibration well on lower setting.   Strengths: Able to follow instructions, Alert and oriented, Effective communication skills, Good insight into deficits/needs, Independent prior level of function, Manages pain appropriately, Motivated for self care and independence, Pleasant and cooperative, Supportive family, Willingly participates in therapeutic activities  Barriers: Bowel incontinence, Decreased endurance, Fatigue, Generalized weakness, Hemiplegia, Home accessibility, Orthostatic hypotension, Impaired activity tolerance, Impaired balance, Limited mobility, Spasticity    Plan    Shower tomorrow w/ primary OT.     Occupational Therapy Goals     Problem: Dressing     Dates: Start: 04/30/21       Goal: STG-Within one week, patient will dress UB     Dates: Start: 04/30/21       Description: 1) Individualized Goal:  with mod A with verbal cues for haylie-technique.  2) Interventions:  OT E Stim Attended, OT Self Care/ADL, OT Cognitive Skill Dev, OT Manual Ther Technique, OT Neuro Re-Ed/Balance, OT Sensory Int Techniques, OT Therapeutic Activity, OT Evaluation, and OT Therapeutic Exercise    Note:     Goal Note filed on 05/04/21 1459 by Aj Lovett OT/L    Max A                  Goal: STG-Within one week, patient will dress LB     Dates: Start: 04/30/21       Description: 1) Individualized Goal:  with max A using AE/AD/techniques as needed  2) Interventions:  OT E Stim Attended, OT Self Care/ADL, OT Cognitive Skill Dev, OT Manual Ther Technique, OT Neuro Re-Ed/Balance, OT Sensory Int Techniques, OT Therapeutic Activity, OT Evaluation, and OT Therapeutic Exercise    Note:     Goal Note filed on 05/04/21 1459 by Aj Lovett OT/L    Total A                         Problem: Grooming     Dates: Start: 04/30/21       Goal: STG-Within one week, patient will complete grooming     Dates: Start: 04/30/21       Description: 1) Individualized Goal:  with min A using AE/AD/techniques as needed  2) Interventions:  OT E Stim Attended, OT Self Care/ADL, OT Cognitive Skill Dev, OT Manual Ther Technique, OT Neuro Re-Ed/Balance, OT Sensory Int Techniques, OT Therapeutic Activity, OT Evaluation, and OT Therapeutic Exercise      Note:     Goal Note filed on 05/04/21 1459 by Aj Lovett OT/L    Mod A                        Problem: OT Long Term Goals     Dates: Start: 04/30/21       Goal: LTG-By discharge, patient will complete basic self care tasks     Dates: Start: 04/30/21       Description: 1) Individualized Goal:  with min to mod A using AE/AD/techniques as needed  2) Interventions:  OT E Stim Attended, OT Self Care/ADL, OT Cognitive Skill Dev, OT Manual Ther Technique, OT Neuro Re-Ed/Balance, OT Sensory Int Techniques, OT Therapeutic Activity, OT Evaluation, and OT Therapeutic Exercise          Goal: LTG-By discharge, patient will perform bathroom transfers     Dates: Start: 04/30/21       Description: 1) Individualized Goal:  with max A x 1 person using slideboard versus squat pivot to the right  2) Interventions:  OT E Stim Attended, OT Self Care/ADL, OT Cognitive Skill Dev, OT Manual Ther Technique, OT Neuro Re-Ed/Balance, OT Sensory Int Techniques, OT Therapeutic Activity, OT Evaluation, and OT Therapeutic Exercise

## 2021-05-05 NOTE — PROGRESS NOTES
Hospital Medicine Daily Progress Note      Chief Complaint:  Hypertension  Afib    Interval History:  No significant events or changes since last visit    Review of Systems  Review of Systems   Constitutional: Negative for fever.   Eyes: Negative for blurred vision.   Respiratory: Negative for shortness of breath.    Cardiovascular: Negative for palpitations.   Gastrointestinal: Negative for nausea and vomiting.   Neurological: Negative for dizziness and headaches.   Psychiatric/Behavioral: Negative for hallucinations.        Physical Exam  Temp:  [36.2 °C (97.2 °F)-36.9 °C (98.5 °F)] 36.9 °C (98.5 °F)  Pulse:  [75-86] 83  Resp:  [18-20] 18  BP: (113-124)/(66-86) 114/66  SpO2:  [98 %] 98 %    Physical Exam  Vitals and nursing note reviewed.   Constitutional:       General: He is not in acute distress.  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     Mouth/Throat:      Mouth: Mucous membranes are moist.      Pharynx: Oropharynx is clear.   Eyes:      General: No scleral icterus.  Cardiovascular:      Rate and Rhythm: Normal rate and regular rhythm.      Heart sounds: No murmur.   Pulmonary:      Effort: Pulmonary effort is normal.      Breath sounds: Normal breath sounds. No stridor.   Abdominal:      General: There is no distension.      Palpations: Abdomen is soft.      Tenderness: There is no abdominal tenderness.   Musculoskeletal:      Cervical back: No rigidity.      Right lower leg: No edema.      Left lower leg: No edema.   Skin:     General: Skin is warm and dry.      Findings: No rash.   Neurological:      Mental Status: He is alert and oriented to person, place, and time.   Psychiatric:         Mood and Affect: Mood normal.         Behavior: Behavior normal.         Fluids    Intake/Output Summary (Last 24 hours) at 5/5/2021 0846  Last data filed at 5/5/2021 7203  Gross per 24 hour   Intake 460 ml   Output 1210 ml   Net -750 ml       Laboratory                       Assessment/Plan  * Acute right MCA stroke (HCC)- (present on admission)  Assessment & Plan  S/P decompressive craniectomy for increased ICP (4/3)    Paroxysmal atrial fibrillation (HCC)- (present on admission)  Assessment & Plan  HR ok but occ rises up  Echo: EF > 75%  On Lopressor  On Xarelto  Consider increasing Lopressor dose  Cont to monitor    Dizziness  Assessment & Plan  On Antivert    Arm swelling  Assessment & Plan  Nearly resolved  U/S LUE negative DVT  Likely 2nd to stroke and imobility    Dyslipidemia  Assessment & Plan  On Lipitor    Reactive depression- (present on admission)  Assessment & Plan  On Remeron    History of COVID-19- (present on admission)  Assessment & Plan  Had Covid 3/18/21    Acquired hypothyroidism- (present on admission)  Assessment & Plan  On Omer Thyroid

## 2021-05-05 NOTE — CARE PLAN
Problem: Urinary Elimination:  Goal: Ability to reestablish a normal urinary elimination pattern will improve  Outcome: PROGRESSING SLOWER THAN EXPECTED  Note: Order received to insert dye due to bladder retention. While inserting 16 fr dye catheter, small amount of blood came out. Three way valves catheter was used instead. Pt had hx hematuria. NIYAH Prasad and Dr Monge notified. Patient tolerated well. Dye bag is now draining yellow urine without blood.      Problem: Safety  Goal: Will remain free from injury  Outcome: PROGRESSING AS EXPECTED

## 2021-05-05 NOTE — CARE PLAN
Problem: Swallowing STGs  Goal: STG-Within one week, patient will  Description: 1) Individualized goal:  trial 0-Thin liquids in isolation with no overt s/sx of aspiration noted   2) Interventions:  SLP Swallowing Dysfunction Treatment, SLP Oral Pharyngeal Evaluation, SLP Video Swallow Evaluation, SLP Self Care / ADL Training , and SLP Group Treatment      Outcome: MET  Note: Pt tolerating 0-Thin liquids with no overt s/sx of aspiration noted.   Goal: STG-Within one week, patient will safely swallow  Description: 1) Individualized goal:  trials of 7- Regular Textures with no overt s/sx of aspiration noted   2) Interventions:  SLP Swallowing Dysfunction Treatment, SLP Oral Pharyngeal Evaluation, SLP Video Swallow Evaluation, SLP Self Care / ADL Training , and SLP Group Treatment      Outcome: MET  Note: Pt tolerating 7- Regular Textures with no overt s/sx of aspiration noted.      Problem: Problem Solving STGs  Goal: STG-Within one week, patient will  Description: 1) Individualized goal:  complete SCCAN with goals to be added as appropriate  2) Interventions:  SLP Speech Language Treatment, SLP Self Care / ADL Training , SLP Cognitive Skill Development, and SLP Group Treatment  Outcome: MET  Note: SCCAN completed 5/2/21     Problem: Swallowing STGs  Goal: STG-Within one week, patient will  Description: 1) Individualized goal:  complete MBSS with goals to be added as appropriate  2) Interventions:  SLP Swallowing Dysfunction Treatment, SLP Oral Pharyngeal Evaluation, and SLP Video Swallow Evaluation      5/5/2021 0823 by Mariza Alvarez, Student  Outcome: DISCHARGED-GOAL NOT MET  Note: MBSS not indicated  5/5/2021 0756 by Mariza Alvarez, Student  Outcome: NOT MET  Note: MBSS not completed

## 2021-05-05 NOTE — PROGRESS NOTES
"Rehab Progress Note     Date of Service: 5/5/2021  Chief Complaint: follow up stroke    Interval Events (Subjective)    Patient seen and examined today in the hallway at the nursing station.  He reports having spasms in his left leg sometimes during the day, not at night.   He was unable to urinate at all after his voiding trial, will replace Brewer and increase Flomax.  Patient denies any pain or headache.  He has no new complaints.   First weekly conference today.     ROS: No changes to bowel, bladder, pain, mood, or sleep.           Objective:  VITAL SIGNS: /66   Pulse 83   Temp 36.9 °C (98.5 °F) (Tympanic)   Resp 18   Ht 1.778 m (5' 10\")   Wt 67.2 kg (148 lb 2.4 oz)   SpO2 98%   BMI 21.26 kg/m²   Gen: alert, no apparent distress  HEENT: helmet in place over right craniectomy defect  Neuro: notable for left spastic hemiplegia, decreased tone today compared to yesterday, several beats of clonus at left ankle          Recent Results (from the past 72 hour(s))   POCT glucose device results    Collection Time: 05/02/21  9:50 PM   Result Value Ref Range    Glucose - Accu-Ck 120 (H) 65 - 99 mg/dL   HEPATIC FUNCTION PANEL    Collection Time: 05/05/21  6:21 AM   Result Value Ref Range    Alkaline Phosphatase 81 30 - 99 U/L    AST(SGOT) 36 12 - 45 U/L    ALT(SGPT) 73 (H) 2 - 50 U/L    Total Bilirubin 0.3 0.1 - 1.5 mg/dL    Direct Bilirubin <0.2 0.1 - 0.5 mg/dL    Indirect Bilirubin see below 0.0 - 1.0 mg/dL    Albumin 3.2 3.2 - 4.9 g/dL    Total Protein 6.0 6.0 - 8.2 g/dL       Current Facility-Administered Medications   Medication Frequency   • tamsulosin (FLOMAX) capsule 0.8 mg QHS   • baclofen (LIORESAL) tablet 30 mg TID   • meclizine (ANTIVERT) tablet 25 mg TID   • hydrOXYzine HCl (ATARAX) tablet 50 mg Q6HRS PRN   • melatonin tablet 3 mg HS PRN   • Respiratory Therapy Consult Continuous RT   • Pharmacy Consult Request ...Pain Management Review 1 Each PHARMACY TO DOSE   • hydrALAZINE (APRESOLINE) tablet 10 " mg Q8HRS PRN   • acetaminophen (Tylenol) tablet 650 mg Q4HRS PRN   • lactulose 20 GM/30ML solution 30 mL QDAY PRN   • docusate sodium (ENEMEEZ) enema 283 mg QDAY PRN   • fleet enema 133 mL QDAY PRN   • artificial tears ophthalmic solution 1 Drop PRN   • benzocaine-menthol (CEPACOL) lozenge 1 Lozenge Q2HRS PRN   • mag hydrox-al hydrox-simeth (MAALOX PLUS ES or MYLANTA DS) suspension 20 mL Q2HRS PRN   • ondansetron (ZOFRAN ODT) dispertab 4 mg 4X/DAY PRN    Or   • ondansetron (ZOFRAN) syringe/vial injection 4 mg 4X/DAY PRN   • traZODone (DESYREL) tablet 50 mg QHS PRN   • sodium chloride (OCEAN) 0.65 % nasal spray 2 Spray PRN   • midazolam (VERSED) 5 mg/mL (1 mL vial) PRN   • atorvastatin (LIPITOR) tablet 80 mg Q EVENING   • aspirin (ASA) chewable tab 81 mg DAILY   • metoprolol tartrate (LOPRESSOR) tablet 12.5 mg BID   • mirtazapine (Remeron) orally disintegrating tab 15 mg QHS   • rivaroxaban (XARELTO) tablet 20 mg PM MEAL   • thyroid (ARMOUR THYROID) tablet 60 mg BID       Orders Placed This Encounter   Procedures   • Diet Order Diet: Level 7 - Easy to Chew (float pills in puree); Liquid level: Level 0 - Thin; Tray Modifications (optional): SLP - 1:1 Supervision by Nursing, SLP - Deliver to Nursing Station     Standing Status:   Standing     Number of Occurrences:   1     Order Specific Question:   Diet:     Answer:   Level 7 - Easy to Chew [22]     Comments:   float pills in puree     Order Specific Question:   Liquid level     Answer:   Level 0 - Thin     Order Specific Question:   Tray Modifications (optional)     Answer:   SLP - 1:1 Supervision by Nursing     Order Specific Question:   Tray Modifications (optional)     Answer:   SLP - Deliver to Nursing Station       Assessment:  Active Hospital Problems    Diagnosis    • *Acute right MCA stroke (HCC)    • Paroxysmal atrial fibrillation (HCC)    • Urinary retention    • Acquired hypothyroidism    • History of COVID-19    • Hypotension    • Normocytic anemia    •  Spasticity as late effect of cerebrovascular accident (CVA)    • Reactive depression      This patient is a 56 y.o. male admitted for acute inpatient rehabilitation with Acute right MCA stroke (HCC).    I led and attended the weekly conference today, and agree with the IDT conference documentation and plan of care as noted below.    Date of conference: 5/5/2021    Goals and barriers: See IDT note.    Biggest barriers: left spastic hemiparesis, urinary retention, bowel incontinence, fluctuation in function, impaired trunk control, left neglect, spasticity, low blood pressure    CM/social support: wife supportive, to go to 1  here in [place], wife's sister    Anticipated DC date: 5/21    Home health: PT/OT/SLP/RN    Equip: TBD    Follow up: PCP, Dr. Dumont, stroke bridge clinic, urology, neurosugery      Medical Decision Making and Plan:    Large right ICA/MCA ischemic stroke  S/p craniectomy 4/3, Dr. Payton  Continue full rehab program  PT/OT/SLP, 1 hr each discipline, 5 days per week    Xarhianna  Statin    Outpatient follow up with stroke bridge clinic, Dr. Dumont, referrals made    Outpatient follow up with Dr. Payton for cranioplasty    Dizziness, improved  Could be due to hypotension  Scheduled Meclizine, will titrate off prior to discharge  Will try scopolamine if meclizine ineffective, has not needed  Continue Teds and abdominal binder       Spasticity, improved  Increased baclofen 15 mg TID to 20 mg TID 4/30 --> 30 mg TID 5/3  Started valium at night 2 mg 4/30, discontinued 5/3, doesn't seem to make much difference  Consider adding Zanaflex in future, LFTs on 5/5 with elevated ALT  Would benefit from Botox, may try to get approval to do as an inpatient, discussed with Dr. Dumont     Atrial fibrillation  Metoprolol  Xarelto  Hospitalist consulted, appreciate assistance     Hypothyroidism   [place] thyroid     Hypertension  Hypotension  Lisinopril discontinued  Metoprolol  Monitor for orthostasis on Flomax, dose  increased  Hospitalist consulted, appreciate assistance  Teds and abdominal binder    Insomnia, improved/resolved  Already on mirtazapine  Started trial of Valium 4/30, discontinued 5/4     Reactive depression  Mirtazapine  Consider psychology consult if needed   Mood currently stable     History of COVID-19  Without sequelae     Normocytic anemia  Mild, monitor    Bowel program  Continue bowel medications  Last BM 5/5    Bladder program  Urinary retention  Changed Prazosin to Flomax  Voiding trial 5/4, unsuccessful  Replace Brewer 5/5, increase Flomax  Likely has spastic bladder  Will need outpatient urology follow up  Failed recent trial/history of traumatic placement/hematuria  Monitor blood pressure on higher dose of Flomax    DVT prophylaxis  Xarelto      Total time:  40 minutes.  I spent greater than 50% of the time for patient care, counseling, and coordination on this date, including patient face-to face time, unit/floor time with review of records/pertinent lab data and studies, as well as discussing diagnostic evaluation/work up, planned therapeutic interventions, and future disposition of care, as per the interval events/subjective and the assessment and plan as noted above.      Radha Monge M.D.   Physical Medicine and Rehabilitation

## 2021-05-05 NOTE — THERAPY
Physical Therapy   Daily Treatment     Patient Name: Thong Arzola  Age:  56 y.o., Sex:  male  Medical Record #: [id number]  Today's Date: 5/5/2021     Precautions  Precautions: (P) Fall Risk, Swallow Precautions ( See Comments)  Comments: (P) Helmet on OOB; L UE w/ 2-3 Modified Jeremiah Scale; orthostasis    Subjective    Pt and spouse were agreeable to POC. Pt was eager to lay down.      Objective       05/05/21 1531   Precautions   Precautions Fall Risk;Swallow Precautions ( See Comments)   Comments Helmet on OOB; L UE w/ 2-3 Modified Jeremiah Scale; orthostasis   Wheelchair Functional Level of Assist   Wheelchair Assist Maximal Assist  (to steer away from left side)   Distance Wheelchair (Feet or Distance) 15  (x 4 with haylie technique in tilt in space )   Wheelchair Description Adaptive equipment;Limited by fatigue;Extra time;Assistance with steering;Verbal cueing  (Dependent for left sided safety/ vc/ attention)   Transfer Functional Level of Assist   Bed, Chair, Wheelchair Transfer Maximal Assist   Bed Chair Wheelchair Transfer Description Adaptive equipment;Slideboard transfer from bed to wheelchair;Set-up of equipment;Verbal cueing   Bed Mobility    Sit to Supine Total Assist   Scooting Maximal Assist   Rolling Maximum Assist to Lt.   Neuro-Muscular Treatments   Neuro-Muscular Treatments Weight Shift Right;Weight Shift Left;Verbal Cuing;Tactile Cuing;Sequencing;Postural Changes;Postural Facilitation;Joint Approximation;Facilitation   Comments Low profile Roho added to improve access to armrest closure/ opening for use of slieboard.    Interdisciplinary Plan of Care Collaboration   IDT Collaboration with  Family / Caregiver   Patient Position at End of Therapy In Bed;Family / Friend in Room;Call Light within Reach   Collaboration Comments Anel, spouse, participated in transfer from [facility], and education was initiated on head:hip ratio, safety, and protection of L side.    Strengths & Barriers    Strengths Alert and oriented;Effective communication skills;Independent prior level of function;Motivated for self care and independence;Pleasant and cooperative;Supportive family;Willingly participates in therapeutic activities   Barriers Hemiparesis;Home accessibility;Orthostatic hypotension;Impaired activity tolerance;Impaired balance;Spasticity;Limited mobility   PT Total Time Spent   PT Individual Total Time Spent (Mins) 30   PT Charge Group   PT Neuromuscular Re-Education / Balance 1   PT Therapeutic Activities 1       Assessment    Pt was unable to steer away from L side using tilt in space + haylie technique; suspect fatigue as contributing factor, in addition to neglect. Transfer training education, and 2nd person assist was initiated with spouse wc to bed using Gaurav Board.     Strengths: (P) Alert and oriented, Effective communication skills, Independent prior level of function, Motivated for self care and independence, Pleasant and cooperative, Supportive family, Willingly participates in therapeutic activities  Barriers: (P) Hemiparesis, Home accessibility, Orthostatic hypotension, Impaired activity tolerance, Impaired balance, Spasticity, Limited mobility    Plan    Midline orientation EOM, sitting balance, slide board vs. Gaurav board transfer to progress to 1 person assist consistently, bed mobility, w/c mobility,  LE (progress to adjunct after 2nd trial), family training with spouse as appropriate. Reassess wheelchair positioning; pt may need a pommel to support sitting posture.     Passport items to be completed:  Get in/out of bed safely, in/out of a vehicle, safely use mobility device, walk or wheel around home/community, navigate up and down stairs, show how to get up/down from the ground, ensure home is accessible, demonstrate HEP, complete caregiver training    Physical Therapy Problems     Problem: Mobility     Dates: Start: 04/30/21       Goal: STG-Within one week, patient will propel  wheelchair household distances     Dates: Start: 04/30/21       Description: 1) Individualized goal:  25ft with haylie technique with SBA on even surfaces  2) Interventions:  PT Group Therapy, PT E Stim Attended, PT Orthotics Training, PT Gait Training, PT Self Care/Home Eval, PT Therapeutic Exercises, PT Neuro Re-Ed/Balance, PT Therapeutic Activity, and PT Evaluation      Note:     Goal Note filed on 05/05/21 1131 by Ivory Cintron, LAVONT    Variable performance, min-max A to steer   Fatigues easily, L neglect                        Problem: Mobility Transfers     Dates: Start: 04/30/21       Goal: STG-Within one week, patient will perform bed mobility     Dates: Start: 04/30/21       Description: 1) Individualized goal:  sit <> supine with modA   2) Interventions:  PT Group Therapy, PT E Stim Attended, PT Orthotics Training, PT Gait Training, PT Self Care/Home Eval, PT Therapeutic Exercises, PT Neuro Re-Ed/Balance, PT Therapeutic Activity, and PT Evaluation      Note:     Goal Note filed on 05/05/21 1131 by Ivory Cintron, DPT    Variable performance, mod A - 2 person  Fatigues easily                   Goal: STG-Within one week, patient will transfer bed to chair     Dates: Start: 04/30/21       Description: 1) Individualized goal:  transfer with slide board and modA  2) Interventions:  PT Group Therapy, PT E Stim Attended, PT Orthotics Training, PT Gait Training, PT Self Care/Home Eval, PT Therapeutic Exercises, PT Neuro Re-Ed/Balance, PT Therapeutic Activity, and PT Evaluation      Note:     Goal Note filed on 05/05/21 1131 by Ivory Cintron, DPT    Variable performance - 1-2 person assist with slide board  Fatigues easily, haylie, neglect                        Problem: PT-Long Term Goals     Dates: Start: 04/30/21       Goal: LTG-By discharge, patient will propel wheelchair     Dates: Start: 04/30/21       Description: 1) Individualized goal:  100ft with haylie technique and Anastacio on even/uneven surfaces  2)  Interventions:  PT Group Therapy, PT E Stim Attended, PT Orthotics Training, PT Gait Training, PT Self Care/Home Eval, PT Therapeutic Exercises, PT Neuro Re-Ed/Balance, PT Therapeutic Activity, and PT Evaluation          Goal: LTG-By discharge, patient will transfer one surface to another     Dates: Start: 04/30/21       Description: 1) Individualized goal:  SQPT with Taz   2) Interventions:  PT Group Therapy, PT E Stim Attended, PT Orthotics Training, PT Gait Training, PT Self Care/Home Eval, PT Therapeutic Exercises, PT Neuro Re-Ed/Balance, PT Therapeutic Activity, and PT Evaluation            Goal: LTG-By discharge, patient will     Dates: Start: 04/30/21       Description: 1) Individualized goal: perform bed mobility (supine<>sit) with Taz   2) Interventions:  PT Group Therapy, PT E Stim Attended, PT Orthotics Training, PT Gait Training, PT Self Care/Home Eval, PT Therapeutic Exercises, PT Neuro Re-Ed/Balance, PT Therapeutic Activity, and PT Evaluation

## 2021-05-05 NOTE — THERAPY
Physical Therapy   Daily Treatment     Patient Name: Thong Arzola  Age:  56 y.o., Sex:  male  Medical Record #: [id number]  Today's Date: 5/5/2021     Precautions  Precautions: (P) Fall Risk, Swallow Precautions ( See Comments)  Comments: (P) Helmet on OOB; L UE w/ 2-3 Modified Jeremiah Scale; orthostasis    Subjective    Pt was receptive to POC, requested to lay down for 30 min post session to rest before T Dine.      Objective       05/05/21 1001   Precautions   Precautions Fall Risk;Swallow Precautions ( See Comments)   Comments Helmet on OOB; L UE w/ 2-3 Modified Jeremiah Scale; orthostasis   Gait Functional Level of Assist    Gait Level Of Assist Unable to Participate   Wheelchair Functional Level of Assist   Wheelchair Assist Maximal Assist   Distance Wheelchair (Feet or Distance) 40   Wheelchair Description Adaptive equipment;Assistance with steering;Extra time;Impaired coordination;Limited by fatigue;Safety concerns  (Poor haylie mobility technique/ dec steering with RLE)   Stairs Functional Level of Assist   Level of Assist with Stairs Unable to Participate   Transfer Functional Level of Assist   Bed, Chair, Wheelchair Transfer Total Assist X 2  (2nd person for 3 trials with standard SB, wc<> EOM/ bed)   Bed Chair Wheelchair Transfer Description Slideboard transfer from bed to wheelchair;Verbal cueing;Set-up of equipment;Increased time  (intermittent assist from 2nd person, variable needs)   Bed Mobility    Sit to Supine Total Assist X 2   Scooting Maximal Assist   Rolling Moderate Assist to Lt.   Neuro-Muscular Treatments   Neuro-Muscular Treatments Weight Shift Left;Weight Shift Right;Verbal Cuing;Tactile Cuing;Sequencing;Postural Facilitation;Postural Changes;Joint Approximation;Electrical Stimulation;Anterior weight shift   Comments Head:hip ratio educated during transfers, and scooting.  LE cycle- electrodes placed on glutes, quads, hams, ant tib, gastroc. Palpation to optimize contractions (to  tolerance) of each major muscle group.  Pt switched to tilt in space with large wheels to assist with indep propulsion. Roho cushion provided for pressure relief needs.    Interdisciplinary Plan of Care Collaboration   IDT Collaboration with  Therapy Tech   Patient Position at End of Therapy In Bed;Call Light within Reach;Phone within Reach   Collaboration Comments POC, transfer assist/ set up assist, wc modification assist   Strengths & Barriers   Strengths Alert and oriented;Effective communication skills;Independent prior level of function;Motivated for self care and independence;Pleasant and cooperative;Supportive family;Willingly participates in therapeutic activities   Barriers Hemiparesis;Home accessibility;Orthostatic hypotension;Impaired activity tolerance;Impaired balance;Spasticity;Limited mobility   PT Total Time Spent   PT Individual Total Time Spent (Mins) 60   PT Charge Group   PT Electrical Stimulation Attended 1   PT Neuromuscular Re-Education / Balance 1   PT Therapeutic Activities 2     Intermittent seated EOM/EOB sitting balance, and re-orientation to midline during session.     Assessment    Pt performed 2.36 miles on  LE cycle, with off motor AROM x 6:43 min of 21:40 min. Pt presented with 0.7kCal/hr and 0.7W for power output, and 4%R asymmetry. Pt was transferred into a new tilt in space to support sitting posture, and allow for self propulsion. Further training to utilize haylie technique is needed. Pt continues to lean left, and demonstrate left neglect.     Strengths: (P) Alert and oriented, Effective communication skills, Independent prior level of function, Motivated for self care and independence, Pleasant and cooperative, Supportive family, Willingly participates in therapeutic activities  Barriers: (P) Hemiparesis, Home accessibility, Orthostatic hypotension, Impaired activity tolerance, Impaired balance, Spasticity, Limited mobility    Plan    Midline orientation EOM, sitting  balance, slide board transfer, bed mobility, w/c mobility in new tilt in space,  LE (progress to adjunct tx), ongoing assessment of wc set up.     Passport items to be completed:  Get in/out of bed safely, in/out of a vehicle, safely use mobility device, walk or wheel around home/community, navigate up and down stairs, show how to get up/down from the ground, ensure home is accessible, demonstrate HEP, complete caregiver training    Physical Therapy Problems     Problem: Mobility     Dates: Start: 04/30/21       Goal: STG-Within one week, patient will propel wheelchair household distances     Dates: Start: 04/30/21       Description: 1) Individualized goal:  25ft with haylie technique with SBA on even surfaces  2) Interventions:  PT Group Therapy, PT E Stim Attended, PT Orthotics Training, PT Gait Training, PT Self Care/Home Eval, PT Therapeutic Exercises, PT Neuro Re-Ed/Balance, PT Therapeutic Activity, and PT Evaluation                  Problem: Mobility Transfers     Dates: Start: 04/30/21       Goal: STG-Within one week, patient will perform bed mobility     Dates: Start: 04/30/21       Description: 1) Individualized goal:  sit <> supine with modA   2) Interventions:  PT Group Therapy, PT E Stim Attended, PT Orthotics Training, PT Gait Training, PT Self Care/Home Eval, PT Therapeutic Exercises, PT Neuro Re-Ed/Balance, PT Therapeutic Activity, and PT Evaluation            Goal: STG-Within one week, patient will transfer bed to chair     Dates: Start: 04/30/21       Description: 1) Individualized goal:  transfer with slide board and modA  2) Interventions:  PT Group Therapy, PT E Stim Attended, PT Orthotics Training, PT Gait Training, PT Self Care/Home Eval, PT Therapeutic Exercises, PT Neuro Re-Ed/Balance, PT Therapeutic Activity, and PT Evaluation                  Problem: PT-Long Term Goals     Dates: Start: 04/30/21       Goal: LTG-By discharge, patient will propel wheelchair     Dates: Start: 04/30/21        Description: 1) Individualized goal:  100ft with haylie technique and Anastacio on even/uneven surfaces  2) Interventions:  PT Group Therapy, PT E Stim Attended, PT Orthotics Training, PT Gait Training, PT Self Care/Home Eval, PT Therapeutic Exercises, PT Neuro Re-Ed/Balance, PT Therapeutic Activity, and PT Evaluation          Goal: LTG-By discharge, patient will transfer one surface to another     Dates: Start: 04/30/21       Description: 1) Individualized goal:  SQPT with Taz   2) Interventions:  PT Group Therapy, PT E Stim Attended, PT Orthotics Training, PT Gait Training, PT Self Care/Home Eval, PT Therapeutic Exercises, PT Neuro Re-Ed/Balance, PT Therapeutic Activity, and PT Evaluation            Goal: LTG-By discharge, patient will     Dates: Start: 04/30/21       Description: 1) Individualized goal: perform bed mobility (supine<>sit) with Taz   2) Interventions:  PT Group Therapy, PT E Stim Attended, PT Orthotics Training, PT Gait Training, PT Self Care/Home Eval, PT Therapeutic Exercises, PT Neuro Re-Ed/Balance, PT Therapeutic Activity, and PT Evaluation

## 2021-05-05 NOTE — CARE PLAN
Problem: Safety  Goal: Will remain free from falls  Outcome: PROGRESSING AS EXPECTED  Intervention: Implement fall precautions  Note: Safety precautions observed , needs anticipated and attended. Safety measures enforced, right brain flap missing , incision healed. Denies headache                              No results found for: POCINR free from fal and injury.     Problem: Infection  Goal: Will remain free from infection  Outcome: PROGRESSING AS EXPECTED  Intervention: Assess signs and symptoms of infection  Note: Febrile, vital signs stable, no s/s of infection noted.     Problem: Pain Management  Goal: Pain level will decrease to patient's comfort goal  Outcome: PROGRESSING AS EXPECTED     Problem: Urinary Elimination:  Goal: Ability to reestablish a normal urinary elimination pattern will improve  Outcome: PROGRESSING AS EXPECTED  Intervention: Assess and monitor for signs and symptoms of urinary retention  Note: Pvr monitored, residuals  high, icp done [ see I and O } . Cont monitored.

## 2021-05-05 NOTE — DISCHARGE PLANNING
Case Management/IDT follow up.   Met with pt's spouse providing update from IDT.   Projected dc date set for 5/21  Anticipate home eval prior    Spouse confirms a ramp will be installed.   Anticipate  home health for PT/OT/SLP  Discussed about the possibility of consideration for Rehab without [facility]  Family training.   DME to be determined.   Follow up with:  PCP, Out PT PM[facility], [facility], and Dr Arthur Payton Neurosurgeon.     Plan:  Continue to follow    Addendum:  Will also need DMV placard.

## 2021-05-05 NOTE — CARE PLAN
Problem: Swallowing STGs  Goal: STG-Within one week, patient will  Description: 1) Individualized goal:  complete MBSS with goals to be added as appropriate  2) Interventions:  SLP Swallowing Dysfunction Treatment, SLP Oral Pharyngeal Evaluation, and SLP Video Swallow Evaluation      Outcome: NOT MET  Note: MBSS not completed      Problem: Swallowing STGs  Goal: STG-Within one week, patient will  Description: 1) Individualized goal:  trial 0-Thin liquids in isolation with no overt s/sx of aspiration noted   2) Interventions:  SLP Swallowing Dysfunction Treatment, SLP Oral Pharyngeal Evaluation, SLP Video Swallow Evaluation, SLP Self Care / ADL Training , and SLP Group Treatment      Outcome: MET  Note: Pt tolerating 0-Thin liquids with no overt s/sx of aspiration noted.   Goal: STG-Within one week, patient will safely swallow  Description: 1) Individualized goal:  trials of 7- Regular Textures with no overt s/sx of aspiration noted   2) Interventions:  SLP Swallowing Dysfunction Treatment, SLP Oral Pharyngeal Evaluation, SLP Video Swallow Evaluation, SLP Self Care / ADL Training , and SLP Group Treatment      Outcome: MET  Note: Pt tolerating 7- Regular Textures with no overt s/sx of aspiration noted.      Problem: Problem Solving STGs  Goal: STG-Within one week, patient will  Description: 1) Individualized goal:  complete SCCAN with goals to be added as appropriate  2) Interventions:  SLP Speech Language Treatment, SLP Self Care / ADL Training , SLP Cognitive Skill Development, and SLP Group Treatment  Outcome: MET  Note: SCCAN completed 5/2/21

## 2021-05-06 PROCEDURE — 97535 SELF CARE MNGMENT TRAINING: CPT

## 2021-05-06 PROCEDURE — 97530 THERAPEUTIC ACTIVITIES: CPT

## 2021-05-06 PROCEDURE — 97129 THER IVNTJ 1ST 15 MIN: CPT

## 2021-05-06 PROCEDURE — 700102 HCHG RX REV CODE 250 W/ 637 OVERRIDE(OP): Performed by: PHYSICAL MEDICINE & REHABILITATION

## 2021-05-06 PROCEDURE — 99231 SBSQ HOSP IP/OBS SF/LOW 25: CPT | Performed by: HOSPITALIST

## 2021-05-06 PROCEDURE — 99233 SBSQ HOSP IP/OBS HIGH 50: CPT | Performed by: PHYSICAL MEDICINE & REHABILITATION

## 2021-05-06 PROCEDURE — A9270 NON-COVERED ITEM OR SERVICE: HCPCS | Performed by: PHYSICAL MEDICINE & REHABILITATION

## 2021-05-06 PROCEDURE — 770010 HCHG ROOM/CARE - REHAB SEMI PRIVAT*

## 2021-05-06 PROCEDURE — 92526 ORAL FUNCTION THERAPY: CPT

## 2021-05-06 PROCEDURE — 97130 THER IVNTJ EA ADDL 15 MIN: CPT

## 2021-05-06 RX ORDER — LANOLIN ALCOHOL/MO/W.PET/CERES
3 CREAM (GRAM) TOPICAL
Status: DISCONTINUED | OUTPATIENT
Start: 2021-05-06 | End: 2021-06-04 | Stop reason: HOSPADM

## 2021-05-06 RX ADMIN — ATORVASTATIN CALCIUM 80 MG: 40 TABLET, FILM COATED ORAL at 20:18

## 2021-05-06 RX ADMIN — BACLOFEN 30 MG: 20 TABLET ORAL at 15:18

## 2021-05-06 RX ADMIN — THYROID, PORCINE 60 MG: 30 TABLET ORAL at 20:16

## 2021-05-06 RX ADMIN — THYROID, PORCINE 60 MG: 30 TABLET ORAL at 08:15

## 2021-05-06 RX ADMIN — BACLOFEN 30 MG: 20 TABLET ORAL at 20:17

## 2021-05-06 RX ADMIN — TAMSULOSIN HYDROCHLORIDE 0.8 MG: 0.4 CAPSULE ORAL at 20:17

## 2021-05-06 RX ADMIN — MELATONIN TAB 3 MG 3 MG: 3 TAB at 20:18

## 2021-05-06 RX ADMIN — ASPIRIN 81 MG CHEWABLE TABLET 81 MG: 81 TABLET CHEWABLE at 17:46

## 2021-05-06 RX ADMIN — METOPROLOL TARTRATE 12.5 MG: 25 TABLET, FILM COATED ORAL at 20:19

## 2021-05-06 RX ADMIN — METOPROLOL TARTRATE 12.5 MG: 25 TABLET, FILM COATED ORAL at 08:15

## 2021-05-06 RX ADMIN — MECLIZINE HYDROCHLORIDE 25 MG: 25 TABLET ORAL at 08:15

## 2021-05-06 RX ADMIN — BACLOFEN 30 MG: 20 TABLET ORAL at 08:15

## 2021-05-06 RX ADMIN — MIRTAZAPINE 15 MG: 15 TABLET, ORALLY DISINTEGRATING ORAL at 20:18

## 2021-05-06 RX ADMIN — RIVAROXABAN 20 MG: 20 TABLET, FILM COATED ORAL at 17:46

## 2021-05-06 RX ADMIN — MECLIZINE HYDROCHLORIDE 25 MG: 25 TABLET ORAL at 15:18

## 2021-05-06 RX ADMIN — ACETAMINOPHEN 650 MG: 325 TABLET, FILM COATED ORAL at 20:16

## 2021-05-06 RX ADMIN — MECLIZINE HYDROCHLORIDE 25 MG: 25 TABLET ORAL at 20:18

## 2021-05-06 ASSESSMENT — ENCOUNTER SYMPTOMS
FEVER: 0
COUGH: 0
DIZZINESS: 0
DIARRHEA: 0
BLURRED VISION: 0
NERVOUS/ANXIOUS: 0

## 2021-05-06 NOTE — THERAPY
Occupational Therapy  Daily Treatment     Patient Name: Thong Arzola  Age:  56 y.o., Sex:  male  Medical Record #: [id number]  Today's Date: 5/6/2021     Precautions  Precautions: (P) Fall Risk, Swallow Precautions ( See Comments)  Comments: (P) Helmet on OOB; L UE w/ 2-3 Modified Jeremiah Scale; orthostasis         Subjective    Patient agreeable to shower.     Objective       05/06/21 0831   Precautions   Precautions Fall Risk;Swallow Precautions ( See Comments)   Comments Helmet on OOB; L UE w/ 2-3 Modified Jeremiah Scale; orthostasis   Cognition    Level of Consciousness Alert   Functional Level of Assist   Grooming Moderate Assist   Grooming Description Increased time;Seated in wheelchair at sink;Set-up of equipment;Supervision for safety;Verbal cueing  (assist to wash left hand, brush teeth s/u, min A comb hair)   Bathing Moderate Assist   Bathing Description Hand held shower;Assit wtih lower extremities;Assit with back;Increased time;Assit with perineal;Set-up of equipment;Set up for shower sleeve;Supervision for safety;Verbal cueing  (on tilt in space rolling shower chair)   Upper Body Dressing Moderate Assist   Upper Body Dressing Description Set-up of equipment;Supervision for safety;Verbal cueing   Lower Body Dressing Total Assist   Lower Body Dressing Description Set-up of equipment;Supervision for safety;Verbal cueing  (two person assist)   Toileting Total Assist x 2   Toileting Description Set-up of equipment;Supervision for safety;Verbal cueing;Assist for hygiene;Assist to pull pants up;Assist to pull pants down;Assist for standing balance   Toilet Transfers Maximal Assist   Toilet Transfer Description Set-up of equipment;Supervision for safety;Verbal cueing;Requires lift;Adaptive equipment  (lift/lower assist on/off rolling commode chair)   Tub / Shower Transfers Total Assist  (two people for safety, slideboard, rolling tilt in space)   Interdisciplinary Plan of Care Collaboration   IDT  Collaboration with  Therapy Tech   Patient Position at End of Therapy Seated;Chair Alarm On;Self Releasing Lap Belt Applied;Call Light within Reach;Tray Table within Reach;Phone within Reach  (in tilted position)   Collaboration Comments assisted with session   OT Total Time Spent   OT Individual Total Time Spent (Mins) 60   OT Charge Group   OT Self Care / ADL 4       Assessment    Patient required mod A to total assist (with two helpers) to complete adl routine.  Continues with poor sitting balance/trunk control and requires very close supervision to avoid falling out of shower chair.  Strengths: Able to follow instructions, Alert and oriented, Effective communication skills, Good insight into deficits/needs, Independent prior level of function, Manages pain appropriately, Motivated for self care and independence, Pleasant and cooperative, Supportive family, Willingly participates in therapeutic activities  Barriers: Bowel incontinence, Decreased endurance, Fatigue, Generalized weakness, Hemiplegia, Home accessibility, Orthostatic hypotension, Impaired activity tolerance, Impaired balance, Limited mobility, Spasticity    Plan    Sitting balance/core strengthening/midline orientation, ADL retraining, slideboard transfers progressing to lateral scoots or squat pivots to the right, L UE neuro re-ed/stretching, family training with spouse Anel; Bathing is not a goal of OT at this time (CNAs should be bathing patient)    Occupational Therapy Goals     Problem: Dressing     Dates: Start: 04/30/21       Goal: STG-Within one week, patient will dress UB     Dates: Start: 04/30/21       Description: 1) Individualized Goal:  with mod A with verbal cues for haylie-technique.  2) Interventions:  OT E Stim Attended, OT Self Care/ADL, OT Cognitive Skill Dev, OT Manual Ther Technique, OT Neuro Re-Ed/Balance, OT Sensory Int Techniques, OT Therapeutic Activity, OT Evaluation, and OT Therapeutic Exercise    Note:     Goal Note filed  on 05/04/21 1459 by Aj Lovett, OT/L    Max A                  Goal: STG-Within one week, patient will dress LB     Dates: Start: 04/30/21       Description: 1) Individualized Goal:  with max A using AE/AD/techniques as needed  2) Interventions:  OT E Stim Attended, OT Self Care/ADL, OT Cognitive Skill Dev, OT Manual Ther Technique, OT Neuro Re-Ed/Balance, OT Sensory Int Techniques, OT Therapeutic Activity, OT Evaluation, and OT Therapeutic Exercise    Note:     Goal Note filed on 05/04/21 1459 by Aj Lovett, OT/L    Total A                        Problem: Grooming     Dates: Start: 04/30/21       Goal: STG-Within one week, patient will complete grooming     Dates: Start: 04/30/21       Description: 1) Individualized Goal:  with min A using AE/AD/techniques as needed  2) Interventions:  OT E Stim Attended, OT Self Care/ADL, OT Cognitive Skill Dev, OT Manual Ther Technique, OT Neuro Re-Ed/Balance, OT Sensory Int Techniques, OT Therapeutic Activity, OT Evaluation, and OT Therapeutic Exercise      Note:     Goal Note filed on 05/04/21 1459 by Aj Lovett, OT/L    Mod A                        Problem: OT Long Term Goals     Dates: Start: 04/30/21       Goal: LTG-By discharge, patient will complete basic self care tasks     Dates: Start: 04/30/21       Description: 1) Individualized Goal:  with min to mod A using AE/AD/techniques as needed  2) Interventions:  OT E Stim Attended, OT Self Care/ADL, OT Cognitive Skill Dev, OT Manual Ther Technique, OT Neuro Re-Ed/Balance, OT Sensory Int Techniques, OT Therapeutic Activity, OT Evaluation, and OT Therapeutic Exercise          Goal: LTG-By discharge, patient will perform bathroom transfers     Dates: Start: 04/30/21       Description: 1) Individualized Goal:  with max A x 1 person using slideboard versus squat pivot to the right  2) Interventions:  OT E Stim Attended, OT Self Care/ADL, OT Cognitive Skill Dev, OT Manual Ther Technique, OT Neuro  Re-Ed/Balance, OT Sensory Int Techniques, OT Therapeutic Activity, OT Evaluation, and OT Therapeutic Exercise

## 2021-05-06 NOTE — CARE PLAN
Problem: Safety  Goal: Will remain free from falls  Outcome: PROGRESSING AS EXPECTED  Intervention: Implement fall precautions  Flowsheets (Taken 5/6/2021 0449)  Bed Alarm: Yes - Alarm On  Note: Safety precautions observed , needs anticipated , pt turned and repositioned to sides. Pt free from fall and injury.     Problem: Infection  Goal: Will remain free from infection  Outcome: PROGRESSING AS EXPECTED  Intervention: Assess signs and symptoms of infection  Note: Afebrile, vital signs stable , no s/s of infection noted.     Problem: Bowel/Gastric:  Goal: Normal bowel function is maintained or improved  Outcome: PROGRESSING AS EXPECTED  Note: Pt incontinent of stool , had bm to a soft stool, cleansed , kept dry and clean.

## 2021-05-06 NOTE — THERAPY
"Speech Language Pathology  Daily Treatment     Patient Name: Thong Arzola  Age:  56 y.o., Sex:  male  Medical Record #: [id number]  Today's Date: 5/6/2021     Precautions  Precautions: Fall Risk, Swallow Precautions ( See Comments)  Comments: Helmet on OOB; L UE w/ 2-3 Modified Jeremiah Scale; orthostasis    Subjective    Pt seen at two sessions, 1000 and 1130, to target attention and dysphagia management      Objective       05/06/21 1001   SLP Total Time Spent   SLP Individual Total Time Spent (Mins) 60   Treatment Charges   SLP Swallowing Dysfunction Treatment Swallowing Dysfunction Treatment   SLP Cognitive Skill Development First 15 Minutes 1   SLP Cognitive Skill Development Additional 15 Minutes 1       Assessment    1000: Pt indep recalled attention strategies for scanning to the left discussed two days ago in ST (marginal guide, scanning left to right, placing hand at edge of paper). Pt completed four visual scanning activities (matching word on the left to corresponding picture on the right) to target attention and scanning to the left. Pt required mod A (marginal guide, continue to bottom of paper, attention to line number) to achieve 100% accuracy and verbal cues to continue the activity until all words have been matched with a picture (pt thought he was done with the activity, requiring mod cues to look to the bottom left of the paper to complete tasks).     1130: Pt assessed with current diet (7- Regular Textures and 0-Thin liquids) with no overt s/sx of aspiration noted. Pt required mod cues to attend to the left side of his plate/tray to locate items and finish the food on his plate (ate right side of plate). When asked to locate items on his tray, pt reported \"I don't see it\" requiring mod cues during meal to turn his head to view the whole plate/tray. Pt met all swallow goals, no longer following for dysphagia management. Recommend continue in T-Dine for spv and reinforcement of safe swallow " strategies.     Strengths: Able to follow instructions, Pleasant and cooperative, Supportive family, Willingly participates in therapeutic activities, Motivated for self care and independence  Barriers: Impaired activity tolerance, Impaired functional cognition, Impaired insight/denial of deficits, Visual impairment(Left neglect)    Plan    Continue to target attention and visual scanning to the left     Passport items to be completed:  solve problems related to safety situations, review education related to hospitalization, complete caregiver training     Speech Therapy Problems     Problem: Problem Solving STGs     Dates: Start: 05/05/21       Goal: STG-Within one week, patient will     Dates: Start: 05/05/21       Description: 1) Individualized goal: demonstrate appropriate visual scanning to the left with min cues and 80% accuracy.  2) Interventions:  SLP Speech Language Treatment, SLP Self Care / ADL Training , SLP Cognitive Skill Development, and SLP Group Treatment              Goal: STG-Within one week, patient will     Dates: Start: 05/05/21       Description: 1) Individualized goal:  complete simple attention tasks with min A with 80% accuracy.   2) Interventions:  SLP Speech Language Treatment, SLP Self Care / ADL Training , SLP Cognitive Skill Development, and SLP Group Treatment                    Problem: Speech/Swallowing LTGs     Dates: Start: 04/30/21       Goal: LTG-By discharge, patient will solve complex problem     Dates: Start: 04/30/21       Description: 1) Individualized goal:  related to IADL's with mod I for safe d/c home.  2) Interventions:  SLP Speech Language Treatment, SLP Swallowing Dysfunction Treatment, SLP Oral Pharyngeal Evaluation, SLP Video Swallow Evaluation, SLP Self Care / ADL Training , SLP Cognitive Skill Development, and SLP Group Treatment              Goal: LTG-By discharge, patient will     Dates: Start: 04/30/21       Description: 1) Individualized goal:  tolerate 7-  Regular Textures and 0-Thin liquids with no overt s/sx of aspiration noted   2) Interventions:  SLP Speech Language Treatment, SLP Swallowing Dysfunction Treatment, SLP Oral Pharyngeal Evaluation, SLP Video Swallow Evaluation, and SLP Group Treatment                        Problem: Swallowing STGs     Dates: Start: 05/05/21       Goal: STG-Within one week, patient will     Dates: Start: 05/05/21       Description: 1) Individualized goal: tolerate 7- Regular Textures and 0-Thin liquids during meal times with no overt s/sx of aspiration noted for 1/1 meals   2) Interventions:  SLP Swallowing Dysfunction Treatment, SLP Oral Pharyngeal Evaluation, SLP Self Care / ADL Training , and SLP Group Treatment

## 2021-05-06 NOTE — THERAPY
"Physical Therapy   Daily Treatment     Patient Name: Thong Arzola  Age:  56 y.o., Sex:  male  Medical Record #: [id number]  Today's Date: 5/6/2021     Precautions  Precautions: Fall Risk, Swallow Precautions ( See Comments)  Comments: Helmet on OOB; L UE w/ 2-3 Modified Jeremiah Scale; orthostasis    Subjective    Pt was supine in bed upon arrival and agreeable to treatment.  Pt's spouse present during session.  Pt agreeable to vestibular evaluation.     Objective       05/06/21 1401   Precautions   Precautions Fall Risk;Swallow Precautions ( See Comments)   Interdisciplinary Plan of Care Collaboration   Patient Position at End of Therapy In Bed;Call Light within Reach;Tray Table within Reach;Phone within Reach;Family / Friend in Room   PT Total Time Spent   PT Individual Total Time Spent (Mins) 60   PT Charge Group   PT Therapeutic Activities 4     VESTIBULAR EVALUTION    Subjective:     Onset of dizziness: Since onset of CVA, but pt has had very few episodes of room spinning dizziness [facility]  Description of dizziness: lightheadedness related to low BP, in addition to prior room spinning dizziness episodes.  Pt currently with report of feeling \"wonky\" during objective evaluation, clarified as a visual lag type of dizziness.  Duration: Prior episodes described as \"constant\" lasting for 10 min or more.  What makes dizziness worse: From prior episodes: supine </> sit, rolling B directions  What makes dizziness better: Lying flat and not moving  Fall history: N/A  Double vision: Negative  Visual changes: Negative  Ear fullness: Negative  Tinnitus: Negative    Oculomotor Exam:     Spontaneous nystagmus: Negative  Gaze holding nystagmus: Negative, but subjective report of dizziness with L side  Smooth pursuit: Negative  Saccades: Negative, but subjective report of dizziness with R side  Convergence: Positive for poor convergence  VOR: Negative, but subjective report of dizziness  VOR " Cancellation: Negative, but subjective report of dizziness  Head Impulse Test: Negative bilaterally, but subjective report of dizziness and mild nausea with L sided testing    Positional Testing: deferred    Assessment    Pt is currently without episodes of vertigo, but with intermittent subjective mild dizziness with objective testing.  Pt's prior episodes of vertigo possibly related to central cause.  Pt and pt's spouse educated on vestibular and brain anatomy, POC, and outpatient resources if residual dizziness persists after D/C.  Strengths: Alert and oriented, Effective communication skills, Independent prior level of function, Motivated for self care and independence, Pleasant and cooperative, Supportive family, Willingly participates in therapeutic activities  Barriers: Hemiparesis, Home accessibility, Orthostatic hypotension, Impaired activity tolerance, Impaired balance, Spasticity, Limited mobility    Plan    Midline orientation EOM, sitting balance, slide board vs. Gaurav board transfer to progress to 1 person assist consistently, bed mobility, w/c mobility,  LE (progress to adjunct after 2nd trial), family training with spouse as appropriate. Reassess wheelchair positioning; pt may need a pommel to support sitting posture.      Passport items to be completed:  Get in/out of bed safely, in/out of a vehicle, safely use mobility device, walk or wheel around home/community, navigate up and down stairs, show how to get up/down from the ground, ensure home is accessible, demonstrate HEP, complete caregiver training       Physical Therapy Problems     Problem: Mobility     Dates: Start: 04/30/21       Goal: STG-Within one week, patient will propel wheelchair household distances     Dates: Start: 04/30/21       Description: 1) Individualized goal:  25ft with haylie technique with SBA on even surfaces  2) Interventions:  PT Group Therapy, PT E Stim Attended, PT Orthotics Training, PT Gait Training, PT Self  Care/Home Eval, PT Therapeutic Exercises, PT Neuro Re-Ed/Balance, PT Therapeutic Activity, and PT Evaluation      Note:     Goal Note filed on 05/05/21 1131 by Ivory Cintron, LAVONT    Variable performance, min-max A to steer   Fatigues easily, L neglect                        Problem: Mobility Transfers     Dates: Start: 04/30/21       Goal: STG-Within one week, patient will perform bed mobility     Dates: Start: 04/30/21       Description: 1) Individualized goal:  sit <> supine with modA   2) Interventions:  PT Group Therapy, PT E Stim Attended, PT Orthotics Training, PT Gait Training, PT Self Care/Home Eval, PT Therapeutic Exercises, PT Neuro Re-Ed/Balance, PT Therapeutic Activity, and PT Evaluation      Note:     Goal Note filed on 05/05/21 1131 by Ivory Cintron, DPT    Variable performance, mod A - 2 person  Fatigues easily                   Goal: STG-Within one week, patient will transfer bed to chair     Dates: Start: 04/30/21       Description: 1) Individualized goal:  transfer with slide board and modA  2) Interventions:  PT Group Therapy, PT E Stim Attended, PT Orthotics Training, PT Gait Training, PT Self Care/Home Eval, PT Therapeutic Exercises, PT Neuro Re-Ed/Balance, PT Therapeutic Activity, and PT Evaluation      Note:     Goal Note filed on 05/05/21 1131 by Ivory Cintron, DPCHANDANA    Variable performance - 1-2 person assist with slide board  Fatigues easily, haylie, neglect                        Problem: PT-Long Term Goals     Dates: Start: 04/30/21       Goal: LTG-By discharge, patient will propel wheelchair     Dates: Start: 04/30/21       Description: 1) Individualized goal:  100ft with haylie technique and Anastacio on even/uneven surfaces  2) Interventions:  PT Group Therapy, PT E Stim Attended, PT Orthotics Training, PT Gait Training, PT Self Care/Home Eval, PT Therapeutic Exercises, PT Neuro Re-Ed/Balance, PT Therapeutic Activity, and PT Evaluation          Goal: LTG-By discharge, patient will transfer one  surface to another     Dates: Start: 04/30/21       Description: 1) Individualized goal:  SQPT with Taz   2) Interventions:  PT Group Therapy, PT E Stim Attended, PT Orthotics Training, PT Gait Training, PT Self Care/Home Eval, PT Therapeutic Exercises, PT Neuro Re-Ed/Balance, PT Therapeutic Activity, and PT Evaluation            Goal: LTG-By discharge, patient will     Dates: Start: 04/30/21       Description: 1) Individualized goal: perform bed mobility (supine<>sit) with Taz   2) Interventions:  PT Group Therapy, PT E Stim Attended, PT Orthotics Training, PT Gait Training, PT Self Care/Home Eval, PT Therapeutic Exercises, PT Neuro Re-Ed/Balance, PT Therapeutic Activity, and PT Evaluation

## 2021-05-06 NOTE — PROGRESS NOTES
Hospital Medicine Daily Progress Note      Chief Complaint:  Hypertension  Afib    Interval History:  No significant events or changes since last visit    Review of Systems  Review of Systems   Constitutional: Negative for fever.   Eyes: Negative for blurred vision.   Respiratory: Negative for cough.    Cardiovascular: Negative for chest pain.   Gastrointestinal: Negative for diarrhea.   Musculoskeletal: Negative for joint pain.   Neurological: Negative for dizziness.   Psychiatric/Behavioral: The patient is not nervous/anxious.         Physical Exam  Temp:  [36.4 °C (97.6 °F)-37 °C (98.6 °F)] 36.4 °C (97.6 °F)  Pulse:  [86-98] 86  Resp:  [16-18] 16  BP: (108-137)/(65-84) 109/71  SpO2:  [94 %-96 %] 94 %    Physical Exam  Vitals and nursing note reviewed.   Constitutional:       Appearance: He is not diaphoretic.   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     Mouth/Throat:      Pharynx: No oropharyngeal exudate or posterior oropharyngeal erythema.   Eyes:      Extraocular Movements: Extraocular movements intact.   Neck:      Vascular: No carotid bruit.   Cardiovascular:      Rate and Rhythm: Normal rate and regular rhythm.      Heart sounds: No murmur.   Pulmonary:      Effort: Pulmonary effort is normal.      Breath sounds: Normal breath sounds. No stridor.   Abdominal:      General: Bowel sounds are normal.      Palpations: Abdomen is soft.   Musculoskeletal:      Right lower leg: No edema.      Left lower leg: No edema.   Skin:     General: Skin is warm and dry.      Findings: No rash.   Neurological:      Mental Status: He is alert and oriented to person, place, and time.   Psychiatric:         Mood and Affect: Mood normal.         Behavior: Behavior normal.         Fluids    Intake/Output Summary (Last 24 hours) at 5/6/2021 0913  Last data filed at 5/6/2021 0800  Gross per 24 hour   Intake 960 ml   Output 1900 ml   Net -940 ml       Laboratory                       Assessment/Plan  * Acute right MCA stroke (HCC)- (present on admission)  Assessment & Plan  S/P decompressive craniectomy for increased ICP (4/3)    Paroxysmal atrial fibrillation (HCC)- (present on admission)  Assessment & Plan  HR okrecently  Echo: EF > 75%  On Lopressor  On Xarelto  Cont to monitor    Dizziness  Assessment & Plan  On Antivert    Arm swelling  Assessment & Plan  Nearly resolved  U/S LUE negative DVT  Likely 2nd to stroke and imobility    Dyslipidemia  Assessment & Plan  On Lipitor    Reactive depression- (present on admission)  Assessment & Plan  On Remeron    History of COVID-19- (present on admission)  Assessment & Plan  Had Covid 3/18/21    Acquired hypothyroidism- (present on admission)  Assessment & Plan  On [place] Thyroid

## 2021-05-07 PROCEDURE — 99231 SBSQ HOSP IP/OBS SF/LOW 25: CPT | Performed by: HOSPITALIST

## 2021-05-07 PROCEDURE — 97112 NEUROMUSCULAR REEDUCATION: CPT

## 2021-05-07 PROCEDURE — 700102 HCHG RX REV CODE 250 W/ 637 OVERRIDE(OP): Performed by: PHYSICAL MEDICINE & REHABILITATION

## 2021-05-07 PROCEDURE — 770010 HCHG ROOM/CARE - REHAB SEMI PRIVAT*

## 2021-05-07 PROCEDURE — 97530 THERAPEUTIC ACTIVITIES: CPT

## 2021-05-07 PROCEDURE — A9270 NON-COVERED ITEM OR SERVICE: HCPCS | Performed by: PHYSICAL MEDICINE & REHABILITATION

## 2021-05-07 PROCEDURE — 97130 THER IVNTJ EA ADDL 15 MIN: CPT

## 2021-05-07 PROCEDURE — 99232 SBSQ HOSP IP/OBS MODERATE 35: CPT | Performed by: PHYSICAL MEDICINE & REHABILITATION

## 2021-05-07 PROCEDURE — 97129 THER IVNTJ 1ST 15 MIN: CPT

## 2021-05-07 RX ADMIN — MECLIZINE HYDROCHLORIDE 25 MG: 25 TABLET ORAL at 20:57

## 2021-05-07 RX ADMIN — BACLOFEN 30 MG: 20 TABLET ORAL at 20:56

## 2021-05-07 RX ADMIN — MECLIZINE HYDROCHLORIDE 25 MG: 25 TABLET ORAL at 07:42

## 2021-05-07 RX ADMIN — METOPROLOL TARTRATE 12.5 MG: 25 TABLET, FILM COATED ORAL at 07:42

## 2021-05-07 RX ADMIN — MIRTAZAPINE 15 MG: 15 TABLET, ORALLY DISINTEGRATING ORAL at 20:55

## 2021-05-07 RX ADMIN — BACLOFEN 30 MG: 20 TABLET ORAL at 07:41

## 2021-05-07 RX ADMIN — MECLIZINE HYDROCHLORIDE 25 MG: 25 TABLET ORAL at 16:47

## 2021-05-07 RX ADMIN — ATORVASTATIN CALCIUM 80 MG: 40 TABLET, FILM COATED ORAL at 20:54

## 2021-05-07 RX ADMIN — MELATONIN TAB 3 MG 3 MG: 3 TAB at 20:57

## 2021-05-07 RX ADMIN — THYROID, PORCINE 60 MG: 30 TABLET ORAL at 07:41

## 2021-05-07 RX ADMIN — METOPROLOL TARTRATE 12.5 MG: 25 TABLET, FILM COATED ORAL at 20:55

## 2021-05-07 RX ADMIN — ASPIRIN 81 MG CHEWABLE TABLET 81 MG: 81 TABLET CHEWABLE at 17:18

## 2021-05-07 RX ADMIN — BACLOFEN 30 MG: 20 TABLET ORAL at 16:47

## 2021-05-07 RX ADMIN — THYROID, PORCINE 60 MG: 30 TABLET ORAL at 20:54

## 2021-05-07 RX ADMIN — TAMSULOSIN HYDROCHLORIDE 0.8 MG: 0.4 CAPSULE ORAL at 20:55

## 2021-05-07 RX ADMIN — RIVAROXABAN 20 MG: 20 TABLET, FILM COATED ORAL at 17:18

## 2021-05-07 ASSESSMENT — ENCOUNTER SYMPTOMS
ABDOMINAL PAIN: 0
NAUSEA: 0
NERVOUS/ANXIOUS: 0
CHILLS: 0
FEVER: 0
VOMITING: 0
DIARRHEA: 0
SHORTNESS OF BREATH: 0

## 2021-05-07 NOTE — THERAPY
Occupational Therapy  Daily Treatment     Patient Name: Thong Arzola  Age:  56 y.o., Sex:  male  Medical Record #: [id number]  Today's Date: 5/7/2021     Precautions  Precautions: (P) Fall Risk, Swallow Precautions ( See Comments)  Comments: (P) Helmet on OOB; L UE w/ 2-3 Modified Jeremiah Scale         Subjective    Patient agreeable to continue to work on sitting balance and midline posture.     Objective       05/07/21 0831   Precautions   Precautions Fall Risk;Swallow Precautions ( See Comments)   Comments Helmet on OOB; L UE w/ 2-3 Modified Jeremiah Scale   Neuro-Muscular Treatments   Neuro-Muscular Treatments Anterior weight shift;Inhibition;Joint Approximation;Verbal Cuing;Weight Shift Left;Weight Shift Right   Comments Seated EOM with mirror anterior to patient for visual feedback, worked on unsupported sitting for core control and posture.  Able to sit 1-2 minutes unsupported in relative midline with verbal cues to correct at times.  1 x 10 posterior leans and right lateral leans for core strengthening.  Weightbearing through L UE with palm on mat table and elbow in extension while reaching across midline with R UE.     Bed Mobility    Supine to Sit Maximal Assist   Sit to Supine Maximal Assist   Interdisciplinary Plan of Care Collaboration   IDT Collaboration with  Family / Caregiver;Physician;Therapy Tech   Patient Position at End of Therapy Seated;Chair Alarm On;Self Releasing Lap Belt Applied  (hand off to SLP)   Collaboration Comments tech assisted with session; Dr Monge assessed patient; Anel (spouse) present for part of session   OT Total Time Spent   OT Individual Total Time Spent (Mins) 60   OT Charge Group   OT Neuromuscular Re-education / Balance 2   OT Therapy Activity 2     Stretching to left scapula in supine followed by left shoulder/elbow/forearm/wrist/fingers PROM/stretching.    Min A slideboard transfers to the right from w/c < > mat with verbal cues.    Discussed patient's sister in  law's home setup and requested pt's spouse take pictures of bathroom for OT to assess.  Also discussed home evaluation prior to d/c home.    Assessment    Patient's sitting balance and posture much improved today.  OT able to stretch and range L UE with increased ease today.    Strengths: Able to follow instructions, Alert and oriented, Effective communication skills, Good insight into deficits/needs, Independent prior level of function, Manages pain appropriately, Motivated for self care and independence, Pleasant and cooperative, Supportive family, Willingly participates in therapeutic activities  Barriers: Bowel incontinence, Decreased endurance, Fatigue, Generalized weakness, Hemiplegia, Home accessibility, Orthostatic hypotension, Impaired activity tolerance, Impaired balance, Limited mobility, Spasticity    Plan    Sitting balance/core strengthening/midline orientation, ADL retraining, slideboard transfers progressing to lateral scoots or squat pivots to the right, L UE neuro re-ed/stretching, family training with spouse Anel; Bathing is not a goal of OT at this time (CNAs should be bathing patient)      Occupational Therapy Goals     Problem: Dressing     Dates: Start: 04/30/21       Goal: STG-Within one week, patient will dress UB     Dates: Start: 04/30/21       Description: 1) Individualized Goal:  with mod A with verbal cues for haylie-technique.  2) Interventions:  OT E Stim Attended, OT Self Care/ADL, OT Cognitive Skill Dev, OT Manual Ther Technique, OT Neuro Re-Ed/Balance, OT Sensory Int Techniques, OT Therapeutic Activity, OT Evaluation, and OT Therapeutic Exercise    Note:     Goal Note filed on 05/04/21 1458 by Aj Lovett, OT/L    Max A                  Goal: STG-Within one week, patient will dress LB     Dates: Start: 04/30/21       Description: 1) Individualized Goal:  with max A using AE/AD/techniques as needed  2) Interventions:  OT E Stim Attended, OT Self Care/ADL, OT Cognitive Skill Dev,  OT Manual Ther Technique, OT Neuro Re-Ed/Balance, OT Sensory Int Techniques, OT Therapeutic Activity, OT Evaluation, and OT Therapeutic Exercise    Note:     Goal Note filed on 05/04/21 1459 by Aj Lovett, OT/L    Total A                        Problem: Grooming     Dates: Start: 04/30/21       Goal: STG-Within one week, patient will complete grooming     Dates: Start: 04/30/21       Description: 1) Individualized Goal:  with min A using AE/AD/techniques as needed  2) Interventions:  OT E Stim Attended, OT Self Care/ADL, OT Cognitive Skill Dev, OT Manual Ther Technique, OT Neuro Re-Ed/Balance, OT Sensory Int Techniques, OT Therapeutic Activity, OT Evaluation, and OT Therapeutic Exercise      Note:     Goal Note filed on 05/04/21 1459 by Aj Lovett OT/L    Mod A                        Problem: OT Long Term Goals     Dates: Start: 04/30/21       Goal: LTG-By discharge, patient will complete basic self care tasks     Dates: Start: 04/30/21       Description: 1) Individualized Goal:  with min to mod A using AE/AD/techniques as needed  2) Interventions:  OT E Stim Attended, OT Self Care/ADL, OT Cognitive Skill Dev, OT Manual Ther Technique, OT Neuro Re-Ed/Balance, OT Sensory Int Techniques, OT Therapeutic Activity, OT Evaluation, and OT Therapeutic Exercise          Goal: LTG-By discharge, patient will perform bathroom transfers     Dates: Start: 04/30/21       Description: 1) Individualized Goal:  with max A x 1 person using slideboard versus squat pivot to the right  2) Interventions:  OT E Stim Attended, OT Self Care/ADL, OT Cognitive Skill Dev, OT Manual Ther Technique, OT Neuro Re-Ed/Balance, OT Sensory Int Techniques, OT Therapeutic Activity, OT Evaluation, and OT Therapeutic Exercise

## 2021-05-07 NOTE — CARE PLAN
Problem: Bowel/Gastric:  Goal: Normal bowel function is maintained or improved  Outcome: PROGRESSING SLOWER THAN EXPECTED  Note: Pt incontinent of bowel this shift. Cleansed and kept dry throughout the night. Will continue to monitor.     Problem: Urinary Elimination:  Goal: Ability to reestablish a normal urinary elimination pattern will improve  Outcome: PROGRESSING SLOWER THAN EXPECTED  Note: Pt with dye catheter to gravity draining adequate amount of clear annalisa urine. Dye care provided by staff. Will continue to monitor.

## 2021-05-07 NOTE — PROGRESS NOTES
Hospital Medicine Daily Progress Note      Chief Complaint:  Hypertension  Afib    Interval History:  No significant events or changes since last visit    Review of Systems  Review of Systems   Constitutional: Negative for chills and fever.   Respiratory: Negative for shortness of breath.    Cardiovascular: Negative for chest pain.   Gastrointestinal: Negative for abdominal pain, diarrhea, nausea and vomiting.   Psychiatric/Behavioral: The patient is not nervous/anxious.         Physical Exam  Temp:  [36.6 °C (97.8 °F)-37 °C (98.6 °F)] 37 °C (98.6 °F)  Pulse:  [62-77] 62  Resp:  [16-18] 16  BP: (107-127)/(70-82) 127/77  SpO2:  [95 %-97 %] 97 %    Physical Exam  Vitals and nursing note reviewed.   Constitutional:       Appearance: Normal appearance.   HENT:      Head: Atraumatic.      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  Eyes:      Conjunctiva/sclera: Conjunctivae normal.      Pupils: Pupils are equal, round, and reactive to light.   Cardiovascular:      Rate and Rhythm: Normal rate and regular rhythm.   Pulmonary:      Effort: Pulmonary effort is normal.      Breath sounds: Normal breath sounds.   Abdominal:      General: Bowel sounds are normal.      Palpations: Abdomen is soft.   Musculoskeletal:      Cervical back: Normal range of motion and neck supple.      Right lower leg: No edema.      Left lower leg: No edema.   Skin:     General: Skin is warm and dry.   Neurological:      Mental Status: He is alert and oriented to person, place, and time.   Psychiatric:         Mood and Affect: Mood normal.         Behavior: Behavior normal.         Fluids    Intake/Output Summary (Last 24 hours) at 5/7/2021 0815  Last data filed at 5/7/2021 0525  Gross per 24 hour   Intake 240 ml   Output 2325 ml   Net -2085 ml       Laboratory                      Assessment/Plan  * Acute right MCA stroke (HCC)- (present on admission)  Assessment & Plan  S/P decompressive craniectomy for increased ICP  (4/3)    Paroxysmal atrial fibrillation (HCC)- (present on admission)  Assessment & Plan  HR okrecently  Echo: EF > 75%  On Lopressor  On Xarelto  Cont to monitor    Dizziness  Assessment & Plan  On Antivert    Arm swelling  Assessment & Plan  Nearly resolved  U/S LUE negative DVT  Likely 2nd to stroke and imobility    Dyslipidemia  Assessment & Plan  On Lipitor    Reactive depression- (present on admission)  Assessment & Plan  On Remeron    History of COVID-19- (present on admission)  Assessment & Plan  Had Covid 3/18/21    Acquired hypothyroidism- (present on admission)  Assessment & Plan  On [place] Thyroid

## 2021-05-07 NOTE — THERAPY
Physical Therapy   Daily Treatment     Patient Name: Thong Arzola  Age:  56 y.o., Sex:  male  Medical Record #: [id number]  Today's Date: 5/7/2021     Precautions  Precautions: Fall Risk, Swallow Precautions ( See Comments)  Comments: Helmet on OOB; L UE w/ 2-3 Modified Jeremiah Scale    Subjective    Pt was supine in bed upon arrival and agreeable to treatment.  Pt's spouse present during session.       Objective       05/07/21 1501   Precautions   Precautions Fall Risk;Swallow Precautions ( See Comments)   Transfer Functional Level of Assist   Bed, Chair, Wheelchair Transfer Moderate Assist  (Mod A to L side with SB; min A to R with SB and SQPT )   Bed Chair Wheelchair Transfer Description Adaptive equipment;Squat pivot transfer to wheelchair;Supervision for safety;Set-up of equipment   Bed Mobility    Supine to Sit Maximal Assist   Sit to Supine Maximal Assist   Sit to Stand Maximal Assist  (to mod A)   Scooting Moderate Assist   Rolling Moderate Assist to Lt.   Interdisciplinary Plan of Care Collaboration   Patient Position at End of Therapy In Bed;Call Light within Reach;Tray Table within Reach;Phone within Reach;Family / Friend in Room   PT Total Time Spent   PT Individual Total Time Spent (Mins) 60   PT Charge Group   PT Neuromuscular Re-Education / Balance 1   PT Therapeutic Activities 3     Vitals during session:  Seated: 113/73  Seated after standing trials: 126/90    Transfer training completed with use of jesus board (mod A to L, min A to R) with focus on sequencing and safety.  Pt able to progress to SQPT at end of session to R side with min A.  Completed sitting balance at EOM x 10 min using mirror for increased visual feedback.  Completed weight shifting anterior/posterior/laterally both directions x 10 reps with return to midline.  STS in // bars with focus on sequencing x 3 reps with standing tolerance of 10-40 sec with occasional mild dizziness reported.     Assessment    Pt with improving  midline orientation with min A to SBA needed for sitting EOM.  Pt able to progress to dynamic sitting balance activities.  Pt with ability to trial standing with mild dizziness reported but vitals WNL.  Pt continues to be limited secondary to L hemiparesis and sensory deficits.    Strengths: Alert and oriented, Effective communication skills, Independent prior level of function, Motivated for self care and independence, Pleasant and cooperative, Supportive family, Willingly participates in therapeutic activities  Barriers: Hemiparesis, Home accessibility, Orthostatic hypotension, Impaired activity tolerance, Impaired balance, Spasticity, Limited mobility    Plan    Midline orientation EOM, sitting balance, transfer training with SB progressing to SQPT as appropriate, bed mobility, w/c mobility,  LE (progress to adjunct after 2nd trial), family training with spouse as appropriate. Continue STS training and standing tolerance as able.  Begin pre-gait training as appropriate     Passport items to be completed:  Get in/out of bed safely, in/out of a vehicle, safely use mobility device, walk or wheel around home/community, navigate up and down stairs, show how to get up/down from the ground, ensure home is accessible, demonstrate HEP, complete caregiver training    Physical Therapy Problems     Problem: Mobility     Dates: Start: 04/30/21       Goal: STG-Within one week, patient will propel wheelchair household distances     Dates: Start: 04/30/21       Description: 1) Individualized goal:  25ft with haylie technique with SBA on even surfaces  2) Interventions:  PT Group Therapy, PT E Stim Attended, PT Orthotics Training, PT Gait Training, PT Self Care/Home Eval, PT Therapeutic Exercises, PT Neuro Re-Ed/Balance, PT Therapeutic Activity, and PT Evaluation      Note:     Goal Note filed on 05/05/21 1131 by LAVON DawkinsT    Variable performance, min-max A to steer   Fatigues easily, L neglect                         Problem: Mobility Transfers     Dates: Start: 04/30/21       Goal: STG-Within one week, patient will perform bed mobility     Dates: Start: 04/30/21       Description: 1) Individualized goal:  sit <> supine with modA   2) Interventions:  PT Group Therapy, PT E Stim Attended, PT Orthotics Training, PT Gait Training, PT Self Care/Home Eval, PT Therapeutic Exercises, PT Neuro Re-Ed/Balance, PT Therapeutic Activity, and PT Evaluation      Note:     Goal Note filed on 05/05/21 1131 by Ivory Cintron, DPT    Variable performance, mod A - 2 person  Fatigues easily                   Goal: STG-Within one week, patient will transfer bed to chair     Dates: Start: 04/30/21       Description: 1) Individualized goal:  transfer with slide board and modA  2) Interventions:  PT Group Therapy, PT E Stim Attended, PT Orthotics Training, PT Gait Training, PT Self Care/Home Eval, PT Therapeutic Exercises, PT Neuro Re-Ed/Balance, PT Therapeutic Activity, and PT Evaluation      Note:     Goal Note filed on 05/05/21 1131 by Ivory Cintron, DPT    Variable performance - 1-2 person assist with slide board  Fatigues easily, haylie, neglect                        Problem: PT-Long Term Goals     Dates: Start: 04/30/21       Goal: LTG-By discharge, patient will propel wheelchair     Dates: Start: 04/30/21       Description: 1) Individualized goal:  100ft with haylie technique and Anastacio on even/uneven surfaces  2) Interventions:  PT Group Therapy, PT E Stim Attended, PT Orthotics Training, PT Gait Training, PT Self Care/Home Eval, PT Therapeutic Exercises, PT Neuro Re-Ed/Balance, PT Therapeutic Activity, and PT Evaluation          Goal: LTG-By discharge, patient will transfer one surface to another     Dates: Start: 04/30/21       Description: 1) Individualized goal:  SQPT with Taz   2) Interventions:  PT Group Therapy, PT E Stim Attended, PT Orthotics Training, PT Gait Training, PT Self Care/Home Eval, PT Therapeutic Exercises, PT Neuro  Re-Ed/Balance, PT Therapeutic Activity, and PT Evaluation            Goal: LTG-By discharge, patient will     Dates: Start: 04/30/21       Description: 1) Individualized goal: perform bed mobility (supine<>sit) with Taz   2) Interventions:  PT Group Therapy, PT E Stim Attended, PT Orthotics Training, PT Gait Training, PT Self Care/Home Eval, PT Therapeutic Exercises, PT Neuro Re-Ed/Balance, PT Therapeutic Activity, and PT Evaluation

## 2021-05-07 NOTE — PROGRESS NOTES
"Rehab Progress Note     Date of Service: 5/6/2021  Chief Complaint: follow up stroke    Interval Events (Subjective)    Patient seen and examined today in his room.  He reports he did not sleep well last night because he had a headache.  Patient also seen later in the cafeteria eating his lunch and noted to have severe neglect and avoiding the food on the left side of his plate.    Physical therapy did a BPPV assessment which was negative for signs of inner ear issues causing his dizziness.    Called the patient's wife Anel in the evening to update her on his medical issues as well as his functional status.  All questions were answered.    ROS: No changes to bowel, bladder, pain, or mood.       Objective:  VITAL SIGNS: /70   Pulse 77   Temp 36.6 °C (97.9 °F) (Oral)   Resp 16   Ht 1.778 m (5' 10\")   Wt 67.2 kg (148 lb 2.4 oz)   SpO2 95%   BMI 21.26 kg/m²   Gen: alert, no apparent distress  Neuro: notable for left spastic hemiplegia, left neglect          Recent Results (from the past 72 hour(s))   HEPATIC FUNCTION PANEL    Collection Time: 05/05/21  6:21 AM   Result Value Ref Range    Alkaline Phosphatase 81 30 - 99 U/L    AST(SGOT) 36 12 - 45 U/L    ALT(SGPT) 73 (H) 2 - 50 U/L    Total Bilirubin 0.3 0.1 - 1.5 mg/dL    Direct Bilirubin <0.2 0.1 - 0.5 mg/dL    Indirect Bilirubin see below 0.0 - 1.0 mg/dL    Albumin 3.2 3.2 - 4.9 g/dL    Total Protein 6.0 6.0 - 8.2 g/dL       Current Facility-Administered Medications   Medication Frequency   • tamsulosin (FLOMAX) capsule 0.8 mg QHS   • baclofen (LIORESAL) tablet 30 mg TID   • meclizine (ANTIVERT) tablet 25 mg TID   • hydrOXYzine HCl (ATARAX) tablet 50 mg Q6HRS PRN   • melatonin tablet 3 mg HS PRN   • Respiratory Therapy Consult Continuous RT   • Pharmacy Consult Request ...Pain Management Review 1 Each PHARMACY TO DOSE   • hydrALAZINE (APRESOLINE) tablet 10 mg Q8HRS PRN   • acetaminophen (Tylenol) tablet 650 mg Q4HRS PRN   • lactulose 20 GM/30ML solution " 30 mL QDAY PRN   • docusate sodium (ENEMEEZ) enema 283 mg QDAY PRN   • fleet enema 133 mL QDAY PRN   • artificial tears ophthalmic solution 1 Drop PRN   • benzocaine-menthol (CEPACOL) lozenge 1 Lozenge Q2HRS PRN   • mag hydrox-al hydrox-simeth (MAALOX PLUS ES or MYLANTA DS) suspension 20 mL Q2HRS PRN   • ondansetron (ZOFRAN ODT) dispertab 4 mg 4X/DAY PRN    Or   • ondansetron (ZOFRAN) syringe/vial injection 4 mg 4X/DAY PRN   • traZODone (DESYREL) tablet 50 mg QHS PRN   • sodium chloride (OCEAN) 0.65 % nasal spray 2 Spray PRN   • midazolam (VERSED) 5 mg/mL (1 mL vial) PRN   • atorvastatin (LIPITOR) tablet 80 mg Q EVENING   • aspirin (ASA) chewable tab 81 mg DAILY   • metoprolol tartrate (LOPRESSOR) tablet 12.5 mg BID   • mirtazapine (Remeron) orally disintegrating tab 15 mg QHS   • rivaroxaban (XARELTO) tablet 20 mg PM MEAL   • thyroid (ARMOUR THYROID) tablet 60 mg BID       Orders Placed This Encounter   Procedures   • Diet Order Diet: Level 7 - Easy to Chew (float pills in puree); Liquid level: Level 0 - Thin; Tray Modifications (optional): SLP - 1:1 Supervision by Nursing, SLP - Deliver to Nursing Station     Standing Status:   Standing     Number of Occurrences:   1     Order Specific Question:   Diet:     Answer:   Level 7 - Easy to Chew [22]     Comments:   float pills in puree     Order Specific Question:   Liquid level     Answer:   Level 0 - Thin     Order Specific Question:   Tray Modifications (optional)     Answer:   SLP - 1:1 Supervision by Nursing     Order Specific Question:   Tray Modifications (optional)     Answer:   SLP - Deliver to Nursing Station       Assessment:  Active Hospital Problems    Diagnosis    • *Acute right MCA stroke (HCC)    • Paroxysmal atrial fibrillation (HCC)    • Urinary retention    • Acquired hypothyroidism    • History of COVID-19    • Hypotension    • Normocytic anemia    • Spasticity as late effect of cerebrovascular accident (CVA)    • Reactive depression      This  patient is a 56 y.o. male admitted for acute inpatient rehabilitation with Acute right MCA stroke (HCC).    I led and attended the weekly conference, and agree with the IDT conference documentation and plan of care as noted below.    Date of conference: 5/5/2021    Goals and barriers: See IDT note.    Biggest barriers: left spastic hemiparesis, urinary retention, bowel incontinence, fluctuation in function, impaired trunk control, left neglect, spasticity, low blood pressure    CM/social support: wife supportive, to go to 1  here in [place], wife's sister    Anticipated DC date: 5/21    Home health: PT/OT/SLP/RN    Equip: TBD    Follow up: PCP, Dr. Dumont, stroke bridge clinic, urology, neurosugery      Medical Decision Making and Plan:    Large right ICA/MCA ischemic stroke  S/p craniectomy 4/3, Dr. Payton  Continue full rehab program  PT/OT/SLP, 1 hr each discipline, 5 days per week    Xarelto  Statin    Outpatient follow up with stroke bridge clinic, Dr. Dumont, referrals made    Outpatient follow up with Dr. Payton for cranioplasty    Dizziness, improved  Could be due to hypotension  Scheduled Meclizine, will titrate off prior to discharge  Will try scopolamine if meclizine ineffective, has not needed  Continue Teds and abdominal binder  BPPV testing by PT negative    Spasticity, improved  Increased baclofen 15 mg TID to 20 mg TID 4/30 --> 30 mg TID 5/3  Started valium at night 2 mg 4/30, discontinued 5/3, doesn't seem to make much difference  Consider adding Zanaflex in future, LFTs on 5/5 with elevated ALT, recheck prior to initiation  May benefit from Botox in the future, continue to monitor     Atrial fibrillation  Metoprolol  Xarelto  Hospitalist consulted, appreciate assistance     Hypothyroidism   [place] thyroid     Hypertension  Hypotension  Lisinopril discontinued  Metoprolol  Monitor for orthostasis on Flomax, dose increased  Hospitalist consulted, appreciate assistance  Teds and abdominal  binder    Insomnia, improved/resolved  Already on mirtazapine  Started trial of Valium 4/30, discontinued 5/4  Schedule melatonin     Reactive depression  Mirtazapine  Consider psychology consult if needed   Mood currently stable     History of COVID-19  Without sequelae     Normocytic anemia  Mild, monitor    Bowel program  Continue bowel medications  Last BM 5/6    Bladder program  Urinary retention  Changed Prazosin to Flomax  Voiding trial 5/4, unsuccessful  Replace Brewer 5/5, increased Flomax  Likely has spastic bladder  Will need outpatient urology follow up  Failed recent trial/history of traumatic placement/hematuria  Monitor blood pressure on higher dose of Flomax    DVT prophylaxis  Xarelto      Total time:  38 minutes.  I spent greater than 50% of the time for patient care, counseling, and coordination on this date, including patient face-to face time, unit/floor time with review of records/pertinent lab data and studies, as well as discussing diagnostic evaluation/work up, planned therapeutic interventions, and future disposition of care, as per the interval events/subjective and the assessment and plan as noted above.    Time today spent updating patient's wife on his functional progress, prognosis for recovery, and medical issues.    I have performed a physical exam, reviewed and updated ROS, as well as the assessment and plan today 5/6/2021. In review of note from 5/5/2021 there are no new changes except as documented above.      Radha Monge M.D.   Physical Medicine and Rehabilitation

## 2021-05-07 NOTE — THERAPY
Speech Language Pathology  Daily Treatment     Patient Name: Thong Arzola  Age:  56 y.o., Sex:  male  Medical Record #: [id number]  Today's Date: 2021     Precautions  Precautions: Fall Risk, Swallow Precautions ( See Comments)  Comments: Helmet on OOB; L UE w/ 2-3 Modified Jeremiah Scale    Subjective    Pt pleasant and cooperative during tx.       Objective       21 0931   Cognition   Moderate Attention Minimal (4)   Visual Scanning / Cancellation Skills Moderate (3)   SLP Total Time Spent   SLP Individual Total Time Spent (Mins) 60   Treatment Charges   SLP Cognitive Skill Development First 15 Minutes 1   SLP Cognitive Skill Development Additional 15 Minutes 3       Assessment    Alternating attn (pt required to state places for each letter of the alphabet): 88% independent; 100% Taz.  Single target visual scannin% independent; 85% modA with line guide and verbal cues.  Sentence completion (choice of 4): 78% independent.  Pt benefits from cues to look left to find beginning of sentence, and answer choices.     Strengths: Able to follow instructions, Pleasant and cooperative, Supportive family, Willingly participates in therapeutic activities, Motivated for self care and independence  Barriers: Impaired activity tolerance, Impaired functional cognition, Impaired insight/denial of deficits, Visual impairment(Left neglect)    Plan    Attn, visual scanning, diet tolerance, use of strategies        Speech Therapy Problems     Problem: Problem Solving STGs     Dates: Start: 21       Goal: STG-Within one week, patient will     Dates: Start: 21       Description: 1) Individualized goal: demonstrate appropriate visual scanning to the left with min cues and 80% accuracy.  2) Interventions:  SLP Speech Language Treatment, SLP Self Care / ADL Training , SLP Cognitive Skill Development, and SLP Group Treatment              Goal: STG-Within one week, patient will     Dates: Start: 21        Description: 1) Individualized goal:  complete simple attention tasks with min A with 80% accuracy.   2) Interventions:  SLP Speech Language Treatment, SLP Self Care / ADL Training , SLP Cognitive Skill Development, and SLP Group Treatment                    Problem: Speech/Swallowing LTGs     Dates: Start: 04/30/21       Goal: LTG-By discharge, patient will solve complex problem     Dates: Start: 04/30/21       Description: 1) Individualized goal:  related to IADL's with mod I for safe d/c home.  2) Interventions:  SLP Speech Language Treatment, SLP Swallowing Dysfunction Treatment, SLP Oral Pharyngeal Evaluation, SLP Video Swallow Evaluation, SLP Self Care / ADL Training , SLP Cognitive Skill Development, and SLP Group Treatment              Goal: LTG-By discharge, patient will     Dates: Start: 04/30/21       Description: 1) Individualized goal:  tolerate 7- Regular Textures and 0-Thin liquids with no overt s/sx of aspiration noted   2) Interventions:  SLP Speech Language Treatment, SLP Swallowing Dysfunction Treatment, SLP Oral Pharyngeal Evaluation, SLP Video Swallow Evaluation, and SLP Group Treatment                        Problem: Swallowing STGs     Dates: Start: 05/05/21       Goal: STG-Within one week, patient will     Dates: Start: 05/05/21       Description: 1) Individualized goal: tolerate 7- Regular Textures and 0-Thin liquids during meal times with no overt s/sx of aspiration noted for 1/1 meals   2) Interventions:  SLP Swallowing Dysfunction Treatment, SLP Oral Pharyngeal Evaluation, SLP Self Care / ADL Training , and SLP Group Treatment

## 2021-05-08 PROCEDURE — A9270 NON-COVERED ITEM OR SERVICE: HCPCS | Performed by: PHYSICAL MEDICINE & REHABILITATION

## 2021-05-08 PROCEDURE — 97129 THER IVNTJ 1ST 15 MIN: CPT

## 2021-05-08 PROCEDURE — 700102 HCHG RX REV CODE 250 W/ 637 OVERRIDE(OP): Performed by: PHYSICAL MEDICINE & REHABILITATION

## 2021-05-08 PROCEDURE — 97130 THER IVNTJ EA ADDL 15 MIN: CPT

## 2021-05-08 PROCEDURE — 770010 HCHG ROOM/CARE - REHAB SEMI PRIVAT*

## 2021-05-08 PROCEDURE — 99231 SBSQ HOSP IP/OBS SF/LOW 25: CPT | Performed by: HOSPITALIST

## 2021-05-08 RX ADMIN — TAMSULOSIN HYDROCHLORIDE 0.8 MG: 0.4 CAPSULE ORAL at 22:33

## 2021-05-08 RX ADMIN — RIVAROXABAN 20 MG: 20 TABLET, FILM COATED ORAL at 17:46

## 2021-05-08 RX ADMIN — THYROID, PORCINE 60 MG: 30 TABLET ORAL at 08:32

## 2021-05-08 RX ADMIN — METOPROLOL TARTRATE 12.5 MG: 25 TABLET, FILM COATED ORAL at 08:32

## 2021-05-08 RX ADMIN — BACLOFEN 30 MG: 20 TABLET ORAL at 08:32

## 2021-05-08 RX ADMIN — MELATONIN TAB 3 MG 3 MG: 3 TAB at 22:34

## 2021-05-08 RX ADMIN — ASPIRIN 81 MG CHEWABLE TABLET 81 MG: 81 TABLET CHEWABLE at 17:46

## 2021-05-08 RX ADMIN — THYROID, PORCINE 60 MG: 30 TABLET ORAL at 22:32

## 2021-05-08 RX ADMIN — ATORVASTATIN CALCIUM 80 MG: 40 TABLET, FILM COATED ORAL at 22:34

## 2021-05-08 RX ADMIN — MIRTAZAPINE 15 MG: 15 TABLET, ORALLY DISINTEGRATING ORAL at 22:34

## 2021-05-08 RX ADMIN — METOPROLOL TARTRATE 12.5 MG: 25 TABLET, FILM COATED ORAL at 22:33

## 2021-05-08 RX ADMIN — BACLOFEN 30 MG: 20 TABLET ORAL at 22:33

## 2021-05-08 RX ADMIN — MECLIZINE HYDROCHLORIDE 25 MG: 25 TABLET ORAL at 08:32

## 2021-05-08 RX ADMIN — MECLIZINE HYDROCHLORIDE 25 MG: 25 TABLET ORAL at 14:36

## 2021-05-08 RX ADMIN — BACLOFEN 30 MG: 20 TABLET ORAL at 14:36

## 2021-05-08 RX ADMIN — MECLIZINE HYDROCHLORIDE 25 MG: 25 TABLET ORAL at 22:33

## 2021-05-08 ASSESSMENT — ENCOUNTER SYMPTOMS
HALLUCINATIONS: 0
BLURRED VISION: 0
HEADACHES: 0
NAUSEA: 0
DIZZINESS: 0
SHORTNESS OF BREATH: 0
VOMITING: 0
FEVER: 0
PALPITATIONS: 0

## 2021-05-08 ASSESSMENT — PAIN DESCRIPTION - PAIN TYPE: TYPE: ACUTE PAIN

## 2021-05-08 NOTE — CARE PLAN
Problem: Pain Management  Goal: Pain level will decrease to patient's comfort goal  Outcome: PROGRESSING AS EXPECTED  Note: Pt denies pain or discomfort tonight. Sleeps good. Will continue to monitor.     Problem: Urinary Elimination:  Goal: Ability to reestablish a normal urinary elimination pattern will improve  Outcome: PROGRESSING SLOWER THAN EXPECTED  Note: Pt with dye catheter to gravity draining adequate amount of clear annalisa urine. Dye care provided by staff.

## 2021-05-08 NOTE — PROGRESS NOTES
"Rehab Progress Note     Date of Service: 5/7/2021  Chief Complaint: follow up stroke    Interval Events (Subjective)    Patient seen and examined today in the gym.  He is on the mat with OT.  He is only requiring min assist for transfers.  Met wife Anel, discussed possibility of extending his stay.   Patient has no complaints today.     ROS: No changes to bowel, bladder, pain, mood, or sleep.         Objective:  VITAL SIGNS: /102   Pulse 84   Temp 36.3 °C (97.3 °F) (Temporal)   Resp 18   Ht 1.778 m (5' 10\")   Wt 67.2 kg (148 lb 2.4 oz)   SpO2 96%   BMI 21.26 kg/m²   Gen: alert, no apparent distress  Neuro: notable for left spastic hemiplegia  : catheter in place          No results found for this or any previous visit (from the past 72 hour(s)).    Current Facility-Administered Medications   Medication Frequency   • melatonin tablet 3 mg QHS   • tamsulosin (FLOMAX) capsule 0.8 mg QHS   • baclofen (LIORESAL) tablet 30 mg TID   • meclizine (ANTIVERT) tablet 25 mg TID   • hydrOXYzine HCl (ATARAX) tablet 50 mg Q6HRS PRN   • Respiratory Therapy Consult Continuous RT   • Pharmacy Consult Request ...Pain Management Review 1 Each PHARMACY TO DOSE   • hydrALAZINE (APRESOLINE) tablet 10 mg Q8HRS PRN   • acetaminophen (Tylenol) tablet 650 mg Q4HRS PRN   • lactulose 20 GM/30ML solution 30 mL QDAY PRN   • docusate sodium (ENEMEEZ) enema 283 mg QDAY PRN   • fleet enema 133 mL QDAY PRN   • artificial tears ophthalmic solution 1 Drop PRN   • benzocaine-menthol (CEPACOL) lozenge 1 Lozenge Q2HRS PRN   • mag hydrox-al hydrox-simeth (MAALOX PLUS ES or MYLANTA DS) suspension 20 mL Q2HRS PRN   • ondansetron (ZOFRAN ODT) dispertab 4 mg 4X/DAY PRN    Or   • ondansetron (ZOFRAN) syringe/vial injection 4 mg 4X/DAY PRN   • traZODone (DESYREL) tablet 50 mg QHS PRN   • sodium chloride (OCEAN) 0.65 % nasal spray 2 Spray PRN   • midazolam (VERSED) 5 mg/mL (1 mL vial) PRN   • atorvastatin (LIPITOR) tablet 80 mg Q EVENING   • aspirin " (ASA) chewable tab 81 mg DAILY   • metoprolol tartrate (LOPRESSOR) tablet 12.5 mg BID   • mirtazapine (Remeron) orally disintegrating tab 15 mg QHS   • rivaroxaban (XARELTO) tablet 20 mg PM MEAL   • thyroid (ARMOUR THYROID) tablet 60 mg BID       Orders Placed This Encounter   Procedures   • Diet Order Diet: Level 7 - Easy to Chew (float pills in puree); Liquid level: Level 0 - Thin; Tray Modifications (optional): SLP - 1:1 Supervision by Nursing, SLP - Deliver to Nursing Station     Standing Status:   Standing     Number of Occurrences:   1     Order Specific Question:   Diet:     Answer:   Level 7 - Easy to Chew [22]     Comments:   float pills in puree     Order Specific Question:   Liquid level     Answer:   Level 0 - Thin     Order Specific Question:   Tray Modifications (optional)     Answer:   SLP - 1:1 Supervision by Nursing     Order Specific Question:   Tray Modifications (optional)     Answer:   SLP - Deliver to Nursing Station       Assessment:  Active Hospital Problems    Diagnosis    • *Acute right MCA stroke (HCC)    • Paroxysmal atrial fibrillation (HCC)    • Urinary retention    • Acquired hypothyroidism    • History of COVID-19    • Hypotension    • Normocytic anemia    • Spasticity as late effect of cerebrovascular accident (CVA)    • Reactive depression      This patient is a 56 y.o. male admitted for acute inpatient rehabilitation with Acute right MCA stroke (HCC).    I led and attended the weekly conference, and agree with the IDT conference documentation and plan of care as noted below.    Date of conference: 5/5/2021    Goals and barriers: See IDT note.    Biggest barriers: left spastic hemiparesis, urinary retention, bowel incontinence, fluctuation in function, impaired trunk control, left neglect, spasticity, low blood pressure    CM/social support: wife supportive, to go to 1  here in [place], wife's sister    Anticipated DC date: 5/21, may be able to extend pending his progress    Home  health: PT/OT/SLP/RN    Equip: TBD    Follow up: PCP, Dr. Dumont, stroke bridge clinic, urology, neurosugery      Medical Decision Making and Plan:    Large right ICA/MCA ischemic stroke  S/p craniectomy 4/3, Dr. Payton  Continue full rehab program  PT/OT/SLP, 1 hr each discipline, 5 days per week    Xarelto  Statin    Outpatient follow up with stroke bridge clinic, Dr. Dumont, referrals made    Outpatient follow up with Dr. Payton for cranioplasty    Dizziness, improved  Could be due to hypotension  Scheduled Meclizine, will titrate off prior to discharge  Continue Teds and abdominal binder  BPPV testing by PT negative    Spasticity, improved  Increased baclofen 15 mg TID to 20 mg TID 4/30 --> 30 mg TID 5/3  Started valium at night 2 mg 4/30, discontinued 5/3, doesn't seem to make much difference  Consider adding Zanaflex in future, LFTs on 5/5 with elevated ALT, recheck prior to initiation  Currently better controlled on baclofen and with stretching  May benefit from Botox in the future, continue to monitor     Atrial fibrillation  Metoprolol  Xarelto  Hospitalist consulted, appreciate assistance     Hypothyroidism   [place] thyroid     Hypertension  Hypotension  Lisinopril discontinued  Metoprolol  Monitor for orthostasis on Flomax, dose increased  Hospitalist consulted, appreciate assistance  Teds and abdominal binder    Insomnia, improved/resolved  Already on mirtazapine  Started trial of Valium 4/30, discontinued 5/4  Scheduled melatonin     Reactive depression  Mirtazapine  Consider psychology consult if needed   Mood currently stable     History of COVID-19  Without sequelae     Normocytic anemia  Mild, monitor    Bowel program  Continue bowel medications  Last BM 5/6    Bladder program  Urinary retention  Changed Prazosin to Flomax  Voiding trial 5/4, unsuccessful  Replace Brewer 5/5, increased Flomax  Likely has spastic bladder  Will need outpatient urology follow up  Failed recent trial/history of traumatic  placement/hematuria  Monitor blood pressure on higher dose of Flomax    DVT prophylaxis  Xarelto      Total time:  26 minutes.  I spent greater than 50% of the time for patient care, counseling, and coordination on this date, including patient face-to face time, unit/floor time with review of records/pertinent lab data and studies, as well as discussing diagnostic evaluation/work up, planned therapeutic interventions, and future disposition of care, as per the interval events/subjective and the assessment and plan as noted above.    I have performed a physical exam, reviewed and updated ROS, as well as the assessment and plan today 5/7/2021. In review of note from 5/6/2021 there are no new changes except as documented above.            Radha Monge M.D.   Physical Medicine and Rehabilitation

## 2021-05-08 NOTE — PROGRESS NOTES
Hospital Medicine Daily Progress Note      Chief Complaint:  Hypertension  Afib    Interval History:  No significant events or changes since last visit    Review of Systems  Review of Systems   Constitutional: Negative for fever.   Eyes: Negative for blurred vision.   Respiratory: Negative for shortness of breath.    Cardiovascular: Negative for palpitations.   Gastrointestinal: Negative for nausea and vomiting.   Neurological: Negative for dizziness and headaches.   Psychiatric/Behavioral: Negative for hallucinations.        Physical Exam  Temp:  [36.3 °C (97.3 °F)-37 °C (98.6 °F)] 36.3 °C (97.3 °F)  Pulse:  [84-94] 84  Resp:  [12-18] 18  BP: (124-152)/() 152/102  SpO2:  [95 %-96 %] 96 %    Physical Exam  Vitals and nursing note reviewed.   Constitutional:       General: He is not in acute distress.  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     Mouth/Throat:      Mouth: Mucous membranes are moist.      Pharynx: Oropharynx is clear.   Eyes:      General: No scleral icterus.  Cardiovascular:      Rate and Rhythm: Normal rate and regular rhythm.   Pulmonary:      Effort: Pulmonary effort is normal.      Breath sounds: No wheezing or rales.   Abdominal:      General: Bowel sounds are normal.      Palpations: Abdomen is soft.   Musculoskeletal:      Cervical back: No rigidity.      Right lower leg: No edema.      Left lower leg: No edema.   Skin:     General: Skin is warm and dry.   Neurological:      Mental Status: He is alert and oriented to person, place, and time.   Psychiatric:         Mood and Affect: Mood normal.         Behavior: Behavior normal.         Fluids    Intake/Output Summary (Last 24 hours) at 5/8/2021 0855  Last data filed at 5/8/2021 0515  Gross per 24 hour   Intake 1020 ml   Output 1250 ml   Net -230 ml       Laboratory                      Assessment/Plan  * Acute right MCA stroke (HCC)- (present on admission)  Assessment & Plan  S/P decompressive  craniectomy for increased ICP (4/3)    Paroxysmal atrial fibrillation (HCC)- (present on admission)  Assessment & Plan  HR okrecently  Echo: EF > 75%  On Lopressor  On Xarelto  Cont to monitor    Dizziness  Assessment & Plan  On Antivert    Arm swelling  Assessment & Plan  Nearly resolved  U/S LUE negative DVT  Likely 2nd to stroke and imobility    Dyslipidemia  Assessment & Plan  On Lipitor    Reactive depression- (present on admission)  Assessment & Plan  On Remeron    History of COVID-19- (present on admission)  Assessment & Plan  Had Covid 3/18/21    Acquired hypothyroidism- (present on admission)  Assessment & Plan  On [place] Thyroid

## 2021-05-08 NOTE — THERAPY
Speech Language Pathology  Daily Treatment     Patient Name: Thong Arzola  Age:  56 y.o., Sex:  male  Medical Record #: [id number]  Today's Date: 5/8/2021     Precautions  Precautions: Fall Risk, Swallow Precautions ( See Comments)  Comments: Helmet on OOB; L UE w/ 2-3 Modified Jeremiah Scale    Subjective    Pt pleasant and cooperative during tx.      Objective       05/08/21 1431   Cognition   Visual Scanning / Cancellation Skills Moderate (3)   Interdisciplinary Plan of Care Collaboration   IDT Collaboration with  Family / Caregiver   Collaboration Comments spouse present during tx.    SLP Total Time Spent   SLP Individual Total Time Spent (Mins) 60   Treatment Charges   SLP Cognitive Skill Development First 15 Minutes 1   SLP Cognitive Skill Development Additional 15 Minutes 3       Assessment    Sentence completion: 96% Taz (improved from 78%).  Spelling (given picture and letters): 100% (easy level), 80% (hard level - given choice of each letter of alphabet).  Pt benefit from mi-mod verbal cues to find letters on left side of page/screen.     Strengths: Able to follow instructions, Pleasant and cooperative, Supportive family, Willingly participates in therapeutic activities, Motivated for self care and independence  Barriers: Impaired activity tolerance, Impaired functional cognition, Impaired insight/denial of deficits, Visual impairment(Left neglect)    Plan    Attn, visual scanning        Speech Therapy Problems     Problem: Problem Solving STGs     Dates: Start: 05/05/21       Goal: STG-Within one week, patient will     Dates: Start: 05/05/21       Description: 1) Individualized goal: demonstrate appropriate visual scanning to the left with min cues and 80% accuracy.  2) Interventions:  SLP Speech Language Treatment, SLP Self Care / ADL Training , SLP Cognitive Skill Development, and SLP Group Treatment              Goal: STG-Within one week, patient will     Dates: Start: 05/05/21       Description:  1) Individualized goal:  complete simple attention tasks with min A with 80% accuracy.   2) Interventions:  SLP Speech Language Treatment, SLP Self Care / ADL Training , SLP Cognitive Skill Development, and SLP Group Treatment                    Problem: Speech/Swallowing LTGs     Dates: Start: 04/30/21       Goal: LTG-By discharge, patient will solve complex problem     Dates: Start: 04/30/21       Description: 1) Individualized goal:  related to IADL's with mod I for safe d/c home.  2) Interventions:  SLP Speech Language Treatment, SLP Swallowing Dysfunction Treatment, SLP Oral Pharyngeal Evaluation, SLP Video Swallow Evaluation, SLP Self Care / ADL Training , SLP Cognitive Skill Development, and SLP Group Treatment              Goal: LTG-By discharge, patient will     Dates: Start: 04/30/21       Description: 1) Individualized goal:  tolerate 7- Regular Textures and 0-Thin liquids with no overt s/sx of aspiration noted   2) Interventions:  SLP Speech Language Treatment, SLP Swallowing Dysfunction Treatment, SLP Oral Pharyngeal Evaluation, SLP Video Swallow Evaluation, and SLP Group Treatment                        Problem: Swallowing STGs     Dates: Start: 05/05/21       Goal: STG-Within one week, patient will     Dates: Start: 05/05/21       Description: 1) Individualized goal: tolerate 7- Regular Textures and 0-Thin liquids during meal times with no overt s/sx of aspiration noted for 1/1 meals   2) Interventions:  SLP Swallowing Dysfunction Treatment, SLP Oral Pharyngeal Evaluation, SLP Self Care / ADL Training , and SLP Group Treatment

## 2021-05-09 LAB
ERYTHROCYTE [DISTWIDTH] IN BLOOD BY AUTOMATED COUNT: 40.2 FL (ref 35.9–50)
HCT VFR BLD AUTO: 37.4 % (ref 42–52)
HGB BLD-MCNC: 12.1 G/DL (ref 14–18)
MCH RBC QN AUTO: 28.2 PG (ref 27–33)
MCHC RBC AUTO-ENTMCNC: 32.4 G/DL (ref 33.7–35.3)
MCV RBC AUTO: 87.2 FL (ref 81.4–97.8)
PLATELET # BLD AUTO: 285 K/UL (ref 164–446)
PMV BLD AUTO: 9.1 FL (ref 9–12.9)
RBC # BLD AUTO: 4.29 M/UL (ref 4.7–6.1)
WBC # BLD AUTO: 6.4 K/UL (ref 4.8–10.8)

## 2021-05-09 PROCEDURE — 770010 HCHG ROOM/CARE - REHAB SEMI PRIVAT*

## 2021-05-09 PROCEDURE — A9270 NON-COVERED ITEM OR SERVICE: HCPCS | Performed by: PHYSICAL MEDICINE & REHABILITATION

## 2021-05-09 PROCEDURE — 99231 SBSQ HOSP IP/OBS SF/LOW 25: CPT | Performed by: HOSPITALIST

## 2021-05-09 PROCEDURE — 85027 COMPLETE CBC AUTOMATED: CPT

## 2021-05-09 PROCEDURE — 36415 COLL VENOUS BLD VENIPUNCTURE: CPT

## 2021-05-09 PROCEDURE — 700102 HCHG RX REV CODE 250 W/ 637 OVERRIDE(OP): Performed by: PHYSICAL MEDICINE & REHABILITATION

## 2021-05-09 RX ADMIN — METOPROLOL TARTRATE 12.5 MG: 25 TABLET, FILM COATED ORAL at 09:04

## 2021-05-09 RX ADMIN — MECLIZINE HYDROCHLORIDE 25 MG: 25 TABLET ORAL at 09:03

## 2021-05-09 RX ADMIN — ATORVASTATIN CALCIUM 80 MG: 40 TABLET, FILM COATED ORAL at 20:35

## 2021-05-09 RX ADMIN — RIVAROXABAN 20 MG: 20 TABLET, FILM COATED ORAL at 17:41

## 2021-05-09 RX ADMIN — THYROID, PORCINE 60 MG: 30 TABLET ORAL at 20:35

## 2021-05-09 RX ADMIN — BACLOFEN 30 MG: 20 TABLET ORAL at 20:35

## 2021-05-09 RX ADMIN — ASPIRIN 81 MG CHEWABLE TABLET 81 MG: 81 TABLET CHEWABLE at 17:41

## 2021-05-09 RX ADMIN — MECLIZINE HYDROCHLORIDE 25 MG: 25 TABLET ORAL at 20:34

## 2021-05-09 RX ADMIN — BACLOFEN 30 MG: 20 TABLET ORAL at 09:04

## 2021-05-09 RX ADMIN — METOPROLOL TARTRATE 12.5 MG: 25 TABLET, FILM COATED ORAL at 20:35

## 2021-05-09 RX ADMIN — ACETAMINOPHEN 650 MG: 325 TABLET, FILM COATED ORAL at 07:31

## 2021-05-09 RX ADMIN — THYROID, PORCINE 60 MG: 30 TABLET ORAL at 09:04

## 2021-05-09 RX ADMIN — BACLOFEN 30 MG: 20 TABLET ORAL at 14:32

## 2021-05-09 RX ADMIN — MIRTAZAPINE 15 MG: 15 TABLET, ORALLY DISINTEGRATING ORAL at 20:35

## 2021-05-09 RX ADMIN — TAMSULOSIN HYDROCHLORIDE 0.8 MG: 0.4 CAPSULE ORAL at 20:34

## 2021-05-09 RX ADMIN — MELATONIN TAB 3 MG 3 MG: 3 TAB at 20:35

## 2021-05-09 RX ADMIN — MECLIZINE HYDROCHLORIDE 25 MG: 25 TABLET ORAL at 14:32

## 2021-05-09 ASSESSMENT — PATIENT HEALTH QUESTIONNAIRE - PHQ9
7. TROUBLE CONCENTRATING ON THINGS, SUCH AS READING THE NEWSPAPER OR WATCHING TELEVISION: SEVERAL DAYS
4. FEELING TIRED OR HAVING LITTLE ENERGY: SEVERAL DAYS
3. TROUBLE FALLING OR STAYING ASLEEP OR SLEEPING TOO MUCH: SEVERAL DAYS
2. FEELING DOWN, DEPRESSED, IRRITABLE, OR HOPELESS: NOT AT ALL
6. FEELING BAD ABOUT YOURSELF - OR THAT YOU ARE A FAILURE OR HAVE LET YOURSELF OR YOUR FAMILY DOWN: NOT AL ALL
9. THOUGHTS THAT YOU WOULD BE BETTER OFF DEAD, OR OF HURTING YOURSELF: NOT AT ALL
SUM OF ALL RESPONSES TO PHQ9 QUESTIONS 1 AND 2: 1
SUM OF ALL RESPONSES TO PHQ QUESTIONS 1-9: 5
1. LITTLE INTEREST OR PLEASURE IN DOING THINGS: SEVERAL DAYS
5. POOR APPETITE OR OVEREATING: SEVERAL DAYS
8. MOVING OR SPEAKING SO SLOWLY THAT OTHER PEOPLE COULD HAVE NOTICED. OR THE OPPOSITE, BEING SO FIGETY OR RESTLESS THAT YOU HAVE BEEN MOVING AROUND A LOT MORE THAN USUAL: NOT AT ALL

## 2021-05-09 ASSESSMENT — ENCOUNTER SYMPTOMS
BLURRED VISION: 0
NERVOUS/ANXIOUS: 0
FEVER: 0
COUGH: 0
DIARRHEA: 0
DIZZINESS: 0

## 2021-05-09 ASSESSMENT — PAIN DESCRIPTION - PAIN TYPE
TYPE: ACUTE PAIN
TYPE: ACUTE PAIN

## 2021-05-09 NOTE — CARE PLAN
Problem: Safety  Goal: Will remain free from injury  Note: Call light with in reach. Redirection to use call light for assistance. Non skid socks. Upper siderails up x2 while in bed.      Problem: Urinary Elimination:  Goal: Ability to reestablish a normal urinary elimination pattern will improve  Note: F/C intact. Abdominal binder on. Left heel boot on. Repositioned up in bed. Will monitor.

## 2021-05-09 NOTE — PROGRESS NOTES
Hospital Medicine Daily Progress Note      Chief Complaint:  Hypertension  Afib    Interval History:  No significant events or changes since last visit    Review of Systems  Review of Systems   Constitutional: Negative for fever.   Eyes: Negative for blurred vision.   Respiratory: Negative for cough.    Cardiovascular: Negative for chest pain.   Gastrointestinal: Negative for diarrhea.   Musculoskeletal: Negative for joint pain.   Neurological: Negative for dizziness.   Psychiatric/Behavioral: The patient is not nervous/anxious.         Physical Exam  Temp:  [36.7 °C (98 °F)-37.2 °C (99 °F)] 36.7 °C (98 °F)  Pulse:  [] 80  Resp:  [16-18] 16  BP: (118-146)/() 145/98  SpO2:  [95 %-96 %] 95 %    Physical Exam  Vitals and nursing note reviewed.   Constitutional:       Appearance: He is not diaphoretic.   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  Eyes:      Extraocular Movements: Extraocular movements intact.   Neck:      Vascular: No carotid bruit.   Cardiovascular:      Rate and Rhythm: Normal rate and regular rhythm.   Pulmonary:      Effort: Pulmonary effort is normal.      Breath sounds: No wheezing or rales.   Abdominal:      General: There is no distension.      Palpations: Abdomen is soft.      Tenderness: There is no abdominal tenderness.   Musculoskeletal:      Right lower leg: No edema.      Left lower leg: No edema.   Skin:     General: Skin is warm and dry.   Neurological:      Mental Status: He is alert and oriented to person, place, and time.   Psychiatric:         Mood and Affect: Mood normal.         Behavior: Behavior normal.         Fluids    Intake/Output Summary (Last 24 hours) at 5/9/2021 0911  Last data filed at 5/9/2021 0900  Gross per 24 hour   Intake 720 ml   Output 2450 ml   Net -1730 ml       Laboratory  Recent Labs     05/09/21  0615   WBC 6.4   RBC 4.29*   HEMOGLOBIN 12.1*   HEMATOCRIT 37.4*   MCV 87.2   MCH 28.2   MCHC 32.4*   RDW 40.2    PLATELETCT 285   MPV 9.1                     Assessment/Plan  * Acute right MCA stroke (HCC)- (present on admission)  Assessment & Plan  S/P decompressive craniectomy for increased ICP (4/3)    Paroxysmal atrial fibrillation (HCC)- (present on admission)  Assessment & Plan  HR ok   BP a little labile but ok  Echo: EF > 75%  On Lopressor  On Xarelto  Cont to monitor    Dizziness  Assessment & Plan  On Antivert    Arm swelling  Assessment & Plan  Nearly resolved  U/S LUE negative DVT  Likely 2nd to stroke and imobility    Dyslipidemia  Assessment & Plan  On Lipitor    Reactive depression- (present on admission)  Assessment & Plan  On Remeron    History of COVID-19- (present on admission)  Assessment & Plan  Had Covid 3/18/21    Acquired hypothyroidism- (present on admission)  Assessment & Plan  On [place] Thyroid

## 2021-05-10 PROCEDURE — 770010 HCHG ROOM/CARE - REHAB SEMI PRIVAT*

## 2021-05-10 PROCEDURE — A9270 NON-COVERED ITEM OR SERVICE: HCPCS | Performed by: PHYSICAL MEDICINE & REHABILITATION

## 2021-05-10 PROCEDURE — 99232 SBSQ HOSP IP/OBS MODERATE 35: CPT | Performed by: PHYSICAL MEDICINE & REHABILITATION

## 2021-05-10 PROCEDURE — 97112 NEUROMUSCULAR REEDUCATION: CPT

## 2021-05-10 PROCEDURE — 700102 HCHG RX REV CODE 250 W/ 637 OVERRIDE(OP): Performed by: PHYSICAL MEDICINE & REHABILITATION

## 2021-05-10 PROCEDURE — 97530 THERAPEUTIC ACTIVITIES: CPT

## 2021-05-10 PROCEDURE — 99231 SBSQ HOSP IP/OBS SF/LOW 25: CPT | Performed by: HOSPITALIST

## 2021-05-10 PROCEDURE — 97535 SELF CARE MNGMENT TRAINING: CPT

## 2021-05-10 RX ADMIN — THYROID, PORCINE 60 MG: 30 TABLET ORAL at 08:47

## 2021-05-10 RX ADMIN — BACLOFEN 30 MG: 20 TABLET ORAL at 20:21

## 2021-05-10 RX ADMIN — RIVAROXABAN 20 MG: 20 TABLET, FILM COATED ORAL at 17:32

## 2021-05-10 RX ADMIN — BACLOFEN 30 MG: 20 TABLET ORAL at 08:46

## 2021-05-10 RX ADMIN — TAMSULOSIN HYDROCHLORIDE 0.8 MG: 0.4 CAPSULE ORAL at 20:23

## 2021-05-10 RX ADMIN — METOPROLOL TARTRATE 12.5 MG: 25 TABLET, FILM COATED ORAL at 20:22

## 2021-05-10 RX ADMIN — ACETAMINOPHEN 650 MG: 325 TABLET, FILM COATED ORAL at 14:43

## 2021-05-10 RX ADMIN — MIRTAZAPINE 15 MG: 15 TABLET, ORALLY DISINTEGRATING ORAL at 20:23

## 2021-05-10 RX ADMIN — ASPIRIN 81 MG CHEWABLE TABLET 81 MG: 81 TABLET CHEWABLE at 17:32

## 2021-05-10 RX ADMIN — MECLIZINE HYDROCHLORIDE 25 MG: 25 TABLET ORAL at 08:46

## 2021-05-10 RX ADMIN — THYROID, PORCINE 60 MG: 30 TABLET ORAL at 20:21

## 2021-05-10 RX ADMIN — MECLIZINE HYDROCHLORIDE 25 MG: 25 TABLET ORAL at 20:21

## 2021-05-10 RX ADMIN — METOPROLOL TARTRATE 12.5 MG: 25 TABLET, FILM COATED ORAL at 08:46

## 2021-05-10 RX ADMIN — BACLOFEN 30 MG: 20 TABLET ORAL at 14:41

## 2021-05-10 RX ADMIN — MELATONIN TAB 3 MG 3 MG: 3 TAB at 20:21

## 2021-05-10 RX ADMIN — MECLIZINE HYDROCHLORIDE 25 MG: 25 TABLET ORAL at 14:41

## 2021-05-10 RX ADMIN — ATORVASTATIN CALCIUM 80 MG: 40 TABLET, FILM COATED ORAL at 20:21

## 2021-05-10 ASSESSMENT — ENCOUNTER SYMPTOMS
SHORTNESS OF BREATH: 0
NERVOUS/ANXIOUS: 0
VOMITING: 0
ABDOMINAL PAIN: 0
FEVER: 0
NAUSEA: 0
DIARRHEA: 0
CHILLS: 0

## 2021-05-10 ASSESSMENT — PATIENT HEALTH QUESTIONNAIRE - PHQ9
7. TROUBLE CONCENTRATING ON THINGS, SUCH AS READING THE NEWSPAPER OR WATCHING TELEVISION: SEVERAL DAYS
SUM OF ALL RESPONSES TO PHQ9 QUESTIONS 1 AND 2: 1
9. THOUGHTS THAT YOU WOULD BE BETTER OFF DEAD, OR OF HURTING YOURSELF: NOT AT ALL
SUM OF ALL RESPONSES TO PHQ QUESTIONS 1-9: 5
9. THOUGHTS THAT YOU WOULD BE BETTER OFF DEAD, OR OF HURTING YOURSELF: NOT AT ALL
SUM OF ALL RESPONSES TO PHQ QUESTIONS 1-9: 5
2. FEELING DOWN, DEPRESSED, IRRITABLE, OR HOPELESS: NOT AT ALL
5. POOR APPETITE OR OVEREATING: SEVERAL DAYS
2. FEELING DOWN, DEPRESSED, IRRITABLE, OR HOPELESS: NOT AT ALL
4. FEELING TIRED OR HAVING LITTLE ENERGY: SEVERAL DAYS
SUM OF ALL RESPONSES TO PHQ9 QUESTIONS 1 AND 2: 1
8. MOVING OR SPEAKING SO SLOWLY THAT OTHER PEOPLE COULD HAVE NOTICED. OR THE OPPOSITE, BEING SO FIGETY OR RESTLESS THAT YOU HAVE BEEN MOVING AROUND A LOT MORE THAN USUAL: NOT AT ALL
3. TROUBLE FALLING OR STAYING ASLEEP OR SLEEPING TOO MUCH: SEVERAL DAYS
4. FEELING TIRED OR HAVING LITTLE ENERGY: SEVERAL DAYS
1. LITTLE INTEREST OR PLEASURE IN DOING THINGS: SEVERAL DAYS
6. FEELING BAD ABOUT YOURSELF - OR THAT YOU ARE A FAILURE OR HAVE LET YOURSELF OR YOUR FAMILY DOWN: NOT AL ALL
6. FEELING BAD ABOUT YOURSELF - OR THAT YOU ARE A FAILURE OR HAVE LET YOURSELF OR YOUR FAMILY DOWN: NOT AL ALL
8. MOVING OR SPEAKING SO SLOWLY THAT OTHER PEOPLE COULD HAVE NOTICED. OR THE OPPOSITE, BEING SO FIGETY OR RESTLESS THAT YOU HAVE BEEN MOVING AROUND A LOT MORE THAN USUAL: NOT AT ALL
5. POOR APPETITE OR OVEREATING: SEVERAL DAYS
1. LITTLE INTEREST OR PLEASURE IN DOING THINGS: SEVERAL DAYS
7. TROUBLE CONCENTRATING ON THINGS, SUCH AS READING THE NEWSPAPER OR WATCHING TELEVISION: SEVERAL DAYS
3. TROUBLE FALLING OR STAYING ASLEEP OR SLEEPING TOO MUCH: SEVERAL DAYS

## 2021-05-10 ASSESSMENT — PAIN DESCRIPTION - PAIN TYPE: TYPE: ACUTE PAIN

## 2021-05-10 ASSESSMENT — ACTIVITIES OF DAILY LIVING (ADL)
TOILET_TRANSFER_DESCRIPTION: GRAB BAR;INCREASED TIME;REQUIRES LIFT;SET-UP OF EQUIPMENT;SUPERVISION FOR SAFETY;VERBAL CUEING

## 2021-05-10 NOTE — PROGRESS NOTES
"Rehab Progress Note     Date of Service: 5/11/2021  Chief Complaint: follow up stroke    Interval Events (Subjective)    Patient seen and examined today in the therapy gym.  He is practicing standing in the parallel bars.  He is complaining of some dizziness however his blood pressures are stable.  Patient reports when his left leg is moved he has some shooting pain otherwise denies any pain.  Also reports abnormal sensation in the left leg.  Patient is also reporting fatigue but is trying to work hard with therapy.  He has no other complaints.    ROS: No changes to bowel, bladder, mood, or sleep.           Objective:  VITAL SIGNS: /84   Pulse 80   Temp 36.5 °C (97.7 °F) (Temporal)   Resp 18   Ht 1.778 m (5' 10\")   Wt 67.2 kg (148 lb 2.4 oz)   SpO2 93%   BMI 21.26 kg/m²   Gen: alert, no apparent distress  Neuro: notable for spastic left hemiplegia, increased tone in the left wrist and finger flexors, minimal to no tone in the left lower extremity  : Catheter in place draining pink urine        Recent Results (from the past 72 hour(s))   CBC WITHOUT DIFFERENTIAL    Collection Time: 05/09/21  6:15 AM   Result Value Ref Range    WBC 6.4 4.8 - 10.8 K/uL    RBC 4.29 (L) 4.70 - 6.10 M/uL    Hemoglobin 12.1 (L) 14.0 - 18.0 g/dL    Hematocrit 37.4 (L) 42.0 - 52.0 %    MCV 87.2 81.4 - 97.8 fL    MCH 28.2 27.0 - 33.0 pg    MCHC 32.4 (L) 33.7 - 35.3 g/dL    RDW 40.2 35.9 - 50.0 fL    Platelet Count 285 164 - 446 K/uL    MPV 9.1 9.0 - 12.9 fL       Current Facility-Administered Medications   Medication Frequency   • melatonin tablet 3 mg QHS   • tamsulosin (FLOMAX) capsule 0.8 mg QHS   • baclofen (LIORESAL) tablet 30 mg TID   • meclizine (ANTIVERT) tablet 25 mg TID   • hydrOXYzine HCl (ATARAX) tablet 50 mg Q6HRS PRN   • Respiratory Therapy Consult Continuous RT   • Pharmacy Consult Request ...Pain Management Review 1 Each PHARMACY TO DOSE   • hydrALAZINE (APRESOLINE) tablet 10 mg Q8HRS PRN   • acetaminophen " (Tylenol) tablet 650 mg Q4HRS PRN   • lactulose 20 GM/30ML solution 30 mL QDAY PRN   • docusate sodium (ENEMEEZ) enema 283 mg QDAY PRN   • fleet enema 133 mL QDAY PRN   • artificial tears ophthalmic solution 1 Drop PRN   • benzocaine-menthol (CEPACOL) lozenge 1 Lozenge Q2HRS PRN   • mag hydrox-al hydrox-simeth (MAALOX PLUS ES or MYLANTA DS) suspension 20 mL Q2HRS PRN   • ondansetron (ZOFRAN ODT) dispertab 4 mg 4X/DAY PRN    Or   • ondansetron (ZOFRAN) syringe/vial injection 4 mg 4X/DAY PRN   • traZODone (DESYREL) tablet 50 mg QHS PRN   • sodium chloride (OCEAN) 0.65 % nasal spray 2 Spray PRN   • midazolam (VERSED) 5 mg/mL (1 mL vial) PRN   • atorvastatin (LIPITOR) tablet 80 mg Q EVENING   • aspirin (ASA) chewable tab 81 mg DAILY   • metoprolol tartrate (LOPRESSOR) tablet 12.5 mg BID   • mirtazapine (Remeron) orally disintegrating tab 15 mg QHS   • rivaroxaban (XARELTO) tablet 20 mg PM MEAL   • thyroid (ARMOUR THYROID) tablet 60 mg BID       Orders Placed This Encounter   Procedures   • Diet Order Diet: Level 7 - Easy to Chew (float pills in puree); Liquid level: Level 0 - Thin; Tray Modifications (optional): SLP - 1:1 Supervision by Nursing, SLP - Deliver to Nursing Station     Standing Status:   Standing     Number of Occurrences:   1     Order Specific Question:   Diet:     Answer:   Level 7 - Easy to Chew [22]     Comments:   float pills in puree     Order Specific Question:   Liquid level     Answer:   Level 0 - Thin     Order Specific Question:   Tray Modifications (optional)     Answer:   SLP - 1:1 Supervision by Nursing     Order Specific Question:   Tray Modifications (optional)     Answer:   SLP - Deliver to Nursing Station       Assessment:  Active Hospital Problems    Diagnosis    • *Acute right MCA stroke (HCC)    • Paroxysmal atrial fibrillation (HCC)    • Urinary retention    • Acquired hypothyroidism    • History of COVID-19    • Hypotension    • Normocytic anemia    • Spasticity as late effect of  cerebrovascular accident (CVA)    • Reactive depression      This patient is a 56 y.o. male admitted for acute inpatient rehabilitation with Acute right MCA stroke (HCC).    I led and attended the weekly conference, and agree with the IDT conference documentation and plan of care as noted below.    Date of conference: 5/5/2021    Goals and barriers: See IDT note.    Biggest barriers: left spastic hemiparesis, urinary retention, bowel incontinence, fluctuation in function, impaired trunk control, left neglect, spasticity, low blood pressure    CM/social support: wife supportive, to go to 1  here in [place], wife's sister    Anticipated DC date: 5/21, may be able to extend pending his progress    Home health: PT/OT/SLP/RN    Equip: TBD    Follow up: PCP, Dr. Dumont, stroke bridge clinic, urology, neurosugery      Medical Decision Making and Plan:    Large right ICA/MCA ischemic stroke  S/p craniectomy 4/3, Dr. Payton  Left hemiplegia  Cognitive deficits  Left neglect  Continue full rehab program  PT/OT/SLP, 1 hr each discipline, 5 days per week    Xarelto  Statin    Outpatient follow up with stroke bridge clinic, Dr. Dumont, referrals made    Outpatient follow up with Dr. Payton for cranioplasty    Dizziness, improved  Scheduled Meclizine, will titrate off prior to discharge  Continue Teds and abdominal binder  BPPV testing by PT negative    Spasticity, improved  Increased baclofen 15 mg TID to 20 mg TID 4/30 --> 30 mg TID 5/3    Started valium at night 2 mg 4/30, discontinued 5/3, doesn't seem to make much difference    Consider adding Zanaflex in future, LFTs on 5/5 with elevated ALT, recheck prior to initiation    Currently better controlled on baclofen and with stretching    May benefit from Botox in the future, continue to monitor     Atrial fibrillation  Metoprolol  Xarelto  Hospitalist consulted, appreciate assistance     Hypothyroidism   [place] thyroid     Hypertension  Hypotension  Lisinopril  discontinued  Metoprolol  Monitor for orthostasis on Flomax, dose increased  Hospitalist consulted, appreciate assistance  Teds and abdominal binder    Insomnia, improved/resolved  Already on mirtazapine  Scheduled melatonin     Reactive depression  Mirtazapine  Consider psychology consult if needed   Mood currently stable     History of COVID-19  Without sequelae     Normocytic anemia  Mild, monitor    Bowel program  Continue bowel medications  Last BM 5/10    Bladder program  Urinary retention  Changed Prazosin to Flomax  Voiding trial 5/4, unsuccessful  Replaced Rbewer 5/5, increased Flomax  Likely has spastic bladder  Will need outpatient urology follow up  Failed recent trial/history of traumatic placement/hematuria  Monitor blood pressure on higher dose of Flomax  Will attempt another voiding trial this week    DVT prophylaxis  Xarelto      Total time:  25 minutes.  I spent greater than 50% of the time for patient care, counseling, and coordination on this date, including patient face-to face time, unit/floor time with review of records/pertinent lab data and studies, as well as discussing diagnostic evaluation/work up, planned therapeutic interventions, and future disposition of care, as per the interval events/subjective and the assessment and plan as noted above.    I have performed a physical exam, reviewed and updated ROS, as well as the assessment and plan today 5/10/2021. In review of note from 5/7/2021 there are no new changes except as documented above.      Radha oMnge M.D.   Physical Medicine and Rehabilitation

## 2021-05-10 NOTE — CARE PLAN
Problem: Safety  Goal: Will remain free from injury  Note: Call light with in reach. Redirection to use call light for assistance. Non skid socks. Upper siderails up x2 while in bed.      Problem: Urinary Elimination:  Goal: Ability to reestablish a normal urinary elimination pattern will improve  Note: F/C intact with yellow and bloody urine, encouraged increase fluids intake. Will monitor.

## 2021-05-10 NOTE — THERAPY
Physical Therapy   Daily Treatment     Patient Name: Thong Arzola  Age:  56 y.o., Sex:  male  Medical Record #: [id number]  Today's Date: 5/10/2021     Precautions  Precautions: (P) Fall Risk  Comments: (P) Helmet on OOB; L UE w/ 2-3 Modified Jeremiah Scale    Subjective    Pt was agreeable to trial in standing frame; reporting limiting factor as weakness and fatigue.      Objective       05/10/21 1501   Precautions   Precautions Fall Risk   Comments Helmet on OOB; L UE w/ 2-3 Modified Jeremiah Scale   Vitals   Pulse 83   Patient BP Position Sitting   Blood Pressure 113/83   Transfer Functional Level of Assist   Bed, Chair, Wheelchair Transfer Total Assist X 2   Bed Chair Wheelchair Transfer Description Squat pivot transfer to wheelchair;Slideboard transfer from bed to wheelchair;Verbal cueing;Increased time;Adaptive equipment;Assist with two limbs  (SB to wc left side, reach pivot to right side into bed )   Standing Lower Body Exercises   Comments Standing frame glute squeezes 10-15x, scap squeezes 15x AAROM for coordination of movement.    Bed Mobility    Supine to Sit Minimal Assist  (hook method, sequencing, extra time)   Sit to Supine Total Assist X 2   Sit to Stand Total Assist  (Use of standing frame 2x)   Scooting Total Assist X 2  (EOB/ up in bed)   Rolling Moderate Assist to Lt.   Neuro-Muscular Treatments   Neuro-Muscular Treatments Weight Shift Left;Weight Shift Right;Verbal Cuing;Tactile Cuing;Sequencing;Postural Facilitation;Postural Changes;Joint Approximation;Facilitation;Compensatory Strategies;Anterior weight shift   Comments EOB balance/ midline x 5 min min/mod A.  Wt shifting to place standing frame harness/ remove harness- 1 person to guard/ 1 person to manage harness. Standing frame: 5 min + 7 min.  Mirron ant to patient for midline orientation, and encouragement to scan left. Positioning post tx: PRAFO donned, heels floated, pillow to support L shoulder/ UE.   Interdisciplinary Plan of  Care Collaboration   IDT Collaboration with  Therapy Tech;Family / Caregiver   Patient Position at End of Therapy In Bed;Family / Friend in Room;Call Light within Reach   Collaboration Comments Spouse present for duration of tx. Education on POC rationale: midline, standing benefits, thansferring toward haylie side.    Strengths & Barriers   Strengths Alert and oriented;Effective communication skills;Independent prior level of function;Motivated for self care and independence;Pleasant and cooperative;Supportive family;Willingly participates in therapeutic activities   Barriers Hemiparesis;Home accessibility;Orthostatic hypotension;Impaired activity tolerance;Impaired balance;Spasticity;Limited mobility   PT Total Time Spent   PT Individual Total Time Spent (Mins) 60   PT Charge Group   PT Neuromuscular Re-Education / Balance 2   PT Therapeutic Activities 2       Assessment    Pt progressed to standing frame trials 5 min, 7 min, with BP remaining >94 systolic, and no report of dizziness. Pt was limited by fatigue ad generalized weakness.   Pt and spouse remain optimistic and thankful.     Strengths: (P) Alert and oriented, Effective communication skills, Independent prior level of function, Motivated for self care and independence, Pleasant and cooperative, Supportive family, Willingly participates in therapeutic activities  Barriers: (P) Hemiparesis, Home accessibility, Orthostatic hypotension, Impaired activity tolerance, Impaired balance, Spasticity, Limited mobility    Plan    Midline orientation EOM, sitting balance, transfer training with SB progressing to SQPT as appropriate, bed mobility, w/c mobility,  LE (progress to adjunct after 2nd trial), family training with spouse as appropriate. Continue STS training and standing tolerance as able.  Ongoing standing frame tolerance.     Passport items to be completed:  Get in/out of bed safely, in/out of a vehicle, safely use mobility device, walk or wheel around  home/community, navigate up and down stairs, show how to get up/down from the ground, ensure home is accessible, demonstrate HEP, complete caregiver training    Physical Therapy Problems     Problem: Mobility     Dates: Start: 04/30/21       Goal: STG-Within one week, patient will propel wheelchair household distances     Dates: Start: 04/30/21       Description: 1) Individualized goal:  25ft with haylie technique with SBA on even surfaces  2) Interventions:  PT Group Therapy, PT E Stim Attended, PT Orthotics Training, PT Gait Training, PT Self Care/Home Eval, PT Therapeutic Exercises, PT Neuro Re-Ed/Balance, PT Therapeutic Activity, and PT Evaluation      Note:     Goal Note filed on 05/05/21 1131 by Ivory Cintron, ASHUTOSH    Variable performance, min-max A to steer   Fatigues easily, L neglect                        Problem: Mobility Transfers     Dates: Start: 04/30/21       Goal: STG-Within one week, patient will perform bed mobility     Dates: Start: 04/30/21       Description: 1) Individualized goal:  sit <> supine with modA   2) Interventions:  PT Group Therapy, PT E Stim Attended, PT Orthotics Training, PT Gait Training, PT Self Care/Home Eval, PT Therapeutic Exercises, PT Neuro Re-Ed/Balance, PT Therapeutic Activity, and PT Evaluation      Note:     Goal Note filed on 05/05/21 1131 by Ivroy Cintron, LAVONT    Variable performance, mod A - 2 person  Fatigues easily                   Goal: STG-Within one week, patient will transfer bed to chair     Dates: Start: 04/30/21       Description: 1) Individualized goal:  transfer with slide board and modA  2) Interventions:  PT Group Therapy, PT E Stim Attended, PT Orthotics Training, PT Gait Training, PT Self Care/Home Eval, PT Therapeutic Exercises, PT Neuro Re-Ed/Balance, PT Therapeutic Activity, and PT Evaluation      Note:     Goal Note filed on 05/05/21 1131 by Ivory Cintron DPT    Variable performance - 1-2 person assist with slide board  Fatigues easily, haylie,  neglect                        Problem: PT-Long Term Goals     Dates: Start: 04/30/21       Goal: LTG-By discharge, patient will propel wheelchair     Dates: Start: 04/30/21       Description: 1) Individualized goal:  100ft with haylie technique and Anastacio on even/uneven surfaces  2) Interventions:  PT Group Therapy, PT E Stim Attended, PT Orthotics Training, PT Gait Training, PT Self Care/Home Eval, PT Therapeutic Exercises, PT Neuro Re-Ed/Balance, PT Therapeutic Activity, and PT Evaluation          Goal: LTG-By discharge, patient will transfer one surface to another     Dates: Start: 04/30/21       Description: 1) Individualized goal:  SQPT with Taz   2) Interventions:  PT Group Therapy, PT E Stim Attended, PT Orthotics Training, PT Gait Training, PT Self Care/Home Eval, PT Therapeutic Exercises, PT Neuro Re-Ed/Balance, PT Therapeutic Activity, and PT Evaluation            Goal: LTG-By discharge, patient will     Dates: Start: 04/30/21       Description: 1) Individualized goal: perform bed mobility (supine<>sit) with Taz   2) Interventions:  PT Group Therapy, PT E Stim Attended, PT Orthotics Training, PT Gait Training, PT Self Care/Home Eval, PT Therapeutic Exercises, PT Neuro Re-Ed/Balance, PT Therapeutic Activity, and PT Evaluation

## 2021-05-10 NOTE — THERAPY
Occupational Therapy  Daily Treatment     Patient Name: Thong Arzola  Age:  56 y.o., Sex:  male  Medical Record #: [id number]  Today's Date: 5/10/2021     Precautions  Precautions: (P) Fall Risk  Comments: (P) Helmet on OOB; L UE w/ 2-3 Modified Jeremiah Scale         Subjective    Patient expressed interest in trying to have BM on toilet (was successful).     Objective       05/10/21 0901   Precautions   Precautions Fall Risk   Comments Helmet on OOB; L UE w/ 2-3 Modified Jeremiah Scale   Cognition    Attention Impaired   Functional Level of Assist   Grooming Stand by Assist;Seated   Grooming Description Increased time;Seated in wheelchair at sink;Set-up of equipment;Supervision for safety;Verbal cueing  (to wash/dry hands/face, brush teeth)   Upper Body Dressing Minimal Assist   Upper Body Dressing Description Increased time;Set-up of equipment;Supervision for safety;Verbal cueing  (min A to don/doff pullover shirt w/ cues and extra time )   Toileting Total Assist x 2   Toileting Description Set-up of equipment;Supervision for safety;Verbal cueing;Grab bar;Assist to pull pants up;Assist to pull pants down;Assist for standing balance  (able to do hygiene, but assist w/ standing and pants up/down)   Toilet Transfers Maximal Assist   Toilet Transfer Description Grab bar;Increased time;Requires lift;Set-up of equipment;Supervision for safety;Verbal cueing  (lift and lower assist on/off commode over toilet, gb)   Interdisciplinary Plan of Care Collaboration   IDT Collaboration with  Therapy Tech   Patient Position at End of Therapy Seated;Chair Alarm On;Self Releasing Lap Belt Applied;Call Light within Reach;Tray Table within Reach;Other (Comments)  (in tilt)   Collaboration Comments tech assisted and placed headrest on w/c for when in tilt   OT Total Time Spent   OT Individual Total Time Spent (Mins) 60   OT Charge Group   OT Self Care / ADL 4     Placed in tilt for pressure relief and positioning in  w/c.    Educated on haylie-technique for donning/doffing shirt.    Therapy tech placed headrest on w/c for support in tilt position.    Assessment    Patient completed grooming and UB dressing, toileting and toilet transfer with less assistance today.  Tolerated standing with assist to perform own bowel hygiene.  Had large BM, continent.  Strengths: Able to follow instructions, Alert and oriented, Effective communication skills, Good insight into deficits/needs, Independent prior level of function, Manages pain appropriately, Motivated for self care and independence, Pleasant and cooperative, Supportive family, Willingly participates in therapeutic activities  Barriers: Bowel incontinence, Decreased endurance, Fatigue, Generalized weakness, Hemiplegia, Home accessibility, Orthostatic hypotension, Impaired activity tolerance, Impaired balance, Limited mobility, Spasticity    Plan    Sitting balance/core strengthening/midline orientation, ADL retraining, slideboard transfers progressing to lateral scoots or squat pivots to the right, L UE neuro re-ed/stretching, family training with spouse Anel; Bathing is not a goal of OT at this time (CNAs should be bathing patient)    Occupational Therapy Goals     Problem: Dressing     Dates: Start: 04/30/21       Goal: STG-Within one week, patient will dress UB     Dates: Start: 04/30/21       Description: 1) Individualized Goal:  with mod A with verbal cues for haylie-technique.  2) Interventions:  OT E Stim Attended, OT Self Care/ADL, OT Cognitive Skill Dev, OT Manual Ther Technique, OT Neuro Re-Ed/Balance, OT Sensory Int Techniques, OT Therapeutic Activity, OT Evaluation, and OT Therapeutic Exercise    Note:     Goal Note filed on 05/04/21 9422 by Aj Lovett, OT/DAILY    Max A                  Goal: STG-Within one week, patient will dress LB     Dates: Start: 04/30/21       Description: 1) Individualized Goal:  with max A using AE/AD/techniques as needed  2) Interventions:  OT  E Stim Attended, OT Self Care/ADL, OT Cognitive Skill Dev, OT Manual Ther Technique, OT Neuro Re-Ed/Balance, OT Sensory Int Techniques, OT Therapeutic Activity, OT Evaluation, and OT Therapeutic Exercise    Note:     Goal Note filed on 05/04/21 1459 by Aj Lovett, OT/L    Total A                        Problem: Grooming     Dates: Start: 04/30/21       Goal: STG-Within one week, patient will complete grooming     Dates: Start: 04/30/21       Description: 1) Individualized Goal:  with min A using AE/AD/techniques as needed  2) Interventions:  OT E Stim Attended, OT Self Care/ADL, OT Cognitive Skill Dev, OT Manual Ther Technique, OT Neuro Re-Ed/Balance, OT Sensory Int Techniques, OT Therapeutic Activity, OT Evaluation, and OT Therapeutic Exercise      Note:     Goal Note filed on 05/04/21 1459 by Aj Lovett, OT/L    Mod A                        Problem: OT Long Term Goals     Dates: Start: 04/30/21       Goal: LTG-By discharge, patient will complete basic self care tasks     Dates: Start: 04/30/21       Description: 1) Individualized Goal:  with min to mod A using AE/AD/techniques as needed  2) Interventions:  OT E Stim Attended, OT Self Care/ADL, OT Cognitive Skill Dev, OT Manual Ther Technique, OT Neuro Re-Ed/Balance, OT Sensory Int Techniques, OT Therapeutic Activity, OT Evaluation, and OT Therapeutic Exercise          Goal: LTG-By discharge, patient will perform bathroom transfers     Dates: Start: 04/30/21       Description: 1) Individualized Goal:  with max A x 1 person using slideboard versus squat pivot to the right  2) Interventions:  OT E Stim Attended, OT Self Care/ADL, OT Cognitive Skill Dev, OT Manual Ther Technique, OT Neuro Re-Ed/Balance, OT Sensory Int Techniques, OT Therapeutic Activity, OT Evaluation, and OT Therapeutic Exercise

## 2021-05-10 NOTE — DISCHARGE PLANNING
Per YESSENIA Macedo, [facility] will be here tomorrow at 3:30pm to assess patient.  Nursing aware.

## 2021-05-10 NOTE — PROGRESS NOTES
Hospital Medicine Daily Progress Note      Chief Complaint:  Hypertension  Afib    Interval History:  No significant events or changes since last visit    Review of Systems  Review of Systems   Constitutional: Negative for chills and fever.   Respiratory: Negative for shortness of breath.    Cardiovascular: Negative for chest pain.   Gastrointestinal: Negative for abdominal pain, diarrhea, nausea and vomiting.   Psychiatric/Behavioral: The patient is not nervous/anxious.         Physical Exam  Temp:  [36.5 °C (97.7 °F)-36.6 °C (97.9 °F)] 36.5 °C (97.7 °F)  Pulse:  [64-82] 80  Resp:  [16-18] 18  BP: (121-144)/(54-84) 128/84  SpO2:  [93 %-96 %] 93 %    Physical Exam  Vitals and nursing note reviewed.   Constitutional:       Appearance: Normal appearance.   HENT:      Head: Atraumatic.      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  Eyes:      Conjunctiva/sclera: Conjunctivae normal.      Pupils: Pupils are equal, round, and reactive to light.   Cardiovascular:      Rate and Rhythm: Normal rate and regular rhythm.      Heart sounds: No murmur.   Pulmonary:      Effort: Pulmonary effort is normal.      Breath sounds: No stridor. No wheezing or rales.   Abdominal:      General: There is no distension.      Palpations: Abdomen is soft.      Tenderness: There is no abdominal tenderness.   Musculoskeletal:      Cervical back: Normal range of motion and neck supple.      Right lower leg: No edema.      Left lower leg: No edema.   Skin:     General: Skin is warm and dry.      Findings: No rash.   Neurological:      Mental Status: He is alert and oriented to person, place, and time.   Psychiatric:         Mood and Affect: Mood normal.         Behavior: Behavior normal.         Fluids    Intake/Output Summary (Last 24 hours) at 5/10/2021 0906  Last data filed at 5/10/2021 0130  Gross per 24 hour   Intake 460 ml   Output 1500 ml   Net -1040 ml       Laboratory  Recent Labs     05/09/21  0615   WBC 6.4   RBC 4.29*    HEMOGLOBIN 12.1*   HEMATOCRIT 37.4*   MCV 87.2   MCH 28.2   MCHC 32.4*   RDW 40.2   PLATELETCT 285   MPV 9.1                     Assessment/Plan  * Acute right MCA stroke (HCC)- (present on admission)  Assessment & Plan  S/P decompressive craniectomy for increased ICP (4/3)    Paroxysmal atrial fibrillation (HCC)- (present on admission)  Assessment & Plan  HR ok   BP a little labile but ok  Echo: EF > 75%  On Lopressor  On Xarelto  Cont to monitor    Dizziness  Assessment & Plan  On Antivert    Arm swelling  Assessment & Plan  Nearly resolved  U/S LUE negative DVT  Likely 2nd to stroke and immobility    Dyslipidemia  Assessment & Plan  On Lipitor    Reactive depression- (present on admission)  Assessment & Plan  On Remeron    History of COVID-19- (present on admission)  Assessment & Plan  Had Covid 3/18/21    Acquired hypothyroidism- (present on admission)  Assessment & Plan  On [place] Thyroid

## 2021-05-10 NOTE — THERAPY
"Physical Therapy   Daily Treatment     Patient Name: Thong Arzola  Age:  56 y.o., Sex:  male  Medical Record #: [id number]  Today's Date: 5/10/2021     Precautions  Precautions: (P) Fall Risk  Comments: (P) Helmet on OOB; L UE w/ 2-3 Modified Jeremiah Scale    Subjective    Pt reported wanting to get into bed upon PT arrival, but agreed to attempt standing prior to lunch.      Objective       05/10/21 1101   Precautions   Precautions Fall Risk   Comments Helmet on OOB; L UE w/ 2-3 Modified Jeremiah Scale   Vitals   Pulse 87   Patient BP Position Sitting   Blood Pressure (!) 98/62   Bed Mobility    Sit to Stand Total Assist X 2  (Mod A STS with RUE pull on // bars, 2nd person to balance )   Neuro-Muscular Treatments   Neuro-Muscular Treatments Anterior weight shift;Facilitation;Weight Shift Right;Weight Shift Left;Verbal Cuing;Sequencing;Tactile Cuing;Postural Facilitation;Postural Changes;Joint Approximation   Comments Standing 1 min x 4 in // bars, with RUE support, 2 person support as needed to encourage midline, use of mirron ant to patient for postural cueing. Cueing to recuit gluteals in standing, and \"lift chest\".    Interdisciplinary Plan of Care Collaboration   IDT Collaboration with  Therapy Tech;Physician   Patient Position at End of Therapy Other (Comments)  (Handoff to T Dine)   Collaboration Comments POC, assist to support static standing   Strengths & Barriers   Strengths Alert and oriented;Effective communication skills;Independent prior level of function;Motivated for self care and independence;Pleasant and cooperative;Supportive family;Willingly participates in therapeutic activities   Barriers Hemiparesis;Home accessibility;Orthostatic hypotension;Impaired activity tolerance;Impaired balance;Spasticity;Limited mobility   PT Total Time Spent   PT Individual Total Time Spent (Mins) 30   PT Charge Group   PT Neuromuscular Re-Education / Balance 1   PT Therapeutic Activities 1 "       Assessment    Pt participated in functional stretching/ strengthening in // bars with 2 person (variable) assist. Pt required blocking to LLE, and assist to achieve midline. Pt was often encouraged to scan to midline, and beyond (naming items to his left while pushing wheelchair toward // bars). Pt continues to be very limited in L sided awareness, and rarely even gazes straight ahead unless cued to do so.  Pt demonstrated impaired activity tolerance, and tend to push into the wheelchair with the backs of his legs to support himself in standing.  Pt reported mild dizziness, but vitals remained stable.     Strengths: (P) Alert and oriented, Effective communication skills, Independent prior level of function, Motivated for self care and independence, Pleasant and cooperative, Supportive family, Willingly participates in therapeutic activities  Barriers: (P) Hemiparesis, Home accessibility, Orthostatic hypotension, Impaired activity tolerance, Impaired balance, Spasticity, Limited mobility    Plan    Midline orientation EOM, sitting balance, transfer training with SB progressing to SQPT as appropriate, bed mobility, w/c mobility,  LE, family training with spouse as appropriate. Continue STS training and standing tolerance as able. Assess tolerance in standing frame. Begin pre-gait training as appropriate    Passport items to be completed:  Get in/out of bed safely, in/out of a vehicle, safely use mobility device, walk or wheel around home/community, navigate up and down stairs, show how to get up/down from the ground, ensure home is accessible, demonstrate HEP, complete caregiver training    Physical Therapy Problems     Problem: Mobility     Dates: Start: 04/30/21       Goal: STG-Within one week, patient will propel wheelchair household distances     Dates: Start: 04/30/21       Description: 1) Individualized goal:  25ft with haylie technique with SBA on even surfaces  2) Interventions:  PT Group Therapy, PT  E Stim Attended, PT Orthotics Training, PT Gait Training, PT Self Care/Home Eval, PT Therapeutic Exercises, PT Neuro Re-Ed/Balance, PT Therapeutic Activity, and PT Evaluation      Note:     Goal Note filed on 05/05/21 1131 by Ivory Cintron, DPT    Variable performance, min-max A to steer   Fatigues easily, L neglect                        Problem: Mobility Transfers     Dates: Start: 04/30/21       Goal: STG-Within one week, patient will perform bed mobility     Dates: Start: 04/30/21       Description: 1) Individualized goal:  sit <> supine with modA   2) Interventions:  PT Group Therapy, PT E Stim Attended, PT Orthotics Training, PT Gait Training, PT Self Care/Home Eval, PT Therapeutic Exercises, PT Neuro Re-Ed/Balance, PT Therapeutic Activity, and PT Evaluation      Note:     Goal Note filed on 05/05/21 1131 by Ivory Cintron, DPT    Variable performance, mod A - 2 person  Fatigues easily                   Goal: STG-Within one week, patient will transfer bed to chair     Dates: Start: 04/30/21       Description: 1) Individualized goal:  transfer with slide board and modA  2) Interventions:  PT Group Therapy, PT E Stim Attended, PT Orthotics Training, PT Gait Training, PT Self Care/Home Eval, PT Therapeutic Exercises, PT Neuro Re-Ed/Balance, PT Therapeutic Activity, and PT Evaluation      Note:     Goal Note filed on 05/05/21 1131 by Ivory Cintron, DPT    Variable performance - 1-2 person assist with slide board  Fatigues easily, haylie, neglect                        Problem: PT-Long Term Goals     Dates: Start: 04/30/21       Goal: LTG-By discharge, patient will propel wheelchair     Dates: Start: 04/30/21       Description: 1) Individualized goal:  100ft with haylie technique and Anastacio on even/uneven surfaces  2) Interventions:  PT Group Therapy, PT E Stim Attended, PT Orthotics Training, PT Gait Training, PT Self Care/Home Eval, PT Therapeutic Exercises, PT Neuro Re-Ed/Balance, PT Therapeutic Activity, and PT  Evaluation          Goal: LTG-By discharge, patient will transfer one surface to another     Dates: Start: 04/30/21       Description: 1) Individualized goal:  SQPT with Taz   2) Interventions:  PT Group Therapy, PT E Stim Attended, PT Orthotics Training, PT Gait Training, PT Self Care/Home Eval, PT Therapeutic Exercises, PT Neuro Re-Ed/Balance, PT Therapeutic Activity, and PT Evaluation            Goal: LTG-By discharge, patient will     Dates: Start: 04/30/21       Description: 1) Individualized goal: perform bed mobility (supine<>sit) with Taz   2) Interventions:  PT Group Therapy, PT E Stim Attended, PT Orthotics Training, PT Gait Training, PT Self Care/Home Eval, PT Therapeutic Exercises, PT Neuro Re-Ed/Balance, PT Therapeutic Activity, and PT Evaluation

## 2021-05-10 NOTE — THERAPY
"Occupational Therapy  Daily Treatment     Patient Name: Thong Arzola  Age:  56 y.o., Sex:  male  Medical Record #: [id number]  Today's Date: 5/10/2021     Precautions  Precautions: Fall Risk  Comments: Helmet on OOB; L UE w/ 2-3 Modified Jeremiah Scale         Subjective    \"sometimes Ill try to stretch it on my own\"     Objective       05/10/21 1431   Neuro-Muscular Treatments   Neuro-Muscular Treatments Verbal Cuing;Facilitation   Comments LUE neuro re-ed and gentle stretch in all planes with good response. Hand is the tightest of all joints - discuss with primary OT re: hand splint. Removed elbow soft splint - updated room board for staff to have 2 hrs on/2hrs off throughout day. Edu spouse re: spasticity, future expectations re: medications/splint wear, and demo provided on stretching techniques   Interdisciplinary Plan of Care Collaboration   IDT Collaboration with  Occupational Therapist   Patient Position at End of Therapy Call Light within Reach;Tray Table within Reach;In Bed  (handoff to PT)   Collaboration Comments re: edu provided to spouse    OT Total Time Spent   OT Individual Total Time Spent (Mins) 30   OT Charge Group   OT Neuromuscular Re-education / Balance 2       Assessment    Spouse verbalized good understanding re: LUE care, spasticity, gentle PROM - no hands on training completed yet. Room board updated on elbow splint wear.     Strengths: Able to follow instructions, Alert and oriented, Effective communication skills, Good insight into deficits/needs, Independent prior level of function, Manages pain appropriately, Motivated for self care and independence, Pleasant and cooperative, Supportive family, Willingly participates in therapeutic activities  Barriers: Bowel incontinence, Decreased endurance, Fatigue, Generalized weakness, Hemiplegia, Home accessibility, Orthostatic hypotension, Impaired activity tolerance, Impaired balance, Limited mobility, Spasticity    Plan    Refer to Primary " OT POC/goals    Occupational Therapy Goals     Problem: Dressing     Dates: Start: 04/30/21       Goal: STG-Within one week, patient will dress UB     Dates: Start: 04/30/21       Description: 1) Individualized Goal:  with mod A with verbal cues for haylie-technique.  2) Interventions:  OT E Stim Attended, OT Self Care/ADL, OT Cognitive Skill Dev, OT Manual Ther Technique, OT Neuro Re-Ed/Balance, OT Sensory Int Techniques, OT Therapeutic Activity, OT Evaluation, and OT Therapeutic Exercise    Note:     Goal Note filed on 05/04/21 1459 by Aj Lovett, OT/L    Max A                  Goal: STG-Within one week, patient will dress LB     Dates: Start: 04/30/21       Description: 1) Individualized Goal:  with max A using AE/AD/techniques as needed  2) Interventions:  OT E Stim Attended, OT Self Care/ADL, OT Cognitive Skill Dev, OT Manual Ther Technique, OT Neuro Re-Ed/Balance, OT Sensory Int Techniques, OT Therapeutic Activity, OT Evaluation, and OT Therapeutic Exercise    Note:     Goal Note filed on 05/04/21 1459 by Aj Lovett, OT/L    Total A                        Problem: Grooming     Dates: Start: 04/30/21       Goal: STG-Within one week, patient will complete grooming     Dates: Start: 04/30/21       Description: 1) Individualized Goal:  with min A using AE/AD/techniques as needed  2) Interventions:  OT E Stim Attended, OT Self Care/ADL, OT Cognitive Skill Dev, OT Manual Ther Technique, OT Neuro Re-Ed/Balance, OT Sensory Int Techniques, OT Therapeutic Activity, OT Evaluation, and OT Therapeutic Exercise      Note:     Goal Note filed on 05/04/21 1459 by Aj Lovett, OT/L    Mod A                        Problem: OT Long Term Goals     Dates: Start: 04/30/21       Goal: LTG-By discharge, patient will complete basic self care tasks     Dates: Start: 04/30/21       Description: 1) Individualized Goal:  with min to mod A using AE/AD/techniques as needed  2) Interventions:  OT E Stim Attended, OT Self  Care/ADL, OT Cognitive Skill Dev, OT Manual Ther Technique, OT Neuro Re-Ed/Balance, OT Sensory Int Techniques, OT Therapeutic Activity, OT Evaluation, and OT Therapeutic Exercise          Goal: LTG-By discharge, patient will perform bathroom transfers     Dates: Start: 04/30/21       Description: 1) Individualized Goal:  with max A x 1 person using slideboard versus squat pivot to the right  2) Interventions:  OT E Stim Attended, OT Self Care/ADL, OT Cognitive Skill Dev, OT Manual Ther Technique, OT Neuro Re-Ed/Balance, OT Sensory Int Techniques, OT Therapeutic Activity, OT Evaluation, and OT Therapeutic Exercise

## 2021-05-11 PROCEDURE — 99232 SBSQ HOSP IP/OBS MODERATE 35: CPT | Performed by: HOSPITALIST

## 2021-05-11 PROCEDURE — 97129 THER IVNTJ 1ST 15 MIN: CPT

## 2021-05-11 PROCEDURE — 770010 HCHG ROOM/CARE - REHAB SEMI PRIVAT*

## 2021-05-11 PROCEDURE — 700102 HCHG RX REV CODE 250 W/ 637 OVERRIDE(OP): Performed by: PHYSICAL MEDICINE & REHABILITATION

## 2021-05-11 PROCEDURE — A9270 NON-COVERED ITEM OR SERVICE: HCPCS | Performed by: PHYSICAL MEDICINE & REHABILITATION

## 2021-05-11 PROCEDURE — 97130 THER IVNTJ EA ADDL 15 MIN: CPT

## 2021-05-11 PROCEDURE — 97535 SELF CARE MNGMENT TRAINING: CPT

## 2021-05-11 PROCEDURE — 99232 SBSQ HOSP IP/OBS MODERATE 35: CPT | Performed by: PHYSICAL MEDICINE & REHABILITATION

## 2021-05-11 PROCEDURE — 97530 THERAPEUTIC ACTIVITIES: CPT

## 2021-05-11 PROCEDURE — 97110 THERAPEUTIC EXERCISES: CPT

## 2021-05-11 PROCEDURE — 97112 NEUROMUSCULAR REEDUCATION: CPT

## 2021-05-11 RX ORDER — GABAPENTIN 100 MG/1
100 CAPSULE ORAL EVERY EVENING
Status: DISCONTINUED | OUTPATIENT
Start: 2021-05-11 | End: 2021-05-31

## 2021-05-11 RX ADMIN — MIRTAZAPINE 15 MG: 15 TABLET, ORALLY DISINTEGRATING ORAL at 21:01

## 2021-05-11 RX ADMIN — TAMSULOSIN HYDROCHLORIDE 0.8 MG: 0.4 CAPSULE ORAL at 20:59

## 2021-05-11 RX ADMIN — THYROID, PORCINE 60 MG: 30 TABLET ORAL at 20:59

## 2021-05-11 RX ADMIN — MECLIZINE HYDROCHLORIDE 25 MG: 25 TABLET ORAL at 17:29

## 2021-05-11 RX ADMIN — BACLOFEN 30 MG: 20 TABLET ORAL at 08:58

## 2021-05-11 RX ADMIN — MECLIZINE HYDROCHLORIDE 25 MG: 25 TABLET ORAL at 08:58

## 2021-05-11 RX ADMIN — METOPROLOL TARTRATE 12.5 MG: 25 TABLET, FILM COATED ORAL at 21:00

## 2021-05-11 RX ADMIN — METOPROLOL TARTRATE 12.5 MG: 25 TABLET, FILM COATED ORAL at 08:59

## 2021-05-11 RX ADMIN — MECLIZINE HYDROCHLORIDE 25 MG: 25 TABLET ORAL at 20:59

## 2021-05-11 RX ADMIN — ASPIRIN 81 MG CHEWABLE TABLET 81 MG: 81 TABLET CHEWABLE at 17:29

## 2021-05-11 RX ADMIN — BACLOFEN 30 MG: 20 TABLET ORAL at 20:59

## 2021-05-11 RX ADMIN — MELATONIN TAB 3 MG 3 MG: 3 TAB at 21:00

## 2021-05-11 RX ADMIN — BACLOFEN 30 MG: 20 TABLET ORAL at 17:27

## 2021-05-11 RX ADMIN — THYROID, PORCINE 60 MG: 30 TABLET ORAL at 08:58

## 2021-05-11 RX ADMIN — ATORVASTATIN CALCIUM 80 MG: 40 TABLET, FILM COATED ORAL at 20:59

## 2021-05-11 RX ADMIN — RIVAROXABAN 20 MG: 20 TABLET, FILM COATED ORAL at 17:29

## 2021-05-11 RX ADMIN — GABAPENTIN 100 MG: 100 CAPSULE ORAL at 20:59

## 2021-05-11 ASSESSMENT — ENCOUNTER SYMPTOMS
SHORTNESS OF BREATH: 0
PALPITATIONS: 0
FOCAL WEAKNESS: 1
COUGH: 0
BRUISES/BLEEDS EASILY: 0
EYES NEGATIVE: 1
NAUSEA: 0
ABDOMINAL PAIN: 0
FEVER: 0
DEPRESSION: 1
CHILLS: 0
VOMITING: 0
POLYDIPSIA: 0

## 2021-05-11 ASSESSMENT — PATIENT HEALTH QUESTIONNAIRE - PHQ9
3. TROUBLE FALLING OR STAYING ASLEEP OR SLEEPING TOO MUCH: SEVERAL DAYS
6. FEELING BAD ABOUT YOURSELF - OR THAT YOU ARE A FAILURE OR HAVE LET YOURSELF OR YOUR FAMILY DOWN: NOT AL ALL
2. FEELING DOWN, DEPRESSED, IRRITABLE, OR HOPELESS: NOT AT ALL
9. THOUGHTS THAT YOU WOULD BE BETTER OFF DEAD, OR OF HURTING YOURSELF: NOT AT ALL
1. LITTLE INTEREST OR PLEASURE IN DOING THINGS: SEVERAL DAYS
SUM OF ALL RESPONSES TO PHQ9 QUESTIONS 1 AND 2: 1
5. POOR APPETITE OR OVEREATING: SEVERAL DAYS
7. TROUBLE CONCENTRATING ON THINGS, SUCH AS READING THE NEWSPAPER OR WATCHING TELEVISION: SEVERAL DAYS
8. MOVING OR SPEAKING SO SLOWLY THAT OTHER PEOPLE COULD HAVE NOTICED. OR THE OPPOSITE, BEING SO FIGETY OR RESTLESS THAT YOU HAVE BEEN MOVING AROUND A LOT MORE THAN USUAL: NOT AT ALL
SUM OF ALL RESPONSES TO PHQ QUESTIONS 1-9: 5
4. FEELING TIRED OR HAVING LITTLE ENERGY: SEVERAL DAYS

## 2021-05-11 ASSESSMENT — ACTIVITIES OF DAILY LIVING (ADL): BED_CHAIR_WHEELCHAIR_TRANSFER_DESCRIPTION: SET-UP OF EQUIPMENT;SUPERVISION FOR SAFETY;VERBAL CUEING

## 2021-05-11 NOTE — THERAPY
Physical Therapy   Daily Treatment     Patient Name: Thong Arzola  Age:  56 y.o., Sex:  male  Medical Record #: [id number]  Today's Date: 5/11/2021     Precautions  Precautions: Fall Risk  Comments: Helmet on OOB; L UE w/ 2-3 Modified Jeremiah Scale    Subjective    Pt seated in room with wife present, agreeable to PT.     Objective       05/11/21 1031   Precautions   Precautions Fall Risk   Comments Helmet on OOB; L UE w/ 2-3 Modified Jeremiah Scale   Transfer Functional Level of Assist   Bed, Chair, Wheelchair Transfer   (see note for transfer training completed this session)   Bed Mobility    Supine to Sit Minimal Assist  (extra time, verbal cuing, and demonstration )   Sit to Supine Stand by Assist   Scooting Stand by Assist   Interdisciplinary Plan of Care Collaboration   IDT Collaboration with  Family / Caregiver;Therapy Tech   Patient Position at End of Therapy Seated;Call Light within Reach;Tray Table within Reach;Family / Friend in Room   Collaboration Comments pt's wife present for PT session. tech assisted with session    PT Total Time Spent   PT Individual Total Time Spent (Mins) 30   PT Charge Group   PT Therapeutic Activities 2     Transfer training completed this session with flat, full size bed: pt completed slideboard transfer with min A x2 WC <> supine in bed; pt also completed squat pivot transfer mod A x2 WC <> EOB (sit <> sit). Pt and wife provided education throughout regarding transfer technique, head-hips relationship, and safe assistance techniques.     Assessment    Pt and wife receptive to education regarding transfer techniques and assist. Pt showing progress toward squat pivot vs slideboard transfer technique.    Strengths: Alert and oriented, Effective communication skills, Independent prior level of function, Motivated for self care and independence, Pleasant and cooperative, Supportive family, Willingly participates in therapeutic activities  Barriers: Hemiparesis, Home  accessibility, Orthostatic hypotension, Impaired activity tolerance, Impaired balance, Spasticity, Limited mobility    Plan    Midline orientation EOM, sitting balance, transfer training with SB progressing to SQPT as appropriate, bed mobility, w/c mobility,  LE (progress to adjunct after 2nd trial), family training with spouse as appropriate. Continue STS training and standing tolerance as able.  Ongoing standing frame tolerance.     Physical Therapy Problems     Problem: Mobility     Dates: Start: 04/30/21       Goal: STG-Within one week, patient will propel wheelchair household distances     Dates: Start: 04/30/21       Description: 1) Individualized goal:  25ft with haylie technique with SBA on even surfaces  2) Interventions:  PT Group Therapy, PT E Stim Attended, PT Orthotics Training, PT Gait Training, PT Self Care/Home Eval, PT Therapeutic Exercises, PT Neuro Re-Ed/Balance, PT Therapeutic Activity, and PT Evaluation      Note:     Goal Note filed on 05/05/21 1131 by Ivory Cintron, DPT    Variable performance, min-max A to steer   Fatigues easily, L neglect                        Problem: Mobility Transfers     Dates: Start: 04/30/21       Goal: STG-Within one week, patient will perform bed mobility     Dates: Start: 04/30/21       Description: 1) Individualized goal:  sit <> supine with modA   2) Interventions:  PT Group Therapy, PT E Stim Attended, PT Orthotics Training, PT Gait Training, PT Self Care/Home Eval, PT Therapeutic Exercises, PT Neuro Re-Ed/Balance, PT Therapeutic Activity, and PT Evaluation      Note:     Goal Note filed on 05/05/21 1131 by Ivory Cintron, DPT    Variable performance, mod A - 2 person  Fatigues easily                   Goal: STG-Within one week, patient will transfer bed to chair     Dates: Start: 04/30/21       Description: 1) Individualized goal:  transfer with slide board and modA  2) Interventions:  PT Group Therapy, PT E Stim Attended, PT Orthotics Training, PT Gait  Training, PT Self Care/Home Eval, PT Therapeutic Exercises, PT Neuro Re-Ed/Balance, PT Therapeutic Activity, and PT Evaluation      Note:     Goal Note filed on 05/05/21 1131 by Ivory Cintron DPT    Variable performance - 1-2 person assist with slide board  Fatigues easily, haylie, neglect                        Problem: PT-Long Term Goals     Dates: Start: 04/30/21       Goal: LTG-By discharge, patient will propel wheelchair     Dates: Start: 04/30/21       Description: 1) Individualized goal:  100ft with haylie technique and Anastacio on even/uneven surfaces  2) Interventions:  PT Group Therapy, PT E Stim Attended, PT Orthotics Training, PT Gait Training, PT Self Care/Home Eval, PT Therapeutic Exercises, PT Neuro Re-Ed/Balance, PT Therapeutic Activity, and PT Evaluation          Goal: LTG-By discharge, patient will transfer one surface to another     Dates: Start: 04/30/21       Description: 1) Individualized goal:  SQPT with Taz   2) Interventions:  PT Group Therapy, PT E Stim Attended, PT Orthotics Training, PT Gait Training, PT Self Care/Home Eval, PT Therapeutic Exercises, PT Neuro Re-Ed/Balance, PT Therapeutic Activity, and PT Evaluation            Goal: LTG-By discharge, patient will     Dates: Start: 04/30/21       Description: 1) Individualized goal: perform bed mobility (supine<>sit) with Taz   2) Interventions:  PT Group Therapy, PT E Stim Attended, PT Orthotics Training, PT Gait Training, PT Self Care/Home Eval, PT Therapeutic Exercises, PT Neuro Re-Ed/Balance, PT Therapeutic Activity, and PT Evaluation

## 2021-05-11 NOTE — THERAPY
Physical Therapy   Daily Treatment     Patient Name: Thong Arzola  Age:  56 y.o., Sex:  male  Medical Record #: [id number]  Today's Date: 5/11/2021     Precautions  Precautions: (P) Fall Risk  Comments: (P) Helmet on OOB; L UE w/ 2-3 Modified Jeremiah Scale    Subjective    Patient and spouse were receptive and agreeable to family training for therapeutic exercise in hook lying.       Objective       05/11/21 1401   Precautions   Precautions Fall Risk   Comments Helmet on OOB; L UE w/ 2-3 Modified Jeremiah Scale   Transfer Functional Level of Assist   Bed, Chair, Wheelchair Transfer Moderate Assist  (towards left, extra time, 2nd person on SBA- inconsistent )   Bed Chair Wheelchair Transfer Description Slideboard transfer from bed to wheelchair;Set-up of equipment;Verbal cueing;Increased time;Adaptive equipment;Assist with two limbs   Supine Lower Body Exercise   Supine Lower Body Exercises   (Family training)   Bridges Two Legged;2 sets of 10   Hip Abduction Hook Lying;2 sets of 10;Bilateral;Light Resistance Theraband  (Isometric L)   Hip Adduction  2 sets of 10;Bilateral  (pillow squeezes )   Sitting Lower Body Exercises   Comments [employer] LE cycle 0.81 miles (0.78 miles AROM), 15 min (14:41 AROM), 46%L, 54%R, pt encouraged to gaze forward at screen for postural improvement and symmetry training.    Bed Mobility    Supine to Sit Moderate Assist   Scooting Minimal Assist   Rolling Moderate Assist to Rt.   Neuro-Muscular Treatments   Neuro-Muscular Treatments Weight Shift Left;Weight Shift Right;Verbal Cuing;Sequencing;Tactile Cuing;Postural Facilitation;Joint Approximation;Postural Changes;Facilitation;Anterior weight shift   Comments Positioning with pillow to assist with midline during [employer].  Pt instructed to describe passing items to his left when being pushed from room to gym for L sided attention. Arm pad added to L armrest for elbow protection with padded sleeve is off (2 hrs on/ 2 hrs off).     Interdisciplinary Plan of Care Collaboration   IDT Collaboration with  Family / Caregiver   Patient Position at End of Therapy Seated;Family / Friend in Room;Self Releasing Lap Belt Applied   Collaboration Comments Family training with Anel for supine ther ex; handouts provided.     Strengths & Barriers   Strengths Alert and oriented;Effective communication skills;Independent prior level of function;Motivated for self care and independence;Pleasant and cooperative;Supportive family;Willingly participates in therapeutic activities   Barriers Hemiparesis;Home accessibility;Orthostatic hypotension;Impaired activity tolerance;Impaired balance;Spasticity;Limited mobility   PT Total Time Spent   PT Individual Total Time Spent (Mins) 60   PT Charge Group   PT Therapeutic Exercise 1   PT Neuromuscular Re-Education / Balance 1   PT Therapeutic Activities 2       Assessment    Spouse, Anel, participated in family training for core/ hip exercise on hook lying. Pt demonstrated a SB transfer towards his left x 1 person (2nd person was on standby due to inconsistencies). Pt was able to use LLE actively, without additional resistance, during [employer] LE training, and nearly symmetrical. Spouse remains very encouraging, engaged, and supportive. Pt continues to prefer to gaze toward the right, but can gaze central, or left with cuing.        Strengths: (P) Alert and oriented, Effective communication skills, Independent prior level of function, Motivated for self care and independence, Pleasant and cooperative, Supportive family, Willingly participates in therapeutic activities  Barriers: (P) Hemiparesis, Home accessibility, Orthostatic hypotension, Impaired activity tolerance, Impaired balance, Spasticity, Limited mobility    Plan    Midline orientation EOM, sitting balance, transfer training with SB progressing to SQPT as appropriate, bed mobility, w/c mobility,  LE, ongoing family training with spouse as  appropriate. Continue STS training and standing tolerance as able.  Ongoing standing frame tolerance.     Passport items to be completed:  Get in/out of bed safely, in/out of a vehicle, safely use mobility device, walk or wheel around home/community, navigate up and down stairs, show how to get up/down from the ground, ensure home is accessible, demonstrate HEP, complete caregiver training    Physical Therapy Problems     Problem: Mobility     Dates: Start: 04/30/21       Goal: STG-Within one week, patient will propel wheelchair household distances     Dates: Start: 04/30/21       Description: 1) Individualized goal:  25ft with haylie technique with SBA on even surfaces  2) Interventions:  PT Group Therapy, PT E Stim Attended, PT Orthotics Training, PT Gait Training, PT Self Care/Home Eval, PT Therapeutic Exercises, PT Neuro Re-Ed/Balance, PT Therapeutic Activity, and PT Evaluation      Note:     Goal Note filed on 05/05/21 1131 by Ivory Cintron, DPT    Variable performance, min-max A to steer   Fatigues easily, L neglect                        Problem: Mobility Transfers     Dates: Start: 04/30/21       Goal: STG-Within one week, patient will perform bed mobility     Dates: Start: 04/30/21       Description: 1) Individualized goal:  sit <> supine with modA   2) Interventions:  PT Group Therapy, PT E Stim Attended, PT Orthotics Training, PT Gait Training, PT Self Care/Home Eval, PT Therapeutic Exercises, PT Neuro Re-Ed/Balance, PT Therapeutic Activity, and PT Evaluation      Note:     Goal Note filed on 05/05/21 1131 by Ivory Cintron, DPT    Variable performance, mod A - 2 person  Fatigues easily                   Goal: STG-Within one week, patient will transfer bed to chair     Dates: Start: 04/30/21       Description: 1) Individualized goal:  transfer with slide board and modA  2) Interventions:  PT Group Therapy, PT E Stim Attended, PT Orthotics Training, PT Gait Training, PT Self Care/Home Eval, PT Therapeutic  Exercises, PT Neuro Re-Ed/Balance, PT Therapeutic Activity, and PT Evaluation      Note:     Goal Note filed on 05/05/21 1131 by Ivory Cintron DPT    Variable performance - 1-2 person assist with slide board  Fatigues easily, haylie, neglect                        Problem: PT-Long Term Goals     Dates: Start: 04/30/21       Goal: LTG-By discharge, patient will propel wheelchair     Dates: Start: 04/30/21       Description: 1) Individualized goal:  100ft with haylie technique and Anastacio on even/uneven surfaces  2) Interventions:  PT Group Therapy, PT E Stim Attended, PT Orthotics Training, PT Gait Training, PT Self Care/Home Eval, PT Therapeutic Exercises, PT Neuro Re-Ed/Balance, PT Therapeutic Activity, and PT Evaluation          Goal: LTG-By discharge, patient will transfer one surface to another     Dates: Start: 04/30/21       Description: 1) Individualized goal:  SQPT with Taz   2) Interventions:  PT Group Therapy, PT E Stim Attended, PT Orthotics Training, PT Gait Training, PT Self Care/Home Eval, PT Therapeutic Exercises, PT Neuro Re-Ed/Balance, PT Therapeutic Activity, and PT Evaluation            Goal: LTG-By discharge, patient will     Dates: Start: 04/30/21       Description: 1) Individualized goal: perform bed mobility (supine<>sit) with Taz   2) Interventions:  PT Group Therapy, PT E Stim Attended, PT Orthotics Training, PT Gait Training, PT Self Care/Home Eval, PT Therapeutic Exercises, PT Neuro Re-Ed/Balance, PT Therapeutic Activity, and PT Evaluation

## 2021-05-11 NOTE — CARE PLAN
Problem: Communication  Goal: The ability to communicate needs accurately and effectively will improve  Note: Patient able to express his own needs to staff. He uses the call light appropriately.      Problem: Safety  Goal: Will remain free from injury  Intervention: Provide assistance with mobility  Note: Patient uses call light consistently and appropriately this shift.  Waits for assistance when needed and does not attempt self transfer.

## 2021-05-11 NOTE — PROGRESS NOTES
Hospital Medicine Daily Progress Note      Chief Complaint  Hypotension    Interval Problem Update  No overnight problems.    Review of Systems  Review of Systems   Constitutional: Negative for chills and fever.   Eyes: Negative.    Respiratory: Negative for cough and shortness of breath.    Cardiovascular: Negative for chest pain and palpitations.   Gastrointestinal: Negative for abdominal pain, nausea and vomiting.   Skin: Negative for itching and rash.   Neurological: Positive for focal weakness.   Endo/Heme/Allergies: Negative for polydipsia. Does not bruise/bleed easily.   Psychiatric/Behavioral: Positive for depression.        Physical Exam  Temp:  [36.4 °C (97.5 °F)-36.5 °C (97.7 °F)] 36.4 °C (97.5 °F)  Pulse:  [] 96  Resp:  [17] 17  BP: ()/(74-88) 132/86  SpO2:  [92 %-97 %] 97 %    Physical Exam  Vitals reviewed.   Constitutional:       General: He is not in acute distress.     Appearance: Normal appearance. He is not ill-appearing.   HENT:      Head:      Comments: R skull defect     Right Ear: External ear normal.      Left Ear: External ear normal.      Nose: Nose normal.      Mouth/Throat:      Pharynx: Oropharynx is clear.   Eyes:      General:         Right eye: No discharge.         Left eye: No discharge.      Extraocular Movements: Extraocular movements intact.      Conjunctiva/sclera: Conjunctivae normal.   Cardiovascular:      Rate and Rhythm: Normal rate and regular rhythm.   Pulmonary:      Effort: Pulmonary effort is normal. No respiratory distress.      Breath sounds: Normal breath sounds. No wheezing.   Abdominal:      General: Bowel sounds are normal. There is no distension.      Palpations: Abdomen is soft.      Tenderness: There is no abdominal tenderness.   Musculoskeletal:      Cervical back: Normal range of motion and neck supple.      Right lower leg: No edema.      Left lower leg: No edema.      Comments: LUE trace edema   Skin:     General: Skin is warm and dry.    Neurological:      Mental Status: He is alert.      Comments: Awake and alert         Fluids    Intake/Output Summary (Last 24 hours) at 5/11/2021 1529  Last data filed at 5/11/2021 1300  Gross per 24 hour   Intake 860 ml   Output 1550 ml   Net -690 ml       Laboratory  Recent Labs     05/09/21  0615   WBC 6.4   RBC 4.29*   HEMOGLOBIN 12.1*   HEMATOCRIT 37.4*   MCV 87.2   MCH 28.2   MCHC 32.4*   RDW 40.2   PLATELETCT 285   MPV 9.1                     Assessment/Plan  * Acute right MCA stroke (HCC)- (present on admission)  Assessment & Plan  Presented w/ left hemiparesis  S/P decompressive craniectomy for increased ICP done on 4/3/21 by Dr. Payton  On ASA and Lipitor  Helmet when OOB    Paroxysmal atrial fibrillation (HCC)- (present on admission)  Assessment & Plan  Echo EF >75%  Rate controlled on Metoprolol  Anticoagulated on Xarelto  Monitor for orthostatic hypotension on Flomax  Check F/U labs in am     Reactive depression- (present on admission)  Assessment & Plan  On Remeron    History of COVID-19- (present on admission)  Assessment & Plan  SARS-CoV-2 PCR positive 3/18/21    Acquired hypothyroidism- (present on admission)  Assessment & Plan  Euthyroid on [place] Thyroid     Full Code

## 2021-05-11 NOTE — THERAPY
"Speech Language Pathology  Daily Treatment     Patient Name: Thong Arzola  Age:  56 y.o., Sex:  male  Medical Record #: [id number]  Today's Date: 5/11/2021     Precautions  Precautions: Fall Risk  Comments: Helmet on OOB; L UE w/ 2-3 Modified Jeremiah Scale    Subjective    Patient was willing to participate in ST.      Objective       05/11/21 0932   Cognition   Attention to Task Minimal (4)   Simple Attention Minimal (4)   Moderate Attention Minimal (4)   Visual Scanning / Cancellation Skills Moderate (3)   SLP Total Time Spent   SLP Individual Total Time Spent (Mins) 60   Treatment Charges   SLP Cognitive Skill Development First 15 Minutes 1   SLP Cognitive Skill Development Additional 15 Minutes 3       Assessment    Mod cues needed for visual scanning during maze tasks.   Completed Calendar organization task with min cues to locate dates on calendar, mod cues to scan left when locating items on \"to do\" page.     Strengths: Able to follow instructions, Pleasant and cooperative, Supportive family, Willingly participates in therapeutic activities, Motivated for self care and independence  Barriers: Impaired activity tolerance, Impaired functional cognition, Impaired insight/denial of deficits, Visual impairment(Left neglect)    Plan    Continue to target visual attention     Passport items to be completed:  complete caregiver training     Speech Therapy Problems     Problem: Problem Solving STGs     Dates: Start: 05/05/21       Goal: STG-Within one week, patient will     Dates: Start: 05/05/21       Description: 1) Individualized goal: demonstrate appropriate visual scanning to the left with min cues and 80% accuracy.  2) Interventions:  SLP Speech Language Treatment, SLP Self Care / ADL Training , SLP Cognitive Skill Development, and SLP Group Treatment              Goal: STG-Within one week, patient will     Dates: Start: 05/05/21       Description: 1) Individualized goal:  complete simple attention tasks " with min A with 80% accuracy.   2) Interventions:  SLP Speech Language Treatment, SLP Self Care / ADL Training , SLP Cognitive Skill Development, and SLP Group Treatment                    Problem: Speech/Swallowing LTGs     Dates: Start: 04/30/21       Goal: LTG-By discharge, patient will solve complex problem     Dates: Start: 04/30/21       Description: 1) Individualized goal:  related to IADL's with mod I for safe d/c home.  2) Interventions:  SLP Speech Language Treatment, SLP Swallowing Dysfunction Treatment, SLP Oral Pharyngeal Evaluation, SLP Video Swallow Evaluation, SLP Self Care / ADL Training , SLP Cognitive Skill Development, and SLP Group Treatment              Goal: LTG-By discharge, patient will     Dates: Start: 04/30/21       Description: 1) Individualized goal:  tolerate 7- Regular Textures and 0-Thin liquids with no overt s/sx of aspiration noted   2) Interventions:  SLP Speech Language Treatment, SLP Swallowing Dysfunction Treatment, SLP Oral Pharyngeal Evaluation, SLP Video Swallow Evaluation, and SLP Group Treatment                        Problem: Swallowing STGs     Dates: Start: 05/05/21       Goal: STG-Within one week, patient will     Dates: Start: 05/05/21       Description: 1) Individualized goal: tolerate 7- Regular Textures and 0-Thin liquids during meal times with no overt s/sx of aspiration noted for 1/1 meals   2) Interventions:  SLP Swallowing Dysfunction Treatment, SLP Oral Pharyngeal Evaluation, SLP Self Care / ADL Training , and SLP Group Treatment

## 2021-05-11 NOTE — THERAPY
Occupational Therapy  Daily Treatment     Patient Name: Thong Arzola  Age:  56 y.o., Sex:  male  Medical Record #: [id number]  Today's Date: 5/11/2021     Precautions  Precautions: (P) Fall Risk  Comments: (P) Helmet on OOB; L UE w/ 2-3 Modified Jeremiah Scale         Subjective    Patient laying in bed awake and agreeable to get dressed, groomed and out of bed.  Stated he slept well.     Objective       05/11/21 0701   Precautions   Precautions Fall Risk   Comments Helmet on OOB; L UE w/ 2-3 Modified Jeremiah Scale   Cognition    Ability To Follow Commands 1 Step   New Learning Impaired   Attention Impaired   Sequencing Impaired   Functional Level of Assist   Eating Supervision   Eating Description Increased time;Supervision for safety;Verbal cueing;Set-up of equipment or meal/tube feeding   Grooming Stand by Assist;Seated   Grooming Description Seated in wheelchair at sink;Increased time;Set-up of equipment;Supervision for safety;Verbal cueing  (to brush teeth, wash/dry hands/face)   Upper Body Dressing Moderate Assist   Upper Body Dressing Description Initial preparation for task;Set-up of equipment;Supervision for safety;Verbal cueing  (assist to thread L UE and pull down, assist sit balance EOB)   Lower Body Dressing Moderate Assist   Lower Body Dressing Description Increased time;Initial preparation for task;Set-up of equipment;Supervision for safety;Verbal cueing  (assist w/ 3/8 tasks to don/doff shorts and don slip on shoes)   Bed, Chair, Wheelchair Transfer Minimal Assist   Bed Chair Wheelchair Transfer Description Set-up of equipment;Supervision for safety;Verbal cueing  (min A reach pivot to the right from bed to w/c)   Bed Mobility    Supine to Sit Moderate Assist   Scooting Minimal Assist   Skilled Intervention Verbal Cuing;Tactile Cuing;Sequencing   Interdisciplinary Plan of Care Collaboration   IDT Collaboration with  Therapy Tech   Patient Position at End of Therapy Seated;Chair Alarm On;Self  Releasing Lap Belt Applied  (in t dine)   Collaboration Comments assisted with jasper   OT Total Time Spent   OT Individual Total Time Spent (Mins) 60   OT Charge Group   OT Self Care / ADL 4       Assessment    Patient demonstrated improvement in LB dressing today while supine in bed utilizing partial bridge and rolling.  Required frequent verbal cues due to impaired attention, direction following and impulsivity.    Strengths: Able to follow instructions, Alert and oriented, Effective communication skills, Good insight into deficits/needs, Independent prior level of function, Manages pain appropriately, Motivated for self care and independence, Pleasant and cooperative, Supportive family, Willingly participates in therapeutic activities  Barriers: Bowel incontinence, Decreased endurance, Fatigue, Generalized weakness, Hemiplegia, Home accessibility, Orthostatic hypotension, Impaired activity tolerance, Impaired balance, Limited mobility, Spasticity    Plan    Sitting balance/core strengthening/midline orientation, ADL retraining, slideboard transfers progressing to lateral scoots or squat pivots to the right, L UE neuro re-ed/stretching, family training with spouse Anel; Bathing is not a goal of OT at this time (CNAs should be bathing patient)    Occupational Therapy Goals     Problem: Dressing     Dates: Start: 04/30/21       Goal: STG-Within one week, patient will dress UB     Dates: Start: 04/30/21       Description: 1) Individualized Goal:  with mod A with verbal cues for haylie-technique.  2) Interventions:  OT E Stim Attended, OT Self Care/ADL, OT Cognitive Skill Dev, OT Manual Ther Technique, OT Neuro Re-Ed/Balance, OT Sensory Int Techniques, OT Therapeutic Activity, OT Evaluation, and OT Therapeutic Exercise    Note:     Goal Note filed on 05/04/21 9510 by Aj Lovett, OT/L    Max A                  Goal: STG-Within one week, patient will dress LB     Dates: Start: 04/30/21       Description: 1)  Individualized Goal:  with max A using AE/AD/techniques as needed  2) Interventions:  OT E Stim Attended, OT Self Care/ADL, OT Cognitive Skill Dev, OT Manual Ther Technique, OT Neuro Re-Ed/Balance, OT Sensory Int Techniques, OT Therapeutic Activity, OT Evaluation, and OT Therapeutic Exercise    Note:     Goal Note filed on 05/04/21 1459 by Aj Lovett, OT/L    Total A                        Problem: Grooming     Dates: Start: 04/30/21       Goal: STG-Within one week, patient will complete grooming     Dates: Start: 04/30/21       Description: 1) Individualized Goal:  with min A using AE/AD/techniques as needed  2) Interventions:  OT E Stim Attended, OT Self Care/ADL, OT Cognitive Skill Dev, OT Manual Ther Technique, OT Neuro Re-Ed/Balance, OT Sensory Int Techniques, OT Therapeutic Activity, OT Evaluation, and OT Therapeutic Exercise      Note:     Goal Note filed on 05/04/21 1459 by Aj Lovett, OT/L    Mod A                        Problem: OT Long Term Goals     Dates: Start: 04/30/21       Goal: LTG-By discharge, patient will complete basic self care tasks     Dates: Start: 04/30/21       Description: 1) Individualized Goal:  with min to mod A using AE/AD/techniques as needed  2) Interventions:  OT E Stim Attended, OT Self Care/ADL, OT Cognitive Skill Dev, OT Manual Ther Technique, OT Neuro Re-Ed/Balance, OT Sensory Int Techniques, OT Therapeutic Activity, OT Evaluation, and OT Therapeutic Exercise          Goal: LTG-By discharge, patient will perform bathroom transfers     Dates: Start: 04/30/21       Description: 1) Individualized Goal:  with max A x 1 person using slideboard versus squat pivot to the right  2) Interventions:  OT E Stim Attended, OT Self Care/ADL, OT Cognitive Skill Dev, OT Manual Ther Technique, OT Neuro Re-Ed/Balance, OT Sensory Int Techniques, OT Therapeutic Activity, OT Evaluation, and OT Therapeutic Exercise

## 2021-05-11 NOTE — CARE PLAN
Problem: Safety  Goal: Will remain free from falls  Outcome: PROGRESSING AS EXPECTED  Intervention: Implement fall precautions  Flowsheets (Taken 5/11/2021 0338)  Bed Alarm: Yes - Alarm On  Note: Pt with hard cast on the right lower extremity, safety measures enforced , complaint to instructions, does not self transfer, needs anticipated and attended. Pt free from fall and injury.     Problem: Pain Management  Goal: Pain level will decrease to patient's comfort goal  Outcome: PROGRESSING AS EXPECTED  Intervention: Educate and implement non-pharmacologic comfort measures. Examples: relaxation, distration, play therapy, activity therapy, massage, etc.  Note: Pain controlled with oxycodone 15 mg at night, cont monitored.     Problem: Urinary Elimination:  Goal: Ability to reestablish a normal urinary elimination pattern will improve  Outcome: PROGRESSING SLOWER THAN EXPECTED  Intervention: Evaluate need to continue indwelling urinary catheter  Note: Pt with dye cath dressing to slightly bloody urine, flushed with sterile ,urine output cleared.

## 2021-05-11 NOTE — DISCHARGE PLANNING
[facility] ([facility]) met with patient and family this afternoon. Patient is a good candidate for program.  Team conference tomorrow and CM to update Phylicia at [facility] on DC date and patients progress.  CM will continue to monitor for DC needs.

## 2021-05-11 NOTE — PROGRESS NOTES
"Rehab Progress Note     Date of Service: 5/11/2021  Chief Complaint: follow up stroke    Interval Events (Subjective)    Patient seen and examined today in the cafeteria just before lunch. He is complaining of a sharp shooting pain in his left leg that shoots up his back at night and is interfering with his sleep. We discussed starting gabapentin. He has no other complaints today.     ROS: No changes to bowel, bladder, or mood.            Objective:  VITAL SIGNS: /88   Pulse 80   Temp 36.5 °C (97.7 °F) (Oral)   Resp 17   Ht 1.778 m (5' 10\")   Wt 67.2 kg (148 lb 2.4 oz)   SpO2 92%   BMI 21.26 kg/m²   Gen: alert, no apparent distress  HEENT: helmet   Neuro: notable for spastic left hemiplegia, left neglect        Recent Results (from the past 72 hour(s))   CBC WITHOUT DIFFERENTIAL    Collection Time: 05/09/21  6:15 AM   Result Value Ref Range    WBC 6.4 4.8 - 10.8 K/uL    RBC 4.29 (L) 4.70 - 6.10 M/uL    Hemoglobin 12.1 (L) 14.0 - 18.0 g/dL    Hematocrit 37.4 (L) 42.0 - 52.0 %    MCV 87.2 81.4 - 97.8 fL    MCH 28.2 27.0 - 33.0 pg    MCHC 32.4 (L) 33.7 - 35.3 g/dL    RDW 40.2 35.9 - 50.0 fL    Platelet Count 285 164 - 446 K/uL    MPV 9.1 9.0 - 12.9 fL       Current Facility-Administered Medications   Medication Frequency   • melatonin tablet 3 mg QHS   • tamsulosin (FLOMAX) capsule 0.8 mg QHS   • baclofen (LIORESAL) tablet 30 mg TID   • meclizine (ANTIVERT) tablet 25 mg TID   • hydrOXYzine HCl (ATARAX) tablet 50 mg Q6HRS PRN   • Respiratory Therapy Consult Continuous RT   • Pharmacy Consult Request ...Pain Management Review 1 Each PHARMACY TO DOSE   • hydrALAZINE (APRESOLINE) tablet 10 mg Q8HRS PRN   • acetaminophen (Tylenol) tablet 650 mg Q4HRS PRN   • lactulose 20 GM/30ML solution 30 mL QDAY PRN   • docusate sodium (ENEMEEZ) enema 283 mg QDAY PRN   • fleet enema 133 mL QDAY PRN   • artificial tears ophthalmic solution 1 Drop PRN   • benzocaine-menthol (CEPACOL) lozenge 1 Lozenge Q2HRS PRN   • mag " hydrox-al hydrox-simeth (MAALOX PLUS ES or MYLANTA DS) suspension 20 mL Q2HRS PRN   • ondansetron (ZOFRAN ODT) dispertab 4 mg 4X/DAY PRN    Or   • ondansetron (ZOFRAN) syringe/vial injection 4 mg 4X/DAY PRN   • traZODone (DESYREL) tablet 50 mg QHS PRN   • sodium chloride (OCEAN) 0.65 % nasal spray 2 Spray PRN   • midazolam (VERSED) 5 mg/mL (1 mL vial) PRN   • atorvastatin (LIPITOR) tablet 80 mg Q EVENING   • aspirin (ASA) chewable tab 81 mg DAILY   • metoprolol tartrate (LOPRESSOR) tablet 12.5 mg BID   • mirtazapine (Remeron) orally disintegrating tab 15 mg QHS   • rivaroxaban (XARELTO) tablet 20 mg PM MEAL   • thyroid (ARMOUR THYROID) tablet 60 mg BID       Orders Placed This Encounter   Procedures   • Diet Order Diet: Level 7 - Easy to Chew (float pills in puree); Liquid level: Level 0 - Thin; Tray Modifications (optional): SLP - 1:1 Supervision by Nursing, SLP - Deliver to Nursing Station     Standing Status:   Standing     Number of Occurrences:   1     Order Specific Question:   Diet:     Answer:   Level 7 - Easy to Chew [22]     Comments:   float pills in puree     Order Specific Question:   Liquid level     Answer:   Level 0 - Thin     Order Specific Question:   Tray Modifications (optional)     Answer:   SLP - 1:1 Supervision by Nursing     Order Specific Question:   Tray Modifications (optional)     Answer:   SLP - Deliver to Nursing Station       Assessment:  Active Hospital Problems    Diagnosis    • *Acute right MCA stroke (HCC)    • Paroxysmal atrial fibrillation (HCC)    • Urinary retention    • Acquired hypothyroidism    • History of COVID-19    • Hypotension    • Normocytic anemia    • Spasticity as late effect of cerebrovascular accident (CVA)    • Reactive depression      This patient is a 56 y.o. male admitted for acute inpatient rehabilitation with Acute right MCA stroke (HCC).    I led and attended the weekly conference, and agree with the IDT conference documentation and plan of care as noted  below.    Date of conference: 5/5/2021    Goals and barriers: See IDT note.    Biggest barriers: left spastic hemiparesis, urinary retention, bowel incontinence, fluctuation in function, impaired trunk control, left neglect, spasticity, low blood pressure    CM/social support: wife supportive, to go to 1  here in [place], wife's sister    Anticipated DC date: 5/21, may be able to extend pending his progress    Home health: PT/OT/SLP/RN    Equip: TBD    Follow up: PCP, Dr. Dumont, stroke bridge clinic, urology, neurosugery      Medical Decision Making and Plan:    Large right ICA/MCA ischemic stroke  S/p craniectomy 4/3, Dr. Payton  Left hemiplegia  Cognitive deficits  Left neglect  Continue full rehab program  PT/OT/SLP, 1 hr each discipline, 5 days per week    Xarelto  Statin    Outpatient follow up with stroke bridge clinic, Dr. Dumont, referrals made    Outpatient follow up with Dr. Payton for cranioplasty    Neuropathic pain  Left leg at night  Start low dose gabapentin 100 mg 5/11    Dizziness, improved  Scheduled Meclizine, will titrate off prior to discharge  Continue Teds and abdominal binder  BPPV testing by PT negative    Spasticity, improved  Increased baclofen 15 mg TID to 20 mg TID 4/30 --> 30 mg TID 5/3    Started valium at night 2 mg 4/30, discontinued 5/3, doesn't seem to make much difference    Consider adding Zanaflex in future, LFTs on 5/5 with elevated ALT, recheck prior to initiation    Currently better controlled on baclofen and with stretching    May benefit from Botox in the future, continue to monitor     Atrial fibrillation  Metoprolol  Xarelto  Hospitalist consulted, appreciate assistance     Hypothyroidism   [place] thyroid     Hypertension  Hypotension  Lisinopril discontinued  Metoprolol  Monitor for orthostasis on Flomax, dose increased  Hospitalist consulted, appreciate assistance  Teds and abdominal binder    Insomnia, improved/resolved  Already on mirtazapine  Scheduled  melatonin  Gabapentin added for neuropathic pain at night     Reactive depression  Mirtazapine  Consider psychology consult if needed   Mood currently stable     History of COVID-19  Without sequelae     Normocytic anemia  Mild, monitor    Bowel program  Continue bowel medications  Last BM 5/10    Bladder program  Urinary retention  Changed Prazosin to Flomax  Voiding trial 5/4, unsuccessful  Replaced Brewer 5/5, increased Flomax   Likely has spastic bladder  Will need outpatient urology follow up  Failed recent trial/history of traumatic placement/hematuria  Monitor blood pressure on higher dose of Flomax  Will attempt another voiding trial tomorrow am    DVT prophylaxis  Xarelto      Total time:  26 minutes.  I spent greater than 50% of the time for patient care, counseling, and coordination on this date, including patient face-to face time, unit/floor time with review of records/pertinent lab data and studies, as well as discussing diagnostic evaluation/work up, planned therapeutic interventions, and future disposition of care, as per the interval events/subjective and the assessment and plan as noted above.    I have performed a physical exam, reviewed and updated ROS, as well as the assessment and plan today 5/11/2021. In review of note from 5/10/2021 there are no new changes except as documented above.            Radha Monge M.D.   Physical Medicine and Rehabilitation

## 2021-05-11 NOTE — CARE PLAN
Problem: Problem Solving STGs  Goal: STG-Within one week, patient will  Description: 1) Individualized goal: demonstrate appropriate visual scanning to the left with min cues and 80% accuracy.  2) Interventions:  SLP Speech Language Treatment, SLP Self Care / ADL Training , SLP Cognitive Skill Development, and SLP Group Treatment      Outcome: NOT MET  Goal: STG-Within one week, patient will  Description: 1) Individualized goal:  complete simple attention tasks with min A with 80% accuracy.   2) Interventions:  SLP Speech Language Treatment, SLP Self Care / ADL Training , SLP Cognitive Skill Development, and SLP Group Treatment      Outcome: NOT MET

## 2021-05-12 DIAGNOSIS — R33.9 URINARY RETENTION: ICD-10-CM

## 2021-05-12 LAB
ALBUMIN SERPL BCP-MCNC: 3.4 G/DL (ref 3.2–4.9)
ALBUMIN/GLOB SERPL: 1.4 G/DL
ALP SERPL-CCNC: 76 U/L (ref 30–99)
ALT SERPL-CCNC: 67 U/L (ref 2–50)
ANION GAP SERPL CALC-SCNC: 7 MMOL/L (ref 7–16)
AST SERPL-CCNC: 31 U/L (ref 12–45)
BASOPHILS # BLD AUTO: 0.2 % (ref 0–1.8)
BASOPHILS # BLD: 0.01 K/UL (ref 0–0.12)
BILIRUB SERPL-MCNC: 0.3 MG/DL (ref 0.1–1.5)
BUN SERPL-MCNC: 18 MG/DL (ref 8–22)
CALCIUM SERPL-MCNC: 9.8 MG/DL (ref 8.5–10.5)
CHLORIDE SERPL-SCNC: 107 MMOL/L (ref 96–112)
CO2 SERPL-SCNC: 28 MMOL/L (ref 20–33)
CREAT SERPL-MCNC: 1.02 MG/DL (ref 0.5–1.4)
EOSINOPHIL # BLD AUTO: 0.1 K/UL (ref 0–0.51)
EOSINOPHIL NFR BLD: 2.3 % (ref 0–6.9)
ERYTHROCYTE [DISTWIDTH] IN BLOOD BY AUTOMATED COUNT: 41.4 FL (ref 35.9–50)
GLOBULIN SER CALC-MCNC: 2.5 G/DL (ref 1.9–3.5)
GLUCOSE SERPL-MCNC: 99 MG/DL (ref 65–99)
HCT VFR BLD AUTO: 34.2 % (ref 42–52)
HGB BLD-MCNC: 11.4 G/DL (ref 14–18)
IMM GRANULOCYTES # BLD AUTO: 0.02 K/UL (ref 0–0.11)
IMM GRANULOCYTES NFR BLD AUTO: 0.5 % (ref 0–0.9)
LYMPHOCYTES # BLD AUTO: 1.1 K/UL (ref 1–4.8)
LYMPHOCYTES NFR BLD: 25.8 % (ref 22–41)
MAGNESIUM SERPL-MCNC: 2 MG/DL (ref 1.5–2.5)
MCH RBC QN AUTO: 28.7 PG (ref 27–33)
MCHC RBC AUTO-ENTMCNC: 33.3 G/DL (ref 33.7–35.3)
MCV RBC AUTO: 86.1 FL (ref 81.4–97.8)
MONOCYTES # BLD AUTO: 0.38 K/UL (ref 0–0.85)
MONOCYTES NFR BLD AUTO: 8.9 % (ref 0–13.4)
NEUTROPHILS # BLD AUTO: 2.66 K/UL (ref 1.82–7.42)
NEUTROPHILS NFR BLD: 62.3 % (ref 44–72)
NRBC # BLD AUTO: 0 K/UL
NRBC BLD-RTO: 0 /100 WBC
PHOSPHATE SERPL-MCNC: 3.8 MG/DL (ref 2.5–4.5)
PLATELET # BLD AUTO: 290 K/UL (ref 164–446)
PMV BLD AUTO: 9.3 FL (ref 9–12.9)
POTASSIUM SERPL-SCNC: 3.8 MMOL/L (ref 3.6–5.5)
PROT SERPL-MCNC: 5.9 G/DL (ref 6–8.2)
RBC # BLD AUTO: 3.97 M/UL (ref 4.7–6.1)
SODIUM SERPL-SCNC: 142 MMOL/L (ref 135–145)
WBC # BLD AUTO: 4.3 K/UL (ref 4.8–10.8)

## 2021-05-12 PROCEDURE — 99233 SBSQ HOSP IP/OBS HIGH 50: CPT | Performed by: PHYSICAL MEDICINE & REHABILITATION

## 2021-05-12 PROCEDURE — 97530 THERAPEUTIC ACTIVITIES: CPT

## 2021-05-12 PROCEDURE — 97112 NEUROMUSCULAR REEDUCATION: CPT

## 2021-05-12 PROCEDURE — 700102 HCHG RX REV CODE 250 W/ 637 OVERRIDE(OP): Performed by: PHYSICAL MEDICINE & REHABILITATION

## 2021-05-12 PROCEDURE — 36415 COLL VENOUS BLD VENIPUNCTURE: CPT

## 2021-05-12 PROCEDURE — 80053 COMPREHEN METABOLIC PANEL: CPT

## 2021-05-12 PROCEDURE — 97129 THER IVNTJ 1ST 15 MIN: CPT

## 2021-05-12 PROCEDURE — 770010 HCHG ROOM/CARE - REHAB SEMI PRIVAT*

## 2021-05-12 PROCEDURE — 84100 ASSAY OF PHOSPHORUS: CPT

## 2021-05-12 PROCEDURE — A9270 NON-COVERED ITEM OR SERVICE: HCPCS | Performed by: PHYSICAL MEDICINE & REHABILITATION

## 2021-05-12 PROCEDURE — 85025 COMPLETE CBC W/AUTO DIFF WBC: CPT

## 2021-05-12 PROCEDURE — 99231 SBSQ HOSP IP/OBS SF/LOW 25: CPT | Performed by: HOSPITALIST

## 2021-05-12 PROCEDURE — 83735 ASSAY OF MAGNESIUM: CPT

## 2021-05-12 PROCEDURE — 97130 THER IVNTJ EA ADDL 15 MIN: CPT

## 2021-05-12 RX ADMIN — MECLIZINE HYDROCHLORIDE 25 MG: 25 TABLET ORAL at 16:14

## 2021-05-12 RX ADMIN — MELATONIN TAB 3 MG 3 MG: 3 TAB at 21:43

## 2021-05-12 RX ADMIN — ATORVASTATIN CALCIUM 80 MG: 40 TABLET, FILM COATED ORAL at 21:44

## 2021-05-12 RX ADMIN — MECLIZINE HYDROCHLORIDE 25 MG: 25 TABLET ORAL at 08:55

## 2021-05-12 RX ADMIN — ASPIRIN 81 MG CHEWABLE TABLET 81 MG: 81 TABLET CHEWABLE at 18:24

## 2021-05-12 RX ADMIN — GABAPENTIN 100 MG: 100 CAPSULE ORAL at 21:50

## 2021-05-12 RX ADMIN — MECLIZINE HYDROCHLORIDE 25 MG: 25 TABLET ORAL at 21:43

## 2021-05-12 RX ADMIN — BACLOFEN 30 MG: 20 TABLET ORAL at 21:43

## 2021-05-12 RX ADMIN — RIVAROXABAN 20 MG: 20 TABLET, FILM COATED ORAL at 18:23

## 2021-05-12 RX ADMIN — MIRTAZAPINE 15 MG: 15 TABLET, ORALLY DISINTEGRATING ORAL at 21:44

## 2021-05-12 RX ADMIN — BACLOFEN 30 MG: 20 TABLET ORAL at 16:12

## 2021-05-12 RX ADMIN — THYROID, PORCINE 60 MG: 30 TABLET ORAL at 08:54

## 2021-05-12 RX ADMIN — THYROID, PORCINE 60 MG: 30 TABLET ORAL at 21:44

## 2021-05-12 RX ADMIN — METOPROLOL TARTRATE 12.5 MG: 25 TABLET, FILM COATED ORAL at 08:55

## 2021-05-12 RX ADMIN — METOPROLOL TARTRATE 12.5 MG: 25 TABLET, FILM COATED ORAL at 21:42

## 2021-05-12 RX ADMIN — BACLOFEN 30 MG: 20 TABLET ORAL at 08:55

## 2021-05-12 ASSESSMENT — ENCOUNTER SYMPTOMS
COUGH: 0
POLYDIPSIA: 0
ABDOMINAL PAIN: 0
FOCAL WEAKNESS: 1
EYES NEGATIVE: 1
DEPRESSION: 1
PALPITATIONS: 0
BRUISES/BLEEDS EASILY: 0
VOMITING: 0
CHILLS: 0
NAUSEA: 0
SHORTNESS OF BREATH: 0
FEVER: 0

## 2021-05-12 ASSESSMENT — ACTIVITIES OF DAILY LIVING (ADL)
BED_CHAIR_WHEELCHAIR_TRANSFER_DESCRIPTION: ADAPTIVE EQUIPMENT;INCREASED TIME;SET-UP OF EQUIPMENT;SQUAT PIVOT TRANSFER TO WHEELCHAIR;SUPERVISION FOR SAFETY;VERBAL CUEING
BED_CHAIR_WHEELCHAIR_TRANSFER_DESCRIPTION: SET-UP OF EQUIPMENT;SUPERVISION FOR SAFETY;VERBAL CUEING

## 2021-05-12 ASSESSMENT — GAIT ASSESSMENTS
DISTANCE (FEET): 0
GAIT LEVEL OF ASSIST: UNABLE TO PARTICIPATE
ASSISTIVE DEVICE: PARALLEL BARS

## 2021-05-12 NOTE — CARE PLAN
Problem: Problem Solving STGs  Goal: STG-Within one week, patient will  Description: 1) Individualized goal: demonstrate appropriate visual scanning to the left with min cues and 80% accuracy.  2) Interventions:  SLP Speech Language Treatment, SLP Self Care / ADL Training , SLP Cognitive Skill Development, and SLP Group Treatment      Outcome: Not Met  Note: Mod cues required for visual scanning to the left   Goal: STG-Within one week, patient will  Description: 1) Individualized goal:  complete simple attention tasks with min A with 80% accuracy.   2) Interventions:  SLP Speech Language Treatment, SLP Self Care / ADL Training , SLP Cognitive Skill Development, and SLP Group Treatment      Outcome: Not Met  Note: Mod A required for simple attention tasks      Problem: Speech/Swallowing LTGs  Goal: LTG-By discharge, patient will  Description: 1) Individualized goal:  tolerate 7- Regular Textures and 0-Thin liquids with no overt s/sx of aspiration noted   2) Interventions:  SLP Speech Language Treatment, SLP Swallowing Dysfunction Treatment, SLP Oral Pharyngeal Evaluation, SLP Video Swallow Evaluation, and SLP Group Treatment          Outcome: Met  Note: Pt currently tolerating a regular texture diet with thin liquids

## 2021-05-12 NOTE — THERAPY
"Speech Language Pathology  Daily Treatment     Patient Name: Thong Arzola  Age:  56 y.o., Sex:  male  Medical Record #: [id number]  Today's Date: 5/12/2021     Precautions  Precautions: Fall Risk  Comments: Helmet on OOB; L UE w/ 2-3 Modified Jeremaih Scale    Subjective    Pt was pleasant and cooperative during this ST session      Objective       05/12/21 0931   SLP Total Time Spent   SLP Individual Total Time Spent (Mins) 60   Treatment Charges   SLP Cognitive Skill Development First 15 Minutes 1   SLP Cognitive Skill Development Additional 15 Minutes 3       Assessment    Pt required mod cues to scan too the left while being wheeled down to the hallway to describe pictures.  Pt often either describing pictures to the right or pictures on the left far down the hallway.  Once in therapy pt participated in a scanning task requiring him to match pictures from the board to the right to the word on the board on the left side (initially started with 1 picture per line, FO6 and increasing difficulty to a FO24).  Pt was able to verbalize cues (I need to look further left I think) with spv, but required mod cues to consistently implement them during this scanning task.  Pt was able to recall tasks completed in yesterdays ST session (calendar task) stating \"We did a calendar organization thing and it was a lot harder than it should have been\".  Pt completed a similar calendar organization task (1 week calendar vs 1 month) and required mod cues to locate items on the left side of the page to schedule as well as mod cues to slow down while writing so it was legible.      Strengths: Able to follow instructions, Alert and oriented, Effective communication skills, Making steady progress towards goals, Supportive family, Pleasant and cooperative, Motivated for self care and independence  Barriers: Visual impairment (left neglect)    Plan    Continue targeting left visual scanning tasks       Speech Therapy Problems (Active)  "    Problem: Problem Solving STGs     Dates: Start: 05/05/21       Goal: STG-Within one week, patient will     Dates: Start: 05/05/21       Description: 1) Individualized goal: demonstrate appropriate visual scanning to the left with min cues and 80% accuracy.  2) Interventions:  SLP Speech Language Treatment, SLP Self Care / ADL Training , SLP Cognitive Skill Development, and SLP Group Treatment        Goal Note filed on 05/12/21 1054 by Min Gomez MS,CCC-SLP     Mod cues required for visual scanning to the left             Goal: STG-Within one week, patient will     Dates: Start: 05/05/21       Description: 1) Individualized goal:  complete simple attention tasks with min A with 80% accuracy.   2) Interventions:  SLP Speech Language Treatment, SLP Self Care / ADL Training , SLP Cognitive Skill Development, and SLP Group Treatment        Goal Note filed on 05/12/21 1054 by Min Gomez MS,CCC-SLP     Mod A required for simple attention tasks                Problem: Speech/Swallowing LTGs     Dates: Start: 04/30/21       Goal: LTG-By discharge, patient will solve complex problem     Dates: Start: 04/30/21       Description: 1) Individualized goal:  related to IADL's with mod I for safe d/c home.  2) Interventions:  SLP Speech Language Treatment, SLP Swallowing Dysfunction Treatment, SLP Oral Pharyngeal Evaluation, SLP Video Swallow Evaluation, SLP Self Care / ADL Training , SLP Cognitive Skill Development, and SLP Group Treatment

## 2021-05-12 NOTE — CARE PLAN
Dye cath drained blood tinged urine, discontinued at  0630 hrs for the voiding trial.    Problem: Safety  Goal: Will remain free from falls  Outcome: PROGRESSING AS EXPECTED  Intervention: Implement fall precautions  Flowsheets (Taken 5/11/2021 0450)  Bed Alarm: Yes - Alarm On  Environmental Precautions:  • Treaded Slipper Socks on Patient  • Bed in Low Position  • Personal Belongings, Wastebasket, Call Bell etc. in Easy Reach  Note: Safety measures enforced, needs anticipated and attended. Right head flap missing , incision line well approximated. Compliant to safety instructions , pt free from fall and injury.     Problem: Urinary Elimination:  Goal: Ability to reestablish a normal urinary elimination pattern will improve  Outcome: PROGRESSING SLOWER THAN EXPECTED  Intervention: Evaluate need to continue indwelling urinary catheter  Note: Pt with dye catheter draining to a bld tinged urine , flushed with sterile water . Cont monitored.For d/c dye cath in am for voiding trial.

## 2021-05-12 NOTE — CARE PLAN
Problem: Mobility  Goal: STG-Within one week, patient will propel wheelchair household distances  Description: 1) Individualized goal:  25ft with haylie technique with SBA on even surfaces  2) Interventions:  PT Group Therapy, PT E Stim Attended, PT Orthotics Training, PT Gait Training, PT Self Care/Home Eval, PT Therapeutic Exercises, PT Neuro Re-Ed/Balance, PT Therapeutic Activity, and PT Evaluation    Outcome: Not Met  Note: Pt continues with variable performance requiring intermittent max A to steer d/t L neglect     Problem: Mobility Transfers  Goal: STG-Within one week, patient will perform bed mobility  Description: 1) Individualized goal:  sit <> supine with modA   2) Interventions:  PT Group Therapy, PT E Stim Attended, PT Orthotics Training, PT Gait Training, PT Self Care/Home Eval, PT Therapeutic Exercises, PT Neuro Re-Ed/Balance, PT Therapeutic Activity, and PT Evaluation    Outcome: Met  Goal: STG-Within one week, patient will transfer bed to chair  Description: 1) Individualized goal:  transfer with slide board and modA  2) Interventions:  PT Group Therapy, PT E Stim Attended, PT Orthotics Training, PT Gait Training, PT Self Care/Home Eval, PT Therapeutic Exercises, PT Neuro Re-Ed/Balance, PT Therapeutic Activity, and PT Evaluation    Outcome: Met

## 2021-05-12 NOTE — THERAPY
Physical Therapy   Daily Treatment     Patient Name: Thong Arzola  Age:  56 y.o., Sex:  male  Medical Record #: [id number]  Today's Date: 5/12/2021     Precautions  Precautions: Fall Risk, Other (See Comments)  Comments: Helmet OOB; LUE 2-3 Modified Jermeiah Scale; Abdominal binder OOB; Brewer cath; L inattention & haylie    Subjective    Patient agreeable to PT; SO arrived after first 25 min of session; pt reports minor blurred vision with visual accomodation to further objects, once prompted.     Objective       05/12/21 1431   Precautions   Precautions Fall Risk;Other (See Comments)   Comments Helmet OOB; LUE 2-3 Modified Jeremiah Scale; Abdominal binder OOB; Brewer cath; L inattention & haylie   Vitals   Pulse 86   Patient BP Position Sitting   Blood Pressure 114/65  (abdominal binder in place)   O2 (LPM) 0   O2 Delivery Device None - Room Air   Gait Functional Level of Assist    Gait Level Of Assist Unable to Participate   Assistive Device Parallel Bars   Distance (Feet) 0   # of Times Distance was Traveled 0   Deviation   (Safety concerns; may begin Ambulation this week as BP allows)   Wheelchair Functional Level of Assist   Wheelchair Assist Total Assist   Distance Wheelchair (Feet or Distance) 0   Wheelchair Description   (Limited due to Tilt-in-Space w/c at this time)   Stairs Functional Level of Assist   Level of Assist with Stairs Unable to Participate   # of Stairs Climbed 0   Stairs Description Safety concerns   Transfer Functional Level of Assist   Bed, Chair, Wheelchair Transfer   (Min-Mod Ax2 for EOB-->w/c to pt's L/haylie side)   Bed Chair Wheelchair Transfer Description Adaptive equipment;Increased time;Set-up of equipment;Squat pivot transfer to wheelchair;Supervision for safety;Verbal cueing   Sitting Lower Body Exercises   Sitting Lower Body Exercises Yes   Long Arc Quad 2 sets of 10  (on L: tapping, quick stretch, cueing, visual focus)   Marching 1 set of 10  (on L: tapping, quick stretch, cueing,  visual focus)   Standing Lower Body Exercises   Comments 5 standing reps in // bars of 3-4 minutes each; focus on increased LLE and LUE weight bearing, visual scanning and awareness to pt's Left, decreasing pt's overcompensating weight shift from R to L; requires Min-Mod A for balance, L tibial guarding, LUE management, Brewer setup, cueing, gait belt, // bars; incorporated 2 sets of 10-12 R hip flexion to increase WB to L UE/LE; incorporated weight shifts with BUE vs LUE only support to decrease compensatory RUE usage.   Bed Mobility    Supine to Sit Moderate Assist  (LLE management, min A at trunk)   Sit to Stand   (Min Ax1 usually in // bars today; Mod Ax2 at EOB; setup, LUE)   Neuro-Muscular Treatments   Comments Neuro focus to above listed ther ex and standing tolerances per above.   Interdisciplinary Plan of Care Collaboration   IDT Collaboration with  Physical Therapist;Therapy Tech;Family / Caregiver;Other (See Comments)   Patient Position at End of Therapy Seated;Family / Friend in Room;Other (Comments)  (Pt reclined about 40-45 degrees in Tilt-in-space)   Collaboration Comments Treatment plan with PT and tech;  for LLE already accomplished today; 2nd person A with bed transfer from therapy manager; SO present for pt hydration, education on neuro principles, pressure reliefs, and hands on use of Tilt-in-Space   PT Total Time Spent   PT Individual Total Time Spent (Mins) 75   PT Charge Group   PT Neuromuscular Re-Education / Balance 2   PT Therapeutic Activities 3       Assessment    Patient has improving BP in sitting and standing positions today; cueing for increased L environmental scanning but continues to improve with decreased cueing needed within session; LUE management throughout along with A for LLE; great motivation; fair cognition; good verbal expression; good social support.    Strengths: Alert and oriented, Effective communication skills, Independent prior level of function, Motivated for  self care and independence, Pleasant and cooperative, Supportive family, Willingly participates in therapeutic activities  Barriers: Hemiparesis, Home accessibility, Orthostatic hypotension, Impaired activity tolerance, Impaired balance, Spasticity, Limited mobility    Plan    Midline orientation EOM, sitting balance, transfer training with SB progressing to SQPT as appropriate, bed mobility, w/c mobility,  LE, ongoing family training with spouse as appropriate. Continue STS training and standing tolerance as able.  Ongoing standing frame tolerance.      Passport items to be completed:  Get in/out of bed safely, in/out of a vehicle, safely use mobility device, walk or wheel around home/community, navigate up and down stairs, show how to get up/down from the ground, ensure home is accessible, demonstrate HEP, complete caregiver training    Physical Therapy Problems (Active)     Problem: Mobility     Dates: Start: 04/30/21       Goal: STG-Within one week, patient will propel wheelchair household distances     Dates: Start: 04/30/21       Description: 1) Individualized goal:  25ft with haylie technique with SBA on even surfaces  2) Interventions:  PT Group Therapy, PT E Stim Attended, PT Orthotics Training, PT Gait Training, PT Self Care/Home Eval, PT Therapeutic Exercises, PT Neuro Re-Ed/Balance, PT Therapeutic Activity, and PT Evaluation      Goal Note filed on 05/12/21 0844 by Neena Gastelum DPT     Pt continues with variable performance requiring intermittent max A to steer d/t L neglect               Problem: Mobility Transfers     Dates: Start: 05/12/21       Goal: STG-Within one week, patient will sit to stand     Dates: Start: 05/12/21       Goal Note filed on 05/12/21 1201 by Neena Gastelum DPT     1) Individualized goal:  STS in // bars with min A  2) Interventions:  PT Group Therapy, PT E Stim Attended, PT Gait Training, PT Therapeutic Exercises, PT Neuro Re-Ed/Balance, PT Aquatic Therapy, PT  Therapeutic Activity, PT Manual Therapy, and PT Evaluation            Goal: STG-Within one week, patient will transfer bed to chair     Dates: Start: 05/12/21       Goal Note filed on 05/12/21 1201 by Neena Gastelum DPT     1) Individualized goal:  SQPT with CGA  2) Interventions:  PT Group Therapy, PT E Stim Attended, PT Gait Training, PT Therapeutic Exercises, PT Neuro Re-Ed/Balance, PT Aquatic Therapy, PT Therapeutic Activity, PT Manual Therapy, and PT Evaluation               Problem: PT-Long Term Goals     Dates: Start: 04/30/21       Goal: LTG-By discharge, patient will propel wheelchair     Dates: Start: 04/30/21       Description: 1) Individualized goal:  100ft with haylie technique and Anastacio on even/uneven surfaces  2) Interventions:  PT Group Therapy, PT E Stim Attended, PT Orthotics Training, PT Gait Training, PT Self Care/Home Eval, PT Therapeutic Exercises, PT Neuro Re-Ed/Balance, PT Therapeutic Activity, and PT Evaluation       Goal: LTG-By discharge, patient will transfer one surface to another     Dates: Start: 04/30/21       Description: 1) Individualized goal:  SQPT with Taz   2) Interventions:  PT Group Therapy, PT E Stim Attended, PT Orthotics Training, PT Gait Training, PT Self Care/Home Eval, PT Therapeutic Exercises, PT Neuro Re-Ed/Balance, PT Therapeutic Activity, and PT Evaluation         Goal: LTG-By discharge, patient will     Dates: Start: 04/30/21       Description: 1) Individualized goal: perform bed mobility (supine<>sit) with Taz   2) Interventions:  PT Group Therapy, PT E Stim Attended, PT Orthotics Training, PT Gait Training, PT Self Care/Home Eval, PT Therapeutic Exercises, PT Neuro Re-Ed/Balance, PT Therapeutic Activity, and PT Evaluation

## 2021-05-12 NOTE — THERAPY
"Occupational Therapy  Daily Treatment     Patient Name: Thong Arzola  Age:  56 y.o., Sex:  male  Medical Record #: [id number]  Today's Date: 5/12/2021     Precautions  Precautions: (P) Fall Risk  Comments: (P) Helmet on OOB; L UE w/ 2-3 Modified Jeremiah Scale         Subjective    \"Do you go to [employer]?\"     Objective       05/12/21 1231   Precautions   Precautions Fall Risk   Comments Helmet on OOB; L UE w/ 2-3 Modified Jeremiah Scale   Pain 0 - 10 Group   Location Head   Therapist Pain Assessment During Activity   Functional Level of Assist   Bed, Chair, Wheelchair Transfer Minimal Assist   Bed Chair Wheelchair Transfer Description Set-up of equipment;Supervision for safety;Verbal cueing  (min A squat pivot/lateral scoot w/c <> mat to the R)   Neuro-Muscular Treatments   Neuro-Muscular Treatments Facilitation;Joint Approximation;Quick Stretch;Postural Facilitation;Sensory Stimuli;Sequencing;Tactile Cuing;Tapping;Verbal Cuing;Vibration   Comments 1 x 10 anterior/posterior/left and right lateral leans x 10 each direction for core strengthening followed up by upright sitting posture.  Postural facilitation provided at thoracic and lumbar spine for improved posture.  In supine, therapist provided passive stretching to muscles around scapula prior to ROM/stretching of the shoulder.  L UE prolonged and passive stretch into shoulder abduction, external rotation, elbow extension, forearm supination, wrist and finger extension.  Muscle tapping and vibration to left biceps/triceps for active assisted flexion/extension.     Bed Mobility    Supine to Sit Moderate Assist   Sit to Supine Moderate Assist   Scooting Minimal Assist   Skilled Intervention Tactile Cuing;Verbal Cuing;Sequencing;Facilitation   OT Total Time Spent   OT Individual Total Time Spent (Mins) 60   OT Charge Group   OT Neuromuscular Re-education / Balance 2   OT Therapy Activity 2       Assessment    Patient able to tolerate stretching of L UE into " extensor pattern well.  Showed 1 to 2-/5 left elbow flexion and extension this session.  Sitting balance/core control continues to improve.  Min A squat pivot/lateral scoot transfers to strong side to/from wheelchair.  Strengths: Able to follow instructions, Alert and oriented, Effective communication skills, Good insight into deficits/needs, Independent prior level of function, Manages pain appropriately, Motivated for self care and independence, Pleasant and cooperative, Supportive family, Willingly participates in therapeutic activities  Barriers: Bowel incontinence, Decreased endurance, Fatigue, Generalized weakness, Hemiplegia, Home accessibility, Orthostatic hypotension, Impaired activity tolerance, Impaired balance, Limited mobility, Spasticity    Plan    Sitting balance/core strengthening/midline orientation, ADL retraining, slideboard transfers progressing to lateral scoots or squat pivots to the right, L UE neuro re-ed/stretching, family training with spouse Anel; Bathing is not a goal of OT at this time (CNAs should be bathing patient); home evaluation to sister in law's house in [place]; pursue resting hand splint for left hand/wrist    Occupational Therapy Goals (Active)     Problem: Dressing     Dates: Start: 05/12/21       Goal: STG-Within one week, patient will dress UB     Dates: Start: 05/12/21       Goal Note filed on 05/12/21 1136 by Aj Lovett, OT/L     1) Individualized Goal: with min A to don/doff shirt with cues  2) Interventions:  OT Self Care/ADL, OT Cognitive Skill Dev, OT Manual Ther Technique, OT Neuro Re-Ed/Balance, OT Sensory Int Techniques, OT Therapeutic Activity, OT Evaluation, and OT Therapeutic Exercise            Goal: STG-Within one week, patient will dress LB     Dates: Start: 05/12/21       Goal Note filed on 05/12/21 1136 by Aj Lovett, OT/L     1) Individualized Goal: with min A using cues and adaptive techniques  2) Interventions:  OT Self Care/ADL, OT Cognitive  Skill Dev, OT Manual Ther Technique, OT Neuro Re-Ed/Balance, OT Sensory Int Techniques, OT Therapeutic Activity, OT Evaluation, and OT Therapeutic Exercise               Problem: Functional Transfers     Dates: Start: 05/12/21       Goal: STG-Within one week, patient will transfer to toilet     Dates: Start: 05/12/21       Goal Note filed on 05/12/21 1136 by Aj Lovett, OT/L     1) Individualized Goal: with mod A using grab bar and commode over the toilet  2) Interventions:  OT Self Care/ADL, OT Cognitive Skill Dev, OT Manual Ther Technique, OT Neuro Re-Ed/Balance, OT Sensory Int Techniques, OT Therapeutic Activity, OT Evaluation, and OT Therapeutic Exercise               Problem: OT Long Term Goals     Dates: Start: 04/30/21       Goal: LTG-By discharge, patient will complete basic self care tasks     Dates: Start: 04/30/21       Description: 1) Individualized Goal:  with min to mod A using AE/AD/techniques as needed  2) Interventions:  OT E Stim Attended, OT Self Care/ADL, OT Cognitive Skill Dev, OT Manual Ther Technique, OT Neuro Re-Ed/Balance, OT Sensory Int Techniques, OT Therapeutic Activity, OT Evaluation, and OT Therapeutic Exercise       Goal: LTG-By discharge, patient will perform bathroom transfers     Dates: Start: 04/30/21       Description: 1) Individualized Goal:  with max A x 1 person using slideboard versus squat pivot to the right  2) Interventions:  OT E Stim Attended, OT Self Care/ADL, OT Cognitive Skill Dev, OT Manual Ther Technique, OT Neuro Re-Ed/Balance, OT Sensory Int Techniques, OT Therapeutic Activity, OT Evaluation, and OT Therapeutic Exercise

## 2021-05-12 NOTE — PROGRESS NOTES
"Rehab Progress Note     Date of Service: 5/11/2021  Chief Complaint: follow up stroke    Interval Events (Subjective)    Patient seen and examined today in the cafeteria.  He is just finished his breakfast.  He reports the gabapentin last night helped with the pain in his left leg.    Attempted another voiding trial today which was unsuccessful.  In addition with replacement of the catheter he had some initial hematuria.    Patient continues to make good progress with therapies were going to extend his rehab stay.    ROS: No changes to bowel, mood or sleep.         Objective:  VITAL SIGNS: /80   Pulse 91   Temp 36.4 °C (97.6 °F) (Temporal)   Resp 17   Ht 1.778 m (5' 10\")   Wt 67.2 kg (148 lb 2.4 oz)   SpO2 93%   BMI 21.26 kg/m²   Gen: alert, no apparent distress  HEENT: Helmet in place  Neuro: notable for spastic left hemiparesis, left neglect        Recent Results (from the past 72 hour(s))   CBC WITH DIFFERENTIAL    Collection Time: 05/12/21  5:53 AM   Result Value Ref Range    WBC 4.3 (L) 4.8 - 10.8 K/uL    RBC 3.97 (L) 4.70 - 6.10 M/uL    Hemoglobin 11.4 (L) 14.0 - 18.0 g/dL    Hematocrit 34.2 (L) 42.0 - 52.0 %    MCV 86.1 81.4 - 97.8 fL    MCH 28.7 27.0 - 33.0 pg    MCHC 33.3 (L) 33.7 - 35.3 g/dL    RDW 41.4 35.9 - 50.0 fL    Platelet Count 290 164 - 446 K/uL    MPV 9.3 9.0 - 12.9 fL    Neutrophils-Polys 62.30 44.00 - 72.00 %    Lymphocytes 25.80 22.00 - 41.00 %    Monocytes 8.90 0.00 - 13.40 %    Eosinophils 2.30 0.00 - 6.90 %    Basophils 0.20 0.00 - 1.80 %    Immature Granulocytes 0.50 0.00 - 0.90 %    Nucleated RBC 0.00 /100 WBC    Neutrophils (Absolute) 2.66 1.82 - 7.42 K/uL    Lymphs (Absolute) 1.10 1.00 - 4.80 K/uL    Monos (Absolute) 0.38 0.00 - 0.85 K/uL    Eos (Absolute) 0.10 0.00 - 0.51 K/uL    Baso (Absolute) 0.01 0.00 - 0.12 K/uL    Immature Granulocytes (abs) 0.02 0.00 - 0.11 K/uL    NRBC (Absolute) 0.00 K/uL   Comp Metabolic Panel    Collection Time: 05/12/21  5:53 AM   Result Value " Ref Range    Sodium 142 135 - 145 mmol/L    Potassium 3.8 3.6 - 5.5 mmol/L    Chloride 107 96 - 112 mmol/L    Co2 28 20 - 33 mmol/L    Anion Gap 7.0 7.0 - 16.0    Glucose 99 65 - 99 mg/dL    Bun 18 8 - 22 mg/dL    Creatinine 1.02 0.50 - 1.40 mg/dL    Calcium 9.8 8.5 - 10.5 mg/dL    AST(SGOT) 31 12 - 45 U/L    ALT(SGPT) 67 (H) 2 - 50 U/L    Alkaline Phosphatase 76 30 - 99 U/L    Total Bilirubin 0.3 0.1 - 1.5 mg/dL    Albumin 3.4 3.2 - 4.9 g/dL    Total Protein 5.9 (L) 6.0 - 8.2 g/dL    Globulin 2.5 1.9 - 3.5 g/dL    A-G Ratio 1.4 g/dL   MAGNESIUM    Collection Time: 05/12/21  5:53 AM   Result Value Ref Range    Magnesium 2.0 1.5 - 2.5 mg/dL   PHOSPHORUS    Collection Time: 05/12/21  5:53 AM   Result Value Ref Range    Phosphorus 3.8 2.5 - 4.5 mg/dL   ESTIMATED GFR    Collection Time: 05/12/21  5:53 AM   Result Value Ref Range    GFR If African American >60 >60 mL/min/1.73 m 2    GFR If Non African American >60 >60 mL/min/1.73 m 2       Current Facility-Administered Medications   Medication Frequency   • gabapentin (NEURONTIN) capsule 100 mg Q EVENING   • melatonin tablet 3 mg QHS   • tamsulosin (FLOMAX) capsule 0.8 mg QHS   • baclofen (LIORESAL) tablet 30 mg TID   • meclizine (ANTIVERT) tablet 25 mg TID   • hydrOXYzine HCl (ATARAX) tablet 50 mg Q6HRS PRN   • Respiratory Therapy Consult Continuous RT   • Pharmacy Consult Request ...Pain Management Review 1 Each PHARMACY TO DOSE   • hydrALAZINE (APRESOLINE) tablet 10 mg Q8HRS PRN   • acetaminophen (Tylenol) tablet 650 mg Q4HRS PRN   • lactulose 20 GM/30ML solution 30 mL QDAY PRN   • docusate sodium (ENEMEEZ) enema 283 mg QDAY PRN   • fleet enema 133 mL QDAY PRN   • artificial tears ophthalmic solution 1 Drop PRN   • benzocaine-menthol (CEPACOL) lozenge 1 Lozenge Q2HRS PRN   • mag hydrox-al hydrox-simeth (MAALOX PLUS ES or MYLANTA DS) suspension 20 mL Q2HRS PRN   • ondansetron (ZOFRAN ODT) dispertab 4 mg 4X/DAY PRN    Or   • ondansetron (ZOFRAN) syringe/vial injection 4  mg 4X/DAY PRN   • traZODone (DESYREL) tablet 50 mg QHS PRN   • sodium chloride (OCEAN) 0.65 % nasal spray 2 Spray PRN   • midazolam (VERSED) 5 mg/mL (1 mL vial) PRN   • atorvastatin (LIPITOR) tablet 80 mg Q EVENING   • aspirin (ASA) chewable tab 81 mg DAILY   • metoprolol tartrate (LOPRESSOR) tablet 12.5 mg BID   • mirtazapine (Remeron) orally disintegrating tab 15 mg QHS   • rivaroxaban (XARELTO) tablet 20 mg PM MEAL   • thyroid (ARMOUR THYROID) tablet 60 mg BID       Orders Placed This Encounter   Procedures   • Diet Order Diet: Level 7 - Easy to Chew (float pills in puree); Liquid level: Level 0 - Thin; Tray Modifications (optional): SLP - 1:1 Supervision by Nursing, SLP - Deliver to Nursing Station     Standing Status:   Standing     Number of Occurrences:   1     Order Specific Question:   Diet:     Answer:   Level 7 - Easy to Chew [22]     Comments:   float pills in puree     Order Specific Question:   Liquid level     Answer:   Level 0 - Thin     Order Specific Question:   Tray Modifications (optional)     Answer:   SLP - 1:1 Supervision by Nursing     Order Specific Question:   Tray Modifications (optional)     Answer:   SLP - Deliver to Nursing Station       Assessment:  Active Hospital Problems    Diagnosis    • *Acute right MCA stroke (HCC)    • Paroxysmal atrial fibrillation (HCC)    • Urinary retention    • Acquired hypothyroidism    • History of COVID-19    • Hypotension    • Normocytic anemia    • Spasticity as late effect of cerebrovascular accident (CVA)    • Reactive depression      This patient is a 56 y.o. male admitted for acute inpatient rehabilitation with Acute right MCA stroke (HCC).    I led and attended the weekly conference, and agree with the IDT conference documentation and plan of care as noted below.    Date of conference: 5/12/2021    Goals and barriers: See IDT note.    Biggest barriers: right spastic hemiparesis, urinary retention, poor sleep, home accessibility, impaired  balance/sitting posture, left neglect    Goals in next week: hand splint    CM/social support: wife supportive, to go to 1 LH here in [place], wife's sister    Anticipated DC date: 6/4    Home health: PT/OT/SLP/RN - [facility]    Equip: TBD    Follow up: PCP, Dr. Dumont, stroke bridge clinic, urology, neurosugery      Medical Decision Making and Plan:    Large right ICA/MCA ischemic stroke  S/p craniectomy 4/3, Dr. Payton  Left hemiplegia, has some very mild active elbow ext/flex  Cognitive deficits  Left neglect  Continue full rehab program  PT/OT/SLP, 1 hr each discipline, 5 days per week    Xarelto  Statin    Outpatient follow up with stroke bridge clinic, Dr. Dumont, referrals made    Outpatient follow up with Dr. Payton for cranioplasty    Making good progress were going to extend his stay    Neuropathic pain, improved  Left leg at night  Continue low dose gabapentin 100 mg 5/11  Continue to monitor need to increase dose    Dizziness, improved  Scheduled Meclizine, will titrate off prior to discharge  Continue Teds and abdominal binder for now  BPPV testing by PT negative    Spasticity, improved/stable  Increased baclofen 15 mg TID to 20 mg TID 4/30 --> 30 mg TID 5/3    Started valium at night 2 mg 4/30, discontinued 5/3, doesn't seem to make much difference    Consider adding Zanaflex in future, LFTs on 5/5 with elevated ALT, recheck prior to initiation    Currently better controlled on baclofen and with stretching    May benefit from Botox in the future, continue to monitor, will follow up with Dr. Dumont in the outpatient clinic     Atrial fibrillation  Metoprolol  Xarelto  Hospitalist consulted, appreciate assistance     Hypothyroidism   [place] thyroid     Hypertension  Hypotension, improved  Lisinopril discontinued  Metoprolol  Flomax discontinued 5/12  Hospitalist consulted, appreciate assistance  Teds and abdominal binder    Insomnia, improved/resolved  Already on mirtazapine  Scheduled  melatonin  Gabapentin added for neuropathic pain at night     Reactive depression  Mirtazapine  Consider psychology consult if needed   Mood currently stable     History of COVID-19  Without sequelae     Normocytic anemia  Mild, monitor    Bowel program  Continue bowel medications  Last BM 5/10    Bladder program  Urinary retention  Changed Prazosin to Flomax  Voiding trial 5/4, unsuccessful  Replaced Brewer 5/5, increased Flomax   Voiding trial 5/12, unsuccessful again  Discontinued Flomax 5/12, not efffective  Likely has spastic bladder  Will need outpatient urology follow up, referral made    DVT prophylaxis  Xarelto    Total time:  40 minutes.  I spent greater than 50% of the time for patient care, counseling, and coordination on this date, including patient face-to face time, unit/floor time with review of records/pertinent lab data and studies, as well as discussing diagnostic evaluation/work up, planned therapeutic interventions, and future disposition of care, as per the interval events/subjective and the assessment and plan as noted above.    Radha Moneg M.D.   Physical Medicine and Rehabilitation

## 2021-05-12 NOTE — CARE PLAN
Problem: Grooming  Goal: STG-Within one week, patient will complete grooming  Description: 1) Individualized Goal:  with min A using AE/AD/techniques as needed  2) Interventions:  OT E Stim Attended, OT Self Care/ADL, OT Cognitive Skill Dev, OT Manual Ther Technique, OT Neuro Re-Ed/Balance, OT Sensory Int Techniques, OT Therapeutic Activity, OT Evaluation, and OT Therapeutic Exercise    Outcome: Met  Note: SBA with cues and increased time     Problem: Dressing  Goal: STG-Within one week, patient will dress UB  Description: 1) Individualized Goal:  with mod A with verbal cues for haylie-technique.  2) Interventions:  OT E Stim Attended, OT Self Care/ADL, OT Cognitive Skill Dev, OT Manual Ther Technique, OT Neuro Re-Ed/Balance, OT Sensory Int Techniques, OT Therapeutic Activity, OT Evaluation, and OT Therapeutic Exercise  Outcome: Met  Note: Mod A to don/doff pullover shirt  Goal: STG-Within one week, patient will dress LB  Description: 1) Individualized Goal:  with max A using AE/AD/techniques as needed  2) Interventions:  OT E Stim Attended, OT Self Care/ADL, OT Cognitive Skill Dev, OT Manual Ther Technique, OT Neuro Re-Ed/Balance, OT Sensory Int Techniques, OT Therapeutic Activity, OT Evaluation, and OT Therapeutic Exercise  Outcome: Met  Note: Max A with cues

## 2021-05-13 PROCEDURE — 97112 NEUROMUSCULAR REEDUCATION: CPT | Mod: CO

## 2021-05-13 PROCEDURE — A9270 NON-COVERED ITEM OR SERVICE: HCPCS | Performed by: PHYSICAL MEDICINE & REHABILITATION

## 2021-05-13 PROCEDURE — 99231 SBSQ HOSP IP/OBS SF/LOW 25: CPT | Performed by: HOSPITALIST

## 2021-05-13 PROCEDURE — 99233 SBSQ HOSP IP/OBS HIGH 50: CPT | Performed by: PHYSICAL MEDICINE & REHABILITATION

## 2021-05-13 PROCEDURE — 97112 NEUROMUSCULAR REEDUCATION: CPT

## 2021-05-13 PROCEDURE — 770010 HCHG ROOM/CARE - REHAB SEMI PRIVAT*

## 2021-05-13 PROCEDURE — 97530 THERAPEUTIC ACTIVITIES: CPT

## 2021-05-13 PROCEDURE — 97116 GAIT TRAINING THERAPY: CPT

## 2021-05-13 PROCEDURE — 92526 ORAL FUNCTION THERAPY: CPT

## 2021-05-13 PROCEDURE — 700102 HCHG RX REV CODE 250 W/ 637 OVERRIDE(OP): Performed by: PHYSICAL MEDICINE & REHABILITATION

## 2021-05-13 PROCEDURE — 97129 THER IVNTJ 1ST 15 MIN: CPT

## 2021-05-13 PROCEDURE — 97130 THER IVNTJ EA ADDL 15 MIN: CPT

## 2021-05-13 RX ORDER — AMOXICILLIN 250 MG
2 CAPSULE ORAL EVERY EVENING
Status: DISCONTINUED | OUTPATIENT
Start: 2021-05-13 | End: 2021-06-04 | Stop reason: HOSPADM

## 2021-05-13 RX ORDER — BACLOFEN 20 MG/1
20 TABLET ORAL 3 TIMES DAILY
Status: DISCONTINUED | OUTPATIENT
Start: 2021-05-13 | End: 2021-05-25

## 2021-05-13 RX ADMIN — GABAPENTIN 100 MG: 100 CAPSULE ORAL at 21:13

## 2021-05-13 RX ADMIN — RIVAROXABAN 20 MG: 20 TABLET, FILM COATED ORAL at 18:08

## 2021-05-13 RX ADMIN — MELATONIN TAB 3 MG 3 MG: 3 TAB at 21:13

## 2021-05-13 RX ADMIN — BACLOFEN 20 MG: 20 TABLET ORAL at 21:11

## 2021-05-13 RX ADMIN — MIRTAZAPINE 15 MG: 15 TABLET, ORALLY DISINTEGRATING ORAL at 21:12

## 2021-05-13 RX ADMIN — BACLOFEN 20 MG: 20 TABLET ORAL at 16:01

## 2021-05-13 RX ADMIN — THYROID, PORCINE 60 MG: 30 TABLET ORAL at 08:29

## 2021-05-13 RX ADMIN — THYROID, PORCINE 60 MG: 30 TABLET ORAL at 21:11

## 2021-05-13 RX ADMIN — ATORVASTATIN CALCIUM 80 MG: 40 TABLET, FILM COATED ORAL at 21:12

## 2021-05-13 RX ADMIN — ASPIRIN 81 MG CHEWABLE TABLET 81 MG: 81 TABLET CHEWABLE at 18:08

## 2021-05-13 RX ADMIN — SENNOSIDES AND DOCUSATE SODIUM 2 TABLET: 8.6; 5 TABLET ORAL at 21:12

## 2021-05-13 RX ADMIN — MECLIZINE HYDROCHLORIDE 25 MG: 25 TABLET ORAL at 21:12

## 2021-05-13 RX ADMIN — MECLIZINE HYDROCHLORIDE 25 MG: 25 TABLET ORAL at 08:31

## 2021-05-13 RX ADMIN — BACLOFEN 30 MG: 20 TABLET ORAL at 08:30

## 2021-05-13 RX ADMIN — MECLIZINE HYDROCHLORIDE 25 MG: 25 TABLET ORAL at 16:01

## 2021-05-13 RX ADMIN — METOPROLOL TARTRATE 12.5 MG: 25 TABLET, FILM COATED ORAL at 21:12

## 2021-05-13 RX ADMIN — METOPROLOL TARTRATE 12.5 MG: 25 TABLET, FILM COATED ORAL at 08:30

## 2021-05-13 ASSESSMENT — ENCOUNTER SYMPTOMS
BRUISES/BLEEDS EASILY: 0
EYES NEGATIVE: 1
FEVER: 0
NAUSEA: 0
FOCAL WEAKNESS: 1
DEPRESSION: 1
VOMITING: 0
SHORTNESS OF BREATH: 0
ABDOMINAL PAIN: 0
COUGH: 0
PALPITATIONS: 0
CHILLS: 0
POLYDIPSIA: 0

## 2021-05-13 ASSESSMENT — GAIT ASSESSMENTS
GAIT LEVEL OF ASSIST: MAXIMAL ASSIST
ASSISTIVE DEVICE: PARALLEL BARS
DEVIATION: STEP TO;DECREASED BASE OF SUPPORT;BRADYKINETIC;DECREASED HEEL STRIKE;DECREASED TOE OFF
DISTANCE (FEET): 5

## 2021-05-13 ASSESSMENT — ACTIVITIES OF DAILY LIVING (ADL)
BED_CHAIR_WHEELCHAIR_TRANSFER_DESCRIPTION: ADAPTIVE EQUIPMENT;SET-UP OF EQUIPMENT;SQUAT PIVOT TRANSFER TO WHEELCHAIR;SUPERVISION FOR SAFETY;VERBAL CUEING

## 2021-05-13 ASSESSMENT — PAIN DESCRIPTION - PAIN TYPE: TYPE: ACUTE PAIN

## 2021-05-13 NOTE — PROGRESS NOTES
Hospital Medicine Daily Progress Note      Chief Complaint  Hypotension    Interval Problem Update  Pt denies complaints.  Labs reviewed.     Review of Systems  Review of Systems   Constitutional: Negative for chills and fever.   Eyes: Negative.    Respiratory: Negative for cough and shortness of breath.    Cardiovascular: Negative for chest pain and palpitations.   Gastrointestinal: Negative for abdominal pain, nausea and vomiting.   Skin: Negative for itching and rash.   Neurological: Positive for focal weakness.   Endo/Heme/Allergies: Negative for polydipsia. Does not bruise/bleed easily.   Psychiatric/Behavioral: Positive for depression.        Physical Exam  Temp:  [36.4 °C (97.5 °F)-36.6 °C (97.9 °F)] 36.6 °C (97.9 °F)  Pulse:  [] 64  Resp:  [17-18] 18  BP: (113-136)/(65-86) 136/86    Physical Exam  Vitals reviewed.   Constitutional:       General: He is not in acute distress.     Appearance: Normal appearance. He is not ill-appearing.   HENT:      Head:      Comments: R skull defect     Right Ear: External ear normal.      Left Ear: External ear normal.      Nose: Nose normal.      Mouth/Throat:      Pharynx: Oropharynx is clear.   Eyes:      General:         Right eye: No discharge.         Left eye: No discharge.      Extraocular Movements: Extraocular movements intact.      Conjunctiva/sclera: Conjunctivae normal.   Cardiovascular:      Rate and Rhythm: Normal rate and regular rhythm.   Pulmonary:      Effort: Pulmonary effort is normal. No respiratory distress.      Breath sounds: Normal breath sounds. No wheezing.   Abdominal:      General: Bowel sounds are normal. There is no distension.      Palpations: Abdomen is soft.      Tenderness: There is no abdominal tenderness.   Musculoskeletal:      Cervical back: Normal range of motion and neck supple.      Right lower leg: No edema.      Left lower leg: No edema.      Comments: LUE trace edema   Skin:     General: Skin is warm and dry.    Neurological:      Mental Status: He is alert.      Comments: Awake and alert         Fluids    Intake/Output Summary (Last 24 hours) at 5/13/2021 1413  Last data filed at 5/13/2021 0800  Gross per 24 hour   Intake 710 ml   Output 1400 ml   Net -690 ml       Laboratory  Recent Labs     05/12/21  0553   WBC 4.3*   RBC 3.97*   HEMOGLOBIN 11.4*   HEMATOCRIT 34.2*   MCV 86.1   MCH 28.7   MCHC 33.3*   RDW 41.4   PLATELETCT 290   MPV 9.3     Recent Labs     05/12/21  0553   SODIUM 142   POTASSIUM 3.8   CHLORIDE 107   CO2 28   GLUCOSE 99   BUN 18   CREATININE 1.02   CALCIUM 9.8                 Assessment/Plan  * Acute right MCA stroke (HCC)- (present on admission)  Assessment & Plan  Presented w/ left hemiparesis  S/P decompressive craniectomy for increased ICP done on 4/3/21 by Dr. Payton  On Xarelto, ASA, and Lipitor  Helmet when OOB    Reactive depression- (present on admission)  Assessment & Plan  On Remeron    Paroxysmal atrial fibrillation (HCC)- (present on admission)  Assessment & Plan  Echo EF >75%  Rate controlled on Metoprolol  Anticoagulated on Xarelto  Now off Flomax    History of COVID-19- (present on admission)  Assessment & Plan  SARS-CoV-2 PCR positive 3/18/21    Acquired hypothyroidism- (present on admission)  Assessment & Plan  Euthyroid on [place] Thyroid     Full Code

## 2021-05-13 NOTE — THERAPY
Physical Therapy   Daily Treatment     Patient Name: Thong Arzola  Age:  56 y.o., Sex:  male  Medical Record #: [id number]  Today's Date: 5/13/2021     Precautions  Precautions: Fall Risk  Comments: Helmet OOB; LUE 2-3 Modified Jeremiah Scale; Abdominal binder OOB; Brewer cath; L inattention & haylie    Subjective    Pt was supine in bed upon arrival and agreeable to treatment.      Objective       05/13/21 1501   Precautions   Precautions Fall Risk   Gait Functional Level of Assist    Gait Level Of Assist Maximal Assist   Assistive Device Parallel Bars   Distance (Feet) 5   # of Times Distance was Traveled 2   Deviation Step To;Decreased Base Of Support;Bradykinetic;Decreased Heel Strike;Decreased Toe Off  (Max A for LLE advancement and trunk control)   Transfer Functional Level of Assist   Bed, Chair, Wheelchair Transfer Moderate Assist  (to L side, min A to R side)   Bed Chair Wheelchair Transfer Description Adaptive equipment;Set-up of equipment;Squat pivot transfer to wheelchair;Supervision for safety;Verbal cueing   Bed Mobility    Supine to Sit Minimal Assist   Sit to Supine Minimal Assist   Sit to Stand Minimal Assist   Scooting Minimal Assist   Rolling Moderate Assist to Lt.   Interdisciplinary Plan of Care Collaboration   Patient Position at End of Therapy In Bed;Call Light within Reach;Tray Table within Reach;Phone within Reach   PT Total Time Spent   PT Individual Total Time Spent (Mins) 60   PT Charge Group   PT Gait Training 1   PT Neuromuscular Re-Education / Balance 1   PT Therapeutic Activities 2     Vitals durning session: seated /91, HR 85 bpm    STS training/standing posture completed with use of mirror for increased visual feedback for midline orientation.  Pre-gait activities in // bars: WS x 10 reps with tibal blocking and hip facilitation.    Assessment    Pt with improving upright tolerance with no dizziness this session.  Pt continues to be limited secondary to significant LLE  strength and sensory deficits with absent proprioception.  Pt also limited secondary to poor activity tolerance.   Strengths: Alert and oriented, Effective communication skills, Independent prior level of function, Motivated for self care and independence, Pleasant and cooperative, Supportive family, Willingly participates in therapeutic activities  Barriers: Hemiparesis, Home accessibility, Orthostatic hypotension, Impaired activity tolerance, Impaired balance, Spasticity, Limited mobility    Plan    Midline orientation EOM, sitting balance, transfer training - SQPT as appropriate, bed mobility, w/c mobility,  LE, ongoing family training with spouse as appropriate. Continue STS training and standing tolerance as able.  Ongoing standing frame tolerance. Pre-gait/gait training in // bars. Consider vector.     Passport items to be completed:  Get in/out of bed safely, in/out of a vehicle, safely use mobility device, walk or wheel around home/community, navigate up and down stairs, show how to get up/down from the ground, ensure home is accessible, demonstrate HEP, complete caregiver training       Physical Therapy Problems (Active)     Problem: Mobility     Dates: Start: 04/30/21       Goal: STG-Within one week, patient will propel wheelchair household distances     Dates: Start: 04/30/21       Description: 1) Individualized goal:  25ft with haylie technique with SBA on even surfaces  2) Interventions:  PT Group Therapy, PT E Stim Attended, PT Orthotics Training, PT Gait Training, PT Self Care/Home Eval, PT Therapeutic Exercises, PT Neuro Re-Ed/Balance, PT Therapeutic Activity, and PT Evaluation      Goal Note filed on 05/12/21 0844 by Neena Gastelum DPT     Pt continues with variable performance requiring intermittent max A to steer d/t L neglect               Problem: Mobility Transfers     Dates: Start: 05/12/21       Goal: STG-Within one week, patient will sit to stand     Dates: Start: 05/12/21       Goal  Note filed on 05/12/21 1201 by Neena Gastelum DPT     1) Individualized goal:  STS in // bars with min A  2) Interventions:  PT Group Therapy, PT E Stim Attended, PT Gait Training, PT Therapeutic Exercises, PT Neuro Re-Ed/Balance, PT Aquatic Therapy, PT Therapeutic Activity, PT Manual Therapy, and PT Evaluation            Goal: STG-Within one week, patient will transfer bed to chair     Dates: Start: 05/12/21       Goal Note filed on 05/12/21 1201 by Neena Gastelum DPT     1) Individualized goal:  SQPT with CGA  2) Interventions:  PT Group Therapy, PT E Stim Attended, PT Gait Training, PT Therapeutic Exercises, PT Neuro Re-Ed/Balance, PT Aquatic Therapy, PT Therapeutic Activity, PT Manual Therapy, and PT Evaluation               Problem: PT-Long Term Goals     Dates: Start: 04/30/21       Goal: LTG-By discharge, patient will propel wheelchair     Dates: Start: 04/30/21       Description: 1) Individualized goal:  100ft with haylie technique and Anastacio on even/uneven surfaces  2) Interventions:  PT Group Therapy, PT E Stim Attended, PT Orthotics Training, PT Gait Training, PT Self Care/Home Eval, PT Therapeutic Exercises, PT Neuro Re-Ed/Balance, PT Therapeutic Activity, and PT Evaluation       Goal: LTG-By discharge, patient will transfer one surface to another     Dates: Start: 04/30/21       Description: 1) Individualized goal:  SQPT with Taz   2) Interventions:  PT Group Therapy, PT E Stim Attended, PT Orthotics Training, PT Gait Training, PT Self Care/Home Eval, PT Therapeutic Exercises, PT Neuro Re-Ed/Balance, PT Therapeutic Activity, and PT Evaluation         Goal: LTG-By discharge, patient will     Dates: Start: 04/30/21       Description: 1) Individualized goal: perform bed mobility (supine<>sit) with Taz   2) Interventions:  PT Group Therapy, PT E Stim Attended, PT Orthotics Training, PT Gait Training, PT Self Care/Home Eval, PT Therapeutic Exercises, PT Neuro Re-Ed/Balance, PT Therapeutic Activity, and PT  Evaluation

## 2021-05-13 NOTE — PROGRESS NOTES
Hospital Medicine Daily Progress Note      Chief Complaint  Hypotension    Interval Problem Update  No chest pain, shortness of breath, or palpitations.     Review of Systems  Review of Systems   Constitutional: Negative for chills and fever.   Eyes: Negative.    Respiratory: Negative for cough and shortness of breath.    Cardiovascular: Negative for chest pain and palpitations.   Gastrointestinal: Negative for abdominal pain, nausea and vomiting.   Skin: Negative for itching and rash.   Neurological: Positive for focal weakness.   Endo/Heme/Allergies: Negative for polydipsia. Does not bruise/bleed easily.   Psychiatric/Behavioral: Positive for depression.        Physical Exam  Temp:  [36.4 °C (97.5 °F)-36.6 °C (97.9 °F)] 36.6 °C (97.9 °F)  Pulse:  [] 64  Resp:  [17-18] 18  BP: (113-136)/(65-86) 136/86    Physical Exam  Vitals reviewed.   Constitutional:       General: He is not in acute distress.     Appearance: Normal appearance. He is not ill-appearing.   HENT:      Head:      Comments: R skull defect     Right Ear: External ear normal.      Left Ear: External ear normal.      Nose: Nose normal.      Mouth/Throat:      Pharynx: Oropharynx is clear.   Eyes:      General:         Right eye: No discharge.         Left eye: No discharge.      Extraocular Movements: Extraocular movements intact.      Conjunctiva/sclera: Conjunctivae normal.   Cardiovascular:      Rate and Rhythm: Normal rate and regular rhythm.   Pulmonary:      Effort: Pulmonary effort is normal. No respiratory distress.      Breath sounds: Normal breath sounds. No wheezing.   Abdominal:      General: Bowel sounds are normal. There is no distension.      Palpations: Abdomen is soft.      Tenderness: There is no abdominal tenderness.   Musculoskeletal:      Cervical back: Normal range of motion and neck supple.      Right lower leg: No edema.      Left lower leg: No edema.      Comments: LUE trace edema   Skin:     General: Skin is warm and dry.    Neurological:      Mental Status: He is alert.      Comments: Awake and alert         Fluids    Intake/Output Summary (Last 24 hours) at 5/13/2021 1416  Last data filed at 5/13/2021 0800  Gross per 24 hour   Intake 710 ml   Output 1400 ml   Net -690 ml       Laboratory  Recent Labs     05/12/21  0553   WBC 4.3*   RBC 3.97*   HEMOGLOBIN 11.4*   HEMATOCRIT 34.2*   MCV 86.1   MCH 28.7   MCHC 33.3*   RDW 41.4   PLATELETCT 290   MPV 9.3     Recent Labs     05/12/21  0553   SODIUM 142   POTASSIUM 3.8   CHLORIDE 107   CO2 28   GLUCOSE 99   BUN 18   CREATININE 1.02   CALCIUM 9.8                 Assessment/Plan  * Acute right MCA stroke (HCC)- (present on admission)  Assessment & Plan  Presented w/ left hemiparesis  S/P decompressive craniectomy for increased ICP done on 4/3/21 by Dr. Payton  On Xarelto, ASA, and Lipitor  Helmet when OOB    Reactive depression- (present on admission)  Assessment & Plan  On Remeron    Paroxysmal atrial fibrillation (HCC)- (present on admission)  Assessment & Plan  Echo EF >75%  Rate controlled on Metoprolol  Anticoagulated on Xarelto    History of COVID-19- (present on admission)  Assessment & Plan  SARS-CoV-2 PCR positive 3/18/21    Acquired hypothyroidism- (present on admission)  Assessment & Plan  Euthyroid on [place] Thyroid     Full Code    Patient seen and examined.  Chart reviewed.  Assessment and Plan as above.  No changes today.

## 2021-05-13 NOTE — PROGRESS NOTES
Attempted to insert cath for inability to void. Approx 30ml of blood noted and no urine. Dr. Monge notified. Order for 3 way dye. Dye inserted without difficulty and hematuria was immediately flushed out by clear yellow urine.

## 2021-05-13 NOTE — DISCHARGE PLANNING
CM spoke with patient and his wife (at ) to update on IDT and new target DC date of 6/4/21.  Answered questions.  Patient expressed much excitement about being able to work with [facility] after DC home.  He is happy about the progress he is making while at [facility].      CM will continue to monitor for DC needs.

## 2021-05-13 NOTE — THERAPY
"Occupational Therapy  Daily Treatment     Patient Name: Thong Arzola  Age:  56 y.o., Sex:  male  Medical Record #: [id number]  Today's Date: 5/13/2021     Precautions  Precautions: (P) Fall Risk  Comments: (P) Helmet OOB; LUE 2-3 Modified Jeremiah Scale; Abdominal binder OOB; Brewer cath; L inattention & haylie         Subjective    \" Thank you .\" stated at end of session       Objective       05/13/21 1001   Precautions   Precautions Fall Risk   Comments Helmet OOB; LUE 2-3 Modified Jeremiah Scale; Abdominal binder OOB; Brewer cath; L inattention & haylie   Neuro-Muscular Treatments   Neuro-Muscular Treatments Facilitation;Postural Facilitation;Quick Stretch;Tactile Cuing;Tapping;Verbal Cuing;Weight Shift Left   Comments seated edge of mat    left scapular mobilization  ( elevation retraction  depression )     paddle applied to left hand durig tx to decrease flexor tone.  proceeded to  perform weight bearing  through  left  UE while reaching witth right   hand  placed on mat at side.   attempted weight bearing through the elbow  but patient fearful  in this position.    gradually worked left  UE into partial  external rotation during   weight bearing activity.       presents with trace active elbow extension  ( pushing against therapists hand) when given mod verbal cues and muscle belly tapping.    flicker to intermitent  trace of   scapular elevation  with mod verbal cues and muscle belly tapping.         Interdisciplinary Plan of Care Collaboration   IDT Collaboration with  Family / Caregiver   Patient Position at End of Therapy Seated;Self Releasing Lap Belt Applied;Family / Friend in Room  (tilt in space  chair tilted back for rest before lunch. )   Collaboration Comments  spouse present and assisting and providing cues/ encouragement as  needed approprtiately though out session      OT Total Time Spent   OT Individual Total Time Spent (Mins) 60   OT Charge Group   OT Neuromuscular Re-education / Balance 4      " educated both regarding purpose of neuro re ed activity performed   To decrease flexor tone in right UE as well as facilitate neuromuscular return.   Sat edge of mat  X 42 minutes   With min cues for upright midline posture.     No LOB noted     Assessment     participates to the best of his ability with all tasks presented.     Continues with left inattention   Starting to demonstrate right UE neuromuscular return  Limited by flexor tone    attempted to apply arm trough with elevating pad to arm  But mecahics of w/c would not allow    May benefit from   Half arm tray   Or if  Other w/  chosen  Try arm trough application.    Strengths: Able to follow instructions, Alert and oriented, Effective communication skills, Good insight into deficits/needs, Independent prior level of function, Manages pain appropriately, Motivated for self care and independence, Pleasant and cooperative, Supportive family, Willingly participates in therapeutic activities  Barriers: Bowel incontinence, Decreased endurance, Fatigue, Generalized weakness, Hemiplegia, Home accessibility, Orthostatic hypotension, Impaired activity tolerance, Impaired balance, Limited mobility, Spasticity    Plan     Aggressive right UE neuro re ed .     Sitting balance/core strengthening/midline orientation, ADL retraining, slideboard transfers progressing to lateral scoots or squat pivots to the right, L UE neuro re-ed/stretching, family training with spouse Anel; Bathing is not a goal of OT at this time (CNAs should be bathing patient); home evaluation to sister in law's house in [place]; pursue resting hand splint for left hand/wrist         Occupational Therapy Goals (Active)     Problem: Dressing     Dates: Start: 05/12/21       Goal: STG-Within one week, patient will dress UB     Dates: Start: 05/12/21       Goal Note filed on 05/12/21 1136 by Aj Lovett, OT/L     1) Individualized Goal: with min A to don/doff shirt with cues  2) Interventions:  OT Self  Care/ADL, OT Cognitive Skill Dev, OT Manual Ther Technique, OT Neuro Re-Ed/Balance, OT Sensory Int Techniques, OT Therapeutic Activity, OT Evaluation, and OT Therapeutic Exercise            Goal: STG-Within one week, patient will dress LB     Dates: Start: 05/12/21       Goal Note filed on 05/12/21 1136 by Aj Lovett, OT/L     1) Individualized Goal: with min A using cues and adaptive techniques  2) Interventions:  OT Self Care/ADL, OT Cognitive Skill Dev, OT Manual Ther Technique, OT Neuro Re-Ed/Balance, OT Sensory Int Techniques, OT Therapeutic Activity, OT Evaluation, and OT Therapeutic Exercise               Problem: Functional Transfers     Dates: Start: 05/12/21       Goal: STG-Within one week, patient will transfer to toilet     Dates: Start: 05/12/21       Goal Note filed on 05/12/21 1136 by Aj Lovett, OT/L     1) Individualized Goal: with mod A using grab bar and commode over the toilet  2) Interventions:  OT Self Care/ADL, OT Cognitive Skill Dev, OT Manual Ther Technique, OT Neuro Re-Ed/Balance, OT Sensory Int Techniques, OT Therapeutic Activity, OT Evaluation, and OT Therapeutic Exercise               Problem: OT Long Term Goals     Dates: Start: 04/30/21       Goal: LTG-By discharge, patient will complete basic self care tasks     Dates: Start: 04/30/21       Description: 1) Individualized Goal:  with min to mod A using AE/AD/techniques as needed  2) Interventions:  OT E Stim Attended, OT Self Care/ADL, OT Cognitive Skill Dev, OT Manual Ther Technique, OT Neuro Re-Ed/Balance, OT Sensory Int Techniques, OT Therapeutic Activity, OT Evaluation, and OT Therapeutic Exercise       Goal: LTG-By discharge, patient will perform bathroom transfers     Dates: Start: 04/30/21       Description: 1) Individualized Goal:  with max A x 1 person using slideboard versus squat pivot to the right  2) Interventions:  OT E Stim Attended, OT Self Care/ADL, OT Cognitive Skill Dev, OT Manual Ther Technique, OT  Neuro Re-Ed/Balance, OT Sensory Int Techniques, OT Therapeutic Activity, OT Evaluation, and OT Therapeutic Exercise

## 2021-05-13 NOTE — DISCHARGE PLANNING
LEILANI spoke with Angeli, Admissions Coordinator-Rehab [facility].  Patient has been accepted.  She will get authorization and work with [facility] for [facility].  CM notified [facility] Transitional Care Navigator, Lenore Dhaliwal.  CM will continue to monitor for DC needs.

## 2021-05-13 NOTE — PROGRESS NOTES
"Rehab Progress Note     Encounter Date: 5/13/2021    CC: Fatigue    Interval Events (Subjective)  Vital signs reviewed: Blood pressure remains largely stable, did have one episode of hypotension at 99 systolic 5/10.  Occasionally intermittently tachycardic with heart rate max 112 on 5/12.  No supplemental oxygen.  Afebrile.  Bowel: Medium 5/12  Bladder: Brewer catheter present.    Patient seen and examined in his room today.  He is up in his wheelchair.  His significant other is present.  Spasticity is well controlled.  Does complain of extreme fatigue and sleepiness during the day.  Also feels like he is not sleeping well at night, however, significant other states that the nurses also that he is fast asleep when they check on him.  Patient states that he feels like he is not sleeping well because when he wakes up he does not feel rested and feels like he could go to sleep again, not sure if this is before after his medications.  Discussed in detail with him usual protocol for removing Brewer catheter.  Discussed with nursing as well.  Patient had large output of blood with last trial.  Would not like catheter, but explained to him that we do not like to do intermittent catheterization if he has already had trauma to the urethra.  Patient and significant other understand.  Would like to add bowel meds as he is having difficulty having bowel movements.  Gabapentin controlling the nerve pains at night better.    14 point ROS reviewed and negative except as stated above.     Objective:  VITAL SIGNS: /86   Pulse 64   Temp 36.6 °C (97.9 °F) (Oral)   Resp 18   Ht 1.778 m (5' 10\")   Wt 67.2 kg (148 lb 2.4 oz)   SpO2 99%   BMI 21.26 kg/m²     GEN: No apparent distress, sitting up in manual wheelchair.  HEENT: Head normocephalic, right craniectomy present.  Sclera nonicteric bilaterally, no ocular discharge appreciated bilaterally.  Helmet donned but removed for inspection.  CV: Extremities warm and " well-perfused, no peripheral edema appreciated bilaterally.  PULMONARY: Breathing nonlabored on room air, no respiratory accessory muscle use.  Not requiring supplemental oxygen.  ABD: Soft, nontender.  SKIN: No appreciable skin breakdown on exposed areas of skin.  Incision well-healed.  NEURO: Awake alert.  Conversational.  Logical thought content.  Flaccid left upper extremity.  Tone 1/4 biceps, 1/4 wrist flexors.  Left lower extremity has clonus at left ankle, but otherwise no tone noted on exam.  PSYCH: Flat affect.    Recent Results (from the past 72 hour(s))   CBC WITH DIFFERENTIAL    Collection Time: 05/12/21  5:53 AM   Result Value Ref Range    WBC 4.3 (L) 4.8 - 10.8 K/uL    RBC 3.97 (L) 4.70 - 6.10 M/uL    Hemoglobin 11.4 (L) 14.0 - 18.0 g/dL    Hematocrit 34.2 (L) 42.0 - 52.0 %    MCV 86.1 81.4 - 97.8 fL    MCH 28.7 27.0 - 33.0 pg    MCHC 33.3 (L) 33.7 - 35.3 g/dL    RDW 41.4 35.9 - 50.0 fL    Platelet Count 290 164 - 446 K/uL    MPV 9.3 9.0 - 12.9 fL    Neutrophils-Polys 62.30 44.00 - 72.00 %    Lymphocytes 25.80 22.00 - 41.00 %    Monocytes 8.90 0.00 - 13.40 %    Eosinophils 2.30 0.00 - 6.90 %    Basophils 0.20 0.00 - 1.80 %    Immature Granulocytes 0.50 0.00 - 0.90 %    Nucleated RBC 0.00 /100 WBC    Neutrophils (Absolute) 2.66 1.82 - 7.42 K/uL    Lymphs (Absolute) 1.10 1.00 - 4.80 K/uL    Monos (Absolute) 0.38 0.00 - 0.85 K/uL    Eos (Absolute) 0.10 0.00 - 0.51 K/uL    Baso (Absolute) 0.01 0.00 - 0.12 K/uL    Immature Granulocytes (abs) 0.02 0.00 - 0.11 K/uL    NRBC (Absolute) 0.00 K/uL   Comp Metabolic Panel    Collection Time: 05/12/21  5:53 AM   Result Value Ref Range    Sodium 142 135 - 145 mmol/L    Potassium 3.8 3.6 - 5.5 mmol/L    Chloride 107 96 - 112 mmol/L    Co2 28 20 - 33 mmol/L    Anion Gap 7.0 7.0 - 16.0    Glucose 99 65 - 99 mg/dL    Bun 18 8 - 22 mg/dL    Creatinine 1.02 0.50 - 1.40 mg/dL    Calcium 9.8 8.5 - 10.5 mg/dL    AST(SGOT) 31 12 - 45 U/L    ALT(SGPT) 67 (H) 2 - 50 U/L     Alkaline Phosphatase 76 30 - 99 U/L    Total Bilirubin 0.3 0.1 - 1.5 mg/dL    Albumin 3.4 3.2 - 4.9 g/dL    Total Protein 5.9 (L) 6.0 - 8.2 g/dL    Globulin 2.5 1.9 - 3.5 g/dL    A-G Ratio 1.4 g/dL   MAGNESIUM    Collection Time: 05/12/21  5:53 AM   Result Value Ref Range    Magnesium 2.0 1.5 - 2.5 mg/dL   PHOSPHORUS    Collection Time: 05/12/21  5:53 AM   Result Value Ref Range    Phosphorus 3.8 2.5 - 4.5 mg/dL   ESTIMATED GFR    Collection Time: 05/12/21  5:53 AM   Result Value Ref Range    GFR If African American >60 >60 mL/min/1.73 m 2    GFR If Non African American >60 >60 mL/min/1.73 m 2       Current Facility-Administered Medications   Medication Frequency   • gabapentin (NEURONTIN) capsule 100 mg Q EVENING   • melatonin tablet 3 mg QHS   • baclofen (LIORESAL) tablet 30 mg TID   • meclizine (ANTIVERT) tablet 25 mg TID   • hydrOXYzine HCl (ATARAX) tablet 50 mg Q6HRS PRN   • Respiratory Therapy Consult Continuous RT   • Pharmacy Consult Request ...Pain Management Review 1 Each PHARMACY TO DOSE   • hydrALAZINE (APRESOLINE) tablet 10 mg Q8HRS PRN   • acetaminophen (Tylenol) tablet 650 mg Q4HRS PRN   • lactulose 20 GM/30ML solution 30 mL QDAY PRN   • docusate sodium (ENEMEEZ) enema 283 mg QDAY PRN   • fleet enema 133 mL QDAY PRN   • artificial tears ophthalmic solution 1 Drop PRN   • benzocaine-menthol (CEPACOL) lozenge 1 Lozenge Q2HRS PRN   • mag hydrox-al hydrox-simeth (MAALOX PLUS ES or MYLANTA DS) suspension 20 mL Q2HRS PRN   • ondansetron (ZOFRAN ODT) dispertab 4 mg 4X/DAY PRN    Or   • ondansetron (ZOFRAN) syringe/vial injection 4 mg 4X/DAY PRN   • traZODone (DESYREL) tablet 50 mg QHS PRN   • sodium chloride (OCEAN) 0.65 % nasal spray 2 Spray PRN   • midazolam (VERSED) 5 mg/mL (1 mL vial) PRN   • atorvastatin (LIPITOR) tablet 80 mg Q EVENING   • aspirin (ASA) chewable tab 81 mg DAILY   • metoprolol tartrate (LOPRESSOR) tablet 12.5 mg BID   • mirtazapine (Remeron) orally disintegrating tab 15 mg QHS   •  rivaroxaban (XARELTO) tablet 20 mg PM MEAL   • thyroid (ARMOUR THYROID) tablet 60 mg BID       Orders Placed This Encounter   Procedures   • Diet Order Diet: Level 7 - Easy to Chew (float pills in puree); Liquid level: Level 0 - Thin; Tray Modifications (optional): SLP - 1:1 Supervision by Nursing, SLP - Deliver to Nursing Station     Standing Status:   Standing     Number of Occurrences:   1     Order Specific Question:   Diet:     Answer:   Level 7 - Easy to Chew [22]     Comments:   float pills in puree     Order Specific Question:   Liquid level     Answer:   Level 0 - Thin     Order Specific Question:   Tray Modifications (optional)     Answer:   SLP - 1:1 Supervision by Nursing     Order Specific Question:   Tray Modifications (optional)     Answer:   SLP - Deliver to Nursing Station       Assessment:  Active Hospital Problems    Diagnosis    • *Acute right MCA stroke (HCC)    • Dyslipidemia    • Arm swelling    • Dizziness    • Hypotension    • Normocytic anemia    • Spasticity as late effect of cerebrovascular accident (CVA)    • Reactive depression    • Urinary retention    • Acquired hypothyroidism    • History of COVID-19    • Paroxysmal atrial fibrillation (HCC)        Medical Decision Making and Plan: Adapted from Dr. Radha Monge's note dated 5/12/2021  Large right ICA/MCA ischemic stroke  S/p craniectomy 4/3, Dr. Payton  Left hemiplegia, has some very mild active elbow ext/flex  Cognitive deficits  Left neglect  Continue full rehab program  PT/OT/SLP, 1 hr each discipline, 5 days per week     Xarelto  Statin     Outpatient follow up with stroke bridge clinic, Dr. Dumont, referrals made     Outpatient follow up with Dr. Payton for cranioplasty -extensive discussion with significant other and patient regarding cranioplasty process 5/13     Making good progress were going to extend his stay     Neuropathic pain, improved  Left leg at night  Continue low dose gabapentin 100 mg 5/11  Continue to monitor  need to increase dose -pain improved 5/13     Dizziness, improved  Scheduled Meclizine, will titrate off prior to discharge  Continue Teds and abdominal binder for now  BPPV testing by PT negative     Spasticity, improved/stable  Increased baclofen 15 mg TID to 20 mg TID 4/30 --> 30 mg TID 5/3--> 20 mg 3 times daily 5/13 given patient complaint of significant tiredness during the day spasticity currently well controlled so will trial down.  Discussed with him we will go back up if has worsening spasticity.     Started valium at night 2 mg 4/30, discontinued 5/3, doesn't seem to make much difference     Consider adding Zanaflex in future, LFTs on 5/5 with elevated ALT, recheck prior to initiation     Currently better controlled on baclofen and with stretching     May benefit from Botox in the future, continue to monitor, will follow up with Dr. Dumont in the outpatient clinic  No indication for Botox as of 5/13     Atrial fibrillation  Metoprolol  Xarelto  Hospitalist consulted, appreciate assistance     Hypothyroidism   [place] thyroid     Hypertension  Hypotension, improved  Lisinopril discontinued  Metoprolol  Flomax discontinued 5/12  Hospitalist consulted, appreciate assistance  Teds and abdominal binder     Insomnia, improved/resolved  Already on mirtazapine  Scheduled melatonin; declines need for additional sleep aid at this time 5/13  Gabapentin added for neuropathic pain at night     Reactive depression  Mirtazapine  Consider psychology consult if needed   Mood currently stable     History of COVID-19  Without sequelae      Normocytic anemia  Mild, monitor     Bowel program  Senokot 2 tablets nightly added 5/13  Last BM 5/12     Bladder program  Urinary retention  Changed Prazosin to Flomax  Voiding trial 5/4, unsuccessful  Replaced Brewer 5/5, increased Flomax   Voiding trial 5/12, unsuccessful again  Discontinued Flomax 5/12, not efffective  Likely has spastic bladder  Will need outpatient urology follow up,  referral made  Extensive discussion with patient regarding usual process for Brewer removal and voiding trial 5/13     DVT prophylaxis  Xarelto    Total time: 40 minutes.  I spent greater than 50% of the time for patient care and coordination on this date, including unit/floor time, and face-to-face time with the patient as per assessment and plan above.    Zulema Dumont D.O.

## 2021-05-13 NOTE — THERAPY
"Speech Language Pathology  Daily Treatment     Patient Name: Thong Arzola  Age:  56 y.o., Sex:  male  Medical Record #: [id number]  Today's Date: 5/13/2021     Precautions  Precautions: Fall Risk, Other (See Comments)  Comments: Helmet OOB; LUE 2-3 Modified Jeremiah Scale; Abdominal binder OOB; Brewer cath; L inattention & haylie    Subjective    Pt seen for two 30 minute sessions today, pleasant and cooperative during both      Objective       05/13/21 0801   Speech Language Pathologist Assigned   Assigned SLP / Extension CL/MP 60 SPLIT OK   SLP Total Time Spent   SLP Individual Total Time Spent (Mins) 60   Treatment Charges   SLP Swallowing Dysfunction Treatment Swallowing Dysfunction Treatment   SLP Cognitive Skill Development First 15 Minutes 1   SLP Cognitive Skill Development Additional 15 Minutes 1       Assessment    [id number]: Pt seen during breakfast to assess pt's ability to locate items on his tray.  Pt was able to locate items on the left without cues required.  Pt was noted to give himself auditory cues throughout this meal to locate items on the left side (I.e. \"Food sometimes hides on the left side of my plate so I have to turn my head far to see it\" and \"Do I have a boost? Wait, let me look, there it is on the left side\").  Pt tolerated 7- Regular Textures and 0-Thin liquids without s/sx of aspiration.  Recommend discharging pt from t-dine.      [id number]: Pt willing to participate at bedside.  Pt continued calendar organization task (simple 1 week calendar) from yesterdays session.  Pt required min cues to locate information on the left and was able to write much more legibly when asked to print vs write in cursive.  Pt also completed 2 following written directions tasks and required min-mod cues to locate the number of the direction before starting to read it (pt occasionally started 1-2 words into the sentence and then made up the first 1-2 words).  Pt also required cues to reread directions for " recall.      Strengths: Able to follow instructions, Alert and oriented, Effective communication skills, Making steady progress towards goals, Supportive family, Pleasant and cooperative, Motivated for self care and independence  Barriers: Visual impairment (left neglect)    Plan    D/C T-dine, continue to target left visual scanning, functional organization and attention tasks       Speech Therapy Problems (Active)     Problem: Problem Solving STGs     Dates: Start: 05/05/21       Goal: STG-Within one week, patient will     Dates: Start: 05/05/21       Description: 1) Individualized goal: demonstrate appropriate visual scanning to the left with min cues and 80% accuracy.  2) Interventions:  SLP Speech Language Treatment, SLP Self Care / ADL Training , SLP Cognitive Skill Development, and SLP Group Treatment        Goal Note filed on 05/12/21 1054 by Min Gomez MS,CCC-SLP     Mod cues required for visual scanning to the left             Goal: STG-Within one week, patient will     Dates: Start: 05/05/21       Description: 1) Individualized goal:  complete simple attention tasks with min A with 80% accuracy.   2) Interventions:  SLP Speech Language Treatment, SLP Self Care / ADL Training , SLP Cognitive Skill Development, and SLP Group Treatment        Goal Note filed on 05/12/21 1054 by Min Gomez MS,CCC-SLP     Mod A required for simple attention tasks                Problem: Speech/Swallowing LTGs     Dates: Start: 04/30/21       Goal: LTG-By discharge, patient will solve complex problem     Dates: Start: 04/30/21       Description: 1) Individualized goal:  related to IADL's with mod I for safe d/c home.  2) Interventions:  SLP Speech Language Treatment, SLP Swallowing Dysfunction Treatment, SLP Oral Pharyngeal Evaluation, SLP Video Swallow Evaluation, SLP Self Care / ADL Training , SLP Cognitive Skill Development, and SLP Group Treatment

## 2021-05-14 PROCEDURE — 97130 THER IVNTJ EA ADDL 15 MIN: CPT

## 2021-05-14 PROCEDURE — 99231 SBSQ HOSP IP/OBS SF/LOW 25: CPT | Performed by: HOSPITALIST

## 2021-05-14 PROCEDURE — 99231 SBSQ HOSP IP/OBS SF/LOW 25: CPT | Performed by: PHYSICAL MEDICINE & REHABILITATION

## 2021-05-14 PROCEDURE — 97535 SELF CARE MNGMENT TRAINING: CPT

## 2021-05-14 PROCEDURE — 97530 THERAPEUTIC ACTIVITIES: CPT

## 2021-05-14 PROCEDURE — 700102 HCHG RX REV CODE 250 W/ 637 OVERRIDE(OP): Performed by: PHYSICAL MEDICINE & REHABILITATION

## 2021-05-14 PROCEDURE — A9270 NON-COVERED ITEM OR SERVICE: HCPCS | Performed by: PHYSICAL MEDICINE & REHABILITATION

## 2021-05-14 PROCEDURE — 97129 THER IVNTJ 1ST 15 MIN: CPT

## 2021-05-14 PROCEDURE — 97116 GAIT TRAINING THERAPY: CPT

## 2021-05-14 PROCEDURE — 97112 NEUROMUSCULAR REEDUCATION: CPT

## 2021-05-14 PROCEDURE — 770010 HCHG ROOM/CARE - REHAB SEMI PRIVAT*

## 2021-05-14 RX ADMIN — MECLIZINE HYDROCHLORIDE 25 MG: 25 TABLET ORAL at 08:15

## 2021-05-14 RX ADMIN — MECLIZINE HYDROCHLORIDE 25 MG: 25 TABLET ORAL at 21:17

## 2021-05-14 RX ADMIN — SENNOSIDES AND DOCUSATE SODIUM 2 TABLET: 8.6; 5 TABLET ORAL at 21:18

## 2021-05-14 RX ADMIN — METOPROLOL TARTRATE 12.5 MG: 25 TABLET, FILM COATED ORAL at 21:17

## 2021-05-14 RX ADMIN — MECLIZINE HYDROCHLORIDE 25 MG: 25 TABLET ORAL at 16:13

## 2021-05-14 RX ADMIN — METOPROLOL TARTRATE 12.5 MG: 25 TABLET, FILM COATED ORAL at 08:15

## 2021-05-14 RX ADMIN — BACLOFEN 20 MG: 20 TABLET ORAL at 21:17

## 2021-05-14 RX ADMIN — MIRTAZAPINE 15 MG: 15 TABLET, ORALLY DISINTEGRATING ORAL at 21:18

## 2021-05-14 RX ADMIN — BACLOFEN 20 MG: 20 TABLET ORAL at 16:13

## 2021-05-14 RX ADMIN — ASPIRIN 81 MG CHEWABLE TABLET 81 MG: 81 TABLET CHEWABLE at 18:24

## 2021-05-14 RX ADMIN — RIVAROXABAN 20 MG: 20 TABLET, FILM COATED ORAL at 18:24

## 2021-05-14 RX ADMIN — BACLOFEN 20 MG: 20 TABLET ORAL at 08:15

## 2021-05-14 RX ADMIN — THYROID, PORCINE 60 MG: 30 TABLET ORAL at 21:17

## 2021-05-14 RX ADMIN — ATORVASTATIN CALCIUM 80 MG: 40 TABLET, FILM COATED ORAL at 21:16

## 2021-05-14 RX ADMIN — MELATONIN TAB 3 MG 3 MG: 3 TAB at 21:17

## 2021-05-14 RX ADMIN — THYROID, PORCINE 60 MG: 30 TABLET ORAL at 08:15

## 2021-05-14 RX ADMIN — GABAPENTIN 100 MG: 100 CAPSULE ORAL at 21:16

## 2021-05-14 ASSESSMENT — ENCOUNTER SYMPTOMS
DEPRESSION: 1
PALPITATIONS: 0
BRUISES/BLEEDS EASILY: 0
NAUSEA: 0
CHILLS: 0
EYES NEGATIVE: 1
SHORTNESS OF BREATH: 0
COUGH: 0
FEVER: 0
POLYDIPSIA: 0
VOMITING: 0
ABDOMINAL PAIN: 0
FOCAL WEAKNESS: 1

## 2021-05-14 ASSESSMENT — GAIT ASSESSMENTS
ASSISTIVE DEVICE: PARALLEL BARS
GAIT LEVEL OF ASSIST: MAXIMAL ASSIST
DISTANCE (FEET): 7
DEVIATION: STEP TO;BRADYKINETIC;DECREASED HEEL STRIKE;DECREASED TOE OFF

## 2021-05-14 ASSESSMENT — PATIENT HEALTH QUESTIONNAIRE - PHQ9
3. TROUBLE FALLING OR STAYING ASLEEP OR SLEEPING TOO MUCH: SEVERAL DAYS
9. THOUGHTS THAT YOU WOULD BE BETTER OFF DEAD, OR OF HURTING YOURSELF: NOT AT ALL
6. FEELING BAD ABOUT YOURSELF - OR THAT YOU ARE A FAILURE OR HAVE LET YOURSELF OR YOUR FAMILY DOWN: NOT AL ALL
8. MOVING OR SPEAKING SO SLOWLY THAT OTHER PEOPLE COULD HAVE NOTICED. OR THE OPPOSITE, BEING SO FIGETY OR RESTLESS THAT YOU HAVE BEEN MOVING AROUND A LOT MORE THAN USUAL: NOT AT ALL
2. FEELING DOWN, DEPRESSED, IRRITABLE, OR HOPELESS: NOT AT ALL
4. FEELING TIRED OR HAVING LITTLE ENERGY: SEVERAL DAYS
1. LITTLE INTEREST OR PLEASURE IN DOING THINGS: SEVERAL DAYS
5. POOR APPETITE OR OVEREATING: SEVERAL DAYS
7. TROUBLE CONCENTRATING ON THINGS, SUCH AS READING THE NEWSPAPER OR WATCHING TELEVISION: SEVERAL DAYS
SUM OF ALL RESPONSES TO PHQ QUESTIONS 1-9: 5
SUM OF ALL RESPONSES TO PHQ9 QUESTIONS 1 AND 2: 1

## 2021-05-14 ASSESSMENT — PAIN DESCRIPTION - PAIN TYPE: TYPE: ACUTE PAIN

## 2021-05-14 ASSESSMENT — ACTIVITIES OF DAILY LIVING (ADL)
BED_CHAIR_WHEELCHAIR_TRANSFER_DESCRIPTION: ADAPTIVE EQUIPMENT;SQUAT PIVOT TRANSFER TO WHEELCHAIR;SUPERVISION FOR SAFETY;VERBAL CUEING
BED_CHAIR_WHEELCHAIR_TRANSFER_DESCRIPTION: INCREASED TIME;SUPERVISION FOR SAFETY;VERBAL CUEING;SQUAT PIVOT TRANSFER TO WHEELCHAIR
TUB_SHOWER_TRANSFER_DESCRIPTION: GRAB BAR;SHOWER BENCH;INCREASED TIME;SUPERVISION FOR SAFETY;VERBAL CUEING

## 2021-05-14 NOTE — THERAPY
Speech Language Pathology  Daily Treatment     Patient Name: Thong Arzola  Age:  56 y.o., Sex:  male  Medical Record #: [id number]  Today's Date: 5/14/2021     Precautions  Precautions: Fall Risk  Comments: Helmet OOB; LUE 2-3 Modified Jeremiah Scale; Abdominal binder OOB; Brewer cath; L inattention & haylie    Subjective    Pt pleasant and cooperative during tx.       Objective       05/14/21 0931   Cognition   Moderate Attention Supervision (5)   Visual Scanning / Cancellation Skills Moderate (3)   SLP Total Time Spent   SLP Individual Total Time Spent (Mins) 60   Treatment Charges   SLP Cognitive Skill Development First 15 Minutes 1   SLP Cognitive Skill Development Additional 15 Minutes 3       Assessment    2x2 organization grid( pt required to sort a word list into 4 boxes- in the air vs not in the air/ transportation vs. Not transportation) : 90% [place] (cues to look to left and locate words).  Pt required to state name of a place for each letter of the alphabet: 100% independent.  Single target cancellation: 83% given mod verbal cues and line guide on left.  Memory book introduced this day.  Pt required min-mod cues to write on L side of the page.     Strengths: Able to follow instructions, Alert and oriented, Effective communication skills, Making steady progress towards goals, Supportive family, Pleasant and cooperative, Motivated for self care and independence  Barriers: Visual impairment (left neglect)    Plan    Use of memory book, attn, visual scanning.         Speech Therapy Problems (Active)     Problem: Problem Solving STGs     Dates: Start: 05/05/21       Goal: STG-Within one week, patient will     Dates: Start: 05/05/21       Description: 1) Individualized goal: demonstrate appropriate visual scanning to the left with min cues and 80% accuracy.  2) Interventions:  SLP Speech Language Treatment, SLP Self Care / ADL Training , SLP Cognitive Skill Development, and SLP Group Treatment        Goal Note  filed on 05/12/21 1054 by Min Gomez MS,CCC-SLP     Mod cues required for visual scanning to the left             Goal: STG-Within one week, patient will     Dates: Start: 05/05/21       Description: 1) Individualized goal:  complete simple attention tasks with min A with 80% accuracy.   2) Interventions:  SLP Speech Language Treatment, SLP Self Care / ADL Training , SLP Cognitive Skill Development, and SLP Group Treatment        Goal Note filed on 05/12/21 1054 by Min Gomez MS,CCC-SLP     Mod A required for simple attention tasks                Problem: Speech/Swallowing LTGs     Dates: Start: 04/30/21       Goal: LTG-By discharge, patient will solve complex problem     Dates: Start: 04/30/21       Description: 1) Individualized goal:  related to IADL's with mod I for safe d/c home.  2) Interventions:  SLP Speech Language Treatment, SLP Swallowing Dysfunction Treatment, SLP Oral Pharyngeal Evaluation, SLP Video Swallow Evaluation, SLP Self Care / ADL Training , SLP Cognitive Skill Development, and SLP Group Treatment

## 2021-05-14 NOTE — CARE PLAN
The patient is Stable - Low risk of patient condition declining or worsening      Problem: Safety  Goal: Will remain free from injury  Outcome: Progressing  Goal: Will remain free from falls  Outcome: Progressing  Note:   Patient uses call light consistently and appropriately this shift.  Waits for assistance when needed and does not attempt self transfer.  Able to verbalize needs.  Will continue to monitor.       Problem: Infection  Goal: Will remain free from infection  Outcome: Progressing  Note:   Patient remains free from s/s infection; afebrile.  Will continue to monitor.

## 2021-05-14 NOTE — PROGRESS NOTES
"Rehab Progress Note     Encounter Date: 5/14/2021    CC: Doing well    Interval Events (Subjective)  Vital signs reviewed: More frequent tachycardia.  Heart rate max 112 on 5/12.  Blood pressure largely within normal limits.  Bowel: Last bowel movement 5/13  Indwelling Brewer catheter    Patient seen and examined at his room.  States that he is doing well.  Does feel somewhat less tired than he had prior.  Does not notice that his tone is any worse.  Denies any fevers, chills, shortness of breath, chest pain, nausea, vomiting.  Again discussed with him need to keep Brewer in for now.  Defer to primary team to have 1 more trial prior to discharge.  Patient in agreement.    14 point ROS reviewed and negative except as stated above.     Objective:  VITAL SIGNS: /102   Pulse 80   Temp 37.1 °C (98.7 °F) (Temporal)   Resp 18   Ht 1.778 m (5' 10\")   Wt 67.2 kg (148 lb 2.4 oz)   SpO2 96%   BMI 21.26 kg/m²     GEN: No apparent distress, laying in bed comfortably.  HEENT: Head normocephalic, right craniectomy.  Sclera nonicteric bilaterally, no ocular discharge appreciated bilaterally.  CV: Extremities warm and well-perfused, no peripheral edema appreciated bilaterally.  PULMONARY: Breathing nonlabored on room air, no respiratory accessory muscle use.  Not requiring supplemental oxygen.  ABD: Soft, nontender.  SKIN: No appreciable skin breakdown on exposed areas of skin.  PSYCH: Mood and affect within normal limits.  NEURO: Awake alert.  Conversational.  Logical thought content.  Flaccid left upper extremity.  Hemiparetic left lower extremity.  Possible mild increase in tone of left upper extremity in elbow extension, wrist extension, finger extension.      Recent Results (from the past 72 hour(s))   CBC WITH DIFFERENTIAL    Collection Time: 05/12/21  5:53 AM   Result Value Ref Range    WBC 4.3 (L) 4.8 - 10.8 K/uL    RBC 3.97 (L) 4.70 - 6.10 M/uL    Hemoglobin 11.4 (L) 14.0 - 18.0 g/dL    Hematocrit 34.2 (L) 42.0 - " 52.0 %    MCV 86.1 81.4 - 97.8 fL    MCH 28.7 27.0 - 33.0 pg    MCHC 33.3 (L) 33.7 - 35.3 g/dL    RDW 41.4 35.9 - 50.0 fL    Platelet Count 290 164 - 446 K/uL    MPV 9.3 9.0 - 12.9 fL    Neutrophils-Polys 62.30 44.00 - 72.00 %    Lymphocytes 25.80 22.00 - 41.00 %    Monocytes 8.90 0.00 - 13.40 %    Eosinophils 2.30 0.00 - 6.90 %    Basophils 0.20 0.00 - 1.80 %    Immature Granulocytes 0.50 0.00 - 0.90 %    Nucleated RBC 0.00 /100 WBC    Neutrophils (Absolute) 2.66 1.82 - 7.42 K/uL    Lymphs (Absolute) 1.10 1.00 - 4.80 K/uL    Monos (Absolute) 0.38 0.00 - 0.85 K/uL    Eos (Absolute) 0.10 0.00 - 0.51 K/uL    Baso (Absolute) 0.01 0.00 - 0.12 K/uL    Immature Granulocytes (abs) 0.02 0.00 - 0.11 K/uL    NRBC (Absolute) 0.00 K/uL   Comp Metabolic Panel    Collection Time: 05/12/21  5:53 AM   Result Value Ref Range    Sodium 142 135 - 145 mmol/L    Potassium 3.8 3.6 - 5.5 mmol/L    Chloride 107 96 - 112 mmol/L    Co2 28 20 - 33 mmol/L    Anion Gap 7.0 7.0 - 16.0    Glucose 99 65 - 99 mg/dL    Bun 18 8 - 22 mg/dL    Creatinine 1.02 0.50 - 1.40 mg/dL    Calcium 9.8 8.5 - 10.5 mg/dL    AST(SGOT) 31 12 - 45 U/L    ALT(SGPT) 67 (H) 2 - 50 U/L    Alkaline Phosphatase 76 30 - 99 U/L    Total Bilirubin 0.3 0.1 - 1.5 mg/dL    Albumin 3.4 3.2 - 4.9 g/dL    Total Protein 5.9 (L) 6.0 - 8.2 g/dL    Globulin 2.5 1.9 - 3.5 g/dL    A-G Ratio 1.4 g/dL   MAGNESIUM    Collection Time: 05/12/21  5:53 AM   Result Value Ref Range    Magnesium 2.0 1.5 - 2.5 mg/dL   PHOSPHORUS    Collection Time: 05/12/21  5:53 AM   Result Value Ref Range    Phosphorus 3.8 2.5 - 4.5 mg/dL   ESTIMATED GFR    Collection Time: 05/12/21  5:53 AM   Result Value Ref Range    GFR If African American >60 >60 mL/min/1.73 m 2    GFR If Non African American >60 >60 mL/min/1.73 m 2       Current Facility-Administered Medications   Medication Frequency   • senna-docusate (PERICOLACE or SENOKOT S) 8.6-50 MG per tablet 2 tablet Q EVENING   • baclofen (LIORESAL) tablet 20 mg TID    • gabapentin (NEURONTIN) capsule 100 mg Q EVENING   • melatonin tablet 3 mg QHS   • meclizine (ANTIVERT) tablet 25 mg TID   • hydrOXYzine HCl (ATARAX) tablet 50 mg Q6HRS PRN   • Respiratory Therapy Consult Continuous RT   • Pharmacy Consult Request ...Pain Management Review 1 Each PHARMACY TO DOSE   • hydrALAZINE (APRESOLINE) tablet 10 mg Q8HRS PRN   • acetaminophen (Tylenol) tablet 650 mg Q4HRS PRN   • lactulose 20 GM/30ML solution 30 mL QDAY PRN   • docusate sodium (ENEMEEZ) enema 283 mg QDAY PRN   • fleet enema 133 mL QDAY PRN   • artificial tears ophthalmic solution 1 Drop PRN   • benzocaine-menthol (CEPACOL) lozenge 1 Lozenge Q2HRS PRN   • mag hydrox-al hydrox-simeth (MAALOX PLUS ES or MYLANTA DS) suspension 20 mL Q2HRS PRN   • ondansetron (ZOFRAN ODT) dispertab 4 mg 4X/DAY PRN    Or   • ondansetron (ZOFRAN) syringe/vial injection 4 mg 4X/DAY PRN   • traZODone (DESYREL) tablet 50 mg QHS PRN   • sodium chloride (OCEAN) 0.65 % nasal spray 2 Spray PRN   • midazolam (VERSED) 5 mg/mL (1 mL vial) PRN   • atorvastatin (LIPITOR) tablet 80 mg Q EVENING   • aspirin (ASA) chewable tab 81 mg DAILY   • metoprolol tartrate (LOPRESSOR) tablet 12.5 mg BID   • mirtazapine (Remeron) orally disintegrating tab 15 mg QHS   • rivaroxaban (XARELTO) tablet 20 mg PM MEAL   • thyroid (ARMOUR THYROID) tablet 60 mg BID       Orders Placed This Encounter   Procedures   • Diet Order Diet: Level 7 - Easy to Chew (float pills in puree); Liquid level: Level 0 - Thin; Tray Modifications (optional): SLP - 1:1 Supervision by Nursing, SLP - Deliver to Nursing Station     Standing Status:   Standing     Number of Occurrences:   1     Order Specific Question:   Diet:     Answer:   Level 7 - Easy to Chew [22]     Comments:   float pills in puree     Order Specific Question:   Liquid level     Answer:   Level 0 - Thin     Order Specific Question:   Tray Modifications (optional)     Answer:   SLP - 1:1 Supervision by Nursing     Order Specific  Question:   Tray Modifications (optional)     Answer:   SLP - Deliver to Nursing Station       Assessment:  Active Hospital Problems    Diagnosis    • *Acute right MCA stroke (HCC)    • Dyslipidemia    • Arm swelling    • Dizziness    • Hypotension    • Normocytic anemia    • Spasticity as late effect of cerebrovascular accident (CVA)    • Reactive depression    • Urinary retention    • Acquired hypothyroidism    • History of COVID-19    • Paroxysmal atrial fibrillation (HCC)        Medical Decision Making and Plan: Adapted from Dr. Radha Monge's note dated 5/12/2021  Large right ICA/MCA ischemic stroke  S/p craniectomy 4/3, Dr. Payton  Left hemiplegia, has some very mild active elbow ext/flex  Cognitive deficits  Left neglect  Continue full rehab program  PT/OT/SLP, 1 hr each discipline, 5 days per week     Xarelto  Statin     Outpatient follow up with stroke bridge clinic, Dr. Dumont, referrals made     Outpatient follow up with Dr. Payton for cranioplasty -extensive discussion with significant other and patient regarding cranioplasty process 5/13     Making good progress were going to extend his stay     Neuropathic pain, improved  Left leg at night  Continue low dose gabapentin 100 mg 5/11  Continue to monitor need to increase dose -pain improved 5/13     Dizziness, improved  Scheduled Meclizine, will titrate off prior to discharge  Continue Teds and abdominal binder for now  BPPV testing by PT negative     Spasticity, improved/stable  Increased baclofen 15 mg TID to 20 mg TID 4/30 --> 30 mg TID 5/3--> 20 mg 3 times daily 5/13 given patient complaint of significant tiredness during the day spasticity currently well controlled so will trial down.  Discussed with him we will go back up if has worsening spasticity.  Possibly has slightly worsened tone in left upper extremity, but difficult to discern.  Patient does subjectively report feeling less tired than prior.  Continue to monitor.     Started valium at night  2 mg 4/30, discontinued 5/3, doesn't seem to make much difference     Consider adding Zanaflex in future, LFTs on 5/5 with elevated ALT, recheck prior to initiation     Currently better controlled on baclofen and with stretching     May benefit from Botox in the future, continue to monitor, will follow up with Dr. Dumont in the outpatient clinic     Atrial fibrillation  Metoprolol  Xarelto  Hospitalist consulted, appreciate assistance  Heart rates more frequently elevated recently heart rate max 5/12 was 112     Hypothyroidism   [place] thyroid     Hypertension  Hypotension, improved  Lisinopril discontinued  Metoprolol  Flomax discontinued 5/12  Hospitalist consulted, appreciate assistance  Teds and abdominal binder     Insomnia, improved/resolved  Already on mirtazapine  Scheduled melatonin; declines need for additional sleep aid at this time 5/13  Gabapentin added for neuropathic pain at night     Reactive depression  Mirtazapine  Consider psychology consult if needed   Mood currently stable     History of COVID-19  Without sequelae      Normocytic anemia  Mild, monitor     Bowel program  Senokot 2 tablets nightly added 5/13  Last BM 5/13     Bladder program   Urinary retention  Changed Prazosin to Flomax  Voiding trial 5/4, unsuccessful  Replaced Brewer 5/5, increased Flomax   Voiding trial 5/12, unsuccessful again  Discontinued Flomax 5/12, not efffective  Likely has spastic bladder  Will need outpatient urology follow up, referral made  Extensive discussion with patient regarding usual process for Brewer removal and voiding trial 5/13     DVT prophylaxis  Xarelto    Total time: 16 minutes.  I spent greater than 50% of the time for patient care and coordination on this date, including unit/floor time, and face-to-face time with the patient as per assessment and plan above.    Zulema Dumont D.O.

## 2021-05-14 NOTE — CARE PLAN
Problem: Communication  Goal: The ability to communicate needs accurately and effectively will improve  Outcome: Progressing     Problem: Safety  Goal: Will remain free from injury  Outcome: Progressing  Goal: Will remain free from falls  Outcome: Progressing     Problem: Infection  Goal: Will remain free from infection  Outcome: Progressing     Problem: Venous Thromboembolism (VTW)/Deep Vein Thrombosis (DVT) Prevention:  Goal: Patient will participate in Venous Thrombosis (VTE)/Deep Vein Thrombosis (DVT)Prevention Measures  Outcome: Progressing     Problem: Bowel/Gastric:  Goal: Normal bowel function is maintained or improved  Outcome: Progressing  Goal: Will not experience complications related to bowel motility  Outcome: Progressing         Progress made toward(s) clinical / shift goals:  Having daily or every other day bowel movements without signs of constipation  Patient is not progressing towards the following goals:

## 2021-05-14 NOTE — PROGRESS NOTES
Hospital Medicine Daily Progress Note      Chief Complaint  Hypotension    Interval Problem Update  No new issues.    Review of Systems  Review of Systems   Constitutional: Negative for chills and fever.   Eyes: Negative.    Respiratory: Negative for cough and shortness of breath.    Cardiovascular: Negative for chest pain and palpitations.   Gastrointestinal: Negative for abdominal pain, nausea and vomiting.   Skin: Negative for itching and rash.   Neurological: Positive for focal weakness.   Endo/Heme/Allergies: Negative for polydipsia. Does not bruise/bleed easily.   Psychiatric/Behavioral: Positive for depression.        Physical Exam  Temp:  [36.3 °C (97.4 °F)-37 °C (98.6 °F)] 37 °C (98.6 °F)  Pulse:  [] 81  Resp:  [17-18] 17  BP: (126-157)/() 134/99  SpO2:  [95 %-96 %] 95 %    Physical Exam  Vitals reviewed.   Constitutional:       General: He is not in acute distress.     Appearance: Normal appearance. He is not ill-appearing.   HENT:      Head:      Comments: +helmet     Right Ear: External ear normal.      Left Ear: External ear normal.      Nose: Nose normal.      Mouth/Throat:      Pharynx: Oropharynx is clear.   Eyes:      General:         Right eye: No discharge.         Left eye: No discharge.      Extraocular Movements: Extraocular movements intact.      Conjunctiva/sclera: Conjunctivae normal.   Cardiovascular:      Rate and Rhythm: Normal rate and regular rhythm.   Pulmonary:      Effort: Pulmonary effort is normal. No respiratory distress.      Breath sounds: Normal breath sounds. No wheezing.   Abdominal:      General: Bowel sounds are normal. There is no distension.      Palpations: Abdomen is soft.      Tenderness: There is no abdominal tenderness.   Musculoskeletal:      Cervical back: Normal range of motion and neck supple.      Right lower leg: No edema.      Left lower leg: No edema.   Skin:     General: Skin is warm and dry.   Neurological:      Mental Status: He is alert.       Comments: Awake and alert         Fluids    Intake/Output Summary (Last 24 hours) at 5/15/2021 1551  Last data filed at 5/15/2021 1507  Gross per 24 hour   Intake 780 ml   Output 1700 ml   Net -920 ml       Laboratory                      Assessment/Plan  * Acute right MCA stroke (HCC)- (present on admission)  Assessment & Plan  Presented w/ left hemiparesis  S/P decompressive craniectomy for increased ICP done on 4/3/21 by Dr. Payton  On Xarelto, ASA, and Lipitor  Helmet    Reactive depression- (present on admission)  Assessment & Plan  On Remeron    Paroxysmal atrial fibrillation (HCC)- (present on admission)  Assessment & Plan  Echo EF >75%  Rate controlled on Metoprolol  Anticoagulated on Xarelto    History of COVID-19- (present on admission)  Assessment & Plan  SARS-CoV-2 PCR positive 3/18/21    Acquired hypothyroidism- (present on admission)  Assessment & Plan  Euthyroid on [place] Thyroid     Full Code

## 2021-05-14 NOTE — THERAPY
"Speech Language Pathology  Daily Treatment     Patient Name: Thong Arzola  Age:  56 y.o., Sex:  male  Medical Record #: [id number]  Today's Date: 5/14/2021     Precautions  Precautions: Fall Risk  Comments: Helmet OOB; LUE 2-3 Modified Jeremiah Scale; Abdominal binder OOB; Brewer cath; L inattention & haylie    Subjective    Pt pleasant and cooperative during this ST session      Objective       05/14/21 0831   SLP Total Time Spent   SLP Individual Total Time Spent (Mins) 30   Treatment Charges   SLP Cognitive Skill Development First 15 Minutes 1   SLP Cognitive Skill Development Additional 15 Minutes 1       Assessment    Pt sitting up with his meal tray in front of his at the beginning of this session without any food eaten.  Pt stated \"I couldn't find my fork so I haven't eaten anything yet\".  Pt required min verbal cues to locate items on the left side of his tray to initiate eating breakfast.  Pt will be re-enrolled in [employer] for assistance with locating items on his tray.  Pt completed a visual scanning task requiring him to locate 1 letter target.  Pt required mod cues on the first task to locate items on the left to achieve 100% accuracy, reducing to min cues on the second task to achieve 100% accuracy.    Strengths: Able to follow instructions, Alert and oriented, Effective communication skills, Making steady progress towards goals, Supportive family, Pleasant and cooperative, Motivated for self care and independence  Barriers: Visual impairment (left neglect)    Plan    Re-enroll in [employer] for assistance locating items on the left side of his tray, continue targeting left visual scanning tasks       Speech Therapy Problems (Active)     Problem: Problem Solving STGs     Dates: Start: 05/05/21       Goal: STG-Within one week, patient will     Dates: Start: 05/05/21       Description: 1) Individualized goal: demonstrate appropriate visual scanning to the left with min cues and 80% accuracy.  2) " Interventions:  SLP Speech Language Treatment, SLP Self Care / ADL Training , SLP Cognitive Skill Development, and SLP Group Treatment        Goal Note filed on 05/12/21 1054 by Min Gomez MS,CCC-SLP     Mod cues required for visual scanning to the left             Goal: STG-Within one week, patient will     Dates: Start: 05/05/21       Description: 1) Individualized goal:  complete simple attention tasks with min A with 80% accuracy.   2) Interventions:  SLP Speech Language Treatment, SLP Self Care / ADL Training , SLP Cognitive Skill Development, and SLP Group Treatment        Goal Note filed on 05/12/21 1054 by Min Gomez MS,CCC-SLP     Mod A required for simple attention tasks                Problem: Speech/Swallowing LTGs     Dates: Start: 04/30/21       Goal: LTG-By discharge, patient will solve complex problem     Dates: Start: 04/30/21       Description: 1) Individualized goal:  related to IADL's with mod I for safe d/c home.  2) Interventions:  SLP Speech Language Treatment, SLP Swallowing Dysfunction Treatment, SLP Oral Pharyngeal Evaluation, SLP Video Swallow Evaluation, SLP Self Care / ADL Training , SLP Cognitive Skill Development, and SLP Group Treatment

## 2021-05-14 NOTE — THERAPY
Physical Therapy   Daily Treatment     Patient Name: Thong Arzola  Age:  56 y.o., Sex:  male  Medical Record #: [id number]  Today's Date: 5/14/2021     Precautions  Precautions: Fall Risk  Comments: Helmet OOB; LUE 2-3 Modified Jeremiah Scale; Abdominal binder OOB; Brewer cath; L inattention & haylie    Subjective    Pt was seated in w/c upon arrival and agreeable to treatment.  Pt's spouse present during session.      Objective       05/14/21 1401   Precautions   Precautions Fall Risk   Gait Functional Level of Assist    Gait Level Of Assist Maximal Assist   Assistive Device Parallel Bars   Distance (Feet) 7  (x5 feet x 2)   # of Times Distance was Traveled 1   Deviation Step To;Bradykinetic;Decreased Heel Strike;Decreased Toe Off  (Max A for LLE advancement and trunk control)   Transfer Functional Level of Assist   Bed, Chair, Wheelchair Transfer Minimal Assist   Bed Chair Wheelchair Transfer Description Adaptive equipment;Squat pivot transfer to wheelchair;Supervision for safety;Verbal cueing  (To R side using bedrail)   Bed Mobility    Sit to Supine Minimal Assist   Sit to Stand Contact Guard Assist  (in // bars)   Interdisciplinary Plan of Care Collaboration   Patient Position at End of Therapy In Bed;Call Light within Reach;Tray Table within Reach;Phone within Reach   PT Total Time Spent   PT Individual Total Time Spent (Mins) 60   PT Charge Group   PT Gait Training 1   PT Neuromuscular Re-Education / Balance 1   PT Therapeutic Activities 2     Neuro re ed in // bars: WS x 10 reps, RLE tapping x 5 reps, standing posture using mirror for increased feedback with focus on upright posture, decreasing anterior trunk lean, and increased LLE weight shift.      Assessment    Pt with slight increase in L sided weight shift with ambulation with repetition, resulting in improving R step length.  Pt with improving midline orientation in standing with VC and visual cueing.  Pt continues to be limited secondary to poor  activity tolerance, poor endurance, and significant LLE motor and sensory deficits.  Strengths: Alert and oriented, Effective communication skills, Independent prior level of function, Motivated for self care and independence, Pleasant and cooperative, Supportive family, Willingly participates in therapeutic activities  Barriers: Hemiparesis, Home accessibility, Orthostatic hypotension, Impaired activity tolerance, Impaired balance, Spasticity, Limited mobility    Plan    Midline orientation EOM, sitting balance, transfer training - SQPT as appropriate, bed mobility, w/c mobility,  LE, ongoing family training with spouse as appropriate. Continue STS training and standing tolerance as able.  Ongoing standing frame tolerance. Pre-gait/gait training in // bars. Consider vector.     Passport items to be completed:  Get in/out of bed safely, in/out of a vehicle, safely use mobility device, walk or wheel around home/community, navigate up and down stairs, show how to get up/down from the ground, ensure home is accessible, demonstrate HEP, complete caregiver training       Physical Therapy Problems (Active)     Problem: Mobility     Dates: Start: 04/30/21       Goal: STG-Within one week, patient will propel wheelchair household distances     Dates: Start: 04/30/21       Description: 1) Individualized goal:  25ft with haylie technique with SBA on even surfaces  2) Interventions:  PT Group Therapy, PT E Stim Attended, PT Orthotics Training, PT Gait Training, PT Self Care/Home Eval, PT Therapeutic Exercises, PT Neuro Re-Ed/Balance, PT Therapeutic Activity, and PT Evaluation      Goal Note filed on 05/12/21 9571 by Neena Gastelum DPT     Pt continues with variable performance requiring intermittent max A to steer d/t L neglect               Problem: Mobility Transfers     Dates: Start: 05/12/21       Goal: STG-Within one week, patient will sit to stand     Dates: Start: 05/12/21       Goal Note filed on 05/12/21 1201 by  Neena Gastelum DPT     1) Individualized goal:  STS in // bars with min A  2) Interventions:  PT Group Therapy, PT E Stim Attended, PT Gait Training, PT Therapeutic Exercises, PT Neuro Re-Ed/Balance, PT Aquatic Therapy, PT Therapeutic Activity, PT Manual Therapy, and PT Evaluation            Goal: STG-Within one week, patient will transfer bed to chair     Dates: Start: 05/12/21       Goal Note filed on 05/12/21 1201 by Neena Gastelum DPT     1) Individualized goal:  SQPT with CGA  2) Interventions:  PT Group Therapy, PT E Stim Attended, PT Gait Training, PT Therapeutic Exercises, PT Neuro Re-Ed/Balance, PT Aquatic Therapy, PT Therapeutic Activity, PT Manual Therapy, and PT Evaluation               Problem: PT-Long Term Goals     Dates: Start: 04/30/21       Goal: LTG-By discharge, patient will propel wheelchair     Dates: Start: 04/30/21       Description: 1) Individualized goal:  100ft with haylie technique and [place] on even/uneven surfaces  2) Interventions:  PT Group Therapy, PT E Stim Attended, PT Orthotics Training, PT Gait Training, PT Self Care/Home Eval, PT Therapeutic Exercises, PT Neuro Re-Ed/Balance, PT Therapeutic Activity, and PT Evaluation       Goal: LTG-By discharge, patient will transfer one surface to another     Dates: Start: 04/30/21       Description: 1) Individualized goal:  SQPT with Taz   2) Interventions:  PT Group Therapy, PT E Stim Attended, PT Orthotics Training, PT Gait Training, PT Self Care/Home Eval, PT Therapeutic Exercises, PT Neuro Re-Ed/Balance, PT Therapeutic Activity, and PT Evaluation         Goal: LTG-By discharge, patient will     Dates: Start: 04/30/21       Description: 1) Individualized goal: perform bed mobility (supine<>sit) with Taz   2) Interventions:  PT Group Therapy, PT E Stim Attended, PT Orthotics Training, PT Gait Training, PT Self Care/Home Eval, PT Therapeutic Exercises, PT Neuro Re-Ed/Balance, PT Therapeutic Activity, and PT Evaluation

## 2021-05-14 NOTE — THERAPY
Occupational Therapy  Daily Treatment     Patient Name: Thong Arzola  Age:  56 y.o., Sex:  male  Medical Record #: [id number]  Today's Date: 5/14/2021     Precautions  Precautions: Fall Risk  Comments: (P) Helmet OOB; LUE 2-3 Modified Jeremiah Scale; Abdominal binder OOB; Brewer cath; L inattention & haylie         Subjective    Pt received supine in bed, wife present, per wife pt had not showered all week, pt agreeable to shower with OT assist     Objective       05/14/21 1301   Precautions   Precautions Fall Risk   Comments Helmet OOB; LUE 2-3 Modified Jeremiah Scale; Abdominal binder OOB; Brewer cath; L inattention & haylie   Functional Level of Assist   Bathing Moderate Assist   Bathing Description Hand held shower;Grab bar;Supervision for safety;Verbal cueing;Assit wtih lower extremities;Assit with perineal;Increased time  (on pull down shower bench, close supervision for safety)   Upper Body Dressing Minimal Assist   Upper Body Dressing Description Supervision for safety;Verbal cueing;Increased time  (assist to thread LUE, verbal cues, increased time)   Lower Body Dressing Maximal Assist   Lower Body Dressing Description Increased time;Supervision for safety;Verbal cueing   Bed, Chair, Wheelchair Transfer Minimal Assist   Bed Chair Wheelchair Transfer Description Increased time;Supervision for safety;Verbal cueing;Squat pivot transfer to wheelchair  (to R bed > w/c)   Tub / Shower Transfers Moderate Assist   Tub Shower Transfer Description Grab bar;Shower bench;Increased time;Supervision for safety;Verbal cueing  (stand pivot w/c <> shower bench via GB)   Interdisciplinary Plan of Care Collaboration   IDT Collaboration with  Family / Caregiver   Patient Position at End of Therapy Seated;Family / Friend in Room  (up in w/c ready for PT)   Collaboration Comments spouse present for session, engaged in hands on assist during ADLs as needed   OT Total Time Spent   OT Individual Total Time Spent (Mins) 60   OT Charge  Group   OT Self Care / ADL 4       Assessment    Pt tolerated session well, focus on progression of ADLs, pt able to shower on standard shower bench this session vs previously using rolling shower bench with close supervision to contact guard, cues for UE use as needed for stability, assist for LEs and LUE, cues to attend to L side of body, pt did stand for short period to wash bottom due to observed skid marks on brief upon removal with mod A from OT and assist from wife to wash backside. Wife assisted patient with lower body dressing with OT assist for standing components. BP stable throughout, pt pleasant, motivated for improvements but requires consistent cues for attention and problem solving toward more independent task completion. Wife present and supportive, hands on as appropriate, has not taken photos of shower setup as requested but will do so this weekend.     Strengths: Able to follow instructions, Alert and oriented, Effective communication skills, Good insight into deficits/needs, Independent prior level of function, Manages pain appropriately, Motivated for self care and independence, Pleasant and cooperative, Supportive family, Willingly participates in therapeutic activities  Barriers: Bowel incontinence, Decreased endurance, Fatigue, Generalized weakness, Hemiplegia, Home accessibility, Orthostatic hypotension, Impaired activity tolerance, Impaired balance, Limited mobility, Spasticity    Plan    Sitting balance/core strengthening/midline orientation, ADL retraining, slideboard transfers progressing to lateral scoots or squat pivots to the right, L UE neuro re-ed/stretching, family training with spouse Anel; Bathing is not a goal of OT at this time (CNAs should be bathing patient); home evaluation to sister in law's house in [place]; pursue resting hand splint for left hand/wrist      Occupational Therapy Goals (Active)     Problem: Dressing     Dates: Start: 05/12/21       Goal: STG-Within one week,  patient will dress UB     Dates: Start: 05/12/21       Goal Note filed on 05/12/21 1136 by Aj Lovett, OT/L     1) Individualized Goal: with min A to don/doff shirt with cues  2) Interventions:  OT Self Care/ADL, OT Cognitive Skill Dev, OT Manual Ther Technique, OT Neuro Re-Ed/Balance, OT Sensory Int Techniques, OT Therapeutic Activity, OT Evaluation, and OT Therapeutic Exercise            Goal: STG-Within one week, patient will dress LB     Dates: Start: 05/12/21       Goal Note filed on 05/12/21 1136 by Aj Lovett, OT/L     1) Individualized Goal: with min A using cues and adaptive techniques  2) Interventions:  OT Self Care/ADL, OT Cognitive Skill Dev, OT Manual Ther Technique, OT Neuro Re-Ed/Balance, OT Sensory Int Techniques, OT Therapeutic Activity, OT Evaluation, and OT Therapeutic Exercise               Problem: Functional Transfers     Dates: Start: 05/12/21       Goal: STG-Within one week, patient will transfer to toilet     Dates: Start: 05/12/21       Goal Note filed on 05/12/21 1136 by Aj Lovett, OT/L     1) Individualized Goal: with mod A using grab bar and commode over the toilet  2) Interventions:  OT Self Care/ADL, OT Cognitive Skill Dev, OT Manual Ther Technique, OT Neuro Re-Ed/Balance, OT Sensory Int Techniques, OT Therapeutic Activity, OT Evaluation, and OT Therapeutic Exercise               Problem: OT Long Term Goals     Dates: Start: 04/30/21       Goal: LTG-By discharge, patient will complete basic self care tasks     Dates: Start: 04/30/21       Description: 1) Individualized Goal:  with min to mod A using AE/AD/techniques as needed  2) Interventions:  OT E Stim Attended, OT Self Care/ADL, OT Cognitive Skill Dev, OT Manual Ther Technique, OT Neuro Re-Ed/Balance, OT Sensory Int Techniques, OT Therapeutic Activity, OT Evaluation, and OT Therapeutic Exercise       Goal: LTG-By discharge, patient will perform bathroom transfers     Dates: Start: 04/30/21       Description:  1) Individualized Goal:  with max A x 1 person using slideboard versus squat pivot to the right  2) Interventions:  OT E Stim Attended, OT Self Care/ADL, OT Cognitive Skill Dev, OT Manual Ther Technique, OT Neuro Re-Ed/Balance, OT Sensory Int Techniques, OT Therapeutic Activity, OT Evaluation, and OT Therapeutic Exercise

## 2021-05-15 PROCEDURE — 700102 HCHG RX REV CODE 250 W/ 637 OVERRIDE(OP): Performed by: HOSPITALIST

## 2021-05-15 PROCEDURE — A9270 NON-COVERED ITEM OR SERVICE: HCPCS | Performed by: PHYSICAL MEDICINE & REHABILITATION

## 2021-05-15 PROCEDURE — 99232 SBSQ HOSP IP/OBS MODERATE 35: CPT | Performed by: HOSPITALIST

## 2021-05-15 PROCEDURE — 700102 HCHG RX REV CODE 250 W/ 637 OVERRIDE(OP): Performed by: PHYSICAL MEDICINE & REHABILITATION

## 2021-05-15 PROCEDURE — 770010 HCHG ROOM/CARE - REHAB SEMI PRIVAT*

## 2021-05-15 PROCEDURE — A9270 NON-COVERED ITEM OR SERVICE: HCPCS | Performed by: HOSPITALIST

## 2021-05-15 RX ADMIN — ATORVASTATIN CALCIUM 80 MG: 40 TABLET, FILM COATED ORAL at 21:09

## 2021-05-15 RX ADMIN — RIVAROXABAN 20 MG: 20 TABLET, FILM COATED ORAL at 17:37

## 2021-05-15 RX ADMIN — METOPROLOL TARTRATE 12.5 MG: 25 TABLET, FILM COATED ORAL at 08:28

## 2021-05-15 RX ADMIN — MECLIZINE HYDROCHLORIDE 25 MG: 25 TABLET ORAL at 08:28

## 2021-05-15 RX ADMIN — BACLOFEN 20 MG: 20 TABLET ORAL at 21:09

## 2021-05-15 RX ADMIN — THYROID, PORCINE 60 MG: 30 TABLET ORAL at 08:28

## 2021-05-15 RX ADMIN — MECLIZINE HYDROCHLORIDE 25 MG: 25 TABLET ORAL at 14:02

## 2021-05-15 RX ADMIN — ASPIRIN 81 MG CHEWABLE TABLET 81 MG: 81 TABLET CHEWABLE at 17:37

## 2021-05-15 RX ADMIN — SENNOSIDES AND DOCUSATE SODIUM 2 TABLET: 8.6; 5 TABLET ORAL at 21:09

## 2021-05-15 RX ADMIN — GABAPENTIN 100 MG: 100 CAPSULE ORAL at 21:09

## 2021-05-15 RX ADMIN — MECLIZINE HYDROCHLORIDE 25 MG: 25 TABLET ORAL at 21:09

## 2021-05-15 RX ADMIN — METOPROLOL TARTRATE 25 MG: 25 TABLET, FILM COATED ORAL at 21:10

## 2021-05-15 RX ADMIN — BACLOFEN 20 MG: 20 TABLET ORAL at 14:02

## 2021-05-15 RX ADMIN — MIRTAZAPINE 15 MG: 15 TABLET, ORALLY DISINTEGRATING ORAL at 21:10

## 2021-05-15 RX ADMIN — THYROID, PORCINE 60 MG: 30 TABLET ORAL at 21:09

## 2021-05-15 RX ADMIN — BACLOFEN 20 MG: 20 TABLET ORAL at 08:28

## 2021-05-15 RX ADMIN — MELATONIN TAB 3 MG 3 MG: 3 TAB at 21:10

## 2021-05-15 ASSESSMENT — PATIENT HEALTH QUESTIONNAIRE - PHQ9
SUM OF ALL RESPONSES TO PHQ9 QUESTIONS 1 AND 2: 1
5. POOR APPETITE OR OVEREATING: SEVERAL DAYS
SUM OF ALL RESPONSES TO PHQ QUESTIONS 1-9: 5
4. FEELING TIRED OR HAVING LITTLE ENERGY: SEVERAL DAYS
6. FEELING BAD ABOUT YOURSELF - OR THAT YOU ARE A FAILURE OR HAVE LET YOURSELF OR YOUR FAMILY DOWN: NOT AL ALL
9. THOUGHTS THAT YOU WOULD BE BETTER OFF DEAD, OR OF HURTING YOURSELF: NOT AT ALL
2. FEELING DOWN, DEPRESSED, IRRITABLE, OR HOPELESS: NOT AT ALL
3. TROUBLE FALLING OR STAYING ASLEEP OR SLEEPING TOO MUCH: SEVERAL DAYS
1. LITTLE INTEREST OR PLEASURE IN DOING THINGS: SEVERAL DAYS
7. TROUBLE CONCENTRATING ON THINGS, SUCH AS READING THE NEWSPAPER OR WATCHING TELEVISION: SEVERAL DAYS
8. MOVING OR SPEAKING SO SLOWLY THAT OTHER PEOPLE COULD HAVE NOTICED. OR THE OPPOSITE, BEING SO FIGETY OR RESTLESS THAT YOU HAVE BEEN MOVING AROUND A LOT MORE THAN USUAL: NOT AT ALL

## 2021-05-15 ASSESSMENT — ENCOUNTER SYMPTOMS
BRUISES/BLEEDS EASILY: 0
NAUSEA: 0
ABDOMINAL PAIN: 0
VOMITING: 0
FEVER: 0
FOCAL WEAKNESS: 1
COUGH: 0
EYES NEGATIVE: 1
SHORTNESS OF BREATH: 0
POLYDIPSIA: 0
CHILLS: 0
DEPRESSION: 1
PALPITATIONS: 0

## 2021-05-15 ASSESSMENT — PAIN DESCRIPTION - PAIN TYPE
TYPE: ACUTE PAIN
TYPE: ACUTE PAIN

## 2021-05-15 NOTE — PROGRESS NOTES
Hospital Medicine Daily Progress Note      Chief Complaint  Hypotension    Interval Problem Update  Heart rates and blood pressures somewhat high.  Pt asymptomatic.    Review of Systems  Review of Systems   Constitutional: Negative for chills and fever.   Eyes: Negative.    Respiratory: Negative for cough and shortness of breath.    Cardiovascular: Negative for chest pain and palpitations.   Gastrointestinal: Negative for abdominal pain, nausea and vomiting.   Skin: Negative for itching and rash.   Neurological: Positive for focal weakness.   Endo/Heme/Allergies: Negative for polydipsia. Does not bruise/bleed easily.   Psychiatric/Behavioral: Positive for depression.        Physical Exam  Temp:  [36.3 °C (97.4 °F)-37 °C (98.6 °F)] 37 °C (98.6 °F)  Pulse:  [] 81  Resp:  [17-18] 17  BP: (126-157)/() 134/99  SpO2:  [95 %-96 %] 95 %    Physical Exam  Vitals reviewed.   Constitutional:       General: He is not in acute distress.     Appearance: Normal appearance. He is not ill-appearing.   HENT:      Head:      Comments: +helmet     Right Ear: External ear normal.      Left Ear: External ear normal.      Nose: Nose normal.      Mouth/Throat:      Pharynx: Oropharynx is clear.   Eyes:      General:         Right eye: No discharge.         Left eye: No discharge.      Extraocular Movements: Extraocular movements intact.      Conjunctiva/sclera: Conjunctivae normal.   Cardiovascular:      Rate and Rhythm: Normal rate and regular rhythm.   Pulmonary:      Effort: Pulmonary effort is normal. No respiratory distress.      Breath sounds: Normal breath sounds. No wheezing.   Abdominal:      General: Bowel sounds are normal. There is no distension.      Palpations: Abdomen is soft.      Tenderness: There is no abdominal tenderness.   Musculoskeletal:      Cervical back: Normal range of motion and neck supple.      Right lower leg: No edema.      Left lower leg: No edema.   Skin:     General: Skin is warm and dry.    Neurological:      Mental Status: He is alert.      Comments: Awake and alert         Fluids    Intake/Output Summary (Last 24 hours) at 5/15/2021 1554  Last data filed at 5/15/2021 1507  Gross per 24 hour   Intake 780 ml   Output 1700 ml   Net -920 ml       Laboratory                      Assessment/Plan  * Acute right MCA stroke (HCC)- (present on admission)  Assessment & Plan  Presented w/ left hemiparesis  S/P decompressive craniectomy for increased ICP done on 4/3/21 by Dr. Payton  On Xarelto, ASA, and Lipitor  Helmet    Reactive depression- (present on admission)  Assessment & Plan  On Remeron    Paroxysmal atrial fibrillation (HCC)- (present on admission)  Assessment & Plan  Echo EF >75%  Increase Metoprolol to optimize HR and BP control  Anticoagulated on Xarelto  Check F/U labs in am     History of COVID-19- (present on admission)  Assessment & Plan  SARS-CoV-2 PCR positive 3/18/21    Acquired hypothyroidism- (present on admission)  Assessment & Plan  Euthyroid on [place] Thyroid     Full Code

## 2021-05-15 NOTE — CARE PLAN
Problem: Communication  Goal: The ability to communicate needs accurately and effectively will improve  Outcome: Progressing     Problem: Safety  Goal: Will remain free from injury  Outcome: Progressing  Goal: Will remain free from falls  Outcome: Progressing     Problem: Safety  Goal: Will remain free from falls  Outcome: Progressing     Problem: Infection  Goal: Will remain free from infection  Outcome: Progressing

## 2021-05-16 PROBLEM — R79.89 AZOTEMIA: Status: ACTIVE | Noted: 2021-05-16

## 2021-05-16 LAB
ALBUMIN SERPL BCP-MCNC: 3.4 G/DL (ref 3.2–4.9)
ALBUMIN/GLOB SERPL: 1.1 G/DL
ALP SERPL-CCNC: 80 U/L (ref 30–99)
ALT SERPL-CCNC: 60 U/L (ref 2–50)
ANION GAP SERPL CALC-SCNC: 11 MMOL/L (ref 7–16)
AST SERPL-CCNC: 28 U/L (ref 12–45)
BILIRUB SERPL-MCNC: 0.4 MG/DL (ref 0.1–1.5)
BUN SERPL-MCNC: 19 MG/DL (ref 8–22)
CALCIUM SERPL-MCNC: 10.1 MG/DL (ref 8.5–10.5)
CHLORIDE SERPL-SCNC: 106 MMOL/L (ref 96–112)
CO2 SERPL-SCNC: 26 MMOL/L (ref 20–33)
CREAT SERPL-MCNC: 1.12 MG/DL (ref 0.5–1.4)
ERYTHROCYTE [DISTWIDTH] IN BLOOD BY AUTOMATED COUNT: 43.2 FL (ref 35.9–50)
GLOBULIN SER CALC-MCNC: 3 G/DL (ref 1.9–3.5)
GLUCOSE SERPL-MCNC: 85 MG/DL (ref 65–99)
HCT VFR BLD AUTO: 41.6 % (ref 42–52)
HGB BLD-MCNC: 13.5 G/DL (ref 14–18)
MCH RBC QN AUTO: 28.4 PG (ref 27–33)
MCHC RBC AUTO-ENTMCNC: 32.5 G/DL (ref 33.7–35.3)
MCV RBC AUTO: 87.4 FL (ref 81.4–97.8)
PLATELET # BLD AUTO: 292 K/UL (ref 164–446)
PMV BLD AUTO: 9.4 FL (ref 9–12.9)
POTASSIUM SERPL-SCNC: 3.9 MMOL/L (ref 3.6–5.5)
PROT SERPL-MCNC: 6.4 G/DL (ref 6–8.2)
RBC # BLD AUTO: 4.76 M/UL (ref 4.7–6.1)
SODIUM SERPL-SCNC: 143 MMOL/L (ref 135–145)
WBC # BLD AUTO: 4.9 K/UL (ref 4.8–10.8)

## 2021-05-16 PROCEDURE — 700102 HCHG RX REV CODE 250 W/ 637 OVERRIDE(OP): Performed by: HOSPITALIST

## 2021-05-16 PROCEDURE — A9270 NON-COVERED ITEM OR SERVICE: HCPCS | Performed by: HOSPITALIST

## 2021-05-16 PROCEDURE — 700102 HCHG RX REV CODE 250 W/ 637 OVERRIDE(OP): Performed by: PHYSICAL MEDICINE & REHABILITATION

## 2021-05-16 PROCEDURE — 97130 THER IVNTJ EA ADDL 15 MIN: CPT

## 2021-05-16 PROCEDURE — 770010 HCHG ROOM/CARE - REHAB SEMI PRIVAT*

## 2021-05-16 PROCEDURE — A9270 NON-COVERED ITEM OR SERVICE: HCPCS | Performed by: PHYSICAL MEDICINE & REHABILITATION

## 2021-05-16 PROCEDURE — 97129 THER IVNTJ 1ST 15 MIN: CPT

## 2021-05-16 PROCEDURE — 99232 SBSQ HOSP IP/OBS MODERATE 35: CPT | Performed by: HOSPITALIST

## 2021-05-16 PROCEDURE — 36415 COLL VENOUS BLD VENIPUNCTURE: CPT

## 2021-05-16 PROCEDURE — 80053 COMPREHEN METABOLIC PANEL: CPT

## 2021-05-16 PROCEDURE — 85027 COMPLETE CBC AUTOMATED: CPT

## 2021-05-16 RX ADMIN — THYROID, PORCINE 60 MG: 30 TABLET ORAL at 08:31

## 2021-05-16 RX ADMIN — MELATONIN TAB 3 MG 3 MG: 3 TAB at 21:08

## 2021-05-16 RX ADMIN — METOPROLOL TARTRATE 25 MG: 25 TABLET, FILM COATED ORAL at 08:30

## 2021-05-16 RX ADMIN — ASPIRIN 81 MG CHEWABLE TABLET 81 MG: 81 TABLET CHEWABLE at 17:09

## 2021-05-16 RX ADMIN — MECLIZINE HYDROCHLORIDE 25 MG: 25 TABLET ORAL at 08:31

## 2021-05-16 RX ADMIN — BACLOFEN 20 MG: 20 TABLET ORAL at 08:31

## 2021-05-16 RX ADMIN — RIVAROXABAN 20 MG: 20 TABLET, FILM COATED ORAL at 17:09

## 2021-05-16 RX ADMIN — MECLIZINE HYDROCHLORIDE 25 MG: 25 TABLET ORAL at 14:05

## 2021-05-16 RX ADMIN — MIRTAZAPINE 15 MG: 15 TABLET, ORALLY DISINTEGRATING ORAL at 21:08

## 2021-05-16 RX ADMIN — BACLOFEN 20 MG: 20 TABLET ORAL at 14:05

## 2021-05-16 RX ADMIN — ATORVASTATIN CALCIUM 80 MG: 40 TABLET, FILM COATED ORAL at 21:07

## 2021-05-16 RX ADMIN — GABAPENTIN 100 MG: 100 CAPSULE ORAL at 21:08

## 2021-05-16 RX ADMIN — SENNOSIDES AND DOCUSATE SODIUM 2 TABLET: 8.6; 5 TABLET ORAL at 21:07

## 2021-05-16 RX ADMIN — METOPROLOL TARTRATE 25 MG: 25 TABLET, FILM COATED ORAL at 21:07

## 2021-05-16 RX ADMIN — MECLIZINE HYDROCHLORIDE 25 MG: 25 TABLET ORAL at 21:07

## 2021-05-16 RX ADMIN — THYROID, PORCINE 60 MG: 30 TABLET ORAL at 21:07

## 2021-05-16 RX ADMIN — BACLOFEN 20 MG: 20 TABLET ORAL at 21:07

## 2021-05-16 ASSESSMENT — ENCOUNTER SYMPTOMS
PALPITATIONS: 0
POLYDIPSIA: 0
ABDOMINAL PAIN: 0
COUGH: 0
BRUISES/BLEEDS EASILY: 0
EYES NEGATIVE: 1
CHILLS: 0
DEPRESSION: 1
SHORTNESS OF BREATH: 0
FEVER: 0
NAUSEA: 0
FOCAL WEAKNESS: 1
VOMITING: 0

## 2021-05-16 ASSESSMENT — PATIENT HEALTH QUESTIONNAIRE - PHQ9
2. FEELING DOWN, DEPRESSED, IRRITABLE, OR HOPELESS: NOT AT ALL
7. TROUBLE CONCENTRATING ON THINGS, SUCH AS READING THE NEWSPAPER OR WATCHING TELEVISION: SEVERAL DAYS
3. TROUBLE FALLING OR STAYING ASLEEP OR SLEEPING TOO MUCH: SEVERAL DAYS
8. MOVING OR SPEAKING SO SLOWLY THAT OTHER PEOPLE COULD HAVE NOTICED. OR THE OPPOSITE, BEING SO FIGETY OR RESTLESS THAT YOU HAVE BEEN MOVING AROUND A LOT MORE THAN USUAL: NOT AT ALL
9. THOUGHTS THAT YOU WOULD BE BETTER OFF DEAD, OR OF HURTING YOURSELF: NOT AT ALL
5. POOR APPETITE OR OVEREATING: SEVERAL DAYS
SUM OF ALL RESPONSES TO PHQ QUESTIONS 1-9: 5
SUM OF ALL RESPONSES TO PHQ9 QUESTIONS 1 AND 2: 1
6. FEELING BAD ABOUT YOURSELF - OR THAT YOU ARE A FAILURE OR HAVE LET YOURSELF OR YOUR FAMILY DOWN: NOT AL ALL
1. LITTLE INTEREST OR PLEASURE IN DOING THINGS: SEVERAL DAYS
4. FEELING TIRED OR HAVING LITTLE ENERGY: SEVERAL DAYS

## 2021-05-16 ASSESSMENT — PAIN DESCRIPTION - PAIN TYPE: TYPE: ACUTE PAIN

## 2021-05-16 ASSESSMENT — FIBROSIS 4 INDEX: FIB4 SCORE: 0.69

## 2021-05-16 NOTE — PROGRESS NOTES
Hospital Medicine Daily Progress Note      Chief Complaint  Hypotension    Interval Problem Update  No new problems.  Labs reviewed.     Review of Systems  Review of Systems   Constitutional: Negative for chills and fever.   Eyes: Negative.    Respiratory: Negative for cough and shortness of breath.    Cardiovascular: Negative for chest pain and palpitations.   Gastrointestinal: Negative for abdominal pain, nausea and vomiting.   Skin: Negative for itching and rash.   Neurological: Positive for focal weakness.   Endo/Heme/Allergies: Negative for polydipsia. Does not bruise/bleed easily.   Psychiatric/Behavioral: Positive for depression.        Physical Exam  Temp:  [36.1 °C (97 °F)-37 °C (98.6 °F)] 36.9 °C (98.4 °F)  Pulse:  [68-81] 68  Resp:  [14-18] 18  BP: (128-134)/(89-99) 131/90  SpO2:  [95 %-96 %] 96 %    Physical Exam  Vitals reviewed.   Constitutional:       General: He is not in acute distress.     Appearance: Normal appearance. He is not ill-appearing.   HENT:      Head:      Comments: +helmet     Right Ear: External ear normal.      Left Ear: External ear normal.      Nose: Nose normal.      Mouth/Throat:      Pharynx: Oropharynx is clear.   Eyes:      General:         Right eye: No discharge.         Left eye: No discharge.      Extraocular Movements: Extraocular movements intact.      Conjunctiva/sclera: Conjunctivae normal.   Cardiovascular:      Rate and Rhythm: Normal rate and regular rhythm.   Pulmonary:      Effort: Pulmonary effort is normal. No respiratory distress.      Breath sounds: Normal breath sounds. No wheezing.   Abdominal:      General: Bowel sounds are normal. There is no distension.      Palpations: Abdomen is soft.      Tenderness: There is no abdominal tenderness.   Musculoskeletal:      Cervical back: Normal range of motion and neck supple.      Right lower leg: No edema.      Left lower leg: No edema.   Skin:     General: Skin is warm and dry.   Neurological:      Mental Status:  He is alert.      Comments: Awake and alert         Fluids    Intake/Output Summary (Last 24 hours) at 5/16/2021 1036  Last data filed at 5/16/2021 0950  Gross per 24 hour   Intake 820 ml   Output 1300 ml   Net -480 ml       Laboratory  Recent Labs     05/16/21  0545   WBC 4.9   RBC 4.76   HEMOGLOBIN 13.5*   HEMATOCRIT 41.6*   MCV 87.4   MCH 28.4   MCHC 32.5*   RDW 43.2   PLATELETCT 292   MPV 9.4     Recent Labs     05/16/21  0545   SODIUM 143   POTASSIUM 3.9   CHLORIDE 106   CO2 26   GLUCOSE 85   BUN 19   CREATININE 1.12   CALCIUM 10.1                 Assessment/Plan  * Acute right MCA stroke (HCC)- (present on admission)  Assessment & Plan  Presented w/ left hemiparesis  S/P decompressive craniectomy for increased ICP done on 4/3/21 by Dr. Payton  On Xarelto, ASA, and Lipitor  Helmet    Azotemia  Assessment & Plan  Encourage PO fluids  Follow renal function    Reactive depression- (present on admission)  Assessment & Plan  On Remeron    Paroxysmal atrial fibrillation (HCC)- (present on admission)  Assessment & Plan  Echo EF >75%  HR and BP control both improved w/ increased Metoprolol  Anticoagulated on Xarelto    History of COVID-19- (present on admission)  Assessment & Plan  SARS-CoV-2 PCR positive 3/18/21    Acquired hypothyroidism- (present on admission)  Assessment & Plan  Euthyroid on [place] Thyroid     Full Code    Pt's medical status optimized and has no acute medical issues requiring Hospitalist services at this time.  Will sign off.  Please re-consult as needed.  Discussed w/ pt and wife at bedside.

## 2021-05-16 NOTE — THERAPY
Speech Language Pathology  Daily Treatment     Patient Name: Thong Arzola  Age:  56 y.o., Sex:  male  Medical Record #: [id number]  Today's Date: 5/16/2021     Precautions  Precautions: Fall Risk  Comments: Helmet OOB; LUE 2-3 Modified Jeremiah Scale; Abdominal binder OOB; Brewer cath; L inattention & haylie    Subjective    Pt pleasant and cooperative during tx.       Objective       05/16/21 0831   Reading Comprehension    Reading Sentences Minimal (4)   Cognition   Attention to Task Minimal (4)   Simple Attention Minimal (4)   SLP Total Time Spent   SLP Individual Total Time Spent (Mins) 60   Treatment Charges   SLP Cognitive Skill Development First 15 Minutes 1   SLP Cognitive Skill Development Additional 15 Minutes 3       Assessment    Sentence completion (choice of 4): 88%,  Sentence matching (choice of 4): 76%, Spell what you see (difficulty hard): 90% independent.  Find the THEs in a paragraph: 3/15 min; 6/15 given mod verbal and visual cues.     Strengths: Able to follow instructions, Alert and oriented, Effective communication skills, Making steady progress towards goals, Supportive family, Pleasant and cooperative, Motivated for self care and independence  Barriers: Visual impairment (left neglect)    Plan    Target attn, memory book, visual scanning          Speech Therapy Problems (Active)     Problem: Problem Solving STGs     Dates: Start: 05/05/21       Goal: STG-Within one week, patient will     Dates: Start: 05/05/21       Description: 1) Individualized goal: demonstrate appropriate visual scanning to the left with min cues and 80% accuracy.  2) Interventions:  SLP Speech Language Treatment, SLP Self Care / ADL Training , SLP Cognitive Skill Development, and SLP Group Treatment        Goal Note filed on 05/12/21 1054 by Min Gomez MS,CCC-SLP     Mod cues required for visual scanning to the left             Goal: STG-Within one week, patient will     Dates: Start: 05/05/21       Description: 1)  Individualized goal:  complete simple attention tasks with min A with 80% accuracy.   2) Interventions:  SLP Speech Language Treatment, SLP Self Care / ADL Training , SLP Cognitive Skill Development, and SLP Group Treatment        Goal Note filed on 05/12/21 1054 by Min Gomez MS,CCC-SLP     Mod A required for simple attention tasks                Problem: Speech/Swallowing LTGs     Dates: Start: 04/30/21       Goal: LTG-By discharge, patient will solve complex problem     Dates: Start: 04/30/21       Description: 1) Individualized goal:  related to IADL's with mod I for safe d/c home.  2) Interventions:  SLP Speech Language Treatment, SLP Swallowing Dysfunction Treatment, SLP Oral Pharyngeal Evaluation, SLP Video Swallow Evaluation, SLP Self Care / ADL Training , SLP Cognitive Skill Development, and SLP Group Treatment

## 2021-05-17 PROCEDURE — 700102 HCHG RX REV CODE 250 W/ 637 OVERRIDE(OP): Performed by: PHYSICAL MEDICINE & REHABILITATION

## 2021-05-17 PROCEDURE — 97110 THERAPEUTIC EXERCISES: CPT | Mod: CQ

## 2021-05-17 PROCEDURE — 97530 THERAPEUTIC ACTIVITIES: CPT

## 2021-05-17 PROCEDURE — A9270 NON-COVERED ITEM OR SERVICE: HCPCS | Performed by: PHYSICAL MEDICINE & REHABILITATION

## 2021-05-17 PROCEDURE — 97112 NEUROMUSCULAR REEDUCATION: CPT

## 2021-05-17 PROCEDURE — 770010 HCHG ROOM/CARE - REHAB SEMI PRIVAT*

## 2021-05-17 PROCEDURE — 99232 SBSQ HOSP IP/OBS MODERATE 35: CPT | Performed by: PHYSICAL MEDICINE & REHABILITATION

## 2021-05-17 PROCEDURE — 97032 APPL MODALITY 1+ESTIM EA 15: CPT

## 2021-05-17 PROCEDURE — 97535 SELF CARE MNGMENT TRAINING: CPT

## 2021-05-17 PROCEDURE — 700102 HCHG RX REV CODE 250 W/ 637 OVERRIDE(OP): Performed by: HOSPITALIST

## 2021-05-17 PROCEDURE — 97150 GROUP THERAPEUTIC PROCEDURES: CPT

## 2021-05-17 PROCEDURE — A9270 NON-COVERED ITEM OR SERVICE: HCPCS | Performed by: HOSPITALIST

## 2021-05-17 RX ADMIN — MELATONIN TAB 3 MG 3 MG: 3 TAB at 19:39

## 2021-05-17 RX ADMIN — MIRTAZAPINE 15 MG: 15 TABLET, ORALLY DISINTEGRATING ORAL at 19:40

## 2021-05-17 RX ADMIN — METOPROLOL TARTRATE 25 MG: 25 TABLET, FILM COATED ORAL at 19:39

## 2021-05-17 RX ADMIN — SENNOSIDES AND DOCUSATE SODIUM 2 TABLET: 8.6; 5 TABLET ORAL at 19:39

## 2021-05-17 RX ADMIN — ATORVASTATIN CALCIUM 80 MG: 40 TABLET, FILM COATED ORAL at 19:39

## 2021-05-17 RX ADMIN — THYROID, PORCINE 60 MG: 30 TABLET ORAL at 19:39

## 2021-05-17 RX ADMIN — GABAPENTIN 100 MG: 100 CAPSULE ORAL at 19:39

## 2021-05-17 RX ADMIN — THYROID, PORCINE 60 MG: 30 TABLET ORAL at 08:04

## 2021-05-17 RX ADMIN — METOPROLOL TARTRATE 25 MG: 25 TABLET, FILM COATED ORAL at 08:05

## 2021-05-17 RX ADMIN — MECLIZINE HYDROCHLORIDE 25 MG: 25 TABLET ORAL at 14:25

## 2021-05-17 RX ADMIN — BACLOFEN 20 MG: 20 TABLET ORAL at 14:25

## 2021-05-17 RX ADMIN — MECLIZINE HYDROCHLORIDE 25 MG: 25 TABLET ORAL at 19:39

## 2021-05-17 RX ADMIN — RIVAROXABAN 20 MG: 20 TABLET, FILM COATED ORAL at 17:29

## 2021-05-17 RX ADMIN — ASPIRIN 81 MG CHEWABLE TABLET 81 MG: 81 TABLET CHEWABLE at 17:29

## 2021-05-17 RX ADMIN — BACLOFEN 20 MG: 20 TABLET ORAL at 08:06

## 2021-05-17 RX ADMIN — MECLIZINE HYDROCHLORIDE 25 MG: 25 TABLET ORAL at 08:05

## 2021-05-17 RX ADMIN — BACLOFEN 20 MG: 20 TABLET ORAL at 19:39

## 2021-05-17 NOTE — THERAPY
Occupational Therapy  Daily Treatment     Patient Name: Thong Arzola  Age:  56 y.o., Sex:  male  Medical Record #: [id number]  Today's Date: 5/17/2021     Precautions  Precautions: Fall Risk  Comments: Helmet OOB; LUE 2-3 Modified Jeremiah Scale; Abdominal binder OOB; Brewer cath; L inattention & haylie         Subjective    Patient sitting in w/c in room and ready for OT.     Objective       05/17/21 1001   Precautions   Precautions Fall Risk   Comments Helmet OOB; LUE 2-3 Modified Jeremiah Scale; Abdominal binder OOB; Brewer cath; L inattention & haylie.   Functional Level of Assist   Lower Body Dressing Moderate Assist   Lower Body Dressing Description Set-up of equipment;Supervision for safety;Verbal cueing  (able to don/doff R sock/shoe, assist to don/doff L sock/shoe)   Neuro-Muscular Treatments   Neuro-Muscular Treatments Facilitation;Tactile Cuing;Tapping;Verbal Cuing   Comments Muscle belly tapping on left bicep and triceps for neuro re-education.     Interdisciplinary Plan of Care Collaboration   Patient Position at End of Therapy Seated;Call Light within Reach;Tray Table within Reach;Phone within Reach;Chair Alarm On;Self Releasing Lap Belt Applied   OT Total Time Spent   OT Individual Total Time Spent (Mins) 30   OT Charge Group   OT Self Care / ADL 1   OT Therapy Activity 1     Passive stretching and ROM to left scapular muscles and biceps, wrist and fingers into extension with forearm in supination.    Assessment    Patient verbalized some minor discomfort with left elbow extension, but stated scapular protraction felt good.  Able to manage sock and shoe on R LE, but not L due to no active movement in L LE at this time.  Strengths: Able to follow instructions, Alert and oriented, Effective communication skills, Good insight into deficits/needs, Independent prior level of function, Manages pain appropriately, Motivated for self care and independence, Pleasant and cooperative, Supportive family, Willingly  participates in therapeutic activities  Barriers: Bowel incontinence, Decreased endurance, Fatigue, Generalized weakness, Hemiplegia, Home accessibility, Orthostatic hypotension, Impaired activity tolerance, Impaired balance, Limited mobility, Spasticity    Plan    Sitting balance/core strengthening/midline orientation, ADL retraining, slideboard transfers progressing to lateral scoots or squat pivots to the right, L UE neuro re-ed/stretching, family training with spouse Anel; Bathing is not a goal of OT at this time (CNAs should be bathing patient); home evaluation to sister in law's house in [place]; pursue resting hand splint for left hand/wrist    Occupational Therapy Goals (Active)     Problem: Dressing     Dates: Start: 05/12/21       Goal: STG-Within one week, patient will dress UB     Dates: Start: 05/12/21       Goal Note filed on 05/12/21 1136 by Aj Lovett, OT/L     1) Individualized Goal: with min A to don/doff shirt with cues  2) Interventions:  OT Self Care/ADL, OT Cognitive Skill Dev, OT Manual Ther Technique, OT Neuro Re-Ed/Balance, OT Sensory Int Techniques, OT Therapeutic Activity, OT Evaluation, and OT Therapeutic Exercise            Goal: STG-Within one week, patient will dress LB     Dates: Start: 05/12/21       Goal Note filed on 05/12/21 1136 by Aj Lovett, OT/L     1) Individualized Goal: with min A using cues and adaptive techniques  2) Interventions:  OT Self Care/ADL, OT Cognitive Skill Dev, OT Manual Ther Technique, OT Neuro Re-Ed/Balance, OT Sensory Int Techniques, OT Therapeutic Activity, OT Evaluation, and OT Therapeutic Exercise               Problem: Functional Transfers     Dates: Start: 05/12/21       Goal: STG-Within one week, patient will transfer to toilet     Dates: Start: 05/12/21       Goal Note filed on 05/12/21 1136 by Aj Lovett, OT/L     1) Individualized Goal: with mod A using grab bar and commode over the toilet  2) Interventions:  OT Self Care/ADL, OT  Cognitive Skill Dev, OT Manual Ther Technique, OT Neuro Re-Ed/Balance, OT Sensory Int Techniques, OT Therapeutic Activity, OT Evaluation, and OT Therapeutic Exercise               Problem: OT Long Term Goals     Dates: Start: 04/30/21       Goal: LTG-By discharge, patient will complete basic self care tasks     Dates: Start: 04/30/21       Description: 1) Individualized Goal:  with min to mod A using AE/AD/techniques as needed  2) Interventions:  OT E Stim Attended, OT Self Care/ADL, OT Cognitive Skill Dev, OT Manual Ther Technique, OT Neuro Re-Ed/Balance, OT Sensory Int Techniques, OT Therapeutic Activity, OT Evaluation, and OT Therapeutic Exercise       Goal: LTG-By discharge, patient will perform bathroom transfers     Dates: Start: 04/30/21       Description: 1) Individualized Goal:  with max A x 1 person using slideboard versus squat pivot to the right  2) Interventions:  OT E Stim Attended, OT Self Care/ADL, OT Cognitive Skill Dev, OT Manual Ther Technique, OT Neuro Re-Ed/Balance, OT Sensory Int Techniques, OT Therapeutic Activity, OT Evaluation, and OT Therapeutic Exercise

## 2021-05-17 NOTE — THERAPY
Physical Therapy   Daily Treatment     Patient Name: Thong Arzola  Age:  56 y.o., Sex:  male  Medical Record #: [id number]  Today's Date: 5/17/2021     Precautions  Precautions: Fall Risk  Comments: Helmet OOB; LUE 2-3 Modified Jeremiah Scale; Abdominal binder OOB; Brewer cath; L inattention & haylie    Subjective    Pt seated in wc in reclined position, agreeable to PT session     Objective       05/17/21 1101   Transfer Functional Level of Assist   Bed, Chair, Wheelchair Transfer Minimal Assist   Bed Chair Wheelchair Transfer Description Other (comment);Supervision for safety;Verbal cueing  (SPT wc <> stepper)   Sitting Lower Body Exercises   Sitting Lower Body Exercises Yes   Nustep Resistance Level 3;Time (See Comments)  (10' B LE and R UE for CV endurance)   Interdisciplinary Plan of Care Collaboration   Patient Position at End of Therapy Seated;Self Releasing Lap Belt Applied;Other (Comments)  (transported pt to T-Dine)   PT Total Time Spent   PT Individual Total Time Spent (Mins) 30   PT Charge Group   PT Therapeutic Exercise 1   PT Therapeutic Activities 1   Supervising Physical Therapist Ivory Cintron       Assessment    The patient is pleasant and cooperative with session, he appreciated being able to participate in cardio activity on the stepper this session  Strengths: Alert and oriented, Effective communication skills, Independent prior level of function, Motivated for self care and independence, Pleasant and cooperative, Supportive family, Willingly participates in therapeutic activities  Barriers: Hemiparesis, Home accessibility, Orthostatic hypotension, Impaired activity tolerance, Impaired balance, Spasticity, Limited mobility    Plan    Midline orientation EOM, sitting balance, transfer training - SQPT as appropriate, bed mobility, w/c mobility,  LE, ongoing family training with spouse as appropriate. Continue STS training and standing tolerance as able.  Ongoing standing frame  tolerance. Pre-gait/gait training in // bars. Consider vector.     Passport items to be completed:  Get in/out of bed safely, in/out of a vehicle, safely use mobility device, walk or wheel around home/community, navigate up and down stairs, show how to get up/down from the ground, ensure home is accessible, demonstrate HEP, complete caregiver training    Physical Therapy Problems (Active)     Problem: Mobility     Dates: Start: 04/30/21       Goal: STG-Within one week, patient will propel wheelchair household distances     Dates: Start: 04/30/21       Description: 1) Individualized goal:  25ft with haylie technique with SBA on even surfaces  2) Interventions:  PT Group Therapy, PT E Stim Attended, PT Orthotics Training, PT Gait Training, PT Self Care/Home Eval, PT Therapeutic Exercises, PT Neuro Re-Ed/Balance, PT Therapeutic Activity, and PT Evaluation      Goal Note filed on 05/12/21 0844 by Neena Gastelum DPT     Pt continues with variable performance requiring intermittent max A to steer d/t L neglect               Problem: Mobility Transfers     Dates: Start: 05/12/21       Goal: STG-Within one week, patient will sit to stand     Dates: Start: 05/12/21       Goal Note filed on 05/12/21 1201 by Neena Gastelum DPT     1) Individualized goal:  STS in // bars with min A  2) Interventions:  PT Group Therapy, PT E Stim Attended, PT Gait Training, PT Therapeutic Exercises, PT Neuro Re-Ed/Balance, PT Aquatic Therapy, PT Therapeutic Activity, PT Manual Therapy, and PT Evaluation            Goal: STG-Within one week, patient will transfer bed to chair     Dates: Start: 05/12/21       Goal Note filed on 05/12/21 1201 by Neena Gastelum DPT     1) Individualized goal:  SQPT with CGA  2) Interventions:  PT Group Therapy, PT E Stim Attended, PT Gait Training, PT Therapeutic Exercises, PT Neuro Re-Ed/Balance, PT Aquatic Therapy, PT Therapeutic Activity, PT Manual Therapy, and PT Evaluation               Problem: PT-Long  Term Goals     Dates: Start: 04/30/21       Goal: LTG-By discharge, patient will propel wheelchair     Dates: Start: 04/30/21       Description: 1) Individualized goal:  100ft with haylie technique and Anastacio on even/uneven surfaces  2) Interventions:  PT Group Therapy, PT E Stim Attended, PT Orthotics Training, PT Gait Training, PT Self Care/Home Eval, PT Therapeutic Exercises, PT Neuro Re-Ed/Balance, PT Therapeutic Activity, and PT Evaluation       Goal: LTG-By discharge, patient will transfer one surface to another     Dates: Start: 04/30/21       Description: 1) Individualized goal:  SQPT with Taz   2) Interventions:  PT Group Therapy, PT E Stim Attended, PT Orthotics Training, PT Gait Training, PT Self Care/Home Eval, PT Therapeutic Exercises, PT Neuro Re-Ed/Balance, PT Therapeutic Activity, and PT Evaluation         Goal: LTG-By discharge, patient will     Dates: Start: 04/30/21       Description: 1) Individualized goal: perform bed mobility (supine<>sit) with Taz   2) Interventions:  PT Group Therapy, PT E Stim Attended, PT Orthotics Training, PT Gait Training, PT Self Care/Home Eval, PT Therapeutic Exercises, PT Neuro Re-Ed/Balance, PT Therapeutic Activity, and PT Evaluation

## 2021-05-17 NOTE — THERAPY
Physical Therapy   Daily Treatment     Patient Name: Thong Arzola  Age:  56 y.o., Sex:  male  Medical Record #: [id number]  Today's Date: 5/17/2021     Precautions  Precautions: (P) Fall Risk  Comments: (P) Helmet OOB; LUE 2-3 Modified Jeremiah Scale; Abdominal binder OOB; Brewer cath; L inattention & haylie    Subjective    Pt reported excessive fatigue at start of tx, but agreed to perform standing frame intervention. Pt reported dizziness after 1 min in standing frame on first trial.      Objective       05/17/21 1501   Precautions   Precautions Fall Risk   Comments Helmet OOB; LUE 2-3 Modified Jeremiah Scale; Abdominal binder OOB; Brewer cath; L inattention & haylie   Transfer Functional Level of Assist   Bed, Chair, Wheelchair Transfer Minimal Assist  (Reach pivot right/left with bed rail min A. variable bed mob)   Bed Chair Wheelchair Transfer Description Adaptive equipment;Assist with two limbs;Set-up of equipment;Verbal cueing   Bed Mobility    Supine to Sit Minimal Assist   Sit to Supine Total Assist X 2  (Variable 2x, patient very fatigued )   Sit to Stand   (Variable- standing frame )   Scooting Moderate Assist   Rolling Minimal Assist to Rt.  (bed rail)   Neuro-Muscular Treatments   Neuro-Muscular Treatments Weight Shift Left;Weight Shift Right;Verbal Cuing;Tactile Cuing;Sequencing;Postural Facilitation;Postural Changes;Joint Approximation;Facilitation;Tapping   Comments Standing frame 1 min, 5 min x 2, glute squeezes 10x 2 sets, scap squeezes 10x, facilitation to L quad for L knee ext x 10 AAROM/PROM- fatigues easily. Mirror to the left of patient for visual encouragement/ L sided attention, L cervical rotation/ scanning.     Interdisciplinary Plan of Care Collaboration   IDT Collaboration with  Therapy Tech;Family / Caregiver   Patient Position at End of Therapy In Bed;Family / Friend in Room   Collaboration Comments POC, transfer support, equipment management.    Strengths & Barriers   Strengths Alert  and oriented;Effective communication skills;Independent prior level of function;Motivated for self care and independence;Pleasant and cooperative;Supportive family;Willingly participates in therapeutic activities   Barriers Hemiparesis;Home accessibility;Orthostatic hypotension;Impaired activity tolerance;Impaired balance;Spasticity;Limited mobility   PT Total Time Spent   PT Individual Total Time Spent (Mins) 60   PT Charge Group   PT Neuromuscular Re-Education / Balance 2   PT Therapeutic Activities 2       Assessment    Vitals monitored during standing frame activity- 134/100, 129/92, 118/68. Pt continues to fatigue easy, and is limited by nonfunctional LUE, and hemiparetic LLE. Spouse remains supportive, and by his side. Pt participated in 3 trials with standing frame, with minimal reported dizziness.       Strengths: (P) Alert and oriented, Effective communication skills, Independent prior level of function, Motivated for self care and independence, Pleasant and cooperative, Supportive family, Willingly participates in therapeutic activities  Barriers: (P) Hemiparesis, Home accessibility, Orthostatic hypotension, Impaired activity tolerance, Impaired balance, Spasticity, Limited mobility    Plan    Continue postural re-education, core engagement, seated balance, sit to stand training, transfer training.     Passport items to be completed:  Get in/out of bed safely, in/out of a vehicle, safely use mobility device, walk or wheel around home/community, navigate up and down stairs, show how to get up/down from the ground, ensure home is accessible, demonstrate HEP, complete caregiver training    Physical Therapy Problems (Active)     Problem: Mobility     Dates: Start: 04/30/21       Goal: STG-Within one week, patient will propel wheelchair household distances     Dates: Start: 04/30/21       Description: 1) Individualized goal:  25ft with haylie technique with SBA on even surfaces  2) Interventions:  PT Group  Therapy, PT E Stim Attended, PT Orthotics Training, PT Gait Training, PT Self Care/Home Eval, PT Therapeutic Exercises, PT Neuro Re-Ed/Balance, PT Therapeutic Activity, and PT Evaluation      Goal Note filed on 05/12/21 0844 by Neena Gastelum DPT     Pt continues with variable performance requiring intermittent max A to steer d/t L neglect               Problem: Mobility Transfers     Dates: Start: 05/12/21       Goal: STG-Within one week, patient will sit to stand     Dates: Start: 05/12/21       Goal Note filed on 05/12/21 1201 by Neena Gastelum DPT     1) Individualized goal:  STS in // bars with min A  2) Interventions:  PT Group Therapy, PT E Stim Attended, PT Gait Training, PT Therapeutic Exercises, PT Neuro Re-Ed/Balance, PT Aquatic Therapy, PT Therapeutic Activity, PT Manual Therapy, and PT Evaluation            Goal: STG-Within one week, patient will transfer bed to chair     Dates: Start: 05/12/21       Goal Note filed on 05/12/21 1201 by Neena Gastelum DPT     1) Individualized goal:  SQPT with CGA  2) Interventions:  PT Group Therapy, PT E Stim Attended, PT Gait Training, PT Therapeutic Exercises, PT Neuro Re-Ed/Balance, PT Aquatic Therapy, PT Therapeutic Activity, PT Manual Therapy, and PT Evaluation               Problem: PT-Long Term Goals     Dates: Start: 04/30/21       Goal: LTG-By discharge, patient will propel wheelchair     Dates: Start: 04/30/21       Description: 1) Individualized goal:  100ft with haylie technique and Anastacio on even/uneven surfaces  2) Interventions:  PT Group Therapy, PT E Stim Attended, PT Orthotics Training, PT Gait Training, PT Self Care/Home Eval, PT Therapeutic Exercises, PT Neuro Re-Ed/Balance, PT Therapeutic Activity, and PT Evaluation       Goal: LTG-By discharge, patient will transfer one surface to another     Dates: Start: 04/30/21       Description: 1) Individualized goal:  SQPT with Taz   2) Interventions:  PT Group Therapy, PT E Stim Attended, PT Orthotics  Training, PT Gait Training, PT Self Care/Home Eval, PT Therapeutic Exercises, PT Neuro Re-Ed/Balance, PT Therapeutic Activity, and PT Evaluation         Goal: LTG-By discharge, patient will     Dates: Start: 04/30/21       Description: 1) Individualized goal: perform bed mobility (supine<>sit) with Taz   2) Interventions:  PT Group Therapy, PT E Stim Attended, PT Orthotics Training, PT Gait Training, PT Self Care/Home Eval, PT Therapeutic Exercises, PT Neuro Re-Ed/Balance, PT Therapeutic Activity, and PT Evaluation

## 2021-05-17 NOTE — THERAPY
Occupational Therapy  Daily Treatment     Patient Name: Thong Arzola  Age:  56 y.o., Sex:  male  Medical Record #: [id number]  Today's Date: 5/17/2021     Precautions  Precautions: Fall Risk  Comments: Helmet OOB; LUE 2-3 Modified Jeremiah Scale; Abdominal binder OOB; Brewer cath; L inattention & haylie         Subjective    Pt seated in t-dine, pleasant and cooperative      Objective       05/17/21 0801   Functional Level of Assist   Eating Supervision  (set-up assist, L neglect, non-functional LUE - haylie-techique)   Interdisciplinary Plan of Care Collaboration   Patient Position at End of Therapy Seated  (in t-dine with techs)   OT Total Time Spent   OT Group Total Time Spent (Mins) 30   OT Charge Group   OT Group Therapy Group Activities     Self-feeding Assessment during meal:    - Ability to grasp utensils: RUE dynamic tripod grasp    - Ability to bring food to mouth: Mod I  - Ability to bring liquids to mouth: Mod I  - Ability to open packages: requires significant assist  - Ability to cut food: with fork SBA  - Ability to manage body posture: upright in w/c, needs assist to position w/c at table  - Ability to maintain a clean appearance: good  - Behavior/socialization skills: appropriate with no issues    - Primary barriers to self-feeding: non-functional LUE, spasticity, L neglect  - Adaptive equipment/ Compensatory strategy recommendations: receptive when cues are provided for increase scanning to the L side d/t neglect and using haylie-techniques when opening packages    Assessment    Pt seen during breakfast meal in OT Self-feeding/AE Group. Pt was able to eat approximately 100 % of meal and required set-up assist overall. Pt's response to edu/recommendations: receptive when cues are provided for increase scanning to the L side d/t neglect and using haylie-techniques when opening packages  . Pt would continue to benefit from the OT group in order to improve independence and confidence with self-feeding  skills.    Strengths: Able to follow instructions, Alert and oriented, Effective communication skills, Good insight into deficits/needs, Independent prior level of function, Manages pain appropriately, Motivated for self care and independence, Pleasant and cooperative, Supportive family, Willingly participates in therapeutic activities  Barriers: Bowel incontinence, Decreased endurance, Fatigue, Generalized weakness, Hemiplegia, Home accessibility, Orthostatic hypotension, Impaired activity tolerance, Impaired balance, Limited mobility, Spasticity    Plan    Continue OT Self-feeding/AE Group 3x/week - continue to progress use of haylie-techniques during set-up and self-feeding    Occupational Therapy Goals (Active)     Problem: Dressing     Dates: Start: 05/12/21       Goal: STG-Within one week, patient will dress UB     Dates: Start: 05/12/21       Goal Note filed on 05/12/21 1136 by Aj Lovett, OT/L     1) Individualized Goal: with min A to don/doff shirt with cues  2) Interventions:  OT Self Care/ADL, OT Cognitive Skill Dev, OT Manual Ther Technique, OT Neuro Re-Ed/Balance, OT Sensory Int Techniques, OT Therapeutic Activity, OT Evaluation, and OT Therapeutic Exercise            Goal: STG-Within one week, patient will dress LB     Dates: Start: 05/12/21       Goal Note filed on 05/12/21 1136 by Aj Lovett, OT/L     1) Individualized Goal: with min A using cues and adaptive techniques  2) Interventions:  OT Self Care/ADL, OT Cognitive Skill Dev, OT Manual Ther Technique, OT Neuro Re-Ed/Balance, OT Sensory Int Techniques, OT Therapeutic Activity, OT Evaluation, and OT Therapeutic Exercise               Problem: Functional Transfers     Dates: Start: 05/12/21       Goal: STG-Within one week, patient will transfer to toilet     Dates: Start: 05/12/21       Goal Note filed on 05/12/21 1136 by Aj Lovett, OT/L     1) Individualized Goal: with mod A using grab bar and commode over the toilet  2)  Interventions:  OT Self Care/ADL, OT Cognitive Skill Dev, OT Manual Ther Technique, OT Neuro Re-Ed/Balance, OT Sensory Int Techniques, OT Therapeutic Activity, OT Evaluation, and OT Therapeutic Exercise               Problem: OT Long Term Goals     Dates: Start: 04/30/21       Goal: LTG-By discharge, patient will complete basic self care tasks     Dates: Start: 04/30/21       Description: 1) Individualized Goal:  with min to mod A using AE/AD/techniques as needed  2) Interventions:  OT E Stim Attended, OT Self Care/ADL, OT Cognitive Skill Dev, OT Manual Ther Technique, OT Neuro Re-Ed/Balance, OT Sensory Int Techniques, OT Therapeutic Activity, OT Evaluation, and OT Therapeutic Exercise       Goal: LTG-By discharge, patient will perform bathroom transfers     Dates: Start: 04/30/21       Description: 1) Individualized Goal:  with max A x 1 person using slideboard versus squat pivot to the right  2) Interventions:  OT E Stim Attended, OT Self Care/ADL, OT Cognitive Skill Dev, OT Manual Ther Technique, OT Neuro Re-Ed/Balance, OT Sensory Int Techniques, OT Therapeutic Activity, OT Evaluation, and OT Therapeutic Exercise

## 2021-05-17 NOTE — THERAPY
Physical Therapy   Daily Treatment     Patient Name: Thong Arzola  Age:  56 y.o., Sex:  male  Medical Record #: [id number]  Today's Date: 5/17/2021     Precautions  Precautions: (P) Fall Risk  Comments: (P) Helmet OOB; LUE 2-3 Modified Jeremiah Scale; Abdominal binder OOB; Brewer cath; L inattention & haylie    Subjective    Pt agreeable to therapy.      Objective     05/17/21 1401   Precautions   Precautions Fall Risk   Comments Helmet OOB; LUE 2-3 Modified Jeremiah Scale; Abdominal binder OOB; Brewer cath; L inattention & haylie   Cognition    New Learning Impaired   Attention Impaired   Sequencing Impaired   Neuro-Muscular Treatments   Neuro-Muscular Treatments Facilitation;Postural Changes;Postural Facilitation;Verbal Cuing;Tactile Cuing;Weight Shift Right;Weight Shift Left   PT Total Time Spent   PT Individual Total Time Spent (Mins) 30   PT Charge Group   PT Neuromuscular Re-Education / Balance 2       -Transfer training no RUE assistance. Hands clasped position. weight shift to lift off with pivot. Repeated practice of lift off and pivot, completing transfer to left side, mod A. Transfer sequencing repeated to right side following session mod A.     Seated postural re-education: RUE upper diagonal reaching manual and verbal cues for left lateral lumbar flexion, manual cues for lumbar extension. Visual feedback from mirror. Verbal cues for pt to assess posture in mirror. Repeated practice   -Seated balance: fair -, loss of balance with reaching must maintain CGA during task     -Seated in midline min to moderate manual facilitation. Raised mat table height, anterior weight shift to lift off. Pt is unable to hold posture but left quad does engage initially but then he loses the leg and the midline trunk and mod A is needed. Repeated practice seated to anterior weight shift to lift off.     - sit to stand from raised mat table, no UE assistance, visual feedback, manual and verbal cues for starting posture in  midline with left lateral lumbar flexors engaged. Sit to stand mod - max A,  Standing max A, pt instructed to use visual feedback to evaluate standing posture. His intrinsic sense of posture is mismatched from his extrinsic visual feedback. He feels he is leaning upper body to the left but when he looks in the mirror he sees that his upper body is leaning to the right. The trunk has disengaged in standing and there is minimal left side activation thus contributing to falling to the left sensation. This activity of sit to stand and standing posture self assessment was repeated 5 times.     Assessment    Trunk and LLE demonstrate potential to be activated however, at this time it is not sustained during change of posture.     Strengths: Alert and oriented, Effective communication skills, Independent prior level of function, Motivated for self care and independence, Pleasant and cooperative, Supportive family, Willingly participates in therapeutic activities  Barriers: Hemiparesis, Home accessibility, Orthostatic hypotension, Impaired activity tolerance, Impaired balance, Spasticity, Limited mobility    Plan    Continue postural re-education, core engagement, seated balance, sit to stand training, transfer training.   :  Passport items to be completed:  Get in/out of bed safely, in/out of a vehicle, safely use mobility device, walk or wheel around home/community, navigate up and down stairs, show how to get up/down from the ground, ensure home is accessible, demonstrate HEP, complete caregiver training    Physical Therapy Problems (Active)     Problem: Mobility     Dates: Start: 04/30/21       Goal: STG-Within one week, patient will propel wheelchair household distances     Dates: Start: 04/30/21       Description: 1) Individualized goal:  25ft with haylie technique with SBA on even surfaces  2) Interventions:  PT Group Therapy, PT E Stim Attended, PT Orthotics Training, PT Gait Training, PT Self Care/Home Eval, PT  Therapeutic Exercises, PT Neuro Re-Ed/Balance, PT Therapeutic Activity, and PT Evaluation      Goal Note filed on 05/12/21 0844 by Neena Gastelum DPT     Pt continues with variable performance requiring intermittent max A to steer d/t L neglect               Problem: Mobility Transfers     Dates: Start: 05/12/21       Goal: STG-Within one week, patient will sit to stand     Dates: Start: 05/12/21       Goal Note filed on 05/12/21 1201 by Neena Gastelum DPT     1) Individualized goal:  STS in // bars with min A  2) Interventions:  PT Group Therapy, PT E Stim Attended, PT Gait Training, PT Therapeutic Exercises, PT Neuro Re-Ed/Balance, PT Aquatic Therapy, PT Therapeutic Activity, PT Manual Therapy, and PT Evaluation            Goal: STG-Within one week, patient will transfer bed to chair     Dates: Start: 05/12/21       Goal Note filed on 05/12/21 1201 by Neena Gastelum DPT     1) Individualized goal:  SQPT with CGA  2) Interventions:  PT Group Therapy, PT E Stim Attended, PT Gait Training, PT Therapeutic Exercises, PT Neuro Re-Ed/Balance, PT Aquatic Therapy, PT Therapeutic Activity, PT Manual Therapy, and PT Evaluation               Problem: PT-Long Term Goals     Dates: Start: 04/30/21       Goal: LTG-By discharge, patient will propel wheelchair     Dates: Start: 04/30/21       Description: 1) Individualized goal:  100ft with haylie technique and Anastacio on even/uneven surfaces  2) Interventions:  PT Group Therapy, PT E Stim Attended, PT Orthotics Training, PT Gait Training, PT Self Care/Home Eval, PT Therapeutic Exercises, PT Neuro Re-Ed/Balance, PT Therapeutic Activity, and PT Evaluation       Goal: LTG-By discharge, patient will transfer one surface to another     Dates: Start: 04/30/21       Description: 1) Individualized goal:  SQPT with Taz   2) Interventions:  PT Group Therapy, PT E Stim Attended, PT Orthotics Training, PT Gait Training, PT Self Care/Home Eval, PT Therapeutic Exercises, PT Neuro  Re-Ed/Balance, PT Therapeutic Activity, and PT Evaluation         Goal: LTG-By discharge, patient will     Dates: Start: 04/30/21       Description: 1) Individualized goal: perform bed mobility (supine<>sit) with Taz   2) Interventions:  PT Group Therapy, PT E Stim Attended, PT Orthotics Training, PT Gait Training, PT Self Care/Home Eval, PT Therapeutic Exercises, PT Neuro Re-Ed/Balance, PT Therapeutic Activity, and PT Evaluation

## 2021-05-17 NOTE — THERAPY
Occupational Therapy  Daily Treatment     Patient Name: Thong Arzola  Age:  56 y.o., Sex:  male  Medical Record #: [id number]  Today's Date: 5/17/2021     Precautions  Precautions: Fall Risk  Comments: Helmet OOB; LUE 2-3 Modified Jeremiah Scale; Abdominal binder OOB; Brewer cath; L inattention & haylie         Subjective    Patient agreeable to try e-stim to left triceps for facilitation and to inhibit left biceps tone.     Objective       05/17/21 1201   Precautions   Precautions Fall Risk   Comments Helmet OOB; LUE 2-3 Modified Jeremiah Scale; Abdominal binder OOB; Brewer cath; L inattention & haylie   Neuro-Muscular Treatments   Neuro-Muscular Treatments Facilitation;Sequencing;Verbal Cuing;Tapping   Comments Attempted e stim to left triceps and left wrist/finger extensors, but unable to elicit any movement more than muscle twitches at triceps.    Interdisciplinary Plan of Care Collaboration   Patient Position at End of Therapy Seated;Self Releasing Lap Belt Applied;Chair Alarm On;Call Light within Reach;Tray Table within Reach;Phone within Reach   OT Total Time Spent   OT Individual Total Time Spent (Mins) 30   OT Charge Group   OT Electrical Stimulation Attended 1   OT Neuromuscular Re-education / Balance 1     Neuro re-education to left biceps/triceps with therapist performing muscle belly tapping.    Assessment    Unable to elicit good enough muscle contractions using the Youneeq e stim units since they don't allow custom amounts of stimulation.  Will attempt with Empi unit tomorrow.  Patient tolerated the sensation well.  Strengths: Able to follow instructions, Alert and oriented, Effective communication skills, Good insight into deficits/needs, Independent prior level of function, Manages pain appropriately, Motivated for self care and independence, Pleasant and cooperative, Supportive family, Willingly participates in therapeutic activities  Barriers: Bowel incontinence, Decreased endurance, Fatigue,  Generalized weakness, Hemiplegia, Home accessibility, Orthostatic hypotension, Impaired activity tolerance, Impaired balance, Limited mobility, Spasticity    Plan    Sitting balance/core strengthening/midline orientation, ADL retraining, slideboard transfers progressing to lateral scoots or squat pivots to the right, L UE neuro re-ed/stretching, family training with spouse Anel; Bathing is not a goal of OT at this time (CNAs should be bathing patient); home evaluation to sister in law's house in [place]; pursue resting hand splint for left hand/wrist    Occupational Therapy Goals (Active)     Problem: Dressing     Dates: Start: 05/12/21       Goal: STG-Within one week, patient will dress UB     Dates: Start: 05/12/21       Goal Note filed on 05/12/21 1136 by Aj Lovett, OT/L     1) Individualized Goal: with min A to don/doff shirt with cues  2) Interventions:  OT Self Care/ADL, OT Cognitive Skill Dev, OT Manual Ther Technique, OT Neuro Re-Ed/Balance, OT Sensory Int Techniques, OT Therapeutic Activity, OT Evaluation, and OT Therapeutic Exercise            Goal: STG-Within one week, patient will dress LB     Dates: Start: 05/12/21       Goal Note filed on 05/12/21 1136 by Aj Lovett, OT/L     1) Individualized Goal: with min A using cues and adaptive techniques  2) Interventions:  OT Self Care/ADL, OT Cognitive Skill Dev, OT Manual Ther Technique, OT Neuro Re-Ed/Balance, OT Sensory Int Techniques, OT Therapeutic Activity, OT Evaluation, and OT Therapeutic Exercise               Problem: Functional Transfers     Dates: Start: 05/12/21       Goal: STG-Within one week, patient will transfer to toilet     Dates: Start: 05/12/21       Goal Note filed on 05/12/21 1136 by Aj Lovett, OT/L     1) Individualized Goal: with mod A using grab bar and commode over the toilet  2) Interventions:  OT Self Care/ADL, OT Cognitive Skill Dev, OT Manual Ther Technique, OT Neuro Re-Ed/Balance, OT Sensory Int Techniques,  OT Therapeutic Activity, OT Evaluation, and OT Therapeutic Exercise               Problem: OT Long Term Goals     Dates: Start: 04/30/21       Goal: LTG-By discharge, patient will complete basic self care tasks     Dates: Start: 04/30/21       Description: 1) Individualized Goal:  with min to mod A using AE/AD/techniques as needed  2) Interventions:  OT E Stim Attended, OT Self Care/ADL, OT Cognitive Skill Dev, OT Manual Ther Technique, OT Neuro Re-Ed/Balance, OT Sensory Int Techniques, OT Therapeutic Activity, OT Evaluation, and OT Therapeutic Exercise       Goal: LTG-By discharge, patient will perform bathroom transfers     Dates: Start: 04/30/21       Description: 1) Individualized Goal:  with max A x 1 person using slideboard versus squat pivot to the right  2) Interventions:  OT E Stim Attended, OT Self Care/ADL, OT Cognitive Skill Dev, OT Manual Ther Technique, OT Neuro Re-Ed/Balance, OT Sensory Int Techniques, OT Therapeutic Activity, OT Evaluation, and OT Therapeutic Exercise

## 2021-05-17 NOTE — CARE PLAN
Problem: Safety  Goal: Will remain free from injury  Note: Pt uses call light consistently and appropriately. Waits for assistance does not attempt self transfer this shift. Able to verbalize needs.      Problem: Pain Management  Goal: Pain level will decrease to patient's comfort goal  Note: Patient able to verbalize pain level and verbalize an acceptable level of pain.

## 2021-05-17 NOTE — PROGRESS NOTES
"Rehab Progress Note     Date of Service: 5/11/2021  Chief Complaint: follow up stroke    Interval Events (Subjective)    Patient seen and examined today in his room.  He reports continued fatigue.  His baclofen was decreased over the weekend due to his fatigue, with increased spasticity in his arm/leg.   We discussed a trial of Zanaflex.  He denies any pain.  He has no other complaints.     ROS: No changes to bowel, bladder, pain, mood, or sleep.       Objective:  VITAL SIGNS: /79   Pulse 62   Temp 36 °C (96.8 °F) (Temporal)   Resp 18   Ht 1.778 m (5' 10\")   Wt 68.6 kg (151 lb 3.8 oz)   SpO2 97%   BMI 21.70 kg/m²   Gen: alert, no apparent distress  Neuro: notable for spastic left hemiplegia, increased tone in left fingers/wrist/elbow compared to usual        Recent Results (from the past 72 hour(s))   CBC WITHOUT DIFFERENTIAL    Collection Time: 05/16/21  5:45 AM   Result Value Ref Range    WBC 4.9 4.8 - 10.8 K/uL    RBC 4.76 4.70 - 6.10 M/uL    Hemoglobin 13.5 (L) 14.0 - 18.0 g/dL    Hematocrit 41.6 (L) 42.0 - 52.0 %    MCV 87.4 81.4 - 97.8 fL    MCH 28.4 27.0 - 33.0 pg    MCHC 32.5 (L) 33.7 - 35.3 g/dL    RDW 43.2 35.9 - 50.0 fL    Platelet Count 292 164 - 446 K/uL    MPV 9.4 9.0 - 12.9 fL   Comp Metabolic Panel    Collection Time: 05/16/21  5:45 AM   Result Value Ref Range    Sodium 143 135 - 145 mmol/L    Potassium 3.9 3.6 - 5.5 mmol/L    Chloride 106 96 - 112 mmol/L    Co2 26 20 - 33 mmol/L    Anion Gap 11.0 7.0 - 16.0    Glucose 85 65 - 99 mg/dL    Bun 19 8 - 22 mg/dL    Creatinine 1.12 0.50 - 1.40 mg/dL    Calcium 10.1 8.5 - 10.5 mg/dL    AST(SGOT) 28 12 - 45 U/L    ALT(SGPT) 60 (H) 2 - 50 U/L    Alkaline Phosphatase 80 30 - 99 U/L    Total Bilirubin 0.4 0.1 - 1.5 mg/dL    Albumin 3.4 3.2 - 4.9 g/dL    Total Protein 6.4 6.0 - 8.2 g/dL    Globulin 3.0 1.9 - 3.5 g/dL    A-G Ratio 1.1 g/dL   ESTIMATED GFR    Collection Time: 05/16/21  5:45 AM   Result Value Ref Range    GFR If  >60 " >60 mL/min/1.73 m 2    GFR If Non African American >60 >60 mL/min/1.73 m 2       Current Facility-Administered Medications   Medication Frequency   • metoprolol tartrate (LOPRESSOR) tablet 25 mg BID   • senna-docusate (PERICOLACE or SENOKOT S) 8.6-50 MG per tablet 2 tablet Q EVENING   • baclofen (LIORESAL) tablet 20 mg TID   • gabapentin (NEURONTIN) capsule 100 mg Q EVENING   • melatonin tablet 3 mg QHS   • meclizine (ANTIVERT) tablet 25 mg TID   • hydrOXYzine HCl (ATARAX) tablet 50 mg Q6HRS PRN   • Respiratory Therapy Consult Continuous RT   • Pharmacy Consult Request ...Pain Management Review 1 Each PHARMACY TO DOSE   • hydrALAZINE (APRESOLINE) tablet 10 mg Q8HRS PRN   • acetaminophen (Tylenol) tablet 650 mg Q4HRS PRN   • lactulose 20 GM/30ML solution 30 mL QDAY PRN   • docusate sodium (ENEMEEZ) enema 283 mg QDAY PRN   • fleet enema 133 mL QDAY PRN   • artificial tears ophthalmic solution 1 Drop PRN   • benzocaine-menthol (CEPACOL) lozenge 1 Lozenge Q2HRS PRN   • mag hydrox-al hydrox-simeth (MAALOX PLUS ES or MYLANTA DS) suspension 20 mL Q2HRS PRN   • ondansetron (ZOFRAN ODT) dispertab 4 mg 4X/DAY PRN    Or   • ondansetron (ZOFRAN) syringe/vial injection 4 mg 4X/DAY PRN   • traZODone (DESYREL) tablet 50 mg QHS PRN   • sodium chloride (OCEAN) 0.65 % nasal spray 2 Spray PRN   • midazolam (VERSED) 5 mg/mL (1 mL vial) PRN   • atorvastatin (LIPITOR) tablet 80 mg Q EVENING   • aspirin (ASA) chewable tab 81 mg DAILY   • mirtazapine (Remeron) orally disintegrating tab 15 mg QHS   • rivaroxaban (XARELTO) tablet 20 mg PM MEAL   • thyroid (ARMOUR THYROID) tablet 60 mg BID       Orders Placed This Encounter   Procedures   • Diet Order Diet: Level 7 - Easy to Chew (float pills in puree); Liquid level: Level 0 - Thin; Tray Modifications (optional): SLP - 1:1 Supervision by Nursing, SLP - Deliver to Nursing Station     Standing Status:   Standing     Number of Occurrences:   1     Order Specific Question:   Diet:     Answer:    Level 7 - Easy to Chew [22]     Comments:   float pills in puree     Order Specific Question:   Liquid level     Answer:   Level 0 - Thin     Order Specific Question:   Tray Modifications (optional)     Answer:   SLP - 1:1 Supervision by Nursing     Order Specific Question:   Tray Modifications (optional)     Answer:   SLP - Deliver to Nursing Station       Assessment:  Active Hospital Problems    Diagnosis    • *Acute right MCA stroke (HCC)    • Paroxysmal atrial fibrillation (HCC)    • Urinary retention    • Acquired hypothyroidism    • History of COVID-19    • Hypotension    • Normocytic anemia    • Spasticity as late effect of cerebrovascular accident (CVA)    • Reactive depression      This patient is a 56 y.o. male admitted for acute inpatient rehabilitation with Acute right MCA stroke (HCC).    I led and attended the weekly conference, and agree with the IDT conference documentation and plan of care as noted below.    Date of conference: 5/12/2021    Goals and barriers: See IDT note.    Biggest barriers: right spastic hemiparesis, urinary retention, poor sleep, home accessibility, impaired balance/sitting posture, left neglect    Goals in next week: hand splint    CM/social support: wife supportive, to go to 1  here in [place], wife's sister    Anticipated DC date: 6/4    Home health: PT/OT/SLP/RN - [facility]    Equip: TBD    Follow up: PCP, Dr. Dumont, stroke bridge clinic, urology, neurosugery      Medical Decision Making and Plan:    Large right ICA/MCA ischemic stroke  S/p craniectomy 4/3, Dr. Payton  Left hemiplegia, has some very mild active elbow ext/flex  Cognitive deficits, improved  Left neglect, improved  Continue full rehab program  PT/OT/SLP, 1 hr each discipline, 5 days per week    Xarelto  Statin    Outpatient follow up with stroke bridge clinic, Dr. Dumont, referrals made    Outpatient follow up with Dr. Payton for cranioplasty    Making good progress    Neuropathic pain,  improved/resolved  Left leg at night  Continue low dose gabapentin 100 mg 5/11  Continue to monitor need to increase dose    Dizziness, improved  Scheduled Meclizine, will titrate off prior to discharge  Continue Teds and abdominal binder for now  BPPV testing by PT negative    Spasticity, improved/stable  Increased baclofen 15 mg TID to 20 mg TID 4/30 --> 30 mg TID 5/3  Was decreased back down to 20 mg TID on 5/13 due to fatigue, with increased spasticity today    Started valium at night 2 mg 4/30, discontinued 5/3, doesn't seem to make much difference    Consider adding Zanaflex in future, LFTs on 5/5 with elevated ALT, recheck in am, if OK will start Zanaflex    May benefit from Botox in the future, continue to monitor, will follow up with Dr. Dumont in the outpatient clinic     Atrial fibrillation  Metoprolol  Xarelto  Hospitalist signed off     Hypothyroidism   [place] thyroid     Hypertension  Hypotension, improved  Lisinopril discontinued  Metoprolol, dose increased  Flomax discontinued 5/12  Hospitalist signed off  Teds and abdominal binder    Insomnia, improved/resolved  Already on mirtazapine  Scheduled melatonin  Gabapentin added for neuropathic pain at night     Reactive depression  Mirtazapine  Consider psychology consult if needed   Mood currently stable     History of COVID-19  Without sequelae     Normocytic anemia  Mild, monitor    Bowel program  Continue bowel medications  Last BM 5/16    Bladder program  Urinary retention, continues  Changed Prazosin to Flomax  Voiding trial 5/4, unsuccessful  Replaced Brewer 5/5, increased Flomax   Voiding trial 5/12, unsuccessful again  Discontinued Flomax 5/12, not efffective  Likely has spastic bladder  Will need outpatient urology follow up, referral made    DVT prophylaxis  Xarelto    Total time:  25 minutes.  I spent greater than 50% of the time for patient care, counseling, and coordination on this date, including patient face-to face time, unit/floor time  with review of records/pertinent lab data and studies, as well as discussing diagnostic evaluation/work up, planned therapeutic interventions, and future disposition of care, as per the interval events/subjective and the assessment and plan as noted above.    I have performed a physical exam, reviewed and updated ROS, as well as the assessment and plan today 5/17/2021. In review of note from 5/12/2021 there are no new changes except as documented above.          Radha Monge M.D.   Physical Medicine and Rehabilitation

## 2021-05-17 NOTE — PROGRESS NOTES
Patient care assumed. Report received from [facility] SUSSY Romeo.  Patient is alert and calm, resting in bed. Call light and bedside table within reach. Will continue to monitor.

## 2021-05-18 LAB
ALBUMIN SERPL BCP-MCNC: 3.4 G/DL (ref 3.2–4.9)
ALP SERPL-CCNC: 78 U/L (ref 30–99)
ALT SERPL-CCNC: 59 U/L (ref 2–50)
AST SERPL-CCNC: 25 U/L (ref 12–45)
BILIRUB CONJ SERPL-MCNC: <0.2 MG/DL (ref 0.1–0.5)
BILIRUB INDIRECT SERPL-MCNC: ABNORMAL MG/DL (ref 0–1)
BILIRUB SERPL-MCNC: 0.3 MG/DL (ref 0.1–1.5)
PROT SERPL-MCNC: 6.4 G/DL (ref 6–8.2)

## 2021-05-18 PROCEDURE — 97112 NEUROMUSCULAR REEDUCATION: CPT

## 2021-05-18 PROCEDURE — 97535 SELF CARE MNGMENT TRAINING: CPT

## 2021-05-18 PROCEDURE — 770010 HCHG ROOM/CARE - REHAB SEMI PRIVAT*

## 2021-05-18 PROCEDURE — 97530 THERAPEUTIC ACTIVITIES: CPT

## 2021-05-18 PROCEDURE — 700102 HCHG RX REV CODE 250 W/ 637 OVERRIDE(OP): Performed by: PHYSICAL MEDICINE & REHABILITATION

## 2021-05-18 PROCEDURE — 700102 HCHG RX REV CODE 250 W/ 637 OVERRIDE(OP): Performed by: HOSPITALIST

## 2021-05-18 PROCEDURE — 97032 APPL MODALITY 1+ESTIM EA 15: CPT

## 2021-05-18 PROCEDURE — A9270 NON-COVERED ITEM OR SERVICE: HCPCS | Performed by: PHYSICAL MEDICINE & REHABILITATION

## 2021-05-18 PROCEDURE — 97150 GROUP THERAPEUTIC PROCEDURES: CPT

## 2021-05-18 PROCEDURE — A9270 NON-COVERED ITEM OR SERVICE: HCPCS | Performed by: HOSPITALIST

## 2021-05-18 PROCEDURE — 99233 SBSQ HOSP IP/OBS HIGH 50: CPT | Performed by: PHYSICAL MEDICINE & REHABILITATION

## 2021-05-18 PROCEDURE — 36415 COLL VENOUS BLD VENIPUNCTURE: CPT

## 2021-05-18 PROCEDURE — 80076 HEPATIC FUNCTION PANEL: CPT

## 2021-05-18 PROCEDURE — 97116 GAIT TRAINING THERAPY: CPT

## 2021-05-18 RX ORDER — ATORVASTATIN CALCIUM 40 MG/1
40 TABLET, FILM COATED ORAL EVERY EVENING
Status: DISCONTINUED | OUTPATIENT
Start: 2021-05-18 | End: 2021-06-04 | Stop reason: HOSPADM

## 2021-05-18 RX ADMIN — METOPROLOL TARTRATE 25 MG: 25 TABLET, FILM COATED ORAL at 08:40

## 2021-05-18 RX ADMIN — MELATONIN TAB 3 MG 3 MG: 3 TAB at 20:29

## 2021-05-18 RX ADMIN — METOPROLOL TARTRATE 25 MG: 25 TABLET, FILM COATED ORAL at 20:29

## 2021-05-18 RX ADMIN — BACLOFEN 20 MG: 20 TABLET ORAL at 08:40

## 2021-05-18 RX ADMIN — MECLIZINE HYDROCHLORIDE 25 MG: 25 TABLET ORAL at 08:40

## 2021-05-18 RX ADMIN — THYROID, PORCINE 60 MG: 30 TABLET ORAL at 08:40

## 2021-05-18 RX ADMIN — MECLIZINE HYDROCHLORIDE 25 MG: 25 TABLET ORAL at 14:53

## 2021-05-18 RX ADMIN — THYROID, PORCINE 60 MG: 30 TABLET ORAL at 20:29

## 2021-05-18 RX ADMIN — MECLIZINE HYDROCHLORIDE 25 MG: 25 TABLET ORAL at 20:30

## 2021-05-18 RX ADMIN — RIVAROXABAN 20 MG: 20 TABLET, FILM COATED ORAL at 17:42

## 2021-05-18 RX ADMIN — SENNOSIDES AND DOCUSATE SODIUM 2 TABLET: 8.6; 5 TABLET ORAL at 20:29

## 2021-05-18 RX ADMIN — BACLOFEN 20 MG: 20 TABLET ORAL at 14:53

## 2021-05-18 RX ADMIN — GABAPENTIN 100 MG: 100 CAPSULE ORAL at 20:29

## 2021-05-18 RX ADMIN — MIRTAZAPINE 15 MG: 15 TABLET, ORALLY DISINTEGRATING ORAL at 20:29

## 2021-05-18 RX ADMIN — BACLOFEN 20 MG: 20 TABLET ORAL at 20:29

## 2021-05-18 RX ADMIN — ATORVASTATIN CALCIUM 40 MG: 40 TABLET, FILM COATED ORAL at 20:29

## 2021-05-18 RX ADMIN — ASPIRIN 81 MG CHEWABLE TABLET 81 MG: 81 TABLET CHEWABLE at 17:42

## 2021-05-18 ASSESSMENT — GAIT ASSESSMENTS
DISTANCE (FEET): 10
GAIT LEVEL OF ASSIST: TOTAL ASSIST X 2
ASSISTIVE DEVICE: PARALLEL BARS;OTHER (COMMENTS)

## 2021-05-18 ASSESSMENT — ACTIVITIES OF DAILY LIVING (ADL): BED_CHAIR_WHEELCHAIR_TRANSFER_DESCRIPTION: ADAPTIVE EQUIPMENT;SET-UP OF EQUIPMENT

## 2021-05-18 NOTE — CARE PLAN
Problem: Safety  Goal: Will remain free from falls  Note: Use of call light encouraged to make needs known.Fall precaution and frequent rounding in place for safety.Call light within reach.Will continue to monitor and assess needs and safety.     Problem: Pain Management  Goal: Pain level will decrease to patient's comfort goal  Intervention: Follow pain managment plan developed in collaboration with patient and Interdisciplinary Team  Note: Pain is manageable with scheduled medication.Repositioned with pillows for comfort.Will continue to monitor and assess pain level and medicate as needed.

## 2021-05-18 NOTE — PROGRESS NOTES
"Rehab Progress Note     Date of Service: 5/11/2021  Chief Complaint: follow up stroke    Interval Events (Subjective)    Patient seen and examined today in his room.  He reports he slept OK last night.  He continues to have spasticity in his left arm/hand. He is open to getting BOTOX. Discussed with Dr. Dumont.  He also continues to complain of fatigue, even on the lower dose of baclofen.  He has no other complaints.    Returned to room later to answer questions and give medical updates to patient's wife Anel regarding patient's sleep, fatigue, bladder, spasticity, and follow up with neurosurgery.     ROS: No changes to bowel, bladder, pain, mood, or sleep.           Objective:  VITAL SIGNS: /98   Pulse 67   Temp 36.4 °C (97.6 °F) (Temporal)   Resp 18   Ht 1.778 m (5' 10\")   Wt 68.6 kg (151 lb 3.8 oz)   SpO2 98%   BMI 21.70 kg/m²   Gen: alert, no apparent distress  HEENT: helmet in place  Neuro: notable for spastic left hemiplegia        Recent Results (from the past 72 hour(s))   CBC WITHOUT DIFFERENTIAL    Collection Time: 05/16/21  5:45 AM   Result Value Ref Range    WBC 4.9 4.8 - 10.8 K/uL    RBC 4.76 4.70 - 6.10 M/uL    Hemoglobin 13.5 (L) 14.0 - 18.0 g/dL    Hematocrit 41.6 (L) 42.0 - 52.0 %    MCV 87.4 81.4 - 97.8 fL    MCH 28.4 27.0 - 33.0 pg    MCHC 32.5 (L) 33.7 - 35.3 g/dL    RDW 43.2 35.9 - 50.0 fL    Platelet Count 292 164 - 446 K/uL    MPV 9.4 9.0 - 12.9 fL   Comp Metabolic Panel    Collection Time: 05/16/21  5:45 AM   Result Value Ref Range    Sodium 143 135 - 145 mmol/L    Potassium 3.9 3.6 - 5.5 mmol/L    Chloride 106 96 - 112 mmol/L    Co2 26 20 - 33 mmol/L    Anion Gap 11.0 7.0 - 16.0    Glucose 85 65 - 99 mg/dL    Bun 19 8 - 22 mg/dL    Creatinine 1.12 0.50 - 1.40 mg/dL    Calcium 10.1 8.5 - 10.5 mg/dL    AST(SGOT) 28 12 - 45 U/L    ALT(SGPT) 60 (H) 2 - 50 U/L    Alkaline Phosphatase 80 30 - 99 U/L    Total Bilirubin 0.4 0.1 - 1.5 mg/dL    Albumin 3.4 3.2 - 4.9 g/dL    Total Protein " 6.4 6.0 - 8.2 g/dL    Globulin 3.0 1.9 - 3.5 g/dL    A-G Ratio 1.1 g/dL   ESTIMATED GFR    Collection Time: 05/16/21  5:45 AM   Result Value Ref Range    GFR If African American >60 >60 mL/min/1.73 m 2    GFR If Non African American >60 >60 mL/min/1.73 m 2   HEPATIC FUNCTION PANEL    Collection Time: 05/18/21  6:04 AM   Result Value Ref Range    Alkaline Phosphatase 78 30 - 99 U/L    AST(SGOT) 25 12 - 45 U/L    ALT(SGPT) 59 (H) 2 - 50 U/L    Total Bilirubin 0.3 0.1 - 1.5 mg/dL    Direct Bilirubin <0.2 0.1 - 0.5 mg/dL    Indirect Bilirubin see below 0.0 - 1.0 mg/dL    Albumin 3.4 3.2 - 4.9 g/dL    Total Protein 6.4 6.0 - 8.2 g/dL       Current Facility-Administered Medications   Medication Frequency   • atorvastatin (LIPITOR) tablet 40 mg Q EVENING   • metoprolol tartrate (LOPRESSOR) tablet 25 mg BID   • senna-docusate (PERICOLACE or SENOKOT S) 8.6-50 MG per tablet 2 tablet Q EVENING   • baclofen (LIORESAL) tablet 20 mg TID   • gabapentin (NEURONTIN) capsule 100 mg Q EVENING   • melatonin tablet 3 mg QHS   • meclizine (ANTIVERT) tablet 25 mg TID   • hydrOXYzine HCl (ATARAX) tablet 50 mg Q6HRS PRN   • Respiratory Therapy Consult Continuous RT   • Pharmacy Consult Request ...Pain Management Review 1 Each PHARMACY TO DOSE   • hydrALAZINE (APRESOLINE) tablet 10 mg Q8HRS PRN   • acetaminophen (Tylenol) tablet 650 mg Q4HRS PRN   • lactulose 20 GM/30ML solution 30 mL QDAY PRN   • docusate sodium (ENEMEEZ) enema 283 mg QDAY PRN   • fleet enema 133 mL QDAY PRN   • artificial tears ophthalmic solution 1 Drop PRN   • benzocaine-menthol (CEPACOL) lozenge 1 Lozenge Q2HRS PRN   • mag hydrox-al hydrox-simeth (MAALOX PLUS ES or MYLANTA DS) suspension 20 mL Q2HRS PRN   • ondansetron (ZOFRAN ODT) dispertab 4 mg 4X/DAY PRN    Or   • ondansetron (ZOFRAN) syringe/vial injection 4 mg 4X/DAY PRN   • traZODone (DESYREL) tablet 50 mg QHS PRN   • sodium chloride (OCEAN) 0.65 % nasal spray 2 Spray PRN   • midazolam (VERSED) 5 mg/mL (1 mL vial)  PRN   • aspirin (ASA) chewable tab 81 mg DAILY   • mirtazapine (Remeron) orally disintegrating tab 15 mg QHS   • rivaroxaban (XARELTO) tablet 20 mg PM MEAL   • thyroid (ARMOUR THYROID) tablet 60 mg BID       Orders Placed This Encounter   Procedures   • Diet Order Diet: Level 7 - Easy to Chew (float pills in puree); Liquid level: Level 0 - Thin; Tray Modifications (optional): SLP - 1:1 Supervision by Nursing, SLP - Deliver to Nursing Station     Standing Status:   Standing     Number of Occurrences:   1     Order Specific Question:   Diet:     Answer:   Level 7 - Easy to Chew [22]     Comments:   float pills in puree     Order Specific Question:   Liquid level     Answer:   Level 0 - Thin     Order Specific Question:   Tray Modifications (optional)     Answer:   SLP - 1:1 Supervision by Nursing     Order Specific Question:   Tray Modifications (optional)     Answer:   SLP - Deliver to Nursing Station       Assessment:  Active Hospital Problems    Diagnosis    • *Acute right MCA stroke (HCC)    • Paroxysmal atrial fibrillation (HCC)    • Urinary retention    • Acquired hypothyroidism    • History of COVID-19    • Hypotension    • Normocytic anemia    • Spasticity as late effect of cerebrovascular accident (CVA)    • Reactive depression      This patient is a 56 y.o. male admitted for acute inpatient rehabilitation with Acute right MCA stroke (HCC).    I led and attended the weekly conference, and agree with the IDT conference documentation and plan of care as noted below.    Date of conference: 5/12/2021    Goals and barriers: See IDT note.    Biggest barriers: right spastic hemiparesis, urinary retention, poor sleep, home accessibility, impaired balance/sitting posture, left neglect    Goals in next week: hand splint    CM/social support: wife supportive, to go to 1  here in [place], wife's sister    Anticipated DC date: 6/4    Home health: PT/OT/SLP/RN - [facility]    Equip: TBD    Follow up: PCP,   Tim, stroke bridge clinic, urology, neurosugery      Medical Decision Making and Plan:    Large right ICA/MCA ischemic stroke  S/p craniectomy 4/3, Dr. Payton  Left hemiplegia, has some very mild active elbow ext/flex  Cognitive deficits, improved  Left neglect, improved  Continue full rehab program  PT/OT/SLP, 1 hr each discipline, 5 days per week    Xarelto  Statin    Outpatient follow up with stroke bridge clinic, Dr. Dumont, referrals made    Outpatient follow up with Dr. Payton for cranioplasty    Making good progress, improved function    Neuropathic pain, improved/resolved  Left leg at night, improved/resolved  Continue low dose gabapentin 100 mg 5/11  Continue to monitor need to increase dose    Dizziness, improved  Scheduled Meclizine, will titrate off prior to discharge  Continue Teds and abdominal binder for now  BPPV testing by PT negative    Spasticity, improved/stable  Increased baclofen 15 mg TID to 20 mg TID 4/30 --> 30 mg TID 5/3  Was decreased back down to 20 mg TID on 5/13 due to fatigue, with increased spasticity     Started valium at night 2 mg 4/30, discontinued 5/3, doesn't seem to make much difference    Consider adding Zanaflex in future, LFTs on 5/5 with elevated ALT, rechecked 5/18, still elevated, not a good candidate for Zanaflex at this time    Will benefit from Botox - unable to tolerate higher doses of baclofen due to sedation/fatigue, unable to trial Zanaflex due to elevated ALT, trial of Valium was not effective and patient also too sedated    Elevated ALT  Decreased statin dose 80 mg to 40 mg 5/18  Recheck next week     Atrial fibrillation  Metoprolol  Xarelto     Hypothyroidism   [place] thyroid     Hypertension  Hypotension, improved  Lisinopril discontinued  Metoprolol, dose increased  Flomax discontinued 5/12  Teds and abdominal binder    Insomnia, improved/resolved  Already on mirtazapine  Scheduled melatonin  Gabapentin added for neuropathic pain at night     Reactive  depression  Mirtazapine  Consider psychology consult if needed   Mood currently stable     History of COVID-19  Without sequelae     Normocytic anemia  Mild, monitor    Bowel program  Continue bowel medications  Last BM 5/17    Bladder program  Urinary retention, continues  Changed Prazosin to Flomax  Voiding trial 5/4, unsuccessful  Replaced Brewer 5/5, increased Flomax   Voiding trial 5/12, unsuccessful again  Discontinued Flomax 5/12, not efffective  Likely has spastic bladder versus dyssynergia   Will need outpatient urology follow up, referral made    DVT prophylaxis  Xarelto    Total time:  36 minutes.  I spent greater than 50% of the time for patient care, counseling, and coordination on this date, including patient face-to face time, unit/floor time with review of records/pertinent lab data and studies, as well as discussing diagnostic evaluation/work up, planned therapeutic interventions, and future disposition of care, as per the interval events/subjective and the assessment and plan as noted above.    Time today updating wife and patient on all plans of care, function, and medical issues.    I have performed a physical exam, reviewed and updated ROS, as well as the assessment and plan today 5/18/2021. In review of note from 5/17/2021 there are no new changes except as documented above.    Radha Monge M.D.   Physical Medicine and Rehabilitation

## 2021-05-18 NOTE — CARE PLAN
T  Problem: Skin Integrity  Goal: Risk for impaired skin integrity will decrease  Note: Patient's skin remains intact and free from new or accidental injury this shift.  Will continue to monitor.      Problem: Urinary Elimination:  Goal: Ability to reestablish a normal urinary elimination pattern will improve  Intervention: Evaluate need to continue indwelling urinary catheter  Note: Patient has dye due to retaining urine, patient has clear yellow urine draining at intervals.

## 2021-05-18 NOTE — PROGRESS NOTES
Patient care assumed. Report received from [facility] SUSSY Gmoez. Patient is alert and calm, resting in bed. Call light and bedside table within reach. Will continue to monitor.

## 2021-05-18 NOTE — THERAPY
Physical Therapy   Daily Treatment     Patient Name: Thong Arzola  Age:  56 y.o., Sex:  male  Medical Record #: [id number]  Today's Date: 5/18/2021     Precautions  Precautions: (P) Fall Risk  Comments: (P) Helmet OOB; LUE 2-3 Modified Jeremiah Scale; Abdominal binder OOB; Brewer cath; L inattention & haylie    Subjective    Pt and spouse were excited to try Vector harness. Pt reported fatigue post tx.      Objective       05/18/21 1401   Precautions   Precautions Fall Risk   Comments Helmet OOB; LUE 2-3 Modified Jeremiah Scale; Abdominal binder OOB; Brewer cath; L inattention & haylie   Gait Functional Level of Assist    Gait Level Of Assist Total Assist X 2  (Mirror ant to pt for visual feedback)   Assistive Device Parallel Bars;Other (Comments)  (Vector harness 10 lb BWS)   Distance (Feet) 10  (5ft trial #1)   # of Times Distance was Traveled 4   Deviation Step To;Ataxic;Decreased Base Of Support;Bradykinetic;Decreased Heel Strike;Decreased Toe Off;Other (Comment)  (Foot slider LLE swing AAROM, blocking LLE stance to support)   Transfer Functional Level of Assist   Bed, Chair, Wheelchair Transfer Contact Guard Assist   Bed Chair Wheelchair Transfer Description Adaptive equipment;Set-up of equipment  (CGA wc to bed toward right reach pivot)   Bed Mobility    Sit to Supine Maximal Assist   Sit to Stand Contact Guard Assist  (// bars, Standing x 3 min to don harness )   Scooting Moderate Assist   Rolling Moderate Assist to Lt.   Neuro-Muscular Treatments   Neuro-Muscular Treatments Weight Shift Left;Weight Shift Right;Tactile Cuing;Sequencing;Verbal Cuing;Tapping;Postural Facilitation;Postural Changes;Joint Approximation;Facilitation   Comments Education on Vector system, profile established in [facility].    Interdisciplinary Plan of Care Collaboration   IDT Collaboration with  Therapy Tech;Family / Caregiver;Nursing;Certified Nursing Assistant   Patient Position at End of Therapy In Bed;Call Light within  Reach;Tray Table within Reach;Phone within Reach  (L shoulder supported with pillow, L PRAFO donned)   Collaboration Comments POC, meds provided by RN, equipment support   Strengths & Barriers   Strengths Alert and oriented;Effective communication skills;Independent prior level of function;Motivated for self care and independence;Pleasant and cooperative;Supportive family;Willingly participates in therapeutic activities   Barriers Hemiparesis;Home accessibility;Orthostatic hypotension;Impaired activity tolerance;Impaired balance;Spasticity;Limited mobility   PT Total Time Spent   PT Individual Total Time Spent (Mins) 60   PT Charge Group   PT Gait Training 1   PT Neuromuscular Re-Education / Balance 1   PT Therapeutic Activities 2       Assessment    Pt progressed to pregait training in Vector [employer] harness system. Foot slider was utilized to assist for LLE advancement for AAROM, and blocking in stance phase was necessary for support. Pt remains limited by asymmetry, dec activity tolerance, impaired motor planning, and dec left attention.  Pt demonstrated improved sit <> stand motor control, and improved reach pivot sequencing to the right.     Strengths: (P) Alert and oriented, Effective communication skills, Independent prior level of function, Motivated for self care and independence, Pleasant and cooperative, Supportive family, Willingly participates in therapeutic activities  Barriers: (P) Hemiparesis, Home accessibility, Orthostatic hypotension, Impaired activity tolerance, Impaired balance, Spasticity, Limited mobility    Plan    Continue postural re-education, core engagement, seated balance, sit to stand training, transfer training, ongoing standing tolerance, pregait/ Vector East, family training.     Passport items to be completed:  Get in/out of bed safely, in/out of a vehicle, safely use mobility device, walk or wheel around home/community, navigate up and down stairs, show how to get up/down from the  ground, ensure home is accessible, demonstrate HEP, complete caregiver training    Physical Therapy Problems (Active)     Problem: Mobility     Dates: Start: 04/30/21       Goal: STG-Within one week, patient will propel wheelchair household distances     Dates: Start: 04/30/21       Description: 1) Individualized goal:  25ft with haylie technique with SBA on even surfaces  2) Interventions:  PT Group Therapy, PT E Stim Attended, PT Orthotics Training, PT Gait Training, PT Self Care/Home Eval, PT Therapeutic Exercises, PT Neuro Re-Ed/Balance, PT Therapeutic Activity, and PT Evaluation      Goal Note filed on 05/12/21 0844 by Neena Gastelum DPT     Pt continues with variable performance requiring intermittent max A to steer d/t L neglect               Problem: Mobility Transfers     Dates: Start: 05/12/21       Goal: STG-Within one week, patient will sit to stand     Dates: Start: 05/12/21       Goal Note filed on 05/12/21 1201 by Neena Gastelum DPT     1) Individualized goal:  STS in // bars with min A  2) Interventions:  PT Group Therapy, PT E Stim Attended, PT Gait Training, PT Therapeutic Exercises, PT Neuro Re-Ed/Balance, PT Aquatic Therapy, PT Therapeutic Activity, PT Manual Therapy, and PT Evaluation            Goal: STG-Within one week, patient will transfer bed to chair     Dates: Start: 05/12/21       Goal Note filed on 05/12/21 1201 by Neena Gastelum DPT     1) Individualized goal:  SQPT with CGA  2) Interventions:  PT Group Therapy, PT E Stim Attended, PT Gait Training, PT Therapeutic Exercises, PT Neuro Re-Ed/Balance, PT Aquatic Therapy, PT Therapeutic Activity, PT Manual Therapy, and PT Evaluation               Problem: PT-Long Term Goals     Dates: Start: 04/30/21       Goal: LTG-By discharge, patient will propel wheelchair     Dates: Start: 04/30/21       Description: 1) Individualized goal:  100ft with haylie technique and Anastacio on even/uneven surfaces  2) Interventions:  PT Group Therapy, PT E Stim  Attended, PT Orthotics Training, PT Gait Training, PT Self Care/Home Eval, PT Therapeutic Exercises, PT Neuro Re-Ed/Balance, PT Therapeutic Activity, and PT Evaluation       Goal: LTG-By discharge, patient will transfer one surface to another     Dates: Start: 04/30/21       Description: 1) Individualized goal:  SQPT with Taz   2) Interventions:  PT Group Therapy, PT E Stim Attended, PT Orthotics Training, PT Gait Training, PT Self Care/Home Eval, PT Therapeutic Exercises, PT Neuro Re-Ed/Balance, PT Therapeutic Activity, and PT Evaluation         Goal: LTG-By discharge, patient will     Dates: Start: 04/30/21       Description: 1) Individualized goal: perform bed mobility (supine<>sit) with Taz   2) Interventions:  PT Group Therapy, PT E Stim Attended, PT Orthotics Training, PT Gait Training, PT Self Care/Home Eval, PT Therapeutic Exercises, PT Neuro Re-Ed/Balance, PT Therapeutic Activity, and PT Evaluation

## 2021-05-18 NOTE — THERAPY
Occupational Therapy  Daily Treatment     Patient Name: Thong Arzola  Age:  56 y.o., Sex:  male  Medical Record #: [id number]  Today's Date: 5/18/2021     Precautions  Precautions: (P) Fall Risk  Comments: (P) Helmet OOB; LUE 2-3 Modified Jeremiah Scale; Abdominal binder OOB; Brewer cath; L inattention & haylie         Subjective    Patient agreeable to therapist shaving hair off his left arm for better e stim connection.     Objective       05/18/21 1001   Precautions   Precautions Fall Risk   Comments Helmet OOB; LUE 2-3 Modified Jeremiah Scale; Abdominal binder OOB; Brewer cath; L inattention & haylie   Cognition    Attention Impaired   Functional Level of Assist   Eating Supervision   Eating Description Set-up of equipment or meal/tube feeding  (setup to open plastic container holding cut up banana)   Grooming Supervision;Seated   Grooming Description Supervision for safety  (brushed teeth, wash face)   Neuro-Muscular Treatments   Neuro-Muscular Treatments Electrical Stimulation;Facilitation;Inhibition;Postural Changes;Postural Facilitation;Sequencing;Tactile Cuing;Verbal Cuing;Vibration   Comments E stim to left wrist/finger extensors x 8 minutes and left triceps x 8 minutes with cues to attempt to volitionally pair with stimulation.  Verbal cues for sequencing active attempts.  Attempted vibration to both sets of extensor muscles as well.     Interdisciplinary Plan of Care Collaboration   Patient Position at End of Therapy Seated;Chair Alarm On;Self Releasing Lap Belt Applied;Call Light within Reach;Tray Table within Reach;Phone within Reach  (in tilted position)   OT Total Time Spent   OT Individual Total Time Spent (Mins) 60   OT Charge Group   OT Electrical Stimulation Attended 2   OT Self Care / ADL 1   OT Therapy Activity 1     Therapist shaved patient dorsal side of left forearm and tricep area for increased e-stim connection.    Squat pivot transfer w/c <> mat to the right with min/CGA and verbal  cues.  Sitting balance and core control seated EOM with min/mod perturbations applied by therapist with cues to maintain upright and midline posture.  Occasional verbal/tactile cues given for lumbar extension.  Assisted patient with sending pictures of bathroom setup to therapist from his phone.    Assessment    Patient tolerated E stim well with fair movement at wrist/finger extensors with stimulation.  Triceps with less movement elicited by stimulation, most likely due to elbow flexor tone.  Sitting balance and posture continue to improve.  Strengths: Able to follow instructions, Alert and oriented, Effective communication skills, Good insight into deficits/needs, Independent prior level of function, Manages pain appropriately, Motivated for self care and independence, Pleasant and cooperative, Supportive family, Willingly participates in therapeutic activities  Barriers: Bowel incontinence, Decreased endurance, Fatigue, Generalized weakness, Hemiplegia, Home accessibility, Orthostatic hypotension, Impaired activity tolerance, Impaired balance, Limited mobility, Spasticity    Plan    Sitting balance/core strengthening/midline orientation, ADL retraining, slideboard transfers progressing to lateral scoots or squat pivots to the right, L UE neuro re-ed/stretching, family training with spouse Anel; Bathing is not a goal of OT at this time (CNAs should be bathing patient); home evaluation to sister in law's house in [place]; pursue resting hand splint for left hand/wrist    Occupational Therapy Goals (Active)     Problem: Dressing     Dates: Start: 05/12/21       Goal: STG-Within one week, patient will dress UB     Dates: Start: 05/12/21       Goal Note filed on 05/12/21 1136 by Aj Lovett, OT/L     1) Individualized Goal: with min A to don/doff shirt with cues  2) Interventions:  OT Self Care/ADL, OT Cognitive Skill Dev, OT Manual Ther Technique, OT Neuro Re-Ed/Balance, OT Sensory Int Techniques, OT Therapeutic  Activity, OT Evaluation, and OT Therapeutic Exercise            Goal: STG-Within one week, patient will dress LB     Dates: Start: 05/12/21       Goal Note filed on 05/12/21 1136 by Aj Lovett, OT/L     1) Individualized Goal: with min A using cues and adaptive techniques  2) Interventions:  OT Self Care/ADL, OT Cognitive Skill Dev, OT Manual Ther Technique, OT Neuro Re-Ed/Balance, OT Sensory Int Techniques, OT Therapeutic Activity, OT Evaluation, and OT Therapeutic Exercise               Problem: Functional Transfers     Dates: Start: 05/12/21       Goal: STG-Within one week, patient will transfer to toilet     Dates: Start: 05/12/21       Goal Note filed on 05/12/21 1136 by Aj Lovett, OT/L     1) Individualized Goal: with mod A using grab bar and commode over the toilet  2) Interventions:  OT Self Care/ADL, OT Cognitive Skill Dev, OT Manual Ther Technique, OT Neuro Re-Ed/Balance, OT Sensory Int Techniques, OT Therapeutic Activity, OT Evaluation, and OT Therapeutic Exercise               Problem: OT Long Term Goals     Dates: Start: 04/30/21       Goal: LTG-By discharge, patient will complete basic self care tasks     Dates: Start: 04/30/21       Description: 1) Individualized Goal:  with min to mod A using AE/AD/techniques as needed  2) Interventions:  OT E Stim Attended, OT Self Care/ADL, OT Cognitive Skill Dev, OT Manual Ther Technique, OT Neuro Re-Ed/Balance, OT Sensory Int Techniques, OT Therapeutic Activity, OT Evaluation, and OT Therapeutic Exercise       Goal: LTG-By discharge, patient will perform bathroom transfers     Dates: Start: 04/30/21       Description: 1) Individualized Goal:  with max A x 1 person using slideboard versus squat pivot to the right  2) Interventions:  OT E Stim Attended, OT Self Care/ADL, OT Cognitive Skill Dev, OT Manual Ther Technique, OT Neuro Re-Ed/Balance, OT Sensory Int Techniques, OT Therapeutic Activity, OT Evaluation, and OT Therapeutic Exercise

## 2021-05-18 NOTE — THERAPY
Occupational Therapy  Daily Treatment     Patient Name: Thong Arzola  Age:  56 y.o., Sex:  male  Medical Record #: [id number]  Today's Date: 5/18/2021     Precautions  Precautions: Fall Risk  Comments: Helmet OOB; LUE 2-3 Modified Jeremiah Scale; Abdominal binder OOB; Brewer cath; L inattention & haylie         Subjective     Pt seated in t-dine, pleasant and cooperative      Objective          05/17/21 0801   Functional Level of Assist   Eating Supervision  (set-up assist, L neglect, non-functional LUE - haylie-techique)   Interdisciplinary Plan of Care Collaboration   Patient Position at End of Therapy Seated  (in t-dine with techs)   OT Total Time Spent   OT Group Total Time Spent (Mins) 30   OT Charge Group   OT Group Therapy Group Activities      Self-feeding Assessment during meal:     - Ability to grasp utensils: RUE dynamic tripod grasp    - Ability to bring food to mouth: Mod I  - Ability to bring liquids to mouth: Mod I  - Ability to open packages: Mod A  - Ability to cut food: with fork SBA  - Ability to manage body posture: required increase cues to maintain midline in w/c (with lean to R and L sides), needs assist to position w/c at table  - Ability to maintain a clean appearance: good  - Behavior/socialization skills: appropriate with no issues     - Primary barriers to self-feeding: non-functional LUE, spasticity, L neglect  - Adaptive equipment/ Compensatory strategy recommendations: receptive when cues are provided for increase scanning to the L side d/t neglect and using haylie-techniques when opening packages. Increase edu was provided on compensatory strategies when opening items - pt able to use L hand/body to stabilize bowl of fruit when removing plastic top with R hand. Pt also able to spread butter when therapist assisted with stabilizing condiment on table     Assessment     Pt seen during breakfast meal in OT Self-feeding/AE Group. Pt was able to eat approximately 100 % of meal and required  set-up assist overall. Pt's response to edu/recommendations: receptive when cues are provided for increase scanning to the L side d/t neglect and using haylie-techniques when opening packages  . Pt would continue to benefit from the OT group in order to improve independence and confidence with self-feeding skills.     Strengths: Able to follow instructions, Alert and oriented, Effective communication skills, Good insight into deficits/needs, Independent prior level of function, Manages pain appropriately, Motivated for self care and independence, Pleasant and cooperative, Supportive family, Willingly participates in therapeutic activities  Barriers: Bowel incontinence, Decreased endurance, Fatigue, Generalized weakness, Hemiplegia, Home accessibility, Orthostatic hypotension, Impaired activity tolerance, Impaired balance, Limited mobility, Spasticity     Plan     Continue OT Self-feeding/AE Group 3x/week - continue to progress use of haylie-techniques during set-up and self-feeding      Occupational Therapy Goals (Active)     Problem: Dressing     Dates: Start: 05/12/21       Goal: STG-Within one week, patient will dress UB     Dates: Start: 05/12/21       Goal Note filed on 05/12/21 1136 by Aj Lovett, OT/L     1) Individualized Goal: with min A to don/doff shirt with cues  2) Interventions:  OT Self Care/ADL, OT Cognitive Skill Dev, OT Manual Ther Technique, OT Neuro Re-Ed/Balance, OT Sensory Int Techniques, OT Therapeutic Activity, OT Evaluation, and OT Therapeutic Exercise            Goal: STG-Within one week, patient will dress LB     Dates: Start: 05/12/21       Goal Note filed on 05/12/21 1136 by Aj Lovett, OT/L     1) Individualized Goal: with min A using cues and adaptive techniques  2) Interventions:  OT Self Care/ADL, OT Cognitive Skill Dev, OT Manual Ther Technique, OT Neuro Re-Ed/Balance, OT Sensory Int Techniques, OT Therapeutic Activity, OT Evaluation, and OT Therapeutic Exercise                Problem: Functional Transfers     Dates: Start: 05/12/21       Goal: STG-Within one week, patient will transfer to toilet     Dates: Start: 05/12/21       Goal Note filed on 05/12/21 1136 by Aj Lovett, OT/L     1) Individualized Goal: with mod A using grab bar and commode over the toilet  2) Interventions:  OT Self Care/ADL, OT Cognitive Skill Dev, OT Manual Ther Technique, OT Neuro Re-Ed/Balance, OT Sensory Int Techniques, OT Therapeutic Activity, OT Evaluation, and OT Therapeutic Exercise               Problem: OT Long Term Goals     Dates: Start: 04/30/21       Goal: LTG-By discharge, patient will complete basic self care tasks     Dates: Start: 04/30/21       Description: 1) Individualized Goal:  with min to mod A using AE/AD/techniques as needed  2) Interventions:  OT E Stim Attended, OT Self Care/ADL, OT Cognitive Skill Dev, OT Manual Ther Technique, OT Neuro Re-Ed/Balance, OT Sensory Int Techniques, OT Therapeutic Activity, OT Evaluation, and OT Therapeutic Exercise       Goal: LTG-By discharge, patient will perform bathroom transfers     Dates: Start: 04/30/21       Description: 1) Individualized Goal:  with max A x 1 person using slideboard versus squat pivot to the right  2) Interventions:  OT E Stim Attended, OT Self Care/ADL, OT Cognitive Skill Dev, OT Manual Ther Technique, OT Neuro Re-Ed/Balance, OT Sensory Int Techniques, OT Therapeutic Activity, OT Evaluation, and OT Therapeutic Exercise

## 2021-05-19 DIAGNOSIS — I69.953 HEMIPLEGIA AND HEMIPARESIS FOLLOWING UNSPECIFIED CEREBROVASCULAR DISEASE AFFECTING RIGHT NON-DOMINANT SIDE (HCC): ICD-10-CM

## 2021-05-19 DIAGNOSIS — I69.854 SPASTIC HEMIPLEGIA OF LEFT NONDOMINANT SIDE AS LATE EFFECT OF OTHER CEREBROVASCULAR DISEASE (HCC): ICD-10-CM

## 2021-05-19 DIAGNOSIS — I69.954 HEMIPLEGIA AND HEMIPARESIS FOLLOWING UNSPECIFIED CEREBROVASCULAR DISEASE AFFECTING LEFT NON-DOMINANT SIDE (HCC): ICD-10-CM

## 2021-05-19 DIAGNOSIS — M62.838 OTHER MUSCLE SPASM: ICD-10-CM

## 2021-05-19 PROCEDURE — 97530 THERAPEUTIC ACTIVITIES: CPT | Mod: CO

## 2021-05-19 PROCEDURE — 97129 THER IVNTJ 1ST 15 MIN: CPT

## 2021-05-19 PROCEDURE — 700102 HCHG RX REV CODE 250 W/ 637 OVERRIDE(OP): Performed by: PHYSICAL MEDICINE & REHABILITATION

## 2021-05-19 PROCEDURE — A9270 NON-COVERED ITEM OR SERVICE: HCPCS | Performed by: PHYSICAL MEDICINE & REHABILITATION

## 2021-05-19 PROCEDURE — 99233 SBSQ HOSP IP/OBS HIGH 50: CPT | Performed by: PHYSICAL MEDICINE & REHABILITATION

## 2021-05-19 PROCEDURE — 97530 THERAPEUTIC ACTIVITIES: CPT

## 2021-05-19 PROCEDURE — 97112 NEUROMUSCULAR REEDUCATION: CPT | Mod: CO

## 2021-05-19 PROCEDURE — A9270 NON-COVERED ITEM OR SERVICE: HCPCS | Performed by: HOSPITALIST

## 2021-05-19 PROCEDURE — 97130 THER IVNTJ EA ADDL 15 MIN: CPT

## 2021-05-19 PROCEDURE — 770010 HCHG ROOM/CARE - REHAB SEMI PRIVAT*

## 2021-05-19 PROCEDURE — 700102 HCHG RX REV CODE 250 W/ 637 OVERRIDE(OP): Performed by: HOSPITALIST

## 2021-05-19 RX ADMIN — THYROID, PORCINE 60 MG: 30 TABLET ORAL at 08:01

## 2021-05-19 RX ADMIN — METOPROLOL TARTRATE 25 MG: 25 TABLET, FILM COATED ORAL at 08:01

## 2021-05-19 RX ADMIN — ATORVASTATIN CALCIUM 40 MG: 40 TABLET, FILM COATED ORAL at 20:14

## 2021-05-19 RX ADMIN — THYROID, PORCINE 60 MG: 30 TABLET ORAL at 20:13

## 2021-05-19 RX ADMIN — METOPROLOL TARTRATE 25 MG: 25 TABLET, FILM COATED ORAL at 20:14

## 2021-05-19 RX ADMIN — BACLOFEN 20 MG: 20 TABLET ORAL at 20:14

## 2021-05-19 RX ADMIN — ASPIRIN 81 MG CHEWABLE TABLET 81 MG: 81 TABLET CHEWABLE at 17:31

## 2021-05-19 RX ADMIN — RIVAROXABAN 20 MG: 20 TABLET, FILM COATED ORAL at 17:31

## 2021-05-19 RX ADMIN — MECLIZINE HYDROCHLORIDE 25 MG: 25 TABLET ORAL at 14:53

## 2021-05-19 RX ADMIN — MELATONIN TAB 3 MG 3 MG: 3 TAB at 20:13

## 2021-05-19 RX ADMIN — BACLOFEN 20 MG: 20 TABLET ORAL at 08:01

## 2021-05-19 RX ADMIN — GABAPENTIN 100 MG: 100 CAPSULE ORAL at 20:14

## 2021-05-19 RX ADMIN — BACLOFEN 20 MG: 20 TABLET ORAL at 14:53

## 2021-05-19 RX ADMIN — MIRTAZAPINE 15 MG: 15 TABLET, ORALLY DISINTEGRATING ORAL at 20:13

## 2021-05-19 RX ADMIN — MECLIZINE HYDROCHLORIDE 25 MG: 25 TABLET ORAL at 20:14

## 2021-05-19 RX ADMIN — MECLIZINE HYDROCHLORIDE 25 MG: 25 TABLET ORAL at 08:01

## 2021-05-19 NOTE — THERAPY
"Occupational Therapy  Daily Treatment     Patient Name: Thong Arzola  Age:  56 y.o., Sex:  male  Medical Record #: [id number]  Today's Date: 5/19/2021     Precautions  Precautions: (P) Fall Risk  Comments: (P) Helmet OOB; LUE 2-3 Modified Jeremiah Scale; Abdominal binder OOB; Brewer cath; L inattention & haylie         Subjective    \"It hurt's but it feel like I'm doing something .\" referring to left UE discomfort during weight bearing activity      Objective       05/19/21 1331   Precautions   Precautions Fall Risk   Comments Helmet OOB; LUE 2-3 Modified Jeremiah Scale; Abdominal binder OOB; Brewer cath; L inattention & haylie   Functional Level of Assist   Bed, Chair, Wheelchair Transfer Minimal Assist   Neuro-Muscular Treatments   Neuro-Muscular Treatments Tapping;Verbal Cuing;Weight Shift Left   Comments  seated edge of mat left scapular  mobilization  ( elevation retraction, depression and upward rotation )   pectoral / throracic stretching  followed by  weight bearing through left UE while reaching with the right to decreased  flexor tone in left UE.   note paddle applied to left hand to decrease flexor tone in fingers  at start of session .  required extra time with weight bearing  and gradual placement  of UE into external rotation. .    Bed Mobility    Supine to Sit Minimal Assist   Sit to Supine Maximal Assist   Scooting Moderate Assist   Interdisciplinary Plan of Care Collaboration   Patient Position at End of Therapy In Bed;Call Light within Reach;Tray Table within Reach   OT Total Time Spent   OT Individual Total Time Spent (Mins) 60   OT Charge Group   OT Neuromuscular Re-education / Balance 3   OT Therapy Activity 1       Assessment      Presented with decreased flexor tone in left UE at end of session but not as much as compared to  Last time I worked with  [facility].     Strengths: Able to follow instructions, Alert and oriented, Effective communication skills, Good insight into deficits/needs, Independent " prior level of function, Manages pain appropriately, Motivated for self care and independence, Pleasant and cooperative, Supportive family, Willingly participates in therapeutic activities  Barriers: Bowel incontinence, Decreased endurance, Fatigue, Generalized weakness, Hemiplegia, Home accessibility, Orthostatic hypotension, Impaired activity tolerance, Impaired balance, Limited mobility, Spasticity    Plan    Sitting balance/core strengthening/midline orientation, ADL retraining, slideboard transfers progressing to lateral scoots or squat pivots to the right, L UE neuro re-ed/stretching, family training with spouse Anel; Bathing is not a goal of OT at this time (CNAs should be bathing patient); home evaluation to sister in law's house in [place]; pursue resting hand splint for left hand/wrist           Occupational Therapy Goals (Active)     Problem: Dressing     Dates: Start: 05/12/21       Goal: STG-Within one week, patient will dress LB     Dates: Start: 05/12/21       Goal Note filed on 05/19/21 1155 by Nicki Yancey MS,OTR/L     Requires Max A               Problem: Functional Transfers     Dates: Start: 05/19/21       Goal: STG-Within one week, patient will transfer to toilet     Dates: Start: 05/19/21       Description: 1) Individualized Goal: with min A using grab bar and commode over the toilet  2) Interventions:  OT Self Care/ADL, OT Cognitive Skill Dev, OT Manual Ther Technique, OT Neuro Re-Ed/Balance, OT Sensory Int Techniques, OT Therapeutic Activity, OT Evaluation, and OT Therapeutic Exercise            Problem: OT Long Term Goals     Dates: Start: 04/30/21       Goal: LTG-By discharge, patient will complete basic self care tasks     Dates: Start: 04/30/21       Description: 1) Individualized Goal:  with min to mod A using AE/AD/techniques as needed  2) Interventions:  OT E Stim Attended, OT Self Care/ADL, OT Cognitive Skill Dev, OT Manual Ther Technique, OT Neuro Re-Ed/Balance, OT Sensory Int  Techniques, OT Therapeutic Activity, OT Evaluation, and OT Therapeutic Exercise       Goal: LTG-By discharge, patient will perform bathroom transfers     Dates: Start: 04/30/21       Description: 1) Individualized Goal:  with max A x 1 person using slideboard versus squat pivot to the right  2) Interventions:  OT E Stim Attended, OT Self Care/ADL, OT Cognitive Skill Dev, OT Manual Ther Technique, OT Neuro Re-Ed/Balance, OT Sensory Int Techniques, OT Therapeutic Activity, OT Evaluation, and OT Therapeutic Exercise          Problem: Toileting     Dates: Start: 05/19/21       Goal: STG-Within one week, patient will complete toileting tasks     Dates: Start: 05/19/21       Description: 1) Individualized Goal: with mod A using grab bar and commode over the toilet  2) Interventions:  OT Self Care/ADL, OT Cognitive Skill Dev, OT Manual Ther Technique, OT Neuro Re-Ed/Balance, OT Sensory Int Techniques, OT Therapeutic Activity, OT Evaluation, and OT Therapeutic Exercise

## 2021-05-19 NOTE — CARE PLAN
Problem: Safety  Goal: Will remain free from falls  Note: Appropriately uses call light to make needs known.Fall precaution and frequent rounding in place for safety.Call light within reach.Will continue to monitor and assess needs and safety.     Problem: Infection  Goal: Will remain free from infection  Intervention: Assess signs and symptoms of infection  Note: Pt is calm, comfortable and no sign of acute distress noted.Will continue to monitor.     Problem: Urinary Elimination:  Goal: Ability to reestablish a normal urinary elimination pattern will improve  Intervention: Assess and monitor for signs and symptoms of urinary retention  Note: Pt has dye catheter in place which is draining adequate amount of urine.Will continue to monitor.

## 2021-05-19 NOTE — THERAPY
Physical Therapy   Daily Treatment     Patient Name: Thong Arzola  Age:  56 y.o., Sex:  male  Medical Record #: [id number]  Today's Date: 5/19/2021     Precautions  Precautions: Fall Risk  Comments: Helmet OOB; LUE 2-3 Modified Jeremiah Scale; Abdominal binder OOB; Brewer cath; L inattention & haylie    Subjective    Pt was seated in w/c upon arrival and agreeable to treatment.  Pt reported disappointment with use of Vector harness yesterday because he thought it would help him walk better.     Objective       05/19/21 1001   Precautions   Precautions Fall Risk   Bed Mobility    Sit to Stand Contact Guard Assist  (using rail on step stool)   Interdisciplinary Plan of Care Collaboration   Patient Position at End of Therapy Seated;Call Light within Reach;Tray Table within Reach;Phone within Reach   PT Total Time Spent   PT Individual Total Time Spent (Mins) 30   PT Charge Group   PT Therapeutic Activities 2     STS training using rail with step stool with mirror for increased visual feedback.  Standing posture neuro re ed with LLE tibal blocking with focus on symmetric and upright posture.  Standing tolerance x 3 min x 2, x 2 min.  Weight shifting laterally with RUE support x 10 reps x 2 with focus on increased LLE WBing.  Completed collaboration with MD on pt's CLOF.    Assessment    Pt with improving midline orientation in static and dynamic standing, but poor weight shift to L LE.  Pt continues to be limited d/t LLE motor and sensory deficits.  Strengths: Alert and oriented, Effective communication skills, Independent prior level of function, Motivated for self care and independence, Pleasant and cooperative, Supportive family, Willingly participates in therapeutic activities  Barriers: Hemiparesis, Home accessibility, Orthostatic hypotension, Impaired activity tolerance, Impaired balance, Spasticity, Limited mobility    Plan    Continue postural re-education, core engagement, seated balance, sit to stand  training, transfer training, ongoing standing tolerance, pregait/ Vector [place], family training.      Passport items to be completed:  Get in/out of bed safely, in/out of a vehicle, safely use mobility device, walk or wheel around home/community, navigate up and down stairs, show how to get up/down from the ground, ensure home is accessible, demonstrate HEP, complete caregiver training       Physical Therapy Problems (Active)     Problem: Mobility     Dates: Start: 04/30/21       Goal: STG-Within one week, patient will propel wheelchair household distances     Dates: Start: 04/30/21       Description: 1) Individualized goal:  25ft with haylie technique with SBA on even surfaces  2) Interventions:  PT Group Therapy, PT E Stim Attended, PT Orthotics Training, PT Gait Training, PT Self Care/Home Eval, PT Therapeutic Exercises, PT Neuro Re-Ed/Balance, PT Therapeutic Activity, and PT Evaluation      Goal Note filed on 05/19/21 0838 by Neena Gastelum DPT     Pt continues to require assist for steering            Goal: STG-Within one week, patient will ambulate household distance     Dates: Start: 05/19/21       Description: 1) Individualized goal:  AMB x 10 feet in // bars with mod A  2) Interventions:  PT Group Therapy, PT E Stim Attended, PT Gait Training, PT Therapeutic Exercises, PT Neuro Re-Ed/Balance, PT Aquatic Therapy, PT Therapeutic Activity, PT Manual Therapy, and PT Evaluation      Goal Note filed on 05/19/21 0839 by Neena Gastelum DPT     1) Individualized goal:  AMB x 10 feet in // bars with mod A  2) Interventions:  PT Group Therapy, PT E Stim Attended, PT Gait Training, PT Therapeutic Exercises, PT Neuro Re-Ed/Balance, PT Aquatic Therapy, PT Therapeutic Activity, PT Manual Therapy, and PT Evaluation               Problem: Mobility Transfers     Dates: Start: 05/19/21       Goal: STG-Within one week, patient will transfer bed to chair     Dates: Start: 05/19/21       Description: 1) Individualized goal:   SQPT with SBA  2) Interventions:  PT Group Therapy, PT E Stim Attended, PT Gait Training, PT Therapeutic Exercises, PT Neuro Re-Ed/Balance, PT Aquatic Therapy, PT Therapeutic Activity, PT Manual Therapy, and PT Evaluation      Goal Note filed on 05/19/21 0839 by Neena Gastelum DPT     1) Individualized goal:  SQPT with SBA  2) Interventions:  PT Group Therapy, PT E Stim Attended, PT Gait Training, PT Therapeutic Exercises, PT Neuro Re-Ed/Balance, PT Aquatic Therapy, PT Therapeutic Activity, PT Manual Therapy, and PT Evaluation               Problem: PT-Long Term Goals     Dates: Start: 04/30/21       Goal: LTG-By discharge, patient will propel wheelchair     Dates: Start: 04/30/21       Description: 1) Individualized goal:  100ft with haylie technique and Anastacio on even/uneven surfaces  2) Interventions:  PT Group Therapy, PT E Stim Attended, PT Orthotics Training, PT Gait Training, PT Self Care/Home Eval, PT Therapeutic Exercises, PT Neuro Re-Ed/Balance, PT Therapeutic Activity, and PT Evaluation       Goal: LTG-By discharge, patient will transfer one surface to another     Dates: Start: 04/30/21       Description: 1) Individualized goal:  SQPT with Taz   2) Interventions:  PT Group Therapy, PT E Stim Attended, PT Orthotics Training, PT Gait Training, PT Self Care/Home Eval, PT Therapeutic Exercises, PT Neuro Re-Ed/Balance, PT Therapeutic Activity, and PT Evaluation         Goal: LTG-By discharge, patient will     Dates: Start: 04/30/21       Description: 1) Individualized goal: perform bed mobility (supine<>sit) with Taz   2) Interventions:  PT Group Therapy, PT E Stim Attended, PT Orthotics Training, PT Gait Training, PT Self Care/Home Eval, PT Therapeutic Exercises, PT Neuro Re-Ed/Balance, PT Therapeutic Activity, and PT Evaluation

## 2021-05-19 NOTE — PROGRESS NOTES
Patient seen and examined in his room on 5/18/2021.  Discussed planning for Botox injections if they wish to proceed with that.  Patient and his wife, Anel, are amenable to trying Botox injections and would like to do so soon as possible.  Referral placed for authorization through her insurance today, 5/19/2021.  Briefly, patient is a 56-year-old male who unfortunately sustained a large right ICA/MCA ischemic stroke and is s/p craniectomy by Dr. Payton on 4/3/2021.  He has resultant left spastic hemiplegia of the left upper extremity.    On exam patient had spasticity at 1/4 of the elbow flexors, 3/4 wrist flexors, 2/4 finger flexors, 1+/4 pronators.    We will plan for the following:    Okay to continue anticoagulation with Xarelto and antiplatelet with aspirin through the procedure for botulinum toxin injection.  The risks of going off the medication outweigh the risks of doing the procedure on the medication.  I will plan to use 27-gauge needles and ultrasound guidance to avoid all blood vessels during the procedure.  We discussed that while on anticoagulation the patient does have an increased risk of bleeding and hematoma     Botox Plan: I plan to inject to the left upper extremity  Location Botox Amount in Units   Bicep  50 units   FCR  50 units   FCU  50 units   Pronator teres  50 units   FDS  100 units   FDP  100 units   Total Units  400 units       The risks benefits and alternatives to this procedure were discussed and the patient wishes to proceed with the procedure. Risks include but are not limited to damage to surrounding structures, infection, bleeding, worsening of pain which can be permanent, weakness which can be permanent. Benefits include pain relief, improved function. Alternatives includes not doing the procedure.      Patient scheduled for June 8th.   We will plan to taper off of baclofen if patient responds well to Botox.  May need some baseline tone control with pharmacologic agents.  Will  reevaluate on an outpatient basis.    Dr. Zulema Dumont DO, MS  Department of Physical Medicine & Rehabilitation  Neuro Rehabilitation [facility]

## 2021-05-19 NOTE — CARE PLAN
Problem: Communication  Goal: The ability to communicate needs accurately and effectively will improve  Outcome: Progressing     Problem: Safety  Goal: Will remain free from injury  Outcome: Progressing     Problem: Infection  Goal: Will remain free from infection  Outcome: Progressing

## 2021-05-19 NOTE — THERAPY
"Speech Language Pathology  Daily Treatment     Patient Name: Thong Arzola  Age:  56 y.o., Sex:  male  Medical Record #: [id number]  Today's Date: 5/19/2021     Precautions  Precautions: Fall Risk  Comments: Helmet OOB; LUE 2-3 Modified Jeremiah Scale; Abdominal binder OOB; Brewer cath; L inattention & haylie    Subjective    Patient was willing to participate. Was seen at 0900 and 1500. Spouse present for second session.      Objective       05/19/21 0902   Cognition   Attention to Task Minimal (4)   Visual Scanning / Cancellation Skills Moderate (3)   Executive Functioning / Organization Moderate (3)   Functional Math / Financial Management Moderate (3)   SLP Total Time Spent   SLP Individual Total Time Spent (Mins) 90   Treatment Charges   SLP Cognitive Skill Development First 15 Minutes 1   SLP Cognitive Skill Development Additional 15 Minutes 5       Assessment    Able to ID words from list when provided with descriptions with min cues needed. Cues mostly needed when target word were on left side.   \"who has more $\" task completed with mod verbal cues. Provided strategies for use of external aids and organization which improved accuracy.     Strengths: Able to follow instructions, Alert and oriented, Supportive family, Pleasant and cooperative, Motivated for self care and independence, Willingly participates in therapeutic activities, Independent prior level of function  Barriers: Impaired functional cognition, Visual impairment, Impaired activity tolerance    Plan    Visual attention, functional problem solving.     Passport items to be completed: manage finances, manage medications, arrive to therapy appointments on time, complete daily memory log entries, solve problems related to safety situations, review education related to hospitalization    Speech Therapy Problems (Active)     Problem: Problem Solving STGs     Dates: Start: 05/05/21       Goal: STG-Within one week, patient will     Dates: Start: " 05/05/21       Description: 1) Individualized goal: demonstrate appropriate visual scanning to the left with min cues and 80% accuracy.  2) Interventions:  SLP Speech Language Treatment, SLP Self Care / ADL Training , SLP Cognitive Skill Development, and SLP Group Treatment        Goal Note filed on 05/12/21 1054 by Min Gomez MS,CCC-SLP     Mod cues required for visual scanning to the left             Goal: STG-Within one week, patient will     Dates: Start: 05/05/21       Description: 1) Individualized goal:  complete simple attention tasks with min A with 80% accuracy.   2) Interventions:  SLP Speech Language Treatment, SLP Self Care / ADL Training , SLP Cognitive Skill Development, and SLP Group Treatment        Goal Note filed on 05/12/21 1054 by Min Gomez MS,CCC-SLP     Mod A required for simple attention tasks                Problem: Speech/Swallowing LTGs     Dates: Start: 04/30/21       Goal: LTG-By discharge, patient will solve complex problem     Dates: Start: 04/30/21       Description: 1) Individualized goal:  related to IADL's with mod I for safe d/c home.  2) Interventions:  SLP Speech Language Treatment, SLP Swallowing Dysfunction Treatment, SLP Oral Pharyngeal Evaluation, SLP Video Swallow Evaluation, SLP Self Care / ADL Training , SLP Cognitive Skill Development, and SLP Group Treatment

## 2021-05-19 NOTE — PROGRESS NOTES
"Rehab Progress Note     Date of Service: 5/11/2021  Chief Complaint: follow up stroke    Interval Events (Subjective)    Patient seen and examined today in the therapy gym.  He is working on standing with PT.   He was able to do some walking in the parallel bars.  He reports he slept OK last night, but is still so fatigued.  He was evaluated by Dr. Dumont last night, and toxin has been ordered for his left upper extremity, to be completed after discharge from rehab.  Patient has no new complaints today.    ROS: No changes to bowel, bladder, pain, mood, or sleep.     Objective:  VITAL SIGNS: /91   Pulse 62   Temp 35.9 °C (96.6 °F) (Temporal)   Resp 18   Ht 1.778 m (5' 10\")   Wt 68.6 kg (151 lb 3.8 oz)   SpO2 96%   BMI 21.70 kg/m²   Gen: alert, no apparent distress  Neuro: notable for spastic left hemiplegia, tone in the left wrist, finger flexors, pronator, and biceps        Recent Results (from the past 72 hour(s))   HEPATIC FUNCTION PANEL    Collection Time: 05/18/21  6:04 AM   Result Value Ref Range    Alkaline Phosphatase 78 30 - 99 U/L    AST(SGOT) 25 12 - 45 U/L    ALT(SGPT) 59 (H) 2 - 50 U/L    Total Bilirubin 0.3 0.1 - 1.5 mg/dL    Direct Bilirubin <0.2 0.1 - 0.5 mg/dL    Indirect Bilirubin see below 0.0 - 1.0 mg/dL    Albumin 3.4 3.2 - 4.9 g/dL    Total Protein 6.4 6.0 - 8.2 g/dL       Current Facility-Administered Medications   Medication Frequency   • atorvastatin (LIPITOR) tablet 40 mg Q EVENING   • metoprolol tartrate (LOPRESSOR) tablet 25 mg BID   • senna-docusate (PERICOLACE or SENOKOT S) 8.6-50 MG per tablet 2 tablet Q EVENING   • baclofen (LIORESAL) tablet 20 mg TID   • gabapentin (NEURONTIN) capsule 100 mg Q EVENING   • melatonin tablet 3 mg QHS   • meclizine (ANTIVERT) tablet 25 mg TID   • hydrOXYzine HCl (ATARAX) tablet 50 mg Q6HRS PRN   • Respiratory Therapy Consult Continuous RT   • Pharmacy Consult Request ...Pain Management Review 1 Each PHARMACY TO DOSE   • hydrALAZINE " (APRESOLINE) tablet 10 mg Q8HRS PRN   • acetaminophen (Tylenol) tablet 650 mg Q4HRS PRN   • lactulose 20 GM/30ML solution 30 mL QDAY PRN   • docusate sodium (ENEMEEZ) enema 283 mg QDAY PRN   • fleet enema 133 mL QDAY PRN   • artificial tears ophthalmic solution 1 Drop PRN   • benzocaine-menthol (CEPACOL) lozenge 1 Lozenge Q2HRS PRN   • mag hydrox-al hydrox-simeth (MAALOX PLUS ES or MYLANTA DS) suspension 20 mL Q2HRS PRN   • ondansetron (ZOFRAN ODT) dispertab 4 mg 4X/DAY PRN    Or   • ondansetron (ZOFRAN) syringe/vial injection 4 mg 4X/DAY PRN   • traZODone (DESYREL) tablet 50 mg QHS PRN   • sodium chloride (OCEAN) 0.65 % nasal spray 2 Spray PRN   • midazolam (VERSED) 5 mg/mL (1 mL vial) PRN   • aspirin (ASA) chewable tab 81 mg DAILY   • mirtazapine (Remeron) orally disintegrating tab 15 mg QHS   • rivaroxaban (XARELTO) tablet 20 mg PM MEAL   • thyroid (ARMOUR THYROID) tablet 60 mg BID       Orders Placed This Encounter   Procedures   • Diet Order Diet: Level 7 - Easy to Chew (float pills in puree); Liquid level: Level 0 - Thin; Tray Modifications (optional): SLP - 1:1 Supervision by Nursing, SLP - Deliver to Nursing Station     Standing Status:   Standing     Number of Occurrences:   1     Order Specific Question:   Diet:     Answer:   Level 7 - Easy to Chew [22]     Comments:   float pills in puree     Order Specific Question:   Liquid level     Answer:   Level 0 - Thin     Order Specific Question:   Tray Modifications (optional)     Answer:   SLP - 1:1 Supervision by Nursing     Order Specific Question:   Tray Modifications (optional)     Answer:   SLP - Deliver to Nursing Station       Assessment:  Active Hospital Problems    Diagnosis    • *Acute right MCA stroke (HCC)    • Paroxysmal atrial fibrillation (HCC)    • Urinary retention    • Acquired hypothyroidism    • History of COVID-19    • Hypotension    • Normocytic anemia    • Spasticity as late effect of cerebrovascular accident (CVA)    • Reactive  depression      This patient is a 56 y.o. male admitted for acute inpatient rehabilitation with Acute right MCA stroke (HCC).    I led and attended the weekly conference, and agree with the IDT conference documentation and plan of care as noted below.    Date of conference: 5/19/2021    Goals and barriers: See IDT note.    Biggest barriers: left spastic hemiplegia, left neglect, incontinent of bowel, impaired activity tolerance/endurance, left sided abnormal sensation, impaired cognition and attention    Goals in next week: home evaluation    CM/social support: wife supportive, to go to 1  here in [place], wife's sister    Anticipated DC date: 6/4    Home health: PT/OT/SLP/RN - [facility]    Equip: [facility] [place] with pressure relieving cushion    Follow up: PCP, Dr. Dumont, stroke bridge clinic, urology, neurosugery      Medical Decision Making and Plan:    Large right ICA/MCA ischemic stroke  S/p craniectomy 4/3, Dr. Payotn  Left hemiplegia, has some very mild active elbow ext/flex, hip adduction  Cognitive deficits, improved  Left neglect, improved  Continue full rehab program  PT/OT/SLP, 1 hr each discipline, 5 days per week    Xarelto  Statin    Outpatient follow up with stroke bridge clinic, Dr. Dumont, referrals made    Outpatient follow up with Dr. Payton for cranioplasty    Making good progress, improved function    Neuropathic pain, improved/resolved  Left leg at night, improved/resolved  Continue low dose gabapentin 100 mg 5/11  Continue to monitor need to increase dose  Currently stable as off 5/19    Dizziness, improved  Scheduled Meclizine, will titrate off prior to discharge, starting next week  Continue Teds and abdominal binder for now  BPPV testing by PT negative    Spasticity, improved/stable  Increased baclofen 15 mg TID to 20 mg TID 4/30 --> 30 mg TID 5/3  Was decreased back down to 20 mg TID on 5/13 due to fatigue, with increased spasticity     Started valium at night 2 mg 4/30, discontinued 5/3,  doesn't seem to make much difference    Consider adding Zanaflex in future, LFTs on 5/5 with elevated ALT, rechecked 5/18, still elevated, not a good candidate for Zanaflex at this time    Will benefit from Botox - unable to tolerate higher doses of baclofen due to sedation/fatigue, unable to trial Zanaflex due to elevated ALT, trial of Valium was not effective and patient also too sedated    Dr. Dumont has ordered 400 units with plan to inject LUE as an outpatient after discharge from rehab    Elevated ALT  Decreased statin dose 80 mg to 40 mg 5/18  Recheck next week     Atrial fibrillation  Metoprolol  Xarelto     Hypothyroidism   Ismay thyroid     Hypertension  Hypotension, improved  Lisinopril discontinued  Metoprolol, dose increased  Flomax discontinued 5/12  Teds and abdominal binder    Insomnia, improved/resolved  Already on mirtazapine  Scheduled melatonin  Gabapentin added for neuropathic pain at night     Reactive depression  Mirtazapine  Consider psychology consult if needed   Mood currently stable     History of COVID-19  Without sequelae     Normocytic anemia  Mild, monitor    Bowel program  Continue bowel medications  Last BM 5/19    Bladder program  Urinary retention, continues  Changed Prazosin to Flomax  Voiding trial 5/4, unsuccessful  Replaced Brewer 5/5, increased Flomax   Voiding trial 5/12, unsuccessful again  Discontinued Flomax 5/12, not efffective  Likely has spastic bladder versus dyssynergia   Will need outpatient urology follow up, referral made    DVT prophylaxis  Xarelto    Total time:  40 minutes.  I spent greater than 50% of the time for patient care, counseling, and coordination on this date, including patient face-to face time, unit/floor time with review of records/pertinent lab data and studies, as well as discussing diagnostic evaluation/work up, planned therapeutic interventions, and future disposition of care, as per the interval events/subjective and the assessment and plan as  noted above.      Radha Monge M.D.   Physical Medicine and Rehabilitation

## 2021-05-19 NOTE — CARE PLAN
Problem: Mobility  Goal: STG-Within one week, patient will propel wheelchair household distances  Description: 1) Individualized goal:  25ft with haylie technique with SBA on even surfaces  2) Interventions:  PT Group Therapy, PT E Stim Attended, PT Orthotics Training, PT Gait Training, PT Self Care/Home Eval, PT Therapeutic Exercises, PT Neuro Re-Ed/Balance, PT Therapeutic Activity, and PT Evaluation    Outcome: Not Met  Note: Pt continues to require assist for steering     Problem: Mobility Transfers  Goal: STG-Within one week, patient will sit to stand  Outcome: Met  Goal: STG-Within one week, patient will transfer bed to chair  Outcome: Met

## 2021-05-19 NOTE — CARE PLAN
Problem: Dressing  Goal: STG-Within one week, patient will dress UB  Outcome: Met  Goal: STG-Within one week, patient will dress LB  Outcome: Not Met  Note: Requires Max A     Problem: Functional Transfers  Goal: STG-Within one week, patient will transfer to toilet  Outcome: Met

## 2021-05-20 PROCEDURE — 700102 HCHG RX REV CODE 250 W/ 637 OVERRIDE(OP): Performed by: PHYSICAL MEDICINE & REHABILITATION

## 2021-05-20 PROCEDURE — 97112 NEUROMUSCULAR REEDUCATION: CPT

## 2021-05-20 PROCEDURE — A9270 NON-COVERED ITEM OR SERVICE: HCPCS | Performed by: PHYSICAL MEDICINE & REHABILITATION

## 2021-05-20 PROCEDURE — 770010 HCHG ROOM/CARE - REHAB SEMI PRIVAT*

## 2021-05-20 PROCEDURE — 700102 HCHG RX REV CODE 250 W/ 637 OVERRIDE(OP): Performed by: HOSPITALIST

## 2021-05-20 PROCEDURE — A9270 NON-COVERED ITEM OR SERVICE: HCPCS | Performed by: HOSPITALIST

## 2021-05-20 PROCEDURE — 97530 THERAPEUTIC ACTIVITIES: CPT

## 2021-05-20 PROCEDURE — 97535 SELF CARE MNGMENT TRAINING: CPT

## 2021-05-20 PROCEDURE — 97130 THER IVNTJ EA ADDL 15 MIN: CPT

## 2021-05-20 PROCEDURE — 97129 THER IVNTJ 1ST 15 MIN: CPT

## 2021-05-20 PROCEDURE — 99231 SBSQ HOSP IP/OBS SF/LOW 25: CPT | Performed by: PHYSICAL MEDICINE & REHABILITATION

## 2021-05-20 RX ORDER — MECLIZINE HYDROCHLORIDE 25 MG/1
25 TABLET ORAL 2 TIMES DAILY
Status: DISCONTINUED | OUTPATIENT
Start: 2021-05-20 | End: 2021-05-26

## 2021-05-20 RX ADMIN — RIVAROXABAN 20 MG: 20 TABLET, FILM COATED ORAL at 18:35

## 2021-05-20 RX ADMIN — SENNOSIDES AND DOCUSATE SODIUM 2 TABLET: 8.6; 5 TABLET ORAL at 20:12

## 2021-05-20 RX ADMIN — GABAPENTIN 100 MG: 100 CAPSULE ORAL at 20:12

## 2021-05-20 RX ADMIN — MIRTAZAPINE 15 MG: 15 TABLET, ORALLY DISINTEGRATING ORAL at 20:12

## 2021-05-20 RX ADMIN — BACLOFEN 20 MG: 20 TABLET ORAL at 15:27

## 2021-05-20 RX ADMIN — MELATONIN TAB 3 MG 3 MG: 3 TAB at 20:12

## 2021-05-20 RX ADMIN — MECLIZINE HYDROCHLORIDE 25 MG: 25 TABLET ORAL at 08:59

## 2021-05-20 RX ADMIN — THYROID, PORCINE 60 MG: 30 TABLET ORAL at 20:12

## 2021-05-20 RX ADMIN — METOPROLOL TARTRATE 25 MG: 25 TABLET, FILM COATED ORAL at 08:59

## 2021-05-20 RX ADMIN — MECLIZINE HYDROCHLORIDE 25 MG: 25 TABLET ORAL at 20:12

## 2021-05-20 RX ADMIN — ATORVASTATIN CALCIUM 40 MG: 40 TABLET, FILM COATED ORAL at 20:12

## 2021-05-20 RX ADMIN — BACLOFEN 20 MG: 20 TABLET ORAL at 08:59

## 2021-05-20 RX ADMIN — THYROID, PORCINE 60 MG: 30 TABLET ORAL at 08:59

## 2021-05-20 RX ADMIN — METOPROLOL TARTRATE 25 MG: 25 TABLET, FILM COATED ORAL at 20:12

## 2021-05-20 RX ADMIN — BACLOFEN 20 MG: 20 TABLET ORAL at 20:12

## 2021-05-20 NOTE — THERAPY
Occupational Therapy  Daily Treatment     Patient Name: Thong Arzola  Age:  56 y.o., Sex:  male  Medical Record #: [id number]  Today's Date: 5/20/2021     Precautions  Precautions: Fall Risk  Comments: Helmet OOB; LUE 2-3 Modified Jeremiah Scale; Abdominal binder OOB; Brewer cath; L inattention & haylie         Subjective    Spouse Anel present for FT      Objective    FT - edu provided on toilet transfers, toileting, sit<>stands from EOB. Hands-on training was completed with edu re: body mechanics, hand/feet placement, strategies to manage clothing, seated balance - cues for posture, use of gait belt.     Spouse with questions/concerns re: setting up date/time for home evaluation and learning specifics on what bathroom DME she will need to purchase, so she can have it ready and set-up prior to the home evaluation. Advised her to wait until the primary OT returns to learn more about the home evaluation, but that we also have plenty of time before d/c to get it completed. Edu pt and spouse on items that will most likely be recommended: padded BSC with drop arms (same commode that is currently in his bathroom) and padded tub transfer bench and where to purchase items, but to be sure I advised her to wait for Primary OT to return to learn more.      05/20/21 1231   Functional Level of Assist   Grooming Supervision;Total Assist   Toileting Total Assist   Toilet Transfers Moderate Assist   Neuro-Muscular Treatments   Neuro-Muscular Treatments Weight Shift Right;Weight Shift Left;Verbal Cuing;Facilitation   Comments in side lying  - scapular mobilization in all directions, significant tightness in L scapula d/t spasticity but with improve mobility by the end. Unable to complete WB through LUE d/t pain   Interdisciplinary Plan of Care Collaboration   Patient Position at End of Therapy In Bed;Call Light within Reach;Tray Table within Reach   OT Total Time Spent   OT Individual Total Time Spent (Mins) 60   OT Charge Group    OT Self Care / ADL 3   OT Neuromuscular Re-education / Balance 1       Assessment    Pt able to assist with alta-hygiene and clothing mgmt during toileting with intermittent cues for midline orientation, however continued to require assist for thoroughness. Spouse completed hands-on training with functional transfers and toileting. Spouse able to demo stand pivot and sit<>stand with pt properly with no assist. Spouse could benefit from ongoing practice with transfers until less cues are required for set-up. Spouse is ready to set-up home evaluation and purchase bathroom DME - primary OT will follow-up.    Strengths: Able to follow instructions, Alert and oriented, Effective communication skills, Good insight into deficits/needs, Independent prior level of function, Manages pain appropriately, Motivated for self care and independence, Pleasant and cooperative, Supportive family, Willingly participates in therapeutic activities  Barriers: Bowel incontinence, Decreased endurance, Fatigue, Generalized weakness, Hemiplegia, Home accessibility, Orthostatic hypotension, Impaired activity tolerance, Impaired balance, Limited mobility, Spasticity    Plan    Refer to Primary OT POC/goals. Set-up home evaluation and provide a specific list of recommended bathroom DME and home modifications     Occupational Therapy Goals (Active)     Problem: Dressing     Dates: Start: 05/12/21       Goal: STG-Within one week, patient will dress LB     Dates: Start: 05/12/21       Goal Note filed on 05/19/21 1155 by Nicki Yancey MS,OTR/L     Requires Max A               Problem: Functional Transfers     Dates: Start: 05/19/21       Goal: STG-Within one week, patient will transfer to toilet     Dates: Start: 05/19/21       Description: 1) Individualized Goal: with min A using grab bar and commode over the toilet  2) Interventions:  OT Self Care/ADL, OT Cognitive Skill Dev, OT Manual Ther Technique, OT Neuro Re-Ed/Balance, OT Sensory Int  Techniques, OT Therapeutic Activity, OT Evaluation, and OT Therapeutic Exercise            Problem: OT Long Term Goals     Dates: Start: 04/30/21       Goal: LTG-By discharge, patient will complete basic self care tasks     Dates: Start: 04/30/21       Description: 1) Individualized Goal:  with min to mod A using AE/AD/techniques as needed  2) Interventions:  OT E Stim Attended, OT Self Care/ADL, OT Cognitive Skill Dev, OT Manual Ther Technique, OT Neuro Re-Ed/Balance, OT Sensory Int Techniques, OT Therapeutic Activity, OT Evaluation, and OT Therapeutic Exercise       Goal: LTG-By discharge, patient will perform bathroom transfers     Dates: Start: 04/30/21       Description: 1) Individualized Goal:  with max A x 1 person using slideboard versus squat pivot to the right  2) Interventions:  OT E Stim Attended, OT Self Care/ADL, OT Cognitive Skill Dev, OT Manual Ther Technique, OT Neuro Re-Ed/Balance, OT Sensory Int Techniques, OT Therapeutic Activity, OT Evaluation, and OT Therapeutic Exercise          Problem: Toileting     Dates: Start: 05/19/21       Goal: STG-Within one week, patient will complete toileting tasks     Dates: Start: 05/19/21       Description: 1) Individualized Goal: with mod A using grab bar and commode over the toilet  2) Interventions:  OT Self Care/ADL, OT Cognitive Skill Dev, OT Manual Ther Technique, OT Neuro Re-Ed/Balance, OT Sensory Int Techniques, OT Therapeutic Activity, OT Evaluation, and OT Therapeutic Exercise

## 2021-05-20 NOTE — CARE PLAN
Problem: Communication  Goal: The ability to communicate needs accurately and effectively will improve  Outcome: Progressing     Problem: Safety  Goal: Will remain free from injury  Outcome: Progressing   The patient is Stable - Low risk of patient condition declining or worsening

## 2021-05-20 NOTE — THERAPY
Speech Language Pathology  Daily Treatment     Patient Name: Thong Arzola  Age:  56 y.o., Sex:  male  Medical Record #: [id number]  Today's Date: 5/20/2021     Precautions  Precautions: Fall Risk  Comments: Helmet OOB; LUE 2-3 Modified Jeremiah Scale; Abdominal binder OOB; Brewer cath; L inattention & haylie    Subjective    Patient participated in ST.      Objective       05/20/21 1032   Cognition   Attention to Task Minimal (4)   Simple Attention Minimal (4)   Moderate Attention Moderate (3)   Visual Scanning / Cancellation Skills Moderate (3)   Functional Math / Financial Management Moderate (3)   SLP Total Time Spent   SLP Individual Total Time Spent (Mins) 60   Treatment Charges   SLP Cognitive Skill Development First 15 Minutes 1   SLP Cognitive Skill Development Additional 15 Minutes 3       Assessment    Completed four component written directives with min verbal cues and use of external marker on left side.   Mod-max cues needed for basic financial tasks. Improves when presented with material verbally.     Strengths: Able to follow instructions, Alert and oriented, Supportive family, Pleasant and cooperative, Motivated for self care and independence, Willingly participates in therapeutic activities, Independent prior level of function  Barriers: Impaired functional cognition, Visual impairment, Impaired activity tolerance    Plan    Attention, scanning, basic problem solving.     Passport items to be completed: manage finances, manage medications, arrive to therapy appointments on time, complete daily memory log entries, solve problems related to safety situations, review education related to hospitalization, complete caregiver training     Speech Therapy Problems (Active)     Problem: Problem Solving STGs     Dates: Start: 05/05/21       Goal: STG-Within one week, patient will     Dates: Start: 05/05/21       Description: 1) Individualized goal: demonstrate appropriate visual scanning to the left with min  cues and 80% accuracy.  2) Interventions:  SLP Speech Language Treatment, SLP Self Care / ADL Training , SLP Cognitive Skill Development, and SLP Group Treatment        Goal Note filed on 05/12/21 1054 by Min Gomez MS,CCC-SLP     Mod cues required for visual scanning to the left             Goal: STG-Within one week, patient will     Dates: Start: 05/05/21       Description: 1) Individualized goal:  complete simple attention tasks with min A with 80% accuracy.   2) Interventions:  SLP Speech Language Treatment, SLP Self Care / ADL Training , SLP Cognitive Skill Development, and SLP Group Treatment        Goal Note filed on 05/12/21 1054 by Min Gomez MS,CCC-SLP     Mod A required for simple attention tasks                Problem: Speech/Swallowing LTGs     Dates: Start: 04/30/21       Goal: LTG-By discharge, patient will solve complex problem     Dates: Start: 04/30/21       Description: 1) Individualized goal:  related to IADL's with mod I for safe d/c home.  2) Interventions:  SLP Speech Language Treatment, SLP Swallowing Dysfunction Treatment, SLP Oral Pharyngeal Evaluation, SLP Video Swallow Evaluation, SLP Self Care / ADL Training , SLP Cognitive Skill Development, and SLP Group Treatment

## 2021-05-20 NOTE — PROGRESS NOTES
"Rehab Progress Note     Date of Service: 5/11/2021  Chief Complaint: follow up stroke    Interval Events (Subjective)    Patient seen and examined today in his room.  He reports he slept OK last night.  He denies any more pain in his left leg at night.  He has no new complaints, is just waiting to go to therapy.    ROS: No changes to bowel, bladder, pain, mood, or sleep.       Objective:  VITAL SIGNS: /80   Pulse 69   Temp 36.5 °C (97.7 °F) (Oral)   Resp 16   Ht 1.778 m (5' 10\")   Wt 68.6 kg (151 lb 3.8 oz)   SpO2 94%   BMI 21.70 kg/m²   Gen: alert, no apparent distress  Neuro: notable for spastic left hemiplegia        Recent Results (from the past 72 hour(s))   HEPATIC FUNCTION PANEL    Collection Time: 05/18/21  6:04 AM   Result Value Ref Range    Alkaline Phosphatase 78 30 - 99 U/L    AST(SGOT) 25 12 - 45 U/L    ALT(SGPT) 59 (H) 2 - 50 U/L    Total Bilirubin 0.3 0.1 - 1.5 mg/dL    Direct Bilirubin <0.2 0.1 - 0.5 mg/dL    Indirect Bilirubin see below 0.0 - 1.0 mg/dL    Albumin 3.4 3.2 - 4.9 g/dL    Total Protein 6.4 6.0 - 8.2 g/dL       Current Facility-Administered Medications   Medication Frequency   • atorvastatin (LIPITOR) tablet 40 mg Q EVENING   • metoprolol tartrate (LOPRESSOR) tablet 25 mg BID   • senna-docusate (PERICOLACE or SENOKOT S) 8.6-50 MG per tablet 2 tablet Q EVENING   • baclofen (LIORESAL) tablet 20 mg TID   • gabapentin (NEURONTIN) capsule 100 mg Q EVENING   • melatonin tablet 3 mg QHS   • meclizine (ANTIVERT) tablet 25 mg TID   • hydrOXYzine HCl (ATARAX) tablet 50 mg Q6HRS PRN   • Respiratory Therapy Consult Continuous RT   • Pharmacy Consult Request ...Pain Management Review 1 Each PHARMACY TO DOSE   • hydrALAZINE (APRESOLINE) tablet 10 mg Q8HRS PRN   • acetaminophen (Tylenol) tablet 650 mg Q4HRS PRN   • lactulose 20 GM/30ML solution 30 mL QDAY PRN   • docusate sodium (ENEMEEZ) enema 283 mg QDAY PRN   • fleet enema 133 mL QDAY PRN   • artificial tears ophthalmic solution 1 Drop " PRN   • benzocaine-menthol (CEPACOL) lozenge 1 Lozenge Q2HRS PRN   • mag hydrox-al hydrox-simeth (MAALOX PLUS ES or MYLANTA DS) suspension 20 mL Q2HRS PRN   • ondansetron (ZOFRAN ODT) dispertab 4 mg 4X/DAY PRN    Or   • ondansetron (ZOFRAN) syringe/vial injection 4 mg 4X/DAY PRN   • traZODone (DESYREL) tablet 50 mg QHS PRN   • sodium chloride (OCEAN) 0.65 % nasal spray 2 Spray PRN   • midazolam (VERSED) 5 mg/mL (1 mL vial) PRN   • aspirin (ASA) chewable tab 81 mg DAILY   • mirtazapine (Remeron) orally disintegrating tab 15 mg QHS   • rivaroxaban (XARELTO) tablet 20 mg PM MEAL   • thyroid (ARMOUR THYROID) tablet 60 mg BID       Orders Placed This Encounter   Procedures   • Diet Order Diet: Level 7 - Easy to Chew (float pills in puree); Liquid level: Level 0 - Thin; Tray Modifications (optional): SLP - 1:1 Supervision by Nursing, SLP - Deliver to Nursing Station     Standing Status:   Standing     Number of Occurrences:   1     Order Specific Question:   Diet:     Answer:   Level 7 - Easy to Chew [22]     Comments:   float pills in puree     Order Specific Question:   Liquid level     Answer:   Level 0 - Thin     Order Specific Question:   Tray Modifications (optional)     Answer:   SLP - 1:1 Supervision by Nursing     Order Specific Question:   Tray Modifications (optional)     Answer:   SLP - Deliver to Nursing Station       Assessment:  Active Hospital Problems    Diagnosis    • *Acute right MCA stroke (HCC)    • Paroxysmal atrial fibrillation (HCC)    • Urinary retention    • Acquired hypothyroidism    • History of COVID-19    • Hypotension    • Normocytic anemia    • Spasticity as late effect of cerebrovascular accident (CVA)    • Reactive depression      This patient is a 56 y.o. male admitted for acute inpatient rehabilitation with Acute right MCA stroke (HCC).    I led and attended the weekly conference, and agree with the IDT conference documentation and plan of care as noted below.    Date of conference:  5/19/2021    Goals and barriers: See IDT note.    Biggest barriers: left spastic hemiplegia, left neglect, incontinent of bowel, impaired activity tolerance/endurance, left sided abnormal sensation, impaired cognition and attention    Goals in next week: home evaluation    CM/social support: wife supportive, to go to 1  here in [place], wife's sister    Anticipated DC date: 6/4    Home health: PT/OT/SLP/RN - [facility]    [place] [facility] [place]    Follow up: PCP, Dr. Dumont, stroke bridge clinic, urology, neurosugery      Medical Decision Making and Plan:    Large right ICA/MCA ischemic stroke  S/p craniectomy 4/3, Dr. Payton  Left hemiplegia, minimal improvement  Cognitive deficits, improved  Left neglect, improved  Continue full rehab program  PT/OT/SLP, 1 hr each discipline, 5 days per week    Xarelto  Aspirin discontinued per neurology recommendations  Statin    Outpatient follow up with stroke bridge clinic, Dr. Dumont, referrals made    Outpatient follow up with Dr. Payton for cranioplasty    Making good progress, improved function    Neuropathic pain, improved/resolved  Left leg at night, improved/resolved  Continue low dose gabapentin 100 mg 5/11  Continue to monitor need to increase dose  Currently well controlled as of 5/20    Dizziness, improved  Scheduled Meclizine, start titration 5/20, TID --> BID  Continue Teds and abdominal binder for now  BPPV testing by PT negative    Spasticity, improved/stable  Increased baclofen 15 mg TID to 20 mg TID 4/30 --> 30 mg TID 5/3  Was decreased back down to 20 mg TID on 5/13 due to fatigue, with increased spasticity     Started valium at night 2 mg 4/30, discontinued 5/3, doesn't seem to make much difference    Consider adding Zanaflex in future, LFTs on 5/5 with elevated ALT, rechecked 5/18, still elevated, not a good candidate for Zanaflex at this time    Will benefit from Botox - unable to tolerate higher doses of baclofen due to  sedation/fatigue, unable to trial Zanaflex due to elevated ALT, trial of Valium was not effective and patient also too sedated    Dr. Dumont has ordered 400 units with plan to inject LUE as an outpatient after discharge from rehab    Elevated ALT  Decreased statin dose 80 mg to 40 mg 5/18  Recheck next week     Atrial fibrillation  Metoprolol  Xarelto     Hypothyroidism   [place] thyroid, repeat TSH in 4 weeks from admission, as dose was increased at acute     Hypertension  Hypotension, improved  Lisinopril discontinued  Metoprolol, dose increased  Flomax discontinued 5/12  Teds and abdominal binder    Insomnia, improved/resolved  Already on mirtazapine  Scheduled melatonin  Gabapentin added for neuropathic pain at night     Reactive depression  Mirtazapine  Consider psychology consult if needed   Mood currently stable     History of COVID-19  Without sequelae     Normocytic anemia  Mild, monitor    Bowel program  Continue bowel medications  Last BM 5/19    Bladder program  Urinary retention, continues  Unsuccessful voiding trials x 3, even on Flomax  Outpatient follow up with urology, referral made    DVT prophylaxis  Xarelto    Total time:  18 minutes.  I spent greater than 50% of the time for patient care, counseling, and coordination on this date, including patient face-to face time, unit/floor time with review of records/pertinent lab data and studies, as well as discussing diagnostic evaluation/work up, planned therapeutic interventions, and future disposition of care, as per the interval events/subjective and the assessment and plan as noted above.    I have performed a physical exam, reviewed and updated ROS, as well as the assessment and plan today 5/20/2021. In review of note from 5/19/2021 there are no new changes except as documented above.            Radha Monge M.D.   Physical Medicine and Rehabilitation

## 2021-05-20 NOTE — THERAPY
Physical Therapy   Daily Treatment     Patient Name: Thong Arzola  Age:  56 y.o., Sex:  male  Medical Record #: [id number]  Today's Date: 5/20/2021     Precautions  Precautions: Fall Risk  Comments: Helmet OOB; LUE 2-3 Modified Jeremiah Scale; Abdominal binder OOB; Brewer cath; L inattention & haylie    Subjective    Pt was supine in bed upon arrival and agreeable to treatment.  Pt's spouse present for family training.       Objective       05/20/21 1431   Precautions   Precautions Fall Risk   Transfer Functional Level of Assist   Bed, Chair, Wheelchair Transfer Moderate Assist  (to L side, CGA to R side)   Bed Chair Wheelchair Transfer Description Adaptive equipment;Squat pivot transfer to wheelchair;Supervision for safety;Set-up of equipment;Verbal cueing   Bed Mobility    Supine to Sit Minimal Assist   Sit to Supine Moderate Assist   Scooting Moderate Assist   Interdisciplinary Plan of Care Collaboration   Patient Position at End of Therapy In Bed;Call Light within Reach;Tray Table within Reach;Phone within Reach   PT Total Time Spent   PT Individual Total Time Spent (Mins) 30   PT Charge Group   PT Therapeutic Activities 2     Completed transfer training with pt's spouse on bed mobility and transfers from bed to w/c.  Review of w/c components.  Gathered new w/c set up with standard w/c and rigid backrest.    Assessment    Pt's spouse demonstrated good safety and body mechanics with pt with transferring pt to both weak and strong sides with only very minor cueing for sequencing needed.  Pt with improving ability to maintain upright unsupported sitting   Strengths: Alert and oriented, Effective communication skills, Independent prior level of function, Motivated for self care and independence, Pleasant and cooperative, Supportive family, Willingly participates in therapeutic activities  Barriers: Hemiparesis, Home accessibility, Orthostatic hypotension, Impaired activity tolerance, Impaired balance, Spasticity,  Limited mobility    Plan    Continue postural re-education, core engagement, seated balance, sit to stand training, transfer training, ongoing standing tolerance, pregait/ [employer] [place], family training.      Passport items to be completed:  Get in/out of bed safely, in/out of a vehicle, safely use mobility device, walk or wheel around home/community, navigate up and down stairs, show how to get up/down from the ground, ensure home is accessible, demonstrate HEP, complete caregiver training    Physical Therapy Problems (Active)     Problem: Mobility     Dates: Start: 04/30/21       Goal: STG-Within one week, patient will propel wheelchair household distances     Dates: Start: 04/30/21       Description: 1) Individualized goal:  25ft with haylie technique with SBA on even surfaces  2) Interventions:  PT Group Therapy, PT E Stim Attended, PT Orthotics Training, PT Gait Training, PT Self Care/Home Eval, PT Therapeutic Exercises, PT Neuro Re-Ed/Balance, PT Therapeutic Activity, and PT Evaluation      Goal Note filed on 05/19/21 0838 by Neena Gastelum DPT     Pt continues to require assist for steering            Goal: STG-Within one week, patient will ambulate household distance     Dates: Start: 05/19/21       Description: 1) Individualized goal:  AMB x 10 feet in // bars with mod A  2) Interventions:  PT Group Therapy, PT E Stim Attended, PT Gait Training, PT Therapeutic Exercises, PT Neuro Re-Ed/Balance, PT Aquatic Therapy, PT Therapeutic Activity, PT Manual Therapy, and PT Evaluation      Goal Note filed on 05/19/21 0839 by Neena Gastelum DPT     1) Individualized goal:  AMB x 10 feet in // bars with mod A  2) Interventions:  PT Group Therapy, PT E Stim Attended, PT Gait Training, PT Therapeutic Exercises, PT Neuro Re-Ed/Balance, PT Aquatic Therapy, PT Therapeutic Activity, PT Manual Therapy, and PT Evaluation               Problem: Mobility Transfers     Dates: Start: 05/19/21       Goal: STG-Within one week,  patient will transfer bed to chair     Dates: Start: 05/19/21       Description: 1) Individualized goal:  SQPT with SBA  2) Interventions:  PT Group Therapy, PT E Stim Attended, PT Gait Training, PT Therapeutic Exercises, PT Neuro Re-Ed/Balance, PT Aquatic Therapy, PT Therapeutic Activity, PT Manual Therapy, and PT Evaluation      Goal Note filed on 05/19/21 0839 by Neena Gastelum DPT     1) Individualized goal:  SQPT with SBA  2) Interventions:  PT Group Therapy, PT E Stim Attended, PT Gait Training, PT Therapeutic Exercises, PT Neuro Re-Ed/Balance, PT Aquatic Therapy, PT Therapeutic Activity, PT Manual Therapy, and PT Evaluation               Problem: PT-Long Term Goals     Dates: Start: 04/30/21       Goal: LTG-By discharge, patient will propel wheelchair     Dates: Start: 04/30/21       Description: 1) Individualized goal:  100ft with haylie technique and [place] on even/uneven surfaces  2) Interventions:  PT Group Therapy, PT E Stim Attended, PT Orthotics Training, PT Gait Training, PT Self Care/Home Eval, PT Therapeutic Exercises, PT Neuro Re-Ed/Balance, PT Therapeutic Activity, and PT Evaluation       Goal: LTG-By discharge, patient will transfer one surface to another     Dates: Start: 04/30/21       Description: 1) Individualized goal:  SQPT with Taz   2) Interventions:  PT Group Therapy, PT E Stim Attended, PT Orthotics Training, PT Gait Training, PT Self Care/Home Eval, PT Therapeutic Exercises, PT Neuro Re-Ed/Balance, PT Therapeutic Activity, and PT Evaluation         Goal: LTG-By discharge, patient will     Dates: Start: 04/30/21       Description: 1) Individualized goal: perform bed mobility (supine<>sit) with Taz   2) Interventions:  PT Group Therapy, PT E Stim Attended, PT Orthotics Training, PT Gait Training, PT Self Care/Home Eval, PT Therapeutic Exercises, PT Neuro Re-Ed/Balance, PT Therapeutic Activity, and PT Evaluation

## 2021-05-20 NOTE — CARE PLAN
Problem: Communication  Goal: The ability to communicate needs accurately and effectively will improve  Outcome: Progressing     Problem: Safety  Goal: Will remain free from injury  Outcome: Progressing  Goal: Will remain free from falls  Outcome: Progressing     Problem: Infection  Goal: Will remain free from infection  Outcome: Progressing     Problem: Venous Thromboembolism (VTW)/Deep Vein Thrombosis (DVT) Prevention:  Goal: Patient will participate in Venous Thrombosis (VTE)/Deep Vein Thrombosis (DVT)Prevention Measures  Outcome: Progressing   The patient is Stable - Low risk of patient condition declining or worsening

## 2021-05-21 PROCEDURE — A9270 NON-COVERED ITEM OR SERVICE: HCPCS | Performed by: HOSPITALIST

## 2021-05-21 PROCEDURE — 97112 NEUROMUSCULAR REEDUCATION: CPT

## 2021-05-21 PROCEDURE — 770010 HCHG ROOM/CARE - REHAB SEMI PRIVAT*

## 2021-05-21 PROCEDURE — A9270 NON-COVERED ITEM OR SERVICE: HCPCS | Performed by: PHYSICAL MEDICINE & REHABILITATION

## 2021-05-21 PROCEDURE — 700102 HCHG RX REV CODE 250 W/ 637 OVERRIDE(OP): Performed by: PHYSICAL MEDICINE & REHABILITATION

## 2021-05-21 PROCEDURE — 97116 GAIT TRAINING THERAPY: CPT

## 2021-05-21 PROCEDURE — 97530 THERAPEUTIC ACTIVITIES: CPT

## 2021-05-21 PROCEDURE — 700102 HCHG RX REV CODE 250 W/ 637 OVERRIDE(OP): Performed by: HOSPITALIST

## 2021-05-21 PROCEDURE — 97130 THER IVNTJ EA ADDL 15 MIN: CPT

## 2021-05-21 PROCEDURE — 97129 THER IVNTJ 1ST 15 MIN: CPT

## 2021-05-21 PROCEDURE — 99232 SBSQ HOSP IP/OBS MODERATE 35: CPT | Performed by: PHYSICAL MEDICINE & REHABILITATION

## 2021-05-21 RX ADMIN — MECLIZINE HYDROCHLORIDE 25 MG: 25 TABLET ORAL at 08:55

## 2021-05-21 RX ADMIN — MELATONIN TAB 3 MG 3 MG: 3 TAB at 19:46

## 2021-05-21 RX ADMIN — MECLIZINE HYDROCHLORIDE 25 MG: 25 TABLET ORAL at 19:46

## 2021-05-21 RX ADMIN — METOPROLOL TARTRATE 25 MG: 25 TABLET, FILM COATED ORAL at 19:46

## 2021-05-21 RX ADMIN — RIVAROXABAN 20 MG: 20 TABLET, FILM COATED ORAL at 18:02

## 2021-05-21 RX ADMIN — THYROID, PORCINE 60 MG: 30 TABLET ORAL at 08:55

## 2021-05-21 RX ADMIN — MIRTAZAPINE 15 MG: 15 TABLET, ORALLY DISINTEGRATING ORAL at 19:47

## 2021-05-21 RX ADMIN — BACLOFEN 20 MG: 20 TABLET ORAL at 19:46

## 2021-05-21 RX ADMIN — ATORVASTATIN CALCIUM 40 MG: 40 TABLET, FILM COATED ORAL at 19:46

## 2021-05-21 RX ADMIN — THYROID, PORCINE 60 MG: 30 TABLET ORAL at 19:46

## 2021-05-21 RX ADMIN — SENNOSIDES AND DOCUSATE SODIUM 2 TABLET: 8.6; 5 TABLET ORAL at 19:45

## 2021-05-21 RX ADMIN — GABAPENTIN 100 MG: 100 CAPSULE ORAL at 19:46

## 2021-05-21 RX ADMIN — BACLOFEN 20 MG: 20 TABLET ORAL at 15:04

## 2021-05-21 RX ADMIN — LACTULOSE 30 ML: 20 SOLUTION ORAL at 19:47

## 2021-05-21 RX ADMIN — BACLOFEN 20 MG: 20 TABLET ORAL at 08:55

## 2021-05-21 RX ADMIN — METOPROLOL TARTRATE 25 MG: 25 TABLET, FILM COATED ORAL at 08:55

## 2021-05-21 ASSESSMENT — ACTIVITIES OF DAILY LIVING (ADL)
BED_CHAIR_WHEELCHAIR_TRANSFER_DESCRIPTION: INCREASED TIME;REQUIRES LIFT;SET-UP OF EQUIPMENT;SUPERVISION FOR SAFETY;VERBAL CUEING

## 2021-05-21 ASSESSMENT — GAIT ASSESSMENTS
DEVIATION: STEP TO;BRADYKINETIC;DECREASED HEEL STRIKE;DECREASED TOE OFF
ASSISTIVE DEVICE: PARALLEL BARS
DISTANCE (FEET): 10
GAIT LEVEL OF ASSIST: TOTAL ASSIST X 2

## 2021-05-21 NOTE — THERAPY
Physical Therapy   Daily Treatment     Patient Name: Thong Arzola  Age:  56 y.o., Sex:  male  Medical Record #: [id number]  Today's Date: 5/21/2021     Precautions  Precautions: Fall Risk  Comments: Helmet OOB; LUE 2-3 Modified Jeremiah Scale; Abdominal binder OOB; Brewer cath; L inattention & haylie    Subjective    Pt was seated in w/c upon arrival and agreeable to treatment.  Pt's spouse present during session.       Objective       05/21/21 1401   Precautions   Precautions Fall Risk   Gait Functional Level of Assist    Gait Level Of Assist Total Assist X 2  (Max A with w/c follow for safety)   Assistive Device Parallel Bars   Distance (Feet) 10   # of Times Distance was Traveled 2   Deviation Step To;Bradykinetic;Decreased Heel Strike;Decreased Toe Off  (Max A for trunk support and to advance LLE, tibal blocking )   Wheelchair Functional Level of Assist   Wheelchair Assist Moderate Assist  (to SBA for steering, pt using R hemitechnique)   Distance Wheelchair (Feet or Distance) 150   Wheelchair Description Extra time;Limited by fatigue;Safety concerns;Supervision for safety;Verbal cueing   Interdisciplinary Plan of Care Collaboration   Patient Position at End of Therapy Seated;Call Light within Reach;Tray Table within Reach;Phone within Reach;Family / Friend in Room   PT Total Time Spent   PT Individual Total Time Spent (Mins) 60   PT Charge Group   PT Gait Training 1   PT Neuromuscular Re-Education / Balance 1   PT Therapeutic Activities 2     Completed continued family training with pt's spouse for transfers to/from bed to w/c.  Pt spouse cleared to transfer pt; nursing staff informed.  Changed out w/c from tilt in space to standard w/c with rigid backrest and pressure relieving cushion.  W/C mobility completed with focus on use of R haylie technique and L sided attention.  Pre-gait activities in // bars: WS x 10 reps. Gait training completed with use of mirror for increased visual feedback.    Assessment    Pt  with improving ability to propel in new w/c set up with initial assist needed for steering but improvement with repetition and cueing for attention to L side.  Pt with significant improvement in standing posture with standing and gait activities with only intermittent FW trunk lean which pt was able to correct with VCing.  Pt continues to be limited d/t significant RLE hemipariesis.  Pt's spouse demonstrated good sequencing and safety with transfers and is now cleared to transfer pt from bed to w/c.    Strengths: Alert and oriented, Effective communication skills, Independent prior level of function, Motivated for self care and independence, Pleasant and cooperative, Supportive family, Willingly participates in therapeutic activities  Barriers: Hemiparesis, Home accessibility, Orthostatic hypotension, Impaired activity tolerance, Impaired balance, Spasticity, Limited mobility    Plan    Continue to assess w/c needs, w/c mobility using R haylie technique, transfer training with focus on sequencing, standing posture and balance, gait training/pre gait in // bars and with [employer] [place], continue family training.    Passport items to be completed:  Get in/out of bed safely, in/out of a vehicle, safely use mobility device, walk or wheel around home/community, navigate up and down stairs, show how to get up/down from the ground, ensure home is accessible, demonstrate HEP, complete caregiver training    Physical Therapy Problems (Active)     Problem: Mobility     Dates: Start: 04/30/21       Goal: STG-Within one week, patient will propel wheelchair household distances     Dates: Start: 04/30/21       Description: 1) Individualized goal:  25ft with haylie technique with SBA on even surfaces  2) Interventions:  PT Group Therapy, PT E Stim Attended, PT Orthotics Training, PT Gait Training, PT Self Care/Home Eval, PT Therapeutic Exercises, PT Neuro Re-Ed/Balance, PT Therapeutic Activity, and PT Evaluation      Goal Note filed on  05/19/21 0838 by Neena Gastelum DPT     Pt continues to require assist for steering            Goal: STG-Within one week, patient will ambulate household distance     Dates: Start: 05/19/21       Description: 1) Individualized goal:  AMB x 10 feet in // bars with mod A  2) Interventions:  PT Group Therapy, PT E Stim Attended, PT Gait Training, PT Therapeutic Exercises, PT Neuro Re-Ed/Balance, PT Aquatic Therapy, PT Therapeutic Activity, PT Manual Therapy, and PT Evaluation      Goal Note filed on 05/19/21 0839 by Neena Gastelum DPT     1) Individualized goal:  AMB x 10 feet in // bars with mod A  2) Interventions:  PT Group Therapy, PT E Stim Attended, PT Gait Training, PT Therapeutic Exercises, PT Neuro Re-Ed/Balance, PT Aquatic Therapy, PT Therapeutic Activity, PT Manual Therapy, and PT Evaluation               Problem: Mobility Transfers     Dates: Start: 05/19/21       Goal: STG-Within one week, patient will transfer bed to chair     Dates: Start: 05/19/21       Description: 1) Individualized goal:  SQPT with SBA  2) Interventions:  PT Group Therapy, PT E Stim Attended, PT Gait Training, PT Therapeutic Exercises, PT Neuro Re-Ed/Balance, PT Aquatic Therapy, PT Therapeutic Activity, PT Manual Therapy, and PT Evaluation      Goal Note filed on 05/19/21 0839 by Neena Gastelum DPT     1) Individualized goal:  SQPT with SBA  2) Interventions:  PT Group Therapy, PT E Stim Attended, PT Gait Training, PT Therapeutic Exercises, PT Neuro Re-Ed/Balance, PT Aquatic Therapy, PT Therapeutic Activity, PT Manual Therapy, and PT Evaluation               Problem: PT-Long Term Goals     Dates: Start: 04/30/21       Goal: LTG-By discharge, patient will propel wheelchair     Dates: Start: 04/30/21       Description: 1) Individualized goal:  100ft with haylie technique and [place] on even/uneven surfaces  2) Interventions:  PT Group Therapy, PT E Stim Attended, PT Orthotics Training, PT Gait Training, PT Self Care/Home Eval, PT  Therapeutic Exercises, PT Neuro Re-Ed/Balance, PT Therapeutic Activity, and PT Evaluation       Goal: LTG-By discharge, patient will transfer one surface to another     Dates: Start: 04/30/21       Description: 1) Individualized goal:  SQPT with [place]   2) Interventions:  PT Group Therapy, PT E Stim Attended, PT Orthotics Training, PT Gait Training, PT Self Care/Home Eval, PT Therapeutic Exercises, PT Neuro Re-Ed/Balance, PT Therapeutic Activity, and PT Evaluation         Goal: LTG-By discharge, patient will     Dates: Start: 04/30/21       Description: 1) Individualized goal: perform bed mobility (supine<>sit) with Taz   2) Interventions:  PT Group Therapy, PT E Stim Attended, PT Orthotics Training, PT Gait Training, PT Self Care/Home Eval, PT Therapeutic Exercises, PT Neuro Re-Ed/Balance, PT Therapeutic Activity, and PT Evaluation

## 2021-05-21 NOTE — THERAPY
Recreational Therapy   Initial Evaluation     Patient Name: Thong Arzola  Age:  56 y.o., Sex:  male  Medical Record #: [id number]  Today's Date: 5/21/2021     Subjective    Pt reporting that he is focused on regaining function on LLE/LUE.    Objective       05/21/21 0931   Leisure History   Leisure Interests Sports;Travel;Other (Comments)  (Fishing, hiking, biking, being outdoors)   Pre-Morbid Leisure Lifestyle Individual;Group;Active   Prior Living Arrangements   Lives with - Patient's Self Care Capacity Spouse   Steps Into Home 2   Steps In Home 16   Ambulation Independent   Assistive Devices Used None   Driving / Transportation Driving Independent   Functional Ability Status - Physical   Endurance Low   Right  Strong   Left  Weak   Right Arm Strong   Left Arm Weak   Right Leg Strong   Left Leg Weak   Upper Extremity Gross Motor Uses Both Arms / Hands  (LUE weakness)   Lower Extremity Gross Motor Uses Right Leg;No Function in Left Leg   Fine Motor Manipulates Small Objects  (Only with RUE)   Functional Ability Status - Cognitive   Attention Span Remains on Task   Comprehension Follows Two Step Commands   Judgment Able to Make Independent Decisions   Functional Ability Status - Emotional    Affect Appropriate   Mood Appropriate   Behavior Appropriate   Leisure Competence Measure   Leisure Awareness Independent   Leisure Attitude Independent   Leisure Skills Moderate Assist   Cultural / Social Behaviors Independent   Interpersonal Skills Independent   Community Integration Skills Moderate Assist   Social Contact Independent   Community Participation Moderate Assist   Clinical Impression   Clinical Impression Impaired Fine Motor Leisure Functioning;Impaired Gross Motor Leisure Functioning;Impaired Group Interaction Skills;Impaired Community Skills;Impaired Relaxation and Coping Skills;Impaired Leisure Skills   Current Discharge Plan   Current Discharge Plan Return to Prior Living Situation   Benefit     Benefit Patient would Benefit from Inpatient Recreational Therapy to Maximize Independent Leisure Functioning    Interdisciplinary Plan of Care Collaboration   Patient Position at End of Therapy In Bed;Call Light within Reach;Tray Table within Reach   Strengths & Barriers   Strengths Able to follow instructions;Alert and oriented;Motivated for self care and independence;Pleasant and cooperative;Supportive family;Willingly participates in therapeutic activities   Barriers Hemiplegia;Decreased endurance;Fatigue;Impaired activity tolerance   Treatment Time   Total Time Spent (mins) 30   Procedural Tracking   Procedural Tracking Community Re-Integration;Community Skills Development;Leisure Skills Awareness;Leisure Skills Development;Cognitive Skills Training;Gross Motor Functional Leisure Skills       Assessment  Patient is 56 y.o. male with a diagnosis of Acute right MCA stroke .  Additional factors influencing patient status / progress (ie: cognitive factors, co-morbidities, social support, etc): past medical history of COVID-19 3/18/2021, paroxysmal atrial fibrillation not on anti-coagulation, hypothyroidism; now admitted for acute inpatient rehabilitation with severe functional debility after and acute stroke..        Plan  Recommend Recreational Therapy 30 minutes per day 2-3 days per week for 2 weeks for the following treatments:  Community Re-Integration, Community Skills Development, Leisure Skills Awareness, Leisure Skills Development, Social Skills Training and Cognitive Skills Training    Passport items to be completed:  Passport items to be completed:  Verbalize two positive leisure activities, discuss returning to work, hobbies, community groups or volunteer activities, explore community resources     Goals:  Long term and short term goals have been discussed with patient and they are in agreement.    Recreation Therapy Problems (Active)     Problem: Recreation Therapy     Dates: Start: 05/21/21        Goal: STG-Within one week, patient will demonstrate leisure problem solving     Dates: Start: 05/21/21       Description: by sharing on two positive lesiure activities they would like to participate in either in session or in his free time and any perceived barriers to their participation.       Goal: STG-Within one week, patient will     Dates: Start: 05/21/21       Description: Complete a cognitive leisure activity with 60% accuracy with Min A verbal cues.       Goal: LTG-By discharge, patient will demonstrate leisure problem solving     Dates: Start: 05/21/21       Description: by sharing on two positive lesiure activities they would like to participate in following discharge and any perceived barriers to their participation.            Goal: LTG-By discharge, patient will     Dates: Start: 05/21/21       Description: Complete a cognitive leisure activity with 80% accuracy with Min A verbal cues.

## 2021-05-21 NOTE — THERAPY
"Occupational Therapy  Daily Treatment     Patient Name: Thong Arzola  Age:  56 y.o., Sex:  male  Medical Record #: [id number]  Today's Date: 5/21/2021     Precautions  Precautions: (P) Fall Risk  Comments: (P) Helmet OOB; LUE 2-3 Modified Jeremiah Scale; Abdominal binder OOB; Brewer cath; L inattention & haylie         Subjective    \"Massage helps my arm loosen up.\"     Objective       05/21/21 1001   Precautions   Precautions Fall Risk   Comments Helmet OOB; LUE 2-3 Modified Jeremiah Scale; Abdominal binder OOB; Brewer cath; L inattention & haylie   Functional Level of Assist   Bed, Chair, Wheelchair Transfer Moderate Assist  (To L side)   Bed Chair Wheelchair Transfer Description Adaptive equipment;Increased time;Set-up of equipment;Squat pivot transfer to wheelchair;Verbal cueing;Assist with one limb   Interdisciplinary Plan of Care Collaboration   IDT Collaboration with  Speech Therapist   Patient Position at End of Therapy Seated;Other (Comments)  (speech therapy gym )   Collaboration Comments transition of care   OT Total Time Spent   OT Individual Total Time Spent (Mins) 30   OT Charge Group   OT Neuromuscular Re-education / Balance 1   OT Therapy Activity 1   Pt completed 3 transfers during the session. Pt required mod assist to transfer from bed>w/c and w/c>mat on L side and CGA-min assist from mat>to w/c on R. Pt able to direct care and inform the OT which side of the bed he would like to get out of and where to place the w/c.   While seated EOM pt received massage from scapular to finger tips. Pt cont to demonstrate increased tone in LUE. Pt stated he felt some pain when stretching wrist into extension. Pt's fingers do not have any increased tone and have full PROM.     Assessment    Pt tolerated session well focused on transfers and LUE function. Pt required one verbal and tacile cue to sit up straight at EOM. Pt was very pleasant and motivated to complete all tasks during therapy.   Strengths: Able to " follow instructions, Alert and oriented, Effective communication skills, Good insight into deficits/needs, Independent prior level of function, Manages pain appropriately, Motivated for self care and independence, Pleasant and cooperative, Supportive family, Willingly participates in therapeutic activities  Barriers: Bowel incontinence, Decreased endurance, Fatigue, Generalized weakness, Hemiplegia, Home accessibility, Orthostatic hypotension, Impaired activity tolerance, Impaired balance, Limited mobility, Spasticity    Plan     Sitting balance/core strengthening/midline orientation, ADL retraining, slideboard transfers progressing to lateral scoots or squat pivots to the right, L UE neuro re-ed/stretching, family training with spouse Anel; Bathing is not a goal of OT at this time (CNAs should be bathing patient); home evaluation to sister in law's house in [place]; pursue resting hand splint for left hand/wrist    Occupational Therapy Goals (Active)     Problem: Dressing     Dates: Start: 05/12/21       Goal: STG-Within one week, patient will dress LB     Dates: Start: 05/12/21       Goal Note filed on 05/19/21 1155 by Nicki Yancey MS,OTR/L     Requires Max A               Problem: Functional Transfers     Dates: Start: 05/19/21       Goal: STG-Within one week, patient will transfer to toilet     Dates: Start: 05/19/21       Description: 1) Individualized Goal: with min A using grab bar and commode over the toilet  2) Interventions:  OT Self Care/ADL, OT Cognitive Skill Dev, OT Manual Ther Technique, OT Neuro Re-Ed/Balance, OT Sensory Int Techniques, OT Therapeutic Activity, OT Evaluation, and OT Therapeutic Exercise            Problem: OT Long Term Goals     Dates: Start: 04/30/21       Goal: LTG-By discharge, patient will complete basic self care tasks     Dates: Start: 04/30/21       Description: 1) Individualized Goal:  with min to mod A using AE/AD/techniques as needed  2) Interventions:  OT E Stim  Attended, OT Self Care/ADL, OT Cognitive Skill Dev, OT Manual Ther Technique, OT Neuro Re-Ed/Balance, OT Sensory Int Techniques, OT Therapeutic Activity, OT Evaluation, and OT Therapeutic Exercise       Goal: LTG-By discharge, patient will perform bathroom transfers     Dates: Start: 04/30/21       Description: 1) Individualized Goal:  with max A x 1 person using slideboard versus squat pivot to the right  2) Interventions:  OT E Stim Attended, OT Self Care/ADL, OT Cognitive Skill Dev, OT Manual Ther Technique, OT Neuro Re-Ed/Balance, OT Sensory Int Techniques, OT Therapeutic Activity, OT Evaluation, and OT Therapeutic Exercise          Problem: Toileting     Dates: Start: 05/19/21       Goal: STG-Within one week, patient will complete toileting tasks     Dates: Start: 05/19/21       Description: 1) Individualized Goal: with mod A using grab bar and commode over the toilet  2) Interventions:  OT Self Care/ADL, OT Cognitive Skill Dev, OT Manual Ther Technique, OT Neuro Re-Ed/Balance, OT Sensory Int Techniques, OT Therapeutic Activity, OT Evaluation, and OT Therapeutic Exercise

## 2021-05-21 NOTE — PROGRESS NOTES
"Rehab Progress Note     Date of Service: 5/11/2021  Chief Complaint: follow up stroke    Interval Events (Subjective)    I am familiar with Mr. Arzola from the acute side and he appears much improved since the last time I saw him. His hemiparesis has not improved but his neglect and speech has. He denies new pain or AMS. I am informed that he has a repeat head CT ordered and this can take place at any time, no new symptoms to support this, but it was ordered by NSG and likely is routine follow up given the size and severity of his lesion.     ROS: No changes to bowel, bladder, pain, mood, or sleep.     Objective:  VITAL SIGNS: /89   Pulse 81   Temp 36.5 °C (97.7 °F) (Oral)   Resp 18   Ht 1.778 m (5' 10\")   Wt 68.6 kg (151 lb 3.8 oz)   SpO2 96%   BMI 21.70 kg/m²     Physical Exam:  Vitals: /89   Pulse 81   Temp 36.5 °C (97.7 °F) (Oral)   Resp 18   Ht 1.778 m (5' 10\")   Wt 68.6 kg (151 lb 3.8 oz)   SpO2 96%   Gen: NAD  Head: Hemicraniectomy present   Eyes/ Nose/ Mouth: PERRLA, moist mucous membranes  Cardio: RRR, good distal perfusion, warm extremities  Pulm: normal respiratory effort, no cyanosis   Abd: Soft NTND, negative borborygmi   Ext: No peripheral edema. No calf tenderness. No clubbing.  Mental status: answers questions appropriately follows commands  Speech: fluent, no aphasia or dysarthria      No results found for this or any previous visit (from the past 72 hour(s)).    Current Facility-Administered Medications   Medication Frequency   • meclizine (ANTIVERT) tablet 25 mg BID   • atorvastatin (LIPITOR) tablet 40 mg Q EVENING   • metoprolol tartrate (LOPRESSOR) tablet 25 mg BID   • senna-docusate (PERICOLACE or SENOKOT S) 8.6-50 MG per tablet 2 tablet Q EVENING   • baclofen (LIORESAL) tablet 20 mg TID   • gabapentin (NEURONTIN) capsule 100 mg Q EVENING   • melatonin tablet 3 mg QHS   • hydrOXYzine HCl (ATARAX) tablet 50 mg Q6HRS PRN   • Respiratory Therapy Consult Continuous RT   • " Pharmacy Consult Request ...Pain Management Review 1 Each PHARMACY TO DOSE   • hydrALAZINE (APRESOLINE) tablet 10 mg Q8HRS PRN   • acetaminophen (Tylenol) tablet 650 mg Q4HRS PRN   • lactulose 20 GM/30ML solution 30 mL QDAY PRN   • docusate sodium (ENEMEEZ) enema 283 mg QDAY PRN   • fleet enema 133 mL QDAY PRN   • artificial tears ophthalmic solution 1 Drop PRN   • benzocaine-menthol (CEPACOL) lozenge 1 Lozenge Q2HRS PRN   • mag hydrox-al hydrox-simeth (MAALOX PLUS ES or MYLANTA DS) suspension 20 mL Q2HRS PRN   • ondansetron (ZOFRAN ODT) dispertab 4 mg 4X/DAY PRN    Or   • ondansetron (ZOFRAN) syringe/vial injection 4 mg 4X/DAY PRN   • traZODone (DESYREL) tablet 50 mg QHS PRN   • sodium chloride (OCEAN) 0.65 % nasal spray 2 Spray PRN   • midazolam (VERSED) 5 mg/mL (1 mL vial) PRN   • mirtazapine (Remeron) orally disintegrating tab 15 mg QHS   • rivaroxaban (XARELTO) tablet 20 mg PM MEAL   • thyroid (ARMOUR THYROID) tablet 60 mg BID       Orders Placed This Encounter   Procedures   • Diet Order Diet: Level 7 - Easy to Chew (float pills in puree); Liquid level: Level 0 - Thin; Tray Modifications (optional): SLP - 1:1 Supervision by Nursing, SLP - Deliver to Nursing Station     Standing Status:   Standing     Number of Occurrences:   1     Order Specific Question:   Diet:     Answer:   Level 7 - Easy to Chew [22]     Comments:   float pills in puree     Order Specific Question:   Liquid level     Answer:   Level 0 - Thin     Order Specific Question:   Tray Modifications (optional)     Answer:   SLP - 1:1 Supervision by Nursing     Order Specific Question:   Tray Modifications (optional)     Answer:   SLP - Deliver to Nursing Station       Assessment:  Active Hospital Problems    Diagnosis    • *Acute right MCA stroke (HCC)    • Paroxysmal atrial fibrillation (HCC)    • Urinary retention    • Acquired hypothyroidism    • History of COVID-19    • Hypotension    • Normocytic anemia    • Spasticity as late effect of  cerebrovascular accident (CVA)    • Reactive depression      This patient is a 56 y.o. male admitted for acute inpatient rehabilitation with Acute right MCA stroke (HCC).    I led and attended the weekly conference, and agree with the IDT conference documentation and plan of care as noted below.    Date of conference: 5/19/2021    Goals and barriers: See IDT note.    Biggest barriers: left spastic hemiplegia, left neglect, incontinent of bowel, impaired activity tolerance/endurance, left sided abnormal sensation, impaired cognition and attention    Goals in next week: home evaluation    CM/social support: wife supportive, to go to 1  here in [place], wife's sister    Anticipated DC date: 6/4    Home health: PT/OT/SLP/RN - [facility]    Equip: MW with pressure relieving cushion    Follow up: PCP, Dr. Dumont, stroke bridge clinic, urology, neurosugery      Medical Decision Making and Plan:    Large right ICA/MCA ischemic stroke  S/p craniectomy 4/3, Dr. Payton  Left hemiplegia, minimal improvement  Cognitive deficits, improved  Left neglect, improved  Continue full rehab program  PT/OT/SLP, 1 hr each discipline, 5 days per week    Xarelto  Aspirin discontinued per neurology recommendations  Statin    Outpatient follow up with stroke bridge clinic, Dr. Dumont, referrals made    Outpatient follow up with Dr. Payton for cranioplasty    Making good progress, improved function    Neuropathic pain, improved/resolved  Left leg at night, improved/resolved  Continue low dose gabapentin 100 mg 5/11  Continue to monitor need to increase dose  Currently well controlled as of 5/20    Dizziness, improved  Scheduled Meclizine, start titration 5/20, TID --> BID  Continue Teds and abdominal binder for now  BPPV testing by PT negative    Spasticity, improved/stable  Increased baclofen 15 mg TID to 20 mg TID 4/30 --> 30 mg TID 5/3  Was decreased back down to 20 mg TID on 5/13 due to fatigue, with increased spasticity     Started  valium at night 2 mg 4/30, discontinued 5/3, doesn't seem to make much difference    Consider adding Zanaflex in future, LFTs on 5/5 with elevated ALT, rechecked 5/18, still elevated, not a good candidate for Zanaflex at this time    Will benefit from Botox - unable to tolerate higher doses of baclofen due to sedation/fatigue, unable to trial Zanaflex due to elevated ALT, trial of Valium was not effective and patient also too sedated    Dr. Dumont has ordered 400 units with plan to inject LUE as an outpatient after discharge from rehab    Elevated ALT  Decreased statin dose 80 mg to 40 mg 5/18  Recheck next week     Atrial fibrillation  Metoprolol  Xarelto     Hypothyroidism   [place] thyroid, repeat TSH in 4 weeks from admission, as dose was increased at acute     Hypertension  Hypotension, improved  Lisinopril discontinued  Metoprolol, dose increased  Flomax discontinued 5/12  Teds and abdominal binder    Insomnia, improved/resolved  Already on mirtazapine  Scheduled melatonin  Gabapentin added for neuropathic pain at night     Reactive depression  Mirtazapine  Consider psychology consult if needed   Mood currently stable     History of COVID-19  Without sequelae     Normocytic anemia  Mild, monitor    Bowel program  Continue bowel medications  Last BM 5/19    Bladder program  Urinary retention, continues  Unsuccessful voiding trials x 3, even on Flomax  Outpatient follow up with urology, referral made    DVT prophylaxis  Xarelto    Patient was seen for 27 minutes on unit/floor of which > 50% of time was spent on counseling and coordination of care regarding the above, including prognosis, risk reduction, benefits of treatment, and options for next stage of care.    Christofer Eisenberg D.O.   Physical Medicine and Rehabilitation

## 2021-05-21 NOTE — THERAPY
"Speech Language Pathology  Daily Treatment     Patient Name: Thong Arzola  Age:  56 y.o., Sex:  male  Medical Record #: [id number]  Today's Date: 5/21/2021     Precautions  Precautions: Fall Risk  Comments: Helmet OOB; LUE 2-3 Modified Jeremiah Scale; Abdominal binder OOB; Brewer cath; L inattention & haylie    Subjective    Pt pleasant and cooperative during tx.       Objective       05/21/21 1031   Cognition   Moderate Attention Minimal (4)   Visual Scanning / Cancellation Skills Moderate (3)   Functional Math / Financial Management Minimal (4)   SLP Total Time Spent   SLP Individual Total Time Spent (Mins) 60   Treatment Charges   SLP Cognitive Skill Development First 15 Minutes 1   SLP Cognitive Skill Development Additional 15 Minutes 3       Assessment    Alternating attn- Naming people for each letter of the alphabet- 92% independent; 100% Taz.  Single step word problems: 70% independent; 100% Taz.  Pt completed 4 words searches.  Accuracy progressed from 33% independent (first puzzle) to 70% on the last puzzle.  Pt benefits from min-mod cues to look to L, and to figure out which letter on which to focus.  Pt states, \"I'm stuck,\" but with verbal cues \"look left.  What letter are you searching for?, Which word are you searching for,\"  Pt is able to continue.      Strengths: Able to follow instructions, Alert and oriented, Supportive family, Pleasant and cooperative, Motivated for self care and independence, Willingly participates in therapeutic activities, Independent prior level of function  Barriers: Impaired functional cognition, Visual impairment, Impaired activity tolerance    Plan    Target basic word searches for attn/visual scanning, recall, and basic problem solving        Speech Therapy Problems (Active)     Problem: Problem Solving STGs     Dates: Start: 05/05/21       Goal: STG-Within one week, patient will     Dates: Start: 05/05/21       Description: 1) Individualized goal: demonstrate " appropriate visual scanning to the left with min cues and 80% accuracy.  2) Interventions:  SLP Speech Language Treatment, SLP Self Care / ADL Training , SLP Cognitive Skill Development, and SLP Group Treatment        Goal Note filed on 05/12/21 1054 by Min Gomez MS,CCC-SLP     Mod cues required for visual scanning to the left             Goal: STG-Within one week, patient will     Dates: Start: 05/05/21       Description: 1) Individualized goal:  complete simple attention tasks with min A with 80% accuracy.   2) Interventions:  SLP Speech Language Treatment, SLP Self Care / ADL Training , SLP Cognitive Skill Development, and SLP Group Treatment        Goal Note filed on 05/12/21 1054 by Min Gomez MS,CCC-SLP     Mod A required for simple attention tasks                Problem: Speech/Swallowing LTGs     Dates: Start: 04/30/21       Goal: LTG-By discharge, patient will solve complex problem     Dates: Start: 04/30/21       Description: 1) Individualized goal:  related to IADL's with mod I for safe d/c home.  2) Interventions:  SLP Speech Language Treatment, SLP Swallowing Dysfunction Treatment, SLP Oral Pharyngeal Evaluation, SLP Video Swallow Evaluation, SLP Self Care / ADL Training , SLP Cognitive Skill Development, and SLP Group Treatment

## 2021-05-21 NOTE — CARE PLAN
Problem: Safety  Goal: Will remain free from falls  Outcome: Progressing  Note: Patient educated on importance of utilizing call light to minimize the potential of injury from falls. Patient verbalizes understanding.       Problem: Infection  Goal: Will remain free from infection  Outcome: Progressing  Note: Education reinforced to wash hands thoroughly after using the restroom, prior to eating and throughout the day to minimize the potential of acquiring germs while in a facility setting. Patient engaged in conversation and verbalizes understanding.     The patient is Stable - Low risk of patient condition declining or worsening         Progress made toward(s) clinical / shift goals:  Patient very tired and sleeping intermittently through shift - no difficulties - medications discussed and purposes at med [id number].

## 2021-05-21 NOTE — THERAPY
Occupational Therapy  Daily Treatment     Patient Name: Thong Arzola  Age:  56 y.o., Sex:  male  Medical Record #: [id number]  Today's Date: 5/21/2021     Precautions  Precautions: (P) Fall Risk  Comments: (P) Helmet OOB; LUE 2-3 Modified Jeremiah Scale; Abdominal binder OOB; Brewer cath; L inattention & haylie         Subjective    Spouse Anel in room with patient for family training regarding DME needs at home.     Objective       05/21/21 1231   Precautions   Precautions Fall Risk   Comments Helmet OOB; LUE 2-3 Modified Jeremiah Scale; Abdominal binder OOB; Brewer cath; L inattention & haylie   Functional Level of Assist   Bed, Chair, Wheelchair Transfer Moderate Assist   Bed Chair Wheelchair Transfer Description Increased time;Requires lift;Set-up of equipment;Supervision for safety;Verbal cueing  (mod A SPT bed to w/c to the left with spouse assist)   Bed Mobility    Supine to Sit Moderate Assist   Scooting Moderate Assist   Skilled Intervention Verbal Cuing;Tactile Cuing;Sequencing;Facilitation   Interdisciplinary Plan of Care Collaboration   IDT Collaboration with  Family / Caregiver   Patient Position at End of Therapy Seated;Self Releasing Lap Belt Applied  (spouse taking patient back to room)   Collaboration Comments family training   OT Total Time Spent   OT Individual Total Time Spent (Mins) 60   OT Charge Group   OT Therapy Activity 4     Discussed DME needs for bathroom at patient's sister's house, including padded tub bench, handheld shower head and padded 3 in 1 commode for over the toilet.  Discussed a possible installation of a horizontal grab bar above shower handle that turns water on, but it is not mandatory.  Educated on purpose of and how to complete tub/shower transfer with tub bench.  Therapist assisted patient from w/c <> tub/shower with bench and mod A and verbal cues, then spouse assisted with the same transfer with mod A and verbal cues.    Discussed setting up a home eval for week of d/c  date, but no date and time set yet.  Spouse stated CNAs have not given patient a shower all week.  OT informed them he would talk with charge nurse for possible shower this weekend with a male CNA, as this is patient's preference.    Assessment    Spouse demonstrating good ability to assist patient with transfers, but needs continued practice with giving him verbal cues and fine tuning technique.  Strengths: Able to follow instructions, Alert and oriented, Effective communication skills, Good insight into deficits/needs, Independent prior level of function, Manages pain appropriately, Motivated for self care and independence, Pleasant and cooperative, Supportive family, Willingly participates in therapeutic activities  Barriers: Bowel incontinence, Decreased endurance, Fatigue, Generalized weakness, Hemiplegia, Home accessibility, Orthostatic hypotension, Impaired activity tolerance, Impaired balance, Limited mobility, Spasticity    Plan    ADL retraining, L UE neuro re-ed/stretching, family training with spouse Anel; Bathing is not a goal of OT at this time (CNAs should be bathing patient); home evaluation to sister in law's house in [place]; pursue resting hand splint for left hand/wrist    Occupational Therapy Goals (Active)     Problem: Dressing     Dates: Start: 05/12/21       Goal: STG-Within one week, patient will dress LB     Dates: Start: 05/12/21       Goal Note filed on 05/19/21 1155 by Nicki Yancey MS,OTR/L     Requires Max A               Problem: Functional Transfers     Dates: Start: 05/19/21       Goal: STG-Within one week, patient will transfer to toilet     Dates: Start: 05/19/21       Description: 1) Individualized Goal: with min A using grab bar and commode over the toilet  2) Interventions:  OT Self Care/ADL, OT Cognitive Skill Dev, OT Manual Ther Technique, OT Neuro Re-Ed/Balance, OT Sensory Int Techniques, OT Therapeutic Activity, OT Evaluation, and OT Therapeutic Exercise             Problem: OT Long Term Goals     Dates: Start: 04/30/21       Goal: LTG-By discharge, patient will complete basic self care tasks     Dates: Start: 04/30/21       Description: 1) Individualized Goal:  with min to mod A using AE/AD/techniques as needed  2) Interventions:  OT E Stim Attended, OT Self Care/ADL, OT Cognitive Skill Dev, OT Manual Ther Technique, OT Neuro Re-Ed/Balance, OT Sensory Int Techniques, OT Therapeutic Activity, OT Evaluation, and OT Therapeutic Exercise       Goal: LTG-By discharge, patient will perform bathroom transfers     Dates: Start: 04/30/21       Description: 1) Individualized Goal:  with max A x 1 person using slideboard versus squat pivot to the right  2) Interventions:  OT E Stim Attended, OT Self Care/ADL, OT Cognitive Skill Dev, OT Manual Ther Technique, OT Neuro Re-Ed/Balance, OT Sensory Int Techniques, OT Therapeutic Activity, OT Evaluation, and OT Therapeutic Exercise          Problem: Toileting     Dates: Start: 05/19/21       Goal: STG-Within one week, patient will complete toileting tasks     Dates: Start: 05/19/21       Description: 1) Individualized Goal: with mod A using grab bar and commode over the toilet  2) Interventions:  OT Self Care/ADL, OT Cognitive Skill Dev, OT Manual Ther Technique, OT Neuro Re-Ed/Balance, OT Sensory Int Techniques, OT Therapeutic Activity, OT Evaluation, and OT Therapeutic Exercise

## 2021-05-22 PROCEDURE — A9270 NON-COVERED ITEM OR SERVICE: HCPCS | Performed by: PHYSICAL MEDICINE & REHABILITATION

## 2021-05-22 PROCEDURE — 700102 HCHG RX REV CODE 250 W/ 637 OVERRIDE(OP): Performed by: HOSPITALIST

## 2021-05-22 PROCEDURE — 97129 THER IVNTJ 1ST 15 MIN: CPT

## 2021-05-22 PROCEDURE — 700102 HCHG RX REV CODE 250 W/ 637 OVERRIDE(OP): Performed by: PHYSICAL MEDICINE & REHABILITATION

## 2021-05-22 PROCEDURE — 97130 THER IVNTJ EA ADDL 15 MIN: CPT

## 2021-05-22 PROCEDURE — A9270 NON-COVERED ITEM OR SERVICE: HCPCS | Performed by: HOSPITALIST

## 2021-05-22 PROCEDURE — 770010 HCHG ROOM/CARE - REHAB SEMI PRIVAT*

## 2021-05-22 RX ADMIN — MIRTAZAPINE 15 MG: 15 TABLET, ORALLY DISINTEGRATING ORAL at 20:19

## 2021-05-22 RX ADMIN — SENNOSIDES AND DOCUSATE SODIUM 2 TABLET: 8.6; 5 TABLET ORAL at 20:19

## 2021-05-22 RX ADMIN — METOPROLOL TARTRATE 25 MG: 25 TABLET, FILM COATED ORAL at 09:20

## 2021-05-22 RX ADMIN — BACLOFEN 20 MG: 20 TABLET ORAL at 15:39

## 2021-05-22 RX ADMIN — THYROID, PORCINE 60 MG: 30 TABLET ORAL at 20:19

## 2021-05-22 RX ADMIN — MECLIZINE HYDROCHLORIDE 25 MG: 25 TABLET ORAL at 20:19

## 2021-05-22 RX ADMIN — THYROID, PORCINE 60 MG: 30 TABLET ORAL at 09:20

## 2021-05-22 RX ADMIN — ATORVASTATIN CALCIUM 40 MG: 40 TABLET, FILM COATED ORAL at 20:19

## 2021-05-22 RX ADMIN — GABAPENTIN 100 MG: 100 CAPSULE ORAL at 20:19

## 2021-05-22 RX ADMIN — MELATONIN TAB 3 MG 3 MG: 3 TAB at 20:19

## 2021-05-22 RX ADMIN — BACLOFEN 20 MG: 20 TABLET ORAL at 20:19

## 2021-05-22 RX ADMIN — RIVAROXABAN 20 MG: 20 TABLET, FILM COATED ORAL at 18:13

## 2021-05-22 RX ADMIN — MECLIZINE HYDROCHLORIDE 25 MG: 25 TABLET ORAL at 09:20

## 2021-05-22 RX ADMIN — BACLOFEN 20 MG: 20 TABLET ORAL at 09:20

## 2021-05-22 RX ADMIN — METOPROLOL TARTRATE 25 MG: 25 TABLET, FILM COATED ORAL at 20:20

## 2021-05-22 ASSESSMENT — PAIN DESCRIPTION - PAIN TYPE
TYPE: ACUTE PAIN
TYPE: ACUTE PAIN

## 2021-05-22 NOTE — THERAPY
Speech Language Pathology  Daily Treatment     Patient Name: Thong Arzola  Age:  56 y.o., Sex:  male  Medical Record #: [id number]  Today's Date: 5/22/2021     Precautions  Precautions: Fall Risk  Comments: Helmet OOB; LUE 2-3 Modified Jeremiah Scale; Abdominal binder OOB; Brewer cath; L inattention & haylie    Subjective    Pt pleasant and cooperative during tx.  Wife present and supportive.       Objective       05/22/21 1331   Cognition   Visual Scanning / Cancellation Skills Moderate (3)   Functional Memory Activities Supervision (5)   SLP Total Time Spent   SLP Individual Total Time Spent (Mins) 60   Treatment Charges   SLP Cognitive Skill Development First 15 Minutes 1   SLP Cognitive Skill Development Additional 15 Minutes 3       Assessment    Pt recalled daily events independently this day.  Pt completed 2 word searches.  Pt located targets with 58% accuracy independently; 100% given mod verbal cues to look left, and to focus on one word at time.  Alternating attn (male/female names): 84% independent; 100% Taz     Strengths: Able to follow instructions, Alert and oriented, Supportive family, Pleasant and cooperative, Motivated for self care and independence, Willingly participates in therapeutic activities, Independent prior level of function  Barriers: Impaired functional cognition, Visual impairment, Impaired activity tolerance    Plan    Attn, word search scanning/crossword, recall.        Speech Therapy Problems (Active)     Problem: Problem Solving STGs     Dates: Start: 05/05/21       Goal: STG-Within one week, patient will     Dates: Start: 05/05/21       Description: 1) Individualized goal: demonstrate appropriate visual scanning to the left with min cues and 80% accuracy.  2) Interventions:  SLP Speech Language Treatment, SLP Self Care / ADL Training , SLP Cognitive Skill Development, and SLP Group Treatment        Goal Note filed on 05/12/21 1054 by Min Gomez MS,CCC-SLP     Mod cues required  for visual scanning to the left             Goal: STG-Within one week, patient will     Dates: Start: 05/05/21       Description: 1) Individualized goal:  complete simple attention tasks with min A with 80% accuracy.   2) Interventions:  SLP Speech Language Treatment, SLP Self Care / ADL Training , SLP Cognitive Skill Development, and SLP Group Treatment        Goal Note filed on 05/12/21 1054 by Min Gomez MS,CCC-SLP     Mod A required for simple attention tasks                Problem: Speech/Swallowing LTGs     Dates: Start: 04/30/21       Goal: LTG-By discharge, patient will solve complex problem     Dates: Start: 04/30/21       Description: 1) Individualized goal:  related to IADL's with mod I for safe d/c home.  2) Interventions:  SLP Speech Language Treatment, SLP Swallowing Dysfunction Treatment, SLP Oral Pharyngeal Evaluation, SLP Video Swallow Evaluation, SLP Self Care / ADL Training , SLP Cognitive Skill Development, and SLP Group Treatment

## 2021-05-22 NOTE — CARE PLAN
Problem: Communication  Goal: The ability to communicate needs accurately and effectively will improve  Outcome: Progressing     Problem: Safety  Goal: Will remain free from injury  Outcome: Progressing  Goal: Will remain free from falls  Outcome: Progressing     Problem: Infection  Goal: Will remain free from infection  Outcome: Progressing     Problem: Venous Thromboembolism (VTW)/Deep Vein Thrombosis (DVT) Prevention:  Goal: Patient will participate in Venous Thrombosis (VTE)/Deep Vein Thrombosis (DVT)Prevention Measures  Outcome: Progressing     Problem: Bowel/Gastric:  Goal: Normal bowel function is maintained or improved  Outcome: Progressing   The patient is Stable - Low risk of patient condition declining or worsening         Progress made toward(s) clinical / shift goals:  No unsafe behaviors. Calls for assist 100%

## 2021-05-23 PROCEDURE — A9270 NON-COVERED ITEM OR SERVICE: HCPCS | Performed by: PHYSICAL MEDICINE & REHABILITATION

## 2021-05-23 PROCEDURE — 700102 HCHG RX REV CODE 250 W/ 637 OVERRIDE(OP): Performed by: HOSPITALIST

## 2021-05-23 PROCEDURE — 700102 HCHG RX REV CODE 250 W/ 637 OVERRIDE(OP): Performed by: PHYSICAL MEDICINE & REHABILITATION

## 2021-05-23 PROCEDURE — A9270 NON-COVERED ITEM OR SERVICE: HCPCS | Performed by: HOSPITALIST

## 2021-05-23 PROCEDURE — 97129 THER IVNTJ 1ST 15 MIN: CPT

## 2021-05-23 PROCEDURE — 97130 THER IVNTJ EA ADDL 15 MIN: CPT

## 2021-05-23 PROCEDURE — 770010 HCHG ROOM/CARE - REHAB SEMI PRIVAT*

## 2021-05-23 RX ADMIN — BACLOFEN 20 MG: 20 TABLET ORAL at 20:05

## 2021-05-23 RX ADMIN — SENNOSIDES AND DOCUSATE SODIUM 2 TABLET: 8.6; 5 TABLET ORAL at 20:05

## 2021-05-23 RX ADMIN — HYDRALAZINE HYDROCHLORIDE 10 MG: 10 TABLET, FILM COATED ORAL at 07:10

## 2021-05-23 RX ADMIN — RIVAROXABAN 20 MG: 20 TABLET, FILM COATED ORAL at 17:53

## 2021-05-23 RX ADMIN — BACLOFEN 20 MG: 20 TABLET ORAL at 15:33

## 2021-05-23 RX ADMIN — METOPROLOL TARTRATE 25 MG: 25 TABLET, FILM COATED ORAL at 20:05

## 2021-05-23 RX ADMIN — THYROID, PORCINE 60 MG: 30 TABLET ORAL at 20:05

## 2021-05-23 RX ADMIN — MECLIZINE HYDROCHLORIDE 25 MG: 25 TABLET ORAL at 20:05

## 2021-05-23 RX ADMIN — MELATONIN TAB 3 MG 3 MG: 3 TAB at 20:05

## 2021-05-23 RX ADMIN — GABAPENTIN 100 MG: 100 CAPSULE ORAL at 20:05

## 2021-05-23 RX ADMIN — ATORVASTATIN CALCIUM 40 MG: 40 TABLET, FILM COATED ORAL at 20:05

## 2021-05-23 RX ADMIN — MIRTAZAPINE 15 MG: 15 TABLET, ORALLY DISINTEGRATING ORAL at 20:05

## 2021-05-23 RX ADMIN — BACLOFEN 20 MG: 20 TABLET ORAL at 09:04

## 2021-05-23 RX ADMIN — MECLIZINE HYDROCHLORIDE 25 MG: 25 TABLET ORAL at 09:04

## 2021-05-23 RX ADMIN — THYROID, PORCINE 60 MG: 30 TABLET ORAL at 09:04

## 2021-05-23 RX ADMIN — METOPROLOL TARTRATE 25 MG: 25 TABLET, FILM COATED ORAL at 09:04

## 2021-05-23 ASSESSMENT — PAIN DESCRIPTION - PAIN TYPE
TYPE: ACUTE PAIN
TYPE: ACUTE PAIN

## 2021-05-23 ASSESSMENT — FIBROSIS 4 INDEX: FIB4 SCORE: 0.62

## 2021-05-23 NOTE — PROGRESS NOTES
BP better now 167/107 with machine 168/90 manually pulse 68 to 76. Now BP is 130/90 pt is resting in bed

## 2021-05-23 NOTE — THERAPY
Speech Language Pathology  Daily Treatment     Patient Name: Thong Arzola  Age:  56 y.o., Sex:  male  Medical Record #: [id number]  Today's Date: 5/23/2021     Precautions  Precautions: Fall Risk  Comments: Helmet OOB; LUE 2-3 Modified Jeremiah Scale; Abdominal binder OOB; Brewer cath; L inattention & haylie    Subjective    Pt pleasant and cooperative during tx.  Wife present and supportive.      Objective       05/23/21 1431   Cognition   Visual Scanning / Cancellation Skills Moderate (3)   Interdisciplinary Plan of Care Collaboration   IDT Collaboration with  Family / Caregiver   Collaboration Comments wife present during tx.    SLP Total Time Spent   SLP Individual Total Time Spent (Mins) 60   Treatment Charges   SLP Cognitive Skill Development First 15 Minutes 1   SLP Cognitive Skill Development Additional 15 Minutes 3       Assessment    Large word search: 50% given min-mod verbal cues to attend to left.  8 clue crossword puzzle: 100% independent.  Pt initiated 20 clue crossword.      Strengths: Able to follow instructions, Alert and oriented, Supportive family, Pleasant and cooperative, Motivated for self care and independence, Willingly participates in therapeutic activities, Independent prior level of function  Barriers: Impaired functional cognition, Visual impairment, Impaired activity tolerance    Plan    Complete crossword puzzle, and word search (in chart) .  Attn, recall.         Speech Therapy Problems (Active)     Problem: Problem Solving STGs     Dates: Start: 05/05/21       Goal: STG-Within one week, patient will     Dates: Start: 05/05/21       Description: 1) Individualized goal: demonstrate appropriate visual scanning to the left with min cues and 80% accuracy.  2) Interventions:  SLP Speech Language Treatment, SLP Self Care / ADL Training , SLP Cognitive Skill Development, and SLP Group Treatment        Goal Note filed on 05/12/21 1054 by Min Gomez MS,CCC-SLP     Mod cues required for  visual scanning to the left             Goal: STG-Within one week, patient will     Dates: Start: 05/05/21       Description: 1) Individualized goal:  complete simple attention tasks with min A with 80% accuracy.   2) Interventions:  SLP Speech Language Treatment, SLP Self Care / ADL Training , SLP Cognitive Skill Development, and SLP Group Treatment        Goal Note filed on 05/12/21 1054 by Min Gomez MS,CCC-SLP     Mod A required for simple attention tasks                Problem: Speech/Swallowing LTGs     Dates: Start: 04/30/21       Goal: LTG-By discharge, patient will solve complex problem     Dates: Start: 04/30/21       Description: 1) Individualized goal:  related to IADL's with mod I for safe d/c home.  2) Interventions:  SLP Speech Language Treatment, SLP Swallowing Dysfunction Treatment, SLP Oral Pharyngeal Evaluation, SLP Video Swallow Evaluation, SLP Self Care / ADL Training , SLP Cognitive Skill Development, and SLP Group Treatment

## 2021-05-23 NOTE — CARE PLAN
Problem: Communication  Goal: The ability to communicate needs accurately and effectively will improve  Outcome: Progressing  Mom coming in tomorrow to see pt.     Problem: Safety  Goal: Will remain free from injury  Outcome: Progressing  Wife here most of day will not be here tomorrow  Goal: Will remain free from falls  Outcome: Progressing  Calls for assistance and and waits for response     Problem: Infection  Goal: Will remain free from infection  Outcome: Progressing  No s/s of infection noted

## 2021-05-24 PROCEDURE — A9270 NON-COVERED ITEM OR SERVICE: HCPCS | Performed by: PHYSICAL MEDICINE & REHABILITATION

## 2021-05-24 PROCEDURE — 97530 THERAPEUTIC ACTIVITIES: CPT

## 2021-05-24 PROCEDURE — 700102 HCHG RX REV CODE 250 W/ 637 OVERRIDE(OP): Performed by: HOSPITALIST

## 2021-05-24 PROCEDURE — 97112 NEUROMUSCULAR REEDUCATION: CPT

## 2021-05-24 PROCEDURE — 770010 HCHG ROOM/CARE - REHAB SEMI PRIVAT*

## 2021-05-24 PROCEDURE — 700102 HCHG RX REV CODE 250 W/ 637 OVERRIDE(OP): Performed by: PHYSICAL MEDICINE & REHABILITATION

## 2021-05-24 PROCEDURE — 97129 THER IVNTJ 1ST 15 MIN: CPT

## 2021-05-24 PROCEDURE — 97150 GROUP THERAPEUTIC PROCEDURES: CPT

## 2021-05-24 PROCEDURE — 97535 SELF CARE MNGMENT TRAINING: CPT

## 2021-05-24 PROCEDURE — 99231 SBSQ HOSP IP/OBS SF/LOW 25: CPT | Performed by: PHYSICAL MEDICINE & REHABILITATION

## 2021-05-24 PROCEDURE — A9270 NON-COVERED ITEM OR SERVICE: HCPCS | Performed by: HOSPITALIST

## 2021-05-24 PROCEDURE — 97130 THER IVNTJ EA ADDL 15 MIN: CPT

## 2021-05-24 RX ADMIN — METOPROLOL TARTRATE 25 MG: 25 TABLET, FILM COATED ORAL at 08:28

## 2021-05-24 RX ADMIN — BACLOFEN 20 MG: 20 TABLET ORAL at 08:28

## 2021-05-24 RX ADMIN — HYDRALAZINE HYDROCHLORIDE 10 MG: 10 TABLET, FILM COATED ORAL at 06:20

## 2021-05-24 RX ADMIN — BACLOFEN 20 MG: 20 TABLET ORAL at 20:21

## 2021-05-24 RX ADMIN — BACLOFEN 20 MG: 20 TABLET ORAL at 14:59

## 2021-05-24 RX ADMIN — MIRTAZAPINE 15 MG: 15 TABLET, ORALLY DISINTEGRATING ORAL at 20:21

## 2021-05-24 RX ADMIN — THYROID, PORCINE 60 MG: 30 TABLET ORAL at 08:28

## 2021-05-24 RX ADMIN — RIVAROXABAN 20 MG: 20 TABLET, FILM COATED ORAL at 17:12

## 2021-05-24 RX ADMIN — ATORVASTATIN CALCIUM 40 MG: 40 TABLET, FILM COATED ORAL at 20:21

## 2021-05-24 RX ADMIN — GABAPENTIN 100 MG: 100 CAPSULE ORAL at 20:22

## 2021-05-24 RX ADMIN — THYROID, PORCINE 60 MG: 30 TABLET ORAL at 20:23

## 2021-05-24 RX ADMIN — MELATONIN TAB 3 MG 3 MG: 3 TAB at 20:22

## 2021-05-24 RX ADMIN — MECLIZINE HYDROCHLORIDE 25 MG: 25 TABLET ORAL at 08:28

## 2021-05-24 RX ADMIN — METOPROLOL TARTRATE 25 MG: 25 TABLET, FILM COATED ORAL at 20:22

## 2021-05-24 RX ADMIN — MECLIZINE HYDROCHLORIDE 25 MG: 25 TABLET ORAL at 20:21

## 2021-05-24 ASSESSMENT — GAIT ASSESSMENTS
ASSISTIVE DEVICE: PARALLEL BARS;OTHER (COMMENTS)
GAIT LEVEL OF ASSIST: TOTAL ASSIST X 2
DISTANCE (FEET): 10

## 2021-05-24 ASSESSMENT — ACTIVITIES OF DAILY LIVING (ADL)
BED_CHAIR_WHEELCHAIR_TRANSFER_DESCRIPTION: ADAPTIVE EQUIPMENT;SQUAT PIVOT TRANSFER TO WHEELCHAIR
TUB_SHOWER_TRANSFER_DESCRIPTION: GRAB BAR;SHOWER BENCH;SET-UP OF EQUIPMENT;SUPERVISION FOR SAFETY;VERBAL CUEING
TOILET_TRANSFER_DESCRIPTION: GRAB BAR;ADAPTIVE EQUIPMENT;SET-UP OF EQUIPMENT;SUPERVISION FOR SAFETY;VERBAL CUEING

## 2021-05-24 NOTE — THERAPY
Occupational Therapy  Daily Treatment     Patient Name: Thong Arzola  Age:  56 y.o., Sex:  male  Medical Record #: [id number]  Today's Date: 5/24/2021     Precautions  Precautions: (P) Fall Risk  Comments: (P) Helmet OOB; LUE 2-3 Modified Jeremiah Scale; Abdominal binder OOB; Brewer cath; L inattention & haylie         Subjective    Pt seated in t-dine, pleasant and cooperative.      Objective       05/24/21 0801   Precautions   Precautions Fall Risk   Comments Helmet OOB; LUE 2-3 Modified Jeremiah Scale; Abdominal binder OOB; Brewer cath; L inattention & haylie   Functional Level of Assist   Eating Supervision   Eating Description Set-up of equipment or meal/tube feeding;Supervision for safety;Verbal cueing   Interdisciplinary Plan of Care Collaboration   IDT Collaboration with  Therapy Tech   Patient Position at End of Therapy Seated;Other (Comments)  (tech assist pt back to room after breakfast)   OT Total Time Spent   OT Group Total Time Spent (Mins) 30   OT Charge Group   OT Group Therapy Group Activities     Self-feeding Assessment during meal:     - Ability to grasp utensils: RUE dynamic tripod grasp    - Ability to bring food to mouth: Mod I  - Ability to bring liquids to mouth: Mod I  - Ability to open packages: Min-Mod A  - Ability to cut food: with fork SBA  - Ability to manage body posture: needs assist to position w/c at table, intermittent cues for L side attention.   - Ability to maintain a clean appearance: good  - Behavior/socialization skills: appropriate with no issues     - Primary barriers to self-feeding: non-functional LUE, spasticity, L neglect  - Adaptive equipment/ Compensatory strategy recommendations: receptive when cues are provided for increase scanning to the L side d/t neglect and using haylie-techniques when opening packages. Ongoing edu was provided on compensatory strategies when opening items - pt able to use L hand/body to stabilize bowl of fruit when removing plastic top with R  hand. Pt also able to spread butter when therapist assisted with stabilizing condiment on table    Assessment    Pt seen during breakfast meal in OT Self-feeding/AE Group. Pt was able to eat approximately 100 % of meal and required set-up/supv assist overall. Pt con't to require min-mod A for carryover with compensatory strategies to complete set up/open packages. Pt improving with scanning to left side of tray. Pt would continue to benefit from the OT group in order to improve independence and confidence with self-feeding skills.  Strengths: Able to follow instructions, Alert and oriented, Effective communication skills, Good insight into deficits/needs, Independent prior level of function, Manages pain appropriately, Motivated for self care and independence, Pleasant and cooperative, Supportive family, Willingly participates in therapeutic activities  Barriers: Bowel incontinence, Decreased endurance, Fatigue, Generalized weakness, Hemiplegia, Home accessibility, Orthostatic hypotension, Impaired activity tolerance, Impaired balance, Limited mobility, Spasticity    Plan    Continue OT Self-feeding/AE Group 2-3x/week - continue to progress use of haylie-techniques during set-up and self-feeding    Occupational Therapy Goals (Active)     Problem: Dressing     Dates: Start: 05/12/21       Goal: STG-Within one week, patient will dress LB     Dates: Start: 05/12/21       Goal Note filed on 05/19/21 1155 by Nicki Yancey MS,OTR/L     Requires Max A               Problem: Functional Transfers     Dates: Start: 05/19/21       Goal: STG-Within one week, patient will transfer to toilet     Dates: Start: 05/19/21       Description: 1) Individualized Goal: with min A using grab bar and commode over the toilet  2) Interventions:  OT Self Care/ADL, OT Cognitive Skill Dev, OT Manual Ther Technique, OT Neuro Re-Ed/Balance, OT Sensory Int Techniques, OT Therapeutic Activity, OT Evaluation, and OT Therapeutic Exercise             Problem: OT Long Term Goals     Dates: Start: 04/30/21       Goal: LTG-By discharge, patient will complete basic self care tasks     Dates: Start: 04/30/21       Description: 1) Individualized Goal:  with min to mod A using AE/AD/techniques as needed  2) Interventions:  OT E Stim Attended, OT Self Care/ADL, OT Cognitive Skill Dev, OT Manual Ther Technique, OT Neuro Re-Ed/Balance, OT Sensory Int Techniques, OT Therapeutic Activity, OT Evaluation, and OT Therapeutic Exercise       Goal: LTG-By discharge, patient will perform bathroom transfers     Dates: Start: 04/30/21       Description: 1) Individualized Goal:  with max A x 1 person using slideboard versus squat pivot to the right  2) Interventions:  OT E Stim Attended, OT Self Care/ADL, OT Cognitive Skill Dev, OT Manual Ther Technique, OT Neuro Re-Ed/Balance, OT Sensory Int Techniques, OT Therapeutic Activity, OT Evaluation, and OT Therapeutic Exercise          Problem: Toileting     Dates: Start: 05/19/21       Goal: STG-Within one week, patient will complete toileting tasks     Dates: Start: 05/19/21       Description: 1) Individualized Goal: with mod A using grab bar and commode over the toilet  2) Interventions:  OT Self Care/ADL, OT Cognitive Skill Dev, OT Manual Ther Technique, OT Neuro Re-Ed/Balance, OT Sensory Int Techniques, OT Therapeutic Activity, OT Evaluation, and OT Therapeutic Exercise

## 2021-05-24 NOTE — PROGRESS NOTES
"Rehab Progress Note     Date of Service: 5/11/2021  Chief Complaint: follow up stroke    Interval Events (Subjective)    Patient seen and examined today in his room, then later in the PT gym. He is able to take some steps in the parallel bars using the Vector.  Patient reports he's sleeping OK.  His wife has been trained for transfers.  Patient is scheduled to have follow up Head CT tomorrow, ordered by neurosurgery.  He has no new complaints.     ROS: No changes to bowel, bladder, pain, mood, or sleep.     Objective:  VITAL SIGNS: /93   Pulse 89   Temp 36.4 °C (97.6 °F) (Oral)   Resp 17   Ht 1.778 m (5' 10\")   Wt 70 kg (154 lb 5.2 oz)   SpO2 98%   BMI 22.14 kg/m²   Gen: alert, no apparent distress  Neuro: notable for spastic left hemiplegia    No results found for this or any previous visit (from the past 72 hour(s)).    Current Facility-Administered Medications   Medication Frequency   • meclizine (ANTIVERT) tablet 25 mg BID   • atorvastatin (LIPITOR) tablet 40 mg Q EVENING   • metoprolol tartrate (LOPRESSOR) tablet 25 mg BID   • senna-docusate (PERICOLACE or SENOKOT S) 8.6-50 MG per tablet 2 tablet Q EVENING   • baclofen (LIORESAL) tablet 20 mg TID   • gabapentin (NEURONTIN) capsule 100 mg Q EVENING   • melatonin tablet 3 mg QHS   • hydrOXYzine HCl (ATARAX) tablet 50 mg Q6HRS PRN   • Respiratory Therapy Consult Continuous RT   • Pharmacy Consult Request ...Pain Management Review 1 Each PHARMACY TO DOSE   • hydrALAZINE (APRESOLINE) tablet 10 mg Q8HRS PRN   • acetaminophen (Tylenol) tablet 650 mg Q4HRS PRN   • lactulose 20 GM/30ML solution 30 mL QDAY PRN   • docusate sodium (ENEMEEZ) enema 283 mg QDAY PRN   • fleet enema 133 mL QDAY PRN   • artificial tears ophthalmic solution 1 Drop PRN   • benzocaine-menthol (CEPACOL) lozenge 1 Lozenge Q2HRS PRN   • mag hydrox-al hydrox-simeth (MAALOX PLUS ES or MYLANTA DS) suspension 20 mL Q2HRS PRN   • ondansetron (ZOFRAN ODT) dispertab 4 mg 4X/DAY PRN    Or   • " ondansetron (ZOFRAN) syringe/vial injection 4 mg 4X/DAY PRN   • traZODone (DESYREL) tablet 50 mg QHS PRN   • sodium chloride (OCEAN) 0.65 % nasal spray 2 Spray PRN   • midazolam (VERSED) 5 mg/mL (1 mL vial) PRN   • mirtazapine (Remeron) orally disintegrating tab 15 mg QHS   • rivaroxaban (XARELTO) tablet 20 mg PM MEAL   • thyroid (ARMOUR THYROID) tablet 60 mg BID       Orders Placed This Encounter   Procedures   • Diet Order Diet: Level 7 - Easy to Chew (float pills in puree); Liquid level: Level 0 - Thin; Tray Modifications (optional): SLP - 1:1 Supervision by Nursing, SLP - Deliver to Nursing Station     Standing Status:   Standing     Number of Occurrences:   1     Order Specific Question:   Diet:     Answer:   Level 7 - Easy to Chew [22]     Comments:   float pills in puree     Order Specific Question:   Liquid level     Answer:   Level 0 - Thin     Order Specific Question:   Tray Modifications (optional)     Answer:   SLP - 1:1 Supervision by Nursing     Order Specific Question:   Tray Modifications (optional)     Answer:   SLP - Deliver to Nursing Station       Assessment:  Active Hospital Problems    Diagnosis    • *Acute right MCA stroke (HCC)    • Paroxysmal atrial fibrillation (HCC)    • Urinary retention    • Acquired hypothyroidism    • History of COVID-19    • Hypotension    • Normocytic anemia    • Spasticity as late effect of cerebrovascular accident (CVA)    • Reactive depression      This patient is a 56 y.o. male admitted for acute inpatient rehabilitation with Acute right MCA stroke (HCC).    I led and attended the weekly conference, and agree with the IDT conference documentation and plan of care as noted below.    Date of conference: 5/19/2021    Goals and barriers: See IDT note.    Biggest barriers: left spastic hemiplegia, left neglect, incontinent of bowel, impaired activity tolerance/endurance, left sided abnormal sensation, impaired cognition and attention    Goals in next week: home  evaluation    CM/social support: wife supportive, to go to 1  here in [place], wife's sister    Anticipated DC date: 6/4    Home health: PT/OT/SLP/RN - [facility]    Equip: [facility] [place] [facility] with pressure relieving cushion    Follow up: PCP, Dr. Dumont, stroke bridge clinic, urology, neurosugery      Medical Decision Making and Plan:    Large right ICA/MCA ischemic stroke  S/p craniectomy 4/3, Dr. Payton  Left hemiplegia, minimal improvement  Cognitive deficits, improved  Left neglect, improved  Continue full rehab program  PT/OT/SLP, 1 hr each discipline, 5 days per week    Xarelto  Aspirin discontinued per neurology recommendations  Statin    Outpatient follow up with stroke bridge clinic, Dr. Dumont, referrals made    Outpatient follow up with Dr. Payton for cranioplasty, scheduled for follow up Head CT tomorrow morning    Making good progress, improved function    Neuropathic pain, resolved  Left leg at night, resolved  Continue low dose gabapentin 100 mg 5/11  Continue to monitor need to increase dose  Currently well controlled as of 5/24    Dizziness, improved  Scheduled Meclizine, start titration 5/20, TID --> BID  Continue Teds and abdominal binder for now  BPPV testing by PT negative    Spasticity, improved/stable  Increased baclofen 15 mg TID to 20 mg TID 4/30 --> 30 mg TID 5/3  Was decreased back down to 20 mg TID on 5/13 due to fatigue, with increased spasticity     Started valium at night 2 mg 4/30, discontinued 5/3, doesn't seem to make much difference, patient also too sedated    Consider adding Zanaflex in future, LFTs on 5/5 with elevated ALT, rechecked 5/18, still elevated, not a good candidate for Zanaflex at this time    Will benefit from Botox - unable to tolerate higher doses of baclofen due to sedation/fatigue, unable to trial Zanaflex due to elevated ALT, trial of Valium was not effective and patient also too sedated    Dr. Dumont has ordered 400 units with plan to inject LUE as an outpatient after  discharge from rehab    Elevated ALT  Decreased statin dose 80 mg to 40 mg 5/18  Recheck this week, 5/27     Atrial fibrillation  Metoprolol  Xarelto     Hypothyroidism   [place] thyroid, repeat TSH in 4 weeks from admission, as dose was increased at acute  Order on 5/27     Hypertension  Hypotension, improved  Lisinopril discontinued  Metoprolol, dose increased  Flomax discontinued 5/12  Teds and abdominal binder    Insomnia, improved/resolved  Already on mirtazapine  Scheduled melatonin  Gabapentin added for neuropathic pain at night     Reactive depression  Mirtazapine  Consider psychology consult if needed   Mood currently stable     History of COVID-19  Without sequelae     Normocytic anemia  Mild, monitor    Bowel program  Continue bowel medications  Last BM 5/24    Bladder program  Urinary retention, continues  Unsuccessful voiding trials x 3, even on Flomax  Outpatient follow up with urology, referral made    DVT prophylaxis  Xarelto    Total time:  15 minutes.  I spent greater than 50% of the time for patient care, counseling, and coordination on this date, including patient face-to face time, unit/floor time with review of records/pertinent lab data and studies, as well as discussing diagnostic evaluation/work up, planned therapeutic interventions, and future disposition of care, as per the interval events/subjective and the assessment and plan as noted above.    I have performed a physical exam, reviewed and updated ROS, as well as the assessment and plan today 5/24/2021. In review of note from 5/20/2021 there are no new changes except as documented above.      Radha Monge M.D.   Physical Medicine and Rehabilitation

## 2021-05-24 NOTE — THERAPY
Physical Therapy   Daily Treatment     Patient Name: Thong Arzola  Age:  56 y.o., Sex:  male  Medical Record #: [id number]  Today's Date: 5/24/2021     Precautions  Precautions: (P) Fall Risk  Comments: (P) Helmet OOB; LUE 2-3 Modified Jeremiah Scale; Abdominal binder OOB; Brewer cath; L inattention & haylie    Subjective    Pt was agreeable to POC, c/o fatigue during gait training.      Objective       05/24/21 1031   Precautions   Precautions Fall Risk   Comments Helmet OOB; LUE 2-3 Modified Jeremiah Scale; Abdominal binder OOB; Brewer cath; L inattention & haylie   Cognition    Sequencing Impaired   Gait Functional Level of Assist    Gait Level Of Assist Total Assist X 2   Assistive Device Parallel Bars;Other (Comments)  (Vector Harness)   Distance (Feet) 10   # of Times Distance was Traveled 5   Deviation Step To;Ataxic;Decreased Base Of Support;Bradykinetic;Decreased Heel Strike;Decreased Toe Off;Other (Comment)  (Non-functional at this time, 20lb BWS, Ace wrap LLE)   Wheelchair Functional Level of Assist   Wheelchair Assist Supervised   Distance Wheelchair (Feet or Distance) 250   Wheelchair Description Extra time;Verbal cueing;Supervision for safety;Safety concerns  (VC to attend to left side/ scan/ object avoidance)   Transfer Functional Level of Assist   Bed, Chair, Wheelchair Transfer Minimal Assist   Bed Chair Wheelchair Transfer Description Adaptive equipment;Squat pivot transfer to wheelchair  (to left)   Bed Mobility    Sit to Stand Contact Guard Assist   Neuro-Muscular Treatments   Neuro-Muscular Treatments Weight Shift Left;Weight Shift Right;Verbal Cuing;Tactile Cuing;Sequencing;Postural Facilitation;Postural Changes;Joint Approximation;Facilitation;Compensatory Strategies   Comments Facilitation to place LLE after swing through, faciliation to block LLE and inc knee ext in stance phase, facilitation for midline symmetry/ pt tends to lean right/ fwd. Education to use vertical objects to find symmetry.   Standing x 2- 3 min while donning Vector harness.    Interdisciplinary Plan of Care Collaboration   IDT Collaboration with  Physician;Therapy Tech   Patient Position at End of Therapy Seated  (handoff to therapy tech for T Dine)   Collaboration Comments AFO likely not appropriate at this time; not functionally ambulatory. POC.    Strengths & Barriers   Strengths Alert and oriented;Effective communication skills;Independent prior level of function;Motivated for self care and independence;Pleasant and cooperative;Supportive family;Willingly participates in therapeutic activities   Barriers Hemiparesis;Home accessibility;Orthostatic hypotension;Impaired activity tolerance;Impaired balance;Spasticity;Limited mobility   PT Total Time Spent   PT Individual Total Time Spent (Mins) 60   PT Charge Group   PT Neuromuscular Re-Education / Balance 2   PT Therapeutic Activities 2       Assessment    Do not anticipate AFO at this time, due to non-functional gait training. Pregait focus: symmetry, body awareness, midline orientation, forced use, LLE WB. Pt continues to require significant external support for dynamic balance, LLE stability, and placement after swing at this time.     Strengths: (P) Alert and oriented, Effective communication skills, Independent prior level of function, Motivated for self care and independence, Pleasant and cooperative, Supportive family, Willingly participates in therapeutic activities  Barriers: (P) Hemiparesis, Home accessibility, Orthostatic hypotension, Impaired activity tolerance, Impaired balance, Spasticity, Limited mobility    Plan    Continue to assess w/c needs, w/c mobility using R haylie technique, transfer training with focus on sequencing, standing posture and balance, gait training/pre gait in // bars and with Vector [place], continue family training.    Passport items to be completed:  Get in/out of bed safely, in/out of a vehicle, safely use mobility device, walk or wheel around  home/community, navigate up and down stairs, show how to get up/down from the ground, ensure home is accessible, demonstrate HEP, complete caregiver training    Physical Therapy Problems (Active)     Problem: Mobility     Dates: Start: 04/30/21       Goal: STG-Within one week, patient will propel wheelchair household distances     Dates: Start: 04/30/21       Description: 1) Individualized goal:  25ft with haylie technique with SBA on even surfaces  2) Interventions:  PT Group Therapy, PT E Stim Attended, PT Orthotics Training, PT Gait Training, PT Self Care/Home Eval, PT Therapeutic Exercises, PT Neuro Re-Ed/Balance, PT Therapeutic Activity, and PT Evaluation      Goal Note filed on 05/19/21 0838 by Neena Gastelum DPT     Pt continues to require assist for steering            Goal: STG-Within one week, patient will ambulate household distance     Dates: Start: 05/19/21       Description: 1) Individualized goal:  AMB x 10 feet in // bars with mod A  2) Interventions:  PT Group Therapy, PT E Stim Attended, PT Gait Training, PT Therapeutic Exercises, PT Neuro Re-Ed/Balance, PT Aquatic Therapy, PT Therapeutic Activity, PT Manual Therapy, and PT Evaluation      Goal Note filed on 05/19/21 0839 by Neena Gastelum DPT     1) Individualized goal:  AMB x 10 feet in // bars with mod A  2) Interventions:  PT Group Therapy, PT E Stim Attended, PT Gait Training, PT Therapeutic Exercises, PT Neuro Re-Ed/Balance, PT Aquatic Therapy, PT Therapeutic Activity, PT Manual Therapy, and PT Evaluation               Problem: Mobility Transfers     Dates: Start: 05/19/21       Goal: STG-Within one week, patient will transfer bed to chair     Dates: Start: 05/19/21       Description: 1) Individualized goal:  SQPT with SBA  2) Interventions:  PT Group Therapy, PT E Stim Attended, PT Gait Training, PT Therapeutic Exercises, PT Neuro Re-Ed/Balance, PT Aquatic Therapy, PT Therapeutic Activity, PT Manual Therapy, and PT Evaluation      Goal  Note filed on 05/19/21 0839 by Neena Gastelum DPT     1) Individualized goal:  SQPT with SBA  2) Interventions:  PT Group Therapy, PT E Stim Attended, PT Gait Training, PT Therapeutic Exercises, PT Neuro Re-Ed/Balance, PT Aquatic Therapy, PT Therapeutic Activity, PT Manual Therapy, and PT Evaluation               Problem: PT-Long Term Goals     Dates: Start: 04/30/21       Goal: LTG-By discharge, patient will propel wheelchair     Dates: Start: 04/30/21       Description: 1) Individualized goal:  100ft with haylie technique and [place] on even/uneven surfaces  2) Interventions:  PT Group Therapy, PT E Stim Attended, PT Orthotics Training, PT Gait Training, PT Self Care/Home Eval, PT Therapeutic Exercises, PT Neuro Re-Ed/Balance, PT Therapeutic Activity, and PT Evaluation       Goal: LTG-By discharge, patient will transfer one surface to another     Dates: Start: 04/30/21       Description: 1) Individualized goal:  SQPT with Taz   2) Interventions:  PT Group Therapy, PT E Stim Attended, PT Orthotics Training, PT Gait Training, PT Self Care/Home Eval, PT Therapeutic Exercises, PT Neuro Re-Ed/Balance, PT Therapeutic Activity, and PT Evaluation         Goal: LTG-By discharge, patient will     Dates: Start: 04/30/21       Description: 1) Individualized goal: perform bed mobility (supine<>sit) with Taz   2) Interventions:  PT Group Therapy, PT E Stim Attended, PT Orthotics Training, PT Gait Training, PT Self Care/Home Eval, PT Therapeutic Exercises, PT Neuro Re-Ed/Balance, PT Therapeutic Activity, and PT Evaluation

## 2021-05-24 NOTE — THERAPY
Occupational Therapy  Daily Treatment     Patient Name: Thong Arzola  Age:  56 y.o., Sex:  male  Medical Record #: [id number]  Today's Date: 5/24/2021     Precautions  Precautions: Fall Risk  Comments: Helmet OOB; LUE 2-3 Modified Jeremiah Scale; Abdominal binder OOB; Brewer cath; L inattention & haylie         Subjective    Patient laying in bed asleep.  Easily awoken and agreeable to shower.  Wanted to start by using the toilet.     Objective       05/24/21 0701   Precautions   Precautions Fall Risk   Comments Helmet OOB; LUE 2-3 Modified Jeremiah Scale; Abdominal binder OOB; Brewer cath; L inattention & haylie   Cognition    Level of Consciousness Alert   New Learning Impaired   Attention Impaired   Sequencing Impaired   Comments impaired attention to left side   Functional Level of Assist   Eating Supervision   Eating Description Set-up of equipment or meal/tube feeding;Supervision for safety   Grooming Supervision;Seated   Grooming Description Set-up of equipment;Supervision for safety;Seated in wheelchair at sink   Bathing Moderate Assist   Bathing Description Grab bar;Hand held shower;Tub bench;Assit wtih lower extremities;Assit with perineal;Set-up of equipment;Supervision for safety;Verbal cueing  (assist w/ lower legs, buttocks, R UE, cues and AE)   Upper Body Dressing Minimal Assist   Upper Body Dressing Description Set-up of equipment;Supervision for safety;Verbal cueing  (assist w/ 2/8 tasks to don/doff pullover)   Lower Body Dressing Maximal Assist   Lower Body Dressing Description Grab bar;Supervision for safety;Set-up of equipment;Verbal cueing   Toileting Maximal Assist   Toileting Description Set-up of equipment;Supervision for safety;Adaptive equipment;Grab bar;Verbal cueing  (assist w/ clothing mgmt, able to perform hygiene w/ cues)   Toilet Transfers Minimal Assist   Toilet Transfer Description Grab bar;Adaptive equipment;Set-up of equipment;Supervision for safety;Verbal cueing  (min A SPT w/ gb  and commode over toilet)   Tub / Shower Transfers Minimal Assist   Tub Shower Transfer Description Grab bar;Shower bench;Set-up of equipment;Supervision for safety;Verbal cueing  (min A SPT w/c <> bench with gb)   Bed Mobility    Supine to Sit Moderate Assist   Scooting Moderate Assist   Skilled Intervention Sequencing;Tactile Cuing;Verbal Cuing   Interdisciplinary Plan of Care Collaboration   Patient Position at End of Therapy Seated;Chair Alarm On;Self Releasing Lap Belt Applied   OT Total Time Spent   OT Individual Total Time Spent (Mins) 60   OT Charge Group   OT Self Care / ADL 4       Assessment    Patient's bathroom transfers have improved to a min A using SPT method using grab bar and commode over toilet and shower bench.  Still requiring min to max A for ADLs due to L hemiplegia as primary barrier.  Strengths: Able to follow instructions, Alert and oriented, Effective communication skills, Good insight into deficits/needs, Independent prior level of function, Manages pain appropriately, Motivated for self care and independence, Pleasant and cooperative, Supportive family, Willingly participates in therapeutic activities  Barriers: Bowel incontinence, Decreased endurance, Fatigue, Generalized weakness, Hemiplegia, Home accessibility, Orthostatic hypotension, Impaired activity tolerance, Impaired balance, Limited mobility, Spasticity    Plan    ADL retraining, L UE neuro re-ed/stretching, family training with spouse Anel; Bathing is not a goal of OT at this time (CNAs should be bathing patient); home evaluation to sister in law's house in [place]; pursue resting hand splint for left hand/wrist    Occupational Therapy Goals (Active)     Problem: Dressing     Dates: Start: 05/12/21       Goal: STG-Within one week, patient will dress LB     Dates: Start: 05/12/21       Goal Note filed on 05/19/21 1155 by Nicki Yancey MS,OTR/L     Requires Max A               Problem: Functional Transfers     Dates: Start:  05/19/21       Goal: STG-Within one week, patient will transfer to toilet     Dates: Start: 05/19/21       Description: 1) Individualized Goal: with min A using grab bar and commode over the toilet  2) Interventions:  OT Self Care/ADL, OT Cognitive Skill Dev, OT Manual Ther Technique, OT Neuro Re-Ed/Balance, OT Sensory Int Techniques, OT Therapeutic Activity, OT Evaluation, and OT Therapeutic Exercise            Problem: OT Long Term Goals     Dates: Start: 04/30/21       Goal: LTG-By discharge, patient will complete basic self care tasks     Dates: Start: 04/30/21       Description: 1) Individualized Goal:  with min to mod A using AE/AD/techniques as needed  2) Interventions:  OT E Stim Attended, OT Self Care/ADL, OT Cognitive Skill Dev, OT Manual Ther Technique, OT Neuro Re-Ed/Balance, OT Sensory Int Techniques, OT Therapeutic Activity, OT Evaluation, and OT Therapeutic Exercise       Goal: LTG-By discharge, patient will perform bathroom transfers     Dates: Start: 04/30/21       Description: 1) Individualized Goal:  with max A x 1 person using slideboard versus squat pivot to the right  2) Interventions:  OT E Stim Attended, OT Self Care/ADL, OT Cognitive Skill Dev, OT Manual Ther Technique, OT Neuro Re-Ed/Balance, OT Sensory Int Techniques, OT Therapeutic Activity, OT Evaluation, and OT Therapeutic Exercise          Problem: Toileting     Dates: Start: 05/19/21       Goal: STG-Within one week, patient will complete toileting tasks     Dates: Start: 05/19/21       Description: 1) Individualized Goal: with mod A using grab bar and commode over the toilet  2) Interventions:  OT Self Care/ADL, OT Cognitive Skill Dev, OT Manual Ther Technique, OT Neuro Re-Ed/Balance, OT Sensory Int Techniques, OT Therapeutic Activity, OT Evaluation, and OT Therapeutic Exercise

## 2021-05-24 NOTE — THERAPY
Speech Language Pathology  Daily Treatment     Patient Name: Thong Arzola  Age:  56 y.o., Sex:  male  Medical Record #: [id number]  Today's Date: 5/24/2021     Precautions  Precautions: Fall Risk  Comments: Helmet OOB; LUE 2-3 Modified Jeremiah Scale; Abdominal binder OOB; Brewer cath; L inattention & haylie    Subjective    Patient was willing to participate in ST.     Objective       05/24/21 0902   Cognition   Attention to Task Minimal (4)   Simple Attention Minimal (4)   Moderate Attention Moderate (3)   Visual Scanning / Cancellation Skills Moderate (3)   SLP Total Time Spent   SLP Individual Total Time Spent (Mins) 60   Treatment Charges   SLP Cognitive Skill Development First 15 Minutes 1   SLP Cognitive Skill Development Additional 15 Minutes 3       Assessment    Completed word search from previous session. Required mod verbal cues and occasional use of external aids to completed.   Mod cues also needed for mental manipulation task of obscure targets, min cues for common targets.     Strengths: Able to follow instructions, Alert and oriented, Supportive family, Pleasant and cooperative, Motivated for self care and independence, Willingly participates in therapeutic activities, Independent prior level of function  Barriers: Impaired functional cognition, Visual impairment, Impaired activity tolerance    Plan    Continue to target attention and basic problem solving. Visual scanning.     Passport items to be completed: manage finances, manage medications, arrive to therapy appointments on time, complete daily memory log entries, solve problems related to safety situations, review education related to hospitalization  Speech Therapy Problems (Active)     Problem: Problem Solving STGs     Dates: Start: 05/05/21       Goal: STG-Within one week, patient will     Dates: Start: 05/05/21       Description: 1) Individualized goal: demonstrate appropriate visual scanning to the left with min cues and 80%  accuracy.  2) Interventions:  SLP Speech Language Treatment, SLP Self Care / ADL Training , SLP Cognitive Skill Development, and SLP Group Treatment        Goal Note filed on 05/12/21 1054 by Min Gomez MS,CCC-SLP     Mod cues required for visual scanning to the left             Goal: STG-Within one week, patient will     Dates: Start: 05/05/21       Description: 1) Individualized goal:  complete simple attention tasks with min A with 80% accuracy.   2) Interventions:  SLP Speech Language Treatment, SLP Self Care / ADL Training , SLP Cognitive Skill Development, and SLP Group Treatment        Goal Note filed on 05/12/21 1054 by Min Gomez MS,CCC-SLP     Mod A required for simple attention tasks                Problem: Speech/Swallowing LTGs     Dates: Start: 04/30/21       Goal: LTG-By discharge, patient will solve complex problem     Dates: Start: 04/30/21       Description: 1) Individualized goal:  related to IADL's with mod I for safe d/c home.  2) Interventions:  SLP Speech Language Treatment, SLP Swallowing Dysfunction Treatment, SLP Oral Pharyngeal Evaluation, SLP Video Swallow Evaluation, SLP Self Care / ADL Training , SLP Cognitive Skill Development, and SLP Group Treatment

## 2021-05-24 NOTE — CARE PLAN
The patient is Stable - Low risk of patient condition declining or worsening         Progress made toward(s) clinical / shift goals:  will continue to monitor

## 2021-05-25 ENCOUNTER — APPOINTMENT (OUTPATIENT)
Dept: RADIOLOGY | Facility: MEDICAL CENTER | Age: 56
End: 2021-05-25
Attending: PHYSICIAN ASSISTANT
Payer: COMMERCIAL

## 2021-05-25 PROCEDURE — A9270 NON-COVERED ITEM OR SERVICE: HCPCS | Performed by: HOSPITALIST

## 2021-05-25 PROCEDURE — 700102 HCHG RX REV CODE 250 W/ 637 OVERRIDE(OP): Performed by: PHYSICAL MEDICINE & REHABILITATION

## 2021-05-25 PROCEDURE — 97112 NEUROMUSCULAR REEDUCATION: CPT

## 2021-05-25 PROCEDURE — 700102 HCHG RX REV CODE 250 W/ 637 OVERRIDE(OP): Performed by: HOSPITALIST

## 2021-05-25 PROCEDURE — 97530 THERAPEUTIC ACTIVITIES: CPT

## 2021-05-25 PROCEDURE — 97129 THER IVNTJ 1ST 15 MIN: CPT

## 2021-05-25 PROCEDURE — 99232 SBSQ HOSP IP/OBS MODERATE 35: CPT | Performed by: PHYSICAL MEDICINE & REHABILITATION

## 2021-05-25 PROCEDURE — 770010 HCHG ROOM/CARE - REHAB SEMI PRIVAT*

## 2021-05-25 PROCEDURE — A9270 NON-COVERED ITEM OR SERVICE: HCPCS | Performed by: PHYSICAL MEDICINE & REHABILITATION

## 2021-05-25 PROCEDURE — 97032 APPL MODALITY 1+ESTIM EA 15: CPT

## 2021-05-25 PROCEDURE — 97535 SELF CARE MNGMENT TRAINING: CPT

## 2021-05-25 PROCEDURE — 97130 THER IVNTJ EA ADDL 15 MIN: CPT

## 2021-05-25 PROCEDURE — 70450 CT HEAD/BRAIN W/O DYE: CPT

## 2021-05-25 RX ORDER — BACLOFEN 20 MG/1
20 TABLET ORAL 2 TIMES DAILY
Status: DISCONTINUED | OUTPATIENT
Start: 2021-05-25 | End: 2021-06-04 | Stop reason: HOSPADM

## 2021-05-25 RX ORDER — POLYVINYL ALCOHOL 14 MG/ML
1 SOLUTION/ DROPS OPHTHALMIC 3 TIMES DAILY
Status: DISCONTINUED | OUTPATIENT
Start: 2021-05-25 | End: 2021-06-04 | Stop reason: HOSPADM

## 2021-05-25 RX ORDER — BACLOFEN 20 MG/1
30 TABLET ORAL
Status: DISCONTINUED | OUTPATIENT
Start: 2021-05-25 | End: 2021-06-04 | Stop reason: HOSPADM

## 2021-05-25 RX ADMIN — BACLOFEN 20 MG: 20 TABLET ORAL at 15:40

## 2021-05-25 RX ADMIN — ATORVASTATIN CALCIUM 40 MG: 40 TABLET, FILM COATED ORAL at 20:33

## 2021-05-25 RX ADMIN — POLYVINYL ALCOHOL 1 DROP: 14 SOLUTION/ DROPS OPHTHALMIC at 15:42

## 2021-05-25 RX ADMIN — SENNOSIDES AND DOCUSATE SODIUM 2 TABLET: 8.6; 5 TABLET ORAL at 20:35

## 2021-05-25 RX ADMIN — MIRTAZAPINE 15 MG: 15 TABLET, ORALLY DISINTEGRATING ORAL at 20:33

## 2021-05-25 RX ADMIN — THYROID, PORCINE 60 MG: 30 TABLET ORAL at 20:33

## 2021-05-25 RX ADMIN — POLYVINYL ALCOHOL 1 DROP: 14 SOLUTION/ DROPS OPHTHALMIC at 20:35

## 2021-05-25 RX ADMIN — MELATONIN TAB 3 MG 3 MG: 3 TAB at 20:32

## 2021-05-25 RX ADMIN — MECLIZINE HYDROCHLORIDE 25 MG: 25 TABLET ORAL at 20:33

## 2021-05-25 RX ADMIN — RIVAROXABAN 20 MG: 20 TABLET, FILM COATED ORAL at 17:16

## 2021-05-25 RX ADMIN — THYROID, PORCINE 60 MG: 30 TABLET ORAL at 08:37

## 2021-05-25 RX ADMIN — METOPROLOL TARTRATE 25 MG: 25 TABLET, FILM COATED ORAL at 08:37

## 2021-05-25 RX ADMIN — GABAPENTIN 100 MG: 100 CAPSULE ORAL at 20:33

## 2021-05-25 RX ADMIN — BACLOFEN 30 MG: 20 TABLET ORAL at 20:38

## 2021-05-25 RX ADMIN — MECLIZINE HYDROCHLORIDE 25 MG: 25 TABLET ORAL at 08:37

## 2021-05-25 RX ADMIN — BACLOFEN 20 MG: 20 TABLET ORAL at 08:37

## 2021-05-25 RX ADMIN — METOPROLOL TARTRATE 25 MG: 25 TABLET, FILM COATED ORAL at 20:33

## 2021-05-25 ASSESSMENT — ACTIVITIES OF DAILY LIVING (ADL): TOILET_TRANSFER_DESCRIPTION: GRAB BAR;SET-UP OF EQUIPMENT;SUPERVISION FOR SAFETY;VERBAL CUEING;ADAPTIVE EQUIPMENT

## 2021-05-25 NOTE — THERAPY
Occupational Therapy  Daily Treatment     Patient Name: Thong Arzola  Age:  56 y.o., Sex:  male  Medical Record #: [id number]  Today's Date: 5/25/2021     Precautions  Precautions: (P) Fall Risk  Comments: (P) Helmet OOB; LUE 2-3 Modified Jeremiah Scale; Abdominal binder OOB; Brewer cath; L inattention & haylie         Subjective    Patient laying in bed awake and watching sports news on tv.  Agreeable to toileting, dressing, grooming.     Objective       05/25/21 0701   Precautions   Precautions Fall Risk   Comments Helmet OOB; LUE 2-3 Modified Jeremiah Scale; Abdominal binder OOB; Brewer cath; L inattention & haylie   Cognition    New Learning Impaired   Attention Impaired   Sequencing Impaired   Comments cues to scan and look to the left as being wheeled down hallway   Functional Level of Assist   Grooming Supervision;Seated  (washed hands/face w/ cues to wash left hand)   Upper Body Dressing Minimal Assist   Upper Body Dressing Description Increased time;Initial preparation for task;Set-up of equipment;Supervision for safety;Verbal cueing  (assist w/ 1/8 tasks to don/doff pullover t shirt EOB w/ cues)   Lower Body Dressing Maximal Assist   Lower Body Dressing Description Set-up of equipment;Supervision for safety;Verbal cueing;Initial preparation for task  (assist w/ 4/7 tasks to don shorts, socks, shoes)   Toileting Maximal Assist   Toileting Description Grab bar;Assist to pull pants up;Assist to pull pants down;Set-up of equipment;Supervision for safety;Verbal cueing   Toilet Transfers Moderate Assist   Toilet Transfer Description Grab bar;Set-up of equipment;Supervision for safety;Verbal cueing;Adaptive equipment  (gb, commode over toilet, cues)   Sitting Upper Body Exercises   Sitting Upper Body Exercises Yes   Tricep Press 3 sets of 10;Right ;Weight (See Comments for lbs)  (20 lbs w/ R UE only)   Neuro-Muscular Treatments   Neuro-Muscular Treatments Electrical Stimulation;Facilitation;Verbal Cuing   Comments  e stim x 6 minutes to left wrist/finger extensors w/ 31-33 mA   Bed Mobility    Supine to Sit Minimal Assist   Scooting Contact Guard Assist   Skilled Intervention Sequencing;Verbal Cuing  (bed rail)   Interdisciplinary Plan of Care Collaboration   Patient Position at End of Therapy Seated;Self Releasing Lap Belt Applied;Chair Alarm On  (in t dine)   OT Total Time Spent   OT Individual Total Time Spent (Mins) 60   OT Charge Group   OT Electrical Stimulation Attended 1   OT Self Care / ADL 2   OT Therapy Activity 1       Assessment    Patient with improved ability to complete UB dressing today as he required only minimal help to don a shirt using haylie-technique.  Fair response to e stim with left wrist/finger extension.  Strengths: Able to follow instructions, Alert and oriented, Effective communication skills, Good insight into deficits/needs, Independent prior level of function, Manages pain appropriately, Motivated for self care and independence, Pleasant and cooperative, Supportive family, Willingly participates in therapeutic activities  Barriers: Bowel incontinence, Decreased endurance, Fatigue, Generalized weakness, Hemiplegia, Home accessibility, Orthostatic hypotension, Impaired activity tolerance, Impaired balance, Limited mobility, Spasticity    Plan    ADL retraining, L UE neuro re-ed/stretching, family training with spouse Anel; Bathing is not a goal of OT at this time (CNAs should be bathing patient); home evaluation to sister in law's house in [place]; pursue resting hand splint for left hand/wrist    Occupational Therapy Goals (Active)     Problem: Dressing     Dates: Start: 05/12/21       Goal: STG-Within one week, patient will dress LB     Dates: Start: 05/12/21       Goal Note filed on 05/19/21 1155 by Nicki Yancey MS,OTR/L     Requires Max A               Problem: Functional Transfers     Dates: Start: 05/19/21       Goal: STG-Within one week, patient will transfer to toilet     Dates: Start:  05/19/21       Description: 1) Individualized Goal: with min A using grab bar and commode over the toilet  2) Interventions:  OT Self Care/ADL, OT Cognitive Skill Dev, OT Manual Ther Technique, OT Neuro Re-Ed/Balance, OT Sensory Int Techniques, OT Therapeutic Activity, OT Evaluation, and OT Therapeutic Exercise            Problem: OT Long Term Goals     Dates: Start: 04/30/21       Goal: LTG-By discharge, patient will complete basic self care tasks     Dates: Start: 04/30/21       Description: 1) Individualized Goal:  with min to mod A using AE/AD/techniques as needed  2) Interventions:  OT E Stim Attended, OT Self Care/ADL, OT Cognitive Skill Dev, OT Manual Ther Technique, OT Neuro Re-Ed/Balance, OT Sensory Int Techniques, OT Therapeutic Activity, OT Evaluation, and OT Therapeutic Exercise       Goal: LTG-By discharge, patient will perform bathroom transfers     Dates: Start: 04/30/21       Description: 1) Individualized Goal:  with max A x 1 person using slideboard versus squat pivot to the right  2) Interventions:  OT E Stim Attended, OT Self Care/ADL, OT Cognitive Skill Dev, OT Manual Ther Technique, OT Neuro Re-Ed/Balance, OT Sensory Int Techniques, OT Therapeutic Activity, OT Evaluation, and OT Therapeutic Exercise          Problem: Toileting     Dates: Start: 05/19/21       Goal: STG-Within one week, patient will complete toileting tasks     Dates: Start: 05/19/21       Description: 1) Individualized Goal: with mod A using grab bar and commode over the toilet  2) Interventions:  OT Self Care/ADL, OT Cognitive Skill Dev, OT Manual Ther Technique, OT Neuro Re-Ed/Balance, OT Sensory Int Techniques, OT Therapeutic Activity, OT Evaluation, and OT Therapeutic Exercise

## 2021-05-25 NOTE — CARE PLAN
Problem: Problem Solving STGs  Goal: STG-Within one week, patient will  Description: 1) Individualized goal: demonstrate appropriate visual scanning to the left with min cues and 80% accuracy.  2) Interventions:  SLP Speech Language Treatment, SLP Self Care / ADL Training , SLP Cognitive Skill Development, and SLP Group Treatment      Outcome: Not Met  Note: 60-75% for scanning tasks, endurance as barrier. Improved insight to difficulties.   Goal: STG-Within one week, patient will  Description: 1) Individualized goal:  complete simple attention tasks with min A with 80% accuracy.   2) Interventions:  SLP Speech Language Treatment, SLP Self Care / ADL Training , SLP Cognitive Skill Development, and SLP Group Treatment      Outcome: Not Met  Note: Progressing

## 2021-05-25 NOTE — CARE PLAN
Problem: Safety  Goal: Will remain free from falls  Outcome: Progressing  Note: Safety precautions observed, call light in reach, needs anticipated. Pt free from fall and injury.     Problem: Infection  Goal: Will remain free from infection  Outcome: Progressing  Intervention: Assess signs and symptoms of infection  Note: Afebrile, vital signs stable, no s/s of infection noted .      Problem: Knowledge Deficit  Goal: Knowledge of disease process/condition, treatment plan, diagnostic tests, and medications will improve  Outcome: Progressing  Intervention: Assess knowledge level of disease process/condition, treatment plan, diagnostic tests, and medications  Note: Plan of care reviewed, verbalized understanding.      Problem: Urinary Elimination:  Goal: Ability to reestablish a normal urinary elimination pattern will improve  Outcome: Progressing  Intervention: Evaluate need to continue indwelling urinary catheter  Note: Dye cath intact draining yellow colored urine, cont monitored.   The patient is Stable - Low risk of patient condition declining or worsening         Progress made toward(s) clinical / shift goals:  Pain under control. Bm regular .    Patient is not progressing towards the following goals:Pt has dye cath re; retaining urine.

## 2021-05-25 NOTE — THERAPY
Speech Language Pathology  Daily Treatment     Patient Name: Thong Arzola  Age:  56 y.o., Sex:  male  Medical Record #: [id number]  Today's Date: 5/25/2021     Precautions  Precautions: Fall Risk  Comments: Helmet OOB; LUE 2-3 Modified Jeremiah Scale; Abdominal binder OOB; Brewer cath; L inattention & haylie    Subjective    Patient participated in speech therapy.      Objective       05/25/21 1032   Cognition   Attention to Task Minimal (4)   Simple Attention Minimal (4)   Moderate Attention Moderate (3)   Visual Scanning / Cancellation Skills Moderate (3)   Executive Functioning / Organization Moderate (3)   SLP Total Time Spent   SLP Individual Total Time Spent (Mins) 60   Treatment Charges   SLP Cognitive Skill Development First 15 Minutes 1   SLP Cognitive Skill Development Additional 15 Minutes 3       Assessment    Continues to require mod cues for visual attention and organization tasks. Decreased cognitive endurance, benefiting from frequent rest breaks.     Strengths: Able to follow instructions, Alert and oriented, Supportive family, Pleasant and cooperative, Motivated for self care and independence, Willingly participates in therapeutic activities, Independent prior level of function  Barriers: Impaired functional cognition, Visual impairment, Impaired activity tolerance    Plan    Attention, organization, basic problem solving.     Passport items to be completed: manage finances, manage medications, arrive to therapy appointments on time, complete daily memory log entries, solve problems related to safety situations, review education related to hospitalization,   Speech Therapy Problems (Active)     Problem: Problem Solving STGs     Dates: Start: 05/05/21       Goal: STG-Within one week, patient will     Dates: Start: 05/05/21       Description: 1) Individualized goal: demonstrate appropriate visual scanning to the left with min cues and 80% accuracy.  2) Interventions:  SLP Speech Language Treatment,  SLP Self Care / ADL Training , SLP Cognitive Skill Development, and SLP Group Treatment        Goal Note filed on 05/12/21 1054 by Min Gomez MS,CCC-SLP     Mod cues required for visual scanning to the left             Goal: STG-Within one week, patient will     Dates: Start: 05/05/21       Description: 1) Individualized goal:  complete simple attention tasks with min A with 80% accuracy.   2) Interventions:  SLP Speech Language Treatment, SLP Self Care / ADL Training , SLP Cognitive Skill Development, and SLP Group Treatment        Goal Note filed on 05/12/21 1054 by Min Gomez MS,CCC-SLP     Mod A required for simple attention tasks                Problem: Speech/Swallowing LTGs     Dates: Start: 04/30/21       Goal: LTG-By discharge, patient will solve complex problem     Dates: Start: 04/30/21       Description: 1) Individualized goal:  related to IADL's with mod I for safe d/c home.  2) Interventions:  SLP Speech Language Treatment, SLP Swallowing Dysfunction Treatment, SLP Oral Pharyngeal Evaluation, SLP Video Swallow Evaluation, SLP Self Care / ADL Training , SLP Cognitive Skill Development, and SLP Group Treatment

## 2021-05-25 NOTE — PROGRESS NOTES
"Rehab Progress Note     Date of Service: 5/11/2021  Chief Complaint: follow up stroke    Interval Events (Subjective)    Patient seen and examined today in the therapy gym.  He is standing in the parallel bars, doing much better than yesterday, using mirrors for feedback.  He was able to stand for 10 minutes.  His wife is present.  He is scheduled for follow up Head CT this afternoon.  Will go over results with patient and wife when she returns on Thursday.  Patient denies any pain and reports he's sleeping OK.  He does report increased spasms in his left leg at night.   Patient denies any pain or irritation in his left eye.  He has no other complaints.     ROS: No changes to bowel, bladder, pain, mood, or sleep.       Objective:  VITAL SIGNS: /86   Pulse 80   Temp 37.2 °C (98.9 °F) (Oral)   Resp 18   Ht 1.778 m (5' 10\")   Wt 70 kg (154 lb 5.2 oz)   SpO2 95%   BMI 22.14 kg/m²   Gen: alert, no apparent distress  Heent: mild yellow drainage from left eye with crusting  Neuro: notable for spastic left hemiplegia, left neglect    No results found for this or any previous visit (from the past 72 hour(s)).    Current Facility-Administered Medications   Medication Frequency   • baclofen (LIORESAL) tablet 20 mg BID   • baclofen (LIORESAL) tablet 30 mg QHS   • artificial tears ophthalmic solution 1 Drop TID   • meclizine (ANTIVERT) tablet 25 mg BID   • atorvastatin (LIPITOR) tablet 40 mg Q EVENING   • metoprolol tartrate (LOPRESSOR) tablet 25 mg BID   • senna-docusate (PERICOLACE or SENOKOT S) 8.6-50 MG per tablet 2 tablet Q EVENING   • gabapentin (NEURONTIN) capsule 100 mg Q EVENING   • melatonin tablet 3 mg QHS   • hydrOXYzine HCl (ATARAX) tablet 50 mg Q6HRS PRN   • Respiratory Therapy Consult Continuous RT   • Pharmacy Consult Request ...Pain Management Review 1 Each PHARMACY TO DOSE   • hydrALAZINE (APRESOLINE) tablet 10 mg Q8HRS PRN   • acetaminophen (Tylenol) tablet 650 mg Q4HRS PRN   • lactulose 20 " GM/30ML solution 30 mL QDAY PRN   • docusate sodium (ENEMEEZ) enema 283 mg QDAY PRN   • fleet enema 133 mL QDAY PRN   • benzocaine-menthol (CEPACOL) lozenge 1 Lozenge Q2HRS PRN   • mag hydrox-al hydrox-simeth (MAALOX PLUS ES or MYLANTA DS) suspension 20 mL Q2HRS PRN   • ondansetron (ZOFRAN ODT) dispertab 4 mg 4X/DAY PRN    Or   • ondansetron (ZOFRAN) syringe/vial injection 4 mg 4X/DAY PRN   • traZODone (DESYREL) tablet 50 mg QHS PRN   • sodium chloride (OCEAN) 0.65 % nasal spray 2 Spray PRN   • midazolam (VERSED) 5 mg/mL (1 mL vial) PRN   • mirtazapine (Remeron) orally disintegrating tab 15 mg QHS   • rivaroxaban (XARELTO) tablet 20 mg PM MEAL   • thyroid (ARMOUR THYROID) tablet 60 mg BID       Orders Placed This Encounter   Procedures   • Diet Order Diet: Level 7 - Easy to Chew (float pills in puree); Liquid level: Level 0 - Thin; Tray Modifications (optional): SLP - 1:1 Supervision by Nursing, SLP - Deliver to Nursing Station     Standing Status:   Standing     Number of Occurrences:   1     Order Specific Question:   Diet:     Answer:   Level 7 - Easy to Chew [22]     Comments:   float pills in puree     Order Specific Question:   Liquid level     Answer:   Level 0 - Thin     Order Specific Question:   Tray Modifications (optional)     Answer:   SLP - 1:1 Supervision by Nursing     Order Specific Question:   Tray Modifications (optional)     Answer:   SLP - Deliver to Nursing Station       Assessment:  Active Hospital Problems    Diagnosis    • *Acute right MCA stroke (HCC)    • Paroxysmal atrial fibrillation (HCC)    • Urinary retention    • Acquired hypothyroidism    • History of COVID-19    • Hypotension    • Normocytic anemia    • Spasticity as late effect of cerebrovascular accident (CVA)    • Reactive depression      This patient is a 56 y.o. male admitted for acute inpatient rehabilitation with Acute right MCA stroke (HCC).    I led and attended the weekly conference, and agree with the IDT conference  documentation and plan of care as noted below.    Date of conference: 5/19/2021    Goals and barriers: See IDT note.    Biggest barriers: left spastic hemiplegia, left neglect, incontinent of bowel, impaired activity tolerance/endurance, left sided abnormal sensation, impaired cognition and attention    Goals in next week: home evaluation    CM/social support: wife supportive, to go to 1  here in [place], wife's sister    Anticipated DC date: 6/4    Home health: PT/OT/SLP/RN - [facility]    Equip: [facility] with pressure relieving cushion    Follow up: PCP, Dr. Dumont, stroke bridge clinic, urology, neurosugery      Medical Decision Making and Plan:    Large right ICA/MCA ischemic stroke  S/p craniectomy 4/3, Dr. Payton  Left hemiplegia, minimal improvement  Cognitive deficits, improved  Left neglect, improved  Continue full rehab program  PT/OT/SLP, 1 hr each discipline, 5 days per week    Xarelto  Aspirin discontinued per neurology recommendations  Statin    Outpatient follow up with stroke bridge clinic, Dr. Dumont, referrals made    Outpatient follow up with Dr. Payton for cranioplasty, scheduled for follow up Head CT today    Making good progress, improved function, able to stand for 10 minutes today    Neuropathic pain, resolved  Left leg at night, resolved  Continue low dose gabapentin 100 mg 5/11  Continue to monitor need to increase dose  Currently well controlled as of 5/24    Dizziness, improved  Scheduled Meclizine, start titration 5/20, TID --> BID  Continue Teds and abdominal binder for now  BPPV testing by PT negative    Spasticity, continues, worse at night  Increased baclofen 15 mg TID to 20 mg TID 4/30 --> 30 mg TID 5/3  Was decreased back down to 20 mg TID on 5/13 due to fatigue, with increased spasticity     Increase night time dose to 30 mg 5/25    Started valium at night 2 mg 4/30, discontinued 5/3, doesn't seem to make much difference, patient also too sedated    Consider adding Zanaflex in  future, LFTs on 5/5 with elevated ALT, rechecked 5/18, still elevated, not a good candidate for Zanaflex at this time    Will benefit from Botox - unable to tolerate higher doses of baclofen due to sedation/fatigue, unable to trial Zanaflex due to elevated ALT, trial of Valium was not effective and patient also too sedated    Dr. Dumont has ordered 400 units with plan to inject LUE as an outpatient after discharge from rehab    Elevated ALT  Decreased statin dose 80 mg to 40 mg 5/18  Recheck this week, 5/27     Atrial fibrillation  Metoprolol  Xarelto     Hypothyroidism   [place] thyroid, repeat TSH in 4 weeks from admission, as dose was increased at acute  Order on 5/27     Hypertension  Hypotension, improved  Lisinopril discontinued  Metoprolol, dose increased  Flomax discontinued 5/12  Teds and abdominal binder    Insomnia, improved/resolved  Already on mirtazapine  Scheduled melatonin  Gabapentin added for neuropathic pain at night     Reactive depression  Mirtazapine  Consider psychology consult if needed   Mood currently stable     History of COVID-19  Without sequelae     Normocytic anemia  Mild, monitor    Bowel program  Continue bowel medications  Last BM 5/25    Bladder program  Urinary retention, continues  Unsuccessful voiding trials x 3, even on Flomax  Outpatient follow up with urology, referral made    DVT prophylaxis  Xarelto    Total time:  26 minutes.  I spent greater than 50% of the time for patient care, counseling, and coordination on this date, including patient face-to face time, unit/floor time with review of records/pertinent lab data and studies, as well as discussing diagnostic evaluation/work up, planned therapeutic interventions, and future disposition of care, as per the interval events/subjective and the assessment and plan as noted above.    I have performed a physical exam, reviewed and updated ROS, as well as the assessment and plan today 5/25/2021. In review of note from 5/24/2021  there are no new changes except as documented above.    Radha Monge M.D.   Physical Medicine and Rehabilitation

## 2021-05-25 NOTE — THERAPY
Physical Therapy   Daily Treatment     Patient Name: Thong Arzola  Age:  56 y.o., Sex:  male  Medical Record #: [id number]  Today's Date: 5/25/2021     Precautions  Precautions: (P) Fall Risk  Comments: (P) Helmet OOB; LUE 2-3 Modified Jeremiah Scale; Abdominal binder OOB; Brewer cath; L inattention & haylie    Subjective    Pt and spouse were pleased with standing tolerance, and aware of scheduled CT this afternoon.     Objective       05/25/21 0901   Precautions   Precautions Fall Risk   Comments Helmet OOB; LUE 2-3 Modified Jeremiah Scale; Abdominal binder OOB; Brewer cath; L inattention & haylie   Wheelchair Functional Level of Assist   Wheelchair Assist Supervised   Distance Wheelchair (Feet or Distance) 250   Wheelchair Description Extra time;Supervision for safety;Verbal cueing  (VC to attend to left side/ scan/ object avoidance)   Transfer Functional Level of Assist   Bed, Chair, Wheelchair Transfer Minimal Assist   Bed Chair Wheelchair Transfer Description Adaptive equipment;Assist with one limb;Set-up of equipment;Verbal cueing  (blocking L knee, to the left)   Supine Lower Body Exercise   Bridges Two Legged;3 sets of 10  (Add pillow squeeze simultaneously for co-contraction)   Hip Abduction Hook Lying;1 set of 10  (LLE isometric with support to stabilize)   Hip Adduction  1 set of 10;Bilateral  (Pillow squeezes)   Knee to Chest 1 set of 10;Bilateral  (AAROM/ PROM L)   Short Arc Quad 1 set of 10;Bilateral  (AAROM/ PROM L)   Heel Slide 1 set of 10;Bilateral  (AAROM/ PROM L)   Comments PROM to BLE- ROM intact RLE. Manual stretching to LLE >30 sec knee to chest, IR/ER, heelcord   Bed Mobility    Supine to Sit Minimal Assist   Sit to Stand Contact Guard Assist   Scooting Contact Guard Assist   Rolling Minimal Assist to Rt.   Neuro-Muscular Treatments   Neuro-Muscular Treatments Weight Shift Right;Weight Shift Left;Verbal Cuing;Tactile Cuing;Sequencing;Joint Approximation;Facilitation;Postural  Facilitation;Postural Changes   Comments Mirror anterior to patient for midline orientation, and a mirror to left of patient for L sided awareness/ attention. Standing min A at L knee 2 min x 2, 10 min, emphasis on scanning environment, body awareness, midline. Scap squeezes 15x in standing.    Interdisciplinary Plan of Care Collaboration   IDT Collaboration with  Nursing;Therapy Tech;Physician   Patient Position at End of Therapy Seated;Family / Friend in Room;Self Releasing Lap Belt Applied  (Helmet)   Collaboration Comments CLOF, POC, equipment assist   Strengths & Barriers   Strengths Alert and oriented;Effective communication skills;Independent prior level of function;Motivated for self care and independence;Pleasant and cooperative;Supportive family;Willingly participates in therapeutic activities   Barriers Hemiparesis;Home accessibility;Orthostatic hypotension;Impaired activity tolerance;Impaired balance;Spasticity;Limited mobility   PT Total Time Spent   PT Individual Total Time Spent (Mins) 60   PT Charge Group   PT Neuromuscular Re-Education / Balance 2   PT Therapeutic Activities 2       Assessment    Pt able to stand for a total of 14 min, with longest stand to 10 min. Pt demonstrated improved body awareness and midline orientation. Pt is showing improving L sided scanning/ awareness.     Strengths: (P) Alert and oriented, Effective communication skills, Independent prior level of function, Motivated for self care and independence, Pleasant and cooperative, Supportive family, Willingly participates in therapeutic activities  Barriers: (P) Hemiparesis, Home accessibility, Orthostatic hypotension, Impaired activity tolerance, Impaired balance, Spasticity, Limited mobility    Plan    Continue to assess w/c needs, w/c mobility using R haylie technique, transfer training with focus on sequencing, standing posture and balance, gait training/pre gait in // bars and with [employer], continue family  training.    Passport items to be completed:  Passport items to be completed:  Get in/out of bed safely, in/out of a vehicle, safely use mobility device, walk or wheel around home/community, navigate up and down stairs, show how to get up/down from the ground, ensure home is accessible, demonstrate HEP, complete caregiver training    Physical Therapy Problems (Active)     Problem: Mobility     Dates: Start: 04/30/21       Goal: STG-Within one week, patient will propel wheelchair household distances     Dates: Start: 04/30/21       Description: 1) Individualized goal:  25ft with haylie technique with SBA on even surfaces  2) Interventions:  PT Group Therapy, PT E Stim Attended, PT Orthotics Training, PT Gait Training, PT Self Care/Home Eval, PT Therapeutic Exercises, PT Neuro Re-Ed/Balance, PT Therapeutic Activity, and PT Evaluation      Goal Note filed on 05/19/21 0838 by Neena Gastelum DPT     Pt continues to require assist for steering            Goal: STG-Within one week, patient will ambulate household distance     Dates: Start: 05/19/21       Description: 1) Individualized goal:  AMB x 10 feet in // bars with mod A  2) Interventions:  PT Group Therapy, PT E Stim Attended, PT Gait Training, PT Therapeutic Exercises, PT Neuro Re-Ed/Balance, PT Aquatic Therapy, PT Therapeutic Activity, PT Manual Therapy, and PT Evaluation      Goal Note filed on 05/19/21 0839 by Neena Gastelum DPT     1) Individualized goal:  AMB x 10 feet in // bars with mod A  2) Interventions:  PT Group Therapy, PT E Stim Attended, PT Gait Training, PT Therapeutic Exercises, PT Neuro Re-Ed/Balance, PT Aquatic Therapy, PT Therapeutic Activity, PT Manual Therapy, and PT Evaluation               Problem: Mobility Transfers     Dates: Start: 05/19/21       Goal: STG-Within one week, patient will transfer bed to chair     Dates: Start: 05/19/21       Description: 1) Individualized goal:  SQPT with SBA  2) Interventions:  PT Group Therapy, PT E  Stim Attended, PT Gait Training, PT Therapeutic Exercises, PT Neuro Re-Ed/Balance, PT Aquatic Therapy, PT Therapeutic Activity, PT Manual Therapy, and PT Evaluation      Goal Note filed on 05/19/21 0839 by Neena Gastelum DPT     1) Individualized goal:  SQPT with SBA  2) Interventions:  PT Group Therapy, PT E Stim Attended, PT Gait Training, PT Therapeutic Exercises, PT Neuro Re-Ed/Balance, PT Aquatic Therapy, PT Therapeutic Activity, PT Manual Therapy, and PT Evaluation               Problem: PT-Long Term Goals     Dates: Start: 04/30/21       Goal: LTG-By discharge, patient will propel wheelchair     Dates: Start: 04/30/21       Description: 1) Individualized goal:  100ft with haylie technique and [place] on even/uneven surfaces  2) Interventions:  PT Group Therapy, PT E Stim Attended, PT Orthotics Training, PT Gait Training, PT Self Care/Home Eval, PT Therapeutic Exercises, PT Neuro Re-Ed/Balance, PT Therapeutic Activity, and PT Evaluation       Goal: LTG-By discharge, patient will transfer one surface to another     Dates: Start: 04/30/21       Description: 1) Individualized goal:  SQPT with [place]   2) Interventions:  PT Group Therapy, PT E Stim Attended, PT Orthotics Training, PT Gait Training, PT Self Care/Home Eval, PT Therapeutic Exercises, PT Neuro Re-Ed/Balance, PT Therapeutic Activity, and PT Evaluation         Goal: LTG-By discharge, patient will     Dates: Start: 04/30/21       Description: 1) Individualized goal: perform bed mobility (supine<>sit) with Taz   2) Interventions:  PT Group Therapy, PT E Stim Attended, PT Orthotics Training, PT Gait Training, PT Self Care/Home Eval, PT Therapeutic Exercises, PT Neuro Re-Ed/Balance, PT Therapeutic Activity, and PT Evaluation

## 2021-05-26 LAB
ANION GAP SERPL CALC-SCNC: 8 MMOL/L (ref 7–16)
BUN SERPL-MCNC: 19 MG/DL (ref 8–22)
CALCIUM SERPL-MCNC: 9.5 MG/DL (ref 8.5–10.5)
CHLORIDE SERPL-SCNC: 106 MMOL/L (ref 96–112)
CO2 SERPL-SCNC: 27 MMOL/L (ref 20–33)
CREAT SERPL-MCNC: 0.98 MG/DL (ref 0.5–1.4)
ERYTHROCYTE [DISTWIDTH] IN BLOOD BY AUTOMATED COUNT: 43.7 FL (ref 35.9–50)
GLUCOSE SERPL-MCNC: 95 MG/DL (ref 65–99)
HCT VFR BLD AUTO: 38.4 % (ref 42–52)
HGB BLD-MCNC: 12.8 G/DL (ref 14–18)
MCH RBC QN AUTO: 29.3 PG (ref 27–33)
MCHC RBC AUTO-ENTMCNC: 33.3 G/DL (ref 33.7–35.3)
MCV RBC AUTO: 87.9 FL (ref 81.4–97.8)
PLATELET # BLD AUTO: 265 K/UL (ref 164–446)
PMV BLD AUTO: 9.4 FL (ref 9–12.9)
POTASSIUM SERPL-SCNC: 4 MMOL/L (ref 3.6–5.5)
RBC # BLD AUTO: 4.37 M/UL (ref 4.7–6.1)
SODIUM SERPL-SCNC: 141 MMOL/L (ref 135–145)
WBC # BLD AUTO: 5 K/UL (ref 4.8–10.8)

## 2021-05-26 PROCEDURE — 97150 GROUP THERAPEUTIC PROCEDURES: CPT

## 2021-05-26 PROCEDURE — A9270 NON-COVERED ITEM OR SERVICE: HCPCS | Performed by: PHYSICAL MEDICINE & REHABILITATION

## 2021-05-26 PROCEDURE — 99233 SBSQ HOSP IP/OBS HIGH 50: CPT | Performed by: PHYSICAL MEDICINE & REHABILITATION

## 2021-05-26 PROCEDURE — 97530 THERAPEUTIC ACTIVITIES: CPT

## 2021-05-26 PROCEDURE — 700102 HCHG RX REV CODE 250 W/ 637 OVERRIDE(OP): Performed by: PHYSICAL MEDICINE & REHABILITATION

## 2021-05-26 PROCEDURE — 700102 HCHG RX REV CODE 250 W/ 637 OVERRIDE(OP): Performed by: HOSPITALIST

## 2021-05-26 PROCEDURE — 85027 COMPLETE CBC AUTOMATED: CPT

## 2021-05-26 PROCEDURE — 97112 NEUROMUSCULAR REEDUCATION: CPT

## 2021-05-26 PROCEDURE — 36415 COLL VENOUS BLD VENIPUNCTURE: CPT

## 2021-05-26 PROCEDURE — 80048 BASIC METABOLIC PNL TOTAL CA: CPT

## 2021-05-26 PROCEDURE — 97535 SELF CARE MNGMENT TRAINING: CPT

## 2021-05-26 PROCEDURE — 97130 THER IVNTJ EA ADDL 15 MIN: CPT

## 2021-05-26 PROCEDURE — 97129 THER IVNTJ 1ST 15 MIN: CPT

## 2021-05-26 PROCEDURE — A9270 NON-COVERED ITEM OR SERVICE: HCPCS | Performed by: HOSPITALIST

## 2021-05-26 PROCEDURE — 770010 HCHG ROOM/CARE - REHAB SEMI PRIVAT*

## 2021-05-26 RX ORDER — MECLIZINE HYDROCHLORIDE 25 MG/1
25 TABLET ORAL DAILY
Status: DISCONTINUED | OUTPATIENT
Start: 2021-05-27 | End: 2021-06-02

## 2021-05-26 RX ADMIN — METOPROLOL TARTRATE 25 MG: 25 TABLET, FILM COATED ORAL at 20:42

## 2021-05-26 RX ADMIN — POLYVINYL ALCOHOL 1 DROP: 14 SOLUTION/ DROPS OPHTHALMIC at 20:40

## 2021-05-26 RX ADMIN — THYROID, PORCINE 60 MG: 30 TABLET ORAL at 20:42

## 2021-05-26 RX ADMIN — RIVAROXABAN 20 MG: 20 TABLET, FILM COATED ORAL at 18:04

## 2021-05-26 RX ADMIN — SENNOSIDES AND DOCUSATE SODIUM 2 TABLET: 8.6; 5 TABLET ORAL at 20:42

## 2021-05-26 RX ADMIN — THYROID, PORCINE 60 MG: 30 TABLET ORAL at 09:43

## 2021-05-26 RX ADMIN — POLYVINYL ALCOHOL 1 DROP: 14 SOLUTION/ DROPS OPHTHALMIC at 14:48

## 2021-05-26 RX ADMIN — GABAPENTIN 100 MG: 100 CAPSULE ORAL at 20:42

## 2021-05-26 RX ADMIN — ATORVASTATIN CALCIUM 40 MG: 40 TABLET, FILM COATED ORAL at 20:43

## 2021-05-26 RX ADMIN — BACLOFEN 20 MG: 20 TABLET ORAL at 14:48

## 2021-05-26 RX ADMIN — MIRTAZAPINE 15 MG: 15 TABLET, ORALLY DISINTEGRATING ORAL at 20:43

## 2021-05-26 RX ADMIN — BACLOFEN 20 MG: 20 TABLET ORAL at 09:43

## 2021-05-26 RX ADMIN — METOPROLOL TARTRATE 25 MG: 25 TABLET, FILM COATED ORAL at 09:43

## 2021-05-26 RX ADMIN — MELATONIN TAB 3 MG 3 MG: 3 TAB at 20:43

## 2021-05-26 RX ADMIN — BACLOFEN 30 MG: 20 TABLET ORAL at 20:43

## 2021-05-26 RX ADMIN — POLYVINYL ALCOHOL 1 DROP: 14 SOLUTION/ DROPS OPHTHALMIC at 10:00

## 2021-05-26 RX ADMIN — MECLIZINE HYDROCHLORIDE 25 MG: 25 TABLET ORAL at 09:43

## 2021-05-26 ASSESSMENT — ACTIVITIES OF DAILY LIVING (ADL)
TUB_SHOWER_TRANSFER_DESCRIPTION: GRAB BAR;SHOWER BENCH;SET-UP OF EQUIPMENT;SUPERVISION FOR SAFETY;VERBAL CUEING
BED_CHAIR_WHEELCHAIR_TRANSFER_DESCRIPTION: SET-UP OF EQUIPMENT;SUPERVISION FOR SAFETY;VERBAL CUEING

## 2021-05-26 NOTE — THERAPY
"Occupational Therapy  Daily Treatment     Patient Name: Thong Arzola  Age:  56 y.o., Sex:  male  Medical Record #: [id number]  Today's Date: 5/26/2021     Precautions  Precautions: (P) Fall Risk, Other (See Comments)  Comments: (P) Helmet OOB; LUE 2-3 Modified Jeremiah Scale; Abdominal binder OOB; Brewer cath; L inattention & haylie         Subjective    \"Thank you, that's great.\" --patient's response after trimming his beard     Objective       05/26/21 1231   Precautions   Precautions Fall Risk;Other (See Comments)   Comments Helmet OOB; LUE 2-3 Modified Jeremiah Scale; Abdominal binder OOB; Brewer cath; L inattention & haylie   Cognition    New Learning Impaired   Attention Impaired   Sequencing Impaired   Comments impaired attention to middle and left side of face when shaving.  Stated he was done after only shaving right side.   Functional Level of Assist   Grooming Minimal Assist   Grooming Description Increased time;Initial preparation for task;Seated in wheelchair at sink;Set-up of equipment;Supervision for safety;Verbal cueing  (min A for thoroughness shaving facial hair w/ electric )   Upper Body Dressing Minimal Assist   Upper Body Dressing Description Increased time;Set-up of equipment;Supervision for safety;Verbal cueing  (min A w/ max VCs to don/doff pullover shirt)   Bed, Chair, Wheelchair Transfer Minimal Assist   Bed Chair Wheelchair Transfer Description Set-up of equipment;Supervision for safety;Verbal cueing  (min A SPT w/c <> mat w/ cues)   Neuro-Muscular Treatments   Neuro-Muscular Treatments Electrical Stimulation;Facilitation;Inhibition;Tapping;Verbal Cuing   Comments E stim left triceps x 2 minutes at 35 mA with no movement ellicted by stimulation, so activity was stopped due to in being uncomfortable to patient.     Bed Mobility    Supine to Sit Moderate Assist   Sit to Supine Moderate Assist   Skilled Intervention Verbal Cuing;Tactile Cuing   Interdisciplinary Plan of Care Collaboration "   Patient Position at End of Therapy Seated;Chair Alarm On;Self Releasing Lap Belt Applied;Call Light within Reach;Tray Table within Reach;Phone within Reach   OT Total Time Spent   OT Individual Total Time Spent (Mins) 60   OT Charge Group   OT Self Care / ADL 2   OT Therapy Activity 2     Passive stretching to muscles around left scapula with patient in supine on mat followed by stretching of left UE into extensor pattern.  Neuro re-ed left elbow flexion/extension with muscle belly tapping.     Assessment    Patient's left tricep with no response from e-stim at 35 mA. Does demonstrate 1-2/5 volitional elbow flexion at times.  Improving ability to don/doff shirt, but still requiring min A due to impaired problem solving, command following and sequencing.  Strengths: Able to follow instructions, Alert and oriented, Effective communication skills, Good insight into deficits/needs, Independent prior level of function, Manages pain appropriately, Motivated for self care and independence, Pleasant and cooperative, Supportive family, Willingly participates in therapeutic activities  Barriers: Bowel incontinence, Decreased endurance, Fatigue, Generalized weakness, Hemiplegia, Home accessibility, Orthostatic hypotension, Impaired activity tolerance, Impaired balance, Limited mobility, Spasticity    Plan    ADL retraining, L UE neuro re-ed/stretching, family training with spouse Anel; Bathing is not a goal of OT at this time (CNAs should be bathing patient); home evaluation to sister in law's house in [place]; pursue resting hand splint for left hand/wrist    Occupational Therapy Goals (Active)     Problem: Functional Transfers     Dates: Start: 05/19/21       Goal: STG-Within one week, patient will transfer to toilet     Dates: Start: 05/19/21       Description: 1) Individualized Goal: with min A using grab bar and commode over the toilet  2) Interventions:  OT Self Care/ADL, OT Cognitive Skill Dev, OT Manual Ther Technique,  OT Neuro Re-Ed/Balance, OT Sensory Int Techniques, OT Therapeutic Activity, OT Evaluation, and OT Therapeutic Exercise      Goal Note filed on 05/26/21 1129 by Aj Lovett, OT/L     Mod A               Problem: OT Long Term Goals     Dates: Start: 04/30/21       Goal: LTG-By discharge, patient will complete basic self care tasks     Dates: Start: 04/30/21       Description: 1) Individualized Goal:  with min to mod A using AE/AD/techniques as needed  2) Interventions:  OT E Stim Attended, OT Self Care/ADL, OT Cognitive Skill Dev, OT Manual Ther Technique, OT Neuro Re-Ed/Balance, OT Sensory Int Techniques, OT Therapeutic Activity, OT Evaluation, and OT Therapeutic Exercise       Goal: LTG-By discharge, patient will perform bathroom transfers     Dates: Start: 04/30/21       Description: 1) Individualized Goal:  with max A x 1 person using slideboard versus squat pivot to the right  2) Interventions:  OT E Stim Attended, OT Self Care/ADL, OT Cognitive Skill Dev, OT Manual Ther Technique, OT Neuro Re-Ed/Balance, OT Sensory Int Techniques, OT Therapeutic Activity, OT Evaluation, and OT Therapeutic Exercise          Problem: Toileting     Dates: Start: 05/19/21       Goal: STG-Within one week, patient will complete toileting tasks     Dates: Start: 05/19/21       Description: 1) Individualized Goal: with mod A using grab bar and commode over the toilet  2) Interventions:  OT Self Care/ADL, OT Cognitive Skill Dev, OT Manual Ther Technique, OT Neuro Re-Ed/Balance, OT Sensory Int Techniques, OT Therapeutic Activity, OT Evaluation, and OT Therapeutic Exercise      Goal Note filed on 05/26/21 1129 by Aj Lovett OT/L     Max A

## 2021-05-26 NOTE — CARE PLAN
Problem: Toileting  Goal: STG-Within one week, patient will complete toileting tasks  Description: 1) Individualized Goal: with mod A using grab bar and commode over the toilet  2) Interventions:  OT Self Care/ADL, OT Cognitive Skill Dev, OT Manual Ther Technique, OT Neuro Re-Ed/Balance, OT Sensory Int Techniques, OT Therapeutic Activity, OT Evaluation, and OT Therapeutic Exercise    Outcome: Not Met  Note: Max A     Problem: Functional Transfers  Goal: STG-Within one week, patient will transfer to toilet  Description: 1) Individualized Goal: with min A using grab bar and commode over the toilet  2) Interventions:  OT Self Care/ADL, OT Cognitive Skill Dev, OT Manual Ther Technique, OT Neuro Re-Ed/Balance, OT Sensory Int Techniques, OT Therapeutic Activity, OT Evaluation, and OT Therapeutic Exercise    Outcome: Not Met  Note: Mod A

## 2021-05-26 NOTE — THERAPY
Physical Therapy   Daily Treatment     Patient Name: Thong Arzola  Age:  56 y.o., Sex:  male  Medical Record #: [id number]  Today's Date: 5/26/2021     Precautions  Precautions: (P) Fall Risk, Other (See Comments)  Comments: (P) Helmet OOB; LUE 2-3 Modified Jeremiah Scale; Abdominal binder OOB; Brewer cath; L inattention & haylie    Subjective    Pt was in agreeable with POC, and receptive to education. No complaints.       Objective       05/26/21 0901   Precautions   Precautions Fall Risk;Other (See Comments)   Comments Helmet OOB; LUE 2-3 Modified Jeremiah Scale; Abdominal binder OOB; Brewer cath; L inattention & haylie   Wheelchair Functional Level of Assist   Wheelchair Assist Supervised   Distance Wheelchair (Feet or Distance) 150   Wheelchair Description Extra time;Limited by fatigue  (Improved L sided attention noted)   Transfer Functional Level of Assist   Bed, Chair, Wheelchair Transfer Minimal Assist   Bed Chair Wheelchair Transfer Description Adaptive equipment;Non-hospital bed;Increased time;Assist with one limb;Set-up of equipment;Squat pivot transfer to wheelchair;Verbal cueing  (L/R reach pivot)   Supine Lower Body Exercise   Bridges Two Legged;3 sets of 10   Standing Lower Body Exercises   Mini Squat Partial;2 sets of 10  (EOB, with RUE support on bed rail, blocking LLE)   Bed Mobility    Sit to Stand Contact Guard Assist   Neuro-Muscular Treatments   Neuro-Muscular Treatments Weight Shift Left;Weight Shift Right;Verbal Cuing;Tactile Cuing;Sequencing;Postural Facilitation;Postural Changes;Joint Approximation;Facilitation;Co-Contraction   Comments Postural EOB facilitation to thoracic spine with manual stretching, to enhance spinal flexibility, and thoracic extension. Sitting EOB with cones to patient's left side, to inc L awareness, reaching and stacking with trunk rotation x 6 reps, 4 sets, min A. Standing in // bars 1-2 min increments while reaching across body, with a mini squat, to grab cone to  his Left, stand up tall, and stack cone to his Right side 6x, 3-4sets, 2 person due to nu UE support. Guarding at L knee and assisting with pelvic rotation, and balance.     Interdisciplinary Plan of Care Collaboration   IDT Collaboration with  Therapy Tech   Patient Position at End of Therapy Seated;Self Releasing Lap Belt Applied;Call Light within Reach;Tray Table within Reach;Phone within Reach   Collaboration Comments POC, equipment assist   Strengths & Barriers   Strengths Alert and oriented;Effective communication skills;Independent prior level of function;Motivated for self care and independence;Pleasant and cooperative;Supportive family;Willingly participates in therapeutic activities   Barriers Hemiparesis;Home accessibility;Orthostatic hypotension;Impaired activity tolerance;Impaired balance;Spasticity;Limited mobility   PT Total Time Spent   PT Individual Total Time Spent (Mins) 60   PT Charge Group   PT Neuromuscular Re-Education / Balance 2   PT Therapeutic Activities 2       Assessment    Patient participated in motor planning/ neuro re-education with emphasis on symmetry, postural awareness, L sided attention, and environmental scanning. Pt demonstrates an overall improvement in L sided awareness, and body awareness. Pt was able to sit EOB without physical support statically, and with min A dynamically.      Strengths: (P) Alert and oriented, Effective communication skills, Independent prior level of function, Motivated for self care and independence, Pleasant and cooperative, Supportive family, Willingly participates in therapeutic activities  Barriers: (P) Hemiparesis, Home accessibility, Orthostatic hypotension, Impaired activity tolerance, Impaired balance, Spasticity, Limited mobility    Plan    Continue to assess w/c needs, w/c mobility using R haylie technique, transfer training with focus on sequencing, standing posture and balance, gait training/pre gait in // bars and with [employer] [place],  continue family training.    Passport items to be completed:  Get in/out of bed safely, in/out of a vehicle, safely use mobility device, walk or wheel around home/community, navigate up and down stairs, show how to get up/down from the ground, ensure home is accessible, demonstrate HEP, complete caregiver training    Physical Therapy Problems (Active)     Problem: Mobility     Dates: Start: 04/30/21       Goal: STG-Within one week, patient will ambulate household distance     Dates: Start: 05/19/21       Description: 1) Individualized goal:  AMB x 10 feet in // bars with mod A  2) Interventions:  PT Group Therapy, PT E Stim Attended, PT Gait Training, PT Therapeutic Exercises, PT Neuro Re-Ed/Balance, PT Aquatic Therapy, PT Therapeutic Activity, PT Manual Therapy, and PT Evaluation      Goal Note filed on 05/26/21 1241 by Ivory Cintron, LAVONT     2 person for wc follow at this time for safety, L haylie, impaired body awareness/ impaired L sided awareness               Problem: Mobility Transfers     Dates: Start: 05/19/21       Goal: STG-Within one week, patient will transfer bed to chair     Dates: Start: 05/19/21       Description: 1) Individualized goal:  SQPT with SBA  2) Interventions:  PT Group Therapy, PT E Stim Attended, PT Gait Training, PT Therapeutic Exercises, PT Neuro Re-Ed/Balance, PT Aquatic Therapy, PT Therapeutic Activity, PT Manual Therapy, and PT Evaluation      Goal Note filed on 05/26/21 1241 by Ivory Cintron, LAVONT     Min A for impaired motor planning/ safety concerns, L haylie               Problem: PT-Long Term Goals     Dates: Start: 04/30/21       Goal: LTG-By discharge, patient will propel wheelchair     Dates: Start: 04/30/21       Description: 1) Individualized goal:  100ft with haylie technique and Anastacio on even/uneven surfaces  2) Interventions:  PT Group Therapy, PT E Stim Attended, PT Orthotics Training, PT Gait Training, PT Self Care/Home Eval, PT Therapeutic Exercises, PT Neuro Re-Ed/Balance,  PT Therapeutic Activity, and PT Evaluation       Goal: LTG-By discharge, patient will transfer one surface to another     Dates: Start: 04/30/21       Description: 1) Individualized goal:  SQPT with Taz   2) Interventions:  PT Group Therapy, PT E Stim Attended, PT Orthotics Training, PT Gait Training, PT Self Care/Home Eval, PT Therapeutic Exercises, PT Neuro Re-Ed/Balance, PT Therapeutic Activity, and PT Evaluation         Goal: LTG-By discharge, patient will     Dates: Start: 04/30/21       Description: 1) Individualized goal: perform bed mobility (supine<>sit) with Taz   2) Interventions:  PT Group Therapy, PT E Stim Attended, PT Orthotics Training, PT Gait Training, PT Self Care/Home Eval, PT Therapeutic Exercises, PT Neuro Re-Ed/Balance, PT Therapeutic Activity, and PT Evaluation

## 2021-05-26 NOTE — CARE PLAN
Problem: Communication  Goal: The ability to communicate needs accurately and effectively will improve  Note: Patient able to verbalize his own needs to staff.      Problem: Safety  Goal: Will remain free from falls  Note: Patient will use call light for staff assistance. He does not attempt to self transfer.      Problem: Pain Management  Goal: Pain level will decrease to patient's comfort goal  Flowsheets (Taken 5/25/2021 7733)  Pain Rating Scale (NPRS): 0  Note: Patient had no complaints of pain this shift. Will continue to monitor.    The patient is Stable - Low risk of patient condition declining or worsening         Progress made toward(s) clinical / shift goals:  within normal limits

## 2021-05-26 NOTE — PROGRESS NOTES
"Rehab Progress Note     Date of Service: 5/11/2021  Chief Complaint: follow up stroke    Interval Events (Subjective)    Patient seen and examined today in the cafeteria.  He is working with speech therapy.  He reports he's sleeping well and denies any pain.  He has no new complaints.      ROS: No changes to bowel, bladder, pain, mood, or sleep.       Objective:  VITAL SIGNS: /82   Pulse 60   Temp 36.7 °C (98.1 °F) (Temporal)   Resp 18   Ht 1.778 m (5' 10\")   Wt 70 kg (154 lb 5.2 oz)   SpO2 97%   BMI 22.14 kg/m²   Gen: alert, no apparent distress  Neuro: notable for spastic left hemiplegia    Recent Results (from the past 72 hour(s))   CBC WITHOUT DIFFERENTIAL    Collection Time: 05/26/21  5:42 AM   Result Value Ref Range    WBC 5.0 4.8 - 10.8 K/uL    RBC 4.37 (L) 4.70 - 6.10 M/uL    Hemoglobin 12.8 (L) 14.0 - 18.0 g/dL    Hematocrit 38.4 (L) 42.0 - 52.0 %    MCV 87.9 81.4 - 97.8 fL    MCH 29.3 27.0 - 33.0 pg    MCHC 33.3 (L) 33.7 - 35.3 g/dL    RDW 43.7 35.9 - 50.0 fL    Platelet Count 265 164 - 446 K/uL    MPV 9.4 9.0 - 12.9 fL   BASIC METABOLIC PANEL    Collection Time: 05/26/21  5:42 AM   Result Value Ref Range    Sodium 141 135 - 145 mmol/L    Potassium 4.0 3.6 - 5.5 mmol/L    Chloride 106 96 - 112 mmol/L    Co2 27 20 - 33 mmol/L    Glucose 95 65 - 99 mg/dL    Bun 19 8 - 22 mg/dL    Creatinine 0.98 0.50 - 1.40 mg/dL    Calcium 9.5 8.5 - 10.5 mg/dL    Anion Gap 8.0 7.0 - 16.0   ESTIMATED GFR    Collection Time: 05/26/21  5:42 AM   Result Value Ref Range    GFR If African American >60 >60 mL/min/1.73 m 2    GFR If Non African American >60 >60 mL/min/1.73 m 2       Current Facility-Administered Medications   Medication Frequency   • baclofen (LIORESAL) tablet 20 mg BID   • baclofen (LIORESAL) tablet 30 mg QHS   • artificial tears ophthalmic solution 1 Drop TID   • meclizine (ANTIVERT) tablet 25 mg BID   • atorvastatin (LIPITOR) tablet 40 mg Q EVENING   • metoprolol tartrate (LOPRESSOR) tablet 25 mg " BID   • senna-docusate (PERICOLACE or SENOKOT S) 8.6-50 MG per tablet 2 tablet Q EVENING   • gabapentin (NEURONTIN) capsule 100 mg Q EVENING   • melatonin tablet 3 mg QHS   • hydrOXYzine HCl (ATARAX) tablet 50 mg Q6HRS PRN   • Respiratory Therapy Consult Continuous RT   • Pharmacy Consult Request ...Pain Management Review 1 Each PHARMACY TO DOSE   • hydrALAZINE (APRESOLINE) tablet 10 mg Q8HRS PRN   • acetaminophen (Tylenol) tablet 650 mg Q4HRS PRN   • lactulose 20 GM/30ML solution 30 mL QDAY PRN   • docusate sodium (ENEMEEZ) enema 283 mg QDAY PRN   • fleet enema 133 mL QDAY PRN   • benzocaine-menthol (CEPACOL) lozenge 1 Lozenge Q2HRS PRN   • mag hydrox-al hydrox-simeth (MAALOX PLUS ES or MYLANTA DS) suspension 20 mL Q2HRS PRN   • ondansetron (ZOFRAN ODT) dispertab 4 mg 4X/DAY PRN    Or   • ondansetron (ZOFRAN) syringe/vial injection 4 mg 4X/DAY PRN   • traZODone (DESYREL) tablet 50 mg QHS PRN   • sodium chloride (OCEAN) 0.65 % nasal spray 2 Spray PRN   • midazolam (VERSED) 5 mg/mL (1 mL vial) PRN   • mirtazapine (Remeron) orally disintegrating tab 15 mg QHS   • rivaroxaban (XARELTO) tablet 20 mg PM MEAL   • thyroid (ARMOUR THYROID) tablet 60 mg BID       Orders Placed This Encounter   Procedures   • Diet Order Diet: Level 7 - Easy to Chew (float pills in puree); Liquid level: Level 0 - Thin; Tray Modifications (optional): SLP - 1:1 Supervision by Nursing, SLP - Deliver to Nursing Station     Standing Status:   Standing     Number of Occurrences:   1     Order Specific Question:   Diet:     Answer:   Level 7 - Easy to Chew [22]     Comments:   float pills in puree     Order Specific Question:   Liquid level     Answer:   Level 0 - Thin     Order Specific Question:   Tray Modifications (optional)     Answer:   SLP - 1:1 Supervision by Nursing     Order Specific Question:   Tray Modifications (optional)     Answer:   SLP - Deliver to Nursing Station       Radiology    CT-HEAD W/O   Final Result      1.  Further  demarcation of large area of encephalomalacia in the right MCA distribution.      2.  No acute hemorrhage.      3.  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      4.  Right temporoparietal craniectomy changes.      US-EXTREMITY VENOUS UPPER UNILAT LEFT   Final Result            Assessment:  Active Hospital Problems    Diagnosis    • *Acute right MCA stroke (HCC)    • Paroxysmal atrial fibrillation (HCC)    • Urinary retention    • Acquired hypothyroidism    • History of COVID-19    • Hypotension    • Normocytic anemia    • Spasticity as late effect of cerebrovascular accident (CVA)    • Reactive depression      This patient is a 56 y.o. male admitted for acute inpatient rehabilitation with Acute right MCA stroke (HCC).    I led and attended the weekly conference, and agree with the IDT conference documentation and plan of care as noted below.    Date of conference: 5/26/2021    Goals and barriers: See IDT note.    Biggest barriers: left spastic hemiplegia, impaired balance, left neglect    Goals in next week: home evaluation    CM/social support: wife supportive, to go to 1  here in [place], wife's sister    Anticipated DC date: 6/4    Home health: PT/OT/SLP/RN - [facility]    Equip: MWC with pressure relieving cushion with firm positioning backrest, bathroom DME    Follow up: PCP, Dr. Dumont, stroke bridge clinic, urology, neurosugery      Medical Decision Making and Plan:    Large right ICA/MCA ischemic stroke  S/p craniectomy 4/3, Dr. Payton  Left hemiplegia, minimal improvement  Cognitive deficits, improved  Left neglect, improved  Continue full rehab program  PT/OT/SLP, 1 hr each discipline, 5 days per week    Xarelto  Aspirin discontinued per neurology recommendations  Statin    Outpatient follow up with stroke bridge clinic, Dr. Dumont, referrals made    Outpatient follow up with Dr. Payton for cranioplasty, scheduled for follow up Head CT per above, text  surgeon for review    Making good progress, improved function, able to stand for 10 minutes    Neuropathic pain, resolved  Left leg at night, resolved  Continue low dose gabapentin 100 mg 5/11  Continue to monitor need to increase dose  Currently well controlled as of 5/26    Dizziness, improved  Scheduled Meclizine, start titration 5/20, TID --> BID, decrease to daily 5/27  Continue Teds and abdominal binder for now  BPPV testing by PT negative    Spasticity, continues, worse at night  Increased baclofen 15 mg TID to 20 mg TID 4/30 --> 30 mg TID 5/3  Was decreased back down to 20 mg TID on 5/13 due to fatigue, with increased spasticity     Increase night time dose to 30 mg 5/25    Started valium at night 2 mg 4/30, discontinued 5/3, doesn't seem to make much difference, patient also too sedated    Consider adding Zanaflex in future, LFTs on 5/5 with elevated ALT, rechecked 5/18, still elevated, not a good candidate for Zanaflex at this time    Will benefit from Botox - unable to tolerate higher doses of baclofen due to sedation/fatigue, unable to trial Zanaflex due to elevated ALT, trial of Valium was not effective and patient also too sedated    Dr. Dumont has ordered 400 units with plan to inject LUE as an outpatient after discharge from rehab    Elevated ALT  Decreased statin dose 80 mg to 40 mg 5/18  Recheck in am     Atrial fibrillation  Metoprolol  Xarelto     Hypothyroidism   Dixie thyroid, repeat TSH in 4 weeks from admission, as dose was increased at acute  Recheck in am     Hypertension  Hypotension, improved  Lisinopril discontinued  Metoprolol, dose increased  Flomax discontinued 5/12  Teds and abdominal binder    Insomnia, improved/resolved  Already on mirtazapine  Scheduled melatonin  Gabapentin added for neuropathic pain at night     Reactive depression  Mirtazapine  Consider psychology consult if needed   Mood currently stable     History of COVID-19  Without sequelae     Normocytic anemia  Mild,  monitor    Bowel program  Continue bowel medications  Last BM 5/25    Bladder program  Urinary retention, continues  Unsuccessful voiding trials x 3, even on Flomax  Outpatient follow up with urology, referral made    DVT prophylaxis  Xarelto    Total time:  40 minutes.  I spent greater than 50% of the time for patient care, counseling, and coordination on this date, including patient face-to face time, unit/floor time with review of records/pertinent lab data and studies, as well as discussing diagnostic evaluation/work up, planned therapeutic interventions, and future disposition of care, as per the interval events/subjective and the assessment and plan as noted above.      Radha Monge M.D.   Physical Medicine and Rehabilitation

## 2021-05-26 NOTE — CARE PLAN
Problem: Venous Thromboembolism (VTW)/Deep Vein Thrombosis (DVT) Prevention:  Goal: Patient will participate in Venous Thrombosis (VTE)/Deep Vein Thrombosis (DVT)Prevention Measures  Outcome: Progressing  Note: Left arm weak , posey air splint off at 2000 hrs , placed back at 2200 hrs- am .etc,Pt aware of plan of care,      Problem: Urinary Elimination:  Goal: Ability to reestablish a normal urinary elimination pattern will improve  Outcome: Progressing  Note: Pt with urinary retention dye catheter intact draining yellow colored urine. NO hematuria noted.   The patient is Stable - Low risk of patient condition declining or worsening         Progress made toward(s) clinical / shift goals:  Pain under control, urine yellow in color , no hematuria.    Patient is not progressing towards the following goals:Left arm weak, on posey air splint schedule .

## 2021-05-26 NOTE — DISCHARGE PLANNING
LEILANI confirmed with Angeli at [facility] that patient has been accepted and they will follow when he DC's home. CM met with patient to let him know and update him on IDT and still the DC date of 6/4/21.  Patient is pleased with his progress at [facility].  CM will continue to monitor for DC needs.

## 2021-05-26 NOTE — THERAPY
Occupational Therapy  Daily Treatment     Patient Name: Thong Arzola  Age:  56 y.o., Sex:  male  Medical Record #: [id number]  Today's Date: 5/26/2021     Precautions  Precautions: (P) Fall Risk  Comments: (P) Helmet OOB; LUE 2-3 Modified Jeremiah Scale; Abdominal binder OOB; Brewer cath; L inattention & haylie         Subjective    Pt seated in t-dine, pleasant and cooperative.      Objective       05/26/21 0801   Precautions   Precautions Fall Risk   Comments Helmet OOB; LUE 2-3 Modified Jeremiah Scale; Abdominal binder OOB; Brewer cath; L inattention & haylie   Cognition    Level of Consciousness Alert   Functional Level of Assist   Eating Modified Independent   Eating Description Increased time   Interdisciplinary Plan of Care Collaboration   Patient Position at End of Therapy Seated;Other (Comments)  (tech assisted pt back to room)   OT Total Time Spent   OT Group Total Time Spent (Mins) 30   OT Charge Group   OT Group Therapy Group Activities     Self-feeding Assessment during meal:     - Ability to grasp utensils: RUE dynamic tripod grasp    - Ability to bring food to mouth: Mod I  - Ability to bring liquids to mouth: Mod I  - Ability to open packages: Mod I  - Ability to cut food: with fork Mod I  - Ability to manage body posture: assist to position w/c at table.   - Ability to maintain a clean appearance: good  - Behavior/socialization skills: appropriate with no issues     - Primary barriers to self-feeding: non-functional LUE, spasticity, L neglect  - Adaptive equipment/ Compensatory strategy recommendations: improved scanning to left side without cues and carryover of haylie technique when opening packages.     Assessment    Pt demo improved carryover of haylie technique to open packages and set up for self-feeding. Recommend d/c from OT self-feeding group.  Strengths: Able to follow instructions, Alert and oriented, Effective communication skills, Good insight into deficits/needs, Independent prior level of  function, Manages pain appropriately, Motivated for self care and independence, Pleasant and cooperative, Supportive family, Willingly participates in therapeutic activities  Barriers: Bowel incontinence, Decreased endurance, Fatigue, Generalized weakness, Hemiplegia, Home accessibility, Orthostatic hypotension, Impaired activity tolerance, Impaired balance, Limited mobility, Spasticity    Plan    D/c from OT self-feeding group.         Occupational Therapy Goals (Active)     Problem: Dressing     Dates: Start: 05/12/21       Goal: STG-Within one week, patient will dress LB     Dates: Start: 05/12/21       Goal Note filed on 05/19/21 1155 by Nicki Yancey MS,OTR/L     Requires Max A               Problem: Functional Transfers     Dates: Start: 05/19/21       Goal: STG-Within one week, patient will transfer to toilet     Dates: Start: 05/19/21       Description: 1) Individualized Goal: with min A using grab bar and commode over the toilet  2) Interventions:  OT Self Care/ADL, OT Cognitive Skill Dev, OT Manual Ther Technique, OT Neuro Re-Ed/Balance, OT Sensory Int Techniques, OT Therapeutic Activity, OT Evaluation, and OT Therapeutic Exercise            Problem: OT Long Term Goals     Dates: Start: 04/30/21       Goal: LTG-By discharge, patient will complete basic self care tasks     Dates: Start: 04/30/21       Description: 1) Individualized Goal:  with min to mod A using AE/AD/techniques as needed  2) Interventions:  OT E Stim Attended, OT Self Care/ADL, OT Cognitive Skill Dev, OT Manual Ther Technique, OT Neuro Re-Ed/Balance, OT Sensory Int Techniques, OT Therapeutic Activity, OT Evaluation, and OT Therapeutic Exercise       Goal: LTG-By discharge, patient will perform bathroom transfers     Dates: Start: 04/30/21       Description: 1) Individualized Goal:  with max A x 1 person using slideboard versus squat pivot to the right  2) Interventions:  OT E Stim Attended, OT Self Care/ADL, OT Cognitive Skill Dev,  OT Manual Ther Technique, OT Neuro Re-Ed/Balance, OT Sensory Int Techniques, OT Therapeutic Activity, OT Evaluation, and OT Therapeutic Exercise          Problem: Toileting     Dates: Start: 05/19/21       Goal: STG-Within one week, patient will complete toileting tasks     Dates: Start: 05/19/21       Description: 1) Individualized Goal: with mod A using grab bar and commode over the toilet  2) Interventions:  OT Self Care/ADL, OT Cognitive Skill Dev, OT Manual Ther Technique, OT Neuro Re-Ed/Balance, OT Sensory Int Techniques, OT Therapeutic Activity, OT Evaluation, and OT Therapeutic Exercise

## 2021-05-26 NOTE — THERAPY
Speech Language Pathology  Daily Treatment     Patient Name: Thong Arzola  Age:  56 y.o., Sex:  male  Medical Record #: [id number]  Today's Date: 5/26/2021     Precautions  Precautions: Fall Risk  Comments: Helmet OOB; LUE 2-3 Modified Jeremiah Scale; Abdominal binder OOB; Brewer cath; L inattention & haylie    Subjective    Patient was willing to participate in ST seen at 0730 and 1030.     Objective       05/26/21 0732   Cognition   Attention to Task Minimal (4)   Simple Attention Minimal (4)   Moderate Attention Moderate (3)   Visual Scanning / Cancellation Skills Moderate (3)   Executive Functioning / Organization Moderate (3)   SLP Total Time Spent   SLP Individual Total Time Spent (Mins) 60   Treatment Charges   SLP Cognitive Skill Development First 15 Minutes 1   SLP Cognitive Skill Development Additional 15 Minutes 3       Assessment    Continues to require mod verbal cues for visual attention tasks. Completed calendar task with 90% accuracy with use of strategies.     Strengths: Able to follow instructions, Pleasant and cooperative, Supportive family, Willingly participates in therapeutic activities  Barriers: Impaired functional cognition, Visual impairment, Decreased endurance    Plan    Visual attention, organization, carry-over of strategies.     Passport items to be completed:  complete daily memory log entries, solve problems related to safety situations, review education related to hospitalization, complete caregiver training     Speech Therapy Problems (Active)     Problem: Problem Solving STGs     Dates: Start: 05/05/21       Goal: STG-Within one week, patient will     Dates: Start: 05/05/21       Description: 1) Individualized goal: demonstrate appropriate visual scanning to the left with min cues and 80% accuracy.  2) Interventions:  SLP Speech Language Treatment, SLP Self Care / ADL Training , SLP Cognitive Skill Development, and SLP Group Treatment        Goal Note filed on 05/25/21 1529 by  Carly Ramirez MS,CCC-SLP     60-75% for scanning tasks, endurance as barrier. Improved insight to difficulties.             Goal: STG-Within one week, patient will     Dates: Start: 05/05/21       Description: 1) Individualized goal:  complete simple attention tasks with min A with 80% accuracy.   2) Interventions:  SLP Speech Language Treatment, SLP Self Care / ADL Training , SLP Cognitive Skill Development, and SLP Group Treatment        Goal Note filed on 05/25/21 1529 by Carly Ramirez MS,CCC-SLP     Progressing               Problem: Speech/Swallowing LTGs     Dates: Start: 04/30/21       Goal: LTG-By discharge, patient will solve complex problem     Dates: Start: 04/30/21       Description: 1) Individualized goal:  related to IADL's with mod I for safe d/c home.  2) Interventions:  SLP Speech Language Treatment, SLP Swallowing Dysfunction Treatment, SLP Oral Pharyngeal Evaluation, SLP Video Swallow Evaluation, SLP Self Care / ADL Training , SLP Cognitive Skill Development, and SLP Group Treatment

## 2021-05-26 NOTE — CARE PLAN
Problem: Mobility Transfers  Goal: STG-Within one week, patient will transfer bed to chair  Description: 1) Individualized goal:  SQPT with SBA  2) Interventions:  PT Group Therapy, PT E Stim Attended, PT Gait Training, PT Therapeutic Exercises, PT Neuro Re-Ed/Balance, PT Aquatic Therapy, PT Therapeutic Activity, PT Manual Therapy, and PT Evaluation    Outcome: Not Met  Note: Min A for impaired motor planning/ safety concerns, L haylie     Problem: Mobility  Goal: STG-Within one week, patient will ambulate household distance  Description: 1) Individualized goal:  AMB x 10 feet in // bars with mod A  2) Interventions:  PT Group Therapy, PT E Stim Attended, PT Gait Training, PT Therapeutic Exercises, PT Neuro Re-Ed/Balance, PT Aquatic Therapy, PT Therapeutic Activity, PT Manual Therapy, and PT Evaluation    Outcome: Progressing  Note: 2 person for  follow at this time for safety, L haylie, impaired body awareness/ impaired L sided awareness     Problem: Mobility  Goal: STG-Within one week, patient will propel wheelchair household distances  Description: 1) Individualized goal:  25ft with haylie technique with SBA on even surfaces  2) Interventions:  PT Group Therapy, PT E Stim Attended, PT Orthotics Training, PT Gait Training, PT Self Care/Home Eval, PT Therapeutic Exercises, PT Neuro Re-Ed/Balance, PT Therapeutic Activity, and PT Evaluation    Outcome: Met

## 2021-05-27 LAB
ALBUMIN SERPL BCP-MCNC: 3.3 G/DL (ref 3.2–4.9)
ALP SERPL-CCNC: 71 U/L (ref 30–99)
ALT SERPL-CCNC: 55 U/L (ref 2–50)
AST SERPL-CCNC: 21 U/L (ref 12–45)
BILIRUB CONJ SERPL-MCNC: <0.2 MG/DL (ref 0.1–0.5)
BILIRUB INDIRECT SERPL-MCNC: ABNORMAL MG/DL (ref 0–1)
BILIRUB SERPL-MCNC: 0.3 MG/DL (ref 0.1–1.5)
PROT SERPL-MCNC: 6.1 G/DL (ref 6–8.2)
T4 FREE SERPL-MCNC: 0.93 NG/DL (ref 0.93–1.7)
TSH SERPL DL<=0.005 MIU/L-ACNC: 0.01 UIU/ML (ref 0.38–5.33)

## 2021-05-27 PROCEDURE — 97530 THERAPEUTIC ACTIVITIES: CPT

## 2021-05-27 PROCEDURE — 97130 THER IVNTJ EA ADDL 15 MIN: CPT

## 2021-05-27 PROCEDURE — 97129 THER IVNTJ 1ST 15 MIN: CPT

## 2021-05-27 PROCEDURE — A9270 NON-COVERED ITEM OR SERVICE: HCPCS | Performed by: HOSPITALIST

## 2021-05-27 PROCEDURE — 700102 HCHG RX REV CODE 250 W/ 637 OVERRIDE(OP): Performed by: PHYSICAL MEDICINE & REHABILITATION

## 2021-05-27 PROCEDURE — 99233 SBSQ HOSP IP/OBS HIGH 50: CPT | Performed by: PHYSICAL MEDICINE & REHABILITATION

## 2021-05-27 PROCEDURE — 84439 ASSAY OF FREE THYROXINE: CPT

## 2021-05-27 PROCEDURE — 36415 COLL VENOUS BLD VENIPUNCTURE: CPT

## 2021-05-27 PROCEDURE — A9270 NON-COVERED ITEM OR SERVICE: HCPCS | Performed by: PHYSICAL MEDICINE & REHABILITATION

## 2021-05-27 PROCEDURE — 700102 HCHG RX REV CODE 250 W/ 637 OVERRIDE(OP): Performed by: HOSPITALIST

## 2021-05-27 PROCEDURE — 770010 HCHG ROOM/CARE - REHAB SEMI PRIVAT*

## 2021-05-27 PROCEDURE — 97535 SELF CARE MNGMENT TRAINING: CPT

## 2021-05-27 PROCEDURE — 80076 HEPATIC FUNCTION PANEL: CPT

## 2021-05-27 PROCEDURE — 84443 ASSAY THYROID STIM HORMONE: CPT

## 2021-05-27 RX ADMIN — MECLIZINE HYDROCHLORIDE 25 MG: 25 TABLET ORAL at 11:23

## 2021-05-27 RX ADMIN — THYROID, PORCINE 60 MG: 30 TABLET ORAL at 19:48

## 2021-05-27 RX ADMIN — MELATONIN TAB 3 MG 3 MG: 3 TAB at 19:48

## 2021-05-27 RX ADMIN — ATORVASTATIN CALCIUM 40 MG: 40 TABLET, FILM COATED ORAL at 19:48

## 2021-05-27 RX ADMIN — BACLOFEN 20 MG: 20 TABLET ORAL at 09:12

## 2021-05-27 RX ADMIN — MIRTAZAPINE 15 MG: 15 TABLET, ORALLY DISINTEGRATING ORAL at 19:47

## 2021-05-27 RX ADMIN — POLYVINYL ALCOHOL 1 DROP: 14 SOLUTION/ DROPS OPHTHALMIC at 17:07

## 2021-05-27 RX ADMIN — BACLOFEN 20 MG: 20 TABLET ORAL at 17:02

## 2021-05-27 RX ADMIN — METOPROLOL TARTRATE 25 MG: 25 TABLET, FILM COATED ORAL at 19:47

## 2021-05-27 RX ADMIN — RIVAROXABAN 20 MG: 20 TABLET, FILM COATED ORAL at 17:30

## 2021-05-27 RX ADMIN — POLYVINYL ALCOHOL 1 DROP: 14 SOLUTION/ DROPS OPHTHALMIC at 19:51

## 2021-05-27 RX ADMIN — BACLOFEN 30 MG: 20 TABLET ORAL at 19:48

## 2021-05-27 RX ADMIN — POLYVINYL ALCOHOL 1 DROP: 14 SOLUTION/ DROPS OPHTHALMIC at 09:13

## 2021-05-27 RX ADMIN — METOPROLOL TARTRATE 25 MG: 25 TABLET, FILM COATED ORAL at 09:13

## 2021-05-27 RX ADMIN — THYROID, PORCINE 60 MG: 30 TABLET ORAL at 09:12

## 2021-05-27 RX ADMIN — GABAPENTIN 100 MG: 100 CAPSULE ORAL at 19:48

## 2021-05-27 ASSESSMENT — ACTIVITIES OF DAILY LIVING (ADL): TUB_SHOWER_TRANSFER_DESCRIPTION: SET-UP OF EQUIPMENT;SUPERVISION FOR SAFETY;GRAB BAR

## 2021-05-27 NOTE — DISCHARGE PLANNING
CM called [facility] to check on status of consult.  Spoke with [employer].  He saw patient yesterday and is recommending a Dynamic Hinged Elbow Brace to wear at night.  He confirmed no charge to [facility] for consult.  He also stated he could bill insurance after patient DC's.  He will fax order form to this CM.      CM updated Lenore Escobarab Mgr of above.  She will check with Aj, RANDA and Noel regarding ordering brace.      CM available as needs arise.

## 2021-05-27 NOTE — THERAPY
Physical Therapy   Daily Treatment     Patient Name: Thong Arzola  Age:  56 y.o., Sex:  male  Medical Record #: [id number]  Today's Date: 5/27/2021     Precautions  Precautions: Fall Risk, Other (See Comments)  Comments: Helmet OOB; LUE 2-3 Modified Jeremiah Scale; Abdominal binder OOB; Brewer cath; L inattention & haylie    Subjective    Pt was seated in w/c upon arrival and agreeable to treatment.  Pt's spouse present for family training.       Objective       05/27/21 0931   Precautions   Precautions Fall Risk;Other (See Comments)   Interdisciplinary Plan of Care Collaboration   Patient Position at End of Therapy Seated;Family / Friend in Room  (Pt's spouse to take pt back to room)   PT Total Time Spent   PT Individual Total Time Spent (Mins) 60   PT Charge Group   PT Therapeutic Activities 4     Completed discussion on D/C planning including home set up and safety, review of D/C equipment and recommendations, and step of of family training tomorrow for car transfers.  Completed collaboration with therapy supervisor to set up family training and collaboration with CM on D/C equipment.  Completed family training with pt's spouse on bed mobility and transfer training on regular queen sized bed x 3 reps with focus on body mechanics and safety using SPT method.    Assessment    Pt's spouse demonstrated good safety and guarding with pt with transfers using a SPT method.  Pt would likely benefit from bed rail to assist with independence.  Pt would also benefit from pressure relieving cushion to assist with decreasing risk for skin breakdown as pt with difficulty weight shifting L side.  Pt demonstrated improvement in STS with UE support with SPT.  Strengths: Alert and oriented, Effective communication skills, Independent prior level of function, Motivated for self care and independence, Pleasant and cooperative, Supportive family, Willingly participates in therapeutic activities  Barriers: Hemiparesis, Home  accessibility, Orthostatic hypotension, Impaired activity tolerance, Impaired balance, Spasticity, Limited mobility    Plan    Continue to assess w/c needs with trial of standard w/c (18W, 20H, 18D), w/c mobility using R haylie technique, transfer training with focus on sequencing, standing posture and balance, gait training/pre gait in // bars and with [employer], continue family training.  Complete family training for car transfers in pt's personal vehicle.      Passport items to be completed:  Get in/out of bed safely, in/out of a vehicle, safely use mobility device, walk or wheel around home/community, navigate up and down stairs, show how to get up/down from the ground, ensure home is accessible, demonstrate HEP, complete caregiver training    Physical Therapy Problems (Active)     Problem: Mobility     Dates: Start: 04/30/21       Goal: STG-Within one week, patient will ambulate household distance     Dates: Start: 05/19/21       Description: 1) Individualized goal:  AMB x 10 feet in // bars with mod A  2) Interventions:  PT Group Therapy, PT E Stim Attended, PT Gait Training, PT Therapeutic Exercises, PT Neuro Re-Ed/Balance, PT Aquatic Therapy, PT Therapeutic Activity, PT Manual Therapy, and PT Evaluation      Goal Note filed on 05/26/21 1241 by Ivory Cintron DPT     2 person for wc follow at this time for safety, L haylie, impaired body awareness/ impaired L sided awareness               Problem: Mobility Transfers     Dates: Start: 05/19/21       Goal: STG-Within one week, patient will transfer bed to chair     Dates: Start: 05/19/21       Description: 1) Individualized goal:  SQPT with SBA  2) Interventions:  PT Group Therapy, PT E Stim Attended, PT Gait Training, PT Therapeutic Exercises, PT Neuro Re-Ed/Balance, PT Aquatic Therapy, PT Therapeutic Activity, PT Manual Therapy, and PT Evaluation      Goal Note filed on 05/26/21 1241 by Ivory Cintron DPT     Min A for impaired motor planning/ safety concerns,  L haylie               Problem: PT-Long Term Goals     Dates: Start: 04/30/21       Goal: LTG-By discharge, patient will propel wheelchair     Dates: Start: 04/30/21       Description: 1) Individualized goal:  100ft with haylie technique and Anast[place] on even/uneven surfaces  2) Interventions:  PT Group Therapy, PT E Stim Attended, PT Orthotics Training, PT Gait Training, PT Self Care/Home Eval, PT Therapeutic Exercises, PT Neuro Re-Ed/Balance, PT Therapeutic Activity, and PT Evaluation       Goal: LTG-By discharge, patient will transfer one surface to another     Dates: Start: 04/30/21       Description: 1) Individualized goal:  SQPT with Taz   2) Interventions:  PT Group Therapy, PT E Stim Attended, PT Orthotics Training, PT Gait Training, PT Self Care/Home Eval, PT Therapeutic Exercises, PT Neuro Re-Ed/Balance, PT Therapeutic Activity, and PT Evaluation         Goal: LTG-By discharge, patient will     Dates: Start: 04/30/21       Description: 1) Individualized goal: perform bed mobility (supine<>sit) with Taz   2) Interventions:  PT Group Therapy, PT E Stim Attended, PT Orthotics Training, PT Gait Training, PT Self Care/Home Eval, PT Therapeutic Exercises, PT Neuro Re-Ed/Balance, PT Therapeutic Activity, and PT Evaluation

## 2021-05-27 NOTE — CARE PLAN
Problem: Communication  Goal: The ability to communicate needs accurately and effectively will improve  Outcome: Progressing  Intervention: Educate patient and significant other/support system about the plan of care, procedures, treatments, medications and allow for questions  Note: Pt reviewed on the ff. Meds, safety measures,pain control, dye catheter , aspiration precautions etc. Verbalized understanding ,     Problem: Pain Management  Goal: Pain level will decrease to patient's comfort goal  Outcome: Progressing  Intervention: Educate and implement non-pharmacologic comfort measures. Examples: relaxation, distration, play therapy, activity therapy, massage, etc.  Note: Safety measures enforced , call light in reach, needs anticipated . Pt free from fall and injury.     Problem: Urinary Elimination:  Goal: Ability to reestablish a normal urinary elimination pattern will improve  Outcome: Progressing  Intervention: Assess and monitor for signs and symptoms of urinary retention  Note: Dye cath intact draining yellow colored urine.   The patient is Stable - Low risk of patient condition declining or worsening         Progress made toward(s) clinical / shift goals:  Pain under control.    Patient is not progressing towards the following goals:Pt remains with dye catheter draining yellow colored urine.

## 2021-05-27 NOTE — CARE PLAN
Problem: Knowledge Deficit  Goal: Knowledge of the prescribed therapeutic regimen will improve  Outcome: Progressing  Note: Patient able to perform regular activities this shift.  Pain controlled this shift.  Pain management includes PRN pain meds as well as non-pharmacological measures such as emotional support, rest, and repositioning.  Will continue to monitor.    The patient is Stable - Low risk of patient condition declining or worsening         Progress made toward(s) clinical / shift goals:  wnl    Patient is not progressing towards the following goals:

## 2021-05-27 NOTE — THERAPY
Recreational Therapy  Daily Treatment     Patient Name: Thong Arzola  AGE:  56 y.o., SEX:  male  Medical Record #: [id number]  Today's Date: 5/27/2021       Subjective    Pt positive and ready for session. Pt reporting activity was more difficult than he was expecting.      Objective       05/27/21 0901   Functional Ability Status - Cognitive   Attention Span Remains on Task   Comprehension Follows Three Step Commands   Judgment Able to Make Independent Decisions   Functional Ability Status - Emotional    Affect Appropriate   Mood Appropriate   Behavior Appropriate   Skilled Intervention    Skilled Intervention Cognitive Leisure   Skilled Intervention Comments Snap circuits   Interdisciplinary Plan of Care Collaboration   IDT Collaboration with  Family / Caregiver;Physical Therapist   Patient Position at End of Therapy Seated   Collaboration Comments SO in attendance, transfer of care to the PT.    Strengths & Barriers   Strengths Able to follow instructions;Alert and oriented;Independent prior level of function;Motivated for self care and independence;Pleasant and cooperative;Supportive family;Willingly participates in therapeutic activities   Barriers Impaired carryover of learning;Impaired functional cognition   Treatment Time   Total Time Spent (mins) 15   Total Time Missed 15   Reasons for Time Missed Medical-Patient With Nursing  (hygiene, medication, toilet)   Procedural Tracking   Procedural Tracking Community Re-Integration;Community Skills Development;Leisure Skills Awareness;Leisure Skills Development;Cognitive Skills Training       Assessment    Pt was given an activity called snap circuits. Pt was given the materials to place together electronic pieces matching the picture he was given. Pt required Mod verbal cues for completion of the activity. Pt struggled with the correct placement of the materials to match the photo.     Strengths: (P) Able to follow instructions, Alert and oriented, Independent  prior level of function, Motivated for self care and independence, Pleasant and cooperative, Supportive family, Willingly participates in therapeutic activities  Barriers: (P) Impaired carryover of learning, Impaired functional cognition    Plan    Continue cognitive leisure multi step activities.     Passport items to be completed:  Passport items to be completed:  Verbalize two positive leisure activities, discuss returning to work, hobbies, community groups or volunteer activities, explore community resources

## 2021-05-27 NOTE — THERAPY
Occupational Therapy  Daily Treatment     Patient Name: Thong Arzola  Age:  56 y.o., Sex:  male  Medical Record #: [id number]  Today's Date: 5/27/2021     Precautions  Precautions: (P) Fall Risk, Other (See Comments)  Comments: (P) Helmet OOB; LUE 2-3 Modified Jeremiah Scale; Abdominal binder OOB; Brewer cath; L inattention & haylie         Subjective    Spouse present for family training with adls.     Objective       05/27/21 1231   Precautions   Precautions Fall Risk;Other (See Comments)   Comments Helmet OOB; LUE 2-3 Modified Jeremiah Scale; Abdominal binder OOB; Brewer cath; L inattention & haylie   Functional Level of Assist   Grooming Supervision   Grooming Description Seated in wheelchair at sink;Supervision for safety;Verbal cueing  (to comb hair)   Bathing Moderate Assist   Bathing Description Grab bar;Hand held shower;Tub bench;Set-up of equipment;Supervision for safety;Verbal cueing  (spouse assisting w/ cues)   Upper Body Dressing Stand by Assist   Upper Body Dressing Description Set-up of equipment;Supervision for safety;Verbal cueing;Increased time  (to don/doff shirt)   Lower Body Dressing Maximal Assist   Lower Body Dressing Description   (spouse assisting)   Toileting Maximal Assist   Toileting Description   (spouse assisting)   Toilet Transfers Moderate Assist   Toilet Transfer Description Set-up of equipment;Supervision for safety;Verbal cueing;Grab bar;Adaptive equipment  (spouse assisting)   Tub / Shower Transfers Minimal Assist   Tub Shower Transfer Description Set-up of equipment;Supervision for safety;Grab bar  (spouse assisting)   Interdisciplinary Plan of Care Collaboration   IDT Collaboration with  Physician   Collaboration Comments d/c abd binder and TEDS unless edema continues to be present   OT Total Time Spent   OT Individual Total Time Spent (Mins) 60   OT Charge Group   OT Self Care / ADL 4       Assessment    Spouse demonstrated ability to assist patient with adl routine with min A  to max A and cues.  Strengths: Able to follow instructions, Alert and oriented, Effective communication skills, Good insight into deficits/needs, Independent prior level of function, Manages pain appropriately, Motivated for self care and independence, Pleasant and cooperative, Supportive family, Willingly participates in therapeutic activities  Barriers: Bowel incontinence, Decreased endurance, Fatigue, Generalized weakness, Hemiplegia, Home accessibility, Orthostatic hypotension, Impaired activity tolerance, Impaired balance, Limited mobility, Spasticity    Plan    ADL retraining, L UE neuro re-ed/stretching, family training with spouse Anel; Bathing is not a goal of OT at this time (CNAs should be bathing patient); home evaluation to sister in law's house in [place]; pursue resting hand splint for left hand/wrist    Occupational Therapy Goals (Active)     Problem: Functional Transfers     Dates: Start: 05/19/21       Goal: STG-Within one week, patient will transfer to toilet     Dates: Start: 05/19/21       Description: 1) Individualized Goal: with min A using grab bar and commode over the toilet  2) Interventions:  OT Self Care/ADL, OT Cognitive Skill Dev, OT Manual Ther Technique, OT Neuro Re-Ed/Balance, OT Sensory Int Techniques, OT Therapeutic Activity, OT Evaluation, and OT Therapeutic Exercise      Goal Note filed on 05/26/21 1129 by Aj Lovett, OT/L     Mod A               Problem: OT Long Term Goals     Dates: Start: 04/30/21       Goal: LTG-By discharge, patient will complete basic self care tasks     Dates: Start: 04/30/21       Description: 1) Individualized Goal:  with min to mod A using AE/AD/techniques as needed  2) Interventions:  OT E Stim Attended, OT Self Care/ADL, OT Cognitive Skill Dev, OT Manual Ther Technique, OT Neuro Re-Ed/Balance, OT Sensory Int Techniques, OT Therapeutic Activity, OT Evaluation, and OT Therapeutic Exercise       Goal: LTG-By discharge, patient will perform bathroom  transfers     Dates: Start: 04/30/21       Description: 1) Individualized Goal:  with max A x 1 person using slideboard versus squat pivot to the right  2) Interventions:  OT E Stim Attended, OT Self Care/ADL, OT Cognitive Skill Dev, OT Manual Ther Technique, OT Neuro Re-Ed/Balance, OT Sensory Int Techniques, OT Therapeutic Activity, OT Evaluation, and OT Therapeutic Exercise          Problem: Toileting     Dates: Start: 05/19/21       Goal: STG-Within one week, patient will complete toileting tasks     Dates: Start: 05/19/21       Description: 1) Individualized Goal: with mod A using grab bar and commode over the toilet  2) Interventions:  OT Self Care/ADL, OT Cognitive Skill Dev, OT Manual Ther Technique, OT Neuro Re-Ed/Balance, OT Sensory Int Techniques, OT Therapeutic Activity, OT Evaluation, and OT Therapeutic Exercise      Goal Note filed on 05/26/21 1129 by Aj Lovett, OT/L     Max A

## 2021-05-27 NOTE — DISCHARGE PLANNING
Yesterday:    CM called [facility] for consult/elbow brace.   Confirmed they do not charge for consults and will be over to evaluate patient needs.  CM asked that prosthetist speak w/ CM after consult regarding brace recommendation and do not start any orders until speaks to CM.  CM will continue to monitor for DC needs.

## 2021-05-27 NOTE — PROGRESS NOTES
"Rehab Progress Note     Date of Service: 5/11/2021  Chief Complaint: follow up stroke    Interval Events (Subjective)    Patient seen and examined today in his room.  His wife Anel is present.  We reviewed his Head CT imaging and discussed he will follow up with neurosurgery to pick a date for his cranioplasty.  Also advised patient I spoke with his surgeon yesterday, who looked at the scan and reports there's no concern about being able to do the cranioplasty.  Patient continues to complain of fatigue.  He has no new complaints.     ROS: No changes to bowel, bladder, pain, mood, or sleep.         Objective:  VITAL SIGNS: /98   Pulse 68   Temp 36.7 °C (98.1 °F) (Oral)   Resp 16   Ht 1.778 m (5' 10\")   Wt 70 kg (154 lb 5.2 oz)   SpO2 97%   BMI 22.14 kg/m²   Gen: alert, no apparent distress  Neuro: notable for spastic left hemiplegia    Recent Results (from the past 72 hour(s))   CBC WITHOUT DIFFERENTIAL    Collection Time: 05/26/21  5:42 AM   Result Value Ref Range    WBC 5.0 4.8 - 10.8 K/uL    RBC 4.37 (L) 4.70 - 6.10 M/uL    Hemoglobin 12.8 (L) 14.0 - 18.0 g/dL    Hematocrit 38.4 (L) 42.0 - 52.0 %    MCV 87.9 81.4 - 97.8 fL    MCH 29.3 27.0 - 33.0 pg    MCHC 33.3 (L) 33.7 - 35.3 g/dL    RDW 43.7 35.9 - 50.0 fL    Platelet Count 265 164 - 446 K/uL    MPV 9.4 9.0 - 12.9 fL   BASIC METABOLIC PANEL    Collection Time: 05/26/21  5:42 AM   Result Value Ref Range    Sodium 141 135 - 145 mmol/L    Potassium 4.0 3.6 - 5.5 mmol/L    Chloride 106 96 - 112 mmol/L    Co2 27 20 - 33 mmol/L    Glucose 95 65 - 99 mg/dL    Bun 19 8 - 22 mg/dL    Creatinine 0.98 0.50 - 1.40 mg/dL    Calcium 9.5 8.5 - 10.5 mg/dL    Anion Gap 8.0 7.0 - 16.0   ESTIMATED GFR    Collection Time: 05/26/21  5:42 AM   Result Value Ref Range    GFR If African American >60 >60 mL/min/1.73 m 2    GFR If Non African American >60 >60 mL/min/1.73 m 2   TSH WITH REFLEX TO FT4    Collection Time: 05/27/21  6:17 AM   Result Value Ref Range    TSH 0.010 " (L) 0.380 - 5.330 uIU/mL   HEPATIC FUNCTION PANEL    Collection Time: 05/27/21  6:17 AM   Result Value Ref Range    Alkaline Phosphatase 71 30 - 99 U/L    AST(SGOT) 21 12 - 45 U/L    ALT(SGPT) 55 (H) 2 - 50 U/L    Total Bilirubin 0.3 0.1 - 1.5 mg/dL    Direct Bilirubin <0.2 0.1 - 0.5 mg/dL    Indirect Bilirubin see below 0.0 - 1.0 mg/dL    Albumin 3.3 3.2 - 4.9 g/dL    Total Protein 6.1 6.0 - 8.2 g/dL   FREE THYROXINE    Collection Time: 05/27/21  6:17 AM   Result Value Ref Range    Free T-4 0.93 0.93 - 1.70 ng/dL       Current Facility-Administered Medications   Medication Frequency   • meclizine (ANTIVERT) tablet 25 mg DAILY   • baclofen (LIORESAL) tablet 20 mg BID   • baclofen (LIORESAL) tablet 30 mg QHS   • artificial tears ophthalmic solution 1 Drop TID   • atorvastatin (LIPITOR) tablet 40 mg Q EVENING   • metoprolol tartrate (LOPRESSOR) tablet 25 mg BID   • senna-docusate (PERICOLACE or SENOKOT S) 8.6-50 MG per tablet 2 tablet Q EVENING   • gabapentin (NEURONTIN) capsule 100 mg Q EVENING   • melatonin tablet 3 mg QHS   • hydrOXYzine HCl (ATARAX) tablet 50 mg Q6HRS PRN   • Respiratory Therapy Consult Continuous RT   • Pharmacy Consult Request ...Pain Management Review 1 Each PHARMACY TO DOSE   • hydrALAZINE (APRESOLINE) tablet 10 mg Q8HRS PRN   • acetaminophen (Tylenol) tablet 650 mg Q4HRS PRN   • lactulose 20 GM/30ML solution 30 mL QDAY PRN   • docusate sodium (ENEMEEZ) enema 283 mg QDAY PRN   • fleet enema 133 mL QDAY PRN   • benzocaine-menthol (CEPACOL) lozenge 1 Lozenge Q2HRS PRN   • mag hydrox-al hydrox-simeth (MAALOX PLUS ES or MYLANTA DS) suspension 20 mL Q2HRS PRN   • ondansetron (ZOFRAN ODT) dispertab 4 mg 4X/DAY PRN    Or   • ondansetron (ZOFRAN) syringe/vial injection 4 mg 4X/DAY PRN   • traZODone (DESYREL) tablet 50 mg QHS PRN   • sodium chloride (OCEAN) 0.65 % nasal spray 2 Spray PRN   • midazolam (VERSED) 5 mg/mL (1 mL vial) PRN   • mirtazapine (Remeron) orally disintegrating tab 15 mg QHS   •  rivaroxaban (XARELTO) tablet 20 mg PM MEAL   • thyroid (ARMOUR THYROID) tablet 60 mg BID       Orders Placed This Encounter   Procedures   • Diet Order Diet: Level 7 - Easy to Chew (float pills in puree); Liquid level: Level 0 - Thin; Tray Modifications (optional): SLP - 1:1 Supervision by Nursing, SLP - Deliver to Nursing Station     Standing Status:   Standing     Number of Occurrences:   1     Order Specific Question:   Diet:     Answer:   Level 7 - Easy to Chew [22]     Comments:   float pills in puree     Order Specific Question:   Liquid level     Answer:   Level 0 - Thin     Order Specific Question:   Tray Modifications (optional)     Answer:   SLP - 1:1 Supervision by Nursing     Order Specific Question:   Tray Modifications (optional)     Answer:   SLP - Deliver to Nursing Station       Radiology    CT-HEAD W/O   Final Result      1.  Further demarcation of large area of encephalomalacia in the right MCA distribution.      2.  No acute hemorrhage.      3.  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      4.  Right temporoparietal craniectomy changes.      US-EXTREMITY VENOUS UPPER UNILAT LEFT   Final Result            Assessment:  Active Hospital Problems    Diagnosis    • *Acute right MCA stroke (HCC)    • Paroxysmal atrial fibrillation (HCC)    • Urinary retention    • Acquired hypothyroidism    • History of COVID-19    • Hypotension    • Normocytic anemia    • Spasticity as late effect of cerebrovascular accident (CVA)    • Reactive depression      This patient is a 56 y.o. male admitted for acute inpatient rehabilitation with Acute right MCA stroke (HCC).    I led and attended the weekly conference, and agree with the IDT conference documentation and plan of care as noted below.    Date of conference: 5/26/2021    Goals and barriers: See IDT note.    Biggest barriers: left spastic hemiplegia, impaired balance, left neglect    Goals in next week: home  evaluation    CM/social support: wife supportive, to go to 1  here in [place], wife's sister    Anticipated DC date: 6/4    Home health: PT/OT/SLP/RN - [facility]    Equip: MWC with pressure relieving cushion with firm positioning backrest, bathroom DME    Follow up: PCP, Dr. Dumont, stroke bridge clinic, urology, neurosugery      Medical Decision Making and Plan:    Large right ICA/MCA ischemic stroke  S/p craniectomy 4/3, Dr. Payton  Left hemiplegia, minimal improvement  Cognitive deficits, improved  Left neglect, improved  Continue full rehab program  PT/OT/SLP, 1 hr each discipline, 5 days per week    Xarelto  Aspirin discontinued per neurology recommendations  Statin    Outpatient follow up with stroke bridge clinic, Dr. Dumont, referrals made    Outpatient follow up with Dr. Payton for cranioplasty, scheduled for 6/9, Head CT per above, results discussed with patient/wife/Dr. Payton    Making good progress, improved function, able to stand for 10 minutes    Neuropathic pain, resolved  Left leg at night, resolved  Continue low dose gabapentin 100 mg 5/11  Continue to monitor need to increase dose  Currently well controlled as of 5/27    Fatigue  Continues to be an issue  Did not tolerate higher doses of baclofen, likely will be titrated off in the outpatient clinic with Dr. Dumont  Will discuss with patient whether he would like to try some Ritalin prior to discharge    Dizziness, improved  Scheduled Meclizine, start titration 5/20, TID --> BID, decrease to daily 5/27, would like to discontinue prior to discharge  Continue Teds and abdominal binder for now  BPPV testing by PT negative    Spasticity, continues, worse at night  Increased baclofen 15 mg TID to 20 mg TID 4/30 --> 30 mg TID 5/3  Was decreased back down to 20 mg TID on 5/13 due to fatigue, with increased spasticity     Increased night time dose to 30 mg 5/25    Started valium at night 2 mg 4/30, discontinued 5/3, doesn't seem to make much  difference, patient also too sedated    Consider adding Zanaflex in future, LFTs on 5/5 with elevated ALT, rechecked 5/18, still elevated, not a good candidate for Zanaflex at this time    Will benefit from Botox - unable to tolerate higher doses of baclofen due to sedation/fatigue, unable to trial Zanaflex due to elevated ALT, trial of Valium was not effective and patient also too sedated    Dr. Dumont has ordered 400 units with plan to inject LUE as an outpatient after discharge from rehab    Elevated ALT, continues/improved  Decreased statin dose 80 mg to 40 mg 5/18    Atrial fibrillation  Metoprolol  Xarelto     Hypothyroidism   [place] thyroid, repeat TSH in 4 weeks from admission, as dose was increased at acute  Slightly abnormal 5/27, low TSH, normal T4  Outpatient follow up with PCP as these findings may be due to his acute illness     Hypertension  Hypotension, improved  Lisinopril discontinued  Metoprolol, dose increased  Flomax discontinued 5/12  Teds and abdominal binder - OK to discontinue Abd binder, keep Teds to prevent edema  Currently with good control of BP    Insomnia, improved/resolved  Already on mirtazapine  Scheduled melatonin  Gabapentin added for neuropathic pain at night     Reactive depression  Mirtazapine  Consider psychology consult if needed   Mood currently stable     History of COVID-19  Without sequelae     Normocytic anemia  Mild, monitor    Bowel program  Continue bowel medications  Last BM 5/26    Bladder program  Urinary retention, continues  Unsuccessful voiding trials x 3, even on Flomax  Outpatient follow up with urology, referral made    DVT prophylaxis  Xarelto    Total time:  35 minutes.  I spent greater than 50% of the time for patient care, counseling, and coordination on this date, including patient face-to face time, unit/floor time with review of records/pertinent lab data and studies, as well as discussing diagnostic evaluation/work up, planned therapeutic  interventions, and future disposition of care, as per the interval events/subjective and the assessment and plan as noted above.    Entire time today spent reviewing imaging with patient and wife at bedside, answering questions regarding follow up with neurosurgery.    I have performed a physical exam, reviewed and updated ROS, as well as the assessment and plan today 5/27/2021. In review of note from 5/26/2021 there are no new changes except as documented above.      Radha Monge M.D.   Physical Medicine and Rehabilitation

## 2021-05-27 NOTE — THERAPY
Speech Language Pathology  Daily Treatment     Patient Name: Thong Arzola  Age:  56 y.o., Sex:  male  Medical Record #: [id number]  Today's Date: 5/27/2021     Precautions  Precautions: Fall Risk, Other (See Comments)  Comments: Helmet OOB; LUE 2-3 Modified Jeremiah Scale; Abdominal binder OOB; Brewer cath; L inattention & haylie    Subjective    Pt was pleasant and cooperative during this ST session      Objective       05/27/21 1401   Interdisciplinary Plan of Care Collaboration   IDT Collaboration with  Family / Caregiver   Patient Position at End of Therapy Seated;Family / Friend in Room   Collaboration Comments Pt's wife present for ST session    SLP Total Time Spent   SLP Individual Total Time Spent (Mins) 60   Treatment Charges   SLP Cognitive Skill Development First 15 Minutes 1   SLP Cognitive Skill Development Additional 15 Minutes 3       Assessment    Pt completed a complex organization, attention and visual scanning task requiring him to locate coupons on a grocery inventory, write the original price of each item, the sales price of each item using the coupons, and total the amount in savings.  Pt required mod cues for organization and attention during this task including cues required to check off numbers/items as he wrote them and to double check numbers in the calculator as he entered them.  Pt benefited from breaks throughout this session.  Pt educated on the importance of verbalizing when he needed a break vs taking a break when he was too mentally fatigued to continue.      Strengths: Able to follow instructions, Pleasant and cooperative, Supportive family, Willingly participates in therapeutic activities  Barriers: Impaired functional cognition, Visual impairment, Decreased endurance    Plan    Continue targeting complex attention, organization, left visual scanning       Speech Therapy Problems (Active)     Problem: Problem Solving STGs     Dates: Start: 05/05/21       Goal: STG-Within one  week, patient will     Dates: Start: 05/05/21       Description: 1) Individualized goal: demonstrate appropriate visual scanning to the left with min cues and 80% accuracy.  2) Interventions:  SLP Speech Language Treatment, SLP Self Care / ADL Training , SLP Cognitive Skill Development, and SLP Group Treatment        Goal Note filed on 05/25/21 1529 by Carly Ramirez MS,CCC-SLP     60-75% for scanning tasks, endurance as barrier. Improved insight to difficulties.             Goal: STG-Within one week, patient will     Dates: Start: 05/05/21       Description: 1) Individualized goal:  complete simple attention tasks with min A with 80% accuracy.   2) Interventions:  SLP Speech Language Treatment, SLP Self Care / ADL Training , SLP Cognitive Skill Development, and SLP Group Treatment        Goal Note filed on 05/25/21 1529 by Carly Ramirez MS,CCC-SLP     Progressing               Problem: Speech/Swallowing LTGs     Dates: Start: 04/30/21       Goal: LTG-By discharge, patient will solve complex problem     Dates: Start: 04/30/21       Description: 1) Individualized goal:  related to IADL's with mod I for safe d/c home.  2) Interventions:  SLP Speech Language Treatment, SLP Swallowing Dysfunction Treatment, SLP Oral Pharyngeal Evaluation, SLP Video Swallow Evaluation, SLP Self Care / ADL Training , SLP Cognitive Skill Development, and SLP Group Treatment

## 2021-05-28 PROCEDURE — 97112 NEUROMUSCULAR REEDUCATION: CPT | Mod: CO

## 2021-05-28 PROCEDURE — 99231 SBSQ HOSP IP/OBS SF/LOW 25: CPT | Performed by: PHYSICAL MEDICINE & REHABILITATION

## 2021-05-28 PROCEDURE — 700102 HCHG RX REV CODE 250 W/ 637 OVERRIDE(OP): Performed by: PHYSICAL MEDICINE & REHABILITATION

## 2021-05-28 PROCEDURE — A9270 NON-COVERED ITEM OR SERVICE: HCPCS | Performed by: PHYSICAL MEDICINE & REHABILITATION

## 2021-05-28 PROCEDURE — A9270 NON-COVERED ITEM OR SERVICE: HCPCS | Performed by: HOSPITALIST

## 2021-05-28 PROCEDURE — 97530 THERAPEUTIC ACTIVITIES: CPT

## 2021-05-28 PROCEDURE — 770010 HCHG ROOM/CARE - REHAB SEMI PRIVAT*

## 2021-05-28 PROCEDURE — 700102 HCHG RX REV CODE 250 W/ 637 OVERRIDE(OP): Performed by: HOSPITALIST

## 2021-05-28 RX ADMIN — ATORVASTATIN CALCIUM 40 MG: 40 TABLET, FILM COATED ORAL at 20:29

## 2021-05-28 RX ADMIN — BACLOFEN 20 MG: 20 TABLET ORAL at 08:23

## 2021-05-28 RX ADMIN — BACLOFEN 20 MG: 20 TABLET ORAL at 14:57

## 2021-05-28 RX ADMIN — POLYVINYL ALCOHOL 1 DROP: 14 SOLUTION/ DROPS OPHTHALMIC at 14:59

## 2021-05-28 RX ADMIN — MELATONIN TAB 3 MG 3 MG: 3 TAB at 20:29

## 2021-05-28 RX ADMIN — METOPROLOL TARTRATE 25 MG: 25 TABLET, FILM COATED ORAL at 20:29

## 2021-05-28 RX ADMIN — GABAPENTIN 100 MG: 100 CAPSULE ORAL at 20:27

## 2021-05-28 RX ADMIN — MIRTAZAPINE 15 MG: 15 TABLET, ORALLY DISINTEGRATING ORAL at 20:28

## 2021-05-28 RX ADMIN — POLYVINYL ALCOHOL 1 DROP: 14 SOLUTION/ DROPS OPHTHALMIC at 20:32

## 2021-05-28 RX ADMIN — MECLIZINE HYDROCHLORIDE 25 MG: 25 TABLET ORAL at 08:22

## 2021-05-28 RX ADMIN — THYROID, PORCINE 60 MG: 30 TABLET ORAL at 20:27

## 2021-05-28 RX ADMIN — METOPROLOL TARTRATE 25 MG: 25 TABLET, FILM COATED ORAL at 08:22

## 2021-05-28 RX ADMIN — BACLOFEN 30 MG: 20 TABLET ORAL at 20:28

## 2021-05-28 RX ADMIN — RIVAROXABAN 20 MG: 20 TABLET, FILM COATED ORAL at 17:29

## 2021-05-28 RX ADMIN — THYROID, PORCINE 60 MG: 30 TABLET ORAL at 08:22

## 2021-05-28 RX ADMIN — SENNOSIDES AND DOCUSATE SODIUM 2 TABLET: 8.6; 5 TABLET ORAL at 20:28

## 2021-05-28 RX ADMIN — POLYVINYL ALCOHOL 1 DROP: 14 SOLUTION/ DROPS OPHTHALMIC at 08:25

## 2021-05-28 ASSESSMENT — ACTIVITIES OF DAILY LIVING (ADL): BED_CHAIR_WHEELCHAIR_TRANSFER_DESCRIPTION: SET-UP OF EQUIPMENT;SUPERVISION FOR SAFETY;VERBAL CUEING

## 2021-05-28 NOTE — CARE PLAN
Problem: Pain Management  Goal: Pain level will decrease to patient's comfort goal  Outcome: Progressing  Note: Patient able to verbalize needs.  Denies pain or discomfort this shift and no s/s same noted.       Problem: Urinary Elimination:  Goal: Ability to reestablish a normal urinary elimination pattern will improve  Outcome: Not Progressing  Flowsheets (Taken 5/28/2021 6674)  Urinary Elimination: Catheter (Document on LDA)  Note: Patient has indwelling dye d/t pt is unable to void. Pt urine is clear and yellow.

## 2021-05-28 NOTE — CARE PLAN
Problem: Safety  Goal: Will remain free from falls  Note: Appropriately uses call light to make needs known.Helmet on when out of bed.Fall precautions and frequent rounding in place for safety.Call light within reach.Will continue to monitor and assess needs and safety.     Problem: Bowel/Gastric:  Goal: Will not experience complications related to bowel motility  Intervention: Assess baseline bowel pattern  Note: Pt refused scheduled senna at hs.Continent of bowel per report.LBM 5/27.Will continue to monitor.     Problem: Urinary Elimination:  Goal: Ability to reestablish a normal urinary elimination pattern will improve  Intervention: Assess and monitor for signs and symptoms of urinary retention  Note: Pt has dye catheter in place which is draining adequate amount of urine.Cleansed with soap and water.Will continue to monitor.

## 2021-05-28 NOTE — PROGRESS NOTES
"Rehab Progress Note     Date of Service: 5/11/2021  Chief Complaint: follow up stroke    Interval Events (Subjective)    Patient seen and examined today in the therapy gym.  He is working with occupational therapy with weightbearing on the left upper extremity.  He is reporting some swelling in the left hand.  He reports there is some pain with the stretching on this side.  He reports he is sleeping okay.  He is going to have car transfer training this afternoon with his family.  He has no new complaints.  He is excited for his discharge home next week.  Patient was fitted for a left elbow splint.    ROS: No changes to bowel, bladder, pain, mood, or sleep.           Objective:  VITAL SIGNS: /88   Pulse 77   Temp 36.5 °C (97.7 °F) (Oral)   Resp 16   Ht 1.778 m (5' 10\")   Wt 70 kg (154 lb 5.2 oz)   SpO2 97%   BMI 22.14 kg/m²   Gen: alert, no apparent distress  Neuro: notable for spastic left hemiplegia    Recent Results (from the past 72 hour(s))   CBC WITHOUT DIFFERENTIAL    Collection Time: 05/26/21  5:42 AM   Result Value Ref Range    WBC 5.0 4.8 - 10.8 K/uL    RBC 4.37 (L) 4.70 - 6.10 M/uL    Hemoglobin 12.8 (L) 14.0 - 18.0 g/dL    Hematocrit 38.4 (L) 42.0 - 52.0 %    MCV 87.9 81.4 - 97.8 fL    MCH 29.3 27.0 - 33.0 pg    MCHC 33.3 (L) 33.7 - 35.3 g/dL    RDW 43.7 35.9 - 50.0 fL    Platelet Count 265 164 - 446 K/uL    MPV 9.4 9.0 - 12.9 fL   BASIC METABOLIC PANEL    Collection Time: 05/26/21  5:42 AM   Result Value Ref Range    Sodium 141 135 - 145 mmol/L    Potassium 4.0 3.6 - 5.5 mmol/L    Chloride 106 96 - 112 mmol/L    Co2 27 20 - 33 mmol/L    Glucose 95 65 - 99 mg/dL    Bun 19 8 - 22 mg/dL    Creatinine 0.98 0.50 - 1.40 mg/dL    Calcium 9.5 8.5 - 10.5 mg/dL    Anion Gap 8.0 7.0 - 16.0   ESTIMATED GFR    Collection Time: 05/26/21  5:42 AM   Result Value Ref Range    GFR If African American >60 >60 mL/min/1.73 m 2    GFR If Non African American >60 >60 mL/min/1.73 m 2   TSH WITH REFLEX TO FT4    " Collection Time: 05/27/21  6:17 AM   Result Value Ref Range    TSH 0.010 (L) 0.380 - 5.330 uIU/mL   HEPATIC FUNCTION PANEL    Collection Time: 05/27/21  6:17 AM   Result Value Ref Range    Alkaline Phosphatase 71 30 - 99 U/L    AST(SGOT) 21 12 - 45 U/L    ALT(SGPT) 55 (H) 2 - 50 U/L    Total Bilirubin 0.3 0.1 - 1.5 mg/dL    Direct Bilirubin <0.2 0.1 - 0.5 mg/dL    Indirect Bilirubin see below 0.0 - 1.0 mg/dL    Albumin 3.3 3.2 - 4.9 g/dL    Total Protein 6.1 6.0 - 8.2 g/dL   FREE THYROXINE    Collection Time: 05/27/21  6:17 AM   Result Value Ref Range    Free T-4 0.93 0.93 - 1.70 ng/dL       Current Facility-Administered Medications   Medication Frequency   • meclizine (ANTIVERT) tablet 25 mg DAILY   • baclofen (LIORESAL) tablet 20 mg BID   • baclofen (LIORESAL) tablet 30 mg QHS   • artificial tears ophthalmic solution 1 Drop TID   • atorvastatin (LIPITOR) tablet 40 mg Q EVENING   • metoprolol tartrate (LOPRESSOR) tablet 25 mg BID   • senna-docusate (PERICOLACE or SENOKOT S) 8.6-50 MG per tablet 2 tablet Q EVENING   • gabapentin (NEURONTIN) capsule 100 mg Q EVENING   • melatonin tablet 3 mg QHS   • hydrOXYzine HCl (ATARAX) tablet 50 mg Q6HRS PRN   • Respiratory Therapy Consult Continuous RT   • Pharmacy Consult Request ...Pain Management Review 1 Each PHARMACY TO DOSE   • hydrALAZINE (APRESOLINE) tablet 10 mg Q8HRS PRN   • acetaminophen (Tylenol) tablet 650 mg Q4HRS PRN   • lactulose 20 GM/30ML solution 30 mL QDAY PRN   • docusate sodium (ENEMEEZ) enema 283 mg QDAY PRN   • fleet enema 133 mL QDAY PRN   • benzocaine-menthol (CEPACOL) lozenge 1 Lozenge Q2HRS PRN   • mag hydrox-al hydrox-simeth (MAALOX PLUS ES or MYLANTA DS) suspension 20 mL Q2HRS PRN   • ondansetron (ZOFRAN ODT) dispertab 4 mg 4X/DAY PRN    Or   • ondansetron (ZOFRAN) syringe/vial injection 4 mg 4X/DAY PRN   • traZODone (DESYREL) tablet 50 mg QHS PRN   • sodium chloride (OCEAN) 0.65 % nasal spray 2 Spray PRN   • midazolam (VERSED) 5 mg/mL (1 mL vial)  PRN   • mirtazapine (Remeron) orally disintegrating tab 15 mg QHS   • rivaroxaban (XARELTO) tablet 20 mg PM MEAL   • thyroid (ARMOUR THYROID) tablet 60 mg BID       Orders Placed This Encounter   Procedures   • Diet Order Diet: Level 7 - Easy to Chew (float pills in puree); Liquid level: Level 0 - Thin; Tray Modifications (optional): SLP - 1:1 Supervision by Nursing, SLP - Deliver to Nursing Station     Standing Status:   Standing     Number of Occurrences:   1     Order Specific Question:   Diet:     Answer:   Level 7 - Easy to Chew [22]     Comments:   float pills in puree     Order Specific Question:   Liquid level     Answer:   Level 0 - Thin     Order Specific Question:   Tray Modifications (optional)     Answer:   SLP - 1:1 Supervision by Nursing     Order Specific Question:   Tray Modifications (optional)     Answer:   SLP - Deliver to Nursing Station       Radiology    CT-HEAD W/O   Final Result      1.  Further demarcation of large area of encephalomalacia in the right MCA distribution.      2.  No acute hemorrhage.      3.  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      4.  Right temporoparietal craniectomy changes.      US-EXTREMITY VENOUS UPPER UNILAT LEFT   Final Result            Assessment:  Active Hospital Problems    Diagnosis    • *Acute right MCA stroke (HCC)    • Paroxysmal atrial fibrillation (HCC)    • Urinary retention    • Acquired hypothyroidism    • History of COVID-19    • Hypotension    • Normocytic anemia    • Spasticity as late effect of cerebrovascular accident (CVA)    • Reactive depression      This patient is a 56 y.o. male admitted for acute inpatient rehabilitation with Acute right MCA stroke (HCC).    I led and attended the weekly conference, and agree with the IDT conference documentation and plan of care as noted below.    Date of conference: 5/26/2021    Goals and barriers: See IDT note.    Biggest barriers: left spastic  hemiplegia, impaired balance, left neglect    Goals in next week: home evaluation    CM/social support: wife supportive, to go to 1  here in [place], wife's sister    Anticipated DC date: 6/4    Home health: PT/OT/SLP/RN - [facility]    Equip: MW with pressure relieving cushion with firm positioning backrest, bathroom DME    Follow up: PCP, Dr. Dumont, stroke bridge clinic, urology, neurosugery      Medical Decision Making and Plan:    Large right ICA/MCA ischemic stroke  S/p craniectomy 4/3, Dr. Payton  Left hemiplegia, minimal improvement  Cognitive deficits, improved  Left neglect, improved  Continue full rehab program  PT/OT/SLP, 1 hr each discipline, 5 days per week    Xarelto  Aspirin discontinued per neurology recommendations  Statin    Outpatient follow up with stroke bridge clinic, Dr. Dumont, referrals made    Outpatient follow up with Dr. Payton for cranioplasty, scheduled for 6/9, Head CT per above, results discussed with patient/wife/Dr. Payton    Making good progress, improved function, able to stand for 10 minutes    Patient fitted for a left elbow splint which will help with his spasticity and prevent contractures at the elbow    Neuropathic pain, resolved  Left leg at night, resolved  Continue low dose gabapentin 100 mg 5/11  Continue to monitor need to increase dose  Currently well controlled as of 5/28    Fatigue  Continues to be an issue  Did not tolerate higher doses of baclofen, likely will be titrated off in the outpatient clinic with Dr. Dumont    Dizziness, improved  Scheduled Meclizine, start titration 5/20, TID --> BID, decrease to daily 5/27, will discontinue prior to discharge  Continue Teds for now, okay to discontinue abdominal binder  BPPV testing by PT negative    Spasticity, continues, worse at night  Increased baclofen 15 mg TID to 20 mg TID 4/30 --> 30 mg TID 5/3  Was decreased back down to 20 mg TID on 5/13 due to fatigue, with increased spasticity     Increased night time  baclofen dose to 30 mg 5/25 for better nighttime control    Started valium at night 2 mg 4/30, discontinued 5/3, doesn't seem to make much difference, patient also too sedated    Consider adding Zanaflex in future, LFTs on 5/5 with elevated ALT, rechecked 5/18, still elevated, not a good candidate for Zanaflex at this time    Will benefit from Botox - unable to tolerate higher doses of baclofen due to sedation/fatigue, unable to trial Zanaflex due to elevated ALT, trial of Valium was not effective and patient also too sedated    Dr. Dumont has ordered 400 units with plan to inject LUE as an outpatient after discharge from rehab    Elevated ALT, continues/improved  Decreased statin dose 80 mg to 40 mg 5/18  Almost in normal range    Atrial fibrillation  Metoprolol  Xarelto  Currently with good rate control     Hypothyroidism   [place] thyroid, repeat TSH in 4 weeks from admission, as dose was increased at acute  Slightly abnormal 5/27, low TSH, normal T4  Outpatient follow up with PCP as these findings may be due to his acute illness     Hypertension  Hypotension, improved  Lisinopril discontinued  Metoprolol, dose increased  Flomax discontinued 5/12  Teds and abdominal binder - OK to discontinue Abd binder, keep Teds to prevent edema  Currently with good control of BP    Insomnia, improved/resolved  Already on mirtazapine  Scheduled melatonin  Gabapentin added for neuropathic pain at night     Reactive depression  Mirtazapine  Consider psychology consult if needed   Mood currently stable     History of COVID-19  Without sequelae     Normocytic anemia  Mild, monitor    Bowel program  Continue bowel medications  Last BM 5/27    Bladder program  Urinary retention, continues  Unsuccessful voiding trials x 3, even on Flomax  Outpatient follow up with urology, referral made    DVT prophylaxis  Xarelto    Total time:  16 minutes.  I spent greater than 50% of the time for patient care, counseling, and coordination on this  date, including patient face-to face time, unit/floor time with review of records/pertinent lab data and studies, as well as discussing diagnostic evaluation/work up, planned therapeutic interventions, and future disposition of care, as per the interval events/subjective and the assessment and plan as noted above.    I have performed a physical exam, reviewed and updated ROS, as well as the assessment and plan today 5/28/2021. In review of note from 5/27/2021 there are no new changes except as documented above.            Radha Monge M.D.   Physical Medicine and Rehabilitation

## 2021-05-28 NOTE — THERAPY
Physical Therapy   Daily Treatment     Patient Name: Thong Arzola  Age:  56 y.o., Sex:  male  Medical Record #: [id number]  Today's Date: 5/28/2021     Precautions  Precautions: Fall Risk  Comments: Helmet OOB; LUE 2-3 Modified Jeremiah Scale; Abdominal binder OOB; Brewer cath; L inattention & haylie    Subjective    Pt was seated in w/c upon arrival and agreeable to treatment.  Pt's spouse, sister, son, and mother present for car transfer training.       Objective       05/28/21 1301   Precautions   Precautions Fall Risk   Interdisciplinary Plan of Care Collaboration   Patient Position at End of Therapy Seated;Call Light within Reach;Tray Table within Reach;Phone within Reach;Family / Friend in Room   PT Total Time Spent   PT Individual Total Time Spent (Mins) 60   PT Charge Group   PT Therapeutic Activities 4     Completed family training on car transfers in pt's personal vehicle x 4 reps total with min-mod A with focus on sequencing and safety.     Pt's spouse given information for purchase of bedrail and transport chair as needed.    Assessment    All of pt's family members (spouse, son, and sister) demonstrated good safety and body mechanics with car transfers with pt after repetition and VCing.  Pt's family verbalized all education.  Pt demonstrated improving sequencing with transfer with repetition but needed intermittent cueing for UE placement.    Strengths: Alert and oriented, Effective communication skills, Independent prior level of function, Motivated for self care and independence, Pleasant and cooperative, Supportive family, Willingly participates in therapeutic activities  Barriers: Hemiparesis, Home accessibility, Orthostatic hypotension, Impaired activity tolerance, Impaired balance, Spasticity, Limited mobility    Plan    Complete w/c consult with Aaron for ultralight wheelchair with swing away footrests, continue w/c mobility using R haylie technique, transfer training with focus on sequencing,  standing posture and balance, gait training/pre gait in // bars and with [employer], continue family training.       Passport items to be completed:  Get in/out of bed safely, navigate up and down stairs, show how to get up/down from the ground, demonstrate HEP, complete caregiver training    Physical Therapy Problems (Active)     Problem: Mobility     Dates: Start: 04/30/21       Goal: STG-Within one week, patient will ambulate household distance     Dates: Start: 05/19/21       Description: 1) Individualized goal:  AMB x 10 feet in // bars with mod A  2) Interventions:  PT Group Therapy, PT E Stim Attended, PT Gait Training, PT Therapeutic Exercises, PT Neuro Re-Ed/Balance, PT Aquatic Therapy, PT Therapeutic Activity, PT Manual Therapy, and PT Evaluation      Goal Note filed on 05/26/21 1241 by Ivory Cintron, LAVONT     2 person for wc follow at this time for safety, L haylie, impaired body awareness/ impaired L sided awareness               Problem: Mobility Transfers     Dates: Start: 05/19/21       Goal: STG-Within one week, patient will transfer bed to chair     Dates: Start: 05/19/21       Description: 1) Individualized goal:  SQPT with SBA  2) Interventions:  PT Group Therapy, PT E Stim Attended, PT Gait Training, PT Therapeutic Exercises, PT Neuro Re-Ed/Balance, PT Aquatic Therapy, PT Therapeutic Activity, PT Manual Therapy, and PT Evaluation      Goal Note filed on 05/26/21 1241 by Ivory Cintron DPT     Min A for impaired motor planning/ safety concerns, L haylie               Problem: PT-Long Term Goals     Dates: Start: 04/30/21       Goal: LTG-By discharge, patient will propel wheelchair     Dates: Start: 04/30/21       Description: 1) Individualized goal:  100ft with haylie technique and Anastacio on even/uneven surfaces  2) Interventions:  PT Group Therapy, PT E Stim Attended, PT Orthotics Training, PT Gait Training, PT Self Care/Home Eval, PT Therapeutic Exercises, PT Neuro Re-Ed/Balance, PT Therapeutic  Activity, and PT Evaluation       Goal: LTG-By discharge, patient will transfer one surface to another     Dates: Start: 04/30/21       Description: 1) Individualized goal:  SQPT with Taz   2) Interventions:  PT Group Therapy, PT E Stim Attended, PT Orthotics Training, PT Gait Training, PT Self Care/Home Eval, PT Therapeutic Exercises, PT Neuro Re-Ed/Balance, PT Therapeutic Activity, and PT Evaluation         Goal: LTG-By discharge, patient will     Dates: Start: 04/30/21       Description: 1) Individualized goal: perform bed mobility (supine<>sit) with Taz   2) Interventions:  PT Group Therapy, PT E Stim Attended, PT Orthotics Training, PT Gait Training, PT Self Care/Home Eval, PT Therapeutic Exercises, PT Neuro Re-Ed/Balance, PT Therapeutic Activity, and PT Evaluation

## 2021-05-28 NOTE — THERAPY
Physical Therapy   Daily Treatment     Patient Name: Thong Arzola  Age:  56 y.o., Sex:  male  Medical Record #: [id number]  Today's Date: 5/28/2021     Precautions  Precautions: Fall Risk  Comments: Helmet OOB; LUE 2-3 Modified Jeremiah Scale; Abdominal binder OOB; Brewer cath; L inattention & haylie    Subjective    Pt was supine in bed upon arrival and agreeable to treatment.  Pt's mother present during the session.      Objective       05/28/21 1531   Precautions   Precautions Fall Risk   Transfer Functional Level of Assist   Bed, Chair, Wheelchair Transfer Minimal Assist   Bed Chair Wheelchair Transfer Description Set-up of equipment;Supervision for safety;Verbal cueing   Bed Mobility    Supine to Sit Moderate Assist   Sit to Stand Contact Guard Assist  (in // bars)   Interdisciplinary Plan of Care Collaboration   Patient Position at End of Therapy Seated;Call Light within Reach;Tray Table within Reach;Phone within Reach;Family / Friend in Room   PT Total Time Spent   PT Individual Total Time Spent (Mins) 30   PT Charge Group   PT Therapeutic Activities 2     Trialed pt in ultralight wheelchair with swing away footrest and tension backrest (18W, 18H, 18D).  W/C mobility in this chair completed with SPV x 250 feet with VCing for L sided awareness.  Completed additional w/c mobility around obstacles and in tight spaces with focus on obstacle negotiation.  Pt able to complete with SPV with VCing.  STS training in // bars from w/c height with CGA.    Assessment    Pt demonstrated improved ability to propel with use of ultralight wheelchair.  Pt with only VCing needed d/t L neglect. Pt with improving standing posture without VCing or visual feedback needed this session.   Strengths: Alert and oriented, Effective communication skills, Independent prior level of function, Motivated for self care and independence, Pleasant and cooperative, Supportive family, Willingly participates in therapeutic activities  Barriers:  Hemiparesis, Home accessibility, Orthostatic hypotension, Impaired activity tolerance, Impaired balance, Spasticity, Limited mobility    Plan    Complete w/c consult with Aaron for ultralight wheelchair with swing away footrests, continue w/c mobility using R haylie technique, transfer training with focus on sequencing, standing posture and balance, gait training/pre gait in // bars and with [employer], continue family training.       Passport items to be completed:  Get in/out of bed safely, navigate up and down stairs, show how to get up/down from the ground, demonstrate HEP, complete caregiver training    Physical Therapy Problems (Active)     Problem: Mobility     Dates: Start: 04/30/21       Goal: STG-Within one week, patient will ambulate household distance     Dates: Start: 05/19/21       Description: 1) Individualized goal:  AMB x 10 feet in // bars with mod A  2) Interventions:  PT Group Therapy, PT E Stim Attended, PT Gait Training, PT Therapeutic Exercises, PT Neuro Re-Ed/Balance, PT Aquatic Therapy, PT Therapeutic Activity, PT Manual Therapy, and PT Evaluation      Goal Note filed on 05/26/21 1241 by Ivory Cintron DPT     2 person for wc follow at this time for safety, L haylie, impaired body awareness/ impaired L sided awareness               Problem: Mobility Transfers     Dates: Start: 05/19/21       Goal: STG-Within one week, patient will transfer bed to chair     Dates: Start: 05/19/21       Description: 1) Individualized goal:  SQPT with SBA  2) Interventions:  PT Group Therapy, PT E Stim Attended, PT Gait Training, PT Therapeutic Exercises, PT Neuro Re-Ed/Balance, PT Aquatic Therapy, PT Therapeutic Activity, PT Manual Therapy, and PT Evaluation      Goal Note filed on 05/26/21 1241 by Ivory Cintron DPT     Min A for impaired motor planning/ safety concerns, L haylie               Problem: PT-Long Term Goals     Dates: Start: 04/30/21       Goal: LTG-By discharge, patient will propel wheelchair      Dates: Start: 04/30/21       Description: 1) Individualized goal:  100ft with haylei technique and Anastacio on even/uneven surfaces  2) Interventions:  PT Group Therapy, PT E Stim Attended, PT Orthotics Training, PT Gait Training, PT Self Care/Home Eval, PT Therapeutic Exercises, PT Neuro Re-Ed/Balance, PT Therapeutic Activity, and PT Evaluation       Goal: LTG-By discharge, patient will transfer one surface to another     Dates: Start: 04/30/21       Description: 1) Individualized goal:  SQPT with Taz   2) Interventions:  PT Group Therapy, PT E Stim Attended, PT Orthotics Training, PT Gait Training, PT Self Care/Home Eval, PT Therapeutic Exercises, PT Neuro Re-Ed/Balance, PT Therapeutic Activity, and PT Evaluation         Goal: LTG-By discharge, patient will     Dates: Start: 04/30/21       Description: 1) Individualized goal: perform bed mobility (supine<>sit) with Taz   2) Interventions:  PT Group Therapy, PT E Stim Attended, PT Orthotics Training, PT Gait Training, PT Self Care/Home Eval, PT Therapeutic Exercises, PT Neuro Re-Ed/Balance, PT Therapeutic Activity, and PT Evaluation

## 2021-05-28 NOTE — THERAPY
"Occupational Therapy  Daily Treatment     Patient Name: Thong Arzola  Age:  56 y.o., Sex:  male  Medical Record #: [id number]  Today's Date: 5/28/2021     Precautions  Precautions: Fall Risk  Comments: Helmet OOB; LUE 2-3 Modified Jeremiah Scale; Abdominal binder OOB; Brewer cath; L inattention & haylie         Subjective    \" My hand is swollen.\" referring to left hand  Which presents with 2 plus edema  Back of hand.        Objective       05/28/21 1101   Precautions   Precautions Fall Risk   Comments Helmet OOB; LUE 2-3 Modified Jeremiah Scale; Abdominal binder OOB; Brewer cath; L inattention & haylie   Neuro-Muscular Treatments   Neuro-Muscular Treatments Quick Stretch;Tapping;Verbal Cuing;Weight Shift Left   Comments seated edge of mat   paddle applied to  left hand to decrease flexor tone in fingers   2+ edema noted  back of hand.    left scapular mobilization  ( elevation retraction and depression) followed by prolonged weight bearing with gradual movement into external rotation  , while reaching with the right.       utilizing quick stretch muscle  belly tapping  and cues  presented with trace active trcipes extension   x 5 reps  x  4.   unable to achieve full elbow extension.        Interdisciplinary Plan of Care Collaboration   IDT Collaboration with  Family / Caregiver   Patient Position at End of Therapy Seated;Self Releasing Lap Belt Applied;Family / Friend in Room   Collaboration Comments patient's mom present during   2nd half of tx session  .   OT Total Time Spent   OT Individual Total Time Spent (Mins) 60   OT Charge Group   OT Neuromuscular Re-education / Balance 4      performed combination of  Manual lymph drainage and retrograde massage to reduce edema in left hand followed by application of  Edema glovea to left hand  at end of session       Transfer  W/c  <-> sitting edge of mat  With  Min assist stand pivot transfer.        Assessment     agreeable to one hour tx session as opposed to  2  Thirty " minute sessions.    left elbow flexor tone decreased but  Increase  Noticed in fingers and  Decreased tolerance for wrist extension noted during weight bearing   But  This improved as activity progressed     Strengths: Able to follow instructions, Alert and oriented, Effective communication skills, Good insight into deficits/needs, Independent prior level of function, Manages pain appropriately, Motivated for self care and independence, Pleasant and cooperative, Supportive family, Willingly participates in therapeutic activities  Barriers: Bowel incontinence, Decreased endurance, Fatigue, Generalized weakness, Hemiplegia, Home accessibility, Orthostatic hypotension, Impaired activity tolerance, Impaired balance, Limited mobility, Spasticity    Plan      ADL retraining, L UE neuro re-ed/stretching, family training with spouse Anel; Bathing is not a goal of OT at this time (CNAs should be bathing patient); home evaluation to sister in law's house in [place]; pursue resting hand splint for left hand/wrist  Fabricate  Andrews gate or  Regular hand paddle  For  Flexor tone reduction in left hand and wrist.   Educate/  Train family in prolonged weight bearing activity and  Paddle  Application techniques.        Occupational Therapy Goals (Active)     Problem: Functional Transfers     Dates: Start: 05/19/21       Goal: STG-Within one week, patient will transfer to toilet     Dates: Start: 05/19/21       Description: 1) Individualized Goal: with min A using grab bar and commode over the toilet  2) Interventions:  OT Self Care/ADL, OT Cognitive Skill Dev, OT Manual Ther Technique, OT Neuro Re-Ed/Balance, OT Sensory Int Techniques, OT Therapeutic Activity, OT Evaluation, and OT Therapeutic Exercise      Goal Note filed on 05/26/21 1129 by Aj Lovett, OT/DAILY     Mod A               Problem: OT Long Term Goals     Dates: Start: 04/30/21       Goal: LTG-By discharge, patient will complete basic self care tasks     Dates: Start:  04/30/21       Description: 1) Individualized Goal:  with min to mod A using AE/AD/techniques as needed  2) Interventions:  OT E Stim Attended, OT Self Care/ADL, OT Cognitive Skill Dev, OT Manual Ther Technique, OT Neuro Re-Ed/Balance, OT Sensory Int Techniques, OT Therapeutic Activity, OT Evaluation, and OT Therapeutic Exercise       Goal: LTG-By discharge, patient will perform bathroom transfers     Dates: Start: 04/30/21       Description: 1) Individualized Goal:  with max A x 1 person using slideboard versus squat pivot to the right  2) Interventions:  OT E Stim Attended, OT Self Care/ADL, OT Cognitive Skill Dev, OT Manual Ther Technique, OT Neuro Re-Ed/Balance, OT Sensory Int Techniques, OT Therapeutic Activity, OT Evaluation, and OT Therapeutic Exercise          Problem: Toileting     Dates: Start: 05/19/21       Goal: STG-Within one week, patient will complete toileting tasks     Dates: Start: 05/19/21       Description: 1) Individualized Goal: with mod A using grab bar and commode over the toilet  2) Interventions:  OT Self Care/ADL, OT Cognitive Skill Dev, OT Manual Ther Technique, OT Neuro Re-Ed/Balance, OT Sensory Int Techniques, OT Therapeutic Activity, OT Evaluation, and OT Therapeutic Exercise      Goal Note filed on 05/26/21 1129 by Aj Lovett OT/DAILY Espinosa A

## 2021-05-29 PROCEDURE — A9270 NON-COVERED ITEM OR SERVICE: HCPCS | Performed by: PHYSICAL MEDICINE & REHABILITATION

## 2021-05-29 PROCEDURE — 700102 HCHG RX REV CODE 250 W/ 637 OVERRIDE(OP): Performed by: PHYSICAL MEDICINE & REHABILITATION

## 2021-05-29 PROCEDURE — 770010 HCHG ROOM/CARE - REHAB SEMI PRIVAT*

## 2021-05-29 PROCEDURE — 700102 HCHG RX REV CODE 250 W/ 637 OVERRIDE(OP): Performed by: HOSPITALIST

## 2021-05-29 PROCEDURE — A9270 NON-COVERED ITEM OR SERVICE: HCPCS | Performed by: HOSPITALIST

## 2021-05-29 RX ADMIN — BACLOFEN 20 MG: 20 TABLET ORAL at 09:32

## 2021-05-29 RX ADMIN — ACETAMINOPHEN 650 MG: 325 TABLET, FILM COATED ORAL at 15:55

## 2021-05-29 RX ADMIN — THYROID, PORCINE 60 MG: 30 TABLET ORAL at 09:32

## 2021-05-29 RX ADMIN — BACLOFEN 20 MG: 20 TABLET ORAL at 15:40

## 2021-05-29 RX ADMIN — GABAPENTIN 100 MG: 100 CAPSULE ORAL at 20:20

## 2021-05-29 RX ADMIN — METOPROLOL TARTRATE 25 MG: 25 TABLET, FILM COATED ORAL at 20:19

## 2021-05-29 RX ADMIN — POLYVINYL ALCOHOL 1 DROP: 14 SOLUTION/ DROPS OPHTHALMIC at 09:35

## 2021-05-29 RX ADMIN — BACLOFEN 30 MG: 20 TABLET ORAL at 20:22

## 2021-05-29 RX ADMIN — MELATONIN TAB 3 MG 3 MG: 3 TAB at 20:21

## 2021-05-29 RX ADMIN — ATORVASTATIN CALCIUM 40 MG: 40 TABLET, FILM COATED ORAL at 20:21

## 2021-05-29 RX ADMIN — METOPROLOL TARTRATE 25 MG: 25 TABLET, FILM COATED ORAL at 09:32

## 2021-05-29 RX ADMIN — POLYVINYL ALCOHOL 1 DROP: 14 SOLUTION/ DROPS OPHTHALMIC at 20:23

## 2021-05-29 RX ADMIN — RIVAROXABAN 20 MG: 20 TABLET, FILM COATED ORAL at 18:04

## 2021-05-29 RX ADMIN — MIRTAZAPINE 15 MG: 15 TABLET, ORALLY DISINTEGRATING ORAL at 20:21

## 2021-05-29 RX ADMIN — MECLIZINE HYDROCHLORIDE 25 MG: 25 TABLET ORAL at 09:33

## 2021-05-29 RX ADMIN — SENNOSIDES AND DOCUSATE SODIUM 2 TABLET: 8.6; 5 TABLET ORAL at 20:22

## 2021-05-29 RX ADMIN — THYROID, PORCINE 60 MG: 30 TABLET ORAL at 20:21

## 2021-05-29 RX ADMIN — POLYVINYL ALCOHOL 1 DROP: 14 SOLUTION/ DROPS OPHTHALMIC at 15:42

## 2021-05-29 NOTE — CARE PLAN
The patient is Stable - Low risk of patient condition declining or worsening  Problem: Safety  Goal: Will remain free from falls  Outcome: Progressing  Note: Pt uses call light  appropriately. Waits for assistance and does not attempt self transfer this shift. Able to verbalize needs.      Problem: Pain Management  Goal: Pain level will decrease to patient's comfort goal  Outcome: Progressing  Note: Pt denies pain or discomfort tonight. Sleeps good. Will continue to monitor.

## 2021-05-30 PROCEDURE — 770010 HCHG ROOM/CARE - REHAB SEMI PRIVAT*

## 2021-05-30 PROCEDURE — 700102 HCHG RX REV CODE 250 W/ 637 OVERRIDE(OP): Performed by: PHYSICAL MEDICINE & REHABILITATION

## 2021-05-30 PROCEDURE — 700102 HCHG RX REV CODE 250 W/ 637 OVERRIDE(OP): Performed by: HOSPITALIST

## 2021-05-30 PROCEDURE — A9270 NON-COVERED ITEM OR SERVICE: HCPCS | Performed by: HOSPITALIST

## 2021-05-30 PROCEDURE — A9270 NON-COVERED ITEM OR SERVICE: HCPCS | Performed by: PHYSICAL MEDICINE & REHABILITATION

## 2021-05-30 PROCEDURE — 97530 THERAPEUTIC ACTIVITIES: CPT

## 2021-05-30 RX ADMIN — BACLOFEN 20 MG: 20 TABLET ORAL at 07:35

## 2021-05-30 RX ADMIN — POLYVINYL ALCOHOL 1 DROP: 14 SOLUTION/ DROPS OPHTHALMIC at 20:54

## 2021-05-30 RX ADMIN — ATORVASTATIN CALCIUM 40 MG: 40 TABLET, FILM COATED ORAL at 20:52

## 2021-05-30 RX ADMIN — POLYVINYL ALCOHOL 1 DROP: 14 SOLUTION/ DROPS OPHTHALMIC at 14:54

## 2021-05-30 RX ADMIN — MECLIZINE HYDROCHLORIDE 25 MG: 25 TABLET ORAL at 07:36

## 2021-05-30 RX ADMIN — BACLOFEN 30 MG: 20 TABLET ORAL at 20:48

## 2021-05-30 RX ADMIN — MIRTAZAPINE 15 MG: 15 TABLET, ORALLY DISINTEGRATING ORAL at 20:52

## 2021-05-30 RX ADMIN — METOPROLOL TARTRATE 25 MG: 25 TABLET, FILM COATED ORAL at 07:36

## 2021-05-30 RX ADMIN — SENNOSIDES AND DOCUSATE SODIUM 2 TABLET: 8.6; 5 TABLET ORAL at 20:48

## 2021-05-30 RX ADMIN — THYROID, PORCINE 60 MG: 30 TABLET ORAL at 07:35

## 2021-05-30 RX ADMIN — RIVAROXABAN 20 MG: 20 TABLET, FILM COATED ORAL at 17:36

## 2021-05-30 RX ADMIN — MELATONIN TAB 3 MG 3 MG: 3 TAB at 20:52

## 2021-05-30 RX ADMIN — METOPROLOL TARTRATE 25 MG: 25 TABLET, FILM COATED ORAL at 20:52

## 2021-05-30 RX ADMIN — BACLOFEN 20 MG: 20 TABLET ORAL at 14:53

## 2021-05-30 RX ADMIN — POLYVINYL ALCOHOL 1 DROP: 14 SOLUTION/ DROPS OPHTHALMIC at 07:39

## 2021-05-30 RX ADMIN — THYROID, PORCINE 60 MG: 30 TABLET ORAL at 20:51

## 2021-05-30 RX ADMIN — GABAPENTIN 100 MG: 100 CAPSULE ORAL at 20:52

## 2021-05-30 ASSESSMENT — FIBROSIS 4 INDEX: FIB4 SCORE: 0.6

## 2021-05-30 ASSESSMENT — GAIT ASSESSMENTS
ASSISTIVE DEVICE: PARALLEL BARS
GAIT LEVEL OF ASSIST: TOTAL ASSIST X 2
DEVIATION: STEP TO;DECREASED BASE OF SUPPORT;BRADYKINETIC;DECREASED HEEL STRIKE;DECREASED TOE OFF
DISTANCE (FEET): 10

## 2021-05-30 ASSESSMENT — ACTIVITIES OF DAILY LIVING (ADL): BED_CHAIR_WHEELCHAIR_TRANSFER_DESCRIPTION: INCREASED TIME;SET-UP OF EQUIPMENT;SUPERVISION FOR SAFETY;VERBAL CUEING

## 2021-05-30 NOTE — CARE PLAN
Problem: Communication  Goal: The ability to communicate needs accurately and effectively will improve  Outcome: Progressing     Problem: Safety  Goal: Will remain free from falls  Outcome: Progressing     Problem: Pain Management  Goal: Pain level will decrease to patient's comfort goal  Outcome: Progressing   The patient is Stable - Low risk of patient condition declining or worsening         :

## 2021-05-30 NOTE — CARE PLAN
Problem: Venous Thromboembolism (VTW)/Deep Vein Thrombosis (DVT) Prevention:  Goal: Patient will participate in Venous Thrombosis (VTE)/Deep Vein Thrombosis (DVT)Prevention Measures  Flowsheets  Taken 5/30/2021 1612  JONNY Hose (Graduated Compression Stockings): On  Taken 5/30/2021 0750  Mechanical Prophylaxis: JONNY Hose (Graduated Compression Stockings)  Pharmacologic Prophylaxis Used: Rivaroxaban (Xarelto) - (ONLY for Total Knee and Total Hip Surgery)    Problem: Bowel/Gastric:  Goal: Will not experience complications related to bowel motility  Flowsheets (Taken 5/30/2021 0900 by Melo Vega, C.N.A.)  Last BM: 05/30/21     Problem: Pain Management  Goal: Pain level will decrease to patient's comfort goal  Flowsheets (Taken 5/30/2021 1613)  Comfort Goal:   Comfort with Movement   Perform Activity   Stay Alert  Note: Pt denies pain, appears calm and comfortable.

## 2021-05-30 NOTE — PROGRESS NOTES
Received patient during shift change, report rec'd from day shift RN. Resting in bed, VS stable on room air. Per report continent of Bowel, dye cath in place. Mod assist for transfers. A&O x 3-4, able to make needs known. Bed in low position, call light within reach.

## 2021-05-30 NOTE — THERAPY
Physical Therapy   Daily Treatment     Patient Name: Thong Arzola  Age:  56 y.o., Sex:  male  Medical Record #: [id number]  Today's Date: 5/30/2021     Precautions  Precautions: Fall Risk  Comments: Helmet OOB; LUE 2-3 Modified Jeremiah Scale; Abdominal binder OOB; Brewer cath; L inattention & haylie    Subjective    Pt was seated in w/c upon arrival and agreeable to treatment.  Pt's spouse present during session.      Objective       05/30/21 0931   Precautions   Precautions Fall Risk   Gait Functional Level of Assist    Gait Level Of Assist Total Assist X 2  (Max A for LLE advancement with w/c follow for safety)   Assistive Device Parallel Bars   Distance (Feet) 10   # of Times Distance was Traveled 2   Deviation Step To;Decreased Base Of Support;Bradykinetic;Decreased Heel Strike;Decreased Toe Off   Transfer Functional Level of Assist   Bed, Chair, Wheelchair Transfer Minimal Assist  (to CGA using SPT to/from mat table)   Bed Chair Wheelchair Transfer Description Increased time;Set-up of equipment;Supervision for safety;Verbal cueing   Bed Mobility    Sit to Supine Minimal Assist   Sit to Stand Contact Guard Assist   Interdisciplinary Plan of Care Collaboration   Patient Position at End of Therapy In Bed;Call Light within Reach;Tray Table within Reach;Phone within Reach   PT Total Time Spent   PT Individual Total Time Spent (Mins) 60   PT Charge Group   PT Therapeutic Activities 4     Discussion with pt and pt's spouse on moving forward with getting ultralight w/c with swing away foot rests.  UB dressing completed with mod A.  Transfer training using SPT method to/from mat table with min A progressing to CGA going both directions.  Completed supine PROM LLE: hip/knee flexion/extension, hip IR/ER, DF/PF all x 10 reps Completed supine stretching LLE: ADD, glut, hamstring, gastroc all x 30 sec each, passive trunk rotation x 10 reps.  Pt's spouse training in completion of supine PROM, stretching program.      Assessment    Pt demonstrated significant improvement in transfers using SPT method with repetition.  Pt requiring less assist for trunk support with gait training.  Pt's spouse demonstrated proficiency with supine program.   Strengths: Alert and oriented, Effective communication skills, Independent prior level of function, Motivated for self care and independence, Pleasant and cooperative, Supportive family, Willingly participates in therapeutic activities  Barriers: Hemiparesis, Home accessibility, Orthostatic hypotension, Impaired activity tolerance, Impaired balance, Spasticity, Limited mobility    Plan    Complete w/c consult with Aaron for ultralight wheelchair with swing away footrests, continue w/c mobility using R haylie technique, transfer training with focus on sequencing, standing posture and balance, gait training/pre gait in // bars and with [employer], continue family training.        Passport items to be completed:  Get in/out of bed safely, navigate up and down stairs, show how to get up/down from the ground, demonstrate HEP, complete caregiver training    Physical Therapy Problems (Active)     Problem: Mobility     Dates: Start: 04/30/21       Goal: STG-Within one week, patient will ambulate household distance     Dates: Start: 05/19/21       Description: 1) Individualized goal:  AMB x 10 feet in // bars with mod A  2) Interventions:  PT Group Therapy, PT E Stim Attended, PT Gait Training, PT Therapeutic Exercises, PT Neuro Re-Ed/Balance, PT Aquatic Therapy, PT Therapeutic Activity, PT Manual Therapy, and PT Evaluation      Goal Note filed on 05/26/21 1241 by LAVON DawkinsT     2 person for wc follow at this time for safety, L haylie, impaired body awareness/ impaired L sided awareness               Problem: Mobility Transfers     Dates: Start: 05/19/21       Goal: STG-Within one week, patient will transfer bed to chair     Dates: Start: 05/19/21       Description: 1) Individualized goal:   SQPT with SBA  2) Interventions:  PT Group Therapy, PT E Stim Attended, PT Gait Training, PT Therapeutic Exercises, PT Neuro Re-Ed/Balance, PT Aquatic Therapy, PT Therapeutic Activity, PT Manual Therapy, and PT Evaluation      Goal Note filed on 05/26/21 1241 by ASHUTOSH Dawkins A for impaired motor planning/ safety concerns, L haylie               Problem: PT-Long Term Goals     Dates: Start: 04/30/21       Goal: LTG-By discharge, patient will propel wheelchair     Dates: Start: 04/30/21       Description: 1) Individualized goal:  100ft with haylie technique and Anastacio on even/uneven surfaces  2) Interventions:  PT Group Therapy, PT E Stim Attended, PT Orthotics Training, PT Gait Training, PT Self Care/Home Eval, PT Therapeutic Exercises, PT Neuro Re-Ed/Balance, PT Therapeutic Activity, and PT Evaluation       Goal: LTG-By discharge, patient will transfer one surface to another     Dates: Start: 04/30/21       Description: 1) Individualized goal:  SQPT with Taz   2) Interventions:  PT Group Therapy, PT E Stim Attended, PT Orthotics Training, PT Gait Training, PT Self Care/Home Eval, PT Therapeutic Exercises, PT Neuro Re-Ed/Balance, PT Therapeutic Activity, and PT Evaluation         Goal: LTG-By discharge, patient will     Dates: Start: 04/30/21       Description: 1) Individualized goal: perform bed mobility (supine<>sit) with Taz   2) Interventions:  PT Group Therapy, PT E Stim Attended, PT Orthotics Training, PT Gait Training, PT Self Care/Home Eval, PT Therapeutic Exercises, PT Neuro Re-Ed/Balance, PT Therapeutic Activity, and PT Evaluation

## 2021-05-30 NOTE — CARE PLAN
Problem: Communication  Goal: The ability to communicate needs accurately and effectively will improve  Outcome: Progressing     Problem: Safety  Goal: Will remain free from falls  Outcome: Progressing     Problem: Urinary Elimination:  Goal: Ability to reestablish a normal urinary elimination pattern will improve  Outcome: Progressing  Brewer cath in place.       The patient is Stable - Low risk of patient condition declining or worsening    Shift Goals  Patient Goals:  (sleep well)    Progress made toward(s) clinical / shift goals:  Slept off and on through night after hs meds. See flowsheet.    Patient is not progressing towards the following goals:

## 2021-05-31 PROCEDURE — 700102 HCHG RX REV CODE 250 W/ 637 OVERRIDE(OP): Performed by: PHYSICAL MEDICINE & REHABILITATION

## 2021-05-31 PROCEDURE — 97112 NEUROMUSCULAR REEDUCATION: CPT | Mod: CO

## 2021-05-31 PROCEDURE — 97130 THER IVNTJ EA ADDL 15 MIN: CPT

## 2021-05-31 PROCEDURE — 700102 HCHG RX REV CODE 250 W/ 637 OVERRIDE(OP): Performed by: HOSPITALIST

## 2021-05-31 PROCEDURE — 770010 HCHG ROOM/CARE - REHAB SEMI PRIVAT*

## 2021-05-31 PROCEDURE — 97129 THER IVNTJ 1ST 15 MIN: CPT

## 2021-05-31 PROCEDURE — A9270 NON-COVERED ITEM OR SERVICE: HCPCS | Performed by: PHYSICAL MEDICINE & REHABILITATION

## 2021-05-31 PROCEDURE — 97530 THERAPEUTIC ACTIVITIES: CPT

## 2021-05-31 PROCEDURE — A9270 NON-COVERED ITEM OR SERVICE: HCPCS | Performed by: HOSPITALIST

## 2021-05-31 PROCEDURE — 99232 SBSQ HOSP IP/OBS MODERATE 35: CPT | Performed by: PHYSICAL MEDICINE & REHABILITATION

## 2021-05-31 PROCEDURE — 97112 NEUROMUSCULAR REEDUCATION: CPT

## 2021-05-31 RX ORDER — GABAPENTIN 100 MG/1
200 CAPSULE ORAL 3 TIMES DAILY
Status: DISCONTINUED | OUTPATIENT
Start: 2021-05-31 | End: 2021-06-03

## 2021-05-31 RX ADMIN — SENNOSIDES AND DOCUSATE SODIUM 2 TABLET: 8.6; 5 TABLET ORAL at 19:52

## 2021-05-31 RX ADMIN — GABAPENTIN 200 MG: 100 CAPSULE ORAL at 15:06

## 2021-05-31 RX ADMIN — BACLOFEN 30 MG: 20 TABLET ORAL at 19:51

## 2021-05-31 RX ADMIN — POLYVINYL ALCOHOL 1 DROP: 14 SOLUTION/ DROPS OPHTHALMIC at 19:55

## 2021-05-31 RX ADMIN — BACLOFEN 20 MG: 20 TABLET ORAL at 08:40

## 2021-05-31 RX ADMIN — ATORVASTATIN CALCIUM 40 MG: 40 TABLET, FILM COATED ORAL at 19:51

## 2021-05-31 RX ADMIN — RIVAROXABAN 20 MG: 20 TABLET, FILM COATED ORAL at 17:42

## 2021-05-31 RX ADMIN — POLYVINYL ALCOHOL 1 DROP: 14 SOLUTION/ DROPS OPHTHALMIC at 15:06

## 2021-05-31 RX ADMIN — MELATONIN TAB 3 MG 3 MG: 3 TAB at 19:52

## 2021-05-31 RX ADMIN — POLYVINYL ALCOHOL 1 DROP: 14 SOLUTION/ DROPS OPHTHALMIC at 08:45

## 2021-05-31 RX ADMIN — THYROID, PORCINE 60 MG: 30 TABLET ORAL at 19:52

## 2021-05-31 RX ADMIN — METOPROLOL TARTRATE 25 MG: 25 TABLET, FILM COATED ORAL at 19:52

## 2021-05-31 RX ADMIN — METOPROLOL TARTRATE 25 MG: 25 TABLET, FILM COATED ORAL at 08:40

## 2021-05-31 RX ADMIN — GABAPENTIN 200 MG: 100 CAPSULE ORAL at 19:51

## 2021-05-31 RX ADMIN — MECLIZINE HYDROCHLORIDE 25 MG: 25 TABLET ORAL at 08:40

## 2021-05-31 RX ADMIN — MIRTAZAPINE 15 MG: 15 TABLET, ORALLY DISINTEGRATING ORAL at 19:52

## 2021-05-31 RX ADMIN — THYROID, PORCINE 60 MG: 30 TABLET ORAL at 08:40

## 2021-05-31 RX ADMIN — BACLOFEN 20 MG: 20 TABLET ORAL at 15:06

## 2021-05-31 NOTE — PROGRESS NOTES
Received patient during shift change, report rec'd from day shift RN. Resting in bed, VS stable on room air. Continent of Bowel, dye cath in place r/t retention. Mod assist for transfers. A&O x 3-4, able to make needs known. Bed in low position, call light within reach.

## 2021-05-31 NOTE — THERAPY
Physical Therapy   Daily Treatment     Patient Name: Thong Arzola  Age:  56 y.o., Sex:  male  Medical Record #: [id number]  Today's Date: 5/31/2021     Precautions  Precautions: (P) Fall Risk  Comments: (P) Helmet OOB; LUE 2-3 Modified Jeremiah Scale; Abdominal binder OOB; Brewer cath; L inattention & haylie    Subjective    Pt was proud that he could still participate in outdoor activities from a wc level, and reported having competitive family members.      Objective       05/31/21 0931   Precautions   Precautions Fall Risk   Comments Helmet OOB; LUE 2-3 Modified Jeremiah Scale; Abdominal binder OOB; Brewer cath; L inattention & haylie   Wheelchair Functional Level of Assist   Wheelchair Assist Maximal Assist  (Outdoor wc mobility with haylie technique, uneven surfaces )   Distance Wheelchair (Feet or Distance) 30  (SPV indoors >150 ft haylie technique )   Wheelchair Description Adaptive equipment;Assistance with steering;Verbal cueing  (L attention support)   Neuro-Muscular Treatments   Comments [employer] outdoor activity participation: football toss x 10, bean bag toss x 20, ladder ball x 28, [place] toss with spouse x 10min, corn hole x 28- all SPV from wc level, with symmetry and L sided attention.     Strengths & Barriers   Strengths Alert and oriented;Effective communication skills;Independent prior level of function;Motivated for self care and independence;Pleasant and cooperative;Supportive family;Willingly participates in therapeutic activities   Barriers Hemiparesis;Home accessibility;Orthostatic hypotension;Impaired activity tolerance;Impaired balance;Spasticity;Limited mobility   PT Total Time Spent   PT Individual Total Time Spent (Mins) 60   PT Charge Group   PT Neuromuscular Re-Education / Balance 2   PT Therapeutic Activities 2     Rec therapist consulted for adaptive sports recommendations when patient brought up wanting to paddle board.   PT partner called to report that [employer] is scheduled for  consult tomorrow to proceed with ultra lightweight wc.     Assessment    Pt was encouraged by his participation in outdoor activities, and spouse was engaged in session. Pt required significant assist for outdoor wc safety, and was unable to bump back on sidewalk path when L side slipped off (max A).     Strengths: (P) Alert and oriented, Effective communication skills, Independent prior level of function, Motivated for self care and independence, Pleasant and cooperative, Supportive family, Willingly participates in therapeutic activities  Barriers: (P) Hemiparesis, Home accessibility, Orthostatic hypotension, Impaired activity tolerance, Impaired balance, Spasticity, Limited mobility    Plan    WC consult with Tomy 3-4pm tomorrow.     Passport items to be completed:  Get in/out of bed safely, in/out of a vehicle, safely use mobility device, walk or wheel around home/community, navigate up and down stairs, show how to get up/down from the ground, ensure home is accessible, demonstrate HEP, complete caregiver training    Physical Therapy Problems (Active)     Problem: Mobility     Dates: Start: 04/30/21       Goal: STG-Within one week, patient will ambulate household distance     Dates: Start: 05/19/21       Description: 1) Individualized goal:  AMB x 10 feet in // bars with mod A  2) Interventions:  PT Group Therapy, PT E Stim Attended, PT Gait Training, PT Therapeutic Exercises, PT Neuro Re-Ed/Balance, PT Aquatic Therapy, PT Therapeutic Activity, PT Manual Therapy, and PT Evaluation      Goal Note filed on 05/26/21 1241 by Iovry Cintron DPT     2 person for wc follow at this time for safety, L haylie, impaired body awareness/ impaired L sided awareness               Problem: Mobility Transfers     Dates: Start: 05/19/21       Goal: STG-Within one week, patient will transfer bed to chair     Dates: Start: 05/19/21       Description: 1) Individualized goal:  SQPT with SBA  2) Interventions:  PT Group Therapy, PT  E Stim Attended, PT Gait Training, PT Therapeutic Exercises, PT Neuro Re-Ed/Balance, PT Aquatic Therapy, PT Therapeutic Activity, PT Manual Therapy, and PT Evaluation      Goal Note filed on 05/26/21 1241 by Iovry Cintron DPT     Min A for impaired motor planning/ safety concerns, L haylie               Problem: PT-Long Term Goals     Dates: Start: 04/30/21       Goal: LTG-By discharge, patient will propel wheelchair     Dates: Start: 04/30/21       Description: 1) Individualized goal:  100ft with haylie technique and Anastacio on even/uneven surfaces  2) Interventions:  PT Group Therapy, PT E Stim Attended, PT Orthotics Training, PT Gait Training, PT Self Care/Home Eval, PT Therapeutic Exercises, PT Neuro Re-Ed/Balance, PT Therapeutic Activity, and PT Evaluation       Goal: LTG-By discharge, patient will transfer one surface to another     Dates: Start: 04/30/21       Description: 1) Individualized goal:  SQPT with Taz   2) Interventions:  PT Group Therapy, PT E Stim Attended, PT Orthotics Training, PT Gait Training, PT Self Care/Home Eval, PT Therapeutic Exercises, PT Neuro Re-Ed/Balance, PT Therapeutic Activity, and PT Evaluation         Goal: LTG-By discharge, patient will     Dates: Start: 04/30/21       Description: 1) Individualized goal: perform bed mobility (supine<>sit) with Taz   2) Interventions:  PT Group Therapy, PT E Stim Attended, PT Orthotics Training, PT Gait Training, PT Self Care/Home Eval, PT Therapeutic Exercises, PT Neuro Re-Ed/Balance, PT Therapeutic Activity, and PT Evaluation

## 2021-05-31 NOTE — PROGRESS NOTES
"Rehab Progress Note     Encounter Date: 5/31/2021    CC: right Stroke    Interval Events (Subjective)  Complains of tingling and burning in the left arm greater than the leg, worse when anybody touches it, worse at night.    Complains of increased spasticity in left arm and leg, causing pain and disturbing function.  Baclofen has helped but concern with fatigue during the daytime.    Participating well with therapy  Last BM: 05/30/21    ROS:  Gen: No fatigue, confusion, significant weight loss  Eyes: no blurry vision, double vision or pain in eyes  ENT: no changes in hearing, runny nose, nose bleeds, sinus pain  CV: No CP, palpitations,   Lungs: no shortness of breath, changes in secretions, changes in cough, pain with coughing  Abd: No abd pain, nausea, vomiting, pain with eating  : no blood in urine, pain during storage phase, bladder spasms, suprapubic pain  Ext: No swelling in legs, asymmetric atrophy  Neuro: no changes in strength or sensation  Skin: no new ulcers/skin breakdown appreciated by patient  Mood: No changes in mood today, increase in depression or anxiety  Heme: no bruising, or bleeding  Rest of 14 point review of systems is negative    Objective:  Vitals: /102   Pulse 70   Temp 36.6 °C (97.8 °F) (Oral)   Resp 20   Ht 1.778 m (5' 10\")   Wt 72.5 kg (159 lb 13.3 oz)   SpO2 95%   Gen: NAD, Body mass index is 22.93 kg/m².  HEENT: Right craniectomy, PERRLA, moist mucous membranes  Cardio: RRR, no mumurs  Pulm: CTAB, with normal respiratory effort  Abd: Soft NTND, active bowel sounds,   Ext: No peripheral edema. No calf tenderness. No clubbing/cyanosis. +dorsal pedalis pulses bilaterally.    Tone: 1+-2/4 MAS left upper extremity elbow flexion    No results found for this or any previous visit (from the past 72 hour(s)).    Current Facility-Administered Medications   Medication Frequency   • gabapentin (NEURONTIN) capsule 200 mg TID   • meclizine (ANTIVERT) tablet 25 mg DAILY   • baclofen " (LIORESAL) tablet 20 mg BID   • baclofen (LIORESAL) tablet 30 mg QHS   • artificial tears ophthalmic solution 1 Drop TID   • atorvastatin (LIPITOR) tablet 40 mg Q EVENING   • metoprolol tartrate (LOPRESSOR) tablet 25 mg BID   • senna-docusate (PERICOLACE or SENOKOT S) 8.6-50 MG per tablet 2 tablet Q EVENING   • melatonin tablet 3 mg QHS   • hydrOXYzine HCl (ATARAX) tablet 50 mg Q6HRS PRN   • Respiratory Therapy Consult Continuous RT   • Pharmacy Consult Request ...Pain Management Review 1 Each PHARMACY TO DOSE   • hydrALAZINE (APRESOLINE) tablet 10 mg Q8HRS PRN   • acetaminophen (Tylenol) tablet 650 mg Q4HRS PRN   • lactulose 20 GM/30ML solution 30 mL QDAY PRN   • docusate sodium (ENEMEEZ) enema 283 mg QDAY PRN   • fleet enema 133 mL QDAY PRN   • benzocaine-menthol (CEPACOL) lozenge 1 Lozenge Q2HRS PRN   • mag hydrox-al hydrox-simeth (MAALOX PLUS ES or MYLANTA DS) suspension 20 mL Q2HRS PRN   • ondansetron (ZOFRAN ODT) dispertab 4 mg 4X/DAY PRN    Or   • ondansetron (ZOFRAN) syringe/vial injection 4 mg 4X/DAY PRN   • traZODone (DESYREL) tablet 50 mg QHS PRN   • sodium chloride (OCEAN) 0.65 % nasal spray 2 Spray PRN   • midazolam (VERSED) 5 mg/mL (1 mL vial) PRN   • mirtazapine (Remeron) orally disintegrating tab 15 mg QHS   • rivaroxaban (XARELTO) tablet 20 mg PM MEAL   • thyroid (ARMOUR THYROID) tablet 60 mg BID       Orders Placed This Encounter   Procedures   • Diet Order Diet: Level 7 - Easy to Chew (float pills in puree); Liquid level: Level 0 - Thin; Tray Modifications (optional): SLP - 1:1 Supervision by Nursing, SLP - Deliver to Nursing Station     Standing Status:   Standing     Number of Occurrences:   1     Order Specific Question:   Diet:     Answer:   Level 7 - Easy to Chew [22]     Comments:   float pills in puree     Order Specific Question:   Liquid level     Answer:   Level 0 - Thin     Order Specific Question:   Tray Modifications (optional)     Answer:   SLP - 1:1 Supervision by Nursing     Order  Specific Question:   Tray Modifications (optional)     Answer:   SLP - Deliver to Nursing Station       Assessment:  Active Hospital Problems    Diagnosis    • *Acute right MCA stroke (HCC)    • Azotemia    • Dyslipidemia    • Arm swelling    • Dizziness    • Hypotension    • Normocytic anemia    • Spasticity as late effect of cerebrovascular accident (CVA)    • Reactive depression    • Urinary retention    • Acquired hypothyroidism    • History of COVID-19    • Paroxysmal atrial fibrillation (HCC)        Medical Decision Making and Plan:  Right ICA/MCA ischemic stroke: Status post craniectomy on 4/3 by Dr. Payton  -Continue comprehensive acute inpatient rehabilitation    Neuropathic pain: Allodynia of the left upper extremity  -Discussed with patient desensitization  -Increase gabapentin to 200 mg twice daily, low-dose increase secondary to concern for fatigue and cognitive side effects.  If no significant side effects tomorrow would recommend increasing dose to 300 mg 3 times a day and titrating up as tolerated.    Spasticity:  -Gabapentin increased will also assist with spasticity      Total time:  26 minutes.  I spent greater than 50% of the time for patient care and coordination on this date, including unit/floor time, and face-to-face time with the patient as per assessment and plan above including neuropathic pain/allodynia, increase gabapentin to 200 mg 3 times a day, spasticity, increase gabapentin.    Tacho Low D.O.

## 2021-05-31 NOTE — CARE PLAN
Problem: Communication  Goal: The ability to communicate needs accurately and effectively will improve  Outcome: Progressing     Problem: Safety  Goal: Will remain free from falls  Outcome: Progressing     Problem: Bowel/Gastric:  Goal: Will not experience complications related to bowel motility  Outcome: Progressing       The patient is Stable - Low risk of patient condition declining or worsening    Shift Goals  Patient Goals:  (sleep well)    Progress made toward(s) clinical / shift goals:  Slept through most of night after hs meds.    Patient is not progressing towards the following goals:

## 2021-05-31 NOTE — THERAPY
Speech Language Pathology  Daily Treatment     Patient Name: Thong Arzola  Age:  56 y.o., Sex:  male  Medical Record #: [id number]  Today's Date: 5/31/2021     Precautions  Precautions: Fall Risk  Comments: Helmet OOB; LUE 2-3 Modified Jeremiah Scale; Abdominal binder OOB; Brewer cath; L inattention & haylie    Subjective    Pt reported that he felt tired and that his neck hurt, but he was willing to participate in this ST session      Objective       05/31/21 1401   SLP Total Time Spent   SLP Individual Total Time Spent (Mins) 60   Treatment Charges   SLP Cognitive Skill Development First 15 Minutes 1   SLP Cognitive Skill Development Additional 15 Minutes 3       Assessment    Pt required mod cues to locate the differences between 2 pictures.  Pt was asked to locate 10 differences; however was only able to locate 3-4 before stating that he was unable to locate any further (all initial differences were located on the right).  Pt benefited from cues to start on the left side of the page and look at one item at a time to locate differences.  With mod cues pt was able to locate 13-16 differences per picture.      Strengths: Able to follow instructions, Pleasant and cooperative, Supportive family, Willingly participates in therapeutic activities  Barriers: Impaired functional cognition, Visual impairment, Decreased endurance    Plan    Continue targeting visual scanning, attention, organization skills       Speech Therapy Problems (Active)     Problem: Problem Solving STGs     Dates: Start: 05/05/21       Goal: STG-Within one week, patient will     Dates: Start: 05/05/21       Description: 1) Individualized goal: demonstrate appropriate visual scanning to the left with min cues and 80% accuracy.  2) Interventions:  SLP Speech Language Treatment, SLP Self Care / ADL Training , SLP Cognitive Skill Development, and SLP Group Treatment        Goal Note filed on 05/25/21 1529 by Carly Ramirez MS,CCC-SLP     60-75%  for scanning tasks, endurance as barrier. Improved insight to difficulties.             Goal: STG-Within one week, patient will     Dates: Start: 05/05/21       Description: 1) Individualized goal:  complete simple attention tasks with min A with 80% accuracy.   2) Interventions:  SLP Speech Language Treatment, SLP Self Care / ADL Training , SLP Cognitive Skill Development, and SLP Group Treatment        Goal Note filed on 05/25/21 1529 by Carly Ramirez MS,CCC-SLP     Progressing               Problem: Speech/Swallowing LTGs     Dates: Start: 04/30/21       Goal: LTG-By discharge, patient will solve complex problem     Dates: Start: 04/30/21       Description: 1) Individualized goal:  related to IADL's with mod I for safe d/c home.  2) Interventions:  SLP Speech Language Treatment, SLP Swallowing Dysfunction Treatment, SLP Oral Pharyngeal Evaluation, SLP Video Swallow Evaluation, SLP Self Care / ADL Training , SLP Cognitive Skill Development, and SLP Group Treatment

## 2021-05-31 NOTE — CARE PLAN
The patient is   Problem: Safety  Goal: Will remain free from falls  Outcome: Progressing     Problem: Pain Management  Goal: Pain level will decrease to patient's comfort goal  Outcome: Progressing       Shift Goals  Clinical Goals: Pt will improve ADL's and strength  Patient Goals:  (sleep well)    Progress made toward(s) clinical / shift goals:continuing to progress    Patient is not progressing towards the following goals:

## 2021-05-31 NOTE — THERAPY
"Occupational Therapy  Daily Treatment     Patient Name: Thong Arzola  Age:  56 y.o., Sex:  male  Medical Record #: [id number]  Today's Date: 5/31/2021     Precautions  Precautions: (P) Fall Risk  Comments: (P) Helmet OOB; LUE 2-3 Modified Jeremiah Scale; Abdominal binder OOB; Brewer cath; L inattention & haylie         Subjective    \" I'm starting to wear down . Stated  At last 15 minutes of tx session        Objective       05/31/21 1231   Precautions   Precautions Fall Risk   Comments Helmet OOB; LUE 2-3 Modified Jeremiah Scale; Abdominal binder OOB; Brewer cath; L inattention & haylie   Functional Level of Assist   Lower Body Dressing Maximal Assist  (to don shoes  seated EOB )   Bed, Chair, Wheelchair Transfer Minimal Assist  (standpivot max cues for weight shifiting and LE advancement)   Neuro-Muscular Treatments   Neuro-Muscular Treatments Tapping;Verbal Cuing;Weight Shift Left   Comments seated edge of mat   left scapular mobilization   followed by  prolonged weight bearing through left UE while reaching with the right.   no need to apply paddle today as fingers not in flexor tone pattern .  continued flexed elbow but easily ranged to almost full extension.     moderate amount of \"stifness \" in left wrist   whiich decreasd during weight bearing activity.        [place] gate paddle fabricated on  5-28-21   after weight bearing activity    feliberto bryant measured and applied   and  paddle applied to patient with  antipacted 2 hour wear time .       Bed Mobility    Supine to Sit Moderate Assist   Interdisciplinary Plan of Care Collaboration   IDT Collaboration with  Family / Caregiver   Patient Position at End of Therapy Seated;Self Releasing Lap Belt Applied;Call Light within Reach;Tray Table within Reach;Family / Friend in Room   Collaboration Comments initiated family training  for an  at home program to focus on prolonged weight bearing and   [place] paddle  use with spouse Anel. Educated and " "demosntrated     prolonged weight bearing through left UE   at edge of bed    recommend a hard cover book or smoth wood/ plastic  item under hand  for improved input  along with using Dyem to reduce  \" slipage \" of the solid surface and hand.     educated regading hand/ finger placement  for input just above the elbow to provide input and  weight bearing through heel of hand.      she observed  [place]  paddle  as worn by the patient   was not present when therapist applied.      would benefit from conitnued  training  for spasticity management  and paddle use.    OT Total Time Spent   OT Individual Total Time Spent (Mins) 60   OT Charge Group   OT Neuromuscular Re-education / Balance 4     Stand pivot transfer with weight shift and  Left LE advancement technique  Performed  With  Mod assist.   W/c <-> Mat      buenrostro gate paddle removed at  3PM   Significant increase in passive  Left wrist  ROM and finger extension   Assessment       Completed tx no complaints other than fatigue.     cues for left attention during   Weight bearing and  [place] paddle application .     Strengths: Able to follow instructions, Alert and oriented, Effective communication skills, Good insight into deficits/needs, Independent prior level of function, Manages pain appropriately, Motivated for self care and independence, Pleasant and cooperative, Supportive family, Willingly participates in therapeutic activities  Barriers: Bowel incontinence, Decreased endurance, Fatigue, Generalized weakness, Hemiplegia, Home accessibility, Orthostatic hypotension, Impaired activity tolerance, Impaired balance, Limited mobility, Spasticity    Plan  ADL retraining, L UE neuro re-ed/stretching, family training with spouse Anel; Bathing is not a goal of OT at this time (CNAs should be bathing patient); home evaluation to sister in law's house in [place]; pursue resting hand splint for left hand/wrist  continued   Education and Training family in " prolonged weight bearing activity and  Paddle  Application techniques.      Occupational Therapy Goals (Active)     Problem: Functional Transfers     Dates: Start: 05/19/21       Goal: STG-Within one week, patient will transfer to toilet     Dates: Start: 05/19/21       Description: 1) Individualized Goal: with min A using grab bar and commode over the toilet  2) Interventions:  OT Self Care/ADL, OT Cognitive Skill Dev, OT Manual Ther Technique, OT Neuro Re-Ed/Balance, OT Sensory Int Techniques, OT Therapeutic Activity, OT Evaluation, and OT Therapeutic Exercise      Goal Note filed on 05/26/21 1129 by Aj Lovett, OT/L     Mod A               Problem: OT Long Term Goals     Dates: Start: 04/30/21       Goal: LTG-By discharge, patient will complete basic self care tasks     Dates: Start: 04/30/21       Description: 1) Individualized Goal:  with min to mod A using AE/AD/techniques as needed  2) Interventions:  OT E Stim Attended, OT Self Care/ADL, OT Cognitive Skill Dev, OT Manual Ther Technique, OT Neuro Re-Ed/Balance, OT Sensory Int Techniques, OT Therapeutic Activity, OT Evaluation, and OT Therapeutic Exercise       Goal: LTG-By discharge, patient will perform bathroom transfers     Dates: Start: 04/30/21       Description: 1) Individualized Goal:  with max A x 1 person using slideboard versus squat pivot to the right  2) Interventions:  OT E Stim Attended, OT Self Care/ADL, OT Cognitive Skill Dev, OT Manual Ther Technique, OT Neuro Re-Ed/Balance, OT Sensory Int Techniques, OT Therapeutic Activity, OT Evaluation, and OT Therapeutic Exercise          Problem: Toileting     Dates: Start: 05/19/21       Goal: STG-Within one week, patient will complete toileting tasks     Dates: Start: 05/19/21       Description: 1) Individualized Goal: with mod A using grab bar and commode over the toilet  2) Interventions:  OT Self Care/ADL, OT Cognitive Skill Dev, OT Manual Ther Technique, OT Neuro Re-Ed/Balance, OT Sensory  Int Techniques, OT Therapeutic Activity, OT Evaluation, and OT Therapeutic Exercise      Goal Note filed on 05/26/21 1129 by Aj Lovett OT/DAILY MUELLER

## 2021-06-01 PROCEDURE — 97530 THERAPEUTIC ACTIVITIES: CPT

## 2021-06-01 PROCEDURE — 700102 HCHG RX REV CODE 250 W/ 637 OVERRIDE(OP): Performed by: HOSPITALIST

## 2021-06-01 PROCEDURE — 770010 HCHG ROOM/CARE - REHAB SEMI PRIVAT*

## 2021-06-01 PROCEDURE — 700102 HCHG RX REV CODE 250 W/ 637 OVERRIDE(OP): Performed by: PHYSICAL MEDICINE & REHABILITATION

## 2021-06-01 PROCEDURE — 97130 THER IVNTJ EA ADDL 15 MIN: CPT

## 2021-06-01 PROCEDURE — A9270 NON-COVERED ITEM OR SERVICE: HCPCS | Performed by: PHYSICAL MEDICINE & REHABILITATION

## 2021-06-01 PROCEDURE — A9270 NON-COVERED ITEM OR SERVICE: HCPCS | Performed by: HOSPITALIST

## 2021-06-01 PROCEDURE — 97129 THER IVNTJ 1ST 15 MIN: CPT

## 2021-06-01 PROCEDURE — 99232 SBSQ HOSP IP/OBS MODERATE 35: CPT | Performed by: PHYSICAL MEDICINE & REHABILITATION

## 2021-06-01 RX ADMIN — MELATONIN TAB 3 MG 3 MG: 3 TAB at 19:57

## 2021-06-01 RX ADMIN — POLYVINYL ALCOHOL 1 DROP: 14 SOLUTION/ DROPS OPHTHALMIC at 16:18

## 2021-06-01 RX ADMIN — THYROID, PORCINE 60 MG: 30 TABLET ORAL at 08:13

## 2021-06-01 RX ADMIN — RIVAROXABAN 20 MG: 20 TABLET, FILM COATED ORAL at 17:13

## 2021-06-01 RX ADMIN — ATORVASTATIN CALCIUM 40 MG: 40 TABLET, FILM COATED ORAL at 19:57

## 2021-06-01 RX ADMIN — POLYVINYL ALCOHOL 1 DROP: 14 SOLUTION/ DROPS OPHTHALMIC at 08:15

## 2021-06-01 RX ADMIN — GABAPENTIN 200 MG: 100 CAPSULE ORAL at 08:13

## 2021-06-01 RX ADMIN — METOPROLOL TARTRATE 25 MG: 25 TABLET, FILM COATED ORAL at 19:57

## 2021-06-01 RX ADMIN — BACLOFEN 30 MG: 20 TABLET ORAL at 19:57

## 2021-06-01 RX ADMIN — GABAPENTIN 200 MG: 100 CAPSULE ORAL at 16:16

## 2021-06-01 RX ADMIN — GABAPENTIN 200 MG: 100 CAPSULE ORAL at 19:57

## 2021-06-01 RX ADMIN — METOPROLOL TARTRATE 25 MG: 25 TABLET, FILM COATED ORAL at 08:13

## 2021-06-01 RX ADMIN — MECLIZINE HYDROCHLORIDE 25 MG: 25 TABLET ORAL at 08:14

## 2021-06-01 RX ADMIN — THYROID, PORCINE 60 MG: 30 TABLET ORAL at 19:58

## 2021-06-01 RX ADMIN — SENNOSIDES AND DOCUSATE SODIUM 2 TABLET: 8.6; 5 TABLET ORAL at 19:57

## 2021-06-01 RX ADMIN — POLYVINYL ALCOHOL 1 DROP: 14 SOLUTION/ DROPS OPHTHALMIC at 19:56

## 2021-06-01 RX ADMIN — BACLOFEN 20 MG: 20 TABLET ORAL at 16:16

## 2021-06-01 RX ADMIN — BACLOFEN 20 MG: 20 TABLET ORAL at 08:13

## 2021-06-01 RX ADMIN — MIRTAZAPINE 15 MG: 15 TABLET, ORALLY DISINTEGRATING ORAL at 19:57

## 2021-06-01 ASSESSMENT — ACTIVITIES OF DAILY LIVING (ADL): BED_CHAIR_WHEELCHAIR_TRANSFER_DESCRIPTION: ADAPTIVE EQUIPMENT;SET-UP OF EQUIPMENT

## 2021-06-01 NOTE — THERAPY
Recreational Therapy  Daily Treatment     Patient Name: Thong Arzola  AGE:  56 y.o., SEX:  male  Medical Record #: [id number]  Today's Date: 6/1/2021       Subjective    Pt sharing his interests in adaptive leisure.      Objective       06/01/21 1101   Functional Ability Status - Cognitive   Attention Span Remains on Task   Comprehension Follows Three Step Commands   Judgment Able to Make Independent Decisions   Functional Ability Status - Emotional    Affect Appropriate   Mood Appropriate   Behavior Appropriate   Skilled Intervention    Skilled Intervention Relaxation / Coping Skills;Community Skills;Leisure Education    Skilled Intervention Comments Given information on adaptive sports and rental equipment in the area.    Interdisciplinary Plan of Care Collaboration   Patient Position at End of Therapy Seated;Call Light within Reach;Tray Table within Reach   Strengths & Barriers   Strengths Able to follow instructions;Alert and oriented;Motivated for self care and independence;Pleasant and cooperative;Supportive family;Willingly participates in therapeutic activities   Barriers Hemiparesis;Limited mobility   Treatment Time   Total Time Spent (mins) 30   Procedural Tracking   Procedural Tracking Community Re-Integration;Community Skills Development;Leisure Skills Awareness;Leisure Skills Development       Assessment    Pt was shown many adaptive sports and leisure activities he will have available to him following his discharge and how he can participate. Pt was given information on equipment rentals, adaptive hiking, and information on an event in which he can try out adaptive biking shortly following discharge.     Strengths: (P) Able to follow instructions, Alert and oriented, Motivated for self care and independence, Pleasant and cooperative, Supportive family, Willingly participates in therapeutic activities  Barriers: (P) Hemiparesis, Limited mobility    Plan    Follow up with SO on any questions about  participation.     Passport items completed:    Verbalize two positive leisure activities, discuss returning to work, hobbies, community groups or volunteer activities, explore community resources

## 2021-06-01 NOTE — CARE PLAN
Problem: Safety  Goal: Will remain free from falls  Note: Appropriately uses call light to make needs known.Fall precautions and frequent rounding in place for safety.Call light within reach.Will continue to monitor and assess needs and safety.     Problem: Pain Management  Goal: Pain level will decrease to patient's comfort goal  Intervention: Follow pain managment plan developed in collaboration with patient and Interdisciplinary Team  Note: Pain is manageable with scheduled medication.Repositioned with pillows for comfort.Will continue to monitor and assess pain level and medicate as needed.     Problem: Urinary Elimination:  Goal: Ability to reestablish a normal urinary elimination pattern will improve  Intervention: Assess and monitor for signs and symptoms of urinary retention  Note: Pt has dye catheter in place which is draining adequate amount of urine.Cleansed with soap and water.Will continue to monitor.

## 2021-06-01 NOTE — THERAPY
"Occupational Therapy  Daily Treatment     Patient Name: Thong Arzola  Age:  56 y.o., Sex:  male  Medical Record #: [id number]  Today's Date: 6/1/2021     Precautions  Precautions: (P) Fall Risk  Comments: (P) Helmet OOB; LUE 2-3 Modified Jeremiah Scale; Abdominal binder OOB; Brewer cath; L inattention & haylie         Subjective    Patient sitting in w/c in room.  Ready for home evaluation.  Spouse, sister and son at the house.     Objective       06/01/21 1316   Precautions   Precautions Fall Risk   Comments Helmet OOB; LUE 2-3 Modified Jeremiah Scale; Abdominal binder OOB; Brewer cath; L inattention & haylie   Pain 0 - 10 Group   Location Arm   Location Orientation Left   Description   (states it is hypersensitive)   Therapist Pain Assessment During Activity   Cognition    Attention Impaired   Sequencing Impaired   Interdisciplinary Plan of Care Collaboration   IDT Collaboration with  Family / Caregiver;Therapy Tech   Patient Position at End of Therapy Seated;Self Releasing Lap Belt Applied;Phone within Reach;Tray Table within Reach;Call Light within Reach;Chair Alarm On   Collaboration Comments home evaluation   OT Total Time Spent   OT Individual Total Time Spent (Mins) 60   OT Charge Group   OT Therapy Activity 4     Patient, spouse, sister and son present for home evaluation at sister's house.  They have two platform steps which are 5-6\" high to get in/out of the house and one step from entryway inside front door down into the remainder of the house, which is 5-6\" high.  They currently have one moveable ramp, which can be used to access all three steps, but have two other ramps waiting to be delivered.  Mattress in bedroom is currently on the floor, but they have a frame expected to be delivered in the next day or two.  Therefore, could not attempt bed transfer.  Spouse assisted patient from w/c <> couch with mod/max A and cues from therapist.  Spouse assisted patient from w/c <> tub/shower with tub bench with " "mod A and verbal cues from therapist.  Patient's current w/c from rim to rim is just under 27\" wide.  Doorway to bathroom with door on is 25.5\" and door off is 27\".  OT removed door and spouse was then able to get w/c in/out of bathroom.  Discussed having patient sit in w/c at kitchen table rather than transferring onto a wooden dining chair.  All other areas are accessible.  There is a padded droparm BSC over the toilet in the bathroom, which they declined to transfer onto during home evaluation.    Assessment    Patient will need a w/c that is less than 27\" in width at it's widest points. Home is accessible at this time, but will be easier for patient and spouse once other ramps are delivered and bed frame is delivered.    Strengths: Able to follow instructions, Alert and oriented, Effective communication skills, Good insight into deficits/needs, Independent prior level of function, Manages pain appropriately, Motivated for self care and independence, Pleasant and cooperative, Supportive family, Willingly participates in therapeutic activities  Barriers: Bowel incontinence, Decreased endurance, Fatigue, Generalized weakness, Hemiplegia, Home accessibility, Orthostatic hypotension, Impaired activity tolerance, Impaired balance, Limited mobility, Spasticity    Plan    ADL retraining, L UE neuro re-ed/stretching, family training with spouse Anel    Occupational Therapy Goals (Active)     Problem: Functional Transfers     Dates: Start: 05/19/21       Goal: STG-Within one week, patient will transfer to toilet     Dates: Start: 05/19/21       Description: 1) Individualized Goal: with min A using grab bar and commode over the toilet  2) Interventions:  OT Self Care/ADL, OT Cognitive Skill Dev, OT Manual Ther Technique, OT Neuro Re-Ed/Balance, OT Sensory Int Techniques, OT Therapeutic Activity, OT Evaluation, and OT Therapeutic Exercise      Goal Note filed on 05/26/21 1129 by Aj Lovett, OT/L     Mod A            "    Problem: OT Long Term Goals     Dates: Start: 04/30/21       Goal: LTG-By discharge, patient will complete basic self care tasks     Dates: Start: 04/30/21       Description: 1) Individualized Goal:  with min to mod A using AE/AD/techniques as needed  2) Interventions:  OT E Stim Attended, OT Self Care/ADL, OT Cognitive Skill Dev, OT Manual Ther Technique, OT Neuro Re-Ed/Balance, OT Sensory Int Techniques, OT Therapeutic Activity, OT Evaluation, and OT Therapeutic Exercise       Goal: LTG-By discharge, patient will perform bathroom transfers     Dates: Start: 04/30/21       Description: 1) Individualized Goal:  with max A x 1 person using slideboard versus squat pivot to the right  2) Interventions:  OT E Stim Attended, OT Self Care/ADL, OT Cognitive Skill Dev, OT Manual Ther Technique, OT Neuro Re-Ed/Balance, OT Sensory Int Techniques, OT Therapeutic Activity, OT Evaluation, and OT Therapeutic Exercise          Problem: Toileting     Dates: Start: 05/19/21       Goal: STG-Within one week, patient will complete toileting tasks     Dates: Start: 05/19/21       Description: 1) Individualized Goal: with mod A using grab bar and commode over the toilet  2) Interventions:  OT Self Care/ADL, OT Cognitive Skill Dev, OT Manual Ther Technique, OT Neuro Re-Ed/Balance, OT Sensory Int Techniques, OT Therapeutic Activity, OT Evaluation, and OT Therapeutic Exercise      Goal Note filed on 05/26/21 1129 by Aj Lovett, OT/L     Max A

## 2021-06-01 NOTE — PROGRESS NOTES
"Rehab Progress Note     Date of Service: 5/11/2021  Chief Complaint: follow up stroke    Interval Events (Subjective)    Patient seen and examined today in his room.  He denies any pain or spasms.  His gabapentin was increased slightly over the weekend by Dr. Low.  He is going for a home evaluation today.  He denies any dizziness, even when he went for his Head CT last week.  He has no new complaints.  He's looking forward to his discharge this week.    ROS: No changes to bowel, bladder, pain, mood, or sleep.       Objective:  VITAL SIGNS: /88   Pulse 70   Temp 36.6 °C (97.8 °F) (Oral)   Resp 18   Ht 1.778 m (5' 10\")   Wt 72.5 kg (159 lb 13.3 oz)   SpO2 96%   BMI 22.93 kg/m²   Gen: alert, no apparent distress  Neuro: notable for spastic left hemiplegia    No results found for this or any previous visit (from the past 72 hour(s)).    Current Facility-Administered Medications   Medication Frequency   • gabapentin (NEURONTIN) capsule 200 mg TID   • meclizine (ANTIVERT) tablet 25 mg DAILY   • baclofen (LIORESAL) tablet 20 mg BID   • baclofen (LIORESAL) tablet 30 mg QHS   • artificial tears ophthalmic solution 1 Drop TID   • atorvastatin (LIPITOR) tablet 40 mg Q EVENING   • metoprolol tartrate (LOPRESSOR) tablet 25 mg BID   • senna-docusate (PERICOLACE or SENOKOT S) 8.6-50 MG per tablet 2 tablet Q EVENING   • melatonin tablet 3 mg QHS   • hydrOXYzine HCl (ATARAX) tablet 50 mg Q6HRS PRN   • Respiratory Therapy Consult Continuous RT   • Pharmacy Consult Request ...Pain Management Review 1 Each PHARMACY TO DOSE   • hydrALAZINE (APRESOLINE) tablet 10 mg Q8HRS PRN   • acetaminophen (Tylenol) tablet 650 mg Q4HRS PRN   • lactulose 20 GM/30ML solution 30 mL QDAY PRN   • docusate sodium (ENEMEEZ) enema 283 mg QDAY PRN   • fleet enema 133 mL QDAY PRN   • benzocaine-menthol (CEPACOL) lozenge 1 Lozenge Q2HRS PRN   • mag hydrox-al hydrox-simeth (MAALOX PLUS ES or MYLANTA DS) suspension 20 mL Q2HRS PRN   • ondansetron " (ZOFRAN ODT) dispertab 4 mg 4X/DAY PRN    Or   • ondansetron (ZOFRAN) syringe/vial injection 4 mg 4X/DAY PRN   • traZODone (DESYREL) tablet 50 mg QHS PRN   • sodium chloride (OCEAN) 0.65 % nasal spray 2 Spray PRN   • midazolam (VERSED) 5 mg/mL (1 mL vial) PRN   • mirtazapine (Remeron) orally disintegrating tab 15 mg QHS   • rivaroxaban (XARELTO) tablet 20 mg PM MEAL   • thyroid (ARMOUR THYROID) tablet 60 mg BID       Orders Placed This Encounter   Procedures   • Diet Order Diet: Level 7 - Easy to Chew (float pills in puree); Liquid level: Level 0 - Thin; Tray Modifications (optional): SLP - 1:1 Supervision by Nursing, SLP - Deliver to Nursing Station     Standing Status:   Standing     Number of Occurrences:   1     Order Specific Question:   Diet:     Answer:   Level 7 - Easy to Chew [22]     Comments:   float pills in puree     Order Specific Question:   Liquid level     Answer:   Level 0 - Thin     Order Specific Question:   Tray Modifications (optional)     Answer:   SLP - 1:1 Supervision by Nursing     Order Specific Question:   Tray Modifications (optional)     Answer:   SLP - Deliver to Nursing Station       Radiology    CT-HEAD W/O   Final Result      1.  Further demarcation of large area of encephalomalacia in the right MCA distribution.      2.  No acute hemorrhage.      3.  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      4.  Right temporoparietal craniectomy changes.      US-EXTREMITY VENOUS UPPER UNILAT LEFT   Final Result            Assessment:  Active Hospital Problems    Diagnosis    • *Acute right MCA stroke (HCC)    • Paroxysmal atrial fibrillation (HCC)    • Urinary retention    • Acquired hypothyroidism    • History of COVID-19    • Hypotension    • Normocytic anemia    • Spasticity as late effect of cerebrovascular accident (CVA)    • Reactive depression      This patient is a 56 y.o. male admitted for acute inpatient rehabilitation with Acute right  MCA stroke (HCC).    I led and attended the weekly conference, and agree with the IDT conference documentation and plan of care as noted below.    Date of conference: 5/26/2021    Goals and barriers: See IDT note.    Biggest barriers: left spastic hemiplegia, impaired balance, left neglect    Goals in next week: home evaluation, scheduled for today    CM/social support: wife supportive, to go to 1  here in [place], wife's sister    Anticipated DC date: 6/4    Home health: PT/OT/SLP/RN - [facility]    Equip: MWC with pressure relieving cushion with firm positioning backrest, bathroom DME    Follow up: PCP, Dr. Dumont, stroke bridge clinic, urology, neurosugery      Medical Decision Making and Plan:    Large right ICA/MCA ischemic stroke  S/p craniectomy 4/3, Dr. Payton  Left hemiplegia, minimal improvement  Cognitive deficits, improved  Left neglect, improved  Continue full rehab program  PT/OT/SLP, 1 hr each discipline, 5 days per week    Xarelto  Aspirin discontinued per neurology recommendations  Statin    Outpatient follow up with stroke bridge clinic, Dr. Dumont, referrals made    Outpatient follow up with Dr. Payton for cranioplasty, scheduled for 6/9, Head CT per above, results discussed with patient/wife/Dr. Payton    Making good progress, improved function, able to stand for 10 minutes    Patient fitted for a left elbow splint which will help with his spasticity and prevent contractures at the elbow    Neuropathic pain, resolved  Left leg at night, resolved  Continue low dose gabapentin 100 mg 5/11 --> 200 mg TID 5/31  Continue to monitor need to increase dose    Fatigue, improved  Continues to be an issue, though has improved since admission  Did not tolerate higher doses of baclofen, likely will be titrated off in the outpatient clinic with Dr. Dumont    Dizziness, improved  Scheduled Meclizine, start titration 5/20, TID --> BID, decrease to daily 5/27, will discontinue prior to discharge, see how home  evaluation goes today  Continue Teds for now, okay to discontinue abdominal binder  BPPV testing by PT negative    Spasticity, continues, worse at night  Increased baclofen 15 mg TID to 20 mg TID 4/30 --> 30 mg TID 5/3  Was decreased back down to 20 mg TID on 5/13 due to fatigue, with increased spasticity     Increased night time baclofen dose to 30 mg 5/25 for better nighttime control    Started valium at night 2 mg 4/30, discontinued 5/3, doesn't seem to make much difference, patient also too sedated    Consider adding Zanaflex in future, LFTs on 5/5 with elevated ALT, rechecked 5/18, still elevated, not a good candidate for Zanaflex at this time    Will benefit from Botox - unable to tolerate higher doses of baclofen due to sedation/fatigue, unable to trial Zanaflex due to elevated ALT, trial of Valium was not effective and patient also too sedated    Dr. Dumont has ordered 400 units with plan to inject LUE as an outpatient after discharge from rehab    Elevated ALT, continues/improved  Decreased statin dose 80 mg to 40 mg 5/18  Almost in normal range  Recheck on 6/3 for resolution    Atrial fibrillation  Metoprolol  Xarelto  Currently with good rate control     Hypothyroidism   [place] thyroid, repeat TSH in 4 weeks from admission, as dose was increased at acute  Slightly abnormal 5/27, low TSH, normal T4  Outpatient follow up with PCP as these findings may be due to his acute illness     Hypertension  Hypotension, improved  Lisinopril discontinued  Metoprolol, dose increased  Flomax discontinued 5/12  Teds and abdominal binder - OK to discontinue Abd binder, keep Teds to prevent edema  Currently with good control of BP    Insomnia, improved/resolved  Already on mirtazapine  Scheduled melatonin  Gabapentin added for neuropathic pain at night     Reactive depression  Mirtazapine  Consider psychology consult if needed   Mood currently stable     History of COVID-19  Without sequelae     Normocytic anemia  Mild,  monitor    Bowel program  Continue bowel medications  Last BM 5/31    Bladder program  Urinary retention, continues  Unsuccessful voiding trials x 3, even on Flomax  Outpatient follow up with urology, referral made    DVT prophylaxis  Xarelto    Total time:  25 minutes.  I spent greater than 50% of the time for patient care, counseling, and coordination on this date, including patient face-to face time, unit/floor time with review of records/pertinent lab data and studies, as well as discussing diagnostic evaluation/work up, planned therapeutic interventions, and future disposition of care, as per the interval events/subjective and the assessment and plan as noted above.    I have performed a physical exam, reviewed and updated ROS, as well as the assessment and plan today 6/1/2021. In review of note from 5/28/2021 there are no new changes except as documented above.    Radha Monge M.D.   Physical Medicine and Rehabilitation

## 2021-06-01 NOTE — THERAPY
Physical Therapy   Daily Treatment     Patient Name: Thong Arzola  Age:  56 y.o., Sex:  male  Medical Record #: [id number]  Today's Date: 6/1/2021     Precautions  Precautions: (P) Fall Risk  Comments: (P) Helmet OOB; LUE 2-3 Modified Jeremiah Scale; Abdominal binder OOB; Brewer cath; L inattention & haylie    Subjective    Pt and spouse were engaged in wc decision making. Both were agreeable to specifications discussed.       Objective     06/01/21 1501   Precautions   Precautions Fall Risk   Comments Helmet OOB; LUE 2-3 Modified Jeremiah Scale; Abdominal binder OOB; Brewer cath; L inattention & haylie   Stairs Functional Level of Assist   Level of Assist with Stairs Unable to Participate   Transfer Functional Level of Assist   Bed, Chair, Wheelchair Transfer Minimal Assist   Bed Chair Wheelchair Transfer Description Adaptive equipment;Set-up of equipment  (Reach pivot to the right)   Bed Mobility    Sit to Supine Minimal Assist   Neuro-Muscular Treatments   Neuro-Muscular Treatments Weight Shift Left;Weight Shift Right;Verbal Cuing;Tactile Cuing;Sequencing;Postural Facilitation;Postural Changes;Joint Approximation   Comments Leg  trial for bed mobility would require more practice; patient continues to require manual lift to scoot LLE to the left.    Interdisciplinary Plan of Care Collaboration   IDT Collaboration with  Family / Caregiver;Other (See Comments)   Patient Position at End of Therapy In Bed;Tray Table within Reach;Family / Friend in Room   Collaboration Comments [employer] wc  present for consulation to determine most appropriate features- demo wc should include solid backrest for postural support and symmetry, pneumatic tires for smooth ride with less maintanence, anti tippers, seat belt, ultra lightweight frame, pressure relieving, positioning wc cushion, swing away foot rests for performance of haylie technique mobility, haylie locks that lock bilateral brakes from right side, swing away full  length/ height adjustable arm rests with trough to support nonfunctional LUE.   Strengths & Barriers   Strengths Alert and oriented;Effective communication skills;Independent prior level of function;Motivated for self care and independence;Pleasant and cooperative;Supportive family;Willingly participates in therapeutic activities   Barriers Hemiparesis;Home accessibility;Orthostatic hypotension;Impaired activity tolerance;Impaired balance;Spasticity;Limited mobility   PT Total Time Spent   PT Individual Total Time Spent (Mins) 60   PT Charge Group   PT Therapeutic Activities 4     Education on rationale for ultra lightweight wc.     Assessment    Consult with [employer] for patient specific wheelchair needs (see collaboration for details). Pt trialed use of leg  for bed mobility ease, yet more practice is required to be successful with its use.     Strengths: (P) Alert and oriented, Effective communication skills, Independent prior level of function, Motivated for self care and independence, Pleasant and cooperative, Supportive family, Willingly participates in therapeutic activities  Barriers: (P) Hemiparesis, Home accessibility, Orthostatic hypotension, Impaired activity tolerance, Impaired balance, Spasticity, Limited mobility    Plan    DME letter, demo wc should arrive tomorrow afternoon, prepare for D/C Friday, ongoing family training.    Passport items to be completed:  Get in/out of bed safely, in/out of a vehicle, safely use mobility device, walk or wheel around home/community, navigate up and down stairs, show how to get up/down from the ground, ensure home is accessible, demonstrate HEP, complete caregiver training    Physical Therapy Problems (Active)     Problem: Mobility     Dates: Start: 04/30/21       Goal: STG-Within one week, patient will ambulate household distance     Dates: Start: 05/19/21       Description: 1) Individualized goal:  AMB x 10 feet in // bars with mod A  2) Interventions:  PT  Group Therapy, PT E Stim Attended, PT Gait Training, PT Therapeutic Exercises, PT Neuro Re-Ed/Balance, PT Aquatic Therapy, PT Therapeutic Activity, PT Manual Therapy, and PT Evaluation      Goal Note filed on 05/26/21 1241 by Ivory Cintron DPT     2 person for wc follow at this time for safety, L haylie, impaired body awareness/ impaired L sided awareness               Problem: Mobility Transfers     Dates: Start: 05/19/21       Goal: STG-Within one week, patient will transfer bed to chair     Dates: Start: 05/19/21       Description: 1) Individualized goal:  SQPT with SBA  2) Interventions:  PT Group Therapy, PT E Stim Attended, PT Gait Training, PT Therapeutic Exercises, PT Neuro Re-Ed/Balance, PT Aquatic Therapy, PT Therapeutic Activity, PT Manual Therapy, and PT Evaluation      Goal Note filed on 05/26/21 1241 by Ivory Cintron DPT     Min A for impaired motor planning/ safety concerns, L haylie               Problem: PT-Long Term Goals     Dates: Start: 04/30/21       Goal: LTG-By discharge, patient will propel wheelchair     Dates: Start: 04/30/21       Description: 1) Individualized goal:  100ft with haylie technique and Anastacio on even/uneven surfaces  2) Interventions:  PT Group Therapy, PT E Stim Attended, PT Orthotics Training, PT Gait Training, PT Self Care/Home Eval, PT Therapeutic Exercises, PT Neuro Re-Ed/Balance, PT Therapeutic Activity, and PT Evaluation       Goal: LTG-By discharge, patient will transfer one surface to another     Dates: Start: 04/30/21       Description: 1) Individualized goal:  SQPT with Taz   2) Interventions:  PT Group Therapy, PT E Stim Attended, PT Orthotics Training, PT Gait Training, PT Self Care/Home Eval, PT Therapeutic Exercises, PT Neuro Re-Ed/Balance, PT Therapeutic Activity, and PT Evaluation         Goal: LTG-By discharge, patient will     Dates: Start: 04/30/21       Description: 1) Individualized goal: perform bed mobility (supine<>sit) with Taz   2) Interventions:  PT  Group Therapy, PT E Stim Attended, PT Orthotics Training, PT Gait Training, PT Self Care/Home Eval, PT Therapeutic Exercises, PT Neuro Re-Ed/Balance, PT Therapeutic Activity, and PT Evaluation

## 2021-06-01 NOTE — THERAPY
Speech Language Pathology  Daily Treatment     Patient Name: Thong Arzola  Age:  56 y.o., Sex:  male  Medical Record #: [id number]  Today's Date: 6/1/2021     Precautions  Precautions: Fall Risk  Comments: Helmet OOB; LUE 2-3 Modified Jeremiah Scale; Abdominal binder OOB; Brewer cath; L inattention & haylie    Subjective    Patient participated in Speech therapy.      Objective       06/01/21 1002   Cognition   Attention to Task Minimal (4)   Simple Attention Minimal (4)   Moderate Attention Moderate (3)   Visual Scanning / Cancellation Skills Minimal (4)   Functional Math / Financial Management Minimal (4)   SLP Total Time Spent   SLP Individual Total Time Spent (Mins) 60   Treatment Charges   SLP Cognitive Skill Development First 15 Minutes 1   SLP Cognitive Skill Development Additional 15 Minutes 3       Assessment    90% for alternating math, cues needed for borrowing during subtraction.   Improved use of compensatory strategies when scanning to left.   Mod cues for organizing time slot and adding total time amounts.     Strengths: Able to follow instructions, Pleasant and cooperative, Supportive family, Willingly participates in therapeutic activities  Barriers: Impaired functional cognition, Visual impairment, Decreased endurance    Plan    Visual attention, functional problem solving.     Passport items to be completed:  manage finances, manage medications, arrive to therapy appointments on time, complete daily memory log entries, solve problems related to safety situations,    Speech Therapy Problems (Active)     Problem: Problem Solving STGs     Dates: Start: 05/05/21       Goal: STG-Within one week, patient will     Dates: Start: 05/05/21       Description: 1) Individualized goal: demonstrate appropriate visual scanning to the left with min cues and 80% accuracy.  2) Interventions:  SLP Speech Language Treatment, SLP Self Care / ADL Training , SLP Cognitive Skill Development, and SLP Group Treatment         Goal Note filed on 05/25/21 1529 by Carly Ramirez MS,CCC-SLP     60-75% for scanning tasks, endurance as barrier. Improved insight to difficulties.             Goal: STG-Within one week, patient will     Dates: Start: 05/05/21       Description: 1) Individualized goal:  complete simple attention tasks with min A with 80% accuracy.   2) Interventions:  SLP Speech Language Treatment, SLP Self Care / ADL Training , SLP Cognitive Skill Development, and SLP Group Treatment        Goal Note filed on 05/25/21 1529 by Carly Ramirez MS,CCC-SLP     Progressing               Problem: Speech/Swallowing LTGs     Dates: Start: 04/30/21       Goal: LTG-By discharge, patient will solve complex problem     Dates: Start: 04/30/21       Description: 1) Individualized goal:  related to IADL's with mod I for safe d/c home.  2) Interventions:  SLP Speech Language Treatment, SLP Swallowing Dysfunction Treatment, SLP Oral Pharyngeal Evaluation, SLP Video Swallow Evaluation, SLP Self Care / ADL Training , SLP Cognitive Skill Development, and SLP Group Treatment

## 2021-06-01 NOTE — CARE PLAN
Problem: Safety  Goal: Will remain free from falls  Note: Pt uses call light appropriately. Call light is within easy reach, wears helmet when OOB.      Problem: Venous Thromboembolism (VTW)/Deep Vein Thrombosis (DVT) Prevention:  Goal: Patient will participate in Venous Thrombosis (VTE)/Deep Vein Thrombosis (DVT)Prevention Measures  Flowsheets (Taken 6/1/2021 0810)  Pharmacologic Prophylaxis Used: Rivaroxaban (Xarelto)     Problem: Bowel/Gastric:  Goal: Will not experience complications related to bowel motility  Flowsheets (Taken 6/1/2021 0900 by Avis Dyer, C.N.A.)  Last BM: 06/01/21  Note: Pt's last BM was today 5/1/21. Denies constipation/diarrhea, denies any abdominal discomfort.

## 2021-06-02 PROCEDURE — 99233 SBSQ HOSP IP/OBS HIGH 50: CPT | Performed by: PHYSICAL MEDICINE & REHABILITATION

## 2021-06-02 PROCEDURE — A9270 NON-COVERED ITEM OR SERVICE: HCPCS | Performed by: PHYSICAL MEDICINE & REHABILITATION

## 2021-06-02 PROCEDURE — 700102 HCHG RX REV CODE 250 W/ 637 OVERRIDE(OP): Performed by: PHYSICAL MEDICINE & REHABILITATION

## 2021-06-02 PROCEDURE — 770010 HCHG ROOM/CARE - REHAB SEMI PRIVAT*

## 2021-06-02 PROCEDURE — 700102 HCHG RX REV CODE 250 W/ 637 OVERRIDE(OP): Performed by: HOSPITALIST

## 2021-06-02 PROCEDURE — 97129 THER IVNTJ 1ST 15 MIN: CPT

## 2021-06-02 PROCEDURE — 97130 THER IVNTJ EA ADDL 15 MIN: CPT

## 2021-06-02 PROCEDURE — 97112 NEUROMUSCULAR REEDUCATION: CPT | Mod: CO

## 2021-06-02 PROCEDURE — A9270 NON-COVERED ITEM OR SERVICE: HCPCS | Performed by: HOSPITALIST

## 2021-06-02 RX ADMIN — THYROID, PORCINE 60 MG: 30 TABLET ORAL at 21:00

## 2021-06-02 RX ADMIN — MECLIZINE HYDROCHLORIDE 25 MG: 25 TABLET ORAL at 07:37

## 2021-06-02 RX ADMIN — THYROID, PORCINE 60 MG: 30 TABLET ORAL at 07:37

## 2021-06-02 RX ADMIN — METOPROLOL TARTRATE 25 MG: 25 TABLET, FILM COATED ORAL at 21:00

## 2021-06-02 RX ADMIN — GABAPENTIN 200 MG: 100 CAPSULE ORAL at 21:00

## 2021-06-02 RX ADMIN — BACLOFEN 20 MG: 20 TABLET ORAL at 07:37

## 2021-06-02 RX ADMIN — POLYVINYL ALCOHOL 1 DROP: 14 SOLUTION/ DROPS OPHTHALMIC at 07:39

## 2021-06-02 RX ADMIN — GABAPENTIN 200 MG: 100 CAPSULE ORAL at 07:37

## 2021-06-02 RX ADMIN — POLYVINYL ALCOHOL 1 DROP: 14 SOLUTION/ DROPS OPHTHALMIC at 15:08

## 2021-06-02 RX ADMIN — GABAPENTIN 200 MG: 100 CAPSULE ORAL at 15:08

## 2021-06-02 RX ADMIN — ATORVASTATIN CALCIUM 40 MG: 40 TABLET, FILM COATED ORAL at 21:00

## 2021-06-02 RX ADMIN — MELATONIN TAB 3 MG 3 MG: 3 TAB at 21:00

## 2021-06-02 RX ADMIN — MIRTAZAPINE 15 MG: 15 TABLET, ORALLY DISINTEGRATING ORAL at 21:00

## 2021-06-02 RX ADMIN — BACLOFEN 20 MG: 20 TABLET ORAL at 15:08

## 2021-06-02 RX ADMIN — POLYVINYL ALCOHOL 1 DROP: 14 SOLUTION/ DROPS OPHTHALMIC at 20:59

## 2021-06-02 RX ADMIN — BACLOFEN 30 MG: 20 TABLET ORAL at 21:00

## 2021-06-02 RX ADMIN — RIVAROXABAN 20 MG: 20 TABLET, FILM COATED ORAL at 17:08

## 2021-06-02 RX ADMIN — METOPROLOL TARTRATE 25 MG: 25 TABLET, FILM COATED ORAL at 07:38

## 2021-06-02 RX ADMIN — SENNOSIDES AND DOCUSATE SODIUM 2 TABLET: 8.6; 5 TABLET ORAL at 21:00

## 2021-06-02 ASSESSMENT — GAIT ASSESSMENTS
DEVIATION: STEP TO;DECREASED BASE OF SUPPORT;BRADYKINETIC;DECREASED HEEL STRIKE;DECREASED TOE OFF
GAIT LEVEL OF ASSIST: TOTAL ASSIST X 2
DISTANCE (FEET): 10
ASSISTIVE DEVICE: PARALLEL BARS

## 2021-06-02 ASSESSMENT — ACTIVITIES OF DAILY LIVING (ADL): BED_CHAIR_WHEELCHAIR_TRANSFER_DESCRIPTION: ADAPTIVE EQUIPMENT;SET-UP OF EQUIPMENT;VERBAL CUEING

## 2021-06-02 NOTE — THERAPY
Recreational Therapy  Daily Treatment     Patient Name: Thong Arzola  AGE:  56 y.o., SEX:  male  Medical Record #: [id number]  Today's Date: 6/2/2021       Subjective    Pt reporting he had not yet gone over the adaptive information given in the prior session.      Objective       06/02/21 1431   Functional Ability Status - Cognitive   Attention Span Remains on Task   Comprehension Follows Three Step Commands   Judgment Able to Make Independent Decisions   Functional Ability Status - Emotional    Affect Appropriate;Flat   Mood Appropriate   Behavior Appropriate   Skilled Intervention    Skilled Intervention Relaxation / Coping Skills;Community Skills;Leisure Education    Skilled Intervention Comments Checking in on paperwork and information given to him in the prior session.    Interdisciplinary Plan of Care Collaboration   Patient Position at End of Therapy In Bed;Call Light within Reach;Tray Table within Reach   Strengths & Barriers   Strengths Able to follow instructions;Alert and oriented;Motivated for self care and independence;Pleasant and cooperative;Supportive family;Willingly participates in therapeutic activities   Barriers Hemiparesis;Impaired balance;Impaired activity tolerance;Fatigue;Limited mobility   Treatment Time   Total Time Spent (mins) 0   Procedural Tracking   Procedural Tracking Community Re-Integration;Community Skills Development;Leisure Skills Awareness;Leisure Skills Development       Assessment    Pt was given the opportuniry to ask questions on information and paperwork given to him in the prior session.     Strengths: (P) Able to follow instructions, Alert and oriented, Motivated for self care and independence, Pleasant and cooperative, Supportive family, Willingly participates in therapeutic activities  Barriers: (P) Hemiparesis, Impaired balance, Impaired activity tolerance, Fatigue, Limited mobility    Plan    Discharge Pt as all goals are discharged.     Passport items  completed:    Verbalize two positive leisure activities, discuss returning to work, hobbies, community groups or volunteer activities, explore community resources

## 2021-06-02 NOTE — THERAPY
Speech Language Pathology  Daily Treatment     Patient Name: Thong Arzola  Age:  56 y.o., Sex:  male  Medical Record #: [id number]  Today's Date: 6/2/2021     Precautions  Precautions: Fall Risk  Comments: Helmet OOB; LUE 2-3 Modified Jeremiah Scale; Abdominal binder OOB; Brewer cath; L inattention & haylie    Subjective    Patient reports fatigue after PT session, requested therapy bedside.      Objective       06/02/21 1032   Cognition   Attention to Task Minimal (4)   Simple Attention Minimal (4)   Moderate Attention Moderate (3)   Visual Scanning / Cancellation Skills Minimal (4)   SLP Total Time Spent   SLP Individual Total Time Spent (Mins) 60   Treatment Charges   SLP Cognitive Skill Development First 15 Minutes 1   SLP Cognitive Skill Development Additional 15 Minutes 3       Assessment    Min cue needed for carrying and borrowing during 2 digit arithmetic.   Verbal alternating attention tasks with min cues. Frequent rest breaks needed,     Strengths: Able to follow instructions, Willingly participates in therapeutic activities, Motivated for self care and independence, Pleasant and cooperative, Supportive family  Barriers: Impaired functional cognition, Visual impairment    Plan    Outcome assessment.         Speech Therapy Problems (Active)     Problem: Problem Solving STGs     Dates: Start: 05/05/21       Goal: STG-Within one week, patient will     Dates: Start: 05/05/21       Description: 1) Individualized goal: demonstrate appropriate visual scanning to the left with min cues and 80% accuracy.  2) Interventions:  SLP Speech Language Treatment, SLP Self Care / ADL Training , SLP Cognitive Skill Development, and SLP Group Treatment        Goal Note filed on 05/25/21 1529 by Carly Ramirez MS,CCC-SLP     60-75% for scanning tasks, endurance as barrier. Improved insight to difficulties.                Problem: Speech/Swallowing LTGs     Dates: Start: 04/30/21       Goal: LTG-By discharge, patient  will solve complex problem     Dates: Start: 04/30/21       Description: 1) Individualized goal:  related to IADL's with mod I for safe d/c home.  2) Interventions:  SLP Speech Language Treatment, SLP Swallowing Dysfunction Treatment, SLP Oral Pharyngeal Evaluation, SLP Video Swallow Evaluation, SLP Self Care / ADL Training , SLP Cognitive Skill Development, and SLP Group Treatment

## 2021-06-02 NOTE — CARE PLAN
Problem: Mobility  Goal: STG-Within one week, patient will ambulate household distance  Description: 1) Individualized goal:  AMB x 10 feet in // bars with mod A  2) Interventions:  PT Group Therapy, PT E Stim Attended, PT Gait Training, PT Therapeutic Exercises, PT Neuro Re-Ed/Balance, PT Aquatic Therapy, PT Therapeutic Activity, PT Manual Therapy, and PT Evaluation    Outcome: Not Met  Note: 2 person assist, non functional, L haylie      Problem: Mobility Transfers  Goal: STG-Within one week, patient will transfer bed to chair  Description: 1) Individualized goal:  SQPT with SBA  2) Interventions:  PT Group Therapy, PT E Stim Attended, PT Gait Training, PT Therapeutic Exercises, PT Neuro Re-Ed/Balance, PT Aquatic Therapy, PT Therapeutic Activity, PT Manual Therapy, and PT Evaluation    Outcome: Not Met  Note: Min A reach pivot, L haylie

## 2021-06-02 NOTE — CARE PLAN
Problem: Infection  Goal: Will remain free from infection  Intervention: Assess signs and symptoms of infection  Note: Pt is calm, comfortable and no sign of acute distress noted.Will continue to monitor.     Problem: Fall Risk - Rehab  Goal: Patient will remain free from falls  Note: Pt appropriately uses call light to make needs known.Fall precaution and frequent rounding in place for safety.Helmet out of bed.Call light within reach.Will continue to monitor and assess needs and safety.

## 2021-06-02 NOTE — THERAPY
"Physical Therapy   Daily Treatment     Patient Name: Thong Arzola  Age:  56 y.o., Sex:  male  Medical Record #: [id number]  Today's Date: 6/2/2021     Precautions  Precautions: (P) Fall Risk  Comments: (P) Helmet OOB; LUE 2-3 Modified Jeremiah Scale; Abdominal binder OOB; Brewer cath; L inattention & haylie    Subjective    Pt reported fatigue, but agreeable to participate. Pt was grateful for care.     Objective       06/02/21 0901   Precautions   Precautions Fall Risk   Comments Helmet OOB; LUE 2-3 Modified Jeremiah Scale; Abdominal binder OOB; Brewer cath; L inattention & haylie   Wheelchair Functional Level of Assist   Wheelchair Assist Supervised   Distance Wheelchair (Feet or Distance) 150   Wheelchair Description   (haylie technique)   Stairs Functional Level of Assist   Level of Assist with Stairs Unable to Participate   Transfer Functional Level of Assist   Bed, Chair, Wheelchair Transfer Minimal Assist   Bed Chair Wheelchair Transfer Description Adaptive equipment;Set-up of equipment;Verbal cueing   Bed Mobility    Sit to Supine Minimal Assist   Sit to Stand   (5 reps, 1\" step beneath RLE for forced use LLE, block L knee)   Scooting Minimal Assist   Rolling Minimum Assist to Lt.   Neuro-Muscular Treatments   Neuro-Muscular Treatments Weight Shift Right;Weight Shift Left;Verbal Cuing;Tactile Cuing;Sequencing;Postural Facilitation;Postural Changes;Joint Approximation;Facilitation   Comments Cone reaching activity across to the left; each rep with mini squat to retrive, and place to patient's right side 6 cones x 4, blocking at L knee, forced use LUE, 2nd person present for assist as needed with LUE and equipment, mod/max x 1.    Interdisciplinary Plan of Care Collaboration   IDT Collaboration with  Physician;Therapy Tech   Patient Position at End of Therapy In Bed;Call Light within Reach;Tray Table within Reach;Phone within Reach   Collaboration Comments POC, CLOF, activity assist   Strengths & Barriers "   Strengths Alert and oriented;Effective communication skills;Independent prior level of function;Motivated for self care and independence;Pleasant and cooperative;Supportive family;Willingly participates in therapeutic activities   Barriers Hemiparesis;Home accessibility;Orthostatic hypotension;Impaired activity tolerance;Impaired balance;Spasticity;Limited mobility   PT Total Time Spent   PT Individual Total Time Spent (Mins) 60       Assessment    Pt tends to sit/ rest with an apparent pelvic obliquity, with inc WB through L side, and apparent windswept BLE to the right side. Pt reports that this is a liekly compensatory strategy for feeling more tired on his right side. Pt remains limited by dec activity tolerance, LLE clonus throughout session, L haylie, and dec R sided environmental scanning.      Strengths: (P) Alert and oriented, Effective communication skills, Independent prior level of function, Motivated for self care and independence, Pleasant and cooperative, Supportive family, Willingly participates in therapeutic activities  Barriers: (P) Hemiparesis, Home accessibility, Orthostatic hypotension, Impaired activity tolerance, Impaired balance, Spasticity, Limited mobility    Plan    Prepare for D/C Friday. Ongoing family training as needed. LLE neuro re-ed, DME wc letter.     Passport items to be completed:  Get in/out of bed safely, in/out of a vehicle, safely use mobility device, walk or wheel around home/community, navigate up and down stairs, show how to get up/down from the ground, ensure home is accessible, demonstrate HEP, complete caregiver training    Physical Therapy Problems (Active)     Problem: Mobility     Dates: Start: 04/30/21       Goal: STG-Within one week, patient will ambulate household distance     Dates: Start: 05/19/21       Description: 1) Individualized goal:  AMB x 10 feet in // bars with mod A  2) Interventions:  PT Group Therapy, PT E Stim Attended, PT Gait Training, PT  Therapeutic Exercises, PT Neuro Re-Ed/Balance, PT Aquatic Therapy, PT Therapeutic Activity, PT Manual Therapy, and PT Evaluation      Goal Note filed on 05/26/21 1241 by Ivory Cintron DPT     2 person for wc follow at this time for safety, L haylie, impaired body awareness/ impaired L sided awareness               Problem: Mobility Transfers     Dates: Start: 05/19/21       Goal: STG-Within one week, patient will transfer bed to chair     Dates: Start: 05/19/21       Description: 1) Individualized goal:  SQPT with SBA  2) Interventions:  PT Group Therapy, PT E Stim Attended, PT Gait Training, PT Therapeutic Exercises, PT Neuro Re-Ed/Balance, PT Aquatic Therapy, PT Therapeutic Activity, PT Manual Therapy, and PT Evaluation      Goal Note filed on 05/26/21 1241 by ASHUTOSH Dawkins A for impaired motor planning/ safety concerns, L haylie               Problem: PT-Long Term Goals     Dates: Start: 04/30/21       Goal: LTG-By discharge, patient will propel wheelchair     Dates: Start: 04/30/21       Description: 1) Individualized goal:  100ft with haylie technique and Anastacio on even/uneven surfaces  2) Interventions:  PT Group Therapy, PT E Stim Attended, PT Orthotics Training, PT Gait Training, PT Self Care/Home Eval, PT Therapeutic Exercises, PT Neuro Re-Ed/Balance, PT Therapeutic Activity, and PT Evaluation       Goal: LTG-By discharge, patient will transfer one surface to another     Dates: Start: 04/30/21       Description: 1) Individualized goal:  SQPT with Taz   2) Interventions:  PT Group Therapy, PT E Stim Attended, PT Orthotics Training, PT Gait Training, PT Self Care/Home Eval, PT Therapeutic Exercises, PT Neuro Re-Ed/Balance, PT Therapeutic Activity, and PT Evaluation         Goal: LTG-By discharge, patient will     Dates: Start: 04/30/21       Description: 1) Individualized goal: perform bed mobility (supine<>sit) with Taz   2) Interventions:  PT Group Therapy, PT E Stim Attended, PT Orthotics Training,  PT Gait Training, PT Self Care/Home Eval, PT Therapeutic Exercises, PT Neuro Re-Ed/Balance, PT Therapeutic Activity, and PT Evaluation

## 2021-06-02 NOTE — CARE PLAN
Problem: Venous Thromboembolism (VTW)/Deep Vein Thrombosis (DVT) Prevention:  Goal: Patient will participate in Venous Thrombosis (VTE)/Deep Vein Thrombosis (DVT)Prevention Measures  Flowsheets (Taken 6/2/2021 0750)  Pharmacologic Prophylaxis Used: Rivaroxaban (Xarelto)      Problem: Bowel/Gastric:  Goal: Will not experience complications related to bowel motility  Flowsheets (Taken 6/2/2021 0830 by Marci Rhodes, C.N.A.)  Last BM: 06/02/21     Problem: Urinary Elimination:  Goal: Ability to reestablish a normal urinary elimination pattern will improve  Note: F/C clean and dry, draining yellow cloudy urine.

## 2021-06-02 NOTE — THERAPY
"Occupational Therapy  Daily Treatment     Patient Name: Thong Arzola  Age:  56 y.o., Sex:  male  Medical Record #: [id number]  Today's Date: 6/2/2021     Precautions  Precautions: (P) Fall Risk  Comments: (P) Helmet OOB; LUE 2-3 Modified Jeremiah Scale; Abdominal binder OOB; Brewer cath; L inattention & haylie         Subjective    \" Yeah She's on her way.  She will be here around three.\" when asked if his spouse was at work today.        Objective       06/02/21 1231   Precautions   Precautions Fall Risk   Comments Helmet OOB; LUE 2-3 Modified Jeremiah Scale; Abdominal binder OOB; Brewer cath; L inattention & haylie   Functional Level of Assist   Bed, Chair, Wheelchair Transfer Minimal Assist  ( min to CGA   bed <-> W/c and w/c sitting EOM )   Neuro-Muscular Treatments   Neuro-Muscular Treatments Verbal Cuing;Weight Shift Left   Comments seated EOM  left scapular mobilization  ( retraction and elevation ) followed by weight bearing through Left  UE while reaching with the right.    attempted active bilateral scapular elevation however limited by increased tone/ spasticity that presents during  volitional movement of the  left UE.     Interdisciplinary Plan of Care Collaboration   Patient Position at End of Therapy In Bed;Call Light within Reach;Tray Table within Reach   OT Total Time Spent   OT Individual Total Time Spent (Mins) 60   OT Charge Group   OT Neuromuscular Re-education / Balance 4     Seated on mat  Andrews gate  Paddle applied at end of session. Rehab tech took video as  instruction to show to spouse as she was not present this session .    Max wear time  2 hours  One time per day.   Do not wear at  Night.      Agreeable to 7 AM  ADL routine with primary therapist  For reassess prior to d/c       Assessment     slightly more \"flat\" in affect this session .  Patient citing  Slight increase in fatigue.    decreased right wrist extension   Flexion synergy pattern noted in fingers when item placed in patients " palm.  He  Reports pain in wrist and fingers  with stretch and weight bearing  Both decreased after weight bearing  Activity    Buenrostro gate paddle removed after  9 minute wear time.   Passive wrist extension  To full range with out pain.    No flexion synergy noted in fingers when therapist placed his fingers in patients palm         Strengths: Able to follow instructions, Alert and oriented, Effective communication skills, Good insight into deficits/needs, Independent prior level of function, Manages pain appropriately, Motivated for self care and independence, Pleasant and cooperative, Supportive family, Willingly participates in therapeutic activities  Barriers: Bowel incontinence, Decreased endurance, Fatigue, Generalized weakness, Hemiplegia, Home accessibility, Orthostatic hypotension, Impaired activity tolerance, Impaired balance, Limited mobility, Spasticity    Plan     ADL routine in AM       Continue use of buenrostro gate paddle daily   Follow up HH OT please reassess for need as patient  Presenting with decreasing   Spasticity /tone          Occupational Therapy Goals (Active)     Problem: Functional Transfers     Dates: Start: 05/19/21       Goal: STG-Within one week, patient will transfer to toilet     Dates: Start: 05/19/21       Description: 1) Individualized Goal: with min A using grab bar and commode over the toilet  2) Interventions:  OT Self Care/ADL, OT Cognitive Skill Dev, OT Manual Ther Technique, OT Neuro Re-Ed/Balance, OT Sensory Int Techniques, OT Therapeutic Activity, OT Evaluation, and OT Therapeutic Exercise      Goal Note filed on 05/26/21 1129 by Aj Lovett OT/DAILY     Mod A               Problem: OT Long Term Goals     Dates: Start: 04/30/21       Goal: LTG-By discharge, patient will complete basic self care tasks     Dates: Start: 04/30/21       Description: 1) Individualized Goal:  with min to mod A using AE/AD/techniques as needed  2) Interventions:  OT E Stim Attended, OT Self  Care/ADL, OT Cognitive Skill Dev, OT Manual Ther Technique, OT Neuro Re-Ed/Balance, OT Sensory Int Techniques, OT Therapeutic Activity, OT Evaluation, and OT Therapeutic Exercise       Goal: LTG-By discharge, patient will perform bathroom transfers     Dates: Start: 04/30/21       Description: 1) Individualized Goal:  with max A x 1 person using slideboard versus squat pivot to the right  2) Interventions:  OT E Stim Attended, OT Self Care/ADL, OT Cognitive Skill Dev, OT Manual Ther Technique, OT Neuro Re-Ed/Balance, OT Sensory Int Techniques, OT Therapeutic Activity, OT Evaluation, and OT Therapeutic Exercise          Problem: Toileting     Dates: Start: 05/19/21       Goal: STG-Within one week, patient will complete toileting tasks     Dates: Start: 05/19/21       Description: 1) Individualized Goal: with mod A using grab bar and commode over the toilet  2) Interventions:  OT Self Care/ADL, OT Cognitive Skill Dev, OT Manual Ther Technique, OT Neuro Re-Ed/Balance, OT Sensory Int Techniques, OT Therapeutic Activity, OT Evaluation, and OT Therapeutic Exercise      Goal Note filed on 05/26/21 1129 by Aj Lovett, OT/L     Max A

## 2021-06-02 NOTE — PROGRESS NOTES
"Rehab Progress Note     Date of Service: 5/11/2021  Chief Complaint: follow up stroke    Interval Events (Subjective)    Patient seen and examined today in the therapy gym.  Per PT has some clonus at the left ankle today.   He reports the home evaluation went well yesterday.  Therapy notes reviewed.  Patient denies any dizziness during the drive yesterday.  He has no new complaints.     ROS: No changes to bowel, bladder, pain, mood, or sleep.         Objective:  VITAL SIGNS: /93   Pulse 76   Temp 36.7 °C (98.1 °F) (Temporal)   Resp 18   Ht 1.778 m (5' 10\")   Wt 72.5 kg (159 lb 13.3 oz)   SpO2 94%   BMI 22.93 kg/m²   Gen: alert, no apparent distress  Neuro: notable for spastic left hemiplegia    No results found for this or any previous visit (from the past 72 hour(s)).    Current Facility-Administered Medications   Medication Frequency   • gabapentin (NEURONTIN) capsule 200 mg TID   • meclizine (ANTIVERT) tablet 25 mg DAILY   • baclofen (LIORESAL) tablet 20 mg BID   • baclofen (LIORESAL) tablet 30 mg QHS   • artificial tears ophthalmic solution 1 Drop TID   • atorvastatin (LIPITOR) tablet 40 mg Q EVENING   • metoprolol tartrate (LOPRESSOR) tablet 25 mg BID   • senna-docusate (PERICOLACE or SENOKOT S) 8.6-50 MG per tablet 2 tablet Q EVENING   • melatonin tablet 3 mg QHS   • hydrOXYzine HCl (ATARAX) tablet 50 mg Q6HRS PRN   • Respiratory Therapy Consult Continuous RT   • Pharmacy Consult Request ...Pain Management Review 1 Each PHARMACY TO DOSE   • hydrALAZINE (APRESOLINE) tablet 10 mg Q8HRS PRN   • acetaminophen (Tylenol) tablet 650 mg Q4HRS PRN   • lactulose 20 GM/30ML solution 30 mL QDAY PRN   • docusate sodium (ENEMEEZ) enema 283 mg QDAY PRN   • fleet enema 133 mL QDAY PRN   • benzocaine-menthol (CEPACOL) lozenge 1 Lozenge Q2HRS PRN   • mag hydrox-al hydrox-simeth (MAALOX PLUS ES or MYLANTA DS) suspension 20 mL Q2HRS PRN   • ondansetron (ZOFRAN ODT) dispertab 4 mg 4X/DAY PRN    Or   • ondansetron " (ZOFRAN) syringe/vial injection 4 mg 4X/DAY PRN   • traZODone (DESYREL) tablet 50 mg QHS PRN   • sodium chloride (OCEAN) 0.65 % nasal spray 2 Spray PRN   • midazolam (VERSED) 5 mg/mL (1 mL vial) PRN   • mirtazapine (Remeron) orally disintegrating tab 15 mg QHS   • rivaroxaban (XARELTO) tablet 20 mg PM MEAL   • thyroid (ARMOUR THYROID) tablet 60 mg BID       Orders Placed This Encounter   Procedures   • Diet Order Diet: Level 7 - Easy to Chew (float pills in puree); Liquid level: Level 0 - Thin; Tray Modifications (optional): SLP - 1:1 Supervision by Nursing, SLP - Deliver to Nursing Station     Standing Status:   Standing     Number of Occurrences:   1     Order Specific Question:   Diet:     Answer:   Level 7 - Easy to Chew [22]     Comments:   float pills in puree     Order Specific Question:   Liquid level     Answer:   Level 0 - Thin     Order Specific Question:   Tray Modifications (optional)     Answer:   SLP - 1:1 Supervision by Nursing     Order Specific Question:   Tray Modifications (optional)     Answer:   SLP - Deliver to Nursing Station       Radiology    CT-HEAD W/O   Final Result      1.  Further demarcation of large area of encephalomalacia in the right MCA distribution.      2.  No acute hemorrhage.      3.  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      4.  Right temporoparietal craniectomy changes.      US-EXTREMITY VENOUS UPPER UNILAT LEFT   Final Result            Assessment:  Active Hospital Problems    Diagnosis    • *Acute right MCA stroke (HCC)    • Paroxysmal atrial fibrillation (HCC)    • Urinary retention    • Acquired hypothyroidism    • History of COVID-19    • Hypotension    • Normocytic anemia    • Spasticity as late effect of cerebrovascular accident (CVA)    • Reactive depression      This patient is a 56 y.o. male admitted for acute inpatient rehabilitation with Acute right MCA stroke (HCC).    I led and attended the weekly  conference, and agree with the IDT conference documentation and plan of care as noted below.    Date of conference: 5/26/2021    Goals and barriers: See IDT note.    Biggest barriers: left spastic hemiplegia, impaired balance, left neglect    Goals in next week: home evaluation, scheduled for 6/1    CM/social support: wife supportive, to go to 1  here in [place], wife's sister    Anticipated DC date: 6/4    Home health: PT/OT/SLP/RN - [facility]    Equip: custom light weigth MWC with pressure relieving cushion with firm positioning backrest, bathroom DME    Follow up: PCP, Dr. Dumont, stroke bridge clinic, urology, neurosurgery    Pharmacy:       Medical Decision Making and Plan:    Large right ICA/MCA ischemic stroke  S/p craniectomy 4/3, Dr. Payton  Left hemiplegia, minimal improvement  Cognitive deficits, improved  Left neglect, improved  Continue full rehab program  PT/OT/SLP, 1 hr each discipline, 5 days per week    Xarelto  Aspirin discontinued per neurology recommendations  Statin    Outpatient follow up with stroke bridge clinic, Dr. Dumont, referrals made    Outpatient follow up with Dr. Payton for cranioplasty, scheduled for 6/9, Head CT per above, results discussed with patient/wife/Dr. Payton    Patient fitted for a left elbow splint which will help with his spasticity and prevent contractures at the elbow    Neuropathic pain, resolved  Left leg at night, resolved  Continue low dose gabapentin 100 mg 5/11 --> 200 mg TID 5/31  Continue to monitor need to increase dose, currently well controlled    Fatigue, improved  Continues, though has improved since admission  Did not tolerate higher doses of baclofen, likely will be titrated off in the outpatient clinic with Dr. Dumont    Dizziness, improved/rsolved  Scheduled Meclizine, start titration 5/20, TID --> BID, decrease to daily 5/27, discontinued 6/2  Continue Teds for now, okay to discontinue abdominal binder  BPPV testing by PT negative    Spasticity,  continues, worse at night  Increased baclofen 15 mg TID to 20 mg TID 4/30 --> 30 mg TID 5/3  Was decreased back down to 20 mg TID on 5/13 due to fatigue, with increased spasticity     Increased night time baclofen dose to 30 mg 5/25 for better nighttime control    Started valium at night 2 mg 4/30, discontinued 5/3, doesn't seem to make much difference, patient also too sedated    Consider adding Zanaflex in future, LFTs on 5/5 with elevated ALT, rechecked 5/18, still elevated, not a good candidate for Zanaflex at this time    Will benefit from Botox - unable to tolerate higher doses of baclofen due to sedation/fatigue, unable to trial Zanaflex due to elevated ALT, trial of Valium was not effective and patient also too sedated    Dr. Dumont has ordered 400 units with plan to inject LUE as an outpatient after discharge from rehab    Elevated ALT, continues/improved  Decreased statin dose 80 mg to 40 mg 5/18  Almost in normal range  Recheck on 6/3 for resolution    Atrial fibrillation  Metoprolol  Xarelto  Currently with good rate control     Hypothyroidism   [place] thyroid, repeat TSH in 4 weeks from admission, as dose was increased at acute  Slightly abnormal 5/27, low TSH, normal T4  Outpatient follow up with PCP as these findings may be due to his acute illness     Hypertension  Hypotension, resolved  Lisinopril discontinued  Metoprolol, dose increased  Flomax discontinued 5/12  Teds and abdominal binder - OK to discontinue Abd binder, keep Teds to prevent edema  Currently with good control of BP    Insomnia, improved/resolved  Already on mirtazapine  Scheduled melatonin  Gabapentin added for neuropathic pain at night     Reactive depression  Mirtazapine  Consider psychology consult if needed   Mood currently stable     History of COVID-19  Without sequelae     Normocytic anemia  Mild, monitor    Bowel program  Continue bowel medications  Last BM 6/1    Bladder program  Urinary retention, continues  Unsuccessful  voiding trials x 3, even on Flomax, last one placed  5/12  Outpatient follow up with urology, referral made    DVT prophylaxis  Xarelto    Total time:  40 minutes.  I spent greater than 50% of the time for patient care, counseling, and coordination on this date, including patient face-to face time, unit/floor time with review of records/pertinent lab data and studies, as well as discussing diagnostic evaluation/work up, planned therapeutic interventions, and future disposition of care, as per the interval events/subjective and the assessment and plan as noted above.      Radha Monge M.D.   Physical Medicine and Rehabilitation

## 2021-06-02 NOTE — CARE PLAN
Problem: Recreation Therapy  Goal: STG-Within one week, patient will demonstrate leisure problem solving  Description: by sharing on two positive lesiure activities they would like to participate in either in session or in his free time and any perceived barriers to their participation.  Outcome: Met  Goal: STG-Within one week, patient will  Description: Complete a cognitive leisure activity with 60% accuracy with Min A verbal cues.  Outcome: Met  Goal: LTG-By discharge, patient will demonstrate leisure problem solving  Description: by sharing on two positive lesiure activities they would like to participate in following discharge and any perceived barriers to their participation.       Outcome: Met  Goal: LTG-By discharge, patient will  Description: Complete a cognitive leisure activity with 80% accuracy with Min A verbal cues.  Outcome: Met

## 2021-06-02 NOTE — CARE PLAN
Problem: Toileting  Goal: STG-Within one week, patient will complete toileting tasks  Description: 1) Individualized Goal: with mod A using grab bar and commode over the toilet  2) Interventions:  OT Self Care/ADL, OT Cognitive Skill Dev, OT Manual Ther Technique, OT Neuro Re-Ed/Balance, OT Sensory Int Techniques, OT Therapeutic Activity, OT Evaluation, and OT Therapeutic Exercise    Outcome: Not Met     Problem: Functional Transfers  Goal: STG-Within one week, patient will transfer to toilet  Description: 1) Individualized Goal: with min A using grab bar and commode over the toilet  2) Interventions:  OT Self Care/ADL, OT Cognitive Skill Dev, OT Manual Ther Technique, OT Neuro Re-Ed/Balance, OT Sensory Int Techniques, OT Therapeutic Activity, OT Evaluation, and OT Therapeutic Exercise    Outcome: Not Met

## 2021-06-02 NOTE — CARE PLAN
Problem: Problem Solving STGs  Goal: STG-Within one week, patient will  Description: 1) Individualized goal: demonstrate appropriate visual scanning to the left with min cues and 80% accuracy.  2) Interventions:  SLP Speech Language Treatment, SLP Self Care / ADL Training , SLP Cognitive Skill Development, and SLP Group Treatment      Outcome: Not Met

## 2021-06-03 PROBLEM — M79.89 ARM SWELLING: Status: RESOLVED | Noted: 2021-05-04 | Resolved: 2021-06-03

## 2021-06-03 PROBLEM — I95.9 HYPOTENSION: Status: RESOLVED | Noted: 2021-04-30 | Resolved: 2021-06-03

## 2021-06-03 PROBLEM — Z74.09 IMPAIRED MOBILITY AND ADLS: Status: ACTIVE | Noted: 2021-06-03

## 2021-06-03 PROBLEM — H20.032: Status: ACTIVE | Noted: 2021-06-03

## 2021-06-03 PROBLEM — H44.002 INFECTION OF LEFT EYE: Status: ACTIVE | Noted: 2021-06-03

## 2021-06-03 PROBLEM — R42 DIZZINESS: Status: RESOLVED | Noted: 2021-05-04 | Resolved: 2021-06-03

## 2021-06-03 PROBLEM — Z78.9 IMPAIRED MOBILITY AND ADLS: Status: ACTIVE | Noted: 2021-06-03

## 2021-06-03 PROBLEM — R79.89 AZOTEMIA: Status: RESOLVED | Noted: 2021-05-16 | Resolved: 2021-06-03

## 2021-06-03 PROCEDURE — 700102 HCHG RX REV CODE 250 W/ 637 OVERRIDE(OP): Performed by: PHYSICAL MEDICINE & REHABILITATION

## 2021-06-03 PROCEDURE — A9270 NON-COVERED ITEM OR SERVICE: HCPCS | Performed by: PHYSICAL MEDICINE & REHABILITATION

## 2021-06-03 PROCEDURE — 97530 THERAPEUTIC ACTIVITIES: CPT

## 2021-06-03 PROCEDURE — A9270 NON-COVERED ITEM OR SERVICE: HCPCS | Performed by: HOSPITALIST

## 2021-06-03 PROCEDURE — 99232 SBSQ HOSP IP/OBS MODERATE 35: CPT | Performed by: PHYSICAL MEDICINE & REHABILITATION

## 2021-06-03 PROCEDURE — 97112 NEUROMUSCULAR REEDUCATION: CPT

## 2021-06-03 PROCEDURE — 700102 HCHG RX REV CODE 250 W/ 637 OVERRIDE(OP): Performed by: HOSPITALIST

## 2021-06-03 PROCEDURE — 700101 HCHG RX REV CODE 250: Performed by: PHYSICAL MEDICINE & REHABILITATION

## 2021-06-03 PROCEDURE — 97129 THER IVNTJ 1ST 15 MIN: CPT

## 2021-06-03 PROCEDURE — 97535 SELF CARE MNGMENT TRAINING: CPT

## 2021-06-03 PROCEDURE — 770010 HCHG ROOM/CARE - REHAB SEMI PRIVAT*

## 2021-06-03 PROCEDURE — 97130 THER IVNTJ EA ADDL 15 MIN: CPT

## 2021-06-03 RX ORDER — GABAPENTIN 100 MG/1
200 CAPSULE ORAL EVERY EVENING
Qty: 30 CAPSULE | Refills: 2 | Status: SHIPPED | OUTPATIENT
Start: 2021-06-03 | End: 2021-09-15 | Stop reason: SDUPTHER

## 2021-06-03 RX ORDER — GABAPENTIN 100 MG/1
200 CAPSULE ORAL EVERY EVENING
Status: DISCONTINUED | OUTPATIENT
Start: 2021-06-03 | End: 2021-06-04 | Stop reason: HOSPADM

## 2021-06-03 RX ORDER — CIPROFLOXACIN HYDROCHLORIDE 3.5 MG/ML
1 SOLUTION/ DROPS TOPICAL
Status: DISCONTINUED | OUTPATIENT
Start: 2021-06-03 | End: 2021-06-03

## 2021-06-03 RX ORDER — LANOLIN ALCOHOL/MO/W.PET/CERES
3 CREAM (GRAM) TOPICAL
Qty: 30 TABLET | Refills: 2 | COMMUNITY
Start: 2021-06-03 | End: 2021-09-15 | Stop reason: SDUPTHER

## 2021-06-03 RX ORDER — ATORVASTATIN CALCIUM 40 MG/1
40 TABLET, FILM COATED ORAL EVERY EVENING
Qty: 30 TABLET | Refills: 2 | Status: SHIPPED | OUTPATIENT
Start: 2021-06-03 | End: 2022-02-17

## 2021-06-03 RX ORDER — AMOXICILLIN 250 MG
2 CAPSULE ORAL EVERY EVENING
Qty: 60 TABLET | Refills: 2 | Status: ON HOLD | COMMUNITY
Start: 2021-06-03 | End: 2021-06-23

## 2021-06-03 RX ORDER — CIPROFLOXACIN HYDROCHLORIDE 3.5 MG/ML
1 SOLUTION/ DROPS TOPICAL
Status: DISCONTINUED | OUTPATIENT
Start: 2021-06-03 | End: 2021-06-04 | Stop reason: HOSPADM

## 2021-06-03 RX ORDER — BACLOFEN 10 MG/1
30 TABLET ORAL
Qty: 90 TABLET | Refills: 2 | Status: SHIPPED | OUTPATIENT
Start: 2021-06-03 | End: 2021-07-26

## 2021-06-03 RX ORDER — ACETAMINOPHEN 325 MG/1
500 TABLET ORAL EVERY 4 HOURS PRN
Qty: 30 TABLET | Refills: 0 | COMMUNITY
Start: 2021-06-03

## 2021-06-03 RX ORDER — BACLOFEN 20 MG/1
TABLET ORAL
Qty: 60 TABLET | Refills: 2 | Status: SHIPPED | OUTPATIENT
Start: 2021-06-03 | End: 2021-07-26

## 2021-06-03 RX ORDER — THYROID 60 MG/1
60 TABLET ORAL 2 TIMES DAILY
Qty: 60 TABLET | Refills: 2 | Status: SHIPPED | OUTPATIENT
Start: 2021-06-03 | End: 2022-05-02

## 2021-06-03 RX ORDER — MIRTAZAPINE 15 MG/1
15 TABLET, ORALLY DISINTEGRATING ORAL
Qty: 30 TABLET | Refills: 2 | Status: SHIPPED | OUTPATIENT
Start: 2021-06-03 | End: 2021-09-15 | Stop reason: SDUPTHER

## 2021-06-03 RX ORDER — CIPROFLOXACIN HYDROCHLORIDE 3.5 MG/ML
1 SOLUTION/ DROPS TOPICAL
Qty: 1 ML | Refills: 0 | Status: SHIPPED | OUTPATIENT
Start: 2021-06-03 | End: 2021-06-07

## 2021-06-03 RX ADMIN — BACLOFEN 20 MG: 20 TABLET ORAL at 07:57

## 2021-06-03 RX ADMIN — METOPROLOL TARTRATE 25 MG: 25 TABLET, FILM COATED ORAL at 21:19

## 2021-06-03 RX ADMIN — CIPROFLOXACIN 1 DROP: 3 SOLUTION OPHTHALMIC at 17:04

## 2021-06-03 RX ADMIN — METOPROLOL TARTRATE 25 MG: 25 TABLET, FILM COATED ORAL at 07:56

## 2021-06-03 RX ADMIN — POLYVINYL ALCOHOL 1 DROP: 14 SOLUTION/ DROPS OPHTHALMIC at 08:00

## 2021-06-03 RX ADMIN — BACLOFEN 20 MG: 20 TABLET ORAL at 14:31

## 2021-06-03 RX ADMIN — CIPROFLOXACIN 1 DROP: 3 SOLUTION OPHTHALMIC at 14:32

## 2021-06-03 RX ADMIN — ATORVASTATIN CALCIUM 40 MG: 40 TABLET, FILM COATED ORAL at 21:18

## 2021-06-03 RX ADMIN — THYROID, PORCINE 60 MG: 30 TABLET ORAL at 21:18

## 2021-06-03 RX ADMIN — SENNOSIDES AND DOCUSATE SODIUM 2 TABLET: 8.6; 5 TABLET ORAL at 21:19

## 2021-06-03 RX ADMIN — MELATONIN TAB 3 MG 3 MG: 3 TAB at 21:19

## 2021-06-03 RX ADMIN — POLYVINYL ALCOHOL 1 DROP: 14 SOLUTION/ DROPS OPHTHALMIC at 21:17

## 2021-06-03 RX ADMIN — THYROID, PORCINE 60 MG: 30 TABLET ORAL at 07:57

## 2021-06-03 RX ADMIN — MIRTAZAPINE 15 MG: 15 TABLET, ORALLY DISINTEGRATING ORAL at 21:20

## 2021-06-03 RX ADMIN — POLYVINYL ALCOHOL 1 DROP: 14 SOLUTION/ DROPS OPHTHALMIC at 14:33

## 2021-06-03 RX ADMIN — CIPROFLOXACIN 1 DROP: 3 SOLUTION OPHTHALMIC at 21:18

## 2021-06-03 RX ADMIN — GABAPENTIN 200 MG: 100 CAPSULE ORAL at 21:19

## 2021-06-03 RX ADMIN — GABAPENTIN 200 MG: 100 CAPSULE ORAL at 07:57

## 2021-06-03 RX ADMIN — BACLOFEN 30 MG: 20 TABLET ORAL at 21:18

## 2021-06-03 RX ADMIN — RIVAROXABAN 20 MG: 20 TABLET, FILM COATED ORAL at 17:04

## 2021-06-03 ASSESSMENT — ACTIVITIES OF DAILY LIVING (ADL)
TOILET_TRANSFER_DESCRIPTION: GRAB BAR;ADAPTIVE EQUIPMENT;SET-UP OF EQUIPMENT;SUPERVISION FOR SAFETY;VERBAL CUEING
TUB_SHOWER_TRANSFER_DESCRIPTION: GRAB BAR;SHOWER BENCH;SET-UP OF EQUIPMENT;SUPERVISION FOR SAFETY;VERBAL CUEING
TOILETING_LEVEL_OF_ASSIST: REQUIRES PHYSICAL ASSIST WITH TOILETING
BED_CHAIR_WHEELCHAIR_TRANSFER_DESCRIPTION: SUPERVISION FOR SAFETY;SET-UP OF EQUIPMENT
TOILET_TRANSFER_LEVEL_OF_ASSIST: REQUIRES PHYSICAL ASSIST WITH TOILET TRANSFER
BED_CHAIR_WHEELCHAIR_TRANSFER_DESCRIPTION: SUPERVISION FOR SAFETY;VERBAL CUEING;SET-UP OF EQUIPMENT
SHOWER_TRANSFER_LEVEL_OF_ASSIST: REQUIRES PHYSICAL ASSIST WITH SHOWER TRANSFER

## 2021-06-03 NOTE — THERAPY
Occupational Therapy  Daily Treatment     Patient Name: Thong Arzola  Age:  56 y.o., Sex:  male  Medical Record #: [id number]  Today's Date: 6/3/2021     Precautions  Precautions: (P) Fall Risk  Comments: (P) Helmet OOB; LUE 2-3 Modified Jeremiah Scale; Abdominal binder OOB; Brewer cath; L inattention & haylie         Subjective    Patient agreeable to shower.  Stated he would like to have a bowel movement prior to shower.     Objective       06/03/21 0701   Precautions   Precautions Fall Risk   Comments Helmet OOB; LUE 2-3 Modified Jeremiah Scale; Abdominal binder OOB; Brewer cath; L inattention & haylie   Cognition    New Learning Impaired   Attention Impaired   Sequencing Impaired   Functional Level of Assist   Eating Modified Independent   Grooming Supervision;Seated   Grooming Description Verbal cueing  (to locate comb in drawer; setup to wash R hand)   Bathing Minimal Assist   Bathing Description Grab bar;Hand held shower;Tub bench;Set-up of equipment;Supervision for safety;Verbal cueing  (assist to wash R arm and dry L armpit)   Upper Body Dressing Stand by Assist   Upper Body Dressing Description Set-up of equipment;Supervision for safety;Verbal cueing  (do don/doff shirt)   Lower Body Dressing Moderate Assist   Lower Body Dressing Description Verbal cueing;Supervision for safety;Set-up of equipment;Grab bar;Increased time  (assist w/ 7/19 tasks)   Toileting Maximal Assist   Toileting Description Assist to pull pants up;Grab bar;Assist for standing balance;Set-up of equipment;Supervision for safety;Verbal cueing   Bed, Chair, Wheelchair Transfer Minimal Assist   Bed Chair Wheelchair Transfer Description Supervision for safety;Verbal cueing;Set-up of equipment   Toilet Transfers Contact Guard Assist   Toilet Transfer Description Grab bar;Adaptive equipment;Set-up of equipment;Supervision for safety;Verbal cueing   Tub / Shower Transfers Contact Guard Assist   Tub Shower Transfer Description Grab bar;Shower  bench;Set-up of equipment;Supervision for safety;Verbal cueing   Bed Mobility    Supine to Sit Minimal Assist  (for L LE)   Scooting Contact Guard Assist   Skilled Intervention Verbal Cuing   Interdisciplinary Plan of Care Collaboration   Patient Position at End of Therapy Seated;Chair Alarm On;Self Releasing Lap Belt Applied;Call Light within Reach;Tray Table within Reach;Phone within Reach   OT Total Time Spent   OT Individual Total Time Spent (Mins) 60   OT Charge Group   OT Self Care / ADL 4       Assessment    Patient improved his independence with bathing, LB dressing, toileting and toilet/shower transfers today.  Still requiring moderate verbal cues throughout adl tasks due to impaired attention, problem solving and memory.  Ready to d/c home tomorrow.  Strengths: Able to follow instructions, Alert and oriented, Effective communication skills, Good insight into deficits/needs, Independent prior level of function, Manages pain appropriately, Motivated for self care and independence, Pleasant and cooperative, Supportive family, Willingly participates in therapeutic activities  Barriers: Bowel incontinence, Decreased endurance, Fatigue, Generalized weakness, Hemiplegia, Home accessibility, Orthostatic hypotension, Impaired activity tolerance, Impaired balance, Limited mobility, Spasticity    Plan    D/C home tomorrow    Occupational Therapy Goals (Active)     Problem: Functional Transfers     Dates: Start: 05/19/21       Goal: STG-Within one week, patient will transfer to toilet     Dates: Start: 05/19/21       Description: 1) Individualized Goal: with min A using grab bar and commode over the toilet  2) Interventions:  OT Self Care/ADL, OT Cognitive Skill Dev, OT Manual Ther Technique, OT Neuro Re-Ed/Balance, OT Sensory Int Techniques, OT Therapeutic Activity, OT Evaluation, and OT Therapeutic Exercise      Goal Note filed on 05/26/21 1129 by Aj Lovett, OT/L     Mod A               Problem: OT Long Term  Goals     Dates: Start: 04/30/21       Goal: LTG-By discharge, patient will complete basic self care tasks     Dates: Start: 04/30/21       Description: 1) Individualized Goal:  with min to mod A using AE/AD/techniques as needed  2) Interventions:  OT E Stim Attended, OT Self Care/ADL, OT Cognitive Skill Dev, OT Manual Ther Technique, OT Neuro Re-Ed/Balance, OT Sensory Int Techniques, OT Therapeutic Activity, OT Evaluation, and OT Therapeutic Exercise       Goal: LTG-By discharge, patient will perform bathroom transfers     Dates: Start: 04/30/21       Description: 1) Individualized Goal:  with max A x 1 person using slideboard versus squat pivot to the right  2) Interventions:  OT E Stim Attended, OT Self Care/ADL, OT Cognitive Skill Dev, OT Manual Ther Technique, OT Neuro Re-Ed/Balance, OT Sensory Int Techniques, OT Therapeutic Activity, OT Evaluation, and OT Therapeutic Exercise          Problem: Toileting     Dates: Start: 05/19/21       Goal: STG-Within one week, patient will complete toileting tasks     Dates: Start: 05/19/21       Description: 1) Individualized Goal: with mod A using grab bar and commode over the toilet  2) Interventions:  OT Self Care/ADL, OT Cognitive Skill Dev, OT Manual Ther Technique, OT Neuro Re-Ed/Balance, OT Sensory Int Techniques, OT Therapeutic Activity, OT Evaluation, and OT Therapeutic Exercise      Goal Note filed on 05/26/21 1129 by Aj Lovett, OT/L     Max A

## 2021-06-03 NOTE — THERAPY
Occupational Therapy  Daily Treatment     Patient Name: Thong Arzola  Age:  56 y.o., Sex:  male  Medical Record #: [id number]  Today's Date: 6/3/2021     Precautions  Precautions: Fall Risk  Comments: (P) Helmet OOB         Subjective    Patient agreeable to work with OT earlier than scheduled.       Objective       06/03/21 0901   Precautions   Precautions Fall Risk   Comments Helmet OOB   Neuro-Muscular Treatments   Neuro-Muscular Treatments Joint Approximation;Verbal Cuing;Weight Shift Left   Comments While seated EOM, therapist performed stretching to muscles around left scapula followed by weightbearing through left palm with extended elbow on EOM.  Patient reached across midline with R UE to promote weightbearing through L UE.     Interdisciplinary Plan of Care Collaboration   Patient Position at End of Therapy Seated;Chair Alarm On;Self Releasing Lap Belt Applied   OT Total Time Spent   OT Individual Total Time Spent (Mins) 30   OT Charge Group   OT Neuromuscular Re-education / Balance 1   OT Therapy Activity 1     SPT w/c <> bed with min/mod A and cues.    Assessment    Patient did experience some discomfort throughout left UE with weigthbearing, but managed to complete activity with minimal complaints.  Strengths: Able to follow instructions, Alert and oriented, Effective communication skills, Good insight into deficits/needs, Independent prior level of function, Manages pain appropriately, Motivated for self care and independence, Pleasant and cooperative, Supportive family, Willingly participates in therapeutic activities  Barriers: Bowel incontinence, Decreased endurance, Fatigue, Generalized weakness, Hemiplegia, Home accessibility, Orthostatic hypotension, Impaired activity tolerance, Impaired balance, Limited mobility, Spasticity    Plan    D/C home tomorrow    Occupational Therapy Goals (Active)     Problem: Functional Transfers     Dates: Start: 05/19/21       Goal: STG-Within one week, patient  will transfer to toilet     Dates: Start: 05/19/21       Description: 1) Individualized Goal: with min A using grab bar and commode over the toilet  2) Interventions:  OT Self Care/ADL, OT Cognitive Skill Dev, OT Manual Ther Technique, OT Neuro Re-Ed/Balance, OT Sensory Int Techniques, OT Therapeutic Activity, OT Evaluation, and OT Therapeutic Exercise      Goal Note filed on 05/26/21 1129 by Aj Lovett, OT/L     Mod A               Problem: OT Long Term Goals     Dates: Start: 04/30/21       Goal: LTG-By discharge, patient will complete basic self care tasks     Dates: Start: 04/30/21       Description: 1) Individualized Goal:  with min to mod A using AE/AD/techniques as needed  2) Interventions:  OT E Stim Attended, OT Self Care/ADL, OT Cognitive Skill Dev, OT Manual Ther Technique, OT Neuro Re-Ed/Balance, OT Sensory Int Techniques, OT Therapeutic Activity, OT Evaluation, and OT Therapeutic Exercise          Problem: Toileting     Dates: Start: 05/19/21       Goal: STG-Within one week, patient will complete toileting tasks     Dates: Start: 05/19/21       Description: 1) Individualized Goal: with mod A using grab bar and commode over the toilet  2) Interventions:  OT Self Care/ADL, OT Cognitive Skill Dev, OT Manual Ther Technique, OT Neuro Re-Ed/Balance, OT Sensory Int Techniques, OT Therapeutic Activity, OT Evaluation, and OT Therapeutic Exercise      Goal Note filed on 05/26/21 1129 by Aj Lovett, OT/L     Max A

## 2021-06-03 NOTE — CARE PLAN
Problem: Bowel/Gastric:  Goal: Will not experience complications related to bowel motility  Intervention: Implement interventions to promote bowel evacuation if inadequate bowel movements in past 48 hours  Note: Pt is continent of bowel per report.Scheduled senna given at hs.LBM 6/2.Will continue to monitor.     Problem: Fall Risk - Rehab  Goal: Patient will remain free from falls  Note: Appropriately uses call light to make needs known.Fall precautions and frequent rounding in place for safety.Call light within reach.Will continue to monitor and assess needs and safety.

## 2021-06-03 NOTE — THERAPY
Speech Language Pathology  Daily Treatment     Patient Name: Thong Arzola  Age:  56 y.o., Sex:  male  Medical Record #: [id number]  Today's Date: 6/3/2021     Precautions  Precautions: Fall Risk  Comments: Helmet OOB    Subjective    Patient was willing to participate. Requested therapy bedside d/t fatigue     Objective       06/03/21 1302   Outcome Measures   Outcome Measures Utilized SCCAN   SCCAN (Scales of Cognitive and Communicative Ability for Neurorehabilitation)   Oral Expression - Raw Score 18   Oral Expression - Scale Performance Score 95   Orientation - Raw Score 12   Orientation - Scale Performance Score 100   Memory - Raw Score 13   Memory - Scale Performance Score 68   Speech Comprehension - Raw Score 13   Speech Comprehension - Scale Performance Score 100   Reading Comprehension - Raw Score 12   Reading Comprehension - Scale Performance Score 100   Writing - Raw Score 7   Writing - Scale Performance Score 100   Attention - Raw Score 8   Attention - Scale Performance Score 50   Problem Solving - Raw Score 16   Problem Solving - Scale Performance Score 70   SCCAN Total Raw Score 80   SCCAN Degree of Severity Mild Impairment   SLP Total Time Spent   SLP Individual Total Time Spent (Mins) 60   Treatment Charges   SLP Cognitive Skill Development First 15 Minutes 1   SLP Cognitive Skill Development Additional 15 Minutes 3       Assessment    SCCAN completed with final raw score of 80 which indicates mild cognitive deficits (improved from moderate). Improvements note in all cognitive domains, however continues to demonstrate difficutlies with memory, attention, and problem solving.     Strengths: Able to follow instructions, Willingly participates in therapeutic activities, Motivated for self care and independence, Pleasant and cooperative, Supportive family  Barriers: Impaired functional cognition, Visual impairment    Plan    Prepare for d/c home with spouse, HH speech therapy.         Speech Therapy  Problems (Active)     Problem: Problem Solving STGs     Dates: Start: 05/05/21       Goal: STG-Within one week, patient will     Dates: Start: 05/05/21       Description: 1) Individualized goal: demonstrate appropriate visual scanning to the left with min cues and 80% accuracy.  2) Interventions:  SLP Speech Language Treatment, SLP Self Care / ADL Training , SLP Cognitive Skill Development, and SLP Group Treatment        Goal Note filed on 05/25/21 1529 by Carly Ramirez MS,CCC-SLP     60-75% for scanning tasks, endurance as barrier. Improved insight to difficulties.                Problem: Speech/Swallowing LTGs     Dates: Start: 04/30/21       Goal: LTG-By discharge, patient will solve complex problem     Dates: Start: 04/30/21       Description: 1) Individualized goal:  related to IADL's with mod I for safe d/c home.  2) Interventions:  SLP Speech Language Treatment, SLP Swallowing Dysfunction Treatment, SLP Oral Pharyngeal Evaluation, SLP Video Swallow Evaluation, SLP Self Care / ADL Training , SLP Cognitive Skill Development, and SLP Group Treatment

## 2021-06-03 NOTE — CARE PLAN
Problem: PT-Long Term Goals  Goal: LTG-By discharge, patient will transfer one surface to another  Description: 1) Individualized goal:  SQPT with Taz   2) Interventions:  PT Group Therapy, PT E Stim Attended, PT Orthotics Training, PT Gait Training, PT Self Care/Home Eval, PT Therapeutic Exercises, PT Neuro Re-Ed/Balance, PT Therapeutic Activity, and PT Evaluation    Outcome: Met  Goal: LTG-By discharge, patient will  Description: 1) Individualized goal: perform bed mobility (supine<>sit) with Taz   2) Interventions:  PT Group Therapy, PT E Stim Attended, PT Orthotics Training, PT Gait Training, PT Self Care/Home Eval, PT Therapeutic Exercises, PT Neuro Re-Ed/Balance, PT Therapeutic Activity, and PT Evaluation    Outcome: Met     Problem: PT-Long Term Goals  Goal: LTG-By discharge, patient will propel wheelchair  Description: 1) Individualized goal:  100ft with haylie technique and Anastacio on even/uneven surfaces  2) Interventions:  PT Group Therapy, PT E Stim Attended, PT Orthotics Training, PT Gait Training, PT Self Care/Home Eval, PT Therapeutic Exercises, PT Neuro Re-Ed/Balance, PT Therapeutic Activity, and PT Evaluation  Outcome: Discharged - Not Met

## 2021-06-03 NOTE — CARE PLAN
Problem: OT Long Term Goals  Goal: LTG-By discharge, patient will complete basic self care tasks  Description: 1) Individualized Goal:  with min to mod A using AE/AD/techniques as needed  2) Interventions:  OT E Stim Attended, OT Self Care/ADL, OT Cognitive Skill Dev, OT Manual Ther Technique, OT Neuro Re-Ed/Balance, OT Sensory Int Techniques, OT Therapeutic Activity, OT Evaluation, and OT Therapeutic Exercise  Outcome: Not Met     Problem: OT Long Term Goals  Goal: LTG-By discharge, patient will perform bathroom transfers  Description: 1) Individualized Goal:  with max A x 1 person using slideboard versus squat pivot to the right  2) Interventions:  OT E Stim Attended, OT Self Care/ADL, OT Cognitive Skill Dev, OT Manual Ther Technique, OT Neuro Re-Ed/Balance, OT Sensory Int Techniques, OT Therapeutic Activity, OT Evaluation, and OT Therapeutic Exercise  Outcome: Met

## 2021-06-03 NOTE — THERAPY
Physical Therapy   Daily Treatment     Patient Name: Thong Arzola  Age:  56 y.o., Sex:  male  Medical Record #: [id number]  Today's Date: 6/3/2021     Precautions  Precautions: Fall Risk  Comments: Helmet OOB; LUE 2-3 Modified Jeremiah Scale; Abdominal binder OOB; Brewer cath; L inattention & haylie    Subjective    Pt was seated in w/c upon arrival and agreeable to treatment.  Pt stated he feels ready to go home.     Objective       06/03/21 0931   Precautions   Precautions Fall Risk   Wheelchair Functional Level of Assist   Wheelchair Assist Supervised   Distance Wheelchair (Feet or Distance) 400  (x2)   Wheelchair Description Extra time;Limited by fatigue;Supervision for safety;Verbal cueing   Stairs Functional Level of Assist   Level of Assist with Stairs Unable to Participate   Transfer Functional Level of Assist   Bed, Chair, Wheelchair Transfer Contact Guard Assist  (SPT to R side using bedrail)   Bed Chair Wheelchair Transfer Description Supervision for safety;Set-up of equipment   Bed Mobility    Supine to Sit Minimal Assist   Sit to Supine Minimal Assist   Sit to Stand Contact Guard Assist  (to min A)   Scooting Contact Guard Assist   Rolling Minimal Assist to Rt.;Minimum Assist to Lt.   Interdisciplinary Plan of Care Collaboration   Patient Position at End of Therapy In Bed;Call Light within Reach;Tray Table within Reach;Phone within Reach   PT Total Time Spent   PT Individual Total Time Spent (Mins) 60   PT Charge Group   PT Therapeutic Activities 4     Completed D/C IRF CHARLENE items in preparation for D/C tomorrow.  Completed mat assessment for w/c letter.  W/C mobility completed with focus on maneuvering, L sided attention, and endurance. Checked off all items in passport.    Assessment    Pt has made steady progress throughout his stay with improved seated and standing posture and sequencing with mobility.  Pt ready to D/C.  Strengths: Alert and oriented, Effective communication skills, Independent  prior level of function, Motivated for self care and independence, Pleasant and cooperative, Supportive family, Willingly participates in therapeutic activities  Barriers: Hemiparesis, Home accessibility, Orthostatic hypotension, Impaired activity tolerance, Impaired balance, Spasticity, Limited mobility    Plan    Pt to D/C tomorrow    Passport items to be completed:  All completed     Physical Therapy Problems (Active)     Problem: Mobility     Dates: Start: 04/30/21       Goal: STG-Within one week, patient will ambulate household distance     Dates: Start: 05/19/21       Description: 1) Individualized goal:  AMB x 10 feet in // bars with mod A  2) Interventions:  PT Group Therapy, PT E Stim Attended, PT Gait Training, PT Therapeutic Exercises, PT Neuro Re-Ed/Balance, PT Aquatic Therapy, PT Therapeutic Activity, PT Manual Therapy, and PT Evaluation      Goal Note filed on 06/02/21 1237 by Ivory Cintron DPT     2 person assist, non functional, L haylie                Problem: Mobility Transfers     Dates: Start: 05/19/21       Goal: STG-Within one week, patient will transfer bed to chair     Dates: Start: 05/19/21       Description: 1) Individualized goal:  SQPT with SBA  2) Interventions:  PT Group Therapy, PT E Stim Attended, PT Gait Training, PT Therapeutic Exercises, PT Neuro Re-Ed/Balance, PT Aquatic Therapy, PT Therapeutic Activity, PT Manual Therapy, and PT Evaluation      Goal Note filed on 06/02/21 1237 by Ivory Cintron DPT     Min A reach pivot, L haylie                Problem: PT-Long Term Goals     Dates: Start: 04/30/21       Goal: LTG-By discharge, patient will propel wheelchair     Dates: Start: 04/30/21       Description: 1) Individualized goal:  100ft with haylie technique and Anastacio on even/uneven surfaces  2) Interventions:  PT Group Therapy, PT E Stim Attended, PT Orthotics Training, PT Gait Training, PT Self Care/Home Eval, PT Therapeutic Exercises, PT Neuro Re-Ed/Balance, PT Therapeutic Activity,  and PT Evaluation       Goal: LTG-By discharge, patient will transfer one surface to another     Dates: Start: 04/30/21       Description: 1) Individualized goal:  SQPT with Taz   2) Interventions:  PT Group Therapy, PT E Stim Attended, PT Orthotics Training, PT Gait Training, PT Self Care/Home Eval, PT Therapeutic Exercises, PT Neuro Re-Ed/Balance, PT Therapeutic Activity, and PT Evaluation         Goal: LTG-By discharge, patient will     Dates: Start: 04/30/21       Description: 1) Individualized goal: perform bed mobility (supine<>sit) with Taz   2) Interventions:  PT Group Therapy, PT E Stim Attended, PT Orthotics Training, PT Gait Training, PT Self Care/Home Eval, PT Therapeutic Exercises, PT Neuro Re-Ed/Balance, PT Therapeutic Activity, and PT Evaluation

## 2021-06-03 NOTE — PROGRESS NOTES
"Rehab Progress Note     Date of Service: 5/11/2021  Chief Complaint: follow up stroke    Interval Events (Subjective)    Patient seen and examined today in his room.  He is reporting tiredness, ever since his gabapentin dose was increased.   He also has some drainage from his left eye today, denies any pain.  He feels ready for his discharge home tomorrow.     ROS: No changes to bowel, bladder, pain, mood, or sleep.       Objective:  VITAL SIGNS: /88   Pulse 69   Temp 36.6 °C (97.9 °F) (Temporal)   Resp 18   Ht 1.778 m (5' 10\")   Wt 72.5 kg (159 lb 13.3 oz)   SpO2 99%   BMI 22.93 kg/m²   Gen: alert, no apparent distress  HEENT: right craniectomy defect, yellow/white discharge in left eye with sclera injection  Neuro: notable for spastic left hemiplegia    No results found for this or any previous visit (from the past 72 hour(s)).    Current Facility-Administered Medications   Medication Frequency   • gabapentin (NEURONTIN) capsule 200 mg Q EVENING   • ciprofloxacin (CILOXIN) 0.3 % ophthalmic solution 1 Drop Q4HRS WHILE AWAKE   • baclofen (LIORESAL) tablet 20 mg BID   • baclofen (LIORESAL) tablet 30 mg QHS   • artificial tears ophthalmic solution 1 Drop TID   • atorvastatin (LIPITOR) tablet 40 mg Q EVENING   • metoprolol tartrate (LOPRESSOR) tablet 25 mg BID   • senna-docusate (PERICOLACE or SENOKOT S) 8.6-50 MG per tablet 2 tablet Q EVENING   • melatonin tablet 3 mg QHS   • hydrOXYzine HCl (ATARAX) tablet 50 mg Q6HRS PRN   • Respiratory Therapy Consult Continuous RT   • Pharmacy Consult Request ...Pain Management Review 1 Each PHARMACY TO DOSE   • hydrALAZINE (APRESOLINE) tablet 10 mg Q8HRS PRN   • acetaminophen (Tylenol) tablet 650 mg Q4HRS PRN   • lactulose 20 GM/30ML solution 30 mL QDAY PRN   • docusate sodium (ENEMEEZ) enema 283 mg QDAY PRN   • fleet enema 133 mL QDAY PRN   • benzocaine-menthol (CEPACOL) lozenge 1 Lozenge Q2HRS PRN   • mag hydrox-al hydrox-simeth (MAALOX PLUS ES or MYLANTA DS) " suspension 20 mL Q2HRS PRN   • ondansetron (ZOFRAN ODT) dispertab 4 mg 4X/DAY PRN    Or   • ondansetron (ZOFRAN) syringe/vial injection 4 mg 4X/DAY PRN   • traZODone (DESYREL) tablet 50 mg QHS PRN   • sodium chloride (OCEAN) 0.65 % nasal spray 2 Spray PRN   • midazolam (VERSED) 5 mg/mL (1 mL vial) PRN   • mirtazapine (Remeron) orally disintegrating tab 15 mg QHS   • rivaroxaban (XARELTO) tablet 20 mg PM MEAL   • thyroid (ARMOUR THYROID) tablet 60 mg BID       Orders Placed This Encounter   Procedures   • Diet Order Diet: Level 7 - Easy to Chew (float pills in puree); Liquid level: Level 0 - Thin; Tray Modifications (optional): SLP - 1:1 Supervision by Nursing, SLP - Deliver to Nursing Station     Standing Status:   Standing     Number of Occurrences:   1     Order Specific Question:   Diet:     Answer:   Level 7 - Easy to Chew [22]     Comments:   float pills in puree     Order Specific Question:   Liquid level     Answer:   Level 0 - Thin     Order Specific Question:   Tray Modifications (optional)     Answer:   SLP - 1:1 Supervision by Nursing     Order Specific Question:   Tray Modifications (optional)     Answer:   SLP - Deliver to Nursing Station       Radiology    CT-HEAD W/O   Final Result      1.  Further demarcation of large area of encephalomalacia in the right MCA distribution.      2.  No acute hemorrhage.      3.  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      4.  Right temporoparietal craniectomy changes.      US-EXTREMITY VENOUS UPPER UNILAT LEFT   Final Result            Assessment:  Active Hospital Problems    Diagnosis    • *Acute right MCA stroke (HCC)    • Paroxysmal atrial fibrillation (HCC)    • Urinary retention    • Acquired hypothyroidism    • History of COVID-19    • Hypotension    • Normocytic anemia    • Spasticity as late effect of cerebrovascular accident (CVA)    • Reactive depression      This patient is a 56 y.o. male admitted for  acute inpatient rehabilitation with Acute right MCA stroke (HCC).    I led and attended the weekly conference, and agree with the IDT conference documentation and plan of care as noted below.    Date of conference: 5/26/2021    Goals and barriers: See IDT note.    Biggest barriers: left spastic hemiplegia, impaired balance, left neglect    Goals in next week: home evaluation, scheduled for 6/1    CM/social support: wife supportive, to go to 1  here in [place], wife's sister    Anticipated DC date: 6/4    Home health: PT/OT/SLP/RN - [facility]    Equip: custom light weigth MWC with pressure relieving cushion with firm positioning backrest, bathroom DME    Follow up: PCP, Dr. Dumont, stroke bridge clinic, urology, neurosurgery    Pharmacy:       Medical Decision Making and Plan:    Large right ICA/MCA ischemic stroke  S/p craniectomy 4/3, Dr. Payton  Left hemiplegia, minimal improvement  Cognitive deficits, improved  Left neglect, improved  Continue full rehab program  PT/OT/SLP, 1 hr each discipline, 5 days per week    Xarelto  Aspirin discontinued per neurology recommendations  Statin    Outpatient follow up with stroke bridge clinic, Dr. Dumont, referrals made    Outpatient follow up with Dr. Payton for cranioplasty, scheduled for 6/9, Head CT per above, results discussed with patient/wife/Dr. Payton    Patient fitted for a left elbow splint which will help with his spasticity and prevent contractures at the elbow    Neuropathic pain, resolved  Left leg at night, resolved  Continue low dose gabapentin 100 mg 5/11 --> 200 mg TID 5/31, discontinue daytime dosing today due to increased fatigue during day  Continue to monitor need to increase dose, currently well controlled    Fatigue, improved  Continues, though has improved since admission  Did not tolerate higher doses of baclofen, likely will be titrated off in the outpatient clinic with Dr. Dumont    Left eye infection  Start Cipro eye drops for 5  days    Dizziness, resolved  Scheduled Meclizine, start titration 5/20, TID --> BID, decrease to daily 5/27, discontinued 6/2  Continue Teds for now, okay to discontinue abdominal binder  BPPV testing by PT negative    Spasticity, continues, worse at night  Increased baclofen 15 mg TID to 20 mg TID 4/30 --> 30 mg TID 5/3  Was decreased back down to 20 mg TID on 5/13 due to fatigue, with increased spasticity    Increased night time baclofen dose to 30 mg 5/25 for better nighttime control    Started valium at night 2 mg 4/30, discontinued 5/3, doesn't seem to make much difference, patient also too sedated    Consider adding Zanaflex in future, LFTs on 5/5 with elevated ALT, rechecked 5/18, still elevated, not a good candidate for Zanaflex at this time    Will benefit from Botox - unable to tolerate higher doses of baclofen due to sedation/fatigue, unable to trial Zanaflex due to elevated ALT, trial of Valium was not effective and patient also too sedated    Dr. Dumont has ordered 400 units with plan to inject LUE as an outpatient after discharge from rehab    Elevated ALT, continues/improved  Decreased statin dose 80 mg to 40 mg 5/18  Almost in normal range    Atrial fibrillation  Metoprolol  Xarelto  Currently with good rate control     Hypothyroidism   Parker thyroid, repeat TSH in 4 weeks from admission, as dose was increased at acute  Slightly abnormal 5/27, low TSH, normal T4  Outpatient follow up with PCP as these findings may be due to his acute illness     Hypertension  Hypotension, resolved  Lisinopril discontinued  Metoprolol, dose increased  Flomax discontinued 5/12  Teds and abdominal binder - OK to discontinue Abd binder, keep Teds to prevent edema  Currently with good control of BP    Insomnia, improved/resolved  Already on mirtazapine  Scheduled melatonin  Gabapentin added for neuropathic pain at night     Reactive depression  Mirtazapine  Consider psychology consult if needed   Mood currently  stable     History of COVID-19  Without sequelae     Normocytic anemia  Mild, monitor    Bowel program  Continue bowel medications  Last BM 6/2    Bladder program  Urinary retention, continues  Unsuccessful voiding trials x 3, even on Flomax, last one placed  5/12  Outpatient follow up with urology, referral made    DVT prophylaxis  Xarelto    Total time:  27 minutes.  I spent greater than 50% of the time for patient care, counseling, and coordination on this date, including patient face-to face time, unit/floor time with review of records/pertinent lab data and studies, as well as discussing diagnostic evaluation/work up, planned therapeutic interventions, and future disposition of care, as per the interval events/subjective and the assessment and plan as noted above.    I have performed a physical exam, reviewed and updated ROS, as well as the assessment and plan today 6/3/2021. In review of note from 6/2/2021 there are no new changes except as documented above.      Radha Monge M.D.   Physical Medicine and Rehabilitation

## 2021-06-03 NOTE — CARE PLAN
Problem: Infection  Goal: Will remain free from infection  Note: Redness and drainage to L eye noted, new order for Cipro eyedrops received.      Problem: Bowel/Gastric:  Goal: Will not experience complications related to bowel motility  Flowsheets (Taken 6/3/2021 0800)  Last BM: 06/02/21     Problem: Urinary Elimination:  Goal: Ability to reestablish a normal urinary elimination pattern will improve  Note: Pt has a F/C, site is clean and dry, catheter draining yellow clear urine.

## 2021-06-04 VITALS
RESPIRATION RATE: 18 BRPM | WEIGHT: 159.83 LBS | BODY MASS INDEX: 22.88 KG/M2 | OXYGEN SATURATION: 96 % | HEART RATE: 71 BPM | DIASTOLIC BLOOD PRESSURE: 106 MMHG | SYSTOLIC BLOOD PRESSURE: 158 MMHG | HEIGHT: 70 IN | TEMPERATURE: 98.3 F

## 2021-06-04 PROCEDURE — A9270 NON-COVERED ITEM OR SERVICE: HCPCS | Performed by: HOSPITALIST

## 2021-06-04 PROCEDURE — 700102 HCHG RX REV CODE 250 W/ 637 OVERRIDE(OP): Performed by: PHYSICAL MEDICINE & REHABILITATION

## 2021-06-04 PROCEDURE — 700102 HCHG RX REV CODE 250 W/ 637 OVERRIDE(OP): Performed by: HOSPITALIST

## 2021-06-04 PROCEDURE — 99239 HOSP IP/OBS DSCHRG MGMT >30: CPT | Performed by: PHYSICAL MEDICINE & REHABILITATION

## 2021-06-04 PROCEDURE — A9270 NON-COVERED ITEM OR SERVICE: HCPCS | Performed by: PHYSICAL MEDICINE & REHABILITATION

## 2021-06-04 RX ADMIN — POLYVINYL ALCOHOL 1 DROP: 14 SOLUTION/ DROPS OPHTHALMIC at 08:43

## 2021-06-04 RX ADMIN — THYROID, PORCINE 60 MG: 30 TABLET ORAL at 08:39

## 2021-06-04 RX ADMIN — BACLOFEN 20 MG: 20 TABLET ORAL at 08:41

## 2021-06-04 RX ADMIN — METOPROLOL TARTRATE 25 MG: 25 TABLET, FILM COATED ORAL at 08:39

## 2021-06-04 RX ADMIN — CIPROFLOXACIN 1 DROP: 3 SOLUTION OPHTHALMIC at 05:29

## 2021-06-04 RX ADMIN — CIPROFLOXACIN 1 DROP: 3 SOLUTION OPHTHALMIC at 10:33

## 2021-06-04 NOTE — DISCHARGE PLANNING
Case management Summary:   Met with patient and his wife prior to discharge.     Reviewed all follow up appointments.     Referral made to [facility] and they have accepted referral and are ready to follow.      Patient has all recommended DME.       During hospitalization, I have provided support and education and have been available for questions and information during hours of operation, communicated with therapy team and MD along with providing links/resources  to outside services.    Patient verbalizes agreement with all plans and has an understanding of the next steps within the post acute services.     Individualized Goals:   1. Return to family member's  home in [place].    2. Stabilize blood pressure and reduce dizziness.      Outcome:   1. Patient has made great progress while in rehab and has met his goals for DC.

## 2021-06-04 NOTE — PROGRESS NOTES
Patient discharged to home per order.  Reviewed all discharge instructions, appointments, discharge medications and dye cath care with patient and his wife. They verbalize understanding.  Discharge paperwork completed, signed copies in chart.  Patient has Rehab educational binder and all belongings.  Patient alert, calm, stable: no change in status from morning assessment.  Patient left facitlity at 1245 accompanied by family and rehab staff.  Have enjoyed working with this pleasant patient.

## 2021-06-04 NOTE — DISCHARGE INSTRUCTIONS
[facility] NURSING DISCHARGE INSTRUCTIONS        Indwelling Urinary Catheter Care, Adult  An indwelling urinary catheter is a thin tube that is put into your bladder. The tube helps to drain pee (urine) out of your body. The tube goes in through your urethra. Your urethra is where pee comes out of your body. Your pee will come out through the catheter, then it will go into a bag (drainage bag).  Take good care of your catheter so it will work well.  How to wear your catheter and bag  Supplies needed  · Sticky tape (adhesive tape) or a leg strap.  · Alcohol wipe or soap and water (if you use tape).  · A clean towel (if you use tape).  · Large overnight bag.  · Smaller bag (leg bag).  Wearing your catheter  Attach your catheter to your leg with tape or a leg strap.  · Make sure the catheter is not pulled tight.  · If a leg strap gets wet, take it off and put on a dry strap.  · If you use tape to hold the bag on your le. Use an alcohol wipe or soap and water to wash your skin where the tape made it sticky before.  2. Use a clean towel to pat-dry that skin.  3. Use new tape to make the bag stay on your leg.  Wearing your bags  You should have been given a large overnight bag.  · You may wear the overnight bag in the day or night.  · Always have the overnight bag lower than your bladder.  Do not let the bag touch the floor.  · Before you go to sleep, put a clean plastic bag in a wastebasket. Then hang the overnight bag inside the wastebasket.  You should also have a smaller leg bag that fits under your clothes.  · Always wear the leg bag below your knee.  · Do not wear your leg bag at night.  How to care for your skin and catheter  Supplies needed  · A clean washcloth.  · Water and mild soap.  · A clean towel.  Caring for your skin and catheter         · Clean the skin around your catheter every day:  ? Wash your hands with soap and water.  ? Wet a clean washcloth in warm water and mild  soap.  ? Clean the skin around your urethra.  ? If you are female:  ? Gently spread the folds of skin around your vagina (labia).  ? With the washcloth in your other hand, wipe the inner side of your labia on each side. Wipe from front to back.  ? If you are male:  ? Pull back any skin that covers the end of your penis (foreskin).  ? With the washcloth in your other hand, wipe your penis in small circles. Start wiping at the tip of your penis, then move away from the catheter.  ? Move the foreskin back in place, if needed.  ? With your free hand, hold the catheter close to where it goes into your body.  ? Keep holding the catheter during cleaning so it does not get pulled out.  ? With the washcloth in your other hand, clean the catheter.  ? Only wipe downward on the catheter.  ? Do not wipe upward toward your body. Doing this may push germs into your urethra and cause infection.  ? Use a clean towel to pat-dry the catheter and the skin around it. Make sure to wipe off all soap.  ? Wash your hands with soap and water.  · Shower every day. Do not take baths.  · Do not use cream, ointment, or lotion on the area where the catheter goes into your body, unless your doctor tells you to.  · Do not use powders, sprays, or lotions on your genital area.  · Check your skin around the catheter every day for signs of infection. Check for:  ? Redness, swelling, or pain.  ? Fluid or blood.  ? Warmth.  ? Pus or a bad smell.  How to empty the bag  Supplies needed  · Rubbing alcohol.  · Gauze pad or cotton ball.  · Tape or a leg strap.  Emptying the bag  Pour the pee out of your bag when it is ?-½ full, or at least 2-3 times a day. Do this for your overnight bag and your leg bag.  1. Wash your hands with soap and water.  2. Separate (detach) the bag from your leg.  3. Hold the bag over the toilet or a clean pail. Keep the bag lower than your hips and bladder. This is so the pee (urine) does not go back into the tube.  4. Open the pour  spout. It is at the bottom of the bag.  5. Empty the pee into the toilet or pail. Do not let the pour spout touch any surface.  6. Put rubbing alcohol on a gauze pad or cotton ball.  7. Use the gauze pad or cotton ball to clean the pour spout.  8. Close the pour spout.  9. Attach the bag to your leg with tape or a leg strap.  10. Wash your hands with soap and water.  Follow instructions for cleaning the drainage bag:  · From the product maker.  · As told by your doctor.  How to change the bag  Supplies needed  · Alcohol wipes.  · A clean bag.  · Tape or a leg strap.  Changing the bag  Replace your bag when it starts to leak, smell bad, or look dirty.  1. Wash your hands with soap and water.  2. Separate the dirty bag from your leg.  3. Pinch the catheter with your fingers so that pee does not spill out.  4. Separate the catheter tube from the bag tube where these tubes connect (at the connection valve). Do not let the tubes touch any surface.  5. Clean the end of the catheter tube with an alcohol wipe. Use a different alcohol wipe to clean the end of the bag tube.  6. Connect the catheter tube to the tube of the clean bag.  7. Attach the clean bag to your leg with tape or a leg strap. Do not make the bag tight on your leg.  8. Wash your hands with soap and water.  General rules    · Never pull on your catheter. Never try to take it out. Doing that can hurt you.  · Always wash your hands before and after you touch your catheter or bag. Use a mild, fragrance-free soap. If you do not have soap and water, use hand .  · Always make sure there are no twists or bends (kinks) in the catheter tube.  · Always make sure there are no leaks in the catheter or bag.  · Drink enough fluid to keep your pee pale yellow.  · Do not take baths, swim, or use a hot tub.  · If you are female, wipe from front to back after you poop (have a bowel movement).  Contact a doctor if:  · Your pee is cloudy.  · Your pee smells worse than  usual.  · Your catheter gets clogged.  · Your catheter leaks.  · Your bladder feels full.  Get help right away if:  · You have redness, swelling, or pain where the catheter goes into your body.  · You have fluid, blood, pus, or a bad smell coming from the area where the catheter goes into your body.  · Your skin feels warm where the catheter goes into your body.  · You have a fever.  · You have pain in your:  ? Belly (abdomen).  ? Legs.  ? Lower back.  ? Bladder.  · You see blood in the catheter.  · Your pee is pink or red.  · You feel sick to your stomach (nauseous).  · You throw up (vomit).  · You have chills.  · Your pee is not draining into the bag.  · Your catheter gets pulled out.  Summary  · An indwelling urinary catheter is a thin tube that is placed into the bladder to help drain pee (urine) out of the body.  · The catheter is placed into the part of the body that drains pee from the bladder (urethra).  · Taking good care of your catheter will keep it working properly and help prevent problems.  · Always wash your hands before and after touching your catheter or bag.  · Never pull on your catheter or try to take it out.  This information is not intended to replace advice given to you by your health care provider. Make sure you discuss any questions you have with your health care provider.  Document Released: 04/14/2014 Document Revised: 04/10/2020 Document Reviewed: 08/03/2018  [employer] Patient Education © 2020 [employer]              Blood Pressure: 143/88  Weight: 72.5 kg (159 lb 13.3 oz)  Nursing recommendations for Thong Arzola at time of discharge are as follows:  Client verbalized understanding of all discharge instructions and prescriptions.     Review all your home medications and newly ordered medications with your doctor and/or pharmacist. Follow medication instructions as directed by your doctor and/or pharmacist.    Pain Management:   Discharge Pain Medication Instructions:  Comfort  Goal: Comfort with Movement, Perform Activity, Stay Alert  Notify your primary care provider if pain is unrelieved with these measures, if the pain is new, or increased in intensity.    Discharge Skin Characteristics:    Discharge Skin Exam:    Wound 04/03/21 Incision Head Right Skin staples, bacitracin. (Active)     Skin / Wound Care Instructions: Please contact your primary care physician for any change in skin integrity.     If You Have Surgical Incisions / Wounds:  Monitor surgical site(s) for signs of increased swelling, redness or symptoms of drainage from the site or fever as this could indicate signs and symptoms of infection. If these symptoms are noted, notifiy your primary care provider.      Discharge Safety Instructions:       Discharge Safety Concerns:    The interdisciplinary team has made recommendation that you should not be left alone  in the house due to weakness  Anti-embolic stockings are required during the day and off at night to increase circulation to the lower extremities.    Discharge Diet:       Discharge Liquids:    Discharge Bowel Function:    Please contact your primary care physician for any changes in bowel habits.  Discharge Bowel Program:    Discharge Bladder Function:    Discharge Urinary Devices:        Nursing Discharge Plan:        Case Management Discharge Instructions:   Discharge Location: Other  Agency Name/Address/Phone: [facility]: [phone]  Home Health: Home Health Aide, Occupational Therapist, Physical Therapist, Speech Therapist  Outpatient Services:    DME Provider/Phone:    Medical Equipment Ordered:    Prescription Faxed to:        Discharge Medication Instructions:  Below are the medications your physician expects you to take upon discharge:    Physical Therapy After a Stroke  After a stroke, some people experience physical changes or problems. Physical therapy may be prescribed to help you recover and overcome problems such as:  · Inability to move  (paralysis) or weakness, typically affecting one side of the body.  · Trouble with balance.  · Pain, a pins and needles sensation, or numbness in certain parts of the body. You may also have difficulty feeling touch, pressure, or changes in temperature.  · Involuntary muscle tightening (spasticity).  · Stiffness in muscles and joints.  · Altered coordination and reflexes.  What causes physical disability after a stroke?  A stroke can damage parts of your brain that control your body's normal functions, including your ability to move and to keep your balance.  The types of physical problems you have will depend on how severe the stroke was and where it was located in the brain. Weakness or paralysis may affect just your fingers and hands, a whole leg or arm, or an entire side of your body.  What is physical therapy?  Physical therapy involves using exercises, stretches, and activities to help you regain movement and independence after your stroke. Physical therapy may focus on one or more of the following:  · Range of motion. This can help with movement and reduce muscle stiffness.  · Balance. This helps to lower your risk of falling.  · Position changes or transfers, such as moving from sitting to standing or from a chair to a bed.  · Coordination, such as getting an object from a shelf.  · Muscle strength. Muscles may be strengthened with weights or by repeating certain motions.  · Functional mobility. This may include stair training or learning how to use a wheelchair, walker, or cane.  · Walking (gait training).  · Activities of daily living, such as getting out of the car or buttoning a shirt.  Why is physical therapy important?  It is important to do exercises and follow your rehabilitation plan as told by your physical therapist. Physical therapy can:  · Help you regain independence.  · Prevent injury from falls by building strength and balance.  · Lower your risk of blood clots.  · Lower your risk of skin  sores (pressure injuries).  · Increase physical activity and exercise. This may help lower your risk for another stroke.  · Help reduce pain.  When will therapy start and where will I have therapy?  Your health care provider will decide when it is best for you to start therapy. In some cases, people start rehabilitation, including physical therapy, as soon as they are medically stable, which may be 24-48 hours after a stroke.  Rehabilitation can take place in a few different places, based on your needs. It may take place in:  · The hospital or an in-patient rehabilitation hospital.  · An outpatient rehabilitation facility.  · A long-term care facility.  · A community rehabilitation clinic.  · Your home.  What are assistive devices?  Assistive devices are tools to help you move, maintain balance, and manage daily tasks while recovering from a stroke. Your physical therapist may recommend and help you learn to use:  · Equipment to help you move, such as wheelchairs, canes, or walkers.  · Braces or splints to keep your arms, hands, legs, or feet in a comfortable and safe position.  · Bathtub benches or grab bars to keep you safe in the bathroom.  · Special utensils, bowls, and plates that allow you to eat with one hand.  It is important to use these devices as told by your health care provider.  Summary  · After a stroke, some people may experience physical disabilities, such as weakness or paralysis, pain, or balance problems.  · Physical therapy involves exercises, stretches, and activities that help to improve your ability to move and to handle daily tasks.  · Physical therapy exercises focus on restoring range of motion, balance, coordination, muscle strength, and the ability to move (mobility).  · Physical therapy can help you regain independence, prevent falls, and allow you to live a more active lifestyle after a stroke.  This information is not intended to replace advice given to you by your health care  provider. Make sure you discuss any questions you have with your health care provider.  Document Released: 03/26/2018 Document Revised: 04/09/2020 Document Reviewed: 03/26/2018  [employer] Patient Education © 2020 [employer]      Cognitive Rehabilitation After a Stroke  After a stroke, you may have various problems with thinking (cognitive disability). The types of problems you have will depend on how severe the stroke was and where it was located in the brain. Problems may include:  · Problems with short-term memory.  · Trouble paying attention.  · Trouble communicating or understanding language (aphasia).  · A drop in mental ability that may interfere with daily life (dementia).  · Trouble with problem-solving and information processing.  · Problems with reading, writing, or math.  · Problems with your ability to plan and to perform activities in sequence (executive function).  These problems can feel overwhelming. However, with rehabilitation and time to heal, many people have improvement in their symptoms.  What causes cognitive disability?  A stroke happens when blood cannot flow to certain areas of the brain. When this happens, brain cells die in the affected areas because they cannot get oxygen and nutrients from the blood. Cognitive disability is caused by the death of cells in the areas of the brain that control thinking.  What is cognitive rehabilitation?  Cognitive rehabilitation is a program to help you improve your thinking skills after a stroke. Rehabilitation cannot completely reverse the effects of a stroke, but it can help you with memory, problem-solving, and communication skills. Therapy focuses on:  · Improving brain function. This may involve activities such as learning to break down tasks into simple steps.  · Helping you learn ways to cope with thinking problems. For example, you might learn memory tricks or do activities that stimulate memory, such as naming objects or describing  pictures.  Cognitive rehabilitation may include:  · Speech-language therapy to help you understand and use language to communicate.  · Occupational therapy to help you perform daily activities.  · Music therapy to help relieve stress, anxiety, and depression. This may involve listening to music, singing, or playing instruments.  · Physical therapy to help improve your ability to move and perform actions that involve the muscles (motor functions).  When will therapy start and where will I have therapy?  Your health care provider will decide when it is best for you to start therapy. In some cases, people start rehabilitation as soon as their health is stable, which may be 24-48 hours after the stroke.  Rehabilitation can take place in a few different places, based on your needs. It may take place in:  · The hospital or an in-patient rehabilitation hospital.  · An outpatient rehabilitation facility.  · A long-term care facility.  · A community rehabilitation clinic.  · Your home.  What are some tools to help after a stroke?  There are a number of tools and apps that you can use on your smartphone, personal computer, or tablet to help improve brain function. Some of these apps include:  · Calendar reminders or alarm apps to help with memory.  · Note-taking or sketch pad apps to help with memory or communication.  · [id number] apps that allow you to listen to what you are reading, which helps your ability to understanding text.  · Picture dictionary or picture message apps to help with communication.  · E-readers. These can highlight text as it is read aloud, which helps with listening and reading skills.  How can my friends or family help during my rehabilitation?  During your recovery, it is important that your friends and family members help you work toward more independence. Your caregivers should speak with your health care providers to learn how they can best help you during recovery. This may include working  on speech-language or memory exercises at home, or helping with daily tasks and errands.  If you have cognitive disability, you may be at risk for injury or accidents at home, such as forgetting to turn off the stove. Friends and family members can help ensure home safety by taking steps such as getting appliances with automatic shut-off features or storing dangerous objects in a secure place.  What else should I know about cognitive rehabilitation after a stroke?  Having trouble with memory and problem-solving can make you feel alone. You may also have mood changes, anxiety, or depression after a stroke. It is important to:  · Stay connected with others through social groups, online support groups, or your community.  · Talk to your friends, family, and caregivers about any emotional problems you are having.  · Go to one-on-one or group therapy as suggested by your health care provider.  · Stay physically active and exercise as often as suggested by your health care provider.  Summary  · After a stroke, some people have problems with thinking that affect attention, memory, language, communication, and problem-solving.  · Cognitive rehabilitation is a program to help you regain brain function and learn skills to cope with thinking problems.  · Rehabilitation cannot completely reverse the effects of a stroke, but it can help to improve quality of life.  · Cognitive rehabilitation may include speech-language therapy, occupational therapy, music therapy, and physical therapy.  This information is not intended to replace advice given to you by your health care provider. Make sure you discuss any questions you have with your health care provider.  Document Released: 03/23/2018 Document Revised: 04/08/2020 Document Reviewed: 03/23/2018  [employer] Patient Education © 2020 [employer]      Hypothyroidism    Hypothyroidism is when the thyroid gland does not make enough of certain hormones (it is underactive). The thyroid  gland is a small gland located in the lower front part of the neck, just in front of the windpipe (trachea). This gland makes hormones that help control how the body uses food for energy (metabolism) as well as how the heart and brain function. These hormones also play a role in keeping your bones strong. When the thyroid is underactive, it produces too little of the hormones thyroxine (T4) and triiodothyronine (T3).  What are the causes?  This condition may be caused by:  · Hashimoto's disease. This is a disease in which the body's disease-fighting system (immune system) attacks the thyroid gland. This is the most common cause.  · Viral infections.  · Pregnancy.  · Certain medicines.  · Birth defects.  · Past radiation treatments to the head or neck for cancer.  · Past treatment with radioactive iodine.  · Past exposure to radiation in the environment.  · Past surgical removal of part or all of the thyroid.  · Problems with a gland in the center of the brain (pituitary gland).  · Lack of enough iodine in the diet.  What increases the risk?  You are more likely to develop this condition if:  · You are female.  · You have a family history of thyroid conditions.  · You use a medicine called lithium.  · You take medicines that affect the immune system (immunosuppressants).  What are the signs or symptoms?  Symptoms of this condition include:  · Feeling as though you have no energy (lethargy).  · Not being able to tolerate cold.  · Weight gain that is not explained by a change in diet or exercise habits.  · Lack of appetite.  · Dry skin.  · Coarse hair.  · Menstrual irregularity.  · Slowing of thought processes.  · Constipation.  · Sadness or depression.  How is this diagnosed?  This condition may be diagnosed based on:  · Your symptoms, your medical history, and a physical exam.  · Blood tests.  You may also have imaging tests, such as an ultrasound or MRI.  How is this treated?  This condition is treated with medicine  that replaces the thyroid hormones that your body does not make. After you begin treatment, it may take several weeks for symptoms to go away.  Follow these instructions at home:  · Take over-the-counter and prescription medicines only as told by your health care provider.  · If you start taking any new medicines, tell your health care provider.  · Keep all follow-up visits as told by your health care provider. This is important.  ? As your condition improves, your dosage of thyroid hormone medicine may change.  ? You will need to have blood tests regularly so that your health care provider can monitor your condition.  Contact a health care provider if:  · Your symptoms do not get better with treatment.  · You are taking thyroid replacement medicine and you:  ? Sweat a lot.  ? Have tremors.  ? Feel anxious.  ? Lose weight rapidly.  ? Cannot tolerate heat.  ? Have emotional swings.  ? Have diarrhea.  ? Feel weak.  Get help right away if you have:  · Chest pain.  · An irregular heartbeat.  · A rapid heartbeat.  · Difficulty breathing.  Summary  · Hypothyroidism is when the thyroid gland does not make enough of certain hormones (it is underactive).  · When the thyroid is underactive, it produces too little of the hormones thyroxine (T4) and triiodothyronine (T3).  · The most common cause is Hashimoto's disease, a disease in which the body's disease-fighting system (immune system) attacks the thyroid gland. The condition can also be caused by viral infections, medicine, pregnancy, or past radiation treatment to the head or neck.  · Symptoms may include weight gain, dry skin, constipation, feeling as though you do not have energy, and not being able to tolerate cold.  · This condition is treated with medicine to replace the thyroid hormones that your body does not make.  This information is not intended to replace advice given to you by your health care provider. Make sure you discuss any questions you have with your  health care provider.  Document Released: 12/18/2006 Document Revised: 11/30/2018 Document Reviewed: 11/28/2018  [employer] Patient Education © 2020 [employer]      Indwelling Urinary Catheter Care  You have been given a flexible tube (catheter) used to drain the bladder. Catheters are often used when a person has difficulty urinating due to blockage, bleeding, infection, or inability to control bladder or bowel movements (incontinence). A catheter requires daily care to prevent infection and blockage.  HOME CARE INSTRUCTIONS   Do the following to reduce the risk of infection. Antibiotic medicines cannot prevent infections.  Limit the number of bacteria entering your bladder  · Wash your hands for 2 minutes with soapy water before and after handling the catheter.  · Wash your bottom and the entire catheter twice daily, as well as after each bowel movement. Wash the tip of the penis or just above the vaginal opening with soap and warm water, rinse, and then wash the rectal area. Always wash from front to back.  · When changing from the leg bag to overnight bag or from the overnight bag to leg bag, thoroughly clean the end of the catheter where it connects to the tubing with an alcohol wipe.  · Clean the leg bag and overnight bag daily after use. Replace your drainage bags weekly.  · Always keep the tubing and bag below the level of your bladder. This allows your urine to drain properly. Lifting the bag or tubing above the level of your bladder will cause dirty urine to flow back into your bladder. If you must briefly lift the bag higher than your bladder, pinch the catheter or tubing to prevent backflow.  · Drink enough water and fluids to keep your urine clear or pale yellow, or as directed by your caregiver. This will flush bacteria out of the bladder.  Protect tissues from injury  · Attach the catheter to your leg so there is no tension on the catheter. Use adhesive tape or a leg strap. If you are using adhesive  tape, remove any sticky residue left behind by the previous tape you used.  · Place your leg bag on your lower leg. Fasten the straps securely and comfortably.  · Do not remove the catheter yourself unless you have been instructed how to do so.  Keep the urinary pathway open  · Check throughout the day to be sure your catheter is working and urine is draining freely. Make sure the tubing does not become kinked.  · Do not let the drainage bag overfill.  SEEK IMMEDIATE MEDICAL CARE IF:   · The catheter becomes blocked. Urine is not draining.  · Urine is leaking.  · You have any pain.  · You have a fever.  Document Released: 12/18/2006 Document Revised: 12/04/2013 Document Reviewed: 05/19/2011  [employer]® Patient Information ©2014 [employer].      Acute Urinary Retention, Male    Acute urinary retention means that you cannot pee (urinate) at all, or that you pee too little and your bladder is not emptied completely. If it is not treated, it can lead to kidney damage or other serious problems.  Follow these instructions at home:  · Take over-the-counter and prescription medicines only as told by your doctor. Ask your doctor what medicines you should stay away from. Do not take any medicine unless your doctor says it is okay to do so.  · If you were sent home with a tube that drains the bladder (catheter), take care of it as told by your doctor.  · Drink enough fluid to keep your pee clear or pale yellow.  · If you were given an antibiotic, take it as told by your doctor. Do not stop taking the antibiotic even if you start to feel better.  · Do not use any products that contain nicotine or tobacco, such as cigarettes and e-cigarettes. If you need help quitting, ask your doctor.  · Watch for changes in your symptoms. Tell your doctor about them.  · If told, track changes in your blood pressure at home. Tell your doctor about them.  · Keep all follow-up visits as told by your doctor. This is important.  Contact a doctor  if:  · You have spasms or you leak pee when you have spasms.  Get help right away if:  · You have chills or a fever.  · You have a tube that drains the bladder and:  ? The tube stops draining pee.  ? The tube falls out.  · You have blood in your pee.  Summary  · Acute urinary retention means that you have problems peeing. It may mean that you cannot pee at all, or that you pee too little.  · If this condition is not treated, it can lead to kidney damage or other serious problems.  · If you were sent home with a tube that drains the bladder, take care of it as told by your doctor.  · Monitor any changes in your symptoms. Tell your doctor about any changes.  This information is not intended to replace advice given to you by your health care provider. Make sure you discuss any questions you have with your health care provider.  Document Released: 06/05/2009 Document Revised: 03/05/2020 Document Reviewed: 01/19/2018  [employer] Patient Education © 2020 [employer]      Fall Prevention in Hospitals, Adult  Being a patient in the hospital puts you at risk for falling. Falls can cause serious injury and harm, but they can be prevented. It is important to understand what puts you at risk for falling and what you and your health care team can do to prevent you from falling. If you or a loved one falls at the hospital, it is important to tell hospital staff about it.  What increases the risk for falls?  Certain conditions and treatments may increase your risk of falling in the hospital. These include:  · Being in an unfamiliar environment, especially when using the bathroom at night.  · Having surgery.  · Being on bed rest.  · Taking many medicines or certain types of medicines, such as sleeping pills.  · Having tubes in place, such as IV lines or catheters.  Other risk factors for falls in a hospital include:  · Having difficulty with hearing or vision.  · Having a change in thinking or behavior, such as confusion.  · Having  depression.  · Having trouble with balance.  · Being a male.  · Feeling dizzy.  · Needing to use the toilet frequently.  · Having fallen during the past three months.  · Having low blood pressure.  What are some strategies for preventing falls?  If you or a loved one has to stay in the hospital:  · Ask about which fall prevention strategies will be in place. Do not hesitate to speak up if you notice that the fall prevention plan has changed.  · Ask for help moving around, especially after surgery or when feeling unwell.  · If you have been asked to call for help when getting up, do not get up by yourself. Asking for help with getting up is for your safety, and the staff is there to help you.  · Wear nonskid footwear.  · Get up slowly, and sit at the side of the bed for a few minutes before standing up.  · Keep items you need, such as the nurse call button or a phone, close to you so that you do not need to reach for them.  · Wear eyeglasses or hearing aids if you have them.  · Have someone stay in the hospital with you or your loved one.  · Ask if sleeping pills or other medicines that can cause confusion are necessary.  What does the hospital staff do to help prevent falls?  Hospitals have systems in place to prevent falls and accidents, which may involve:  · Discussing your fall risks and making a personalized fall prevention plan.  · Checking in regularly to see if you need help.  · Placing an arm band on your wrist or a sign near your room to alert other staff of your needs.  · Using an alarm on your hospital bed. This is an alarm that goes off if you get out of bed and forget to call for help.  · Keeping the bed in a low and locked position.  · Keeping the area around the bed and bathroom well-lit and free from clutter.  · Keeping your room quiet, so that you can sleep and be well-rested.  · Using safety equipment, such as:  ? A belt around your waist.  ? Walkers, crutches, and other devices for  support.  ? Safety beds, such as low beds or cushions on the floor next to the bed.  · Having a staff person stay with you (one-on-one observation), even when you are using the bathroom. This is for your safety.  · Using video monitoring. This allows a staff member to come to help you if you need help.  What other actions can I take to lower my risk of falls?  · Check in regularly with your health care provider or pharmacist to review all of the medicines that you take.  · Make sure that you have a regular exercise program to stay fit. This will help you maintain your balance.  · Talk with a physical therapist or  if recommended by your health care provider. They can help you to improve your strength, balance, and endurance.  · If you are over age 65:  ? Ask your health care provider if you need a calcium or vitamin D supplement.  ? Have your eyes and hearing checked every year.  ? Have your feet checked every year.  Where to find more information  You can find more information about fall prevention from the Centers for Disease Control and Prevention: www.cdc.gov/steadi  Summary  · Being in an unfamiliar environment, such as the hospital, increases your risk for falling.  · If you have been asked to call for help when getting up, do not get up by yourself. Asking for help with getting up is for your safety, and the staff is there to help you.  · Ask about which fall prevention strategies will be in place. Do not hesitate to speak up if you notice that the fall prevention plan has changed.  · If you or a loved one falls, tell the hospital staff. This is important.  This information is not intended to replace advice given to you by your health care provider. Make sure you discuss any questions you have with your health care provider.  Document Released: 12/15/2001 Document Revised: 11/30/2018 Document Reviewed: 08/01/2018  Elsevier Patient Education © 2020 [employer]      Suicidal Feelings: How to Help  Yourself  Suicide is when you end your own life. There are many things you can do to help yourself feel better when struggling with these feelings. Many services and people are available to support you and others who struggle with similar feelings.   If you ever feel like you may hurt yourself or others, or have thoughts about taking your own life, get help right away. To get help:  · Call your local emergency services (911 in the [place]  · The [facility] and human services helpline (211 in the [place]  · Go to your nearest emergency department.  · Call a suicide hotline to speak with a trained counselor. The following suicide hotlines are available in the [place]:  ? 9-727-714-TALK (1-475.992.4055).  ? 6-812-YHOXMMQ (1-780.881.7244).  ? 1-735.640.7977. This is a hotline for English speakers.  ? 1-243.674.7551. This is a hotline for TTY users.  ? 5-958-4-U-CHANI (1-255.660.3482). This is a hotline for lesbian, brown, bisexual, transgender, or questioning youth.  ? For a list of hotlines in [place], visit www.suicide.org/hotlines/international/sgjyxq-cyvsqou-mlvedeae.html  · Contact a crisis center or a local suicide prevention center. To find a crisis center or suicide prevention center:  ? Call your local hospital, clinic, community service organization, mental health center, social service provider, or health department. Ask for help with connecting to a crisis center.  ? For a list of crisis centers in the [place], visit: suicidepreventionlifeline.org  ? For a list of crisis centers in [place], visit: suicideprevention.ca  How to help yourself feel better    · Promise yourself that you will not do anything extreme when you have suicidal feelings. Remember, there is hope. Many people have gotten through suicidal thoughts and feelings, and you can too. If you have had these feelings before, remind yourself that you can get through them again.  · Let family, friends, teachers, or counselors know how  you are feeling. Try not to separate yourself from those who care about you and want to help you. Talk with someone every day, even if you do not feel sociable. Face-to-face conversation is best to help them understand your feelings.  · Contact a mental health care provider and work with this person regularly.  · Make a safety plan that you can follow during a crisis. Include phone numbers of suicide prevention hotlines, mental health professionals, and trusted friends and family members you can call during an emergency. Save these numbers on your phone.  · If you are thinking of taking a lot of medicine, give your medicine to someone who can give it to you as prescribed. If you are on antidepressants and are concerned you will overdose, tell your health care provider so that he or she can give you safer medicines.  · Try to stick to your routines. Follow a schedule every day. Make self-care a priority.  · Make a list of realistic goals, and cross them off when you achieve them. Accomplishments can give you a sense of worth.  · Wait until you are feeling better before doing things that you find difficult or unpleasant.  · Do things that you have always enjoyed to take your mind off your feelings. Try reading a book, or listening to or playing music. Spending time outside, in nature, may help you feel better.  Follow these instructions at home:    · Visit your primary health care provider every year for a checkup.  · Work with a mental health care provider as needed.  · Eat a well-balanced diet, and eat regular meals.  · Get plenty of rest.  · Exercise if you are able. Just 30 minutes of exercise each day can help you feel better.  · Take over-the-counter and prescription medicines only as told by your health care provider. Ask your mental health care provider about the possible side effects of any medicines you are taking.  · Do not use alcohol or drugs, and remove these substances from your home.  · Remove weapons,  poisons, knives, and other deadly items from your home.  General recommendations  · Keep your living space well lit.  · When you are feeling well, write yourself a letter with tips and support that you can read when you are not feeling well.  · Remember that life's difficulties can be sorted out with help. Conditions can be treated, and you can learn behaviors and ways of thinking that will help you.  Where to find more information  · National Suicide Prevention Lifeline: www.suicidepreventionlifeline.org  · Hopeline: www.hopeline.com  · American Foundation for Suicide Prevention: www.afsp.org  · The Jagjit Project (for lesbian, brown, bisexual, transgender, or questioning youth): www.thetrevorproject.org  Contact a health care provider if:  · You feel as though you are a burden to others.  · You feel agitated, angry, vengeful, or have extreme mood swings.  · You have withdrawn from family and friends.  Get help right away if:  · You are talking about suicide or wishing to die.  · You start making plans for how to commit suicide.  · You feel that you have no reason to live.  · You start making plans for putting your affairs in order, saying goodbye, or giving your possessions away.  · You feel guilt, shame, or unbearable pain, and it seems like there is no way out.  · You are frequently using drugs or alcohol.  · You are engaging in risky behaviors that could lead to death.  If you have any of these symptoms, get help right away. Call emergency services, go to your nearest emergency department or crisis center, or call a suicide crisis helpline.  Summary  · Suicide is when you take your own life.  · Promise yourself that you will not do anything extreme when you have suicidal feelings.  · Let family, friends, teachers, or counselors know how you are feeling.  · Get help right away if you feel as though life is getting too tough to handle and you are thinking about suicide.  This information is not intended to replace  advice given to you by your health care provider. Make sure you discuss any questions you have with your health care provider.  Document Released: 06/23/2004 Document Revised: 04/09/2020 Document Reviewed: 07/31/2018  Elsevier Patient Education © 2020 [employer].      Occupational Therapy Discharge Instructions for Thong Arzola    6/3/2021    Level of Assist Required for Eating: Able to Complete Eating without Assist  Level of Assist Required for Grooming: Requires Physical Assist with Grooming (needs some assistance when trimming beard)  Level of Assist Required for Dressing: Requires Physical Assist with Dressing (getting left shoe/sock on/off, left foot into shorts)  Level of Assist Required for Toileting: Requires Physical Assist with Toileting (pulling pants up/down and standing balance)  Level of Assist Required for Toilet Transfer: Requires Physical Assist with Toilet Transfer  Equipment for Toilet Transfer: Commode over Toilet  Level of Assist Required for Bathing: Requires Physical Assist with Bathing  Equipment for Bathing: Tub Transfer Bench, Hand Held Shower Head  Level of Assist Required for Shower Transfer: Requires Physical Assist with Shower Transfer  Equipment for Shower Transfer: Tub Transfer Bench  Level of Assist Required for Home Mgmt: Requires Physical Assist with Home Management  Level of Assist Required for Meal Prep: Requires Physical Assist with Meal Preparation  Driving: May not Drive, Please Contact Physician for Further Information  Home Exercise Program: None Issued    Physical Therapy Discharge Instructions for Thong Arzola    6/3/2021    Level of Assist Required for Ambulation: Should Not Attempt Curbs at This Time, Should Not Attempt Stairs at This Time, Should Not Attempt Walking, Requires Wheelchair for Mobility at This Time  Level of Assist Required to Propel Wheelchair: Supervision  Level of Assist Required for Transfers: Physical Assist  Device Recommended for  Transfers:  (wheelchair)  Home Exercise Program: Refer to Home Exercise Program Handout for Details    It was an absolute pleasure working with you Salty!  Take care and best wishes with your continued recovery!  Neena Gastelum, PT DPT    Speech Therapy Discharge Instructions for Thong Arzola    6/3/2021    Diet: Regular (7), Thin (0)  Cognition:  Strategies to Help Improve Your Memory   Your speech therapist can work with you to develop strategies to help you remember new information. There are 2 main types of strategies to help your memory: internal reminders and external reminders.     Internal Reminders     •Rehearsal: retelling yourself information you just learned, or restating it out loud in your own words.   •Repetition: saying the same information over and over, either silently or out loud.   •Clarification: asking someone else to repeat or rephrase information.   •Chunking: grouping items together to reduce the number of items to remember, such as grouping 7-digit phone numbers into 2 chunks, one with 3 numbers and the other with 4 numbers.   •Rhyming: making a rhyme out of important information.   •Acronyms or alphabet cueing: creating a letter for each word you want to remember, or vice versa. One example is using the sentence “Every Good Boy Does Fine” to remember that the lines of a treble staff in music are the notes E, G, B, D, and F.   •Imagery (also called visualization): creating pictures of the information in your mind.   •Association: linking old information or habits with the new, such as remembering to take your medicine every night at the same time that you brush your teeth.   •Personal meaning: making the new information meaningful or emotionally important to you in some way.     External Reminders     •Using a paper or electronic calendar or day planner.   •Setting timers or alarms to remind you to do something.   •Writing down reminders such as to-do lists, shopping lists, and project  outlines.   •Recording new information with a voice recorder.   •Using a medicine organizing tool.   •Creating specific, permanent places for important items. One example is always putting your keys, wallet, and cell phone in the same place every time you get home.    Remember left visual attention! Scan to left!    It was great working with you Salty, best of luck in your ongoing recovery! -Carly, SLP

## 2021-06-04 NOTE — DISCHARGE PLANNING
"Per Rosette at [facility], referral accepted.  Patient already has \"loaner\" wheelchair.  "

## 2021-06-04 NOTE — CARE PLAN
Problem: Communication  Goal: The ability to communicate needs accurately and effectively will improve  Outcome: Progressing     Problem: Safety  Goal: Will remain free from falls  Outcome: Progressing     Problem: Infection  Goal: Will remain free from infection  Outcome: Progressing     Problem: Venous Thromboembolism (VTW)/Deep Vein Thrombosis (DVT) Prevention:  Goal: Patient will participate in Venous Thrombosis (VTE)/Deep Vein Thrombosis (DVT)Prevention Measures  Outcome: Progressing     Problem: Knowledge Deficit  Goal: Knowledge of disease process/condition, treatment plan, diagnostic tests, and medications will improve  Outcome: Progressing     Problem: Pain Management  Goal: Pain level will decrease to patient's comfort goal  Outcome: Progressing     Problem: Skin Integrity  Goal: Risk for impaired skin integrity will decrease  Outcome: Progressing   The patient is Stable - Low risk of patient condition declining or worsening    Shift Goals  Clinical Goals:  (strengthening)  Patient Goals:  (safety, participate in therapy)    Progress made toward(s) clinical / shift goals: dye cath cath demonstrated today by patient and his wife    Patient is not progressing towards the following goals:

## 2021-06-04 NOTE — DISCHARGE SUMMARY
Admission Date: 4/29/2021    Discharge Date: 6/4/2021    Attending Provider: Radha Monge MD    Admission Diagnosis:   Active Hospital Problems    Diagnosis    • *Acute right MCA stroke (HCC)    • Impaired mobility and ADLs    • Infection of left eye    • Dyslipidemia    • Normocytic anemia    • Spasticity as late effect of cerebrovascular accident (CVA)    • Reactive depression    • Urinary retention    • Acquired hypothyroidism    • History of COVID-19    • Paroxysmal atrial fibrillation (HCC)        Discharge Diagnosis:  Active Hospital Problems    Diagnosis    • *Acute right MCA stroke (HCC)    • Impaired mobility and ADLs    • Infection of left eye    • Dyslipidemia    • Normocytic anemia    • Spasticity as late effect of cerebrovascular accident (CVA)    • Reactive depression    • Urinary retention    • Acquired hypothyroidism    • History of COVID-19    • Paroxysmal atrial fibrillation (HCC)        HPI per H&P:    The patient is a 56 y.o. male with a past medical history of COVID-19 3/18/2021, paroxysmal atrial fibrillation not on anti-coagulation, hypothyroidism; now admitted for acute inpatient rehabilitation with severe functional debility after and acute stroke.     On admission the patient and medical record report he presented to the hospital on 3/31 with left sided weakness. Imaging with right ICA and MCA occlusion. Perfusion scan with 210 ml completed infarction/ischemia, 76 ml of penumbra. Patient was outside window for tPA and was also not a candidate for thrombectomy. ECHO EF 75%.      Patient required craniectomy with Dr. Payton on 4/3, bone flap placed in freezer. Helmet in place. Acute stay complicated by acute urinary retention, traumatic catheter placement required coude and causing hematuria, started on Prazosin. Voiding trial attempted prior to admission to rehab, unsuccessful and Brewer was replaced.       Patient was started on baclofen for developing spasticity. EEG on 4/26 with mild  encephalopathy, no evidence of seizures.      Patient current reports left sided weakness, without any improvement since admission. He also reports impaired sensation on the left side. He is right hand dominant. He denies any visual changes. He denies any pain. His mood is a little down due to his large stroke. He moved his bowels this morning. Meds floated whole in puree.      Patient was evaluated by Rehab Medicine physician and Physical Therapy, Occupational Therapy and Speech Therapy and determined to be appropriate for acute inpatient rehab and was transferred to [facility] on 4/29/2021  1:22 PM.    Rehab Hospital Course by Problem List:    Large right ICA/MCA ischemic stroke  S/p craniectomy 4/3, Dr. Payton  Left hemiplegia, minimal improvement  Cognitive deficits, improved  Left neglect, improved     Xarelto  Statin     Outpatient follow up with stroke bridge clinic, Dr. Dumont, referrals made     Outpatient follow up with Dr. Payton for cranioplasty, scheduled for 6/9, Head CT per above, results discussed with patient/wife/Dr. Payton     Patient fitted for a left elbow splint which will help with his spasticity and prevent contractures at the elbow     Neuropathic pain, resolved  Left leg at night, resolved  Continue low dose gabapentin 100 mg 5/11 --> 200 mg TID 5/31, discontinue daytime dosing 6/3 due to increased fatigue during day     Fatigue, improved  Continues, though has improved since admission  Did not tolerate higher doses of baclofen, likely will be titrated off in the outpatient clinic with Dr. Dumont     Left eye infection, 6/3  Cipro eye drops for 5 days     Dizziness, resolved  Scheduled Meclizine, start titration 5/20, TID --> BID, decrease to daily 5/27, discontinued 6/2  Continue Teds for now, okay to discontinue abdominal binder  BPPV testing by PT negative     Spasticity, continues, worse at night  Increased baclofen 15 mg TID to 20 mg TID 4/30 --> 30 mg TID 5/3  Was  decreased back down to 20 mg TID on 5/13 due to fatigue, with increased spasticity     Increased night time baclofen dose to 30 mg 5/25 for better nighttime control     Started valium at night 2 mg 4/30, discontinued 5/3, doesn't seem to make much difference, patient also too sedated     Consider adding Zanaflex in future, LFTs on 5/5 with elevated ALT, rechecked 5/18, still elevated, not a good candidate for Zanaflex at this time     Will benefit from Botox - unable to tolerate higher doses of baclofen due to sedation/fatigue, unable to trial Zanaflex due to elevated ALT, trial of Valium was not effective and patient also too sedated     Dr. Dumont has ordered 400 units with plan to inject LUE as an outpatient after discharge from rehab     Elevated ALT, continues/improved  Decreased statin dose 80 mg to 40 mg 5/18  Almost in normal range     Atrial fibrillation  Metoprolol  Xarelto     Hypothyroidism   [place] thyroid, repeat TSH in 4 weeks from admission, as dose was increased at acute  Slightly abnormal 5/27, low TSH, normal T4 on 4 week recheck  Outpatient follow up with PCP as these findings may be due to his acute illness     Hypertension  Hypotension, resolved  Lisinopril discontinued  Metoprolol, dose increased  Flomax discontinued 5/12  Teds and abdominal binder - OK to discontinue Abd binder, keep Teds to prevent edema     Insomnia, improved/resolved  Already on mirtazapine  Scheduled melatonin  Gabapentin added for neuropathic pain at night     Reactive depression  Mirtazapine  Mood currently stable     History of COVID-19  Without sequelae      Normocytic anemia  Mild, monitor     Bowel program  Continue bowel medications  Last BM 6/2     Bladder program  Urinary retention, continues  Unsuccessful voiding trials x 3, even on Flomax, last one placed  5/12  Outpatient follow up with urology, referral made     DVT prophylaxis  Xarelto    Functional Status at Discharge  Eating:  Modified Independent  Eating  Description:  Increased time  Grooming:  Supervision, Seated  Grooming Description:  Verbal cueing (to locate comb in drawer; setup to wash R hand)  Bathing:  Minimal Assist  Bathing Description:  Grab bar, Hand held shower, Tub bench, Set-up of equipment, Supervision for safety, Verbal cueing (assist to wash R arm and dry L armpit)  Upper Body Dressing:  Stand by Assist  Upper Body Dressing Description:  Set-up of equipment, Supervision for safety, Verbal cueing (do don/doff shirt)  Lower Body Dressing:  Moderate Assist  Lower Body Dressing Description:  Verbal cueing, Supervision for safety, Set-up of equipment, Grab bar, Increased time (assist w/ 7/19 tasks)  Discharge Location : Relative / Friend's Home  Patient Discharging with Assist of: Spouse / Significant Other;Family   Level of Supervision Required: Intermittent Supervision  Recommended Equipment for Discharge: Ramp;Manual Wheelchair;Tub Transfer Bench;3 in 1 Commode;Hand Held Shower Head  Recommended Services Upon Discharge: Home Health Occupational Therapy  Long Term Goals Met: 1  Long Term Goals Not Met: 1  Reason(s) for Goals Not Met: impaired standing balance, L sided hemiplegia  Criteria for Termination of Services: Maximum Function Achieved for Inpatient Rehabilitation  Walk:  Total Assist X 2 (Max A for LLE advancement with w/c follow for safety)  Distance Walked:  10  Number of Times Distance Was Traveled:  2  Assistive Device:  Parallel Bars  Gait Deviation:  Step To, Decreased Base Of Support, Bradykinetic, Decreased Heel Strike, Decreased Toe Off  Wheelchair:  Supervised  Distance Propelled:  400 (x2)   Wheelchair Description:  Extra time, Limited by fatigue, Supervision for safety, Verbal cueing  Stairs Unable to Participate  Stairs Description Safety concerns  Discharge Location: Home  Patient Discharging with Assist of: Spouse / Significant Other;Family  Level of Supervision Required Upon Discharge: Intermittent Supervision  Recommended  Equipment for Discharge: Manual Wheelchair;Ramp  Recommeded Services Upon Discharge: Home Health Physical Therapy;Outpatient Physical Therapy  Long Term Goals Met: 2  Long Term Goals Not Met: 1  Reason(s) for Goals Not Met: For w/c goal, pt requiring SPV for L sided attention and with uneven surfaces.  Pt's spouse has been trained to provide this assistance.  Criteria for Termination of Services: Maximum Function Achieved for Inpatient Rehabilitation  Comprehension:  Modified Independent  Comprehension Description:  Glasses  Expression:  Independent  Expression Description:     Social Interaction:  Modified Independent  Social Interaction Description:  Other (comment) (Flat affect)  Problem Solving:  Minimal Assist  Problem Solving Description:  Bed/chair alarm, Increased time, Seat belt, Therapy schedule, Verbal cueing  Memory:  Minimal Assist  Memory Description:  Bed/chair alarm, Increased time, Seat belt, Therapy schedule, Verbal cueing  Progress since Admit: Patient completed SCCAN with a final raw score of 80 indicating mild cognitive deficits. Difficutlies noted with attention, memory, and problem solving. Moderate cues needed for left visual attention.  Discharge Location : Home  Patient Discharging with Assist of: Spouse / Significant Other  Level of Supervision Required: Intermittent Supervision  Recommended Services Upon Discharge: Home Health Speech Therapy  Long Term Goals Met: 1/1  Long Term Goals Not Met: n/a  Reason(s) for Goals Not Met: n/a  Criteria for Termination of Services: Maximum Function Achieved for Inpatient Rehabilitation    IRadha M.D., personally performed a complete drug regimen review and no potential clinically significant medication issues were identified.     Discharge Medication:     Medication List      START taking these medications      Instructions   ciprofloxacin 0.3 % Soln  Commonly known as: CILOXIN   Administer 1 Drop into the left eye every 4 hours while  awake for 4 days.  Dose: 1 Drop     gabapentin 100 MG Caps  Commonly known as: NEURONTIN   Take 2 Capsules by mouth every evening.  Dose: 200 mg     melatonin 3 MG Tabs   Take 1 tablet by mouth at bedtime.  Dose: 3 mg     mirtazapine 15 MG Tbdp  Commonly known as: Remeron  Replaces: mirtazapine 15 MG Tabs   Take 1 tablet by mouth at bedtime.  Dose: 15 mg        CHANGE how you take these medications      Instructions   acetaminophen 325 MG Tabs  What changed:   · when to take this  · reasons to take this  Commonly known as: Tylenol   Take 2 Tablets by mouth every four hours as needed for Mild Pain.  Dose: 650 mg     atorvastatin 40 MG Tabs  What changed:   · medication strength  · how much to take  Commonly known as: LIPITOR   Take 1 tablet by mouth every evening.  Dose: 40 mg     * baclofen 20 MG tablet  What changed:   · medication strength  · how much to take  · how to take this  · when to take this  · additional instructions  Commonly known as: LIORESAL   20 mg (1 tab) twice daily at 08:00 and 14:00     * baclofen 10 MG Tabs  What changed: You were already taking a medication with the same name, and this prescription was added. Make sure you understand how and when to take each.  Commonly known as: LIORESAL   Take 3 Tablets by mouth at bedtime.  Dose: 30 mg     metoprolol tartrate 25 MG Tabs  What changed: how much to take  Commonly known as: LOPRESSOR   Take 1 tablet by mouth 2 times a day.  Dose: 25 mg     senna-docusate 8.6-50 MG Tabs  What changed:   · how much to take  · when to take this  Commonly known as: PERICOLACE or SENOKOT S   Take 2 Tablets by mouth every evening.  Dose: 2 tablet         * This list has 2 medication(s) that are the same as other medications prescribed for you. Read the directions carefully, and ask your doctor or other care provider to review them with you.            CONTINUE taking these medications      Instructions   rivaroxaban 20 MG Tabs tablet  Commonly known as: XARELTO    Take 1 tablet by mouth with dinner.  Dose: 20 mg     thyroid 60 MG Tabs  Commonly known as: ARMOUR THYROID   Take 1 tablet by mouth 2 times a day.  Dose: 60 mg        STOP taking these medications    aspirin 81 MG Chew chewable tablet  Commonly known as: ASA     bisacodyl 10 MG Supp  Commonly known as: DULCOLAX     lisinopril 2.5 MG Tabs  Commonly known as: PRINIVIL     magnesium hydroxide 400 MG/5ML Susp  Commonly known as: MILK OF MAGNESIA     meclizine 25 MG Tabs  Commonly known as: ANTIVERT     mirtazapine 15 MG Tabs  Commonly known as: Remeron  Replaced by: mirtazapine 15 MG Tbdp     ondansetron 4 MG Tbdp  Commonly known as: ZOFRAN ODT     oxyCODONE immediate-release 5 MG Tabs  Commonly known as: ROXICODONE     polyethylene glycol/lytes 17 g Pack  Commonly known as: MIRALAX     prazosin 1 MG Caps  Commonly known as: MINIPRESS     promethazine 12.5 MG Supp  Commonly known as: PHENERGAN            Discharge Diet:  Regular    Discharge Activity:  Do not return to work or driving until cleared by a physician.         Disposition:  Patient to discharge home with family support and community resources.     Discharge Location: Home with therapies.     Agency Name / Address / Phone: [facility]: [phone].     Home Health: Home Health Aide, Occupational Therapist, Physical Therapist, Speech Therapist.     Follow-up Information:     Arthur Rebolledo-Primary Care  [address] [facility] [address]  [phone]  Monday 6/7/2021 appointment at 4:30pm.     Zulema Dumont D.O.-Physical Medicine and Rehab  [address] [address]  [phone]  Tuesday 6/8/2021 appointment at 2:15pm.  Check-in at 1:45pm.-See attached map     Arthur Payton M.D.-Neurosurgery  [address] [facility] [address]  [phone]  Wednesday 6/9/2021 appointment at 10:30am.  You will need a CT scan prior to this appointment.      Familia Copeland, PAC-Urology  [address]  [place]  [address]  [phone]  Wednesday 6/16/2021 appointment at 9:30am.  Check-in at 9:15am.        SHERRIE Cruz-Neurology  [address] [place] [address]  [phone]  Monday 6/28/2021 appointment at 11:00am. Check-in at 10:45am-[facility]       Condition on Discharge:  Good    More than 35 minutes was spent on discharging this patient, including face-to-face time, prescription management, and the dictation of this note.    Radha Monge M.D.    Date of Service: 6/4/2021

## 2021-06-04 NOTE — CARE PLAN
The patient is Stable - Low risk of patient condition declining or worsening    Problem: Safety  Goal: Will remain free from falls  Outcome: Progressing  Note: Call light within reach, patient educated and encouraged to use for assistance for any needs and for assistance for safe transferring.         Problem: Bowel/Gastric:  Goal: Will not experience complications related to bowel motility  Outcome: Progressing  Flowsheets (Taken 6/3/2021 0800 by Princess Peggy Villegas R.N.)  Last BM: 06/02/21     Problem: Pain Management  Goal: Pain level will decrease to patient's comfort goal  Outcome: Progressing  Flowsheets (Taken 6/3/2021 1910)  Pain Rating Scale (NPRS): 0     Problem: Skin Integrity  Goal: Risk for impaired skin integrity will decrease  Outcome: Progressing     Shift Goals  Clinical Goals:  (strengthening)  Patient Goals:  (safety, participate in therapy)

## 2021-06-08 ENCOUNTER — OFFICE VISIT (OUTPATIENT)
Dept: PHYSICAL MEDICINE AND REHAB | Facility: REHABILITATION | Age: 56
End: 2021-06-08
Payer: COMMERCIAL

## 2021-06-08 VITALS
SYSTOLIC BLOOD PRESSURE: 120 MMHG | WEIGHT: 159 LBS | BODY MASS INDEX: 22.81 KG/M2 | DIASTOLIC BLOOD PRESSURE: 70 MMHG | HEART RATE: 70 BPM | TEMPERATURE: 97.6 F | RESPIRATION RATE: 18 BRPM

## 2021-06-08 DIAGNOSIS — I63.511 ACUTE RIGHT MCA STROKE (HCC): ICD-10-CM

## 2021-06-08 DIAGNOSIS — R25.2 SPASTICITY: ICD-10-CM

## 2021-06-08 DIAGNOSIS — M62.838 OTHER MUSCLE SPASM: ICD-10-CM

## 2021-06-08 DIAGNOSIS — I69.854 SPASTIC HEMIPLEGIA OF LEFT NONDOMINANT SIDE AS LATE EFFECT OF OTHER CEREBROVASCULAR DISEASE (HCC): ICD-10-CM

## 2021-06-08 DIAGNOSIS — I69.954 HEMIPLEGIA AND HEMIPARESIS FOLLOWING UNSPECIFIED CEREBROVASCULAR DISEASE AFFECTING LEFT NON-DOMINANT SIDE (HCC): Primary | ICD-10-CM

## 2021-06-08 DIAGNOSIS — G81.94 LEFT HEMIPARESIS (HCC): ICD-10-CM

## 2021-06-08 PROCEDURE — 99417 PROLNG OP E/M EACH 15 MIN: CPT | Performed by: PHYSICAL MEDICINE & REHABILITATION

## 2021-06-08 PROCEDURE — 99215 OFFICE O/P EST HI 40 MIN: CPT | Performed by: PHYSICAL MEDICINE & REHABILITATION

## 2021-06-08 RX ORDER — DANTROLENE SODIUM 25 MG/1
25 CAPSULE ORAL 3 TIMES DAILY
Qty: 90 CAPSULE | Refills: 0 | Status: SHIPPED | OUTPATIENT
Start: 2021-06-08 | End: 2021-06-08

## 2021-06-08 RX ORDER — DANTROLENE SODIUM 25 MG/1
25 CAPSULE ORAL 3 TIMES DAILY
Qty: 90 CAPSULE | Refills: 0 | Status: SHIPPED | OUTPATIENT
Start: 2021-06-08 | End: 2021-07-08 | Stop reason: SDUPTHER

## 2021-06-08 ASSESSMENT — ENCOUNTER SYMPTOMS
PALPITATIONS: 0
CHILLS: 0
BRUISES/BLEEDS EASILY: 0
COUGH: 0
MEMORY LOSS: 0
CONSTIPATION: 0
DIARRHEA: 0
SHORTNESS OF BREATH: 0
FOCAL WEAKNESS: 1
FALLS: 0
FEVER: 0

## 2021-06-08 ASSESSMENT — FIBROSIS 4 INDEX: FIB4 SCORE: 0.6

## 2021-06-08 NOTE — PROGRESS NOTES
[facility]  PM&R Neuro Rehabilitation Clinic  [address] [place] [address]  Ph: [phone]    NEW PATIENT EVALUATION    *Patient established in PM&R practice, however, patient new to writer as patient is transferring care. Therefore, patient billed as established.     Patient Name: Thong Arzola   Patient : 1965  Patient Age: 56 y.o.     Examining Physician: Dr. Zulema Dumont, DO    PM&R History to Date - Background Information:  Dates of Admission/Discharge to ARU: 2021-2021    SUBJECTIVE:   Patient Identification: Thong Arzola is a 56 y.o. male with PMH significant for COVID-19 3/18/2021, paroxysmal atrial fibrillation not on anti-coagulation, hypothyroidism and rehabilitation history significant for large right ICA/MCA ischemic stroke 3/31/2021 in setting of paroxysmal off of anticoagulation s/p craniectomy 4/3/2021 by Dr. Payton and is presenting to PM&R clinic for a NEW OUTPATIENT evaluation with the following chief complaint/s:    Chief Complaint: Spasticity    Accompanied by Today: Anel + Sister  History of Present Illness:  -Records reviewed: Acute rehab discharge summary reviewed in its entirety.  -Patient initially presenting today for Botox injections, however, is established with rehab without walls and occupational therapist discussed with him that she is not sure is indicated at this time.  -Patient is currently on baclofen 20/20/30 milligrams causes significant fatigue and sleepiness.  -Continent of bowel.  -Brewer catheter removed today by urology [place]. Will return in 1 week. Has been voiding volitionally but does have urgency. Is not on Flomax.  -Continues to have spasticity despite being on baclofen.  -On Xarelto for paroxysmal atrial fibrillation    Review of Systems:  Review of Systems   Constitutional: Positive for malaise/fatigue. Negative for chills and fever.   HENT:        Craniectomy   Respiratory: Negative for cough and shortness of breath.     Cardiovascular: Negative for palpitations and leg swelling.   Gastrointestinal: Negative for constipation and diarrhea.   Musculoskeletal: Negative for falls and joint pain.   Neurological: Positive for focal weakness.        + spasticity   Endo/Heme/Allergies: Does not bruise/bleed easily.   Psychiatric/Behavioral: Negative for memory loss.      All other pertinent positive review of systems are noted above in HPI.   All other systems reviewed and are negative.    Past Medical History:  Past Medical History:   Diagnosis Date   • Afib (HCC)    • COVID-19    • High cholesterol       Past Surgical History:   Procedure Laterality Date   • CRANIOTOMY Right 4/3/2021    Procedure: CRANIOTOMY;  Surgeon: Arthur Payton M.D.;  Location: SURGERY [facility];  Service: Neurosurgery   • KNEE RECONSTRUCTION      left knee orthoscopic        Current Outpatient Medications:   •  dantrolene (DANTRIUM) 25 MG Cap, Take 1 capsule by mouth 3 times a day., Disp: 90 capsule, Rfl: 0  •  acetaminophen (TYLENOL) 325 MG Tab, Take 2 Tablets by mouth every four hours as needed for Mild Pain., Disp: 30 tablet, Rfl: 0  •  thyroid (ARMOUR THYROID) 60 MG Tab, Take 1 tablet by mouth 2 times a day., Disp: 60 tablet, Rfl: 2  •  atorvastatin (LIPITOR) 40 MG Tab, Take 1 tablet by mouth every evening., Disp: 30 tablet, Rfl: 2  •  baclofen (LIORESAL) 20 MG tablet, 20 mg (1 tab) twice daily at 08:00 and 14:00, Disp: 60 tablet, Rfl: 2  •  baclofen (LIORESAL) 10 MG Tab, Take 3 Tablets by mouth at bedtime., Disp: 90 tablet, Rfl: 2  •  metoprolol tartrate (LOPRESSOR) 25 MG Tab, Take 1 tablet by mouth 2 times a day., Disp: 60 tablet, Rfl: 2  •  mirtazapine (REMERON) 15 MG TABLET DISPERSIBLE, Take 1 tablet by mouth at bedtime., Disp: 30 tablet, Rfl: 2  •  rivaroxaban (XARELTO) 20 MG Tab tablet, Take 1 tablet by mouth with dinner., Disp: 30 tablet, Rfl: 2  •  senna-docusate (PERICOLACE OR SENOKOT S) 8.6-50 MG Tab, Take 2 Tablets by mouth every evening.,  Disp: 60 tablet, Rfl: 2  •  gabapentin (NEURONTIN) 100 MG Cap, Take 2 Capsules by mouth every evening., Disp: 30 capsule, Rfl: 2  •  melatonin 3 MG Tab, Take 1 tablet by mouth at bedtime., Disp: 30 tablet, Rfl: 2  No Active Allergies     Past Social History:  Social History     Socioeconomic History   • Marital status:      Spouse name: Not on file   • Number of children: Not on file   • Years of education: Not on file   • Highest education level: Not on file   Occupational History   • Not on file   Tobacco Use   • Smoking status: Never Smoker   • Smokeless tobacco: Never Used   Vaping Use   • Vaping Use: Never used   Substance and Sexual Activity   • Alcohol use: Yes     Comment: occ   • Drug use: No   • Sexual activity: Not on file   Other Topics Concern   • Not on file   Social History Narrative   • Not on file     Social Determinants of Health     Financial Resource Strain:    • Difficulty of Paying Living Expenses:    Food Insecurity:    • Worried About Running Out of Food in the Last Year:    • Ran Out of Food in the Last Year:    Transportation Needs:    • Lack of Transportation (Medical):    • Lack of Transportation (Non-Medical):    Physical Activity:    • Days of Exercise per Week:    • Minutes of Exercise per Session:    Stress:    • Feeling of Stress :    Social Connections:    • Frequency of Communication with Friends and Family:    • Frequency of Social Gatherings with Friends and Family:    • Attends [employer] Services:    • Active Member of Clubs or Organizations:    • Attends Club or Organization Meetings:    • Marital Status:    Intimate Partner Violence:    • Fear of Current or Ex-Partner:    • Emotionally Abused:    • Physically Abused:    • Sexually Abused:         Family History:  History reviewed. No pertinent family history.    Depression and Opioid Screening  PHQ-9:  Depression Screen (PHQ-2/PHQ-9) 5/14/2021 5/15/2021 5/16/2021   PHQ-2 Total Score 1 1 1   PHQ-9 Total Score 5 5 5      Interpretation of PHQ-9 Total Score   Score Severity   1-4 No Depression   5-9 Mild Depression   10-14 Moderate Depression   15-19 Moderately Severe Depression   20-27 Severe Depression     OBJECTIVE:   Vital Signs:  Vitals:    06/08/21 1422   BP: 120/70   Pulse: 70   Resp: 18   Temp: 36.4 °C (97.6 °F)        Pertinent Labs:  Lab Results   Component Value Date/Time    SODIUM 141 05/26/2021 05:42 AM    POTASSIUM 4.0 05/26/2021 05:42 AM    CHLORIDE 106 05/26/2021 05:42 AM    CO2 27 05/26/2021 05:42 AM    GLUCOSE 95 05/26/2021 05:42 AM    BUN 19 05/26/2021 05:42 AM    CREATININE 0.98 05/26/2021 05:42 AM    CREATININE 1.3 04/04/2008 03:05 AM       Lab Results   Component Value Date/Time    HBA1C 5.9 (H) 04/01/2021 02:45 AM       Lab Results   Component Value Date/Time    WBC 5.0 05/26/2021 05:42 AM    RBC 4.37 (L) 05/26/2021 05:42 AM    HEMOGLOBIN 12.8 (L) 05/26/2021 05:42 AM    HEMATOCRIT 38.4 (L) 05/26/2021 05:42 AM    MCV 87.9 05/26/2021 05:42 AM    MCH 29.3 05/26/2021 05:42 AM    MCHC 33.3 (L) 05/26/2021 05:42 AM    MPV 9.4 05/26/2021 05:42 AM    NEUTSPOLYS 62.30 05/12/2021 05:53 AM    LYMPHOCYTES 25.80 05/12/2021 05:53 AM    MONOCYTES 8.90 05/12/2021 05:53 AM    EOSINOPHILS 2.30 05/12/2021 05:53 AM    BASOPHILS 0.20 05/12/2021 05:53 AM       Lab Results   Component Value Date/Time    ASTSGOT 21 05/27/2021 06:17 AM    ALTSGPT 55 (H) 05/27/2021 06:17 AM        Physical Exam:   GEN: No apparent distress  HEENT: Head normocephalic, atraumatic.  Sclera nonicteric bilaterally, no ocular discharge appreciated bilaterally. Helmet donned. Left craniectomy.  CV: Extremities warm and well-perfused, dependent edema appreciated left ankle and left upper extremity  PULMONARY: Breathing nonlabored on room air, no respiratory accessory muscle use.  Not requiring supplemental oxygen.  ABD: Soft, nontender.  SKIN: No appreciable skin breakdown on exposed areas of skin.  PSYCH: Mood and affect within normal limits.  NEURO: Awake  alert.  Conversational.  Logical thought content. Answers questions appropriately.    Motor Exam Upper Extremities   ? Myotome R L   Shoulder Abduction C5 5 0   Elbow flexion C5 5 0   Wrist extension C6 5 0   Elbow extension C7 5 0   Finger flexion C8 5 0   Finger abduction T1 5 0     Motor Exam Lower Extremities  ? Myotome R L   Hip flexion L2 5 0   Knee extension L3 5 0   Ankle dorsiflexion L4 5 0   Toe extension L5 5 0   Ankle plantarflexion S1 5 0     Left bicep 1/4  Left tricep 1/4  Left wrist flexors 2/4  Left finger flexors 1+/4  Left plantar flexors 2/4  Positive clonus left ankle (5-6 beats)    Imaging:   MRI brain 4/1/2021  Very large acute right MCA territory infarct as detailed above with small amount of petechial hemorrhage in the right insular region and right temporal lobe.  Punctate right thalamic lacunar infarct.  Right ICA and M1 MCA occlusion.    ASSESSMENT/PLAN: Thong Arzola  is a 56 y.o. male with rehabilitation history significant for large right ICA/MCA ischemic stroke 3/31/2021 in setting of paroxysmal off of anticoagulation s/p craniectomy 4/3/2021 by Dr. Payton, here for evaluation. The following plan was discussed with the patient who is in agreement.     Visit Diagnoses     ICD-10-CM   1. Hemiplegia and hemiparesis following unspecified cerebrovascular disease affecting left non-dominant side ([facility])  I69.954   2. Other muscle spasm  M62.838   3. Spastic hemiplegia of left nondominant side as late effect of other cerebrovascular disease ([facility])  I69.854   4. Left hemiparesis ([facility])  G81.94   5. Acute right MCA stroke ([facility])  I63.511   6. Spasticity  R25.2      Wife and sister assist with history    Rehab/Neuro:   1. Large right ICA/MCA ischemic stroke 3/31/2021 in setting of paroxysmal off of anticoagulation s/p craniectomy 4/3/2021 by Dr. Payton   2. Left hemiplegia  -Records reviewed as aforementioned in the HPI.  -Med management: Anticoagulation with Xarelto for secondary stroke  prevention  -Med management: Atorvastatin 40 mg q. evening, reduced from 80 mg due to elevated LFTs.  -Lab review: After statin reduction-ALT normalizing, still minimally elevated at 55 on 5/27/2021. AST within normal limits.  -Therapy: Rehab without walls  -Occupation: Discussed at next visit  -Driving status: Currently not driving.    Spasticity: Present. Extensive discussion/counseling today regarding different management modalities of spasticity including conservative, pharmacologic, interventional including Botox. Plan to hold on Botox for today while patient and wife discuss management options in which way they would like to move forward with.  -Therapy: Continue occupational therapy and physical therapy with rehab without walls.  -Equipment: Resting hand orthotic/paddle and AFO.  -Continue range of motion and stretching  -Med management: Continue baclofen 20/20/30.  -Med management: Start dantrolene 25 mg with slow titration to ultimate dosing of 3 times daily.  -Titration program:   -Continue baclofen 20 mg/20 mg/20mg (reduce from 30mg at night) and start dantrolene 25 mg at night x5 days   -Reduce baclofen to 10 mg/20 mg/20 mg and start dantrolene 25 mg in the morning and continue 25 mg at night x5 days   -Reduce baclofen to 10 mg/10 mg/20 mg and start dantrolene 25 mg/25mg/25mg x 5 days   -Reduce baclofen to 10 mg/10 mg/10 mg and continue dantrolene 25 mg 3 times daily until next visit  -CMP to evaluate LFTs. ALT very mildly elevated at 55 (normal parameter less than 50) and was normalizing after reduction of statin.     Neurogenic Bladder:   -Status: Brewer catheter removed this morning by urology [place]. Voiding volitionally with some urgency.    Neurogenic Bowel:   -Med management: Senokot as needed    Sleep: Poor sleep quality  -Med management: Trial melatonin q. Evening  -Med management: Continue Remeron 15 mg nightly for now    LUE + LLE swelling: Dependent edema. Patient on Xarelto, low suspicion for  thrombus.  -Conservative: Compression stockings for leg and compression glove for arm  -Monitor. Counseled on dependent edema.    Follow up: 4 weeks for reevaluation    Total time spent was 60 minutes.  Included in this time is the time spent preparing for the visit including record review, my exam and evaluation, counseling and education regarding that which is aforementioned in the assessment and plan. Time was spent ordering the appropriate labs, tests, procedures, referrals, medications. Included this time as the time spent obtaining history from someone other than the patient. Discussion involved the patient and wife, sister.    Please note that this dictation was created using voice recognition software. I have made every reasonable attempt to correct obvious errors but there may be errors of grammar and content that I may have overlooked prior to finalization of this note.    Dr. Zulema Dumont DO, MS  Department of Physical Medicine & Rehabilitation  Neuro Rehabilitation Clinic  [facility]

## 2021-06-08 NOTE — PROCEDURES
[facility]  Physical Medicine & Rehabilitation Clinic  [address] [place] [address] [place] [address]  Ph: [phone]    PROCEDURE NOTE    Procedure: Botulinum Injection***  Primary Diagnosis & Secondary Diagnoses:   {No diagnosis found. (Refresh or delete this SmartLink)}      INFORMED CONSENT   The risks and benefits of the procedure were explained to the patient, and all questions were answered. Benefits of the injection include spasticity reduction by decrease in muscle activation following toxin injection. Adverse effects from toxin injection include but are not limited to: excessive weakness, infection, breathing and/or swallowing difficulty.  Common adverse effects from the injection itself are pain and bruising. The patient demonstrated good understanding of risks and benefits and consents to botulinum toxin injection.     Received botulinum toxin product in last 3 mos: No  Have recently received abx (aminoglycosides): No  Take anti-spasticity medications: ***  Take allergy/cold medicine:  ***  Take a sleep medicine: ***  Lactating/pregnant: No  Known NMJ disease: No  Human albumin allergy: No    Pre-procedure time out was performed.     PROCEDURE:  Usual sterile procedure was observed with sterile prep with chlorhexidine. Ultrasound was used for needle guidance for the injections in the following muscles. A negative aspiration of blood was obtained, and the following was injected into each muscle.  ***    Injection sites were cleaned with alcohol and band aid was applied afterwards.  The patient tolerated the procedure well.  There were no complications.  The images were uploaded for permanent storage    MEDICATION USED:  200 units of botulinum toxin type A, mixed with 4mL of sterile, preservative-free normal saline in four 1cc syringes, for a total of 50 units in 1 mL. Repeated for other vials.     Total units injected: 400 units  Total units discarded: 0 units    See MAR for Botox details.     Counseling: Pt was  instructed to seek immediate medical attention if fever, difficulty breathing, difficulty swallowing, or other concerning sxs arise. RTC in 2-4 weeks to investigate for effect at peak.    Additional Plan: ***      BOTOX  NDC 9338-2210-90  Lot  ***  Exp ***    Dr. Zulema Dumont DO, MS  Department of Physical Medicine & Rehabilitation  Neuro Rehabilitation Clinic  [facility]

## 2021-06-16 ENCOUNTER — PRE-ADMISSION TESTING (OUTPATIENT)
Dept: ADMISSIONS | Facility: MEDICAL CENTER | Age: 56
DRG: 981 | End: 2021-06-16
Attending: NEUROLOGICAL SURGERY
Payer: COMMERCIAL

## 2021-06-16 ENCOUNTER — HOSPITAL ENCOUNTER (OUTPATIENT)
Dept: RADIOLOGY | Facility: MEDICAL CENTER | Age: 56
DRG: 981 | End: 2021-06-16
Attending: NEUROLOGICAL SURGERY | Admitting: NEUROLOGICAL SURGERY
Payer: COMMERCIAL

## 2021-06-16 DIAGNOSIS — Z01.810 PRE-OPERATIVE CARDIOVASCULAR EXAMINATION: ICD-10-CM

## 2021-06-16 DIAGNOSIS — Z01.811 PRE-OPERATIVE RESPIRATORY EXAMINATION: ICD-10-CM

## 2021-06-16 DIAGNOSIS — Z01.812 PRE-OPERATIVE LABORATORY EXAMINATION: ICD-10-CM

## 2021-06-16 LAB
ANION GAP SERPL CALC-SCNC: 10 MMOL/L (ref 7–16)
APTT PPP: 31.3 SEC (ref 24.7–36)
BASOPHILS # BLD AUTO: 0.4 % (ref 0–1.8)
BASOPHILS # BLD: 0.02 K/UL (ref 0–0.12)
BUN SERPL-MCNC: 20 MG/DL (ref 8–22)
CALCIUM SERPL-MCNC: 9.5 MG/DL (ref 8.5–10.5)
CHLORIDE SERPL-SCNC: 107 MMOL/L (ref 96–112)
CO2 SERPL-SCNC: 26 MMOL/L (ref 20–33)
CREAT SERPL-MCNC: 1.06 MG/DL (ref 0.5–1.4)
EKG IMPRESSION: NORMAL
EOSINOPHIL # BLD AUTO: 0.36 K/UL (ref 0–0.51)
EOSINOPHIL NFR BLD: 7.3 % (ref 0–6.9)
ERYTHROCYTE [DISTWIDTH] IN BLOOD BY AUTOMATED COUNT: 41.9 FL (ref 35.9–50)
GLUCOSE SERPL-MCNC: 117 MG/DL (ref 65–99)
HCT VFR BLD AUTO: 43.4 % (ref 42–52)
HGB BLD-MCNC: 14.2 G/DL (ref 14–18)
IMM GRANULOCYTES # BLD AUTO: 0.02 K/UL (ref 0–0.11)
IMM GRANULOCYTES NFR BLD AUTO: 0.4 % (ref 0–0.9)
INR PPP: 1.08 (ref 0.87–1.13)
LYMPHOCYTES # BLD AUTO: 1.19 K/UL (ref 1–4.8)
LYMPHOCYTES NFR BLD: 24.3 % (ref 22–41)
MCH RBC QN AUTO: 28.6 PG (ref 27–33)
MCHC RBC AUTO-ENTMCNC: 32.7 G/DL (ref 33.7–35.3)
MCV RBC AUTO: 87.5 FL (ref 81.4–97.8)
MONOCYTES # BLD AUTO: 0.45 K/UL (ref 0–0.85)
MONOCYTES NFR BLD AUTO: 9.2 % (ref 0–13.4)
NEUTROPHILS # BLD AUTO: 2.86 K/UL (ref 1.82–7.42)
NEUTROPHILS NFR BLD: 58.4 % (ref 44–72)
NRBC # BLD AUTO: 0 K/UL
NRBC BLD-RTO: 0 /100 WBC
PLATELET # BLD AUTO: 314 K/UL (ref 164–446)
PMV BLD AUTO: 8.8 FL (ref 9–12.9)
POTASSIUM SERPL-SCNC: 4.5 MMOL/L (ref 3.6–5.5)
PROTHROMBIN TIME: 13.7 SEC (ref 12–14.6)
RBC # BLD AUTO: 4.96 M/UL (ref 4.7–6.1)
SODIUM SERPL-SCNC: 143 MMOL/L (ref 135–145)
WBC # BLD AUTO: 4.9 K/UL (ref 4.8–10.8)

## 2021-06-16 PROCEDURE — 93005 ELECTROCARDIOGRAM TRACING: CPT

## 2021-06-16 PROCEDURE — 85730 THROMBOPLASTIN TIME PARTIAL: CPT

## 2021-06-16 PROCEDURE — 85025 COMPLETE CBC W/AUTO DIFF WBC: CPT

## 2021-06-16 PROCEDURE — 85610 PROTHROMBIN TIME: CPT

## 2021-06-16 PROCEDURE — 36415 COLL VENOUS BLD VENIPUNCTURE: CPT

## 2021-06-16 PROCEDURE — 80048 BASIC METABOLIC PNL TOTAL CA: CPT

## 2021-06-16 PROCEDURE — 71045 X-RAY EXAM CHEST 1 VIEW: CPT

## 2021-06-16 PROCEDURE — 93010 ELECTROCARDIOGRAM REPORT: CPT | Performed by: INTERNAL MEDICINE

## 2021-06-16 ASSESSMENT — FIBROSIS 4 INDEX: FIB4 SCORE: 0.6

## 2021-06-22 ENCOUNTER — ANESTHESIA EVENT (OUTPATIENT)
Dept: SURGERY | Facility: MEDICAL CENTER | Age: 56
DRG: 981 | End: 2021-06-22
Payer: COMMERCIAL

## 2021-06-23 ENCOUNTER — ANESTHESIA (OUTPATIENT)
Dept: SURGERY | Facility: MEDICAL CENTER | Age: 56
DRG: 981 | End: 2021-06-23
Payer: COMMERCIAL

## 2021-06-23 ENCOUNTER — HOSPITAL ENCOUNTER (INPATIENT)
Facility: MEDICAL CENTER | Age: 56
LOS: 1 days | DRG: 981 | End: 2021-06-24
Attending: NEUROLOGICAL SURGERY | Admitting: NEUROLOGICAL SURGERY
Payer: COMMERCIAL

## 2021-06-23 ENCOUNTER — APPOINTMENT (OUTPATIENT)
Dept: RADIOLOGY | Facility: MEDICAL CENTER | Age: 56
DRG: 981 | End: 2021-06-23
Attending: PHYSICIAN ASSISTANT
Payer: COMMERCIAL

## 2021-06-23 DIAGNOSIS — G89.18 ACUTE POST-OPERATIVE PAIN: ICD-10-CM

## 2021-06-23 PROBLEM — I48.0 PAROXYSMAL ATRIAL FIBRILLATION (HCC): Chronic | Status: ACTIVE | Noted: 2021-03-31

## 2021-06-23 PROBLEM — N31.9 NEUROGENIC BLADDER: Status: ACTIVE | Noted: 2021-06-23

## 2021-06-23 LAB
SARS-COV+SARS-COV-2 AG RESP QL IA.RAPID: NOTDETECTED
SARS-COV-2 RNA RESP QL NAA+PROBE: NOTDETECTED
SPECIMEN SOURCE: NORMAL
SPECIMEN SOURCE: NORMAL

## 2021-06-23 PROCEDURE — C1729 CATH, DRAINAGE: HCPCS | Performed by: NEUROLOGICAL SURGERY

## 2021-06-23 PROCEDURE — 700101 HCHG RX REV CODE 250: Performed by: NEUROLOGICAL SURGERY

## 2021-06-23 PROCEDURE — A9270 NON-COVERED ITEM OR SERVICE: HCPCS | Performed by: NEUROLOGICAL SURGERY

## 2021-06-23 PROCEDURE — A9270 NON-COVERED ITEM OR SERVICE: HCPCS | Performed by: PHYSICIAN ASSISTANT

## 2021-06-23 PROCEDURE — A9270 NON-COVERED ITEM OR SERVICE: HCPCS | Performed by: ANESTHESIOLOGY

## 2021-06-23 PROCEDURE — 500331 HCHG COTTONOID, SURG PATTIE: Performed by: NEUROLOGICAL SURGERY

## 2021-06-23 PROCEDURE — 92610 EVALUATE SWALLOWING FUNCTION: CPT

## 2021-06-23 PROCEDURE — 009600Z DRAINAGE OF CEREBRAL VENTRICLE WITH DRAINAGE DEVICE, OPEN APPROACH: ICD-10-PCS | Performed by: NEUROLOGICAL SURGERY

## 2021-06-23 PROCEDURE — 160041 HCHG SURGERY MINUTES - EA ADDL 1 MIN LEVEL 4: Performed by: NEUROLOGICAL SURGERY

## 2021-06-23 PROCEDURE — 700101 HCHG RX REV CODE 250: Performed by: ANESTHESIOLOGY

## 2021-06-23 PROCEDURE — 160009 HCHG ANES TIME/MIN: Performed by: NEUROLOGICAL SURGERY

## 2021-06-23 PROCEDURE — 110454 HCHG SHELL REV 250: Performed by: NEUROLOGICAL SURGERY

## 2021-06-23 PROCEDURE — 700111 HCHG RX REV CODE 636 W/ 250 OVERRIDE (IP): Performed by: NURSE PRACTITIONER

## 2021-06-23 PROCEDURE — A9270 NON-COVERED ITEM OR SERVICE: HCPCS | Performed by: NURSE PRACTITIONER

## 2021-06-23 PROCEDURE — 160035 HCHG PACU - 1ST 60 MINS PHASE I: Performed by: NEUROLOGICAL SURGERY

## 2021-06-23 PROCEDURE — 500889 HCHG PACK, NEURO: Performed by: NEUROLOGICAL SURGERY

## 2021-06-23 PROCEDURE — 700111 HCHG RX REV CODE 636 W/ 250 OVERRIDE (IP): Performed by: ANESTHESIOLOGY

## 2021-06-23 PROCEDURE — 500075 HCHG BLADE, CLIPPER NEURO: Performed by: NEUROLOGICAL SURGERY

## 2021-06-23 PROCEDURE — 700105 HCHG RX REV CODE 258: Performed by: NEUROLOGICAL SURGERY

## 2021-06-23 PROCEDURE — 99291 CRITICAL CARE FIRST HOUR: CPT | Performed by: NURSE PRACTITIONER

## 2021-06-23 PROCEDURE — 700111 HCHG RX REV CODE 636 W/ 250 OVERRIDE (IP): Performed by: NEUROLOGICAL SURGERY

## 2021-06-23 PROCEDURE — U0003 INFECTIOUS AGENT DETECTION BY NUCLEIC ACID (DNA OR RNA); SEVERE ACUTE RESPIRATORY SYNDROME CORONAVIRUS 2 (SARS-COV-2) (CORONAVIRUS DISEASE [COVID-19]), AMPLIFIED PROBE TECHNIQUE, MAKING USE OF HIGH THROUGHPUT TECHNOLOGIES AS DESCRIBED BY CMS-2020-01-R: HCPCS

## 2021-06-23 PROCEDURE — 700102 HCHG RX REV CODE 250 W/ 637 OVERRIDE(OP): Performed by: PHYSICIAN ASSISTANT

## 2021-06-23 PROCEDURE — 70450 CT HEAD/BRAIN W/O DYE: CPT

## 2021-06-23 PROCEDURE — 160036 HCHG PACU - EA ADDL 30 MINS PHASE I: Performed by: NEUROLOGICAL SURGERY

## 2021-06-23 PROCEDURE — 700105 HCHG RX REV CODE 258: Performed by: ANESTHESIOLOGY

## 2021-06-23 PROCEDURE — 501838 HCHG SUTURE GENERAL: Performed by: NEUROLOGICAL SURGERY

## 2021-06-23 PROCEDURE — U0005 INFEC AGEN DETEC AMPLI PROBE: HCPCS

## 2021-06-23 PROCEDURE — 0NS504Z REPOSITION RIGHT TEMPORAL BONE WITH INTERNAL FIXATION DEVICE, OPEN APPROACH: ICD-10-PCS | Performed by: NEUROLOGICAL SURGERY

## 2021-06-23 PROCEDURE — 160002 HCHG RECOVERY MINUTES (STAT): Performed by: NEUROLOGICAL SURGERY

## 2021-06-23 PROCEDURE — 700102 HCHG RX REV CODE 250 W/ 637 OVERRIDE(OP): Performed by: ANESTHESIOLOGY

## 2021-06-23 PROCEDURE — 160029 HCHG SURGERY MINUTES - 1ST 30 MINS LEVEL 4: Performed by: NEUROLOGICAL SURGERY

## 2021-06-23 PROCEDURE — 700101 HCHG RX REV CODE 250: Performed by: PHYSICIAN ASSISTANT

## 2021-06-23 PROCEDURE — C1713 ANCHOR/SCREW BN/BN,TIS/BN: HCPCS | Performed by: NEUROLOGICAL SURGERY

## 2021-06-23 PROCEDURE — 770022 HCHG ROOM/CARE - ICU (200)

## 2021-06-23 PROCEDURE — 500367 HCHG DRAIN KIT, HEMOVAC: Performed by: NEUROLOGICAL SURGERY

## 2021-06-23 PROCEDURE — 700111 HCHG RX REV CODE 636 W/ 250 OVERRIDE (IP): Performed by: PHYSICIAN ASSISTANT

## 2021-06-23 PROCEDURE — 160048 HCHG OR STATISTICAL LEVEL 1-5: Performed by: NEUROLOGICAL SURGERY

## 2021-06-23 PROCEDURE — 700102 HCHG RX REV CODE 250 W/ 637 OVERRIDE(OP): Performed by: NURSE PRACTITIONER

## 2021-06-23 PROCEDURE — 87426 SARSCOV CORONAVIRUS AG IA: CPT

## 2021-06-23 DEVICE — SCREW STRYK NC 1.5X5MM (6NCX40=240) (80EA/PK) CONSIGNED QTY 240 PRE-LOAD: Type: IMPLANTABLE DEVICE | Site: CRANIAL | Status: FUNCTIONAL

## 2021-06-23 DEVICE — SCREW STRYK NC 1.5X4MM (6NCX40=240) CONSIGNED QTY 240 PRE-LOAD 80/PK: Type: IMPLANTABLE DEVICE | Site: CRANIAL | Status: FUNCTIONAL

## 2021-06-23 DEVICE — PLATE NC BURR HOLE COVER FOR SHUNT 20MM (6NCX4=24): Type: IMPLANTABLE DEVICE | Site: CRANIAL | Status: FUNCTIONAL

## 2021-06-23 DEVICE — CATHETER VENTRICLEAR EVD ANTIOBITIC (5EA/PK): Type: IMPLANTABLE DEVICE | Site: BRAIN | Status: FUNCTIONAL

## 2021-06-23 DEVICE — PLATE NC BURR HOLE COVER FOR SHUNT 14MM (6NCX6=36): Type: IMPLANTABLE DEVICE | Site: CRANIAL | Status: FUNCTIONAL

## 2021-06-23 DEVICE — PLATE NC STRAIGHT 4 HOLE WITH BAR (6NCX4=24): Type: IMPLANTABLE DEVICE | Site: CRANIAL | Status: FUNCTIONAL

## 2021-06-23 RX ORDER — HYDROMORPHONE HYDROCHLORIDE 1 MG/ML
0.5 INJECTION, SOLUTION INTRAMUSCULAR; INTRAVENOUS; SUBCUTANEOUS
Status: DISCONTINUED | OUTPATIENT
Start: 2021-06-23 | End: 2021-06-24 | Stop reason: HOSPADM

## 2021-06-23 RX ORDER — LEVETIRACETAM 5 MG/ML
500 INJECTION INTRAVASCULAR EVERY 12 HOURS
Status: DISCONTINUED | OUTPATIENT
Start: 2021-06-23 | End: 2021-06-24 | Stop reason: HOSPADM

## 2021-06-23 RX ORDER — OXYCODONE HCL 5 MG/5 ML
10 SOLUTION, ORAL ORAL
Status: COMPLETED | OUTPATIENT
Start: 2021-06-23 | End: 2021-06-23

## 2021-06-23 RX ORDER — HALOPERIDOL 5 MG/ML
1 INJECTION INTRAMUSCULAR
Status: DISCONTINUED | OUTPATIENT
Start: 2021-06-23 | End: 2021-06-23 | Stop reason: HOSPADM

## 2021-06-23 RX ORDER — DIPHENHYDRAMINE HYDROCHLORIDE 50 MG/ML
25 INJECTION INTRAMUSCULAR; INTRAVENOUS EVERY 6 HOURS PRN
Status: DISCONTINUED | OUTPATIENT
Start: 2021-06-23 | End: 2021-06-24 | Stop reason: HOSPADM

## 2021-06-23 RX ORDER — ONDANSETRON 2 MG/ML
4 INJECTION INTRAMUSCULAR; INTRAVENOUS EVERY 4 HOURS PRN
Status: DISCONTINUED | OUTPATIENT
Start: 2021-06-23 | End: 2021-06-24 | Stop reason: HOSPADM

## 2021-06-23 RX ORDER — ATORVASTATIN CALCIUM 40 MG/1
40 TABLET, FILM COATED ORAL EVERY EVENING
Status: DISCONTINUED | OUTPATIENT
Start: 2021-06-24 | End: 2021-06-24 | Stop reason: HOSPADM

## 2021-06-23 RX ORDER — MIRTAZAPINE 15 MG/1
15 TABLET, ORALLY DISINTEGRATING ORAL
Status: DISCONTINUED | OUTPATIENT
Start: 2021-06-23 | End: 2021-06-24 | Stop reason: HOSPADM

## 2021-06-23 RX ORDER — DOCUSATE SODIUM 100 MG/1
100 CAPSULE, LIQUID FILLED ORAL 2 TIMES DAILY
Status: DISCONTINUED | OUTPATIENT
Start: 2021-06-23 | End: 2021-06-24 | Stop reason: HOSPADM

## 2021-06-23 RX ORDER — CEFAZOLIN SODIUM 1 G/3ML
INJECTION, POWDER, FOR SOLUTION INTRAMUSCULAR; INTRAVENOUS PRN
Status: DISCONTINUED | OUTPATIENT
Start: 2021-06-23 | End: 2021-06-23 | Stop reason: SURG

## 2021-06-23 RX ORDER — AMOXICILLIN 250 MG
1 CAPSULE ORAL NIGHTLY
Status: DISCONTINUED | OUTPATIENT
Start: 2021-06-23 | End: 2021-06-24 | Stop reason: HOSPADM

## 2021-06-23 RX ORDER — ACETAMINOPHEN 500 MG
1000 TABLET ORAL EVERY 6 HOURS
Status: DISCONTINUED | OUTPATIENT
Start: 2021-06-23 | End: 2021-06-24 | Stop reason: HOSPADM

## 2021-06-23 RX ORDER — ENEMA 19; 7 G/133ML; G/133ML
1 ENEMA RECTAL
Status: DISCONTINUED | OUTPATIENT
Start: 2021-06-23 | End: 2021-06-24 | Stop reason: HOSPADM

## 2021-06-23 RX ORDER — BACLOFEN 10 MG/1
20 TABLET ORAL 2 TIMES DAILY
Status: DISCONTINUED | OUTPATIENT
Start: 2021-06-23 | End: 2021-06-24 | Stop reason: HOSPADM

## 2021-06-23 RX ORDER — DEXAMETHASONE SODIUM PHOSPHATE 4 MG/ML
4 INJECTION, SOLUTION INTRA-ARTICULAR; INTRALESIONAL; INTRAMUSCULAR; INTRAVENOUS; SOFT TISSUE
Status: DISCONTINUED | OUTPATIENT
Start: 2021-06-23 | End: 2021-06-24 | Stop reason: HOSPADM

## 2021-06-23 RX ORDER — CEFAZOLIN SODIUM 1 G/3ML
INJECTION, POWDER, FOR SOLUTION INTRAMUSCULAR; INTRAVENOUS
Status: DISCONTINUED | OUTPATIENT
Start: 2021-06-23 | End: 2021-06-23 | Stop reason: HOSPADM

## 2021-06-23 RX ORDER — DEXTROSE MONOHYDRATE, SODIUM CHLORIDE, AND POTASSIUM CHLORIDE 50; 1.49; 9 G/1000ML; G/1000ML; G/1000ML
INJECTION, SOLUTION INTRAVENOUS CONTINUOUS
Status: DISCONTINUED | OUTPATIENT
Start: 2021-06-23 | End: 2021-06-23

## 2021-06-23 RX ORDER — OXYCODONE HYDROCHLORIDE 10 MG/1
10 TABLET ORAL
Status: DISCONTINUED | OUTPATIENT
Start: 2021-06-23 | End: 2021-06-24 | Stop reason: HOSPADM

## 2021-06-23 RX ORDER — HYDROMORPHONE HYDROCHLORIDE 1 MG/ML
0.1 INJECTION, SOLUTION INTRAMUSCULAR; INTRAVENOUS; SUBCUTANEOUS
Status: DISCONTINUED | OUTPATIENT
Start: 2021-06-23 | End: 2021-06-23 | Stop reason: HOSPADM

## 2021-06-23 RX ORDER — PROCHLORPERAZINE EDISYLATE 5 MG/ML
10 INJECTION INTRAMUSCULAR; INTRAVENOUS EVERY 6 HOURS PRN
Status: DISCONTINUED | OUTPATIENT
Start: 2021-06-23 | End: 2021-06-24 | Stop reason: HOSPADM

## 2021-06-23 RX ORDER — POLYETHYLENE GLYCOL 3350 17 G/17G
1 POWDER, FOR SOLUTION ORAL 2 TIMES DAILY PRN
Status: DISCONTINUED | OUTPATIENT
Start: 2021-06-23 | End: 2021-06-24 | Stop reason: HOSPADM

## 2021-06-23 RX ORDER — LABETALOL HYDROCHLORIDE 5 MG/ML
5 INJECTION, SOLUTION INTRAVENOUS
Status: COMPLETED | OUTPATIENT
Start: 2021-06-23 | End: 2021-06-23

## 2021-06-23 RX ORDER — SCOLOPAMINE TRANSDERMAL SYSTEM 1 MG/1
1 PATCH, EXTENDED RELEASE TRANSDERMAL
Status: DISCONTINUED | OUTPATIENT
Start: 2021-06-23 | End: 2021-06-24 | Stop reason: HOSPADM

## 2021-06-23 RX ORDER — GABAPENTIN 100 MG/1
200 CAPSULE ORAL EVERY EVENING
Status: DISCONTINUED | OUTPATIENT
Start: 2021-06-23 | End: 2021-06-24 | Stop reason: HOSPADM

## 2021-06-23 RX ORDER — DEXMEDETOMIDINE HYDROCHLORIDE 100 UG/ML
INJECTION, SOLUTION INTRAVENOUS PRN
Status: DISCONTINUED | OUTPATIENT
Start: 2021-06-23 | End: 2021-06-23 | Stop reason: SURG

## 2021-06-23 RX ORDER — HYDROMORPHONE HYDROCHLORIDE 2 MG/ML
INJECTION, SOLUTION INTRAMUSCULAR; INTRAVENOUS; SUBCUTANEOUS PRN
Status: DISCONTINUED | OUTPATIENT
Start: 2021-06-23 | End: 2021-06-23 | Stop reason: SURG

## 2021-06-23 RX ORDER — ONDANSETRON 2 MG/ML
INJECTION INTRAMUSCULAR; INTRAVENOUS PRN
Status: DISCONTINUED | OUTPATIENT
Start: 2021-06-23 | End: 2021-06-23 | Stop reason: SURG

## 2021-06-23 RX ORDER — HYDROMORPHONE HYDROCHLORIDE 1 MG/ML
0.2 INJECTION, SOLUTION INTRAMUSCULAR; INTRAVENOUS; SUBCUTANEOUS
Status: DISCONTINUED | OUTPATIENT
Start: 2021-06-23 | End: 2021-06-23 | Stop reason: HOSPADM

## 2021-06-23 RX ORDER — ACETAMINOPHEN 500 MG
1000 TABLET ORAL ONCE
Status: COMPLETED | OUTPATIENT
Start: 2021-06-23 | End: 2021-06-23

## 2021-06-23 RX ORDER — UREA 10 %
3 LOTION (ML) TOPICAL
Refills: 2 | Status: DISCONTINUED | OUTPATIENT
Start: 2021-06-23 | End: 2021-06-24 | Stop reason: HOSPADM

## 2021-06-23 RX ORDER — SODIUM CHLORIDE 9 MG/ML
INJECTION, SOLUTION INTRAVENOUS
Status: DISCONTINUED | OUTPATIENT
Start: 2021-06-23 | End: 2021-06-23 | Stop reason: SURG

## 2021-06-23 RX ORDER — BISACODYL 10 MG
10 SUPPOSITORY, RECTAL RECTAL
Status: DISCONTINUED | OUTPATIENT
Start: 2021-06-23 | End: 2021-06-24 | Stop reason: HOSPADM

## 2021-06-23 RX ORDER — ACETAMINOPHEN 500 MG
1000 TABLET ORAL EVERY 6 HOURS PRN
Status: DISCONTINUED | OUTPATIENT
Start: 2021-06-28 | End: 2021-06-24 | Stop reason: HOSPADM

## 2021-06-23 RX ORDER — LEVETIRACETAM 500 MG/5ML
INJECTION, SOLUTION, CONCENTRATE INTRAVENOUS PRN
Status: DISCONTINUED | OUTPATIENT
Start: 2021-06-23 | End: 2021-06-23 | Stop reason: SURG

## 2021-06-23 RX ORDER — BACLOFEN 10 MG/1
30 TABLET ORAL
Status: DISCONTINUED | OUTPATIENT
Start: 2021-06-23 | End: 2021-06-24 | Stop reason: HOSPADM

## 2021-06-23 RX ORDER — BUPIVACAINE HYDROCHLORIDE AND EPINEPHRINE 5; 5 MG/ML; UG/ML
INJECTION, SOLUTION PERINEURAL
Status: DISCONTINUED | OUTPATIENT
Start: 2021-06-23 | End: 2021-06-23 | Stop reason: HOSPADM

## 2021-06-23 RX ORDER — AMOXICILLIN 250 MG
1 CAPSULE ORAL
Status: DISCONTINUED | OUTPATIENT
Start: 2021-06-23 | End: 2021-06-24 | Stop reason: HOSPADM

## 2021-06-23 RX ORDER — CEFAZOLIN SODIUM 2 G/100ML
2 INJECTION, SOLUTION INTRAVENOUS EVERY 8 HOURS
Status: COMPLETED | OUTPATIENT
Start: 2021-06-23 | End: 2021-06-24

## 2021-06-23 RX ORDER — HYDRALAZINE HYDROCHLORIDE 20 MG/ML
10 INJECTION INTRAMUSCULAR; INTRAVENOUS
Status: DISCONTINUED | OUTPATIENT
Start: 2021-06-23 | End: 2021-06-24 | Stop reason: HOSPADM

## 2021-06-23 RX ORDER — ROCURONIUM BROMIDE 10 MG/ML
INJECTION, SOLUTION INTRAVENOUS PRN
Status: DISCONTINUED | OUTPATIENT
Start: 2021-06-23 | End: 2021-06-23 | Stop reason: SURG

## 2021-06-23 RX ORDER — THYROID 30 MG/1
60 TABLET ORAL 2 TIMES DAILY
Status: DISCONTINUED | OUTPATIENT
Start: 2021-06-23 | End: 2021-06-24 | Stop reason: HOSPADM

## 2021-06-23 RX ORDER — DANTROLENE SODIUM 25 MG/1
25 CAPSULE ORAL 2 TIMES DAILY
Status: DISCONTINUED | OUTPATIENT
Start: 2021-06-23 | End: 2021-06-24 | Stop reason: HOSPADM

## 2021-06-23 RX ORDER — CLONIDINE HYDROCHLORIDE 0.1 MG/1
0.1 TABLET ORAL EVERY 4 HOURS PRN
Status: DISCONTINUED | OUTPATIENT
Start: 2021-06-23 | End: 2021-06-24 | Stop reason: HOSPADM

## 2021-06-23 RX ORDER — ONDANSETRON 2 MG/ML
4 INJECTION INTRAMUSCULAR; INTRAVENOUS
Status: DISCONTINUED | OUTPATIENT
Start: 2021-06-23 | End: 2021-06-23 | Stop reason: HOSPADM

## 2021-06-23 RX ORDER — SODIUM CHLORIDE, SODIUM LACTATE, POTASSIUM CHLORIDE, CALCIUM CHLORIDE 600; 310; 30; 20 MG/100ML; MG/100ML; MG/100ML; MG/100ML
INJECTION, SOLUTION INTRAVENOUS CONTINUOUS
Status: ACTIVE | OUTPATIENT
Start: 2021-06-23 | End: 2021-06-23

## 2021-06-23 RX ORDER — MANNITOL 20 G/100ML
INJECTION, SOLUTION INTRAVENOUS PRN
Status: DISCONTINUED | OUTPATIENT
Start: 2021-06-23 | End: 2021-06-23 | Stop reason: SURG

## 2021-06-23 RX ORDER — LABETALOL HYDROCHLORIDE 5 MG/ML
10 INJECTION, SOLUTION INTRAVENOUS
Status: DISCONTINUED | OUTPATIENT
Start: 2021-06-23 | End: 2021-06-24 | Stop reason: HOSPADM

## 2021-06-23 RX ORDER — LIDOCAINE HYDROCHLORIDE 40 MG/ML
SOLUTION TOPICAL PRN
Status: DISCONTINUED | OUTPATIENT
Start: 2021-06-23 | End: 2021-06-23 | Stop reason: SURG

## 2021-06-23 RX ORDER — OXYCODONE HCL 5 MG/5 ML
5 SOLUTION, ORAL ORAL
Status: COMPLETED | OUTPATIENT
Start: 2021-06-23 | End: 2021-06-23

## 2021-06-23 RX ORDER — OXYCODONE HYDROCHLORIDE 5 MG/1
5 TABLET ORAL
Status: DISCONTINUED | OUTPATIENT
Start: 2021-06-23 | End: 2021-06-24 | Stop reason: HOSPADM

## 2021-06-23 RX ORDER — HYDROMORPHONE HYDROCHLORIDE 1 MG/ML
0.4 INJECTION, SOLUTION INTRAMUSCULAR; INTRAVENOUS; SUBCUTANEOUS
Status: DISCONTINUED | OUTPATIENT
Start: 2021-06-23 | End: 2021-06-23 | Stop reason: HOSPADM

## 2021-06-23 RX ORDER — DIPHENHYDRAMINE HCL 25 MG
25 TABLET ORAL EVERY 6 HOURS PRN
Status: DISCONTINUED | OUTPATIENT
Start: 2021-06-23 | End: 2021-06-24 | Stop reason: HOSPADM

## 2021-06-23 RX ADMIN — BACLOFEN 30 MG: 10 TABLET ORAL at 21:12

## 2021-06-23 RX ADMIN — SCOPALAMINE 1 PATCH: 1 PATCH, EXTENDED RELEASE TRANSDERMAL at 15:00

## 2021-06-23 RX ADMIN — LEVETIRACETAM INJECTION 500 MG: 5 INJECTION INTRAVENOUS at 17:21

## 2021-06-23 RX ADMIN — MINERAL OIL, PETROLATUM 1 APPLICATION: 425; 573 OINTMENT OPHTHALMIC at 13:15

## 2021-06-23 RX ADMIN — OXYCODONE HYDROCHLORIDE 10 MG: 5 SOLUTION ORAL at 09:46

## 2021-06-23 RX ADMIN — METOPROLOL TARTRATE 25 MG: 25 TABLET, FILM COATED ORAL at 17:20

## 2021-06-23 RX ADMIN — FENTANYL CITRATE 50 MCG: 50 INJECTION, SOLUTION INTRAMUSCULAR; INTRAVENOUS at 11:28

## 2021-06-23 RX ADMIN — FENTANYL CITRATE 50 MCG: 50 INJECTION, SOLUTION INTRAMUSCULAR; INTRAVENOUS at 09:45

## 2021-06-23 RX ADMIN — SODIUM CHLORIDE: 9 INJECTION, SOLUTION INTRAVENOUS at 08:28

## 2021-06-23 RX ADMIN — POTASSIUM CHLORIDE, DEXTROSE MONOHYDRATE AND SODIUM CHLORIDE: 150; 5; 900 INJECTION, SOLUTION INTRAVENOUS at 13:16

## 2021-06-23 RX ADMIN — DEXMEDETOMIDINE 20 MCG: 200 INJECTION, SOLUTION INTRAVENOUS at 08:50

## 2021-06-23 RX ADMIN — LABETALOL HYDROCHLORIDE 5 MG: 5 INJECTION, SOLUTION INTRAVENOUS at 10:30

## 2021-06-23 RX ADMIN — ACETAMINOPHEN 1000 MG: 500 TABLET ORAL at 17:18

## 2021-06-23 RX ADMIN — HYDROMORPHONE HYDROCHLORIDE 2 MG: 2 INJECTION, SOLUTION INTRAMUSCULAR; INTRAVENOUS; SUBCUTANEOUS at 07:22

## 2021-06-23 RX ADMIN — LABETALOL HYDROCHLORIDE 5 MG: 5 INJECTION, SOLUTION INTRAVENOUS at 09:20

## 2021-06-23 RX ADMIN — BACLOFEN 20 MG: 10 TABLET ORAL at 15:29

## 2021-06-23 RX ADMIN — LABETALOL HYDROCHLORIDE 5 MG: 5 INJECTION, SOLUTION INTRAVENOUS at 11:10

## 2021-06-23 RX ADMIN — DOCUSATE SODIUM 100 MG: 100 CAPSULE, LIQUID FILLED ORAL at 17:19

## 2021-06-23 RX ADMIN — SODIUM CHLORIDE: 9 INJECTION, SOLUTION INTRAVENOUS at 07:21

## 2021-06-23 RX ADMIN — ACETAMINOPHEN 1000 MG: 500 TABLET ORAL at 23:05

## 2021-06-23 RX ADMIN — MIRTAZAPINE 15 MG: 15 TABLET, ORALLY DISINTEGRATING ORAL at 21:13

## 2021-06-23 RX ADMIN — LIDOCAINE HYDROCHLORIDE 4 ML: 40 SOLUTION TOPICAL at 07:32

## 2021-06-23 RX ADMIN — ROCURONIUM BROMIDE 50 MG: 10 INJECTION, SOLUTION INTRAVENOUS at 07:30

## 2021-06-23 RX ADMIN — MANNITOL 50 G: 20 INJECTION, SOLUTION INTRAVENOUS at 07:35

## 2021-06-23 RX ADMIN — Medication 3 MG: at 21:13

## 2021-06-23 RX ADMIN — ONDANSETRON 4 MG: 2 INJECTION INTRAMUSCULAR; INTRAVENOUS at 08:58

## 2021-06-23 RX ADMIN — DANTROLENE SODIUM 25 MG: 25 CAPSULE ORAL at 17:19

## 2021-06-23 RX ADMIN — CEFAZOLIN 2 G: 330 INJECTION, POWDER, FOR SOLUTION INTRAMUSCULAR; INTRAVENOUS at 07:30

## 2021-06-23 RX ADMIN — SODIUM CHLORIDE, POTASSIUM CHLORIDE, SODIUM LACTATE AND CALCIUM CHLORIDE: 600; 310; 30; 20 INJECTION, SOLUTION INTRAVENOUS at 07:07

## 2021-06-23 RX ADMIN — DEXMEDETOMIDINE 20 MCG: 200 INJECTION, SOLUTION INTRAVENOUS at 08:44

## 2021-06-23 RX ADMIN — POVIDONE IODINE 15 ML: 100 SOLUTION TOPICAL at 07:07

## 2021-06-23 RX ADMIN — PROPOFOL 150 MG: 10 INJECTION, EMULSION INTRAVENOUS at 07:30

## 2021-06-23 RX ADMIN — ACETAMINOPHEN 1000 MG: 500 TABLET ORAL at 13:12

## 2021-06-23 RX ADMIN — DEXMEDETOMIDINE 10 MCG: 200 INJECTION, SOLUTION INTRAVENOUS at 08:58

## 2021-06-23 RX ADMIN — ONDANSETRON 4 MG: 2 INJECTION INTRAMUSCULAR; INTRAVENOUS at 12:31

## 2021-06-23 RX ADMIN — LEVETIRACETAM 500 MG: 100 INJECTION INTRAVENOUS at 07:35

## 2021-06-23 RX ADMIN — CEFAZOLIN SODIUM 2 G: 2 INJECTION, SOLUTION INTRAVENOUS at 16:21

## 2021-06-23 RX ADMIN — DOCUSATE SODIUM 50 MG AND SENNOSIDES 8.6 MG 1 TABLET: 8.6; 5 TABLET, FILM COATED ORAL at 21:12

## 2021-06-23 RX ADMIN — GABAPENTIN 200 MG: 100 CAPSULE ORAL at 17:20

## 2021-06-23 RX ADMIN — THYROID, PORCINE 60 MG: 30 TABLET ORAL at 17:21

## 2021-06-23 RX ADMIN — ACETAMINOPHEN 1000 MG: 500 TABLET ORAL at 07:07

## 2021-06-23 RX ADMIN — LABETALOL HYDROCHLORIDE 5 MG: 5 INJECTION, SOLUTION INTRAVENOUS at 09:35

## 2021-06-23 RX ADMIN — PROCHLORPERAZINE EDISYLATE 10 MG: 5 INJECTION INTRAMUSCULAR; INTRAVENOUS at 17:42

## 2021-06-23 ASSESSMENT — COGNITIVE AND FUNCTIONAL STATUS - GENERAL
TURNING FROM BACK TO SIDE WHILE IN FLAT BAD: A LITTLE
MOBILITY SCORE: 14
MOVING TO AND FROM BED TO CHAIR: A LITTLE
SUGGESTED CMS G CODE MODIFIER DAILY ACTIVITY: CL
HELP NEEDED FOR BATHING: A LOT
SUGGESTED CMS G CODE MODIFIER MOBILITY: CL
DRESSING REGULAR LOWER BODY CLOTHING: A LOT
WALKING IN HOSPITAL ROOM: A LOT
CLIMB 3 TO 5 STEPS WITH RAILING: TOTAL
EATING MEALS: A LOT
TOILETING: A LOT
STANDING UP FROM CHAIR USING ARMS: A LITTLE
PERSONAL GROOMING: A LOT
DAILY ACTIVITIY SCORE: 12
MOVING FROM LYING ON BACK TO SITTING ON SIDE OF FLAT BED: A LOT
DRESSING REGULAR UPPER BODY CLOTHING: A LOT

## 2021-06-23 ASSESSMENT — ENCOUNTER SYMPTOMS
HEADACHES: 1
CHILLS: 0
FALLS: 0
FOCAL WEAKNESS: 1
COUGH: 0
MEMORY LOSS: 0
VOMITING: 0
PALPITATIONS: 0
DEPRESSION: 1
FLANK PAIN: 0
SHORTNESS OF BREATH: 0
ABDOMINAL PAIN: 0
NAUSEA: 0
MYALGIAS: 1
FEVER: 0

## 2021-06-23 ASSESSMENT — PAIN DESCRIPTION - PAIN TYPE
TYPE: SURGICAL PAIN
TYPE: ACUTE PAIN;SURGICAL PAIN
TYPE: SURGICAL PAIN
TYPE: ACUTE PAIN;SURGICAL PAIN
TYPE: SURGICAL PAIN

## 2021-06-23 ASSESSMENT — FIBROSIS 4 INDEX
FIB4 SCORE: 0.51
FIB4 SCORE: 0.51

## 2021-06-23 ASSESSMENT — PAIN SCALES - GENERAL: PAIN_LEVEL: 5

## 2021-06-23 NOTE — OP REPORT
DATE OF SERVICE:  06/23/2021     SURGEON:  Arthur Payton MD     FIRST ASSISTANT:  Lilia Pizano PA-C     PREOPERATIVE DIAGNOSIS:  Right cranial defect.     POSTOPERATIVE DIAGNOSIS:  Right cranial defect.     PROCEDURE:  Right autologous cranioplasty.     COMPLICATIONS:  None.     IMPLANTS:  Leibinger plating system.     DRAINS:  Medium Hemovac.     ESTIMATED BLOOD LOSS:  Less than 200 mL.     BRIEF HISTORY OF PRESENT ILLNESS:  This is a gentleman who had a stroke   approximately 2 months ago.  Subsequently, was deemed appropriate to place his   cranioplasty back in after having a decompressive hemicraniectomy.  His   family was counseled on this and they wished to move forward with it.     CONSENT:  Obtained from him and his wife apprising the risks, complications,   indications of the surgery which included but not limited to infection,   hematoma, need for more surgery.  They agreed and consented.     PROCEDURE IN DETAIL:  The patient was brought to the operating room, was   placed under general anesthesia with endotracheal intubation.  His head   clipped, prepped and draped in a sterile usual fashion for a right   cranioplasty.  We then performed surgical timeout confirming correct side,   site, procedure and patient with all faculty and staff agreeing.  We then   incised his dermis using a 10 blade surgical scalpel down to the bone, used   Bovie cautery along with scalpel to reflect a myocutaneous flap anteriorly,   exposing the bone edge circumferentially around the patient's defect.  Once   that was completed, we then elevated the temporalis muscle off of the   patient's dura and reflected this anteriorly.  Once that was in place, it was   obvious the patient had a large ____ of the temporal lobe.  We did open the   dura over the temporal lobe to see if this was a CSF collection which it was   not.  We then had to move forward with placement of an EVD given that the   mannitol only produced 200 mL of  urine and he was not going to accommodate the   bone flap.  Once we placed the EVD, we were able to remove approximately   60-80 mL of CSF and then the bone flap fit nicely within the cranial cavity   defect.  Once that was completed, we then replated the bone and then placed   the bone back into the wound with LeiMetafuseder plating system, removed the EVD   from underneath, irrigated the wound thoroughly with bacitracin irrigation,   reapproximated the temporalis muscle and then closed the wound in layers using   0 Vicryls followed by surgical staples on the scalp.  We did place the medium   Hemovac prior to closure of the skin flap and the patient was extubated and   taken to PACU for recovery in stable condition.  All counts were correct x2.    My physician assistant was necessary.  She assisted with retraction of the   scalp during dissection, hemostasis and opening and closure of the wound.  I   explained the patient's case and making it faster for him.        ______________________________  MD NINI Meneses/TOMEKA/ALICIA    DD:  06/23/2021 12:00  DT:  06/23/2021 12:21    Job#:  [id number]

## 2021-06-23 NOTE — ANESTHESIA PROCEDURE NOTES
Airway    Date/Time: 6/23/2021 7:32 AM  Performed by: Maura Recinos M.D.  Authorized by: Maura Recinos M.D.     Location:  OR  Urgency:  Elective  Indications for Airway Management:  Anesthesia      Spontaneous Ventilation: absent    Sedation Level:  Deep  Preoxygenated: Yes    Patient Position:  Sniffing  Mask Difficulty Assessment:  0 - not attempted  Final Airway Type:  Endotracheal airway  Final Endotracheal Airway:  ETT  Cuffed: Yes    Technique Used for Successful ETT Placement:  Video laryngoscopy  Devices/Methods Used in Placement:  Intubating stylet    Insertion Site:  Oral  Blade Type:  Glide  Laryngoscope Blade/Videolaryngoscope Blade Size:  4  ETT Size (mm):  7.5  Leak Pressue (cm H2O):  35  Measured from:  Teeth  ETT to Teeth (cm):  23  Placement Verified by: auscultation and capnometry    Cormack-Lehane Classification:  Grade I - full view of glottis  Number of Attempts at Approach:  1   Unable to tape ETT initially 2/2 pt beard, not enough skin for purchase for tape even with benzoin.  Unable to tie tube given crani surgery.  Beard clipped on left side to facilitate ETT taping.

## 2021-06-23 NOTE — ANESTHESIA PREPROCEDURE EVALUATION
56M here for cranioplasty for repair of skull defect following craniotomy for MCA stroke    No Active Allergies     Relevant Problems   ANESTHESIA (within normal limits)      PULMONARY   (positive) History of COVID-19      NEURO   (positive) Acute right MCA stroke (HCC)   (positive) History of COVID-19      CARDIAC   (positive) Paroxysmal atrial fibrillation (HCC)      ENDO   (positive) Acquired hypothyroidism      Other   (positive) Impaired mobility and ADLs     Past Medical History:   Diagnosis Date   • Afib (HCC)    • COVID-19    • Disorder of thyroid     hypo   • High cholesterol    • Psychiatric problem     reactive depression   • Stroke (HCC) 2021    right side MCA      No current facility-administered medications on file prior to encounter.     Current Outpatient Medications on File Prior to Encounter   Medication Sig Dispense Refill   • dantrolene (DANTRIUM) 25 MG Cap Take 1 capsule by mouth 3 times a day. 90 capsule 0   • acetaminophen (TYLENOL) 325 MG Tab Take 2 Tablets by mouth every four hours as needed for Mild Pain. 30 tablet 0   • thyroid (ARMOUR THYROID) 60 MG Tab Take 1 tablet by mouth 2 times a day. 60 tablet 2   • atorvastatin (LIPITOR) 40 MG Tab Take 1 tablet by mouth every evening. 30 tablet 2   • baclofen (LIORESAL) 20 MG tablet 20 mg (1 tab) twice daily at 08:00 and 14:00 60 tablet 2   • baclofen (LIORESAL) 10 MG Tab Take 3 Tablets by mouth at bedtime. 90 tablet 2   • metoprolol tartrate (LOPRESSOR) 25 MG Tab Take 1 tablet by mouth 2 times a day. 60 tablet 2   • mirtazapine (REMERON) 15 MG TABLET DISPERSIBLE Take 1 tablet by mouth at bedtime. 30 tablet 2   • rivaroxaban (XARELTO) 20 MG Tab tablet Take 1 tablet by mouth with dinner. 30 tablet 2   • senna-docusate (PERICOLACE OR SENOKOT S) 8.6-50 MG Tab Take 2 Tablets by mouth every evening. 60 tablet 2   • gabapentin (NEURONTIN) 100 MG Cap Take 2 Capsules by mouth every evening. 30 capsule 2   • melatonin 3 MG Tab Take 1 tablet by mouth at  bedtime. 30 tablet 2      Past Surgical History:   Procedure Laterality Date   • CRANIOTOMY Right 4/3/2021    Procedure: CRANIOTOMY;  Surgeon: Arthur Payton M.D.;  Location: SURGERY [facility];  Service: Neurosurgery   • KNEE RECONSTRUCTION      left knee orthoscopic      Vitals:    06/23/21 0626   Pulse: 78   Resp: 18   Temp: 36.4 °C (97.5 °F)   SpO2: 96%      Physical Exam    Airway   Mallampati: IV  TM distance: >3 FB  Neck ROM: limited       Cardiovascular - normal exam     Dental - normal exam        Facial Hair   Pulmonary   Breath sounds clear to auscultation     Abdominal    Neurological - abnormal exam    Comments: No strength in left arm or leg. No facial asymmetry or speech deficit             Anesthesia Plan    ASA 4   ASA physical status 4 criteria: CVA or TIA - recent (< 3 months)    Plan - general       Airway plan will be ETT        Plan Factors:   Patient was previously instructed to abstain from smoking on day of procedure.  Patient did not smoke on day of procedure.      Induction: intravenous    Postoperative Plan: Postoperative administration of opioids is intended.    Pertinent diagnostic labs and testing reviewed    Informed Consent:    Anesthetic plan and risks discussed with patient.    Use of blood products discussed with: patient whom consented to blood products.

## 2021-06-23 NOTE — ASSESSMENT & PLAN NOTE
-Resume metoprolol when able to take p.o.   -Resume Xarelto when okay with neurosurgery  -Currently sinus rhythm  -Telemetry monitoring

## 2021-06-23 NOTE — CONSULTS
Critical Care Consultation    Date of consult: 6/23/2021    Referring Physician  Arthur Payton M.D.    Reason for Consultation  I was consulted for ICU admission and critical care management including frequent neurologic exams and hemodynamic monitoring.    History of Presenting Illness  Mr. Arzola is a 56 year old male with PMH significant for hypothyroidism, COVID-19 (3/18/21), HTN, HLD, and PAF rate controlled with Metoprolol not on AC who was hospitalized (3/31-4/29 and DC'd to [facility] 4/29-6/4) with left sided weakness, facial droop, and right gaze deviation. Was outside window for TPA. Imaging showed BEVERLEY complete occlusion not amendable to IR intervention. He underwent Right decompressive hemicraniectomy with expansile duraplasty on 4/3 and presented today for elective replacement of bone flap.     LUE/LLE flaccid at his baseline. Drowsy, oriented x 4, follows commands. PERRLA 5mm. Goal SBP <160, currently not requiring intervention.     Code Status  Full Code    Review of Systems  Review of Systems   Constitutional: Positive for malaise/fatigue. Negative for chills and fever.   Respiratory: Negative for cough and shortness of breath.    Cardiovascular: Negative for chest pain and palpitations.   Gastrointestinal: Negative for abdominal pain, nausea and vomiting.   Genitourinary: Negative for flank pain and hematuria.   Musculoskeletal: Positive for myalgias. Negative for falls.   Neurological: Positive for focal weakness (LUE LLE at baseline) and headaches.   Psychiatric/Behavioral: Positive for depression (at baseline). Negative for memory loss.   All other systems reviewed and are negative.      Past Medical History   has a past medical history of Afib (HCC), COVID-19, Disorder of thyroid, High cholesterol, Psychiatric problem, and Stroke (HCC) (2021).    Surgical History   has a past surgical history that includes knee reconstruction and craniotomy (Right, 4/3/2021).    Family History  family  history is not on file.    Social History   reports that he has never smoked. He has never used smokeless tobacco. He reports current alcohol use. He reports that he does not use drugs.    Medications  Home Medications     Reviewed by Du Crespo (Pharmacy [facility]) on 06/23/21 at 0713  Med List Status: Complete   Medication Last Dose Status   acetaminophen (TYLENOL) 325 MG Tab 6/16/2021 Active   atorvastatin (LIPITOR) 40 MG Tab 6/22/2021 Active   baclofen (LIORESAL) 10 MG Tab 6/22/2021 Active   baclofen (LIORESAL) 20 MG tablet 6/22/2021 Active   dantrolene (DANTRIUM) 25 MG Cap 6/22/2021 Active   gabapentin (NEURONTIN) 100 MG Cap 6/22/2021 Active   melatonin 3 MG Tab 6/22/2021 Active   metoprolol tartrate (LOPRESSOR) 25 MG Tab 6/22/2021 Active   mirtazapine (REMERON) 15 MG TABLET DISPERSIBLE 6/22/2021 Active   rivaroxaban (XARELTO) 20 MG Tab tablet 6/19/2021 Active   thyroid (ARMOUR THYROID) 60 MG Tab 6/23/2021 Active              Current Facility-Administered Medications   Medication Dose Route Frequency Provider Last Rate Last Admin   • Pharmacy Consult Request ...Pain Management Review 1 Each  1 Each Other PHARMACY TO DOSE HANSA Morris.A.-C.       • MD ALERT...DO NOT ADMINISTER NSAIDS or ASPIRIN unless ORDERED By Neurosurgery 1 Each  1 Each Other PRN Lilia Pizano, P.A.-C.       • ondansetron (ZOFRAN) syringe/vial injection 4 mg  4 mg Intravenous Q4HRS PRN Lilia Pizano, P.A.-C.   4 mg at 06/23/21 1231   • dexamethasone (DECADRON) injection 4 mg  4 mg Intravenous Once PRN Lilia Pizano, P.A.-C.       • diphenhydrAMINE (BENADRYL) injection 25 mg  25 mg Intravenous Q6HRS PRN Lilia Pizano, P.A.-C.       • scopolamine (TRANSDERM-SCOP) patch 1 Patch  1 Patch Transdermal Q72HRS PRN Lilia Pizano, P.A.-C.       • labetalol (NORMODYNE/TRANDATE) injection 10 mg  10 mg Intravenous Q HOUR PRN Lilia Pizano P.A.-C.       • hydrALAZINE (APRESOLINE) injection 10 mg  10 mg Intravenous Q  HOUR PRN HANSA Morris.A.-C.       • cloNIDine (CATAPRES) tablet 0.1 mg  0.1 mg Oral Q4HRS PRN HANSA Morris.A.-C.       • niCARdipine (CARDENE) 25 mg in  mL Infusion  0-15 mg/hr Intravenous Continuous Lilia Pizano, HANSA.A.-C.       • docusate sodium (COLACE) capsule 100 mg  100 mg Oral BID Lilia Pizano, P.A.-C.       • senna-docusate (PERICOLACE or SENOKOT S) 8.6-50 MG per tablet 1 tablet  1 tablet Oral Nightly HANSA Morris.A.-C.       • senna-docusate (PERICOLACE or SENOKOT S) 8.6-50 MG per tablet 1 tablet  1 tablet Oral Q24HRS PRN Lilia Pizano, HANSA.A.-C.       • polyethylene glycol/lytes (MIRALAX) PACKET 1 Packet  1 Packet Oral BID PRN Lilia Pizano, P.A.-C.       • magnesium hydroxide (MILK OF MAGNESIA) suspension 30 mL  30 mL Oral QDAY PRN Lilia Pizano, HANSA.A.-C.       • bisacodyl (DULCOLAX) suppository 10 mg  10 mg Rectal Q24HRS PRN Lilia Pizano, P.A.-C.       • fleet enema 133 mL  1 Each Rectal Once PRN Lilia Pizano, HANSA.A.-C.       • artificial tears (EYE LUBRICANT) ophth ointment 1 Application  1 Application Both Eyes Q8HRS Lilia Pizano, P.A.-C.       • dextrose 5 % and 0.9 % NaCl with KCl 20 mEq infusion   Intravenous Continuous Lilia Pizano, HANSA.A.-C.       • acetaminophen (TYLENOL) tablet 1,000 mg  1,000 mg Oral Q6HRS Lilia Pizano, AHNSA.A.-C.        Followed by   • [START ON 6/28/2021] acetaminophen (TYLENOL) tablet 1,000 mg  1,000 mg Oral Q6HRS PRN Lilia Pizano, HANSA.A.-C.       • oxyCODONE immediate-release (ROXICODONE) tablet 5 mg  5 mg Oral Q3HRS PRN HANSA Morris.A.-C.        Or   • oxyCODONE immediate release (ROXICODONE) tablet 10 mg  10 mg Oral Q3HRS PRN HANSA Morris.A.-C.        Or   • HYDROmorphone (Dilaudid) injection 0.5 mg  0.5 mg Intravenous Q3HRS PRN Lilia Pizano, P.A.-C.       • ceFAZolin in dextrose (ANCEF) IVPB premix 2 g  2 g Intravenous Q8HR Lilia Pizano, P.A.-C.       • levETIRAcetam (Keppra)  500 mg in 100 mL NaCl IV premix  500 mg Intravenous Q12HRS Lilia Pizano, P.A.-C.       • diphenhydrAMINE (BENADRYL) tablet/capsule 25 mg  25 mg Oral Q6HRS PRN Lilia Pizano, P.A.-C.        Or   • diphenhydrAMINE (BENADRYL) injection 25 mg  25 mg Intravenous Q6HRS PRN Lilia Pizano, P.A.-C.       • benzocaine-menthol (CEPACOL) lozenge 1 Lozenge  1 Lozenge Mouth/Throat Q2HRS PRN Lilia Pizano, P.A.-C.       • [START ON 6/24/2021] atorvastatin (LIPITOR) tablet 40 mg  40 mg Oral Q EVENING Lilia Pizano, P.A.-C.       • baclofen (LIORESAL) tablet 30 mg  30 mg Oral QHS Lilia Pizano, P.A.-C.       • dantrolene (DANTRIUM) capsule 25 mg  25 mg Oral BID Lilia Pizano, P.A.-C.       • gabapentin (NEURONTIN) capsule 200 mg  200 mg Oral Q EVENING Lilia Pizano, P.A.-C.       • melatonin tablet 3 mg  3 mg Oral QHS Lilia Pizano, P.A.-C.       • metoprolol tartrate (LOPRESSOR) tablet 25 mg  25 mg Oral BID Lilia Pizano, P.A.-C.       • mirtazapine (Remeron) orally disintegrating tab 15 mg  15 mg Oral QHS Lilia Pizano, P.A.-C.       • thyroid (ARMOUR THYROID) tablet 60 mg  60 mg Oral BID Lilia Pizano, P.A.-C.           Allergies  No Known Allergies    Vital Signs last 24 hours  Temp:  [36.4 °C (97.5 °F)-37.2 °C (99 °F)] 36.8 °C (98.2 °F)  Pulse:  [61-82] 82  Resp:  [6-18] 15  BP: (131-171)/() 132/93  SpO2:  [96 %-100 %] 100 %    Physical Exam  Physical Exam  Vitals and nursing note reviewed.   Constitutional:       General: He is not in acute distress.     Appearance: Normal appearance. He is not toxic-appearing.      Interventions: Nasal cannula in place.   HENT:      Head:      Comments: Right sided craniotomy incision with staples well approximated with minimal serosanguineous drainage     Right Ear: Hearing normal.      Left Ear: Hearing normal.      Mouth/Throat:      Lips: Pink.      Mouth: Mucous membranes are moist.   Eyes:      Pupils: Pupils are equal, round, and  reactive to light.   Cardiovascular:      Rate and Rhythm: Normal rate and regular rhythm.      Pulses: Normal pulses.      Heart sounds: Normal heart sounds.   Pulmonary:      Effort: Pulmonary effort is normal.      Breath sounds: Normal breath sounds. No wheezing or rhonchi.   Abdominal:      General: Bowel sounds are decreased. There is distension.      Palpations: Abdomen is soft.      Tenderness: There is no guarding or rebound.   Musculoskeletal:      Right lower leg: No edema.      Left lower leg: No edema.      Comments: LUE/LLE flaccid   Skin:     General: Skin is warm and dry.      Capillary Refill: Capillary refill takes less than 2 seconds.      Nails: There is no clubbing.   Neurological:      Mental Status: He is easily aroused. He is lethargic.      Cranial Nerves: Cranial nerve deficit present.      Motor: Weakness present.      Comments: LUE/LLE 0/5  RUE/RLE 5/5   Psychiatric:         Mood and Affect: Mood normal.         Behavior: Behavior normal.         Cognition and Memory: Cognition normal.      Comments: Mumbled speech          Fluids    Intake/Output Summary (Last 24 hours) at 6/23/2021 1303  Last data filed at 6/23/2021 1200  Gross per 24 hour   Intake 1350 ml   Output 2125 ml   Net -775 ml       Laboratory  Recent Results (from the past 48 hour(s))   SARS-COV Antigen CRISTIAN: Collect dry nasal swab AND NP swab in VTM    Collection Time: 06/23/21  6:31 AM   Result Value Ref Range    SARS-CoV-2 Source Nasal Swab     SARS-COV ANTIGEN CRISTIAN NotDetected Not-Detected   SARS-CoV-2 PCR (24 hour In-House): Collect NP swab in VTM    Collection Time: 06/23/21  6:46 AM    Specimen: Nasal; Respirate   Result Value Ref Range    SARS-CoV-2 Source NP Swab        Imaging  CT-HEAD W/O    (Results Pending)       Assessment/Plan  Reactive depression- (present on admission)  Assessment & Plan  -Resume mirtazapine when able to take p.o.    Spasticity as late effect of cerebrovascular accident (CVA)- (present on  admission)  Assessment & Plan  -Resume baclofen when able to take p.o.    Paroxysmal atrial fibrillation (HCC)- (present on admission)  Assessment & Plan  -Resume metoprolol when able to take p.o.   -Resume Xarelto when okay with neurosurgery  -Currently sinus rhythm  -Telemetry monitoring    Acute right MCA stroke (HCC)- (present on admission)  Assessment & Plan  -3/31/2021  -Was outside of window for TPA.  Imaging showed complete occlusion right ICA not amenable to IR intervention.  Ultimately required right decompressive hemicraniectomy.  -Every hour neurochecks  -With residual LUE/LLE flaccidity  -Resume Xarelto when okay with neurosurgery  -Statin when able to take p.o.  -PT OT SLP    Acquired hypothyroidism- (present on admission)  Assessment & Plan  -Resume [place] Thyroid when able to take p.o.      Discussed patient condition and risk of morbidity and/or mortality with RN and Patient.    The patient remains critically ill.  Critical care time = 32 minutes in directly providing and coordinating critical care and extensive data review.  No time overlap and excludes procedures.    CRISTÓBAL Hampton.  Please note that this dictation was created using voice recognition software. I have made every reasonable attempt to correct obvious errors, but there may be errors of grammar and possibly content that I did not discover before finalizing the note.

## 2021-06-23 NOTE — ASSESSMENT & PLAN NOTE
-3/31/2021  -Was outside of window for TPA.  Imaging showed complete occlusion right ICA not amenable to IR intervention.  Ultimately required right decompressive hemicraniectomy.  -Every hour neurochecks  -With residual LUE/LLE flaccidity  -Resume Xarelto when okay with neurosurgery  -Statin when able to take p.o.  -PT OT SLP

## 2021-06-23 NOTE — ANESTHESIA POSTPROCEDURE EVALUATION
Patient: Thong Arzola    Procedure Summary     Date: 06/23/21 Room / Location: [place] [address] / SURGERY [facility]    Anesthesia Start: 0721 Anesthesia Stop: 0912    Procedure: CRANIOPLASTY - FOR SKULL DEFECT (Right Head) Diagnosis: (ACQUIRED DEFECT OF SKULL)    Surgeons: Arthur Payton M.D. Responsible Provider: Maura Recinos M.D.    Anesthesia Type: general ASA Status: 4          Final Anesthesia Type: general  Last vitals  BP   Blood Pressure: (!) 171/110, NIBP: 160/117    Temp   36.5 °C (97.7 °F)    Pulse   63   Resp   (!) 7    SpO2   98 %      Anesthesia Post Evaluation    Patient location during evaluation: PACU  Patient participation: complete - patient participated  Level of consciousness: awake and alert  Pain score: 5    Airway patency: patent  Anesthetic complications: no  Cardiovascular status: hemodynamically stable  Respiratory status: acceptable  Hydration status: euvolemic    PONV: none          There were no known complications for this encounter.

## 2021-06-23 NOTE — OR NURSING
"Await for an ICU room.Pt. verbalized good relief from PRN med's./MAR FOR c/o \"moderate pain and headache.  "

## 2021-06-23 NOTE — CARE PLAN
Problem: Pain - Standard  Goal: Alleviation of pain or a reduction in pain to the patient’s comfort goal  Outcome: Progressing     Problem: Knowledge Deficit - Standard  Goal: Patient and family/care givers will demonstrate understanding of plan of care, disease process/condition, diagnostic tests and medications  Outcome: Progressing     Problem: Skin Integrity  Goal: Skin integrity is maintained or improved  Outcome: Progressing   The patient is Stable - Low risk of patient condition declining or worsening    Shift Goals  Clinical Goals: stable neuro exam, CT 6 hours post op  Patient Goals: go home tomorrow    Progress made toward(s) clinical / shift goals:  CT complete, neuro exam has been stable.     Patient is not progressing towards the following goals: n/a.

## 2021-06-23 NOTE — ANESTHESIA TIME REPORT
Anesthesia Start and Stop Event Times     Date Time Event    6/23/2021 0715 Ready for Procedure     0721 Anesthesia Start     0912 Anesthesia Stop        Responsible Staff  06/23/21    Name Role Begin End    Maura Recinos M.D. Anesth 0721 0912        Preop Diagnosis (Free Text):  Pre-op Diagnosis     ACQUIRED DEFECT OF SKULL        Preop Diagnosis (Codes):    Post op Diagnosis  S/P craniotomy      Premium Reason  Non-Premium    Comments:

## 2021-06-24 ENCOUNTER — HOSPITAL ENCOUNTER (OUTPATIENT)
Dept: RADIOLOGY | Facility: MEDICAL CENTER | Age: 56
End: 2021-06-24
Attending: PHYSICIAN ASSISTANT
Payer: COMMERCIAL

## 2021-06-24 VITALS
DIASTOLIC BLOOD PRESSURE: 77 MMHG | WEIGHT: 177.69 LBS | BODY MASS INDEX: 24.88 KG/M2 | HEIGHT: 71 IN | OXYGEN SATURATION: 97 % | TEMPERATURE: 97.5 F | RESPIRATION RATE: 10 BRPM | HEART RATE: 67 BPM | SYSTOLIC BLOOD PRESSURE: 131 MMHG

## 2021-06-24 PROCEDURE — A9270 NON-COVERED ITEM OR SERVICE: HCPCS | Performed by: NURSE PRACTITIONER

## 2021-06-24 PROCEDURE — 70450 CT HEAD/BRAIN W/O DYE: CPT

## 2021-06-24 PROCEDURE — 700102 HCHG RX REV CODE 250 W/ 637 OVERRIDE(OP): Performed by: NURSE PRACTITIONER

## 2021-06-24 PROCEDURE — 700111 HCHG RX REV CODE 636 W/ 250 OVERRIDE (IP): Performed by: PHYSICIAN ASSISTANT

## 2021-06-24 PROCEDURE — 700102 HCHG RX REV CODE 250 W/ 637 OVERRIDE(OP): Performed by: PHYSICIAN ASSISTANT

## 2021-06-24 PROCEDURE — A9270 NON-COVERED ITEM OR SERVICE: HCPCS | Performed by: PHYSICIAN ASSISTANT

## 2021-06-24 RX ORDER — HYDROCODONE BITARTRATE AND ACETAMINOPHEN 5; 325 MG/1; MG/1
1 TABLET ORAL EVERY 6 HOURS PRN
Qty: 12 TABLET | Refills: 0
Start: 2021-06-24 | End: 2021-06-27

## 2021-06-24 RX ORDER — LEVETIRACETAM 500 MG/1
500 TABLET ORAL 2 TIMES DAILY
Qty: 60 TABLET | Refills: 0
Start: 2021-06-24 | End: 2021-06-30

## 2021-06-24 RX ADMIN — CEFAZOLIN SODIUM 2 G: 2 INJECTION, SOLUTION INTRAVENOUS at 01:40

## 2021-06-24 RX ADMIN — BACLOFEN 20 MG: 10 TABLET ORAL at 07:58

## 2021-06-24 RX ADMIN — THYROID, PORCINE 60 MG: 30 TABLET ORAL at 05:31

## 2021-06-24 RX ADMIN — METOPROLOL TARTRATE 25 MG: 25 TABLET, FILM COATED ORAL at 05:31

## 2021-06-24 RX ADMIN — ACETAMINOPHEN 1000 MG: 500 TABLET ORAL at 09:39

## 2021-06-24 RX ADMIN — LEVETIRACETAM INJECTION 500 MG: 5 INJECTION INTRAVENOUS at 05:30

## 2021-06-24 RX ADMIN — DANTROLENE SODIUM 25 MG: 25 CAPSULE ORAL at 05:31

## 2021-06-24 RX ADMIN — DOCUSATE SODIUM 100 MG: 100 CAPSULE, LIQUID FILLED ORAL at 05:30

## 2021-06-24 ASSESSMENT — LIFESTYLE VARIABLES
HAVE YOU EVER FELT YOU SHOULD CUT DOWN ON YOUR DRINKING: NO
CONSUMPTION TOTAL: NEGATIVE
HOW MANY TIMES IN THE PAST YEAR HAVE YOU HAD 5 OR MORE DRINKS IN A DAY: 0
ON A TYPICAL DAY WHEN YOU DRINK ALCOHOL HOW MANY DRINKS DO YOU HAVE: 1
EVER HAD A DRINK FIRST THING IN THE MORNING TO STEADY YOUR NERVES TO GET RID OF A HANGOVER: NO
DOES PATIENT WANT TO STOP DRINKING: NO
TOTAL SCORE: 0
EVER FELT BAD OR GUILTY ABOUT YOUR DRINKING: NO
TOTAL SCORE: 0
ALCOHOL_USE: YES
TOTAL SCORE: 0
HAVE PEOPLE ANNOYED YOU BY CRITICIZING YOUR DRINKING: NO
AVERAGE NUMBER OF DAYS PER WEEK YOU HAVE A DRINK CONTAINING ALCOHOL: 7

## 2021-06-24 ASSESSMENT — PAIN DESCRIPTION - PAIN TYPE
TYPE: ACUTE PAIN
TYPE: ACUTE PAIN;SURGICAL PAIN
TYPE: ACUTE PAIN

## 2021-06-24 NOTE — THERAPY
Speech Language Pathology   Clinical Swallow Evaluation     Patient Name: Thong Arzola  AGE:  56 y.o., SEX:  male  Medical Record #: [id number]  Today's Date: 6/23/2021     Precautions  Precautions: Swallow Precautions ( See Comments)    Assessment    Patient is 56 y.o. male admitted 6/23/21 for elective bone flap replacement. PMH significant for hypothyroidism, COVID-19 (3/18/21), HTN, HLD, and PAF rate controlled with Metoprolol not on AC who was hospitalized (3/31-4/29 and DC'd to [facility] 4/29-6/4) with hx R MCA left sided weakness, facial droop, and right gaze deviation. CXR on 6/16/21: No acute cardiopulmonary abnormalities are identified. On regular diet at rehab. Additional factors influencing patient status/progress: hx of CVA.      Patient was seen on this date for a clinical swallow evaluation. Spouse at bedside for session. Patient was AAOx4 with flat affect and aprosody of speech. Vocal quality clear but reduced in intensity. Oral motor examination revealed mild xerostomia, left facial droop (baseline per spouse), and reduced lingual ROM and coordination. PO trials were administered and consisted of thin liquids via cup and straw, applesauce, mixed solids, and dry solids. Onset of pharyngeal swallow was timely. No overt s/sx of aspiration appreciated across all trials tested. Pt reported saltine crackers were too dry and spouse reported some difficulty consuming seeds (regular textures) at baseline. Pt took pill whole with thin liquid wash without difficulty when RN at bedside. Patient reported increasing nausea and had episode of emesis following PO trials. RN notified. Education provided to patient and spouse regarding current status and SLP recs.     Plan    Recommend initiation of an Easy-to-Chew (level 7) and Thin Liquid (level 0) diet given assistance with tray set up. SLP following to ensure diet tolerance and upgrade as tolerated.     Recommend Speech Therapy 2 times per week until  therapy goals are met for the following treatments:  Dysphagia Training and Patient / Family / Caregiver Education.    Discharge Recommendations: Anticipate that the patient will have no further speech therapy needs after discharge from the hospital     Objective       06/23/21 1512   Vitals   O2 Delivery Device Room air w/o2 available   Prior Level Of Function   Communication Within Functional Limits   Swallow Impaired   Dentition Intact   Dentures None   Hearing Within Functional Limits for Evaluation   Hearing Aid None   Vision   (left sided visual inattention/?cut)   Patient's Primary Language English   Oral Motor Eval    Is Patient Able to Complete Oral Motor Eval Yes but Impaired   Labial Function   Labial Structure At Rest Minimal   Labial Vowel Production / I /, / U / Minimal   Labial Sequence / I /, / U / Minimal   Lingual Function   Lingual Structure At Rest Minimal  (xerostomia )   Lingual Protrude Moderate   Lingual Retract Moderate   Elevate In Mouth Moderate   Elevate Outside Mouth Moderate   Lateralization Minimal Right;Minimal Left   Jaw   Jaw Structure At Rest Within Functional Limits   Bite ([place]) Minimal   Chew ([place]) Minimal   Jaw Open / Resist Within Functional Limits   Jaw Close / Resist Within Functional Limits   Velar Function   Velar Structure At Rest Within Functional Limits   / A / Prolonged Within Functional Limits   / A /  5X's Within Functional Limits   Laryngeal Function   Voice Quality Minimal  (reduced intensity )   Volutional Cough Within Functional Limits   Excursion Upon Swallow Complete   Oral Food Presentation   Single Swallow Slightly Thick (1)  Within Functional Limits   Serial Swallow Slightly Thick (1) Within Functional Limits   Liquidised (3) Within Functional Limits   Soft & Bite-Sized (6) - (Dysphagia III) Within Functional Limits   Regular (7) Minimal   Self Feeding Needs Assistance   Dysphagia Strategies / Recommendations   Compensatory Strategies Assistance  Needed for Meal Tray Set-up;Monitor During Meals;Head of Bed 90 Degrees During Eating / Drinking   Diet / Liquid Recommendation Thin (0);Regular - Easy to Chew (7)   Medication Administration  Whole with Liquid Wash   Therapy Interventions Dysphagia Therapy By Speech Language Pathologist   Short Term Goals   Short Term Goal # 1 Patient will consume a EC7/TN0 diet with no overt s/sx of aspiration given min cues to standard swallow precautions.

## 2021-06-24 NOTE — PROGRESS NOTES
CT head reviewed by myself & Dr Payton, small subdural hematoma, no significant midline shift  Continue q1hr NC overnight  Repeat head CT w/o contrast in AM  Please call with questions, repeat head CT w/o for change in neuro exam

## 2021-06-24 NOTE — PROGRESS NOTES
Mr. Arzola is a 56 year old male with PMH significant for hypothyroidism, COVID-19 (3/18/21), HTN, HLD, and PAF rate controlled with Metoprolol not on AC who was hospitalized (3/31-4/29 and DC'd to [facility] 4/29-6/4) with left sided weakness, facial droop, and right gaze deviation. Was outside window for TPA. Imaging showed BEVERLEY complete occlusion not amendable to IR intervention. He underwent Right decompressive hemicraniectomy with expansile duraplasty on 4/3 and presented 6/23/2021 for elective replacement of bone flap.    Postop CT showed small SDH with no significant midline shift.  Repeat head CT this morning shows a stable/unchanged small SDH.    This morning he is alert and oriented x4 with neuro deficits at his baseline (LUE/LLE flaccidity, left-sided neglect).  We will remove the Brewer and start bladder training with bladder scan protocol.    Discussed with neurosurgery team who plans to discharge patient home sometime today.  Patient no longer requires ICU care.  Critical services is available for any questions or concerns.    CRISTÓBAL Hampton.    Please note that this dictation was created using voice recognition software. I have made every reasonable attempt to correct obvious errors, but there may be errors of grammar and possibly content that I did not discover before finalizing the note.

## 2021-06-24 NOTE — DISCHARGE INSTRUCTIONS
Leave incision open to air.   OK to shower & pat dry starting 24hr after drain has been removed  No soaking in hot tubs, baths, pools, etc.  Avoid repetitive bending, lifting over 10lbs, twisting. We encourage you to take frequent walks as tolerated    Do not drive or consume alcohol while taking narcotic pain medications. Do not take additional acetaminophen (tylenol) without consulting our office.   Do not take NSAIDs (such as ibuprofen or naproxen) or Aspirin unless you have been told otherwise by Dr Paytno's team  Follow up at [facility] Neurosurgery office in 2 weeks. Call with questions or concerns @ [phone]    Discharge Instructions    Discharged to home by car with relative. Discharged via wheelchair, hospital escort: Refused.  Special equipment needed: Wheelchair    Be sure to schedule a follow-up appointment with your primary care doctor or any specialists as instructed.       Depression / Suicide Risk    As you are discharged from this [facility] facility, it is important to learn how to keep safe from harming yourself.    Recognize the warning signs:  · Abrupt changes in personality, positive or negative- including increase in energy   · Giving away possessions  · Change in eating patterns- significant weight changes-  positive or negative  · Change in sleeping patterns- unable to sleep or sleeping all the time   · Unwillingness or inability to communicate  · Depression  · Unusual sadness, discouragement and loneliness  · Talk of wanting to die  · Neglect of personal appearance   · Rebelliousness- reckless behavior  · Withdrawal from people/activities they love  · Confusion- inability to concentrate     If you or a loved one observes any of these behaviors or has concerns about self-harm, here's what you can do:  · Talk about it- your feelings and reasons for harming yourself  · Remove any means that you might use to hurt yourself (examples: pills, rope, extension cords, firearm)  · Get professional  help from the community (Mental Health, Substance Abuse, psychological counseling)  · Do not be alone:Call your Safe Contact- someone whom you trust who will be there for you.  · Call your local CRISIS HOTLINE 035-4114 or 458-334-8231  · Call your local Children's Mobile Crisis Response Team [place] (171) 485-7410 or www.WillCall  · Call the toll free National Suicide Prevention Hotlines   · National Suicide Prevention Lifeline 447-900-BCLZ (3757)  · National Hope Line Network 800-SUICIDE (270-3105)

## 2021-06-24 NOTE — PROGRESS NOTES
Patient discharged home with wife via personal wheelchair, all discharge instructions reviewed with patient and wife, all questions addressed

## 2021-06-24 NOTE — PROGRESS NOTES
2 RN skin check completed, no signs of skin breakdown noted. Sacrum and heels blanchable, pressure points off loaded.

## 2021-06-24 NOTE — DISCHARGE SUMMARY
Discharge Summary    CHIEF COMPLAINT ON ADMISSION  No chief complaint on file.      Reason for Admission  ACQUIRED DEFECT OF SKULL     Admission Date  6/23/2021    CODE STATUS  Full Code    HPI & HOSPITAL COURSE  The patient is a 56-year-old male who is status post right hemicraniectomy on 4/3/2021 for hemorrhagic stroke. He was admitted to the hospital on 6/23/2021 and underwent cranioplasty with Dr. Payton. He was admitted to the ICU overnight for close observation. His neuro status remained intact at his baseline. Vital signs remained stable throughout the hospital stay. Postop CT demonstrated collection of epidural fluid but repeat CT this morning showed diminished epidural fluid collection. Drain was removed by Dr. Payton. He will be discharged home today if cleared by ICU medical team. He is to stay off of his Xarelto for 10 days after surgery. He will also discharged home with Keppra 500 mg twice daily for 5 additional days.  No notes on file    Therefore, he is discharged in good and stable condition to home with close outpatient follow-up.    The patient recovered much more quickly than anticipated on admission.    Discharge Date  6/24/2021    FOLLOW UP ITEMS POST DISCHARGE  SNG in 2 weeks    DISCHARGE DIAGNOSES  Active Problems:    Acute right MCA stroke (HCC) POA: Yes    Paroxysmal atrial fibrillation (HCC) (Chronic) POA: Yes    Spasticity as late effect of cerebrovascular accident (CVA) POA: Yes    Reactive depression POA: Yes    Neurogenic bladder POA: Unknown    Acquired hypothyroidism (Chronic) POA: Yes  Resolved Problems:    * No resolved hospital problems. *      FOLLOW UP  Future Appointments   Date Time Provider Department Center   6/28/2021 11:00 AM SHERRIE Cruz [facility] None   7/8/2021  9:45 AM Zulema Dumont D.O. [facility] None     No follow-up provider specified.    MEDICATIONS ON DISCHARGE     Medication List      START taking these medications      Instructions    HYDROcodone-acetaminophen 5-325 MG Tabs per tablet  Commonly known as: Norco   Take 1 tablet by mouth every 6 hours as needed for up to 3 days.  Dose: 1 tablet     levETIRAcetam 500 MG Tabs  Commonly known as: KEPPRA   Take 1 tablet by mouth 2 times a day for 6 days.  Dose: 500 mg        CHANGE how you take these medications      Instructions   dantrolene 25 MG Caps  What changed:   · when to take this  · additional instructions  Commonly known as: DANTRIUM   Take 1 capsule by mouth 3 times a day.  Dose: 25 mg        CONTINUE taking these medications      Instructions   acetaminophen 325 MG Tabs  Commonly known as: Tylenol   Take 2 Tablets by mouth every four hours as needed for Mild Pain.  Dose: 650 mg     atorvastatin 40 MG Tabs  Commonly known as: LIPITOR   Take 1 tablet by mouth every evening.  Dose: 40 mg     * baclofen 20 MG tablet  Commonly known as: LIORESAL   20 mg (1 tab) twice daily at 08:00 and 14:00     * baclofen 10 MG Tabs  Commonly known as: LIORESAL   Take 3 Tablets by mouth at bedtime.  Dose: 30 mg     gabapentin 100 MG Caps  Commonly known as: NEURONTIN   Take 2 Capsules by mouth every evening.  Dose: 200 mg     melatonin 3 MG Tabs   Take 1 tablet by mouth at bedtime.  Dose: 3 mg     metoprolol tartrate 25 MG Tabs  Commonly known as: LOPRESSOR   Take 1 tablet by mouth 2 times a day.  Dose: 25 mg     mirtazapine 15 MG Tbdp  Commonly known as: Remeron   Take 1 tablet by mouth at bedtime.  Dose: 15 mg     thyroid 60 MG Tabs  Commonly known as: ARMOUR THYROID   Take 1 tablet by mouth 2 times a day.  Dose: 60 mg         * This list has 2 medication(s) that are the same as other medications prescribed for you. Read the directions carefully, and ask your doctor or other care provider to review them with you.            STOP taking these medications    rivaroxaban 20 MG Tabs tablet  Commonly known as: XARELTO            Allergies  No Known Allergies    DIET  Orders Placed This Encounter   Procedures   •  Diet Order Diet: Level 7 - Easy to Chew (assist with tray set up); Liquid level: Level 0 - Thin; Tray Modifications (optional): SLP - Deliver to Nursing Station     Standing Status:   Standing     Number of Occurrences:   1     Order Specific Question:   Diet:     Answer:   Level 7 - Easy to Chew [22]     Comments:   assist with tray set up     Order Specific Question:   Liquid level     Answer:   Level 0 - Thin     Order Specific Question:   Tray Modifications (optional)     Answer:   SLP - Deliver to Nursing Station       ACTIVITY  As tolerated.  10-15-lb lifting restriction    CONSULTATIONS  n/a    PROCEDURES  cranioplasty    LABORATORY  Lab Results   Component Value Date    SODIUM 143 06/16/2021    POTASSIUM 4.5 06/16/2021    CHLORIDE 107 06/16/2021    CO2 26 06/16/2021    GLUCOSE 117 (H) 06/16/2021    BUN 20 06/16/2021    CREATININE 1.06 06/16/2021    CREATININE 1.3 04/04/2008        Lab Results   Component Value Date    WBC 4.9 06/16/2021    HEMOGLOBIN 14.2 06/16/2021    HEMATOCRIT 43.4 06/16/2021    PLATELETCT 314 06/16/2021        Total time of the discharge process exceeds 30 minutes.

## 2021-06-24 NOTE — PROGRESS NOTES
Patient's prescriptions sent to [facility] on [address].    Norco 5/325mg 1 tab Q6hrs PRN for pain, for 3 days, #12  Keppra 500mg, 1 tab BID for 6 days, #12

## 2021-06-24 NOTE — PROGRESS NOTES
Neurosurgery Progress Note    Subjective:  No events overnight status post cranioplasty    Exam:  Patient is at neurologic baseline with left hemiparesis incision is clean dry and intact drain was removed 2021    BP  Min: 89/54  Max: 171/110  Pulse  Av.5  Min: 61  Max: 106  Resp  Av.2  Min: 6  Max: 31  Temp  Av.4 °C (97.5 °F)  Min: 35.9 °C (96.6 °F)  Max: 37.2 °C (99 °F)  SpO2  Av.5 %  Min: 92 %  Max: 100 %    No data recorded                      Intake/Output                       21 - 21 0659 21 - 21 Total  Total                 Intake    P.O.  260  100 360  240  -- 240    P.O. 260 100 360 240 -- 240    I.V.  1378.3  -- 1378.3  --  -- --    Volume (mL) (NS infusion) 1250 -- 1250 -- -- --    Volume (mL) (dextrose 5 % and 0.9 % NaCl with KCl 20 mEq infusion) 128.3 -- 128.3 -- -- --    IV Piggyback  200  0 200  --  -- --    Volume (mL) (levETIRAcetam (Keppra) 500 mg in 100 mL NaCl IV premix) 100 0 100 -- -- --    Volume (mL) (ceFAZolin in dextrose (ANCEF) IVPB premix 2 g) 100 -- 100 -- -- --    Total Intake 1838.3 100 1938.3 240 -- 240       Output    Urine  1625  550 2175  170  -- 170    Urine 225 -- 225 -- -- --    Output (mL) ([REMOVED] Urethral Catheter Latex 16 Fr.) 4645 514 2187 170 -- 170    Emesis  425  -- 425  --  -- --    Emesis 425 -- 425 -- -- --    Emesis - Number of Times 2 x -- 2 x -- -- --    Drains  155  70 225  --  -- --    Output (mL) ([REMOVED] Closed/Suction Drain Anterior Scalp) 155 70 225 -- -- --    Stool  --  -- --  --  -- --    Number of Times Stooled -- 0 x 0 x -- -- --    Blood  100  -- 100  --  -- --    Est. Blood Loss 100 -- 100 -- -- --    Total Output 2305 842 2925 170 -- 170       Net I/O     -466.7 -520 -986.7 70 -- 70            Intake/Output Summary (Last 24 hours) at 2021 0923  Last data filed at 2021 0900  Gross per 24 hour   Intake 928.33 ml   Output 2770 ml    Net -1841.67 ml            • Pharmacy Consult Request  1 Each PHARMACY TO DOSE   • MD GRAHAM...DO NOT ADMINISTER NSAIDS or ASPIRIN unless ORDERED By Neurosurgery  1 Each PRN   • ondansetron  4 mg Q4HRS PRN   • dexamethasone  4 mg Once PRN   • diphenhydrAMINE  25 mg Q6HRS PRN   • scopolamine  1 Patch Q72HRS PRN   • labetalol  10 mg Q HOUR PRN   • hydrALAZINE  10 mg Q HOUR PRN   • cloNIDine  0.1 mg Q4HRS PRN   • niCARdipine infusion  0-15 mg/hr Continuous   • docusate sodium  100 mg BID   • senna-docusate  1 tablet Nightly   • senna-docusate  1 tablet Q24HRS PRN   • polyethylene glycol/lytes  1 Packet BID PRN   • magnesium hydroxide  30 mL QDAY PRN   • bisacodyl  10 mg Q24HRS PRN   • fleet  1 Each Once PRN   • acetaminophen  1,000 mg Q6HRS    Followed by   • [START ON 6/28/2021] acetaminophen  1,000 mg Q6HRS PRN   • oxyCODONE immediate-release  5 mg Q3HRS PRN    Or   • oxyCODONE immediate-release  10 mg Q3HRS PRN    Or   • HYDROmorphone  0.5 mg Q3HRS PRN   • levETIRAcetam (Keppra) IV  500 mg Q12HRS   • diphenhydrAMINE  25 mg Q6HRS PRN    Or   • diphenhydrAMINE  25 mg Q6HRS PRN   • benzocaine-menthol  1 Lozenge Q2HRS PRN   • atorvastatin  40 mg Q EVENING   • baclofen  30 mg QHS   • dantrolene  25 mg BID   • gabapentin  200 mg Q EVENING   • melatonin  3 mg QHS   • metoprolol tartrate  25 mg BID   • mirtazapine  15 mg QHS   • thyroid  60 mg BID   • prochlorperazine  10 mg Q6HRS PRN   • baclofen  20 mg BID   • artificial tears  1 Application Q8HRS PRN       Assessment and Plan:  Hospital day # 2  POD# 1  Chemical prophylactic DVT therapy: No  Start date/time: tbd    Patient go to every 4 hour neurochecks  Repeat head CT this morning shows diminished epidural fluid collection  Hemovac removed 6/24/2021  Patient is cleared from a neurosurgical standpoint to be discharged home we will work on discharge this morning with appropriate follow-up  Patient may clean wound and wash his hair starting tomorrow night    Arthur  Fito

## 2021-06-29 ENCOUNTER — TELEPHONE (OUTPATIENT)
Dept: NEUROLOGY | Facility: MEDICAL CENTER | Age: 56
End: 2021-06-29

## 2021-07-07 ENCOUNTER — HOSPITAL ENCOUNTER (OUTPATIENT)
Facility: MEDICAL CENTER | Age: 56
End: 2021-07-07
Attending: PHYSICIAN ASSISTANT
Payer: COMMERCIAL

## 2021-07-07 PROCEDURE — 84153 ASSAY OF PSA TOTAL: CPT

## 2021-07-08 ENCOUNTER — OFFICE VISIT (OUTPATIENT)
Dept: PHYSICAL MEDICINE AND REHAB | Facility: REHABILITATION | Age: 56
End: 2021-07-08
Payer: COMMERCIAL

## 2021-07-08 VITALS
OXYGEN SATURATION: 98 % | TEMPERATURE: 97.4 F | HEART RATE: 80 BPM | RESPIRATION RATE: 18 BRPM | DIASTOLIC BLOOD PRESSURE: 68 MMHG | SYSTOLIC BLOOD PRESSURE: 112 MMHG

## 2021-07-08 DIAGNOSIS — R25.2 SPASTICITY: ICD-10-CM

## 2021-07-08 DIAGNOSIS — I69.954 HEMIPLEGIA AND HEMIPARESIS FOLLOWING UNSPECIFIED CEREBROVASCULAR DISEASE AFFECTING LEFT NON-DOMINANT SIDE (HCC): ICD-10-CM

## 2021-07-08 DIAGNOSIS — I63.511 ACUTE RIGHT MCA STROKE (HCC): Primary | ICD-10-CM

## 2021-07-08 DIAGNOSIS — I69.854 SPASTIC HEMIPLEGIA OF LEFT NONDOMINANT SIDE AS LATE EFFECT OF OTHER CEREBROVASCULAR DISEASE (HCC): ICD-10-CM

## 2021-07-08 DIAGNOSIS — M62.838 OTHER MUSCLE SPASM: ICD-10-CM

## 2021-07-08 DIAGNOSIS — G81.94 LEFT HEMIPARESIS (HCC): ICD-10-CM

## 2021-07-08 LAB — PSA SERPL-MCNC: 0.54 NG/ML (ref 0–4)

## 2021-07-08 PROCEDURE — 99215 OFFICE O/P EST HI 40 MIN: CPT | Performed by: PHYSICAL MEDICINE & REHABILITATION

## 2021-07-08 RX ORDER — DANTROLENE SODIUM 25 MG/1
50 CAPSULE ORAL 3 TIMES DAILY
Qty: 180 CAPSULE | Refills: 0 | Status: SHIPPED | OUTPATIENT
Start: 2021-07-08 | End: 2021-08-16

## 2021-07-08 NOTE — PROGRESS NOTES
[facility]  [facility]  [address] [place] [address]  Ph: [phone]    FOLLOW UP PATIENT EVALUATION      Patient Name: Thong Arzola   Patient : 1965  Patient Age: 56 y.o.     Examining Physician: Dr. Zulema Dumont, DO    PM&R History to Date - Background Information:  Dates of Admission/Discharge to ARU: 2021-2021    SUBJECTIVE:   Patient Identification: Thong Arzola is a 56 y.o. male with PMH significant for COVID-19 3/18/2021, paroxysmal atrial fibrillation not on anti-coagulation, hypothyroidism, QTc prolongation and rehabilitation history significant for large right ICA/MCA ischemic stroke 3/31/2021 in setting of paroxysmal off of anticoagulation s/p craniectomy 4/3/2021 by Dr. Payton s/p cranioplasty 21 and is presenting to PM&R clinic for a FOLLOW UP OUTPATIENT evaluation with the following chief complaint/s:    Chief Complaint: Spasticity    History of Present Illness:  - Patient has been working with [employer] and has been doing well.   - Ambulating more with the quad cane, but is difficult due to patient's hamstring tightness, cannot alter AFO additionally to improve gait. Strength is improving though slowly.   - Had cranioplasty in the interm. Not complicated.   - Patient now more interested in Botox. Still on the Baclofen and transitioning to Dantrolene so currently on both. Havent noticed significant change for better or worse.     Review of Systems:  All other pertinent positive review of systems are noted above in HPI.   All other systems reviewed and are negative.    Past Medical History:  Past Medical History:   Diagnosis Date   • Stroke ([facility])     right side MCA   • Afib (HCC)    • COVID-19    • Disorder of thyroid     hypo   • High cholesterol    • Psychiatric problem     reactive depression      Past Surgical History:   Procedure Laterality Date   • CRANIOPLASTY Right 2021    Procedure: CRANIOPLASTY - FOR SKULL DEFECT;   Surgeon: Arthur Payton M.D.;  Location: SURGERY [facility];  Service: Neurosurgery   • CRANIOTOMY Right 4/3/2021    Procedure: CRANIOTOMY;  Surgeon: Arthur Payton M.D.;  Location: SURGERY [facility];  Service: Neurosurgery   • KNEE RECONSTRUCTION      left knee orthoscopic        Current Outpatient Medications:   •  dantrolene (DANTRIUM) 25 MG Cap, Take 2 Capsules by mouth 3 times a day., Disp: 180 capsule, Rfl: 0  •  rivaroxaban (XARELTO) 20 MG Tab tablet, Take 1 tablet by mouth with dinner., Disp: 30 tablet, Rfl: 2  •  acetaminophen (TYLENOL) 325 MG Tab, Take 2 Tablets by mouth every four hours as needed for Mild Pain., Disp: 30 tablet, Rfl: 0  •  thyroid (ARMOUR THYROID) 60 MG Tab, Take 1 tablet by mouth 2 times a day., Disp: 60 tablet, Rfl: 2  •  atorvastatin (LIPITOR) 40 MG Tab, Take 1 tablet by mouth every evening., Disp: 30 tablet, Rfl: 2  •  baclofen (LIORESAL) 20 MG tablet, 20 mg (1 tab) twice daily at 08:00 and 14:00, Disp: 60 tablet, Rfl: 2  •  baclofen (LIORESAL) 10 MG Tab, Take 3 Tablets by mouth at bedtime., Disp: 90 tablet, Rfl: 2  •  metoprolol tartrate (LOPRESSOR) 25 MG Tab, Take 1 tablet by mouth 2 times a day., Disp: 60 tablet, Rfl: 2  •  mirtazapine (REMERON) 15 MG TABLET DISPERSIBLE, Take 1 tablet by mouth at bedtime., Disp: 30 tablet, Rfl: 2  •  gabapentin (NEURONTIN) 100 MG Cap, Take 2 Capsules by mouth every evening., Disp: 30 capsule, Rfl: 2  •  melatonin 3 MG Tab, Take 1 tablet by mouth at bedtime., Disp: 30 tablet, Rfl: 2  No Known Allergies     Past Social History:  Social History     Socioeconomic History   • Marital status:      Spouse name: Not on file   • Number of children: Not on file   • Years of education: Not on file   • Highest education level: Not on file   Occupational History   • Not on file   Tobacco Use   • Smoking status: Never Smoker   • Smokeless tobacco: Never Used   Vaping Use   • Vaping Use: Never used   Substance and Sexual Activity   • Alcohol use:  Yes     Comment: occ   • Drug use: No   • Sexual activity: Not on file   Other Topics Concern   • Not on file   Social History Narrative   • Not on file     Social Determinants of Health     Financial Resource Strain:    • Difficulty of Paying Living Expenses:    Food Insecurity:    • Worried About Running Out of Food in the Last Year:    • Ran Out of Food in the Last Year:    Transportation Needs:    • Lack of Transportation (Medical):    • Lack of Transportation (Non-Medical):    Physical Activity:    • Days of Exercise per Week:    • Minutes of Exercise per Session:    Stress:    • Feeling of Stress :    Social Connections:    • Frequency of Communication with Friends and Family:    • Frequency of Social Gatherings with Friends and Family:    • Attends [employer] Services:    • Active Member of Clubs or Organizations:    • Attends Club or Organization Meetings:    • Marital Status:    Intimate Partner Violence:    • Fear of Current or Ex-Partner:    • Emotionally Abused:    • Physically Abused:    • Sexually Abused:         Family History:  History reviewed. No pertinent family history.    Depression and Opioid Screening  PHQ-9:  Depression Screen (PHQ-2/PHQ-9) 5/14/2021 5/15/2021 5/16/2021   PHQ-2 Total Score 1 1 1   PHQ-9 Total Score 5 5 5     Interpretation of PHQ-9 Total Score   Score Severity   1-4 No Depression   5-9 Mild Depression   10-14 Moderate Depression   15-19 Moderately Severe Depression   20-27 Severe Depression     OBJECTIVE:   Vital Signs:  Vitals:    07/08/21 0952   BP: 112/68   Pulse: 80   Resp: 18   Temp: 36.3 °C (97.4 °F)   SpO2: 98%        Pertinent Labs:  Lab Results   Component Value Date/Time    SODIUM 143 06/16/2021 03:51 PM    POTASSIUM 4.5 06/16/2021 03:51 PM    CHLORIDE 107 06/16/2021 03:51 PM    CO2 26 06/16/2021 03:51 PM    GLUCOSE 117 (H) 06/16/2021 03:51 PM    BUN 20 06/16/2021 03:51 PM    CREATININE 1.06 06/16/2021 03:51 PM    CREATININE 1.3 04/04/2008 03:05 AM       Lab Results    Component Value Date/Time    HBA1C 5.9 (H) 04/01/2021 02:45 AM       Lab Results   Component Value Date/Time    WBC 4.9 06/16/2021 03:51 PM    RBC 4.96 06/16/2021 03:51 PM    HEMOGLOBIN 14.2 06/16/2021 03:51 PM    HEMATOCRIT 43.4 06/16/2021 03:51 PM    MCV 87.5 06/16/2021 03:51 PM    MCH 28.6 06/16/2021 03:51 PM    MCHC 32.7 (L) 06/16/2021 03:51 PM    MPV 8.8 (L) 06/16/2021 03:51 PM    NEUTSPOLYS 58.40 06/16/2021 03:51 PM    LYMPHOCYTES 24.30 06/16/2021 03:51 PM    MONOCYTES 9.20 06/16/2021 03:51 PM    EOSINOPHILS 7.30 (H) 06/16/2021 03:51 PM    BASOPHILS 0.40 06/16/2021 03:51 PM       Lab Results   Component Value Date/Time    ASTSGOT 21 05/27/2021 06:17 AM    ALTSGPT 55 (H) 05/27/2021 06:17 AM        Physical Exam:   GEN: No apparent distress  HEENT: Head normocephalic, atraumatic.  Sclera nonicteric bilaterally, no ocular discharge appreciated bilaterally.  CV: Extremities warm and well-perfused, no peripheral edema appreciated bilaterally.  PULMONARY: Breathing nonlabored on room air, no respiratory accessory muscle use.  Not requiring supplemental oxygen.  SKIN: No appreciable skin breakdown on exposed areas of skin.  PSYCH: Mood and affect within normal limits.  NEURO: Awake alert.  Conversational.  Logical thought content.  Ambulatory with a quad cane and left AFO.  Spasticity via MAS: 2/4 wrist flexors, 2/4 finger flexors, active clonus of left wrist, does have mild spasticity of elbow flexors/extensors.  1+/4 spasticity left knee flexors, worsened dynamically.  Active clonus left ankle.      ASSESSMENT/PLAN: Thong Arzola  is a 56 y.o. male with rehabilitation history significant for large right ICA/MCA ischemic stroke 3/31/2021 in setting of paroxysmal off of anticoagulation s/p craniectomy 4/3/2021 by Dr. Payton s/p cranioplasty 6/23/21, here for evaluation. The following plan was discussed with the patient who is in agreement.     Visit Diagnoses     ICD-10-CM   1. Acute right MCA stroke (HCC)   I63.511   2. Spasticity  R25.2   3. Hemiplegia and hemiparesis following unspecified cerebrovascular disease affecting left non-dominant side (HCC)  I69.954   4. Other muscle spasm  M62.838   5. Spastic hemiplegia of left nondominant side as late effect of other cerebrovascular disease (HCC)  I69.854   6. Left hemiparesis ([facility])  G81.94        Wife assists with history.    Rehab/Neuro:   1. Large right ICA/MCA ischemic stroke 3/31/2021 in setting of paroxysmal off of anticoagulation s/p craniectomy 4/3/2021 by Dr. Payton s/p cranioplasty 6/23/21  2. Left hemiplegia  -Records reviewed as aforementioned in the HPI.  -Med management: Anticoagulation with Xarelto for secondary stroke prevention.  Refilled today.  -Med management: Atorvastatin 40 mg q. evening, reduced from 80 mg due to elevated LFTs.  -Lab review: After statin reduction-ALT normalizing, still minimally elevated at 55 on 5/27/2021. AST within normal limits.  -Therapy: [facility]  -Occupation: Need to discuss at next visit  -Driving status: Currently not driving.    Spasticity:   -Therapy: Continue occupational therapy and physical therapy with rehab without walls.  -Equipment: Resting hand orthotic/paddle and AFO.  -Continue range of motion and stretching  -Medication Management: Currently on baclofen to 10 mg/10 mg/10 mg and continue dantrolene 25 mg 3 times daily   - Reduce Baclofen by 10mg (1 tablet) every 5 days, increase Dantrolene by 25mg q5 days until at Dantrolene 50mg TID.   -CMP to evaluate LFTs. ALT very mildly elevated at 55 (normal parameter less than 50) and was normalizing after reduction of statin.   - Referral: Auth for Botox in place    Okay to continue anticoagulation with Xarelto through the procedure for botulinum toxin injection.  The risks of going off the medication outweigh the risks of doing the procedure on the medication.  I will plan to use 27-gauge needles and ultrasound guidance to avoid all blood vessels during the  procedure.  We discussed that while on anticoagulation the patient does have an increased risk of bleeding and hematoma     Botox Plan: I plan to inject to the LUE and LLE  Location Botox Amount in Units   FDS 75 units   FDP 75 units   FCR 25 units   FCU 25 units   Hamstrings (focus medially) 200 units   Total Units 400 units       The risks benefits and alternatives to this procedure were discussed and the patient wishes to proceed with the procedure. Risks include but are not limited to damage to surrounding structures, infection, bleeding, worsening of pain which can be permanent, weakness which can be permanent. Benefits include pain relief, improved function. Alternatives includes not doing the procedure.      Neurogenic Bladder: s/p Brewer catheter removal 6/8/21  -Follows with [facility]    Neurogenic Bowel: Stable.   -Med management: Senokot as needed    Sleep: Poor sleep quality  -Med management: Trial melatonin q. Evening  -Med management: Continue Remeron for now.   -Med management: Trial stop Gabapentin. Counseled monitor for poor sleep.     Neuropathic Pain: Resolving.  -Med management: Trial off of gabapentin 200 mg nightly.  -Counseling: Counseled can use as needed nightly.    LUE + LLE swelling: Dependent edema. Patient on Xarelto, low suspicion for thrombus.  -Conservative: Compression stockings for leg and compression glove for arm  -Monitor. Counseled on dependent edema.    Follow up: 2 weeks for Botox injection, reevaluation medications, needs LFTs drawn.    Total time spent was 41 minutes.  Included in this time is the time spent preparing for the visit including record review, my exam and evaluation, counseling and education regarding that which is aforementioned in the assessment and plan. Time was spent ordering the appropriate labs, tests, procedures, referrals, medications.  Including this time was also the time spent in care coordination with physical therapy with gait analysis. Included  this time as the time spent obtaining history from someone other than the patient. Discussion involved the patient, wife, physical therapist.    Please note that this dictation was created using voice recognition software. I have made every reasonable attempt to correct obvious errors but there may be errors of grammar and content that I may have overlooked prior to finalization of this note.    Dr. Zulema Dumont DO, MS  Department of Physical Medicine & Rehabilitation  Neuro Rehabilitation Clinic  [facility]

## 2021-07-19 ENCOUNTER — TELEPHONE (OUTPATIENT)
Dept: CARDIOLOGY | Facility: MEDICAL CENTER | Age: 56
End: 2021-07-19

## 2021-07-19 NOTE — TELEPHONE ENCOUNTER
Spoke to pt in regards to obtaining records for NP appointment with [facility]. Per patient has never been treated by a previous cardiologist. Confirmed all recent notes, labs, and cardiac imaging are in Epic. Pt then requested to cancel appt. Stated he will call back at a later date to reschedule. Appt cancelled.

## 2021-07-26 ENCOUNTER — OFFICE VISIT (OUTPATIENT)
Dept: NEUROLOGY | Facility: MEDICAL CENTER | Age: 56
End: 2021-07-26
Attending: NURSE PRACTITIONER
Payer: COMMERCIAL

## 2021-07-26 VITALS
DIASTOLIC BLOOD PRESSURE: 102 MMHG | BODY MASS INDEX: 23.01 KG/M2 | HEART RATE: 81 BPM | WEIGHT: 165 LBS | SYSTOLIC BLOOD PRESSURE: 150 MMHG | TEMPERATURE: 98.5 F

## 2021-07-26 DIAGNOSIS — E78.2 MIXED HYPERLIPIDEMIA: ICD-10-CM

## 2021-07-26 DIAGNOSIS — R73.03 PREDIABETES: ICD-10-CM

## 2021-07-26 DIAGNOSIS — I10 ESSENTIAL HYPERTENSION: ICD-10-CM

## 2021-07-26 DIAGNOSIS — I69.30 LATE EFFECT OF STROKE: ICD-10-CM

## 2021-07-26 DIAGNOSIS — I48.0 PAROXYSMAL ATRIAL FIBRILLATION (HCC): Chronic | ICD-10-CM

## 2021-07-26 PROCEDURE — 99417 PROLNG OP E/M EACH 15 MIN: CPT | Performed by: NURSE PRACTITIONER

## 2021-07-26 PROCEDURE — 99215 OFFICE O/P EST HI 40 MIN: CPT | Performed by: NURSE PRACTITIONER

## 2021-07-26 PROCEDURE — 99212 OFFICE O/P EST SF 10 MIN: CPT | Performed by: NURSE PRACTITIONER

## 2021-07-26 RX ORDER — AMLODIPINE BESYLATE 5 MG/1
5 TABLET ORAL DAILY
Qty: 90 TABLET | Refills: 1 | Status: SHIPPED | OUTPATIENT
Start: 2021-07-26 | End: 2022-05-02 | Stop reason: SDUPTHER

## 2021-07-26 ASSESSMENT — ENCOUNTER SYMPTOMS
NAUSEA: 0
WEAKNESS: 1
SENSORY CHANGE: 1
HEARTBURN: 0
NECK PAIN: 0
DIZZINESS: 0
DEPRESSION: 0
DOUBLE VISION: 0
VOMITING: 0
SEIZURES: 0
FOCAL WEAKNESS: 1
HEADACHES: 0
BRUISES/BLEEDS EASILY: 0
BLURRED VISION: 0
NERVOUS/ANXIOUS: 1
TINGLING: 0
COUGH: 0
BACK PAIN: 0
FEVER: 0

## 2021-07-26 ASSESSMENT — FIBROSIS 4 INDEX: FIB4 SCORE: 0.51

## 2021-07-26 NOTE — PROGRESS NOTES
Subjective:      HPI  Thong Arzola is a 56 y.o. right handed male who presents to [facility] for evaluation of right MCA territory infarct.    He presented to [facility] on 3/31/2021 with complaints of acute left sided weakness.  He had texted his wife at 1500, took a bath and was unable to get out of the bathtub. He has a history of atrial fibrillation and reported that he knew he had been in Afib since 3/26/2021. He had recently been started on metoprolol.  He had another atrial fibrillation episode the prior summer. He had a positive COVID test on 3/18/2021.  He was not a candidate for tPA due to unknown time of onset.  He had not been on anti-coagulation prior to admission. He underwent decompressive hemicraniectomy on 4/3/2021 with repair of skull defect on 6/23/2021.     PMH includes Afib, history of COVID19, HLD, HTN  Social History:  Never smoker, alcohol 1 1/2 servings a couple of nights per week , denies drug use.     He was discharged on Xarelto 20mg.    Review of Systems   Constitutional: Negative for fever.   HENT: Negative for hearing loss.    Eyes: Negative for blurred vision and double vision.   Respiratory: Negative for cough.    Cardiovascular: Negative for chest pain.   Gastrointestinal: Negative for heartburn, nausea and vomiting.   Genitourinary: Negative for dysuria.   Musculoskeletal: Negative for back pain and neck pain.   Skin: Negative for rash.   Neurological: Positive for sensory change, focal weakness and weakness. Negative for dizziness, tingling, seizures and headaches.   Endo/Heme/Allergies: Does not bruise/bleed easily.   Psychiatric/Behavioral: Negative for depression. The patient is nervous/anxious.          He  Is here with his wife today.  He is in a wheelchair. He toilets with minimal assistance for transfers from the wheelchair, he needs help with dressing. He is feeding himself, no difficulty swallowing.  He continues to follow with   [facility] and is participating in [facility].  He is not checking blood pressure at home.      Current Outpatient Medications on File Prior to Visit   Medication Sig Dispense Refill   • dantrolene (DANTRIUM) 25 MG Cap Take 2 Capsules by mouth 3 times a day. 180 capsule 0   • rivaroxaban (XARELTO) 20 MG Tab tablet Take 1 tablet by mouth with dinner. 30 tablet 2   • acetaminophen (TYLENOL) 325 MG Tab Take 2 Tablets by mouth every four hours as needed for Mild Pain. 30 tablet 0   • thyroid (ARMOUR THYROID) 60 MG Tab Take 1 tablet by mouth 2 times a day. 60 tablet 2   • atorvastatin (LIPITOR) 40 MG Tab Take 1 tablet by mouth every evening. 30 tablet 2   • metoprolol tartrate (LOPRESSOR) 25 MG Tab Take 1 tablet by mouth 2 times a day. 60 tablet 2   • mirtazapine (REMERON) 15 MG TABLET DISPERSIBLE Take 1 tablet by mouth at bedtime. 30 tablet 2   • gabapentin (NEURONTIN) 100 MG Cap Take 2 Capsules by mouth every evening. 30 capsule 2   • melatonin 3 MG Tab Take 1 tablet by mouth at bedtime. 30 tablet 2     No current facility-administered medications on file prior to visit.     Past Medical History:   Diagnosis Date   • Afib (HCC)    • COVID-19    • Disorder of thyroid     hypo   • High cholesterol    • Psychiatric problem     reactive depression   • Stroke (HCC) 2021    right side MCA      Objective:     I personally reviewed imaging below and agree with the findings  MRI brain 4/2/2021  Very large acute right MCA territory infarct as detailed above with small amount of petechial hemorrhage in the right insular region and right temporal lobe.     Punctate right thalamic lacunar infarct.     Right ICA and M1 MCA occlusion.    CT perfusion 3/31/2021  1  Cerebral blood flow less than 30% likely representing completed infarct = 134 mL.     2.  T Max more than 6 seconds likely representing combination of completed infarct and ischemia = 210 mL.     1. Mismatched volume likely representing ischemic brain/penumbra  = 76 mL.  CTA head 3/31/2021  Acute right M1 occlusion.  CTA neck 3/31/2021  Acute occlusion of the right internal carotid artery shortly after bifurcation. It is occluded up to the level of the carotid terminus.  TTE:  LVEF >75%, negative bubble study, LA size WNL, bubble negative.   Stroke Labs: INR 1.04, creat 1.06, A1C 5.9, .        Pulse 81   Temp 36.9 °C (98.5 °F) (Temporal)   Wt 74.8 kg (165 lb) Comment: Stated cannot stand  BMI 23.01 kg/m²    Encounter Vitals  Standard Vitals  Vitals  Blood Pressure: 150/102 (Rechecked 5 min 150/106 adult large cuff)  Temperature: 36.9 °C (98.5 °F)  Temp src: Temporal  Pulse: 81  Weight: 74.8 kg (165 lb) (Stated cannot stand)  Encounter Vitals  Temperature: 36.9 °C (98.5 °F)  Temp src: Temporal  Blood Pressure: 150/102 (Rechecked 5 min 150/106 adult large cuff)  Pulse: 81  Weight: 74.8 kg (165 lb) (Stated cannot stand)            PHYSICAL ASSESSMENT  Constitutional:  Alert, no apparent distress,  Psych:   mood and affect WNL  Muskuloskeletal:  No movement LUE, slight hip flexion and knee extension LLE  NEUROLOGICAL ASSESSMENT  Oriented X 4, speech fluent, naming and memory intact  CN II: Visual fields are full to confrontation. Fundoscopic exam is normal with sharp discs and no vascular changes. Pupils are 4 mm and briskly reactive to light.   CN III, IV, VI  EOMs intact, no ptosis  CN V: Facial sensation is intact to pinprick in all 3 divisions bilaterally. Corneal responses are intact.  CN VII: face with smile droop on left.  CN VII: Hearing is normal to rubbing fingers  CN IX, X: Palate elevates symmetrically. Phonation is normal.  CN XI: Head turning and shoulder shrug are intact  CN XII: Tongue is midline with normal movements and no atrophy.                           Sensation to PP severely decreased LUE/LLE                 No limb ataxia with finger to nose and heel to shin (on right, unable to perform on left)                  In  wheelchair                  Rhomberg negative                Biceps,brachioradialis, tricep, patellar 2+ on right, 3+ on left.      Cardiovascular:    S1S2, no abnormal rhythm auscultated, no peripheral edema  Neck:                     No carotid bruits noted   Pulmonary:            Respirations easy, lungs clear to auscultation all fields.     Skin:                     No obvious rashes.    Iniital NIHSS  18      Current NIHSS    1a. LOC: 0  1b. LOC Questions: 0  1c. LOC Commands: 0  2. Best Gaze:0  3. Visual Fields: 0  4. Facial Paresis: 1  5a. Motor arm left: 4  5b. Motor arm right: 0  6a. Motor leg left: 3  6b. Motor leg right: 0  7. Sensory: 2  8. Best Language: 0  9. Limb Ataxia: 0  10. Dysarthria: 0  11. Extinction/Inattention: 0    Total Score Current 10          Current mRS  4         Assessment/Plan:     1.  Late Effect of stroke  R MCA infarct involving most the right MCA territory likely due to atrial fibrillation in the absence of anti-coagulation.   Presumed Mechanism by Toast:  __  Large Artery Atherosclerosis  __  Small Vessel (lacunar)  XX__   Cardioembolic  __   Other (Sickle cell, vasculitis, hypercoagulable)  __   Unknown  __   ESUS    Stroke risk factors include atrial fibrillation, HLD, prediabetes.,HTN      2 Atrial fibrillation    Continue Xarelto 20mg PO every day with dinner, must be taken with food for absorption.  Avoid OTC anti-inflammatory medication.  Do no stop medication unless directed by cardiology.  Continue Follow up with cardiology.     Risk factors for Atrial fibrillation:  HTN, alcohol use.       3 Mixed Hyperlipidemia  LDL goal < 70, current 112, continue Atorvastatin 40mg, discussed medication side effects, will need follow up with primary care evaluate liver function and intervals and refill    4Prediabetes.     A1C 5.9, discussed dietary and exercise modifications.  Exercise at least 30 minutes daily, avoid red meat, fried foods, butter, cheese.   Eat 5-6 servings of vegetables and fruits daily,  choose lean white meat without skin (chicken, turkey, white fish)--baked, broiled or grilled.    5.  Essential Hypertension.    Blood pressure goal less than 130/80    Well above goal today  Check blood pressure at home.    Start amlodipine 5mg PO every morning, discussed side effects, follow up with primary care.        If any new signs of stroke:  sudden weakness, numbness, speech difficulty (slurring or difficulty finding words), imbalance, incoordination, worse headache of life or vision loss occur, call 911.      Take all medications as prescribed unless instructed by your provider.      I spent a total of 64 minutes caring for patient,  my time includes counseling, review of systems, HPI and assessment, review of images, labs and testing as above.  I reviewed the hospital records, PMH, social and family history.   I have counseled patient on stroke prevention strategies, stroke symptoms and mimics.  Diet and exercise modifications.  We discussed medication side effects and instructions.       I have provided patient a written personalized stroke prevention plan, it is filed under the media tab under ‘[facility]”.

## 2021-07-26 NOTE — PATIENT INSTRUCTIONS
If any new signs of stroke:  sudden weakness, numbness, speech difficulty (slurring or difficulty finding words), imbalance, incoordination, worse headache of life or vision loss occur, call 911.      Take all medications as prescribed unless instructed by your provider.              Stroke Prevention  Some medical conditions and lifestyle choices can lead to a higher risk for a stroke. You can help to prevent a stroke by making nutrition, lifestyle, and other changes.  What nutrition changes can be made?    · Eat healthy foods.  ? Choose foods that are high in fiber. These include:  § Fresh fruits.  § Fresh vegetables.  § Whole grains.  ? Eat at least 5 or more servings of fruits and vegetables each day. Try to fill half of your plate at each meal with fruits and vegetables.  ? Choose lean protein foods. These include:  § Lowfat (lean) cuts of meat.  § Chicken without skin.  § Fish.  § Tofu.  § Beans.  § Nuts.  ? Eat low-fat dairy products.  ? Avoid foods that:  § Are high in salt (sodium).  § Have saturated fat.  § Have trans fat.  § Have cholesterol.  § Are processed.  § Are premade.  · Follow eating guidelines as told by your doctor. These may include:  ? Reducing how many calories you eat and drink each day.  ? Limiting how much salt you eat or drink each day to 1,500 milligrams (mg).  ? Using only healthy fats for cooking. These include:  § Olive oil.  § Canola oil.  § Sunflower oil.  ? Counting how many carbohydrates you eat and drink each day.  What lifestyle changes can be made?  · Try to stay at a healthy weight. Talk to your doctor about what a good weight is for you.  · Get at least 30 minutes of moderate physical activity at least 5 days a week. This can include:  ? Fast walking.  ? Biking.  ? Swimming.  · Do not use any products that have nicotine or tobacco. This includes cigarettes and e-cigarettes. If you need help quitting, ask your doctor. Avoid being around tobacco smoke in general.  · Limit how  "much alcohol you drink to no more than 1 drink a day for nonpregnant women and 2 drinks a day for men. One drink equals 12 oz of beer, 5 oz of wine, or 1½ oz of hard liquor.  · Do not use drugs.  · Avoid taking birth control pills. Talk to your doctor about the risks of taking birth control pills if:  ? You are over 35 years old.  ? You smoke.  ? You get migraines.  ? You have had a blood clot.  What other changes can be made?  · Manage your cholesterol.  ? It is important to eat a healthy diet.  ? If your cholesterol cannot be managed through your diet, you may also need to take medicines. Take medicines as told by your doctor.  · Manage your diabetes.  ? It is important to eat a healthy diet and to exercise regularly.  ? If your blood sugar cannot be managed through diet and exercise, you may need to take medicines. Take medicines as told by your doctor.  · Control your high blood pressure (hypertension).  ? Try to keep your blood pressure below 130/80. This can help lower your risk of stroke.  ? It is important to eat a healthy diet and to exercise regularly.  ? If your blood pressure cannot be managed through diet and exercise, you may need to take medicines. Take medicines as told by your doctor.  ? Ask your doctor if you should check your blood pressure at home.  ? Have your blood pressure checked every year. Do this even if your blood pressure is normal.  · Talk to your doctor about getting checked for a sleep disorder. Signs of this can include:  ? Snoring a lot.  ? Feeling very tired.  · Take over-the-counter and prescription medicines only as told by your doctor. These may include aspirin or blood thinners (antiplatelets or anticoagulants).  · Make sure that any other medical conditions you have are managed.  Where to find more information  · American Stroke Association: www.strokeassociation.org  · National Stroke Association: www.stroke.org  Get help right away if:  · You have any symptoms of stroke. \"BE " "FAST\" is an easy way to remember the main warning signs:  ? B - Balance. Signs are dizziness, sudden trouble walking, or loss of balance.  ? E - Eyes. Signs are trouble seeing or a sudden change in how you see.  ? F - Face. Signs are sudden weakness or loss of feeling of the face, or the face or eyelid drooping on one side.  ? A - Arms. Signs are weakness or loss of feeling in an arm. This happens suddenly and usually on one side of the body.  ? S - Speech. Signs are sudden trouble speaking, slurred speech, or trouble understanding what people say.  ? T - Time. Time to call emergency services. Write down what time symptoms started.  · You have other signs of stroke, such as:  ? A sudden, very bad headache with no known cause.  ? Feeling sick to your stomach (nausea).  ? Throwing up (vomiting).  ? Jerky movements you cannot control (seizure).  These symptoms may represent a serious problem that is an emergency. Do not wait to see if the symptoms will go away. Get medical help right away. Call your local emergency services (911 in the [place] Do not drive yourself to the hospital.  Summary  · You can prevent a stroke by eating healthy, exercising, not smoking, drinking less alcohol, and treating other health problems, such as diabetes, high blood pressure, or high cholesterol.  · Do not use any products that contain nicotine or tobacco, such as cigarettes and e-cigarettes.  · Get help right away if you have any signs or symptoms of a stroke.  This information is not intended to replace advice given to you by your health care provider. Make sure you discuss any questions you have with your health care provider.  Document Released: 06/18/2013 Document Revised: 02/13/2020 Document Reviewed: 03/21/2018  Elsevier Patient Education © 2020 [employer]    "

## 2021-07-29 ENCOUNTER — OFFICE VISIT (OUTPATIENT)
Dept: PHYSICAL MEDICINE AND REHAB | Facility: REHABILITATION | Age: 56
End: 2021-07-29
Payer: COMMERCIAL

## 2021-07-29 VITALS
TEMPERATURE: 98.6 F | HEART RATE: 86 BPM | OXYGEN SATURATION: 98 % | BODY MASS INDEX: 23.01 KG/M2 | DIASTOLIC BLOOD PRESSURE: 90 MMHG | WEIGHT: 165 LBS | SYSTOLIC BLOOD PRESSURE: 142 MMHG | RESPIRATION RATE: 15 BRPM

## 2021-07-29 DIAGNOSIS — I69.954 HEMIPLEGIA AND HEMIPARESIS FOLLOWING UNSPECIFIED CEREBROVASCULAR DISEASE AFFECTING LEFT NON-DOMINANT SIDE (HCC): Primary | ICD-10-CM

## 2021-07-29 DIAGNOSIS — I69.854 SPASTIC HEMIPLEGIA OF LEFT NONDOMINANT SIDE AS LATE EFFECT OF OTHER CEREBROVASCULAR DISEASE (HCC): ICD-10-CM

## 2021-07-29 DIAGNOSIS — M62.838 OTHER MUSCLE SPASM: ICD-10-CM

## 2021-07-29 PROCEDURE — 64642 CHEMODENERV 1 EXTREMITY 1-4: CPT | Performed by: PHYSICAL MEDICINE & REHABILITATION

## 2021-07-29 PROCEDURE — 64643 CHEMODENERV 1 EXTREM 1-4 EA: CPT | Performed by: PHYSICAL MEDICINE & REHABILITATION

## 2021-07-29 PROCEDURE — 76942 ECHO GUIDE FOR BIOPSY: CPT | Performed by: PHYSICAL MEDICINE & REHABILITATION

## 2021-07-29 ASSESSMENT — FIBROSIS 4 INDEX: FIB4 SCORE: 0.51

## 2021-07-29 NOTE — PROCEDURES
[facility]  Physical Medicine & Rehabilitation Clinic  [address] [place] [address]  Ph: [phone]    PROCEDURE NOTE    Procedure: Botulinum Injection  Primary Diagnosis & Secondary Diagnoses:   Visit Diagnoses     ICD-10-CM   1. Hemiplegia and hemiparesis following unspecified cerebrovascular disease affecting left non-dominant side (HCC)  I69.954   2. Other muscle spasm  M62.838   3. Spastic hemiplegia of left nondominant side as late effect of other cerebrovascular disease (HCC)  I69.854     Vitals:    07/29/21 0932   BP: 142/90   Pulse: 86   Resp: 15   Temp: 37 °C (98.6 °F)   SpO2: 98%       INFORMED CONSENT   The risks and benefits of the procedure were explained to the patient, and all questions were answered. Benefits of the injection include spasticity reduction by decrease in muscle activation following toxin injection. Adverse effects from toxin injection include but are not limited to: excessive weakness, infection, breathing and/or swallowing difficulty.  Common adverse effects from the injection itself are pain and bruising. The patient demonstrated good understanding of risks and benefits and consents to botulinum toxin injection.     Received botulinum toxin product in last 3 mos: No  Have recently received abx (aminoglycosides): No  Take anti-spasticity medications: Yes, not consistently  Take allergy/cold medicine:  No  Take a sleep medicine: No  Lactating/pregnant: No  Known NMJ disease: No  Human albumin allergy: No    Pre-procedure time out was performed.     PROCEDURE:  Usual sterile procedure was observed with sterile prep with chlorhexidine. Ultrasound was used for needle guidance for the injections in the following muscles. A negative aspiration of blood was obtained, and the following was injected into each muscle.    Botox Plan: I plan to inject to the LUE and LLE  Location Botox Amount in Units   FDS 75 units   FDP 75 units   FCR 25 units   FCU 25 units   Quadriceps (medially  and laterally)  100 units   Hamstrings (medially and laterally) 100 units   Total Units 400 units      *On exam today spasticity was worse in the patient's quadriceps.  Concerned that if we injected hamstrings only there would be overpowering quadricep spasticity which would cause further abnormality of gait.  Therefore decision was made after extensive conversation with patient and wife to spread the Botox between the quadriceps and hamstrings as he is tight in the hamstrings more medially.    Injection sites were cleaned with alcohol and band aid was applied afterwards.  The patient tolerated the procedure well.  There were no complications.  The images were uploaded for permanent storage    MEDICATION USED:  100 units of botulinum toxin type A, mixed with 2mL of sterile, preservative-free normal saline in two 1cc syringes, for a total of 50 units in 1 mL. Repeated for other vials.    Total units injected: 400 units  Total units discarded: 0 units    See MAR for Botox details.     Counseling: Pt was instructed to seek immediate medical attention if fever, difficulty breathing, difficulty swallowing, or other concerning sxs arise. RTC in 2-4 weeks to investigate for effect at peak.    Additional Plan: Continue therapy    BOTOX  NDC 5633-6179-78  Lot  [id number]  Exp 10/2023    Dr. Zulema Dumont DO, MS  Department of Physical Medicine & Rehabilitation  [facility]  [facility]

## 2021-08-09 NOTE — DOCUMENTATION QUERY
[facility]                                                                       Query Response Note      PATIENT:               LUCY JOHNSON  ACCT #:                  [id number]  MRN:                     [id number]  :                      1965  ADMIT DATE:       2021 6:13 AM  DISCH DATE:        2021 12:20 PM  RESPONDING  PROVIDER #:        [id number]           QUERY TEXT:    The patient presented for replacement of bone flap and underwent right autologous cranioplasty. The patient was found to have a right subdural hematoma after the procedure.    May you please further clarify whether the subdural hematoma is complication related to or due to the cranioplasty procedure?    NOTE:  If an appropriate response is not listed below, please respond with a new note.      The patient's Clinical Indicators include:  Clinical indicators:  - CT of Head: small right subdural hematoma following replacement of calvarial bone flap. No midline shift.  - CT of Head: unchanged small right subdural hematoma underneath the bone flap. No new hemorrhage identified.    Treatment or Monitoring:  -cranioplasty  -continued neuro exams  -repeated CT w/o contrast    Risk Factors:  -Right cranial defect  -Prior R MCA stroke  -HTN  -PAF        Thank you,  [email or web address]  Options provided:   -- Right subdural hematoma is a post op complication due to or associated with the cranioplasty procedure   -- Right subdural hematoma is not a post op complication due to or associated with the cranioplasty procedure   -- Right subdural hematoma is related to another etiology, (please document underlying etiology)   -- Unable to determine      Query created by: Birgit Jaquez on 2021 4:05 PM    RESPONSE TEXT:    Unable to determine          Electronically signed by:  LU HONEYCUTT MD 2021 9:07 AM

## 2021-08-13 DIAGNOSIS — I63.511 ACUTE RIGHT MCA STROKE (HCC): ICD-10-CM

## 2021-08-13 DIAGNOSIS — R25.2 SPASTICITY: ICD-10-CM

## 2021-08-16 RX ORDER — DANTROLENE SODIUM 25 MG/1
50 CAPSULE ORAL 3 TIMES DAILY
Qty: 180 CAPSULE | Refills: 0 | Status: SHIPPED | OUTPATIENT
Start: 2021-08-16 | End: 2021-09-13 | Stop reason: SDUPTHER

## 2021-08-26 ENCOUNTER — OFFICE VISIT (OUTPATIENT)
Dept: PHYSICAL MEDICINE AND REHAB | Facility: REHABILITATION | Age: 56
End: 2021-08-26
Payer: COMMERCIAL

## 2021-08-26 DIAGNOSIS — M79.2 NEUROPATHIC PAIN: ICD-10-CM

## 2021-08-26 DIAGNOSIS — I69.854 SPASTIC HEMIPLEGIA OF LEFT NONDOMINANT SIDE AS LATE EFFECT OF OTHER CEREBROVASCULAR DISEASE (HCC): ICD-10-CM

## 2021-08-26 DIAGNOSIS — I69.954 HEMIPLEGIA AND HEMIPARESIS FOLLOWING UNSPECIFIED CEREBROVASCULAR DISEASE AFFECTING LEFT NON-DOMINANT SIDE (HCC): ICD-10-CM

## 2021-08-26 DIAGNOSIS — I63.511 ACUTE RIGHT MCA STROKE (HCC): Primary | ICD-10-CM

## 2021-08-26 DIAGNOSIS — M62.838 OTHER MUSCLE SPASM: ICD-10-CM

## 2021-08-26 PROCEDURE — 99214 OFFICE O/P EST MOD 30 MIN: CPT | Performed by: PHYSICAL MEDICINE & REHABILITATION

## 2021-08-26 NOTE — PROGRESS NOTES
[facility]  PM&R Neuro Rehabilitation [facility]  [facility] [place] [address]  Ph: [phone]    FOLLOW UP PATIENT EVALUATION      Patient Name: Thong Arzola   Patient : 1965  Patient Age: 56 y.o.     Examining Physician: Dr. Zulema Dumont, DO    PM&R History to Date - Background Information:  Dates of Admission/Discharge to ARU: 2021-2021    SUBJECTIVE:   Patient Identification: Thong Arzola is a 56 y.o. male with PMH significant for COVID-19 3/18/2021, paroxysmal atrial fibrillation not on anti-coagulation, hypothyroidism, QTc prolongation and rehabilitation history significant for large right ICA/MCA ischemic stroke 3/31/2021 in setting of paroxysmal off of anticoagulation s/p craniectomy 4/3/2021 by Dr. Payton s/p cranioplasty 21 and is presenting to PM&R clinic for a FOLLOW UP OUTPATIENT evaluation with the following chief complaint/s:    CC: Botox follow up    History of Present Illness:  - Botox Injections: 21: FDS 75, FDP 75, FCR 25, FCU 25, Quad/hamstring 100 units each = 400 units  - Doing well, d/c from [facility] today.   - His LLE and LUE feels looser.   - Still using AFO + quad cane  - Interested in Botox again.   - Has had a lot of labs done will request.   - Dantrolene is not making too tired.   - Took off Gabapentin at night.   - On the right has new hand numbness.     Review of Systems:  All other pertinent positive review of systems are noted above in HPI.   All other systems reviewed and are negative.    Past Medical History:  Past Medical History:   Diagnosis Date   • Stroke (HCC)     right side MCA   • Afib (HCC)    • COVID-19    • Disorder of thyroid     hypo   • High cholesterol    • Psychiatric problem     reactive depression      Past Surgical History:   Procedure Laterality Date   • CRANIOPLASTY Right 2021    Procedure: CRANIOPLASTY - FOR SKULL DEFECT;  Surgeon: Arthur Payton M.D.;  Location: SURGERY [facility];  Service: Neurosurgery    • CRANIOTOMY Right 4/3/2021    Procedure: CRANIOTOMY;  Surgeon: Arthur Payton M.D.;  Location: SURGERY [facility];  Service: Neurosurgery   • KNEE RECONSTRUCTION      left knee orthoscopic        Current Outpatient Medications:   •  dantrolene (DANTRIUM) 25 MG Cap, TAKE 2 CAPSULES BY MOUTH 3 TIMES A DAY., Disp: 180 Capsule, Rfl: 0  •  amLODIPine (NORVASC) 5 MG Tab, Take 1 tablet by mouth every day., Disp: 90 tablet, Rfl: 1  •  rivaroxaban (XARELTO) 20 MG Tab tablet, Take 1 tablet by mouth with dinner., Disp: 30 tablet, Rfl: 2  •  acetaminophen (TYLENOL) 325 MG Tab, Take 2 Tablets by mouth every four hours as needed for Mild Pain., Disp: 30 tablet, Rfl: 0  •  thyroid (ARMOUR THYROID) 60 MG Tab, Take 1 tablet by mouth 2 times a day., Disp: 60 tablet, Rfl: 2  •  atorvastatin (LIPITOR) 40 MG Tab, Take 1 tablet by mouth every evening., Disp: 30 tablet, Rfl: 2  •  metoprolol tartrate (LOPRESSOR) 25 MG Tab, Take 1 tablet by mouth 2 times a day., Disp: 60 tablet, Rfl: 2  •  mirtazapine (REMERON) 15 MG TABLET DISPERSIBLE, Take 1 tablet by mouth at bedtime., Disp: 30 tablet, Rfl: 2  •  gabapentin (NEURONTIN) 100 MG Cap, Take 2 Capsules by mouth every evening., Disp: 30 capsule, Rfl: 2  •  melatonin 3 MG Tab, Take 1 tablet by mouth at bedtime., Disp: 30 tablet, Rfl: 2  No Known Allergies     Past Social History:  Social History     Socioeconomic History   • Marital status:      Spouse name: Not on file   • Number of children: Not on file   • Years of education: Not on file   • Highest education level: Not on file   Occupational History   • Not on file   Tobacco Use   • Smoking status: Never Smoker   • Smokeless tobacco: Never Used   Vaping Use   • Vaping Use: Never used   Substance and Sexual Activity   • Alcohol use: Yes     Comment: occ   • Drug use: No   • Sexual activity: Not on file   Other Topics Concern   • Not on file   Social History Narrative   • Not on file     Social Determinants of Health      Financial Resource Strain:    • Difficulty of Paying Living Expenses:    Food Insecurity:    • Worried About Running Out of Food in the Last Year:    • Ran Out of Food in the Last Year:    Transportation Needs:    • Lack of Transportation (Medical):    • Lack of Transportation (Non-Medical):    Physical Activity:    • Days of Exercise per Week:    • Minutes of Exercise per Session:    Stress:    • Feeling of Stress :    Social Connections:    • Frequency of Communication with Friends and Family:    • Frequency of Social Gatherings with Friends and Family:    • Attends Jainism Services:    • Active Member of Clubs or Organizations:    • Attends Club or Organization Meetings:    • Marital Status:    Intimate Partner Violence:    • Fear of Current or Ex-Partner:    • Emotionally Abused:    • Physically Abused:    • Sexually Abused:         Family History:  History reviewed. No pertinent family history.    Depression and Opioid Screening  PHQ-9:  Depression Screen (PHQ-2/PHQ-9) 5/14/2021 5/15/2021 5/16/2021   PHQ-2 Total Score 1 1 1   PHQ-9 Total Score 5 5 5     Interpretation of PHQ-9 Total Score   Score Severity   1-4 No Depression   5-9 Mild Depression   10-14 Moderate Depression   15-19 Moderately Severe Depression   20-27 Severe Depression     OBJECTIVE:   Vital Signs:  There were no vitals filed for this visit.     Pertinent Labs:  Lab Results   Component Value Date/Time    SODIUM 143 06/16/2021 03:51 PM    POTASSIUM 4.5 06/16/2021 03:51 PM    CHLORIDE 107 06/16/2021 03:51 PM    CO2 26 06/16/2021 03:51 PM    GLUCOSE 117 (H) 06/16/2021 03:51 PM    BUN 20 06/16/2021 03:51 PM    CREATININE 1.06 06/16/2021 03:51 PM    CREATININE 1.3 04/04/2008 03:05 AM       Lab Results   Component Value Date/Time    HBA1C 5.9 (H) 04/01/2021 02:45 AM       Lab Results   Component Value Date/Time    WBC 4.9 06/16/2021 03:51 PM    RBC 4.96 06/16/2021 03:51 PM    HEMOGLOBIN 14.2 06/16/2021 03:51 PM    HEMATOCRIT 43.4 06/16/2021  03:51 PM    MCV 87.5 06/16/2021 03:51 PM    MCH 28.6 06/16/2021 03:51 PM    MCHC 32.7 (L) 06/16/2021 03:51 PM    MPV 8.8 (L) 06/16/2021 03:51 PM    NEUTSPOLYS 58.40 06/16/2021 03:51 PM    LYMPHOCYTES 24.30 06/16/2021 03:51 PM    MONOCYTES 9.20 06/16/2021 03:51 PM    EOSINOPHILS 7.30 (H) 06/16/2021 03:51 PM    BASOPHILS 0.40 06/16/2021 03:51 PM       Lab Results   Component Value Date/Time    ASTSGOT 21 05/27/2021 06:17 AM    ALTSGPT 55 (H) 05/27/2021 06:17 AM        Physical Exam:   GEN: No apparent distress  HEENT: Head normocephalic.  Sclera nonicteric bilaterally, no ocular discharge appreciated bilaterally.  CV: Extremities warm and well-perfused, mild dependent edema of left lower extremity.  PULMONARY: Breathing nonlabored on room air, no respiratory accessory muscle use.  Not requiring supplemental oxygen.  SKIN: No appreciable skin breakdown on exposed areas of skin.  PSYCH: Mood and affect within normal limits.  NEURO: Awake alert.  Conversational.  Logical thought content.  Ambulatory with AFO and cane.  Significant clonus of left ankle at 10-12 beats.  Spasticity graded at 3/4 of plantar flexors.  Does have tightness of left hamstrings, though does not feel entirely like this is spasticity.  Improvement in upper extremity spasticity with wrist flexors at 1+/4, finger flexors 1+/4.    ASSESSMENT/PLAN: Thong Arzola  is a 56 y.o. male with rehabilitation history significant for large right ICA/MCA ischemic stroke 3/31/2021 in setting of paroxysmal off of anticoagulation s/p craniectomy 4/3/2021 by Dr. Payton s/p cranioplasty 6/23/21, here for evaluation. The following plan was discussed with the patient who is in agreement.     Visit Diagnoses     ICD-10-CM   1. Acute right MCA stroke (HCC)  I63.511   2. Hemiplegia and hemiparesis following unspecified cerebrovascular disease affecting left non-dominant side (HCC)  I69.954   3. Spastic hemiplegia of left nondominant side as late effect of other  cerebrovascular disease (HCC)  I69.854   4. Other muscle spasm  M62.838   5. Neuropathic pain  M79.2      Wife assists with history.    Rehab/Neuro:   1. Large right ICA/MCA ischemic stroke 3/31/2021 in setting of paroxysmal off of anticoagulation s/p craniectomy 4/3/2021 by Dr. Payton s/p cranioplasty 6/23/21  2. Left hemiplegia  -Med management: Anticoagulation with Xarelto for secondary stroke prevention.   -Med management: Atorvastatin 40 mg q. Evening (had been lowered due to LFT elevation)  -Therapy: Dis[facility]  -Driving status: Not driving.  -Return to work: Pending discussion.    Spasticity:   -7/29/21: FDS 75, FDP 75, FCR 25, FCU 25, Quad/hamstring 100 units each = 400 units  -Medication Management: Continue dantrolene 50 mg 3 times daily.  -Labs: Request records from [facility] for recent LFTs.  - Referral: Off for Botox currently in place.    Okay to continue anticoagulation with Xarelto through the procedure for botulinum toxin injection.  The risks of going off the medication outweigh the risks of doing the procedure on the medication.  I will plan to use 27-gauge needles and ultrasound guidance to avoid all blood vessels during the procedure.  We discussed that while on anticoagulation the patient does have an increased risk of bleeding and hematoma     Botox Plan: I plan to inject to the LUE and LLE  Location Botox Amount in Units   Left medial gastrocnemius  100 units   Left lateral gastrocnemius  100 units   Left soleus  75 units   Left FDS/FDP  75 units each   Left FCR/FCU  50 units / 25 units   Total Units 400 units       The risks benefits and alternatives to this procedure were discussed and the patient wishes to proceed with the procedure. Risks include but are not limited to damage to surrounding structures, infection, bleeding, worsening of pain which can be permanent, weakness which can be permanent. Benefits include pain relief,  improved function. Alternatives includes not doing the procedure.      Neurogenic Bladder: Voiding volitionally.    Neurogenic Bowel: Continent, volitional    Neuropathic Pain: Off of gabapentin without issue.      Follow up: Approximately 2 months for repeat Botox injections.    Total time spent was 25 minutes.  Included in this time is the time spent preparing for the visit including record review, my exam and evaluation, counseling and education regarding that which is aforementioned in the assessment and plan. Time was spent ordering the appropriate labs, tests, procedures, referrals, medications. Included this time as the time spent obtaining history from someone other than the patient. Discussion involved the patient and wife.      Please note that this dictation was created using voice recognition software. I have made every reasonable attempt to correct obvious errors but there may be errors of grammar and content that I may have overlooked prior to finalization of this note.    Dr. Zulema Dumont DO, MS  Department of Physical Medicine & Rehabilitation  Neuro Rehabilitation Clinic  [facility]

## 2021-09-01 ENCOUNTER — TELEPHONE (OUTPATIENT)
Dept: PHYSICAL MEDICINE AND REHAB | Facility: REHABILITATION | Age: 56
End: 2021-09-01

## 2021-09-01 DIAGNOSIS — I63.511 ACUTE RIGHT MCA STROKE (HCC): ICD-10-CM

## 2021-09-01 DIAGNOSIS — R25.2 SPASTICITY: ICD-10-CM

## 2021-09-01 DIAGNOSIS — M62.838 OTHER MUSCLE SPASM: ICD-10-CM

## 2021-09-01 NOTE — TELEPHONE ENCOUNTER
Patient's wife called and requested PT and OT referral be placed for [facility] on 2nd.

## 2021-09-13 DIAGNOSIS — R25.2 SPASTICITY: ICD-10-CM

## 2021-09-13 DIAGNOSIS — I63.511 ACUTE RIGHT MCA STROKE (HCC): ICD-10-CM

## 2021-09-13 RX ORDER — DANTROLENE SODIUM 25 MG/1
50 CAPSULE ORAL 3 TIMES DAILY
Qty: 180 CAPSULE | Refills: 2 | Status: SHIPPED | OUTPATIENT
Start: 2021-09-13 | End: 2021-11-09

## 2021-09-15 ENCOUNTER — OCCUPATIONAL THERAPY (OUTPATIENT)
Dept: OCCUPATIONAL THERAPY | Facility: REHABILITATION | Age: 56
End: 2021-09-15
Attending: PHYSICAL MEDICINE & REHABILITATION
Payer: COMMERCIAL

## 2021-09-15 ENCOUNTER — PHYSICAL THERAPY (OUTPATIENT)
Dept: PHYSICAL THERAPY | Facility: REHABILITATION | Age: 56
End: 2021-09-15
Attending: PHYSICAL MEDICINE & REHABILITATION
Payer: COMMERCIAL

## 2021-09-15 DIAGNOSIS — I10 ESSENTIAL HYPERTENSION: ICD-10-CM

## 2021-09-15 DIAGNOSIS — M62.838 OTHER MUSCLE SPASM: ICD-10-CM

## 2021-09-15 DIAGNOSIS — I63.511 ACUTE RIGHT MCA STROKE (HCC): ICD-10-CM

## 2021-09-15 PROCEDURE — 97112 NEUROMUSCULAR REEDUCATION: CPT

## 2021-09-15 PROCEDURE — 97166 OT EVAL MOD COMPLEX 45 MIN: CPT

## 2021-09-15 PROCEDURE — 97110 THERAPEUTIC EXERCISES: CPT

## 2021-09-15 PROCEDURE — 97161 PT EVAL LOW COMPLEX 20 MIN: CPT

## 2021-09-15 RX ORDER — LANOLIN ALCOHOL/MO/W.PET/CERES
3 CREAM (GRAM) TOPICAL
Qty: 30 TABLET | Refills: 2 | Status: SHIPPED | OUTPATIENT
Start: 2021-09-15 | End: 2023-09-12

## 2021-09-15 RX ORDER — MIRTAZAPINE 15 MG/1
15 TABLET, ORALLY DISINTEGRATING ORAL
Qty: 30 TABLET | Refills: 2 | Status: SHIPPED | OUTPATIENT
Start: 2021-09-15 | End: 2022-01-24

## 2021-09-15 RX ORDER — THYROID 60 MG/1
60 TABLET ORAL 2 TIMES DAILY
Qty: 60 TABLET | Refills: 2 | OUTPATIENT
Start: 2021-09-15

## 2021-09-15 RX ORDER — GABAPENTIN 100 MG/1
200 CAPSULE ORAL EVERY EVENING
Qty: 30 CAPSULE | Refills: 2 | Status: SHIPPED | OUTPATIENT
Start: 2021-09-15 | End: 2021-09-15

## 2021-09-15 RX ORDER — AMLODIPINE BESYLATE 5 MG/1
5 TABLET ORAL DAILY
Qty: 90 TABLET | Refills: 1 | OUTPATIENT
Start: 2021-09-15 | End: 2022-09-15

## 2021-09-15 RX ORDER — ATORVASTATIN CALCIUM 40 MG/1
40 TABLET, FILM COATED ORAL EVERY EVENING
Qty: 30 TABLET | Refills: 2 | OUTPATIENT
Start: 2021-09-15

## 2021-09-15 SDOH — ECONOMIC STABILITY: GENERAL: QUALITY OF LIFE: GOOD

## 2021-09-15 ASSESSMENT — ACTIVITIES OF DAILY LIVING (ADL)
MEDICATION_ADMINISTRATION: SUPERVISION
WASHING_HAIR_CURRENT_FUNCTION: SUPERVISION
BATHING_CURRENT_FUNCTION: MINIMUM ASSIST
DRESSING_CURRENT_FUNCTION: MODERATE ASSIST
BRUSHING_TEETH_DENTURES_CURRENT_FUNCTION: MINIMUM ASSIST
DRESSING_SHOES_CURRENT_FUNCTION: MAXIMUM ASSIST
SHAVING_CURRENT_FUNCTION: MINIMUM ASSIST
USING_UTENSILS_CURRENT_FUNCTION: MINIMUM ASSIST
PREPARING MEALS: MAXIMUM ASSIST
TOILETING_POSITION: SITTING
WASHING_LB_CURRENT_FUNCTION: MINIMUM ASSIST
WRITING: INDEPENDENT
WASHING_UPB_CURRENT_FUNCTION: SUPERVISION
WASHING_BACK_CURRENT_FUNCTION: MINIMUM ASSIST
DRESSING_UB_CURRENT_FUNCTION: STAND BY ASSIST
DRESSING_LB_CURRENT_FUNCTION: MAXIMUM ASSIST
EQUIPMENT_USED_WHILE_BATHING: SHOWER CHAIR
TOILETING: STAND BY ASSIST
DRESSING_SOCKS_CURRENT_FUNCTION: MAXIMUM ASSIST
LIGHT HOUSEKEEPING: MAXIMUM ASSIST
SHOPPING: MAXIMUM ASSIST
LAUNDRY: MAXIMUM ASSIST
BATHING_POSITION: SITTING
CLOTHING_MANAGEMENT: MINIMUM ASSIST
CLEANSING: MINIMUM ASSIST
CUTTING_FOOD_CURRENT_FUNCTION: MODERATE ASSIST

## 2021-09-15 ASSESSMENT — ENCOUNTER SYMPTOMS
PAIN SCALE AT LOWEST: 0
PAIN SCALE AT HIGHEST: 0
PAIN SCALE: 0
PAIN SCALE: 0
PAIN SCALE AT LOWEST: 0
AWAKENINGS PER NIGHT: 3
PAIN SCALE AT HIGHEST: 4

## 2021-09-15 NOTE — OP THERAPY EVALUATION
Outpatient Occupational Therapy  INITIAL NEUROLOGICAL EVALUATION    [facility] Occupational Therapy [address]  [address]  [place] [address]  Phone:  [phone]  Fax:  [phone]    Date of Evaluation: 09/15/2021    Patient: Thong Arzola  YOB: 1965  MRN: [id number]     Referring Provider: Zulema Dumont D.O.  [address] [facility]  [address]  [place] [address]   Referring Diagnosis Acute right MCA stroke (HCC) [I63.511];Other muscle spasm [M62.838]     Time Calculation    Start time: 0800  Stop time: 0845 Time Calculation (min): 45 minutes             Chief Complaint: Extremity Weakness and Self Care Duties    Visit Diagnoses     ICD-10-CM   1. Acute right MCA stroke (HCC)  I63.511   2. Other muscle spasm  M62.838       Subjective:   History of Present Illness:     Mechanism of injury:   Thong Arzola is a 56 y.o. male with PMH significant for COVID-19 3/18/2021, paroxysmal atrial fibrillation not on anti-coagulation, hypothyroidism, QTc prolongation and rehabilitation history significant for large right ICA/MCA ischemic stroke 3/31/2021 in setting of paroxysmal off of anticoagulation s/p craniectomy 4/3/2021 by Dr. Payton s/p cranioplasty 21   - Botox Injections: 21: FDS 75, FDP 75, FCR 25, FCU 25, Quad/hamstring 100 units each = 400 units  Quality of life:  Good  Headaches:  no headaches  Ear problems: none  Sleep disturbance:  Difficulty falling asleep and interrupted sleep  # Times/Night awakened:  3  Pain:     Current pain ratin    At best pain ratin    At worst pain ratin  Social Support:     Lives in:  One-story house    Lives with:  Spouse  Hand dominance:  Right  Diagnostic Tests:     CT scan: abnormal      Diagnostic Tests Comments:  FINDINGS:    Redemonstration of postsurgical change from right craniotomy. Unchanged subpleural hematoma underneath the bone flap, measuring up to 4 mm. Trace pneumocephalus.     Treatments:      Previous treatment:  Occupational therapy, speech therapy and physical therapy    Current treatment:  Physical therapy  Activities of Daily Living:     Patient reported ADL status: Impairments noted with LB dressing, hygiene and clothing management tasks after using the bathroom, bathing and all domestic/leisure tasks.    Patient Goals:     Patient goals for therapy:  [place] with ADLs/IADLs, return to sport/leisure activities, increased motion, increased strength and improved balance      Past Medical History:   Diagnosis Date   • Afib (HCC)    • COVID-19    • Disorder of thyroid     hypo   • High cholesterol    • Psychiatric problem     reactive depression   • Stroke (HCC) 2021    right side MCA     Past Surgical History:   Procedure Laterality Date   • CRANIOPLASTY Right 6/23/2021    Procedure: CRANIOPLASTY - FOR SKULL DEFECT;  Surgeon: Arthur Payton M.D.;  Location: SURGERY [facility];  Service: Neurosurgery   • CRANIOTOMY Right 4/3/2021    Procedure: CRANIOTOMY;  Surgeon: Arthur Payton M.D.;  Location: SURGERY [facility];  Service: Neurosurgery   • KNEE RECONSTRUCTION      left knee orthoscopic     Social History     Tobacco Use   • Smoking status: Never Smoker   • Smokeless tobacco: Never Used   Substance Use Topics   • Alcohol use: Yes     Comment: occ     Family and Occupational History     Socioeconomic History   • Marital status:      Spouse name: Not on file   • Number of children: Not on file   • Years of education: Not on file   • Highest education level: Not on file   Occupational History   • Not on file       Objective:     Passive Range of Motion:   Upper extremity (left):     Shoulder flexion: Below functional limits    Shoulder extension: Below functional limits    Shoulder abduction: Below functional limits    Shoulder adduction: Within functional limits    Shoulder external rotation: Below functional limits    Shoulder internal rotation: Within functional limits    Elbow  flexion: Within functional limits    Elbow extension: Within functional limits    Forearm pronation: Within functional limits    Forearm supination: Below functional limits    Wrist flexion: Below functional limits    Wrist extension: Below functional limits    Fingers flexion: Below functional limits    Fingers extension: Within functional limits    Fingers abduction: Within functional limits    Fingers adduction: Within functional limits  Upper extremity (right):     All right upper extremity passive range of motion: All within functional limits    Passive Range of Motion Comments:  One finger anterior subluxation of L humerus noted  Patient has a sling for L shoulder and OTR recommended patient wear it when mobilizing to support shoulder and minimize subluxation.     Strength:     Right upper extremity strength within functional limits.    Upper extremity strength (left):     Shoulder flexion: 1    Shoulder extension:  1    Shoulder abduction: 0    Shoulder adduction: 1    Shoulder external rotation:  0    Shoulder internal rotation: 1    Elbow flexion: 1    Elbow extension: 0    Forearm pronation: 0    Forearm supination: 0    Wrist flexion: 0    Wrist extension: 0    Fingers flexion: 0    Fingers extension: 0    Fingers abduction: 0    Fingers adduction: 0    , Prehension, Pinch:  /Prehension (left):      strength: Impaired    Opposition: Impaired  /Prehension (right):      strength: Within functional limits    Opposition: Within functional limits  Pinch (left):     Pinch (tip to tip): Impaired    Pinch (3 point): Impaired    Pinch (lateral): Impaired  Pinch (right):    Pinch (tip to tip): Within functional limits    Pinch (3 point): Within functional limits    Pinch (lateral): Within functional limits    Tone, Sensation and Coordination:   Tone:     Left upper extremity muscle tone: Flaccid    Right upper extremity muscle tone: Normal    Left lower extremity muscle tone: Gross  assist    Modified Jeremiah:   Upper extremity (left):     Shoulder flexors: 0    Shoulder extensors: 0    Shoulder external rotators: 0    Shoulder internal rotators: 0    Elbow flexors: 1    Elbow extensors: 0    Wrist flexors: 1    Wrist extensors: 0    Finger flexors: 0    Finger extensors: 0    Coordination   Upper extremity (left):     Fine motor: Absent    Gross motor: Absent    Slow alternating movements: Absent    Rapid alternating movements: Absent    Finger to finger: Absent    Finger touch to nose: Absent    Cognition:     Orientation: normal to time, normal to place and normal to person    Direction following: three step    Short term memory: intact    Long term memory: intact    Attention span: intact    Sequencing: intact    Organization: intact    Problem solving: intact    Judgement and safety awareness: intact    Hearing: intact    Vision/Perception:     Visual tracking: intact    Convergence: intact    Visual attentions: intact    Visual scanning: intact    R/L hemianopsia: present    R/L neglect: present    Vision assistive device(s): reading glasses    Vision/Perception Comments:   Bell's Test = 28/35 in 3 minutes.      Activities of Daily Living:   Transfers/Mobility:     Bed/chair transfers: stand by assist    Wheelchair transfers: stand by assist    Sit to stand: stand by assist    Toilet transfers: contact guard assist    Tub/shower transfers: contact guard assist    Bed mobility: minimum assist    Toileting:     Toileting: stand by assist    Hygiene: minimum assist    Clothing management: minimum assist    Toileting position: sitting    Bathing:     Bathing: minimum assist    Bathing position: sitting    Washing hair: supervision    Washing back: minimum assist    Washing upper body: supervision    Washing lower body: minimum assist    Bathing assistive device(s): shower chair    Dressing:     Dressing: moderate assist    Dressing upper body: stand by assist    Dressing lower body: maximum  assist    Socks: maximum assist    Shoes: maximum assist    Grooming:     Brushing teeth or denture care: minimum assist    Shaving: minimum assist    Feeding:     Using utensils: minimum assist    Cutting food: moderate assist    Household Management:     Housekeeping: maximum assist    Laundry: maximum assist    Meal preparation: maximum assist    Medication management: supervision    Shopping: maximum assist    Writing: independent    ADL Comments:  Patient is unable to drive at this time, but may be interest in looking into as he improves with therapy        Therapeutic Exercises (CPT 30805):     1. SROM exercises for L UE to be completed by patient, 3 x a day with 10 reps of each exercise    2. PROM exercises for external rotation of L shoulder and supination of forearm to be completed by wife, Anel, 3 x a day with 10 reps of each exercise    3. Weight bearing exercises for L UE. , 3 x a day with 10 reps of each exercise    Therapeutic Exercise Summary:  Initiated HEP with written, verbal and visual instruction.    Also issued handout for bed positioning for L UE hemiparesis to minimize internal rotation of shoulder, minimize ST shortening and minimize pain in shoulder       Time-based treatments/modalities:    Occupational Therapy Timed Treatment Charges  Therapeutic exercise minutes (CPT 81127): 15 minutes      Assessment, Response and Plan:   Impairments: abnormal coordination, abnormal muscle firing, abnormal muscle tone, abnormal or restricted ROM, activity intolerance, fine motor function, impaired functional mobility, impaired physical strength, lacks appropriate home exercise program, limited ADL's, limited mobility and weight-bearing intolerance    Assessment details:  Patient is a 57 y/o male presenting with left hemiparesis s/p right MCA CVA with 0-1/5 UE strength.  Patient also presenting with impaired PROM of L shoulder, forearm, hand and wrist.  Limited ADLs due to impaired standing  balance/endurance, L visual field impairement and one handed function at this time.  Patient would benefit from OT intervention to enhance function, minimize risk of falls and minimize burden of care.  Patient is motivated to participate in therapy and has a supportive family.    Barriers to therapy:  None  Prognosis: good    Goals:   Short Term Goals:   Patient and caregiver will be independent with HEP and positioning of L UE and OTR will provide information on use of arm trough for w/c to enhance positioning arm in midline.    Patient will be Min A/CGA with hygiene and clothing management after bathroom tasks  Patient will be mod A LB dressing  Patient will be supervision cutting own food using A  Patient will incorporate L UE as GFA for 25% of self care tasks  Patient will be Mod A with meal prep  Short term goal timespan:  2-4 weeks    Long Term Goals:   Patient and caregiver will be independent with HEP and positioning of L UE and OTR will provide information on use of arm trough for w/c to enhance positioning arm in midline.    Patient will be Sup with hygiene and clothing management after bathroom tasks  Patient will be min A LB dressing  Patient will be mod I cutting own food using AE  Patient will be Min A with meal prep  Patient will score 35/35 on Bell's Test  Patient will incorporate L UE as GFA for 25% of self care tasks.    Patient will score at least 40/80 on UEFI   Long term goal timespan:  6-8 weeks    Plan:   Therapy options:  Occupational therapy treatment to continue  Planned therapy interventions:  E Stim Attended (CPT 07005), Neuromuscular Re-education (CPT 90667), Orthotic Training (CPT 24113), Therapeutic Activities (CPT 24200), Therapeutic Exercise (CPT 22552) and Self Care ADL Training (CPT 38267)  Frequency:  2x week  Duration in weeks:  8  Duration in visits:  16  Discussed with:  Patient and family      Functional Assessment Used UEFI=14/80        Referring provider co-signature:  I  have reviewed this plan of care and my co-signature certifies the need for services.    Certification Period: 09/15/2021 to  11/10/21    Physician Signature: ________________________________ Date: ______________

## 2021-09-15 NOTE — TELEPHONE ENCOUNTER
Patient's wife left [facility]  requesting a refill on all medications, except Dantrium 25 Mg. To be sent to [facility].

## 2021-09-15 NOTE — OP THERAPY EVALUATION
Outpatient Physical Therapy  INITIAL EVALUATION    [facility] Physical Therapy [address]  [address]  [place] [address]  Phone:  [phone]  Fax:  [phone]    Date of Evaluation: 09/15/2021    Patient: Thong Arzola  YOB: 1965  MRN: [id number]     Referring Provider: Zulema Dumont D.O.  [address] [facility]  [address]  [place] [address]   Referring Diagnosis Acute right MCA stroke (HCC) [I63.511];Other muscle spasm [M62.838]     Time Calculation  Start time: 0850  Stop time: 0935 Time Calculation (min): 45 minutes         Chief Complaint: No chief complaint on file.    Visit Diagnoses     ICD-10-CM   1. Acute right MCA stroke (HCC)  I63.511   2. Other muscle spasm  M62.838       Date of onset of impairment: 3/25/2021    Subjective   History of Present Illness:     History of chief complaint:  Patient suffered a R cva with L sided hemiparesis 3/31/21--patient was in hospital for a month and then went to acute rehab for a month.  Patient has received rehab without walls at home until August.  Patient has not participated in PT for the past months.  Patient reports 70 assist with ADLs. Spouse reports ambulates with afo and sbqc with cga assist.    PMH: covid with delvelped a-fib w/ 10 days (+)--cva w/in days--a-fib with meds      Prior level of function:  Owner joleen    Pain:     Current pain ratin    At best pain ratin    At worst pain ratin    Location:  C/o L shoulder pain, L ant hip pain--at end of busy day    Aggravating factors:  Goals:  Paddle board and hike  STG: don/doff independently after toileting        Past Medical History:   Diagnosis Date   • Afib (HCC)    • COVID-19    • Disorder of thyroid     hypo   • High cholesterol    • Psychiatric problem     reactive depression   • Stroke (HCC)     right side MCA     Past Surgical History:   Procedure Laterality Date   • CRANIOPLASTY Right 2021    Procedure: CRANIOPLASTY - FOR SKULL DEFECT;   Surgeon: Arthur Payton M.D.;  Location: SURGERY [facility];  Service: Neurosurgery   • CRANIOTOMY Right 4/3/2021    Procedure: CRANIOTOMY;  Surgeon: Arthur Payton M.D.;  Location: SURGERY [facility];  Service: Neurosurgery   • KNEE RECONSTRUCTION      left knee orthoscopic       Precautions:       Objective   Observation and functional movement:  Noted  R Trunk lean, R c/s shift,  L GHJ 1.5 finger sulcus sign, L l.e. noted clonicity     Range of motion and strength:    Noted proximal L GHJ 1.5 finger sulcus-discussed [place] boc sleeve with patient(need to complete L u.e. exam)    L hip fle 90 ir 10-er 20--noted tone      Mod paramjit  L hip fle/ext 2-  L knee fle 3+ ext 1+  L ankle DF4+  PF: 1+    MMT L DF:0-- 1+ with knee flex synergy    Sensation and reflexes:     Bilateral DTR l4 2+, S1 Bilaterally 2+  Clonus: bilateral, 0 beats  Babinski: bilaterally down going  B&B: denied   Saddle paresthesia: denied  Cough/sneeze : increases  Mmt: 4/5 throughout l2-s1 bilaterally    Noted L inferior/lateral visual field cut    Palpation and joint mobility:     TTP: L infra with noted spasms, R upper trap tonicity            Therapeutic Treatments and Modalities:     Therapeutic Treatment and Modalities Summary: W/c propulsion 100 w/ sba w/ v/c     W/c-plinth cga trfr to R --  V/c for sequencing from sit- supine--extra time required supine-sit transfers  Seated  Mid-line orientation with focus on bilateral symmetry in front of mirror with and without manual and verbal cues--HEP  Discussed L '[place]' sleeve for L GHJ approximation  Attempted sit-stand transfer w/o a/d// patient unable to process and execute w/o mod/max m.c. &v.c.// observed significant  increased pushing behavior to unaffected side with increased anterior trunk wt. Shift  Plinth-w/c transfer to L max/mod with extra time and v.c.       Time-based treatments/modalities:    Physical Therapy Timed Treatment Charges  Neuromusc re-ed, balance, coor, post  minutes (CPT 35504): 10 minutes  Therapeutic activity minutes (CPT 93268): 5 minutes  Therapeutic exercise minutes (CPT 75776): 5 minutes      Assessment, Response and Plan:   Assessment details:  P(atient is  56 y.o. male with PMH significant for COVID-19 3/18/2021, paroxysmal atrial fibrillation not on anti-coagulation, hypothyroidism and suffered a significant large right ICA/MCA ischemic stroke 3/31/2021.  Patient underwent craniectomy 4/3/2021 by and  cranioplasty 6/23/21. Patient presents significant L lower leg spasticity, L proximal GHJ subluxation(non-functioning L u.e.) with pushing behavior to unaffected side that is increased with anterior weight shift involved with positional changes, noted  L inferior lateral field of vision cut, with L sided spatial neglect. Paient demonstrates limited safety awareness and limited sequence planning with regards to functional movements.  Patient is a high fall risk given neuromotor and spatial neglect deficits.  Patient should do well for goals if he is compliant with his HEP and POC.  Prognosis: fair    Goals:   Short Term Goals:   W/c- plinth transfer to R supervision  10 meter walk test > .4m/s  TUG< 30 seconds  30 second sit-stand test > 4 attempts  Up from floor with min assist  Up/own curb with cga and a/d  Short term goal time span:  4-6 weeks      Long Term Goals:    Mod I for toileting for bladder  10 meter walk test > .6 m/s  TUG< 25 seconds  30 second sit-stand test > 7 attempts  Up from floor with sba/sup  Up/down curb w/ a/d w/ supervision  supervision for community ambulation for all hard surfaces and ramps > 500 ft.  Long term goal time span:  2-4 months    Plan:   Therapy options:  Physical therapy treatment to continue  Planned therapy interventions:  Neuromuscular Re-education (CPT 00527), Manual Therapy (CPT 45946), Gait Training (CPT 73627), Functional Training, Self Care (CPT 64119) and Therapeutic Exercise (CPT 04514)  Other planned therapy  interventions:  Dry needle  Frequency:  2x week  Duration in weeks:  12  Duration in visits:  20  Plan details:  Complete L u.e. exam, visual field assessment, u.e. and l./e. proprioception and kinesthesia, clock drawing for spatial neglect, midline and vertical orientation training with and wihtout visual assist, L sided loading and activity progression, transfer training, gait trg--lite gait??, dry needling, proprioceptive trg.      Functional Assessment Used        Referring provider co-signature:  I have reviewed this plan of care and my co-signature certifies the need for services.    Certification Period: 09/15/2021 to  12/15/21    Physician Signature: ________________________________ Date: ______________

## 2021-09-17 ENCOUNTER — OCCUPATIONAL THERAPY (OUTPATIENT)
Dept: OCCUPATIONAL THERAPY | Facility: REHABILITATION | Age: 56
End: 2021-09-17
Attending: PHYSICAL MEDICINE & REHABILITATION
Payer: COMMERCIAL

## 2021-09-17 DIAGNOSIS — I63.511 ACUTE RIGHT MCA STROKE (HCC): ICD-10-CM

## 2021-09-17 PROCEDURE — 97535 SELF CARE MNGMENT TRAINING: CPT

## 2021-09-17 PROCEDURE — 97110 THERAPEUTIC EXERCISES: CPT

## 2021-09-17 PROCEDURE — 97112 NEUROMUSCULAR REEDUCATION: CPT

## 2021-09-17 NOTE — OP THERAPY DAILY TREATMENT
Outpatient Occupational Therapy  DAILY TREATMENT     [facility] Occupational Therapy [address]  [address]  [place] [address]  Phone:  [phone]  Fax:  [phone]    Date: 09/17/2021    Patient: Thong Arzola  YOB: 1965  MRN: [id number]     Time Calculation  Start time: 0845  Stop time: 0930 Time Calculation (min): 45 minutes         Chief Complaint: Extremity Weakness and Self Care Duties    Visit #: 2    SUBJECTIVE:  We have been working on the ROM exercises    OBJECTIVE:  Current objective measures:   Passive Range of Motion:   Upper extremity (left):     Shoulder flexion: Below functional limits  0-70    Shoulder extension: Below functional limits  0-10    Shoulder abduction: Below functional limits 0-60    Shoulder external rotation: Below functional limits 0-10     Forearm supination: Below functional limits 0-20    Wrist flexion: Below functional limits 0-40    Wrist extension: Below functional limits 0-25    Fingers flexion: Below functional limits     Passive Range of Motion Comments:  One finger anterior subluxation of L humerus noted  Patient has a sling for L shoulder and OTR recommended patient wear it when mobilizing to support shoulder and minimize subluxation.      Strength:     Right upper extremity strength within functional limits.    Upper extremity strength (left):     Shoulder flexion: 1    Shoulder extension:  1    Shoulder abduction: 0    Shoulder adduction: 1    Shoulder external rotation:  0    Shoulder internal rotation: 1    Elbow flexion: 1    Elbow extension: 0    Forearm pronation: 0    Forearm supination: 0    Wrist flexion: 0    Wrist extension: 0    Fingers flexion: 0    Fingers extension: 0    Fingers abduction: 0    Fingers adduction: 0     , Prehension, Pinch:  Unable at this time  Tone, Sensation and Coordination:   Tone:     Left upper extremity muscle tone: Flaccid    Right upper extremity muscle tone: Normal    Left lower  extremity muscle tone: Gross assist     Modified Jeremiah:   Upper extremity (left):     Shoulder flexors: 0    Shoulder extensors: 0    Shoulder external rotators: 0    Shoulder internal rotators: 0    Elbow flexors: 1    Elbow extensors: 0    Wrist flexors: 1    Wrist extensors: 0    Finger flexors: 0    Finger extensors: 0     Coordination   Absent in L UE     Cognition:     Orientation: normal to time, normal to place and normal to person    Direction following: three step    Short term memory: intact    Long term memory: intact    Attention span: intact    Sequencing: intact    Organization: intact    Problem solving: intact    Judgement and safety awareness: intact    Hearing: intact     Vision/Perception:     Visual tracking: intact    Convergence: intact    Visual attentions: intact    Visual scanning: intact    R/L hemianopsia: present    R/L neglect: present    Vision assistive device(s): reading glasses     Vision/Perception Comments:   Bell's Test = 28/35 in 3 minutes.       Activities of Daily Living:   Transfers/Mobility:     Bed/chair transfers: stand by assist    Wheelchair transfers: stand by assist    Sit to stand: stand by assist    Toilet transfers: contact guard assist    Tub/shower transfers: contact guard assist    Bed mobility: minimum assist     Toileting:     Toileting: stand by assist    Hygiene: minimum assist    Clothing management: minimum assist    Toileting position: sitting     Bathing:     Bathing: minimum assist    Bathing position: sitting    Washing hair: supervision    Washing back: minimum assist    Washing upper body: supervision    Washing lower body: minimum assist    Bathing assistive device(s): shower chair     Dressing:     Dressing: moderate assist    Dressing upper body: stand by assist    Dressing lower body: maximum assist    Socks: maximum assist    Shoes: maximum assist     Grooming:     Brushing teeth or denture care: minimum assist    Shaving: minimum  assist     Feeding:     Using utensils: minimum assist    Cutting food: moderate assist     Household Management:     Housekeeping: maximum assist    Laundry: maximum assist    Meal preparation: maximum assist    Medication management: supervision    Shopping: maximum assist    Writing: independent        Therapeutic Exercises (CPT 50471):     1. AAROM and PROM of L arm in all directions. , 5 sets in all direction for shoulder, elbow, wrist and hand.      Therapeutic Exercise Summary:  Reviewed PROM of external rotation and supination with wife, Anel and SROM with client.     Therapeutic Treatments and Modalities:    1. Neuromuscular Re-education (CPT 05856)    2. Self Care ADL Training (CPT 18953)    Therapeutic Treatments and Modalities Summary: NMRE:  Weight bearing completed seated in w/c through left arm onto therapists leg with support at wrist and shoulder.  10 x .  Shoulder retraction completed with minimal shoulder elevation with focus on strengthening lower traps. 10 x  Also completed weight bearing of affected arm while seated at slanted board with affected hand placed on hand towel and non-affected hand placed on top and worked on circular motion of wiping board in clock wise and anti-clockwise movement 5 x each direction with rest between each set with OTR mobilizing L scapula during movements .  Patient stated he has been using SABOE for e-stim at home for shoulder strengthening and completing a couple times a week.. OTR advised patient to increase to daily at least 2 x to enhance possible motor return.      Self care:  Introduced patient to use of rocker knife for cutting food one handed and also one handed cutting board to enhance ability to cut and peel vegetables and butter toast/make sandwiches.  Printed information on both items also given to patient and wife.     Time-based treatments/modalities:  Therapeutic exercise minutes (CPT 67181): 17 minutes  Self-care/ADL training minutes (CPT 73330): 8  minutes  Neuromusc re-ed minutes (CPT 46343): 15 minutes        Pain rating before treatment: 0  Pain rating after treatment: 0    ASSESSMENT:   Response to treatment: Patient and wife maintaining good ROM of affected arm with some improved PROM of supination and external rotation.    PLAN/RECOMMENDATIONS:   Plan for treatment: therapy treatment to continue next visit.  Planned interventions for next visit: continue with current treatment, E-stim attended (CPT 13645), orthotic training (CPT 40719), self care ADL training (CPT 17146), therapeutic activities (CPT 98144) and therapeutic exercise (CPT 45649)

## 2021-09-19 NOTE — PROGRESS NOTES
Patient vital signs are at baseline: Yes  Patient able to ambulate as they were prior to admission or with assist devices provided by therapies during their stay:  Yes, patient is  NWB with use of walker  Patient MUST void prior to discharge:  Yes  Patient able to tolerate oral intake:  Yes  Pain has adequate pain control using Oral analgesics:  Yes - denies pain    Denies pain over night. Elevated LLE and cold therapy. Utilizing urinal with good urine output. Patient reports slight sensation to toes and can wiggle toes. Sensation is absent from calf down to foot.  NWB with walker and gaitbelt. Patient complained of light headedness and diaphoresis. Checked glucose at 64. Gave patient juice and crackers and rechecked and BG was up to 76. Novolog was not given. States that he is feeling better. IV antibiotics given. Splint and Ace wrap clean dry and intact.   Pt bladder scanned after multiple attempts to void and being unable to pass any urine. Amount of 553ml noted

## 2021-09-20 ENCOUNTER — TELEPHONE (OUTPATIENT)
Dept: PHYSICAL MEDICINE AND REHAB | Facility: REHABILITATION | Age: 56
End: 2021-09-20

## 2021-09-22 ENCOUNTER — OCCUPATIONAL THERAPY (OUTPATIENT)
Dept: OCCUPATIONAL THERAPY | Facility: REHABILITATION | Age: 56
End: 2021-09-22
Attending: PHYSICAL MEDICINE & REHABILITATION
Payer: COMMERCIAL

## 2021-09-22 ENCOUNTER — PHYSICAL THERAPY (OUTPATIENT)
Dept: PHYSICAL THERAPY | Facility: REHABILITATION | Age: 56
End: 2021-09-22
Attending: PHYSICAL MEDICINE & REHABILITATION
Payer: COMMERCIAL

## 2021-09-22 DIAGNOSIS — I63.511 ACUTE RIGHT MCA STROKE (HCC): ICD-10-CM

## 2021-09-22 PROCEDURE — 97116 GAIT TRAINING THERAPY: CPT

## 2021-09-22 PROCEDURE — 97110 THERAPEUTIC EXERCISES: CPT

## 2021-09-22 PROCEDURE — 97112 NEUROMUSCULAR REEDUCATION: CPT

## 2021-09-22 PROCEDURE — 97530 THERAPEUTIC ACTIVITIES: CPT

## 2021-09-22 PROCEDURE — 97032 APPL MODALITY 1+ESTIM EA 15: CPT

## 2021-09-22 NOTE — OP THERAPY DAILY TREATMENT
Outpatient Occupational Therapy  DAILY TREATMENT     [facility] Occupational Therapy [address]  [address]  [place] [address]  Phone:  [phone]  Fax:  [phone]    Date: 09/22/2021    Patient: Thong Arzola  YOB: 1965  MRN: [id number]     Time Calculation  Start time: 0800  Stop time: 0845 Time Calculation (min): 45 minutes         Chief Complaint: Extremity Weakness and Self Care Duties    Visit #: 3    SUBJECTIVE:  I couldn't find the rocker knife and cutting board on [employer], can you find them for me?    OBJECTIVE:  Current objective measures:   Passive Range of Motion:   Upper extremity (left):     Shoulder flexion: Below functional limits  0-80    Shoulder extension: Below functional limits  0-10    Shoulder abduction: Below functional limits 0-70    Shoulder external rotation: Below functional limits 0-20     Forearm supination: Below functional limits 0-20    Wrist flexion: Below functional limits 0-40    Wrist extension: Below functional limits 0-25    Fingers flexion: Below functional limits     Passive Range of Motion Comments:  One finger anterior subluxation of L humerus noted  Patient has a sling for L shoulder and OTR recommended patient wear it when mobilizing to support shoulder and minimize subluxation.      Strength:     Right upper extremity strength within functional limits.    Upper extremity strength (left):     Shoulder flexion: 1    Shoulder extension:  1    Shoulder abduction: 0    Shoulder adduction: 1    Shoulder external rotation:  0    Shoulder internal rotation: 1    Elbow flexion: 1    Elbow extension: 0    Forearm pronation: 0    Forearm supination: 0    Wrist flexion: 0    Wrist extension: 0    Fingers flexion: 0    Fingers extension: 0    Fingers abduction: 0    Fingers adduction: 0     , Prehension, Pinch:  Unable at this time  Tone, Sensation and Coordination:   Tone:     Left upper extremity muscle tone: Flaccid    Right upper  extremity muscle tone: Normal    Left lower extremity muscle tone: Gross assist     Modified Jeremiah:   Upper extremity (left):     Shoulder flexors: 0    Shoulder extensors: 0    Shoulder external rotators: 0    Shoulder internal rotators: 0    Elbow flexors: 1    Elbow extensors: 0    Wrist flexors: 1    Wrist extensors: 0    Finger flexors: 0    Finger extensors: 0     Coordination   Absent in L UE     Cognition:     Orientation: normal to time, normal to place and normal to person    Direction following: three step    Short term memory: intact    Long term memory: intact    Attention span: intact    Sequencing: intact    Organization: intact    Problem solving: intact    Judgement and safety awareness: intact    Hearing: intact     Vision/Perception:     Visual tracking: intact    Convergence: intact    Visual attentions: intact    Visual scanning: intact    R/L hemianopsia: present    R/L neglect: present    Vision assistive device(s): reading glasses     Vision/Perception Comments:   Bell's Test = 28/35 in 3 minutes.       Activities of Daily Living:   Transfers/Mobility:     Bed/chair transfers: stand by assist    Wheelchair transfers: stand by assist    Sit to stand: stand by assist    Toilet transfers: contact guard assist    Tub/shower transfers: contact guard assist    Bed mobility: minimum assist     Toileting:     Toileting: stand by assist    Hygiene: minimum assist    Clothing management: minimum assist    Toileting position: sitting     Bathing:     Bathing: minimum assist    Bathing position: sitting    Washing hair: supervision    Washing back: minimum assist    Washing upper body: supervision    Washing lower body: minimum assist    Bathing assistive device(s): shower chair     Dressing:     Dressing: moderate assist    Dressing upper body: stand by assist    Dressing lower body: maximum assist    Socks: maximum assist    Shoes: maximum assist     Grooming:     Brushing teeth or denture care:  "minimum assist    Shaving: minimum assist     Feeding:     Using utensils: minimum assist    Cutting food: moderate assist     Household Management:     Housekeeping: maximum assist    Laundry: maximum assist    Meal preparation: maximum assist    Medication management: supervision    Shopping: maximum assist    Writing: independent        Therapeutic Exercises (CPT 47676):     1. AAROM and PROM of L UE to enhance passive ROM in all of the extremity.      Therapeutic Treatments and Modalities:    1. E Stim Attended (CPT 29342)    2. Neuromuscular Re-education (CPT 40923)    Therapeutic Treatments and Modalities Summary: E-Stim:  Completed at L shoulder to enhance strength, stabilization and minimize subluxation.  Currently 1 finger anterior subluxation.  SABOE used on level 12 with shoulder elevation and scapular retraction active movement during stimulation    NMRE:  Weight bearing completed through L UE with hand leaning onto mat with support at wrist and elbow 10 x with 5 second hold.  Also leaning onto left elbow while seated at edge of mat.  Patient found this difficult due to impaired center of balance and feeling like he \"was falling\" when leaning towards affected side. Worked on sitting balance and self righting to maintain midline position and scanning/looking to left side.  CGA w/c to mat transfer towards affected side with CGA and verbal prompts.     Time-based treatments/modalities:  Therapeutic exercise minutes (CPT 61938): 15 minutes  Neuromusc re-ed minutes (CPT 82144): 15 minutes  E-stim attended minutes (CPT 52706): 15 minutes     Pain rating before treatment: 0  Pain rating after treatment: 0    ASSESSMENT:   Response to treatment: increased PROM of L UE noted, impaired center of gravity causing listing to non-affected side.  Patient now has his w/c back and OTR to find arm trough to fit w/c for neutral positioning of affected arm.      PLAN/RECOMMENDATIONS:   Plan for treatment: therapy treatment " to continue next visit.  Planned interventions for next visit: continue with current treatment, E-stim attended (CPT 85611), neuromuscular re-education (CPT 43803), orthotic training (CPT 18096), self care ADL training (CPT 44069), therapeutic activities (CPT 77825) and therapeutic exercise (CPT 30144)

## 2021-09-22 NOTE — OP THERAPY DAILY TREATMENT
Outpatient Physical Therapy  DAILY TREATMENT     [facility] Physical Therapy [address]  [address]  [place] [address]  Phone:  [phone]  Fax:  [phone]    Date: 09/22/2021    Patient: Thong Arzola  YOB: 1965  MRN: [id number]     Time Calculation    Start time: 0845  Stop time: 0945 Time Calculation (min): 60 minutes         Chief Complaint: No chief complaint on file.    Visit #: 2    SUBJECTIVE:  Patient doing well and denies and fall incidients    OBJECTIVE:  spatial awareness test  House: [facility]    clock drawings: noted symmetrical sidedness but spatial clustering            Therapeutic Treatments and Modalities:     Therapeutic Treatment and Modalities Summary: Standing frame: focus on symmetry with visual mirror feedback and M.C. facilitation feedback to affected side: wheeled throughout clinic with horizontal perturbation with patient maintaining vertical orientation--added horizontal gaze stab left > r with moving target predominantly in L field of vision--v/c to maintain vertical midline ( head and trunk)  Gait trg x 20 feet w/ SBQC w/ CGA: noted R trunk lean and limited active L l.e. advance with step-to r stride pattern--v/c and facilitatory  manual cues to affected side to promoted erect posture.  Seated bilateral symmetrical rowing with challenge feedback to affected sided w/ v/c to assist patient in active mid-line control during forward trunk lean and transition to upright (performed in front of mirror for additional visual feedback)    HEP:  Patient seated in front of mirror with hands clasped  and  Elbow/forearms placed symmetrical  on ipsilateral thigh: focus to maintain ant. Position in symmetrical position x 20-30 seconds x 3 houlry with visual mirror feedback and to maitain vertical orientation during transitions from sitting to leaning on knees// patient required mod v/c and manula cuing to obtain and min/cga to maintain position for > 15  seconds  Transfers w/c-standing frame to left, chair to w/c to left--patient progressed to cga for [id number] transfers    Time-based treatments/modalities:    Physical Therapy Timed Treatment Charges  Gait training minutes (CPT 36658): 8 minutes  Neuromusc re-ed, balance, coor, post minutes (CPT 46982): 30 minutes  Therapeutic activity minutes (CPT 24705): 15 minutes    ASSESSMENT:   Patient demonstrated improved horizontal gaze stab and was able to track across midline to affected side with stack and dynamic tasks.  Patient continues to struggle with L sided weight bearing in forward seated tasks and standing dynamic task such and transfers and ambulation    PLAN/RECOMMENDATIONS: (trial restraint approach to non-affected side: eye patch w/ mask and sling)  Progress anterior weight shift in front of mirror for symmetry, //bars wt. Shift and stepping progression with facilitory manual cues to affected side. Initiate  lite gait, review transfers.  Progress HEP

## 2021-09-24 ENCOUNTER — OCCUPATIONAL THERAPY (OUTPATIENT)
Dept: OCCUPATIONAL THERAPY | Facility: REHABILITATION | Age: 56
End: 2021-09-24
Attending: PHYSICAL MEDICINE & REHABILITATION
Payer: COMMERCIAL

## 2021-09-24 DIAGNOSIS — I63.511 ACUTE RIGHT MCA STROKE (HCC): ICD-10-CM

## 2021-09-24 PROCEDURE — 97110 THERAPEUTIC EXERCISES: CPT

## 2021-09-24 PROCEDURE — 97112 NEUROMUSCULAR REEDUCATION: CPT

## 2021-09-24 PROCEDURE — 97032 APPL MODALITY 1+ESTIM EA 15: CPT

## 2021-09-24 NOTE — OP THERAPY DAILY TREATMENT
Outpatient Occupational Therapy  DAILY TREATMENT     [facility] [address].  [address]  [place] [address]  Phone:  [phone]  Fax:  [phone]    Date: 09/24/2021    Patient: Thong Arzola  YOB: 1965  MRN: [id number]     Time Calculation  Start time: 0100  Stop time: 0145 Time Calculation (min): 45 minutes         Chief Complaint: Extremity Weakness and Self Care Duties    Visit #: 4    SUBJECTIVE:  We ordered the items you recommended and they should be delivered in the next few days.      OBJECTIVE:  Current objective measures:   Passive Range of Motion:   Upper extremity (left):     Shoulder flexion: Below functional limits  0-80    Shoulder extension: Below functional limits  0-20    Shoulder abduction: Below functional limits 0-70    Shoulder external rotation: Below functional limits 0-20     Forearm supination: Below functional limits 0-25    Wrist flexion: Below functional limits 0-40    Wrist extension: Below functional limits 0-30    Fingers flexion: Below functional limits     Passive Range of Motion Comments:  1/2 finger anterior subluxation of L humerus noted    Strength:     Right upper extremity strength within functional limits.    Upper extremity strength (left):     Shoulder flexion: 1    Shoulder extension:  1    Shoulder abduction: 0    Shoulder adduction: 1    Shoulder external rotation:  0    Shoulder internal rotation: 1    Elbow flexion: 1    Elbow extension: 0    Forearm pronation: 0    Forearm supination: 0    Wrist flexion: 0    Wrist extension: 0    Fingers flexion: 0    Fingers extension: 0    Fingers abduction: 0    Fingers adduction: 0     , Prehension, Pinch:  Unable at this time  Tone, Sensation and Coordination:   Tone:     Left upper extremity muscle tone: Flaccid    Right upper extremity muscle tone: Normal    Left lower extremity muscle tone: Gross assist     Modified Jeremiah:   Upper extremity  (left):     Shoulder flexors: 0    Shoulder extensors: 0    Shoulder external rotators: 0    Shoulder internal rotators: 0    Elbow flexors: 1    Elbow extensors: 0    Wrist flexors: 1    Wrist extensors: 0    Finger flexors: 0    Finger extensors: 0     Coordination   Absent in L UE     Cognition:     Orientation: normal to time, normal to place and normal to person    Direction following: three step    Short term memory: intact    Long term memory: intact    Attention span: intact    Sequencing: intact    Organization: intact    Problem solving: intact    Judgement and safety awareness: intact    Hearing: intact     Vision/Perception:     Visual tracking: intact    Convergence: intact    Visual attentions: intact    Visual scanning: intact    R/L hemianopsia: present    R/L neglect: present    Vision assistive device(s): reading glasses     Vision/Perception Comments:   Bell's Test = 27/35 in 3 minutes.       Activities of Daily Living:   Transfers/Mobility:     Bed/chair transfers: stand by assist    Wheelchair transfers: stand by assist    Sit to stand: stand by assist    Toilet transfers: contact guard assist    Tub/shower transfers: contact guard assist    Bed mobility: minimum assist     Toileting:     Toileting: stand by assist    Hygiene: minimum assist    Clothing management: minimum assist    Toileting position: sitting     Bathing:     Bathing: minimum assist    Bathing position: sitting    Washing hair: supervision    Washing back: minimum assist    Washing upper body: supervision    Washing lower body: minimum assist    Bathing assistive device(s): shower chair     Dressing:     Dressing: moderate assist    Dressing upper body: stand by assist    Dressing lower body: maximum assist    Socks: maximum assist    Shoes: maximum assist     Grooming:     Brushing teeth or denture care: minimum assist    Shaving: minimum assist     Feeding:     Using utensils: minimum assist    Cutting food: moderate  "assist     Household Management:     Housekeeping: maximum assist    Laundry: maximum assist    Meal preparation: maximum assist    Medication management: supervision    Shopping: maximum assist    Writing: independent        Therapeutic Exercises (CPT 28905):     1. AAROM and PROM of L UE to enhance passive ROM in all of the extremity.      Therapeutic Treatments and Modalities:    1. E Stim Attended (CPT 66727)    2. Neuromuscular Re-education (CPT 39241)    Therapeutic Treatments and Modalities Summary: E-Stim:  Completed at L shoulder to enhance strength, stabilization and minimize subluxation.  Currently 1 finger anterior subluxation.  SABOE used on level 11 with shoulder elevation and scapular retraction active movement during stimulation    NMRE:  Weight bearing completed through L UE with hand leaning onto seat next to w/c  with support at wrist and elbow 10 x with 5 second hold. Table top activity focusing on bilateral movement with non-affected R hand placed over affected L hand completing B shoulder movement while \"wiping\" table with cloth in FF/ext, horizontal abd/add, and circular motions of clockwise and counter clockwise.  3 sets of 10 in each direction while focusing on maintaining alignment of trunk to minimize listing to R non affected side.      Also completed Bell's test and send a few copies home with patient to practice.      Time-based treatments/modalities:  Therapeutic exercise minutes (CPT 78617): 15 minutes  Neuromusc re-ed minutes (CPT 57240): 15 minutes  E-stim attended minutes (CPT 85455): 15 minutes     Pain rating before treatment: 0  Pain rating after treatment: 0    ASSESSMENT:   Response to treatment: enhanced visual scanning to left affected side and maintaining eye contact with people on his left side.      PLAN/RECOMMENDATIONS:   Plan for treatment: therapy treatment to continue next visit.  Planned interventions for next visit: continue with current treatment, E-stim attended " (CPT 51150), neuromuscular re-education (CPT 30036), orthotic training (CPT 18671), self care ADL training (CPT 29269), therapeutic activities (CPT 38730) and therapeutic exercise (CPT 07004)

## 2021-09-29 ENCOUNTER — PHYSICAL THERAPY (OUTPATIENT)
Dept: PHYSICAL THERAPY | Facility: REHABILITATION | Age: 56
End: 2021-09-29
Attending: PHYSICAL MEDICINE & REHABILITATION
Payer: COMMERCIAL

## 2021-09-29 ENCOUNTER — OCCUPATIONAL THERAPY (OUTPATIENT)
Dept: OCCUPATIONAL THERAPY | Facility: REHABILITATION | Age: 56
End: 2021-09-29
Attending: PHYSICAL MEDICINE & REHABILITATION
Payer: COMMERCIAL

## 2021-09-29 DIAGNOSIS — I63.511 ACUTE RIGHT MCA STROKE (HCC): ICD-10-CM

## 2021-09-29 PROCEDURE — 97032 APPL MODALITY 1+ESTIM EA 15: CPT

## 2021-09-29 PROCEDURE — 97116 GAIT TRAINING THERAPY: CPT

## 2021-09-29 PROCEDURE — 97112 NEUROMUSCULAR REEDUCATION: CPT

## 2021-09-29 NOTE — OP THERAPY DAILY TREATMENT
Outpatient Physical Therapy  DAILY TREATMENT     [facility] [address].  [address]  [place] [address]  Phone:  [phone]  Fax:  [phone]    Date: 09/29/2021    Patient: Thong Arzola  YOB: 1965  MRN: [id number]     Time Calculation    Start time: 0845  Stop time: 0934 Time Calculation (min): 49 minutes         Chief Complaint: No chief complaint on file.    Visit #: 3    SUBJECTIVE:  Patient denies any new complaints    OBJECTIVE:              Therapeutic Treatments and Modalities:     Therapeutic Treatment and Modalities Summary:  Throughout treatment, R eye patch was placed on patient to obscure R sided vision .  Immobilized R u,e, for transfers to affected side--min/mod with v/s for sequencing    Lite gait trg with eye patch over R eye  with v/cs throughout treatment for patient to look at himself in the mirror on the L side of the vertical stanchion of the Lite Gait  Lite gait training on a level TM .3.-.7 MHP with bilateral hip stabilizer bands, L DF and  knee/hip assist #1 band with lateral bias, L hand placed on upright rail with therapist contact: initiated trial with focus on step-through pattern with R l.e..  I was able to progress to alternating challenge resistance for hip flexion/knee extension with patient demonstrating a signficant increase in bilateral stride length after treatment with decreasing a therapist assist--treat    Time-based treatments/modalities:    Physical Therapy Timed Treatment Charges  Gait training minutes (CPT 29613): 40 minutes  Therapeutic activity minutes (CPT 85973): 8 minutes    ASSESSMENT:   Patient demonstrated improved vertical orientation and righting response.  Patient required mod/max v/c for midline orientation but was able to self correct in front of mirror.  Improved stride length and L sided wt. Bearing during ambulation as treatment progressed    PLAN/RECOMMENDATIONS: ( restraint approach to non-affected  side: eye patch  and immobilization of R u.e.)    S/l PNF  asymmetrical scapulo-pelvic pattern w/ RS,SR,CI, tall kneeling L side down w/ reaching tasks, gait trg in Lite Gait,

## 2021-09-29 NOTE — OP THERAPY DAILY TREATMENT
Outpatient Occupational Therapy  DAILY TREATMENT     [facility] Occupational Therapy [address].  [address]  [place] [address]  Phone:  [phone]  Fax:  [phone]    Date: 09/29/2021    Patient: Thong Arzola  YOB: 1965  MRN: [id number]     Time Calculation  Start time: 0800  Stop time: 0845 Time Calculation (min): 45 minutes         Chief Complaint: Extremity Weakness and Self Care Duties    Visit #: 5    SUBJECTIVE:  My shoulder sling arrived, but I am still waiting on the other items to arrive.     OBJECTIVE:  Current objective measures:   Passive Range of Motion:   Upper extremity (left):     Shoulder flexion: Below functional limits  0-80    Shoulder extension: 0-40    Shoulder abduction: Below functional limits 0-70    Shoulder external rotation: Below functional limits 0-20     Forearm supination: Below functional limits 0-25    Wrist flexion: Below functional limits 0-40    Wrist extension: Below functional limits 0-30    Fingers flexion: Below functional limits     Passive Range of Motion Comments:  1/2 finger anterior subluxation of L humerus noted     Strength:     Right upper extremity strength within functional limits.    Upper extremity strength (left):     Shoulder flexion: 1    Shoulder extension:  1    Shoulder abduction: 0    Shoulder adduction: 1    Shoulder external rotation:  0    Shoulder internal rotation: 1    Elbow flexion: 1    Elbow extension: 0    Forearm pronation: 0    Forearm supination: 0    Wrist flexion: 0    Wrist extension: 0    Fingers flexion: 0    Fingers extension: 0    Fingers abduction: 0    Fingers adduction: 0     , Prehension, Pinch:  Unable at this time  Tone, Sensation and Coordination:   Tone:     Left upper extremity muscle tone: Flaccid    Right upper extremity muscle tone: Normal    Left lower extremity muscle tone: Gross assist     Modified Jeremiah:   Upper extremity (left):     Shoulder flexors: 0    Shoulder  extensors: 0    Shoulder external rotators: 0    Shoulder internal rotators: 0    Scapular retraction: 1    Scapular elevation: 1    Elbow flexors: 1    Elbow extensors: 0    Wrist flexors: 1    Wrist extensors: 0    Finger flexors: 0    Finger extensors: 0     Coordination   Absent in L UE     Cognition:     Orientation: normal to time, normal to place and normal to person    Direction following: three step    Short term memory: intact    Long term memory: intact    Attention span: intact    Sequencing: intact    Organization: intact    Problem solving: intact    Judgement and safety awareness: intact    Hearing: intact     Vision/Perception:     Visual tracking: intact    Convergence: intact    Visual attentions: intact    Visual scanning: intact    R/L hemianopsia: present    R/L neglect: present    Vision assistive device(s): reading glasses     Vision/Perception Comments:   Bell's Test = 27/35 in 3 minutes.       Activities of Daily Living:   Transfers/Mobility:     Bed/chair transfers: stand by assist    Wheelchair transfers: stand by assist    Sit to stand: stand by assist    Toilet transfers: contact guard assist    Tub/shower transfers: contact guard assist    Bed mobility: minimum assist     Toileting:     Toileting: stand by assist    Hygiene: minimum assist    Clothing management: minimum assist    Toileting position: sitting     Bathing:     Bathing: minimum assist    Bathing position: sitting    Washing hair: supervision    Washing back: minimum assist    Washing upper body: supervision    Washing lower body: minimum assist    Bathing assistive device(s): shower chair     Dressing:     Dressing: moderate assist    Dressing upper body: stand by assist    Dressing lower body: maximum assist    Socks: maximum assist    Shoes: maximum assist     Grooming:     Brushing teeth or denture care: minimum assist    Shaving: minimum assist     Feeding:     Using utensils: minimum assist    Cutting food: moderate  assist     Household Management:     Housekeeping: maximum assist    Laundry: maximum assist    Meal preparation: maximum assist    Medication management: supervision    Shopping: maximum assist    Writing: independent        Therapeutic Treatments and Modalities:    1. E Stim Attended (CPT 74303)    2. Neuromuscular Re-education (CPT 39736)    Therapeutic Treatments and Modalities Summary: E-Stim:  Completed at L triceps to enhance muscle strengthening to promote active movement.  SABOE used on level 11 withs active assist  movement of flex/ext and external rotation during 15 minutes of treatment.    NMRE:  Weight bearing completed through L UE with hand leaning onto mat  with support at wrist and elbow 10 x with 5 second hold.  Then with OTR seated at affected side, supporting L UE, participated in cone placing activity with emphasis on leaning onto affected side and towards therapist.  Completed 5 x  And then completed weight bearing through left elbow with OTR providing support from behind and technician in front of patient to assist with arm placement during weight bearing.  Patient able to complete 5 x with increased confidence since last visit.        Application of shoulder support sling completed to support humeral head during walking with PT for next session.     Time-based treatments/modalities:  Neuromusc re-ed minutes (CPT 95225): 30 minutes  E-stim attended minutes (CPT 10116): 15 minutes     Pain rating before treatment: 0  Pain rating after treatment: 0    ASSESSMENT:   Response to treatment: increased confidence with weight bearing through affected L UE and leaning towards affected side.  Improved visual scanning and looking towards left side.  Enhanced eye contact from patient when seated at his affected side.     PLAN/RECOMMENDATIONS:   Plan for treatment: therapy treatment to continue next visit.  Planned interventions for next visit: continue with current treatment, E-stim attended (CPT 26651),  neuromuscular re-education (CPT 99270), orthotic training (CPT 89275), self care ADL training (CPT 75718), therapeutic activities (CPT 48416) and therapeutic exercise (CPT 72886)

## 2021-10-01 ENCOUNTER — PHYSICAL THERAPY (OUTPATIENT)
Dept: PHYSICAL THERAPY | Facility: REHABILITATION | Age: 56
End: 2021-10-01
Attending: PHYSICAL MEDICINE & REHABILITATION
Payer: COMMERCIAL

## 2021-10-01 ENCOUNTER — OCCUPATIONAL THERAPY (OUTPATIENT)
Dept: OCCUPATIONAL THERAPY | Facility: REHABILITATION | Age: 56
End: 2021-10-01
Attending: PHYSICAL MEDICINE & REHABILITATION
Payer: COMMERCIAL

## 2021-10-01 DIAGNOSIS — M62.838 OTHER MUSCLE SPASM: ICD-10-CM

## 2021-10-01 DIAGNOSIS — I63.511 ACUTE RIGHT MCA STROKE (HCC): ICD-10-CM

## 2021-10-01 PROCEDURE — 97112 NEUROMUSCULAR REEDUCATION: CPT

## 2021-10-01 PROCEDURE — 97140 MANUAL THERAPY 1/> REGIONS: CPT

## 2021-10-01 PROCEDURE — 97032 APPL MODALITY 1+ESTIM EA 15: CPT

## 2021-10-01 PROCEDURE — 97116 GAIT TRAINING THERAPY: CPT

## 2021-10-01 NOTE — OP THERAPY DAILY TREATMENT
Outpatient Occupational Therapy  DAILY TREATMENT     [facility] Occupational Therapy [address].  [address]  [place] [address]  Phone:  [phone]  Fax:  [phone]    Date: 10/01/2021    Patient: Thong Arzola  YOB: 1965  MRN: [id number]     Time Calculation  Start time: 0803  Stop time: 0845 Time Calculation (min): 42 minutes         Chief Complaint: Extremity Weakness, Hand Weakness, and Hand Problem    Visit #: 6    SUBJECTIVE:  Patient reports he sometimes experiences pain in L shoulder. He also reported he received Botox injections in LUE not too long ago.    OBJECTIVE:  Current objective measures:   Passive Range of Motion:   Upper extremity (left):     Shoulder flexion: Below functional limits  0-80    Shoulder extension: 0-40    Shoulder abduction: Below functional limits 0-70    Shoulder external rotation: Below functional limits 0-20     Forearm supination: Below functional limits 0-25    Wrist flexion: Below functional limits 0-40    Wrist extension: Below functional limits 0-30    Fingers flexion: Below functional limits     Passive Range of Motion Comments:  1/2 finger anterior subluxation of L humerus noted     Strength:     Right upper extremity strength within functional limits.    Upper extremity strength (left):     Shoulder flexion: 1    Shoulder extension:  1    Shoulder abduction: 0    Shoulder adduction: 1    Shoulder external rotation:  0    Shoulder internal rotation: 1    Elbow flexion: 1    Elbow extension: 0    Forearm pronation: 0    Forearm supination: 0    Wrist flexion: 0    Wrist extension: 0    Fingers flexion: 0    Fingers extension: 0    Fingers abduction: 0    Fingers adduction: 0     , Prehension, Pinch:  Unable at this time  Tone, Sensation and Coordination:   Tone:     Left upper extremity muscle tone: Flaccid    Right upper extremity muscle tone: Normal    Left lower extremity muscle tone: Gross assist     Modified  Jeremiah:   Upper extremity (left):     Shoulder flexors: 0    Shoulder extensors: 0    Shoulder external rotators: 0    Shoulder internal rotators: 0    Scapular retraction: 1    Scapular elevation: 1    Elbow flexors: 1    Elbow extensors: 0    Wrist flexors: 1    Wrist extensors: 0    Finger flexors: 0    Finger extensors: 0     Coordination   Absent in L UE     Cognition:     Orientation: normal to time, normal to place and normal to person    Direction following: three step    Short term memory: intact    Long term memory: intact    Attention span: intact    Sequencing: intact    Organization: intact    Problem solving: intact    Judgement and safety awareness: intact    Hearing: intact     Vision/Perception:     Visual tracking: intact    Convergence: intact    Visual attentions: intact    Visual scanning: intact    R/L hemianopsia: present    R/L neglect: present    Vision assistive device(s): reading glasses     Vision/Perception Comments:   Bell's Test = 27/35 in 3 minutes.       Activities of Daily Living:   Transfers/Mobility:     Bed/chair transfers: stand by assist    Wheelchair transfers: stand by assist    Sit to stand: stand by assist    Toilet transfers: contact guard assist    Tub/shower transfers: contact guard assist    Bed mobility: minimum assist     Toileting:     Toileting: stand by assist    Hygiene: minimum assist    Clothing management: minimum assist    Toileting position: sitting     Bathing:     Bathing: minimum assist    Bathing position: sitting    Washing hair: supervision    Washing back: minimum assist    Washing upper body: supervision    Washing lower body: minimum assist    Bathing assistive device(s): shower chair     Dressing:     Dressing: moderate assist    Dressing upper body: stand by assist    Dressing lower body: maximum assist    Socks: maximum assist    Shoes: maximum assist     Grooming:     Brushing teeth or denture care: minimum assist    Shaving: minimum  assist     Feeding:     Using utensils: minimum assist    Cutting food: moderate assist     Household Management:     Housekeeping: maximum assist    Laundry: maximum assist    Meal preparation: maximum assist    Medication management: supervision    Shopping: maximum assist    Writing: independent        Therapeutic Treatments and Modalities:    1. E Stim Attended (CPT 31083)    2. Neuromuscular Re-education (CPT 55233)    Therapeutic Treatments and Modalities Summary: E-Stim:  Completed at L triceps to enhance muscle strengthening to promote active movement.  SABOE used on level 11 withs active assist  movement of flex/ext and external rotation during 15 minutes of treatment.    NMRE:  Weight bearing completed through L UE with hand leaning onto mat  with support at wrist and elbow 10 x with 5 second hold while seated.  Next, completed x5 LUE weightbearing onto L elbow. Patient participated in BUE weightbearing and AAROM activity with exercise ball while seated on mat x 10 reps. He was able to flex bilateral shoulders to ~ 60 degrees during this activity. Mirror was also placed in front of patient during this activity for visual feedback to decrease leaning to nonaffected right side. Patient reported he has exercise ball at home and can continue working on this activity as part of HEP. Lastly, patient participated in standing BUE AAROM towel slides and circles 2 x 10 reps. Patient require CGA for standing balance during this activity.      Time-based treatments/modalities:  Neuromusc re-ed minutes (CPT 60771): 27 minutes  E-stim attended minutes (CPT 11634): 15 minutes      ASSESSMENT:   Response to treatment: Patient displayed increased confidence with seated LUE weigh bearing on this date. When completing BUE activities, patient tends to lean to right side, however with visual feedback from mirror, he was able to recognize leaning and maintain midline. Patient participated in standing AAROM towel slides on this  date. He displays poor standing balance and required CGA. Patient would benefit from continued skilled OT services to address LUE neuromuscular recovery.    PLAN/RECOMMENDATIONS:   Plan for treatment: therapy treatment to continue next visit.  Planned interventions for next visit: neuromuscular re-education (CPT 87232)

## 2021-10-01 NOTE — OP THERAPY DAILY TREATMENT
"  Outpatient Physical Therapy  DAILY TREATMENT     [facility] Physical Therapy [address].  [address]  [place] [address]  Phone:  [phone]  Fax:  [phone]    Date: 10/01/2021    Patient: Thong Arzola  YOB: 1965  MRN: [id number]     Time Calculation    Start time: 0845  Stop time: 0950 Time Calculation (min): 65 minutes         Chief Complaint: No chief complaint on file.    Visit #: 4    SUBJECTIVE:  Patient denies any new complaints    OBJECTIVE:              Therapeutic Treatments and Modalities:     Therapeutic Treatment and Modalities Summary: R eye patch was placed on patient to obscure R sided vision .   Lite Gait--gait  trg with eye patch over R eye  with v/cs throughout treatment for patient to look at himself in the mirror on the L side of the vertical stanchion of the Lite Gait  Lite gait training on a level TM .3.-1.2 MHP with L  hip stabilizer bands,, L hand secured to upright  Hand rail : v/c and focus on high knee, increasing stride and look in mirror on L side of stanchion    Standing rest in Lite gait included L sls w/ manual cues to wt shift left  With pt. Performing R \" high knees\"--slow and fast past  Standing rest : straddle stance w/ manual cues to manage wt shift to left  And facilitation to for active end range hip/knee extension on L  // improved bilateral stride length and increased L stance phase--cont. To struggle wih knee extensor control early-late mid-stance on L and 20% of the time difficulty advancing L l.e.( no AFO was used).    Plinth:  W/c -plinth transfer sba  PNF s/l asymmetrical, ipsilateral scapulo-pelvic trg w/ RI, RS CI  PNF supine D! Ext pattern RI, RS CI and SR// struggle with hip(/knee extension and initiating hip flexion  Gait trg with [place] x 100 cga w/ v/c for step-through patterning and maintain upright midline orientation(completed with eye patch over R eye)    HEP:  Work on standing symmetry with someone next to him " in front of a mirror  #2, seated lateral weight shift bilaterally with eyes closed  Patient to get a forearm crutch    Time-based treatments/modalities:    Physical Therapy Timed Treatment Charges  Gait training minutes (CPT 07651): 30 minutes  Manual therapy minutes (CPT 19430): 10 minutes  Neuromusc re-ed, balance, coor, post minutes (CPT 79993): 20 minutes    ASSESSMENT:   Improved gait symmetry and sba ambulation with  A single forearm crutch.  Continues to struggle with L l.e. advancement, L active hip/knee ext  in terminal stance and continues to struggle to maintain midline orientation with ambulation      PLAN/RECOMMENDATIONS: ( restraint approach to non-affected side: eye patch  and immobilization of R u.e.)    S/l PNF  asymmetrical scapulo-pelvic pattern w/ RS,SR,CI, tall kneeling  w/ reaching  Tasks,  gait trg in Lite Gait,

## 2021-10-07 ENCOUNTER — OCCUPATIONAL THERAPY (OUTPATIENT)
Dept: OCCUPATIONAL THERAPY | Facility: REHABILITATION | Age: 56
End: 2021-10-07
Attending: PHYSICAL MEDICINE & REHABILITATION
Payer: COMMERCIAL

## 2021-10-07 DIAGNOSIS — I63.511 ACUTE RIGHT MCA STROKE (HCC): ICD-10-CM

## 2021-10-07 PROCEDURE — 97110 THERAPEUTIC EXERCISES: CPT

## 2021-10-07 PROCEDURE — 97112 NEUROMUSCULAR REEDUCATION: CPT

## 2021-10-07 PROCEDURE — 97535 SELF CARE MNGMENT TRAINING: CPT

## 2021-10-07 NOTE — OP THERAPY DAILY TREATMENT
Outpatient Occupational Therapy  DAILY TREATMENT     [facility] [address]  [address]  [place] [address]  Phone:  [phone]  Fax:  [phone]    Date: 10/07/2021    Patient: Thong Arzola  YOB: 1965  MRN: [id number]     Time Calculation  Start time: 0315  Stop time: 0400 Time Calculation (min): 45 minutes         Chief Complaint: Extremity Weakness and Self Care Duties    Visit #: 7    SUBJECTIVE:  My family came to visit so I haven't exercised as much as I should have.     OBJECTIVE:  Current objective measures:   Passive Range of Motion:   Upper extremity (left):     Shoulder flexion: Below functional limits  0-90    Shoulder extension: 0-40    Shoulder abduction: Below functional limits 0-70    Shoulder external rotation: Below functional limits 0-20    Forearm supination: Below functional limits 0-25    Wrist flexion: Below functional limits 0-40    Wrist extension: Below functional limits 0-40    Fingers flexion: Below functional limits     Passive Range of Motion Comments:  1/2 finger anterior subluxation of L humerus noted     Strength:     Right upper extremity strength within functional limits.    Upper extremity strength (left):     Shoulder flexion: 1    Shoulder extension:  1    Shoulder abduction: 0    Shoulder adduction: 1    Shoulder external rotation:  0    Shoulder internal rotation: 1    Elbow flexion: 1    Elbow extension: 0    Forearm pronation: 0    Forearm supination: 0    Wrist flexion: 0    Wrist extension: 0    Fingers flexion: 0    Fingers extension: 0    Fingers abduction: 0    Fingers adduction: 0     , Prehension, Pinch:  Unable at this time  Tone, Sensation and Coordination:   Tone:     Left upper extremity muscle tone: Flaccid    Right upper extremity muscle tone: Normal    Left lower extremity muscle tone: Gross assist     Modified Jeremiah:   Upper extremity (left):     Shoulder flexors: 0    Shoulder extensors:  0    Shoulder external rotators: 0    Shoulder internal rotators: 0    Scapular retraction: 1    Scapular elevation: 1    Elbow flexors: 1    Elbow extensors: 0    Wrist flexors: 1    Wrist extensors: 0    Finger flexors: 0    Finger extensors: 0     Coordination   Absent in L UE     Cognition:     Orientation: normal to time, normal to place and normal to person    Direction following: three step    Short term memory: intact    Long term memory: intact    Attention span: intact    Sequencing: intact    Organization: intact    Problem solving: intact    Judgement and safety awareness: intact    Hearing: intact     Vision/Perception:     Visual tracking: intact    Convergence: intact    Visual attentions: intact    Visual scanning: intact    R/L hemianopsia: present    R/L neglect: present    Vision assistive device(s): reading glasses     Vision/Perception Comments:   Bell's Test = 27/35 in 3 minutes.       Activities of Daily Living:   Transfers/Mobility:     Bed/chair transfers: stand by assist    Wheelchair transfers: stand by assist    Sit to stand: stand by assist    Toilet transfers: contact guard assist    Tub/shower transfers: contact guard assist    Bed mobility: minimum assist     Toileting:     Toileting: stand by assist    Hygiene: minimum assist    Clothing management: minimum assist    Toileting position: sitting     Bathing:     Bathing: minimum assist    Bathing position: sitting    Washing hair: supervision    Washing back: minimum assist    Washing upper body: supervision    Washing lower body: minimum assist    Bathing assistive device(s): shower chair     Dressing:     Dressing: moderate assist    Dressing upper body: stand by assist with t-shirt, min A with jackets    Dressing lower body: maximum assist    Socks: maximum assist    Shoes: maximum assist     Grooming:     Brushing teeth or denture care: minimum assist    Shaving: minimum assist     Feeding:     Using utensils: minimum  assist    Cutting food: moderate assist     Household Management:     Housekeeping: maximum assist    Laundry: maximum assist    Meal preparation: maximum assist    Medication management: supervision    Shopping: maximum assist    Writing: independent        Therapeutic Exercises (CPT 36532):     1. AAROM and PROM of L UE to minimize ST shortening and contractures     Therapeutic Treatments and Modalities:    1. Self Care ADL Training (CPT 15598)    2. Neuromuscular Re-education (CPT 29560)    Therapeutic Treatments and Modalities Summary: Set up/ supervision for w/c transfer from w/c to mat table towards non-affected side.  CGA transferring back to w/c towards affected side.  Patient stated he is now able to nathalie/doff t-shirts; however struggling with jacket/hoodie style coat.  To practice this on next appt.    Still waiting for items to be delivered:  Cutting board, rocker knife and arm trough for wheelchair.    NMRE:  Weight bearing through L hand while seated on mat 5 x and then leaning onto L elbow with pillow to support arm 5 x with increased confidence leaning towards affected side.  Decreased hesitation noted.  Forward leaning while maintaining midline position completed 5 x      Time-based treatments/modalities:  Therapeutic exercise minutes (CPT 14519): 15 minutes  Self-care/ADL training minutes (CPT 70862): 15 minutes  Neuromusc re-ed minutes (CPT 60501): 15 minutes        Pain rating before treatment: 0  Pain rating after treatment: 0    ASSESSMENT:   Response to treatment: Improved functional transfers and increased confidence noted leaning towards affected side.  Still having difficulty maintaining mid-line position.     PLAN/RECOMMENDATIONS:   Plan for treatment: therapy treatment to continue next visit.  Planned interventions for next visit: continue with current treatment, E-stim attended (CPT 75143), neuromuscular re-education (CPT 36322), orthotic training (CPT 73413), self care ADL training (CPT  22738), therapeutic activities (CPT 92516) and therapeutic exercise (CPT 41825)

## 2021-10-11 ENCOUNTER — TELEPHONE (OUTPATIENT)
Dept: PHYSICAL MEDICINE AND REHAB | Facility: REHABILITATION | Age: 56
End: 2021-10-11

## 2021-10-11 NOTE — TELEPHONE ENCOUNTER
ALPHONSEM for patient to call back at [facility]. Patient is due for his next Botox injections after 10/27/21. I called to schedule his next Botox appointment as we currently do not have one scheduled.

## 2021-10-12 ENCOUNTER — PHYSICAL THERAPY (OUTPATIENT)
Dept: PHYSICAL THERAPY | Facility: REHABILITATION | Age: 56
End: 2021-10-12
Attending: PHYSICAL MEDICINE & REHABILITATION
Payer: COMMERCIAL

## 2021-10-12 ENCOUNTER — OCCUPATIONAL THERAPY (OUTPATIENT)
Dept: OCCUPATIONAL THERAPY | Facility: REHABILITATION | Age: 56
End: 2021-10-12
Attending: PHYSICAL MEDICINE & REHABILITATION
Payer: COMMERCIAL

## 2021-10-12 DIAGNOSIS — I63.511 ACUTE RIGHT MCA STROKE (HCC): ICD-10-CM

## 2021-10-12 PROCEDURE — 97112 NEUROMUSCULAR REEDUCATION: CPT

## 2021-10-12 PROCEDURE — 97530 THERAPEUTIC ACTIVITIES: CPT

## 2021-10-12 PROCEDURE — 97116 GAIT TRAINING THERAPY: CPT

## 2021-10-12 PROCEDURE — 97140 MANUAL THERAPY 1/> REGIONS: CPT

## 2021-10-12 NOTE — OP THERAPY DAILY TREATMENT
Outpatient Physical Therapy  DAILY TREATMENT     [facility] [address].  [address]  [place] [address]  Phone:  [phone]  Fax:  [phone]    Date: 10/12/2021    Patient: Thong Arzola  YOB: 1965  MRN: [id number]     Time Calculation    Start time: 0805  Stop time: 0846 Time Calculation (min): 41 minutes         Chief Complaint: No chief complaint on file.    Visit #: 5    SUBJECTIVE:  Patient denies any new complaints    OBJECTIVE:              Therapeutic Treatments and Modalities:     Therapeutic Treatment and Modalities Summary: R eye patch was placed on patient to obscure R sided vision .   Lite Gait--gait  trg with eye patch over R eye  with v/cs throughout treatment for patient to look at himself in the mirror on the L side of the vertical stanchion of the Lite Gait  Lite gait training on a level TM .3.-.9 MHP (v/c for patient to step on L side of TM with ea. Stride ) with L  hip stabilizer bands pulling to left,,(trialed challenge resistance to R w/ patient responding best  To assisted wt. shift to L)  Standing rest in Lite gait included L sls w/ manual cues    HEP:  Work on standing symmetry with someone next to him in front of a mirror  #2, seated lateral weight shift bilaterally with eyes closed  Patient to get a forearm crutch    Time-based treatments/modalities:    Physical Therapy Timed Treatment Charges  Gait training minutes (CPT 05151): 35 minutes  Therapeutic activity minutes (CPT 04540): 5 minutes    ASSESSMENT:   Patient fatigued quickly today with increased struggle for patient to maintain mid-lline- patient appeared to fatigue quickly.  Patient attempted to push through fatigue today but became slightly agitated with his situation.  Patient was frustrated that he was not walking better.  Minimal carry over observed with noted increased tone L u.e.   Min/cga with transfers with minimal control for midline with  transitions    PLAN/RECOMMENDATIONS: ( restraint approach to non-affected side: eye patch )  S/l PNF  asymmetrical scapulo-pelvic pattern w/ RS,SR,CI, tall kneeling  w/ reaching  Tasks,  gait trg in Lite Gait,

## 2021-10-12 NOTE — OP THERAPY DAILY TREATMENT
Outpatient Occupational Therapy  DAILY TREATMENT     [facility] Therapy [address].  [address]  [place] [address]  Phone:  [phone]  Fax:  [phone]    Date: 10/12/2021    Patient: Thong Arzola  YOB: 1965  MRN: [id number]     Time Calculation  Start time: 0845  Stop time: 0930 Time Calculation (min): 45 minutes         Chief Complaint: Extremity Weakness and Self Care Duties    Visit #: 8    SUBJECTIVE:  I am not doing my arm exercises as much as I should be    OBJECTIVE:  Current objective measures:   Passive Range of Motion:   Upper extremity (left):     Shoulder flexion: Below functional limits  0-90    Shoulder extension: 0-40    Shoulder abduction: Below functional limits 0-70    Shoulder external rotation: Below functional limits 0-20    Forearm supination: Below functional limits 0-25    Wrist flexion: Below functional limits 0-40    Wrist extension: Below functional limits 0-40    Fingers flexion: Below functional limits     Passive Range of Motion Comments:  1/2 finger anterior subluxation of L humerus noted     Strength:     Right upper extremity strength within functional limits.    Upper extremity strength (left):     Shoulder flexion: 1    Shoulder extension:  1    Shoulder abduction: 0    Shoulder adduction: 1    Shoulder external rotation:  0    Shoulder internal rotation: 1    Elbow flexion: 1    Elbow extension: 0    Forearm pronation: 0    Forearm supination: 0    Wrist flexion: 0    Wrist extension: 0    Fingers flexion: 0    Fingers extension: 0    Fingers abduction: 0    Fingers adduction: 0     , Prehension, Pinch:  Unable at this time  Tone, Sensation and Coordination:   Tone:     Left upper extremity muscle tone: Flaccid    Right upper extremity muscle tone: Normal    Left lower extremity muscle tone: Gross assist     Modified Jeremiah:   Upper extremity (left):     Shoulder flexors: 0    Shoulder extensors: 0    Shoulder external  rotators: 0    Shoulder internal rotators: 0    Scapular retraction: 1    Scapular elevation: 1    Elbow flexors: 1    Elbow extensors: 0    Wrist flexors: 1    Wrist extensors: 0    Finger flexors: 0    Finger extensors: 0     Coordination   Absent in L UE     Cognition:     Orientation: normal to time, normal to place and normal to person    Direction following: three step    Short term memory: intact    Long term memory: intact    Attention span: intact    Sequencing: intact    Organization: intact    Problem solving: intact    Judgement and safety awareness: intact    Hearing: intact     Vision/Perception:     Visual tracking: intact    Convergence: intact    Visual attentions: intact    Visual scanning: intact    R/L hemianopsia: present    R/L neglect: present    Vision assistive device(s): reading glasses     Vision/Perception Comments:   Bell's Test = 27/35 in 3 minutes.       Activities of Daily Living:   Transfers/Mobility:     Bed/chair transfers: stand by assist    Wheelchair transfers: stand by assist    Sit to stand: stand by assist    Toilet transfers: contact guard assist    Tub/shower transfers: contact guard assist    Bed mobility: minimum assist     Toileting:     Toileting: stand by assist    Hygiene: minimum assist    Clothing management: minimum assist    Toileting position: sitting     Bathing:     Bathing: minimum assist    Bathing position: sitting    Washing hair: supervision    Washing back: minimum assist    Washing upper body: supervision    Washing lower body: minimum assist    Bathing assistive device(s): shower chair     Dressing:     Dressing: moderate assist    Dressing upper body: stand by assist with t-shirt, min A with jackets    Dressing lower body: maximum assist    Socks: maximum assist    Shoes: maximum assist     Grooming:     Brushing teeth or denture care: minimum assist    Shaving: minimum assist     Feeding:     Using utensils: minimum assist    Cutting food: moderate  "assist     Household Management:     Housekeeping: maximum assist    Laundry: maximum assist    Meal preparation: maximum assist    Medication management: supervision    Shopping: maximum assist    Writing: independent*      Therapeutic Exercises (CPT 66689):     1. AAROM and PROM of L UE to enhance passive ROM in all of the extremity.      Therapeutic Treatments and Modalities:    2. Neuromuscular Re-education (CPT 67312)    Therapeutic Treatments and Modalities Summary:   NMRE:  Weight bearing completed through L UE with hand leaning onto seat next to w/c  with support at wrist and elbow 10 x with 5 second hold. Table top activity focusing on bilateral movement with non-affected R hand placed over affected L hand completing B shoulder movement while \"wiping\" table with cloth in FF/ext, horizontal abd/add, and circular motions of clockwise and counter clockwise.  3 sets of 10 in each direction while focusing on maintaining alignment of trunk to minimize listing to R non affected side.    Scapular retraction and hold for 5 seconds 10 x           Time-based treatments/modalities:  Neuromusc re-ed minutes (CPT 00213): 30 minutes  Manual therapy joint mobilization minutes (CPT 47270): 15 minutes        Pain rating before treatment: 0  Pain rating after treatment: 0    ASSESSMENT:   Response to treatment: motivated to participate in therapy; however needs to adhere to HEP for UE.      PLAN/RECOMMENDATIONS:   Plan for treatment: therapy treatment to continue next visit.  Planned interventions for next visit: continue with current treatment, E-stim attended (CPT 90626), manual therapy (CPT 15873), neuromuscular re-education (CPT 39514), orthotic training (CPT 06513), self care ADL training (CPT 38173), therapeutic activities (CPT 60580) and therapeutic exercise (CPT 53374)       "

## 2021-10-18 ENCOUNTER — OCCUPATIONAL THERAPY (OUTPATIENT)
Dept: OCCUPATIONAL THERAPY | Facility: REHABILITATION | Age: 56
End: 2021-10-18
Attending: PHYSICAL MEDICINE & REHABILITATION
Payer: COMMERCIAL

## 2021-10-18 DIAGNOSIS — I63.511 ACUTE RIGHT MCA STROKE (HCC): ICD-10-CM

## 2021-10-18 PROCEDURE — 97110 THERAPEUTIC EXERCISES: CPT

## 2021-10-18 PROCEDURE — 97112 NEUROMUSCULAR REEDUCATION: CPT

## 2021-10-18 PROCEDURE — 97535 SELF CARE MNGMENT TRAINING: CPT

## 2021-10-18 NOTE — OP THERAPY DAILY TREATMENT
Outpatient Occupational Therapy  DAILY TREATMENT     [facility] Therapy [address].  [address]  [place] [address]  Phone:  [phone]  Fax:  [phone]    Date: 10/18/2021    Patient: Thong Arzola  YOB: 1965  MRN: [id number]     Time Calculation  Start time: 1015  Stop time: 1100 Time Calculation (min): 45 minutes         Chief Complaint: Extremity Weakness and Self Care Duties    Visit #: 9    SUBJECTIVE:  I was at a football game this weekend and while I was sitting in my wheelchair and I twisted around to look at something and I felt a pop in my shoulder.      OBJECTIVE:  Current objective measures:   Upper extremity (left):     Shoulder flexion: Below functional limits  0-90    Shoulder extension: 0-40    Shoulder abduction: Below functional limits 0-70    Shoulder external rotation: Below functional limits 0-20    Forearm supination: Below functional limits 0-25    Wrist flexion: Below functional limits 0-40    Wrist extension: Below functional limits 0-40    Fingers flexion: Below functional limits     Passive Range of Motion Comments:  1/2 finger anterior subluxation of L humerus noted     Strength:     Right upper extremity strength within functional limits.    Upper extremity strength (left):     Shoulder flexion: 1    Shoulder extension:  1    Shoulder abduction: 0    Shoulder adduction: 1    Shoulder external rotation:  0    Shoulder internal rotation: 1    Elbow flexion: 1    Elbow extension: 0    Forearm pronation: 0    Forearm supination: 0    Wrist flexion: 0    Wrist extension: 0    Fingers flexion: 0    Fingers extension: 0    Fingers abduction: 0    Fingers adduction: 0     , Prehension, Pinch:  Unable at this time  Tone, Sensation and Coordination:   Tone:     Left upper extremity muscle tone: Flaccid    Right upper extremity muscle tone: Normal    Left lower extremity muscle tone: Gross assist     Modified Jeremiah:   Upper extremity  (left):     Shoulder flexors: 0    Shoulder extensors: 0    Shoulder external rotators: 0    Shoulder internal rotators: 0    Scapular retraction: 1    Scapular elevation: 1    Elbow flexors: 2    Elbow extensors: 0    Wrist flexors: 2    Wrist extensors: 0    Finger flexors: 2    Finger extensors: 0     Coordination   Absent in L UE     Cognition:     Orientation: normal to time, normal to place and normal to person    Direction following: three step    Short term memory: intact    Long term memory: intact    Attention span: intact    Sequencing: intact    Organization: intact    Problem solving: intact    Judgement and safety awareness: intact    Hearing: intact     Vision/Perception:     Visual tracking: intact    Convergence: intact    Visual attentions: intact    Visual scanning: intact    R/L hemianopsia: present    R/L neglect: present    Vision assistive device(s): reading glasses     Vision/Perception Comments:   Bell's Test = 27/35 in 3 minutes.       Activities of Daily Living:   Transfers/Mobility:     Bed/chair transfers: stand by assist    Wheelchair transfers: stand by assist    Sit to stand: stand by assist    Toilet transfers: contact guard assist    Tub/shower transfers: contact guard assist    Bed mobility: minimum assist     Toileting:     Toileting: stand by assist    Hygiene: minimum assist    Clothing management: minimum assist    Toileting position: sitting     Bathing:     Bathing: minimum assist    Bathing position: sitting    Washing hair: supervision    Washing back: minimum assist    Washing upper body: supervision    Washing lower body: minimum assist    Bathing assistive device(s): shower chair     Dressing:     Dressing: moderate assist    Dressing upper body: stand by assist with t-shirt, min A with jackets    Dressing lower body: maximum assist    Socks: maximum assist    Shoes: maximum assist     Grooming:     Brushing teeth or denture care: minimum assist    Shaving: minimum  "assist     Feeding:     Using utensils: minimum assist    Cutting food: moderate assist     Household Management:     Housekeeping: maximum assist    Laundry: maximum assist    Meal preparation: maximum assist    Medication management: supervision    Shopping: maximum assist    Writing: independent*          Therapeutic Exercises (CPT 78803):     1. AAROM and PROM of L UE to enhance passive ROM in all of the extremity.      Therapeutic Treatments and Modalities:    1. Self Care ADL Training (CPT 03961)    2. Neuromuscular Re-education (CPT 50292)    Therapeutic Treatments and Modalities Summary:   NMRE:  Weight bearing completed through L UE with elbow leaning onto pillows on mat  with support at wrist and elbow 10 x with 5 second hold and then weight bearing through hand without pillow for same repetition to enhance proprioceptive feedback and muscle strengthening.  Table top activity focusing on bilateral movement with non-affected R hand placed over affected L hand completing B shoulder movement while \"wiping\" table with cloth in FF/ext, horizontal abd/add, and circular motions of clockwise and counter clockwise.  3 sets of 10 in each direction while focusing on maintaining alignment of trunk to minimize listing to R non affected side.    Scapular retraction and hold for 5 seconds 10 x   Set up/supervision with w/c <-> mat transfers with verbal cues for legrests.            Time-based treatments/modalities:  Therapeutic exercise minutes (CPT 85349): 17 minutes  Self-care/ADL training minutes (CPT 94945): 8 minutes  Neuromusc re-ed minutes (CPT 90324): 15 minutes        Pain rating before treatment: 0  Pain rating after treatment: 0    ASSESSMENT:   Response to treatment: increased flexor tone noted today and 'popping' of humeral head at about 80 degrees of FF or abduction of L shoulder.  No pain noted though    PLAN/RECOMMENDATIONS:   Plan for treatment: therapy treatment to continue next visit.  Planned " interventions for next visit: continue with current treatment, E-stim attended (CPT 23933), neuromuscular re-education (CPT 88175), orthotic training (CPT 01448), self care ADL training (CPT 03450), therapeutic activities (CPT 57426) and therapeutic exercise (CPT 67313)

## 2021-10-20 ENCOUNTER — OCCUPATIONAL THERAPY (OUTPATIENT)
Dept: OCCUPATIONAL THERAPY | Facility: REHABILITATION | Age: 56
End: 2021-10-20
Attending: PHYSICAL MEDICINE & REHABILITATION
Payer: COMMERCIAL

## 2021-10-20 ENCOUNTER — PHYSICAL THERAPY (OUTPATIENT)
Dept: PHYSICAL THERAPY | Facility: REHABILITATION | Age: 56
End: 2021-10-20
Attending: PHYSICAL MEDICINE & REHABILITATION
Payer: COMMERCIAL

## 2021-10-20 DIAGNOSIS — I63.511 ACUTE RIGHT MCA STROKE (HCC): ICD-10-CM

## 2021-10-20 PROCEDURE — 97032 APPL MODALITY 1+ESTIM EA 15: CPT

## 2021-10-20 PROCEDURE — 97110 THERAPEUTIC EXERCISES: CPT

## 2021-10-20 PROCEDURE — 97112 NEUROMUSCULAR REEDUCATION: CPT

## 2021-10-20 PROCEDURE — 97116 GAIT TRAINING THERAPY: CPT

## 2021-10-20 PROCEDURE — 97530 THERAPEUTIC ACTIVITIES: CPT

## 2021-10-20 NOTE — OP THERAPY DAILY TREATMENT
Outpatient Physical Therapy  DAILY TREATMENT     [facility] [address].  [address]  [place] [address]  Phone:  [phone]  Fax:  [phone]    Date: 10/20/2021    Patient: Thong Arzola  YOB: 1965  MRN: [id number]     Time Calculation    Start time: 0806  Stop time: 0845 Time Calculation (min): 39 minutes         Chief Complaint: No chief complaint on file.    Visit #: 6    SUBJECTIVE:  Doing well with patient stating that he got his [place] crutch to use at home    OBJECTIVE:              Therapeutic Treatments and Modalities:     Therapeutic Treatment and Modalities Summary: Sit-stand w/c to Lite gait --sba  Lite Gait--gait  trg  with v/cs throughout treatment for patient to look at himself in the mirror on the L side of the vertical stanchion of the Lite Gait  Lite gait training on a level TM .3.-.8 MHP (v/c for patient to step on L side of TM with ea. Stride ) with L  hip stabilizer bands pulling to left, w/ DF and knee flexion assist with T-band along with MC to advance L l.e. with focus on large stride lengths bilaterally  Gait trg with [place]strand x 60 ft with cga/sba w/ v/c not to look down  And manage straight-line ambulation       Time-based treatments/modalities:    Physical Therapy Timed Treatment Charges  Gait training minutes (CPT 89547): 33 minutes  Therapeutic activity minutes (CPT 73414): 5 minutes    ASSESSMENT:   Improved ease of transfers with improved midline during transitional mvmt.  Cont. To demonstrate high dependency on vision with ambulation and tends to drift R with ambualtion--significant improvement with L l.e. advance and increased stride length with minimal cga/sba assist during ambualtion    PLAN/RECOMMENDATIONS: ( restraint approach to non-affected side: eye patch )   tall kneeling  w/ reaching  Tasks,  gait trg in Lite Gait, gait trg with [place]

## 2021-10-20 NOTE — OP THERAPY DAILY TREATMENT
Outpatient Occupational Therapy  DAILY TREATMENT     [facility] Occupational Therapy [address].  [address]  [place] [address]  Phone:  [phone]  Fax:  [phone]    Date: 10/20/2021    Patient: Thong Arzola  YOB: 1965  MRN: [id number]     Time Calculation  Start time: 0845  Stop time: 0930 Time Calculation (min): 45 minutes         Chief Complaint: Extremity Weakness and Self Care Duties    Visit #: 10    SUBJECTIVE:  I have the cutting board but I haven't used it yet.      OBJECTIVE:  Current objective measures:   Upper extremity (left):     Shoulder flexion: Below functional limits  0-90    Shoulder extension: 0-40    Shoulder abduction: Below functional limits 0-70    Shoulder external rotation: Below functional limits 0-20    Forearm supination: Below functional limits 0-25    Wrist flexion: Below functional limits 0-40    Wrist extension: Below functional limits 0-40    Fingers flexion: Below functional limits     Passive Range of Motion Comments:  1/2 finger anterior subluxation of L humerus noted     Strength:     Right upper extremity strength within functional limits.    Upper extremity strength (left):     Shoulder flexion: 1    Shoulder extension:  1    Shoulder abduction: 0    Shoulder adduction: 1    Shoulder external rotation:  0    Shoulder internal rotation: 1    Elbow flexion: 1    Elbow extension: 0    Forearm pronation: 0    Forearm supination: 0    Wrist flexion: 0    Wrist extension: 0    Fingers flexion: 0    Fingers extension: 0    Fingers abduction: 0    Fingers adduction: 0     , Prehension, Pinch:  Unable at this time  Tone, Sensation and Coordination:   Tone:     Left upper extremity muscle tone: Flaccid    Right upper extremity muscle tone: Normal    Left lower extremity muscle tone: Gross assist     Modified Jeremiah:   Upper extremity (left):     Shoulder flexors: 0    Shoulder extensors: 0    Shoulder external rotators: 0    Shoulder  internal rotators: 0    Scapular retraction: 1    Scapular elevation: 1    Elbow flexors: 2    Elbow extensors: 0    Wrist flexors: 2    Wrist extensors: 0    Finger flexors: 2    Finger extensors: 0     Coordination   Absent in L UE     Cognition:     Orientation: normal to time, normal to place and normal to person    Direction following: three step    Short term memory: intact    Long term memory: intact    Attention span: intact    Sequencing: intact    Organization: intact    Problem solving: intact    Judgement and safety awareness: intact    Hearing: intact     Vision/Perception:     Visual tracking: intact    Convergence: intact    Visual attentions: intact    Visual scanning: intact    R/L hemianopsia: present    R/L neglect: present    Vision assistive device(s): reading glasses     Vision/Perception Comments:   Bell's Test = 27/35 in 3 minutes.       Activities of Daily Living:   Transfers/Mobility:     Bed/chair transfers: stand by assist    Wheelchair transfers: stand by assist    Sit to stand: stand by assist    Toilet transfers: contact guard assist    Tub/shower transfers: contact guard assist    Bed mobility: minimum assist     Toileting:     Toileting: stand by assist    Hygiene: minimum assist    Clothing management: minimum assist    Toileting position: sitting     Bathing:     Bathing: minimum assist    Bathing position: sitting    Washing hair: supervision    Washing back: minimum assist    Washing upper body: supervision    Washing lower body: minimum assist    Bathing assistive device(s): shower chair     Dressing:     Dressing: moderate assist    Dressing upper body: stand by assist with t-shirt, min A with jackets    Dressing lower body: maximum assist    Socks: maximum assist    Shoes: maximum assist     Grooming:     Brushing teeth or denture care: minimum assist    Shaving: minimum assist     Feeding:     Using utensils: minimum assist    Cutting food: moderate assist     Household  Management:     Housekeeping: maximum assist    Laundry: maximum assist    Meal preparation: maximum assist    Medication management: supervision    Shopping: maximum assist    Writing: independent*          Therapeutic Exercises (CPT 89645):     1. SROM and PROM of L shoulder with joint mobilization during movement to enhance normal movement of the glenohumeral joint.      Therapeutic Treatments and Modalities:    1. E Stim Attended (CPT 31923)    2. Neuromuscular Re-education (CPT 26139)    Therapeutic Treatments and Modalities Summary: SABOE e-stim applied to extrinsic extensors to stimulate wrist and digit extension of L hand.  Completed for 10 minutes on level 10   Patient has been applying system to L shoulder for subluxation and to triceps to enhance elbow extension    NMRE:  Weight bearing activity through L UE completed to enhance proprioceptive feedback to promtote muscle return and incorporation of extremity during ADLs.   Table top activity for FF/Ext and horizontal abd/add of L shoulder while maintaining midline position and erect posture.      Time-based treatments/modalities:  Therapeutic exercise minutes (CPT 74659): 15 minutes  Neuromusc re-ed minutes (CPT 27733): 20 minutes  E-stim attended minutes (CPT 24025): 10 minutes     Pain rating before treatment: 0  Pain rating after treatment: 0    ASSESSMENT:   Response to treatment: Patient's functional transfers now at set up/supervision.  Verbal cues with functional mobility in w/c to scan more to left side to avoid hitting objects or wall on left side.  Patient to initiate simple meal prep using AE while seated at kitchen table     PLAN/RECOMMENDATIONS:   Plan for treatment: therapy treatment to continue next visit.  Planned interventions for next visit: continue with current treatment, E-stim attended (CPT 84589), neuromuscular re-education (CPT 46424), orthotic training (CPT 66218), self care ADL training (CPT 11858), therapeutic activities (CPT  60765) and therapeutic exercise (CPT 42825)

## 2021-10-25 ENCOUNTER — APPOINTMENT (OUTPATIENT)
Dept: OCCUPATIONAL THERAPY | Facility: REHABILITATION | Age: 56
End: 2021-10-25
Attending: PHYSICAL MEDICINE & REHABILITATION
Payer: COMMERCIAL

## 2021-10-27 ENCOUNTER — PHYSICAL THERAPY (OUTPATIENT)
Dept: PHYSICAL THERAPY | Facility: REHABILITATION | Age: 56
End: 2021-10-27
Attending: PHYSICAL MEDICINE & REHABILITATION
Payer: COMMERCIAL

## 2021-10-27 ENCOUNTER — OCCUPATIONAL THERAPY (OUTPATIENT)
Dept: OCCUPATIONAL THERAPY | Facility: REHABILITATION | Age: 56
End: 2021-10-27
Attending: PHYSICAL MEDICINE & REHABILITATION
Payer: COMMERCIAL

## 2021-10-27 DIAGNOSIS — I63.511 ACUTE RIGHT MCA STROKE (HCC): ICD-10-CM

## 2021-10-27 PROCEDURE — 97116 GAIT TRAINING THERAPY: CPT

## 2021-10-27 PROCEDURE — 97530 THERAPEUTIC ACTIVITIES: CPT

## 2021-10-27 PROCEDURE — 97110 THERAPEUTIC EXERCISES: CPT

## 2021-10-27 PROCEDURE — 97112 NEUROMUSCULAR REEDUCATION: CPT

## 2021-10-27 NOTE — OP THERAPY DAILY TREATMENT
Outpatient Physical Therapy  DAILY TREATMENT     [facility] Physical Therapy [address].  [address]  [place] [address]  Phone:  [phone]  Fax:  [phone]    Date: 10/27/2021    Patient: Thong Arzola  YOB: 1965  MRN: [id number]     Time Calculation    Start time: 0720  Stop time: 0800 Time Calculation (min): 40 minutes         Chief Complaint: No chief complaint on file.    Visit #: 7    SUBJECTIVE:  Patient reports aht his L hip/knee/ankle hurt at night and have hurt for the past 3 months.  Patient denies that pain is worsening but is limiting and has been taking Tylenol at night to sleep.  Patient denies taking gabapentin and reports that he stopped taking it 2 months ago because he felt tired all of the time and hard to stay awake for therapy    OBJECTIVE:              Therapeutic Treatments and Modalities:     Therapeutic Treatment and Modalities Summary: Sit-stand w/c to // bars w/ R u.e. assist--sba  //bars gait trg with focus on increasing R stride length and pelvic protraction (pt. Struggled to increase R stride due to L clonus and tone L gastroc complex  Tall kneeling at plinth in front of mirror--focussed on symmetry, righting response anad wt bearing into L l.e./u.e.        Time-based treatments/modalities:    Physical Therapy Timed Treatment Charges  Gait training minutes (CPT 19346): 15 minutes  Therapeutic activity minutes (CPT 65057): 25 minutes    ASSESSMENT:   Noted signficiant increased tone L u.e./l.e. that limited ambulation and functional transfers--constant clonus L ankle which limited ambulation--patient has not been using his AFO and instructed him to bring it to  [facility] and to start wearing it  at home.  Not sure how compliant patient has been with his HEP.  Patient cont. To required max v/c and MC to obtain and maintain midline in kneeling, standing and ambulation    Instructed patient to contact MD. Prior to stopping or changing any  medication(pt. reported stopping Gabapentin on his own volition per patient report)    PLAN/RECOMMENDATIONS: ( restraint approach to non-affected side: eye patch )   tall kneeling  w/ reaching  Tasks,  gait trg in Lite Gait, gait trg with [place]--focus on pelvic  Stability and erect psoture w/ L mid-stance and midline orientation

## 2021-10-27 NOTE — OP THERAPY DAILY TREATMENT
Outpatient Occupational Therapy  DAILY TREATMENT     [facility] Occupational Therapy [address].  [address]  [place] [address]  Phone:  [phone]  Fax:  [phone]    Date: 10/27/2021    Patient: Thong Arzola  YOB: 1965  MRN: [id number]     Time Calculation                 Chief Complaint: No chief complaint on file.    Visit #: 11    SUBJECTIVE:  ***    OBJECTIVE:  Current objective measures  Upper extremity (left):     Shoulder flexion: Below functional limits  0-90    Shoulder extension: 0-40    Shoulder abduction: Below functional limits 0-70    Shoulder external rotation: Below functional limits 0-20    Forearm supination: Below functional limits 0-25    Wrist flexion: Below functional limits 0-40    Wrist extension: Below functional limits 0-40    Fingers flexion: Below functional limits     Passive Range of Motion Comments:  1/2 finger anterior subluxation of L humerus noted     Strength:     Right upper extremity strength within functional limits.    Upper extremity strength (left):     Shoulder flexion: 1    Shoulder extension:  1    Shoulder abduction: 0    Shoulder adduction: 1    Shoulder external rotation:  0    Shoulder internal rotation: 1    Elbow flexion: 1    Elbow extension: 0    Forearm pronation: 0    Forearm supination: 0    Wrist flexion: 0    Wrist extension: 0    Fingers flexion: 0    Fingers extension: 0    Fingers abduction: 0    Fingers adduction: 0     , Prehension, Pinch:  Unable at this time  Tone, Sensation and Coordination:   Tone:     Left upper extremity muscle tone: Flaccid    Right upper extremity muscle tone: Normal    Left lower extremity muscle tone: Gross assist     Modified Jeremiah:   Upper extremity (left):     Shoulder flexors: 0    Shoulder extensors: 0    Shoulder external rotators: 0    Shoulder internal rotators: 0    Scapular retraction: 1    Scapular elevation: 1    Elbow flexors: 2    Elbow extensors: 0    Wrist  flexors: 2    Wrist extensors: 0    Finger flexors: 2    Finger extensors: 0     Coordination   Absent in L UE     Cognition:     Orientation: normal to time, normal to place and normal to person    Direction following: three step    Short term memory: intact    Long term memory: intact    Attention span: intact    Sequencing: intact    Organization: intact    Problem solving: intact    Judgement and safety awareness: intact    Hearing: intact     Vision/Perception:     Visual tracking: intact    Convergence: intact    Visual attentions: intact    Visual scanning: intact    R/L hemianopsia: present    R/L neglect: present    Vision assistive device(s): reading glasses     Vision/Perception Comments:   Bell's Test = 27/35 in 3 minutes.       Activities of Daily Living:   Transfers/Mobility:     Bed/chair transfers: stand by assist    Wheelchair transfers: stand by assist    Sit to stand: stand by assist    Toilet transfers: contact guard assist    Tub/shower transfers: contact guard assist    Bed mobility: minimum assist     Toileting:     Toileting: stand by assist    Hygiene: minimum assist    Clothing management: minimum assist    Toileting position: sitting     Bathing:     Bathing: minimum assist    Bathing position: sitting    Washing hair: supervision    Washing back: minimum assist    Washing upper body: supervision    Washing lower body: minimum assist    Bathing assistive device(s): shower chair     Dressing:     Dressing: moderate assist    Dressing upper body: stand by assist with t-shirt, min A with jackets    Dressing lower body: maximum assist    Socks: maximum assist    Shoes: maximum assist     Grooming:     Brushing teeth or denture care: minimum assist    Shaving: minimum assist     Feeding:     Using utensils: minimum assist    Cutting food: moderate assist     Household Management:     Housekeeping: maximum assist    Laundry: maximum assist    Meal preparation: maximum assist    Medication  "management: supervision    Shopping: maximum assist    Writing: independent*      *  Time-based treatments/modalities:           Pain rating before treatment: {PAIN NUMBERS_1-10:45229}  Pain rating after treatment: {PAIN NUMBERS_1-10:59845}    ASSESSMENT:   Response to treatment: ***    PLAN/RECOMMENDATIONS:   Plan for treatment: {Therapy Plan:939226925::\"therapy treatment to continue next visit\"}.  Planned interventions for next visit: {planned OT Interventions:116252743}       "

## 2021-10-27 NOTE — OP THERAPY DAILY TREATMENT
Outpatient Occupational Therapy  DAILY TREATMENT     [facility] Occupational Therapy [address].  [address]  [place] [address]  Phone:  [phone]  Fax:  [phone]    Date: 10/27/2021    Patient: Thong Arzola  YOB: 1965  MRN: [id number]     Time Calculation  Start time: 0800  Stop time: 0845 Time Calculation (min): 45 minutes         Chief Complaint: Extremity Weakness and Self Care Duties    Visit #: 11    SUBJECTIVE:  ***    OBJECTIVE:  Current objective measures  Upper extremity (left):     Shoulder flexion: Below functional limits  0-90    Shoulder extension: 0-40    Shoulder abduction: Below functional limits 0-70    Shoulder external rotation: Below functional limits 0-20    Forearm supination: Below functional limits 0-25    Wrist flexion: Below functional limits 0-40    Wrist extension: Below functional limits 0-40    Fingers flexion: Below functional limits     Passive Range of Motion Comments:  1/2 finger anterior subluxation of L humerus noted     Strength:     Right upper extremity strength within functional limits.    Upper extremity strength (left):     Shoulder flexion: 1    Shoulder extension:  1    Shoulder abduction: 0    Shoulder adduction: 1    Shoulder external rotation:  0    Shoulder internal rotation: 1    Elbow flexion: 1    Elbow extension: 0    Forearm pronation: 0    Forearm supination: 0    Wrist flexion: 0    Wrist extension: 0    Fingers flexion: 0    Fingers extension: 0    Fingers abduction: 0    Fingers adduction: 0     , Prehension, Pinch:  Unable at this time  Tone, Sensation and Coordination:   Tone:     Left upper extremity muscle tone: Flaccid    Right upper extremity muscle tone: Normal    Left lower extremity muscle tone: Gross assist     Modified Jeremiah:   Upper extremity (left):     Shoulder flexors: 0    Shoulder extensors: 0    Shoulder external rotators: 0    Shoulder internal rotators: 0    Scapular retraction:  1    Scapular elevation: 1    Elbow flexors: 2    Elbow extensors: 0    Wrist flexors: 2    Wrist extensors: 0    Finger flexors: 2    Finger extensors: 0     Coordination   Absent in L UE     Cognition:     Orientation: normal to time, normal to place and normal to person    Direction following: three step    Short term memory: intact    Long term memory: intact    Attention span: intact    Sequencing: intact    Organization: intact    Problem solving: intact    Judgement and safety awareness: intact    Hearing: intact     Vision/Perception:     Visual tracking: intact    Convergence: intact    Visual attentions: intact    Visual scanning: intact    R/L hemianopsia: present    R/L neglect: present    Vision assistive device(s): reading glasses     Vision/Perception Comments:   Bell's Test = 27/35 in 3 minutes.       Activities of Daily Living:   Transfers/Mobility:     Bed/chair transfers: stand by assist    Wheelchair transfers: stand by assist    Sit to stand: stand by assist    Toilet transfers: contact guard assist    Tub/shower transfers: contact guard assist    Bed mobility: minimum assist     Toileting:     Toileting: stand by assist    Hygiene: minimum assist    Clothing management: minimum assist    Toileting position: sitting     Bathing:     Bathing: minimum assist    Bathing position: sitting    Washing hair: supervision    Washing back: minimum assist    Washing upper body: supervision    Washing lower body: minimum assist    Bathing assistive device(s): shower chair     Dressing:     Dressing: moderate assist    Dressing upper body: stand by assist with t-shirt, min A with jackets    Dressing lower body: maximum assist    Socks: maximum assist    Shoes: maximum assist     Grooming:     Brushing teeth or denture care: minimum assist    Shaving: minimum assist     Feeding:     Using utensils: minimum assist    Cutting food: moderate assist     Household Management:     Housekeeping: maximum  "assist    Laundry: maximum assist    Meal preparation: maximum assist    Medication management: supervision    Shopping: maximum assist    Writing: independent*      *  Time-based treatments/modalities:  Therapeutic exercise minutes (CPT 71649): 30 minutes  Neuromusc re-ed minutes (CPT 79360): 15 minutes        Pain rating before treatment: 2  Pain rating after treatment: 2    ASSESSMENT:   Response to treatment: increased flexor tone in L internal rotation of shoulder, wrist and digits.  Now using cutting board for one handed meal prep     PLAN/RECOMMENDATIONS:   Plan for treatment: {Therapy Plan:559610315::\"therapy treatment to continue next visit\"}.  Planned interventions for next visit: {planned OT Interventions:909403351}       "

## 2021-10-27 NOTE — OP THERAPY DAILY TREATMENT
Outpatient Occupational Therapy  DAILY TREATMENT     [facility] Occupational [address].  [address]  [place] [address]  Phone:  [phone]  Fax:  [phone]    Date: 10/27/2021    Patient: Thong Arzola  YOB: 1965  MRN: [id number]     Time Calculation  Start time: 0800  Stop time: 0845 Time Calculation (min): 45 minutes         Chief Complaint: Extremity Weakness and Self Care Duties    Visit #: 11    SUBJECTIVE:  I need to get that arm trough placed on my wheelchair     OBJECTIVE:  Current objective measures:   Upper extremity (left):     Shoulder flexion: Below functional limits  0-90    Shoulder extension: 0-40    Shoulder abduction: Below functional limits 0-70    Shoulder external rotation: Below functional limits 0-10    Forearm supination: Below functional limits 0-25    Wrist flexion: Below functional limits 0-40    Wrist extension: Below functional limits 0-40    Fingers flexion: Below functional limits     Passive Range of Motion Comments:  1/2 finger anterior subluxation of L humerus noted     Strength:     Right upper extremity strength within functional limits.    Upper extremity strength (left):     Shoulder flexion: 1    Shoulder extension:  1    Shoulder abduction: 0    Shoulder adduction: 1    Shoulder external rotation:  0    Shoulder internal rotation: 1    Elbow flexion: 1    Elbow extension: 0    Forearm pronation: 0    Forearm supination: 0    Wrist flexion: 0    Wrist extension: 0    Fingers flexion: 0    Fingers extension: 0    Fingers abduction: 0    Fingers adduction: 0     , Prehension, Pinch:  Unable at this time  Tone, Sensation and Coordination:   Tone:     Left upper extremity muscle tone: Flaccid    Right upper extremity muscle tone: Normal    Left lower extremity muscle tone: Gross assist     Modified Jeremiah:   Upper extremity (left):     Shoulder flexors: 0    Shoulder extensors: 0    Shoulder external rotators: 0    Shoulder  internal rotators: 2    Scapular retraction: 1    Scapular elevation: 1    Elbow flexors: 2    Elbow extensors: 0    Wrist flexors: 2    Wrist extensors: 0    Finger flexors: 2    Finger extensors: 0     Coordination   Absent in L UE     Cognition:     Orientation: normal to time, normal to place and normal to person    Direction following: three step    Short term memory: intact    Long term memory: intact    Attention span: intact    Sequencing: intact    Organization: intact    Problem solving: intact    Judgement and safety awareness: intact    Hearing: intact     Vision/Perception:     Visual tracking: intact    Convergence: intact    Visual attentions: intact    Visual scanning: intact    R/L hemianopsia: present    R/L neglect: present    Vision assistive device(s): reading glasses     Vision/Perception Comments:   Bell's Test = 19/35 in 3 minutes.       Decrease from 27/35 on initial evaluation.      Activities of Daily Living:   Transfers/Mobility:     Bed/chair transfers: stand by assist    Wheelchair transfers: stand by assist    Sit to stand: stand by assist    Toilet transfers: contact guard assist    Tub/shower transfers: contact guard assist    Bed mobility: minimum assist     Toileting:     Toileting: stand by assist    Hygiene: minimum assist    Clothing management: minimum assist    Toileting position: sitting     Bathing:     Bathing: minimum assist    Bathing position: sitting    Washing hair: supervision    Washing back: minimum assist    Washing upper body: supervision    Washing lower body: minimum assist    Bathing assistive device(s): shower chair     Dressing:     Dressing: moderate assist    Dressing upper body: stand by assist with t-shirt, min A with jackets    Dressing lower body: maximum assist    Socks: maximum assist    Shoes: maximum assist     Grooming:     Brushing teeth or denture care: minimum assist    Shaving: minimum assist     Feeding:     Using utensils: minimum  "assist    Cutting food: moderate assist- still hasn't used cutting board he has purchased     Household Management:     Housekeeping: maximum assist    Laundry: maximum assist    Meal preparation: maximum assist    Medication management: supervision    Shopping: maximum assist    Writing: independent*          Therapeutic Exercises (CPT 92531):     1. AAROM and PROM of L UE to enhance passive ROM in all of the extremity.      Therapeutic Treatments and Modalities:    1. Neuromuscular Re-education (CPT 81297)    Therapeutic Treatments and Modalities Summary:   NMRE:  Weight bearing completed through L UE with elbow leaning onto pillows on mat  with support at wrist and elbow 10 x with 5 second hold and then weight bearing through hand without pillow for same repetition to enhance proprioceptive feedback and muscle strengthening.  Table top activity focusing on bilateral movement with non-affected R hand placed over affected L hand completing B shoulder movement while \"wiping\" table with cloth in FF/ext, horizontal abd/add, and circular motions of clockwise and counter clockwise.  3 sets of 10 in each direction while focusing on maintaining alignment of trunk to minimize listing to R non affected side.    Scapular retraction and hold for 5 seconds 10 x   Set up/supervision with w/c <-> mat transfers with verbal cues for leg rests.    Reviewed hand hygiene for left hand with patient to prevent fungal growth and maceration of skin.  Min A with return demonstration.             Time-based treatments/modalities:  Therapeutic exercise minutes (CPT 38095): 30 minutes  Neuromusc re-ed minutes (CPT 14462): 15 minutes        Pain rating before treatment: 0  Pain rating after treatment: 0    ASSESSMENT:   Response to treatment: Increased flexor tone of internal rotation of shoulder and wrist/digits of hand of L UE.  Reminders needed to adhere to HEP and use of assistive devices for arm positioning and light domestic tasks. "     PLAN/RECOMMENDATIONS:   Plan for treatment: therapy treatment to continue next visit.  Planned interventions for next visit: continue with current treatment, E-stim attended (CPT 23519), neuromuscular re-education (CPT 71647), orthotic training (CPT 57146), self care ADL training (CPT 48922), therapeutic activities (CPT 35667) and therapeutic exercise (CPT 72693)

## 2021-11-01 ENCOUNTER — OCCUPATIONAL THERAPY (OUTPATIENT)
Dept: OCCUPATIONAL THERAPY | Facility: REHABILITATION | Age: 56
End: 2021-11-01
Attending: PHYSICAL MEDICINE & REHABILITATION
Payer: COMMERCIAL

## 2021-11-01 DIAGNOSIS — I63.511 ACUTE RIGHT MCA STROKE (HCC): ICD-10-CM

## 2021-11-01 PROCEDURE — 97110 THERAPEUTIC EXERCISES: CPT

## 2021-11-01 PROCEDURE — 97112 NEUROMUSCULAR REEDUCATION: CPT

## 2021-11-01 NOTE — OP THERAPY DAILY TREATMENT
Outpatient Occupational Therapy  DAILY TREATMENT     [facility] Occupational Therapy [address].  [address]  [place] [address]  Phone:  [phone]  Fax:  [phone]    Date: 11/01/2021    Patient: Thong Arzola  YOB: 1965  MRN: [id number]     Time Calculation  Start time: 0315  Stop time: 0400 Time Calculation (min): 45 minutes         Chief Complaint: Extremity Weakness    Visit #: 12    SUBJECTIVE:  The arm trough doesn't fit now on my wheel chair and I can't get my arm to stay on it.     OBJECTIVE:  Current objective measures:   Upper extremity (left):     Shoulder flexion: Below functional limits  0-90    Shoulder extension: 0-40    Shoulder abduction: Below functional limits 0-70    Shoulder external rotation: Below functional limits 0-10    Forearm supination: Below functional limits 0-25    Wrist flexion: Below functional limits 0-40    Wrist extension: Below functional limits 0-40    Fingers flexion: Below functional limits     Passive Range of Motion Comments:  1/2 finger anterior subluxation of L humerus noted     Strength:     Right upper extremity strength within functional limits.    Upper extremity strength (left):     Shoulder flexion: 1    Shoulder extension:  1    Shoulder abduction: 0    Shoulder adduction: 1    Shoulder external rotation:  0    Shoulder internal rotation: 1    Elbow flexion: 1    Elbow extension: 0    Forearm pronation: 0    Forearm supination: 0    Wrist flexion: 0    Wrist extension: 0    Fingers flexion: 0    Fingers extension: 0    Fingers abduction: 0    Fingers adduction: 0     , Prehension, Pinch:  Unable at this time  Tone, Sensation and Coordination:   Tone:     Left upper extremity muscle tone: Flaccid    Right upper extremity muscle tone: Normal    Left lower extremity muscle tone: Gross assist     Modified Jeremiah:   Upper extremity (left):     Shoulder flexors: 0    Shoulder extensors: 0    Shoulder external rotators:  0    Shoulder internal rotators: 3    Scapular retraction: 1    Scapular elevation: 1    Elbow flexors: 2    Elbow extensors: 0    Wrist flexors: 2    Wrist extensors: 0    Finger flexors: 2    Finger extensors: 0     Coordination   Absent in L UE     Cognition:     Orientation: normal to time, normal to place and normal to person    Direction following: three step    Short term memory: intact    Long term memory: intact    Attention span: intact    Sequencing: intact    Organization: intact    Problem solving: intact    Judgement and safety awareness: intact    Hearing: intact     Vision/Perception:     Visual tracking: intact    Convergence: intact    Visual attentions: intact    Visual scanning: intact    R/L hemianopsia: present    R/L neglect: present    Vision assistive device(s): reading glasses     Vision/Perception Comments:   Bell's Test = 19/35 in 3 minutes.       Decrease from 27/35 on initial evaluation.      Activities of Daily Living:   Transfers/Mobility:     Bed/chair transfers: stand by assist    Wheelchair transfers: stand by assist    Sit to stand: stand by assist    Toilet transfers: contact guard assist    Tub/shower transfers: contact guard assist    Bed mobility: minimum assist     Toileting:     Toileting: stand by assist    Hygiene: minimum assist    Clothing management: minimum assist    Toileting position: sitting     Bathing:     Bathing: minimum assist    Bathing position: sitting    Washing hair: supervision    Washing back: minimum assist    Washing upper body: supervision    Washing lower body: minimum assist    Bathing assistive device(s): shower chair     Dressing:     Dressing: moderate assist    Dressing upper body: stand by assist with t-shirt, min A with jackets    Dressing lower body: mod assist    Socks: maximum assist    Shoes: min A using elastic laces.     Grooming:     Brushing teeth or denture care: minimum assist    Shaving: minimum assist     Feeding:     Using  utensils: minimum assist    Cutting food: moderate assist- still hasn't used cutting board he has purchased     Household Management:     Housekeeping: maximum assist    Laundry: maximum assist    Meal preparation: maximum assist    Medication management: supervision    Shopping: maximum assist    Writing: independent        Therapeutic Exercises (CPT 15890):     1. AAROM and PROM of L UE to enhance passive ROM in all of the extremity.      Therapeutic Treatments and Modalities:    1. Neuromuscular Re-education (CPT 38541)    Therapeutic Treatments and Modalities Summary:   NMRE:  Weight bearing completed through L UE with elbow leaning onto pillows on mat  with support at wrist and elbow 10 x with 5 second hold and then weight bearing through hand without pillow for same repetition to enhance proprioceptive feedback and muscle strengthening.     Scapular retraction and hold for 5 seconds 10 x   Set up/supervision with w/c <-> mat transfers with verbal cues for leg rests.    Reviewed hand hygiene for left hand with patient to prevent fungal growth and maceration of skin.  Min A with return demonstration.     Patient brought in arm trough for left arm; however unable to nathalie over arm rest so adjustments need to be made to re-apply.            Time-based treatments/modalities:  Therapeutic exercise minutes (CPT 84022): 15 minutes  Neuromusc re-ed minutes (CPT 65195): 30 minutes        Pain rating before treatment: 0  Pain rating after treatment: 0    ASSESSMENT:   Response to treatment: further increase of flexor tone in left arm.  Unfortunately due to increase in flexor tone, patient unable to place arm onto arm trough as it is flexing inwards towards the body.  To participate more with meal prep and use cutting board.     PLAN/RECOMMENDATIONS:   Plan for treatment: therapy treatment to continue next visit.  Planned interventions for next visit: continue with current treatment

## 2021-11-04 ENCOUNTER — PHYSICAL THERAPY (OUTPATIENT)
Dept: PHYSICAL THERAPY | Facility: REHABILITATION | Age: 56
End: 2021-11-04
Attending: PHYSICAL MEDICINE & REHABILITATION
Payer: COMMERCIAL

## 2021-11-04 ENCOUNTER — OCCUPATIONAL THERAPY (OUTPATIENT)
Dept: OCCUPATIONAL THERAPY | Facility: REHABILITATION | Age: 56
End: 2021-11-04
Attending: PHYSICAL MEDICINE & REHABILITATION
Payer: COMMERCIAL

## 2021-11-04 DIAGNOSIS — I63.511 ACUTE RIGHT MCA STROKE (HCC): ICD-10-CM

## 2021-11-04 DIAGNOSIS — M62.838 OTHER MUSCLE SPASM: ICD-10-CM

## 2021-11-04 PROCEDURE — 97112 NEUROMUSCULAR REEDUCATION: CPT

## 2021-11-04 PROCEDURE — 97032 APPL MODALITY 1+ESTIM EA 15: CPT

## 2021-11-04 PROCEDURE — 97110 THERAPEUTIC EXERCISES: CPT

## 2021-11-04 PROCEDURE — 97116 GAIT TRAINING THERAPY: CPT

## 2021-11-04 NOTE — OP THERAPY DAILY TREATMENT
Outpatient Occupational Therapy  DAILY TREATMENT     [facility] Occupational Therapy [address].  [address]  [place] [address]  Phone:  [phone]  Fax:  [phone]    Date: 11/04/2021    Patient: Thong Arzola  YOB: 1965  MRN: [id number]     Time Calculation  Start time: 0100  Stop time: 0145 Time Calculation (min): 45 minutes         Chief Complaint: Extremity Weakness and Self Care Duties    Visit #: 13    SUBJECTIVE:  We are still trying to fix the arm trough for my wheelchair    OBJECTIVE:  Current objective measures:   Upper extremity (left):     Shoulder flexion: Below functional limits  0-90    Shoulder extension: 0-40    Shoulder abduction: Below functional limits 0-70    Shoulder external rotation: Below functional limits 0-10    Forearm supination: Below functional limits 0-25    Wrist flexion: Below functional limits 0-40    Wrist extension: Below functional limits 0-40    Fingers flexion: Below functional limits     Passive Range of Motion Comments:  1/2 finger anterior subluxation of L humerus noted     Strength:     Right upper extremity strength within functional limits.    Upper extremity strength (left):     Shoulder flexion: 1    Shoulder extension:  1    Shoulder abduction: 0    Shoulder adduction: 1    Shoulder external rotation:  0    Shoulder internal rotation: 1    Elbow flexion: 1    Elbow extension: 0    Forearm pronation: 0    Forearm supination: 0    Wrist flexion: 0    Wrist extension: 0    Fingers flexion: 0    Fingers extension: 0    Fingers abduction: 0    Fingers adduction: 0     , Prehension, Pinch:  Unable at this time  Tone, Sensation and Coordination:   Tone:     Left upper extremity muscle tone: Flaccid    Right upper extremity muscle tone: Normal    Left lower extremity muscle tone: Gross assist     Modified Jeremiah:   Upper extremity (left):     Shoulder flexors: 0    Shoulder extensors: 0    Shoulder external rotators:  0    Shoulder internal rotators: 3    Scapular retraction: 1    Scapular elevation: 1    Elbow flexors: 2    Elbow extensors: 0    Wrist flexors: 2    Wrist extensors: 0    Finger flexors: 2    Finger extensors: 0     Coordination   Absent in L UE     Cognition:     Orientation: normal to time, normal to place and normal to person    Direction following: three step    Short term memory: intact    Long term memory: intact    Attention span: intact    Sequencing: intact    Organization: intact    Problem solving: intact    Judgement and safety awareness: intact    Hearing: intact     Vision/Perception:     Visual tracking: intact    Convergence: intact    Visual attentions: intact    Visual scanning: intact    R/L hemianopsia: present    R/L neglect: present    Vision assistive device(s): reading glasses     Vision/Perception Comments:   Bell's Test = 19/35 in 3 minutes.       Decrease from 27/35 on initial evaluation.   Maze Test=54 seconds     Activities of Daily Living:   Transfers/Mobility:     Bed/chair transfers: stand by assist    Wheelchair transfers: stand by assist    Sit to stand: stand by assist    Toilet transfers: contact guard assist    Tub/shower transfers: contact guard assist    Bed mobility: minimum assist     Toileting:     Toileting: stand by assist    Hygiene: minimum assist    Clothing management: minimum assist    Toileting position: sitting     Bathing:     Bathing: minimum assist    Bathing position: sitting    Washing hair: supervision    Washing back: minimum assist    Washing upper body: supervision    Washing lower body: minimum assist    Bathing assistive device(s): shower chair     Dressing:     Dressing: moderate assist    Dressing upper body: stand by assist with t-shirt, min A with jackets    Dressing lower body: mod assist    Socks: maximum assist    Shoes: min A using elastic laces.     Grooming:     Brushing teeth or denture care: minimum assist    Shaving: minimum  assist     Feeding:     Using utensils: minimum assist    Cutting food: moderate assist- still hasn't used cutting board he has purchased     Household Management:     Housekeeping: maximum assist    Laundry: maximum assist    Meal preparation: maximum assist    Medication management: supervision    Shopping: maximum assist    Writing: independent        Therapeutic Exercises (CPT 87911):     1. AAROM and PROM of L UE to enhance passive ROM in all of the extremity.      Therapeutic Treatments and Modalities:    1. Neuromuscular Re-education (CPT 62570)    2. E Stim Unattended (CPT 49283)    Therapeutic Treatments and Modalities Summary:   NMRE:  Weight bearing completed through L UE with elbow leaning onto pillows on mat  with support at wrist and elbow 10 x with 5 second hold and then weight bearing through hand without pillow for same repetition to enhance proprioceptive feedback and muscle strengthening.     Scapular retraction and hold for 5 seconds 10 x    Reviewed hand hygiene for left hand with patient to prevent fungal growth and maceration of skin.  Min A with return demonstration.     E-stim:  Application of SABOE on level 9 for wrist extensors for 15 minutes with passive digit extension during wrist extension.            Time-based treatments/modalities:  Therapeutic exercise minutes (CPT 91557): 15 minutes  Neuromusc re-ed minutes (CPT 22503): 15 minutes  E-stim attended minutes (CPT 45657): 15 minutes     Pain rating before treatment: 0  Pain rating after treatment: 0    ASSESSMENT:   Response to treatment: Able to minimize tone in internal rotation to place arm more in neutral position while seated.  Patient stated he is trying to incorporate it more during self care to act as a stabilizer.      PLAN/RECOMMENDATIONS:   Plan for treatment: therapy treatment to continue next visit.  Planned interventions for next visit: continue with current treatment, E-stim attended (CPT 22835), neuromuscular  re-education (CPT 49726), orthotic training (CPT 31136), self care ADL training (CPT 22990), therapeutic activities (CPT 56064) and therapeutic exercise (CPT 76307)

## 2021-11-04 NOTE — OP THERAPY DAILY TREATMENT
Outpatient Physical Therapy  DAILY TREATMENT     [facility] Physical Therapy [address].  [address]  [place] [address]  Phone:  [phone]  Fax:  [phone]    Date: 11/04/2021    Patient: Thong Arzola  YOB: 1965  MRN: [id number]     Time Calculation                   Chief Complaint: Difficulty Walking, Loss Of Balance, and Weakness    Visit #: 8    SUBJECTIVE:  Pt report ankle/foot, knee pain at night. Using quad cane, has not been using AFO. Sees Dr Dumont for botox next week.     OBJECTIVE:              Therapeutic Treatments and Modalities:     Therapeutic Treatment and Modalities Summary: Sit-stand w/c to // bars w/ R u.e. assist--sba  //bars gait trg with focus on increasing R stride length and pelvic protraction (pt. Struggled to increase R stride due to L clonus and tone L gastroc complex  Tall kneeling at plinth in front of mirror--focussed on symmetry, righting response anad wt bearing into L l.e./u.e.        Time-based treatments/modalities:         ASSESSMENT:   Pt continues to demonstrate significant tone in gastro/HS/quad of Left LE.   Pt has significant fear while ambulating without UE support. Educated on motor learning principles including learning through errors ie gait mechanics mistakes lead to better learning. Encouraged hourly weight shifting in mirror for input to Lt LE. Pt complaints of pain may be more so related to increased wb and walking in the home and disuse.    PLAN/RECOMMENDATIONS:   tall kneeling  w/ reaching  Tasks,  gait trg in Lite Gait, gait trg with [place]--focus on pelvic  Stability and erect psoture w/ L mid-stance and midline orientation

## 2021-11-04 NOTE — OP THERAPY DAILY TREATMENT
Outpatient Physical Therapy  DAILY TREATMENT     [facility] Physical [address].  [address]  [place] [address]  Phone:  [phone]  Fax:  [phone]    Date: 11/04/2021    Patient: Thong Arzola  YOB: 1965  MRN: [id number]     Time Calculation    Start time: 1445  Stop time: 1530 Time Calculation (min): 45 minutes         Chief Complaint: Difficulty Walking, Loss Of Balance, and Weakness    Visit #: 8    SUBJECTIVE:  Complains of Lt LE pain at night. Sees Dr Dumont next week for botox.     OBJECTIVE:            Therapeutic Exercises (CPT 80940):     1. Lt LE bridge, x 10    2. Quadruped attempted      Therapeutic Exercise Summary: Attempts at prone to quadruped and sidelying to quadruped unsuccessful due to UE tone. Mod increased Lt LE ext tone as well.     Therapeutic Treatments and Modalities:     1. Gait Training (CPT 81410)    Therapeutic Treatment and Modalities Summary: Pt ambulated 10 min, 6 min on treadmill with Lite gait support for safety .4mph.  Assist x 2. 1 facilitating left weight shift second providing assist at Lt LE including advancing limb for increased step length and DF for heel strike with use of Tubigrip. Occasional assist at left knee to prevent hyper ext as well as facilitate knee ext in stance. No UR intermittently first 10 minutes. Without UE pt significantly fearful of falling, even in lite gait, decreased overall step length bilaterally. No UE more so to drive pt weightbearing and improve Lt LE confidence.   Second 6 min trial utilized wrist guard to position Lt UE on treadmill, mod hand pain reported. Similar gait mechanics with BUE support. Pt did complete brief trials without UE with mod change in mechanics. Standing rest between trials.      Time-based treatments/modalities:    Physical Therapy Timed Treatment Charges  Gait training minutes (CPT 05076): 30 minutes  Therapeutic exercise minutes (CPT 08563): 15  minutes          ASSESSMENT:   Pt continues to demonstrate significant tone in gastro/HS/quad of Left LE.   Pt has significant fear while ambulating without UE support. Educated on motor learning principles including learning through errors ie gait mechanics mistakes lead to better learning. Encouraged hourly weight shifting in mirror for input to Lt LE. Pt complaints of pain may be more so related to increased wb and walking in the home and disuse.  Attempted quadruped but unable to to severe UE tone.     PLAN/RECOMMENDATIONS:   Plan for treatment: therapy treatment to continue next visit.  Planned interventions for next visit: continue with current treatment.

## 2021-11-08 ENCOUNTER — PHYSICAL THERAPY (OUTPATIENT)
Dept: PHYSICAL THERAPY | Facility: REHABILITATION | Age: 56
End: 2021-11-08
Attending: PHYSICAL MEDICINE & REHABILITATION
Payer: COMMERCIAL

## 2021-11-08 ENCOUNTER — OCCUPATIONAL THERAPY (OUTPATIENT)
Dept: OCCUPATIONAL THERAPY | Facility: REHABILITATION | Age: 56
End: 2021-11-08
Attending: PHYSICAL MEDICINE & REHABILITATION
Payer: COMMERCIAL

## 2021-11-08 DIAGNOSIS — I63.511 ACUTE RIGHT MCA STROKE (HCC): ICD-10-CM

## 2021-11-08 DIAGNOSIS — M62.838 OTHER MUSCLE SPASM: ICD-10-CM

## 2021-11-08 PROCEDURE — 97112 NEUROMUSCULAR REEDUCATION: CPT

## 2021-11-08 PROCEDURE — 97110 THERAPEUTIC EXERCISES: CPT

## 2021-11-08 PROCEDURE — 97032 APPL MODALITY 1+ESTIM EA 15: CPT

## 2021-11-08 PROCEDURE — 97140 MANUAL THERAPY 1/> REGIONS: CPT

## 2021-11-08 NOTE — OP THERAPY PROGRESS SUMMARY
Outpatient Occupational Therapy  PROGRESS SUMMARY NOTE    [facility] Occupational Therapy [facility]  [address]  [address]  [place] [address]  Phone:  [phone]  Fax:  [phone]    Date of Visit: 11/08/2021    Patient: Thong Arzola  YOB: 1965  MRN: [id number]     Referring Provider: Zulema Dumont D.O.  [address]  [address]  [place] [address]   Referring Diagnosis Cerebral infarction due to unspecified occlusion or stenosis of right middle cerebral artery [I63.511];Other muscle spasm [M62.838]     Visit Diagnoses     ICD-10-CM   1. Acute right MCA stroke (HCC)  I63.511       Rehab Potential: good    Progress Report Period: 9/15/21-11/8/21    Functional Assessment Used: Maze test:  38 seconds                                                       Joe's test: 30/35                                                       UEFI=17/80          Objective Findings and Assessment:   Patient progression towards goals: Patient has been actively participating in OT outpatients 1-2 x a week working on NMRE, AAROM, e-stim and self care.  Patient motivated to participate and has enhanced ability to scan more to his left side due to neglect.  He has increased participation in self care and light domestic tasks; however has noted increase in flexor tone of left arm and not utilizing it as GFA as anticipated.  Patient would benefit from continued OT intervention to further enhance level of function and use of L UE during ADLs.     Objective findings and assessment details: Upper extremity (left):     Shoulder flexion: Below functional limits  0-90    Shoulder extension: 0-40    Shoulder abduction: Below functional limits 0-70    Shoulder external rotation: Below functional limits 0-10    Forearm supination: Below functional limits 0-25    Wrist flexion: Below functional limits 0-40    Wrist extension: Below functional limits 0-40    Fingers flexion: Below functional limits     Passive Range of  Motion Comments:  1/2 finger anterior subluxation of L humerus noted     Strength:     Right upper extremity strength within functional limits.    Upper extremity strength (left):     Shoulder flexion: 1    Shoulder extension:  1    Shoulder abduction: 0    Shoulder adduction: 1    Shoulder external rotation:  0    Shoulder internal rotation: 1    Elbow flexion: 1    Elbow extension: 0    Forearm pronation: 0    Forearm supination: 0    Wrist flexion: 0    Wrist extension: 0    Fingers flexion: 0    Fingers extension: 0    Fingers abduction: 0    Fingers adduction: 0     , Prehension, Pinch:  Unable at this time  Tone, Sensation and Coordination:   Tone:     Left upper extremity muscle tone: Flaccid    Right upper extremity muscle tone: Normal    Left lower extremity muscle tone: Gross assist     Modified Jeremiah:   Upper extremity (left):     Shoulder flexors: 0    Shoulder extensors: 0    Shoulder external rotators: 0    Shoulder internal rotators: 3    Scapular retraction: 1    Scapular elevation: 1    Elbow flexors: 2    Elbow extensors: 0    Wrist flexors: 2    Wrist extensors: 0    Finger flexors: 2    Finger extensors: 0     Coordination   Absent in L UE     Cognition:     Orientation: normal to time, normal to place and normal to person    Direction following: three step    Short term memory: intact    Long term memory: intact    Attention span: intact    Sequencing: intact    Organization: intact    Problem solving: intact    Judgement and safety awareness: intact    Hearing: intact     Vision/Perception:     Visual tracking: intact    Convergence: intact    Visual attentions: intact    Visual scanning: intact    R/L hemianopsia: present    R/L neglect: present    Vision assistive device(s): reading glasses     Vision/Perception Comments:   Bell's Test = 30/35 in 3 minutes.        Maze Test=38 seconds     Activities of Daily Living:   Transfers/Mobility:     Bed/chair transfers: stand by  assist    Wheelchair transfers: stand by assist    Sit to stand: stand by assist    Toilet transfers: contact guard assist    Tub/shower transfers: contact guard assist    Bed mobility: minimum assist     Toileting:     Toileting: stand by assist    Hygiene: minimum assist    Clothing management: minimum assist    Toileting position: sitting     Bathing:     Bathing: minimum assist    Bathing position: sitting    Washing hair: supervision    Washing back: minimum assist    Washing upper body: supervision    Washing lower body: minimum assist    Bathing assistive device(s): shower chair     Dressing:     Dressing: moderate assist    Dressing upper body: stand by assist with t-shirt, min A with jackets    Dressing lower body: mod assist    Socks: maximum assist    Shoes: min A using elastic laces.     Grooming:     Brushing teeth or denture care: minimum assist    Shaving: minimum assist     Feeding:     Using utensils: minimum assist    Cutting food: moderate assist- still hasn't used cutting board he has purchased     Household Management:     Housekeeping: maximum assist    Laundry: maximum assist    Meal preparation: maximum assist    Medication management: supervision    Shopping: maximum assist    Writing: independent    Goals:   Short Term Goals:   Patient and caregiver will be independent with HEP and positioning of L UE and OTR will provide information on use of arm trough for w/c to enhance positioning arm in midline.  - met  Patient will be Min A/CGA with hygiene and clothing management after bathroom tasks-met  Patient will be mod A LB dressing-not met  Patient will be supervision cutting own food using AE- not met  Patient will incorporate L UE as GFA for 25% of self care tasks-not met  Patient will be Mod A with meal prep-met  New goals:  Patient will be min A with meal prep  Patient will be SBA/CGA with hygiene and clothing management after use of bathroom    Patient will be min A LB dressing  Short  term goal timespan:  2-4 weeks    Long Term Goals:       Patient will be Sup with hygiene and clothing management after bathroom tasks  Patient will be min A LB dressing  Patient will be mod I cutting own food using AE  Patient will be SBA with meal prep  Patient will score 35/35 on Bell's Test  Patient will incorporate L UE as GFA for 25% of self care tasks.    Patient will score at least 40/80 on UEFI   Long term goal timespan:  4-6 weeks    Plan:   Planned therapy interventions:  Orthotic Training (CPT 72067), E Stim Attended (CPT 24571), Self Care ADL Training (CPT 73234), Neuromuscular Re-education (CPT 32097), Therapeutic Activities (CPT 30162) and Therapeutic Exercise (CPT 59350)  Frequency:  2x week  Duration in weeks:  6  Duration in visits:  12      Referring provider co-signature:  I have reviewed this plan of care and my co-signature certifies the need for services.    Certification Period: 11/08/2021 to 12/20/21    Physician Signature: ________________________________ Date: ______________

## 2021-11-08 NOTE — OP THERAPY DAILY TREATMENT
Outpatient Physical Therapy  DAILY TREATMENT     [facility] Physical Therapy [address].  [address]  [place] [address]  Phone:  [phone]  Fax:  [phone]    Date: 11/08/2021    Patient: Thong Arzola  YOB: 1965  MRN: [id number]     Time Calculation    Start time: 0830  Stop time: 0914 Time Calculation (min): 44 minutes         Chief Complaint: Weakness, Loss Of Balance, and Difficulty Walking    Visit #: 9    SUBJECTIVE:  Complains of Lt LE pain in hip and back. Has not worn AFO the last month or better.     OBJECTIVE:            Therapeutic Exercises (CPT 64649):     1. Piriformis stretch Lt, 3 x 1 min    2. Single knee to chest , 3 x 1 min    3. SL bridge, 2 x 8 Lt    Therapeutic Treatments and Modalities:     1. Neuromuscular Re-education (CPT 13387)    Therapeutic Treatment and Modalities Summary: For pt safety as well as improving pt confidence litegait utilized and positioned where mirror is anterior and litegait not obstructing view. WBQC utilized on Rt UE.     Standing with UE support pt naturally and consistently positions himself into a semi tandem position to off load Lt LE. Therapist positioned to block movement ant/post of Left LE as well as positioned foot between pt feed for appropriate TRAVIS. Attempted weight shifting M/L with pt subjective reports of 20-30% weight through Lt LE x 15 reps. Max A for left knee ext in stance as well as foot positioning.  For improving knee ext in stance utilized reciprocal motions with mini squats 2 x 6 mod A to assist and quad tapping. Without reciprocal motion pt unable to complete. Attempted standing in neutral with max A to facilitate left weight shift and maintain left LE position. Pt fight weight shift hard with significant counter balance with right trunk lean. Pt able to use mirror for feed back to poor positioning but unable to assume trunk neutral. Utilized tapping, cued for lateral flexion into pocket with pt moving  in to more right flexion with attempt. However, once max A for left w/s removed pt able to assume neutral.     Time-based treatments/modalities:    Physical Therapy Timed Treatment Charges  Neuromusc re-ed, balance, coor, post minutes (CPT 65751): 30 minutes  Therapeutic exercise minutes (CPT 97963): 10 minutes          ASSESSMENT:   Continues to have significant impairment of neutral and proprioception with standing. Pt consistently moves left LE forward for increased weightbearing into Rt LE. Pt unaware of it and does this every time therapist tried to facilitate weight shift or cued pt for weightbearing. IN standing with therapist facilitating weight shift, left knee ext pt counter balance with significant right trunk lean and unable to perform corrective maneuver even with tactile, verbal, or external cues from mirror. Will discuss potential neuro-ophthalmology referral    PLAN/RECOMMENDATIONS:   Plan for treatment: therapy treatment to continue next visit.  Planned interventions for next visit: continue with current treatment.

## 2021-11-08 NOTE — OP THERAPY DAILY TREATMENT
Outpatient Occupational Therapy  DAILY TREATMENT     [facility] Occupational [address].  [address]  [place] [address]  Phone:  [phone]  Fax:  [phone]    Date: 11/08/2021    Patient: Thong Arzola  YOB: 1965  MRN: [id number]     Time Calculation  Start time: 1015  Stop time: 1100 Time Calculation (min): 45 minutes         Chief Complaint: Extremity Weakness    Visit #: 14    SUBJECTIVE:  My arm really turns inwards    OBJECTIVE:  Current objective measures:   Upper extremity (left):     Shoulder flexion: Below functional limits  0-90    Shoulder extension: 0-40    Shoulder abduction: Below functional limits 0-70    Shoulder external rotation: Below functional limits 0-10    Forearm supination: Below functional limits 0-25    Wrist flexion: Below functional limits 0-40    Wrist extension: Below functional limits 0-40    Fingers flexion: Below functional limits     Passive Range of Motion Comments:  1/2 finger anterior subluxation of L humerus noted     Strength:     Right upper extremity strength within functional limits.    Upper extremity strength (left):     Shoulder flexion: 1    Shoulder extension:  1    Shoulder abduction: 0    Shoulder adduction: 1    Shoulder external rotation:  0    Shoulder internal rotation: 1    Elbow flexion: 1    Elbow extension: 0    Forearm pronation: 0    Forearm supination: 0    Wrist flexion: 0    Wrist extension: 0    Fingers flexion: 0    Fingers extension: 0    Fingers abduction: 0    Fingers adduction: 0     , Prehension, Pinch:  Unable at this time  Tone, Sensation and Coordination:   Tone:     Left upper extremity muscle tone: Flaccid    Right upper extremity muscle tone: Normal    Left lower extremity muscle tone: Gross assist     Modified Jeremiah:   Upper extremity (left):     Shoulder flexors: 0    Shoulder extensors: 0    Shoulder external rotators: 0    Shoulder internal rotators: 3    Scapular retraction:  1    Scapular elevation: 1    Elbow flexors: 2    Elbow extensors: 0    Wrist flexors: 2    Wrist extensors: 0    Finger flexors: 2    Finger extensors: 0     Coordination   Absent in L UE     Cognition:     Orientation: normal to time, normal to place and normal to person    Direction following: three step    Short term memory: intact    Long term memory: intact    Attention span: intact    Sequencing: intact    Organization: intact    Problem solving: intact    Judgement and safety awareness: intact    Hearing: intact     Vision/Perception:     Visual tracking: intact    Convergence: intact    Visual attentions: intact    Visual scanning: intact    R/L hemianopsia: present    R/L neglect: present    Vision assistive device(s): reading glasses     Vision/Perception Comments:   Bell's Test = 30/35 in 3 minutes.        Maze Test=38 seconds     Activities of Daily Living:   Transfers/Mobility:     Bed/chair transfers: stand by assist    Wheelchair transfers: stand by assist    Sit to stand: stand by assist    Toilet transfers: contact guard assist    Tub/shower transfers: contact guard assist    Bed mobility: minimum assist     Toileting:     Toileting: stand by assist    Hygiene: minimum assist    Clothing management: minimum assist    Toileting position: sitting     Bathing:     Bathing: minimum assist    Bathing position: sitting    Washing hair: supervision    Washing back: minimum assist    Washing upper body: supervision    Washing lower body: minimum assist    Bathing assistive device(s): shower chair     Dressing:     Dressing: moderate assist    Dressing upper body: stand by assist with t-shirt, min A with jackets    Dressing lower body: mod assist    Socks: maximum assist    Shoes: min A using elastic laces.     Grooming:     Brushing teeth or denture care: minimum assist    Shaving: minimum assist     Feeding:     Using utensils: minimum assist    Cutting food: moderate assist- still hasn't used cutting  board he has purchased     Household Management:     Housekeeping: maximum assist    Laundry: maximum assist    Meal preparation: maximum assist    Medication management: supervision    Shopping: maximum assist    Writing: independent        Therapeutic Exercises (CPT 39083):     1. AAROM and PROM of L UE to enhance passive ROM in all of the extremity.      Therapeutic Treatments and Modalities:    1. Neuromuscular Re-education (CPT 44667)    2. E Stim Unattended (CPT 67946)    Therapeutic Treatments and Modalities Summary:   NMRE:  Weight bearing completed through L UE with elbow leaning onto pillows on mat  with support at wrist and elbow 10 x with 5 second hold and then weight bearing through hand without pillow for same repetition to enhance proprioceptive feedback and muscle strengthening.     Scapular retraction and hold for 5 seconds 10 x    Reviewed hand hygiene for left hand with patient to prevent fungal growth and maceration of skin.  Min A with return demonstration.     E-stim:  Application of SABOE on level 9 for wrist extensors for 15 minutes with passive digit extension during wrist extension.            Time-based treatments/modalities:  Neuromusc re-ed minutes (CPT 61465): 15 minutes  Manual therapy joint mobilization minutes (CPT 07887): 15 minutes  E-stim attended minutes (CPT 30473): 15 minutes     Pain rating before treatment: 0  Pain rating after treatment: 0    ASSESSMENT:   Response to treatment: Continues to have increased flexor tone in left arm and needs reminding to complete hand hygiene on a regular basis    PLAN/RECOMMENDATIONS:   Plan for treatment: therapy treatment to continue next visit.  Planned interventions for next visit: continue with current treatment, E-stim attended (CPT 74042), neuromuscular re-education (CPT 44076), orthotic training (CPT 92971), self care ADL training (CPT 37034), therapeutic activities (CPT 40975) and therapeutic exercise (CPT 15677)

## 2021-11-09 ENCOUNTER — OFFICE VISIT (OUTPATIENT)
Dept: PHYSICAL MEDICINE AND REHAB | Facility: REHABILITATION | Age: 56
End: 2021-11-09
Payer: COMMERCIAL

## 2021-11-09 VITALS — RESPIRATION RATE: 18 BRPM | HEART RATE: 68 BPM | OXYGEN SATURATION: 98 %

## 2021-11-09 DIAGNOSIS — I63.511 ACUTE RIGHT MCA STROKE (HCC): ICD-10-CM

## 2021-11-09 DIAGNOSIS — M62.838 OTHER MUSCLE SPASM: ICD-10-CM

## 2021-11-09 DIAGNOSIS — I69.954 HEMIPLEGIA AND HEMIPARESIS FOLLOWING UNSPECIFIED CEREBROVASCULAR DISEASE AFFECTING LEFT NON-DOMINANT SIDE (HCC): Primary | ICD-10-CM

## 2021-11-09 DIAGNOSIS — I69.854 SPASTIC HEMIPLEGIA OF LEFT NONDOMINANT SIDE AS LATE EFFECT OF OTHER CEREBROVASCULAR DISEASE (HCC): ICD-10-CM

## 2021-11-09 PROCEDURE — 64643 CHEMODENERV 1 EXTREM 1-4 EA: CPT | Performed by: PHYSICAL MEDICINE & REHABILITATION

## 2021-11-09 PROCEDURE — 64642 CHEMODENERV 1 EXTREMITY 1-4: CPT | Performed by: PHYSICAL MEDICINE & REHABILITATION

## 2021-11-09 RX ORDER — DANTROLENE SODIUM 25 MG/1
75 CAPSULE ORAL 3 TIMES DAILY
Qty: 360 CAPSULE | Refills: 0 | Status: SHIPPED | OUTPATIENT
Start: 2021-11-09 | End: 2022-01-24

## 2021-11-09 NOTE — PROCEDURES
[facility]  Physical Medicine & Rehabilitation Clinic  [facility] [place] [address]  Ph: [phone]    PROCEDURE NOTE    Procedure: Botulinum Injection  Primary Diagnosis & Secondary Diagnoses:   Visit Diagnoses     ICD-10-CM   1. Hemiplegia and hemiparesis following unspecified cerebrovascular disease affecting left non-dominant side ([facility])  I69.954   2. Acute right MCA stroke ([facility])  I63.511   3. Spastic hemiplegia of left nondominant side as late effect of other cerebrovascular disease ([facility])  I69.854   4. Other muscle spasm  M62.838     INFORMED CONSENT   The risks and benefits of the procedure were explained to the patient, and all questions were answered. Benefits of the injection include spasticity reduction by decrease in muscle activation following toxin injection. Adverse effects from toxin injection include but are not limited to: excessive weakness, infection, breathing and/or swallowing difficulty.  Common adverse effects from the injection itself are pain and bruising. The patient demonstrated good understanding of risks and benefits and consents to botulinum toxin injection.     Received botulinum toxin product in last 3 mos: No  Have recently received abx (aminoglycosides): No  Take anti-spasticity medications: Yes  Take allergy/cold medicine:  No  Take a sleep medicine: No  Lactating/pregnant: No  Known NMJ disease: No  Human albumin allergy: No    Pre-procedure time out was performed.     PROCEDURE:  Usual sterile procedure was observed with sterile prep with chlorhexidine. Ultrasound was used for needle guidance for the injections in the following muscles. A negative aspiration of blood was obtained, and the following was injected into each muscle.    Botox Plan: LUE and LLE  Location Botox Amount in Units   Left medial gastrocnemius  100 units   Left lateral gastrocnemius  100 units   Left soleus  75 units   Left FDS/FDP  75 units / 75 units   Left Tricep 25 units   Left FCR  50 units    Total Units 500 units      ** Changed original muscles planned due to great difficulty with positioning and appropriately getting to FCU. Injected triceps instead.    Injection sites were cleaned with alcohol and band aid was applied afterwards. The patient tolerated the procedure well.  There were no complications.  The images were uploaded for permanent storage    MEDICATION USED:  100 units of botulinum toxin type A, mixed with 2mL of sterile, preservative-free normal saline in two 1cc syringes, for a total of 50 units in 1 mL. Repeated for other vials.    Total units injected: 500 units  Total units discarded: 0 units    See MAR for Botox details.     Counseling: Pt was instructed to seek immediate medical attention if fever, difficulty breathing, difficulty swallowing, or other concerning sxs arise. RTC in 2-4 weeks to investigate for effect at peak.    Additional Plan: Continue therapy, discuss neuro-ophthalmology referral with therapy. Increase Dantrolene to 100mg TID by next appointment.     BOTOX x5 vials  NDC 1304-4615-71  Lot  [id number]  Exp 02/2024    Dr. Zulema Dumont DO, MS  Department of Physical Medicine & Rehabilitation  Neuro Rehabilitation Clinic  [facility]

## 2021-11-10 ENCOUNTER — PHYSICAL THERAPY (OUTPATIENT)
Dept: PHYSICAL THERAPY | Facility: REHABILITATION | Age: 56
End: 2021-11-10
Attending: PHYSICAL MEDICINE & REHABILITATION
Payer: COMMERCIAL

## 2021-11-10 DIAGNOSIS — I63.511 ACUTE RIGHT MCA STROKE (HCC): ICD-10-CM

## 2021-11-10 PROCEDURE — 97112 NEUROMUSCULAR REEDUCATION: CPT

## 2021-11-11 ENCOUNTER — APPOINTMENT (OUTPATIENT)
Dept: OCCUPATIONAL THERAPY | Facility: REHABILITATION | Age: 56
End: 2021-11-11
Attending: PHYSICAL MEDICINE & REHABILITATION
Payer: COMMERCIAL

## 2021-11-11 NOTE — OP THERAPY DAILY TREATMENT
Outpatient Physical Therapy  DAILY TREATMENT     [facility] Physical [address].  [address]  [place] [address]  Phone:  [phone]  Fax:  [phone]    Date: 11/10/2021    Patient: Thong Arzola  YOB: 1965  MRN: [id number]     Time Calculation    Start time: 1500  Stop time: 1543 Time Calculation (min): 43 minutes         Chief Complaint: Weakness, Difficulty Walking, and Loss Of Balance    Visit #: 10    SUBJECTIVE:  Pt received botox yesterday. Complains of continued left hip pain     OBJECTIVE:            Therapeutic Treatments and Modalities:     1. Neuromuscular Re-education (CPT 19884)    Therapeutic Treatment and Modalities Summary: Pt positioned in tall kneeling with use of table moving from table top position. With mirror positioned anteriorly for feed back, tactile cueing, and joint compression at illiac crest for facilitation. Second person present for Lt UE management to increase weight bearing and provide stretch. Attempted to progress weight shift to left LE slowly with empoahsis on internal feed back cues for placing 20% wb, 30%, 40%. Pt unable to internally nor from observation achieve a 50/50 weight split rather 40/60 with Rt LE. Frequent cues to assume neutral trunk position however unable to maintain left weight distribution. Min improvement with variation of cueing. Potentially more with joint compression however pt fatigue was significant at that time.Pt positioned in left 1/2 kneeling with max A x 1 to stabilize left hip and mod x 1 to facilitate movement of Rt LE. Pt able to maintain with excessive UE support x 30 seconds before reporting severe pain and need to stop. Pt refused further attempts.  Pt able to move from floor to standing with Left 1/2 kneel with mod A at Lt hip and stand CGA.     Time-based treatments/modalities:    Physical Therapy Timed Treatment Charges  Neuromusc re-ed, balance, coor, post minutes (CPT 60950): 43  minutes          ASSESSMENT:   Enocuraged pt to purchase full length mirror to place in a room he spends a lot of time in for focus on external feedback for improving midline seated posture.   Discussed potential ophthalmology referral due to previously observed spatial awareness deficits with potential to improve midline as long as carry over appropriate  Encouraged frequent stretching and weightbearing with recent botox injection in UE and LE.   Pt did fait in tall kneeling, complaining of pain, with continued trunk lean to the right. With trunk flexion pt able to position more weight onto Lt LE however every time he was cued for trunk ext to neutral he immediately reverts to the right side. Improved minimally with reps and tactile cues.     PLAN/RECOMMENDATIONS:   Plan for treatment: therapy treatment to continue next visit.  Planned interventions for next visit: continue with current treatment.

## 2021-11-12 ENCOUNTER — APPOINTMENT (OUTPATIENT)
Dept: PHYSICAL THERAPY | Facility: REHABILITATION | Age: 56
End: 2021-11-12
Attending: PHYSICAL MEDICINE & REHABILITATION
Payer: COMMERCIAL

## 2021-11-15 ENCOUNTER — OCCUPATIONAL THERAPY (OUTPATIENT)
Dept: OCCUPATIONAL THERAPY | Facility: REHABILITATION | Age: 56
End: 2021-11-15
Attending: PHYSICAL MEDICINE & REHABILITATION
Payer: COMMERCIAL

## 2021-11-15 DIAGNOSIS — I63.511 ACUTE RIGHT MCA STROKE (HCC): ICD-10-CM

## 2021-11-15 PROCEDURE — 97110 THERAPEUTIC EXERCISES: CPT

## 2021-11-15 PROCEDURE — 97032 APPL MODALITY 1+ESTIM EA 15: CPT

## 2021-11-15 PROCEDURE — 97112 NEUROMUSCULAR REEDUCATION: CPT

## 2021-11-15 NOTE — OP THERAPY DAILY TREATMENT
Outpatient Occupational Therapy  DAILY TREATMENT     [facility] Occupational [address].  [address]  [place] [address]  Phone:  [phone]  Fax:  [phone]    Date: 11/15/2021    Patient: Thong Arzola  YOB: 1965  MRN: [id number]     Time Calculation  Start time: 0315  Stop time: 0400 Time Calculation (min): 45 minutes         Chief Complaint: Extremity Weakness    Visit #: 15    SUBJECTIVE:  Seen by Dr. Dumont on 11/09/21:  Botox Plan: LUE and LLE  Location Botox Amount in Units   Left medial gastrocnemius  100 units   Left lateral gastrocnemius  100 units   Left soleus  75 units   Left FDS/FDP  75 units / 75 units   Left Tricep 25 units   Left FCR  50 units   Total Units 500 units      ** Changed original muscles planned due to great difficulty with positioning and appropriately getting to FCU. Injected triceps instead.      OBJECTIVE:  Current objective measures:   : Upper extremity (left):     Shoulder flexion: Below functional limits  0-90    Shoulder extension: 0-40    Shoulder abduction: Below functional limits 0-70    Shoulder external rotation: Below functional limits 0-10    Forearm supination: Below functional limits 0-25    Wrist flexion: Below functional limits 0-40    Wrist extension: Below functional limits 0-40    Fingers flexion: Below functional limits     Passive Range of Motion Comments:  1/2 finger anterior subluxation of L humerus noted     Strength:     Right upper extremity strength within functional limits.    Upper extremity strength (left):     Shoulder flexion: 1    Shoulder extension:  1    Shoulder abduction: 0    Shoulder adduction: 1    Shoulder external rotation:  0    Shoulder internal rotation: 1    Elbow flexion: 1    Elbow extension: 0    Forearm pronation: 0    Forearm supination: 0    Wrist flexion: 0    Wrist extension: 0    Fingers flexion: 0    Fingers extension: 0    Fingers abduction: 0    Fingers adduction: 0     ,  Prehension, Pinch:  Unable at this time  Tone, Sensation and Coordination:   Tone:     Left upper extremity muscle tone: Flaccid    Right upper extremity muscle tone: Normal    Left lower extremity muscle tone: Gross assist     Modified Jeremiah:   Upper extremity (left):     Shoulder flexors: 0    Shoulder extensors: 0    Shoulder external rotators: 0    Shoulder internal rotators: 2    Scapular retraction: 1    Scapular elevation: 1    Elbow flexors: 2    Elbow extensors: 0    Wrist flexors: 1    Wrist extensors: 0    Finger flexors: 1    Finger extensors: 0     Coordination   Absent in L UE     Cognition:     Orientation: normal to time, normal to place and normal to person    Direction following: three step    Short term memory: intact    Long term memory: intact    Attention span: intact    Sequencing: intact    Organization: intact    Problem solving: intact    Judgement and safety awareness: intact    Hearing: intact     Vision/Perception:     Visual tracking: intact    Convergence: intact    Visual attentions: intact    Visual scanning: intact    R/L hemianopsia: present    R/L neglect: present    Vision assistive device(s): reading glasses     Vision/Perception Comments:   Bell's Test = 30/35 in 3 minutes.        Maze Test=38 seconds     Activities of Daily Living:   Transfers/Mobility:     Bed/chair transfers: stand by assist    Wheelchair transfers: stand by assist    Sit to stand: stand by assist    Toilet transfers: contact guard assist    Tub/shower transfers: contact guard assist    Bed mobility: minimum assist     Toileting:     Toileting: stand by assist    Hygiene: minimum assist    Clothing management: minimum assist    Toileting position: sitting     Bathing:     Bathing: minimum assist    Bathing position: sitting    Washing hair: supervision    Washing back: minimum assist    Washing upper body: supervision    Washing lower body: minimum assist    Bathing assistive device(s): shower  chair     Dressing:     Dressing: moderate assist    Dressing upper body: stand by assist with t-shirt, min A with jackets    Dressing lower body: mod assist    Socks: maximum assist    Shoes: min A using elastic laces.     Grooming:     Brushing teeth or denture care: minimum assist    Shaving: minimum assist     Feeding:     Using utensils: minimum assist    Cutting food: moderate assist- still hasn't used cutting board he has purchased     Household Management:     Housekeeping: maximum assist    Laundry: maximum assist    Meal preparation: maximum assist    Medication management: supervision    Shopping: maximum assist    Writing: independent        Therapeutic Exercises (CPT 73115):     1. AAROM and PROM of L UE to enhance passive ROM in all of the extremity.      Therapeutic Treatments and Modalities:    1. Neuromuscular Re-education (CPT 70453)    2. E Stim Unattended (CPT 84961)    Therapeutic Treatments and Modalities Summary:   NMRE:  Weight bearing completed through L UE with elbow leaning onto pillows on mat  with support at wrist and elbow 10 x with 5 second hold and then weight bearing through hand without pillow for same repetition to enhance proprioceptive feedback and muscle strengthening.     Scapular retraction and hold for 5 seconds 10 x    Table top activity with hand over hand assist for shoulder FF/ext and horizontal adduction/abduction 3 sets of 10 in each direction  E-stim:  Application of SABOE on level 9 for wrist extensors for 15 minutes with passive digit extension during wrist extension.            Time-based treatments/modalities:  Therapeutic exercise minutes (CPT 41510): 15 minutes  Neuromusc re-ed minutes (CPT 99854): 15 minutes  E-stim attended minutes (CPT 07581): 15 minutes     Pain rating before treatment: 0  Pain rating after treatment: 0    ASSESSMENT:   Response to treatment: decreased flexor tone noted at internal rotators, wrist and digits.      PLAN/RECOMMENDATIONS:   Plan  for treatment: therapy treatment to continue next visit.  Planned interventions for next visit: continue with current treatment, E-stim attended (CPT 96319), neuromuscular re-education (CPT 00686), self care ADL training (CPT 69527), therapeutic activities (CPT 27415) and therapeutic exercise (CPT 13127)

## 2021-11-16 ENCOUNTER — APPOINTMENT (OUTPATIENT)
Dept: PHYSICAL THERAPY | Facility: REHABILITATION | Age: 56
End: 2021-11-16
Attending: PHYSICAL MEDICINE & REHABILITATION
Payer: COMMERCIAL

## 2021-11-17 ENCOUNTER — APPOINTMENT (OUTPATIENT)
Dept: PHYSICAL THERAPY | Facility: REHABILITATION | Age: 56
End: 2021-11-17
Attending: PHYSICAL MEDICINE & REHABILITATION
Payer: COMMERCIAL

## 2021-11-18 ENCOUNTER — APPOINTMENT (OUTPATIENT)
Dept: PHYSICAL THERAPY | Facility: REHABILITATION | Age: 56
End: 2021-11-18
Attending: PHYSICAL MEDICINE & REHABILITATION
Payer: COMMERCIAL

## 2021-11-18 ENCOUNTER — OCCUPATIONAL THERAPY (OUTPATIENT)
Dept: OCCUPATIONAL THERAPY | Facility: REHABILITATION | Age: 56
End: 2021-11-18
Attending: PHYSICAL MEDICINE & REHABILITATION
Payer: COMMERCIAL

## 2021-11-18 DIAGNOSIS — I63.511 ACUTE RIGHT MCA STROKE (HCC): ICD-10-CM

## 2021-11-18 PROCEDURE — 97112 NEUROMUSCULAR REEDUCATION: CPT

## 2021-11-18 PROCEDURE — 97032 APPL MODALITY 1+ESTIM EA 15: CPT

## 2021-11-18 NOTE — OP THERAPY DAILY TREATMENT
Outpatient Occupational Therapy  DAILY TREATMENT     [facility] Occupational Therapy [address].  [address]  [place] [address]  Phone:  [phone]  Fax:  [phone]    Date: 11/18/2021    Patient: Thong Arzola  YOB: 1965  MRN: [id number]     Time Calculation  Start time: 0315  Stop time: 0400 Time Calculation (min): 45 minutes         Chief Complaint: Extremity Weakness and Self Care Duties    Visit #: 16    SUBJECTIVE:  I still feel a bit nauseas, but not as much as the beginning of the week    OBJECTIVE:  Current objective measures:   : Upper extremity (left):     Shoulder flexion: Below functional limits  0-90    Shoulder extension: 0-40    Shoulder abduction: Below functional limits 0-70    Shoulder external rotation: Below functional limits 0-10    Forearm supination: Below functional limits 0-25    Wrist flexion: Below functional limits 0-40    Wrist extension: Below functional limits 0-40    Fingers flexion: Below functional limits     Passive Range of Motion Comments:  1/2 finger anterior subluxation of L humerus noted     Strength:     Right upper extremity strength within functional limits.    Upper extremity strength (left):     Shoulder flexion: 1    Shoulder extension:  1    Shoulder abduction: 0    Shoulder adduction: 1    Shoulder external rotation:  0    Shoulder internal rotation: 1    Elbow flexion: 1    Elbow extension: 0    Forearm pronation: 0    Forearm supination: 0    Wrist flexion: 0    Wrist extension: 0    Fingers flexion: 0    Fingers extension: 0    Fingers abduction: 0    Fingers adduction: 0     , Prehension, Pinch:  Unable at this time  Tone, Sensation and Coordination:   Tone:     Left upper extremity muscle tone: Flaccid    Right upper extremity muscle tone: Normal    Left lower extremity muscle tone: Gross assist     Modified Jeremiah:   Upper extremity (left):     Shoulder flexors: 0    Shoulder extensors: 0    Shoulder external  rotators: 0    Shoulder internal rotators: 2    Scapular retraction: 1    Scapular elevation: 1    Elbow flexors: 1    Elbow extensors: 0    Wrist flexors: 0    Wrist extensors: 0    Finger flexors: 0    Finger extensors: 0     Coordination   Absent in L UE     Cognition:     Orientation: normal to time, normal to place and normal to person    Direction following: three step    Short term memory: intact    Long term memory: intact    Attention span: intact    Sequencing: intact    Organization: intact    Problem solving: intact    Judgement and safety awareness: intact    Hearing: intact     Vision/Perception:     Visual tracking: intact    Convergence: intact    Visual attentions: intact    Visual scanning: intact    R/L hemianopsia: present    R/L neglect: present    Vision assistive device(s): reading glasses     Vision/Perception Comments:   Bell's Test = 30/35 in 3 minutes.        Maze Test=38 seconds     Activities of Daily Living:   Transfers/Mobility:     Bed/chair transfers: stand by assist    Wheelchair transfers: stand by assist    Sit to stand: stand by assist    Toilet transfers: contact guard assist    Tub/shower transfers: contact guard assist    Bed mobility: minimum assist     Toileting:     Toileting: stand by assist    Hygiene: minimum assist    Clothing management: minimum assist    Toileting position: sitting     Bathing:     Bathing: minimum assist    Bathing position: sitting    Washing hair: supervision    Washing back: minimum assist    Washing upper body: supervision    Washing lower body: minimum assist    Bathing assistive device(s): shower chair     Dressing:     Dressing: moderate assist    Dressing upper body: stand by assist with t-shirt, min A with jackets    Dressing lower body: mod assist    Socks: maximum assist    Shoes: min A using elastic laces.     Grooming:     Brushing teeth or denture care: minimum assist    Shaving: minimum assist     Feeding:     Using utensils: minimum  assist    Cutting food: moderate assist- still hasn't used cutting board he has purchased     Household Management:     Housekeeping: maximum assist    Laundry: maximum assist    Meal preparation: maximum assist    Medication management: supervision    Shopping: maximum assist    Writing: independent        Therapeutic Treatments and Modalities:    1. Neuromuscular Re-education (CPT 27484)    2. E Stim Unattended (CPT 06187)    Therapeutic Treatments and Modalities Summary:   NMRE:  Weight bearing completed through L UE with elbow leaning onto pillows on mat  with support at wrist and elbow 10 x with 5 second hold and then weight bearing through hand without pillow for same repetition to enhance proprioceptive feedback and muscle strengthening.     Scapular retraction and hold for 5 seconds 10 x    Table top activity with hand over hand assist for shoulder FF/ext and horizontal adduction/abduction 3 sets of 10 in each direction  E-stim:  Application of SABOE on level 9 for shoulder stability, minimizing subluxation for 15 minutes with passive supination, wrist and digit extension.            Time-based treatments/modalities:  Neuromusc re-ed minutes (CPT 76945): 30 minutes  E-stim attended minutes (CPT 68892): 15 minutes     Pain rating before treatment: 0  Pain rating after treatment: 0    ASSESSMENT:   Response to treatment: decreased flexor tone of digits and wrist.      PLAN/RECOMMENDATIONS:   Plan for treatment: therapy treatment to continue next visit.  Planned interventions for next visit: E-stim attended (CPT 51045), neuromuscular re-education (CPT 92816), orthotic training (CPT 64574), self care ADL training (CPT 28039), therapeutic activities (CPT 05291) and therapeutic exercise (CPT 32117)

## 2021-11-19 ENCOUNTER — APPOINTMENT (OUTPATIENT)
Dept: PHYSICAL THERAPY | Facility: REHABILITATION | Age: 56
End: 2021-11-19
Attending: PHYSICAL MEDICINE & REHABILITATION
Payer: COMMERCIAL

## 2021-11-22 ENCOUNTER — OCCUPATIONAL THERAPY (OUTPATIENT)
Dept: OCCUPATIONAL THERAPY | Facility: REHABILITATION | Age: 56
End: 2021-11-22
Attending: PHYSICAL MEDICINE & REHABILITATION
Payer: COMMERCIAL

## 2021-11-22 ENCOUNTER — PHYSICAL THERAPY (OUTPATIENT)
Dept: PHYSICAL THERAPY | Facility: REHABILITATION | Age: 56
End: 2021-11-22
Attending: PHYSICAL MEDICINE & REHABILITATION
Payer: COMMERCIAL

## 2021-11-22 DIAGNOSIS — I63.511 ACUTE RIGHT MCA STROKE (HCC): ICD-10-CM

## 2021-11-22 DIAGNOSIS — M62.838 OTHER MUSCLE SPASM: ICD-10-CM

## 2021-11-22 PROCEDURE — 97112 NEUROMUSCULAR REEDUCATION: CPT

## 2021-11-22 PROCEDURE — 97140 MANUAL THERAPY 1/> REGIONS: CPT

## 2021-11-22 PROCEDURE — 97032 APPL MODALITY 1+ESTIM EA 15: CPT

## 2021-11-22 PROCEDURE — 97116 GAIT TRAINING THERAPY: CPT

## 2021-11-22 NOTE — OP THERAPY DAILY TREATMENT
Outpatient Occupational Therapy  DAILY TREATMENT     [facility] Occupational Therapy [address].  [address]  [place] [address]  Phone:  [phone]  Fax:  [phone]    Date: 11/22/2021    Patient: Thong Arzola  YOB: 1965  MRN: [id number]     Time Calculation  Start time: 1015  Stop time: 1100 Time Calculation (min): 45 minutes         Chief Complaint: Extremity Weakness and Self Care Duties    Visit #: 17    SUBJECTIVE:  My arm feels looser    OBJECTIVE:  Current objective measures:   PROM:   Upper extremity (left):     Shoulder flexion: Below functional limits     0-90    Shoulder extension: 0-40    Shoulder abduction: Below functional limits 0-70    Shoulder external rotation: Below functional limits 0-10    Forearm supination: Below functional limits 0-30   wrist and digits now have full PROM     Passive Range of Motion Comments:  1/2 finger anterior subluxation of L humerus noted     Strength:     Right upper extremity strength within functional limits.    Upper extremity strength (left):     Shoulder flexion: 1    Shoulder extension:  1    Shoulder abduction: 0    Shoulder adduction: 1    Shoulder external rotation:  0    Shoulder internal rotation: 1    Elbow flexion: 1    Elbow extension: 0    Forearm pronation: 0    Forearm supination: 0    Wrist flexion: 0    Wrist extension: 0    Fingers flexion: 0    Fingers extension: 0    Fingers abduction: 0    Fingers adduction: 0     , Prehension, Pinch:  Unable at this time  Tone, Sensation and Coordination:   Tone:     Left upper extremity muscle tone: Flaccid    Right upper extremity muscle tone: Normal    Left lower extremity muscle tone: Gross assist     Modified Jeremiah:   Upper extremity (left):     Shoulder flexors: 0    Shoulder extensors: 0    Shoulder external rotators: 0    Shoulder internal rotators: 1    Scapular retraction: 1    Scapular elevation: 1    Elbow flexors: 1    Elbow extensors: 0    Wrist  flexors: 0    Wrist extensors: 0    Finger flexors: 0    Finger extensors: 0     Coordination   Absent in L UE     Cognition:     Orientation: normal to time, normal to place and normal to person    Direction following: three step    Short term memory: intact    Long term memory: intact    Attention span: intact    Sequencing: intact    Organization: intact    Problem solving: intact    Judgement and safety awareness: intact    Hearing: intact     Vision/Perception:     Visual tracking: intact    Convergence: intact    Visual attentions: intact    Visual scanning: intact    R/L hemianopsia: present    R/L neglect: present    Vision assistive device(s): reading glasses     Vision/Perception Comments:   Bell's Test = 30/35 in 3 minutes.        Maze Test=38 seconds     Activities of Daily Living:   Transfers/Mobility:     Bed/chair transfers: stand by assist    Wheelchair transfers: stand by assist    Sit to stand: stand by assist    Toilet transfers: contact guard assist    Tub/shower transfers: contact guard assist    Bed mobility: minimum assist     Toileting:     Toileting: stand by assist    Hygiene: minimum assist    Clothing management: minimum assist    Toileting position: sitting     Bathing:     Bathing: minimum assist    Bathing position: sitting    Washing hair: supervision    Washing back: minimum assist    Washing upper body: supervision    Washing lower body: minimum assist    Bathing assistive device(s): shower chair     Dressing:     Dressing: moderate assist    Dressing upper body: stand by assist with t-shirt, min A with jackets    Dressing lower body: mod assist    Socks: maximum assist    Shoes: min A using elastic laces.     Grooming:     Brushing teeth or denture care: minimum assist    Shaving: minimum assist     Feeding:     Using utensils: minimum assist    Cutting food: moderate assist- still hasn't used cutting board he has purchased     Household Management:     Housekeeping: maximum  assist    Laundry: maximum assist    Meal preparation: maximum assist    Medication management: supervision    Shopping: maximum assist    Writing: independent        Therapeutic Treatments and Modalities:    1. Neuromuscular Re-education (CPT 06061)    2. E Stim Unattended (CPT 72127)    3. Manual Therapy (CPT 23421)    Therapeutic Treatments and Modalities Summary: Manual therapy to shoulder and scapular area for repositioning of scapula to allow free movement during passive shoulder movement.    NMRE:  Weight bearing completed through L UE with elbow leaning onto pillows on mat  with support at wrist and elbow 10 x with 5 second hold and then weight bearing through hand without pillow for same repetition to enhance proprioceptive feedback and muscle strengthening.     Scapular retraction and hold for 5 seconds 10 x      E-stim:  Application of SABOE on level 9 for 15 minutes to work on wrist extension and supination of forearm with passive flexion during non-stim phase.            Time-based treatments/modalities:  Neuromusc re-ed minutes (CPT 39679): 15 minutes  Manual therapy joint mobilization minutes (CPT 36527): 15 minutes  E-stim attended minutes (CPT 64474): 15 minutes     Pain rating before treatment: 0  Pain rating after treatment: 0    ASSESSMENT:   Response to treatment: decreasing flexor tone of L UE.  Tightness note in sternocleidomastoid causing hiked scapula.  Able to put back in place with aggressive stretching of muscle.      PLAN/RECOMMENDATIONS:   Plan for treatment: therapy treatment to continue next visit.  Planned interventions for next visit: continue with current treatment, E-stim attended (CPT 64440), neuromuscular re-education (CPT 98644), orthotic training (CPT 55782), self care ADL training (CPT 38919), therapeutic activities (CPT 81743) and therapeutic exercise (CPT 62536)

## 2021-11-22 NOTE — OP THERAPY DAILY TREATMENT
"  Outpatient Physical Therapy  DAILY TREATMENT     [facility] Physical Therapy [address].  [address]  [place] [address]  Phone:  [phone]  Fax:  [phone]    Date: 11/22/2021    Patient: Thong Arzola  YOB: 1965  MRN: [id number]     Time Calculation    Start time: 1545  Stop time: 1628 Time Calculation (min): 43 minutes         Chief Complaint: Difficulty Walking, Loss Of Balance, and Weakness    Visit #: 11    SUBJECTIVE:  Pt has been very nauseous since botox injection as well as change in medication. Noncompliant with instructions for wearing AFO or with standing HEP weight shifts in mirror. Reported he did not recall therapist encouraged AFO use with botox injection.    OBJECTIVE:            Therapeutic Treatments and Modalities:     1. Neuromuscular Re-education (CPT 90825)    2. Gait Training (CPT 12253)    Therapeutic Treatment and Modalities Summary: NMR: For improving weight shifting and stabilty, mirror for feed back. Verbal cues to facilitate weight shift to left/neutral. Pt continues to rely heavily on Rt UE/LE. Pt left LE visibly coming off ground and easily moved by therapist. Pt unaware rather verbalizing he is in midline. Pt internal awareness very poor. Completed in parallel bars for increased stabilty however min improvement in performance. For improving weight bearing and single limb support pt instructed to position Rt LE on blue/semi flat ball for some input. Use of quad cane for UE support. CGA/min A from PT. Pt complete 10 trials various length from 3-10 sec. Pt instructed to  position until therapist cued otherwise however he consistently reported \"losing balance, feeling like I will fall\" etc. However no outward signs of instability observed.    Split STS from elevated table approx 24 inches. Unable to perform with Rt LE positioned outside TRAVIS. Attempted x 10 reps with various Rt LE positioned distal/proxima. When able too perform only with " excessive Rt LE and UE with excessive trunk lean. With mirror for feedback pt reported feeling in midline even with visual feedback.       Gait: For improving gait mechanics pt ambulate with quad cane 4 x 200 feet. Mod cues for increasing right step length for increased wb through Left LE. Without cues pt ambulates with step to pattern. Carry over approx 50% however internal feed back poor. Pt heavily relying on vision for stabilty and step length and unresponsive to cues to not lookdown. With therapist hand positioned directly below chin to obstruct vision pt immediately revert to step to gait and unresponsive to cues.     Time-based treatments/modalities:    Physical Therapy Timed Treatment Charges  Gait training minutes (CPT 60112): 13 minutes  Neuromusc re-ed, balance, coor, post minutes (CPT 79716): 30 minutes          ASSESSMENT:   Pt continues to have limited carry over to improvement for midline. And poor carry over of previous education related to AFO, weight bearing or midline cues or compliance with HEP. Pt continues to need manual and verbal cues for weight shifting through left LE. Educated pt unsure of prognosis at this time.   PLAN/RECOMMENDATIONS:   Plan for treatment: therapy treatment to continue next visit.  Planned interventions for next visit: continue with current treatment.

## 2021-11-23 ENCOUNTER — APPOINTMENT (OUTPATIENT)
Dept: PHYSICAL THERAPY | Facility: REHABILITATION | Age: 56
End: 2021-11-23
Attending: PHYSICAL MEDICINE & REHABILITATION
Payer: COMMERCIAL

## 2021-11-30 ENCOUNTER — OCCUPATIONAL THERAPY (OUTPATIENT)
Dept: OCCUPATIONAL THERAPY | Facility: REHABILITATION | Age: 56
End: 2021-11-30
Attending: PHYSICAL MEDICINE & REHABILITATION
Payer: COMMERCIAL

## 2021-11-30 DIAGNOSIS — H53.9 VISION CHANGES: ICD-10-CM

## 2021-11-30 DIAGNOSIS — I63.511 ACUTE RIGHT MCA STROKE (HCC): ICD-10-CM

## 2021-11-30 DIAGNOSIS — R11.0 NAUSEA: ICD-10-CM

## 2021-11-30 PROCEDURE — 97032 APPL MODALITY 1+ESTIM EA 15: CPT

## 2021-11-30 PROCEDURE — 97112 NEUROMUSCULAR REEDUCATION: CPT

## 2021-11-30 PROCEDURE — 97140 MANUAL THERAPY 1/> REGIONS: CPT

## 2021-11-30 NOTE — OP THERAPY DAILY TREATMENT
Outpatient Occupational Therapy  DAILY TREATMENT     [facility] Occupational Therapy [address].  [address]  [place] [address]  Phone:  [phone]  Fax:  [phone]    Date: 11/30/2021    Patient: Thong Arzola  YOB: 1965  MRN: [id number]     Time Calculation  Start time: 0145  Stop time: 0230 Time Calculation (min): 45 minutes         Chief Complaint: Extremity Weakness and Self Care Duties    Visit #: 18    SUBJECTIVE:  I really want to help out more in the kitchen     OBJECTIVE:  Current objective measures:   PROM:   Upper extremity (left):     Shoulder flexion: Below functional limits     0-90    Shoulder extension: 0-40    Shoulder abduction: Below functional limits 0-70    Shoulder external rotation: Below functional limits 0-10    Forearm supination: Below functional limits 0-30   wrist and digits now have full PROM     Passive Range of Motion Comments:  1/2 finger anterior subluxation of L humerus noted     Strength:     Right upper extremity strength within functional limits.    Upper extremity strength (left):     Shoulder flexion: 1    Shoulder extension:  1    Shoulder abduction: 1    Shoulder adduction: 1    Shoulder external rotation:  0    Shoulder internal rotation: 1    Elbow flexion: 1    Elbow extension: 1    Forearm pronation: 0    Forearm supination: 0    Wrist flexion: 0    Wrist extension: 0    Fingers flexion: 0    Fingers extension: 0    Fingers abduction: 0    Fingers adduction: 0     , Prehension, Pinch:  Unable at this time  Tone, Sensation and Coordination:   Tone:     Left upper extremity muscle tone: Flaccid    Right upper extremity muscle tone: Normal    Left lower extremity muscle tone: Gross assist     Modified Jeremiah:   Upper extremity (left):     Shoulder flexors: 0    Shoulder extensors: 0    Shoulder external rotators: 0    Shoulder internal rotators: 1    Scapular retraction: 1    Scapular elevation: 1    Elbow flexors: 1    Elbow  extensors: 0    Wrist flexors: 0    Wrist extensors: 0    Finger flexors: 0    Finger extensors: 0     Coordination   Absent in L UE     Cognition:     Orientation: normal to time, normal to place and normal to person    Direction following: three step    Short term memory: intact    Long term memory: intact    Attention span: intact    Sequencing: intact    Organization: intact    Problem solving: intact    Judgement and safety awareness: intact    Hearing: intact     Vision/Perception:     Visual tracking: intact    Convergence: intact    Visual attentions: intact    Visual scanning: intact    R/L hemianopsia: present    R/L neglect: present    Vision assistive device(s): reading glasses     Vision/Perception Comments:   Bell's Test = 30/35 in 3 minutes.        Maze Test=38 seconds     Activities of Daily Living:   Transfers/Mobility:     Bed/chair transfers: stand by assist    Wheelchair transfers: stand by assist    Sit to stand: stand by assist    Toilet transfers: contact guard assist    Tub/shower transfers: contact guard assist    Bed mobility: minimum assist     Toileting:     Toileting: stand by assist    Hygiene: minimum assist    Clothing management: minimum assist    Toileting position: sitting     Bathing:     Bathing: minimum assist    Bathing position: sitting    Washing hair: supervision    Washing back: minimum assist    Washing upper body: supervision    Washing lower body: minimum assist    Bathing assistive device(s): shower chair     Dressing:     Dressing: Min A     Dressing upper body: stand by assist with t-shirt, min A with jackets    Dressing lower body: mod assist    Socks: maximum assist    Shoes: min A using elastic laces.     Grooming:     Brushing teeth or denture care: minimum assist    Shaving: minimum assist     Feeding:     Using utensils: minimum assist    Cutting food: moderate assist- still hasn't used cutting board he has purchased     Household Management:     Housekeeping:  maximum assist    Laundry: maximum assist    Meal preparation: maximum assist    Medication management: supervision    Shopping: maximum assist    Writing: independent        Therapeutic Treatments and Modalities:    1. Neuromuscular Re-education (CPT 06187)    2. E Stim Unattended (CPT 93927)    3. Manual Therapy (CPT 35358)    Therapeutic Treatments and Modalities Summary: Manual therapy to shoulder and scapular area for repositioning of scapula to allow free movement during passive shoulder movement.    NMRE:  Weight bearing completed through L UE with elbow leaning onto pillows on mat  with support at wrist and elbow 10 x with 5 second hold and then weight bearing through hand without pillow for same repetition to enhance proprioceptive feedback and muscle strengthening.     Scapular retraction and hold for 5 seconds 10 x      E-stim:  Application of SABOE on level 9 for 15 minutes to work on wrist extension and supination of forearm with passive flexion during non-stim phase.            Time-based treatments/modalities:  Neuromusc re-ed minutes (CPT 04300): 15 minutes  Manual therapy joint mobilization minutes (CPT 22068): 15 minutes  E-stim attended minutes (CPT 11130): 15 minutes     Pain rating before treatment: 0  Pain rating after treatment: 0    ASSESSMENT:   Response to treatment: Continues to be motivated to participate in therapy and increased level of function with personal ADLs.  Decreased flexor tone in wrist and digits.  Increased strength in shoulder abduction and triceps.  Progress note completed, please refer to for details.     PLAN/RECOMMENDATIONS:   Plan for treatment: therapy treatment to continue next visit.  Planned interventions for next visit: continue with current treatment, E-stim attended (CPT 78797), manual therapy (CPT 04067), neuromuscular re-education (CPT 68428), self care ADL training (CPT 58474), therapeutic activities (CPT 82074) and therapeutic exercise (CPT 97510)

## 2021-11-30 NOTE — OP THERAPY DAILY TREATMENT
"  Outpatient Physical Therapy  DAILY TREATMENT     [facility] Physical Therapy [place] [address] [address]  [address]  [place] [address]  Phone:  [phone]  Fax:  [phone]    Date: 12/01/2021    Patient: Thong Arzola  YOB: 1965  MRN: [id number]     Time Calculation                   Chief Complaint: No chief complaint on file.    Visit #: 12    SUBJECTIVE:  Pt reports continued nausea. States he did not use wc for 4 days over long weekend. Uses it to get to PT for fear of walking on uneven surfaces.   OBJECTIVE:      TUG 52. 44 small base quad cane  10MWT 53.72  Gait speed .18 m/s  30 sec STS 4 reps.  6MWT 212 feet, no rests.      Therapeutic Treatments and Modalities:     1. Neuromuscular Re-education (CPT 91655)    Therapeutic Treatment and Modalities Summary: NMR:  Reassessment of goals and objective measure. Education non testing uses, norms for condition. Discussion for POC. During testing pt continues to demonstrate significant left neglect. Bumping in to and continuing to walk with shoulder on the wall on right, near miss of objects on right.   Standing weight shifting to left LE with use of mirror for feed back. Continue to perform strong sided trunk lean rather than weight shift. Pt reported unable and poor understanding on how to weight shift today although has been performed frequently. Continued to educate on neglect and promote HEP compliance at prescribed frequency.  Split STS Rt LE anterior from elevated table 26\", unable to perform without UE. Continued to heavily use Rt LE. Therapist to block LE at the heel to prevent pt moving.     Time-based treatments/modalities:               ASSESSMENT:   Overall, pt is making appropriate progress towards goals. Excellent improvement related to transfers to the weak side as well as overall standing as he was transferring with mod/max A at eval. Pt continues to demonstrate significant left sided neglect as observed with interventions " as well as objective measures. Consistenly ambulated to the right and when looking for left sided items requires cues including finding his wc at end of session. Pt participation in home program and carry over to education has been questionable. Time spent discussion ways to improve adherence. Pt will benefit from continued intervention in order to improve independence with gait on even and uneven surfaces, improve strength, and balance to improve QoL and decrease reliance on care givers.     W/c- plinth transfer to R supervision MET  10 meter walk test > .4m/s Progressisng  TUG< 30 seconds Progressing  30 second sit-stand test > 4 attempts Progressing  Up from floor with min assist progressing  Up/own curb with cga and a/d N/T-will continue  Short term goal time span:  4-6 weeks      Long Term Goals:    Mod I for toileting for bladder DC-OT emphasis at this time  10 meter walk test > .6 m/s Progressing  TUG< 25 seconds Progressing  30 second sit-stand test > 7 attempts Progressing  Up from floor with sba/sup Progressing  Up/down curb w/ a/d w/ supervision progressing   supervision for community ambulation for all hard surfaces and ramps > 500 ft. progressing  Long term goal time span:  2-4 months  PLAN/RECOMMENDATIONS:   Plan for treatment: therapy treatment to continue next visit.  Planned interventions for next visit: continue with current treatment.

## 2021-12-01 ENCOUNTER — PHYSICAL THERAPY (OUTPATIENT)
Dept: PHYSICAL THERAPY | Facility: REHABILITATION | Age: 56
End: 2021-12-01
Attending: PHYSICAL MEDICINE & REHABILITATION
Payer: COMMERCIAL

## 2021-12-01 DIAGNOSIS — M62.838 OTHER MUSCLE SPASM: ICD-10-CM

## 2021-12-01 DIAGNOSIS — I63.511 ACUTE RIGHT MCA STROKE (HCC): ICD-10-CM

## 2021-12-01 PROCEDURE — 97110 THERAPEUTIC EXERCISES: CPT

## 2021-12-01 PROCEDURE — 97112 NEUROMUSCULAR REEDUCATION: CPT

## 2021-12-02 NOTE — OP THERAPY PROGRESS SUMMARY
Outpatient Physical Therapy  PROGRESS SUMMARY NOTE      [facility] Physical Therapy [place] [address]  [address]  [place] [address]  Phone:  [phone]  Fax:  [phone]    Date of Visit: 12/01/2021    Patient: Thong Arzola  YOB: 1965  MRN: [id number]     Referring Provider: Zulema Dumont D.O.  [address]  [place] [address]   Referring Diagnosis Other muscle spasm [M62.838]     Visit Diagnoses     ICD-10-CM   1. Acute right MCA stroke (HCC)  I63.511   2. Other muscle spasm  M62.838       Rehab Potential: good    Progress Report Period: 9/15/2021-21/1/2021    Functional Assessment Used          Objective Findings and Assessment:   Patient progression towards goals: W/c- plinth transfer to R supervision MET  10 meter walk test > .4m/s Progressisng  TUG< 30 seconds Progressing  30 second sit-stand test > 4 attempts Progressing  Up from floor with min assist progressing  Up/own curb with cga and a/d N/T-will continue  Short term goal time span:  4-6 weeks      Long Term Goals:    Mod I for toileting for bladder DC-OT emphasis at this time  10 meter walk test > .6 m/s Progressing  TUG< 25 seconds Progressing  30 second sit-stand test > 7 attempts Progressing  Up from floor with sba/sup Progressing  Up/down curb w/ a/d w/ supervision progressing   supervision for community ambulation for all hard surfaces and ramps > 500 ft. progressing    Objective findings and assessment details:   TUG 52. 44 small base quad cane  10MWT 53.72  Gait speed .18 m/s  30 sec STS 4 reps.  6MWT 212 feet, no rests.   Overall, pt is making appropriate progress towards goals. Excellent improvement related to transfers to the weak side as well as overall standing as he was transferring with mod/max A at eval. Pt continues to demonstrate significant left sided neglect as observed with interventions as well as objective measures. Consistenly ambulated to the right and when looking for left  sided items requires cues including finding his wc at end of session. Pt participation in home program and carry over to education has been questionable. Time spent discussion ways to improve adherence. Pt will benefit from continued intervention in order to improve independence with gait on even and uneven surfaces, improve strength, and balance to improve QoL and decrease reliance on care givers.    Goals:   Short Term Goals:   10 meter walk test > .4m/s   TUG< 30 seconds   30 second sit-stand test > 4 attempts   Up from floor with min assist  Up/own curb with cga and a/d     Short term goal time span:  6-8 weeks      Long Term Goals:    10 meter walk test > .6 m/s   TUG< 25 seconds  30 second sit-stand test > 7 attempts  Up from floor with sba/sup  Up/down curb w/ a/d w/ supervision   supervision for community ambulation for all hard surfaces and ramps > 500 ft. Progressing  Pt will demonstrate improved cv endurance with 6MWT score 500 feet or better.  Long term goal time span:  2-4 months    Plan:   Planned therapy interventions:  E Stim Attended (CPT 49313), Functional Training, Self Care (CPT 98023), Therapeutic Activities (CPT 78903), Therapeutic Exercise (CPT 05681), Gait Training (CPT 56538), Manual Therapy (CPT 48035) and Neuromuscular Re-education (CPT 56717)  Frequency:  2x week  Duration in weeks:  10      Referring provider co-signature:  I have reviewed this plan of care and my co-signature certifies the need for services.     Certification Period: 12/01/2021 to 02/10/22    Physician Signature: ________________________________ Date: ______________

## 2021-12-06 ENCOUNTER — APPOINTMENT (OUTPATIENT)
Dept: PHYSICAL MEDICINE AND REHAB | Facility: REHABILITATION | Age: 56
End: 2021-12-06
Payer: COMMERCIAL

## 2021-12-08 ENCOUNTER — PHYSICAL THERAPY (OUTPATIENT)
Dept: PHYSICAL THERAPY | Facility: REHABILITATION | Age: 56
End: 2021-12-08
Attending: PHYSICAL MEDICINE & REHABILITATION
Payer: COMMERCIAL

## 2021-12-08 ENCOUNTER — OCCUPATIONAL THERAPY (OUTPATIENT)
Dept: OCCUPATIONAL THERAPY | Facility: REHABILITATION | Age: 56
End: 2021-12-08
Attending: PHYSICAL MEDICINE & REHABILITATION
Payer: COMMERCIAL

## 2021-12-08 DIAGNOSIS — I63.511 ACUTE RIGHT MCA STROKE (HCC): ICD-10-CM

## 2021-12-08 PROCEDURE — 97112 NEUROMUSCULAR REEDUCATION: CPT

## 2021-12-08 PROCEDURE — 97032 APPL MODALITY 1+ESTIM EA 15: CPT

## 2021-12-08 PROCEDURE — 97140 MANUAL THERAPY 1/> REGIONS: CPT

## 2021-12-08 NOTE — OP THERAPY DAILY TREATMENT
Outpatient Physical Therapy  DAILY TREATMENT     [facility] Physical [address]  [address]  [place] [address]  Phone:  [phone]  Fax:  [phone]    Date: 12/08/2021    Patient: Thong Arzola  YOB: 1965  MRN: [id number]     Time Calculation    Start time: 1415  Stop time: 1458 Time Calculation (min): 43 minutes         Chief Complaint: Difficulty Walking, Loss Of Balance, and Weakness    Visit #: 13    SUBJECTIVE:  Has not used wc in 5 days. Walked into gym today, not wc.   OBJECTIVE:      TUG 52. 44 small base quad cane  10MWT 53.72  Gait speed .18 m/s  30 sec STS 4 reps.  6MWT 212 feet, no rests.      Therapeutic Treatments and Modalities:     1. Neuromuscular Re-education (CPT 89425)    Therapeutic Treatment and Modalities Summary: T improve stabilty, weight shifting and gait mechanics while utilizing error and driving internal awareness and correction pt ambulated with cones positioned in center of hallway. Cued pt that making contact with a cone with his cane or foot, as well as if right side come in contact with the wall was an error and would require pt correction.   4 x 25 feet each AD, rests as needed At end of each rep asked pt for his impression of performance and education on feedback as needed  SBQC: frequent errors, limited self correction. One position where pt would require stepping over 50% of cone pt rather leaned into the wall and positioned himself in a way to laterally side step around cone. Pt did not necessarily see this as error.   With SPC, some improvement in left awareness with stepping on cones. Some improved foot positioning to avoid cones.  No AD, close SBA. Cued for auditory changes in feet with good carry over. And good improvement with cone errors.  4 x 10 feet with 6# AW on left LE. Excellent improvement in positioning in hallway to the left/center rather than right. Some change with fatigue. More noticeable left shoulder  positioning posteriorly with AW with poor pt awareness. Improved with verbal and manual cues.      Continued to educate on neglect including importance of training conscious awareness of need to scan left side. Encouraged pt to place phone, tv remote, food, etc to the left to force full scanning of left side.     Time-based treatments/modalities:    Physical Therapy Timed Treatment Charges  Neuromusc re-ed, balance, coor, post minutes (CPT 47654): 43 minutes          ASSESSMENT:   AS interventions progressed pt did demonstrate improved internal awareness of errors/foot position etc. The best occurred with ankle weight on left LE with pt able to maintain center of hallway, significant improvement from pt who usually heavily pulls to right. Will continue to require skilled PT to improve strength, balance, and gait to improve QoL.   PLAN/RECOMMENDATIONS:   Plan for treatment: therapy treatment to continue next visit.  Planned interventions for next visit: continue with current treatment.

## 2021-12-08 NOTE — OP THERAPY DAILY TREATMENT
Outpatient Occupational Therapy  DAILY TREATMENT     [facility] Occupational Therapy [address]  [address]  [place] [address]  Phone:  [phone]  Fax:  [phone]    Date: 12/08/2021    Patient: Thong Arzola  YOB: 1965  MRN: [id number]     Time Calculation  Start time: 0315  Stop time: 0400 Time Calculation (min): 45 minutes         Chief Complaint: Extremity Weakness and Self Care Duties    Visit #: 19    SUBJECTIVE:  I haven't used my wheelchair in over 6 days.  I have been using my cane and walking everywhere.     OBJECTIVE:  Current objective measures:   PROM:   Upper extremity (left):     Shoulder flexion: Below functional limits     0-90    Shoulder extension: 0-40    Shoulder abduction: Below functional limits 0-70    Shoulder external rotation: Below functional limits 0-15    Forearm supination: Below functional limits 0-30   wrist and digits now have full PROM     Passive Range of Motion Comments:  1/2 finger anterior subluxation of L humerus noted     Strength:     Right upper extremity strength within functional limits.    Upper extremity strength (left):     Shoulder flexion: 1    Shoulder extension:  1    Shoulder abduction: 1    Shoulder adduction: 1    Shoulder external rotation:  0    Shoulder internal rotation: 1    Elbow flexion: 1    Elbow extension: 1    Forearm pronation: 0    Forearm supination: 0    Wrist flexion: 0    Wrist extension: 0    Fingers flexion: 0    Fingers extension: 0    Fingers abduction: 0    Fingers adduction: 0     , Prehension, Pinch:  Unable at this time  Tone, Sensation and Coordination:   Tone:     Left upper extremity muscle tone: Flaccid    Right upper extremity muscle tone: Normal    Left lower extremity muscle tone: Gross assist     Modified Jeremiah:   Upper extremity (left):     Shoulder flexors: 0    Shoulder extensors: 0    Shoulder external rotators: 0    Shoulder internal rotators: 1    Scapular retraction:  1    Scapular elevation: 1    Elbow flexors: 1    Elbow extensors: 0    Wrist flexors: 0    Wrist extensors: 0    Finger flexors: 0    Finger extensors: 0     Coordination   Absent in L UE     Cognition:     Orientation: normal to time, normal to place and normal to person    Direction following: three step    Short term memory: intact    Long term memory: intact    Attention span: intact    Sequencing: intact    Organization: intact    Problem solving: intact    Judgement and safety awareness: intact    Hearing: intact     Vision/Perception:     Visual tracking: intact    Convergence: intact    Visual attentions: intact    Visual scanning: intact    R/L hemianopsia: present    R/L neglect: present    Vision assistive device(s): reading glasses     Vision/Perception Comments:   Bell's Test = 30/35 in 3 minutes.        Maze Test=38 seconds     Activities of Daily Living:   Transfers/Mobility:     Bed/chair transfers: stand by assist    Wheelchair transfers: stand by assist    Sit to stand: stand by assist    Toilet transfers: contact guard assist    Tub/shower transfers: contact guard assist    Bed mobility: minimum assist     Toileting:     Toileting: stand by assist    Hygiene: minimum assist    Clothing management: minimum assist    Toileting position: sitting     Bathing:     Bathing: minimum assist    Bathing position: sitting    Washing hair: supervision    Washing back: minimum assist    Washing upper body: supervision    Washing lower body: minimum assist    Bathing assistive device(s): shower chair     Dressing:     Dressing: Min A     Dressing upper body: stand by assist with t-shirt, min A with jackets    Dressing lower body: mod assist    Socks: maximum assist    Shoes: min A using elastic laces.     Grooming:     Brushing teeth or denture care: minimum assist    Shaving: minimum assist     Feeding:     Using utensils: minimum assist    Cutting food: moderate assist- still hasn't used cutting board he has  purchased     Household Management:     Housekeeping: maximum assist    Laundry: maximum assist    Meal preparation: maximum assist    Medication management: supervision    Shopping: maximum assist    Writing: independent        Therapeutic Treatments and Modalities:    1. Neuromuscular Re-education (CPT 55987)    2. E Stim Unattended (CPT 95675)    3. Manual Therapy (CPT 33457)    Therapeutic Treatments and Modalities Summary: Manual therapy to shoulder and scapular area for repositioning of scapula to allow free movement during passive shoulder movement.    NMRE:  Weight bearing completed through L UE with elbow leaning onto pillows on mat  with support at wrist and elbow 10 x with 5 second hold and then weight bearing through hand without pillow for same repetition to enhance proprioceptive feedback and muscle strengthening.     Scapular retraction and hold for 5 seconds 10 x      E-stim:  Application of SABOE on level 9 for 15 minutes to work on wrist extension and supination of forearm with passive flexion during non-stim phase.            Time-based treatments/modalities:  Neuromusc re-ed minutes (CPT 34323): 15 minutes  Manual therapy joint mobilization minutes (CPT 69007): 15 minutes  E-stim attended minutes (CPT 28136): 15 minutes     Pain rating before treatment: 3  Pain rating after treatment: 3    ASSESSMENT:   Response to treatment: Pain at end of range of left shoulder.  Encouraged to wear shoulder support due to being up and walking more.    PLAN/RECOMMENDATIONS:   Plan for treatment: therapy treatment to continue next visit.  Planned interventions for next visit: continue with current treatment, E-stim attended (CPT 70575), neuromuscular re-education (CPT 68365), orthotic training (CPT 38893), self care ADL training (CPT 22203), therapeutic activities (CPT 83355) and therapeutic exercise (CPT 15039)

## 2021-12-09 ENCOUNTER — PHYSICAL THERAPY (OUTPATIENT)
Dept: PHYSICAL THERAPY | Facility: REHABILITATION | Age: 56
End: 2021-12-09
Attending: PHYSICAL MEDICINE & REHABILITATION
Payer: COMMERCIAL

## 2021-12-09 DIAGNOSIS — I63.511 ACUTE RIGHT MCA STROKE (HCC): ICD-10-CM

## 2021-12-09 PROCEDURE — 97112 NEUROMUSCULAR REEDUCATION: CPT

## 2021-12-09 NOTE — OP THERAPY DAILY TREATMENT
Outpatient Physical Therapy  DAILY TREATMENT     [facility] Physical [address]  [address]  [place] [address]  Phone:  [phone]  Fax:  [phone]    Date: 12/09/2021    Patient: Thong Arzola  YOB: 1965  MRN: [id number]     Time Calculation    Start time: 0830  Stop time: 0914 Time Calculation (min): 44 minutes         Chief Complaint: Difficulty Walking, Loss Of Balance, and Weakness    Visit #: 14    SUBJECTIVE:  Did not focus on gait cues provided yesterday to try at home. Feels tired.   OBJECTIVE:      TUG 52. 44 small base quad cane  10MWT 53.72  Gait speed .18 m/s  30 sec STS 4 reps.  6MWT 212 feet, no rests.      Therapeutic Treatments and Modalities:     1. Neuromuscular Re-education (CPT 28518)    Therapeutic Treatment and Modalities Summary: To improve stabilty, weight shifting and gait mechanics while utilizing error and driving internal awareness and correction. Utilized 6# ankle weight on left LE for increased sensory input and proprioception.  Ambulated with litegait, treadmill .3mph 4 x 8 min bouts. To dec pt visual reliance while ambulating provided goggles with tape positioned over lower half. Also utilized a sheet over top of treadmill to further block pt vision.  Focused on 3 primary cues occasional verbal from therapist but to drive internal awareness with pt. Quiet steps to facilitate weightbearing, approrpiate trunk position to dec left shoulder posteriorly rotated, and to not look at feet.   Pt inconsistent with response to direct verbal cues as well as cues to facilitate patient insight.    Standing weight shift/postural/internal awareness  Pt cued to stand with upright posture and equal weight distribution. Pt, uncued, would stand 10-15 seconds then begin stepping forward to off weight Lt LE. Pt responses as to why were inconsistent and lacked logic.  Attempted to assess problem solving with pt in litegait clearly positioned too far  forward/right as strap pushing consistently onto his chest. He was unable to problem solve how to dec the tension of the strap.     Time-based treatments/modalities:    Physical Therapy Timed Treatment Charges  Neuromusc re-ed, balance, coor, post minutes (CPT 89938): 44 minutes          ASSESSMENT:   Pt continues to have signficant habit further driving neglect and poor weightshifting to left LE.  Pt also demonstrate poor problem solving skilled and will benefit from ST in the future.   PLAN/RECOMMENDATIONS:   Plan for treatment: therapy treatment to continue next visit.  Planned interventions for next visit: continue with current treatment.

## 2021-12-14 ENCOUNTER — OCCUPATIONAL THERAPY (OUTPATIENT)
Dept: OCCUPATIONAL THERAPY | Facility: REHABILITATION | Age: 56
End: 2021-12-14
Attending: PHYSICAL MEDICINE & REHABILITATION
Payer: COMMERCIAL

## 2021-12-14 ENCOUNTER — OFFICE VISIT (OUTPATIENT)
Dept: PHYSICAL MEDICINE AND REHAB | Facility: REHABILITATION | Age: 56
End: 2021-12-14
Payer: COMMERCIAL

## 2021-12-14 DIAGNOSIS — I69.954 HEMIPLEGIA AND HEMIPARESIS FOLLOWING UNSPECIFIED CEREBROVASCULAR DISEASE AFFECTING LEFT NON-DOMINANT SIDE (HCC): ICD-10-CM

## 2021-12-14 DIAGNOSIS — I69.854 SPASTIC HEMIPLEGIA OF LEFT NONDOMINANT SIDE AS LATE EFFECT OF OTHER CEREBROVASCULAR DISEASE (HCC): ICD-10-CM

## 2021-12-14 DIAGNOSIS — I69.398 SPASTICITY AS LATE EFFECT OF CEREBROVASCULAR ACCIDENT (CVA): Primary | ICD-10-CM

## 2021-12-14 DIAGNOSIS — R11.0 NAUSEA: ICD-10-CM

## 2021-12-14 DIAGNOSIS — M25.562 ACUTE PAIN OF LEFT KNEE: ICD-10-CM

## 2021-12-14 DIAGNOSIS — M25.552 PAIN OF LEFT HIP JOINT: ICD-10-CM

## 2021-12-14 DIAGNOSIS — M62.838 OTHER MUSCLE SPASM: ICD-10-CM

## 2021-12-14 DIAGNOSIS — R25.2 SPASTICITY AS LATE EFFECT OF CEREBROVASCULAR ACCIDENT (CVA): Primary | ICD-10-CM

## 2021-12-14 DIAGNOSIS — I63.511 ACUTE RIGHT MCA STROKE (HCC): ICD-10-CM

## 2021-12-14 PROCEDURE — 99214 OFFICE O/P EST MOD 30 MIN: CPT | Performed by: PHYSICAL MEDICINE & REHABILITATION

## 2021-12-14 PROCEDURE — 97112 NEUROMUSCULAR REEDUCATION: CPT

## 2021-12-14 PROCEDURE — 97140 MANUAL THERAPY 1/> REGIONS: CPT

## 2021-12-14 PROCEDURE — 97032 APPL MODALITY 1+ESTIM EA 15: CPT

## 2021-12-14 RX ORDER — CELECOXIB 200 MG/1
200 CAPSULE ORAL 2 TIMES DAILY
Qty: 42 CAPSULE | Refills: 0 | Status: SHIPPED | OUTPATIENT
Start: 2021-12-14 | End: 2022-01-04 | Stop reason: SDUPTHER

## 2021-12-14 NOTE — OP THERAPY DAILY TREATMENT
Outpatient Occupational Therapy  DAILY TREATMENT     [facility] Occupational Therapy [address]  [address]  [place] [address]  Phone:  [phone]  Fax:  [phone]    Date: 12/14/2021    Patient: Thong Arzola  YOB: 1965  MRN: [id number]     Time Calculation  Start time: 1100  Stop time: 1145 Time Calculation (min): 45 minutes         Chief Complaint: Extremity Weakness and Self Care Duties    Visit #: 20    SUBJECTIVE:  I used the cutting board and I love it.    OBJECTIVE:  Current objective measures:   PROM:   Upper extremity (left):     Shoulder flexion: Below functional limits     0-90    Shoulder extension: 0-40    Shoulder abduction: Below functional limits 0-70    Shoulder external rotation: Below functional limits 0-15    Forearm supination: Below functional limits 0-30   wrist and digits now have full PROM     Passive Range of Motion Comments:  1/2 finger anterior subluxation of L humerus noted     Strength:     Right upper extremity strength within functional limits.    Upper extremity strength (left):     Shoulder flexion: 1    Shoulder extension:  1    Shoulder abduction: 1    Shoulder adduction: 1    Shoulder external rotation:  0    Shoulder internal rotation: 1    Elbow flexion: 1    Elbow extension: 1    Forearm pronation: 0    Forearm supination: 0    Wrist flexion: 0    Wrist extension: 0    Fingers flexion: 0    Fingers extension: 0    Fingers abduction: 0    Fingers adduction: 0     , Prehension, Pinch:  Unable at this time  Tone, Sensation and Coordination:   Tone:     Left upper extremity muscle tone: Flaccid    Right upper extremity muscle tone: Normal    Left lower extremity muscle tone: Gross assist     Modified Jeremiah:   Upper extremity (left):     Shoulder flexors: 0    Shoulder extensors: 0    Shoulder external rotators: 0    Shoulder internal rotators: 1    Scapular retraction: 1    Scapular elevation: 1    Elbow flexors: 1    Elbow  extensors: 0    Wrist flexors: 0    Wrist extensors: 0    Finger flexors: 0    Finger extensors: 0     Coordination   Absent in L UE     Cognition:     Orientation: normal to time, normal to place and normal to person    Direction following: three step    Short term memory: intact    Long term memory: intact    Attention span: intact    Sequencing: intact    Organization: intact    Problem solving: intact    Judgement and safety awareness: intact    Hearing: intact     Vision/Perception:     Visual tracking: intact    Convergence: intact    Visual attentions: intact    Visual scanning: intact    R/L hemianopsia: present    R/L neglect: present    Vision assistive device(s): reading glasses     Vision/Perception Comments:   Bell's Test = 30/35 in 3 minutes.        Maze Test=38 seconds     Activities of Daily Living:   Transfers/Mobility:     Bed/chair transfers: stand by assist    Wheelchair transfers: stand by assist    Sit to stand: stand by assist    Toilet transfers: contact guard assist    Tub/shower transfers: contact guard assist    Bed mobility: minimum assist     Toileting:     Toileting: stand by assist    Hygiene: minimum assist    Clothing management: minimum assist    Toileting position: sitting     Bathing:     Bathing: minimum assist    Bathing position: sitting    Washing hair: supervision    Washing back: minimum assist    Washing upper body: supervision    Washing lower body: minimum assist    Bathing assistive device(s): shower chair     Dressing:     Dressing: Min A     Dressing upper body: stand by assist with t-shirt, min A with jackets    Dressing lower body: mod assist    Socks: maximum assist    Shoes: min A using elastic laces.     Grooming:     Brushing teeth or denture care: minimum assist    Shaving: minimum assist     Feeding:     Using utensils: minimum assist    Cutting food: moderate assist- still hasn't used cutting board he has purchased     Household Management:     Housekeeping:  maximum assist    Laundry: maximum assist    Meal preparation: maximum assist    Medication management: supervision    Shopping: maximum assist    Writing: independent      Exercises/Treatments  Time-based treatments/modalities:  Neuromusc re-ed minutes (CPT 08992): 15 minutes  Manual therapy joint mobilization minutes (CPT 34000): 15 minutes  E-stim attended minutes (CPT 46123): 15 minutes     Pain rating before treatment: 0  Pain rating after treatment: 0    ASSESSMENT:   Response to treatment: Set up/supervision with donning/doffing jacket.  Difficulty with zip     PLAN/RECOMMENDATIONS:   Plan for treatment: therapy treatment to continue next visit.  Planned interventions for next visit: continue with current treatment, E-stim attended (CPT 01021), iontophoresis (CPT 79106), neuromuscular re-education (CPT 98412), self care ADL training (CPT 52729), therapeutic activities (CPT 43293) and therapeutic exercise (CPT 52305)

## 2021-12-15 ENCOUNTER — PHYSICAL THERAPY (OUTPATIENT)
Dept: PHYSICAL THERAPY | Facility: REHABILITATION | Age: 56
End: 2021-12-15
Attending: PHYSICAL MEDICINE & REHABILITATION
Payer: COMMERCIAL

## 2021-12-15 VITALS
TEMPERATURE: 97.6 F | DIASTOLIC BLOOD PRESSURE: 64 MMHG | OXYGEN SATURATION: 97 % | HEART RATE: 76 BPM | SYSTOLIC BLOOD PRESSURE: 106 MMHG | RESPIRATION RATE: 18 BRPM

## 2021-12-15 DIAGNOSIS — I63.511 ACUTE RIGHT MCA STROKE (HCC): ICD-10-CM

## 2021-12-15 PROCEDURE — 97116 GAIT TRAINING THERAPY: CPT

## 2021-12-15 NOTE — OP THERAPY DAILY TREATMENT
Outpatient Physical Therapy  DAILY TREATMENT     [facility] Physical [address].  [address]  [place] [address]  Phone:  [phone]  Fax:  [phone]    Date: 12/15/2021    Patient: Thong Arzola  YOB: 1965  MRN: [id number]     Time Calculation    Start time: 1500  Stop time: 1543 Time Calculation (min): 43 minutes         Chief Complaint: Difficulty Walking, Loss Of Balance, and Weakness    Visit #: 15    SUBJECTIVE:  Reports contuing to walk more at home.  OBJECTIVE:      TUG 52. 44 small base quad cane  10MWT 53.72  Gait speed .18 m/s  30 sec STS 4 reps.  6MWT 212 feet, no rests.      Therapeutic Treatments and Modalities:     1. Gait Training (CPT 89858)    Therapeutic Treatment and Modalities Summary: For improving gait mechanics and stabilty.  For external feedback pt verbalized consent to video recording of gait mechanics with quad cane and 7.5 AW on left LE, no AD and 7.5 AW and no Ad/No weight. Pt ambulated 40 feet each setting with views posterior and anterior.  Prior to gait pt reminded of 3 cues: make foot steps sound the same, left shoulder position, do not look down. After trials pt sat down and reviewed video tapes of gait mechanics. Focus of review included right step length and shoulder position. Without AW pt foot positioning of left LE was very unsure and had increased swing time and even some Ad/AB as he waivered before placing it. Also without AD and AW pt almost constantly seeking visual input for limb and looking down.  After review and education as well as education on proprioceptive/sensory input from weight pt ambulated same distance with AW and no AD with improved staying in midline of hallway as well as improved shoulder positioning and for step sounds. However cont to have shortened stride. With removal of AW and cues to mimic gait with AD. Some improvement in left foot position/dec unsure stepping. With AD pt does improve overall  mechanics after ambulating without. Better left foot positioning. Cont dec right step. Improved left shoulder pos as well. Total distance ambulated 350+ feet.   Rests as needed    Time-based treatments/modalities:    Physical Therapy Timed Treatment Charges  Gait training minutes (CPT 50886): 43 minutes          ASSESSMENT:   Pt agreeable to SLP referral in January for assessment after PT education related to impaired problem solving last visit. Pt demo'd improved carry over of specific cueing. Will continue to utilize video feedback as pt seemed to respond better to that than mirror/immediate feedback. Continuing to enforce interval cues and awareness for neglect.   PLAN/RECOMMENDATIONS:   Plan for treatment: therapy treatment to continue next visit.  Planned interventions for next visit: continue with current treatment.

## 2021-12-15 NOTE — PROGRESS NOTES
[facility]  PM&R Neuro Rehabilitation Clinic  [facility] [place] [address]  Ph: [phone]    FOLLOW UP PATIENT EVALUATION      Patient Name: Thong Arzola   Patient : 1965  Patient Age: 56 y.o.     Examining Physician: Dr. Zulema Dumont, DO    PM&R History to Date - Background Information:  Dates of Admission/Discharge to ARU: 2021-2021    SUBJECTIVE:   Patient Identification: Thong Arzola is a 56 y.o. male with PMH significant for COVID-19 3/18/2021, paroxysmal atrial fibrillation not on anti-coagulation, hypothyroidism, QTc prolongation and rehabilitation history significant for large right ICA/MCA ischemic stroke 3/31/2021 in setting of paroxysmal off of anticoagulation s/p craniectomy 4/3/2021 by Dr. Payton s/p cranioplasty 21 and is presenting to PM&R clinic for a FOLLOW UP OUTPATIENT evaluation with the following chief complaint/s:    CC: Botox Follow Up    History of Present Illness:  - Felt nauseas day after Botox.   - Only had one dose increase in Dantrolene.   - Now on Dantrolene 75 mg TID.   - Still with nausea and dizziness. Unsure if related to vision.   - Hasn't seen ophthalmology yet.   - Awaiting cardiology referral.   - In a lot of pain. Is on Gabapentin at night 2 tablets at night.   - Feels like his joints on the left which has pain. Left hip, left knee, left ankle.   - Walking more and more with quad cane.   - Sometimes hip pain radiates upward through abdomen    Review of Systems:  All other pertinent positive review of systems are noted above in HPI.   All other systems reviewed and are negative.    Past Medical History:  Past Medical History:   Diagnosis Date   • Stroke (HCC)     right side MCA   • Afib (HCC)    • COVID-19    • Disorder of thyroid     hypo   • High cholesterol    • Psychiatric problem     reactive depression      Past Surgical History:   Procedure Laterality Date   • CRANIOPLASTY Right 2021    Procedure: CRANIOPLASTY -  FOR SKULL DEFECT;  Surgeon: Arthur Payton M.D.;  Location: SURGERY [facility];  Service: Neurosurgery   • CRANIOTOMY Right 4/3/2021    Procedure: CRANIOTOMY;  Surgeon: Arthur Payton M.D.;  Location: SURGERY [facility];  Service: Neurosurgery   • KNEE RECONSTRUCTION      left knee orthoscopic        Current Outpatient Medications:   •  celecoxib (CELEBREX) 200 MG Cap, Take 1 Capsule by mouth 2 times a day for 21 days., Disp: 42 Capsule, Rfl: 0  •  dantrolene (DANTRIUM) 25 MG Cap, Take 3 Capsules by mouth 3 times a day., Disp: 360 Capsule, Rfl: 0  •  melatonin 3 MG Tab, Take 1 Tablet by mouth at bedtime., Disp: 30 Tablet, Rfl: 2  •  mirtazapine (REMERON) 15 MG TABLET DISPERSIBLE, Take 1 Tablet by mouth at bedtime., Disp: 30 Tablet, Rfl: 2  •  amLODIPine (NORVASC) 5 MG Tab, Take 1 tablet by mouth every day., Disp: 90 tablet, Rfl: 1  •  acetaminophen (TYLENOL) 325 MG Tab, Take 2 Tablets by mouth every four hours as needed for Mild Pain., Disp: 30 tablet, Rfl: 0  •  thyroid (ARMOUR THYROID) 60 MG Tab, Take 1 tablet by mouth 2 times a day., Disp: 60 tablet, Rfl: 2  •  atorvastatin (LIPITOR) 40 MG Tab, Take 1 tablet by mouth every evening., Disp: 30 tablet, Rfl: 2  •  metoprolol tartrate (LOPRESSOR) 25 MG Tab, Take 1 tablet by mouth 2 times a day., Disp: 60 tablet, Rfl: 2  No Known Allergies     Past Social History:  Social History     Socioeconomic History   • Marital status:      Spouse name: Not on file   • Number of children: Not on file   • Years of education: Not on file   • Highest education level: Not on file   Occupational History   • Not on file   Tobacco Use   • Smoking status: Never Smoker   • Smokeless tobacco: Never Used   Vaping Use   • Vaping Use: Never used   Substance and Sexual Activity   • Alcohol use: Yes     Comment: occ   • Drug use: No   • Sexual activity: Not on file   Other Topics Concern   • Not on file   Social History Narrative   • Not on file     Social Determinants of Health      Financial Resource Strain:    • Difficulty of Paying Living Expenses: Not on file   Food Insecurity:    • Worried About Running Out of Food in the Last Year: Not on file   • Ran Out of Food in the Last Year: Not on file   Transportation Needs:    • Lack of Transportation (Medical): Not on file   • Lack of Transportation (Non-Medical): Not on file   Physical Activity:    • Days of Exercise per Week: Not on file   • Minutes of Exercise per Session: Not on file   Stress:    • Feeling of Stress : Not on file   Social Connections:    • Frequency of Communication with Friends and Family: Not on file   • Frequency of Social Gatherings with Friends and Family: Not on file   • Attends Restoration Services: Not on file   • Active Member of Clubs or Organizations: Not on file   • Attends Club or Organization Meetings: Not on file   • Marital Status: Not on file   Intimate Partner Violence:    • Fear of Current or Ex-Partner: Not on file   • Emotionally Abused: Not on file   • Physically Abused: Not on file   • Sexually Abused: Not on file   Housing Stability:    • Unable to Pay for Housing in the Last Year: Not on file   • Number of Places Lived in the Last Year: Not on file   • Unstable Housing in the Last Year: Not on file        Family History:  History reviewed. No pertinent family history.    Depression and Opioid Screening  PHQ-9:  Depression Screen (PHQ-2/PHQ-9) 5/14/2021 5/15/2021 5/16/2021   PHQ-2 Total Score 1 1 1   PHQ-9 Total Score 5 5 5     Interpretation of PHQ-9 Total Score   Score Severity   1-4 No Depression   5-9 Mild Depression   10-14 Moderate Depression   15-19 Moderately Severe Depression   20-27 Severe Depression     OBJECTIVE:   Vital Signs:  Vitals:    12/14/21 1755   BP: 106/64   Pulse: 76   Resp: 18   Temp: 36.4 °C (97.6 °F)   SpO2: 97%        Pertinent Labs:  Lab Results   Component Value Date/Time    SODIUM 143 06/16/2021 03:51 PM    POTASSIUM 4.5 06/16/2021 03:51 PM    CHLORIDE 107 06/16/2021  03:51 PM    CO2 26 06/16/2021 03:51 PM    GLUCOSE 117 (H) 06/16/2021 03:51 PM    BUN 20 06/16/2021 03:51 PM    CREATININE 1.06 06/16/2021 03:51 PM    CREATININE 1.3 04/04/2008 03:05 AM       Lab Results   Component Value Date/Time    HBA1C 5.9 (H) 04/01/2021 02:45 AM       Lab Results   Component Value Date/Time    WBC 4.9 06/16/2021 03:51 PM    RBC 4.96 06/16/2021 03:51 PM    HEMOGLOBIN 14.2 06/16/2021 03:51 PM    HEMATOCRIT 43.4 06/16/2021 03:51 PM    MCV 87.5 06/16/2021 03:51 PM    MCH 28.6 06/16/2021 03:51 PM    MCHC 32.7 (L) 06/16/2021 03:51 PM    MPV 8.8 (L) 06/16/2021 03:51 PM    NEUTSPOLYS 58.40 06/16/2021 03:51 PM    LYMPHOCYTES 24.30 06/16/2021 03:51 PM    MONOCYTES 9.20 06/16/2021 03:51 PM    EOSINOPHILS 7.30 (H) 06/16/2021 03:51 PM    BASOPHILS 0.40 06/16/2021 03:51 PM       Lab Results   Component Value Date/Time    ASTSGOT 21 05/27/2021 06:17 AM    ALTSGPT 55 (H) 05/27/2021 06:17 AM        Physical Exam:   GEN: No apparent distress  HEENT: Head normocephalic, atraumatic.  Sclera nonicteric bilaterally, no ocular discharge appreciated bilaterally.  CV: Extremities warm and well-perfused, no peripheral edema appreciated bilaterally.  PULMONARY: Breathing nonlabored on room air, no respiratory accessory muscle use.  Not requiring supplemental oxygen.  SKIN: No appreciable skin breakdown on exposed areas of skin.  PSYCH: Mood and affect within normal limits.  NEURO: Awake alert.  Conversational.  Logical thought content.  Ambulatory with AFO and cane.  Presents in manual wheelchair.  Upper extremity spasticity improved compared to exam on day of Botox.  Left: Tricep bicep approximately 1+/4, wrist flexors and finger flexors 1+/4.  Clonus improved left ankle.  Still with some sustained clonus at 8-10 beats but improved compared to prior.  Left neglect.    ASSESSMENT/PLAN: Thong Arzola  is a 56 y.o. male with rehabilitation history significant for large right ICA/MCA ischemic stroke 3/31/2021 in  setting of paroxysmal off of anticoagulation s/p craniectomy 4/3/2021 by Dr. Payton s/p cranioplasty 6/23/21, here for evaluation. The following plan was discussed with the patient who is in agreement.     Visit Diagnoses     ICD-10-CM   1. Spasticity as late effect of cerebrovascular accident (CVA)  I69.398    R25.2   2. Acute right MCA stroke (HCC)  I63.511   3. Pain of left hip joint  M25.552   4. Acute pain of left knee  M25.562   5. Hemiplegia and hemiparesis following unspecified cerebrovascular disease affecting left non-dominant side (HCC)  I69.954   6. Spastic hemiplegia of left nondominant side as late effect of other cerebrovascular disease ([facility])  I69.854   7. Nausea  R11.0   8. Other muscle spasm  M62.838      Wife assists with history.    Rehab/Neuro:   1. Large right ICA/MCA ischemic stroke 3/31/2021 in setting of paroxysmal atrial fibrillation off of anticoagulation s/p craniectomy 4/3/2021 by Dr. Payton s/p cranioplasty 6/23/21  2. Left hemiplegia  -Therapy: Outpatient therapy through [facility].  -Would benefit from speech therapy, but not available within [facility] yet.  -Anel and Thong will let us know if they would like to switch to the [facility] in order to get speech therapy  -Currently not on anticoagulation or statin.  Will be seen cardiology through [place] to determine whether these are absolutely necessary or not    Nausea: Having recent nausea and dizziness.  Unclear if this is related to dantrolene.  Happened the day after he had Botox last.  Has had Botox prior without issues.  Anel not convinced it is due to the dantrolene.  Currently is on 75 mg 3 times daily for the past 2 weeks.  -Reduce dantrolene to 50 mg 3 times daily to see if this makes any difference  -Could be vision causing nausea, has referral to neuro-ophthalmology.  -Neuro-ophthalmology referral authorized, but details not given regarding clinic location.  Set reminder to check on status of referral in the new year.  -If this  was due to Botox we will see if this improves as Botox wears off closer to his next appointment.    Spasticity: Having recent nausea and dizziness.  Unclear if this is related to dantrolene.  Happened the day after he had Botox last.  Has had Botox prior without issues.  Anel not convinced it is due to the dantrolene.  Currently is on 75 mg 3 times daily for the past 2 weeks.  -Medication Management: Reduce dantrolene to 50 mg 3 times daily from 75 mg 3 times daily to see if this helps with nausea/dizziness.  -Referral: Referral for Botox currently in place.     Botox Plan: I plan to inject to the LUE and LLE  Location Botox Amount in Units   Muscles to be determined based on exam day of Botox injection  500 units   Total Units 500 units     The risks benefits and alternatives to this procedure were discussed and the patient wishes to proceed with the procedure. Risks include but are not limited to damage to surrounding structures, infection, bleeding, worsening of pain which can be permanent, weakness which can be permanent. Benefits include pain relief, improved function. Alternatives includes not doing the procedure.      Neuropathic Pain:   -Med management: Continue gabapentin 2 tablets at night.  This was recently restarted due to his pain.    Nociceptive pain/MSK/Ortho: Secondary joint pain due to ambulating more.  Only affects left side at hip, knee, ankle.  -Trial Celebrex 200 mg twice daily for 3 weeks to see if this assists with pain.    Follow up: Approximately 2 months for repeat Botox injections.    Total time spent was 30 minutes.  Included in this time is the time spent preparing for the visit including record review, my exam and evaluation, counseling and education regarding that which is aforementioned in the assessment and plan. Time was spent ordering the appropriate labs, tests, procedures, referrals, medications. Included this time as the time spent obtaining history from someone other than the  patient. Discussion involved the patient and wife.    Please note that this dictation was created using voice recognition software. I have made every reasonable attempt to correct obvious errors but there may be errors of grammar and content that I may have overlooked prior to finalization of this note.    Dr. Zulema Dumont DO, MS  Department of Physical Medicine & Rehabilitation  Neuro Rehabilitation Clinic  [facility]

## 2021-12-16 ENCOUNTER — OCCUPATIONAL THERAPY (OUTPATIENT)
Dept: OCCUPATIONAL THERAPY | Facility: REHABILITATION | Age: 56
End: 2021-12-16
Attending: PHYSICAL MEDICINE & REHABILITATION
Payer: COMMERCIAL

## 2021-12-16 DIAGNOSIS — I63.511 ACUTE RIGHT MCA STROKE (HCC): ICD-10-CM

## 2021-12-16 PROCEDURE — 97112 NEUROMUSCULAR REEDUCATION: CPT

## 2021-12-16 PROCEDURE — 97140 MANUAL THERAPY 1/> REGIONS: CPT
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
## 2021-12-16 NOTE — OP THERAPY PROGRESS SUMMARY
Outpatient Occupational Therapy  PROGRESS SUMMARY NOTE    [facility] Occupational Therapy [facility]  [address]  [address]  [place] [address]  Phone:  [phone]  Fax:  [phone]    Date of Visit: 12/16/2021    Patient: Thong Arzola  YOB: 1965  MRN: [id number]     Referring Provider: Zulema Dumont D.O.  [address]  [address]  [place] [address]   Referring Diagnosis Cerebral infarction due to unspecified occlusion or stenosis of right middle cerebral artery [I63.511];Other muscle spasm [M62.838]     Visit Diagnoses     ICD-10-CM   1. Acute right MCA stroke (HCC)  I63.511       Rehab Potential: good    Progress Report Period: 11/08/2020-12/16/21    Functional Assessment Used=SAFE =1          Objective Findings and Assessment:   Patient progression towards goals: Patient continues to participate in OT 2 x a week working on L UE NMRE and self care.  Patient slowly progressing and increased participation with functional mobility and light domestic tasks. Patient continues to have left side visual scanning impairment and some left side neglect, needing verbal cues to correct during tasks.  Patient would benefit with continued OT intervention after the Trell break to further enhance level of function, minimize burden of care and minimize risk of falls.      Objective findings and assessment details: Current objective measures:   PROM:   Upper extremity (left):     Shoulder flexion: Below functional limits     0-90    Shoulder extension: 0-40    Shoulder abduction: Below functional limits 0-75    Shoulder external rotation: Below functional limits 0-15    Forearm supination: Below functional limits 0-30   wrist and digits now have full PROM     Passive Range of Motion Comments:  1/2 finger anterior subluxation of L humerus noted     Strength:     Right upper extremity strength within functional limits.    Upper extremity strength (left):     Shoulder flexion: 1    Shoulder  extension:  1    Shoulder abduction: 1    Shoulder adduction: 1    Shoulder external rotation:  0    Shoulder internal rotation: 1    Elbow flexion: 1    Elbow extension: 1    Forearm pronation: 0    Forearm supination: 0    Wrist flexion: 0    Wrist extension: 0    Fingers flexion: 0    Fingers extension: 0    Fingers abduction: 0    Fingers adduction: 0     , Prehension, Pinch:  Unable at this time  Tone, Sensation and Coordination:   Tone:     Left upper extremity muscle tone: Flaccid    Right upper extremity muscle tone: Normal    Left lower extremity muscle tone: Gross assist     Modified Jeremiah:   Upper extremity (left):     Shoulder flexors: 0    Shoulder extensors: 0    Shoulder external rotators: 0    Shoulder internal rotators: 1    Scapular retraction: 1    Scapular elevation: 1    Elbow flexors: 1    Elbow extensors: 0    Wrist flexors: 0    Wrist extensors: 0    Finger flexors: 0    Finger extensors: 0     Coordination   Absent in L UE     Cognition:     Orientation: normal to time, normal to place and normal to person    Direction following: three step    Short term memory: intact    Long term memory: intact    Attention span: intact    Sequencing: intact    Organization: intact    Problem solving: intact    Judgement and safety awareness: intact    Hearing: intact     Vision/Perception:     Visual tracking: intact    Convergence: intact    Visual attentions: intact    Visual scanning: intact    R/L hemianopsia: present    R/L neglect: present    Vision assistive device(s): reading glasses     Vision/Perception Comments:   Bell's Test = 30/35 in 3 minutes.        Maze Test=38 seconds     Activities of Daily Living:   Transfers/Mobility:     Bed/chair transfers: stand by assist    Wheelchair transfers: stand by assist    Sit to stand: stand by assist    Toilet transfers: contact guard assist    Tub/shower transfers: contact guard assist    Bed mobility: minimum assist     Toileting:     Toileting:  stand by assist    Hygiene: minimum assist    Clothing management: minimum assist    Toileting position: sitting     Bathing:     Bathing: minimum assist    Bathing position: sitting    Washing hair: supervision    Washing back: minimum assist    Washing upper body: supervision    Washing lower body: minimum assist    Bathing assistive device(s): shower chair     Dressing:     Dressing: Min A     Dressing upper body: stand by assist with t-shirt, min A with jackets    Dressing lower body: mod assist    Socks: maximum assist    Shoes: min A using elastic laces.     Grooming:     Brushing teeth or denture care: minimum assist    Shaving: minimum assist     Feeding:     Using utensils: minimum assist    Cutting food: Mod I using cutting board.       Household Management:     Housekeeping: maximum assist    Laundry: maximum assist    Meal preparation: maximum assist    Medication management: supervision    Shopping: maximum assist    Writing: independent    Goals:   Short Term Goals:     Patient will be Mod I  with hygiene and clothing management after bathroom tasks-met  Patient will be min A LB dressing-  Patient will be set up cutting own food using AE- not met  Patient will incorporate L UE as GFA for 25% of self care  Patient will be Min A with meal prep-met      Short term goal timespan:  2-4 weeks    Long Term Goals:   Patient will be Mod I with hygiene and clothing management after bathroom tasks  Patient will be min A LB dressing  Patient will be mod I cutting own food using AE  Patient will be SBA with meal prep  Patient will score 35/35 on Bell's Test  Patient will incorporate L UE as GFA for 25%-50%of self care tasks.    Patient will score at least 40/80 on UEFI   Long term goal timespan:  4-6 weeks    Plan:   Planned therapy interventions:  Neuromuscular Re-education (CPT 55694), E Stim Attended (CPT 67687), Manual Therapy (CPT 49974), Self Care ADL Training (CPT 93453), Therapeutic Activities (CPT 33499)  and Therapeutic Exercise (CPT 37133)  Frequency:  2x week  Duration in weeks:  6  Duration in visits:  12  Plan details:  Starting after the Trell break.        Referring provider co-signature:  I have reviewed this plan of care and my co-signature certifies the need for services.    Certification Period: 12/16/2021 to 02/03/22    Physician Signature: ________________________________ Date: ______________

## 2021-12-16 NOTE — OP THERAPY DAILY TREATMENT
Outpatient Occupational Therapy  DAILY TREATMENT     [facility] Occupational [address]  [address]  [place] [address]  Phone:  [phone]  Fax:  [phone]    Date: 12/16/2021    Patient: Thong Arzola  YOB: 1965  MRN: [id number]     Time Calculation  Start time: 0240  Stop time: 0325 Time Calculation (min): 45 minutes         Chief Complaint: Extremity Weakness and Self Care Duties    Visit #: 21    SUBJECTIVE:  I saw Dr. Dumont the other day and she is planning to do more Botox injections in a couple months.    OBJECTIVE:  Current objective measures:   PROM:   Upper extremity (left):     Shoulder flexion: Below functional limits     0-90    Shoulder extension: 0-40    Shoulder abduction: Below functional limits 0-75    Shoulder external rotation: Below functional limits 0-15    Forearm supination: Below functional limits 0-30   wrist and digits now have full PROM     Passive Range of Motion Comments:  1/2 finger anterior subluxation of L humerus noted     Strength:     Right upper extremity strength within functional limits.    Upper extremity strength (left):     Shoulder flexion: 1    Shoulder extension:  1    Shoulder abduction: 1    Shoulder adduction: 1    Shoulder external rotation:  0    Shoulder internal rotation: 1    Elbow flexion: 1    Elbow extension: 1    Forearm pronation: 0    Forearm supination: 0    Wrist flexion: 0    Wrist extension: 0    Fingers flexion: 0    Fingers extension: 0    Fingers abduction: 0    Fingers adduction: 0     , Prehension, Pinch:  Unable at this time  Tone, Sensation and Coordination:   Tone:     Left upper extremity muscle tone: Flaccid    Right upper extremity muscle tone: Normal    Left lower extremity muscle tone: Gross assist     Modified Jeremiah:   Upper extremity (left):     Shoulder flexors: 0    Shoulder extensors: 0    Shoulder external rotators: 0    Shoulder internal rotators: 1    Scapular retraction:  1    Scapular elevation: 1    Elbow flexors: 1    Elbow extensors: 0    Wrist flexors: 0    Wrist extensors: 0    Finger flexors: 0    Finger extensors: 0     Coordination   Absent in L UE     Cognition:     Orientation: normal to time, normal to place and normal to person    Direction following: three step    Short term memory: intact    Long term memory: intact    Attention span: intact    Sequencing: intact    Organization: intact    Problem solving: intact    Judgement and safety awareness: intact    Hearing: intact     Vision/Perception:     Visual tracking: intact    Convergence: intact    Visual attentions: intact    Visual scanning: intact    R/L hemianopsia: present    R/L neglect: present    Vision assistive device(s): reading glasses     Vision/Perception Comments:   Bell's Test = 30/35 in 3 minutes.        Maze Test=38 seconds     Activities of Daily Living:   Transfers/Mobility:     Bed/chair transfers: stand by assist    Wheelchair transfers: stand by assist    Sit to stand: stand by assist    Toilet transfers: contact guard assist    Tub/shower transfers: contact guard assist    Bed mobility: minimum assist     Toileting:     Toileting: stand by assist    Hygiene: minimum assist    Clothing management: minimum assist    Toileting position: sitting     Bathing:     Bathing: minimum assist    Bathing position: sitting    Washing hair: supervision    Washing back: minimum assist    Washing upper body: supervision    Washing lower body: minimum assist    Bathing assistive device(s): shower chair     Dressing:     Dressing: Min A     Dressing upper body: stand by assist with t-shirt, min A with jackets    Dressing lower body: mod assist    Socks: maximum assist    Shoes: min A using elastic laces.     Grooming:     Brushing teeth or denture care: minimum assist    Shaving: minimum assist     Feeding:     Using utensils: minimum assist    Cutting food: Mod I using cutting board.       Household  Management:     Housekeeping: maximum assist    Laundry: maximum assist    Meal preparation: maximum assist    Medication management: supervision    Shopping: maximum assist    Writing: independent        Therapeutic Treatments and Modalities:    1. Neuromuscular Re-education (CPT 79154)    2. Manual Therapy (CPT 50504)    Therapeutic Treatments and Modalities Summary: Manual therapy to shoulder and scapular area for repositioning of scapula to allow free movement during passive shoulder movement.  Joint mobilization and PROM completed to shoulder, elbow/forarm to minimize ST shortening/tightness and contractures.     NMRE:  Weight bearing completed through L UE with elbow leaning onto pillows on mat  with support at wrist and elbow 10 x with 5 second hold and then weight bearing through hand without pillow for same repetition to enhance proprioceptive feedback and muscle strengthening.     Scapular retraction and hold for 5 seconds 10 x                Time-based treatments/modalities:  Neuromusc re-ed minutes (CPT 39287): 30 minutes  Manual therapy joint mobilization minutes (CPT 71584): 15 minutes        Pain rating before treatment: 1  Pain rating after treatment: 1    ASSESSMENT:   Response to treatment: Continues to have tightness at left shoulder and scapular elevation.  Wife able to return demonstrate exercises to stretch out sternocleidomastoid to minimize scapular tightness/elevated position.    PLAN/RECOMMENDATIONS:   Plan for treatment: therapy treatment to continue next visit.  Planned interventions for next visit: continue with current treatment, E-stim attended (CPT 09952), manual therapy (CPT 21657), neuromuscular re-education (CPT 64308), self care ADL training (CPT 18710), therapeutic activities (CPT 04872) and therapeutic exercise (CPT 61229)

## 2021-12-17 ENCOUNTER — PHYSICAL THERAPY (OUTPATIENT)
Dept: PHYSICAL THERAPY | Facility: REHABILITATION | Age: 56
End: 2021-12-17
Attending: PHYSICAL MEDICINE & REHABILITATION
Payer: COMMERCIAL

## 2021-12-17 DIAGNOSIS — I63.511 ACUTE RIGHT MCA STROKE (HCC): ICD-10-CM

## 2021-12-17 PROCEDURE — 97116 GAIT TRAINING THERAPY: CPT

## 2021-12-20 ENCOUNTER — APPOINTMENT (OUTPATIENT)
Dept: PHYSICAL THERAPY | Facility: REHABILITATION | Age: 56
End: 2021-12-20
Attending: PHYSICAL MEDICINE & REHABILITATION
Payer: COMMERCIAL

## 2021-12-23 ENCOUNTER — APPOINTMENT (OUTPATIENT)
Dept: PHYSICAL THERAPY | Facility: REHABILITATION | Age: 56
End: 2021-12-23
Attending: PHYSICAL MEDICINE & REHABILITATION
Payer: COMMERCIAL

## 2021-12-27 ENCOUNTER — APPOINTMENT (OUTPATIENT)
Dept: PHYSICAL THERAPY | Facility: REHABILITATION | Age: 56
End: 2021-12-27
Attending: PHYSICAL MEDICINE & REHABILITATION
Payer: COMMERCIAL

## 2021-12-30 ENCOUNTER — PHYSICAL THERAPY (OUTPATIENT)
Dept: PHYSICAL THERAPY | Facility: REHABILITATION | Age: 56
End: 2021-12-30
Attending: PHYSICAL MEDICINE & REHABILITATION
Payer: COMMERCIAL

## 2021-12-30 DIAGNOSIS — I63.511 ACUTE RIGHT MCA STROKE (HCC): ICD-10-CM

## 2021-12-30 PROCEDURE — 97116 GAIT TRAINING THERAPY: CPT

## 2021-12-30 NOTE — OP THERAPY DAILY TREATMENT
Outpatient Physical Therapy  DAILY TREATMENT     [facility] Physical [place] [address]  [address]  [place] [address]  Phone:  [phone]  Fax:  [phone]    Date: 12/30/2021    Patient: Thong Arzola  YOB: 1965  MRN: [id number]     Time Calculation    Start time: 1030  Stop time: 1115 Time Calculation (min): 45 minutes         Chief Complaint: Difficulty Walking    Visit #: 17    SUBJECTIVE:  Reports working hard at home on gait and use of AW for sensory input.    OBJECTIVE:      TUG 52. 44 small base quad cane  10MWT 53.72  Gait speed .18 m/s  30 sec STS 4 reps.  6MWT 212 feet, no rests.      Therapeutic Treatments and Modalities:     1. Gait Training (CPT 55041)    Therapeutic Treatment and Modalities Summary: Ambulated 100 feet x 6 with SBQC, 4 with 7# AW rests between bouts. 50 feet x3 no AD x 1 with 7#AW Continued to minimize external cues rather opt for cues related to insight and personal awareness of performance. 4 reps with use of eyewear to block out ability to look at ground. Able to perform Rt step through approx 10-20% of time with cues.  Without AD pt had dec left IR during swing and max A from PT to facilitate with some carry over. Dec Rt step length to 25% without AD however occasional 50-75%          For assessment of pt carry over in slightly more open environment (including gym rather than just secluded hallway) x 200 feet. Dec in Rt step length x 75% of time. Especially at end when tired where pure step too returned. As well as when interacting with others in the gym dec in speed.    Time-based treatments/modalities:    Physical Therapy Timed Treatment Charges  Gait training minutes (CPT 21177): 45 minutes          ASSESSMENT:   Pt demonstrated good carryover from previous session for at home work. Improved step lemgth on Rt LE when unaware of observation (approx 50% past left foot very consistently.) Pt also doing much better decreasing visual input  by not looking at ground, good internal awareness and carry over. Pt will continue to require PT.      PLAN/RECOMMENDATIONS:   Plan for treatment: therapy treatment to continue next visit.  Planned interventions for next visit: continue with current treatment.

## 2022-01-03 ENCOUNTER — PHYSICAL THERAPY (OUTPATIENT)
Dept: PHYSICAL THERAPY | Facility: REHABILITATION | Age: 57
End: 2022-01-03
Attending: PHYSICAL MEDICINE & REHABILITATION
Payer: COMMERCIAL

## 2022-01-03 DIAGNOSIS — I63.511 ACUTE RIGHT MCA STROKE (HCC): ICD-10-CM

## 2022-01-03 PROCEDURE — 97116 GAIT TRAINING THERAPY: CPT

## 2022-01-03 PROCEDURE — 97112 NEUROMUSCULAR REEDUCATION: CPT

## 2022-01-03 NOTE — OP THERAPY DAILY TREATMENT
"  Outpatient Physical Therapy  DAILY TREATMENT     [facility] Physical [address].  [address]  [place] [address]  Phone:  [phone]  Fax:  [phone]    Date: 01/03/2022    Patient: Thong Arzola  YOB: 1965  MRN: [id number]     Time Calculation    Start time: 0845  Stop time: 0930 Time Calculation (min): 45 minutes         Chief Complaint: Difficulty Walking, Loss Of Balance, and Weakness    Visit #: 18    SUBJECTIVE:  Reports working at [facility] on left sided awareness with UE placement.     OBJECTIVE:      TUG 52. 44 small base quad cane  10MWT 53.72  Gait speed .18 m/s  30 sec STS 4 reps.  6MWT 212 feet, no rests.      Therapeutic Treatments and Modalities:     1. Gait Training (CPT 57024)    2. Neuromuscular Re-education (CPT 46301), Visit 18 on 1/3 is visit 1/20 new year    Therapeutic Treatment and Modalities Summary: Gait:Pt ambulated 3 x 200 feet with SBQC. Cued at beginning of each trial fo focus on rt step length, left shoulder position and eyes up. Utilized goggles with tape blocking vision at bottom to discourage visual input. Pt looking down approx 50-60% of time, step length improving with Rt step length past left approx 15-20% of time. Did offer min internal awareness cues when demod good stride length ie \"good, replicate that.\" With carry over of 3-5 steps. Shoulder position fait. ON first walking rep pt was required to open two doors. While able to open the gap was small/min resulting in pt kicking with left LE due to impaired awareness/neglect of left side. Improved on second door after cues from therapist.   @nd rep performed with 7# AW for sensory/proprioceptive input with good increase in right step length to more consistently 30%.    Third rep at end of session with significant fatigue and dec right step length through doorways, halls, and turns. Did hit one item with cane on rt side and did not correct by moving to middle of space rather emphasized " "by carrying cane over item.   When given times to self reflect pt insight continues to be impaired as he guesses what he did or did not do well.      Multitasking during gait. Pt did admit to being color blind therefore scanning for colors unsuccessful. Did perform x 50 feet with frequent stopping and limited left awareness.   Utilized word find where pt to ambulate while identifying a word that begins with last letter of word provided by therapist. Mod dec in gait speed. Dec in stride to approx 50% past left LE. Performed 100 feet. Internal assessment from pt \"not good\" cues for more specific. After performing pt only able to remember 2/3 cues for gait.       NMR: Standing weight shifting/problem solving. Cues pt for standing feet shoulder width apart. Would ask pt for insight if wb through left LE then would attempt to move Left LE without patient awareness. Not wb through heel approx 90% of time. Improved with cues and reps. However, when uncomfortable pt would immediately begin walking again rather than weight shift. No pt insight as to why. Did educate on likely why being related to instability and left LE weakness and encouraged further participation at home in mirror.    Time-based treatments/modalities:    Physical Therapy Timed Treatment Charges  Gait training minutes (CPT 49195): 30 minutes  Neuromusc re-ed, balance, coor, post minutes (CPT 11250): 15 minutes          ASSESSMENT:   Progressed gait interventions to more include more endurance work as mechanics have improved approx 50% for shorter distances. Pt internal awareness is improving somewhat. Did heavily educate on need for SLP for higher level function/processing. Pot did agree and evaluation is scheduled.     Demo'd significant dec knee ext in stance on left LE and reported pain with therapist moving into knee ext-might be developing a contracture from sitting. Reviewed stretching part of HEP and will focus more on this next session.        Short " Term Goals:   10 meter walk test > .4m/s   TUG< 30 seconds   30 second sit-stand test > 4 attempts   Up from floor with min assist  Up/own curb with cga and a/d      Short term goal time span:  6-8 weeks      Long Term Goals:    10 meter walk test > .6 m/s   TUG< 25 seconds  30 second sit-stand test > 7 attempts  Up from floor with sba/sup  Up/down curb w/ a/d w/ supervision   supervision for community ambulation for all hard surfaces and ramps > 500 ft. Progressing  Pt will demonstrate improved cv endurance with 6MWT score 500 feet or better.  Long term goal time span:  2-4 months    PLAN/RECOMMENDATIONS:   Plan for treatment: therapy treatment to continue next visit.  Planned interventions for next visit: continue with current treatment.

## 2022-01-04 ENCOUNTER — OCCUPATIONAL THERAPY (OUTPATIENT)
Dept: OCCUPATIONAL THERAPY | Facility: REHABILITATION | Age: 57
End: 2022-01-04
Attending: PHYSICAL MEDICINE & REHABILITATION
Payer: COMMERCIAL

## 2022-01-04 DIAGNOSIS — I63.511 ACUTE RIGHT MCA STROKE (HCC): ICD-10-CM

## 2022-01-04 DIAGNOSIS — M25.552 PAIN OF LEFT HIP JOINT: ICD-10-CM

## 2022-01-04 DIAGNOSIS — M25.562 ACUTE PAIN OF LEFT KNEE: ICD-10-CM

## 2022-01-04 PROCEDURE — 97032 APPL MODALITY 1+ESTIM EA 15: CPT

## 2022-01-04 PROCEDURE — 97140 MANUAL THERAPY 1/> REGIONS: CPT

## 2022-01-04 PROCEDURE — 97112 NEUROMUSCULAR REEDUCATION: CPT

## 2022-01-04 RX ORDER — CELECOXIB 200 MG/1
200 CAPSULE ORAL 2 TIMES DAILY
Qty: 42 CAPSULE | Refills: 0 | Status: SHIPPED | OUTPATIENT
Start: 2022-01-04 | End: 2022-01-24

## 2022-01-04 NOTE — TELEPHONE ENCOUNTER
Pt's wife called to request rx for    celecoxib (CELEBREX) 200 MG Cap   Be sent to the [facility] on [address].

## 2022-01-04 NOTE — OP THERAPY DAILY TREATMENT
Outpatient Occupational Therapy  DAILY TREATMENT     [facility] Occupational Therapy [address]  [address]  [place] [address]  Phone:  [phone]  Fax:  [phone]    Date: 01/04/2022    Patient: Thong Arzola  YOB: 1965  MRN: [id number]     Time Calculation  Start time: 1105  Stop time: 1150 Time Calculation (min): 45 minutes         Chief Complaint: Extremity Weakness and Self Care Duties    Visit #: 22    SUBJECTIVE:  I really want to do more in the kitchen    OBJECTIVE:  Current objective measures:   PROM:   Upper extremity (left):     Shoulder flexion: Below functional limits     0-90    Shoulder extension: 0-40    Shoulder abduction: Below functional limits 0-75    Shoulder external rotation: Below functional limits 0-15    Forearm supination: Below functional limits 0-30   wrist and digits now have full PROM     Passive Range of Motion Comments:  1.5 finger anterior subluxation of L humerus noted     Strength:     Right upper extremity strength within functional limits.    Upper extremity strength (left):     Shoulder flexion: 1    Shoulder extension:  1    Shoulder abduction: 1    Shoulder adduction: 1    Shoulder external rotation:  0    Shoulder internal rotation: 1    Elbow flexion: 2    Elbow extension: 1    Forearm pronation: 2    Forearm supination: 0    Wrist flexion: 0    Wrist extension: 0    Fingers flexion: 0    Fingers extension: 0    Fingers abduction: 0    Fingers adduction: 0     , Prehension, Pinch:  Unable at this time  Tone, Sensation and Coordination:   Tone:     Left upper extremity muscle tone: Flaccid    Right upper extremity muscle tone: Normal    Left lower extremity muscle tone: Gross assist     Modified Jeremiah:   Upper extremity (left):     Shoulder flexors: 0    Shoulder extensors: 0    Shoulder external rotators: 0    Shoulder internal rotators: 1    Scapular retraction: 1    Scapular elevation: 1    Elbow flexors: 1    Elbow  extensors: 0    Wrist flexors: 0    Wrist extensors: 0    Finger flexors: 0    Finger extensors: 0     Coordination   Absent in L UE     Cognition:     Orientation: normal to time, normal to place and normal to person    Direction following: three step    Short term memory: intact    Long term memory: intact    Attention span: intact    Sequencing: intact    Organization: intact    Problem solving: intact    Judgement and safety awareness: intact    Hearing: intact     Vision/Perception:     Visual tracking: intact    Convergence: intact    Visual attentions: intact    Visual scanning: intact    R/L hemianopsia: present    R/L neglect: present    Vision assistive device(s): reading glasses     Vision/Perception Comments:   Bell's Test = 30/35 in 3 minutes.        Maze Test=38 seconds     Activities of Daily Living:   Transfers/Mobility:     Bed/chair transfers: stand by assist    Wheelchair transfers: stand by assist    Sit to stand: stand by assist    Toilet transfers: contact guard assist    Tub/shower transfers: contact guard assist    Bed mobility: minimum assist     Toileting:     Toileting: stand by assist    Hygiene: minimum assist    Clothing management: minimum assist    Toileting position: sitting     Bathing:     Bathing: minimum assist    Bathing position: sitting    Washing hair: supervision    Washing back: minimum assist    Washing upper body: supervision    Washing lower body: minimum assist    Bathing assistive device(s): shower chair     Dressing:     Dressing: Min A     Dressing upper body: stand by assist with t-shirt, min A with jackets    Dressing lower body: mod assist    Socks: maximum assist    Shoes: min A using elastic laces.     Grooming:     Brushing teeth or denture care: minimum assist    Shaving: minimum assist     Feeding:     Using utensils: minimum assist    Cutting food: Mod I using cutting board.       Household Management:     Housekeeping: maximum assist    Laundry: maximum  assist    Meal preparation: maximum assist    Medication management: supervision    Shopping: maximum assist    Writing: independent        Therapeutic Treatments and Modalities:    1. Neuromuscular Re-education (CPT 15480)    2. E Stim Unattended (CPT 92887)    3. Manual Therapy (CPT 13581)    Therapeutic Treatments and Modalities Summary: Manual therapy to shoulder and scapular area for repositioning of scapula to allow free movement during passive shoulder movement.    NMRE:  Weight bearing completed through L UE with some support at left elbow in neutral position.  Completed 10 x with 5 second hold.    Scapular retraction and hold for 5 seconds 10 x      E-stim:  Application of SABOE on level 9 for 15 minutes to work on wrist extension and supination of forearm with passive flexion during non-stim phase.            Time-based treatments/modalities:  Neuromusc re-ed minutes (CPT 78616): 15 minutes  Manual therapy joint mobilization minutes (CPT 48248): 15 minutes  E-stim attended minutes (CPT 72964): 15 minutes     Pain rating before treatment: 0  Pain rating after treatment: 0    ASSESSMENT:   Response to treatment: Increased left shoulder subluxation noted today.  Educated patient and his son Aden on the importance of patient wearing shoulder brace/support as patient is up mobilizing all the time now and the lack of support is leading to increased subluxation.     PLAN/RECOMMENDATIONS:   Plan for treatment: therapy treatment to continue next visit.  Planned interventions for next visit: continue with current treatment, E-stim attended (CPT 72979), manual therapy (CPT 35136), neuromuscular re-education (CPT 14407), self care ADL training (CPT 50907), therapeutic activities (CPT 17292) and therapeutic exercise (CPT 55456)

## 2022-01-04 NOTE — OP THERAPY DAILY TREATMENT
Outpatient Physical Therapy  DAILY TREATMENT     [facility] Physical [address]  [address]  [place] [address]  Phone:  [phone]  Fax:  [phone]    Date: 01/05/2022    Patient: Thong Arzola  YOB: 1965  MRN: [id number]     Time Calculation    Start time: 1300  Stop time: 1344 Time Calculation (min): 44 minutes         Chief Complaint: Difficulty Walking, Loss Of Balance, and Weakness    Visit #: 19    SUBJECTIVE:  Wearing sling as cued by OT. No new complaints.    OBJECTIVE:      TUG 52. 44 small base quad cane  10MWT 53.72  Gait speed .18 m/s  30 sec STS 4 reps.  6MWT 212 feet, no rests.      Therapeutic Treatments and Modalities:     1. Gait Training (CPT 74543)    2. Therapeutic Activities (CPT 30279), Visit 18 on 1/3 is visit 1/20 new year    Therapeutic Treatment and Modalities Summary: Gait: Pt ambulated 3 x 150 feet with SBQC. Timed pt for further external motivation for improving gait speed through improved step through mechanics.  Rep 1 completed 3:52 with Rt step length (heel to opp toes) 75% of time. Step too returns with thresholds and turns. Continues to hug right side of rooms and hallways. Cued pt on performance at the end. Encouraged more space between Rt Left foot as visual cue for step through mechanics and asked pt to guess how fast he could do it. (30 sec less)  Rep 2 3:42. Improved step through gait including moire appropriate spacing 50% of time with step to gait still present in doorways. After this trial time spent bringing attention to pt related to gait changes with thresholds and corners.  Rep 3: 3:38. Pt demod more appropriate step through gait with only min dec in step length on Rt through corners and doorways. 4/6. Doorways with step too were at end of trial.    There Act: to improve efficiency of transfers associated with neglect and weakness pt cued to approach plinth from left side and sit taking as few steps as possible. Rep 1  without demo/cues pt completed with 11 steps with 7 required to ambulate backwards to sit. Therapist demo'd and verbally cued for improvements and able to perform with 7 steps x 2 reps, 5 steps on last rep. Approach would include ambulating 5 feet to plinth. Pt neglect more noticeable during this time as he would make large u turn/curcle to turn to right rather than shorter distance with left turn to return to start. Provided insight to pt.    Time-based treatments/modalities:    Physical Therapy Timed Treatment Charges  Gait training minutes (CPT 83990): 30 minutes  Therapeutic activity minutes (CPT 70333): 14 minutes          ASSESSMENT:   Pt demod good progressions with gait mechanics during session. Pt also observed on 2 occasions without cues with left head turn while ambulating to observe the neglected side. Continues to make good progress and improving symmetry.       Short Term Goals:   10 meter walk test > .4m/s   TUG< 30 seconds   30 second sit-stand test > 4 attempts   Up from floor with min assist  Up/own curb with cga and a/d      Short term goal time span:  6-8 weeks      Long Term Goals:    10 meter walk test > .6 m/s   TUG< 25 seconds  30 second sit-stand test > 7 attempts  Up from floor with sba/sup  Up/down curb w/ a/d w/ supervision   supervision for community ambulation for all hard surfaces and ramps > 500 ft. Progressing  Pt will demonstrate improved cv endurance with 6MWT score 500 feet or better.  Long term goal time span:  2-4 months    PLAN/RECOMMENDATIONS:   Plan for treatment: therapy treatment to continue next visit.  Planned interventions for next visit: continue with current treatment.

## 2022-01-05 ENCOUNTER — TELEPHONE (OUTPATIENT)
Dept: PHYSICAL MEDICINE AND REHAB | Facility: REHABILITATION | Age: 57
End: 2022-01-05

## 2022-01-05 ENCOUNTER — PHYSICAL THERAPY (OUTPATIENT)
Dept: PHYSICAL THERAPY | Facility: REHABILITATION | Age: 57
End: 2022-01-05
Attending: PHYSICAL MEDICINE & REHABILITATION
Payer: COMMERCIAL

## 2022-01-05 ENCOUNTER — SPEECH THERAPY (OUTPATIENT)
Dept: SPEECH THERAPY | Facility: REHABILITATION | Age: 57
End: 2022-01-05
Attending: PHYSICAL MEDICINE & REHABILITATION
Payer: COMMERCIAL

## 2022-01-05 DIAGNOSIS — I63.511 ACUTE RIGHT MCA STROKE (HCC): ICD-10-CM

## 2022-01-05 DIAGNOSIS — I69.319 COGNITIVE DEFICIT FOLLOWING CEREBROVASCULAR ACCIDENT (CVA): ICD-10-CM

## 2022-01-05 DIAGNOSIS — I63.411 CEREBROVASCULAR ACCIDENT (CVA) DUE TO EMBOLISM OF RIGHT MIDDLE CEREBRAL ARTERY (HCC): ICD-10-CM

## 2022-01-05 PROCEDURE — 97116 GAIT TRAINING THERAPY: CPT

## 2022-01-05 PROCEDURE — 92523 SPEECH SOUND LANG COMPREHEN: CPT

## 2022-01-05 PROCEDURE — 97530 THERAPEUTIC ACTIVITIES: CPT

## 2022-01-05 NOTE — TELEPHONE ENCOUNTER
[facility] for patient in regards to referral to neurophthalmology. I called Dr. Rojas's office to check the status of his referral and Yoko, the , states she reached out to him in mid-December but he did not answer/ call back to schedule. Their office only reaches out one time since they are booked out so far (currently booking out to mid-April 2022). She gave me her direct extension and stated he could call back to schedule a new patient appointment with Dr. Rojas at [phone].    I left this information with the patient on his voicemail.

## 2022-01-05 NOTE — OP THERAPY EVALUATION
"  Outpatient Speech Therapy  INITIAL EVALUATION    [facility]  [address]  [address]  [place] [address]  Phone:  [phone]  Fax:  [phone]    Date of Evaluation: 01/05/2022    Patient: Thong Arzola  YOB: 1965  MRN: [id number]     Referring Provider: Zulema Dumont D.O.  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    Visit Diagnoses     ICD-10-CM   1. Cerebrovascular accident (CVA) due to embolism of right middle cerebral artery (HCC)  I63.411   2. Cognitive deficit following cerebrovascular accident (CVA)  I69.319     Subjective:   Reason for Therapy:     Reason For Evaluation:  Stroke Rehabilitaion and Cognition    Onset Date:  3/31/2021    Onset Description:  Patient is 56 y.o. male with history of acute right sided MCA CVA on 3/31/21. Patient \"was outside of window for TPA. Imaging showed complete occlusion right ICA not amenable to IR intervention. Ultimately required right decompressive hemicraniectomy. With residual LUE/LLE flaccidity\".  Per Physical Therapy, patient demonstrates significant left-sided neglect and impairments with problem solving during therapy tasks.  Patient has been referred to speech therapy for a cognitive linguistic evaluation.    Social Support:     Patient Mental Status:  Alert  Progress Factors:     Progression:  Getting Better    Alleviating Factors:  Reduce Stimuli  Therapy History:     Previous Treatments and Dates:  Patient's ability to provide history of treatment was limited, but patient reported he was in acute rehab until June 2021, and received speech therapy briefly after that via home health.     Hearing:  No Deficits    ST Subjective Vision Subjective: Left Neglect.    " Dentition:  Complete Dentition    Handedness:  Right-handed    Past Medical History:   Diagnosis Date   • Afib (HCC)    • COVID-19    • Disorder of thyroid     hypo   • High cholesterol    • Psychiatric problem     reactive depression   • Stroke (HCC) 2021    right side MCA     Past Surgical History:   Procedure Laterality Date   • CRANIOPLASTY Right 6/23/2021    Procedure: CRANIOPLASTY - FOR SKULL DEFECT;  Surgeon: Arthur Payton M.D.;  Location: SURGERY [facility];  Service: Neurosurgery   • CRANIOTOMY Right 4/3/2021    Procedure: CRANIOTOMY;  Surgeon: Arthur Payton M.D.;  Location: SURGERY [facility];  Service: Neurosurgery   • KNEE RECONSTRUCTION      left knee orthoscopic     Objective:   Other Treatment Interventions:  Cognitive Linguistic Assessment  Treatment Intervention tool(s) used:  The Cognitive Linguistic Quick Test (CLQT) is a screening of cognitive function in five domains (attention, memory, executive functions, language, and visuospatial skills) in adults with suspected neurological impairments following strokes, traumatic brain injury, or dementia.  The CLQT is a criterion-referenced test with severity ratings for two separate age ranges (18-69 and 70-89).  Severity ratings ranging from normal to severe are established for each of the cognitive domains.  A total Composite Severity Rating and a Clock Drawing Severity Rating are also derived.    Objective Details:  Patient was administered the Cognitive Linguistic Quick Test (CLQT) with the following scores:     Personal Facts: 7/8  (Criterion cut off for ages 18-69:  8, for 70-89: 8)   - This task assesses memory and language abilities.     Symbol Cancellation 9/12 (Criterion cut off for ages 18-69: 11, for 70-89: 10)    -  This is a non-linguistic task of visual attention and perception.      Confrontation Naming: 10/10 (Criterion cut off for ages 18-69: 10, for 70-89: 10)   - This language task is for word retrieval.     Clock Drawing:   (Criterion cut off for ages 18-69: 12, for 70-89: 11)   - This serves a mini-screening tool for all cognitive domains analyzing visuospatial, planning, number and time concepts.     Story retell: 5/10 (Criterion cut off for ages 18-69: 6, for 70-89: 5)   - This assesses auditory memory and comprehension, working memory, and language output skills.      Symbol trails: 2/10 (Criterion cut off for ages 18-69: 9, for 70-89: 6)   - This is a non-linguistic task used to assess planning, self-monitoring, working memory and visual attention.     Generative Namin/9 (Criterion cut off for ages 18-69: 5, for 70-89: 4)   - This task assesses word search and retrieval skills in semantic and phonemic categories.      Design Memory:  (Criterion cut off for ages 18-69: 5, for 70-89: 4)   - This is a non-linguistic task that can provide information about visual discrimination and analysis, attention, and visual memory.      Mazes:  (Criterion cut off for ages 18-69: 7, for 70-89: 4)   - This task requires planning, mental flexibility, self-monitoring, and visual discrimination.      Design generation:  (Criterion cut off for ages 18-69: 6, for 70-89: 5)   - This nonlinguistic task is for creativity and mental flexibly.      These scores are then placed in to the 5 cognitive domain areas:    Attention:  3  MILD   Memory:  2  MODERATE   Executive Function: 1 SEVERE   Language: 3 MILD  Visuospatial skills:  3  MILD   Clock Drawing Severity:  WNL    These overall scores combine to create a composite severity rating of 2.4 indicating that cognition is Moderately Impaired at this time.       Speech Therapy Assessment:     Cognitive Linguistic Assessment:     ST Cognitive-Linguistic Assessment Used: Cognitive Linguistic Quick Test (CLQT)    Patient attention sustained: Minimal    Patient attention selective: Moderate    Patient attention divided: Severe    Patient orientation to day: WFL    Patient orientation to time:  WFL    Patient orientation to place: WFL    Patient recent and short term memory: Moderate    Patient prospective and time delay memory: Moderate    Patient biographical information memory: Moderate (Patient not oriented to age)    Patient simple problem solving ability: Moderate    Patient complex problem solving ability: Severe    Patient executive functioning ability: Severe    ST Cognitive-Linguistic Assessment L21: Min-moderate.    Patient decision making and planning ability: Moderate    ST Cognitive-Linguistic Assessment L23: Min-Moderate.    Patient spontaneous clock drawing ability: WFL (Patient reproted this took a effort and has improved since CVA)      Speech Therapy Plan :   Prognosis & Recommendations  Impression Summary:  Patient actively participated in all assessment activities. Patient scored a composite severity rating of 2.4 on CLQT, suggesting Moderate cognitive impairments.  Patient's deficits are characterized by severe left-sided neglect, impaired reasoning, problem solving and executive function skills, decreased memory and attention, and impaired visuospatial skills.  Patient has some awareness of deficits, but does seem aware of the extent of his impairments.  Patient is motivated to improve skills and would benefit from skilled speech therapy to remediate cognitive linguistic deficits.  Prognosis:  Good  Goals  Short Term Goals:  1. Patient will improve attention completing complex, divided attention tasks with 80% accuracy,with mod cues.  2. Patient will improve executive functions completing complex problem solving and reasoning tasks with 80% accuracy, with mod cues.  3. Patient will improve left neglect and visuospatial skills by completing visual scanning and orientation tasks with 80% accuracy with mod cues.   Short Term Goal Duration (Weeks):  6-8 weeks  Long Term Goals:  Per observation and patient report, patient will demonstrate functional cognitive linguistic skills, including  "executive function, memory, and visuospatial skills, in 85% opportunities across several different environments.  Long Term Goal Duration (Weeks):  6-9 months  Patient Stated Goal:  \"Better place to do physical work, driving\"  Potential barriers to Goal Achievement:  Vision  Therapy Recommendations  Recommendation:  Individual Speech Therapy,  Planned Therapy Interventions:  Cognitive-Linguistic training, Home Program and Patient/Caregiver Education,   Frequency:  2x week (16 X 92507)  Duration (in visits):  16  Duration (in weeks):  8             Referring provider co-signature:  I have reviewed this plan of care and my co-signature certifies the need for services.    Certification Period: 01/05/2022 to  03/02/22    Physician Signature: ________________________________ Date: ______________          "

## 2022-01-10 DIAGNOSIS — I63.511 ACUTE RIGHT MCA STROKE (HCC): ICD-10-CM

## 2022-01-11 ENCOUNTER — SPEECH THERAPY (OUTPATIENT)
Dept: SPEECH THERAPY | Facility: REHABILITATION | Age: 57
End: 2022-01-11
Attending: PHYSICAL MEDICINE & REHABILITATION
Payer: COMMERCIAL

## 2022-01-11 ENCOUNTER — OCCUPATIONAL THERAPY (OUTPATIENT)
Dept: OCCUPATIONAL THERAPY | Facility: REHABILITATION | Age: 57
End: 2022-01-11
Attending: PHYSICAL MEDICINE & REHABILITATION
Payer: COMMERCIAL

## 2022-01-11 DIAGNOSIS — I63.511 ACUTE RIGHT MCA STROKE (HCC): ICD-10-CM

## 2022-01-11 DIAGNOSIS — I69.319 COGNITIVE DEFICIT FOLLOWING CEREBROVASCULAR ACCIDENT (CVA): ICD-10-CM

## 2022-01-11 DIAGNOSIS — I63.411 CEREBROVASCULAR ACCIDENT (CVA) DUE TO EMBOLISM OF RIGHT MIDDLE CEREBRAL ARTERY (HCC): ICD-10-CM

## 2022-01-11 PROCEDURE — 92507 TX SP LANG VOICE COMM INDIV: CPT

## 2022-01-11 PROCEDURE — 97032 APPL MODALITY 1+ESTIM EA 15: CPT

## 2022-01-11 PROCEDURE — 97112 NEUROMUSCULAR REEDUCATION: CPT

## 2022-01-11 PROCEDURE — 97140 MANUAL THERAPY 1/> REGIONS: CPT

## 2022-01-11 NOTE — OP THERAPY DAILY TREATMENT
Outpatient Speech Therapy  DAILY TREATMENT     [facility] Speech [address].  [address]  [place] [address]  Phone:  [phone]  Fax:  [phone]    Date: 01/11/2022    Patient: Thong Arzola  YOB: 1965  MRN: [id number]     Time Calculation    Start time: 1420  Stop time: 1505 Time Calculation (min): 45 minutes         Chief Complaint: Other (Left neglect, problem solving)    Visit #: 2    Subjective Evaluation    Objective:   Treatments/Interventions Performed:  Cognitive-Linguistic training, Home program and Patient/Caregiver education  Treatment Intervention tool(s) used:  Torres Cognitive Assessment (MoCA) was used to further assess cognitive skills.  Overall score was 26/30, indicating Normal cognitive function.  Deficits were in visuospatial/executive and language.  Objective Details:  1. Patient will improve attention completing complex, divided attention tasks with 80% accuracy,with mod cues.  --Patient completed simple matching task with conversation with 85%accuracy.  2. Patient will improve executive functions completing complex problem solving and reasoning tasks with 80% accuracy, with mod cues.  --Patient completed simple deductive reasoning tasks with 90% accuracy  3. Patient will improve left neglect and visuospatial skills by completing visual scanning and orientation tasks with 80% accuracy with mod cues.   --Visual scanning task crossing out 1 item: missed initial three items on two lines, but then spontaneously self cued to look to the left.           Speech Therapy Assessment:     Cognitive Linguistic Assessment:     Patient attention selective: Minimal (self-cued to look to the left after initial errors)    Patient orientation to month: [facility]    Patient orientation to self: [facility]    Patient orientation to place: [facility]    Patient simple problem solving ability: Minimal        Speech Therapy Plan :   Prognosis & Recommendations  Impression Summary:   "Patient actively participated in all therapy tasks.  Patient is reporting that he feels he is improving looking to the left, but continues to require prompts.  Patient scored within normal range on MoCA, with difficulties in visuospatial/executive function and language.  Patient continues to present with deficits with left-neglect and visual scanning, executive functions, as well as higher level problem solving and reasoning.  Patient would benefit from continued speech therapy to improve cognitive linguistic functions.  Prognosis:  Good  Goals  Short Term Goals:  1. Patient will improve attention completing complex, divided attention tasks with 80% accuracy,with mod cues.  2. Patient will improve executive functions completing complex problem solving and reasoning tasks with 80% accuracy, with mod cues.  3. Patient will improve left neglect and visuospatial skills by completing visual scanning and orientation tasks with 80% accuracy with mod cues.   Short Term Goal Duration (Weeks):  6-8 weeks  Long Term Goals:  Per observation and patient report, patient will demonstrate functional cognitive linguistic skills, including executive function, memory, and visuospatial skills, in 85% opportunities across several different environments.  Long Term Goal Duration (Weeks):  6-9 months  Patient Stated Goal:  \"Better place to do physical work, driving\"  Potential barriers to Goal Achievement:  Vision  Therapy Recommendations  Recommendation:  Individual Speech Therapy,  Planned Therapy Interventions:  Cognitive-Linguistic training, Home Program and Patient/Caregiver Education,   Frequency:  2x week (16 X 92507)  Duration (in visits):  16  Duration (in weeks):  8               "

## 2022-01-11 NOTE — OP THERAPY DAILY TREATMENT
Outpatient Occupational Therapy  DAILY TREATMENT     [facility] Occupational [address]  [address]  [place] [address]  Phone:  [phone]  Fax:  [phone]    Date: 01/11/2022    Patient: Thong Arzola  YOB: 1965  MRN: [id number]     Time Calculation  Start time: 0315  Stop time: 0400 Time Calculation (min): 45 minutes         Chief Complaint: Extremity Weakness    Visit #: 23    SUBJECTIVE:  No issues noted since last visit.      OBJECTIVE:  Current objective measures:   PROM:   Upper extremity (left):     Shoulder flexion: Below functional limits     0-90    Shoulder extension: 0-40    Shoulder abduction: Below functional limits 0-75    Shoulder external rotation: Below functional limits 0-15    Forearm supination: Below functional limits 0-30   wrist and digits now have full PROM     Passive Range of Motion Comments:  1.5 finger anterior subluxation of L humerus noted     Strength:     Right upper extremity strength within functional limits.    Upper extremity strength (left):     Shoulder flexion: 1    Shoulder extension:  1    Shoulder abduction: 1    Shoulder adduction: 1    Shoulder external rotation:  0    Shoulder internal rotation: 1    Elbow flexion: 2+    Elbow extension: 1    Forearm pronation: 2    Forearm supination: 0    Wrist flexion: 0    Wrist extension: 0    Fingers flexion: 0    Fingers extension: 0    Fingers abduction: 0    Fingers adduction: 0     , Prehension, Pinch:  Unable at this time  Tone, Sensation and Coordination:   Tone:     Left upper extremity muscle tone: Flaccid    Right upper extremity muscle tone: Normal    Left lower extremity muscle tone: Gross assist     Modified Jeremiah:   Upper extremity (left):     Shoulder flexors: 0    Shoulder extensors: 0    Shoulder external rotators: 0    Shoulder internal rotators: 1    Scapular retraction: 1    Scapular elevation: 1    Elbow flexors: 1    Elbow extensors: 0    Wrist  flexors: 0    Wrist extensors: 0    Finger flexors: 0    Finger extensors: 0     Coordination   Absent in L UE     Cognition:     Orientation: normal to time, normal to place and normal to person    Direction following: three step    Short term memory: intact    Long term memory: intact    Attention span: intact    Sequencing: intact    Organization: intact    Problem solving: intact    Judgement and safety awareness: intact    Hearing: intact     Vision/Perception:     Visual tracking: intact    Convergence: intact    Visual attentions: intact    Visual scanning: intact    R/L hemianopsia: present    R/L neglect: present    Vision assistive device(s): reading glasses     Vision/Perception Comments:   Bell's Test = 30/35 in 3 minutes.        Maze Test=38 seconds     Activities of Daily Living:   Transfers/Mobility:     Bed/chair transfers: stand by assist    Wheelchair transfers: stand by assist    Sit to stand: stand by assist    Toilet transfers: contact guard assist    Tub/shower transfers: contact guard assist    Bed mobility: minimum assist     Toileting:     Toileting: stand by assist    Hygiene: minimum assist    Clothing management: minimum assist    Toileting position: sitting     Bathing:     Bathing: minimum assist    Bathing position: sitting    Washing hair: supervision    Washing back: minimum assist    Washing upper body: supervision    Washing lower body: minimum assist    Bathing assistive device(s): shower chair     Dressing:     Dressing: Min A     Dressing upper body: stand by assist with t-shirt, min A with jackets    Dressing lower body: mod assist    Socks: maximum assist    Shoes: min A using elastic laces.     Grooming:     Brushing teeth or denture care: minimum assist    Shaving: minimum assist     Feeding:     Using utensils: minimum assist    Cutting food: Mod I using cutting board.       Household Management:     Housekeeping: maximum assist    Laundry: maximum assist    Meal  preparation: maximum assist    Medication management: supervision    Shopping: maximum assist    Writing: independent        Therapeutic Treatments and Modalities:    1. Neuromuscular Re-education (CPT 03062)    2. E Stim Unattended (CPT 23817)    3. Manual Therapy (CPT 29981)    Therapeutic Treatments and Modalities Summary: Manual therapy to shoulder and scapular area for repositioning of scapula to allow free movement during passive shoulder movement.  Shoulder PROM completed for flex/extension, abduction and external rotation.    NMRE:  Weight bearing completed through L UE with some support at left elbow in neutral position.  Completed 10 x with 5 second hold.    Scapular retraction and hold for 5 seconds 10 x      E-stim:  Application of SABOE on level 9 for 15 minutes to work on wrist extension and supination of forearm with passive flexion during non-stim phase.            Time-based treatments/modalities:  Neuromusc re-ed minutes (CPT 08741): 15 minutes  Manual therapy joint mobilization minutes (CPT 51929): 15 minutes  E-stim attended minutes (CPT 31281): 15 minutes     Pain rating before treatment: 0  Pain rating after treatment: 0    ASSESSMENT:   Response to treatment: Patient stated he is now wearing shoulder support a lot more when mobilizing.  2+/5 elbow flexion noted today. Decreased left scapular upward rotation noted as well.     PLAN/RECOMMENDATIONS:   Plan for treatment: therapy treatment to continue next visit.  Planned interventions for next visit: continue with current treatment, E-stim attended (CPT 41547), manual therapy (CPT 66377), neuromuscular re-education (CPT 77223), self care ADL training (CPT 53717), therapeutic activities (CPT 92375) and therapeutic exercise (CPT 02074)

## 2022-01-12 ENCOUNTER — OCCUPATIONAL THERAPY (OUTPATIENT)
Dept: OCCUPATIONAL THERAPY | Facility: REHABILITATION | Age: 57
End: 2022-01-12
Attending: PHYSICAL MEDICINE & REHABILITATION
Payer: COMMERCIAL

## 2022-01-12 ENCOUNTER — SPEECH THERAPY (OUTPATIENT)
Dept: SPEECH THERAPY | Facility: REHABILITATION | Age: 57
End: 2022-01-12
Attending: PHYSICAL MEDICINE & REHABILITATION
Payer: COMMERCIAL

## 2022-01-12 DIAGNOSIS — I63.511 ACUTE RIGHT MCA STROKE (HCC): ICD-10-CM

## 2022-01-12 DIAGNOSIS — I63.411 CEREBROVASCULAR ACCIDENT (CVA) DUE TO EMBOLISM OF RIGHT MIDDLE CEREBRAL ARTERY (HCC): ICD-10-CM

## 2022-01-12 DIAGNOSIS — I69.319 COGNITIVE DEFICIT FOLLOWING CEREBROVASCULAR ACCIDENT (CVA): ICD-10-CM

## 2022-01-12 PROCEDURE — 92507 TX SP LANG VOICE COMM INDIV: CPT

## 2022-01-12 PROCEDURE — 97140 MANUAL THERAPY 1/> REGIONS: CPT

## 2022-01-12 PROCEDURE — 97112 NEUROMUSCULAR REEDUCATION: CPT

## 2022-01-12 NOTE — OP THERAPY DAILY TREATMENT
Outpatient Occupational Therapy  DAILY TREATMENT     [facility] Occupational Therapy [address].  [address]  [place] [address]  Phone:  [phone]  Fax:  [phone]    Date: 01/12/2022    Patient: Thong Arzola  YOB: 1965  MRN: [id number]     Time Calculation  Start time: 0810  Stop time: 0855 Time Calculation (min): 45 minutes         Chief Complaint: Extremity Weakness    Visit #: 24    SUBJECTIVE:  No issues noted since yesterdays appointment    OBJECTIVE:  Current objective measures:   PROM:   Upper extremity (left):     Shoulder flexion: Below functional limits     0-90    Shoulder extension: 0-40    Shoulder abduction: Below functional limits 0-75    Shoulder external rotation: Below functional limits 0-15    Forearm supination: Below functional limits 0-30   wrist and digits now have full PROM     Passive Range of Motion Comments:  1.5 finger anterior subluxation of L humerus noted     Strength:     Right upper extremity strength within functional limits.    Upper extremity strength (left):     Shoulder flexion: 1    Shoulder extension:  1    Shoulder abduction: 1    Shoulder adduction: 1    Shoulder external rotation:  0    Shoulder internal rotation: 1    Elbow flexion: 2+    Elbow extension: 1    Forearm pronation: 2    Forearm supination: 0    Wrist flexion: 0    Wrist extension: 0    Fingers flexion: 0    Fingers extension: 0    Fingers abduction: 0    Fingers adduction: 0     , Prehension, Pinch:  Unable at this time  Tone, Sensation and Coordination:   Tone:     Left upper extremity muscle tone: Flaccid    Right upper extremity muscle tone: Normal    Left lower extremity muscle tone: Gross assist     Modified Jeremiah:   Upper extremity (left):     Shoulder flexors: 0    Shoulder extensors: 0    Shoulder external rotators: 0    Shoulder internal rotators: 1    Scapular retraction: 1    Scapular elevation: 1    Elbow flexors: 1    Elbow extensors: 0    Wrist  flexors: 0    Wrist extensors: 0    Finger flexors: 0    Finger extensors: 0     Coordination   Absent in L UE     Cognition:     Orientation: normal to time, normal to place and normal to person    Direction following: three step    Short term memory: intact    Long term memory: intact    Attention span: intact    Sequencing: intact    Organization: intact    Problem solving: intact    Judgement and safety awareness: intact    Hearing: intact     Vision/Perception:     Visual tracking: intact    Convergence: intact    Visual attentions: intact    Visual scanning: intact    R/L hemianopsia: present    R/L neglect: present    Vision assistive device(s): reading glasses     Vision/Perception Comments:   Bell's Test = 31/35 in 3 minutes.        Maze Test=38 seconds     Activities of Daily Living:   Transfers/Mobility:     Bed/chair transfers: stand by assist    Wheelchair transfers: stand by assist    Sit to stand: stand by assist    Toilet transfers: contact guard assist    Tub/shower transfers: contact guard assist    Bed mobility: minimum assist     Toileting:     Toileting: stand by assist    Hygiene: minimum assist    Clothing management: minimum assist    Toileting position: sitting     Bathing:     Bathing: minimum assist    Bathing position: sitting    Washing hair: supervision    Washing back: minimum assist    Washing upper body: supervision    Washing lower body: minimum assist    Bathing assistive device(s): shower chair     Dressing:     Dressing: Min A     Dressing upper body: stand by assist with t-shirt, min A with jackets    Dressing lower body: mod assist    Socks: maximum assist    Shoes: min A using elastic laces.     Grooming:     Brushing teeth or denture care: minimum assist    Shaving: minimum assist     Feeding:     Using utensils: minimum assist    Cutting food: Mod I using cutting board.       Household Management:     Housekeeping: maximum assist    Laundry: maximum assist    Meal  "preparation: maximum assist    Medication management: supervision    Shopping: maximum assist    Writing: independent        Therapeutic Treatments and Modalities:    1. Manual Therapy (CPT 96384)    2. Neuromuscular Re-education (CPT 46503)    Therapeutic Treatments and Modalities Summary: Completed mat work with patient in supine position focusing on manual movement of left shoulder with aim of enhancing passive abduction, external rotation and horizontal abduction and adduction.  Able to maintain arm in external rotation with shoulder abduction of 80 degrees within level of comfort.  NMRE at edge of mat with weight shifting to put even pressure through buttocks to minimize leaning towards right side.  Left side leaning completed placing arm onto pillow with weight bearing through elbow and holding for 10 seconds. Completed 5 x.  Patient continues to be a bit hesitant leaning towards left side and stated that it still felt \"weird\" but not as bad as it was at the beginning.    Mod I removing neoprene shoulder support and min A donning with min verbal and tactile cues.    [place] test completed    Time-based treatments/modalities:  Neuromusc re-ed minutes (CPT 55935): 15 minutes  Manual therapy joint mobilization minutes (CPT 83672): 30 minutes        Pain rating before treatment: 0  Pain rating after treatment: 0    ASSESSMENT:   Response to treatment: increased score with [place] test; however needs verbal reminders to look to left or give eye contact to OTR when she is seated at patient's left side.      PLAN/RECOMMENDATIONS:   Plan for treatment: therapy treatment to continue next visit.  Planned interventions for next visit: continue with current treatment, E-stim attended (CPT 37040), manual therapy (CPT 81146), neuromuscular re-education (CPT 59909), self care ADL training (CPT 74489), therapeutic activities (CPT 95396) and therapeutic exercise (CPT 63153)       "

## 2022-01-12 NOTE — OP THERAPY DAILY TREATMENT
Outpatient Speech Therapy  DAILY TREATMENT     [facility] Speech [address]  [address]  [place] [address]  Phone:  [phone]  Fax:  [phone]    Date: 01/12/2022    Patient: Thong Arzola  YOB: 1965  MRN: [id number]     Time Calculation    Start time: 0900  Stop time: 0951 Time Calculation (min): 51 minutes         Chief Complaint: Other (Left Neglect, Problem solving)    Visit #: 3    Subjective:   Reason for Therapy:     Reason For Evaluation:  CVA and Cognition    Onset Date:  3/31/2021  Social Support:     ST Subjective  Patient Mental Status: ALert and cooperative.  Progress Factors:     Progression:  Getting Better  Additional Subjective Comments:      Patient reported he feels like he is continuing to get better, especially with looking to the left.  He is aware that he is still missing visual information on his left side.  His wife continues to provide prompts at home.      Objective:   Treatments/Interventions Performed:  Cognitive-Linguistic training  Objective Details:  1. Patient will improve attention completing complex, divided attention tasks with 80% accuracy,with mod cues.    --Patient required max, constant prompts to continue with card sorting task while maintaining conversation.  Overall accuracy with sorting was 100%.  2. Patient will improve executive functions completing complex problem solving and reasoning tasks with 80% accuracy, with mod cues.  --Patient completed deduction table with max prompts 70%.  Patient required cues to visually scan table for existing information, and required assistance with each clue to deduct information.  --Patient completed 5-step picture sequencing tasks with 70% accuracy.  Patient required additional time to visually scan pictures prior to sequencing.  3. Patient will improve left neglect and visuospatial skills by completing visual scanning and orientation tasks with 80% accuracy with mod cues.  "  --Patient completed trail making task with numbers 1-25 with 100% accuracy, but prolonged scanning time, especially for numbers located on the left side of the page.  --Patient completed 12/12 pieces of board puzzle with pieces placed on left side of puzzle.  Good picture matching and orientation, patient self-cued to look to left when looking for \"missing\" pieces.         Speech Therapy Assessment:     Cognitive Linguistic Assessment:     Patient attention selective: Minimal (self-cued to look to the left after initial errors)    Patient attention divided: Moderate    Patient complex reasoning ability: Moderate    ST Cognitive-Linguistic Assessment L29: Min-moderate.        Speech Therapy Plan :   Prognosis & Recommendations  Impression Summary:  Patient actively participated in all therapy tasks.  Patient is reporting that he feels he is improving looking to the left, and is demonstrating improved self-cueing, but continues to require additional time to process and look to the left. Patient was able to complete deductive reasoning table with max prompts for introduction to task, next session prompts will be faded.  Patient continues to present with deficits with left-neglect and visual scanning, executive functions, as well as higher level problem solving and reasoning.  Patient would benefit from continued speech therapy to improve cognitive linguistic functions.  Prognosis:  Good  Goals  Short Term Goals:  1. Patient will improve attention completing complex, divided attention tasks with 80% accuracy,with mod cues.  2. Patient will improve executive functions completing complex problem solving and reasoning tasks with 80% accuracy, with mod cues.  3. Patient will improve left neglect and visuospatial skills by completing visual scanning and orientation tasks with 80% accuracy with mod cues.   Short Term Goal Duration (Weeks):  6-8 weeks  Long Term Goals:  Per observation and patient report, patient will " "demonstrate functional cognitive linguistic skills, including executive function, memory, and visuospatial skills, in 85% opportunities across several different environments.  Long Term Goal Duration (Weeks):  6-9 months  Patient Stated Goal:  \"Better place to do physical work, driving\"  Potential barriers to Goal Achievement:  Vision  Therapy Recommendations  Recommendation:  Individual Speech Therapy,  Planned Therapy Interventions:  Cognitive-Linguistic training, Home Program and Patient/Caregiver Education,   Frequency:  2x week (16 X 92507)  Duration (in visits):  16  Duration (in weeks):  8               "

## 2022-01-13 ENCOUNTER — PHYSICAL THERAPY (OUTPATIENT)
Dept: PHYSICAL THERAPY | Facility: REHABILITATION | Age: 57
End: 2022-01-13
Attending: PHYSICAL MEDICINE & REHABILITATION
Payer: COMMERCIAL

## 2022-01-13 DIAGNOSIS — I63.511 ACUTE RIGHT MCA STROKE (HCC): ICD-10-CM

## 2022-01-13 PROCEDURE — 97110 THERAPEUTIC EXERCISES: CPT

## 2022-01-13 PROCEDURE — 97116 GAIT TRAINING THERAPY: CPT

## 2022-01-13 NOTE — OP THERAPY DAILY TREATMENT
Outpatient Physical Therapy  DAILY TREATMENT     [facility] Physical [address]  [address]  [place] [address]  Phone:  [phone]  Fax:  [phone]    Date: 01/13/2022    Patient: Thong Arzola  YOB: 1965  MRN: [id number]     Time Calculation    Start time: 0730  Stop time: 0815 Time Calculation (min): 45 minutes         Chief Complaint: Difficulty Walking, Loss Of Balance, and Weakness    Visit #: 20    SUBJECTIVE:  PN note next visit  Has been noticing improvements with his walking at home  OBJECTIVE:            Therapeutic Exercises (CPT 82006):       Therapeutic Exercise Summary: VISIT 3/40 before auth on 1/13, note number 20 on epic    Therapeutic Treatments and Modalities:     1. Gait Training (CPT 88927)    2. Neuromuscular Re-education (CPT 41520)    Therapeutic Treatment and Modalities Summary: Gait: 239 feet in 6:47 Mod cues for step length, 2 cues for shoulder position. Pt demod excellent improvement with Rt step length throughout. Pt right heel equal to left toe 75% or time with full step through 25% of time. Also demod excellent improvement ambulating around turns and thresholds with appropriate mechanics. At end of session ambulate 150 feet without cues with full step through less than 10% but right heel to left toe step 100% of time, still improvement. Brief time reviewing progress via video of pt gait mechanics in last month.       NMR: to facilitate weightbearing and stabilty of Lt LE. Large swiss ball positioned on large mat table at lowest height. Cued to position in modified kneeling/table top with Rt knee on mat table.  Max encouragement to perform with mod A at hips. Pt unable to perform with Rt UE on unstable surface and utilized his cane. Once in position manual cues to facilitate weight shift. Pt drives with hips and/or trunk flexion consistently. With frequent reps and cues for insight did improve. Then positioned Rt LE on floor, left LE on  "mat with max A to position left LE more significant time in this position max cues to facilitate weight shift and cues for insight. Utlized VC related to his gut instinct being inaccurate. X 4 reps therapist trying to facilitate midline with pt pushing excessively into right, would quickly release to facilitate error and learning. By 4th rep pt able to maintain quick release without trunk movement.   Practice standing \"intuition/gut check\" exercises to perform at home with wife present. Cued for weight shifting then therapist/wwife apply anterior directed force at left foot to assess and provide input to pt if he is actually weight bearing. Completed x 15 reps. When pt looking at LE some improvement but consistently, when weight bearing, only w through his toes.     Time-based treatments/modalities:    Physical Therapy Timed Treatment Charges  Gait training minutes (CPT 50469): 15 minutes  Therapeutic exercise minutes (CPT 33109): 30 minutes          ASSESSMENT:   Pt continues to progress towards functional goals with emphasis today on continued improvement of symmetrical gait mechanics and improving weight bearing on Lt LE. New cues today encouraged by SLP for cueing pt awareness that his instinct and gut feeling are currently wrong and continuing to drive neglect and to rather trust the cues from others rather than himself right now. This worked very well in the modified kneeling and postural interventions.   PLAN/RECOMMENDATIONS:   Plan for treatment: therapy treatment to continue next visit.  Planned interventions for next visit: continue with current treatment.       "

## 2022-01-14 ENCOUNTER — APPOINTMENT (OUTPATIENT)
Dept: PHYSICAL THERAPY | Facility: REHABILITATION | Age: 57
End: 2022-01-14
Payer: COMMERCIAL

## 2022-01-19 ENCOUNTER — SPEECH THERAPY (OUTPATIENT)
Dept: SPEECH THERAPY | Facility: REHABILITATION | Age: 57
End: 2022-01-19
Attending: PHYSICAL MEDICINE & REHABILITATION
Payer: COMMERCIAL

## 2022-01-19 ENCOUNTER — PHYSICAL THERAPY (OUTPATIENT)
Dept: PHYSICAL THERAPY | Facility: REHABILITATION | Age: 57
End: 2022-01-19
Attending: PHYSICAL MEDICINE & REHABILITATION
Payer: COMMERCIAL

## 2022-01-19 DIAGNOSIS — I69.854 SPASTIC HEMIPLEGIA OF LEFT NONDOMINANT SIDE AS LATE EFFECT OF OTHER CEREBROVASCULAR DISEASE (HCC): ICD-10-CM

## 2022-01-19 DIAGNOSIS — I69.319 COGNITIVE DEFICIT FOLLOWING CEREBROVASCULAR ACCIDENT (CVA): ICD-10-CM

## 2022-01-19 DIAGNOSIS — M62.838 OTHER MUSCLE SPASM: ICD-10-CM

## 2022-01-19 DIAGNOSIS — I63.511 ACUTE RIGHT MCA STROKE (HCC): ICD-10-CM

## 2022-01-19 DIAGNOSIS — I69.954 HEMIPLEGIA AND HEMIPARESIS FOLLOWING UNSPECIFIED CEREBROVASCULAR DISEASE AFFECTING LEFT NON-DOMINANT SIDE (HCC): ICD-10-CM

## 2022-01-19 DIAGNOSIS — I63.411 CEREBROVASCULAR ACCIDENT (CVA) DUE TO EMBOLISM OF RIGHT MIDDLE CEREBRAL ARTERY (HCC): ICD-10-CM

## 2022-01-19 PROCEDURE — 97112 NEUROMUSCULAR REEDUCATION: CPT

## 2022-01-19 PROCEDURE — 92507 TX SP LANG VOICE COMM INDIV: CPT

## 2022-01-19 NOTE — OP THERAPY PROGRESS SUMMARY
Outpatient Physical Therapy  PROGRESS SUMMARY NOTE      [facility] Physical Therapy [place] [address]  [address]  [place] [address]  Phone:  [phone]  Fax:  [phone]    Date of Visit: 01/19/2022    Patient: Thong Arzola  YOB: 1965  MRN: [id number]     Referring Provider: Zulema Dumont D.O.  [address]  [address]  [place] [address]   Referring Diagnosis Cerebral infarction due to unspecified occlusion or stenosis of right middle cerebral artery [I63.511];Other muscle spasm [M62.838]     Visit Diagnoses     ICD-10-CM   1. Acute right MCA stroke (HCC)  I63.511       Rehab Potential: good    Progress Report Period: 12/1/21-1/19/22    Functional Assessment Used          Objective Findings and Assessment:   Patient progression towards goals: Short Term Goals:   10 meter walk test > .4m/s   TUG< 30 seconds Progressing  30 second sit-stand test > 4 attempts MET  Up from floor with min assist MET  Up/own curb with cga and a/d Did not assess     Short term goal time span:  6-8 weeks      Long Term Goals:    10 meter walk test > .6 m/s   TUG< 25 seconds  30 second sit-stand test > 7 attempts  Up from floor with sba/sup  Up/down curb w/ a/d w/ supervision   supervision for community ambulation for all hard surfaces and ramps > 500 ft. Progressing  Pt will demonstrate improved cv endurance with 6MWT score 500 feet or better.  Long term goal time span:  2-4 months    Objective findings and assessment details: 30 sec STS: 5  10MWT: 50.58 sec = .19 m/s  6MWT 220 feet small based quad cane  TUG 48 sec      Pt continues to present with severe neglect of left side post Rt MCA. This neglect continues to impact his ability to weight shift in standing as well as significant right weight shifts with tranfers and impaired gait mechanics. Since previous progress note pt has demonstrated excellent improvement in right step length. Currently pt step length is not quite reciprocal in nature  however his right heel is parallel to left toes which at last progress note he was consistently step to gait. The POC has been emphasizing gait mechanics since last note as well. Therefore, he has made some, yet small improvements on other functional measures. POC has also focused on improving internal awareness to center with all activities including standings, sitting, and while ambulating. Pt continues to have significant deficits related to functional strength, mobility, ambulation, and balance impairments in which he requires continued PT intervention to decrease reliance on caregiver and improve QoL.         Goals:   Short Term Goals:   1. Pt will demonstrate improved self selected gait speed .3m/s to dec fall risk.  2. Pt will demonstrate improved functional LE strength with 5STS score 28 seconds or less.  3. Pt will demonstrate improved gait mechanics with Rt step length reciprocal 75% or better without cueing with SBQC.   4. Pt will stand, without cueing, with improved weight bearing through left LE to improve neglect.   5. Pt will demonstrate improved internal awareness of center with abilty to perform STS from elevated surface without Rt trunk lean/weight shift.   Short term goal time span:  6-8 weeks      Long Term Goals:    1. Pt will demonstrate improved CV endurance with 6MWT score 350 feet or better.  2. Pt will ambulate with LRAD and reciprocal gait pattern 100% of time without cueing to improve safety and mechanics.   3. Pt will demonstrate improved functional LE strength with 5STS score 17 seconds or less.  4. Pt will demonstrate improved functional mobility with TUG score 30 seconds or less.   Long term goal time span:  2-4 months    Plan:   Planned therapy interventions:  Functional Training, Self Care (CPT 70899), Gait Training (CPT 65560), Manual Therapy (CPT 44840), Therapeutic Exercise (CPT 18766), Therapeutic Activities (CPT 03176) and Neuromuscular Re-education (CPT 96018)  Frequency:  2x  week  Duration in weeks:  12      Referring provider co-signature:  I have reviewed this plan of care and my co-signature certifies the need for services.     Certification Period: 01/19/2022 to 04/19/22    Physician Signature: ________________________________ Date: ______________

## 2022-01-19 NOTE — OP THERAPY DAILY TREATMENT
Outpatient Speech Therapy  DAILY TREATMENT     [facility] Speech [place] [address] [facility]  [address]  [place] [address]  Phone:  [phone]  Fax:  [phone]    Date: 01/19/2022    Patient: Thong Arzola  YOB: 1965  MRN: [id number]     Time Calculation    Start time: 1400  Stop time: 1451 Time Calculation (min): 51 minutes         Chief Complaint: Other (Left Neglect)    Visit #: 4    Subjective:   Reason for Therapy:     Reason For Evaluation:  CVA and Cognition    Onset Date:  3/31/2021  Social Support:     ST Subjective  Patient Mental Status: ALert and cooperative.  Progress Factors:     Progression:  Getting Better  Additional Subjective Comments:      Patient reported he feels like he is continuing to get better, especially with looking to the left.  He is aware that he is still missing visual information on his left side.  His wife continues to provide prompts at home.      Objective:   Objective Details:  1. Patient will improve attention completing complex, divided attention tasks with 80% accuracy,with mod cues.  --Patient required max cues to complete both tasks during divided attention task (sorting and talking) rather than alternating between the two.  2. Patient will improve executive functions completing complex problem solving and reasoning tasks with 80% accuracy, with mod cues.  --Patient was able to complete deductive reason puzzle with mod-max cues for thought organization.  3. Patient will improve left neglect and visuospatial skills by completing visual scanning and orientation tasks with 80% accuracy with mod cues.   --Patient was 65% with scanning task, missing letters in the left margin.  Cued X1 to use left-to-right scanning as task became disorganized.  Patient missed initial words on left while reading directions for task.         Speech Therapy Assessment:     Cognitive Linguistic Assessment:     Patient attention selective: Minimal (self-cued to look  "to the left after initial errors)    Patient attention divided: Moderate    Patient complex reasoning ability: Moderate    ST Cognitive-Linguistic Assessment L29: Min-moderate.        Speech Therapy Plan :   Prognosis & Recommendations  Impression Summary:  Patient actively participated in all therapy tasks. Patient required more cues to attend to left, as well as strategies to organize thoughts to complete tasks.  Prompts were minimally faded with deductive reasoning task from last session.  Patient was provided with additional puzzle to complete at home using previously learned organizing strategies.  Patient continues to present with deficits with left-neglect and visual scanning, executive functions, as well as higher level problem solving and reasoning.  Patient would benefit from continued speech therapy to improve cognitive linguistic functions. Patient verbalized understanding and agreement with current plan of care.  Prognosis:  Good  Goals  Short Term Goals:  1. Patient will improve attention completing complex, divided attention tasks with 80% accuracy,with mod cues.  2. Patient will improve executive functions completing complex problem solving and reasoning tasks with 80% accuracy, with mod cues.  3. Patient will improve left neglect and visuospatial skills by completing visual scanning and orientation tasks with 80% accuracy with mod cues.   Short Term Goal Duration (Weeks):  6-8 weeks  Long Term Goals:  Per observation and patient report, patient will demonstrate functional cognitive linguistic skills, including executive function, memory, and visuospatial skills, in 85% opportunities across several different environments.  Long Term Goal Duration (Weeks):  6-9 months  Patient Stated Goal:  \"Better place to do physical work, driving\"  Potential barriers to Goal Achievement:  Vision  Therapy Recommendations  Recommendation:  Individual Speech Therapy,  Planned Therapy Interventions:  " Cognitive-Linguistic training, Home Program and Patient/Caregiver Education,   Frequency:  2x week (16 X 92507)  Duration (in visits):  16  Duration (in weeks):  8

## 2022-01-19 NOTE — OP THERAPY DAILY TREATMENT
"  Outpatient Physical Therapy  DAILY TREATMENT     [facility] Physical [address].  [address]  [place] [address]  Phone:  [phone]  Fax:  [phone]    Date: 01/19/2022    Patient: Thong Arzola  YOB: 1965  MRN: [id number]     Time Calculation    Start time: 1300  Stop time: 1342 Time Calculation (min): 42 minutes         Chief Complaint: Difficulty Walking and Weakness    Visit #: 21    SUBJECTIVE:  Pt reports working on weight bearing in standing at home. Not wearing AFO today  OBJECTIVE:  30 sec STS: 5  10MWT: 50.58 sec = .19 m/s  6MWT 220 feet small based quad cane  TUG 48 sec          Therapeutic Exercises (CPT 70627):       Therapeutic Exercise Summary: VISIT 3/40 before auth on 1/13, note number 20 on epic    Therapeutic Treatments and Modalities:     2. Neuromuscular Re-education (CPT 15663)    Therapeutic Treatment and Modalities Summary:        NMR: Goal reassessment and education.  Seated weight shifting in standard chair. Utilized therapist nose as midline cue. Pt consistently relocating to right side weightbearing/lean after maintaining neutral 1-2 seconds in sitting. Cues to increase weight on left but cheek with some improvement. However would quickly return to right preference. Continued to drive model that his \"gut\" is further driving his neglect and right sided preference and continue to improve internal awareness with external feed back. Some improvement. Pt reported feeling uncomfortable in new positions. Attempted STS however as soon as pt initiate anterior trunk lean would immediately move into right sided preference. Cued and cued for home that nose, belly button, and space between shoes should line up if in center.   Attempted in standing with tactile cues but pt pushing severely in to right with left directed ws.     Time-based treatments/modalities:    Physical Therapy Timed Treatment Charges  Neuromusc re-ed, balance, coor, post minutes " (CPT 16153): 42 minutes          ASSESSMENT:   Pt continues to present with severe neglect of left side post Rt MCA. This neglect continues to impact his ability to weight shift in standing as well as significant right weight shifts with tranfers and impaired gait mechanics. Since previous progress note pt has demonstrated excellent improvement in right step length. Currently pt step length is not quite reciprocal in nature however his right heel is parallel to left toes which at last progress note he was consistently step to gait. The POC has been emphasizing gait mechanics since last note as well. Therefore, he has made some, yet small improvements on other functional measures. POC has also focused on improving internal awareness to center with all activities including standings, sitting, and while ambulating. Pt continues to have significant deficits related to functional strength, mobility, ambulation, and balance impairments in which he requires continued PT intervention to decrease reliance on caregiver and improve QoL.     Short Term Goals:   10 meter walk test > .4m/s   TUG< 30 seconds Progressing  30 second sit-stand test > 4 attempts MET  Up from floor with min assist MET  Up/own curb with cga and a/d Did not assess     Short term goal time span:  6-8 weeks      Long Term Goals:    10 meter walk test > .6 m/s   TUG< 25 seconds  30 second sit-stand test > 7 attempts  Up from floor with sba/sup  Up/down curb w/ a/d w/ supervision   supervision for community ambulation for all hard surfaces and ramps > 500 ft. Progressing  Pt will demonstrate improved cv endurance with 6MWT score 500 feet or better.  Long term goal time span:  2-4 months    PLAN/RECOMMENDATIONS:   Plan for treatment: therapy treatment to continue next visit.  Planned interventions for next visit: continue with current treatment.

## 2022-01-21 ENCOUNTER — OCCUPATIONAL THERAPY (OUTPATIENT)
Dept: OCCUPATIONAL THERAPY | Facility: REHABILITATION | Age: 57
End: 2022-01-21
Attending: PHYSICAL MEDICINE & REHABILITATION
Payer: COMMERCIAL

## 2022-01-21 ENCOUNTER — PHYSICAL THERAPY (OUTPATIENT)
Dept: PHYSICAL THERAPY | Facility: REHABILITATION | Age: 57
End: 2022-01-21
Attending: PHYSICAL MEDICINE & REHABILITATION
Payer: COMMERCIAL

## 2022-01-21 DIAGNOSIS — I63.511 ACUTE RIGHT MCA STROKE (HCC): ICD-10-CM

## 2022-01-21 PROCEDURE — 97112 NEUROMUSCULAR REEDUCATION: CPT

## 2022-01-21 PROCEDURE — 97116 GAIT TRAINING THERAPY: CPT

## 2022-01-21 PROCEDURE — 97535 SELF CARE MNGMENT TRAINING: CPT

## 2022-01-21 NOTE — OP THERAPY DAILY TREATMENT
"  Outpatient Physical Therapy  DAILY TREATMENT     [facility] Physical [address].  [address]  [place] [address]  Phone:  [phone]  Fax:  [phone]    Date: 01/21/2022    Patient: Thong Arzola  YOB: 1965  MRN: [id number]     Time Calculation    Start time: 0820  Stop time: 0900 Time Calculation (min): 40 minutes         Chief Complaint: Difficulty Walking, Loss Of Balance, and Weakness    Visit #: 22    SUBJECTIVE:  Continues to work on exercises at home. Family continue to point out points/actions related to neglect ie his son noted he was basically falling off the right side of the bed. He typically sleeps on the right side.  OBJECTIVE:  30 sec STS: 5  10MWT: 50.58 sec = .19 m/s  6MWT 220 feet small based quad cane  TUG 48 sec          Therapeutic Exercises (CPT 71572):       Therapeutic Exercise Summary: VISIT 3/40 before auth on 1/13, note number 20 on epic    Therapeutic Treatments and Modalities:     2. Neuromuscular Re-education (CPT 43069)    Therapeutic Treatment and Modalities Summary:        NMR: For facilitating weight shift, improving SLS, and improving right step length. Pt positioned in // bars for increased stabilty. Cued for beginning of every rep for postural assessment. \"belly button, chest, nose with gap between feet.\" performed with mirror for further feedback.  1/2 foam roller positioned directly in front of shoes. Pt completed rt step over/back x 15 reps. First 3 reps excess trunk flexion improved with cues. Cues to increase time on Left LE, dec hernadez off of left LE. Driven by dec length gastroc. Final 5 reps cued for pt to look up with no significant change.  Throughout cued for internalizing feeling to apply directly to gait.   X 10 Left LE, cues to drive knee to clear LE. CGA throughout  Performed x 5 reps each with SbQC and CGA. MaX for posterior/Rt LOB x 2 while performing Left stepping, improved with cues. CGA    Gait: pt ambulated 250 " feet. While ambulating mod cues to increase step length to reciprocal pattern. After 75 feet pt then ambulated approx 30 feet with consistent right reciprocal pattern with carry over from intervention and cues. With fatigue returned to more typical in which right heel in line with left toes. Pt also able to hold conversation with therapist and others without return of step to pattern, good improvement.     Time-based treatments/modalities:    Physical Therapy Timed Treatment Charges  Gait training minutes (CPT 43069): 10 minutes  Neuromusc re-ed, balance, coor, post minutes (CPT 86812): 30 minutes          ASSESSMENT:   Encouraged pt to perform supine HEP as well as if ne naps to complete on left side of bed. As well as making the effort to always sit on the far left side of couch as able. Continued to cue pt to neglect with transfers/approach to plinth, wait area etc.  Pt demod good carry over from intervention to gait mechanics. Also demonstrating some internally awareness improvements when prompted and responding well to visualization of belly button, chest, and noise in line with the gap between feet. Also demod improved central/midline positioning when performing stand from plinth, without cueing.   PLAN/RECOMMENDATIONS:   Plan for treatment: therapy treatment to continue next visit.  Planned interventions for next visit: continue with current treatment.

## 2022-01-21 NOTE — OP THERAPY DAILY TREATMENT
Outpatient Occupational Therapy  DAILY TREATMENT     [facility] Occupational Therapy [address].  [address]  [place] [address]  Phone:  [phone]  Fax:  [phone]    Date: 01/21/2022    Patient: Thong Arzola  YOB: 1965  MRN: [id number]     Time Calculation  Start time: 0930  Stop time: 1015 Time Calculation (min): 45 minutes         Chief Complaint: Extremity Weakness and Self Care Duties    Visit #: 25    SUBJECTIVE:  I have been working on getting this sling on by myself     OBJECTIVE:  Current objective measures:  PROM:   Upper extremity (left):     Shoulder flexion: Below functional limits     0-90    Shoulder extension: 0-40    Shoulder abduction: Below functional limits 0-75    Shoulder external rotation: Below functional limits 0-15    Forearm supination: Below functional limits 0-30   wrist and digits now have full PROM     Passive Range of Motion Comments:  1.5 finger anterior subluxation of L humerus noted     Strength:     Right upper extremity strength within functional limits.    Upper extremity strength (left):     Shoulder flexion: 1    Shoulder extension:  1    Shoulder abduction: 1    Shoulder adduction: 1    Shoulder external rotation:  0    Shoulder internal rotation: 1    Elbow flexion: 3-    Elbow extension: 1    Forearm pronation: 3    Forearm supination: 0    Wrist flexion: 0    Wrist extension: 0    Fingers flexion: 0    Fingers extension: 0    Fingers abduction: 0    Fingers adduction: 0     , Prehension, Pinch:  Unable at this time  Tone, Sensation and Coordination:   Tone:     Left upper extremity muscle tone: Flaccid    Right upper extremity muscle tone: Normal    Left lower extremity muscle tone: Gross assist     Modified Jeremiah:   Upper extremity (left):     Shoulder flexors: 0    Shoulder extensors: 0    Shoulder external rotators: 0    Shoulder internal rotators: 1    Scapular retraction: 1    Scapular elevation: 1    Elbow  flexors: 1    Elbow extensors: 0    Wrist flexors: 0    Wrist extensors: 0    Finger flexors: 0    Finger extensors: 0     Coordination   Absent in L UE     Cognition:     Orientation: normal to time, normal to place and normal to person    Direction following: three step    Short term memory: intact    Long term memory: intact    Attention span: intact    Sequencing: intact    Organization: intact    Problem solving: intact    Judgement and safety awareness: intact    Hearing: intact     Vision/Perception:     Visual tracking: intact    Convergence: intact    Visual attentions: intact    Visual scanning: intact    R/L hemianopsia: present    R/L neglect: present    Vision assistive device(s): reading glasses     Vision/Perception Comments:   Bell's Test = 31/35 in 3 minutes.        Maze Test=38 seconds     Activities of Daily Living:   Transfers/Mobility:     Bed/chair transfers: stand by assist    Wheelchair transfers: stand by assist    Sit to stand: stand by assist    Toilet transfers: contact guard assist    Tub/shower transfers: contact guard assist    Bed mobility: minimum assist     Toileting:     Toileting: stand by assist    Hygiene: minimum assist    Clothing management: minimum assist    Toileting position: sitting     Bathing:     Bathing: minimum assist    Bathing position: sitting    Washing hair: supervision    Washing back: minimum assist    Washing upper body: supervision    Washing lower body: minimum assist    Bathing assistive device(s): shower chair     Dressing:     Dressing: Min A     Dressing upper body: stand by assist with t-shirt, min A with jackets    Dressing lower body: mod assist    Socks: maximum assist    Shoes: min A using elastic laces.     Grooming:     Brushing teeth or denture care: minimum assist    Shaving: minimum assist     Feeding:     Using utensils: minimum assist    Cutting food: Mod I using cutting board.       Household Management:     Housekeeping: maximum  assist    Laundry: maximum assist    Meal preparation: maximum assist    Medication management: supervision    Shopping: maximum assist    Writing: independent*      Therapeutic Treatments and Modalities:    1. Neuromuscular Re-education (CPT 70376)    2. Self Care ADL Training (CPT 20988)    Therapeutic Treatments and Modalities Summary:      NMRE:  Weight bearing completed through L UE with elbow leaning onto pillows on mat  with support at wrist and elbow 10 x with 5 second hold and then weight bearing through hand without pillow for same repetition to enhance proprioceptive feedback and muscle strengthening.     Scapular retraction and hold for 5 seconds 10 x    Weight shifting side to side to enhance WB onto left buttock area and improving center of gravity.   Practiced donning/doffing shoulder support with min verbal cues.              Time-based treatments/modalities:  Self-care/ADL training minutes (CPT 54066): 15 minutes  Neuromusc re-ed minutes (CPT 07181): 30 minutes        Pain rating before treatment: 0  Pain rating after treatment: 1    ASSESSMENT:   Response to treatment: increased strength noted in biceps and pronation.  Min A donning and Mod I doffing shoulder support.      PLAN/RECOMMENDATIONS:   Plan for treatment: therapy treatment to continue next visit.  Planned interventions for next visit: continue with current treatment, E-stim attended (CPT 67986), manual therapy (CPT 74604), neuromuscular re-education (CPT 69098), self care ADL training (CPT 39264), therapeutic activities (CPT 83000) and therapeutic exercise (CPT 59376)

## 2022-01-22 DIAGNOSIS — M25.552 PAIN OF LEFT HIP JOINT: ICD-10-CM

## 2022-01-22 DIAGNOSIS — M25.562 ACUTE PAIN OF LEFT KNEE: ICD-10-CM

## 2022-01-24 RX ORDER — MIRTAZAPINE 15 MG/1
TABLET, ORALLY DISINTEGRATING ORAL
Qty: 90 TABLET | Refills: 1 | Status: SHIPPED | OUTPATIENT
Start: 2022-01-24 | End: 2022-06-27

## 2022-01-24 RX ORDER — DANTROLENE SODIUM 25 MG/1
75 CAPSULE ORAL 3 TIMES DAILY
Qty: 360 CAPSULE | Refills: 0 | Status: SHIPPED | OUTPATIENT
Start: 2022-01-24 | End: 2022-03-28

## 2022-01-24 RX ORDER — CELECOXIB 200 MG/1
200 CAPSULE ORAL 2 TIMES DAILY
Qty: 42 CAPSULE | Refills: 0 | Status: SHIPPED | OUTPATIENT
Start: 2022-01-24 | End: 2022-02-14

## 2022-01-25 ENCOUNTER — SPEECH THERAPY (OUTPATIENT)
Dept: SPEECH THERAPY | Facility: REHABILITATION | Age: 57
End: 2022-01-25
Attending: PHYSICAL MEDICINE & REHABILITATION
Payer: COMMERCIAL

## 2022-01-25 DIAGNOSIS — I69.319 COGNITIVE DEFICIT FOLLOWING CEREBROVASCULAR ACCIDENT (CVA): ICD-10-CM

## 2022-01-25 DIAGNOSIS — I63.411 CEREBROVASCULAR ACCIDENT (CVA) DUE TO EMBOLISM OF RIGHT MIDDLE CEREBRAL ARTERY (HCC): ICD-10-CM

## 2022-01-25 PROCEDURE — 92507 TX SP LANG VOICE COMM INDIV: CPT

## 2022-01-25 NOTE — TELEPHONE ENCOUNTER
Patient's wife called for Dr. Zulema Dumont and is wondering if refilling this medication is appropriate or if they need an appointment to discuss. Patient is scheduled for Botox on 2/15/22. Please advise.

## 2022-01-26 ENCOUNTER — OCCUPATIONAL THERAPY (OUTPATIENT)
Dept: OCCUPATIONAL THERAPY | Facility: REHABILITATION | Age: 57
End: 2022-01-26
Attending: PHYSICAL MEDICINE & REHABILITATION
Payer: COMMERCIAL

## 2022-01-26 ENCOUNTER — PHYSICAL THERAPY (OUTPATIENT)
Dept: PHYSICAL THERAPY | Facility: REHABILITATION | Age: 57
End: 2022-01-26
Attending: PHYSICAL MEDICINE & REHABILITATION
Payer: COMMERCIAL

## 2022-01-26 DIAGNOSIS — I63.511 ACUTE RIGHT MCA STROKE (HCC): ICD-10-CM

## 2022-01-26 PROCEDURE — 97116 GAIT TRAINING THERAPY: CPT

## 2022-01-26 PROCEDURE — 97140 MANUAL THERAPY 1/> REGIONS: CPT

## 2022-01-26 PROCEDURE — 97112 NEUROMUSCULAR REEDUCATION: CPT

## 2022-01-26 RX ORDER — GABAPENTIN 100 MG/1
200 CAPSULE ORAL EVERY EVENING
Qty: 60 CAPSULE | Refills: 2 | Status: SHIPPED | OUTPATIENT
Start: 2022-01-26 | End: 2022-04-05 | Stop reason: SDUPTHER

## 2022-01-26 NOTE — OP THERAPY DAILY TREATMENT
Outpatient Speech Therapy  DAILY TREATMENT     [facility] Speech [address].  [address]  [place] [address]  Phone:  [phone]  Fax:  [phone]    Date: 01/25/2022    Patient: Thong Arzola  YOB: 1965  MRN: [id number]     Time Calculation    Start time: 1630  Stop time: 1715 Time Calculation (min): 45 minutes         Chief Complaint: Other (Left neglect, problem solving)    Visit #: 5    Subjective:   Reason for Therapy:     Reason For Evaluation:  CVA and Cognition    Onset Date:  3/31/2021  Social Support:     ST Subjective  Patient Mental Status: ALert and cooperative.  Progress Factors:     Progression:  Getting Better  Additional Subjective Comments:      Patient reported he feels like he is continuing to get better, especially with looking to the left.  He is aware that he is still missing visual information on his left side.  Patient completed most of deductive puzzle given for homework, but got stuck with solution.      Objective:   Treatments/Interventions Performed:  Cognitive-Linguistic training  Objective Details:  1. Patient will improve attention completing complex, divided attention tasks with 80% accuracy,with mod cues.  --Min-mod improvement with divided attention task, Patient required less cuing to spell simple words and sort cards into suits.  Minimal errors with sorting fur frequent cues to continue with both tasks simultaneously.  2. Patient will improve executive functions completing complex problem solving and reasoning tasks with 80% accuracy, with mod cues.  --Patient required organizational cues to start with deductive task.  3. Patient will improve left neglect and visuospatial skills by completing visual scanning and orientation tasks with 80% accuracy with mod cues.   --Patient was 65% accurate with scanning task, missing several shapes on left margin.  Patient was reminded on several occasions to start at the left and scan to the  "right, but often he jumped around, resulting in many missed shapes.         Speech Therapy Assessment:     Cognitive Linguistic Assessment:     Patient attention selective: Minimal (self-cued to look to the left after initial errors)    Patient attention divided: Moderate    Patient complex reasoning ability: Moderate    ST Cognitive-Linguistic Assessment L29: Min-moderate.        Speech Therapy Plan :   Prognosis & Recommendations  Impression Summary:  Patient actively participated in all therapy tasks. Patient continues to require cues to attend to left, as well as strategies to organize thoughts to complete tasks. With deductive puzzle provided for homework, Patient was able to identify areas that required more organization to help complete puzzle.   Patient continues to present with deficits with left-neglect and visual scanning, executive functions, as well as higher level problem solving and reasoning.  Patient would benefit from continued speech therapy to improve cognitive linguistic functions. Patient verbalized understanding and agreement with current plan of care.  Prognosis:  Good  Goals  Short Term Goals:  1. Patient will improve attention completing complex, divided attention tasks with 80% accuracy,with mod cues.  2. Patient will improve executive functions completing complex problem solving and reasoning tasks with 80% accuracy, with mod cues.  3. Patient will improve left neglect and visuospatial skills by completing visual scanning and orientation tasks with 80% accuracy with mod cues.   Short Term Goal Duration (Weeks):  6-8 weeks  Long Term Goals:  Per observation and patient report, patient will demonstrate functional cognitive linguistic skills, including executive function, memory, and visuospatial skills, in 85% opportunities across several different environments.  Long Term Goal Duration (Weeks):  6-9 months  Patient Stated Goal:  \"Better place to do physical work, driving\"  Potential " barriers to Goal Achievement:  Vision  Therapy Recommendations  Recommendation:  Individual Speech Therapy,  Planned Therapy Interventions:  Cognitive-Linguistic training, Home Program and Patient/Caregiver Education,   Frequency:  2x week (16 X 92507)  Duration (in visits):  16  Duration (in weeks):  8

## 2022-01-26 NOTE — OP THERAPY DAILY TREATMENT
Outpatient Physical Therapy  DAILY TREATMENT     [facility] Physical [address].  [address]  [place] [address]  Phone:  [phone]  Fax:  [phone]    Date: 01/26/2022    Patient: Thong Arzola  YOB: 1965  MRN: [id number]     Time Calculation    Start time: 1308  Stop time: 1347 Time Calculation (min): 39 minutes         Chief Complaint: Difficulty Walking, Loss Of Balance, and Weakness    Visit #: 23    SUBJECTIVE:  Has not follow up with neuro ophthalmology.  OBJECTIVE:  30 sec STS: 5  10MWT: 50.58 sec = .19 m/s  6MWT 220 feet small based quad cane  TUG 48 sec          Therapeutic Exercises (CPT 34506):     1. Next session -HIIT, HR      Therapeutic Exercise Summary: VISIT 3/40 before auth on 1/13, note number 20 on epic    Therapeutic Treatments and Modalities:     1. Gait Training (CPT 91279)    2. Neuromuscular Re-education (CPT 76097)    Therapeutic Treatment and Modalities Summary:   NMR: For improving single limb stabilty, weight shift facilitation, and right step length pt performed forward/backward stepping over 1/2 foam roll with SBQC. CGA throughout. X 10 with Rt LE 5 with cues to look up. Cued to pause with forward step for internal awareness and carry over to gait mechanics. Increased posterior trunk lean x 10 reps on left LE. Cues to drive left knee up to clear LE. Without visual pt unable to clear Lt LE efficiently getting caught on foam roll. Cues throughout to attempt to dec UE wb.  Split STS Rt LE positioned anteriorly (therapist block to dec pt ability to return rt Le)to facilitate left weight shift and stabilty. Table elevated approx 25 inches. CGA throughout as well as occasional mod A to control descent. Cued in standing to dec trunk flexion as well as nose/navel/gap between LE cue for midline. X 15 improve with reps. Rests between sets as well as opportunities for internal awareness/assessment.     Gait: pt ambulated 250 feet. While  ambulating mod cues to increase step length to reciprocal pattern. Carry over noted approx 20% of steps. Mod cues to look up. 4 x 30 feet with therapist positioned on right to actively make pt uncomfortable and encourage more left/ center gait. 1st instance pt walked in to therapist and stopped, waited a few sec, rather than move to left. Able to perform with cues but not without. Improved overall with reps. Continue to educate and provide examples for neglect to patient. Re-educated on potential use of prism glasses for spatial neglect therefore need to see neuro ophthalmology.      Time-based treatments/modalities:    Physical Therapy Timed Treatment Charges  Gait training minutes (CPT 83735): 15 minutes  Neuromusc re-ed, balance, coor, post minutes (CPT 49027): 24 minutes          ASSESSMENT:   Continued to heavily emphasize HS stretch in  Short sit without trunk recline as a posterior trunk lean noted during left swing phase. Next session will emphasize intensity of interventions vs mechanics.   PLAN/RECOMMENDATIONS:   Plan for treatment: therapy treatment to continue next visit.  Planned interventions for next visit: continue with current treatment.

## 2022-01-26 NOTE — OP THERAPY DAILY TREATMENT
Outpatient Occupational Therapy  DAILY TREATMENT     [facility] Occupational Therapy [address]  [address]  [place] [address]  Phone:  [phone]  Fax:  [phone]    Date: 01/26/2022    Patient: Thong Arzola  YOB: 1965  MRN: [id number]     Time Calculation  Start time: 0145  Stop time: 0230 Time Calculation (min): 45 minutes         Chief Complaint: Extremity Weakness and Self Care Duties    Visit #: 26    SUBJECTIVE:  I feel like I am helping out more in the kitchen.     OBJECTIVE:  Current objective measures:   PROM:   Upper extremity (left):     Shoulder flexion: Below functional limits     0-90    Shoulder extension: 0-40    Shoulder abduction: Below functional limits 0-75    Shoulder external rotation: Below functional limits 0-15    Forearm supination: Below functional limits 0-30   wrist and digits now have full PROM     Passive Range of Motion Comments:  1.5 finger anterior subluxation of L humerus noted     Strength:     Right upper extremity strength within functional limits.    Upper extremity strength (left):     Shoulder flexion: 1    Shoulder extension:  1    Shoulder abduction: 1    Shoulder adduction: 1    Shoulder external rotation:  0    Shoulder internal rotation: 1    Elbow flexion: 3-    Elbow extension: 1    Forearm pronation: 3    Forearm supination: 0    Wrist flexion: 0    Wrist extension: 0    Fingers flexion: 0    Fingers extension: 0    Fingers abduction: 0    Fingers adduction: 0     , Prehension, Pinch:  Unable at this time  Tone, Sensation and Coordination:   Tone:     Left upper extremity muscle tone: Flaccid    Right upper extremity muscle tone: Normal    Left lower extremity muscle tone: Gross assist     Modified Jeremiah:   Upper extremity (left):     Shoulder flexors: 0    Shoulder extensors: 0    Shoulder external rotators: 0    Shoulder internal rotators: 1    Scapular retraction: 1    Scapular elevation: 1    Elbow  flexors: 1    Elbow extensors: 0    Wrist flexors: 0    Wrist extensors: 0    Finger flexors: 0    Finger extensors: 0     Coordination   Absent in L UE     Cognition:     Orientation: normal to time, normal to place and normal to person    Direction following: three step    Short term memory: intact    Long term memory: intact    Attention span: intact    Sequencing: intact    Organization: intact    Problem solving: intact    Judgement and safety awareness: intact    Hearing: intact     Vision/Perception:     Visual tracking: intact    Convergence: intact    Visual attentions: intact    Visual scanning: intact    R/L hemianopsia: present    R/L neglect: present    Vision assistive device(s): reading glasses     Vision/Perception Comments:   Bell's Test = 31/35 in 3 minutes.        Maze Test=38 seconds     Activities of Daily Living:   Transfers/Mobility:     Bed/chair transfers: stand by assist    Wheelchair transfers: stand by assist    Sit to stand: stand by assist    Toilet transfers: contact guard assist    Tub/shower transfers: contact guard assist    Bed mobility: minimum assist     Toileting:     Toileting: stand by assist    Hygiene: minimum assist    Clothing management: minimum assist    Toileting position: sitting     Bathing:     Bathing: minimum assist    Bathing position: sitting    Washing hair: supervision    Washing back: minimum assist    Washing upper body: supervision    Washing lower body: minimum assist    Bathing assistive device(s): shower chair     Dressing:     Dressing: Min A     Dressing upper body: stand by assist with t-shirt, min A with jackets    Dressing lower body: mod assist    Socks: maximum assist    Shoes: min A using elastic laces.     Grooming:     Brushing teeth or denture care: minimum assist    Shaving: minimum assist     Feeding:     Using utensils: minimum assist    Cutting food: Mod I using cutting board.       Household Management:     Housekeeping: maximum  "assist    Laundry: maximum assist    Meal preparation: maximum assist    Medication management: supervision    Shopping: maximum assist    Writing: independent*          Therapeutic Treatments and Modalities:    1. Manual Therapy (CPT 45821)    2. Neuromuscular Re-education (CPT 96639)    Therapeutic Treatments and Modalities Summary: Completed mat work with patient in supine position focusing on manual movement of left shoulder with aim of enhancing passive abduction, external rotation and horizontal abduction and adduction.  Able to maintain arm in external rotation with shoulder abduction of 80 degrees within level of comfort.  NMRE at edge of mat with weight shifting to put even pressure through buttocks to minimize leaning towards right side.  Left side leaning completed placing arm onto pillow with weight bearing through elbow and holding for 10 seconds. Completed 5 x. Leaning on outreached left hand 5 x with 10 second hold.  Patient continues to be a bit hesitant leaning towards left side and stated that it still felt \"weird\" but not as bad as it was at the beginning.    Mod I removing neoprene shoulder support and min A donning with min verbal and tactile cues.        Time-based treatments/modalities:  Neuromusc re-ed minutes (CPT 13698): 15 minutes  Manual therapy joint mobilization minutes (CPT 06465): 30 minutes        Pain rating before treatment: 1  Pain rating after treatment: 1    ASSESSMENT:   Response to treatment: Encouraged patient to increase repetition of active assisted and passive stretching of left shoulder in supine to increase shoulder abduction and external rotation of limb.      PLAN/RECOMMENDATIONS:   Plan for treatment: therapy treatment to continue next visit.  Planned interventions for next visit: continue with current treatment, E-stim attended (CPT 90343), manual therapy (CPT 22920), neuromuscular re-education (CPT 06081), self care ADL training (CPT 18542), therapeutic activities " (CPT 28884) and therapeutic exercise (CPT 59262)

## 2022-01-28 ENCOUNTER — APPOINTMENT (OUTPATIENT)
Dept: OCCUPATIONAL THERAPY | Facility: REHABILITATION | Age: 57
End: 2022-01-28
Attending: PHYSICAL MEDICINE & REHABILITATION
Payer: COMMERCIAL

## 2022-01-28 ENCOUNTER — APPOINTMENT (OUTPATIENT)
Dept: PHYSICAL THERAPY | Facility: REHABILITATION | Age: 57
End: 2022-01-28
Attending: PHYSICAL MEDICINE & REHABILITATION
Payer: COMMERCIAL

## 2022-01-31 ENCOUNTER — OCCUPATIONAL THERAPY (OUTPATIENT)
Dept: OCCUPATIONAL THERAPY | Facility: REHABILITATION | Age: 57
End: 2022-01-31
Attending: PHYSICAL MEDICINE & REHABILITATION
Payer: COMMERCIAL

## 2022-01-31 DIAGNOSIS — I63.511 ACUTE RIGHT MCA STROKE (HCC): ICD-10-CM

## 2022-01-31 PROCEDURE — 97140 MANUAL THERAPY 1/> REGIONS: CPT

## 2022-01-31 NOTE — OP THERAPY DAILY TREATMENT
Outpatient Occupational Therapy  DAILY TREATMENT     [facility] Occupational Therapy [address].  [address]  [place] [address]  Phone:  [phone]  Fax:  [phone]    Date: 01/31/2022    Patient: Thong Arzola  YOB: 1965  MRN: [id number]     Time Calculation  Start time: 0808  Stop time: 0845 Time Calculation (min): 37 minutes         Chief Complaint: Extremity Weakness    Visit #: 27    SUBJECTIVE:  We are chasing up the Neuro ophthalmologist   to get an appointment.     OBJECTIVE:  Current objective measures:   PROM:   Upper extremity (left):     Shoulder flexion: Below functional limits     0-90    Shoulder extension: 0-40    Shoulder abduction: Below functional limits 0-75    Shoulder external rotation: Below functional limits 0-15    Forearm supination: Below functional limits 0-30   wrist and digits now have full PROM     Passive Range of Motion Comments:  1.5 finger anterior subluxation of L humerus noted     Strength:     Right upper extremity strength within functional limits.    Upper extremity strength (left):     Shoulder flexion: 1    Shoulder extension:  1    Shoulder abduction: 1    Shoulder adduction: 1    Shoulder external rotation:  0    Shoulder internal rotation: 1    Elbow flexion: 3-    Elbow extension: 1    Forearm pronation: 3    Forearm supination: 0    Wrist flexion: 0    Wrist extension: 0    Fingers flexion: 0    Fingers extension: 0    Fingers abduction: 0    Fingers adduction: 0     , Prehension, Pinch:  Unable at this time  Tone, Sensation and Coordination:   Tone:     Left upper extremity muscle tone: Flaccid    Right upper extremity muscle tone: Normal    Left lower extremity muscle tone: Gross assist     Modified Jeremiah:   Upper extremity (left):     Shoulder flexors: 0    Shoulder extensors: 0    Shoulder external rotators: 0    Shoulder internal rotators: 1    Scapular retraction: 1    Scapular elevation: 1    Elbow  flexors: 1    Elbow extensors: 0    Wrist flexors: 0    Wrist extensors: 0    Finger flexors: 0    Finger extensors: 0     Coordination   Absent in L UE     Cognition:     Orientation: normal to time, normal to place and normal to person    Direction following: three step    Short term memory: intact    Long term memory: intact    Attention span: intact    Sequencing: intact    Organization: intact    Problem solving: intact    Judgement and safety awareness: intact    Hearing: intact     Vision/Perception:     Visual tracking: intact    Convergence: intact    Visual attentions: intact    Visual scanning: intact    R/L hemianopsia: present    R/L neglect: present    Vision assistive device(s): reading glasses     Vision/Perception Comments:   Bell's Test = 31/35 in 3 minutes.        Maze Test=38 seconds     Activities of Daily Living:   Transfers/Mobility:     Bed/chair transfers: stand by assist    Wheelchair transfers: stand by assist    Sit to stand: stand by assist    Toilet transfers: contact guard assist    Tub/shower transfers: contact guard assist    Bed mobility: minimum assist     Toileting:     Toileting: stand by assist    Hygiene: minimum assist    Clothing management: minimum assist    Toileting position: sitting     Bathing:     Bathing: minimum assist    Bathing position: sitting    Washing hair: supervision    Washing back: minimum assist    Washing upper body: supervision    Washing lower body: minimum assist    Bathing assistive device(s): shower chair     Dressing:     Dressing: Min A     Dressing upper body: stand by assist with t-shirt, min A with jackets    Dressing lower body: mod assist    Socks: maximum assist    Shoes: min A using elastic laces.     Grooming:     Brushing teeth or denture care: minimum assist    Shaving: minimum assist     Feeding:     Using utensils: minimum assist    Cutting food: Mod I using cutting board.       Household Management:     Housekeeping: maximum  assist    Laundry: maximum assist    Meal preparation: maximum assist    Medication management: supervision    Shopping: maximum assist    Writing: independent        Therapeutic Treatments and Modalities:    1. Manual Therapy (CPT 47899)    Therapeutic Treatments and Modalities Summary: Worked on shoulder mobilization and soft tissue stretching of left shoulder, elbow, forearm and wrist while supine on mat.  Able to position limb in outstretched position with shoulder in 80 degrees of abduction and full supination with wrist extension.      Time-based treatments/modalities:  Manual therapy joint mobilization minutes (CPT 25399): 37 minutes        Pain rating before treatment: 1  Pain rating after treatment: 1    ASSESSMENT:   Response to treatment:  Continues to be motivated to participate in therapy.  Progress note completed, please refer to for details.      PLAN/RECOMMENDATIONS:   Plan for treatment: therapy treatment to continue next visit.  Planned interventions for next visit: continue with current treatment, E-stim attended (CPT 78718), manual therapy (CPT 34796), neuromuscular re-education (CPT 34772), self care ADL training (CPT 24802), therapeutic activities (CPT 58717) and therapeutic exercise (CPT 94084)

## 2022-01-31 NOTE — OP THERAPY PROGRESS SUMMARY
Outpatient Occupational Therapy  PROGRESS SUMMARY NOTE    [facility] Occupational Therapy [facility]  [address]  [address]  [place] [address]  Phone:  [phone]  Fax:  [phone]    Date of Visit: 01/31/2022    Patient: Thong Arzola  YOB: 1965  MRN: [id number]     Referring Provider: Zulema Dumont D.O.  [address]  [address]  [place] [address]   Referring Diagnosis Other muscle spasm [M62.838]     Visit Diagnoses     ICD-10-CM   1. Acute right MCA stroke (HCC)  I63.511       Rehab Potential: good    Progress Report Period: 12/16/21-1/31/22    Functional Assessment Used:  SAFE=1          Objective Findings and Assessment:   Patient progression towards goals: Patient continues to attend OT 2 x a week for PROM, NMRE and functional tasks to enhance functional use of L UE as a GFA, minimize ST tightness and enhance level of function with personal and light domestic tasks.  Patient has increased PROM of L UE and now min A donning shoulder support.  Continues to require some assistance for clothing management in bathroom and lower body dressing.  Patient is doing more in the kitchen now that he is ambulating with his quad cane. Patient would benefit from continued OT intervention to further enhance level of function and minimize ST shortening in L UE.      Objective findings and assessment details: Current objective measures:   PROM:   Upper extremity (left):     Shoulder flexion: Below functional limits     0-90    Shoulder extension: 0-40    Shoulder abduction: Below functional limits 0-80    Shoulder external rotation: Below functional limits 0-15    Forearm supination: Below functional limits 0-50   wrist and digits now have full PROM     Passive Range of Motion Comments:  1.5 finger anterior subluxation of L humerus noted     Strength:     Right upper extremity strength within functional limits.    Upper extremity strength (left):     Shoulder flexion: 1    Shoulder extension:   1    Shoulder abduction: 1    Shoulder adduction: 1    Shoulder external rotation:  0    Shoulder internal rotation: 1    Elbow flexion: 3-    Elbow extension: 1    Forearm pronation: 3    Forearm supination: 0    Wrist flexion: 0    Wrist extension: 0    Fingers flexion: 0    Fingers extension: 0    Fingers abduction: 0    Fingers adduction: 0     , Prehension, Pinch:  Unable at this time  Tone, Sensation and Coordination:   Tone:     Left upper extremity muscle tone: Flaccid    Right upper extremity muscle tone: Normal    Left lower extremity muscle tone: Gross assist     Modified Jeremiah:   Upper extremity (left):     Shoulder flexors: 0    Shoulder extensors: 0    Shoulder external rotators: 0    Shoulder internal rotators: 1    Scapular retraction: 1    Scapular elevation: 1    Elbow flexors: 1    Elbow extensors: 0    Wrist flexors: 0    Wrist extensors: 0    Finger flexors: 0    Finger extensors: 0     Coordination   Absent in L UE     Cognition:     Orientation: normal to time, normal to place and normal to person    Direction following: three step    Short term memory: intact    Long term memory: intact    Attention span: intact    Sequencing: intact    Organization: intact    Problem solving: intact    Judgement and safety awareness: intact    Hearing: intact     Vision/Perception:     Visual tracking: intact    Convergence: intact    Visual attentions: intact    Visual scanning: intact    R/L hemianopsia: present    R/L neglect: present    Vision assistive device(s): reading glasses     Vision/Perception Comments:   Bell's Test = 31/35 in 3 minutes.        Maze Test=38 seconds     Activities of Daily Living:   Transfers/Mobility:     Bed/chair transfers: Mod I assist    Wheelchair transfers: Mod I    Sit to stand: Mod I     Toilet transfers: supervision/SBA    Tub/shower transfers: contact guard assist    Bed mobility: Supervision assist     Toileting:     Toileting: stand by assist    Hygiene:  minimum assist    Clothing management: minimum assist    Toileting position: sitting     Bathing:     Bathing: minimum assist    Bathing position: sitting    Washing hair: supervision    Washing back: minimum assist    Washing upper body: supervision    Washing lower body: minimum assist    Bathing assistive device(s): shower chair     Dressing:     Dressing: Min A     Dressing upper body: stand by assist with t-shirt, SBA/CGA with jackets    Dressing lower body: mod assist    Socks: maximum assist    Shoes: min A using elastic laces.     Grooming:     Brushing teeth or denture care: minimum assist    Shaving: minimum assist     Feeding:     Using utensils: minimum assist    Cutting food: Mod I using cutting board.       Household Management:     Housekeeping: maximum assist    Laundry: maximum assist    Meal preparation: min A  assist    Medication management: supervision    Shopping: maximum assist    Writing: independent    Goals:   Short Term Goals:   Patient will be Mod I  with hygiene and clothing management after bathroom tasks-met  Patient will be min A LB dressing-  Patient will be Mod I  cutting own food using AE- not met  Patient will incorporate L UE as GFA for 25% of self care  Patient will be set up for meal prep  Short term goal timespan:  2-4 weeks    Long Term Goals:     Patient will be SBA LB dressing  Patient will be Mod I with meal prep using compensatory techniques and AE  Patient will score 35/35 on Bell's Test  Patient will incorporate L UE as GFA for 25%-50%of self care tasks.    Patient will score at least 40/80 on UEFI   Long term goal timespan:  4-6 weeks    Plan:   Planned therapy interventions:  E Stim Attended (CPT 67168), Manual Therapy (CPT 16018), Neuromuscular Re-education (CPT 04711), Self Care ADL Training (CPT 87956), Therapeutic Activities (CPT 94544) and Therapeutic Exercise (CPT 37466)  Frequency:  2x week  Duration in weeks:  6  Duration in visits:  12      Referring provider  co-signature:  I have reviewed this plan of care and my co-signature certifies the need for services.    Certification Period: 01/31/2022 to 03/14/22    Physician Signature: ________________________________ Date: ______________

## 2022-02-01 ENCOUNTER — SPEECH THERAPY (OUTPATIENT)
Dept: SPEECH THERAPY | Facility: REHABILITATION | Age: 57
End: 2022-02-01
Attending: PHYSICAL MEDICINE & REHABILITATION
Payer: COMMERCIAL

## 2022-02-01 ENCOUNTER — PHYSICAL THERAPY (OUTPATIENT)
Dept: PHYSICAL THERAPY | Facility: REHABILITATION | Age: 57
End: 2022-02-01
Attending: PHYSICAL MEDICINE & REHABILITATION
Payer: COMMERCIAL

## 2022-02-01 DIAGNOSIS — I63.511 ACUTE RIGHT MCA STROKE (HCC): ICD-10-CM

## 2022-02-01 DIAGNOSIS — M62.838 OTHER MUSCLE SPASM: ICD-10-CM

## 2022-02-01 DIAGNOSIS — I63.411 CEREBROVASCULAR ACCIDENT (CVA) DUE TO EMBOLISM OF RIGHT MIDDLE CEREBRAL ARTERY (HCC): ICD-10-CM

## 2022-02-01 DIAGNOSIS — I69.319 COGNITIVE DEFICIT FOLLOWING CEREBROVASCULAR ACCIDENT (CVA): ICD-10-CM

## 2022-02-01 PROCEDURE — 92507 TX SP LANG VOICE COMM INDIV: CPT

## 2022-02-01 PROCEDURE — 97116 GAIT TRAINING THERAPY: CPT

## 2022-02-01 NOTE — OP THERAPY DAILY TREATMENT
Outpatient Physical Therapy  DAILY TREATMENT     [facility] Physical Therapy [address]  [address]  [place] [address]  Phone:  [phone]  Fax:  [phone]    Date: 02/01/2022    Patient: Thong Arzola  YOB: 1965  MRN: [id number]     Time Calculation    Start time: 1533  Stop time: 1615 Time Calculation (min): 42 minutes         Chief Complaint: Difficulty Walking    Visit #: 24    SUBJECTIVE:  The pt reports mild joint pain in his L hip, but nothing out of the ordinary. He is agreeable to PT today.     OBJECTIVE:        Therapeutic Exercises (CPT 35765):       Therapeutic Exercise Summary: VISIT 3/40 before auth on 1/13, note number 20 on epic    Therapeutic Treatments and Modalities:     1. Gait Training (CPT 16745)    Therapeutic Treatment and Modalities Summary: Gait Training: LiteGait treadmill training with harness used for pt safety, no BWS utilized  1st round at 1.2 mph with R UE assist x 4:30 minutes, vitals post /92, HR up to 116 bpm, O2 94%  2nd round: 1.4 mph with R UE assist x 2 minutes, vitals post /100,  bpm, O2 94% (re-checked BP and re-started walking once BP returned to 136/94  3rd round: 0.6 mph without UE assist as much as possible x2:30 minutes, post /98, standing rest break with water  4th round: 1.4 mph with R UE assist x 4 minutes, post /96, HR max during round 120 bpm, O2 93%  5th round: 1.4 mph for 3 minutes with R UE assist, post session vitals 140/96, 125 bpm, 94 % O2  Standing rest breaks taken between bouts of ambulation, pt given 1 water break in standing    Total walking distance: 1685 ft.   Total walking time: 17:10 minutes        Time-based treatments/modalities:    Physical Therapy Timed Treatment Charges  Gait training minutes (CPT 70285): 40 minutes    ASSESSMENT:   Response to treatment: The pt did well with gait training on the treadmill at faster gait speeds and with a focus on increased heart rate. He  demonstrated good reciprocal stepping, and relatively symmetrical stride length, though he does demonstrate intermittent toe drag of the L LE. He continues to utilize maximal UE assist on the bars for assistance, and was challenged with walking without UE assist. He will continue to benefit from skilled PT intervention to maximize his CLOF, independence, and safety.     PLAN/RECOMMENDATIONS:   Plan for treatment: therapy treatment to continue next visit.  Planned interventions for next visit: continue with current treatment. Continue with progression of high intensity gait training to pt tolerance.

## 2022-02-02 ENCOUNTER — OCCUPATIONAL THERAPY (OUTPATIENT)
Dept: OCCUPATIONAL THERAPY | Facility: REHABILITATION | Age: 57
End: 2022-02-02
Attending: PHYSICIAN ASSISTANT
Payer: COMMERCIAL

## 2022-02-02 DIAGNOSIS — I63.511 ACUTE RIGHT MCA STROKE (HCC): ICD-10-CM

## 2022-02-02 PROCEDURE — 97110 THERAPEUTIC EXERCISES: CPT

## 2022-02-02 PROCEDURE — 97140 MANUAL THERAPY 1/> REGIONS: CPT

## 2022-02-02 PROCEDURE — 97112 NEUROMUSCULAR REEDUCATION: CPT

## 2022-02-02 NOTE — OP THERAPY DAILY TREATMENT
Outpatient Occupational Therapy  DAILY TREATMENT     [facility] Occupational Therapy [address].  [address]  [place] [address]  Phone:  [phone]  Fax:  [phone]    Date: 02/02/2022    Patient: Thong Arzola  YOB: 1965  MRN: [id number]     Time Calculation  Start time: 0800  Stop time: 0845 Time Calculation (min): 45 minutes         Chief Complaint: Extremity Weakness and Self Care Duties    Visit #: 28    SUBJECTIVE:  I am able to get my AFO on now, but I still need help getting the shoe on over it.      OBJECTIVE:  Current objective measures:   PROM:   Upper extremity (left):     Shoulder flexion: Below functional limits     0-90    Shoulder extension: 0-40    Shoulder abduction: Below functional limits 0-80    Shoulder external rotation: Below functional limits 0-15    Forearm supination: Below functional limits 0-50   wrist and digits now have full PROM     Passive Range of Motion Comments:  1.5 finger anterior subluxation of L humerus noted     Strength:     Right upper extremity strength within functional limits.    Upper extremity strength (left):     Shoulder flexion: 1    Shoulder extension:  1    Shoulder abduction: 1    Shoulder adduction: 1    Shoulder external rotation:  0    Shoulder internal rotation: 1    Elbow flexion: 3-    Elbow extension: 1    Forearm pronation: 3    Forearm supination: 0    Wrist flexion: 0    Wrist extension: 0    Fingers flexion: 0    Fingers extension: 0    Fingers abduction: 0    Fingers adduction: 0     , Prehension, Pinch:  Unable at this time  Tone, Sensation and Coordination:   Tone:     Left upper extremity muscle tone: Flaccid    Right upper extremity muscle tone: Normal    Left lower extremity muscle tone: Gross assist     Modified Jeremiah:   Upper extremity (left):     Shoulder flexors: 0    Shoulder extensors: 0    Shoulder external rotators: 0    Shoulder internal rotators: 1    Scapular retraction: 1    Scapular  elevation: 1    Elbow flexors: 1    Elbow extensors: 0    Wrist flexors: 0    Wrist extensors: 0    Finger flexors: 0    Finger extensors: 0     Coordination   Absent in L UE     Cognition:     Orientation: normal to time, normal to place and normal to person    Direction following: three step    Short term memory: intact    Long term memory: intact    Attention span: intact    Sequencing: intact    Organization: intact    Problem solving: intact    Judgement and safety awareness: intact    Hearing: intact     Vision/Perception:     Visual tracking: intact    Convergence: intact    Visual attentions: intact    Visual scanning: intact    R/L hemianopsia: present    R/L neglect: present    Vision assistive device(s): reading glasses     Vision/Perception Comments:   Bell's Test = 31/35 in 3 minutes.        Maze Test=38 seconds     Activities of Daily Living:   Transfers/Mobility:     Bed/chair transfers: Mod I assist    Wheelchair transfers: Mod I    Sit to stand: Mod I     Toilet transfers: supervision/SBA    Tub/shower transfers: contact guard assist    Bed mobility: Supervision assist     Toileting:     Toileting: stand by assist    Hygiene: minimum assist    Clothing management: minimum assist    Toileting position: sitting     Bathing:     Bathing: minimum assist    Bathing position: sitting    Washing hair: supervision    Washing back: minimum assist    Washing upper body: supervision    Washing lower body: minimum assist    Bathing assistive device(s): shower chair     Dressing:     Dressing: Min A     Dressing upper body: stand by assist with t-shirt, SBA/CGA with jackets    Dressing lower body: mod assist    Socks: maximum assist    Shoes: min A using elastic laces.     Grooming:     Brushing teeth or denture care: minimum assist    Shaving: minimum assist     Feeding:     Using utensils: minimum assist    Cutting food: Mod I using cutting board.       Household Management:     Housekeeping: maximum  assist    Laundry: maximum assist    Meal preparation: min A  assist    Medication management: supervision    Shopping: maximum assist    Writing: independent        Therapeutic Exercises (CPT 47340):     1. Self ROM of left shoulder while in supine positon     Therapeutic Treatments and Modalities:    1. Manual Therapy (CPT 82421)    2. Neuromuscular Re-education (CPT 05904)    Therapeutic Treatments and Modalities Summary: Worked on shoulder mobilization and soft tissue stretching of left shoulder, elbow, forearm and wrist while supine on mat.  Able to position limb in outstretched position with shoulder in 80 degrees of abduction and full supination with wrist extension.    NMRE:  From supine position, OTR had patient bend knees with heels into mat and log roll over onto left side to work on supine to sit while incorporating left side; which patient has not been doing due to fear of falling.  Able to complete correct technique with min A and min verbal cues.  Weight bearing through left arm completed while seated at edge of mat.       Time-based treatments/modalities:  Therapeutic exercise minutes (CPT 30914): 15 minutes  Neuromusc re-ed minutes (CPT 01636): 15 minutes  Manual therapy joint mobilization minutes (CPT 50263): 15 minutes        Pain rating before treatment: 0  Pain rating after treatment: 0    ASSESSMENT:   Response to treatment: less pain in left shoulder when outstretched by OTR.  Increased independence with donning/doffing AFO    PLAN/RECOMMENDATIONS:   Plan for treatment: therapy treatment to continue next visit.  Planned interventions for next visit: continue with current treatment, E-stim attended (CPT 89644), manual therapy (CPT 28894), neuromuscular re-education (CPT 33808), self care ADL training (CPT 47918) and therapeutic activities (CPT 39519), 97110-therapeutic exercise

## 2022-02-02 NOTE — OP THERAPY DAILY TREATMENT
Outpatient Speech Therapy  DAILY TREATMENT     [facility] Speech [address].  [address]  [place] [address]  Phone:  [phone]  Fax:  [phone]    Date: 02/01/2022    Patient: Thong Arzola  YOB: 1965  MRN: [id number]     Time Calculation    Start time: 1645  Stop time: 1715 Time Calculation (min): 30 minutes         Chief Complaint: Other (Left neglect, problem solving)    Visit #: 6    Subjective:   Reason for Therapy:     Reason For Evaluation:  CVA and Cognition    Onset Date:  3/31/2021  Social Support:     ST Subjective  Patient Mental Status: ALert and cooperative.  Progress Factors:     Progression:  Getting Better  Additional Subjective Comments:      Patient reported he is getting better        Objective:   Treatments/Interventions Performed:  Cognitive-Linguistic training, Patient/Caregiver education and Home program  Objective Details:  1. Patient will improve attention completing complex, divided attention tasks with 80% accuracy,with mod cues.  --Patient required multiple cues to scan entire page, keyur left, for alternating shape/ascending size  trail making task.  50% accurate with task. Patient put down pencil if asked a question, unable to divide attention.  2. Patient will improve executive functions completing complex problem solving and reasoning tasks with 80% accuracy, with mod cues.  --Patient required multiple cues to complete simpler deductive task.  3. Patient will improve left neglect and visuospatial skills by completing visual scanning and orientation tasks with 80% accuracy with mod cues.   --See goal #1           Speech Therapy Assessment:     Cognitive Linguistic Assessment:     Patient attention selective: Minimal (self-cued to look to the left after initial errors)    Patient attention divided: Moderate    Patient complex reasoning ability: Moderate    ST Cognitive-Linguistic Assessment L29: Min-moderate.        Speech Therapy Plan :  "  Prognosis & Recommendations  Impression Summary:  Patient actively participated in all therapy tasks. Patient continues to require cues to attend to left, as well as strategies to organize thoughts to complete tasks. Patient was provided with less complex deductive reasoning task, which required frequent cues to break down clues.   Patient continues to present with deficits with left-neglect and visual scanning, executive functions, as well as higher level problem solving and reasoning.  Patient would benefit from continued speech therapy to improve cognitive linguistic functions. Patient verbalized understanding and agreement with current plan of care.  Prognosis:  Good  Goals  Short Term Goals:  1. Patient will improve attention completing complex, divided attention tasks with 80% accuracy,with mod cues.  2. Patient will improve executive functions completing complex problem solving and reasoning tasks with 80% accuracy, with mod cues.  3. Patient will improve left neglect and visuospatial skills by completing visual scanning and orientation tasks with 80% accuracy with mod cues.   Short Term Goal Duration (Weeks):  6-8 weeks  Long Term Goals:  Per observation and patient report, patient will demonstrate functional cognitive linguistic skills, including executive function, memory, and visuospatial skills, in 85% opportunities across several different environments.  Long Term Goal Duration (Weeks):  6-9 months  Patient Stated Goal:  \"Better place to do physical work, driving\"  Potential barriers to Goal Achievement:  Vision  Therapy Recommendations  Recommendation:  Individual Speech Therapy,  Planned Therapy Interventions:  Cognitive-Linguistic training, Home Program and Patient/Caregiver Education,   Frequency:  2x week (16 X 92507)  Duration (in visits):  16  Duration (in weeks):  8               "

## 2022-02-03 ENCOUNTER — PHYSICAL THERAPY (OUTPATIENT)
Dept: PHYSICAL THERAPY | Facility: REHABILITATION | Age: 57
End: 2022-02-03
Attending: PHYSICAL MEDICINE & REHABILITATION
Payer: COMMERCIAL

## 2022-02-03 ENCOUNTER — SPEECH THERAPY (OUTPATIENT)
Dept: SPEECH THERAPY | Facility: REHABILITATION | Age: 57
End: 2022-02-03
Attending: PHYSICAL MEDICINE & REHABILITATION
Payer: COMMERCIAL

## 2022-02-03 DIAGNOSIS — M62.838 OTHER MUSCLE SPASM: ICD-10-CM

## 2022-02-03 DIAGNOSIS — I69.319 COGNITIVE DEFICIT FOLLOWING CEREBROVASCULAR ACCIDENT (CVA): ICD-10-CM

## 2022-02-03 DIAGNOSIS — I63.511 ACUTE RIGHT MCA STROKE (HCC): ICD-10-CM

## 2022-02-03 DIAGNOSIS — I63.411 CEREBROVASCULAR ACCIDENT (CVA) DUE TO EMBOLISM OF RIGHT MIDDLE CEREBRAL ARTERY (HCC): ICD-10-CM

## 2022-02-03 PROCEDURE — 92507 TX SP LANG VOICE COMM INDIV: CPT

## 2022-02-03 PROCEDURE — 97116 GAIT TRAINING THERAPY: CPT

## 2022-02-04 NOTE — OP THERAPY DAILY TREATMENT
Outpatient Speech Therapy  DAILY TREATMENT     [facility] Speech [place] [address] [address]  [address]  [place] [address]  Phone:  [phone]  Fax:  [phone]    Date: 02/03/2022    Patient: Thong Arzola  YOB: 1965  MRN: [id number]     Time Calculation    Start time: 1630  Stop time: 1715 Time Calculation (min): 45 minutes         Chief Complaint: Other (higher level problem solving)    Visit #: 7    Subjective:   Reason for Therapy:     Reason For Evaluation:  CVA and Cognition    Onset Date:  3/31/2021  Social Support:     ST Subjective  Patient Mental Status: ALert and cooperative.  Progress Factors:     Progression:  Getting Better  Additional Subjective Comments:      Patient reported he is getting better        Objective:   Treatments/Interventions Performed:  Cognitive-Linguistic training, Patient/Caregiver education and Home program  Objective Details:  1. Patient will improve attention completing complex, divided attention tasks with 80% accuracy,with mod cues.  --Progressing, patient completed trail making task with no errors while using strategies reviewed upon initiation of task (take time, brianna starting point, scan whole page, connect shapes with arrow for direction).  2. Patient will improve executive functions completing complex problem solving and reasoning tasks with 80% accuracy, with mod cues.  --Progressing, patient completed novel multi-step complex auditory directives 100% with no repetitions.  Patient required organizational strategies and prompts throughout to complete 4x4 deductive reasoning puzzle.  Patient completed verbally presented math story problems 75% with mod prompts to write down key information and additional processing time.  3. Patient will improve left neglect and visuospatial skills by completing visual scanning and orientation tasks with 80% accuracy with mod cues.   --See goal #1         Speech Therapy Assessment:     Cognitive  "Linguistic Assessment:     Patient attention selective: Minimal (self-cued to look to the left after initial errors)    Patient attention divided: Moderate    Patient complex reasoning ability: Moderate    ST Cognitive-Linguistic Assessment L29: Min-moderate.        Speech Therapy Plan :   Prognosis & Recommendations  Impression Summary:  Patient actively participated in all therapy tasks. Patient continues to require cues to formulate strategies to organize thoughts to complete tasks. Patient was provided with less complex deductive reasoning task, which required frequent cues to break down clues.   Patient continues to present with deficits with left-neglect and visual scanning, executive functions, as well as higher level problem solving and reasoning.  Patient would benefit from continued speech therapy to improve cognitive linguistic functions. Patient verbalized understanding and agreement with current plan of care.  Prognosis:  Good  Goals  Short Term Goals:  1. Patient will improve attention completing complex, divided attention tasks with 80% accuracy,with mod cues.  2. Patient will improve executive functions completing complex problem solving and reasoning tasks with 80% accuracy, with mod cues.  3. Patient will improve left neglect and visuospatial skills by completing visual scanning and orientation tasks with 80% accuracy with mod cues.   Short Term Goal Duration (Weeks):  6-8 weeks  Long Term Goals:  Per observation and patient report, patient will demonstrate functional cognitive linguistic skills, including executive function, memory, and visuospatial skills, in 85% opportunities across several different environments.  Long Term Goal Duration (Weeks):  6-9 months  Patient Stated Goal:  \"Better place to do physical work, driving\"  Potential barriers to Goal Achievement:  Vision  Therapy Recommendations  Recommendation:  Individual Speech Therapy,  Planned Therapy Interventions:  Cognitive-Linguistic " training, Home Program and Patient/Caregiver Education,   Frequency:  2x week (16 X 92507)  Duration (in visits):  16  Duration (in weeks):  8

## 2022-02-07 ENCOUNTER — OCCUPATIONAL THERAPY (OUTPATIENT)
Dept: OCCUPATIONAL THERAPY | Facility: REHABILITATION | Age: 57
End: 2022-02-07
Attending: PHYSICIAN ASSISTANT
Payer: COMMERCIAL

## 2022-02-07 DIAGNOSIS — I63.511 ACUTE RIGHT MCA STROKE (HCC): ICD-10-CM

## 2022-02-07 PROCEDURE — 97140 MANUAL THERAPY 1/> REGIONS: CPT

## 2022-02-07 PROCEDURE — 97112 NEUROMUSCULAR REEDUCATION: CPT

## 2022-02-07 NOTE — OP THERAPY DAILY TREATMENT
Outpatient Occupational Therapy  DAILY TREATMENT     [facility] Occupational Therapy [address].  [address]  [place] [address]  Phone:  [phone]  Fax:  [phone]    Date: 02/07/2022    Patient: Thong Arzola  YOB: 1965  MRN: [id number]     Time Calculation  Start time: 0845  Stop time: 0930 Time Calculation (min): 45 minutes         Chief Complaint: Extremity Weakness    Visit #: 29    SUBJECTIVE:  It's not as scary leaning over to my left side     OBJECTIVE:  Current objective measures:   PROM:   Upper extremity (left):     Shoulder flexion: Below functional limits     0-90    Shoulder extension: 0-40    Shoulder abduction: Below functional limits 0-80    Shoulder external rotation: Below functional limits 0-15    Forearm supination: Below functional limits 0-50   wrist and digits now have full PROM     Passive Range of Motion Comments:  1.5 finger anterior subluxation of L humerus noted     Strength:     Right upper extremity strength within functional limits.    Upper extremity strength (left):     Shoulder flexion: 1    Shoulder extension:  1    Shoulder abduction: 1    Shoulder adduction: 1    Shoulder external rotation:  0    Shoulder internal rotation: 1    Elbow flexion: 3-    Elbow extension: 1    Forearm pronation: 3    Forearm supination: 0    Wrist flexion: 0    Wrist extension: 0    Fingers flexion: 0    Fingers extension: 0    Fingers abduction: 0    Fingers adduction: 0     , Prehension, Pinch:  Unable at this time  Tone, Sensation and Coordination:   Tone:     Left upper extremity muscle tone: Flaccid    Right upper extremity muscle tone: Normal    Left lower extremity muscle tone: Gross assist     Modified Jeremiah:   Upper extremity (left):     Shoulder flexors: 0    Shoulder extensors: 0    Shoulder external rotators: 0    Shoulder internal rotators: 1    Scapular retraction: 1    Scapular elevation: 1    Elbow flexors: 1    Elbow extensors:  0    Wrist flexors: 0    Wrist extensors: 0    Finger flexors: 0    Finger extensors: 0     Coordination   Absent in L UE     Cognition:     Orientation: normal to time, normal to place and normal to person    Direction following: three step    Short term memory: intact    Long term memory: intact    Attention span: intact    Sequencing: intact    Organization: intact    Problem solving: intact    Judgement and safety awareness: intact    Hearing: intact     Vision/Perception:     Visual tracking: intact    Convergence: intact    Visual attentions: intact    Visual scanning: intact    R/L hemianopsia: present    R/L neglect: present    Vision assistive device(s): reading glasses     Vision/Perception Comments:   Bell's Test = 31/35 in 3 minutes.        Maze Test=38 seconds     Activities of Daily Living:   Transfers/Mobility:     Bed/chair transfers: Mod I assist    Wheelchair transfers: Mod I    Sit to stand: Mod I     Toilet transfers: supervision/SBA    Tub/shower transfers: contact guard assist    Bed mobility: Supervision assist     Toileting:     Toileting: stand by assist    Hygiene: minimum assist    Clothing management: minimum assist    Toileting position: sitting     Bathing:     Bathing: minimum assist    Bathing position: sitting    Washing hair: supervision    Washing back: minimum assist    Washing upper body: supervision    Washing lower body: minimum assist    Bathing assistive device(s): shower chair     Dressing:     Dressing: Min A     Dressing upper body: stand by assist with t-shirt, SBA/CGA with jackets    Dressing lower body: mod assist    Socks: maximum assist    Shoes: min A using elastic laces.     Grooming:     Brushing teeth or denture care: minimum assist    Shaving: minimum assist     Feeding:     Using utensils: minimum assist    Cutting food: Mod I using cutting board.       Household Management:     Housekeeping: maximum assist    Laundry: maximum assist    Meal preparation: min A   assist    Medication management: supervision    Shopping: maximum assist    Writing: independent        Therapeutic Treatments and Modalities:    1. Manual Therapy (CPT 17766)    2. Neuromuscular Re-education (CPT 20310)    Therapeutic Treatments and Modalities Summary: Worked on shoulder mobilization and soft tissue stretching of left shoulder, elbow, forearm and wrist while supine on mat.  Able to position limb in outstretched position with shoulder in 80 degrees of abduction and full supination with wrist extension.    Min A log rolling over onto left side and pushing self up from supine to sit.  NMRE:  Weight bearing activity completed at edge of mat onto left UE, on elbow first 5 x with 10 second hold and then onto outstretched hand 5 x with 10 second hold.     Time-based treatments/modalities:  Neuromusc re-ed minutes (CPT 31998): 15 minutes  Manual therapy joint mobilization minutes (CPT 06220): 30 minutes        Pain rating before treatment: 0  Pain rating after treatment: 0    ASSESSMENT:   Response to treatment: improved log rolling onto left side with decreased hesitation while weight bearing through left side. Patient stated he made his own scrambled eggs this weekend and helped his sons break up some of the ice at the front of the house.     PLAN/RECOMMENDATIONS:   Plan for treatment: therapy treatment to continue next visit.  Planned interventions for next visit: continue with current treatment, E-stim attended (CPT 55146), manual therapy (CPT 54908), neuromuscular re-education (CPT 87041), self care ADL training (CPT 48242), therapeutic activities (CPT 63711) and therapeutic exercise (CPT 06741)

## 2022-02-08 ENCOUNTER — SPEECH THERAPY (OUTPATIENT)
Dept: SPEECH THERAPY | Facility: REHABILITATION | Age: 57
End: 2022-02-08
Attending: PHYSICAL MEDICINE & REHABILITATION
Payer: COMMERCIAL

## 2022-02-08 ENCOUNTER — PHYSICAL THERAPY (OUTPATIENT)
Dept: PHYSICAL THERAPY | Facility: REHABILITATION | Age: 57
End: 2022-02-08
Attending: PHYSICAL MEDICINE & REHABILITATION
Payer: COMMERCIAL

## 2022-02-08 DIAGNOSIS — I69.319 COGNITIVE DEFICIT FOLLOWING CEREBROVASCULAR ACCIDENT (CVA): ICD-10-CM

## 2022-02-08 DIAGNOSIS — M62.838 OTHER MUSCLE SPASM: ICD-10-CM

## 2022-02-08 DIAGNOSIS — I63.511 ACUTE RIGHT MCA STROKE (HCC): ICD-10-CM

## 2022-02-08 DIAGNOSIS — I63.411 CEREBROVASCULAR ACCIDENT (CVA) DUE TO EMBOLISM OF RIGHT MIDDLE CEREBRAL ARTERY (HCC): ICD-10-CM

## 2022-02-08 PROCEDURE — 97116 GAIT TRAINING THERAPY: CPT

## 2022-02-08 PROCEDURE — 92507 TX SP LANG VOICE COMM INDIV: CPT

## 2022-02-08 NOTE — OP THERAPY DAILY TREATMENT
Outpatient Speech Therapy  DAILY TREATMENT     [facility] Speech [address].  [address]  [place] [address]  Phone:  [phone]  Fax:  [phone]    Date: 02/08/2022    Patient: Thong Arzola  YOB: 1965  MRN: [id number]     Time Calculation    Start time: 1415  Stop time: 1500 Time Calculation (min): 45 minutes         Chief Complaint: Other (Higher level cognitive)    Visit #: 8    Subjective:   Reason for Therapy:     Reason For Evaluation:  CVA and Cognition    Onset Date:  3/31/2021  Social Support:     ST Subjective  Patient Mental Status: ALert and cooperative.  Progress Factors:     Progression:  Getting Better  Additional Subjective Comments:      Patient reported no significant changes          Objective:   Treatments/Interventions Performed:  Cognitive-Linguistic training, Patient/Caregiver education and Home program  Objective Details:  1. Patient will improve attention completing complex, divided attention tasks with 80% accuracy,with mod cues.  Not formally addressed.  2. Patient will improve executive functions completing complex problem solving and reasoning tasks with 80% accuracy, with mod cues.  --Progressing, patient completed deductive puzzle table with mod-max cues.  When clues are broken down, patient is able to deduct information 70%  3. Patient will improve left neglect and visuospatial skills by completing visual scanning and orientation tasks with 80% accuracy with mod cues  --Progressing, patient completed trail making task with no errors, and good scanning and execution.  Patient assembled 4 and 9 cubes to match pictures 80% with prolonged processing time.         Speech Therapy Assessment:     Cognitive Linguistic Assessment:     Patient attention selective: Minimal (self-cued to look to the left after initial errors)    Patient attention divided: Moderate    Patient complex reasoning ability: Moderate    ST Cognitive-Linguistic Assessment  "L29: Min-moderate.        Speech Therapy Plan :   Prognosis & Recommendations  Impression Summary:  Patient actively participated in all therapy tasks. Patient continues to require cues to formulate strategies to organize thoughts to complete deductive reasoning tasks. Patient continues to present with deficits with left-neglect and visual scanning, executive functions, as well as higher level problem solving and reasoning.  Patient would benefit from continued speech therapy to improve cognitive linguistic functions. Patient verbalized understanding and agreement with current plan of care.  Prognosis:  Good  Goals  Short Term Goals:  1. Patient will improve attention completing complex, divided attention tasks with 80% accuracy,with mod cues.  2. Patient will improve executive functions completing complex problem solving and reasoning tasks with 80% accuracy, with mod cues.  3. Patient will improve left neglect and visuospatial skills by completing visual scanning and orientation tasks with 80% accuracy with mod cues.   Short Term Goal Duration (Weeks):  6-8 weeks  Long Term Goals:  Per observation and patient report, patient will demonstrate functional cognitive linguistic skills, including executive function, memory, and visuospatial skills, in 85% opportunities across several different environments.  Long Term Goal Duration (Weeks):  6-9 months  Patient Stated Goal:  \"Better place to do physical work, driving\"  Potential barriers to Goal Achievement:  Vision  Therapy Recommendations  Recommendation:  Individual Speech Therapy,  Planned Therapy Interventions:  Cognitive-Linguistic training, Home Program and Patient/Caregiver Education,   Frequency:  2x week (16 X 92507)  Duration (in visits):  16  Duration (in weeks):  8               "

## 2022-02-08 NOTE — OP THERAPY DAILY TREATMENT
"  Outpatient Physical Therapy  DAILY TREATMENT     [facility] Physical [address].  [address]  [place] [address]  Phone:  [phone]  Fax:  [phone]    Date: 02/08/2022    Patient: Thong Arzola  YOB: 1965  MRN: [id number]     Time Calculation    Start time: 1532  Stop time: 1610 Time Calculation (min): 38 minutes     Chief Complaint: Difficulty Walking    Visit #: 26    SUBJECTIVE:  The pt reports that he experienced soreness throughout his entire L LE after last visit. He denies any focal pain or discomfort, but more of a \"soreness\". However he reports that his goals are to walk better and get his heart rate up and acknowledges that the treadmill walking is a good way to do this.     OBJECTIVE:  Current objective measures: Pre-session vitals: /92, HR 76 bpm, O2 96%          Therapeutic Exercises (CPT 90014):       Therapeutic Exercise Summary: VISIT 3/40 before auth on 1/13, note number 20 on epic    Therapeutic Treatments and Modalities:     1. Gait Training (CPT 04122), See below    Therapeutic Treatment and Modalities Summary: Gait Training: LiteGait treadmill training with harness used for pt safety, no BWS utilized    1st round at 1.4 mph with R UE assist x 5:14 minutes, vitals post /86, HR up to 107 bpm, O2 94%  2nd round: 1.4 mph with R UE assist to 7:30 minutes, vitals post /92 BP,  bpm, O2 93%,   3rd round: 1.0 mph with R UE assist to 11 minutes,    post /96,  bpm, O2 95%   4th round: 1.0 mph with R UE assist to 14 minutes      Total walking distance: 1360 ft  Total walking time: 14 minutes        Time-based treatments/modalities:    Physical Therapy Timed Treatment Charges  Gait training minutes (CPT 12171): 38 minutes    ASSESSMENT:   Response to treatment: The pt demonstrated improved swing phase of gait on the L LE during treadmill training this session. He continues to fatigue aerobically, but his vitals were WNL " throughout the session. The pt is motivated to participate in PT in order to improve his CLOF and independence.     PLAN/RECOMMENDATIONS:   Plan for treatment: therapy treatment to continue next visit.  Planned interventions for next visit: continue with current treatment. Continue with progression of gait training to promote functional mobility and independence.

## 2022-02-09 ENCOUNTER — SPEECH THERAPY (OUTPATIENT)
Dept: SPEECH THERAPY | Facility: REHABILITATION | Age: 57
End: 2022-02-09
Attending: PHYSICAL MEDICINE & REHABILITATION
Payer: COMMERCIAL

## 2022-02-09 ENCOUNTER — OCCUPATIONAL THERAPY (OUTPATIENT)
Dept: OCCUPATIONAL THERAPY | Facility: REHABILITATION | Age: 57
End: 2022-02-09
Attending: PHYSICIAN ASSISTANT
Payer: COMMERCIAL

## 2022-02-09 DIAGNOSIS — I69.319 COGNITIVE DEFICIT FOLLOWING CEREBROVASCULAR ACCIDENT (CVA): ICD-10-CM

## 2022-02-09 DIAGNOSIS — I63.411 CEREBROVASCULAR ACCIDENT (CVA) DUE TO EMBOLISM OF RIGHT MIDDLE CEREBRAL ARTERY (HCC): ICD-10-CM

## 2022-02-09 DIAGNOSIS — I63.511 ACUTE RIGHT MCA STROKE (HCC): ICD-10-CM

## 2022-02-09 PROCEDURE — 97112 NEUROMUSCULAR REEDUCATION: CPT

## 2022-02-09 PROCEDURE — 97140 MANUAL THERAPY 1/> REGIONS: CPT

## 2022-02-09 PROCEDURE — 92507 TX SP LANG VOICE COMM INDIV: CPT

## 2022-02-09 NOTE — OP THERAPY DAILY TREATMENT
"  Outpatient Speech Therapy  DAILY TREATMENT     [facility] Speech [address].  [address]  [place] [address]  Phone:  [phone]  Fax:  [phone]    Date: 02/09/2022    Patient: Thong Arzola  YOB: 1965  MRN: [id number]     Time Calculation    Start time: 0932  Stop time: 1019 Time Calculation (min): 47 minutes         Chief Complaint: Other (Left neglect, HIgher level problem solving)    Visit #: 9    Subjective:   Reason for Therapy:     Reason For Evaluation:  CVA and Cognition    Onset Date:  3/31/2021  Social Support:     ST Subjective  Patient Mental Status: ALert and cooperative.  Progress Factors:     Progression:  Getting Better  Additional Subjective Comments:      Patient reported improvement in OT with mazes          Objective:   Objective Details:  1. Patient will improve attention completing complex, divided attention tasks with 80% accuracy,with mod cues.  --Not formally addressed  2. Patient will improve executive functions completing complex problem solving and reasoning tasks with 80% accuracy, with mod cues.  --Progressing.  Patient completed inference task where statements were rated \"true,false, or unknown\" with 75% accuracy, with the unknown clues being most difficult. Placing 4 words in order (not specified), 65%.  Reading multi-step directives to create drawings, 75%.  3. Patient will improve left neglect and visuospatial skills by completing visual scanning and orientation tasks with 80% accuracy with mod cues.   --Patient completed trail making task with 3 alternating shapes.  Connected all shapes in correct order, but initially was not attending to those on the left side.  Overall very scattered connections.  Patient completed task again with cues for finding the closest next shape with mod cues for scanning.      Speech Therapy Assessment:     Cognitive Linguistic Assessment:     Patient attention selective: Minimal (self-cued to look to " "the left after initial errors)    Patient attention divided: Moderate    Patient complex reasoning ability: Moderate    ST Cognitive-Linguistic Assessment L29: Min-moderate.        Speech Therapy Plan :   Prognosis & Recommendations  Impression Summary:  Patient actively participated in all therapy tasks. Patient continues to require cues to formulate strategies to organize thoughts to complete deductive reasoning tasks. Patient continues to present with deficits with left-neglect and visual scanning, executive functions, as well as higher level problem solving and reasoning.  Patient would benefit from continued speech therapy to improve cognitive linguistic functions. Patient verbalized understanding and agreement with current plan of care.  Prognosis:  Good  Goals  Short Term Goals:  1. Patient will improve attention completing complex, divided attention tasks with 80% accuracy,with mod cues.  2. Patient will improve executive functions completing complex problem solving and reasoning tasks with 80% accuracy, with mod cues.  3. Patient will improve left neglect and visuospatial skills by completing visual scanning and orientation tasks with 80% accuracy with mod cues.   Short Term Goal Duration (Weeks):  6-8 weeks  Long Term Goals:  Per observation and patient report, patient will demonstrate functional cognitive linguistic skills, including executive function, memory, and visuospatial skills, in 85% opportunities across several different environments.  Long Term Goal Duration (Weeks):  6-9 months  Patient Stated Goal:  \"Better place to do physical work, driving\"  Potential barriers to Goal Achievement:  Vision  Therapy Recommendations  Recommendation:  Individual Speech Therapy,  Planned Therapy Interventions:  Cognitive-Linguistic training, Home Program and Patient/Caregiver Education,   Frequency:  2x week (16 X 92507)  Duration (in visits):  16  Duration (in weeks):  8               "

## 2022-02-09 NOTE — OP THERAPY DAILY TREATMENT
"  Outpatient Occupational Therapy  DAILY TREATMENT     [facility] Occupational Therapy [address]  [address]  [place] [address]  Phone:  [phone]  Fax:  [phone]    Date: 02/09/2022    Patient: Thong Arzola  YOB: 1965  MRN: [id number]     Time Calculation  Start time: 0845  Stop time: 0930 Time Calculation (min): 45 minutes         Chief Complaint: Extremity Weakness and Self Care Duties    Visit #: 30    SUBJECTIVE:  My arm is feeling a bit looser    OBJECTIVE:  Current objective measures:   PROM:   Upper extremity (left):     Shoulder flexion: Below functional limits     0-90    Shoulder extension: 0-40    Shoulder abduction: Below functional limits 0-80    Shoulder external rotation: Below functional limits 0-15    Forearm supination: Below functional limits 0-50   wrist and digits now have full PROM     Passive Range of Motion Comments:  1\" finger anterior subluxation of L humerus noted     Strength:     Right upper extremity strength within functional limits.    Upper extremity strength (left):     Shoulder flexion: 1    Shoulder extension:  1    Shoulder abduction: 1    Shoulder adduction: 1    Shoulder external rotation:  0    Shoulder internal rotation: 1    Elbow flexion: 3-    Elbow extension: 1    Forearm pronation: 3    Forearm supination: 0    Wrist flexion: 1    Wrist extension: 0    Fingers flexion: 0    Fingers extension: 0    Fingers abduction: 0    Fingers adduction: 0     , Prehension, Pinch:  Unable at this time  Tone, Sensation and Coordination:   Tone:     Left upper extremity muscle tone: Flaccid    Right upper extremity muscle tone: Normal    Left lower extremity muscle tone: Gross assist     Modified Jeremiah:   Upper extremity (left):     Shoulder flexors: 0    Shoulder extensors: 0    Shoulder external rotators: 0    Shoulder internal rotators: 1    Scapular retraction: 1    Scapular elevation: 1    Elbow flexors: 1    Elbow extensors: " 0    Wrist flexors: 0    Wrist extensors: 0    Finger flexors: 0    Finger extensors: 0     Coordination   Absent in L UE     Cognition:     Orientation: normal to time, normal to place and normal to person    Direction following: three step    Short term memory: intact    Long term memory: intact    Attention span: intact    Sequencing: intact    Organization: intact    Problem solving: intact    Judgement and safety awareness: intact    Hearing: intact     Vision/Perception:     Visual tracking: intact    Convergence: intact    Visual attentions: intact    Visual scanning: intact    R/L hemianopsia: present    R/L neglect: present    Vision assistive device(s): reading glasses     Vision/Perception Comments:   Bell's Test = 31/35 in 3 minutes.        Maze Test=25 seconds     Activities of Daily Living:   Transfers/Mobility:     Bed/chair transfers: Mod I assist    Wheelchair transfers: Mod I    Sit to stand: Mod I     Toilet transfers: supervision/SBA    Tub/shower transfers: contact guard assist    Bed mobility: Supervision assist     Toileting:     Toileting: stand by assist    Hygiene: minimum assist    Clothing management: minimum assist    Toileting position: sitting     Bathing:     Bathing: set up/sup    Bathing position: sitting    Washing hair: supervision    Washing back: mod I    Washing upper body: Mod I    Washing lower body: Mod I     Bathing assistive device(s): shower chair     Dressing:     Dressing: Min A     Dressing upper body: stand by assist with t-shirt, SBA/CGA with jackets    Dressing lower body: Mod I except needs assistance for getting shoe over AFO    Socks: min assist    Shoes: left one over AFO     Grooming:     Brushing teeth or denture care: Mod I     Shaving: Mod I     Feeding:     Using utensils: minimum assist    Cutting food: Mod I using cutting board.       Household Management:     Housekeeping: maximum assist    Laundry: maximum assist    Meal preparation: min A   assist    Medication management: supervision    Shopping: maximum assist    Writing: independent      Exercises/Treatments  Time-based treatments/modalities:  Neuromusc re-ed minutes (CPT 86081): 30 minutes  Manual therapy joint mobilization minutes (CPT 42009): 15 minutes        Pain rating before treatment: 0  Pain rating after treatment: 0    ASSESSMENT:   Response to treatment: improvement in flexor strength and Maze task test.     PLAN/RECOMMENDATIONS:   Plan for treatment: therapy treatment to continue next visit.  Planned interventions for next visit: continue with current treatment, E-stim attended (CPT 10646), manual therapy (CPT 35523), neuromuscular re-education (CPT 26270), self care ADL training (CPT 91381), therapeutic activities (CPT 39044) and therapeutic exercise (CPT 29764)

## 2022-02-10 ENCOUNTER — PHYSICAL THERAPY (OUTPATIENT)
Dept: PHYSICAL THERAPY | Facility: REHABILITATION | Age: 57
End: 2022-02-10
Attending: PHYSICAL MEDICINE & REHABILITATION
Payer: COMMERCIAL

## 2022-02-10 DIAGNOSIS — M62.838 OTHER MUSCLE SPASM: ICD-10-CM

## 2022-02-10 DIAGNOSIS — I63.511 ACUTE RIGHT MCA STROKE (HCC): ICD-10-CM

## 2022-02-10 PROCEDURE — 97116 GAIT TRAINING THERAPY: CPT

## 2022-02-10 PROCEDURE — 97112 NEUROMUSCULAR REEDUCATION: CPT

## 2022-02-10 NOTE — OP THERAPY DAILY TREATMENT
Outpatient Physical Therapy  DAILY TREATMENT     [facility] Physical Therapy [address].  [address]  [place] [address]  Phone:  [phone]  Fax:  [phone]    Date: 02/10/2022    Patient: Thong Arzola  YOB: 1965  MRN: [id number]     Time Calculation    Start time: 1534  Stop time: 1612 Time Calculation (min): 38 minutes         Chief Complaint: Difficulty Walking and Loss Of Balance    Visit #: 27    SUBJECTIVE:  The pt reports he still has soreness in his L LE but is agreeable to PT.     OBJECTIVE:          Therapeutic Exercises (CPT 69237):       Therapeutic Exercise Summary: VISIT 3/40 before auth on 1/13, note number 20 on epic    Therapeutic Treatments and Modalities:     1. Gait Training (CPT 41482), See below    2. Neuromuscular Re-education (CPT 46034), See below    Therapeutic Treatment and Modalities Summary: Gait Training:  Functional overground gait training with SPC including even surface with external cueing for step symmetry indoors, then progressed outdoors. Pt completed overground walking with SPC on grassy surface x40 ft, CGA with gait belt donned. Pt then performed 2x30' ambulation on grassy surface without an AD, one LOB requiring therapist Min A for stability. Stair training outdoors with reciprocal pattern, maximal use of the R UE on handrail, but pt able to ambulate reciprocally up and down.     Neuromuscular Re-education:  Unstable diane surface walking outdoors with SPC progressed to sit to stand on bench. Min use of UEs to stand, no UEs to sit x10 reps with eccentric control. Added L visual field dual tasking of object recognition and description in L visual field    Pt educated that he can transition to use of SPC for gait and to focus on increased walking velocity during straight overground walking. Pt educated not to walk on icy or snowy surfaces.     Time-based treatments/modalities:    Physical Therapy Timed Treatment Charges  Gait training  minutes (CPT 03592): 23 minutes  Neuromusc re-ed, balance, coor, post minutes (CPT 65394): 15 minutes    ASSESSMENT:   Response to treatment: The pt did well with progression of ambulation to outdoor surfaces including grass and rocks. He still demonstrates limited stance time through the L LE but was able to improve stance time with cueing. He performed reciprocal stair ascending and descending but demonstrates limited control of the L LE during swing to step upwards and significant adduction during swing while descending. The pt continues to improve in his independence and functional mobility and will benefit from ongoing skilled therapy to maximize his CLOF, safety, and independence.     PLAN/RECOMMENDATIONS:   Plan for treatment: therapy treatment to continue next visit.  Planned interventions for next visit: continue with current treatment.

## 2022-02-12 ENCOUNTER — APPOINTMENT (OUTPATIENT)
Dept: RADIOLOGY | Facility: MEDICAL CENTER | Age: 57
End: 2022-02-12
Attending: EMERGENCY MEDICINE
Payer: COMMERCIAL

## 2022-02-12 ENCOUNTER — HOSPITAL ENCOUNTER (EMERGENCY)
Facility: MEDICAL CENTER | Age: 57
End: 2022-02-13
Attending: EMERGENCY MEDICINE
Payer: COMMERCIAL

## 2022-02-12 DIAGNOSIS — R56.9 SEIZURE (HCC): ICD-10-CM

## 2022-02-12 LAB
ANION GAP SERPL CALC-SCNC: 15 MMOL/L (ref 7–16)
BASOPHILS # BLD AUTO: 0.2 % (ref 0–1.8)
BASOPHILS # BLD: 0.02 K/UL (ref 0–0.12)
BUN SERPL-MCNC: 18 MG/DL (ref 8–22)
CALCIUM SERPL-MCNC: 9.5 MG/DL (ref 8.5–10.5)
CHLORIDE SERPL-SCNC: 104 MMOL/L (ref 96–112)
CO2 SERPL-SCNC: 20 MMOL/L (ref 20–33)
CREAT SERPL-MCNC: 0.93 MG/DL (ref 0.5–1.4)
EKG IMPRESSION: NORMAL
EOSINOPHIL # BLD AUTO: 0.08 K/UL (ref 0–0.51)
EOSINOPHIL NFR BLD: 1 % (ref 0–6.9)
ERYTHROCYTE [DISTWIDTH] IN BLOOD BY AUTOMATED COUNT: 40.6 FL (ref 35.9–50)
GLUCOSE SERPL-MCNC: 125 MG/DL (ref 65–99)
HCT VFR BLD AUTO: 45.2 % (ref 42–52)
HGB BLD-MCNC: 15.7 G/DL (ref 14–18)
IMM GRANULOCYTES # BLD AUTO: 0.02 K/UL (ref 0–0.11)
IMM GRANULOCYTES NFR BLD AUTO: 0.2 % (ref 0–0.9)
LACTATE BLD-SCNC: 0.9 MMOL/L (ref 0.5–2)
LYMPHOCYTES # BLD AUTO: 1.39 K/UL (ref 1–4.8)
LYMPHOCYTES NFR BLD: 16.7 % (ref 22–41)
MCH RBC QN AUTO: 28.7 PG (ref 27–33)
MCHC RBC AUTO-ENTMCNC: 34.7 G/DL (ref 33.7–35.3)
MCV RBC AUTO: 82.6 FL (ref 81.4–97.8)
MONOCYTES # BLD AUTO: 0.49 K/UL (ref 0–0.85)
MONOCYTES NFR BLD AUTO: 5.9 % (ref 0–13.4)
NEUTROPHILS # BLD AUTO: 6.3 K/UL (ref 1.82–7.42)
NEUTROPHILS NFR BLD: 76 % (ref 44–72)
NRBC # BLD AUTO: 0 K/UL
NRBC BLD-RTO: 0 /100 WBC
PLATELET # BLD AUTO: 296 K/UL (ref 164–446)
PMV BLD AUTO: 8.9 FL (ref 9–12.9)
POTASSIUM SERPL-SCNC: 4.2 MMOL/L (ref 3.6–5.5)
RBC # BLD AUTO: 5.47 M/UL (ref 4.7–6.1)
SODIUM SERPL-SCNC: 139 MMOL/L (ref 135–145)
WBC # BLD AUTO: 8.3 K/UL (ref 4.8–10.8)

## 2022-02-12 PROCEDURE — 80048 BASIC METABOLIC PNL TOTAL CA: CPT

## 2022-02-12 PROCEDURE — 94760 N-INVAS EAR/PLS OXIMETRY 1: CPT

## 2022-02-12 PROCEDURE — 99285 EMERGENCY DEPT VISIT HI MDM: CPT

## 2022-02-12 PROCEDURE — 93005 ELECTROCARDIOGRAM TRACING: CPT | Performed by: EMERGENCY MEDICINE

## 2022-02-12 PROCEDURE — 93005 ELECTROCARDIOGRAM TRACING: CPT

## 2022-02-12 PROCEDURE — 85025 COMPLETE CBC W/AUTO DIFF WBC: CPT

## 2022-02-12 PROCEDURE — 70450 CT HEAD/BRAIN W/O DYE: CPT

## 2022-02-12 PROCEDURE — 36415 COLL VENOUS BLD VENIPUNCTURE: CPT

## 2022-02-12 PROCEDURE — 83605 ASSAY OF LACTIC ACID: CPT

## 2022-02-12 ASSESSMENT — FIBROSIS 4 INDEX: FIB4 SCORE: 0.51

## 2022-02-13 VITALS
RESPIRATION RATE: 18 BRPM | HEART RATE: 75 BPM | SYSTOLIC BLOOD PRESSURE: 120 MMHG | HEIGHT: 70 IN | TEMPERATURE: 98.4 F | OXYGEN SATURATION: 93 % | BODY MASS INDEX: 24.34 KG/M2 | DIASTOLIC BLOOD PRESSURE: 80 MMHG | WEIGHT: 170 LBS

## 2022-02-13 DIAGNOSIS — Z86.73 HISTORY OF STROKE: ICD-10-CM

## 2022-02-13 DIAGNOSIS — R56.9 SEIZURE (HCC): ICD-10-CM

## 2022-02-13 PROCEDURE — 700102 HCHG RX REV CODE 250 W/ 637 OVERRIDE(OP): Performed by: EMERGENCY MEDICINE

## 2022-02-13 PROCEDURE — A9270 NON-COVERED ITEM OR SERVICE: HCPCS | Performed by: EMERGENCY MEDICINE

## 2022-02-13 RX ORDER — LEVETIRACETAM 500 MG/1
1000 TABLET ORAL ONCE
Status: COMPLETED | OUTPATIENT
Start: 2022-02-13 | End: 2022-02-13

## 2022-02-13 RX ORDER — LEVETIRACETAM 500 MG/1
1000 TABLET ORAL 2 TIMES DAILY
Qty: 60 TABLET | Refills: 2 | Status: SHIPPED | OUTPATIENT
Start: 2022-02-13 | End: 2022-02-17 | Stop reason: SDUPTHER

## 2022-02-13 RX ADMIN — LEVETIRACETAM 1000 MG: 500 TABLET, FILM COATED ORAL at 00:52

## 2022-02-13 NOTE — PROGRESS NOTES
Neurology note    56M with history of R MCA stroke in April 2021, s/p craniectomy and cranioplasty.  Doing well post-stroke, now residing at home.  Tonight with seizure event.  No infectious prodrome, currently back at neurologic baseline.  Head CT without acute pathology.    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  Will place referral to [facility]psy clinic.    Arthur Jackson MD  Acute Inpatient Neurology

## 2022-02-13 NOTE — ED NOTES
"Pt discharged home. IV discontinued and gauze placed, pt in possession of belongings. Pt provided discharge education and information pertaining to medications and follow up appointments. Pt received copy of discharge instructions and verbalized understanding. /80   Pulse 75   Temp 36.9 °C (98.4 °F) (Temporal)   Resp 18   Ht 1.778 m (5' 10\")   Wt 77.1 kg (170 lb)   SpO2 93%   BMI 24.39 kg/m²   "

## 2022-02-13 NOTE — ED PROVIDER NOTES
ED Provider Note    CHIEF COMPLAINT  Chief Complaint   Patient presents with   • Seizure       HPI  Thong Arzola is a 56 y.o. male who presents to the emergency department with seizure like activity. Past medical history as documented below. He did have stroke approximately 10 months ago with chronic left-sided hemiplegia. He has regained some function of the left lower extremity but remains without any motor function of left upper extremity.     Tonight while the patient was sitting on the couch his wife noticed that he started to have some spasm movement of the left upper extremity with his became more generalized to the entire body at which point the patient became unconscious. Wife states that he was altered for approximately 10 to 15 minutes and then started to awaken. He was somewhat confused initially and then had improvement of mentation after paramedics arrived. Currently his back to his baseline with no current complaint. No prior history of seizures inclusive of after his stroke. Not currently on any antiepileptics.    REVIEW OF SYSTEMS  See HPI for further details. All other systems are negative.     PAST MEDICAL HISTORY   has a past medical history of Afib ([facility]), COVID-19, Disorder of thyroid, High cholesterol, Psychiatric problem, and Stroke ([facility]) (2021).    SOCIAL HISTORY  Social History     Tobacco Use   • Smoking status: Never Smoker   • Smokeless tobacco: Never Used   Vaping Use   • Vaping Use: Never used   Substance and Sexual Activity   • Alcohol use: Yes     Comment: occ   • Drug use: No   • Sexual activity: Not on file       SURGICAL HISTORY   has a past surgical history that includes knee reconstruction; craniotomy (Right, 4/3/2021); and cranioplasty (Right, 6/23/2021).    CURRENT MEDICATIONS  Home Medications    **Home medications have not yet been reviewed for this encounter**         ALLERGIES  No Known Allergies    PHYSICAL EXAM  VITAL SIGNS: /80   Pulse 75   Temp 36.9 °C  "(98.4 °F) (Temporal)   Resp 18   Ht 1.778 m (5' 10\")   Wt 77.1 kg (170 lb)   SpO2 93%   BMI 24.39 kg/m²  @ANAND[[id number]::@   Pulse ox interpretation: I interpret this pulse ox as normal.  Constitutional: Alert in no apparent distress.  HENT: No signs of trauma, Bilateral external ears normal, Nose normal. No oral trauma  Eyes: Pupils are equal and reactive  Neck: Normal range of motion, No tenderness, Supple  Cardiovascular: Regular rate and rhythm, no murmurs.   Thorax & Lungs: Normal breath sounds, No respiratory distress, No wheezing, No chest tenderness.   Abdomen: Bowel sounds normal, Soft, No tenderness. : no incontinence  Skin: Warm, Dry, No erythema, No rash.   Extremities: Intact distal pulses, No edema, No tenderness  Musculoskeletal: 3/5 strength left lower extremity. No movement of left upper extremity. Full range of motion and normal motor function of right upper and lower extremities.  Neurologic: Alert , Normal motor function, Normal sensory function, No focal deficits noted.   Psychiatric: Affect normal, Judgment normal, Mood normal.       DIAGNOSTIC STUDIES / PROCEDURES      LABS  Results for orders placed or performed during the hospital encounter of 02/12/22   CBC WITH DIFFERENTIAL   Result Value Ref Range    WBC 8.3 4.8 - 10.8 K/uL    RBC 5.47 4.70 - 6.10 M/uL    Hemoglobin 15.7 14.0 - 18.0 g/dL    Hematocrit 45.2 42.0 - 52.0 %    MCV 82.6 81.4 - 97.8 fL    MCH 28.7 27.0 - 33.0 pg    MCHC 34.7 33.7 - 35.3 g/dL    RDW 40.6 35.9 - 50.0 fL    Platelet Count 296 164 - 446 K/uL    MPV 8.9 (L) 9.0 - 12.9 fL    Neutrophils-Polys 76.00 (H) 44.00 - 72.00 %    Lymphocytes 16.70 (L) 22.00 - 41.00 %    Monocytes 5.90 0.00 - 13.40 %    Eosinophils 1.00 0.00 - 6.90 %    Basophils 0.20 0.00 - 1.80 %    Immature Granulocytes 0.20 0.00 - 0.90 %    Nucleated RBC 0.00 /100 WBC    Neutrophils (Absolute) 6.30 1.82 - 7.42 K/uL    Lymphs (Absolute) 1.39 1.00 - 4.80 K/uL    Monos (Absolute) 0.49 0.00 - 0.85 K/uL    " Eos (Absolute) 0.08 0.00 - 0.51 K/uL    Baso (Absolute) 0.02 0.00 - 0.12 K/uL    Immature Granulocytes (abs) 0.02 0.00 - 0.11 K/uL    NRBC (Absolute) 0.00 K/uL   BASIC METABOLIC PANEL   Result Value Ref Range    Sodium 139 135 - 145 mmol/L    Potassium 4.2 3.6 - 5.5 mmol/L    Chloride 104 96 - 112 mmol/L    Co2 20 20 - 33 mmol/L    Glucose 125 (H) 65 - 99 mg/dL    Bun 18 8 - 22 mg/dL    Creatinine 0.93 0.50 - 1.40 mg/dL    Calcium 9.5 8.5 - 10.5 mg/dL    Anion Gap 15.0 7.0 - 16.0   LACTIC ACID   Result Value Ref Range    Lactic Acid 0.9 0.5 - 2.0 mmol/L   ESTIMATED GFR   Result Value Ref Range    GFR If African American >60 >60 mL/min/1.73 m 2    GFR If Non African American >60 >60 mL/min/1.73 m 2   EKG   Result Value Ref Range    Report       [facility]    Test Date:  2022  Pt Name:    LUCY JOHNSON                 Department: ER  MRN:        [id number]                      Room:       RD 11  Gender:     Male                         Technician: [id number]  :        1965                   Requested By:ER TRIAGE PROTOCOL  Order #:    [id number]                    Reading MD:    Measurements  Intervals                                Axis  Rate:       97                           P:          57  NM:         180                          QRS:        -40  QRSD:       82                           T:          -39  QT:         344  QTc:        437    Interpretive Statements  SINUS RHYTHM  LEFT AXIS DEVIATION  NONSPECIFIC T ABNORMALITIES, INFERIOR LEADS  ARTIFACT IN LEAD(S) I,III,aVR,aVL,aVF,V1,V2,V3,V4,V5,V6  Compared to ECG 2021 16:08:45  No significant changes           RADIOLOGY  CT-HEAD W/O   Final Result         Postsurgical changes from right frontal craniotomy.      Thin hyperdense right subdural area underneath the bone flap is indeterminant and could be prominent vascularity or chronic change/calcification. Residual hemorrhage is in the differential. MRI could be  helpful for definitive diagnosis.      No mass effect or midline shift.                     COURSE & MEDICAL DECISION MAKING  Pertinent Labs & Imaging studies reviewed. (See chart for details)  56-year-old male presented to the emergency department with new onset seizure. History as above. Relative recent stroke. Workup tonight large unremarkable. I discussed the case with Dr. andrea on call for neurology. At this point the patient will be started on Keppra. BID dosing thousand grams at home. He will be followed up in the office.   The patient will return for worsening symptoms and is stable at the time of discharge. The patient verbalizes understanding and will comply.    FINAL IMPRESSION  1. Seizure (HCC)            Electronically signed by: Michele Linares M.D., 2/12/2022 11:17 PM

## 2022-02-14 ENCOUNTER — APPOINTMENT (OUTPATIENT)
Dept: OCCUPATIONAL THERAPY | Facility: REHABILITATION | Age: 57
End: 2022-02-14
Attending: PHYSICIAN ASSISTANT
Payer: COMMERCIAL

## 2022-02-14 ENCOUNTER — APPOINTMENT (OUTPATIENT)
Dept: SPEECH THERAPY | Facility: REHABILITATION | Age: 57
End: 2022-02-14
Attending: PHYSICAL MEDICINE & REHABILITATION
Payer: COMMERCIAL

## 2022-02-15 ENCOUNTER — APPOINTMENT (OUTPATIENT)
Dept: PHYSICAL MEDICINE AND REHAB | Facility: REHABILITATION | Age: 57
End: 2022-02-15
Payer: COMMERCIAL

## 2022-02-15 ENCOUNTER — APPOINTMENT (OUTPATIENT)
Dept: SPEECH THERAPY | Facility: REHABILITATION | Age: 57
End: 2022-02-15
Attending: PHYSICAL MEDICINE & REHABILITATION
Payer: COMMERCIAL

## 2022-02-15 NOTE — PROGRESS NOTES
Mr. Arzola had first time seizure post-stroke 2/12/22. Planned to have Botox injection 2/15/22. Will reschedule for safety concerns.

## 2022-02-16 ENCOUNTER — APPOINTMENT (OUTPATIENT)
Dept: PHYSICAL THERAPY | Facility: REHABILITATION | Age: 57
End: 2022-02-16
Attending: PHYSICAL MEDICINE & REHABILITATION
Payer: COMMERCIAL

## 2022-02-16 ENCOUNTER — APPOINTMENT (OUTPATIENT)
Dept: OCCUPATIONAL THERAPY | Facility: REHABILITATION | Age: 57
End: 2022-02-16
Attending: PHYSICIAN ASSISTANT
Payer: COMMERCIAL

## 2022-02-17 ENCOUNTER — OFFICE VISIT (OUTPATIENT)
Dept: NEUROLOGY | Facility: MEDICAL CENTER | Age: 57
End: 2022-02-17
Attending: NURSE PRACTITIONER
Payer: COMMERCIAL

## 2022-02-17 VITALS
DIASTOLIC BLOOD PRESSURE: 82 MMHG | TEMPERATURE: 98.2 F | SYSTOLIC BLOOD PRESSURE: 136 MMHG | BODY MASS INDEX: 23.06 KG/M2 | OXYGEN SATURATION: 97 % | HEIGHT: 72 IN | HEART RATE: 77 BPM

## 2022-02-17 DIAGNOSIS — Z09 HOSPITAL DISCHARGE FOLLOW-UP: ICD-10-CM

## 2022-02-17 DIAGNOSIS — G40.109 LOCALIZATION-RELATED EPILEPSY (HCC): ICD-10-CM

## 2022-02-17 DIAGNOSIS — Z86.73 HISTORY OF STROKE: ICD-10-CM

## 2022-02-17 DIAGNOSIS — I69.352 SPASTIC HEMIPLEGIA OF LEFT DOMINANT SIDE AS LATE EFFECT OF CEREBRAL INFARCTION (HCC): ICD-10-CM

## 2022-02-17 DIAGNOSIS — Z13.31 SCREENING FOR DEPRESSION: ICD-10-CM

## 2022-02-17 DIAGNOSIS — F32.A DEPRESSION, UNSPECIFIED DEPRESSION TYPE: ICD-10-CM

## 2022-02-17 PROBLEM — I25.10 CAD IN NATIVE ARTERY: Status: ACTIVE | Noted: 2022-01-07

## 2022-02-17 PROBLEM — I63.9 CVA (CEREBRAL VASCULAR ACCIDENT) (HCC): Status: ACTIVE | Noted: 2021-03-31

## 2022-02-17 PROBLEM — E78.5 HYPERLIPIDEMIA: Status: ACTIVE | Noted: 2021-07-26

## 2022-02-17 PROCEDURE — 99212 OFFICE O/P EST SF 10 MIN: CPT | Performed by: NURSE PRACTITIONER

## 2022-02-17 PROCEDURE — 99215 OFFICE O/P EST HI 40 MIN: CPT | Performed by: NURSE PRACTITIONER

## 2022-02-17 PROCEDURE — 99417 PROLNG OP E/M EACH 15 MIN: CPT | Performed by: NURSE PRACTITIONER

## 2022-02-17 RX ORDER — LEVETIRACETAM 500 MG/1
500 TABLET ORAL 2 TIMES DAILY
Qty: 60 TABLET | Refills: 5 | Status: SHIPPED | OUTPATIENT
Start: 2022-02-17 | End: 2022-03-23

## 2022-02-17 RX ORDER — ROSUVASTATIN CALCIUM 20 MG/1
20 TABLET, COATED ORAL
COMMUNITY
Start: 2022-01-30 | End: 2022-03-16

## 2022-02-17 RX ORDER — RAMIPRIL 10 MG/1
10 CAPSULE ORAL 2 TIMES DAILY
COMMUNITY
Start: 2022-01-30 | End: 2022-05-02 | Stop reason: SDUPTHER

## 2022-02-17 RX ORDER — CELECOXIB 200 MG/1
200 CAPSULE ORAL 2 TIMES DAILY
COMMUNITY
End: 2022-03-01 | Stop reason: SDUPTHER

## 2022-02-17 ASSESSMENT — PATIENT HEALTH QUESTIONNAIRE - PHQ9: CLINICAL INTERPRETATION OF PHQ2 SCORE: 0

## 2022-02-21 ENCOUNTER — APPOINTMENT (OUTPATIENT)
Dept: OCCUPATIONAL THERAPY | Facility: REHABILITATION | Age: 57
End: 2022-02-21
Payer: COMMERCIAL

## 2022-02-22 ENCOUNTER — SPEECH THERAPY (OUTPATIENT)
Dept: SPEECH THERAPY | Facility: REHABILITATION | Age: 57
End: 2022-02-22
Attending: PHYSICAL MEDICINE & REHABILITATION
Payer: COMMERCIAL

## 2022-02-22 DIAGNOSIS — I63.411 CEREBROVASCULAR ACCIDENT (CVA) DUE TO EMBOLISM OF RIGHT MIDDLE CEREBRAL ARTERY (HCC): ICD-10-CM

## 2022-02-22 DIAGNOSIS — R41.4 LEFT-SIDED VISUAL NEGLECT: ICD-10-CM

## 2022-02-22 DIAGNOSIS — I69.319 COGNITIVE DEFICIT FOLLOWING CEREBROVASCULAR ACCIDENT (CVA): ICD-10-CM

## 2022-02-22 PROCEDURE — 92507 TX SP LANG VOICE COMM INDIV: CPT

## 2022-02-22 NOTE — OP THERAPY PROGRESS SUMMARY
Outpatient Physical Therapy  PROGRESS SUMMARY NOTE      [facility] Physical Therapy [facility]  [address]  [address]  [place] [address]  Phone:  [phone]  Fax:  [phone]    Date of Visit: 02/23/2022    Patient: Thong Arzola  YOB: 1965  MRN: [id number]     Referring Provider: Zulema Dumont D.O.  [address] [facility]  [address]  [place] [address]   Referring Diagnosis Cerebral infarction due to unspecified occlusion or stenosis of right middle cerebral artery [I63.511]     Visit Diagnoses     ICD-10-CM   1. Acute right MCA stroke (HCC)  I63.511       Rehab Potential: good    Progress Report Period: 1/19/22-2/23/22    Functional Assessment Used          Objective Findings and Assessment:   Patient progression towards goals: STG  1. Pt will demonstrate improved self selected gait speed .3m/s to dec fall risk. Progressing, .29m/s  2. Pt will demonstrate improved functional LE strength with 5STS score 28 seconds or less. MET  3. Pt will demonstrate improved gait mechanics with Rt step length reciprocal 75% or better without cueing with SBQC. Progressing, improves min/mod cues  4. Pt will stand, without cueing, with improved weight bearing through left LE to improve neglect. Progressing, continues to more heavily rely on Rt vs left.   5. Pt will demonstrate improved internal awareness of center with abilty to perform STS from elevated surface without Rt trunk lean/weight shift. MET    Short term goal time span:  6-8 weeks      Long Term Goals:    1. Pt will demonstrate improved CV endurance with 6MWT score 350 feet or better. Progressing  2. Pt will ambulate with LRAD and reciprocal gait pattern 100% of time without cueing to improve safety and mechanics. Progressing  3. Pt will demonstrate improved functional LE strength with 5STS score 17 seconds or less. Progressing  4. Pt will demonstrate improved functional mobility with TUG score 30 seconds or less. Progressing    Objective findings  and assessment details: Vitals:Rt 138/80 mmHG 99%, 80bpm  5STS 24. 40 1 UE support  6MWT  270 feet sbqc  TUG 43.28 sec, sbqc  10MWT: 33.91 sec Gait speed .29m/s    Reassessment of goals completed due to recent change in status in which pt suffered a seizure. Prior to return to PT neurologist did clear pt. At this time pt seizure did not result in any physical loses with pt actually demonstrating good progress towards functional mobility goals most notable increased gait speed and functional LE strength. Pt also has demonstrate improved gait mechanics with and withouy AD however unsafe to ambulate in home without AD at this time. Pt also continues to make good progress with outdioor gait training for increasing independence and improving community access. At this time pt continues to have significant deficits related to gait, strength, balance, and endurance and will continue to benefit from skilled PT intervention to maximize function and decrease reliance on caregiver.     Goals:   Short Term Goals:   1. Pt will demonstrate improved self selected gait speed .3m/s to dec fall risk.   2. Pt will demonstrate improved functional LE strength with 5STS score 20 seconds or less.   3. Pt will demonstrate improved gait mechanics with Rt step length reciprocal 75% or better without cueing with LRAD.   4. Pt will stand, without cueing, with improved weight bearing through left LE to improve neglect.   5. Pt will complete most appropriate balance assessment.       Short term goal time span:  4-6 weeks      Long Term Goals:    Long Term Goals:    1. Pt will demonstrate improved CV endurance with 6MWT score 400 feet or better.   2. Pt will ambulate with LRAD and reciprocal gait pattern 100% of time without cueing to improve safety and mechanics.   3. Pt will demonstrate improved functional LE strength with 5STS score 15 seconds or less.   4. Pt will demonstrate improved functional mobility with TUG score 30 seconds or less.   5.  Pt will ambulate on uneven surfaces with LRAD SPV to improve access to the community.   Long term goal time span:  2-4 months    Plan:   Planned therapy interventions:  Gait Training (CPT 88288), Therapeutic Activities (CPT 17818), Therapeutic Exercise (CPT 69648) and Neuromuscular Re-education (CPT 82469)  Frequency:  2x week  Duration in weeks:  12      Referring provider co-signature:  I have reviewed this plan of care and my co-signature certifies the need for services.     Certification Period: 02/23/2022 to 05/24/22    Physician Signature: ________________________________ Date: ______________

## 2022-02-22 NOTE — OP THERAPY DAILY TREATMENT
Outpatient Physical Therapy  DAILY TREATMENT     [facility] Physical Therapy [address]  [address]  [place] [address]  Phone:  [phone]  Fax:  [phone]    Date: 02/23/2022    Patient: Thong Arzola  YOB: 1965  MRN: [id number]     Time Calculation    Start time: 0930  Stop time: 1012 Time Calculation (min): 42 minutes         Chief Complaint: Difficulty Walking, Loss Of Balance, and Weakness    Visit #: 28    SUBJECTIVE:  Pt suffered seizure lasting 10-15 min with LOC on 2/12/22. Pt referred and cleared by neurology to return to PT. Pt reports he has not noticed any changes physically but did note speech therapy was significantly more challenging yesterday. Complains of overall fatigue but wonders if it is related to Keppra.    OBJECTIVE:    Vitals:Rt 138/80 mmHG 99%, 80bpm  5STS 24. 40 1 UE support  6MWT  270 feet sbqc  TUG 43.28 sec, sbqc  10MWT: 33.91 sec Gait speed .29m/s    Therapeutic Exercises (CPT 98208):     1. Reassessment , 5STS, TUG, 6MWT, 10MWT      Therapeutic Exercise Summary: VISIT 3/40 before auth on 1/13, note number 20 on epic    Therapeutic Treatments and Modalities:     1. Gait Training (CPT 17159), See below    Therapeutic Treatment and Modalities Summary: Gait Training: Pt ambulated with Sbqc x 50 with min cues for increase right step length.    Ambulating no A 3 x 50 feet, seated rest as needed. CGA throughout, no significant LOB. Min/mod Cues for quiet feet and increased right step length  Pt demod good carry over to cues. Able to perform step through gait positioning right foot 25-50% past left foot. (semi tandem type position) Occurred 50-75% of the time, 25% yael to gait-increased with fatigue. Continues to rely heavily on visual input for proprioceptive cues    Time-based treatments/modalities:    Physical Therapy Timed Treatment Charges  Gait training minutes (CPT 72355): 12 minutes  Therapeutic exercise minutes (CPT 48382): 30  minutes    ASSESSMENT:   Response to treatment: Reassessment of goals completed due to recent change in status in which pt suffered a seizure. Prior to return to PT neurologist did clear pt. At this time pt seizure did not result in any physical loses with pt actually demonstrating good progress towards functional mobility goals most notable increased gait speed and functional LE strength. Pt also has demonstrate improved gait mechanics with and withouy AD however unsafe to ambulate in home without AD at this time. Pt also continues to make good progress with outdioor gait training for increasing independence and improving community access. At this time pt continues to have significant deficits related to gait, strength, balance, and endurance and will continue to benefit from skilled PT intervention to maximize function and decrease reliance on caregiver.     STG  1. Pt will demonstrate improved self selected gait speed .3m/s to dec fall risk. Progressing, .29m/s  2. Pt will demonstrate improved functional LE strength with 5STS score 28 seconds or less. MET  3. Pt will demonstrate improved gait mechanics with Rt step length reciprocal 75% or better without cueing with SBQC. Progressing, improves min/mod cues  4. Pt will stand, without cueing, with improved weight bearing through left LE to improve neglect. Progressing, continues to more heavily rely on Rt vs left.   5. Pt will demonstrate improved internal awareness of center with abilty to perform STS from elevated surface without Rt trunk lean/weight shift. MET    Short term goal time span:  6-8 weeks      Long Term Goals:    1. Pt will demonstrate improved CV endurance with 6MWT score 350 feet or better. Progressing  2. Pt will ambulate with LRAD and reciprocal gait pattern 100% of time without cueing to improve safety and mechanics. Progressing  3. Pt will demonstrate improved functional LE strength with 5STS score 17 seconds or less. Progressing  4. Pt will  demonstrate improved functional mobility with TUG score 30 seconds or less. Progressing  PLAN/RECOMMENDATIONS:   Plan for treatment: therapy treatment to continue next visit.  Planned interventions for next visit: continue with current treatment.

## 2022-02-23 ENCOUNTER — PHYSICAL THERAPY (OUTPATIENT)
Dept: PHYSICAL THERAPY | Facility: REHABILITATION | Age: 57
End: 2022-02-23
Attending: PHYSICAL MEDICINE & REHABILITATION
Payer: COMMERCIAL

## 2022-02-23 ENCOUNTER — OCCUPATIONAL THERAPY (OUTPATIENT)
Dept: OCCUPATIONAL THERAPY | Facility: REHABILITATION | Age: 57
End: 2022-02-23
Attending: PHYSICIAN ASSISTANT
Payer: COMMERCIAL

## 2022-02-23 DIAGNOSIS — I63.511 ACUTE RIGHT MCA STROKE (HCC): ICD-10-CM

## 2022-02-23 PROCEDURE — 97112 NEUROMUSCULAR REEDUCATION: CPT

## 2022-02-23 PROCEDURE — 97110 THERAPEUTIC EXERCISES: CPT

## 2022-02-23 PROCEDURE — 97116 GAIT TRAINING THERAPY: CPT

## 2022-02-23 NOTE — OP THERAPY DAILY TREATMENT
"  Outpatient Speech Therapy  DAILY TREATMENT     [facility] Speech [place] [address]  [address]  [place] [address]  Phone:  [phone]  Fax:  [phone]    Date: 02/22/2022    Patient: Thong Arzola  YOB: 1965  MRN: [id number]     Time Calculation    Start time: 1546  Stop time: 1630 Time Calculation (min): 44 minutes         Chief Complaint: Poor Memory (Cognition, problem solving, left neglect)    Visit #: 10    Subjective:   Reason for Therapy:     Reason For Evaluation:  CVA and Cognition    Onset Date:  3/31/2021  Social Support:     ST Subjective  Patient Mental Status: ALert and cooperative.  Progress Factors:     Progression:  Getting Better  Additional Subjective Comments:      Patient reported feeling like he has regressed since seizure, feeling more fatigue.  Patient's wife reported high dose of seizure medication may be contributing to fatigue.          Objective:   Objective Details:  1. Patient will improve attention completing complex, divided attention tasks with 80% accuracy,with mod cues.  --Not formally addressed  2. Patient will improve executive functions completing complex problem solving and reasoning tasks with 80% accuracy, with mod cues.  --Progressing.  Patient completed inference task where statements were rated \"true,false, or unknown\" with 75% accuracy, with the unknown clues being most difficult. Placing 4 words in order (not specified), 65%.  Reading multi-step directives to create drawings, 75%.  3. Patient will improve left neglect and visuospatial skills by completing visual scanning and orientation tasks with 80% accuracy with mod cues.   --Patient completed trail making task with 3 alternating shapes. Max cues to connect shapes in specified order with organization of connecting to closest shapes.  Patient fatiges with this task, and completed 50 piece puzzle with mod-max cues to look to the left.  Noticeable regression with left " "neglect.         Speech Therapy Assessment:     Cognitive Linguistic Assessment:     Patient attention selective: Minimal (self-cued to look to the left after initial errors)    Patient attention divided: Moderate    Patient complex reasoning ability: Moderate    ST Cognitive-Linguistic Assessment L29: Min-moderate.        Speech Therapy Plan :   Prognosis & Recommendations  Impression Summary:  Patient actively participated in all therapy tasks.  Patient fatigued quickly with cognitive tasks, with left neglect more prevalent in tasks. Patient continues to require cues to formulate strategies to organize thoughts to complete deductive reasoning tasks. Patient continues to present with deficits with left-neglect and visual scanning, executive functions, as well as higher level problem solving and reasoning.  Patient would benefit from continued speech therapy to improve cognitive linguistic functions. Patient verbalized understanding and agreement with current plan of care.  Prognosis:  Good  Goals  Short Term Goals:  1. Patient will improve attention completing complex, divided attention tasks with 80% accuracy,with mod cues.  2. Patient will improve executive functions completing complex problem solving and reasoning tasks with 80% accuracy, with mod cues.  3. Patient will improve left neglect and visuospatial skills by completing visual scanning and orientation tasks with 80% accuracy with mod cues.   Short Term Goal Duration (Weeks):  6-8 weeks  Long Term Goals:  Per observation and patient report, patient will demonstrate functional cognitive linguistic skills, including executive function, memory, and visuospatial skills, in 85% opportunities across several different environments.  Long Term Goal Duration (Weeks):  6-9 months  Patient Stated Goal:  \"Better place to do physical work, driving\"  Potential barriers to Goal Achievement:  Vision  Therapy Recommendations  Recommendation:  Individual Speech " Therapy,  Planned Therapy Interventions:  Cognitive-Linguistic training, Home Program and Patient/Caregiver Education,   Frequency:  2x week (16 X 92507)  Duration (in visits):  16  Duration (in weeks):  8
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
## 2022-02-25 ENCOUNTER — PHYSICAL THERAPY (OUTPATIENT)
Dept: PHYSICAL THERAPY | Facility: REHABILITATION | Age: 57
End: 2022-02-25
Attending: PHYSICAL MEDICINE & REHABILITATION
Payer: COMMERCIAL

## 2022-02-25 DIAGNOSIS — M62.838 OTHER MUSCLE SPASM: ICD-10-CM

## 2022-02-25 DIAGNOSIS — I63.511 ACUTE RIGHT MCA STROKE (HCC): ICD-10-CM

## 2022-02-25 PROCEDURE — 97116 GAIT TRAINING THERAPY: CPT

## 2022-02-25 NOTE — OP THERAPY DAILY TREATMENT
Outpatient Physical Therapy  DAILY TREATMENT     [facility] Physical [address].  [address]  [place] [address]  Phone:  [phone]  Fax:  [phone]    Date: 02/25/2022    Patient: Thong Arzola  YOB: 1965  MRN: [id number]     Time Calculation    Start time: 0815  Stop time: 0857 Time Calculation (min): 42 minutes     Chief Complaint: Difficulty Walking, Loss Of Balance, and Weakness    Visit #: 29  PN completed visit 28, 2/23/22  SUBJECTIVE:  Brought new SPC he bought to try gait training with.     OBJECTIVE:  Current objective measures: Pre-session vitals: /88, HR 73 bpm, O2 97%          Therapeutic Exercises (CPT 54508):       Therapeutic Exercise Summary: VISIT 3/40 before auth on 1/13, note number 20 on epic    Therapeutic Treatments and Modalities:     1. Gait Training (CPT 14100), See below    Therapeutic Treatment and Modalities Summary: Gait Training: LiteGait treadmill training with harness used for pt safety, no BWS utilized    1st round at 1.5 mph with R UE assist x 5:26minutes, vitals post /90, HR up to 94 bpm, O2 96%  2nd round: 1.4 mph with R UE assist to 12:31 minutes, vitals post /92 BP,  bpm, O2 93%,   Total walking distance: 1053 ft  Total walking time: 12:31 min      Gait training with SPC CGA/Close  feet. Mod cues for increasing step length on Rt LE with improved carry over. Fair stabilty. Pt brought SPC and fit appropriately. Standing rests as needed.     Time-based treatments/modalities:    Physical Therapy Timed Treatment Charges  Gait training minutes (CPT 71155): 42 minutes    ASSESSMENT:   Response to treatment: Pt continues to respond well with improved gait mechanics. Continues to have min carry over from treadmill to level surfaces as he is more cautious/fearful. At this time not cleared to use SPC at home and pt verbalized understanding.   PLAN/RECOMMENDATIONS:   Plan for treatment: therapy treatment to  continue next visit.  Planned interventions for next visit: continue with current treatment. Continue with progression of gait training to promote functional mobility and independence.

## 2022-02-28 ENCOUNTER — SPEECH THERAPY (OUTPATIENT)
Dept: SPEECH THERAPY | Facility: REHABILITATION | Age: 57
End: 2022-02-28
Attending: PHYSICAL MEDICINE & REHABILITATION
Payer: COMMERCIAL

## 2022-02-28 DIAGNOSIS — I69.319 COGNITIVE DEFICIT FOLLOWING CEREBROVASCULAR ACCIDENT (CVA): ICD-10-CM

## 2022-02-28 DIAGNOSIS — I63.411 CEREBROVASCULAR ACCIDENT (CVA) DUE TO EMBOLISM OF RIGHT MIDDLE CEREBRAL ARTERY (HCC): ICD-10-CM

## 2022-02-28 DIAGNOSIS — R41.4 LEFT-SIDED VISUAL NEGLECT: ICD-10-CM

## 2022-02-28 PROCEDURE — 92507 TX SP LANG VOICE COMM INDIV: CPT

## 2022-03-01 NOTE — OP THERAPY DAILY TREATMENT
"  Outpatient Speech Therapy  DAILY TREATMENT     [facility] Speech [place] [address] [address]  [address]  [place] [address]  Phone:  [phone]  Fax:  [phone]    Date: 02/28/2022    Patient: Thong Arzola  YOB: 1965  MRN: [id number]     Time Calculation    Start time: 1531  Stop time: 1621 Time Calculation (min): 50 minutes         Chief Complaint: Poor Memory (Problem solving/reasoning, Left neglect )    Visit #: 11    Subjective:   Reason for Therapy:     Reason For Evaluation:  CVA and Cognition    Onset Date:  3/31/2021  Social Support:     ST Subjective  Patient Mental Status: ALert and cooperative.  Progress Factors:     Progression:  Getting Better  Additional Subjective Comments:      Patient reported feeling like he continues to feel \"brain fog\" since his recent seizure, and continues to feel tired.           Objective:   Treatments/Interventions Performed:  Cognitive-Linguistic training, Patient/Caregiver education and Home program  Objective Details:  1. Patient will improve attention completing complex, divided attention tasks with 80% accuracy,with mod cues.  --Patient's ability to multi-task has moderately decreased since seizure, with difficulty maintaining attention to task while listening or speaking in conversation.  Max cues required to continue with task.  2. Patient will complete complex problem solving and reasoning tasks with 80% accuracy, with mod cues.  --Progressing.  Patient completed inference task where statements were rated \"true,false, or unknown\" with 90% accuracy, with the unknown clues being most difficult.   Mental math with 2 functions: 90%, three functions 80%, patient did not report fatigue with this task.  3. Patient will improve left neglect and visuospatial skills by completing visual scanning and orientation tasks with 80% accuracy with mod cues.   --Patient completed 4 and 9 cube puzzles placed in left visual field 90% with min cues.  " Patient paused towards end of puzzles, searching for cubes on his left with delayed visual response.         Speech Therapy Assessment:     Cognitive Linguistic Assessment:     Patient attention selective: Moderate (self-cued to look to the left after initial errors)    Patient attention divided: Moderate    Patient simple reasoning ability: Minimal    Patient complex reasoning ability: Moderate    Patient complex problem solving ability: Moderate    ST Cognitive-Linguistic Assessment L29: Min-moderate.    Cognitive-Linguistic comments: Left neglect is improving, but still not back to level before seizure        Speech Therapy Plan :   Prognosis & Recommendations  Impression Summary:  Patient actively participated in all therapy tasks.  Patient did not fatigue as quickly with cognitive tasks, with left neglect still more prevalent in tasks. Patient continues to require cues to formulate strategies to organize thoughts to complete deductive reasoning tasks. Patient continues to present with deficits with left-neglect, attention, and visual scanning, as well as higher level problem solving and reasoning.  Patient would benefit from continued speech therapy to improve cognitive linguistic functions. Patient verbalized understanding and agreement with current plan of care.  Prognosis:  Good  Goals  Short Term Goals:  1. Patient will improve attention completing complex, divided attention tasks with 80% accuracy,with mod cues.  2. Patient will improve executive functions completing complex problem solving and reasoning tasks with 80% accuracy, with mod cues.  3. Patient will improve left neglect and visuospatial skills by completing visual scanning and orientation tasks with 80% accuracy with mod cues.   Short Term Goal Duration (Weeks):  6-8 weeks  Long Term Goals:  Per observation and patient report, patient will demonstrate functional cognitive linguistic skills, including executive function, memory, and visuospatial  "skills, in 85% opportunities across several different environments.  Long Term Goal Duration (Weeks):  6-9 months  Patient Stated Goal:  \"Better place to do physical work, driving\"  Potential barriers to Goal Achievement:  Vision  Therapy Recommendations  Recommendation:  Individual Speech Therapy,  Planned Therapy Interventions:  Cognitive-Linguistic training, Home Program and Patient/Caregiver Education,   Frequency:  2x week (16 X 92507)  Duration (in visits):  16  Duration (in weeks):  8               "

## 2022-03-02 ENCOUNTER — SPEECH THERAPY (OUTPATIENT)
Dept: SPEECH THERAPY | Facility: REHABILITATION | Age: 57
End: 2022-03-02
Attending: PHYSICAL MEDICINE & REHABILITATION
Payer: COMMERCIAL

## 2022-03-02 ENCOUNTER — OCCUPATIONAL THERAPY (OUTPATIENT)
Dept: OCCUPATIONAL THERAPY | Facility: REHABILITATION | Age: 57
End: 2022-03-02
Attending: HOMEOPATH
Payer: COMMERCIAL

## 2022-03-02 DIAGNOSIS — I69.319 COGNITIVE DEFICIT FOLLOWING CEREBROVASCULAR ACCIDENT (CVA): ICD-10-CM

## 2022-03-02 DIAGNOSIS — R41.4 LEFT-SIDED VISUAL NEGLECT: ICD-10-CM

## 2022-03-02 DIAGNOSIS — I63.411 CEREBROVASCULAR ACCIDENT (CVA) DUE TO EMBOLISM OF RIGHT MIDDLE CEREBRAL ARTERY (HCC): ICD-10-CM

## 2022-03-02 DIAGNOSIS — I63.511 ACUTE RIGHT MCA STROKE (HCC): ICD-10-CM

## 2022-03-02 PROCEDURE — 97140 MANUAL THERAPY 1/> REGIONS: CPT

## 2022-03-02 PROCEDURE — 92507 TX SP LANG VOICE COMM INDIV: CPT

## 2022-03-02 PROCEDURE — 97112 NEUROMUSCULAR REEDUCATION: CPT

## 2022-03-02 NOTE — OP THERAPY DAILY TREATMENT
Outpatient Speech Therapy  DAILY TREATMENT     [facility] Speech [address].  [address]  [place] [address]  Phone:  [phone]  Fax:  [phone]    Date: 03/02/2022    Patient: Thong Arzola  YOB: 1965  MRN: [id number]     Time Calculation    Start time: 0945  Stop time: 1030 Time Calculation (min): 45 minutes         Chief Complaint: Poor Memory    Visit #: 12    See Progress report

## 2022-03-02 NOTE — OP THERAPY DAILY TREATMENT
"  Outpatient Occupational Therapy  DAILY TREATMENT     [facility] Occupational Therapy [address].  [address]  [place] [address]  Phone:  [phone]  Fax:  [phone]    Date: 03/02/2022    Patient: Thong Arzola  YOB: 1965  MRN: [id number]     Time Calculation  Start time: 0845  Stop time: 0930 Time Calculation (min): 45 minutes         Chief Complaint: Extremity Weakness and Self Care Duties    Visit #: 32    SUBJECTIVE:  This Keppra is making me sleepy    OBJECTIVE:  Current objective measures:   PROM:   Upper extremity (left):     Shoulder flexion: Below functional limits     0-90    Shoulder extension: 0-40    Shoulder abduction: Below functional limits 0-80    Shoulder external rotation: Below functional limits 0-15    Forearm supination: Below functional limits 0-50   wrist and digits now have full PROM     Passive Range of Motion Comments:  1\" finger anterior subluxation of L humerus noted     Strength:     Right upper extremity strength within functional limits.    Upper extremity strength (left):     Shoulder flexion: 1    Shoulder extension:  1    Shoulder abduction: 1    Shoulder adduction: 1    Shoulder external rotation:  0    Shoulder internal rotation: 1    Elbow flexion: 3-    Elbow extension: 1    Forearm pronation: 3    Forearm supination: 0    Wrist flexion: 1    Wrist extension: 0    Fingers flexion: 0    Fingers extension: 0    Fingers abduction: 0    Fingers adduction: 0     , Prehension, Pinch:  Unable at this time  Tone, Sensation and Coordination:   Tone:     Left upper extremity muscle tone: Flaccid    Right upper extremity muscle tone: Normal    Left lower extremity muscle tone: Gross assist     Modified Jeremiah:   Upper extremity (left):     Shoulder flexors: 0    Shoulder extensors: 0    Shoulder external rotators: 0    Shoulder internal rotators: 1    Scapular retraction: 1    Scapular elevation: 1    Elbow flexors: 1    Elbow extensors: " 0    Wrist flexors: 0    Wrist extensors: 0    Finger flexors: 0    Finger extensors: 0     Coordination   Absent in L UE     Cognition:     Orientation: normal to time, normal to place and normal to person    Direction following: three step    Short term memory: intact    Long term memory: intact    Attention span: intact    Sequencing: intact    Organization: intact    Problem solving: intact    Judgement and safety awareness: intact    Hearing: intact     Vision/Perception:     Visual tracking: intact    Convergence: intact    Visual attentions: intact    Visual scanning: intact    R/L hemianopsia: present    R/L neglect: present    Vision assistive device(s): reading glasses     Vision/Perception Comments:   Bell's Test = 31/35 in 3 minutes.        Maze Test=25 seconds     Activities of Daily Living:   Transfers/Mobility:     Bed/chair transfers: Mod I assist    Wheelchair transfers: Mod I    Sit to stand: Mod I     Toilet transfers: supervision/SBA    Tub/shower transfers: contact guard assist    Bed mobility: Supervision assist     Toileting:     Toileting: stand by assist    Hygiene: minimum assist    Clothing management: minimum assist    Toileting position: sitting     Bathing:     Bathing: set up/sup    Bathing position: sitting    Washing hair: supervision    Washing back: mod I    Washing upper body: Mod I    Washing lower body: Mod I     Bathing assistive device(s): shower chair     Dressing:     Dressing: Min A     Dressing upper body: stand by assist with t-shirt, SBA/CGA with jackets    Dressing lower body: Mod I except needs assistance for getting shoe over AFO    Socks: min assist    Shoes: left one over AFO     Grooming:     Brushing teeth or denture care: Mod I     Shaving: Mod I     Feeding:     Using utensils: minimum assist    Cutting food: Mod I using cutting board.       Household Management:     Housekeeping: maximum assist    Laundry: maximum assist    Meal preparation: min A   assist    Medication management: supervision    Shopping: maximum assist    Writing: independent        Therapeutic Treatments and Modalities:    1. Manual Therapy (CPT 28241)    2. Neuromuscular Re-education (CPT 34678)    Therapeutic Treatments and Modalities Summary: Worked on shoulder mobilization and soft tissue stretching of left shoulder, elbow, forearm and wrist while supine on mat.  Able to position limb in outstretched position with shoulder in 80 degrees of abduction and full supination with wrist extension.    Min A log rolling over onto left side and pushing self up from supine to sit.  NMRE:  Weight bearing activity completed at edge of mat onto left UE, on elbow first 5 x with 10 second hold and then onto outstretched hand 5 x with 10 second hold.     Time-based treatments/modalities:  Neuromusc re-ed minutes (CPT 59473): 15 minutes  Manual therapy joint mobilization minutes (CPT 01809): 30 minutes        Pain rating before treatment: 1  Pain rating after treatment: 1    ASSESSMENT:   Response to treatment: less pain noted in left shoulder today after mobilization    PLAN/RECOMMENDATIONS:   Plan for treatment: therapy treatment to continue next visit.  Planned interventions for next visit: continue with current treatment, E-stim attended (CPT 16506), manual therapy (CPT 44369), neuromuscular re-education (CPT 38303), self care ADL training (CPT 85128), therapeutic activities (CPT 36033) and therapeutic exercise (CPT 96400)

## 2022-03-02 NOTE — OP THERAPY DAILY TREATMENT
Outpatient Speech Therapy  DAILY TREATMENT     [facility] Speech [address]  [address]  [place] [address]  Phone:  [phone]  Fax:  [phone]    Date: 03/02/2022    Patient: Thong Arzola  YOB: 1965  MRN: [id number]     Time Calculation                   Chief Complaint: Poor Memory    Visit #: 12    Subjective Evaluation    Speech Therapy Objective       Assessments    Speech Therapy Plan

## 2022-03-02 NOTE — OP THERAPY PROGRESS SUMMARY
"  Outpatient Speech Therapy  PROGRESS SUMMARY NOTE      [facility] Speech Therapy [facility]  [address]  [address]  [place] [address]  Phone:  [phone]  Fax:  [phone]    Date of Visit: 03/02/2022    Patient: Thong Arzola  YOB: 1965  MRN: [id number]     Referring Provider: Zulema Dumont D.O.  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 Cerebrovascular accident (CVA) due to embolism of right middle cerebral artery (HCC)  I63.411   2. Cognitive deficit following cerebrovascular accident (CVA)  I69.319   3. 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  Progress Factors:     Progression:  Getting Better  Additional Subjective Comments:      Patient reported feeling like he continues to feel \"brain fog\" since his recent seizure, and continues to feel tired.  Patient and wife reported that current dosage of Keppra may be a contributing factor.          Objective:   Treatments/Interventions Performed:  Cognitive-Linguistic training, Patient/Caregiver education and Home program  Objective Details:  1. Patient will improve attention completing complex, divided attention tasks with 80% accuracy,with mod cues.  --Had been progressing, with recent regression following seizure. Patient's ability to multi-task has moderately decreased since seizure, with difficulty maintaining attention to task while listening or speaking in conversation.  Max cues required to continue with task.  With scanning " task, increased errors, especially on the left,  observed with less organized approach to task than seen in sessions prior to seizure.  2. Patient will complete complex problem solving and reasoning tasks with 80% accuracy, with mod cues.  --Had been progressing, with recent regression following seizure.  Patient completed visual problem solving task, following clues to locate numbers scattered within overlapping shapes, 100% with reported increased cognitive effort required.  3. Patient will improve left neglect and visuospatial skills by completing visual scanning and orientation tasks with 80% accuracy with mod cues.   --With scanning task, increased errors, especially on the left,  observed with less organized approach to task than seen in sessions prior to seizure.       Speech Therapy Assessment:     Cognitive Linguistic Assessment:     Patient attention selective: Moderate (self-cued to look to the left after initial errors)    Patient attention divided: Moderate    Patient simple reasoning ability: Minimal    Patient complex reasoning ability: Moderate    Patient complex problem solving ability: Moderate    ST Cognitive-Linguistic Assessment L29: Min-moderate.    Cognitive-Linguistic comments: Left neglect is improving, but still not back to level before seizure        Speech Therapy Plan :   Prognosis & Recommendations  Impression Summary: Patient actively participated in all therapy tasks. Patient did not fatigue as quickly with cognitive tasks, with left neglect still more prevalent in tasks. Patient continues to require cues to formulate strategies to organize thoughts to complete deductive reasoning tasks. Patient continues to present with deficits with left-neglect, attention, and visual scanning, as well as higher level problem solving and reasoning.  Patient would benefit from continued speech therapy to improve cognitive linguistic functions. Patient verbalized understanding and agreement with  "current plan of care.  Prognosis:  Good  Goals  Short Term Goals:  1. Patient will improve attention completing complex, divided attention tasks with 80% accuracy,with mod cues.  2. Patient will improve executive functions completing complex problem solving and reasoning tasks with 80% accuracy, with mod cues.  3. Patient will improve left neglect and visuospatial skills by completing visual scanning and orientation tasks with 80% accuracy with mod cues.   Short Term Goal Duration (Weeks):  6-8 weeks  Patient progression on Short Term Goals:  Had been progressing, with regression following recent seizure, see Objective  Long Term Goals:  Per observation and patient report, patient will demonstrate functional cognitive linguistic skills, including executive function, memory, and visuospatial skills, in 85% opportunities across several different environments.  Long Term Goal Duration (Weeks):  6-9 months  Patient progression on Long Term Goals:  Had been progressing, with regression following recent seizure, see Objective  Patient Stated Goal:  \"Better place to do physical work, driving\"  Potential barriers to Goal Achievement:  Vision  Therapy Recommendations  Recommendation:  Individual Speech Therapy,  Planned Therapy Interventions:  Cognitive-Linguistic training, Home Program and Patient/Caregiver Education,   Frequency:  2x week (16 X 92507)  Duration (in visits):  16  Duration (in weeks):  8        Referring provider co-signature:  I have reviewed this plan of care and my co-signature certifies the need for services.    Certification Period: 03/02/2022 to 04/27/22    Physician Signature: ________________________________ Date: ______________        "

## 2022-03-02 NOTE — TELEPHONE ENCOUNTER
Pt's wife called to requested med refill for celecoxib (CELEBREX) 200 MG Cap be sent to [facility] on [address]

## 2022-03-03 ENCOUNTER — OCCUPATIONAL THERAPY (OUTPATIENT)
Dept: OCCUPATIONAL THERAPY | Facility: REHABILITATION | Age: 57
End: 2022-03-03
Attending: HOMEOPATH
Payer: COMMERCIAL

## 2022-03-03 ENCOUNTER — PHYSICAL THERAPY (OUTPATIENT)
Dept: PHYSICAL THERAPY | Facility: REHABILITATION | Age: 57
End: 2022-03-03
Attending: PHYSICAL MEDICINE & REHABILITATION
Payer: COMMERCIAL

## 2022-03-03 DIAGNOSIS — M62.838 OTHER MUSCLE SPASM: ICD-10-CM

## 2022-03-03 DIAGNOSIS — I63.511 ACUTE RIGHT MCA STROKE (HCC): ICD-10-CM

## 2022-03-03 PROCEDURE — 97112 NEUROMUSCULAR REEDUCATION: CPT

## 2022-03-03 PROCEDURE — 97116 GAIT TRAINING THERAPY: CPT

## 2022-03-03 NOTE — OP THERAPY DAILY TREATMENT
"  Outpatient Occupational Therapy  DAILY TREATMENT     [facility] Occupational Therapy [address].  [address]  [place] [address]  Phone:  [phone]  Fax:  [phone]    Date: 03/03/2022    Patient: Thong Arzola  YOB: 1965  MRN: [id number]     Time Calculation  Start time: 0105  Stop time: 0145 Time Calculation (min): 40 minutes         Chief Complaint: Extremity Weakness and Self Care Duties    Visit #: 33    SUBJECTIVE:  I feel my arm isn't getting much better.      OBJECTIVE:  Current objective measures:   Current objective measures:   PROM:   Upper extremity (left):     Shoulder flexion: Below functional limits     0-90    Shoulder extension: 0-40    Shoulder abduction: Below functional limits 0-80    Shoulder external rotation: Below functional limits 0-15    Forearm supination: Below functional limits 0-50   wrist and digits now have full PROM     Passive Range of Motion Comments:  1\" finger anterior subluxation of L humerus noted     Strength:     Right upper extremity strength within functional limits.    Upper extremity strength (left):     Shoulder flexion: 1    Shoulder extension:  1    Shoulder abduction: 1    Shoulder adduction: 1    Shoulder external rotation:  0    Shoulder internal rotation: 1    Elbow flexion: 3-    Elbow extension: 1    Forearm pronation: 3    Forearm supination: 0    Wrist flexion: 1    Wrist extension: 0    Fingers flexion: 0    Fingers extension: 0    Fingers abduction: 0    Fingers adduction: 0     , Prehension, Pinch:  Unable at this time  Tone, Sensation and Coordination:   Tone:     Left upper extremity muscle tone: Flaccid    Right upper extremity muscle tone: Normal    Left lower extremity muscle tone: Gross assist     Modified Jeremiah:   Upper extremity (left):     Shoulder flexors: 0    Shoulder extensors: 0    Shoulder external rotators: 0    Shoulder internal rotators: 1    Scapular retraction: 1    Scapular elevation: " 1    Elbow flexors: 1    Elbow extensors: 0    Wrist flexors: 0    Wrist extensors: 0    Finger flexors: 0    Finger extensors: 0     Coordination   Absent in L UE     Cognition:     Orientation: normal to time, normal to place and normal to person    Direction following: three step    Short term memory: intact    Long term memory: intact    Attention span: intact    Sequencing: intact    Organization: intact    Problem solving: intact    Judgement and safety awareness: intact    Hearing: intact     Vision/Perception:     Visual tracking: intact    Convergence: intact    Visual attentions: intact    Visual scanning: intact    R/L hemianopsia: present    R/L neglect: present    Vision assistive device(s): reading glasses     Vision/Perception Comments:   Bell's Test = 31/35 in 3 minutes.        Maze Test=25 seconds     Activities of Daily Living:   Transfers/Mobility:     Bed/chair transfers: Mod I assist    Wheelchair transfers: Mod I    Sit to stand: Mod I     Toilet transfers: supervision/SBA    Tub/shower transfers: contact guard assist    Bed mobility: Supervision assist     Toileting:     Toileting: stand by assist    Hygiene: minimum assist    Clothing management: minimum assist    Toileting position: sitting     Bathing:     Bathing: set up/sup    Bathing position: sitting    Washing hair: supervision    Washing back: mod I    Washing upper body: Mod I    Washing lower body: Mod I     Bathing assistive device(s): shower chair     Dressing:     Dressing: Min A     Dressing upper body: stand by assist with t-shirt, SBA/CGA with jackets    Dressing lower body: Mod I except needs assistance for getting shoe over AFO    Socks: min assist    Shoes: left one over AFO     Grooming:     Brushing teeth or denture care: Mod I     Shaving: Mod I     Feeding:     Using utensils: minimum assist    Cutting food: Mod I using cutting board.       Household Management:     Housekeeping: maximum assist    Laundry: maximum  assist    Meal preparation: min A  assist    Medication management: supervision    Shopping: maximum assist    Writing: independent        Therapeutic Treatments and Modalities:    2. Neuromuscular Re-education (CPT 28945)    Therapeutic Treatments and Modalities Summary: Worked on shoulder mobilization and soft tissue stretching of left shoulder, elbow, forearm and wrist while supine on mat.  Able to position limb in outstretched position with shoulder in 80 degrees of abduction and full supination with wrist extension.    Min A log rolling over onto left side and pushing self up from supine to sit.  NMRE:  Weight bearing activity completed at edge of mat onto left UE, on elbow first 5 x with 10 second hold and then onto outstretched hand 5 x with 10 second hold.   5 sit to stands with support through left arm to encourage even weight bearing through both sides.  Forward flexion of trunk while seated at edge of mat encouraging even weight bearing through both lower extremities     Time-based treatments/modalities:  Neuromusc re-ed minutes (CPT 56550): 40 minutes        Pain rating before treatment: 1  Pain rating after treatment: 1    ASSESSMENT:   Response to treatment: 1 finger subluxation noted and patient stated he was not consistently been wearing shoulder support and needs to wear more. L UE remains status quo    PLAN/RECOMMENDATIONS:   Plan for treatment: therapy treatment to continue next visit.  Planned interventions for next visit: continue with current treatment, E-stim attended (CPT 78518), manual therapy (CPT 99233), neuromuscular re-education (CPT 25002), self care ADL training (CPT 28095), therapeutic activities (CPT 08927) and therapeutic exercise (CPT 29837)

## 2022-03-03 NOTE — PROGRESS NOTES
Chief Complaint   Patient presents with   • New Patient     Epilepsy       Problem List Items Addressed This Visit    None     Visit Diagnoses     Localization-related epilepsy (HCC)        Healthcare maintenance        Relevant Orders    Referral to establish with [facility] PCP          THIS CONSULTATION WAS PERFORMED VIA TELEMEDICINE, UTILIZING AN ENCRYPTED TRANSMISSION. THE PATIENT CONSENTED TO THIS CONSULTATION.    Interim History:  Thong Arzola 56 y.o. male is f/u for seizures. Wife is joining us today.     Pt reports of no seizures on keppra 500mg BID. He c/o of being tired but is tolerating this better compared to when he was on a higher dose. Mood is stable. He can get frustrated. He does not think he needs to see therapy. He was given mirtazapine. Denies SI or HI. They do not think the keppra has worsened his mood. He is taking vit D. He is still on statin and xarelto for secondary stroke prevention. He has not seen neuroophthalmology yet. He was given tramadol yesterday for pain as the celebrex and gabapentin were not helping him with the pain. They are requesting for a referral to PCP here with [facility]. No more alcohol use. No other concerns.      Past medical history:   Past Medical History:   Diagnosis Date   • Afib (HCC)    • COVID-19    • Disorder of thyroid     hypo   • High cholesterol    • Psychiatric problem     reactive depression   • Stroke (HCC) 2021    right side MCA       Past surgical history:   Past Surgical History:   Procedure Laterality Date   • CRANIOPLASTY Right 6/23/2021    Procedure: CRANIOPLASTY - FOR SKULL DEFECT;  Surgeon: Arthur Payton M.D.;  Location: [facility];  Service: Neurosurgery   • CRANIOTOMY Right 4/3/2021    Procedure: CRANIOTOMY;  Surgeon: Arthur Payton M.D.;  Location: [facility];  Service: Neurosurgery   • KNEE RECONSTRUCTION      left knee orthoscopic       Family history:   No family history on file.    Social history:   Social History      Socioeconomic History   • Marital status:      Spouse name: Not on file   • Number of children: Not on file   • Years of education: Not on file   • Highest education level: Not on file   Occupational History   • Not on file   Tobacco Use   • Smoking status: Never Smoker   • Smokeless tobacco: Never Used   Vaping Use   • Vaping Use: Never used   Substance and Sexual Activity   • Alcohol use: Yes     Comment: occ   • Drug use: No   • Sexual activity: Not on file   Other Topics Concern   • Not on file   Social History Narrative   • Not on file     Social Determinants of Health     Financial Resource Strain: Not on file   Food Insecurity: Not on file   Transportation Needs: Not on file   Physical Activity: Not on file   Stress: Not on file   Social Connections: Not on file   Intimate Partner Violence: Not on file   Housing Stability: Not on file       Current medications:   Current Outpatient Medications   Medication   • ramipril (ALTACE) 10 MG capsule   • rosuvastatin (CRESTOR) 20 MG Tab   • celecoxib (CELEBREX) 200 MG Cap   • vitamin D3 (CHOLECALCIFEROL) 5000 Unit (125 mcg) Tab   • MAGNESIUM PO   • ascorbic acid (VITAMIN C) 1000 MG tablet   • rivaroxaban (XARELTO) 20 MG Tab tablet   • levETIRAcetam (KEPPRA) 500 MG Tab   • gabapentin (NEURONTIN) 100 MG Cap   • dantrolene (DANTRIUM) 25 MG Cap   • mirtazapine (REMERON) 15 MG TABLET DISPERSIBLE   • melatonin 3 MG Tab   • amLODIPine (NORVASC) 5 MG Tab   • acetaminophen (TYLENOL) 325 MG Tab   • thyroid (ARMOUR THYROID) 60 MG Tab   • metoprolol tartrate (LOPRESSOR) 25 MG Tab     No current facility-administered medications for this visit.       Medication Allergy:  No Known Allergies      Review of systems:     General: Denies fevers or chills, or nightsweats, or generalized fatigue.    Head: Denies headaches or dizziness or lightheadedness  Musculoskeletal: Denies muscle pain or swelling, no atrophy, no neck and back pain or stiffness.   Neurologic: Denies facial  droopiness, muscle weakness (focal or generalized), paresthesias, ataxia, change in speech or language, memory loss, abnormal movements, seizures, loss of consciousness, or episodes of confusion.   Psychiatric: Denies mood swings, suicidal or homicidal thoughts. +anxiety, depression       Physical examination:   General: Patient in no acute distress, pleasant and cooperative.  HEENT: Normocephalic, no signs of acute trauma.   Neck: There is normal range of motion.   Skin: no signs of acute rashes or trauma in visible areas  Psychiatric: No hallucinatory behavior. No symptoms of depression or suicidal ideation. Mood and affect appear normal on exam.      NEUROLOGICAL EXAM:   Mental status, orientation: Awake, alert and fully oriented.   Speech and language: speech is clear and fluent. The patient is able to name, repeat and comprehend.   Memory: There is intact recollection of recent and remote events.   Cranial nerve exam:   CN I: Not examined   CN II: Unable to assess  CN III, IV, VI: EOMI  CN V: Unable to assess  CN VII: face symmetric   CN VIII: Unable to assess  CN IX, X: Unable to assess  CN XI: Asymmetric shoulder shrug  CN XII: tongue midline.   Sensory exam Unable to assess  Gait: wheelchair bound      ANCILLARY DATA REVIEWED:       Lab Data Review:  Reviewed in chart.     Records reviewed:   Reviewed in chart.    Imaging:   MRI brain 2021  Very large acute right MCA territory infarct as detailed above with small amount of petechial hemorrhage in the right insular region and right temporal lobe.     Punctate right thalamic lacunar infarct.     Right ICA and M1 MCA occlusion.     EEG:  EEG 4/26/21  This is an abnormal video EEG recording in the awake, drowsy/sleep state(s). The diffuse background slowing is consistent with mild encephalopathy. The presence of asymmetric PDR and sleep spindle along with the loss of fast frequency over the right hemisphere are consistent with known focal structural lesion. No  epileptiform discharges or seizures were seen. This does not preclude a diagnosis of epilepsy.        ASSESSMENT AND PLAN:    1. Localization-related epilepsy (HCC)    2. History of stroke    3. Healthcare maintenance  - Referral to establish with [facility] PCP    4. Spastic hemiplegia of left dominant side as late effect of cerebral infarction (HCC)    5. 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 Cardioembolic etiology given PAF. On xarelto 20mg daily. He has residual deficit of L spastic hemiplegia and L hemianopia. He had his first seizure on 2/12/22 affecting his L side. He had convulsion. Duration: 1-2min. There was post-ictal confusion. No tongue biting or incontinence. No auras or triggers. He was started on high dose keppra and had side effects of lethargy, feeling groggy and cloudy. Dose was decreased to 500mg BID and he is feeling much better but still tired. He continues to be seizure free.      No alcohol,cigarettes or recreational drug use.      Mood is stable. Has depression but is staying positive. No suicidal or homicidal thoughts. Keppra is not making this worse accdg to pt and wife.      Past ASM's: none     Current ASM's: keppra 500mg BID.         Plan:  - continue ASM     - Discussed avoidance of spell/sz triggers: alcohol, sleep deprivation, benadryl, tramadol and stress. - had lengthy discussion regarding tramadol use.      - Discussed Vit D supplementation. Recommended taking 2000-5000u daily.     - Discussed driving restrictions. Pt is not driving.        -Continue f/u with PCP for stroke risk factor management and monitoring. Given referral to PCP as requested.      -Continue f/u with Dr. Dumont, PT/ST/OT     -Continue f/u with neuroophthalmology for L hemianopsia     -Labs to be checked for next appointment: none        FOLLOW-UP:   Return in about 3 months (around 6/16/2022).      EDUCATION AND  COUNSELING:  -Education was provided to the patient and/or family regarding diagnosis and prognosis. The chronic and unpredictable nature of the condition were discussed. There is increased risk for additional events, which may carry potential for significant injuries and death. Discussed frequent seizure triggers: sleep deprivation, medication non-compliance, use of illegal drugs/alcohol, stress, and others.   -We reviewed in detail the current antiepileptic regimen. Potential side effects of antiepileptics were discussed at length, including but no limited to: hypersensitivity reactions (rash and others, some of which can be fatal), visual field changes (some of which may be irreversible), glaucoma, diplopia, kidney stones, osteopenia/osteoporosis/bone fractures, hyperthermia/anhydrosis, hyponatremia, tremors/abnormal movements, ataxia, dizziness, fatigue, increased risk for falls, risk for cardiac arrhythmias/syncope, gastrointestinal side effects(hepatitis, pancreatitis, gastritis, ulcers), gingival hypertrophy/bleeding, drowsiness, sedation, anxiety/nervousness, increased risk for suicide, increased risk for depression, and psychosis.   -We also reviewed drug-drug interactions and their potential effect on seizure control and medication side effects.    -Recommend chronic vitamin D supplementation and regular exercise (if not contraindicated).   -Patient/family educated on risk for SUDEP (Sudden Death in Epilepsy). Counseling was provided on the importance of strict medication and follow up compliance. The patient/family understand the risks associated with non-adherence with the medical plan as outlined, including but not limited to an increased risk for breakthrough seizures, which may contribute to injuries, disability, status epilepticus, and even death.   -Counseling was also provided on potential effects of alcohol and other drugs, which may lower seizure threshold and/or affect the metabolism of  antiepileptic drugs. We recommend avoidance of alcohol and illegal drugs.  -Avoid sleep deprivation.   -We extensively discussed the aspects related to safety in drivers who suffer from epilepsy. The patient is encourage to report to the Division of Motor Vehicles of any condition and/or spells related to confusion, disorientation, and/or loss of awareness and/or loss of consciousness; as these may pose a safety issue if they occur while operating a motor vehicle. The patient and/or family are ultimately responsible for exercising caution and abiding to regulations in place.   -Other seizure precautions were discussed at length, including no diving, no skydiving, no climbing or exposure to unprotected heights, no unsupervised swimming, no Jacuzzi or bathing in bathtubs or deep bodies of water. The patient/family have been advised about risks for operating any machinery while suffering from seizures / syncope / epilepsy and/or while taking antiepileptic drugs.   -The patient understands and agrees that due to the complexity of his/her diagnosis, results of any testing and further recommendations will typically be discussed/made during a face to face encounter in my office. The patient and/or family further understands it is their responsibility to keep proper follow up.     Patient/family agree with plan, as outlined.         Jazz Davidson, MSN, APRN, FNP-C  [facility]  Office: [phone]  Fax: [phone]    This encounter was conducted via Zoom.   The patient was in a private location in the [facility].    Verbal consent was obtained. Patient's identity was verified.      BILLING DOCUMENTATION:   Total time spent including chart review before and after visit was 30min. Over 50% of the time of the visit today was spent on counseling and or coordination of care wtih the patient and/or family, with greater than 50% of the total discussing my assessment and plan as stated above.

## 2022-03-03 NOTE — OP THERAPY DAILY TREATMENT
Outpatient Physical Therapy  DAILY TREATMENT     [address]  [address]  [place] [address]  Phone:  [phone]  Fax:  [phone]    Date: 03/03/2022    Patient: Thong Arzola  YOB: 1965  MRN: [id number]     Time Calculation    Start time: 1400  Stop time: 1441 Time Calculation (min): 41 minutes     Chief Complaint: Difficulty Walking and Loss Of Balance    Visit #: 30    SUBJECTIVE:  The pt reports that his only current complaints are L leg pain. Otherwise he is agreeable to PT.    OBJECTIVE:  Current objective measures: Pre-session vitals 140/90, HR 74 bpm, O2 95%        Therapeutic Exercises (CPT 99724):     20. POC 02/23/2022 to 05/24/22    Therapeutic Treatments and Modalities:     1. Gait Training (CPT 11098), See below    2. Neuromuscular Re-education (CPT 96992), See below    Therapeutic Treatment and Modalities Summary: LiteGait Treadmill training with harness donned for pt safety, no BWS utilized    -Round 1: 7 minutes at 1.2 mph and 0% incline, vitals post 89 bpm, 95% O2 with added cognitive dual tasks with L sided attention including description of pictured cards with categorical naming and description of the items  -Round 2: 3 minutes at 0.7 mph without UE assist and 0% incline    Total ambulation time: 10 minutes     Neuromuscular Re-Education  -Attempted floor transfer, pt reported too much pain in his L knee  -Supine to prone transfer, Min A for management of the L UE  -Prone on elbows with pillow support under L elbow  -Prone to supine transfer with Min A for support at the edge of the table        Time-based treatments/modalities:    Physical Therapy Timed Treatment Charges  Gait training minutes (CPT 99645): 23 minutes  Neuromusc re-ed, balance, coor, post minutes (CPT 10104): 15 minutes    ASSESSMENT:   Response to treatment: The pt tolerated cognitive dual task treadmill training with good attention to the L side and no change  in gait mechanics. He was unable to complete floor transfer secondary to L knee pain but did perform bed mobility tasks with Min A. The pt will continue to benefit from skilled therapy services to maximize his CLOF and independence.     PLAN/RECOMMENDATIONS:   Plan for treatment: therapy treatment to continue next visit.  Planned interventions for next visit: continue with current treatment.

## 2022-03-07 ENCOUNTER — SPEECH THERAPY (OUTPATIENT)
Dept: SPEECH THERAPY | Facility: REHABILITATION | Age: 57
End: 2022-03-07
Attending: PHYSICAL MEDICINE & REHABILITATION
Payer: COMMERCIAL

## 2022-03-07 DIAGNOSIS — I63.411 CEREBROVASCULAR ACCIDENT (CVA) DUE TO EMBOLISM OF RIGHT MIDDLE CEREBRAL ARTERY (HCC): ICD-10-CM

## 2022-03-07 DIAGNOSIS — I69.319 COGNITIVE DEFICIT FOLLOWING CEREBROVASCULAR ACCIDENT (CVA): ICD-10-CM

## 2022-03-07 DIAGNOSIS — R41.4 LEFT-SIDED VISUAL NEGLECT: ICD-10-CM

## 2022-03-07 PROCEDURE — 92507 TX SP LANG VOICE COMM INDIV: CPT

## 2022-03-07 RX ORDER — CELECOXIB 200 MG/1
200 CAPSULE ORAL 2 TIMES DAILY
Qty: 120 CAPSULE | Refills: 0 | Status: SHIPPED | OUTPATIENT
Start: 2022-03-07 | End: 2022-05-17 | Stop reason: SDUPTHER

## 2022-03-07 NOTE — OP THERAPY DAILY TREATMENT
"  Outpatient Speech Therapy  DAILY TREATMENT     [facility] [place] [address] [facility]  [address]  [place] [address]  Phone:  [phone]  Fax:  [phone]    Date: 03/07/2022    Patient: Thong Arzola  YOB: 1965  MRN: [id number]     Time Calculation                   Chief Complaint: No chief complaint on file.    Visit #: 13    Subjective:   Reason for Therapy:     Reason For Evaluation:  CVA and Cognition    Onset Date:  3/31/2021  Social Support:     ST Subjective  Patient Mental Status: ALert and cooperative.  Progress Factors:     Progression:  Getting Better  Additional Subjective Comments:      Patient reported feeling like he continues to feel \"brain fog\" since his recent seizure, and continues to feel tired.  Patient and wife reported that current dosage of Keppra may be a contributing factor.          Objective:   Treatments/Interventions Performed:  Cognitive-Linguistic training, Patient/Caregiver education and Home program  Objective Details:  1. Patient will improve attention completing complex, divided attention tasks with 80% accuracy,with mod cues.  --Progressing,  With scanning task, 3 errors, especially on the left,  observed with less organized approach to task than seen in sessions prior to seizure.  2. Patient will complete complex problem solving and reasoning tasks with 80% accuracy, with mod cues.  --following written directives, required initial cues to understand directives, then followed directions 100%.  5-step sequencing, with extended time, patient able to problem solve through sequencing with cues to attend to details of pictures.  3. Patient will improve left neglect and visuospatial skills by completing visual scanning and orientation tasks with 80% accuracy with mod cues.   --Not formally addressed.         Speech Therapy Assessment:     Cognitive Linguistic Assessment:     Patient attention selective: Moderate (self-cued to look to the left " after initial errors)    Patient attention divided: Moderate    Patient simple reasoning ability: Minimal    Patient complex reasoning ability: Moderate    Patient complex problem solving ability: Moderate    ST Cognitive-Linguistic Assessment L29: Min-moderate.    Cognitive-Linguistic comments: Left neglect is improving, but still not back to level before seizure        Speech Therapy Plan :   Prognosis & Recommendations  Impression Summary: Patient actively participated in all therapy tasks. Patient did not fatigue as quickly with cognitive tasks, with left neglect still more prevalent in tasks. Patient continues to require cues to formulate strategies to organize thoughts to complete deductive reasoning tasks. Patient continues to present with deficits with left-neglect, attention, and visual scanning, as well as higher level problem solving and reasoning.  Patient would benefit from continued speech therapy to improve cognitive linguistic functions. Patient verbalized understanding and agreement with current plan of care.  Prognosis:  Good  Goals  Short Term Goals:  1. Patient will improve attention completing complex, divided attention tasks with 80% accuracy,with mod cues.  2. Patient will improve executive functions completing complex problem solving and reasoning tasks with 80% accuracy, with mod cues.  3. Patient will improve left neglect and visuospatial skills by completing visual scanning and orientation tasks with 80% accuracy with mod cues.   Short Term Goal Duration (Weeks):  6-8 weeks  Patient progression on Short Term Goals:  Had been progressing, with regression following recent seizure, see Objective  Long Term Goals:  Per observation and patient report, patient will demonstrate functional cognitive linguistic skills, including executive function, memory, and visuospatial skills, in 85% opportunities across several different environments.  Long Term Goal Duration (Weeks):  6-9 months  Patient  "progression on Long Term Goals:  Had been progressing, with regression following recent seizure, see Objective  Patient Stated Goal:  \"Better place to do physical work, driving\"  Potential barriers to Goal Achievement:  Vision  Therapy Recommendations  Recommendation:  Individual Speech Therapy,  Planned Therapy Interventions:  Cognitive-Linguistic training, Home Program and Patient/Caregiver Education,   Frequency:  2x week (16 X 92507)  Duration (in visits):  16  Duration (in weeks):  8               "

## 2022-03-08 ENCOUNTER — PHYSICAL THERAPY (OUTPATIENT)
Dept: PHYSICAL THERAPY | Facility: REHABILITATION | Age: 57
End: 2022-03-08
Attending: PHYSICAL MEDICINE & REHABILITATION
Payer: COMMERCIAL

## 2022-03-08 ENCOUNTER — OCCUPATIONAL THERAPY (OUTPATIENT)
Dept: OCCUPATIONAL THERAPY | Facility: REHABILITATION | Age: 57
End: 2022-03-08
Attending: HOMEOPATH
Payer: COMMERCIAL

## 2022-03-08 DIAGNOSIS — I63.511 ACUTE RIGHT MCA STROKE (HCC): ICD-10-CM

## 2022-03-08 DIAGNOSIS — M62.838 OTHER MUSCLE SPASM: ICD-10-CM

## 2022-03-08 PROCEDURE — 97112 NEUROMUSCULAR REEDUCATION: CPT

## 2022-03-08 PROCEDURE — 97116 GAIT TRAINING THERAPY: CPT

## 2022-03-08 NOTE — OP THERAPY DAILY TREATMENT
Outpatient Occupational Therapy  DAILY TREATMENT     [facility] Occupational Therapy [address]  [address]  [place] [address]  Phone:  [phone]  Fax:  [phone]    Date: 03/08/2022    Patient: Thong Arzola  YOB: 1965  MRN: [id number]     Time Calculation  Start time: 1015  Stop time: 1100 Time Calculation (min): 45 minutes         Chief Complaint: Extremity Weakness    Visit #: 34    SUBJECTIVE:  Anel and I do the stretches on my left arm at least twice a day.      OBJECTIVE:  Current objective measures:   PROM:   Upper extremity (left):     Shoulder flexion: Below functional limits     0-90    Shoulder extension: 0-40    Shoulder abduction: Below functional limits 0-80    Shoulder external rotation: Below functional limits 0-15    Forearm supination: Below functional limits 0-50   wrist and digits now have full PROM     Passive Range of Motion Comments:  .5 finger anterior subluxation of L humerus noted     Strength:     Right upper extremity strength within functional limits.    Upper extremity strength (left):     Shoulder flexion: 1    Shoulder extension:  1    Shoulder abduction: 1    Shoulder adduction: 1    Shoulder external rotation:  0    Shoulder internal rotation: 1    Elbow flexion: 3-    Elbow extension: 1    Forearm pronation: 3    Forearm supination: 0    Wrist flexion: 1    Wrist extension: 0    Fingers flexion: 0    Fingers extension: 0    Fingers abduction: 0    Fingers adduction: 0     , Prehension, Pinch:  Unable at this time  Tone, Sensation and Coordination:   Tone:     Left upper extremity muscle tone: Flaccid    Right upper extremity muscle tone: Normal    Left lower extremity muscle tone: Gross assist     Modified Jeremiah:   Upper extremity (left):     Shoulder flexors: 0    Shoulder extensors: 0    Shoulder external rotators: 0    Shoulder internal rotators: 1    Scapular retraction: 1    Scapular elevation: 1    Elbow flexors: 1    Elbow  extensors: 0    Wrist flexors: 0    Wrist extensors: 0    Finger flexors: 0    Finger extensors: 0     Coordination   Absent in L UE     Cognition:     Orientation: normal to time, normal to place and normal to person    Direction following: three step    Short term memory: intact    Long term memory: intact    Attention span: intact    Sequencing: intact    Organization: intact    Problem solving: intact    Judgement and safety awareness: intact    Hearing: intact     Vision/Perception:     Visual tracking: intact    Convergence: intact    Visual attentions: intact    Visual scanning: intact    R/L hemianopsia: present    R/L neglect: present    Vision assistive device(s): reading glasses     Vision/Perception Comments:   Bell's Test = 31/35 in 3 minutes.        Maze Test=25 seconds     Activities of Daily Living:   Transfers/Mobility:     Bed/chair transfers: Mod I assist    Wheelchair transfers: Mod I    Sit to stand: Mod I     Toilet transfers: supervision/SBA    Tub/shower transfers: contact guard assist    Bed mobility: Supervision assist     Toileting:     Toileting: stand by assist    Hygiene: minimum assist    Clothing management: minimum assist    Toileting position: sitting     Bathing:     Bathing: set up/sup    Bathing position: sitting    Washing hair: supervision    Washing back: mod I    Washing upper body: Mod I    Washing lower body: Mod I     Bathing assistive device(s): shower chair     Dressing:     Dressing: Min A     Dressing upper body: stand by assist with t-shirt, SBA/CGA with jackets    Dressing lower body: Mod I except needs assistance for getting shoe over AFO    Socks: min assist    Shoes: left one over AFO     Grooming:     Brushing teeth or denture care: Mod I     Shaving: Mod I     Feeding:     Using utensils: minimum assist    Cutting food: Mod I using cutting board.       Household Management:     Housekeeping: maximum assist    Laundry: maximum assist    Meal preparation: min A   assist    Medication management: supervision    Shopping: maximum assist    Writing: independent        Therapeutic Treatments and Modalities:    2. Neuromuscular Re-education (CPT 71428)    Therapeutic Treatments and Modalities Summary: Worked on shoulder mobilization and soft tissue stretching of left shoulder, elbow, forearm and wrist while supine on mat.  Able to position limb in outstretched position with shoulder in 80 degrees of abduction and full supination with wrist extension.    Min A log rolling over onto left side and pushing self up from supine to sit.  NMRE:  Weight bearing activity completed at edge of mat onto left UE, on elbow first 5 x with 10 second hold and then onto outstretched hand 5 x with 10 second hold.   5 sit to stands with support through left arm to encourage even weight bearing through both sides.  Forward flexion of trunk while seated at edge of mat encouraging even weight bearing through both lower extremities     Patient is now wearing shoulder support on a regular basis and subluxation of left shoulder has improved.     Time-based treatments/modalities:  Neuromusc re-ed minutes (CPT 50429): 45 minutes        Pain rating before treatment: 1  Pain rating after treatment: 1    ASSESSMENT:   Response to treatment: less subluxation of left shoulder.  No change in PROM or strength.      PLAN/RECOMMENDATIONS:   Plan for treatment: therapy treatment to continue next visit.  Planned interventions for next visit: continue with current treatment, E-stim attended (CPT 14945), neuromuscular re-education (CPT 29979), self care ADL training (CPT 05099), therapeutic activities (CPT 56717) and therapeutic exercise (CPT 51181)

## 2022-03-08 NOTE — OP THERAPY DAILY TREATMENT
Outpatient Physical Therapy  DAILY TREATMENT     [facility] Physical [address].  [address]  [place] [address]  Phone:  [phone]  Fax:  [phone]    Date: 03/08/2022    Patient: Thong Arzola  YOB: 1965  MRN: [id number]     Time Calculation    Start time: 0900  Stop time: 0941 Time Calculation (min): 41 minutes     Chief Complaint: Difficulty Walking, Loss Of Balance, and Weakness    Visit #: 31    SUBJECTIVE:  Continues to report some knee discomfort. Agreeable to PT    OBJECTIVE:  Current objective measures: Pre-session vitals 153/94,   Therapeutic Exercises (CPT 70268):     20. POC 02/23/2022 to 05/24/22    Therapeutic Treatments and Modalities:     1. Gait Training (CPT 20164), See below    2. Neuromuscular Re-education (CPT 31398), See below    Therapeutic Treatment and Modalities Summary: LiteGait Treadmill training with harness donned for pt safety, no BWS utilized  X 4 min total. 3 attempts at 1.3mph but treadmill malfunction/shut off every 30 seconds. Therefore discontinued today.  Backwards gait training for improving stabilty, stride, and strength. XCGA with quad cane x 70 feet. Significantly slow pace. Step to pattern without cues. Able to increase eft step length backwards less than 20% of the time with cues. Significant fatigue.        Neuromuscular Re-Education  Attempted tall kneeling on mat table however when therapist max A left LE into flexion pt LOB forward on to mat table. Attempted modified ward kneeleing for weight bearing through left LE With Rt UE support on chair. Max A therapist to position left LE on to mat table. Pt directed weight shifting to tolerance with max reported 40% weight on limb and significant knee pain 4 x 1 min reps with seated rests for fatigue.   Heel slide/knee flexion stretch reviewed/performed for home for improving tolerance to knee flexion ROM.    Time-based treatments/modalities:    Physical Therapy Timed Treatment  Charges  Gait training minutes (CPT 83284): 8 minutes  Neuromusc re-ed, balance, coor, post minutes (CPT 72448): 33 minutes    ASSESSMENT:   Response to treatment: Due to treadmill malfunction lite gait treadmill training discontinued quickly however in that time pt doing well with good mechanics and object identification on left side. Increased trunk flexion noted with backward gait and dec Rt stance time due to left hip ext weakness. However improved with cues to increase step length. Pt complaints of knee pain may be related to weight bearing through join or that it has not been done in a few months but will continue to assess.The pt will continue to benefit from skilled therapy services to maximize his CLOF and independence.    PLAN/RECOMMENDATIONS:   Plan for treatment: therapy treatment to continue next visit.  Planned interventions for next visit: continue with current treatment.  Meniscus?

## 2022-03-10 ENCOUNTER — APPOINTMENT (OUTPATIENT)
Dept: OCCUPATIONAL THERAPY | Facility: REHABILITATION | Age: 57
End: 2022-03-10
Attending: HOMEOPATH
Payer: COMMERCIAL

## 2022-03-10 ENCOUNTER — APPOINTMENT (OUTPATIENT)
Dept: PHYSICAL THERAPY | Facility: REHABILITATION | Age: 57
End: 2022-03-10
Attending: PHYSICAL MEDICINE & REHABILITATION
Payer: COMMERCIAL

## 2022-03-14 ENCOUNTER — PHYSICAL THERAPY (OUTPATIENT)
Dept: PHYSICAL THERAPY | Facility: REHABILITATION | Age: 57
End: 2022-03-14
Attending: PHYSICAL MEDICINE & REHABILITATION
Payer: COMMERCIAL

## 2022-03-14 ENCOUNTER — OCCUPATIONAL THERAPY (OUTPATIENT)
Dept: OCCUPATIONAL THERAPY | Facility: REHABILITATION | Age: 57
End: 2022-03-14
Attending: HOMEOPATH
Payer: COMMERCIAL

## 2022-03-14 DIAGNOSIS — M62.838 OTHER MUSCLE SPASM: ICD-10-CM

## 2022-03-14 DIAGNOSIS — I63.511 ACUTE RIGHT MCA STROKE (HCC): ICD-10-CM

## 2022-03-14 PROCEDURE — 97110 THERAPEUTIC EXERCISES: CPT

## 2022-03-14 PROCEDURE — 97112 NEUROMUSCULAR REEDUCATION: CPT

## 2022-03-14 NOTE — OP THERAPY DAILY TREATMENT
Outpatient Occupational Therapy  DAILY TREATMENT     [facility] Occupational [address].  [address]  [place] [address]  Phone:  [phone]  Fax:  [phone]    Date: 03/14/2022    Patient: Thong Arzola  YOB: 1965  MRN: [id number]     Time Calculation  Start time: 0245  Stop time: 0315 Time Calculation (min): 30 minutes         Chief Complaint: Extremity Weakness    Visit #: 35    SUBJECTIVE:  I am seeing Dr. Dumont tomorrow for more Botox injections    OBJECTIVE:  Current objective measures:   PROM:   Upper extremity (left):     Shoulder flexion: Below functional limits     0-90    Shoulder extension: 0-40    Shoulder abduction: Below functional limits 0-80    Shoulder external rotation: Below functional limits 0-15    Forearm supination: Below functional limits 0-50   wrist and digits now have full PROM     Passive Range of Motion Comments:  .5 finger anterior subluxation of L humerus noted     Strength:     Right upper extremity strength within functional limits.    Upper extremity strength (left):     Shoulder flexion: 1    Shoulder extension:  1    Shoulder abduction: 1    Shoulder adduction: 1    Shoulder external rotation:  0    Shoulder internal rotation: 1    Elbow flexion: 3-    Elbow extension: 1    Forearm pronation: 3    Forearm supination: 0    Wrist flexion: 1    Wrist extension: 0    Fingers flexion: 0    Fingers extension: 0    Fingers abduction: 0    Fingers adduction: 0     , Prehension, Pinch:  Unable at this time  Tone, Sensation and Coordination:   Tone:     Left upper extremity muscle tone: Flaccid    Right upper extremity muscle tone: Normal    Left lower extremity muscle tone: Gross assist     Modified Jeremiah:   Upper extremity (left):     Shoulder flexors: 0    Shoulder extensors: 0    Shoulder external rotators: 0    Shoulder internal rotators: 1    Scapular retraction: 1    Scapular elevation: 1    Elbow flexors: 1    Elbow extensors:  0    Wrist flexors: 0    Wrist extensors: 0    Finger flexors: 0    Finger extensors: 0     Coordination   Absent in L UE     Cognition:     Orientation: normal to time, normal to place and normal to person    Direction following: three step    Short term memory: intact    Long term memory: intact    Attention span: intact    Sequencing: intact    Organization: intact    Problem solving: intact    Judgement and safety awareness: intact    Hearing: intact     Vision/Perception:     Visual tracking: intact    Convergence: intact    Visual attentions: intact    Visual scanning: intact    R/L hemianopsia: present    R/L neglect: present    Vision assistive device(s): reading glasses     Vision/Perception Comments:   Bell's Test = 31/35 in 3 minutes.        Maze Test=25 seconds     Activities of Daily Living:   Transfers/Mobility:     Bed/chair transfers: Mod I assist    Wheelchair transfers: Mod I    Sit to stand: Mod I     Toilet transfers: supervision/SBA    Tub/shower transfers: contact guard assist    Bed mobility: Supervision assist     Toileting:     Toileting: stand by assist    Hygiene: minimum assist    Clothing management: minimum assist    Toileting position: sitting     Bathing:     Bathing: set up/sup    Bathing position: sitting    Washing hair: supervision    Washing back: mod I    Washing upper body: Mod I    Washing lower body: Mod I     Bathing assistive device(s): shower chair     Dressing:     Dressing: Min A     Dressing upper body: stand by assist with t-shirt, SBA/CGA with jackets    Dressing lower body: Mod I except needs assistance for getting shoe over AFO    Socks: min assist    Shoes: left one over AFO     Grooming:     Brushing teeth or denture care: Mod I     Shaving: Mod I     Feeding:     Using utensils: minimum assist    Cutting food: Mod I using cutting board.       Household Management:     Housekeeping: maximum assist    Laundry: maximum assist    Meal preparation: min A   assist    Medication management: supervision    Shopping: maximum assist    Writing: independent        Therapeutic Treatments and Modalities:    2. Neuromuscular Re-education (CPT 70263)    Therapeutic Treatments and Modalities Summary: Worked on shoulder mobilization and soft tissue stretching of left shoulder, elbow, forearm and wrist while supine on mat.  Able to position limb in outstretched position with shoulder in 80 degrees of abduction and full supination with wrist extension.    Min A log rolling over onto left side and pushing self up from supine to sit.  NMRE:  Weight bearing activity completed at edge of mat onto left UE, on elbow first 5 x with 10 second hold and then onto outstretched hand 5 x with 10 second hold.   5 sit to stands with support through left arm to encourage even weight bearing through both sides.  Forward flexion of trunk while seated at edge of mat encouraging even weight bearing through both lower extremities     Patient is now wearing shoulder support on a regular basis and subluxation of left shoulder has improved.     Time-based treatments/modalities:  Neuromusc re-ed minutes (CPT 27173): 30 minutes        Pain rating before treatment: 1  Pain rating after treatment: 1    ASSESSMENT:   Response to treatment: Continues to have tightness in pectoralis, biceps and extrinsic flexors.  To be reviewed by Dr. Dumont tomorrow and may have more Botox in left UE.      PLAN/RECOMMENDATIONS:   Plan for treatment: therapy treatment to continue next visit.  Planned interventions for next visit: continue with current treatment, E-stim attended (CPT 59097), manual therapy (CPT 95459), neuromuscular re-education (CPT 95163), self care ADL training (CPT 86161), therapeutic activities (CPT 56898) and therapeutic exercise (CPT 31444)

## 2022-03-14 NOTE — OP THERAPY PROGRESS SUMMARY
Outpatient Occupational Therapy  PROGRESS SUMMARY NOTE    [facility] Occupational Therapy [place] [address]  [address]  [place] [address]  Phone:  [phone]  Fax:  [phone]    Date of Visit: 03/14/2022    Patient: Thong Arzola  YOB: 1965  MRN: [id number]     Referring Provider: Zulema Dumont D.O.  [address]  [address]  [place] [address]   Referring Diagnosis Cerebral infarction due to unspecified occlusion or stenosis of right middle cerebral artery [I63.511];Other muscle spasm [M62.838]     Visit Diagnoses     ICD-10-CM   1. Acute right MCA stroke (HCC)  I63.511       Rehab Potential: good    Progress Report Period: 1/31/22-3/14/22    Functional Assessment Used: SAFE = 1          Objective Findings and Assessment:   Patient progression towards goals: Patient has continued to participate in OT services working on manual therapy, NMRE and self care with his non-dominant left UE.  Patient continues to improve functionally and mostly at a Set up/Supervision level.  Still needs some help with AFO.  Patient's left UE remains non-functional with increased tone noted in pectoralis, biceps and extrinsic flexors.  To be reviewed for possibility of more Botox injections by Dr. Dumont.    Patient would benefit from continued OT intervention to further enhance functional level and incorporation of left UE during self care tasks.      Objective findings and assessment details: Current objective measures:   PROM:   Upper extremity (left):     Shoulder flexion: Below functional limits     0-90    Shoulder extension: 0-40    Shoulder abduction: Below functional limits 0-80    Shoulder external rotation: Below functional limits 0-15    Forearm supination: Below functional limits 0-50   wrist and digits now have full PROM     Passive Range of Motion Comments:  .5 finger anterior subluxation of L humerus noted     Strength:     Right upper extremity strength within functional limits.     Upper extremity strength (left):     Shoulder flexion: 1    Shoulder extension:  1    Shoulder abduction: 1    Shoulder adduction: 1    Shoulder external rotation:  0    Shoulder internal rotation: 1    Elbow flexion: 3-    Elbow extension: 1    Forearm pronation: 3    Forearm supination: 0    Wrist flexion: 1    Wrist extension: 0    Fingers flexion: 0    Fingers extension: 0    Fingers abduction: 0    Fingers adduction: 0     , Prehension, Pinch:  Unable at this time  Tone, Sensation and Coordination:   Tone:     Left upper extremity muscle tone: Flaccid    Right upper extremity muscle tone: Normal    Left lower extremity muscle tone: Gross assist     Modified Jeremiah:   Upper extremity (left):     Shoulder flexors: 0    Shoulder extensors: 0    Shoulder external rotators: 0    Shoulder internal rotators: 1    Scapular retraction: 1    Scapular elevation: 1    Elbow flexors: 1    Elbow extensors: 0    Wrist flexors: 0    Wrist extensors: 0    Finger flexors: 0    Finger extensors: 0     Coordination   Absent in L UE     Cognition:     Orientation: normal to time, normal to place and normal to person    Direction following: three step    Short term memory: intact    Long term memory: intact    Attention span: intact    Sequencing: intact    Organization: intact    Problem solving: intact    Judgement and safety awareness: intact    Hearing: intact     Vision/Perception:     Visual tracking: intact    Convergence: intact    Visual attentions: intact    Visual scanning: intact    R/L hemianopsia: present    R/L neglect: present    Vision assistive device(s): reading glasses     Vision/Perception Comments:   Bell's Test = 31/35 in 3 minutes.        Maze Test=25 seconds     Activities of Daily Living:   Transfers/Mobility:     Bed/chair transfers: Mod I assist    Wheelchair transfers: Mod I    Sit to stand: Mod I     Toilet transfers: supervision/SBA    Tub/shower transfers: contact guard assist    Bed  mobility: Supervision assist     Toileting:     Toileting: stand by assist    Hygiene: minimum assist    Clothing management: minimum assist    Toileting position: sitting     Bathing:     Bathing: set up/sup    Bathing position: sitting    Washing hair: supervision    Washing back: mod I    Washing upper body: Mod I    Washing lower body: Mod I     Bathing assistive device(s): shower chair     Dressing:     Dressing: Min A     Dressing upper body: stand by assist with t-shirt, SBA/CGA with jackets    Dressing lower body: Mod I except needs assistance for getting shoe over AFO    Socks: min assist    Shoes: left one over AFO     Grooming:     Brushing teeth or denture care: Mod I     Shaving: Mod I     Feeding:     Using utensils: minimum assist    Cutting food: Mod I using cutting board.       Household Management:     Housekeeping: maximum assist    Laundry: maximum assist    Meal preparation: min A  assist    Medication management: supervision    Shopping: maximum assist    Writing: independent    Goals:   Short Term Goals:   Short Term Goals:   Patient will be Mod I  with hygiene and clothing management after bathroom tasks-met  Patient will be set up/supervision LB dressing-  Patient will be Mod I  cutting own food using AE  Patient will incorporate L UE as GFA for 25% of self care  Patient will be set up for meal prep  Short term goal timespan:  2-4 weeks    Long Term Goals:   Patient will be Mod I LB dressing (except for donning AFO)  Patient will be Mod I with meal prep using compensatory techniques and AE  Patient will score 35/35 on Bell's Test  Patient will incorporate L UE as GFA for 25%-50%of self care tasks.    Patient will score at least 40/80 on UEFI   Long term goal timespan:  4-6 weeks    Plan:   Planned therapy interventions:  E Stim Attended (CPT 64547), Manual Therapy (CPT 90164), Neuromuscular Re-education (CPT 13029), Self Care ADL Training (CPT 97261), Therapeutic Activities (CPT 86427) and  Therapeutic Exercise (CPT 95437)  Frequency:  2x week  Duration in weeks:  6  Duration in visits:  12      Referring provider co-signature:  I have reviewed this plan of care and my co-signature certifies the need for services.    Certification Period: 03/14/2022 to 04/25/22    Physician Signature: ________________________________ Date: ______________

## 2022-03-15 ENCOUNTER — OFFICE VISIT (OUTPATIENT)
Dept: PHYSICAL MEDICINE AND REHAB | Facility: REHABILITATION | Age: 57
End: 2022-03-15
Payer: COMMERCIAL

## 2022-03-15 VITALS
OXYGEN SATURATION: 99 % | TEMPERATURE: 97.4 F | SYSTOLIC BLOOD PRESSURE: 106 MMHG | DIASTOLIC BLOOD PRESSURE: 68 MMHG | RESPIRATION RATE: 18 BRPM | HEART RATE: 86 BPM

## 2022-03-15 DIAGNOSIS — I69.854 SPASTIC HEMIPLEGIA OF LEFT NONDOMINANT SIDE AS LATE EFFECT OF OTHER CEREBROVASCULAR DISEASE (HCC): ICD-10-CM

## 2022-03-15 DIAGNOSIS — I69.954 HEMIPLEGIA AND HEMIPARESIS FOLLOWING UNSPECIFIED CEREBROVASCULAR DISEASE AFFECTING LEFT NON-DOMINANT SIDE (HCC): Primary | ICD-10-CM

## 2022-03-15 DIAGNOSIS — M62.838 OTHER MUSCLE SPASM: ICD-10-CM

## 2022-03-15 DIAGNOSIS — G89.29 CHRONIC NOCICEPTIVE PAIN: ICD-10-CM

## 2022-03-15 PROCEDURE — 64644 CHEMODENERV 1 EXTREM 5/> MUS: CPT | Performed by: PHYSICAL MEDICINE & REHABILITATION

## 2022-03-15 PROCEDURE — 64643 CHEMODENERV 1 EXTREM 1-4 EA: CPT | Performed by: PHYSICAL MEDICINE & REHABILITATION

## 2022-03-15 PROCEDURE — 76942 ECHO GUIDE FOR BIOPSY: CPT | Performed by: PHYSICAL MEDICINE & REHABILITATION

## 2022-03-15 PROCEDURE — 99214 OFFICE O/P EST MOD 30 MIN: CPT | Mod: 25 | Performed by: PHYSICAL MEDICINE & REHABILITATION

## 2022-03-15 RX ORDER — TRAMADOL HYDROCHLORIDE 50 MG/1
25 TABLET ORAL 2 TIMES DAILY PRN
Qty: 30 TABLET | Refills: 0 | Status: SHIPPED | OUTPATIENT
Start: 2022-03-15 | End: 2022-04-05 | Stop reason: SDUPTHER

## 2022-03-15 NOTE — PROCEDURES
[facility]  Physical Medicine & Rehabilitation Clinic  [facility] [place] [address]  Ph: [phone]    PROCEDURE NOTE    Procedure: Botulinum Injection  Primary Diagnosis & Secondary Diagnoses:   Visit Diagnoses     ICD-10-CM   1. Hemiplegia and hemiparesis following unspecified cerebrovascular disease affecting left non-dominant side (HCC)  I69.954   2. Spastic hemiplegia of left nondominant side as late effect of other cerebrovascular disease (HCC)  I69.854   3. Other muscle spasm  M62.838   4. Chronic nociceptive pain  G89.29       INFORMED CONSENT   The risks and benefits of the procedure were explained to the patient, and all questions were answered. Benefits of the injection include spasticity reduction by decrease in muscle activation following toxin injection. Adverse effects from toxin injection include but are not limited to: excessive weakness, infection, breathing and/or swallowing difficulty.  Common adverse effects from the injection itself are pain and bruising. The patient demonstrated good understanding of risks and benefits and consents to botulinum toxin injection.     Vitals:    03/15/22 1554   BP: 106/68   Pulse: 86   Resp: 18   Temp: 36.3 °C (97.4 °F)   SpO2: 99%         Received botulinum toxin product in last 3 mos: No  Have recently received abx (aminoglycosides): No  Take anti-spasticity medications: Yes  Take allergy/cold medicine:  No  Take a sleep medicine: No  Lactating/pregnant: No  Known NMJ disease: No  Human albumin allergy: No    Pre-procedure time out was performed.     PROCEDURE:  Usual sterile procedure was observed with sterile prep with chlorhexidine. Ultrasound was used for needle guidance for the injections in the following muscles. A negative aspiration of blood was obtained, and the following was injected into each muscle.    Botox Plan: LUE and LLE  Location Botox Amount in Units   Left pectoralis major/pectoralis minor   50 units   Left bicep  75 units   Left  tricep  75 units   Left vastus medialis  50 units   Left vastus lateralis  50 units   Left vastus intermedius/rectus femoris  50 units   Left medial gastrocnemius  50 units   Left lateral gastrocnemius  50 units   Left soleus  50 units   Total Units 500 units     **Areas of spasticity and pain generators were followed and injected accordingly and therefore we decided to inject the upper extremity as well as the lower extremity due to the fact that the patient has had reduction in his ability to ambulate due to spasticity as well as to perform range of motion in his upper extremity due to spasticity.  For this reason Botox was injected into both the left upper extremity for muscles, and the lower extremity greater than 5 muscles**    Injection sites were cleaned with alcohol and band aid was applied afterwards. The patient tolerated the procedure well.  There were no complications.  The images were uploaded for permanent storage    MEDICATION USED:  100 units of botulinum toxin type A, mixed with 2mL of sterile, preservative-free normal saline in two 1cc syringes, for a total of 50 units in 1 mL.    200 units of botulinum toxin type A, mixed with 4mL of sterile, preservative-free normal saline in four 1cc syringes, for a total of 50 units in 1 mL. Repeated for other vials.    Total units injected: 500 units  Total units discarded: 0 units    See MAR for Botox details.     Counseling: Pt was instructed to seek immediate medical attention if fever, difficulty breathing, difficulty swallowing, or other concerning sxs arise. RTC in 2-4 weeks to investigate for effect at peak.    Additional Plan: See progress note for additional details.    BOTOX x1 vial 100 units  NDC 1872-3818-04  Lot [id number]  Exp 02/2024    BOTOX x2 200 unit vials  NDC 1352-5307-62  Lot [id number]  Exp 11/2024    Dr. Zulema Dumont DO, MS  Department of Physical Medicine & Rehabilitation  Neuro Rehabilitation Clinic  [facility]

## 2022-03-15 NOTE — PROGRESS NOTES
[facility]  PM&R Neuro Rehabilitation Clinic  [address] [place] [address]  Ph: [phone]    FOLLOW UP PATIENT EVALUATION      Patient Name: Thong Arzola   Patient : 1965  Patient Age: 56 y.o.     Examining Physician: Dr. Zulema Dumont, DO    PM&R History to Date - Background Information:  Dates of Admission/Discharge to ARU: 2021-2021    SUBJECTIVE:   Patient Identification: Thong Arzola is a 56 y.o. male with PMH significant for COVID-19 3/18/2021, paroxysmal atrial fibrillation not on anti-coagulation, hypothyroidism, QTc prolongation and rehabilitation history significant for large right ICA/MCA ischemic stroke 3/31/2021 in setting of paroxysmal off of anticoagulation s/p craniectomy 4/3/2021 by Dr. Payton s/p cranioplasty 21 and is presenting to PM&R clinic for a FOLLOW UP OUTPATIENT evaluation with the following chief complaint/s:    CC: Chronic pain.    History of Present Illness:  -Patient presents for Botox injections for spasticity  -However, today they have a second complaint of chronic nociceptive pain.  -The entire left side of his body is very painful, independent of neuropathic pain.  Largely because of the constant stress placed on the joints by spasticity and hemiparesis.  -Measures they have tried include therapy, passive range of motion, Tylenol, Celebrex, heat/ice without relief and he continues to have pain.  Botox does help loosen his joints but he still has the underlying nociceptive pain.    Review of Systems:  All other pertinent positive review of systems are noted above in HPI.   All other systems reviewed and are negative.    Past Medical History:  Past Medical History:   Diagnosis Date   • Stroke (HCC)     right side MCA   • Afib (HCC)    • COVID-19    • Disorder of thyroid     hypo   • High cholesterol    • Psychiatric problem     reactive depression      Past Surgical History:   Procedure Laterality Date   • CRANIOPLASTY Right  6/23/2021    Procedure: CRANIOPLASTY - FOR SKULL DEFECT;  Surgeon: Arthur Payton M.D.;  Location: SURGERY [facility];  Service: Neurosurgery   • CRANIOTOMY Right 4/3/2021    Procedure: CRANIOTOMY;  Surgeon: Arthur Payton M.D.;  Location: SURGERY [facility];  Service: Neurosurgery   • KNEE RECONSTRUCTION      left knee orthoscopic        Current Outpatient Medications:   •  traMADol (ULTRAM) 50 MG Tab, Take 0.5 Tablets by mouth 2 times a day as needed for Moderate Pain or Severe Pain for up to 30 days., Disp: 30 Tablet, Rfl: 0  •  celecoxib (CELEBREX) 200 MG Cap, Take 1 Capsule by mouth 2 times a day., Disp: 120 Capsule, Rfl: 0  •  ramipril (ALTACE) 10 MG capsule, Take 10 mg by mouth., Disp: , Rfl:   •  rosuvastatin (CRESTOR) 20 MG Tab, Take 20 mg by mouth., Disp: , Rfl:   •  vitamin D3 (CHOLECALCIFEROL) 5000 Unit (125 mcg) Tab, Take 1 Tablet by mouth every day., Disp: , Rfl:   •  MAGNESIUM PO, Take 1 Tablet by mouth every day., Disp: , Rfl:   •  ascorbic acid (VITAMIN C) 1000 MG tablet, Take 1,000 mg by mouth every day., Disp: , Rfl:   •  rivaroxaban (XARELTO) 20 MG Tab tablet, Take 20 mg by mouth every day., Disp: , Rfl:   •  levETIRAcetam (KEPPRA) 500 MG Tab, Take 1 Tablet by mouth 2 times a day for 180 days., Disp: 60 Tablet, Rfl: 5  •  gabapentin (NEURONTIN) 100 MG Cap, Take 2 Capsules by mouth every evening., Disp: 60 Capsule, Rfl: 2  •  dantrolene (DANTRIUM) 25 MG Cap, TAKE 3 CAPSULES BY MOUTH 3 TIMES A DAY, Disp: 360 Capsule, Rfl: 0  •  mirtazapine (REMERON) 15 MG TABLET DISPERSIBLE, TAKE 1 TABLET BY MOUTH EVERYDAY AT BEDTIME, Disp: 90 Tablet, Rfl: 1  •  melatonin 3 MG Tab, Take 1 Tablet by mouth at bedtime., Disp: 30 Tablet, Rfl: 2  •  amLODIPine (NORVASC) 5 MG Tab, Take 1 tablet by mouth every day., Disp: 90 tablet, Rfl: 1  •  acetaminophen (TYLENOL) 325 MG Tab, Take 2 Tablets by mouth every four hours as needed for Mild Pain., Disp: 30 tablet, Rfl: 0  •  thyroid (ARMOUR THYROID) 60 MG Tab, Take 1  tablet by mouth 2 times a day., Disp: 60 tablet, Rfl: 2  •  metoprolol tartrate (LOPRESSOR) 25 MG Tab, Take 1 tablet by mouth 2 times a day., Disp: 60 tablet, Rfl: 2    Current Facility-Administered Medications:   •  onabotulinum toxin type A (BOTOX) injection 500 Units, 500 Units, Injection, Once, Zulema Dumont D.O.  No Known Allergies     Past Social History:  Social History     Socioeconomic History   • Marital status:      Spouse name: Not on file   • Number of children: Not on file   • Years of education: Not on file   • Highest education level: Not on file   Occupational History   • Not on file   Tobacco Use   • Smoking status: Never Smoker   • Smokeless tobacco: Never Used   Vaping Use   • Vaping Use: Never used   Substance and Sexual Activity   • Alcohol use: Yes     Comment: occ   • Drug use: No   • Sexual activity: Not on file   Other Topics Concern   • Not on file   Social History Narrative   • Not on file     Social Determinants of Health     Financial Resource Strain: Not on file   Food Insecurity: Not on file   Transportation Needs: Not on file   Physical Activity: Not on file   Stress: Not on file   Social Connections: Not on file   Intimate Partner Violence: Not on file   Housing Stability: Not on file        Family History:  History reviewed. No pertinent family history.    Depression and Opioid Screening  PHQ-9:  Depression Screen (PHQ-2/PHQ-9) 5/15/2021 5/16/2021 2/17/2022   PHQ-2 Total Score 1 1 -   PHQ-2 Total Score - - 0   PHQ-9 Total Score 5 5 -     Interpretation of PHQ-9 Total Score   Score Severity   1-4 No Depression   5-9 Mild Depression   10-14 Moderate Depression   15-19 Moderately Severe Depression   20-27 Severe Depression     OBJECTIVE:   Vital Signs:  Vitals:    03/15/22 1554   BP: 106/68   Pulse: 86   Resp: 18   Temp: 36.3 °C (97.4 °F)   SpO2: 99%        Pertinent Labs:  Lab Results   Component Value Date/Time    SODIUM 139 02/12/2022 09:09 PM    POTASSIUM 4.2  02/12/2022 09:09 PM    CHLORIDE 104 02/12/2022 09:09 PM    CO2 20 02/12/2022 09:09 PM    GLUCOSE 125 (H) 02/12/2022 09:09 PM    BUN 18 02/12/2022 09:09 PM    CREATININE 0.93 02/12/2022 09:09 PM    CREATININE 1.3 04/04/2008 03:05 AM       Lab Results   Component Value Date/Time    HBA1C 5.9 (H) 04/01/2021 02:45 AM       Lab Results   Component Value Date/Time    WBC 8.3 02/12/2022 09:09 PM    RBC 5.47 02/12/2022 09:09 PM    HEMOGLOBIN 15.7 02/12/2022 09:09 PM    HEMATOCRIT 45.2 02/12/2022 09:09 PM    MCV 82.6 02/12/2022 09:09 PM    MCH 28.7 02/12/2022 09:09 PM    MCHC 34.7 02/12/2022 09:09 PM    MPV 8.9 (L) 02/12/2022 09:09 PM    NEUTSPOLYS 76.00 (H) 02/12/2022 09:09 PM    LYMPHOCYTES 16.70 (L) 02/12/2022 09:09 PM    MONOCYTES 5.90 02/12/2022 09:09 PM    EOSINOPHILS 1.00 02/12/2022 09:09 PM    BASOPHILS 0.20 02/12/2022 09:09 PM       Lab Results   Component Value Date/Time    ASTSGOT 21 05/27/2021 06:17 AM    ALTSGPT 55 (H) 05/27/2021 06:17 AM        Physical Exam:   GEN: No apparent distress  HEENT: Head normocephalic, atraumatic.  Sclera nonicteric bilaterally, no ocular discharge appreciated bilaterally.  CV: Extremities warm and well-perfused, no peripheral edema appreciated bilaterally.  PULMONARY: Breathing nonlabored on room air, no respiratory accessory muscle use.  Not requiring supplemental oxygen.  MSK: Left knee appears slightly swollen compared to right.  SKIN: No appreciable skin breakdown on exposed areas of skin.  PSYCH: Mood and affect within normal limits.  NEURO: Awake alert.  Conversational.  Logical thought content.  Answers questions appropriately.  Left spastic hemiparesis.  Very tight in quadriceps at 3/4, hamstrings 1+/4, plantar flexors 3/4 with reduced range of motion in dorsiflexion lacking about 10 to 15 degrees.  Significant spasticity of the left upper extremity as well with pectoralis major/minor 2/4, bicep 3/4, tricep 3/4, wrist flexors 3/4, finger flexors 2/4.      ASSESSMENT/PLAN:  Thong Arzola  is a 56 y.o. male with rehabilitation history significant for large right ICA/MCA ischemic stroke 3/31/2021 in setting of paroxysmal off of anticoagulation s/p craniectomy 4/3/2021 by Dr. Payton s/p cranioplasty 6/23/21, here for evaluation. The following plan was discussed with the patient who is in agreement.     Visit Diagnoses     ICD-10-CM   1. Hemiplegia and hemiparesis following unspecified cerebrovascular disease affecting left non-dominant side ([facility])  I69.954   2. Spastic hemiplegia of left nondominant side as late effect of other cerebrovascular disease ([facility])  I69.854   3. Other muscle spasm  M62.838   4. Chronic nociceptive pain  G89.29      Wife assists with history.    Rehab/Neuro:   1. Large right ICA/MCA ischemic stroke 3/31/2021 in setting of paroxysmal atrial fibrillation off of anticoagulation s/p craniectomy 4/3/2021 by Dr. Payton s/p cranioplasty 6/23/21  2. Left hemiplegia  3.  New onset seizures 2/2022 now 1 ADD.  -Neuro status: Stabilized now, had declined due to new onset seizures.  Now on Keppra.    Nausea: Resolved.  -Med management: Continue dantrolene at lower dose of 50 mg 3 times daily.    Spasticity:   -Patient presents for Botox injections today.  -See procedure note for full details.  -We had extensive evaluation today with the patient and due to the fact that he has both upper extremity and lower extremity spasticity which is significantly impairing his function we followed the pain generators as well as the greatest spasticity appreciated within the different muscle groups.  For this reason we decided to inject both the upper extremity and the lower extremity.  He had decline in his ability to ambulate due to spasticity.    Nociceptive pain/MSK/Ortho: 12/15/2021 secondary joint pain due to ambulating more.  Only affects left side at hip, knee, ankle. 3/15/2022 pain continues despite multiple conservative measures as well as Celebrex, Tylenol.  -Med management:  Trial tramadol 25 mg twice daily as needed    Last opioid risk scale: 3/15/2022    reviewed: 3/15/2022     Opioid Risk Score: 0    Interpretation of Opioid Risk Score   Score 0-3 = Low risk of abuse. Do UDS at least once per year.  Score 4-7 = Moderate risk of abuse. Do UDS 1-4 times per year.  Score 8+ = High risk of abuse. Refer to specialist.     I reviewed the     In prescribing controlled substances to this patient, I certify that I have obtained and reviewed the medical history of Thong Arzola. I have also made a good gracy effort to obtain applicable records from other providers who have treated the patient and records did not demonstrate any increased risk of substance abuse that would prevent me from prescribing controlled substances.     I have conducted a physical exam and documented it. I have reviewed Mr. Arzola’s prescription history as maintained by the [place] Prescription Monitoring Program.     I have assessed the patient’s risk for abuse, dependency, and addiction using the validated Opioid Risk Tool available at https://www.mdcalc.com/dtxgvy-zzzt-mxnz-ort-narcotic-abuse.     Given the above, I believe the benefits of controlled substance therapy outweigh the risks. The reasons for prescribing controlled substances include non-narcotic, oral analgesic alternatives have been inadequate for pain control. Accordingly, I have discussed the risk and benefits, treatment plan, and alternative therapies with the patient.         Follow up: 3 weeks for evaluation of spasticity and efficacy of tramadol for pain.    Please note that this dictation was created using voice recognition software. I have made every reasonable attempt to correct obvious errors but there may be errors of grammar and content that I may have overlooked prior to finalization of this note.    Dr. Zulema Dumont DO, MS  Department of Physical Medicine & Rehabilitation  Neuro Rehabilitation Clinic  [facility]  Group

## 2022-03-16 ENCOUNTER — TELEMEDICINE (OUTPATIENT)
Dept: NEUROLOGY | Facility: MEDICAL CENTER | Age: 57
End: 2022-03-16
Attending: NURSE PRACTITIONER
Payer: COMMERCIAL

## 2022-03-16 ENCOUNTER — APPOINTMENT (OUTPATIENT)
Dept: PHYSICAL THERAPY | Facility: REHABILITATION | Age: 57
End: 2022-03-16
Attending: PHYSICAL MEDICINE & REHABILITATION
Payer: COMMERCIAL

## 2022-03-16 VITALS — WEIGHT: 180 LBS | HEIGHT: 72 IN | BODY MASS INDEX: 24.38 KG/M2

## 2022-03-16 DIAGNOSIS — G40.109 LOCALIZATION-RELATED EPILEPSY (HCC): ICD-10-CM

## 2022-03-16 DIAGNOSIS — Z00.00 HEALTHCARE MAINTENANCE: ICD-10-CM

## 2022-03-16 DIAGNOSIS — Z13.31 SCREENING FOR DEPRESSION: ICD-10-CM

## 2022-03-16 DIAGNOSIS — Z86.73 HISTORY OF STROKE: ICD-10-CM

## 2022-03-16 DIAGNOSIS — I69.352 SPASTIC HEMIPLEGIA OF LEFT DOMINANT SIDE AS LATE EFFECT OF CEREBRAL INFARCTION (HCC): ICD-10-CM

## 2022-03-16 PROCEDURE — 99214 OFFICE O/P EST MOD 30 MIN: CPT | Mod: 95 | Performed by: NURSE PRACTITIONER

## 2022-03-16 RX ORDER — ROSUVASTATIN CALCIUM 10 MG/1
1 TABLET, COATED ORAL DAILY
COMMUNITY
Start: 2022-03-12 | End: 2022-05-02 | Stop reason: SDUPTHER

## 2022-03-16 ASSESSMENT — FIBROSIS 4 INDEX: FIB4 SCORE: 0.54

## 2022-03-17 ENCOUNTER — APPOINTMENT (OUTPATIENT)
Dept: SPEECH THERAPY | Facility: REHABILITATION | Age: 57
End: 2022-03-17
Attending: PHYSICAL MEDICINE & REHABILITATION
Payer: COMMERCIAL

## 2022-03-21 ENCOUNTER — APPOINTMENT (OUTPATIENT)
Dept: PHYSICAL THERAPY | Facility: REHABILITATION | Age: 57
End: 2022-03-21
Attending: PHYSICAL MEDICINE & REHABILITATION
Payer: COMMERCIAL

## 2022-03-21 NOTE — OP THERAPY DAILY TREATMENT
Outpatient Physical Therapy  DAILY TREATMENT     [facility] Physical [address]  [address]  [place] [address]  Phone:  [phone]  Fax:  [phone]    Date: 03/23/2022    Patient: Thong Arzola  YOB: 1965  MRN: [id number]     Time Calculation    Start time: 1100  Stop time: 1142 Time Calculation (min): 42 minutes         Chief Complaint: Difficulty Walking and Loss Of Balance    Visit #: [id number]    SUBJECTIVE:  He has been feeling good. He got Botox shots since his last PT visit in his bicep, quad and calf of his left side. Since getting Botox his left knee has been feeling better.     OBJECTIVE:  Current objective measures:   BP: 140/90  HR: 64  SPO2: 97          Therapeutic Treatments and Modalities:     1. Neuromuscular Re-education (CPT 76083)    Therapeutic Treatment and Modalities Summary: NMR  1. Standing balance unsupported with with UE cone stacking: Standing in front of mobile documentation table with plinth behind him he transferred 7 cones from the far right side of his body to the far left side of his body. Mod/max assist for thoracic rotation and increased weight shift to he left lower extremity. He had increased difficulty with weight shifts onto his left lower extremity. Balance assistance was difficult to assess due to the mod/max assists for thoracic rotations. PT hand placement for thoracic rotation during reaching/weight shifting was sternum and left pelvis for increased weight-bearing and rotation. Cups transferred 1 time from right to left and 2 times left to right.  2. Steated lateral leans to the left: Right upper extremity assisted left upper extremity to left lateral leaning to hit therapists hands, to improve weight shifting and forward leaning to the left side. 2 X 8 repetitions  3. Seated chops: Right upper extremity assisting left upper extremity with bilateral chops 1 X 10 each way. Mod verbal cues for increased thoracic rotation for weight  "shifting and mobility. Right upper extremity holding 3 lbs medicine ball performing chops for increased thoracic rotation/mobility and weight shifting. He required mod/max verbal and tactile cues for increased thoracic rotation and weight shifting.   4. Gait with visual scanning for cones (7 cones): Cones where placed mainly on the left side of the gyms (6 on the left 1 on the right). Patient ambulated from \"neuro gym\" on the path through the \"OT gym\" making left turns back to the \"neuro gym\". He had to visually scan for the cones during the walk and had to reach and turn to the left to give the cones to the therapist. The first lap through he missed 2 cones and was able to spot and grab the two missed cones during his second lap. There was one instance he needed a min/mod assist for LOB correction while passing a cone and turning to the left to hand it to the therapist. While reaching and turning he required CGA and while walking/scanning required a close SBA.     Time-based treatments/modalities:    Physical Therapy Timed Treatment Charges  Neuromusc re-ed, balance, coor, post minutes (CPT 14212): 42 minutes    ASSESSMENT:   Response to treatment:  This session was geared toward seated and standing weight shifting. Began in the seated position working on weight shift and reaching outside his base of support. Moved into seated chops with bilateral UEs and then RUE with 3lbs medicine ball. He had decreased thoracic rotation and required mod/max assist for further thoracic rotation. Unsupported standing balance with cone movement required mod/mas assist for thoracic rotation when reaching and tactile cueing to increase weight shift onto his left lower extremity. While ambulating and having to visually scan for cones he would take a few steps then scan the environment for cones. He required increased cueing for larger steps with his right lower extremity. There was one instance of LOB that required mod assist for LOB " while turning to the left handing over a cone. He will continue to benefit from skilled physical therapy for increased strength, ambulation, balance, stability and endurance.     PLAN/RECOMMENDATIONS:   Plan for treatment: therapy treatment to continue next visit.  Planned interventions for next visit: continue with current treatment.  Work on split stance with head turns next visit on 3/28.     [x] As the licensed therapist supervising this student, I was present during the entire treatment session directing the care and reviewing the assessment plan.  I reviewed all documentation prior to signing. The following changes or alternations were made by the licensed therapist.

## 2022-03-23 ENCOUNTER — OCCUPATIONAL THERAPY (OUTPATIENT)
Dept: OCCUPATIONAL THERAPY | Facility: REHABILITATION | Age: 57
End: 2022-03-23
Attending: HOMEOPATH
Payer: COMMERCIAL

## 2022-03-23 ENCOUNTER — PHYSICAL THERAPY (OUTPATIENT)
Dept: PHYSICAL THERAPY | Facility: REHABILITATION | Age: 57
End: 2022-03-23
Attending: PHYSICAL MEDICINE & REHABILITATION
Payer: COMMERCIAL

## 2022-03-23 DIAGNOSIS — G40.109 LOCALIZATION-RELATED EPILEPSY (HCC): ICD-10-CM

## 2022-03-23 DIAGNOSIS — I69.30 LATE EFFECT OF STROKE: ICD-10-CM

## 2022-03-23 DIAGNOSIS — M62.838 OTHER MUSCLE SPASM: ICD-10-CM

## 2022-03-23 DIAGNOSIS — I63.511 ACUTE RIGHT MCA STROKE (HCC): ICD-10-CM

## 2022-03-23 PROCEDURE — 97112 NEUROMUSCULAR REEDUCATION: CPT

## 2022-03-23 PROCEDURE — 97140 MANUAL THERAPY 1/> REGIONS: CPT

## 2022-03-23 RX ORDER — LEVETIRACETAM 500 MG/1
TABLET ORAL
Qty: 60 TABLET | Refills: 5 | Status: SHIPPED | OUTPATIENT
Start: 2022-03-23 | End: 2022-06-17

## 2022-03-23 NOTE — OP THERAPY DAILY TREATMENT
Outpatient Occupational Therapy  DAILY TREATMENT     [facility] Occupational Therapy [address].  [address]  [place] [address]  Phone:  [phone]  Fax:  [phone]    Date: 03/23/2022    Patient: Thong Arzola  YOB: 1965  MRN: [id number]     Time Calculation  Start time: 1015  Stop time: 1100 Time Calculation (min): 45 minutes         Chief Complaint: Extremity Weakness    Visit #: 36    SUBJECTIVE:  Patient seen by Dr. Dumont for more Botox Injections on 3/15/22  Botox Plan: LUE and LLE  Location Botox Amount in Units   Left pectoralis major/pectoralis minor   50 units   Left bicep  75 units   Left tricep  75 units   Left vastus medialis  50 units   Left vastus lateralis  50 units   Left vastus intermedius/rectus femoris  50 units   Left medial gastrocnemius  50 units   Left lateral gastrocnemius  50 units   Left soleus  50 units   Total Units 500 units       OBJECTIVE:  Current objective measures:   PROM:   Upper extremity (left):     Shoulder flexion: Below functional limits     0-90    Shoulder extension: 0-40    Shoulder abduction: Below functional limits 0-90    Shoulder external rotation: Below functional limits 0-20    Forearm supination: Below functional limits 0-50   wrist and digits now have full PROM     Passive Range of Motion Comments:  .5 finger anterior subluxation of L humerus noted     Strength:     Right upper extremity strength within functional limits.    Upper extremity strength (left):     Shoulder flexion: 1    Shoulder extension:  1    Shoulder abduction: 1    Shoulder adduction: 1    Shoulder external rotation:  0    Shoulder internal rotation: 1    Elbow flexion: 3-    Elbow extension: 2    Forearm pronation: 3    Forearm supination: 0    Wrist flexion: 1    Wrist extension: 0    Fingers flexion: 0    Fingers extension: 0    Fingers abduction: 0    Fingers adduction: 0     , Prehension, Pinch:  Unable at this time  Tone, Sensation and  Coordination:   Tone:     Left upper extremity muscle tone: Flaccid    Right upper extremity muscle tone: Normal    Left lower extremity muscle tone: Gross assist     Modified Jeremiah:   Upper extremity (left):     Shoulder flexors: 0    Shoulder extensors: 0    Shoulder external rotators: 0    Shoulder internal rotators: 1    Scapular retraction: 1    Scapular elevation: 1    Elbow flexors: 1    Elbow extensors: 0    Wrist flexors: 0    Wrist extensors: 0    Finger flexors: 0    Finger extensors: 0     Coordination   Absent in L UE     Cognition:     Orientation: normal to time, normal to place and normal to person    Direction following: three step    Short term memory: intact    Long term memory: intact    Attention span: intact    Sequencing: intact    Organization: intact    Problem solving: intact    Judgement and safety awareness: intact    Hearing: intact     Vision/Perception:     Visual tracking: intact    Convergence: intact    Visual attentions: intact    Visual scanning: intact    R/L hemianopsia: present    R/L neglect: present    Vision assistive device(s): reading glasses     Vision/Perception Comments:   Bell's Test = 31/35 in 3 minutes.        Maze Test=25 seconds     Activities of Daily Living:   Transfers/Mobility:     Bed/chair transfers: Mod I assist    Wheelchair transfers: Mod I    Sit to stand: Mod I     Toilet transfers: supervision/SBA    Tub/shower transfers: contact guard assist    Bed mobility: Supervision assist     Toileting:     Toileting: stand by assist    Hygiene: minimum assist    Clothing management: minimum assist    Toileting position: sitting     Bathing:     Bathing: set up/sup    Bathing position: sitting    Washing hair: supervision    Washing back: mod I    Washing upper body: Mod I    Washing lower body: Mod I     Bathing assistive device(s): shower chair     Dressing:     Dressing: Min A     Dressing upper body: stand by assist with t-shirt, SBA/CGA with  jackets    Dressing lower body: Mod I except needs assistance for getting shoe over AFO    Socks: min assist    Shoes: left one over AFO     Grooming:     Brushing teeth or denture care: Mod I     Shaving: Mod I     Feeding:     Using utensils: minimum assist    Cutting food: Mod I using cutting board.       Household Management:     Housekeeping: maximum assist    Laundry: maximum assist    Meal preparation: min A  assist    Medication management: supervision    Shopping: maximum assist    Writing: independent        Therapeutic Treatments and Modalities:    1. Manual Therapy (CPT 40567)    2. Neuromuscular Re-education (CPT 92867)    Therapeutic Treatments and Modalities Summary: Worked on shoulder mobilization and soft tissue stretching of left shoulder, elbow, forearm and wrist while supine on mat.  Able to position limb in outstretched position with shoulder in 90 degrees of abduction and full supination with wrist extension.    Min A/CGA  log rolling over onto left side and pushing self up from supine to sit.  NMRE:  Weight bearing activity completed at edge of mat onto left UE, on elbow first 5 x with 10 second hold and then onto outstretched hand 5 x with 10 second hold.     Min A donning shoulder support.      Time-based treatments/modalities:  Neuromusc re-ed minutes (CPT 33281): 30 minutes  Manual therapy joint mobilization minutes (CPT 49746): 15 minutes        Pain rating before treatment: 1  Pain rating after treatment: 1    ASSESSMENT:   Response to treatment: Able to achieve more PROM of left shoulder.      PLAN/RECOMMENDATIONS:   Plan for treatment: therapy treatment to continue next visit.  Planned interventions for next visit: continue with current treatment, E-stim attended (CPT 43536), manual therapy (CPT 53210), neuromuscular re-education (CPT 36024), self care ADL training (CPT 73140), therapeutic activities (CPT 36881) and therapeutic exercise (CPT 74069)

## 2022-03-24 ENCOUNTER — SPEECH THERAPY (OUTPATIENT)
Dept: SPEECH THERAPY | Facility: REHABILITATION | Age: 57
End: 2022-03-24
Attending: PHYSICAL MEDICINE & REHABILITATION
Payer: COMMERCIAL

## 2022-03-24 DIAGNOSIS — R41.4 LEFT-SIDED VISUAL NEGLECT: ICD-10-CM

## 2022-03-24 DIAGNOSIS — I69.319 COGNITIVE DEFICIT FOLLOWING CEREBROVASCULAR ACCIDENT (CVA): ICD-10-CM

## 2022-03-24 DIAGNOSIS — I63.411 CEREBROVASCULAR ACCIDENT (CVA) DUE TO EMBOLISM OF RIGHT MIDDLE CEREBRAL ARTERY (HCC): ICD-10-CM

## 2022-03-24 PROCEDURE — 92507 TX SP LANG VOICE COMM INDIV: CPT

## 2022-03-24 NOTE — OP THERAPY DAILY TREATMENT
"  Outpatient Speech Therapy  DAILY TREATMENT     [facility] Speech [address]  [address]  [place] [address]  Phone:  [phone]  Fax:  [phone]    Date: 03/24/2022    Patient: Thong Arzola  YOB: 1965  MRN: [id number]     Time Calculation    Start time: 1115  Stop time: 1200 Time Calculation (min): 45 minutes         Chief Complaint: Other (High level cognitive processing, Left neglect)    Visit #: 14    Subjective:   Reason for Therapy:     Reason For Evaluation:  CVA and Cognition    Onset Date:  3/31/2021  Social Support:     ST Subjective  Patient Mental Status: ALert and cooperative.  Progress Factors:     Progression:  Getting Better  Additional Subjective Comments:      Patient reported feeling like he continues to feel \"brain fog\" and continues to feel tired. Continuing to address left neglect at home.       Objective:   Treatments/Interventions Performed:  Cognitive-Linguistic training, Patient/Caregiver education and Home program  Objective Details:  1. Patient will improve attention completing complex, divided attention tasks with 80% accuracy,with mod cues.  --Progressing,  Continued difficulty completing v/s task while carrying on simple conversation.  Typically task is paused while patient is talking with cues to continue task.  2. Patient will complete complex problem solving and reasoning tasks with 80% accuracy, with mod cues.  --Progressing, Patient followed written clues to assemble colored dots in correct order.  Patient recalled and ordered sets of 4-words 6/6  3. Patient will improve left neglect and visuospatial skills by completing visual scanning and orientation tasks with 80% accuracy with mod cues.   --Patient completed 16 cube puzzle to replicate picture.  With 9/16 pieces placed, all on right side of puzzle, patient reported he had completed puzzle.  With min cue to look again, patient was able to complete 12/14, with additional cue to " complete furthest  left column, with total of 2 errors upon completion.         Speech Therapy Assessment:     Cognitive Linguistic Assessment:     Patient attention selective: Moderate (self-cued to look to the left after initial errors)    Patient attention divided: Moderate    Patient simple reasoning ability: Minimal    Patient complex reasoning ability: Moderate and Minimal (increasing accuracy, but delayed processing)    Patient complex problem solving ability: Moderate and Minimal (delayed processing)    ST Cognitive-Linguistic Assessment L29: Min-moderate.    Cognitive-Linguistic comments: Left neglect is improving, but still not back to level before seizure        Speech Therapy Plan :   Prognosis & Recommendations  Impression Summary:  Patient actively participated in all therapy tasks.  Deductive reasoning is improving, with continued increased processing times.  Left neglect still more prevalent in tasks. Patient  requires less cues to formulate strategies to organize thoughts to complete deductive reasoning tasks. Patient continues to present with deficits with left-neglect, attention, and visual scanning, as well as higher level problem solving and reasoning.  Patient would benefit from continued speech therapy to improve cognitive linguistic functions. Patient verbalized understanding and agreement with current plan of care.  Prognosis:  Good  Goals  Short Term Goals:  1. Patient will improve attention completing complex, divided attention tasks with 80% accuracy,with mod cues.  2. Patient will improve executive functions completing complex problem solving and reasoning tasks with 80% accuracy, with mod cues.  3. Patient will improve left neglect and visuospatial skills by completing visual scanning and orientation tasks with 80% accuracy with mod cues.   Short Term Goal Duration (Weeks):  6-8 weeks  Patient progression on Short Term Goals:  Had been progressing, with regression following recent  "seizure, see Objective  Long Term Goals:  Per observation and patient report, patient will demonstrate functional cognitive linguistic skills, including executive function, memory, and visuospatial skills, in 85% opportunities across several different environments.  Long Term Goal Duration (Weeks):  6-9 months  Patient progression on Long Term Goals:  Had been progressing, with regression following recent seizure, see Objective  Patient Stated Goal:  \"Better place to do physical work, driving\"  Potential barriers to Goal Achievement:  Vision  Therapy Recommendations  Recommendation:  Individual Speech Therapy,  Planned Therapy Interventions:  Cognitive-Linguistic training, Home Program and Patient/Caregiver Education,   Frequency:  2x week (16 X 92507)  Duration (in visits):  16  Duration (in weeks):  8               "

## 2022-03-28 ENCOUNTER — APPOINTMENT (OUTPATIENT)
Dept: PHYSICAL THERAPY | Facility: REHABILITATION | Age: 57
End: 2022-03-28
Attending: PHYSICAL MEDICINE & REHABILITATION
Payer: COMMERCIAL

## 2022-03-28 ENCOUNTER — APPOINTMENT (OUTPATIENT)
Dept: OCCUPATIONAL THERAPY | Facility: REHABILITATION | Age: 57
End: 2022-03-28
Attending: HOMEOPATH
Payer: COMMERCIAL

## 2022-03-28 DIAGNOSIS — I69.954 HEMIPLEGIA AND HEMIPARESIS FOLLOWING UNSPECIFIED CEREBROVASCULAR DISEASE AFFECTING LEFT NON-DOMINANT SIDE (HCC): ICD-10-CM

## 2022-03-28 DIAGNOSIS — I69.854 SPASTIC HEMIPLEGIA OF LEFT NONDOMINANT SIDE AS LATE EFFECT OF OTHER CEREBROVASCULAR DISEASE (HCC): ICD-10-CM

## 2022-03-28 DIAGNOSIS — M62.838 OTHER MUSCLE SPASM: ICD-10-CM

## 2022-03-28 RX ORDER — DANTROLENE SODIUM 25 MG/1
75 CAPSULE ORAL 3 TIMES DAILY
Qty: 360 CAPSULE | Refills: 0 | Status: SHIPPED | OUTPATIENT
Start: 2022-03-28 | End: 2022-05-26

## 2022-03-29 ENCOUNTER — SPEECH THERAPY (OUTPATIENT)
Dept: SPEECH THERAPY | Facility: REHABILITATION | Age: 57
End: 2022-03-29
Attending: PHYSICAL MEDICINE & REHABILITATION
Payer: COMMERCIAL

## 2022-03-29 DIAGNOSIS — I63.411 CEREBROVASCULAR ACCIDENT (CVA) DUE TO EMBOLISM OF RIGHT MIDDLE CEREBRAL ARTERY (HCC): ICD-10-CM

## 2022-03-29 DIAGNOSIS — I69.319 COGNITIVE DEFICIT FOLLOWING CEREBROVASCULAR ACCIDENT (CVA): ICD-10-CM

## 2022-03-29 DIAGNOSIS — R41.4 LEFT-SIDED VISUAL NEGLECT: ICD-10-CM

## 2022-03-29 PROCEDURE — 92507 TX SP LANG VOICE COMM INDIV: CPT

## 2022-03-29 NOTE — OP THERAPY DAILY TREATMENT
"  Outpatient Physical Therapy  DAILY TREATMENT     [facility] Physical Therapy [address].  [address]  [place] [address]  Phone:  [phone]  Fax:  [phone]    Date: 03/30/2022    Patient: Thong Arzola  YOB: 1965  MRN: [id number]     Time Calculation    Start time: 1054  Stop time: 1155 Time Calculation (min): 61 minutes         Chief Complaint: Difficulty Walking, Loss Of Balance, and Weakness    Visit #: 34    SUBJECTIVE:  He has no new complaints since last session. He did about 20 steps at his brothers house and hod some minor knee pain in his left knee. Asked about walking on a path by his house with pine needles/uneven terrain and walking on the sand.     OBJECTIVE:  Current objective measures:   BP: 162/92  HR: 71  SPO2: 97            Therapeutic Treatments and Modalities:     1. Gait Training (CPT 37669), 30 day completed 3/30    Therapeutic Treatment and Modalities Summary: Gait: Outdoor ambulation. Pt required 1 seated rest and 6 stand rests throughout extended gait training session on uneven terrain.   1. Descending 10 steps using the right hand rail. Descending approximately 5 leading with his left foot and 5 leading with his right foot in a step to pattern. CGA X 1   2. Outdoor ramp ambulation: Descending down the ramp to get to the lower level of the parking garage staying on the left side of the ramp (approximately 20ft). Turned to the right walking sideways on an incline with his right lower extremity being on the \"higher\" side (approximately 10ft).   3. Outdoor jessica/small gravel incline: Starting from the bottom of the parking garage entrance and walking uphill in the gravel pit. Required CGA. 2 instance mod A for posterior LOB on incline. Performed obstacle negotiation. Approximately 45ft. Seated rest break after climbing the hill. Then descended the incline approximately 20ft before walking over to the gravel/rock alleyway.   4. Outdoor gravel/rock " alleyway walking; CGA the entire distance of the alleyway with 3 standing rest breaks. Approximately 300ft. Cues for large steps with his right lower extremity.   5. Outdoor incline walking: Ascended the steep ramp (oppsite side of the upper parking lot from the door) approximately 25ft. With CGA and cues for large steps.   6. Outdoor walking across the parking lot: CGA/SBA for approximately 350 ft from end of the ramp at opposite end of the parking lot to the [address] (physical therapy check in door). With a seated rest break to end the session.     Time-based treatments/modalities:    Physical Therapy Timed Treatment Charges  Gait training minutes (CPT 78284): 61 minutes    ASSESSMENT:   Response to treatment:  Patient expressed concerns with varying outdoor walking surfaces so session was geared toward outdoor ambulation on uneven surfaces. Pt able ot complete all outdoor ambulation CGA with 2 instances mod A while ambulating up steep incline on sand texture. He required mod cueing throughout for increased step length on Rt LE to work on gait speed/mechanics. Discussed DC next month from PT for a functional break with heavy emphasis on pt continuation of HEP. Instructed to identify walking path/loop at home to walk daily and time to improve gait speed. Also educated pt on need to return to PT if functional decline occurs. Pt verbalized understanding.  He will continue to benefit from skilled physical therapy to improve ambulation and confidence on uneven surfaces, overall balance, stability and strength for greater independence with community ambulation, household ambulation and improvement for ADL tasks.     Goals:   Short Term Goals:   1. Pt will demonstrate improved self selected gait speed .3m/s to dec fall risk.- Not assessed   2. Pt will demonstrate improved functional LE strength with 5STS score 20 seconds or less.- Not assessed  3. Pt will demonstrate improved gait mechanics with Rt step length  reciprocal 75% or better without cueing with LRAD.- Progressing   4. Pt will stand, without cueing, with improved weight bearing through left LE to improve neglect.- Progressing   5. Pt will complete most appropriate balance assessment.- not assessed         Short term goal time span:  4-6 weeks      Long Term Goals:    Long Term Goals:    1. Pt will demonstrate improved CV endurance with 6MWT score 400 feet or better.- not assessed  2. Pt will ambulate with LRAD and reciprocal gait pattern 100% of time without cueing to improve safety and mechanics.- progressing   3. Pt will demonstrate improved functional LE strength with 5STS score 15 seconds or less.- not assessed   4. Pt will demonstrate improved functional mobility with TUG score 30 seconds or less.- not assessed  5. Pt will ambulate on uneven surfaces with LRAD SPV to improve access to the community.- progressing -currently CGA  Long term goal time span:  2-4 months      PLAN/RECOMMENDATIONS:   Plan for treatment: therapy treatment to continue next visit.  Planned interventions for next visit: continue with current treatment.        [x] As the licensed therapist supervising this student, I was present during the entire treatment session directing the care and reviewing the assessment plan.  I reviewed all documentation prior to signing. The following changes or alternations were made by the licensed therapist.

## 2022-03-29 NOTE — OP THERAPY DAILY TREATMENT
"  Outpatient Speech Therapy  DAILY TREATMENT     [facility] Speech [place] [address]  [address]  [place] [address]  Phone:  [phone]  Fax:  [phone]    Date: 03/29/2022    Patient: Thong Arzola  YOB: 1965  MRN: [id number]     Time Calculation    Start time: 0900  Stop time: 0946 Time Calculation (min): 46 minutes         Chief Complaint: Other (High level cognition)    Visit #: 15    Subjective:   Reason for Therapy:     Reason For Evaluation:  CVA and Cognition    Onset Date:  3/31/2021  Social Support:     ST Subjective  Patient Mental Status: ALert and cooperative.  Progress Factors:     Progression:  Getting Better  Additional Subjective Comments:      Patient reported feeling like he is getting better.      Objective:   Treatments/Interventions Performed:  Cognitive-Linguistic training, Patient/Caregiver education and Home program  Objective Details:  1. Patient will improve attention completing complex, divided attention tasks with 80% accuracy,with mod cues.  --Progressing,  Continued difficulty completing v/s task while carrying on simple conversation.  Typically task is paused while patient is talking with cues to continue task.  2. Patient will complete complex problem solving and reasoning tasks with 80% accuracy, with mod cues.  --Progressing, Patient followed written clues to deductively assemble colored dots in correct order.   3. Patient will improve left neglect and visuospatial skills by completing visual scanning and orientation tasks with 80% accuracy with mod cues.   --Patient completed was introduced to visual reasoning game \"set\", was able to find sets of three cards to meet criteria with mod-max instructions          Speech Therapy Assessment:     Cognitive Linguistic Assessment:     Patient attention selective: Moderate (self-cued to look to the left after initial errors)    Patient attention divided: Moderate    Patient simple reasoning ability: " Minimal    Patient complex reasoning ability: Moderate and Minimal (increasing accuracy, but delayed processing)    Patient complex problem solving ability: Moderate and Minimal (delayed processing)    ST Cognitive-Linguistic Assessment L29: Min-moderate.    Cognitive-Linguistic comments: Left neglect is improving, but still not back to level before seizure        Speech Therapy Plan :   Prognosis & Recommendations  Impression Summary:  Patient actively participated in all therapy tasks.  Deductive reasoning is improving, with continued increased processing times.  Left neglect still more prevalent in tasks. Patient requires less cues to formulate strategies and organize thoughts to complete deductive reasoning tasks, but does require additional processing time. Patient continues to present with deficits with left-neglect, attention, and visual scanning, as well as higher level problem solving and reasoning.  Patient would benefit from continued speech therapy to improve cognitive linguistic functions. Patient verbalized understanding and agreement with current plan of care.  Prognosis:  Good  Goals  Short Term Goals:  1. Patient will improve attention completing complex, divided attention tasks with 80% accuracy,with mod cues.  2. Patient will improve executive functions completing complex problem solving and reasoning tasks with 80% accuracy, with mod cues.  3. Patient will improve left neglect and visuospatial skills by completing visual scanning and orientation tasks with 80% accuracy with mod cues.   Short Term Goal Duration (Weeks):  6-8 weeks  Patient progression on Short Term Goals:  Had been progressing, with regression following recent seizure, see Objective  Long Term Goals:  Per observation and patient report, patient will demonstrate functional cognitive linguistic skills, including executive function, memory, and visuospatial skills, in 85% opportunities across several different environments.  Long  "Term Goal Duration (Weeks):  6-9 months  Patient progression on Long Term Goals:  Had been progressing, with regression following recent seizure, see Objective  Patient Stated Goal:  \"Better place to do physical work, driving\"  Potential barriers to Goal Achievement:  Vision  Therapy Recommendations  Recommendation:  Individual Speech Therapy,  Planned Therapy Interventions:  Cognitive-Linguistic training, Home Program and Patient/Caregiver Education,   Frequency:  2x week (16 X 92507)  Duration (in visits):  16  Duration (in weeks):  8               "

## 2022-03-30 ENCOUNTER — PHYSICAL THERAPY (OUTPATIENT)
Dept: PHYSICAL THERAPY | Facility: REHABILITATION | Age: 57
End: 2022-03-30
Attending: PHYSICAL MEDICINE & REHABILITATION
Payer: COMMERCIAL

## 2022-03-30 DIAGNOSIS — I63.511 ACUTE RIGHT MCA STROKE (HCC): ICD-10-CM

## 2022-03-30 PROCEDURE — 97116 GAIT TRAINING THERAPY: CPT

## 2022-04-04 ENCOUNTER — SPEECH THERAPY (OUTPATIENT)
Dept: SPEECH THERAPY | Facility: REHABILITATION | Age: 57
End: 2022-04-04
Attending: PHYSICAL MEDICINE & REHABILITATION
Payer: COMMERCIAL

## 2022-04-04 ENCOUNTER — OCCUPATIONAL THERAPY (OUTPATIENT)
Dept: OCCUPATIONAL THERAPY | Facility: REHABILITATION | Age: 57
End: 2022-04-04
Attending: PHYSICAL MEDICINE & REHABILITATION
Payer: COMMERCIAL

## 2022-04-04 DIAGNOSIS — I69.319 COGNITIVE DEFICIT FOLLOWING CEREBROVASCULAR ACCIDENT (CVA): ICD-10-CM

## 2022-04-04 DIAGNOSIS — I63.411 CEREBROVASCULAR ACCIDENT (CVA) DUE TO EMBOLISM OF RIGHT MIDDLE CEREBRAL ARTERY (HCC): ICD-10-CM

## 2022-04-04 DIAGNOSIS — I69.30 LATE EFFECT OF STROKE: ICD-10-CM

## 2022-04-04 DIAGNOSIS — R41.4 LEFT-SIDED VISUAL NEGLECT: ICD-10-CM

## 2022-04-04 PROCEDURE — 97112 NEUROMUSCULAR REEDUCATION: CPT

## 2022-04-04 PROCEDURE — 92507 TX SP LANG VOICE COMM INDIV: CPT

## 2022-04-04 PROCEDURE — 97140 MANUAL THERAPY 1/> REGIONS: CPT

## 2022-04-04 NOTE — OP THERAPY DAILY TREATMENT
Outpatient Speech Therapy  DAILY TREATMENT     [facility] Speech [place] [address] [address]  [address]  [place] [address]  Phone:  [phone]  Fax:  [phone]    Date: 04/04/2022    Patient: Thong Arzola  YOB: 1965  MRN: [id number]     Time Calculation    Start time: 0950  Stop time: 1033 Time Calculation (min): 43 minutes         Chief Complaint: No chief complaint on file.    Visit #: 16    Subjective:   Reason for Therapy:     Reason For Evaluation:  CVA and Cognition    Onset Date:  3/31/2021  Social Support:      Subjective  Patient Mental Status: ALert and cooperative.  Progress Factors:     Progression:  Getting Better  Additional Subjective Comments:      Patient reported feeling like he is getting better, has been playing [employer] at home.  Reported that he is sometimes reminded by wife to look at left side of plate towrds the end of meals.      Objective:   Treatments/Interventions Performed:  Cognitive-Linguistic training, Patient/Caregiver education and Home program  Objective Details:  1. Patient will improve attention completing complex, divided attention tasks with 80% accuracy,with mod cues.  --Progressing,  Continued difficulty completing v/s task while carrying on simple conversation.  Typically task is paused while patient is talking with cues to continue task.  2. Patient will complete complex problem solving and reasoning tasks with 80% accuracy, with mod cues.  --Progressing, Patient completed deductive map task with min-no prompts.  3. Patient will improve left neglect and visuospatial skills by completing visual scanning and orientation tasks with 80% accuracy with mod cues.   --Patient completed 16 cube puzzle with mode cues, including covering half of puzzle to focus on left side.         Speech Therapy Assessment:     Cognitive Linguistic Assessment:     Patient attention selective: Moderate (self-cued to look to the left after initial errors)    Patient  attention divided: Moderate    Patient simple reasoning ability: Minimal    Patient complex reasoning ability: Moderate and Minimal (increasing accuracy, but delayed processing)    Patient complex problem solving ability: Moderate and Minimal (delayed processing)    ST Cognitive-Linguistic Assessment L29: Min-moderate.    Cognitive-Linguistic comments: Left neglect is improving, but still not back to level before seizure        Speech Therapy Plan :   Prognosis & Recommendations  Impression Summary: Patient actively participated in all therapy tasks. Deductive reasoning is improving, with continued increased processing times.  Left neglect still more prevalent in tasks. Patient requires less cues to formulate strategies and organize thoughts to complete deductive reasoning tasks, but does require additional processing time. Patient continues to present with deficits with left-neglect, attention, and visual scanning, as well as higher level problem solving and reasoning.  Patient would benefit from continued speech therapy to improve cognitive linguistic functions. Patient verbalized understanding and agreement with current plan of care.  Prognosis:  Good  Goals  Short Term Goals:  1. Patient will improve attention completing complex, divided attention tasks with 80% accuracy,with mod cues.  2. Patient will improve executive functions completing complex problem solving and reasoning tasks with 80% accuracy, with mod cues.  3. Patient will improve left neglect and visuospatial skills by completing visual scanning and orientation tasks with 80% accuracy with mod cues.   Short Term Goal Duration (Weeks):  6-8 weeks  Patient progression on Short Term Goals:  Had been progressing, with regression following recent seizure, see Objective  Long Term Goals:  Per observation and patient report, patient will demonstrate functional cognitive linguistic skills, including executive function, memory, and visuospatial skills, in 85%  "opportunities across several different environments.  Long Term Goal Duration (Weeks):  6-9 months  Patient progression on Long Term Goals:  Had been progressing, with regression following recent seizure, see Objective  Patient Stated Goal:  \"Better place to do physical work, driving\"  Potential barriers to Goal Achievement:  Vision  Therapy Recommendations  Recommendation:  Individual Speech Therapy,  Planned Therapy Interventions:  Cognitive-Linguistic training, Home Program and Patient/Caregiver Education,   Frequency:  2x week (16 X 92507)  Duration (in visits):  16  Duration (in weeks):  8               "

## 2022-04-04 NOTE — OP THERAPY DISCHARGE SUMMARY
Outpatient Occupational Therapy  DISCHARGE SUMMARY NOTE    [facility] Occupational Therapy [address]  [address]  [place] [address]  Phone:  [phone]  Fax:  [phone]    Date of Visit: 04/04/2022    Patient: Thong Arzola  YOB: 1965  MRN: [id number]     Referring Provider: Zulema Dumont D.O.  [address]  [place] [address]   Referring Diagnosis: Cerebral infarction due to unspecified occlusion or stenosis of right middle cerebral artery [I63.511];Other muscle spasm [M62.838]         Functional Assessment Used: SAFE =1        Your patient is being discharged from Occupational Therapy with the following comments:   · Progress plateau    Comments:  Current objective measures:   PROM:   Upper extremity (left):     Shoulder flexion: Below functional limits     0-90    Shoulder extension: 0-40    Shoulder abduction: Below functional limits 0-90    Shoulder external rotation: Below functional limits 0-20    Forearm supination: Below functional limits 0-50   wrist and digits now have full PROM     Passive Range of Motion Comments:  .5 finger anterior subluxation of L humerus noted     Strength:     Right upper extremity strength within functional limits.    Upper extremity strength (left):     Shoulder flexion: 1    Shoulder extension:  1    Shoulder abduction: 1    Shoulder adduction: 1    Shoulder external rotation:  0    Shoulder internal rotation: 1    Elbow flexion: 3-    Elbow extension: 2    Forearm pronation: 3    Forearm supination: 0    Wrist flexion: 1    Wrist extension: 0    Fingers flexion: 0    Fingers extension: 0    Fingers abduction: 0    Fingers adduction: 0     , Prehension, Pinch:  Unable at this time  Tone, Sensation and Coordination:   Tone:     Left upper extremity muscle tone: Flaccid    Right upper extremity muscle tone: Normal    Left lower extremity muscle tone: Gross assist     Modified Jeremiah:   Upper extremity  (left):     Shoulder flexors: 0    Shoulder extensors: 0    Shoulder external rotators: 0    Shoulder internal rotators: 1    Scapular retraction: 1    Scapular elevation: 1    Elbow flexors: 1    Elbow extensors: 0    Wrist flexors: 0    Wrist extensors: 0    Finger flexors: 0    Finger extensors: 0     Coordination   Absent in L UE     Cognition:     Orientation: normal to time, normal to place and normal to person    Direction following: three step    Short term memory: intact    Long term memory: intact    Attention span: intact    Sequencing: intact    Organization: intact    Problem solving: intact    Judgement and safety awareness: intact    Hearing: intact     Vision/Perception:     Visual tracking: intact    Convergence: intact    Visual attentions: intact    Visual scanning: intact    R/L hemianopsia: present    R/L neglect: present    Vision assistive device(s): reading glasses     Vision/Perception Comments:   Bell's Test = 31/35 in 3 minutes.        Maze Test=25 seconds     Activities of Daily Living:   Transfers/Mobility:     Bed/chair transfers: Mod I assist    Wheelchair transfers: Mod I    Sit to stand: Mod I     Toilet transfers: supervision/SBA    Tub/shower transfers: contact guard assist    Bed mobility: Supervision assist     Toileting:     Toileting: stand by assist    Hygiene: minimum assist    Clothing management: minimum assist    Toileting position: sitting     Bathing:     Bathing: set up/sup    Bathing position: sitting    Washing hair: supervision    Washing back: mod I    Washing upper body: Mod I    Washing lower body: Mod I     Bathing assistive device(s): shower chair     Dressing:     Dressing: Min A     Dressing upper body: stand by assist with t-shirt, SBA/CGA with jackets    Dressing lower body: Mod I except needs assistance for getting shoe over AFO    Socks: min assist    Shoes: left one over AFO     Grooming:     Brushing teeth or denture care: Mod I     Shaving: Mod  I     Feeding:     Using utensils: minimum assist    Cutting food: Mod I using cutting board.       Household Management:     Housekeeping: maximum assist    Laundry: maximum assist    Meal preparation: min A  assist    Medication management: supervision    Shopping: maximum assist    Writing: independent    Limitations Remaining:  Non-functional left UE, but has adapted well with ADLs     Recommendations:  Continue with HEP    Suzan Mendez OTR/L    Date: 4/4/2022

## 2022-04-04 NOTE — OP THERAPY DAILY TREATMENT
Outpatient Occupational Therapy  DAILY TREATMENT     [facility] Occupational [address].  [address]  [place] [address]  Phone:  [phone]  Fax:  [phone]    Date: 04/04/2022    Patient: Thong Arzola  YOB: 1965  MRN: [id number]     Time Calculation  Start time: 0845  Stop time: 0930 Time Calculation (min): 45 minutes         Chief Complaint: Extremity Weakness    Visit #: 37    SUBJECTIVE:  We have a good routine now at home.     OBJECTIVE:  Current objective measures:   PROM:   Upper extremity (left):     Shoulder flexion: Below functional limits     0-90    Shoulder extension: 0-40    Shoulder abduction: Below functional limits 0-90    Shoulder external rotation: Below functional limits 0-20    Forearm supination: Below functional limits 0-50   wrist and digits now have full PROM     Passive Range of Motion Comments:  .5 finger anterior subluxation of L humerus noted     Strength:     Right upper extremity strength within functional limits.    Upper extremity strength (left):     Shoulder flexion: 1    Shoulder extension:  1    Shoulder abduction: 1    Shoulder adduction: 1    Shoulder external rotation:  0    Shoulder internal rotation: 1    Elbow flexion: 3-    Elbow extension: 2    Forearm pronation: 3    Forearm supination: 0    Wrist flexion: 1    Wrist extension: 0    Fingers flexion: 0    Fingers extension: 0    Fingers abduction: 0    Fingers adduction: 0     , Prehension, Pinch:  Unable at this time  Tone, Sensation and Coordination:   Tone:     Left upper extremity muscle tone: Flaccid    Right upper extremity muscle tone: Normal    Left lower extremity muscle tone: Gross assist     Modified Jeremiah:   Upper extremity (left):     Shoulder flexors: 0    Shoulder extensors: 0    Shoulder external rotators: 0    Shoulder internal rotators: 1    Scapular retraction: 1    Scapular elevation: 1    Elbow flexors: 1    Elbow extensors: 0    Wrist  flexors: 0    Wrist extensors: 0    Finger flexors: 0    Finger extensors: 0     Coordination   Absent in L UE     Cognition:     Orientation: normal to time, normal to place and normal to person    Direction following: three step    Short term memory: intact    Long term memory: intact    Attention span: intact    Sequencing: intact    Organization: intact    Problem solving: intact    Judgement and safety awareness: intact    Hearing: intact     Vision/Perception:     Visual tracking: intact    Convergence: intact    Visual attentions: intact    Visual scanning: intact    R/L hemianopsia: present    R/L neglect: present    Vision assistive device(s): reading glasses     Vision/Perception Comments:   Bell's Test = 31/35 in 3 minutes.        Maze Test=25 seconds     Activities of Daily Living:   Transfers/Mobility:     Bed/chair transfers: Mod I assist    Wheelchair transfers: Mod I    Sit to stand: Mod I     Toilet transfers: supervision/SBA    Tub/shower transfers: contact guard assist    Bed mobility: Supervision assist     Toileting:     Toileting: stand by assist    Hygiene: minimum assist    Clothing management: minimum assist    Toileting position: sitting     Bathing:     Bathing: set up/sup    Bathing position: sitting    Washing hair: supervision    Washing back: mod I    Washing upper body: Mod I    Washing lower body: Mod I     Bathing assistive device(s): shower chair     Dressing:     Dressing: Min A     Dressing upper body: stand by assist with t-shirt, SBA/CGA with jackets    Dressing lower body: Mod I except needs assistance for getting shoe over AFO    Socks: min assist    Shoes: left one over AFO     Grooming:     Brushing teeth or denture care: Mod I     Shaving: Mod I     Feeding:     Using utensils: minimum assist    Cutting food: Mod I using cutting board.       Household Management:     Housekeeping: maximum assist    Laundry: maximum assist    Meal preparation: min A  assist    Medication  management: supervision    Shopping: maximum assist    Writing: independent        Therapeutic Treatments and Modalities:    1. Manual Therapy (CPT 81514)    2. Neuromuscular Re-education (CPT 76028)    Therapeutic Treatments and Modalities Summary: Worked on shoulder mobilization and soft tissue stretching of left shoulder, elbow, forearm and wrist while supine on mat.  Able to position limb in outstretched position with shoulder in 90 degrees of abduction and full supination with wrist extension.    Min A/CGA  log rolling over onto left side and pushing self up from supine to sit.  NMRE:  Weight bearing activity completed at edge of mat onto left UE, on elbow first 5 x with 10 second hold and then onto outstretched hand 5 x with 10 second hold.     Min A donning shoulder support.      Time-based treatments/modalities:  Neuromusc re-ed minutes (CPT 53264): 30 minutes  Manual therapy joint mobilization minutes (CPT 28136): 15 minutes        Pain rating before treatment: 1  Pain rating after treatment: 1    ASSESSMENT:   Response to treatment: Patient has reached a plateau with his left UE.  He has progressed well functionally and he and his wife Anel are adhering to HEP    PLAN/RECOMMENDATIONS:   Plan for treatment: has reached a plateau .  Planned interventions for next visit: last therapy session today

## 2022-04-05 ENCOUNTER — OFFICE VISIT (OUTPATIENT)
Dept: PHYSICAL MEDICINE AND REHAB | Facility: REHABILITATION | Age: 57
End: 2022-04-05
Payer: COMMERCIAL

## 2022-04-05 ENCOUNTER — APPOINTMENT (OUTPATIENT)
Dept: PHYSICAL THERAPY | Facility: REHABILITATION | Age: 57
End: 2022-04-05
Attending: PHYSICAL MEDICINE & REHABILITATION
Payer: COMMERCIAL

## 2022-04-05 VITALS
RESPIRATION RATE: 17 BRPM | OXYGEN SATURATION: 96 % | SYSTOLIC BLOOD PRESSURE: 128 MMHG | TEMPERATURE: 98.6 F | HEART RATE: 77 BPM | DIASTOLIC BLOOD PRESSURE: 78 MMHG | BODY MASS INDEX: 25.83 KG/M2 | HEIGHT: 70 IN

## 2022-04-05 DIAGNOSIS — G89.29 CHRONIC NOCICEPTIVE PAIN: ICD-10-CM

## 2022-04-05 DIAGNOSIS — I63.511 ACUTE RIGHT MCA STROKE (HCC): Primary | ICD-10-CM

## 2022-04-05 DIAGNOSIS — M79.2 NEUROPATHIC PAIN: ICD-10-CM

## 2022-04-05 DIAGNOSIS — I69.398 SPASTICITY AS LATE EFFECT OF CEREBROVASCULAR ACCIDENT (CVA): ICD-10-CM

## 2022-04-05 DIAGNOSIS — R25.2 SPASTICITY AS LATE EFFECT OF CEREBROVASCULAR ACCIDENT (CVA): ICD-10-CM

## 2022-04-05 PROCEDURE — 99214 OFFICE O/P EST MOD 30 MIN: CPT | Performed by: PHYSICAL MEDICINE & REHABILITATION

## 2022-04-05 RX ORDER — TRAMADOL HYDROCHLORIDE 50 MG/1
25 TABLET ORAL 2 TIMES DAILY PRN
Qty: 30 TABLET | Refills: 2 | Status: SHIPPED | OUTPATIENT
Start: 2022-04-05 | End: 2022-06-17

## 2022-04-05 RX ORDER — GABAPENTIN 100 MG/1
200 CAPSULE ORAL EVERY EVENING
Qty: 180 CAPSULE | Refills: 1 | Status: SHIPPED | OUTPATIENT
Start: 2022-04-05 | End: 2022-08-16 | Stop reason: SDUPTHER

## 2022-04-05 NOTE — PROGRESS NOTES
[facility]  PM[facility]R [facility]  [facility] [place] [address]  Ph: [phone]    FOLLOW UP PATIENT EVALUATION      Patient Name: Thong Arzola   Patient : 1965  Patient Age: 56 y.o.     Examining Physician: Dr. Zulema Dumont, DO    PM&R History to Date - Background Information:  Dates of Admission/Discharge to ARU: 2021-2021    SUBJECTIVE:   Patient Identification: Thong Arzola is a 56 y.o. male with PMH significant for COVID-19 3/18/2021, paroxysmal atrial fibrillation not on anti-coagulation, hypothyroidism, QTc prolongation and rehabilitation history significant for large right ICA/MCA ischemic stroke 3/31/2021 in setting of paroxysmal off of anticoagulation s/p craniectomy 4/3/2021 by Dr. Payton s/p cranioplasty 21 and is presenting to PM&R clinic for a FOLLOW UP OUTPATIENT evaluation with the following chief complaint/s:    CC: Botox and pain follow up     History of Present Illness:  - Therapy will be on hold for a while.   - Has been given 25 mg tablets and they have been cutting them in half.   - The rx was for 50 mg but pharmacy dispensed 25 mg.  - Seems like when they run out of medications its hard to get refills.   - Taking Dantrolene 50 mg TID.   -Patient is very interested in returning to driving.  -Has questions regarding prognosis for arm recovery.    Review of Systems:  All other pertinent positive review of systems are noted above in HPI.   All other systems reviewed and are negative.    Past Medical History:  Past Medical History:   Diagnosis Date   • Stroke (HCC)     right side MCA   • Afib (HCC)    • COVID-19    • Disorder of thyroid     hypo   • High cholesterol    • Psychiatric problem     reactive depression      Past Surgical History:   Procedure Laterality Date   • CRANIOPLASTY Right 2021    Procedure: CRANIOPLASTY - FOR SKULL DEFECT;  Surgeon: Arthur Payton M.D.;  Location: SURGERY [facility];  Service: Neurosurgery    • CRANIOTOMY Right 4/3/2021    Procedure: CRANIOTOMY;  Surgeon: Arthur Payton M.D.;  Location: SURGERY [facility];  Service: Neurosurgery   • KNEE RECONSTRUCTION      left knee orthoscopic        Current Outpatient Medications:   •  gabapentin (NEURONTIN) 100 MG Cap, Take 2 Capsules by mouth every evening., Disp: 180 Capsule, Rfl: 1  •  traMADol (ULTRAM) 50 MG Tab, Take 0.5 Tablets by mouth 2 times a day as needed for Moderate Pain or Severe Pain for up to 90 days., Disp: 30 Tablet, Rfl: 2  •  dantrolene (DANTRIUM) 25 MG Cap, TAKE 3 CAPSULES BY MOUTH 3 TIMES A DAY (Patient taking differently: Take 50 mg by mouth 3 times a day.), Disp: 360 Capsule, Rfl: 0  •  levETIRAcetam (KEPPRA) 500 MG Tab, TAKE 1 TABLET BY MOUTH TWICE A DAY, Disp: 60 Tablet, Rfl: 5  •  rosuvastatin (CRESTOR) 10 MG Tab, Take 1 Tablet by mouth every day., Disp: , Rfl:   •  celecoxib (CELEBREX) 200 MG Cap, Take 1 Capsule by mouth 2 times a day., Disp: 120 Capsule, Rfl: 0  •  ramipril (ALTACE) 10 MG capsule, Take 10 mg by mouth 2 times a day., Disp: , Rfl:   •  vitamin D3 (CHOLECALCIFEROL) 5000 Unit (125 mcg) Tab, Take 1 Tablet by mouth every day., Disp: , Rfl:   •  MAGNESIUM PO, Take 1 Tablet by mouth every day., Disp: , Rfl:   •  ascorbic acid (VITAMIN C) 1000 MG tablet, Take 1,000 mg by mouth every day., Disp: , Rfl:   •  rivaroxaban (XARELTO) 20 MG Tab tablet, Take 20 mg by mouth every day., Disp: , Rfl:   •  mirtazapine (REMERON) 15 MG TABLET DISPERSIBLE, TAKE 1 TABLET BY MOUTH EVERYDAY AT BEDTIME, Disp: 90 Tablet, Rfl: 1  •  melatonin 3 MG Tab, Take 1 Tablet by mouth at bedtime., Disp: 30 Tablet, Rfl: 2  •  amLODIPine (NORVASC) 5 MG Tab, Take 1 tablet by mouth every day., Disp: 90 tablet, Rfl: 1  •  acetaminophen (TYLENOL) 325 MG Tab, Take 2 Tablets by mouth every four hours as needed for Mild Pain., Disp: 30 tablet, Rfl: 0  •  thyroid (ARMOUR THYROID) 60 MG Tab, Take 1 tablet by mouth 2 times a day. (Patient taking differently: Take 60  mg by mouth every day.), Disp: 60 tablet, Rfl: 2  •  metoprolol tartrate (LOPRESSOR) 25 MG Tab, Take 1 tablet by mouth 2 times a day., Disp: 60 tablet, Rfl: 2  No Known Allergies     Past Social History:  Social History     Socioeconomic History   • Marital status:      Spouse name: Not on file   • Number of children: Not on file   • Years of education: Not on file   • Highest education level: Not on file   Occupational History   • Not on file   Tobacco Use   • Smoking status: Never Smoker   • Smokeless tobacco: Never Used   Vaping Use   • Vaping Use: Never used   Substance and Sexual Activity   • Alcohol use: Yes     Comment: occ   • Drug use: No   • Sexual activity: Not on file   Other Topics Concern   • Not on file   Social History Narrative   • Not on file     Social Determinants of Health     Financial Resource Strain: Not on file   Food Insecurity: Not on file   Transportation Needs: Not on file   Physical Activity: Not on file   Stress: Not on file   Social Connections: Not on file   Intimate Partner Violence: Not on file   Housing Stability: Not on file        Family History:  History reviewed. No pertinent family history.    Depression and Opioid Screening  PHQ-9:  Depression Screen (PHQ-2/PHQ-9) 5/15/2021 5/16/2021 2/17/2022   PHQ-2 Total Score 1 1 -   PHQ-2 Total Score - - 0   PHQ-9 Total Score 5 5 -     Interpretation of PHQ-9 Total Score   Score Severity   1-4 No Depression   5-9 Mild Depression   10-14 Moderate Depression   15-19 Moderately Severe Depression   20-27 Severe Depression     OBJECTIVE:   Vital Signs:  Vitals:    04/05/22 1511   BP: 128/78   Pulse: 77   Resp: 17   Temp: 37 °C (98.6 °F)   SpO2: 96%        Pertinent Labs:  Lab Results   Component Value Date/Time    SODIUM 139 02/12/2022 09:09 PM    POTASSIUM 4.2 02/12/2022 09:09 PM    CHLORIDE 104 02/12/2022 09:09 PM    CO2 20 02/12/2022 09:09 PM    GLUCOSE 125 (H) 02/12/2022 09:09 PM    BUN 18 02/12/2022 09:09 PM    CREATININE 0.93  02/12/2022 09:09 PM    CREATININE 1.3 04/04/2008 03:05 AM       Lab Results   Component Value Date/Time    HBA1C 5.9 (H) 04/01/2021 02:45 AM       Lab Results   Component Value Date/Time    WBC 8.3 02/12/2022 09:09 PM    RBC 5.47 02/12/2022 09:09 PM    HEMOGLOBIN 15.7 02/12/2022 09:09 PM    HEMATOCRIT 45.2 02/12/2022 09:09 PM    MCV 82.6 02/12/2022 09:09 PM    MCH 28.7 02/12/2022 09:09 PM    MCHC 34.7 02/12/2022 09:09 PM    MPV 8.9 (L) 02/12/2022 09:09 PM    NEUTSPOLYS 76.00 (H) 02/12/2022 09:09 PM    LYMPHOCYTES 16.70 (L) 02/12/2022 09:09 PM    MONOCYTES 5.90 02/12/2022 09:09 PM    EOSINOPHILS 1.00 02/12/2022 09:09 PM    BASOPHILS 0.20 02/12/2022 09:09 PM       Lab Results   Component Value Date/Time    ASTSGOT 21 05/27/2021 06:17 AM    ALTSGPT 55 (H) 05/27/2021 06:17 AM        Physical Exam:   GEN: No apparent distress  HEENT: Head normocephalic, atraumatic.  Sclera nonicteric bilaterally, no ocular discharge appreciated bilaterally.  CV: Extremities warm and well-perfused, no peripheral edema appreciated bilaterally.  PULMONARY: Breathing nonlabored on room air, no respiratory accessory muscle use.  Not requiring supplemental oxygen.  SKIN: No appreciable skin breakdown on exposed areas of skin.  PSYCH: Mood and affect within normal limits.  NEURO: Awake alert.  Conversational.  Logical thought content.  Very little volitional movement of left upper extremity.  Significant tricep spasticity at 3/4, wrist spasticity 3/4, finger flexion 2/4.  Better range of motion of shoulder region due to loosening the pectoralis muscles.  Ambulatory with AFO and quad cane.  Left neglect.      ASSESSMENT/PLAN: Thong Arzola  is a 56 y.o. male with rehabilitation history significant for large right ICA/MCA ischemic stroke 3/31/2021 in setting of paroxysmal off of anticoagulation s/p craniectomy 4/3/2021 by Dr. aPyton s/p cranioplasty 6/23/21, here for evaluation. The following plan was discussed with the patient who is in  agreement.     Visit Diagnoses     ICD-10-CM   1. Acute right MCA stroke (HCC)  I63.511   2. Neuropathic pain  M79.2   3. Chronic nociceptive pain  G89.29   4. Spasticity as late effect of cerebrovascular accident (CVA)  I69.398    R25.2        Wife assists with history.    Rehab/Neuro:   1. Large right ICA/MCA ischemic stroke 3/31/2021 in setting of paroxysmal atrial fibrillation off of anticoagulation s/p craniectomy 4/3/2021 by Dr. Payton s/p cranioplasty 6/23/21  2. Left hemiplegia  3.  New onset seizures 2/2022 now 1 ADD.  -Neuro status: Stabilized.  -Driving status: Currently not driving.  Extensive counseling regarding the fact that I do not think it is safe for the patient to drive.  Regardless, he would like to go through with a driving evaluation.  I discussed with him that this would be more so to see for himself that there are deficits which make him unsafe for driving.  Patient amenable and would like referral placed.    Spasticity:   -Extensive discussion and counseling regarding utility of Botox and indications for it.  At this point given relatively low functioning left upper extremity goal would be for prevention of contracture, managing pain, managing hygiene.  We have decided mutually to hold off on Botox until after this round is worn off, though it did help significantly in his pectoralis region, to see if we need to continue with that or not given functional recovery of his arm as performed thus far.    Neuropathic pain:  -Med management: Prescription for gabapentin 200 mg nightly, 6-month supply.    Nociceptive pain/MSK/Ortho: 12/15/2021 secondary joint pain due to ambulating more.  Only affects left side at hip, knee, ankle. 3/15/2022 pain continues despite multiple conservative measures as well as Celebrex, Tylenol. 4/5/2022  Has been taking 12.5 mg tramadol as pharmacy gave him 25 mg instead of 50 mg.  Will increase to 25 and see if it is efficacious.  -Med management: Prescription for  tramadol 25 mg twice daily as needed x3 months.    Last opioid risk scale: 3/15/2022    reviewed: 4/5/2022     Opioid Risk Score: 0    Interpretation of Opioid Risk Score   Score 0-3 = Low risk of abuse. Do UDS at least once per year.  Score 4-7 = Moderate risk of abuse. Do UDS 1-4 times per year.  Score 8+ = High risk of abuse. Refer to specialist.     I reviewed the     In prescribing controlled substances to this patient, I certify that I have obtained and reviewed the medical history of Thong Arzola. I have also made a good gracy effort to obtain applicable records from other providers who have treated the patient and records did not demonstrate any increased risk of substance abuse that would prevent me from prescribing controlled substances.     I have conducted a physical exam and documented it. I have reviewed Mr. Arzola’s prescription history as maintained by the [place] Prescription Monitoring Program.     I have assessed the patient’s risk for abuse, dependency, and addiction using the validated Opioid Risk Tool available at https://www.mdcalc.com/lpicic-utnz-zlbq-ort-narcotic-abuse.     Given the above, I believe the benefits of controlled substance therapy outweigh the risks. The reasons for prescribing controlled substances include non-narcotic, oral analgesic alternatives have been inadequate for pain control. Accordingly, I have discussed the risk and benefits, treatment plan, and alternative therapies with the patient.         Follow up: Just after 3 months for reevaluation of spasticity need for Botox.  Medication refills.    Please note that this dictation was created using voice recognition software. I have made every reasonable attempt to correct obvious errors but there may be errors of grammar and content that I may have overlooked prior to finalization of this note.    Dr. Zulema Dumont DO, MS  Department of Physical Medicine & Rehabilitation  Neuro Rehabilitation  [facility]

## 2022-04-07 ENCOUNTER — APPOINTMENT (OUTPATIENT)
Dept: OCCUPATIONAL THERAPY | Facility: REHABILITATION | Age: 57
End: 2022-04-07
Attending: PHYSICAL MEDICINE & REHABILITATION
Payer: COMMERCIAL

## 2022-04-07 ENCOUNTER — PHYSICAL THERAPY (OUTPATIENT)
Dept: PHYSICAL THERAPY | Facility: REHABILITATION | Age: 57
End: 2022-04-07
Attending: PHYSICAL MEDICINE & REHABILITATION
Payer: COMMERCIAL

## 2022-04-07 DIAGNOSIS — M62.838 OTHER MUSCLE SPASM: ICD-10-CM

## 2022-04-07 DIAGNOSIS — I63.511 ACUTE RIGHT MCA STROKE (HCC): ICD-10-CM

## 2022-04-07 PROCEDURE — 97116 GAIT TRAINING THERAPY: CPT

## 2022-04-07 NOTE — OP THERAPY DAILY TREATMENT
Outpatient Physical Therapy  DAILY TREATMENT     [facility] [address]  [address]  [place] [address]  Phone:  [phone]  Fax:  [phone]    Date: 04/07/2022    Patient: Thong Arzola  YOB: 1965  MRN: [id number]     Time Calculation                   Chief Complaint: Difficulty Walking and Loss Of Balance    Visit #: 35    SUBJECTIVE:  Pt walked trail near his home in 18 min. Understands importance of HEP compliance.     OBJECTIVE:            Therapeutic Exercises (CPT 27973):     20. 2/23-5/24      Therapeutic Exercise Summary: DC HEP  Access Code: [email or web address]  URL: [email or web address]  Date: 04/07/2022  Prepared by:    Exercises  Mini Squat - 1 x daily - 7 x weekly - 3 sets - 10 reps  Squat with Chair Touch and Resistance Loop - 1 x daily - 7 x weekly - 3 sets - 10 reps  Supine Bridge - 1 x daily - 7 x weekly - 3 sets - 10 reps  Hooklying Isometric Clamshell - 1 x daily - 7 x weekly - 2 sets - 10 reps - 20 hold  Supine Hip Abduction - 1 x daily - 7 x weekly - 3 sets - 10 reps  Standing March with Counter Support - 1 x daily - 7 x weekly - 3 sets - 10 reps  Backwards Walking - 1 x daily - 7 x weekly - 3 sets - 10 reps  Standing with Head Rotation - 1 x daily - 7 x weekly - 3 sets - 10 reps  Romberg Stance with Head Nods - 1 x daily - 7 x weekly - 3 sets - 10 reps  Seated Long Arc Quad - 1 x daily - 7 x weekly - 3 sets - 10 reps  Supine Heel Slide with Strap - 1 x daily - 7 x weekly - 3 sets - 10 reps  Seated Gastroc Stretch with Strap - 1 x daily - 7 x weekly - 5 reps - 1 min hold  Seated Hamstring Stretch - 1 x daily - 7 x weekly - 5 reps - 1 min hold      Therapeutic Treatments and Modalities:     1. Gait Training (CPT 58970)    Therapeutic Treatment and Modalities Summary: Pt ambulated x 40 min uneven surfaces including gravel, asphalt with CGA/SPV. 2 instances min A for LOB posteriorly on gravel and 1 while descending steep incline. Pt required  frequent cues for gait speed and increasing rt step length. Pt also required frequent insight to left neglect while ambulating including walking off the path, bumping in to object etc. Carry over to insight cues were minimal. Pt gait significantly slow requiring significant time to ambulate this distance. No sitting rests, 5 standing rests 1-2 min.     Time-based treatments/modalities:             ASSESSMENT:   Response to treatment: Pt continues to have poor insight related to left neglect/deficits. PT provided insight to pt when he was ambulating to front door veering heavily to the right rather than the straight path to the door. Pt was unaware of his change in path and therapist pointed out the importance of accuracy with driving as this was brought up by pt to referring physician, who also feels it is inappropriate. Mod cues throughout gait training for increasing gait speed and rt stride. Reviewed, verbally, HEP and encouraged compliance. Will plan for DC     PLAN/RECOMMENDATIONS:   Plan for treatment: therapy treatment to continue next visit.  Planned interventions for next visit: continue with current treatment.

## 2022-04-12 NOTE — OP THERAPY DAILY TREATMENT
Outpatient Physical Therapy  DAILY TREATMENT     [address]  [address]  [place] [address]  Phone:  [phone]  Fax:  [phone]    Date: 04/13/2022    Patient: Thong Arzola  YOB: 1965  MRN: [id number]     Time Calculation    Start time: 0845  Stop time: 0927 Time Calculation (min): 42 minutes         Chief Complaint: Difficulty Walking, Loss Of Balance, and Weakness    Visit #: 36    SUBJECTIVE:  Has no new complaints since last session. He is still feeling comfortable with taking a break from physical therapy at the end of April.     OBJECTIVE:  Current objective measures:             Therapeutic Exercises (CPT 88914):       Therapeutic Exercise Summary: Balance HEP: Normal stance, Romberg stance, split stance. Eyes open: static stance, horizontal, vertical and circular head turns. Eyes closed: static stance, horizontal, vertical and circular head turns. At the counter top with UE support and a chair behind him to sit down if needed.       Therapeutic Treatments and Modalities:     1. Neuromuscular Re-education (CPT 85914), 30 day completed 3/30    Therapeutic Treatment and Modalities Summary: NMR: (all preformed in the parallel bars)   1. Standing balance: Romberg stance on firm surface. Eyes open: static stance, horizontal, vertical and circular head turns. Eyes closed: static stance, horizontal, vertical and circular head turns X 20 seconds each position. Patient required RLE support on and off during the exercises. He required cues for circular head movements to look all the way around the circular specifically to his left.   2. Standing balance: split stance on firm surface with bilateral lead feet. Eyes open: static stance, horizontal, vertical and circular head turns. He required increased time to switch between right and left feet as the lead. Used RUE support on and off to maintain balance. He required cues for full head circles,  especially to the left. X 20 seconds each position- after this portion he required a 2 minute seated break  3. Cobble stone wobble board: Romberg stance X 30 seconds each position. Eyes open: static stance, horizontal, vertical and circular head turns. Eyes closed: static stance, horizontal and vertical head turns. After vertical head turns he reported dizziness and needed a seated rest break of 2 minutes.   4. Cobble stone wobble board: split stance with bilateral lead feet. Position head 2 X 2 minutes on each foot. He required increased time for changing switch feet. He had increased difficulties with his left lower extremity in the back and required cueing for pushing his heel down into the board. He required on and off RUE support to maintain his balance.   5. Cobble stone wobble board: split stance with bilateral lead feet. 1 X 30 seconds each position on each foot. Eyes open: static stance, horizontal and vertical head turns. Eyes closed static stance. He required on and off RUE support to maintain balance.     Time-based treatments/modalities:    Physical Therapy Timed Treatment Charges  Neuromusc re-ed, balance, coor, post minutes (CPT 34435): 42 minutes    ASSESSMENT:   Response to treatment:  Patient continues to have balance limitations and session was geared toward balance exercises. First portion of the session was spent reviewing going over his balance HEP. Second portion of the session was spent working on the cobble stone wobble boards to stimulate uneven surfaces encounter in the community. He requires RUE support when completing balance exercises but was able to take support way intermittently. Discussed physical therapy break at the end of April and patient is still agreeable. He will continue to benefit from skilled physical therapy from improvements in balance, stability, gait, strength and endurance for improved independence.       PLAN/RECOMMENDATIONS:   Plan for treatment: therapy treatment  to continue next visit.  Planned interventions for next visit: continue with current treatment.        [x] As the licensed therapist supervising this student, I was present during the entire treatment session directing the care and reviewing the assessment plan.  I reviewed all documentation prior to signing. The following changes or alternations were made by the licensed therapist.

## 2022-04-13 ENCOUNTER — SPEECH THERAPY (OUTPATIENT)
Dept: SPEECH THERAPY | Facility: REHABILITATION | Age: 57
End: 2022-04-13
Attending: PHYSICAL MEDICINE & REHABILITATION
Payer: COMMERCIAL

## 2022-04-13 ENCOUNTER — PHYSICAL THERAPY (OUTPATIENT)
Dept: PHYSICAL THERAPY | Facility: REHABILITATION | Age: 57
End: 2022-04-13
Attending: PHYSICAL MEDICINE & REHABILITATION
Payer: COMMERCIAL

## 2022-04-13 ENCOUNTER — APPOINTMENT (OUTPATIENT)
Dept: OCCUPATIONAL THERAPY | Facility: REHABILITATION | Age: 57
End: 2022-04-13
Attending: PHYSICAL MEDICINE & REHABILITATION
Payer: COMMERCIAL

## 2022-04-13 DIAGNOSIS — I69.319 COGNITIVE DEFICIT FOLLOWING CEREBROVASCULAR ACCIDENT (CVA): ICD-10-CM

## 2022-04-13 DIAGNOSIS — R41.4 LEFT-SIDED VISUAL NEGLECT: ICD-10-CM

## 2022-04-13 DIAGNOSIS — I63.411 CEREBROVASCULAR ACCIDENT (CVA) DUE TO EMBOLISM OF RIGHT MIDDLE CEREBRAL ARTERY (HCC): ICD-10-CM

## 2022-04-13 DIAGNOSIS — I63.511 ACUTE RIGHT MCA STROKE (HCC): ICD-10-CM

## 2022-04-13 PROCEDURE — 97112 NEUROMUSCULAR REEDUCATION: CPT

## 2022-04-13 PROCEDURE — 92507 TX SP LANG VOICE COMM INDIV: CPT

## 2022-04-13 NOTE — OP THERAPY DAILY TREATMENT
Outpatient Speech Therapy  DAILY TREATMENT     [facility] [place] [address] [address]  [address]  [place] [address]  Phone:  [phone]  Fax:  [phone]    Date: 04/13/2022    Patient: Thong Arzola  YOB: 1965  MRN: [id number]     Time Calculation    Start time: 0947  Stop time: 1032 Time Calculation (min): 45 minutes         Chief Complaint: Other (cognition)    Visit #: 17    Subjective:   Reason for Therapy:     Reason For Evaluation:  CVA and Cognition    Onset Date:  3/31/2021  Social Support:     ST Subjective  Patient Mental Status: ALert and cooperative.  Progress Factors:     Progression:  Getting Better  Additional Subjective Comments:      Patient reported feeling like he is getting better, he is living between 3 houses this past week, which has been challenging.      Objective:   Treatments/Interventions Performed:  Cognitive-Linguistic training, Patient/Caregiver education and Home program  Objective Details:  1. Patient will improve attention completing complex, divided attention tasks with 80% accuracy,with mod cues.  --Progressing,  Continued difficulty completing visual scanning task while carrying on simple conversation.  Typically task is paused while patient is talking with cues to continue task.  2. Patient will complete complex problem solving and reasoning tasks with 80% accuracy, with mod cues.  --Progressing, Patient completed most of executive function/errand planning activity.  Significant difficulty estimating and comparing distances, and repeated reminders to plan order of locations to be efficient rather than back and forth.    3. Patient will improve left neglect and visuospatial skills by completing visual scanning and orientation tasks with 80% accuracy with mod cues.   --not formally addressed           Speech Therapy Assessment:     Cognitive Linguistic Assessment:     Patient attention selective: Moderate (self-cued to look to the left  after initial errors)    Patient attention divided: Moderate    Patient simple reasoning ability: Minimal    Patient complex reasoning ability: Moderate and Minimal (increasing accuracy, but delayed processing)    Patient complex problem solving ability: Moderate and Minimal (delayed processing)    ST Cognitive-Linguistic Assessment L29: Min-moderate.    Cognitive-Linguistic comments: Left neglect is improving, but still not back to level before seizure        Speech Therapy Plan :   Prognosis & Recommendations  Impression Summary: Patient actively participated in all therapy tasks. Deductive reasoning is improving, with continued increased processing times.  Left neglect still more prevalent in tasks. Patient requires less cues to formulate strategies and organize thoughts to complete deductive reasoning tasks, but does require additional processing time. Patient continues to present with deficits with left-neglect, attention, and visual scanning, as well as higher level problem solving and reasoning.  Patient would benefit from continued speech therapy to improve cognitive linguistic functions. Patient verbalized understanding and agreement with current plan of care.  Prognosis:  Good  Goals  Short Term Goals:  1. Patient will improve attention completing complex, divided attention tasks with 80% accuracy,with mod cues.  2. Patient will improve executive functions completing complex problem solving and reasoning tasks with 80% accuracy, with mod cues.  3. Patient will improve left neglect and visuospatial skills by completing visual scanning and orientation tasks with 80% accuracy with mod cues.   Short Term Goal Duration (Weeks):  6-8 weeks  Patient progression on Short Term Goals:  Had been progressing, with regression following recent seizure, see Objective  Long Term Goals:  Per observation and patient report, patient will demonstrate functional cognitive linguistic skills, including executive function,  "memory, and visuospatial skills, in 85% opportunities across several different environments.  Long Term Goal Duration (Weeks):  6-9 months  Patient progression on Long Term Goals:  Had been progressing, with regression following recent seizure, see Objective  Patient Stated Goal:  \"Better place to do physical work, driving\"  Potential barriers to Goal Achievement:  Vision  Therapy Recommendations  Recommendation:  Individual Speech Therapy,  Planned Therapy Interventions:  Cognitive-Linguistic training, Home Program and Patient/Caregiver Education,   Frequency:  2x week (16 X 92507)  Duration (in visits):  16  Duration (in weeks):  8               "

## 2022-04-15 ENCOUNTER — APPOINTMENT (OUTPATIENT)
Dept: PHYSICAL THERAPY | Facility: REHABILITATION | Age: 57
End: 2022-04-15
Attending: PHYSICAL MEDICINE & REHABILITATION
Payer: COMMERCIAL

## 2022-04-15 ENCOUNTER — APPOINTMENT (OUTPATIENT)
Dept: OCCUPATIONAL THERAPY | Facility: REHABILITATION | Age: 57
End: 2022-04-15
Attending: PHYSICAL MEDICINE & REHABILITATION
Payer: COMMERCIAL

## 2022-04-18 ENCOUNTER — APPOINTMENT (OUTPATIENT)
Dept: OCCUPATIONAL THERAPY | Facility: REHABILITATION | Age: 57
End: 2022-04-18
Attending: PHYSICAL MEDICINE & REHABILITATION
Payer: COMMERCIAL

## 2022-04-19 ENCOUNTER — PHYSICAL THERAPY (OUTPATIENT)
Dept: PHYSICAL THERAPY | Facility: REHABILITATION | Age: 57
End: 2022-04-19
Attending: PHYSICAL MEDICINE & REHABILITATION
Payer: COMMERCIAL

## 2022-04-19 DIAGNOSIS — I63.511 ACUTE RIGHT MCA STROKE (HCC): ICD-10-CM

## 2022-04-19 PROCEDURE — 97110 THERAPEUTIC EXERCISES: CPT

## 2022-04-19 PROCEDURE — 97112 NEUROMUSCULAR REEDUCATION: CPT

## 2022-04-19 NOTE — OP THERAPY DISCHARGE SUMMARY
Outpatient Physical Therapy  DISCHARGE SUMMARY NOTE      [facility]  [address]  [address]  [place] [address]  Phone:  [phone]  Fax:  [phone]    Date of Visit: 04/19/2022    Patient: Thong Arzola  YOB: 1965  MRN: [id number]     Referring Provider: Zulema Dumont D.O.  [address] [facility] [address] [facility]  [address]  [place] [address]   Referring Diagnosis Cerebral infarction, unspecified [I63.9];Other muscle spasm [M62.838]       Functional Assessment Used        Your patient is being discharged from Physical Therapy with the following comments:   · Progress plateau    ASSESSMENT:   Response to treatment:  Discussed discharge from skilled physical therapy after today's session. Patient was in agreement with discharge today and feels confident with HEP. Reassessment of patients outcome measures where done as well as the DGI for a balance assessment. He was able to complete the DGI but scored as a high fall risk. Patient was given education on when to return to skilled physical therapy if needed and verbalized understanding. Patient has started to plateau with progress towards goals and is ready for discharge from physical therapy at this time.     STG  1. Pt will demonstrate improved self selected gait speed .3m/s to dec fall risk. MET  2. Pt will demonstrate improved functional LE strength with 5STS score 28 seconds or less. MET  3. Pt will demonstrate improved gait mechanics with Rt step length reciprocal 75% or better without cueing with SBQC. Partially met, min/mod cues for large right step length  4. Pt will stand, without cueing, with improved weight bearing through left LE to improve neglect. Partially met, continues to more heavily rely on Rt vs left.   5. Pt will demonstrate improved internal awareness of center with abilty to perform STS from elevated surface without Rt trunk lean/weight shift. MET     Short term goal time span:  6-8 weeks      Long Term  Goals:    1. Pt will demonstrate improved CV endurance with 6MWT score 350 feet or better. Not met   2. Pt will ambulate with LRAD and reciprocal gait pattern 100% of time without cueing to improve safety and mechanics. Not met   3. Pt will demonstrate improved functional LE strength with 5STS score 17 seconds or less. Not met   4. Pt will demonstrate improved functional mobility with TUG score 30 seconds or less. Not met     Recommendations:  Discharge from skilled physical therapy to [facility]. Education to return once goals set of timed walking path was met or if he begins to see a decline in function.     Jeannie Reece, Student    Date: 4/19/2022      [x] As the licensed therapist supervising this student, I was present during the entire treatment session directing the care and reviewing the assessment plan.  I reviewed all documentation prior to signing.  The following changes or alternations were made by the licensed therapist.

## 2022-04-19 NOTE — OP THERAPY DAILY TREATMENT
Outpatient Physical Therapy  DAILY TREATMENT     [address]  [address]  [place] [address]  Phone:  [phone]  Fax:  [phone]    Date: 04/19/2022    Patient: Thong Arzola  YOB: 1965  MRN: [id number]     Time Calculation    Start time: 0900  Stop time: 0944 Time Calculation (min): 44 minutes         Chief Complaint: Difficulty Walking, Loss Of Balance, and Weakness    Visit #: 37    SUBJECTIVE:  Patient has no new complaints since last session. His exercises are going well at home and he doesn't have any questions regarding them. He is ready to discharge from physical therapy.     OBJECTIVE:  Current objective measures:     Vitals:Rt 158/88 mmHG 97%, 65bpm  5STS 24. 40 1 UE support DC: 19.59  6MWT  297 feet sbqc  TUG 43.28 sec, sbqc DC: 40.41  10MWT: 33.91 sec Gait speed .29m/s DC: 33.32, gait speed: 0.30m/s    DGI:   1. Gait level surface: 2  2. Change in gait speed: 1  3. Gait with horizontal head turns: 1  4. Gait with vertical head turns: 1  5. Gait and pivot turn: 2  6. Step over obstacle: 0- He needed to stop before stepping over obstacle and was unable to clear his LLE over obstacle  7. Step around obstacles: 2   8. Steps: 1  Total score: 10/ 24 indicating he is at an increased risk for falling.           Therapeutic Exercises (CPT 39439):       Therapeutic Exercise Summary: Reassessment:   1. Vitals  2. 5 STS   3. Tug  4. 10 MWT   5 6 MWT    Balance HEP: Normal stance, Romberg stance, split stance. Eyes open: static stance, horizontal, vertical and circular head turns. Eyes closed: static stance, horizontal, vertical and circular head turns. At the counter top with UE support and a chair behind him to sit down if needed.       Therapeutic Treatments and Modalities:     1. Neuromuscular Re-education (CPT 26147), 30 day completed 3/30    Therapeutic Treatment and Modalities Summary: NMR:   1. DGI assessment X 15 minutes    Time-based  treatments/modalities:    Physical Therapy Timed Treatment Charges  Neuromusc re-ed, balance, coor, post minutes (CPT 06122): 14 minutes  Therapeutic exercise minutes (CPT 18156): 30 minutes    ASSESSMENT:   Response to treatment:  Discussed discharge from skilled physical therapy after today's session. Patient was in agreement with discharge today and feels confident with HEP. Reassessment of patients outcome measures where done as well as the DGI for a balance assessment. He was able to complete the DGI but scored as a high fall risk. Patient was given education on when to return to skilled physical therapy if needed and verbalized understanding. Patient was instructed to return to physical therapy once he is able to met his walking path goal of completing the path in 12 minutes or once he beings to see a functional decline. Patient has started to plateau with progress towards goals and is ready for discharge from physical therapy at this time.     STG  1. Pt will demonstrate improved self selected gait speed .3m/s to dec fall risk. MET  2. Pt will demonstrate improved functional LE strength with 5STS score 28 seconds or less. MET  3. Pt will demonstrate improved gait mechanics with Rt step length reciprocal 75% or better without cueing with SBQC. Partially met, min/mod cues for large right step length  4. Pt will stand, without cueing, with improved weight bearing through left LE to improve neglect. Partially met, continues to more heavily rely on Rt vs left.   5. Pt will demonstrate improved internal awareness of center with abilty to perform STS from elevated surface without Rt trunk lean/weight shift. MET     Short term goal time span:  6-8 weeks      Long Term Goals:    1. Pt will demonstrate improved CV endurance with 6MWT score 350 feet or better. Not met   2. Pt will ambulate with LRAD and reciprocal gait pattern 100% of time without cueing to improve safety and mechanics. Not met   3. Pt will demonstrate  improved functional LE strength with 5STS score 17 seconds or less. Not met   4. Pt will demonstrate improved functional mobility with TUG score 30 seconds or less. Not met     PLAN/RECOMMENDATIONS:   Discharge from skilled physical therapy.         [x] As the licensed therapist supervising this student, I was present during the entire treatment session directing the care and reviewing the assessment plan.  I reviewed all documentation prior to signing. The following changes or alternations were made by the licensed therapist.

## 2022-04-20 ENCOUNTER — APPOINTMENT (OUTPATIENT)
Dept: OCCUPATIONAL THERAPY | Facility: REHABILITATION | Age: 57
End: 2022-04-20
Attending: PHYSICAL MEDICINE & REHABILITATION
Payer: COMMERCIAL

## 2022-04-20 ENCOUNTER — SPEECH THERAPY (OUTPATIENT)
Dept: SPEECH THERAPY | Facility: REHABILITATION | Age: 57
End: 2022-04-20
Attending: PHYSICAL MEDICINE & REHABILITATION
Payer: COMMERCIAL

## 2022-04-20 DIAGNOSIS — R41.4 LEFT-SIDED VISUAL NEGLECT: ICD-10-CM

## 2022-04-20 DIAGNOSIS — I69.319 COGNITIVE DEFICIT FOLLOWING CEREBROVASCULAR ACCIDENT (CVA): ICD-10-CM

## 2022-04-20 PROCEDURE — 92507 TX SP LANG VOICE COMM INDIV: CPT

## 2022-04-20 NOTE — OP THERAPY DISCHARGE SUMMARY
Outpatient Speech Therapy  DISCHARGE SUMMARY NOTE      [facility] Speech Therapy [facility]  [address]  [address]  [place] [address]  Phone:  [phone]  Fax:  [phone]    Date of Visit: 04/20/2022    Patient: Thong Arzola  YOB: 1965  MRN: [id number]     Referring Provider: Zulema Dumont D.O.  [address]  [address]  [place] [address]   Referring Diagnosis: Other muscle spasm [M62.838];Cerebral infarction due to unspecified occlusion or stenosis of right middle cerebral artery [I63.511]     Visit #: 18    Time Calculation    Start time: 0947  Stop time: 1028 Time Calculation (min): 41 minutes           Chief Complaint: Other (Cognition)    Visit Diagnoses     ICD-10-CM   1. Cognitive deficit following cerebrovascular accident (CVA)  I69.319   2. 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  Progress Factors:     Progression:  Getting Better  Additional Subjective Comments:      Patient reported no new significant changes, he has been trying to eat from left-to-right on his plate to reduce left neglect.      Objective:   Treatments/Interventions Performed:  Cognitive-Linguistic training, Patient/Caregiver education and Home program  Objective Details:  1. Patient will improve attention completing complex, divided attention tasks with 80% accuracy,with mod cues.  --Progressing,  Continued difficulty completing visual scanning task while carrying on simple conversation.  Typically task is paused while patient is talking with cues to continue task.  2. Patient will complete complex problem solving and reasoning tasks with 80% accuracy, with mod cues.  --Progressing, Patient completed most of executive function/errand planning activity.  Significant difficulty estimating and comparing distances, and  repeated reminders to plan order of locations to be efficient rather than back and forth.    3. Patient will improve left neglect and visuospatial skills by completing visual scanning and orientation tasks with 80% accuracy with mod cues.   --Patient assembled 16 cube puzzle with mod cues, at times needing to cover part of picture he was matching.  Patient completed visual closure exercises completing partially written letters in words with mod-max cues.  Patient easily figured out intended word, but neglected filling in several parts of letters.        Speech Therapy Assessment:     Cognitive Linguistic Assessment:     Patient attention selective: Moderate (self-cued to look to the left after initial errors)    Patient attention divided: Moderate    Patient simple reasoning ability: Minimal    Patient complex reasoning ability: Moderate and Minimal (increasing accuracy, but delayed processing)    Patient complex problem solving ability: Moderate and Minimal (delayed processing)    ST Cognitive-Linguistic Assessment L29: Min-moderate.    Cognitive-Linguistic comments: Left neglect is improving, but still not back to level before seizure        Speech Therapy Plan :   Prognosis & Recommendations  Impression Summary: Patient actively participated in all therapy tasks.  Deductive reasoning is improving, with continued increased processing times.  Left neglect still more prevalent in tasks. Patient requires less cues to formulate strategies and organize thoughts to complete deductive reasoning tasks, but does require additional processing time. Patient continues to present with deficits with left-neglect, attention, and visual scanning, as well as higher level problem solving and reasoning.  Patient has been participating in multiple disciplines of therapy for several months, and he is ready for a break.  Patient would benefit from continued speech therapy to improve cognitive linguistic functions and when he feels  "ready to resume he will contact doctor for new speech therapy referral.    Prognosis:  Good  Goals  Short Term Goals:  1. Patient will improve attention completing complex, divided attention tasks with 80% accuracy,with mod cues.  2. Patient will improve executive functions completing complex problem solving and reasoning tasks with 80% accuracy, with mod cues.  3. Patient will improve left neglect and visuospatial skills by completing visual scanning and orientation tasks with 80% accuracy with mod cues.   Short Term Goal Duration (Weeks):  6-8 weeks  Patient progression on Short Term Goals:  Had been progressing, with regression following recent seizure, see Objective  Long Term Goals:  Per observation and patient report, patient will demonstrate functional cognitive linguistic skills, including executive function, memory, and visuospatial skills, in 85% opportunities across several different environments.  Long Term Goal Duration (Weeks):  6-9 months  Patient progression on Long Term Goals:  Had been progressing, with regression following recent seizure, see Objective  Patient Stated Goal:  \"Better place to do physical work, driving\"  Potential barriers to Goal Achievement:  Vision  Therapy Recommendations  Recommendation:  Other, Patient will resume speech therapy to address cognitive deficits following a break from therapies.  Planned Therapy Interventions:  Cognitive-Linguistic training, Home Program and Patient/Caregiver Education,   ST Plan L20: 16 X 92507.           "

## 2022-04-20 NOTE — OP THERAPY DAILY TREATMENT
Outpatient Speech Therapy  DAILY TREATMENT     [facility] Speech [address]  [address]  [place] [address]  Phone:  [phone]  Fax:  [phone]    Date: 04/20/2022    Patient: Thong Arzola  YOB: 1965  MRN: [id number]     Time Calculation    Start time: 0947  Stop time: 1028 Time Calculation (min): 41 minutes         Chief Complaint: Other (Cognition)    Visit #: 18    Please refer to discharge summary

## 2022-04-25 ENCOUNTER — APPOINTMENT (OUTPATIENT)
Dept: PHYSICAL THERAPY | Facility: REHABILITATION | Age: 57
End: 2022-04-25
Attending: PHYSICAL MEDICINE & REHABILITATION
Payer: COMMERCIAL

## 2022-04-25 ENCOUNTER — APPOINTMENT (OUTPATIENT)
Dept: OCCUPATIONAL THERAPY | Facility: REHABILITATION | Age: 57
End: 2022-04-25
Attending: PHYSICAL MEDICINE & REHABILITATION
Payer: COMMERCIAL

## 2022-04-27 ENCOUNTER — APPOINTMENT (OUTPATIENT)
Dept: PHYSICAL THERAPY | Facility: REHABILITATION | Age: 57
End: 2022-04-27
Attending: PHYSICAL MEDICINE & REHABILITATION
Payer: COMMERCIAL

## 2022-04-27 ENCOUNTER — APPOINTMENT (OUTPATIENT)
Dept: SPEECH THERAPY | Facility: REHABILITATION | Age: 57
End: 2022-04-27
Attending: PHYSICAL MEDICINE & REHABILITATION
Payer: COMMERCIAL

## 2022-04-27 ENCOUNTER — APPOINTMENT (OUTPATIENT)
Dept: OCCUPATIONAL THERAPY | Facility: REHABILITATION | Age: 57
End: 2022-04-27
Attending: PHYSICAL MEDICINE & REHABILITATION
Payer: COMMERCIAL

## 2022-04-29 ENCOUNTER — TELEPHONE (OUTPATIENT)
Dept: CARDIOLOGY | Facility: MEDICAL CENTER | Age: 57
End: 2022-04-29
Payer: COMMERCIAL

## 2022-04-29 ENCOUNTER — HOSPITAL ENCOUNTER (OUTPATIENT)
Dept: LAB | Facility: MEDICAL CENTER | Age: 57
End: 2022-04-29
Attending: INTERNAL MEDICINE
Payer: COMMERCIAL

## 2022-04-29 LAB
ALBUMIN SERPL BCP-MCNC: 4.5 G/DL (ref 3.2–4.9)
ALBUMIN/GLOB SERPL: 1.7 G/DL
ALP SERPL-CCNC: 71 U/L (ref 30–99)
ALT SERPL-CCNC: 43 U/L (ref 2–50)
ANION GAP SERPL CALC-SCNC: 13 MMOL/L (ref 7–16)
AST SERPL-CCNC: 23 U/L (ref 12–45)
BASOPHILS # BLD AUTO: 0.4 % (ref 0–1.8)
BASOPHILS # BLD: 0.02 K/UL (ref 0–0.12)
BILIRUB SERPL-MCNC: 0.5 MG/DL (ref 0.1–1.5)
BUN SERPL-MCNC: 18 MG/DL (ref 8–22)
CALCIUM SERPL-MCNC: 9.7 MG/DL (ref 8.5–10.5)
CHLORIDE SERPL-SCNC: 106 MMOL/L (ref 96–112)
CO2 SERPL-SCNC: 25 MMOL/L (ref 20–33)
CREAT SERPL-MCNC: 1.1 MG/DL (ref 0.5–1.4)
EOSINOPHIL # BLD AUTO: 0.08 K/UL (ref 0–0.51)
EOSINOPHIL NFR BLD: 1.7 % (ref 0–6.9)
ERYTHROCYTE [DISTWIDTH] IN BLOOD BY AUTOMATED COUNT: 41.1 FL (ref 35.9–50)
FASTING STATUS PATIENT QL REPORTED: NORMAL
GFR SERPLBLD CREATININE-BSD FMLA CKD-EPI: 78 ML/MIN/1.73 M 2
GLOBULIN SER CALC-MCNC: 2.6 G/DL (ref 1.9–3.5)
GLUCOSE SERPL-MCNC: 79 MG/DL (ref 65–99)
HCT VFR BLD AUTO: 46.6 % (ref 42–52)
HGB BLD-MCNC: 15.7 G/DL (ref 14–18)
IMM GRANULOCYTES # BLD AUTO: 0.01 K/UL (ref 0–0.11)
IMM GRANULOCYTES NFR BLD AUTO: 0.2 % (ref 0–0.9)
LYMPHOCYTES # BLD AUTO: 1.52 K/UL (ref 1–4.8)
LYMPHOCYTES NFR BLD: 31.8 % (ref 22–41)
MCH RBC QN AUTO: 29 PG (ref 27–33)
MCHC RBC AUTO-ENTMCNC: 33.7 G/DL (ref 33.7–35.3)
MCV RBC AUTO: 86 FL (ref 81.4–97.8)
MONOCYTES # BLD AUTO: 0.34 K/UL (ref 0–0.85)
MONOCYTES NFR BLD AUTO: 7.1 % (ref 0–13.4)
NEUTROPHILS # BLD AUTO: 2.81 K/UL (ref 1.82–7.42)
NEUTROPHILS NFR BLD: 58.8 % (ref 44–72)
NRBC # BLD AUTO: 0 K/UL
NRBC BLD-RTO: 0 /100 WBC
PLATELET # BLD AUTO: 291 K/UL (ref 164–446)
PMV BLD AUTO: 9.5 FL (ref 9–12.9)
POTASSIUM SERPL-SCNC: 4.1 MMOL/L (ref 3.6–5.5)
PROT SERPL-MCNC: 7.1 G/DL (ref 6–8.2)
RBC # BLD AUTO: 5.42 M/UL (ref 4.7–6.1)
SODIUM SERPL-SCNC: 144 MMOL/L (ref 135–145)
WBC # BLD AUTO: 4.8 K/UL (ref 4.8–10.8)

## 2022-04-29 PROCEDURE — 36415 COLL VENOUS BLD VENIPUNCTURE: CPT

## 2022-04-29 PROCEDURE — 80053 COMPREHEN METABOLIC PANEL: CPT

## 2022-04-29 PROCEDURE — 85025 COMPLETE CBC W/AUTO DIFF WBC: CPT

## 2022-04-29 NOTE — PROGRESS NOTES
"Subjective:   Chief Complaint:   Chief Complaint   Patient presents with   • Atrial Fibrillation     NP Dx: AF (paroxysmal atrial fibrillation) (HCC)   • Dyslipidemia   • Hypertension     F/V Dx: Essential (primary) hypertension       Thong Arzola is a 57 y.o. male who is here to establish with a [facility]  Cardiologist for Afib, CVA with residual left hemiparesis, HTN, HLP, coronary artery calcification, FH CAD.    Previously seen by Dr. Wisdom at [facility], I was able to review a noted dated 1-7-22, previously seen by stroke center in [facility].    MCA CVA on 3/31/21 due to embolism with subsequent craniotomy secondary to elevated cranial pressures with residual left arm and leg paralysis  MRI \"Very large acute right MCA territory infarct as detailed above with small amount of petechial hemorrhage in the right insular region and right temporal lobe, punctate right thalamic lacunar infarct, right ICA and M1 MCA occlusion.\"  Found to have carotid CTA revealed acute occlusion of the BEVERLEY shortly after bifurcation. It was occluded up to the level of the carotid terminus.  Was positive for Covid 3-2021 also.  Patient was discharged on Xarelto 20mg however due to confusion was taking Xarelto 10mg and now over the past 4-5 months the patient has been off medical therapy.  Residual left sided hemiparesis of arm/leg, some left neglect.  Was asked to resume Xarelto for CHADS2 vasc of 3 including CVA and remains on this.    He has felt afib 2 times, on BB.  HR 60-70s typically at home.  Instructed if his heart rate is greater than 90 continue extra dose of metoprolol.    Has HLP, on statin.  Statin is for primary prevention of heart disease, not secondary prevention for stroke which is from A. Fib.  LDL cholesterol was 112, HDL low at 32.  No follow-up cholesterol yet.    Has HTN, checking at home.  Controlled in the office    Has IFG, no meds.    Coronary artery calcification on CT, on primary prevention " statin.    Remote PVCs, suppressed on treadmill 2008.    Father had MI in his 40s, CABG in early 50s.      Here with his wife Anel.  They live in [place].      DATA REVIEWED by me:  ECG (my personal interpretation) 2-12-22  Sinus, 97, LAD, NS T wave changes, baseline artifact    Echo 4-1-21  Hyperdynamic left ventricular systolic function.  Left ventricular ejection fraction is visually estimated to be >75.  Negative bubble study including Valsalva.  No significant valve or Doppler abnormality.  No prior study is available for comparison.     Nuc 2008  1. EJECTION FRACTION 60%.    2. NO REVERSIBLE ISCHEMIA IDENTIFIED.     MRI brain 4-1-21  Very large acute right MCA territory infarct as detailed above with small amount of petechial hemorrhage in the right insular region and right temporal lobe.  Punctate right thalamic lacunar infarct.  Right ICA and M1 MCA occlusion.    Most recent labs:       Lab Results   Component Value Date/Time    WBC 4.8 04/29/2022 09:47 AM    HEMOGLOBIN 15.7 04/29/2022 09:47 AM    HEMATOCRIT 46.6 04/29/2022 09:47 AM    MCV 86.0 04/29/2022 09:47 AM    INR 1.08 06/16/2021 03:51 PM      Lab Results   Component Value Date/Time    SODIUM 144 04/29/2022 09:47 AM    POTASSIUM 4.1 04/29/2022 09:47 AM    CHLORIDE 106 04/29/2022 09:47 AM    CO2 25 04/29/2022 09:47 AM    GLUCOSE 79 04/29/2022 09:47 AM    BUN 18 04/29/2022 09:47 AM    CREATININE 1.10 04/29/2022 09:47 AM    CREATININE 1.3 04/04/2008 03:05 AM      Lab Results   Component Value Date/Time    ASTSGOT 23 04/29/2022 09:47 AM    ALTSGPT 43 04/29/2022 09:47 AM    ALBUMIN 4.5 04/29/2022 09:47 AM      Lab Results   Component Value Date/Time    CHOLSTRLTOT 169 04/01/2021 12:36 PM     (H) 04/01/2021 12:36 PM    HDL 32 (A) 04/01/2021 12:36 PM    TRIGLYCERIDE 126 04/01/2021 12:36 PM     No results for input(s): NTPROBNP, TROPONINT in the last 72 hours.      Past Medical History:   Diagnosis Date   • Afib (HCC)    • COVID-19    • Disorder of  thyroid     hypo   • High cholesterol    • Psychiatric problem     reactive depression   • Stroke (HCC) 2021    right side MCA     Past Surgical History:   Procedure Laterality Date   • CRANIOPLASTY Right 6/23/2021    Procedure: CRANIOPLASTY - FOR SKULL DEFECT;  Surgeon: Arthur Payton M.D.;  Location: SURGERY [facility];  Service: Neurosurgery   • CRANIOTOMY Right 4/3/2021    Procedure: CRANIOTOMY;  Surgeon: Arthur Payton M.D.;  Location: SURGERY [facility];  Service: Neurosurgery   • KNEE RECONSTRUCTION      left knee orthoscopic     History reviewed. No pertinent family history.  Social History     Socioeconomic History   • Marital status:      Spouse name: Not on file   • Number of children: Not on file   • Years of education: Not on file   • Highest education level: Not on file   Occupational History   • Not on file   Tobacco Use   • Smoking status: Never Smoker   • Smokeless tobacco: Never Used   Vaping Use   • Vaping Use: Never used   Substance and Sexual Activity   • Alcohol use: Yes     Comment: occ   • Drug use: No   • Sexual activity: Not on file   Other Topics Concern   • Not on file   Social History Narrative   • Not on file     Social Determinants of Health     Financial Resource Strain: Not on file   Food Insecurity: Not on file   Transportation Needs: Not on file   Physical Activity: Not on file   Stress: Not on file   Social Connections: Not on file   Intimate Partner Violence: Not on file   Housing Stability: Not on file     No Known Allergies    Current Outpatient Medications   Medication Sig Dispense Refill   • ARMOUR THYROID 90 MG Tab Take 90 mg by mouth every day.     • Misc Natural Products (PROSTATE HEALTH) Cap Take  by mouth in the morning, at noon, and at bedtime.     • rosuvastatin (CRESTOR) 10 MG Tab Take 1 Tablet by mouth every day. 90 Tablet 7   • rivaroxaban (XARELTO) 20 MG Tab tablet Take 1 Tablet by mouth every day. 90 Tablet 7   • ramipril (ALTACE) 10 MG capsule Take 1  "Capsule by mouth 2 times a day. 180 Capsule 7   • metoprolol tartrate (LOPRESSOR) 25 MG Tab Take 1 Tablet by mouth 2 times a day. 190 Tablet 7   • amLODIPine (NORVASC) 5 MG Tab Take 1 Tablet by mouth 2 times a day. 180 Tablet 7   • Coenzyme Q10 (COQ-10) 30 MG Cap Take  by mouth. 30 Capsule    • gabapentin (NEURONTIN) 100 MG Cap Take 2 Capsules by mouth every evening. 180 Capsule 1   • traMADol (ULTRAM) 50 MG Tab Take 0.5 Tablets by mouth 2 times a day as needed for Moderate Pain or Severe Pain for up to 90 days. 30 Tablet 2   • dantrolene (DANTRIUM) 25 MG Cap TAKE 3 CAPSULES BY MOUTH 3 TIMES A DAY (Patient taking differently: Take 50 mg by mouth 3 times a day.) 360 Capsule 0   • levETIRAcetam (KEPPRA) 500 MG Tab TAKE 1 TABLET BY MOUTH TWICE A DAY (Patient taking differently: Take 500 mg by mouth 2 times a day.) 60 Tablet 5   • celecoxib (CELEBREX) 200 MG Cap Take 1 Capsule by mouth 2 times a day. 120 Capsule 0   • ascorbic acid (VITAMIN C) 1000 MG tablet Take 1,000 mg by mouth every day.     • mirtazapine (REMERON) 15 MG TABLET DISPERSIBLE TAKE 1 TABLET BY MOUTH EVERYDAY AT BEDTIME 90 Tablet 1   • melatonin 3 MG Tab Take 1 Tablet by mouth at bedtime. 30 Tablet 2   • acetaminophen (TYLENOL) 325 MG Tab Take 500 mg by mouth every four hours as needed for Mild Pain. 30 tablet 0     No current facility-administered medications for this visit.       ROS  All others systems reviewed and negative.     Objective:     /82 (BP Location: Left arm, Patient Position: Sitting, BP Cuff Size: Adult)   Pulse 76   Resp 16   Ht 1.778 m (5' 10\")   SpO2 95%  Body mass index is 25.83 kg/m².    General: No acute distress. Well nourished.  HEENT: EOM grossly intact, no scleral icterus, no pharyngeal erythema.   Neck:  No JVD, no bruits, trachea midline  CVS: RRR. Normal S1, S2. No M/R/G. No LE edema.  2+ radial pulses, 2+ PT pulses  Resp: CTAB. No wheezing or crackles/rhonchi. Normal respiratory effort.  Abdomen: Soft, NT, no " jacy hepatomegaly.  MSK/Ext: No clubbing or cyanosis.  Skin: Warm and dry, no rashes.  Neurological: CN III-XII grossly intact with exception of left neglect. Left hemiparesis, using W2  Psych: A&O x 3, appropriate affect, good judgement        Assessment:     1. Cerebrovascular accident (CVA) due to embolism of precerebral artery (HCC)  Lipid Profile   2. Essential hypertension  amLODIPine (NORVASC) 5 MG Tab   3. Mixed hyperlipidemia  Lipid Profile   4. Paroxysmal atrial fibrillation (HCC)     5. Prediabetes     6. Left hemiparesis (HCC)     7. Family history of coronary artery disease in father         Medical Decision Making:  Today's Assessment / Status / Plan:     -Will remain on Xarelto for CHADS2 vascular score of 3 including prior stroke.  -Hold for 48 hours without bridging per guidelines if he needs to have a procedure done, any hold more than 48 hours then consider bridging  -Statin is for primary prevention of heart disease, not secondary prevention for stroke which is from A. Fib.  -Due for follow-up cholesterol  -Blood pressure looks okay in the office but is high and his blood pressure cuff so we need to calibrate his blood pressure cuff  -If he has palpitations associated with resting heart rate greater than 90, he can take an extra dose of metoprolol, see below  -Given strong family history on his father side, primary prevention rosuvastatin is a great choice  -Does have chronic pain post CVA related to hemiparesis which is understandable, okay to remain on Celebrex but avoid all other NSAIDs.  -RTC 3 months, Dr. Nieves      Written instructions given today:      - Your resting heart rate is typically in the 60s and 70s so if you are feeling palpitations or something unusual in the chest, you can check your blood pressure.  If the heart rate is greater than 90 then you may be back in atrial fibrillation and you can take an extra dose of metoprolol.    -Metoprolol tartrate is your medication to help  keep the A. fib away but it is expected that you would go back into A. fib from time to time.  The maximum dose of metoprolol tartrate is 100 mg twice daily so you are on a very low dose.    -At your convenience make an appointment to bring in your blood pressure cuff to have it calibrated in the office.    -Fasting cholesterol panel at your convenience.    Checking Blood Pressure:  -Blood pressure cuff, spend in the $40-65, with good return policy  -It should be automatic, upper arm, measure your arm to get the correct size, probably adult Large but your arm should be under 16.5-17 cm. If you need an XL cuff, you will have to have it special ordered from a pharmacy or durable medical equipment company.  -Put the cuff in place, rest arm on table near height of your heart, sit quietly for 5 min, legs uncrossed, with back support, then take your blood pressure, write it down, keep a log  -Check no more than 1 time day, maybe 2-3 times per week, try different times of day.  -Can bring your cuff to at least one appointment where it can be calibrated to a manual cuff if you are concerned.  -Goal blood pressure is at least under 130/80, ideally under 120/80.  If you think your BP is overall too high, let us know in the office, we can adjust medications, can use [employer] or call the [place] office: [phone].    Salt=sodium=sea salt, guidelines say stay under 2,500 mg daily but I ask for under 4,000 mg daily.  Get salt smart, start looking at labels, count it up.  Salt is hidden in everything, salad dressing, sauces, cheese, most canned food, any processed meat.    -Tylenol is always safe no matter what.  He can take up to 4000 mg which is 4 g daily.    -Since you are taking Celebrex, avoid Aleve, ibuprofen, naproxen, Naprosyn.    -I recommend any brand of co-Q10 once a day over-the-counter.  Follow the instructions on the bottle.    -No fish oil, increased risk of bleeding without any benefit    -Vitamin D supplement is  great    -Vitamin B may be useful    -Call the [facility] line to get set up, either on your computer or by downloading an mike on your smart phone.  Keep them on the phone with you until you are sure you can access the mike or website.  If you use your phone, you can set up a finger print to use if you tend to forget passwords.  Call [phone] or [phone].    Please look into the following diets:    Mediterranean diet.  [facility]  DASH DIET - American Heart Association (particularly for hypertension)  Ornish Diet   Vegan diet (proven to reverse vascular disease)    Resources to learn more:  American Heart Association   Www.Cardiosmart.org, sponsored by the American College of cardiology.      Return in about 3 months (around 8/2/2022).    It is my pleasure to participate in the care of Mr. Arzola.  Please do not hesitate to contact me with questions or concerns.    Robyn Gastelum MD, [facility]  Cardiologist [facility]    Please note that this dictation was created using voice recognition software. I have made every reasonable attempt to correct obvious errors, but it is possible there are errors of grammar and possibly content that I did not discover before finalizing the note.

## 2022-04-29 NOTE — TELEPHONE ENCOUNTER
Called and LVM for pt, checking if pt was seen with any outside cardiology. Will fax request to [facility] for recent visit with cardiology. Pt is to call back & to confirm appt with LS 5/2/22.

## 2022-05-02 ENCOUNTER — OFFICE VISIT (OUTPATIENT)
Dept: CARDIOLOGY | Facility: MEDICAL CENTER | Age: 57
End: 2022-05-02
Payer: COMMERCIAL

## 2022-05-02 VITALS
DIASTOLIC BLOOD PRESSURE: 82 MMHG | OXYGEN SATURATION: 95 % | SYSTOLIC BLOOD PRESSURE: 132 MMHG | HEIGHT: 70 IN | RESPIRATION RATE: 16 BRPM | BODY MASS INDEX: 25.83 KG/M2 | HEART RATE: 76 BPM

## 2022-05-02 DIAGNOSIS — I10 ESSENTIAL HYPERTENSION: ICD-10-CM

## 2022-05-02 DIAGNOSIS — E78.2 MIXED HYPERLIPIDEMIA: ICD-10-CM

## 2022-05-02 DIAGNOSIS — I63.10 CEREBROVASCULAR ACCIDENT (CVA) DUE TO EMBOLISM OF PRECEREBRAL ARTERY (HCC): ICD-10-CM

## 2022-05-02 DIAGNOSIS — R73.03 PREDIABETES: ICD-10-CM

## 2022-05-02 DIAGNOSIS — G81.94 LEFT HEMIPARESIS (HCC): ICD-10-CM

## 2022-05-02 DIAGNOSIS — Z82.49 FAMILY HISTORY OF CORONARY ARTERY DISEASE IN FATHER: ICD-10-CM

## 2022-05-02 DIAGNOSIS — I48.0 PAROXYSMAL ATRIAL FIBRILLATION (HCC): ICD-10-CM

## 2022-05-02 PROCEDURE — 99204 OFFICE O/P NEW MOD 45 MIN: CPT | Performed by: INTERNAL MEDICINE

## 2022-05-02 RX ORDER — AMLODIPINE BESYLATE 5 MG/1
5 TABLET ORAL 2 TIMES DAILY
Qty: 180 TABLET | Refills: 7 | Status: SHIPPED | OUTPATIENT
Start: 2022-05-02 | End: 2023-05-02

## 2022-05-02 RX ORDER — THYROID,PORK 90 MG
90 TABLET ORAL
COMMUNITY
Start: 2022-04-09

## 2022-05-02 RX ORDER — GLUCOSAMINE/MSM/CHONDROITIN A 500-83-400
TABLET ORAL
Qty: 30 CAPSULE | COMMUNITY
Start: 2022-05-02

## 2022-05-02 RX ORDER — RAMIPRIL 10 MG/1
10 CAPSULE ORAL 2 TIMES DAILY
Qty: 180 CAPSULE | Refills: 7 | Status: SHIPPED | OUTPATIENT
Start: 2022-05-02 | End: 2023-06-12

## 2022-05-02 RX ORDER — ROSUVASTATIN CALCIUM 10 MG/1
10 TABLET, COATED ORAL DAILY
Qty: 90 TABLET | Refills: 7 | Status: SHIPPED | OUTPATIENT
Start: 2022-05-02 | End: 2022-08-11

## 2022-05-02 NOTE — LETTER
"     [facility]   [address] [place] [address] [address]  [place] [address]  Phone: [phone]  Fax: [phone]              Thong Arzola  1965    Encounter Date: 5/2/2022    Robyn Gastelum M.D.          PROGRESS NOTE:  Subjective:   Chief Complaint:   Chief Complaint   Patient presents with   • Atrial Fibrillation     NP Dx: AF (paroxysmal atrial fibrillation) (HCC)   • Dyslipidemia   • Hypertension     F/V Dx: Essential (primary) hypertension       Thong Arzola is a 57 y.o. male who is here to establish with a [facility]  Cardiologist for Afib, CVA with residual left hemiparesis, HTN, HLP, coronary artery calcification, FH CAD.    Previously seen by Dr. Wisdom at [facility], I was able to review a noted dated 1-7-22, previously seen by stroke center in [facility].    MCA CVA on 3/31/21 due to embolism with subsequent craniotomy secondary to elevated cranial pressures with residual left arm and leg paralysis  MRI \"Very large acute right MCA territory infarct as detailed above with small amount of petechial hemorrhage in the right insular region and right temporal lobe, punctate right thalamic lacunar infarct, right ICA and M1 MCA occlusion.\"  Found to have carotid CTA revealed acute occlusion of the BEVERLEY shortly after bifurcation. It was occluded up to the level of the carotid terminus.  Was positive for Covid 3-2021 also.  Patient was discharged on Xarelto 20mg however due to confusion was taking Xarelto 10mg and now over the past 4-5 months the patient has been off medical therapy.  Residual left sided hemiparesis of arm/leg, some left neglect.  Was asked to resume Xarelto for CHADS2 vasc of 3 including CVA and remains on this.    He has felt afib 2 times, on BB.  HR 60-70s typically at home.  Instructed if his heart rate is greater than 90 continue extra dose of metoprolol.    Has HLP, on statin.  Statin is for primary prevention of heart disease, not secondary " prevention for stroke which is from A. Fib.  LDL cholesterol was 112, HDL low at 32.  No follow-up cholesterol yet.    Has HTN, checking at home.  Controlled in the office    Has IFG, no meds.    Coronary artery calcification on CT, on primary prevention statin.    Remote PVCs, suppressed on treadmill 2008.    Father had MI in his 40s, CABG in early 50s.      Here with his wife Anel.  They live in [place].      DATA REVIEWED by me:  ECG (my personal interpretation) 2-12-22  Sinus, 97, LAD, NS T wave changes, baseline artifact    Echo 4-1-21  Hyperdynamic left ventricular systolic function.  Left ventricular ejection fraction is visually estimated to be >75.  Negative bubble study including Valsalva.  No significant valve or Doppler abnormality.  No prior study is available for comparison.     Nuc 2008  1. EJECTION FRACTION 60%.    2. NO REVERSIBLE ISCHEMIA IDENTIFIED.     MRI brain 4-1-21  Very large acute right MCA territory infarct as detailed above with small amount of petechial hemorrhage in the right insular region and right temporal lobe.  Punctate right thalamic lacunar infarct.  Right ICA and M1 MCA occlusion.    Most recent labs:       Lab Results   Component Value Date/Time    WBC 4.8 04/29/2022 09:47 AM    HEMOGLOBIN 15.7 04/29/2022 09:47 AM    HEMATOCRIT 46.6 04/29/2022 09:47 AM    MCV 86.0 04/29/2022 09:47 AM    INR 1.08 06/16/2021 03:51 PM      Lab Results   Component Value Date/Time    SODIUM 144 04/29/2022 09:47 AM    POTASSIUM 4.1 04/29/2022 09:47 AM    CHLORIDE 106 04/29/2022 09:47 AM    CO2 25 04/29/2022 09:47 AM    GLUCOSE 79 04/29/2022 09:47 AM    BUN 18 04/29/2022 09:47 AM    CREATININE 1.10 04/29/2022 09:47 AM    CREATININE 1.3 04/04/2008 03:05 AM      Lab Results   Component Value Date/Time    ASTSGOT 23 04/29/2022 09:47 AM    ALTSGPT 43 04/29/2022 09:47 AM    ALBUMIN 4.5 04/29/2022 09:47 AM      Lab Results   Component Value Date/Time    CHOLSTRLTOT 169 04/01/2021 12:36 PM     (H)  04/01/2021 12:36 PM    HDL 32 (A) 04/01/2021 12:36 PM    TRIGLYCERIDE 126 04/01/2021 12:36 PM     No results for input(s): NTPROBNP, TROPONINT in the last 72 hours.      Past Medical History:   Diagnosis Date   • Afib (HCC)    • COVID-19    • Disorder of thyroid     hypo   • High cholesterol    • Psychiatric problem     reactive depression   • Stroke (HCC) 2021    right side MCA     Past Surgical History:   Procedure Laterality Date   • CRANIOPLASTY Right 6/23/2021    Procedure: CRANIOPLASTY - FOR SKULL DEFECT;  Surgeon: Arthur Payton M.D.;  Location: SURGERY [facility];  Service: Neurosurgery   • CRANIOTOMY Right 4/3/2021    Procedure: CRANIOTOMY;  Surgeon: Arthur Payton M.D.;  Location: SURGERY [facility];  Service: Neurosurgery   • KNEE RECONSTRUCTION      left knee orthoscopic     History reviewed. No pertinent family history.  Social History     Socioeconomic History   • Marital status:      Spouse name: Not on file   • Number of children: Not on file   • Years of education: Not on file   • Highest education level: Not on file   Occupational History   • Not on file   Tobacco Use   • Smoking status: Never Smoker   • Smokeless tobacco: Never Used   Vaping Use   • Vaping Use: Never used   Substance and Sexual Activity   • Alcohol use: Yes     Comment: occ   • Drug use: No   • Sexual activity: Not on file   Other Topics Concern   • Not on file   Social History Narrative   • Not on file     Social Determinants of Health     Financial Resource Strain: Not on file   Food Insecurity: Not on file   Transportation Needs: Not on file   Physical Activity: Not on file   Stress: Not on file   Social Connections: Not on file   Intimate Partner Violence: Not on file   Housing Stability: Not on file     No Known Allergies    Current Outpatient Medications   Medication Sig Dispense Refill   • ARMOUR THYROID 90 MG Tab Take 90 mg by mouth every day.     • Misc Natural Products (PROSTATE HEALTH) Cap Take  by mouth in  "the morning, at noon, and at bedtime.     • rosuvastatin (CRESTOR) 10 MG Tab Take 1 Tablet by mouth every day. 90 Tablet 7   • rivaroxaban (XARELTO) 20 MG Tab tablet Take 1 Tablet by mouth every day. 90 Tablet 7   • ramipril (ALTACE) 10 MG capsule Take 1 Capsule by mouth 2 times a day. 180 Capsule 7   • metoprolol tartrate (LOPRESSOR) 25 MG Tab Take 1 Tablet by mouth 2 times a day. 190 Tablet 7   • amLODIPine (NORVASC) 5 MG Tab Take 1 Tablet by mouth 2 times a day. 180 Tablet 7   • Coenzyme Q10 (COQ-10) 30 MG Cap Take  by mouth. 30 Capsule    • gabapentin (NEURONTIN) 100 MG Cap Take 2 Capsules by mouth every evening. 180 Capsule 1   • traMADol (ULTRAM) 50 MG Tab Take 0.5 Tablets by mouth 2 times a day as needed for Moderate Pain or Severe Pain for up to 90 days. 30 Tablet 2   • dantrolene (DANTRIUM) 25 MG Cap TAKE 3 CAPSULES BY MOUTH 3 TIMES A DAY (Patient taking differently: Take 50 mg by mouth 3 times a day.) 360 Capsule 0   • levETIRAcetam (KEPPRA) 500 MG Tab TAKE 1 TABLET BY MOUTH TWICE A DAY (Patient taking differently: Take 500 mg by mouth 2 times a day.) 60 Tablet 5   • celecoxib (CELEBREX) 200 MG Cap Take 1 Capsule by mouth 2 times a day. 120 Capsule 0   • ascorbic acid (VITAMIN C) 1000 MG tablet Take 1,000 mg by mouth every day.     • mirtazapine (REMERON) 15 MG TABLET DISPERSIBLE TAKE 1 TABLET BY MOUTH EVERYDAY AT BEDTIME 90 Tablet 1   • melatonin 3 MG Tab Take 1 Tablet by mouth at bedtime. 30 Tablet 2   • acetaminophen (TYLENOL) 325 MG Tab Take 500 mg by mouth every four hours as needed for Mild Pain. 30 tablet 0     No current facility-administered medications for this visit.       ROS  All others systems reviewed and negative.     Objective:     /82 (BP Location: Left arm, Patient Position: Sitting, BP Cuff Size: Adult)   Pulse 76   Resp 16   Ht 1.778 m (5' 10\")   SpO2 95%  Body mass index is 25.83 kg/m².    General: No acute distress. Well nourished.  HEENT: EOM grossly intact, no scleral " icterus, no pharyngeal erythema.   Neck:  No JVD, no bruits, trachea midline  CVS: RRR. Normal S1, S2. No M/R/G. No LE edema.  2+ radial pulses, 2+ PT pulses  Resp: CTAB. No wheezing or crackles/rhonchi. Normal respiratory effort.  Abdomen: Soft, NT, no jacy hepatomegaly.  MSK/Ext: No clubbing or cyanosis.  Skin: Warm and dry, no rashes.  Neurological: CN III-XII grossly intact with exception of left neglect. Left hemiparesis, using W2  Psych: A&O x 3, appropriate affect, good judgement        Assessment:     1. Cerebrovascular accident (CVA) due to embolism of precerebral artery (HCC)  Lipid Profile   2. Essential hypertension  amLODIPine (NORVASC) 5 MG Tab   3. Mixed hyperlipidemia  Lipid Profile   4. Paroxysmal atrial fibrillation (HCC)     5. Prediabetes     6. Left hemiparesis (HCC)     7. Family history of coronary artery disease in father         Medical Decision Making:  Today's Assessment / Status / Plan:     -Will remain on Xarelto for CHADS2 vascular score of 3 including prior stroke.  -Hold for 48 hours without bridging per guidelines if he needs to have a procedure done, any hold more than 48 hours then consider bridging  -Statin is for primary prevention of heart disease, not secondary prevention for stroke which is from A. Fib.  -Due for follow-up cholesterol  -Blood pressure looks okay in the office but is high and his blood pressure cuff so we need to calibrate his blood pressure cuff  -If he has palpitations associated with resting heart rate greater than 90, he can take an extra dose of metoprolol, see below  -Given strong family history on his father side, primary prevention rosuvastatin is a great choice  -Does have chronic pain post CVA related to hemiparesis which is understandable, okay to remain on Celebrex but avoid all other NSAIDs.  -RTC 3 months, Dr. Nieves      Written instructions given today:      - Your resting heart rate is typically in the 60s and 70s so if you are feeling  palpitations or something unusual in the chest, you can check your blood pressure.  If the heart rate is greater than 90 then you may be back in atrial fibrillation and you can take an extra dose of metoprolol.    -Metoprolol tartrate is your medication to help keep the A. fib away but it is expected that you would go back into A. fib from time to time.  The maximum dose of metoprolol tartrate is 100 mg twice daily so you are on a very low dose.    -At your convenience make an appointment to bring in your blood pressure cuff to have it calibrated in the office.    -Fasting cholesterol panel at your convenience.    Checking Blood Pressure:  -Blood pressure cuff, spend in the $40-65, with good return policy  -It should be automatic, upper arm, measure your arm to get the correct size, probably adult Large but your arm should be under 16.5-17 cm. If you need an XL cuff, you will have to have it special ordered from a pharmacy or durable medical equipment company.  -Put the cuff in place, rest arm on table near height of your heart, sit quietly for 5 min, legs uncrossed, with back support, then take your blood pressure, write it down, keep a log  -Check no more than 1 time day, maybe 2-3 times per week, try different times of day.  -Can bring your cuff to at least one appointment where it can be calibrated to a manual cuff if you are concerned.  -Goal blood pressure is at least under 130/80, ideally under 120/80.  If you think your BP is overall too high, let us know in the office, we can adjust medications, can use [employer] or call the [place] office: [phone].    Salt=sodium=sea salt, guidelines say stay under 2,500 mg daily but I ask for under 4,000 mg daily.  Get salt smart, start looking at labels, count it up.  Salt is hidden in everything, salad dressing, sauces, cheese, most canned food, any processed meat.    -Tylenol is always safe no matter what.  He can take up to 4000 mg which is 4 g daily.    -Since you  are taking Celebrex, avoid Aleve, ibuprofen, naproxen, Naprosyn.    -I recommend any brand of co-Q10 once a day over-the-counter.  Follow the instructions on the bottle.    -No fish oil, increased risk of bleeding without any benefit    -Vitamin D supplement is great    -Vitamin B may be useful    -Call the [employer] line to get set up, either on your computer or by downloading an mike on your smart phone.  Keep them on the phone with you until you are sure you can access the mike or website.  If you use your phone, you can set up a finger print to use if you tend to forget passwords.  Call [phone] or [phone].    Please look into the following diets:    Mediterranean diet.  [place] - [facility]  DASH DIET - American Heart Association (particularly for hypertension)  Ornish Diet   Vegan diet (proven to reverse vascular disease)    Resources to learn more:  American Heart Association   Www.Cardiosmart.org, sponsored by the American College of cardiology.      Return in about 3 months (around 8/2/2022).    It is my pleasure to participate in the care of Mr. Arzola.  Please do not hesitate to contact me with questions or concerns.    Robyn Gastelum MD, [facility]  Cardiologist [facility]    Please note that this dictation was created using voice recognition software. I have made every reasonable attempt to correct obvious errors, but it is possible there are errors of grammar and possibly content that I did not discover before finalizing the note.          No Recipients

## 2022-05-02 NOTE — PATIENT INSTRUCTIONS
- Your resting heart rate is typically in the 60s and 70s so if you are feeling palpitations or something unusual in the chest, you can check your blood pressure.  If the heart rate is greater than 90 then you may be back in atrial fibrillation and you can take an extra dose of metoprolol.    -Metoprolol tartrate is your medication to help keep the A. fib away but it is expected that you would go back into A. fib from time to time.  The maximum dose of metoprolol tartrate is 100 mg twice daily so you are on a very low dose.    -At your convenience make an appointment to bring in your blood pressure cuff to have it calibrated in the office.    -Fasting cholesterol panel at your convenience.      Checking Blood Pressure:  -Blood pressure cuff, spend in the $40-65, with good return policy  -It should be automatic, upper arm, measure your arm to get the correct size, probably adult Large but your arm should be under 16.5-17 cm. If you need an XL cuff, you will have to have it special ordered from a pharmacy or durable medical equipment company.  -Put the cuff in place, rest arm on table near height of your heart, sit quietly for 5 min, legs uncrossed, with back support, then take your blood pressure, write it down, keep a log  -Check no more than 1 time day, maybe 2-3 times per week, try different times of day.  -Can bring your cuff to at least one appointment where it can be calibrated to a manual cuff if you are concerned.  -Goal blood pressure is at least under 130/80, ideally under 120/80.  If you think your BP is overall too high, let us know in the office, we can adjust medications, can use [email or web address] or call the [place] office: [phone].    Salt=sodium=sea salt, guidelines say stay under 2,500 mg daily but I ask for under 4,000 mg daily.  Get salt smart, start looking at labels, count it up.  Salt is hidden in everything, salad dressing, sauces, cheese, most canned food, any processed meat.    -Tylenol is always  safe no matter what.  He can take up to 4000 mg which is 4 g daily.    -Since you are taking Celebrex, avoid Aleve, ibuprofen, naproxen, Naprosyn.    -I recommend any brand of co-Q10 once a day over-the-counter.  Follow the instructions on the bottle.    -No fish oil, increased risk of bleeding without any benefit    -Vitamin D supplement is great    -Vitamin B may be useful    -Call the [employer] line to get set up, either on your computer or by downloading an mike on your smart phone.  Keep them on the phone with you until you are sure you can access the mike or website.  If you use your phone, you can set up a finger print to use if you tend to forget passwords.  Call [phone] or [phone].    Please look into the following diets:    Mediterranean diet.  [place] - [facility]  Ornish Diet   Vegan diet (proven to reverse vascular disease)    Resources to learn more:  American Heart Association   Www.Cardiosmart.org, sponsored by the American College of cardiology.      -We have 2 female cardiologists. Dr. Lizeth Nieves or Dr. Michael Bedoya, both are absolutely excellent.

## 2022-05-09 ENCOUNTER — OFFICE VISIT (OUTPATIENT)
Dept: OPHTHALMOLOGY | Facility: MEDICAL CENTER | Age: 57
End: 2022-05-09
Payer: COMMERCIAL

## 2022-05-09 DIAGNOSIS — H46.9 OPTIC NEUROPATHY: ICD-10-CM

## 2022-05-09 DIAGNOSIS — H53.47 HEMIANOPSIA: ICD-10-CM

## 2022-05-09 DIAGNOSIS — I63.10 CEREBROVASCULAR ACCIDENT (CVA) DUE TO EMBOLISM OF PRECEREBRAL ARTERY (HCC): ICD-10-CM

## 2022-05-09 DIAGNOSIS — H52.223 REGULAR ASTIGMATISM OF BOTH EYES: ICD-10-CM

## 2022-05-09 PROCEDURE — 92015 DETERMINE REFRACTIVE STATE: CPT | Performed by: OPHTHALMOLOGY

## 2022-05-09 PROCEDURE — 99205 OFFICE O/P NEW HI 60 MIN: CPT | Mod: 25 | Performed by: OPHTHALMOLOGY

## 2022-05-09 PROCEDURE — 92083 EXTENDED VISUAL FIELD XM: CPT | Performed by: OPHTHALMOLOGY

## 2022-05-09 PROCEDURE — 92250 FUNDUS PHOTOGRAPHY W/I&R: CPT | Performed by: OPHTHALMOLOGY

## 2022-05-09 ASSESSMENT — VISUAL ACUITY
OS_SC: 20/20-2
METHOD: SNELLEN - LINEAR
OD_SC: 20/25-2

## 2022-05-09 ASSESSMENT — REFRACTION
OS_CYLINDER: +0.25
OD_AXIS: 000
OS_SPHERE: -0.75
OD_SPHERE: -0.50
OD_CYLINDER: +0.00
OS_AXIS: 029

## 2022-05-09 ASSESSMENT — CUP TO DISC RATIO
OD_RATIO: 0.3
OS_RATIO: 0.3

## 2022-05-09 ASSESSMENT — REFRACTION_FINALRX
OD_HPRISM: 6
OS_HBASE: OUT
OS_HPRISM: 6
OD_HBASE: IN

## 2022-05-09 ASSESSMENT — REFRACTION_MANIFEST
OS_SPHERE: -0.75
METHOD_AUTOREFRACTION: 1
OD_SPHERE: -0.50
OS_CYLINDER: +0.50
OD_CYLINDER: SPHERE
OS_AXIS: 011

## 2022-05-09 ASSESSMENT — SLIT LAMP EXAM - LIDS
COMMENTS: NORMAL
COMMENTS: NORMAL

## 2022-05-09 ASSESSMENT — TONOMETRY
OS_IOP_MMHG: 18
OD_IOP_MMHG: 16

## 2022-05-09 ASSESSMENT — EXTERNAL EXAM - LEFT EYE: OS_EXAM: NORMAL

## 2022-05-09 ASSESSMENT — EXTERNAL EXAM - RIGHT EYE: OD_EXAM: NORMAL

## 2022-05-09 ASSESSMENT — CONF VISUAL FIELD
OS_INFERIOR_TEMPORAL_RESTRICTION: 1
OD_INFERIOR_NASAL_RESTRICTION: 1

## 2022-05-09 NOTE — ASSESSMENT & PLAN NOTE
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 Explained are of visual field loss to patient and correlated to the MRI images. Also discussed compensatory manuvers including tilting reading material and reading from up to down. Has a head turn to the left to try and compensate and in PAT with 6 base in OD and 6 base OUT os the head turn improved. Thus gave glasses with yolked prism

## 2022-05-09 NOTE — PROGRESS NOTES
Peds/Neuro Ophthalmology:   Sean Rojas M.D.    Date & Time note created:    5/9/2022   11:35 AM     Referring MD / APRN:  Arthur Rebolledo, No att. providers found    Patient ID:  Name:             Thong Arzola   YOB: 1965  Age:                 57 y.o.  male   MRN:               [id number]    Chief Complaint/Reason for Visit:     Other (Post CVA)      History of Present Illness:    Thong Arzola is a 57 y.o. male   Patient referred for Stroke on 3-.Partial peripheral vision loss.Patient also had a seizure  10 months post stroke.No double vision or headaches.No pain in eyes.Patient wears readers at home.MRI post stroke.Craniectomy on 4-4-2021 with replacement of skull mid July. More recent post stroke seizure. Tonic clonic. Was on xarelto.       Review of Systems:  Review of Systems   Eyes:        Periph vision loss   All other systems reviewed and are negative.      Past Medical History:   Past Medical History:   Diagnosis Date   • Afib ([facility])    • COVID-19    • Disorder of thyroid     hypo   • High cholesterol    • Psychiatric problem     reactive depression   • Stroke ([facility]) 2021    right side MCA       Past Surgical History:  Past Surgical History:   Procedure Laterality Date   • CRANIOPLASTY Right 6/23/2021    Procedure: CRANIOPLASTY - FOR SKULL DEFECT;  Surgeon: Arthur Payton M.D.;  Location: SURGERY [facility];  Service: Neurosurgery   • CRANIOTOMY Right 4/3/2021    Procedure: CRANIOTOMY;  Surgeon: Arthur Payton M.D.;  Location: SURGERY [facility];  Service: Neurosurgery   • KNEE RECONSTRUCTION      left knee orthoscopic       Current Outpatient Medications:  Current Outpatient Medications   Medication Sig Dispense Refill   • ARMOUR THYROID 90 MG Tab Take 90 mg by mouth every day.     • Misc Natural Products (PROSTATE HEALTH) Cap Take  by mouth in the morning, at noon, and at bedtime.     • rosuvastatin (CRESTOR) 10 MG Tab Take 1 Tablet by mouth every day.  90 Tablet 7   • rivaroxaban (XARELTO) 20 MG Tab tablet Take 1 Tablet by mouth every day. 90 Tablet 7   • ramipril (ALTACE) 10 MG capsule Take 1 Capsule by mouth 2 times a day. 180 Capsule 7   • metoprolol tartrate (LOPRESSOR) 25 MG Tab Take 1 Tablet by mouth 2 times a day. 190 Tablet 7   • amLODIPine (NORVASC) 5 MG Tab Take 1 Tablet by mouth 2 times a day. 180 Tablet 7   • Coenzyme Q10 (COQ-10) 30 MG Cap Take  by mouth. 30 Capsule    • gabapentin (NEURONTIN) 100 MG Cap Take 2 Capsules by mouth every evening. 180 Capsule 1   • traMADol (ULTRAM) 50 MG Tab Take 0.5 Tablets by mouth 2 times a day as needed for Moderate Pain or Severe Pain for up to 90 days. 30 Tablet 2   • dantrolene (DANTRIUM) 25 MG Cap TAKE 3 CAPSULES BY MOUTH 3 TIMES A DAY (Patient taking differently: Take 50 mg by mouth 3 times a day.) 360 Capsule 0   • levETIRAcetam (KEPPRA) 500 MG Tab TAKE 1 TABLET BY MOUTH TWICE A DAY (Patient taking differently: Take 500 mg by mouth 2 times a day.) 60 Tablet 5   • celecoxib (CELEBREX) 200 MG Cap Take 1 Capsule by mouth 2 times a day. 120 Capsule 0   • ascorbic acid (VITAMIN C) 1000 MG tablet Take 1,000 mg by mouth every day.     • mirtazapine (REMERON) 15 MG TABLET DISPERSIBLE TAKE 1 TABLET BY MOUTH EVERYDAY AT BEDTIME 90 Tablet 1   • melatonin 3 MG Tab Take 1 Tablet by mouth at bedtime. 30 Tablet 2   • acetaminophen (TYLENOL) 325 MG Tab Take 500 mg by mouth every four hours as needed for Mild Pain. 30 tablet 0     No current facility-administered medications for this visit.       Allergies:  No Known Allergies    Family History:  History reviewed. No pertinent family history.    Social History:  Social History     Socioeconomic History   • Marital status:      Spouse name: Not on file   • Number of children: Not on file   • Years of education: Not on file   • Highest education level: Not on file   Occupational History   • Not on file   Tobacco Use   • Smoking status: Never Smoker   • Smokeless tobacco:  Never Used   Vaping Use   • Vaping Use: Never used   Substance and Sexual Activity   • Alcohol use: Yes     Comment: occ   • Drug use: No   • Sexual activity: Not on file   Other Topics Concern   • Not on file   Social History Narrative    Retired     Social Determinants of Health     Financial Resource Strain: Not on file   Food Insecurity: Not on file   Transportation Needs: Not on file   Physical Activity: Not on file   Stress: Not on file   Social Connections: Not on file   Intimate Partner Violence: Not on file   Housing Stability: Not on file          Physical Exam:  Physical Exam    Oriented x 3  Weight/BMI: There is no height or weight on file to calculate BMI.  There were no vitals taken for this visit.    Base Eye Exam     Visual Acuity (Snellen - Linear)       Right Left    Dist sc 20/25-2 20/20-2          Tonometry ( care, 8:57 AM)       Right Left    Pressure 16 18          Pupils       Pupils    Right PERRL    Left PERRL          Visual Fields       Right Left                            Neuro/Psych     Oriented x3: Yes    Mood/Affect: Normal          Dilation     Both eyes: Tropicamide (MYDRIACYL) 1% ophthalmic solution, Phenylephrine (NEOSYNEPHRINE) ophthalmic solution 2.5%, Cyclopentolate (CYCLOGYL) 1% ophthalmic solution @ 9:12 AM            Additional Tests     Color       Right Left    Ishihara 1/9 1/9          Stereo     Fly: +    Animals: 3/3            Strabismus Exam       0 0 0   0 0 0                       0  0  Ortho  0  0                       0 0 0   0 0 0                Head turn to the left     Slit Lamp and Fundus Exam     External Exam       Right Left    External Normal Normal          Slit Lamp Exam       Right Left    Lids/Lashes Normal Normal    Conjunctiva/Sclera White and quiet White and quiet    Cornea Clear Clear    Anterior Chamber Deep and quiet Deep and quiet    Iris Round and reactive Round and reactive    Lens Clear Clear    Vitreous Normal Normal          Fundus Exam        Right Left    Disc Normal Normal    C/D Ratio 0.3 0.3    Macula Normal Normal    Vessels Normal Normal    Periphery Normal Normal            Refraction     Manifest Refraction (Auto)       Sphere Cylinder Axis    Right -0.50 Sphere     Left -0.75 +0.50 011          Cycloplegic Refraction       Sphere Cylinder Axis    Right -0.50 +0.00 000    Left -0.75 +0.25 029          Final Rx       Sphere Cylinder Axis Add Horz Prism    Right -0.50   +2.00 6 in    Left -0.75 +0.50 020 +2.00 6 out                Pertinent Lab/Test/Imaging Review:  review of neurology and rehab notes in epic. Review of neuro images    Assessment and Plan:     CVA (cerebral vascular accident) (HCC)  5/9/2022 - Left inferior quadrantanopia secondary to right MCA infarct. Explained are of visual field loss to patient and correlated to the MRI images. Also discussed compensatory manuvers including tilting reading material and reading from up to down. Has a head turn to the left to try and compensate and in PAT with 6 base in OD and 6 base OUT os the head turn improved. Thus gave glasses with yolked prism    Optic neuropathy  5/9/2022 - On OCT NFL thickness 66 OD and 76 OS. Nerves slightly tilted and no gross pallor, bu may have some aspect of either retrograde axonal degeneration or lateral geniculate involvement    Regular astigmatism of both eyes  5/9/2022 - slight anisometropic astigmatism. Give glasses rx with the yolked prism    Greater than 60 minute were spent examining the patient, reviewing records from referring providers including MRI images if available and records within the EPIC system, counselling the patient and family regarding the examination findings, diagnostic testing preformed, recommendations for management, prognosis, further testing and referrals as appropriate and follow up. This in addition to time spent interpreting separate billable tests done during the visit.     Sean Rojas M.D.

## 2022-05-09 NOTE — ASSESSMENT & PLAN NOTE
5/9/2022 - On OCT NFL thickness 66 OD and 76 OS. Nerves slightly tilted and no gross pallor, bu may have some aspect of either retrograde axonal degeneration or lateral geniculate involvement

## 2022-05-12 ENCOUNTER — NON-PROVIDER VISIT (OUTPATIENT)
Dept: CARDIOLOGY | Facility: MEDICAL CENTER | Age: 57
End: 2022-05-12
Payer: COMMERCIAL

## 2022-05-12 VITALS — HEART RATE: 70 BPM | SYSTOLIC BLOOD PRESSURE: 148 MMHG | OXYGEN SATURATION: 96 % | DIASTOLIC BLOOD PRESSURE: 90 MMHG

## 2022-05-12 PROCEDURE — 99999 PR NO CHARGE: CPT | Performed by: INTERNAL MEDICINE

## 2022-05-13 ENCOUNTER — TELEPHONE (OUTPATIENT)
Dept: CARDIOLOGY | Facility: MEDICAL CENTER | Age: 57
End: 2022-05-13
Payer: COMMERCIAL

## 2022-05-13 DIAGNOSIS — I10 ESSENTIAL (PRIMARY) HYPERTENSION: ICD-10-CM

## 2022-05-13 RX ORDER — SPIRONOLACTONE 25 MG/1
25 TABLET ORAL DAILY
Qty: 90 TABLET | Refills: 3 | Status: SHIPPED | OUTPATIENT
Start: 2022-05-13 | End: 2023-03-13

## 2022-05-13 NOTE — TELEPHONE ENCOUNTER
Message  Received: Today  Robyn Gastelum M.D.  Mary Gomez R.N.  His blood pressure still looks higher than we would like.  The goal is to get it more consistently closer to 130/80.   I recommend multiple medications at lower to moderate doses as opposed to increasing medications to maximum dose as they tend to have more side effects and become less efficient.   Therefore, my next recommendation is spironolactone 25 mg once daily in the morning.  It is a mild diuretic but will help hold on potassium which can be good for atrial fibrillation.     If he is willing to start the medication, nonfasting BMP 1 month to follow-up on potassium and kidney function.     Thank you             Previous Messages       ----- Message -----   From: Mary Gomez R.N.   Sent: 5/13/2022   8:08 AM PDT   To: Robyn Gastelum M.D.     [facility], I do not believe the pt brought in a BP log, however, 143/106 was his BP using his own cuff in office and 148/90 was his BP using our cuff in office.   ----- Message -----   From: Robyn Gastelum M.D.   Sent: 5/12/2022   6:47 PM PDT   To: Mary Gomez R.N.     M,   I do not see where home blood pressure readings are located.  Somehow in the vital signs?  I only see 1 set of vitals for today.  Can you tell me out?   Thanks   ----- Message -----   From: Mary Gomez R.N.   Sent: 5/12/2022   2:00 PM PDT   To: Robyn Gastelum M.D.     [facility], any medication changes?   ----- Message -----   From: Munira Do, Med Ass't   Sent: 5/12/2022   1:47 PM PDT   To: Mary Gomez R.N.     Patient was here today for blood pressure check per . BP readings located in vital sign section including patient's home BP cuff readings. Informed patient we will forward readings to nurse and they will receive a call with recommendations.   --------------------------------------------------------------------------------------------------------------    Called and s/w pt's wife,  Anel. Informed her of recommendations. Rx sent. Lab order placed. Advised to have pt call w/ any questions, concerns, or if BP remains consistently > 130/80 after 2 weeks of starting the medication.

## 2022-05-17 RX ORDER — CELECOXIB 200 MG/1
200 CAPSULE ORAL 2 TIMES DAILY
Qty: 120 CAPSULE | Refills: 0 | Status: SHIPPED | OUTPATIENT
Start: 2022-05-17 | End: 2022-07-19 | Stop reason: SDUPTHER

## 2022-05-19 NOTE — PROGRESS NOTES
Chief Complaint   Patient presents with   • Follow-Up     Seizures       Problem List Items Addressed This Visit    None     Visit Diagnoses     Vitamin D deficiency        Relevant Orders    VITAMIN D,25 HYDROXY    Localization-related epilepsy (HCC)        Relevant Medications    brivaracetam (BRIVIACT) 50 MG Tab tablet      THIS CONSULTATION WAS PERFORMED VIA TELEMEDICINE, UTILIZING AN ENCRYPTED TRANSMISSION, TO PREVENT SPREAD OF COVID19. THE PATIENT CONSENTED TO THIS CONSULTATION.    Interim History:  Thong Arzola 57 y.o. male is f/u for seizure f/u. Anel, wife, is joining today.     He continues to have no seizures on keppra, however, wife c/o side effects including fatigue, cognitive impairment and intermittent mood issues such as easy to react and get angry. NO SI or HI. They are requesting to lower the dose further. He is still taking vit D. He is f/u with PCP, neuroophthalmology and Dr. Dumont for stroke. Not on tramadol anymore. No other issues.       Past medical history:   Past Medical History:   Diagnosis Date   • Afib (HCC)    • COVID-19    • Disorder of thyroid     hypo   • High cholesterol    • Psychiatric problem     reactive depression   • Stroke (HCC) 2021    right side MCA       Past surgical history:   Past Surgical History:   Procedure Laterality Date   • CRANIOPLASTY Right 6/23/2021    Procedure: CRANIOPLASTY - FOR SKULL DEFECT;  Surgeon: Arthur Payton M.D.;  Location: SURGERY [facility];  Service: Neurosurgery   • CRANIOTOMY Right 4/3/2021    Procedure: CRANIOTOMY;  Surgeon: Arthur Payton M.D.;  Location: SURGERY [facility];  Service: Neurosurgery   • KNEE RECONSTRUCTION      left knee orthoscopic       Family history:   No family history on file.    Social history:   Social History     Socioeconomic History   • Marital status:      Spouse name: Not on file   • Number of children: Not on file   • Years of education: Not on file   • Highest education level: Not on file    Occupational History   • Not on file   Tobacco Use   • Smoking status: Never Smoker   • Smokeless tobacco: Never Used   Vaping Use   • Vaping Use: Never used   Substance and Sexual Activity   • Alcohol use: Yes     Comment: occ   • Drug use: No   • Sexual activity: Not on file   Other Topics Concern   • Not on file   Social History Narrative    Retired     Social Determinants of Health     Financial Resource Strain: Not on file   Food Insecurity: Not on file   Transportation Needs: Not on file   Physical Activity: Not on file   Stress: Not on file   Social Connections: Not on file   Intimate Partner Violence: Not on file   Housing Stability: Not on file       Current medications:   Current Outpatient Medications   Medication   • celecoxib (CELEBREX) 200 MG Cap   • spironolactone (ALDACTONE) 25 MG Tab   • ARMOUR THYROID 90 MG Tab   • Misc Natural Products (PROSTATE HEALTH) Cap   • rosuvastatin (CRESTOR) 10 MG Tab   • rivaroxaban (XARELTO) 20 MG Tab tablet   • ramipril (ALTACE) 10 MG capsule   • metoprolol tartrate (LOPRESSOR) 25 MG Tab   • amLODIPine (NORVASC) 5 MG Tab   • Coenzyme Q10 (COQ-10) 30 MG Cap   • gabapentin (NEURONTIN) 100 MG Cap   • traMADol (ULTRAM) 50 MG Tab   • dantrolene (DANTRIUM) 25 MG Cap   • levETIRAcetam (KEPPRA) 500 MG Tab   • ascorbic acid (VITAMIN C) 1000 MG tablet   • mirtazapine (REMERON) 15 MG TABLET DISPERSIBLE   • melatonin 3 MG Tab   • acetaminophen (TYLENOL) 325 MG Tab     No current facility-administered medications for this visit.       Medication Allergy:  No Known Allergies      Review of systems:     General: Denies fevers or chills, or nightsweats. + generalized fatigue.    Head: Denies headaches or dizziness or lightheadedness  Dermatologic:  Denies new rash  Musculoskeletal: Denies muscle pain or swelling, no atrophy, no neck and back pain or stiffness.   Neurologic: Denies facial droopiness, muscle weakness (focal or generalized), paresthesias, ataxia, change in speech or  language, memory loss, abnormal movements, seizures, loss of consciousness, or episodes of confusion.   Psychiatric: Denies anxiety,  suicidal or homicidal thoughts. + mood swings, depression       Physical examination:   General: Patient in no acute distress, pleasant and cooperative.  HEENT: Normocephalic, no signs of acute trauma.   Neck: There is normal range of motion.   Skin: no signs of acute rashes or trauma in visible areas  Psychiatric: No hallucinatory behavior. No symptoms of depression or suicidal ideation. Mood and affect appear normal on exam.      NEUROLOGICAL EXAM:   Mental status, orientation: Awake, alert and fully oriented.   Speech and language: speech is clear and fluent. The patient is able to name, repeat and comprehend.   Memory: There is intact recollection of recent and remote events.   Cranial nerve exam:   CN I: Not examined   CN II: Unable to assess  CN III, IV, VI: EOMI  CN V: Unable to assess  CN VII: face symmetric   CN VIII: Unable to assess  CN IX, X: Unable to assess  CN XI: Symmetric shoulder shrug  CN XII: tongue midline.   Motor exam: L side hemiplegia  Sensory exam Unable to assess  Deep tendon reflexes: Unable to assess      ANCILLARY DATA REVIEWED:       Lab Data Review:  Reviewed in chart. CBC, CMP    Records reviewed:   Reviewed in chart.    Imaging:   MRI brain 2021  Very large acute right MCA territory infarct as detailed above with small amount of petechial hemorrhage in the right insular region and right temporal lobe.     Punctate right thalamic lacunar infarct.     Right ICA and M1 MCA occlusion.     EEG:  EEG 4/26/21  This is an abnormal video EEG recording in the awake, drowsy/sleep state(s). The diffuse background slowing is consistent with mild encephalopathy. The presence of asymmetric PDR and sleep spindle along with the loss of fast frequency over the right hemisphere are consistent with known focal structural lesion. No epileptiform discharges or seizures  were seen. This does not preclude a diagnosis of epilepsy.        ASSESSMENT AND PLAN:  1. Localization-related epilepsy (HCC)  brivaracetam (BRIVIACT) 50 MG Tab tablet   2. Vitamin D deficiency  VITAMIN D,25 HYDROXY   3. History of stroke     4. 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 Cardioembolic etiology given PAF. On xarelto 20mg daily. He has residual deficit of L spastic hemiplegia and L hemianopia. He had his first seizure on 2/12/22 affecting his L side. He had convulsion. Duration: 1-2min. There was post-ictal confusion. No tongue biting or incontinence. No auras or triggers. He was started on high dose keppra and had side effects of lethargy, feeling groggy and cloudy. Dose was decreased to 500mg BID which he tolerated better. He remains seizure free but he is having more side effects on keppra including cognitive impairment, mood changes and fatigue.      No alcohol,cigarettes or recreational drug use.      Mood is stable. Has depression but is staying positive. No suicidal or homicidal thoughts. Keppra is not making this worse accdg to pt and wife.      Past ASM's: keppra     Current ASM's: briviact 50mg BID        Plan:  - We discussed switching ASM instead of lowering dose and they agreed. Aware of breakthrough seizure risk with med adjustment. Trial of briviact which has lesser side effects profile and generally well tolerated with no significant mood issues than keppra. They are aware of side effects. They are aware that this can be expensive compared to keppra but we have an assistance program that they can apply to if needed. Offered samples and wife is picking them up on Tuesday.      Stop taking keppra and start taking briviact. No need to wean off     - Discussed avoidance of spell/sz triggers: alcohol, sleep deprivation, benadryl, tramadol and stress. Not on tramadol anymore     - Discussed Vit D  supplementation. Recommended taking 2000-5000u daily.     - Discussed driving restrictions. Pt is not driving.        -Continue f/u with PCP for stroke risk factor management and monitoring. Given referral to PCP as requested.      -Continue f/u with Dr. Dumont, PT/ST/OT     -Continue f/u with neuroophthalmology for L hemianopsia     -Labs to be checked for next appointment: vit d          FOLLOW-UP:   Return in about 2 months (around 8/17/2022).      EDUCATION AND COUNSELING:  -Education was provided to the patient and/or family regarding diagnosis and prognosis. The chronic and unpredictable nature of the condition were discussed. There is increased risk for additional events, which may carry potential for significant injuries and death. Discussed frequent seizure triggers: sleep deprivation, medication non-compliance, use of illegal drugs/alcohol, stress, and others.   - There are potential side effects of antiepileptics including but no limited to: hypersensitivity reactions (rash and others, some of which can be fatal), visual field changes (some of which may be irreversible), glaucoma, diplopia, kidney stones, osteopenia/osteoporosis/bone fractures, hyperthermia/anhydrosis, hyponatremia, tremors/abnormal movements, ataxia, dizziness, fatigue, increased risk for falls, risk for cardiac arrhythmias/syncope, gastrointestinal side effects(hepatitis, pancreatitis, gastritis, ulcers), gingival hypertrophy/bleeding, drowsiness, sedation, anxiety/nervousness, increased risk for suicide, increased risk for depression, and psychosis.   -Recommend chronic vitamin D supplementation and regular exercise (if not contraindicated).   -Patient/family educated on risk for SUDEP (Sudden Death in Epilepsy). Counseling was provided on the importance of strict medication and follow up compliance. The patient/family understand the risks associated with non-adherence with the medical plan as outlined, including but not limited to an  increased risk for breakthrough seizures, which may contribute to injuries, disability, status epilepticus, and even death.   -There are potential effects of alcohol and other drugs, which may lower seizure threshold and/or affect the metabolism of antiepileptic drugs. We recommend avoidance of alcohol and illegal drugs.  -Avoid sleep deprivation.   -The patient is encourage to report to the Division of Motor Vehicles of any condition and/or spells related to confusion, disorientation, and/or loss of awareness and/or loss of consciousness; as these may pose a safety issue if they occur while operating a motor vehicle. The patient and/or family are ultimately responsible for exercising caution and abiding to regulations in place.   -Other seizure precautions were discussed at length, including no diving, no skydiving, no climbing or exposure to unprotected heights, no unsupervised swimming, no Jacuzzi or bathing in bathtubs or deep bodies of water. There are risks for operating any machinery while suffering from seizures / syncope / epilepsy and/or while taking antiepileptic drugs.   -The patient understands and agrees that due to the complexity of his/her diagnosis, results of any testing and further recommendations will typically be discussed/made during a face to face encounter in my office. The patient and/or family further understands it is their responsibility to keep proper follow up.     Patient/family agree with plan, as outlined.         Jazz Davidson, MSN, APRN, FNP-C  [facility]  Office: [phone]  Fax: [phone]    This encounter was conducted via Zoom. They are at home  The patient was in a private location in the [facility].    Verbal consent was obtained. Patient's identity was verified.

## 2022-06-02 ENCOUNTER — APPOINTMENT (OUTPATIENT)
Dept: OCCUPATIONAL THERAPY | Facility: REHABILITATION | Age: 57
End: 2022-06-02
Attending: PHYSICAL MEDICINE & REHABILITATION
Payer: COMMERCIAL

## 2022-06-17 ENCOUNTER — TELEMEDICINE (OUTPATIENT)
Dept: NEUROLOGY | Facility: MEDICAL CENTER | Age: 57
End: 2022-06-17
Attending: NURSE PRACTITIONER
Payer: COMMERCIAL

## 2022-06-17 ENCOUNTER — TELEPHONE (OUTPATIENT)
Dept: NEUROLOGY | Facility: MEDICAL CENTER | Age: 57
End: 2022-06-17

## 2022-06-17 VITALS — SYSTOLIC BLOOD PRESSURE: 129 MMHG | DIASTOLIC BLOOD PRESSURE: 95 MMHG

## 2022-06-17 DIAGNOSIS — Z86.73 HISTORY OF STROKE: ICD-10-CM

## 2022-06-17 DIAGNOSIS — E55.9 VITAMIN D DEFICIENCY: ICD-10-CM

## 2022-06-17 DIAGNOSIS — Z13.31 SCREENING FOR DEPRESSION: ICD-10-CM

## 2022-06-17 DIAGNOSIS — G40.109 LOCALIZATION-RELATED EPILEPSY (HCC): ICD-10-CM

## 2022-06-17 PROCEDURE — 99214 OFFICE O/P EST MOD 30 MIN: CPT | Mod: 95 | Performed by: NURSE PRACTITIONER

## 2022-06-17 NOTE — TELEPHONE ENCOUNTER
Briviact 50mg Tablet    Request rec'd via [facility], RTMICHELE Sebastian paid copay $120.00 #60/30 DS Max 30 DS however patient lives in [place], will release to patients local pharmacy notifying liaison Mega Cardozo. - 06/17/2022 11:55am

## 2022-06-21 ENCOUNTER — TELEPHONE (OUTPATIENT)
Dept: NEUROLOGY | Facility: MEDICAL CENTER | Age: 57
End: 2022-06-21
Payer: COMMERCIAL

## 2022-06-27 RX ORDER — MIRTAZAPINE 15 MG/1
TABLET, ORALLY DISINTEGRATING ORAL
Qty: 90 TABLET | Refills: 1 | Status: SHIPPED | OUTPATIENT
Start: 2022-06-27 | End: 2023-07-17

## 2022-07-18 ENCOUNTER — APPOINTMENT (OUTPATIENT)
Dept: PHYSICAL MEDICINE AND REHAB | Facility: REHABILITATION | Age: 57
End: 2022-07-18

## 2022-07-19 RX ORDER — CELECOXIB 200 MG/1
200 CAPSULE ORAL 2 TIMES DAILY
Qty: 120 CAPSULE | Refills: 0 | Status: SHIPPED | OUTPATIENT
Start: 2022-07-19 | End: 2022-08-10 | Stop reason: SDUPTHER

## 2022-07-23 DIAGNOSIS — G40.109 LOCALIZATION-RELATED EPILEPSY (HCC): ICD-10-CM

## 2022-07-27 ENCOUNTER — TELEPHONE (OUTPATIENT)
Dept: NEUROLOGY | Facility: MEDICAL CENTER | Age: 57
End: 2022-07-27
Payer: COMMERCIAL

## 2022-07-27 NOTE — TELEPHONE ENCOUNTER
Called Anel, wife, back. She states pt is doing better now. He had a seizure as he ran out of briviact. She states pharmacy has the briviact now and is waiting to be picked up. Offered more samples if she can stop by tomorrow. They will use keppra tonight as they won't be able to  the briviact until tomorrow am.

## 2022-08-03 ENCOUNTER — TELEPHONE (OUTPATIENT)
Dept: CARDIOLOGY | Facility: MEDICAL CENTER | Age: 57
End: 2022-08-03
Payer: COMMERCIAL

## 2022-08-03 NOTE — TELEPHONE ENCOUNTER
LVM for pt in regards to lab work that was ordered at previous OV with . Office number given for call back if pt has any questions or has had lab work done outside of [facility]. Pt has follow up appointment scheduled with  on 08/11/2022.

## 2022-08-09 ENCOUNTER — HOSPITAL ENCOUNTER (OUTPATIENT)
Dept: LAB | Facility: MEDICAL CENTER | Age: 57
End: 2022-08-09
Attending: INTERNAL MEDICINE
Payer: COMMERCIAL

## 2022-08-09 DIAGNOSIS — I63.10 CEREBROVASCULAR ACCIDENT (CVA) DUE TO EMBOLISM OF PRECEREBRAL ARTERY (HCC): ICD-10-CM

## 2022-08-09 DIAGNOSIS — E78.2 MIXED HYPERLIPIDEMIA: ICD-10-CM

## 2022-08-09 LAB
CHOLEST SERPL-MCNC: 158 MG/DL (ref 100–199)
FASTING STATUS PATIENT QL REPORTED: NORMAL
HDLC SERPL-MCNC: 30 MG/DL
LDLC SERPL CALC-MCNC: 75 MG/DL
TRIGL SERPL-MCNC: 264 MG/DL (ref 0–149)

## 2022-08-09 PROCEDURE — 36415 COLL VENOUS BLD VENIPUNCTURE: CPT

## 2022-08-09 PROCEDURE — 80061 LIPID PANEL: CPT

## 2022-08-10 ENCOUNTER — OFFICE VISIT (OUTPATIENT)
Dept: PHYSICAL MEDICINE AND REHAB | Facility: REHABILITATION | Age: 57
End: 2022-08-10
Payer: COMMERCIAL

## 2022-08-10 VITALS
SYSTOLIC BLOOD PRESSURE: 128 MMHG | HEIGHT: 71 IN | RESPIRATION RATE: 17 BRPM | OXYGEN SATURATION: 97 % | TEMPERATURE: 98.7 F | DIASTOLIC BLOOD PRESSURE: 78 MMHG | WEIGHT: 175 LBS | BODY MASS INDEX: 24.5 KG/M2 | HEART RATE: 76 BPM

## 2022-08-10 DIAGNOSIS — I63.511 ACUTE RIGHT MCA STROKE (HCC): Primary | ICD-10-CM

## 2022-08-10 DIAGNOSIS — M79.2 NEUROPATHIC PAIN: ICD-10-CM

## 2022-08-10 DIAGNOSIS — I69.954 HEMIPLEGIA AND HEMIPARESIS FOLLOWING UNSPECIFIED CEREBROVASCULAR DISEASE AFFECTING LEFT NON-DOMINANT SIDE (HCC): ICD-10-CM

## 2022-08-10 DIAGNOSIS — I69.854 SPASTIC HEMIPLEGIA OF LEFT NONDOMINANT SIDE AS LATE EFFECT OF OTHER CEREBROVASCULAR DISEASE (HCC): ICD-10-CM

## 2022-08-10 DIAGNOSIS — M62.838 OTHER MUSCLE SPASM: ICD-10-CM

## 2022-08-10 DIAGNOSIS — G89.29 CHRONIC NOCICEPTIVE PAIN: ICD-10-CM

## 2022-08-10 PROCEDURE — 99214 OFFICE O/P EST MOD 30 MIN: CPT | Performed by: PHYSICAL MEDICINE & REHABILITATION

## 2022-08-10 RX ORDER — DANTROLENE SODIUM 25 MG/1
50 CAPSULE ORAL 3 TIMES DAILY
Qty: 540 CAPSULE | Refills: 1 | Status: SHIPPED | OUTPATIENT
Start: 2022-08-10 | End: 2023-08-07

## 2022-08-10 RX ORDER — PREGABALIN 50 MG/1
50 CAPSULE ORAL 3 TIMES DAILY
Qty: 90 CAPSULE | Refills: 0 | Status: SHIPPED | OUTPATIENT
Start: 2022-08-10 | End: 2022-09-20 | Stop reason: SDUPTHER

## 2022-08-10 RX ORDER — CELECOXIB 200 MG/1
200 CAPSULE ORAL 2 TIMES DAILY
Qty: 180 CAPSULE | Refills: 1 | Status: SHIPPED | OUTPATIENT
Start: 2022-08-10 | End: 2023-02-08

## 2022-08-10 ASSESSMENT — ENCOUNTER SYMPTOMS
DYSPNEA ON EXERTION: 0
NAUSEA: 0
WHEEZING: 0
NEAR-SYNCOPE: 0
ABDOMINAL PAIN: 0
FOCAL WEAKNESS: 0
DIZZINESS: 0
ORTHOPNEA: 0
SYNCOPE: 0
NIGHT SWEATS: 0
PND: 0
FEVER: 0
DIARRHEA: 0
VOMITING: 0
SHORTNESS OF BREATH: 0
IRREGULAR HEARTBEAT: 0
WEAKNESS: 0
PALPITATIONS: 0
COUGH: 0

## 2022-08-10 ASSESSMENT — FIBROSIS 4 INDEX: FIB4 SCORE: 0.69

## 2022-08-10 NOTE — PROGRESS NOTES
[facility]  PM&R Neuro Rehabilitation Clinic  [facility] [place] [address]  Ph: [phone]    FOLLOW UP PATIENT EVALUATION      Patient Name: Thong Arzola   Patient : 1965  Patient Age: 57 y.o.     Examining Physician: Dr. Zulema Dumont, DO    PM&R History to Date - Background Information:  Dates of Admission/Discharge to ARU: 2021-2021    SUBJECTIVE:   Patient Identification: Thong Arzola is a 57 y.o. male with PMH significant for COVID-19 3/18/2021, paroxysmal atrial fibrillation not on anti-coagulation, hypothyroidism, QTc prolongation and rehabilitation history significant for large right ICA/MCA ischemic stroke 3/31/2021 in setting of paroxysmal off of anticoagulation s/p craniectomy 4/3/2021 by Dr. Payton s/p cranioplasty 21 and is presenting to PM&R clinic for a FOLLOW UP OUTPATIENT evaluation with the following chief complaint/s:    CC: Botox follow up    History of Present Illness:  - Shoulder is subluxed but wasn't wearing brace when in bed during COVID.   - No longer taking Tramadol.   - Neuropathic Pain: Taking Gabapentin 200mg at night  - Sleep: Melatonin at night.   - Went to a therapeutic masseuse who did cupping and it was painful, will not go back.   - Is not on a different seizure medication, but had a seizure when ran out.     Review of Systems:  All other pertinent positive review of systems are noted above in HPI.   All other systems reviewed and are negative.    Past Medical History:  Past Medical History:   Diagnosis Date    Stroke (HCC)     right side MCA    Afib (HCC)     COVID-19     Disorder of thyroid     hypo    High cholesterol     Psychiatric problem     reactive depression      Past Surgical History:   Procedure Laterality Date    CRANIOPLASTY Right 2021    Procedure: CRANIOPLASTY - FOR SKULL DEFECT;  Surgeon: Arthur Payton M.D.;  Location: SURGERY [facility];  Service: Neurosurgery    CRANIOTOMY Right 4/3/2021    Procedure:  CRANIOTOMY;  Surgeon: Arthur Payton M.D.;  Location: SURGERY [facility];  Service: Neurosurgery    KNEE RECONSTRUCTION      left knee orthoscopic        Current Outpatient Medications:     dantrolene (DANTRIUM) 25 MG Cap, Take 2 Capsules by mouth 3 times a day., Disp: 540 Capsule, Rfl: 1    celecoxib (CELEBREX) 200 MG Cap, Take 1 Capsule by mouth 2 times a day., Disp: 180 Capsule, Rfl: 1    pregabalin (LYRICA) 50 MG capsule, Take 1 Capsule by mouth 3 times a day for 30 days., Disp: 90 Capsule, Rfl: 0    brivaracetam (BRIVIACT) 50 MG Tab tablet, Take 1 Tablet by mouth 2 times a day for 180 days., Disp: 180 Tablet, Rfl: 1    mirtazapine (REMERON) 15 MG TABLET DISPERSIBLE, DISSOLVE 1 TABLET BY MOUTH EVERYDAY AT BEDTIME, Disp: 90 Tablet, Rfl: 1    spironolactone (ALDACTONE) 25 MG Tab, Take 1 Tablet by mouth every day., Disp: 90 Tablet, Rfl: 3    ARMOUR THYROID 90 MG Tab, Take 90 mg by mouth every day., Disp: , Rfl:     Misc Natural Products (PROSTATE HEALTH) Cap, Take  by mouth in the morning, at noon, and at bedtime., Disp: , Rfl:     rosuvastatin (CRESTOR) 10 MG Tab, Take 1 Tablet by mouth every day., Disp: 90 Tablet, Rfl: 7    rivaroxaban (XARELTO) 20 MG Tab tablet, Take 1 Tablet by mouth every day., Disp: 90 Tablet, Rfl: 7    ramipril (ALTACE) 10 MG capsule, Take 1 Capsule by mouth 2 times a day., Disp: 180 Capsule, Rfl: 7    metoprolol tartrate (LOPRESSOR) 25 MG Tab, Take 1 Tablet by mouth 2 times a day., Disp: 190 Tablet, Rfl: 7    amLODIPine (NORVASC) 5 MG Tab, Take 1 Tablet by mouth 2 times a day., Disp: 180 Tablet, Rfl: 7    Coenzyme Q10 (COQ-10) 30 MG Cap, Take  by mouth., Disp: 30 Capsule, Rfl:     gabapentin (NEURONTIN) 100 MG Cap, Take 2 Capsules by mouth every evening., Disp: 180 Capsule, Rfl: 1    ascorbic acid (VITAMIN C) 1000 MG tablet, Take 1,000 mg by mouth every day., Disp: , Rfl:     melatonin 3 MG Tab, Take 1 Tablet by mouth at bedtime., Disp: 30 Tablet, Rfl: 2    acetaminophen (TYLENOL) 325 MG  Tab, Take 500 mg by mouth every four hours as needed for Mild Pain., Disp: 30 tablet, Rfl: 0  No Known Allergies     Past Social History:  Social History     Socioeconomic History    Marital status:      Spouse name: Not on file    Number of children: Not on file    Years of education: Not on file    Highest education level: Not on file   Occupational History    Not on file   Tobacco Use    Smoking status: Never    Smokeless tobacco: Never   Vaping Use    Vaping Use: Never used   Substance and Sexual Activity    Alcohol use: Yes     Comment: occ    Drug use: No    Sexual activity: Not on file   Other Topics Concern    Not on file   Social History Narrative    Retired     Social Determinants of Health     Financial Resource Strain: Not on file   Food Insecurity: Not on file   Transportation Needs: Not on file   Physical Activity: Not on file   Stress: Not on file   Social Connections: Not on file   Intimate Partner Violence: Not on file   Housing Stability: Not on file        Family History:  History reviewed. No pertinent family history.    Depression and Opioid Screening  PHQ-9:  Depression Screen (PHQ-2/PHQ-9) 5/15/2021 5/16/2021 2/17/2022   PHQ-2 Total Score 1 1 -   PHQ-2 Total Score - - 0   PHQ-9 Total Score 5 5 -     Interpretation of PHQ-9 Total Score   Score Severity   1-4 No Depression   5-9 Mild Depression   10-14 Moderate Depression   15-19 Moderately Severe Depression   20-27 Severe Depression     OBJECTIVE:   Vital Signs:  Vitals:    08/10/22 0927   BP: 128/78   Pulse: 76   Resp: 17   Temp: 37.1 °C (98.7 °F)   SpO2: 97%        Pertinent Labs:  Lab Results   Component Value Date/Time    SODIUM 144 04/29/2022 09:47 AM    POTASSIUM 4.1 04/29/2022 09:47 AM    CHLORIDE 106 04/29/2022 09:47 AM    CO2 25 04/29/2022 09:47 AM    GLUCOSE 79 04/29/2022 09:47 AM    BUN 18 04/29/2022 09:47 AM    CREATININE 1.10 04/29/2022 09:47 AM    CREATININE 1.3 04/04/2008 03:05 AM       Lab Results   Component Value  Date/Time    HBA1C 5.9 (H) 04/01/2021 02:45 AM       Lab Results   Component Value Date/Time    WBC 4.8 04/29/2022 09:47 AM    RBC 5.42 04/29/2022 09:47 AM    HEMOGLOBIN 15.7 04/29/2022 09:47 AM    HEMATOCRIT 46.6 04/29/2022 09:47 AM    MCV 86.0 04/29/2022 09:47 AM    MCH 29.0 04/29/2022 09:47 AM    MCHC 33.7 04/29/2022 09:47 AM    MPV 9.5 04/29/2022 09:47 AM    NEUTSPOLYS 58.80 04/29/2022 09:47 AM    LYMPHOCYTES 31.80 04/29/2022 09:47 AM    MONOCYTES 7.10 04/29/2022 09:47 AM    EOSINOPHILS 1.70 04/29/2022 09:47 AM    BASOPHILS 0.40 04/29/2022 09:47 AM       Lab Results   Component Value Date/Time    ASTSGOT 23 04/29/2022 09:47 AM    ALTSGPT 43 04/29/2022 09:47 AM        Physical Exam:   GEN: No apparent distress  HEENT: Head normocephalic, atraumatic.  Sclera nonicteric bilaterally, no ocular discharge appreciated bilaterally.  CV: Extremities warm and well-perfused, some swelling appreciated left knee compared to right  PULMONARY: Breathing nonlabored on room air, no respiratory accessory muscle use.  Not requiring supplemental oxygen.  ABD: Soft, nontender.  SKIN: No appreciable skin breakdown on exposed areas of skin.  PSYCH: Mood and affect within normal limits.  NEURO: Awake alert.  Conversational.  Logical thought content.  Spasticity of the left upper extremity with 3/4 wrist flexors and finger flexors, 2/4 tricep, sustained clonus of the left ankle.  Ambulatory with left AFO and quad cane    ASSESSMENT/PLAN: Thong Arzola  is a 57 y.o. male with rehabilitation history significant for large right ICA/MCA ischemic stroke 3/31/2021 in setting of paroxysmal off of anticoagulation s/p craniectomy 4/3/2021 by Dr. Payton s/p cranioplasty 6/23/21, here for evaluation. The following plan was discussed with the patient who is in agreement.     Visit Diagnoses     ICD-10-CM   1. Acute right MCA stroke (HCC)  I63.511   2. Hemiplegia and hemiparesis following unspecified cerebrovascular disease affecting left  non-dominant side (HCC)  I69.954   3. Spastic hemiplegia of left nondominant side as late effect of other cerebrovascular disease (HCC)  I69.854   4. Other muscle spasm  M62.838   5. Neuropathic pain  M79.2   6. Chronic nociceptive pain  G89.29        Wife assists with history.    Rehab/Neuro:   1. Large right ICA/MCA ischemic stroke 3/31/2021 in setting of paroxysmal atrial fibrillation off of anticoagulation s/p craniectomy 4/3/2021 by Dr. Payton s/p cranioplasty 6/23/21  2. Left hemiplegia  3.  New onset seizures 2/2022 now 1 ADD.  -Neuro status: Stabilized.  -Driving status: Currently not driving.  Extensive counseling regarding the fact that I do not think it is safe for the patient to drive.  Regardless, he would like to go through with a driving evaluation.  I discussed with him that this would be more so to see for himself that there are deficits which make him unsafe for driving.  Patient amenable and would like referral placed.    Spasticity:   - Counseled on use of different toxin to see if that would be more beneficial for him as Botox did not seem to be very efficacious.  - Med management: Prescription for dantrolene 50 mg 3 times daily (prescription for 25 mg tablets).     Neuropathic pain:  -Med management: Prescription for gabapentin 200 mg nightly, 6-month supply.  -Med management: Trial Lyrica 50 mg 3 times daily with hope of replacing gabapentin and having less cognitive side effects.    Nociceptive pain/MSK/Ortho: 12/15/2021 secondary joint pain due to ambulating more.  Only affects left side at hip, knee, ankle. 3/15/2022 pain continues despite multiple conservative measures as well as Celebrex, Tylenol. 4/5/2022  Has been taking 12.5 mg tramadol as pharmacy gave him 25 mg instead of 50 mg.  Will increase to 25 and see if it is efficacious. 8/10/2022 self discontinued tramadol as he was having cognitive side effects.  - Med management: Refill for Celebrex 200 mg twice daily      Follow up: 6  months or sooner if needed    Please note that this dictation was created using voice recognition software. I have made every reasonable attempt to correct obvious errors but there may be errors of grammar and content that I may have overlooked prior to finalization of this note.    Dr. Zulema Dumont DO, MS  Department of Physical Medicine & Rehabilitation  Neuro Rehabilitation Clinic  [facility]

## 2022-08-10 NOTE — PROGRESS NOTES
Cardiology Follow-up Consultation Note    Date of note:    8/11/2022    Primary Care Provider: Arthur Rebolledo    Patient Name: Thong Arzola   YOB: 1965  MRN:              [id number]    Chief Complaint: Follow-up A. fib and dyslipidemia    History of Present Illness: Mr. Thong Arzola is a 57 y.o. male whose current medical problems include paroxysmal A. fib, dyslipidemia, hypertension, coronary artery calcification, and CVA (large right MCA infarct due to A. fib) in 3/2021 who is here for follow-up A. fib and dyslipidemia.    The patient was previously a patient of Dr. Gastelum, last seen 5/2/2022.  The patient was doing well during the last visit, and no changes were made to his medications.    The patient returns today for follow-up.  The patient presents with his wife.  The patient reports feeling well today.  He has completed physical therapy for his stroke, and working by himself at home on exercises.  He can walk, but still requires wheelchair for longer distance travels.  He denies any chest pain or shortness of breath on exertion.  Denies any orthopnea, PND, or leg swelling.  No palpitations.  No syncope or presyncopal episodes.      Cardiovascular Risk Factors:  1. Smoking status: Never smoker  2. Type II Diabetes Mellitus: Prediabetes, diet controlled  Lab Results   Component Value Date/Time    HBA1C 5.9 (H) 04/01/2021 02:45 AM     3. Hypertension: On medication  4. Dyslipidemia: On statin  Cholesterol,Tot   Date Value Ref Range Status   08/09/2022 158 100 - 199 mg/dL Final     LDL   Date Value Ref Range Status   08/09/2022 75 <100 mg/dL Final     HDL   Date Value Ref Range Status   08/09/2022 30 (A) >=40 mg/dL Final     Triglycerides   Date Value Ref Range Status   08/09/2022 264 (H) 0 - 149 mg/dL Final     5. Family history of early Coronary Artery Disease in a first degree relative (Male less than 55 years of age; Female less than 65 years of age): None  6.  Obesity and/or  Metabolic Syndrome: Body mass index is 24.41 kg/m².  7. Sedentary lifestyle: Not active due to recent stroke    Review of Systems   Constitutional: Negative for fever, malaise/fatigue and night sweats.   Cardiovascular:  Negative for chest pain, dyspnea on exertion, irregular heartbeat, leg swelling, near-syncope, orthopnea, palpitations, paroxysmal nocturnal dyspnea and syncope.   Respiratory:  Negative for cough, shortness of breath and wheezing.    Gastrointestinal:  Negative for abdominal pain, diarrhea, nausea and vomiting.   Neurological:  Negative for dizziness, focal weakness and weakness.       All other systems reviewed and are negative.     Current Outpatient Medications   Medication Sig Dispense Refill    rosuvastatin (CRESTOR) 20 MG Tab Take 1 Tablet by mouth every evening. 90 Tablet 3    dantrolene (DANTRIUM) 25 MG Cap Take 2 Capsules by mouth 3 times a day. 540 Capsule 1    celecoxib (CELEBREX) 200 MG Cap Take 1 Capsule by mouth 2 times a day. 180 Capsule 1    pregabalin (LYRICA) 50 MG capsule Take 1 Capsule by mouth 3 times a day for 30 days. 90 Capsule 0    brivaracetam (BRIVIACT) 50 MG Tab tablet Take 1 Tablet by mouth 2 times a day for 180 days. 180 Tablet 1    mirtazapine (REMERON) 15 MG TABLET DISPERSIBLE DISSOLVE 1 TABLET BY MOUTH EVERYDAY AT BEDTIME 90 Tablet 1    spironolactone (ALDACTONE) 25 MG Tab Take 1 Tablet by mouth every day. 90 Tablet 3    ARMOUR THYROID 90 MG Tab Take 90 mg by mouth every day. 90 MG BID      Misc Natural Products (PROSTATE HEALTH) Cap Take  by mouth in the morning, at noon, and at bedtime.      rivaroxaban (XARELTO) 20 MG Tab tablet Take 1 Tablet by mouth every day. 90 Tablet 7    ramipril (ALTACE) 10 MG capsule Take 1 Capsule by mouth 2 times a day. 180 Capsule 7    metoprolol tartrate (LOPRESSOR) 25 MG Tab Take 1 Tablet by mouth 2 times a day. 190 Tablet 7    amLODIPine (NORVASC) 5 MG Tab Take 1 Tablet by mouth 2 times a day. 180 Tablet 7    Coenzyme Q10 (COQ-10)  "30 MG Cap Take  by mouth. 30 Capsule     gabapentin (NEURONTIN) 100 MG Cap Take 2 Capsules by mouth every evening. 180 Capsule 1    ascorbic acid (VITAMIN C) 1000 MG tablet Take 1,000 mg by mouth every day.      melatonin 3 MG Tab Take 1 Tablet by mouth at bedtime. 30 Tablet 2    acetaminophen (TYLENOL) 325 MG Tab Take 500 mg by mouth every four hours as needed for Mild Pain. 30 tablet 0     No current facility-administered medications for this visit.         No Known Allergies      Physical Exam:  Ambulatory Vitals  /62 (BP Location: Left arm, Patient Position: Sitting, BP Cuff Size: Adult)   Pulse 65   Resp 16   Ht 1.803 m (5' 11\")   SpO2 95%    Oxygen Therapy:  Pulse Oximetry: 95 %  BP Readings from Last 4 Encounters:   08/11/22 106/62   08/10/22 128/78   06/17/22 (!) 129/95   05/12/22 (!) 148/90       Weight/BMI: Body mass index is 24.41 kg/m².  Wt Readings from Last 4 Encounters:   08/10/22 79.4 kg (175 lb)   03/16/22 81.6 kg (180 lb)   02/12/22 77.1 kg (170 lb)   07/29/21 74.8 kg (165 lb)         General: Well appearing and in no apparent distress  Eyes: nl conjunctiva, no icteric sclera  ENT: wearing a mask, normal external appearance of ears  Neck: no visible JVP,  no carotid bruits  Lungs: normal respiratory effort, CTAB  Heart: RRR, no murmurs, no rubs or gallops,  no edema bilateral lower extremities. No LV/RV heave on cardiac palpatation. + bilateral radial pulses.  + bilateral dp pulses.   Abdomen: soft, non tender, non distended, no masses, normal bowel sounds.  No HSM.  Extremities/MSK: no clubbing, no cyanosis  Neurological: No focal sensory deficits  Psychiatric: Appropriate affect, A/O x 3, intact judgement and insight  Skin: Warm extremities      Lab Data Review:  Lab Results   Component Value Date/Time    CHOLSTRLTOT 158 08/09/2022 08:19 AM    LDL 75 08/09/2022 08:19 AM    HDL 30 (A) 08/09/2022 08:19 AM    TRIGLYCERIDE 264 (H) 08/09/2022 08:19 AM       Lab Results   Component Value " Date/Time    SODIUM 144 04/29/2022 09:47 AM    POTASSIUM 4.1 04/29/2022 09:47 AM    CHLORIDE 106 04/29/2022 09:47 AM    CO2 25 04/29/2022 09:47 AM    GLUCOSE 79 04/29/2022 09:47 AM    BUN 18 04/29/2022 09:47 AM    CREATININE 1.10 04/29/2022 09:47 AM    CREATININE 1.3 04/04/2008 03:05 AM     Lab Results   Component Value Date/Time    ALKPHOSPHAT 71 04/29/2022 09:47 AM    ASTSGOT 23 04/29/2022 09:47 AM    ALTSGPT 43 04/29/2022 09:47 AM    TBILIRUBIN 0.5 04/29/2022 09:47 AM      Lab Results   Component Value Date/Time    WBC 4.8 04/29/2022 09:47 AM     Lab Results   Component Value Date/Time    HBA1C 5.9 (H) 04/01/2021 02:45 AM         Cardiac Imaging and Procedures Review:    EKG dated 2/12/2022: My personal interpretation is sinus rhythm, left axis deviation    Echo dated 4/1/2021:   CONCLUSIONS  Hyperdynamic left ventricular systolic function.  Left ventricular ejection fraction is visually estimated to be >75.  Negative bubble study including Valsalva.  No significant valve or Doppler abnormality.     No prior study is available for comparison.     Assessment & Plan     1. Paroxysmal atrial fibrillation (HCC)        2. Dyslipidemia  rosuvastatin (CRESTOR) 20 MG Tab    Lipid Profile      3. Essential hypertension        4. Coronary artery calcification  rosuvastatin (CRESTOR) 20 MG Tab    Lipid Profile      5. Cerebrovascular accident (CVA) due to embolism of precerebral artery (HCC)              Shared Medical Decision Making:    Paroxysmal A. Fib  CVA (MCA stroke 3/2021)  [id number] at least 3 (hypertension, CVA x2)  -Continue Xarelto 20 mg daily  -Continue metoprolol 25 mg twice daily    Dyslipidemia  Coronary artery calcification  -Increase rosuvastatin to 20 mg daily    Hypertension  BP well controlled this visit  -Continue metoprolol 25 mg twice daily, ramipril 10mg twice daily, amlodipine 5 mg twice daily, and spironolactone 25 mg daily    All of the patient's excellent questions were answered to the best  of my knowledge and to his satisfaction.  It was a pleasure seeing Mr. Thong Arzola in my clinic today. Return in about 6 months (around 2/11/2023). Patient is aware to call the cardiology clinic with any questions or concerns.      Lizeth Nieves MD  [facility] Heart and Vascular [facility], [address]  [address] [address]  Phone: [phone]  Fax: [phone]

## 2022-08-11 ENCOUNTER — OFFICE VISIT (OUTPATIENT)
Dept: CARDIOLOGY | Facility: MEDICAL CENTER | Age: 57
End: 2022-08-11
Payer: COMMERCIAL

## 2022-08-11 VITALS
RESPIRATION RATE: 16 BRPM | SYSTOLIC BLOOD PRESSURE: 106 MMHG | OXYGEN SATURATION: 95 % | BODY MASS INDEX: 24.41 KG/M2 | HEART RATE: 65 BPM | HEIGHT: 71 IN | DIASTOLIC BLOOD PRESSURE: 62 MMHG

## 2022-08-11 DIAGNOSIS — I10 ESSENTIAL HYPERTENSION: ICD-10-CM

## 2022-08-11 DIAGNOSIS — I63.10 CEREBROVASCULAR ACCIDENT (CVA) DUE TO EMBOLISM OF PRECEREBRAL ARTERY (HCC): ICD-10-CM

## 2022-08-11 DIAGNOSIS — I25.10 CORONARY ARTERY CALCIFICATION: ICD-10-CM

## 2022-08-11 DIAGNOSIS — I25.84 CORONARY ARTERY CALCIFICATION: ICD-10-CM

## 2022-08-11 DIAGNOSIS — I48.0 PAROXYSMAL ATRIAL FIBRILLATION (HCC): ICD-10-CM

## 2022-08-11 DIAGNOSIS — E78.5 DYSLIPIDEMIA: ICD-10-CM

## 2022-08-11 PROCEDURE — 99214 OFFICE O/P EST MOD 30 MIN: CPT | Performed by: STUDENT IN AN ORGANIZED HEALTH CARE EDUCATION/TRAINING PROGRAM

## 2022-08-11 RX ORDER — ROSUVASTATIN CALCIUM 20 MG/1
20 TABLET, COATED ORAL EVERY EVENING
Qty: 90 TABLET | Refills: 3 | Status: SHIPPED | OUTPATIENT
Start: 2022-08-11 | End: 2023-09-12 | Stop reason: SDUPTHER

## 2022-08-15 NOTE — PROGRESS NOTES
Chief Complaint   Patient presents with    Seizure       Problem List Items Addressed This Visit    None  Visit Diagnoses       Localization-related epilepsy (HCC)        History of stroke        Screening for depression        Spastic hemiplegia of left dominant side as late effect of cerebral infarction (HCC)                THIS CONSULTATION WAS PERFORMED VIA TELEMEDICINE, UTILIZING AN ENCRYPTED TRANSMISSION, TO PREVENT SPREAD OF COVID19. THE PATIENT CONSENTED TO THIS CONSULTATION.    Interim History:  Thong Arzola 57 y.o. male is f/u for seizure. Wife is joining us today.     Pt had a seizure on July 27th because he ran out of medication. They report that he is doing much better on the briviact as far as side effects. Mood is better. No SI or HI. He is still taking vit D. Wife has expressed concerns regarding running out of medications. No other issues.       Past medical history:   Past Medical History:   Diagnosis Date    Afib (HCC)     COVID-19     Disorder of thyroid     hypo    High cholesterol     Psychiatric problem     reactive depression    Stroke (HCC) 2021    right side MCA       Past surgical history:   Past Surgical History:   Procedure Laterality Date    CRANIOPLASTY Right 6/23/2021    Procedure: CRANIOPLASTY - FOR SKULL DEFECT;  Surgeon: Arthur Payton M.D.;  Location: SURGERY [facility];  Service: Neurosurgery    CRANIOTOMY Right 4/3/2021    Procedure: CRANIOTOMY;  Surgeon: Arthur Payton M.D.;  Location: SURGERY [facility];  Service: Neurosurgery    KNEE RECONSTRUCTION      left knee orthoscopic       Family history:   No family history on file.    Social history:   Social History     Socioeconomic History    Marital status:      Spouse name: Not on file    Number of children: Not on file    Years of education: Not on file    Highest education level: Not on file   Occupational History    Not on file   Tobacco Use    Smoking status: Never    Smokeless tobacco: Never   Vaping  Use    Vaping Use: Never used   Substance and Sexual Activity    Alcohol use: Yes     Comment: occ    Drug use: No    Sexual activity: Not on file   Other Topics Concern    Not on file   Social History Narrative    Retired     Social Determinants of Health     Financial Resource Strain: Not on file   Food Insecurity: Not on file   Transportation Needs: Not on file   Physical Activity: Not on file   Stress: Not on file   Social Connections: Not on file   Intimate Partner Violence: Not on file   Housing Stability: Not on file       Current medications:   Current Outpatient Medications   Medication    rosuvastatin (CRESTOR) 20 MG Tab    dantrolene (DANTRIUM) 25 MG Cap    celecoxib (CELEBREX) 200 MG Cap    pregabalin (LYRICA) 50 MG capsule    brivaracetam (BRIVIACT) 50 MG Tab tablet    mirtazapine (REMERON) 15 MG TABLET DISPERSIBLE    spironolactone (ALDACTONE) 25 MG Tab    ARMOUR THYROID 90 MG Tab    Misc Natural Products (PROSTATE HEALTH) Cap    rivaroxaban (XARELTO) 20 MG Tab tablet    ramipril (ALTACE) 10 MG capsule    metoprolol tartrate (LOPRESSOR) 25 MG Tab    amLODIPine (NORVASC) 5 MG Tab    Coenzyme Q10 (COQ-10) 30 MG Cap    gabapentin (NEURONTIN) 100 MG Cap    ascorbic acid (VITAMIN C) 1000 MG tablet    melatonin 3 MG Tab    acetaminophen (TYLENOL) 325 MG Tab     No current facility-administered medications for this visit.       Medication Allergy:  No Known Allergies      Review of systems:     General: Denies fevers or chills, or nightsweats, or generalized fatigue.    Head: Denies headaches or dizziness or lightheadedness  Musculoskeletal: Denies muscle pain or swelling, no atrophy, no neck and back pain or stiffness.   Neurologic: Denies facial droopiness, muscle weakness (focal or generalized), paresthesias, ataxia, change in speech or language, memory loss, abnormal movements.+ seizures, loss of consciousness  Psychiatric: Denies anxiety, depression, mood swings, suicidal or homicidal thoughts        Physical examination:   General: Patient in no acute distress, pleasant and cooperative.  HEENT: Normocephalic, no signs of acute trauma.   Neck: There is normal range of motion.   Skin: no signs of acute rashes or trauma in visible areas  Psychiatric: No hallucinatory behavior. No symptoms of depression or suicidal ideation. Mood and affect appear normal on exam.      NEUROLOGICAL EXAM:   Mental status, orientation: Awake, alert and fully oriented.   Speech and language: speech is clear and fluent. The patient is able to name, repeat and comprehend.   Memory: There is intact recollection of recent and remote events.       ANCILLARY DATA REVIEWED:       Lab Data Review:  Reviewed in chart.       Records reviewed:   Reviewed in chart.    Imaging:   MRI brain 2021  Very large acute right MCA territory infarct as detailed above with small amount of petechial hemorrhage in the right insular region and right temporal lobe.     Punctate right thalamic lacunar infarct.     Right ICA and M1 MCA occlusion.     EEG:  EEG 4/26/21  This is an abnormal video EEG recording in the awake, drowsy/sleep state(s). The diffuse background slowing is consistent with mild encephalopathy. The presence of asymmetric PDR and sleep spindle along with the loss of fast frequency over the right hemisphere are consistent with known focal structural lesion. No epileptiform discharges or seizures were seen. This does not preclude a diagnosis of epilepsy.          ASSESSMENT AND PLAN:    1. Localization-related epilepsy (HCC)  - brivaracetam (BRIVIACT) 50 MG Tab tablet; Take 1 Tablet by mouth 2 times a day for 180 days.  Dispense: 180 Tablet; Refill: 1    2. History of stroke    3. Screening for depression    4. Spastic hemiplegia of left dominant side as late effect of cerebral infarction (HCC)    5. Vitamin D deficiency  - VITAMIN D,25 HYDROXY; Future          CLINICAL DISCUSSION:  Structural epilepsy secondary to hx of R MCA stroke in 2021 s/p  craniotomy. Cardioembolic etiology given PAF. On xarelto 20mg daily. He has residual deficit of L spastic hemiplegia and L hemianopia. He had his first seizure on 2/12/22 affecting his L side. He had convulsion. Duration: 1-2min. There was post-ictal confusion. No tongue biting or incontinence. No auras or triggers. He was started on high dose keppra and had side effects of lethargy, feeling groggy and cloudy. We have agreed to switch the keppra to briviact and he is feeling better on the new ASM. He had a seizure on 7/27/22 as he ran out of med.      No alcohol,cigarettes or recreational drug use.      Mood is good. Has depression but is staying positive. No suicidal or homicidal thoughts.      Past ASM's: keppra     Current ASM's: briviact 50mg BID        Plan:  - continue ASM. Will provide sample as a just in case pharmacy is not able to give him his refills on time. Anel will  samples on Thurs, 8/25/22 330-4pm.      - Discussed avoidance of spell/sz triggers: alcohol, sleep deprivation, benadryl, tramadol and stress. Not on tramadol anymore     - Discussed Vit D supplementation. Recommended taking 2000-5000u daily.     - Discussed driving restrictions. Pt is not driving.        -Continue f/u with PCP for stroke risk factor management and monitoring.      -Continue f/u with Dr. Dumont, PT/ST/OT     -Continue f/u with neuroophthalmology for L hemianopsia     -Labs to be checked for next appointment: vit d- pending still    -Aware that I will no longer be with [facility] after Sept 2022        FOLLOW-UP:   Return in about 3 months (around 11/22/2022).      EDUCATION AND COUNSELING:  -Education was provided to the patient and/or family regarding diagnosis and prognosis. The chronic and unpredictable nature of the condition were discussed. There is increased risk for additional events, which may carry potential for significant injuries and death. Discussed frequent seizure triggers: sleep deprivation, medication  non-compliance, use of illegal drugs/alcohol, stress, and others.   -We reviewed in detail the current antiepileptic regimen. Potential side effects of antiepileptics were discussed at length, including but no limited to: hypersensitivity reactions (rash and others, some of which can be fatal), visual field changes (some of which may be irreversible), glaucoma, diplopia, kidney stones, osteopenia/osteoporosis/bone fractures, hyperthermia/anhydrosis, hyponatremia, tremors/abnormal movements, ataxia, dizziness, fatigue, increased risk for falls, risk for cardiac arrhythmias/syncope, gastrointestinal side effects(hepatitis, pancreatitis, gastritis, ulcers), gingival hypertrophy/bleeding, drowsiness, sedation, anxiety/nervousness, increased risk for suicide, increased risk for depression, and psychosis.   -We also reviewed drug-drug interactions and their potential effect on seizure control and medication side effects.    -Recommend chronic vitamin D supplementation and regular exercise (if not contraindicated).   -Patient/family educated on risk for SUDEP (Sudden Death in Epilepsy). Counseling was provided on the importance of strict medication and follow up compliance. The patient/family understand the risks associated with non-adherence with the medical plan as outlined, including but not limited to an increased risk for breakthrough seizures, which may contribute to injuries, disability, status epilepticus, and even death.   -Counseling was also provided on potential effects of alcohol and other drugs, which may lower seizure threshold and/or affect the metabolism of antiepileptic drugs. We recommend avoidance of alcohol and illegal drugs.  -Avoid sleep deprivation.   -We extensively discussed the aspects related to safety in drivers who suffer from epilepsy. The patient is encourage to report to the Division of Motor Vehicles of any condition and/or spells related to confusion, disorientation, and/or loss of  awareness and/or loss of consciousness; as these may pose a safety issue if they occur while operating a motor vehicle. The patient and/or family are ultimately responsible for exercising caution and abiding to regulations in place.   -Other seizure precautions were discussed at length, including no diving, no skydiving, no climbing or exposure to unprotected heights, no unsupervised swimming, no Jacuzzi or bathing in bathtubs or deep bodies of water. The patient/family have been advised about risks for operating any machinery while suffering from seizures / syncope / epilepsy and/or while taking antiepileptic drugs.   -The patient understands and agrees that due to the complexity of his/her diagnosis, results of any testing and further recommendations will typically be discussed/made during a face to face encounter in my office. The patient and/or family further understands it is their responsibility to keep proper follow up.     Patient/family agree with plan, as outlined.         Jazz Davidson, MSN, APRN, FNP-C  [facility]  Office: [phone]  Fax: [phone]    This encounter was conducted via Zoom. At their house  The patient was in a private location in the [facility].    Verbal consent was obtained. Patient's identity was verified.

## 2022-08-16 DIAGNOSIS — M79.2 NEUROPATHIC PAIN: ICD-10-CM

## 2022-08-16 RX ORDER — GABAPENTIN 100 MG/1
200 CAPSULE ORAL EVERY EVENING
Qty: 180 CAPSULE | Refills: 1 | Status: SHIPPED | OUTPATIENT
Start: 2022-08-16 | End: 2023-05-30

## 2022-08-22 ENCOUNTER — TELEMEDICINE (OUTPATIENT)
Dept: NEUROLOGY | Facility: MEDICAL CENTER | Age: 57
End: 2022-08-22
Attending: NURSE PRACTITIONER
Payer: COMMERCIAL

## 2022-08-22 VITALS — SYSTOLIC BLOOD PRESSURE: 106 MMHG | DIASTOLIC BLOOD PRESSURE: 69 MMHG | HEART RATE: 78 BPM

## 2022-08-22 DIAGNOSIS — Z86.73 HISTORY OF STROKE: ICD-10-CM

## 2022-08-22 DIAGNOSIS — I69.352 SPASTIC HEMIPLEGIA OF LEFT DOMINANT SIDE AS LATE EFFECT OF CEREBRAL INFARCTION (HCC): ICD-10-CM

## 2022-08-22 DIAGNOSIS — E55.9 VITAMIN D DEFICIENCY: ICD-10-CM

## 2022-08-22 DIAGNOSIS — Z13.31 SCREENING FOR DEPRESSION: ICD-10-CM

## 2022-08-22 DIAGNOSIS — G40.109 LOCALIZATION-RELATED EPILEPSY (HCC): ICD-10-CM

## 2022-08-22 PROCEDURE — 99214 OFFICE O/P EST MOD 30 MIN: CPT | Mod: 95 | Performed by: NURSE PRACTITIONER

## 2022-08-23 ENCOUNTER — PATIENT MESSAGE (OUTPATIENT)
Dept: NEUROLOGY | Facility: MEDICAL CENTER | Age: 57
End: 2022-08-23
Payer: COMMERCIAL

## 2022-08-23 DIAGNOSIS — G40.109 LOCALIZATION-RELATED EPILEPSY (HCC): ICD-10-CM

## 2022-09-20 DIAGNOSIS — G40.109 LOCALIZATION-RELATED EPILEPSY (HCC): ICD-10-CM

## 2022-09-21 ENCOUNTER — TELEPHONE (OUTPATIENT)
Dept: NEUROLOGY | Facility: MEDICAL CENTER | Age: 57
End: 2022-09-21
Payer: COMMERCIAL

## 2022-12-27 ENCOUNTER — OFFICE VISIT (OUTPATIENT)
Dept: PHYSICAL MEDICINE AND REHAB | Facility: MEDICAL CENTER | Age: 57
End: 2022-12-27

## 2022-12-27 DIAGNOSIS — G81.94 LEFT HEMIPARESIS (HCC): ICD-10-CM

## 2022-12-27 DIAGNOSIS — I63.511 ACUTE RIGHT MCA STROKE (HCC): Primary | ICD-10-CM

## 2022-12-27 DIAGNOSIS — M62.838 MUSCLE SPASTICITY: ICD-10-CM

## 2022-12-27 PROCEDURE — 99214 OFFICE O/P EST MOD 30 MIN: CPT | Performed by: PHYSICAL MEDICINE & REHABILITATION

## 2022-12-27 NOTE — PROGRESS NOTES
[facility]  [facility]  [address] [place] [address]  Ph: [phone]    FOLLOW UP PATIENT EVALUATION    Patient Name: Thong Arzola   Patient : 1965  Patient Age: 57 y.o.     Examining Physician: Dr. Zulema Dumont DO    SUBJECTIVE:   Patient Identification: Thong Arzola is a 57 y.o. male with PMH significant for COVID-19 3/18/2021, paroxysmal atrial fibrillation not on anti-coagulation, hypothyroidism, QTc prolongation and rehabilitation history significant for large right ICA/MCA ischemic stroke 3/31/2021 in setting of paroxysmal off of anticoagulation s/p craniectomy 4/3/2021 by Dr. Payton s/p cranioplasty 21 and is presenting to PM&R clinic for a FOLLOW UP OUTPATIENT evaluation with the following chief complaint/s:    Chief Complaint: Stroke follow-up    Date of Last Clinic Visit: 8/10/22  Accompanied by Today: Wife, Anel  Interval History:    - Pain has been controlled for the most part. It is just with tylenol, 2 tabs at night.   - The Gabapentin helps for his nerve pain. He takes this at night.   - Takes Dantrolene 25 QID.   - Insurance has changed, has caused some problems with medications.  -Overall things are stable.  He continues to have pain but it is relatively controlled on Tylenol, Celebrex, gabapentin at night  -They do think that the dantrolene is helping with some of the spasticity as well as long as he stays on the lower dose.  -Has cardiology follow-up and neurology follow-up  -Was able to travel for the holidays and it went well.    Review of Systems:  All other pertinent positive review of systems are noted above in HPI.     Past Medical History:  Past Medical History:   Diagnosis Date    Stroke (HCC)     right side MCA    Afib (HCC)     COVID-19     Disorder of thyroid     hypo    High cholesterol     Psychiatric problem     reactive depression      Past Surgical History:   Procedure Laterality Date    CRANIOPLASTY Right 2021     Procedure: CRANIOPLASTY - FOR SKULL DEFECT;  Surgeon: Arthur Payton M.D.;  Location: SURGERY [facility];  Service: Neurosurgery    CRANIOTOMY Right 4/3/2021    Procedure: CRANIOTOMY;  Surgeon: Arthur Payton M.D.;  Location: SURGERY [facility];  Service: Neurosurgery    KNEE RECONSTRUCTION      left knee orthoscopic        Current Outpatient Medications:     brivaracetam (BRIVIACT) 50 MG Tab tablet, Take 1 Tablet by mouth 2 times a day for 180 days., Disp: 180 Tablet, Rfl: 1    gabapentin (NEURONTIN) 100 MG Cap, Take 2 Capsules by mouth every evening., Disp: 180 Capsule, Rfl: 1    rosuvastatin (CRESTOR) 20 MG Tab, Take 1 Tablet by mouth every evening., Disp: 90 Tablet, Rfl: 3    dantrolene (DANTRIUM) 25 MG Cap, Take 2 Capsules by mouth 3 times a day., Disp: 540 Capsule, Rfl: 1    celecoxib (CELEBREX) 200 MG Cap, Take 1 Capsule by mouth 2 times a day., Disp: 180 Capsule, Rfl: 1    mirtazapine (REMERON) 15 MG TABLET DISPERSIBLE, DISSOLVE 1 TABLET BY MOUTH EVERYDAY AT BEDTIME, Disp: 90 Tablet, Rfl: 1    spironolactone (ALDACTONE) 25 MG Tab, Take 1 Tablet by mouth every day., Disp: 90 Tablet, Rfl: 3    ARMOUR THYROID 90 MG Tab, Take 90 mg by mouth 2 times a day. 90 MG BID, Disp: , Rfl:     Misc Natural Products (PROSTATE HEALTH) Cap, Take  by mouth in the morning, at noon, and at bedtime., Disp: , Rfl:     rivaroxaban (XARELTO) 20 MG Tab tablet, Take 1 Tablet by mouth every day., Disp: 90 Tablet, Rfl: 7    ramipril (ALTACE) 10 MG capsule, Take 1 Capsule by mouth 2 times a day., Disp: 180 Capsule, Rfl: 7    metoprolol tartrate (LOPRESSOR) 25 MG Tab, Take 1 Tablet by mouth 2 times a day., Disp: 190 Tablet, Rfl: 7    amLODIPine (NORVASC) 5 MG Tab, Take 1 Tablet by mouth 2 times a day., Disp: 180 Tablet, Rfl: 7    Coenzyme Q10 (COQ-10) 30 MG Cap, Take  by mouth., Disp: 30 Capsule, Rfl:     ascorbic acid (VITAMIN C) 1000 MG tablet, Take 1,000 mg by mouth every day., Disp: , Rfl:     melatonin 3 MG Tab, Take 1  Tablet by mouth at bedtime., Disp: 30 Tablet, Rfl: 2    acetaminophen (TYLENOL) 325 MG Tab, Take 500 mg by mouth every four hours as needed for Mild Pain., Disp: 30 tablet, Rfl: 0  No Known Allergies     Past Social History:  Social History     Socioeconomic History    Marital status:      Spouse name: Not on file    Number of children: Not on file    Years of education: Not on file    Highest education level: Not on file   Occupational History    Not on file   Tobacco Use    Smoking status: Never    Smokeless tobacco: Never   Vaping Use    Vaping Use: Never used   Substance and Sexual Activity    Alcohol use: Yes     Comment: occ    Drug use: No    Sexual activity: Not on file   Other Topics Concern    Not on file   Social History Narrative    Retired     Social Determinants of Health     Financial Resource Strain: Not on file   Food Insecurity: Not on file   Transportation Needs: Not on file   Physical Activity: Not on file   Stress: Not on file   Social Connections: Not on file   Intimate Partner Violence: Not on file   Housing Stability: Not on file        Family History:  History reviewed. No pertinent family history.    Depression and Opioid Screening  PHQ-9:      5/15/2021 5/16/2021 2/17/2022   Depression Screen (PHQ-2/PHQ-9)   PHQ-2 Total Score 1 1    PHQ-2 Total Score   0   PHQ-9 Total Score 5 5        Multiple values from one day are sorted in reverse-chronological order     Interpretation of PHQ-9 Total Score   Score Severity   1-4 No Depression   5-9 Mild Depression   10-14 Moderate Depression   15-19 Moderately Severe Depression   20-27 Severe Depression     Opioid Risk Score: 0  Interpretation of Opioid Risk Score   Score 0-3 = Low risk of abuse. Do UDS at least once per year.  Score 4-7 = Moderate risk of abuse. Do UDS 1-4 times per year.  Score 8+ = High risk of abuse. Refer to specialist.      OBJECTIVE:   Vital Signs:  There were no vitals filed for this visit.     Pertinent Labs:  Lab  Results   Component Value Date/Time    SODIUM 144 04/29/2022 09:47 AM    POTASSIUM 4.1 04/29/2022 09:47 AM    CHLORIDE 106 04/29/2022 09:47 AM    CO2 25 04/29/2022 09:47 AM    GLUCOSE 79 04/29/2022 09:47 AM    BUN 18 04/29/2022 09:47 AM    CREATININE 1.10 04/29/2022 09:47 AM    CREATININE 1.3 04/04/2008 03:05 AM       Lab Results   Component Value Date/Time    HBA1C 5.9 (H) 04/01/2021 02:45 AM       Lab Results   Component Value Date/Time    WBC 4.8 04/29/2022 09:47 AM    RBC 5.42 04/29/2022 09:47 AM    HEMOGLOBIN 15.7 04/29/2022 09:47 AM    HEMATOCRIT 46.6 04/29/2022 09:47 AM    MCV 86.0 04/29/2022 09:47 AM    MCH 29.0 04/29/2022 09:47 AM    MCHC 33.7 04/29/2022 09:47 AM    MPV 9.5 04/29/2022 09:47 AM    NEUTSPOLYS 58.80 04/29/2022 09:47 AM    LYMPHOCYTES 31.80 04/29/2022 09:47 AM    MONOCYTES 7.10 04/29/2022 09:47 AM    EOSINOPHILS 1.70 04/29/2022 09:47 AM    BASOPHILS 0.40 04/29/2022 09:47 AM       Lab Results   Component Value Date/Time    ASTSGOT 23 04/29/2022 09:47 AM    ALTSGPT 43 04/29/2022 09:47 AM        Physical Exam:   GEN: No apparent distress  HEENT: Head normocephalic, atraumatic.  Sclera nonicteric bilaterally, no ocular discharge appreciated bilaterally.  CV: Extremities warm and well-perfused, no peripheral edema appreciated bilaterally.  PULMONARY: Breathing nonlabored on room air, no respiratory accessory muscle use.  Not requiring supplemental oxygen.  SKIN: No appreciable skin breakdown on exposed areas of skin.  PSYCH: Mood and affect within normal limits.  NEURO: Awake alert.  Conversational.  Logical thought content.  Left neglect.  Left hemiparesis.  Ambulatory with left AFO and quad cane.    ASSESSMENT/PLAN: Thong Arzola  is a 57 y.o. male with rehabilitation history significant for large right ICA/MCA ischemic stroke 3/31/2021 in setting of paroxysmal off of anticoagulation s/p craniectomy 4/3/2021 by Dr. Payton s/p cranioplasty 6/23/21, here for evaluation. The following plan was  discussed with the patient who is in agreement.     Visit Diagnoses     ICD-10-CM   1. Acute right MCA stroke ([facility])  I63.511   2. Muscle spasticity  M62.838   3. Left hemiparesis ([facility])  G81.94      Wife assists with history.    Rehab/Neuro:   1. Large right ICA/MCA ischemic stroke 3/31/2021 in setting of paroxysmal atrial fibrillation off of anticoagulation s/p craniectomy 4/3/2021 by Dr. Payton s/p cranioplasty 6/23/21  2. Left hemiplegia  3.  New onset seizures 2/2022.  -Neuro status: Stable.  -Driving status: Currently not driving.  -Follows with neurology and cardiology     Spasticity:   - Med management: Continue dantrolene 25 mg 4 times daily.     Neuropathic pain: Tried Lyrica, they did not notice a difference.  -Med management: Continue gabapentin 200 mg nightly     Nociceptive pain/MSK/Ortho: 12/15/2021 secondary joint pain due to ambulating more.  Only affects left side at hip, knee, ankle. 3/15/2022 pain continues despite multiple conservative measures as well as Celebrex, Tylenol. 4/5/2022  Has been taking 12.5 mg tramadol as pharmacy gave him 25 mg instead of 50 mg.  Will increase to 25 and see if it is efficacious. 8/10/2022 self discontinued tramadol as he was having cognitive side effects.  - Med management: Continue Celebrex 200 mg twice daily  -Continue OTC Tylenol nightly       Follow up: 1 year annual evaluation      Please note that this dictation was created using voice recognition software. I have made every reasonable attempt to correct obvious errors but there may be errors of grammar and content that I may have overlooked prior to finalization of this note.    Dr. Zulema Dumont DO, MS  Department of Physical Medicine & Rehabilitation  Neuro Rehabilitation Clinic  [facility]

## 2023-02-05 DIAGNOSIS — G89.29 CHRONIC NOCICEPTIVE PAIN: ICD-10-CM

## 2023-02-08 RX ORDER — CELECOXIB 200 MG/1
CAPSULE ORAL
Qty: 180 CAPSULE | Refills: 1 | Status: SHIPPED | OUTPATIENT
Start: 2023-02-08 | End: 2023-09-12

## 2023-03-10 DIAGNOSIS — I10 ESSENTIAL (PRIMARY) HYPERTENSION: ICD-10-CM

## 2023-03-13 RX ORDER — SPIRONOLACTONE 25 MG/1
25 TABLET ORAL DAILY
Qty: 90 TABLET | Refills: 1 | Status: SHIPPED | OUTPATIENT
Start: 2023-03-13 | End: 2023-09-12 | Stop reason: SDUPTHER

## 2023-03-14 ENCOUNTER — TELEPHONE (OUTPATIENT)
Dept: NEUROLOGY | Facility: MEDICAL CENTER | Age: 58
End: 2023-03-14

## 2023-03-14 NOTE — TELEPHONE ENCOUNTER
NEUROLOGY PATIENT PRE-VISIT PLANNING     Patient was NOT contacted to complete PVP.    Patient Appointment is scheduled as: New Patient     Is visit type and length scheduled correctly? Yes    [employer] Patient is checked in Patient Demographics? Yes    3.   Is referral attached to visit? Yes    4. Were records received from referring provider? Yes, patient has been seen in our office before so records are in Epic.     4. Patient was NOT contacted to have someone accompany them to visit.     5. Is this appointment scheduled as a Hospital Follow-Up?  No    6. Does the patient require any pre procedure or post procedure follow up? No    7. If any orders were placed at last visit or intended to be done for this visit do we have Results/Consult Notes? Yes  Labs - Labs were not ordered at last office visit.  Imaging - Imaging was not ordered at last office visit.  Referrals - No referrals were ordered at last office visit.    8. If patient appointment is for Botox - is order pended for provider? N/A

## 2023-03-21 ENCOUNTER — TELEPHONE (OUTPATIENT)
Dept: NEUROLOGY | Facility: MEDICAL CENTER | Age: 58
End: 2023-03-21

## 2023-03-21 ENCOUNTER — OFFICE VISIT (OUTPATIENT)
Dept: NEUROLOGY | Facility: MEDICAL CENTER | Age: 58
End: 2023-03-21
Attending: PSYCHIATRY & NEUROLOGY
Payer: COMMERCIAL

## 2023-03-21 VITALS
SYSTOLIC BLOOD PRESSURE: 124 MMHG | WEIGHT: 198.41 LBS | HEART RATE: 67 BPM | TEMPERATURE: 97.7 F | BODY MASS INDEX: 27.67 KG/M2 | DIASTOLIC BLOOD PRESSURE: 82 MMHG | OXYGEN SATURATION: 95 %

## 2023-03-21 DIAGNOSIS — R53.1 LEFT-SIDED WEAKNESS: ICD-10-CM

## 2023-03-21 DIAGNOSIS — I69.30 LATE EFFECT OF STROKE: ICD-10-CM

## 2023-03-21 DIAGNOSIS — F39 MOOD DISORDER (HCC): ICD-10-CM

## 2023-03-21 DIAGNOSIS — G40.109 LOCALIZATION-RELATED EPILEPSY (HCC): ICD-10-CM

## 2023-03-21 PROCEDURE — 99215 OFFICE O/P EST HI 40 MIN: CPT | Performed by: PSYCHIATRY & NEUROLOGY

## 2023-03-21 PROCEDURE — 99212 OFFICE O/P EST SF 10 MIN: CPT | Performed by: PSYCHIATRY & NEUROLOGY

## 2023-03-21 RX ORDER — PREGABALIN 50 MG/1
50 CAPSULE ORAL 3 TIMES DAILY
COMMUNITY
End: 2023-03-21

## 2023-03-21 ASSESSMENT — FIBROSIS 4 INDEX: FIB4 SCORE: 0.69

## 2023-03-21 NOTE — PROGRESS NOTES
NEUROLOGY NEW PATIENT ENCOUNTER - 03/21/2023       Chief Complaint: History of Right MCA stroke in 2021, and seizures    HISTORY OF PRESENTING COMPLAINT:   is a 57 year old right handed gentleman, with history of right MCA stroke in 2021 with residual left sided deficits, and history of seizures. He was previously being followed by BRITTANY Davidson and last seen in 08/2022 for a telemedicine visit. This was their first visit with me. He was accompanied by his wife for today's clinic visit.    In summary, he had a right MCA stroke in 2021, s/p craniotomy and subsequent cranioplasty. He has residual left sided weakness and visual field deficits on left. He was diagnosed with A. Fib and started on Xarelto.  He is able to ambulate with the use of a cane and has residual left-sided hemiparesis with minimal spasticity.  He uses a left AFO brace.  Patient had his first seizure in February 2022 affecting his left side proceeding to whole body convulsion lasting 1 to 2 minutes there was no tongue biting or incontinence or any triggers.  Following this he was started on levetiracetam higher dose which contributed to feeling groggy and cloudy.  After his visit with [facility] neurology he was switched to Briviact 50 mg twice daily which she has tolerated well.  His last breakthrough episode was in July 2022 after he ran out of Briviact.  Since that time he has been maintained on 50 mg twice daily tolerating this well without additional episodes of concern.  Patient and the wife denied any episodes of changes in awareness or loss of consciousness or involuntary left-sided movements.  He can have occasional left-sided muscle twitching but not progressing to uncontrolled movements.     He continues to follow-up closely with his primary care physician and with his cardiologist.  He has been doing some home exercises for his left-sided spasticity.  He is also on dantrolene 50 mg 3 times a day and on gabapentin 200 mg in the  evening for his left-sided pain and spasticity issues.     Past AED's: Levetiracetam     Current AED's: Briviact 50mg BID    Previous Investigations:      CURRENT MEDICATIONS AT THE TIME OF THIS ENCOUNTER:    Current Outpatient Medications:     brivaracetam, 50 mg, Oral, BID    spironolactone, 25 mg, Oral, DAILY, Taking    celecoxib, TAKE 1 CAPSULE BY MOUTH TWICE A DAY, Taking    gabapentin, 200 mg, Oral, Q EVENING, Taking    rosuvastatin, 20 mg, Oral, Q EVENING, Taking    dantrolene, 50 mg, Oral, TID, Taking    mirtazapine, DISSOLVE 1 TABLET BY MOUTH EVERYDAY AT BEDTIME, Taking    [place] Thyroid, 90 mg, Oral, BID, Taking    Prostate Health, Take  by mouth in the morning, at noon, and at bedtime., Taking    rivaroxaban, 20 mg, Oral, DAILY, Taking    ramipril, 10 mg, Oral, BID, Taking    metoprolol tartrate, 25 mg, Oral, BID, Taking    amLODIPine, 5 mg, Oral, BID, Taking    CoQ-10, Take  by mouth., Taking    ascorbic acid, 1,000 mg, Oral, DAILY, Taking    melatonin, 3 mg, Oral, QHS, Taking    acetaminophen, 500 mg, Oral, Q4HRS PRN, PRN     PAST MEDICAL HISTORY:  Past Medical History:   Diagnosis    History of A. Fib    H/o Right MCA stroke 2021 with residual left sided weakness    Hypothyroidism     Hyperlipidemia    Mood disorder    History of seizures from 02/2022        PAST SURGICAL HISTORY:  Past Surgical History:   Procedure Laterality Date    CRANIOPLASTY Right 6/23/2021    Procedure: CRANIOPLASTY - FOR SKULL DEFECT;  Surgeon: Arthur Payton M.D.;  Location: SURGERY [facility];  Service: Neurosurgery    CRANIOTOMY Right 4/3/2021    Procedure: CRANIOTOMY;  Surgeon: Arthur Payton M.D.;  Location: SURGERY [facility];  Service: Neurosurgery    KNEE RECONSTRUCTION      left knee orthoscopic        FAMILY HISTORY:  No family history on file.     SOCIAL HISTORY:  Social History     Tobacco Use    Smoking status: Never    Smokeless tobacco: Never   Vaping Use    Vaping Use: Never used   Substance Use Topics     Alcohol use: Yes     Comment: occ    Drug use: No          Review of systems:      All other systems were reviewed and negative other than the ones mentioned in HPI.    EXAM:   Ambulatory Vitals:  /82 (BP Location: Left arm, Patient Position: Sitting, BP Cuff Size: Adult)   Pulse 67   Temp 36.5 °C (97.7 °F) (Temporal)   Wt 90 kg (198 lb 6.6 oz)   SpO2 95%        Physical Exam:   Neurological Exam:  Higher Mental Function:   Awake, alert and oriented to place, person and time  Able to answer questions appropriately and follow commands well  Memory intact to immediate recall and remote events  Speech is clear and language fluent   Mood: affect appears normal    Cranial Nerves:  CN II: Pupils equal and reactive, no visual field deficits on confrontation  CN III,IV, VI : EOM intact with no nystagmus  CN V: Facial sensation intact  CN VII: Left sided facial asymmetry  CN VIII: Intact hearing to conversation  CN IX, X: palate elevates symmetrically   CN XI: Symmetric shoulder shrug  CN XII: tongue midline. No signs of tongue biting or fasciculations    Motor: 5/5  strength in right upper and lower extremity. Left hand intrinsics and wrist 0/5, elbow 1/5, shoulder 0/5, Left hip flexion 1/5, knee 2/5, plantar flexion 2/5, dorsiflexion 0/5, Increased tone in left upper and lower extremities.   Sensory: Intact to light touch, temperature, and vibration in all 4 extremities  Reflexes: 1+ in right upper and lower extremities, 3+ on left   Coordination: Intact to finger to nose on right, unable to test on left  Gait: Not tested, he came in a wheelchair for today's visit. At home he can use a quad cane and left AFO to ambulate.      General:   Patient in no acute distress, pleasant and cooperative.  HEENT: Normocephalic, no signs of acute trauma.   Neck: Supple. There is normal range of motion.   Psychiatric: No hallucinatory behavior. No symptoms of depression or suicidal ideation. Mood and affect appear normal on  exam.       ASSESSMENT AND PLAN:  History of seizure   is a 57-year-old right-handed gentleman with history of right MCA stroke and residual left-sided hemiparesis with seizure onset since February 2022.  History is most suggestive of a posttraumatic epilepsy. He was started on levetiracetam at high doses after his firsts seizure, which he could not tolerate and since then switched to Briviact at low doses of 50 mg twice daily.  He had a breakthrough episode in July 2022 in the setting of medication noncompliance.  Other than that he has not had any additional episodes of concern.  They are tolerating the Briviact 50 mg twice daily.  I sent a 90-day supply with 1 refill to his pharmacy.  If he has any additional episodes concerning for seizures his dose may need to be increased.  About safety precautions for people with history of seizures and factors lowering seizure threshold.  Patient does not drive.    2.History of stroke  Right MCA stroke from 2021 with residual left-sided hemiparesis.  He was detected to be in atrial fibrillation and since that time maintained on anticoagulation.  Close follow-up with his cardiologist and with his primary care physician for optimal control of his other small vessel stroke risk factors    3. Left sided weakness  Contractures and recommended passive exercises.  He is also on muscle relaxants and gabapentin for his left-sided pain issues    4. Mood disorder  There is previous history of depression and fluctuating mood.  He denied any active mood issues      They will follow up with me in 12 months.  I strongly encouraged them to continue to follow-up closely with their primary care physician for other medical co morbidities.  If there are any breakthrough episodes of concerns of side effects or new symptoms, they will reach out to our clinic or seek immediate medical attention for any urgent matters.     Total time of the visit was 45 minutes including time spent in  precharting, review of the previous history and test results, documentation, discussing medication safety, side effects, sending medication script, plan of care and answering patient's questions .      Jani Johnson MD, MHS  Department of Neurology at [facility]

## 2023-03-21 NOTE — TELEPHONE ENCOUNTER
Briviact 50 MG Tablet    No PA req'd however the rep Maurilio stated they would be responsible for the full cost of this medication under this plan (we do NOT accept this insurance), Patient also lives in [place] will have liaison release to hi local pharmacy. - 03/21/2023 3:47pm

## 2023-05-10 NOTE — TELEPHONE ENCOUNTER
Is the patient due for a refill? Yes    Was the patient seen the past year? Yes    Date of last office visit: 8/11/2022    Does the patient have an upcoming appointment?  No   If yes, When?     Provider to refill:HK    Does the patients insurance require a 100 day supply?  No

## 2023-05-26 DIAGNOSIS — M79.2 NEUROPATHIC PAIN: ICD-10-CM

## 2023-05-30 RX ORDER — GABAPENTIN 100 MG/1
200 CAPSULE ORAL EVERY EVENING
Qty: 60 CAPSULE | Refills: 5 | Status: SHIPPED | OUTPATIENT
Start: 2023-05-30 | End: 2023-12-12 | Stop reason: HOSPADM

## 2023-06-05 DIAGNOSIS — I25.84 CORONARY ARTERY CALCIFICATION: ICD-10-CM

## 2023-06-05 DIAGNOSIS — I25.10 CORONARY ARTERY CALCIFICATION: ICD-10-CM

## 2023-06-05 DIAGNOSIS — E78.5 DYSLIPIDEMIA: ICD-10-CM

## 2023-06-11 DIAGNOSIS — I10 ESSENTIAL HYPERTENSION: ICD-10-CM

## 2023-06-12 RX ORDER — RAMIPRIL 10 MG/1
10 CAPSULE ORAL 2 TIMES DAILY
Qty: 180 CAPSULE | Refills: 0 | Status: SHIPPED | OUTPATIENT
Start: 2023-06-12 | End: 2023-09-11

## 2023-06-13 NOTE — TELEPHONE ENCOUNTER
Is the patient due for a refill? Yes    Was the patient seen the past year? Yes    Date of last office visit: 8/11/2022    Does the patient have an upcoming appointment?  Yes   If yes, When? 7/31/2023    Provider to refill:HK    Does the patients insurance require a 100 day supply?  No

## 2023-07-17 RX ORDER — MIRTAZAPINE 15 MG/1
TABLET, ORALLY DISINTEGRATING ORAL
Qty: 90 TABLET | Refills: 1 | Status: SHIPPED | OUTPATIENT
Start: 2023-07-17 | End: 2024-01-12

## 2023-07-17 NOTE — TELEPHONE ENCOUNTER
MIRTAZAPINE 15 MG ODT    Received request via: Pharmacy    Was the patient seen in the last year in this department? Yes    Does the patient have an active prescription (recently filled or refills available) for medication(s) requested?  Yes    Does the patient have [facility] Plus and need 100 day supply (blood pressure, diabetes and cholesterol meds only)? Patient does not have SCP

## 2023-08-01 DIAGNOSIS — I69.854 SPASTIC HEMIPLEGIA OF LEFT NONDOMINANT SIDE AS LATE EFFECT OF OTHER CEREBROVASCULAR DISEASE (HCC): ICD-10-CM

## 2023-08-01 DIAGNOSIS — M62.838 OTHER MUSCLE SPASM: ICD-10-CM

## 2023-08-01 DIAGNOSIS — I69.954 HEMIPLEGIA AND HEMIPARESIS FOLLOWING UNSPECIFIED CEREBROVASCULAR DISEASE AFFECTING LEFT NON-DOMINANT SIDE (HCC): ICD-10-CM

## 2023-08-04 NOTE — TELEPHONE ENCOUNTER
DANTROLENE SODIUM 25 MG CAP    Received request via: Pharmacy    Was the patient seen in the last year in this department? Yes    Does the patient have an active prescription (recently filled or refills available) for medication(s) requested?  Yes    Does the patient have care home Plus and need 100 day supply (blood pressure, diabetes and cholesterol meds only)? Patient does not have SCP

## 2023-08-07 RX ORDER — DANTROLENE SODIUM 25 MG/1
50 CAPSULE ORAL 3 TIMES DAILY
Qty: 540 CAPSULE | Refills: 1 | Status: SHIPPED | OUTPATIENT
Start: 2023-08-07 | End: 2023-11-09

## 2023-08-07 NOTE — DISCHARGE PLANNING
Refill request for patients OCPs  Last fill 4/10/23    LOV 4/17/23  ASSESSMENT  > Essentially normal well woman exam.     PLAN  > schedule mammograms  > Reinforced Self-Breast Exam.  > Will return to clinic in 1 year and will call with any questions/concerns prior to that time.  > call is needs refill of OCPs after 3 months if  is not cleared yet    Patient phoned to confirm needs refill, VM box full  Message sent via [employer] to confirm    Encounter open        Case Management Discharge Instructions        Discharge Location: Home with therapies.     Agency Name / Address / Phone: [facility]: [phone].     Home Health: Home Health Aide, Occupational Therapist, Physical Therapist, Speech Therapist.     Follow-up Information:     Arthur Rebolledo-Primary Care  [address] [place] [address]  [phone]  Monday 6/7/2021 appointment at 4:30pm.     Zulema Dumont D.O.-Physical Medicine and Rehab  [address]  [place] [address]  [phone]  Tuesday 6/8/2021 appointment at 2:15pm.  Check-in at 1:45pm.-See attached map     Arthur Payton M.D.-Neurosurgery  [address] [address]  [phone]  Wednesday 6/9/2021 appointment at 10:30am.  You will need a CT scan prior to this appointment.      Familia Copeland, PAC-Urology  [address]  [place] [address]  [phone]  Wednesday 6/16/2021 appointment at 9:30am.  Check-in at 9:15am.        SHERRIE Cruz-Neurology  [address]  [place] [address]  [phone]  Monday 6/28/2021 appointment at 11:00am. Check-in at 10:45am-[facility]

## 2023-08-10 DIAGNOSIS — I10 ESSENTIAL HYPERTENSION: ICD-10-CM

## 2023-08-10 NOTE — TELEPHONE ENCOUNTER
Is the patient due for a refill? Yes    Was the patient seen the past year? Yes    Date of last office visit: 8/11/2022    Does the patient have an upcoming appointment?  Yes   If yes, When? 9/11/2023    Provider to refill:HK    Does the patients insurance require a 100 day supply?  No

## 2023-09-06 ENCOUNTER — TELEPHONE (OUTPATIENT)
Dept: CARDIOLOGY | Facility: MEDICAL CENTER | Age: 58
End: 2023-09-06
Payer: MEDICARE

## 2023-09-07 DIAGNOSIS — G40.109 LOCALIZATION-RELATED EPILEPSY (HCC): ICD-10-CM

## 2023-09-08 ENCOUNTER — TELEPHONE (OUTPATIENT)
Dept: NEUROLOGY | Facility: MEDICAL CENTER | Age: 58
End: 2023-09-08
Payer: MEDICARE

## 2023-09-08 ENCOUNTER — APPOINTMENT (OUTPATIENT)
Dept: CARDIOLOGY | Facility: MEDICAL CENTER | Age: 58
End: 2023-09-08
Attending: INTERNAL MEDICINE
Payer: MEDICARE

## 2023-09-08 NOTE — TELEPHONE ENCOUNTER
Briviact 50 MG Tabs    No RADHA boyer, previously rep Maurilio stated they would be responsible for the full cost of this medication under this plan (we do NOT accept this insurance), Patient also lives in [place] will have liaison release to his local pharmacy - will have liaison Latanya [employer] release. - 09/08/2023 10:52am

## 2023-09-08 NOTE — TELEPHONE ENCOUNTER
Received request via: Pharmacy    Was the patient seen in the last year in this department? Yes    Does the patient have an active prescription (recently filled or refills available) for medication(s) requested? Yes.    Does the patient have [facility] Plus and need 100 day supply (blood pressure, diabetes and cholesterol meds only)? Patient does not have SCP

## 2023-09-10 DIAGNOSIS — I10 ESSENTIAL HYPERTENSION: ICD-10-CM

## 2023-09-11 RX ORDER — RAMIPRIL 10 MG/1
10 CAPSULE ORAL 2 TIMES DAILY
Qty: 180 CAPSULE | Refills: 0 | Status: SHIPPED | OUTPATIENT
Start: 2023-09-11 | End: 2023-09-12 | Stop reason: SDUPTHER

## 2023-09-12 ENCOUNTER — OFFICE VISIT (OUTPATIENT)
Dept: CARDIOLOGY | Facility: MEDICAL CENTER | Age: 58
End: 2023-09-12
Attending: NURSE PRACTITIONER
Payer: MEDICARE

## 2023-09-12 ENCOUNTER — TELEPHONE (OUTPATIENT)
Dept: PHARMACY | Facility: MEDICAL CENTER | Age: 58
End: 2023-09-12
Payer: MEDICARE

## 2023-09-12 ENCOUNTER — TELEPHONE (OUTPATIENT)
Dept: NEUROLOGY | Facility: MEDICAL CENTER | Age: 58
End: 2023-09-12

## 2023-09-12 VITALS
OXYGEN SATURATION: 97 % | SYSTOLIC BLOOD PRESSURE: 94 MMHG | RESPIRATION RATE: 16 BRPM | WEIGHT: 190 LBS | DIASTOLIC BLOOD PRESSURE: 60 MMHG | BODY MASS INDEX: 26.6 KG/M2 | HEIGHT: 71 IN | HEART RATE: 68 BPM

## 2023-09-12 DIAGNOSIS — I48.0 PAROXYSMAL ATRIAL FIBRILLATION (HCC): ICD-10-CM

## 2023-09-12 DIAGNOSIS — I25.10 CORONARY ARTERY CALCIFICATION: ICD-10-CM

## 2023-09-12 DIAGNOSIS — E78.5 DYSLIPIDEMIA: ICD-10-CM

## 2023-09-12 DIAGNOSIS — D68.69 SECONDARY HYPERCOAGULABLE STATE (HCC): ICD-10-CM

## 2023-09-12 DIAGNOSIS — I25.84 CORONARY ARTERY CALCIFICATION: ICD-10-CM

## 2023-09-12 DIAGNOSIS — I10 ESSENTIAL HYPERTENSION: ICD-10-CM

## 2023-09-12 PROCEDURE — 3078F DIAST BP <80 MM HG: CPT | Performed by: NURSE PRACTITIONER

## 2023-09-12 PROCEDURE — 99214 OFFICE O/P EST MOD 30 MIN: CPT | Performed by: NURSE PRACTITIONER

## 2023-09-12 PROCEDURE — 2023F DILAT RTA XM W/O RTNOPTHY: CPT | Performed by: NURSE PRACTITIONER

## 2023-09-12 PROCEDURE — 99213 OFFICE O/P EST LOW 20 MIN: CPT | Performed by: NURSE PRACTITIONER

## 2023-09-12 PROCEDURE — 3074F SYST BP LT 130 MM HG: CPT | Performed by: NURSE PRACTITIONER

## 2023-09-12 RX ORDER — RAMIPRIL 10 MG/1
10 CAPSULE ORAL 2 TIMES DAILY
Qty: 180 CAPSULE | Refills: 3 | Status: SHIPPED | OUTPATIENT
Start: 2023-09-12 | End: 2023-12-11 | Stop reason: SDUPTHER

## 2023-09-12 RX ORDER — SPIRONOLACTONE 25 MG/1
25 TABLET ORAL DAILY
Qty: 90 TABLET | Refills: 3 | Status: SHIPPED | OUTPATIENT
Start: 2023-09-12

## 2023-09-12 RX ORDER — ROSUVASTATIN CALCIUM 20 MG/1
20 TABLET, COATED ORAL EVERY EVENING
Qty: 90 TABLET | Refills: 3 | Status: SHIPPED | OUTPATIENT
Start: 2023-09-12

## 2023-09-12 ASSESSMENT — ENCOUNTER SYMPTOMS
COUGH: 1
PALPITATIONS: 0
SHORTNESS OF BREATH: 0
DIZZINESS: 0
PND: 0
CLAUDICATION: 0
FEVER: 0
ORTHOPNEA: 0
MYALGIAS: 0
ABDOMINAL PAIN: 0

## 2023-09-12 ASSESSMENT — FIBROSIS 4 INDEX: FIB4 SCORE: 0.7

## 2023-09-12 NOTE — TELEPHONE ENCOUNTER
Spoke with Thong and his wife Anel in his appointment today, 09/12/23, regarding possible financial assistance with his Xarhianna. Thong and Emelina Ernst signed the application in person and I advised the Thong and Anel that I would need their 1040 forms for income verification to the . I advised that I would email her at [email or web address] to request for the income documents and when I receive them, I will send with the application to [facility] for determination.     Thong and Anel also expressed hardship with Briviact. I advised that Lucina in Neurology would be able to look into possible assistance for that medication since we do not prescribe it from our office. They were going to head up to Neurology to request assistance due to an extreme cost. I sent a message to Lucina as well to fill her in and see if she was available to meet with them, in which was acknowledged.     Will follow up when income documents are received from patient.

## 2023-09-12 NOTE — TELEPHONE ENCOUNTER
pt was getting meds from [facility] (Briviact 15 mg) and can't get it there anymore because it's illegal.  Running out of it.  Wife came in today, Anel Arzola & wants to know if she can have the RX from our office.   Pls call [phone]. Thx

## 2023-09-12 NOTE — PROGRESS NOTES
Chief Complaint   Patient presents with    Atrial Fibrillation     F/V DX: Paroxysmal atrial fibrillation (HCC)       Subjective     Thong Arzola is a 58 y.o. male who presents today for follow-up on his A-fib, dyslipidemia, hypertension, coronary artery calcification with his wife, Anel.    Patient of Dr. Nieves. He was last seen on 8/11/2022.  During that visit, patient was recommended to increase his rosuvastatin to 20 mg daily.  He was recommended to follow-up in 6 months.    Patient comes in the clinic today reporting he has been doing okay since his last visit, he mentions he is sick with a virus today.  He mentions having some abdominal discomfort with coughing and sneezing.  He mentions dislocation at the top of his abdomen right below his lungs near his diaphragm.  He denies any palpable tenderness.    Patient and his wife today are concerned about his blood pressure and the amount of blood pressure medications he is taking.  Patient does have a few blood pressures on his blood pressure log with him today and mentions his blood pressures primarily around 125, systolic.  Patient's blood pressure today in office is low and recently was around 109, systolic.    Patient mentions having some left lower extremity swelling when he exerts.  He does mention having fatigue.  He denies chest pain, palpitations, orthopnea, PND, shortness of breath or dizziness/lightheadedness.    He mentions her primary care took him off of his statin shortly after his lab testing last year.  He mentions his primary care does not like the side effects of the statins.  Patient mentions he is not aware of having any side effects while taking statin.    Patient mentions his Xarelto is costly and he is not able to get the medication from [facility] anymore.    Patient is seeing a physiatrist.    Additionally, patient has the following medical problems:     -MCA stroke 3/2021, with hemiplegia of the left side, s/p craniectomy on 4/3/2021,  s/p cranioplasty on 6/23/2021    -Seizures, on Briviact    Past Medical History:   Diagnosis Date    Afib (HCC)     COVID-19     Disorder of thyroid     hypo    High cholesterol     Psychiatric problem     reactive depression    Stroke (HCC) 2021    right side MCA     Past Surgical History:   Procedure Laterality Date    CRANIOPLASTY Right 6/23/2021    Procedure: CRANIOPLASTY - FOR SKULL DEFECT;  Surgeon: Arthur Payton M.D.;  Location: SURGERY [facility];  Service: Neurosurgery    CRANIOTOMY Right 4/3/2021    Procedure: CRANIOTOMY;  Surgeon: Arthur Payton M.D.;  Location: SURGERY [facility];  Service: Neurosurgery    KNEE RECONSTRUCTION      left knee orthoscopic     History reviewed. No pertinent family history.  Social History     Socioeconomic History    Marital status:      Spouse name: Not on file    Number of children: Not on file    Years of education: Not on file    Highest education level: Not on file   Occupational History    Not on file   Tobacco Use    Smoking status: Never    Smokeless tobacco: Never   Vaping Use    Vaping Use: Never used   Substance and Sexual Activity    Alcohol use: Yes     Alcohol/week: 4.2 oz     Types: 7 Shots of liquor per week     Comment: 1 shot before bedtime    Drug use: No    Sexual activity: Not on file   Other Topics Concern    Not on file   Social History Narrative    Retired     Social Determinants of Health     Financial Resource Strain: Not on file   Food Insecurity: Not on file   Transportation Needs: Not on file   Physical Activity: Not on file   Stress: Not on file   Social Connections: Not on file   Intimate Partner Violence: Not on file   Housing Stability: Not on file     No Known Allergies  Outpatient Encounter Medications as of 9/12/2023   Medication Sig Dispense Refill    rivaroxaban (XARELTO) 20 MG Tab tablet Take 20 mg by mouth with dinner.      rosuvastatin (CRESTOR) 20 MG Tab Take 1 Tablet by mouth every evening. 90 Tablet 3    metoprolol  tartrate (LOPRESSOR) 25 MG Tab Take 1 Tablet by mouth 2 times a day. 180 Tablet 3    spironolactone (ALDACTONE) 25 MG Tab Take 1 Tablet by mouth every day. 90 Tablet 3    ramipril (ALTACE) 10 MG capsule Take 1 Capsule by mouth 2 times a day. Please complete labs for further refills! 180 Capsule 3    brivaracetam (BRIVIACT) 50 MG Tab tablet Take 1 Tablet by mouth 2 times a day for 180 days. 180 Tablet 1    dantrolene (DANTRIUM) 25 MG Cap TAKE 2 CAPSULES BY MOUTH 3 TIMES A  Capsule 1    mirtazapine (REMERON) 15 MG TABLET DISPERSIBLE DISSOLVE 1 TABLET BY MOUTH EVERYDAY AT BEDTIME 90 Tablet 1    gabapentin (NEURONTIN) 100 MG Cap TAKE 2 CAPSULES BY MOUTH EVERY EVENING 60 Capsule 5    ARMOUR THYROID 90 MG Tab Take 90 mg by mouth 2 times a day. 90 MG BID      Misc Natural Products (PROSTATE HEALTH) Cap Take  by mouth in the morning, at noon, and at bedtime.      Coenzyme Q10 (COQ-10) 30 MG Cap Take  by mouth. 30 Capsule     ascorbic acid (VITAMIN C) 1000 MG tablet Take 1,000 mg by mouth every day.      acetaminophen (TYLENOL) 325 MG Tab Take 500 mg by mouth every four hours as needed for Mild Pain. 30 tablet 0    [DISCONTINUED] ramipril (ALTACE) 10 MG capsule TAKE 1 CAPSULE BY MOUTH 2 TIMES A DAY. PLEASE COMPLETE LABS FOR FURTHER REFILLS! 180 Capsule 0    [DISCONTINUED] metoprolol tartrate (LOPRESSOR) 25 MG Tab TAKE 1 TABLET BY MOUTH TWICE A DAY 60 Tablet 0    [DISCONTINUED] spironolactone (ALDACTONE) 25 MG Tab TAKE 1 TABLET BY MOUTH EVERY DAY 90 Tablet 1    [DISCONTINUED] celecoxib (CELEBREX) 200 MG Cap TAKE 1 CAPSULE BY MOUTH TWICE A  Capsule 1    [DISCONTINUED] rosuvastatin (CRESTOR) 20 MG Tab Take 1 Tablet by mouth every evening. 90 Tablet 3    [DISCONTINUED] melatonin 3 MG Tab Take 1 Tablet by mouth at bedtime. (Patient not taking: Reported on 9/12/2023) 30 Tablet 2     No facility-administered encounter medications on file as of 9/12/2023.     Review of Systems   Constitutional:  Positive for  "malaise/fatigue. Negative for fever.   Respiratory:  Positive for cough. Negative for shortness of breath.    Cardiovascular:  Positive for leg swelling (Left with more activity). Negative for chest pain, palpitations, orthopnea, claudication and PND.   Gastrointestinal:  Negative for abdominal pain.   Musculoskeletal:  Negative for myalgias.   Neurological:  Negative for dizziness.   All other systems reviewed and are negative.             Objective     BP 94/60 (BP Location: Right arm, Patient Position: Sitting, BP Cuff Size: Adult)   Pulse 68   Resp 16   Ht 1.803 m (5' 11\")   Wt 86.2 kg (190 lb)   SpO2 97%   BMI 26.50 kg/m²     Physical Exam  Vitals reviewed.   Constitutional:       Appearance: He is well-developed.   HENT:      Head: Normocephalic and atraumatic.   Eyes:      Pupils: Pupils are equal, round, and reactive to light.   Neck:      Vascular: No JVD.   Cardiovascular:      Rate and Rhythm: Normal rate and regular rhythm.      Heart sounds: Normal heart sounds.   Pulmonary:      Effort: Pulmonary effort is normal. No respiratory distress.      Breath sounds: Normal breath sounds. No wheezing or rales.   Abdominal:      General: Bowel sounds are normal.      Palpations: Abdomen is soft.      Tenderness: There is abdominal tenderness (at mid epigastric area).   Musculoskeletal:         General: Normal range of motion.      Cervical back: Normal range of motion and neck supple.      Right lower leg: No edema.      Left lower leg: No edema.   Skin:     General: Skin is warm and dry.   Neurological:      Mental Status: He is alert and oriented to person, place, and time.      Comments: Left Hemiparesis    Psychiatric:         Behavior: Behavior normal.       Lab Results   Component Value Date/Time    CHOLSTRLTOT 158 08/09/2022 08:19 AM    LDL 75 08/09/2022 08:19 AM    HDL 30 (A) 08/09/2022 08:19 AM    TRIGLYCERIDE 264 (H) 08/09/2022 08:19 AM       Lab Results   Component Value Date/Time    SODIUM 144 " 04/29/2022 09:47 AM    POTASSIUM 4.1 04/29/2022 09:47 AM    CHLORIDE 106 04/29/2022 09:47 AM    CO2 25 04/29/2022 09:47 AM    GLUCOSE 79 04/29/2022 09:47 AM    BUN 18 04/29/2022 09:47 AM    CREATININE 1.10 04/29/2022 09:47 AM    CREATININE 1.3 04/04/2008 03:05 AM     Lab Results   Component Value Date/Time    ALKPHOSPHAT 71 04/29/2022 09:47 AM    ASTSGOT 23 04/29/2022 09:47 AM    ALTSGPT 43 04/29/2022 09:47 AM    TBILIRUBIN 0.5 04/29/2022 09:47 AM       Stress test 4/4/2008  IMPRESSION:     1. EJECTION FRACTION 60%.       2. NO REVERSIBLE ISCHEMIA IDENTIFIED.      Transthoracic Echo Report 4/1/2021   Hyperdynamic left ventricular systolic function.  Left ventricular ejection fraction is visually estimated to be >75.  Negative bubble study including Valsalva.  No significant valve or Doppler abnormality.     No prior study is available for comparison.          Assessment & Plan     1. Dyslipidemia  rosuvastatin (CRESTOR) 20 MG Tab    Comp Metabolic Panel    Lipid Profile    CBC WITH DIFFERENTIAL      2. Coronary artery calcification  rosuvastatin (CRESTOR) 20 MG Tab    Comp Metabolic Panel    Lipid Profile    CBC WITH DIFFERENTIAL      3. Essential hypertension  metoprolol tartrate (LOPRESSOR) 25 MG Tab    spironolactone (ALDACTONE) 25 MG Tab    ramipril (ALTACE) 10 MG capsule    Comp Metabolic Panel    Lipid Profile    CBC WITH DIFFERENTIAL      4. Paroxysmal atrial fibrillation (HCC)  Comp Metabolic Panel    Lipid Profile    CBC WITH DIFFERENTIAL      5. Secondary hypercoagulable state (HCC)  Comp Metabolic Panel    Lipid Profile    CBC WITH DIFFERENTIAL          Medical Decision Making: Today's Assessment/Status/Plan:        Hypertension:  -Today patient blood pressure is low, but patient does not have worsening symptoms  -Recommend he can hold ramipril for a few days, likely his lower blood pressures due to his viral infection  -Encourage hydration  -Recommend patient to monitor BP at home and if BP continuing to  be low or patient develops symptoms, then we can discuss modifying medications for his hypertension  -At this time, he will continue metoprolol 25 mg twice a day, ramipril 10 mg twice a day, spironolactone 25 mg daily    Atrial fibrillation:  -Continue Xarelto 20 mg daily, will have patient apply for patient assistance for this medication  -Continue metoprolol 25 mg twice a day  -[id number] score is 3 (HTN, CVA x2)    Dyslipidemia and atherosclerotic intracranial calcification seen on CT of his head:  -Discussed with patient his most recent cholesterol results are abnormal  -He was taken off of his statin by his PCP last year  -Discussed with patient and wife the importance of taking his statin related to his history of stroke, abnormal lab tests.  Patient agreeable to restart rosuvastatin 20 mg daily  -Patient mentions no known side effects    CVA:  -thought to be from Afib, he was off of OAC at time of stroke. Notes mention cardioembolic  -Followed by neurology, next follow-up in December  -Patient to start rosuvastatin per above  -Currently BP at goal, <120/80    FU in clinic in 3 months with labs. Sooner if needed.    Patient verbalizes understanding and agrees with the plan of care.     PLEASE NOTE: This Note was created using voice recognition Software. I have made every reasonable attempt to correct obvious errors, but I expect that there are errors of grammar and possibly content that I did not discover before finalizing the note

## 2023-09-13 ENCOUNTER — TELEPHONE (OUTPATIENT)
Dept: NEUROLOGY | Facility: MEDICAL CENTER | Age: 58
End: 2023-09-13
Payer: MEDICARE

## 2023-09-13 DIAGNOSIS — G40.109 LOCALIZATION-RELATED EPILEPSY (HCC): ICD-10-CM

## 2023-09-13 NOTE — PROGRESS NOTES
At the request of patient, Briviact 90-day supply with 1 refill sent to pharmacy.  [facility] Yuriy Pena  He was previously getting medications from [place]

## 2023-09-13 NOTE — TELEPHONE ENCOUNTER
At the request of patient, Briviact 90-day supply with 1 refill sent to pharmacy.  [facility] Yuriy Zendejas

## 2023-09-13 NOTE — TELEPHONE ENCOUNTER
Kristine Johnson. Patient wife called requesting for Briviact samples due to medication is pretty pricey. Requesting for samples to get through. Please advise. Thank you. VIOLETTE Mcgregor.

## 2023-09-13 NOTE — TELEPHONE ENCOUNTER
Briviact 50 MG Tabs    No PA rejuventino'd however per previous call rep Maurilio stated they would be responsible for the full cost of this medication under this plan (we do NOT accept this insurance), Patient also lives in [place] will have liaison release to his local pharmacy. - 09/13/2023 11:51am

## 2023-10-03 NOTE — TELEPHONE ENCOUNTER
Medication: Xarelto 20g tabs  Type of Insurance: Government funded (Medicare/Medicare Advantage)  Type of Financial assistance requested MAP/Free Drug  Source: Rad  Source Phone #: [phone]  Outcome: Denied due to over income    Spoke with patient and message sent to MD to advise of denial and to see how they would like to proceed due to unaffordable co-pay. RN sent message to patient to inquire if alternative is requested.

## 2023-10-06 ENCOUNTER — TELEPHONE (OUTPATIENT)
Dept: NEUROLOGY | Facility: MEDICAL CENTER | Age: 58
End: 2023-10-06
Payer: MEDICARE

## 2023-10-06 NOTE — TELEPHONE ENCOUNTER
Patient will be having medicare D but not until November 1. She is asking for samples of the Briviact 50 mg bid. To last 1 month if possible.     Please advise    Thank you

## 2023-10-11 ENCOUNTER — TELEPHONE (OUTPATIENT)
Dept: NEUROLOGY | Facility: MEDICAL CENTER | Age: 58
End: 2023-10-11
Payer: MEDICARE

## 2023-10-26 ENCOUNTER — TELEPHONE (OUTPATIENT)
Dept: SCHEDULING | Facility: IMAGING CENTER | Age: 58
End: 2023-10-26
Payer: MEDICARE

## 2023-10-26 NOTE — TELEPHONE ENCOUNTER
MP    Caller: Anel wife    Medication Name and Dosage:  brivaracetam (BRIVIACT) 50 MG Tab tablet [[id number]    Medication amount left: 0    Preferred Pharmacy: [facility] on [address] (Requesting new Pharmacy change )    Other questions (Topic): N/A    Callback Number (Will only call for issues): [phone] (home) [phone] (work)     Thank You  Beilnda HUTSON

## 2023-10-30 NOTE — TELEPHONE ENCOUNTER
Spoke to Anel keller has refills at the [facility] on [address]. She should give them a call and request refill. If scripts need to go to another [facility] pharmacy ask them to transfer to the new [facility] location.

## 2023-11-09 ENCOUNTER — OFFICE VISIT (OUTPATIENT)
Dept: NEUROLOGY | Facility: MEDICAL CENTER | Age: 58
End: 2023-11-09
Attending: STUDENT IN AN ORGANIZED HEALTH CARE EDUCATION/TRAINING PROGRAM
Payer: MEDICARE

## 2023-11-09 VITALS
HEART RATE: 78 BPM | HEIGHT: 70 IN | SYSTOLIC BLOOD PRESSURE: 118 MMHG | DIASTOLIC BLOOD PRESSURE: 60 MMHG | BODY MASS INDEX: 30.14 KG/M2 | TEMPERATURE: 97.9 F | WEIGHT: 210.54 LBS | OXYGEN SATURATION: 93 %

## 2023-11-09 DIAGNOSIS — G40.109 LOCALIZATION-RELATED EPILEPSY (HCC): ICD-10-CM

## 2023-11-09 DIAGNOSIS — I69.854 SPASTIC HEMIPLEGIA OF LEFT NONDOMINANT SIDE AS LATE EFFECT OF OTHER CEREBROVASCULAR DISEASE (HCC): ICD-10-CM

## 2023-11-09 DIAGNOSIS — M62.838 OTHER MUSCLE SPASM: ICD-10-CM

## 2023-11-09 DIAGNOSIS — I69.954 HEMIPLEGIA AND HEMIPARESIS FOLLOWING UNSPECIFIED CEREBROVASCULAR DISEASE AFFECTING LEFT NON-DOMINANT SIDE (HCC): ICD-10-CM

## 2023-11-09 PROCEDURE — 99212 OFFICE O/P EST SF 10 MIN: CPT | Performed by: STUDENT IN AN ORGANIZED HEALTH CARE EDUCATION/TRAINING PROGRAM

## 2023-11-09 PROCEDURE — 3078F DIAST BP <80 MM HG: CPT | Performed by: STUDENT IN AN ORGANIZED HEALTH CARE EDUCATION/TRAINING PROGRAM

## 2023-11-09 PROCEDURE — 99215 OFFICE O/P EST HI 40 MIN: CPT | Performed by: STUDENT IN AN ORGANIZED HEALTH CARE EDUCATION/TRAINING PROGRAM

## 2023-11-09 PROCEDURE — 3074F SYST BP LT 130 MM HG: CPT | Performed by: STUDENT IN AN ORGANIZED HEALTH CARE EDUCATION/TRAINING PROGRAM

## 2023-11-09 RX ORDER — HYDROCORTISONE 10 MG/1
10 TABLET ORAL 2 TIMES DAILY
COMMUNITY
Start: 2023-10-26

## 2023-11-09 RX ORDER — LEVETIRACETAM 500 MG/1
500 TABLET ORAL 2 TIMES DAILY
Qty: 60 TABLET | Refills: 3 | Status: SHIPPED | OUTPATIENT
Start: 2023-11-09 | End: 2023-12-20 | Stop reason: SDUPTHER

## 2023-11-09 RX ORDER — DANTROLENE SODIUM 25 MG/1
25 CAPSULE ORAL 2 TIMES DAILY
Qty: 1 CAPSULE | Refills: 0
Start: 2023-11-09 | End: 2024-01-12

## 2023-11-09 ASSESSMENT — FIBROSIS 4 INDEX: FIB4 SCORE: 0.7

## 2023-11-09 NOTE — PROGRESS NOTES
[facility]  Transfer of Care visit    Patient name: Thong Arzola  YOB: 1965  MRN: [id number]  Date of visit: 11/9/2023     CC: follow-up for poststroke epilepsy      HPI:    Thong Arzola is a 58 y.o. right-handed man with a history of a R MCA stroke in 2021, seizures being seen in follow up.     Details of history from Dr. Johnson's last visit 3/21/23, confirmed with patient:  He had a right MCA stroke in 2021, s/p craniotomy and subsequent cranioplasty. He has residual left sided weakness and visual field deficits on left. He was diagnosed with A. Fib and started on Xarelto.  He is able to ambulate with the use of a cane and has residual left-sided hemiparesis with minimal spasticity.  He uses a left AFO brace.  Patient had his first seizure in February 2022 affecting his left side proceeding to whole body convulsion lasting 1 to 2 minutes there was no tongue biting or incontinence or any triggers.  Following this he was started on levetiracetam higher dose which contributed to feeling groggy and cloudy.  After his visit with [facility] neurology he was switched to Briviact 50 mg twice daily which she has tolerated well.  His last breakthrough episode was in July 2022 after he ran out of Briviact.  Since that time he has been maintained on 50 mg twice daily tolerating this well without additional episodes of concern.  Patient and the wife denied any episodes of changes in awareness or loss of consciousness or involuntary left-sided movements.  He can have occasional left-sided muscle twitching but not progressing to uncontrolled movements.      He continues to follow-up closely with his primary care physician and with his cardiologist.  He has been doing some home exercises for his left-sided spasticity.  He is also on dantrolene 50 mg 3 times a day and on gabapentin 200 mg in the evening for his left-sided pain and spasticity issues.      Past AED's: Levetiracetam      Current AED's: Briviact 50mg BID    Onset: February 2022  Semiology: L side of body stiffens (arm and leg) --> GTC with whole body convulsion   Frequency: 3 total  Duration of spells: < 1 minute  Triggers: missing medications, no others    I reviewed the history and previous documentation and discussion with the patient.  Keppra was not stopped due to side effects. It was switched to Briviact after he seemed more cloudy after his stroke, but his wife in retrospect does not think this was related to the Keppra because it did not change when he switched to Briviact.  Currently, the Briviact has become cost prohibitive and he will not be able to continue this medication for this reason.    Last seizure: July 2022 after running out of Briviact.     Mood: he has been taking Remeron since the stroke. He had tried to stop to try to minimize polypharmacy    Side effects: mental fogginess    Barriers to taking medication appropriately: None    Driving: no      Vitamin D: taking        Past Medical History:   Past Medical History:   Diagnosis Date    Stroke ([facility]) 2021    right side MCA    Afib (HCC)     COVID-19     Disorder of thyroid     hypo    High cholesterol     Psychiatric problem     reactive depression       Past Surgical History:   Past Surgical History:   Procedure Laterality Date    CRANIOPLASTY Right 6/23/2021    Procedure: CRANIOPLASTY - FOR SKULL DEFECT;  Surgeon: Arthur Payton M.D.;  Location: SURGERY [facility];  Service: Neurosurgery    CRANIOTOMY Right 4/3/2021    Procedure: CRANIOTOMY;  Surgeon: Arthur Payton M.D.;  Location: SURGERY [facility];  Service: Neurosurgery    KNEE RECONSTRUCTION      left knee orthoscopic       Social History:   Social History     Socioeconomic History    Marital status:      Spouse name: Not on file    Number of children: Not on file    Years of education: Not on file    Highest education level: Not on file   Occupational History    Not on file   Tobacco Use     Smoking status: Never    Smokeless tobacco: Never   Vaping Use    Vaping Use: Never used   Substance and Sexual Activity    Alcohol use: Yes     Alcohol/week: 4.2 oz     Types: 7 Shots of liquor per week     Comment: 1 shot before bedtime    Drug use: No    Sexual activity: Not on file   Other Topics Concern    Not on file   Social History Narrative    Retired     Social Determinants of Health     Financial Resource Strain: Not on file   Food Insecurity: Not on file   Transportation Needs: Not on file   Physical Activity: Not on file   Stress: Not on file   Social Connections: Not on file   Intimate Partner Violence: Not on file   Housing Stability: Not on file       Family Hx: History reviewed. No pertinent family history.    Current Medications:   Current Outpatient Medications:     hydrocortisone (CORTEF) 10 MG Tab, Take 10 mg by mouth 2 times a day., Disp: , Rfl:     dantrolene (DANTRIUM) 25 MG Cap, Take 1 Capsule by mouth 2 times a day., Disp: 1 Capsule, Rfl: 0    levETIRAcetam (KEPPRA) 500 MG Tab, Take 1 Tablet by mouth 2 times a day., Disp: 60 Tablet, Rfl: 3    rivaroxaban (XARELTO) 20 MG Tab tablet, Take 20 mg by mouth with dinner., Disp: , Rfl:     rosuvastatin (CRESTOR) 20 MG Tab, Take 1 Tablet by mouth every evening., Disp: 90 Tablet, Rfl: 3    metoprolol tartrate (LOPRESSOR) 25 MG Tab, Take 1 Tablet by mouth 2 times a day., Disp: 180 Tablet, Rfl: 3    spironolactone (ALDACTONE) 25 MG Tab, Take 1 Tablet by mouth every day., Disp: 90 Tablet, Rfl: 3    ramipril (ALTACE) 10 MG capsule, Take 1 Capsule by mouth 2 times a day. Please complete labs for further refills!, Disp: 180 Capsule, Rfl: 3    mirtazapine (REMERON) 15 MG TABLET DISPERSIBLE, DISSOLVE 1 TABLET BY MOUTH EVERYDAY AT BEDTIME, Disp: 90 Tablet, Rfl: 1    gabapentin (NEURONTIN) 100 MG Cap, TAKE 2 CAPSULES BY MOUTH EVERY EVENING, Disp: 60 Capsule, Rfl: 5    ARMOUR THYROID 90 MG Tab, Take 90 mg by mouth 2 times a day. 90 MG BID, Disp: , Rfl:      Misc Natural Products (PROSTATE HEALTH) Cap, Take  by mouth in the morning, at noon, and at bedtime., Disp: , Rfl:     Coenzyme Q10 (COQ-10) 30 MG Cap, Take  by mouth., Disp: 30 Capsule, Rfl:     ascorbic acid (VITAMIN C) 1000 MG tablet, Take 1,000 mg by mouth every day., Disp: , Rfl:     acetaminophen (TYLENOL) 325 MG Tab, Take 500 mg by mouth every four hours as needed for Mild Pain., Disp: 30 tablet, Rfl: 0    Allergies: No Known Allergies      Physical Exam:   Ambulatory Vitals  Vitals:    11/09/23 1611   BP: 118/60   Pulse: 78   Temp: 36.6 °C (97.9 °F)   SpO2: 93%       Constitutional: Well-developed, well-nourished, good hygiene. Appears stated age.  Respiratory: Normal respiratory effort  Skin: Warm, dry, intact. No rashes observed.  Neurologic:   Mental Status: Awake, alert, oriented x 3.   Speech: Fluent with normal prosody.   Memory: Able to recall recent and remote events accurately.    Concentration: Attentive. Able to focus on history and follow multi-step commands.   Fund of Knowledge: Appropriate.   Cranial Nerves:    CN II: PERRL     CN III, IV, VI: EOMI without nystagmus    CN V: Facial sensation intact in all 3 trigeminal distributions, decreased on left    CN VII: Left facial weakness    CN VIII: Hearing intact to voice    CN IX and X: Palate elevates symmetrically, gag reflex not tested    CN XI: Symmetric shoulder shrug     CN XII: Tongue midline   Motor: 5-out of 5 in right hemibody, left hemibody plegia   Sensory: Decreased sensation to light touch in left hemibody on face, arm, leg.   Coordination: No evidence of past-pointing on finger to nose testing on right. Unable to perform heel-to-shin on left.   DTR's: 2+ throughout without clonus.    Babinski: Toes downgoing bilaterally.   Gait: ambulates steadily without assistive device. Romberg negative. Able to perform tandem gait.    Movements: No resting tremors or abnormal movements observed.   Musculoskeletal:    Strength: as above   Tone:  Normal bulk and tone   Joints: No swelling    Studies:      Labs reviewed      Imaging: None new      Assessment/Plan:   Thong Arzola is a 58 y.o. man with a history of poststroke epilepsy who is being seen in follow-up/transfer of care.  The patient was last seen Dr. Johnson.  His seizure semiology is left hemibody stiffening followed by generalized tonic-clonic activity.  His last seizure was in July 2022 when he ran out of Briviact.  If he takes his medication, he has no issues or breakthrough seizures.    The patient and his wife report that the Briviact has become cost prohibitive.  They were previously receiving it from [facility], however they will no longer be able to obtain a supply.  Discussed multiple options, including trial of the new medication.  In discussion with the patient and his wife, they do not believe that switching the medication to Briviact from Keppra made a significant difference in the symptoms that prompted the switch in the first place.  They are interested in trying Keppra 500 mg twice daily again.  I advised the patient to send me a message if he develops intolerable side effects, at which point we will switch medications.  I provided him information about Vimpat which would be a good alternative.    He reports left hemibody pain and was previously following with Dr. Dumont in PM&R.  I placed a new referral for medication management and consideration of Botox for spasticity if indicated.     Patient instructions:  -Start Keppra 500 mg twice daily  -If this doesn't work for you, we can try a different medication called Vimpat       Please let our office know if you have any changes in your seizure frequency and/characteristics. Otherwise, please keep the diary of your events and bring it with you at the time of your next follow up visit with our office.     Please take vitamin D3 [id number] internation units daily.     Please note that the following might precipitate seizures: missed  doses of antiseizure medications, being sick with fever, stress, fatigue, sleep deprivation, not eating regularly, not drinking enough water, drinking too much alcohol, stopping alcohol suddenly if you are currently using it on a regular/daily basis, and/or using recreational drugs, among others.    Please note that the following might lead to an injury, potentially a life-threatening injury, in case you have a seizure and/or lose awareness while:   - being in a large body of water by yourself, such as bath, pool, lake, ocean, among others (risk of drowning)   - being on unprotected heights (risk of fall)   - being around and/or operating heavy machinery (risk of injury)   - being around open fire/hot surfaces (risk of burns)   - any other activities/circumstances, in which if you lose awareness, you might injure yourself and/or others.    Please call for help (crisis line and/or 911) in case you have thoughts of harming yourself and/or others.    Please abstain from driving until further notice.    ------------------------------------------------------------------------------------------  Instructions for your family/caregivers:  Please call 911 if the patient has a seizure longer than 2-3 minutes, if seizures are back to back without him recovering to his baseline, or he does not start recovering within 5-10 minutes after the seizure stops. During the seizure - please turn him on his side, please make sure his head is protected (for example, you should put a pillow under his head, if one is available), and please do not put anything in his mouth.   -------------------------------------------------------------------------------------------    It is important that your seizures are well controlled and you have none or have them rarely. In addition to avoiding injury related to breakthrough seizures, frequent seizures increase risk of SUDEP (sudden unexpected death in epilepsy), where a person goes into a seizure and  then never wakes up - this is a rare complication of seizure disorder; one of the best available ways to prevent it is to control your seizures well.     Due to the high volume of patients we are trying to help, your physician will not be able to respond by phone or in MyChart to your routine concerns between appointments.  This does not reflect a lack of interest or concern for you or your diagnosis.  Please bring these questions and concerns to your appointment where your physician can answer.  Please relay more pressing concerns to our office, either via MyChart, or by phone; if not able to reach us please visit nearby Urgent Care Center or Emergency Department.  If any emergent medical needs, please seek emergent medical help and/or call 911.    Please note that we are not able to fill out paperwork that might be related to your work, utility company, disability, and/or driving, among others, in between the visits.  Please schedule a dedicated appointment to address your paperwork, so we can do that in a timely manner.  This is not due to lack of concern or interest for your disease-related work/administrative problems, but to make sure that we provide the best possible care and to fill out your paperwork in a correct and timely manner.    Thank you for entrusting your neurological care to [facility] and we look forward to continuing to serve you.       Follow-up in approximately 2 months, telephone visit.    Damaris Cagle M.D.   Diplomate, Neurology with Special Qualification in Epilepsy, American Board of Psychiatry and Neurology   of Clinical Neurology, [facility]  Level III Epilepsy Center, Department of Neurology at [facility]   11/9/2023      During today's encounter we discussed available treatment options and their individual side effect profiles. Total encounter time caring for patient today 45 minutes.

## 2023-11-10 ENCOUNTER — TELEPHONE (OUTPATIENT)
Dept: NEUROLOGY | Facility: MEDICAL CENTER | Age: 58
End: 2023-11-10
Payer: MEDICARE

## 2023-11-10 NOTE — TELEPHONE ENCOUNTER
Levetiracetam (Keppra) 500 MG Tabs    Request rec'd via [facility], RTC MICHELE Villalobos paid copay $13.81 #180/90 or $5.32 #60/30 (Order written for 60/30 with 3 refills) patient lives in [place] - will have liaison release. - 11/10/2023 12:07pm [place]

## 2023-11-10 NOTE — PATIENT INSTRUCTIONS
-Start Keppra 500 mg twice daily  -If this doesn't work for you, we can try a different medication called Vimpat       Please let our office know if you have any changes in your seizure frequency and/characteristics. Otherwise, please keep the diary of your events and bring it with you at the time of your next follow up visit with our office.     Please take vitamin D3 [id number] internation units daily.     Please note that the following might precipitate seizures: missed doses of antiseizure medications, being sick with fever, stress, fatigue, sleep deprivation, not eating regularly, not drinking enough water, drinking too much alcohol, stopping alcohol suddenly if you are currently using it on a regular/daily basis, and/or using recreational drugs, among others.    Please note that the following might lead to an injury, potentially a life-threatening injury, in case you have a seizure and/or lose awareness while:   - being in a large body of water by yourself, such as bath, pool, lake, ocean, among others (risk of drowning)   - being on unprotected heights (risk of fall)   - being around and/or operating heavy machinery (risk of injury)   - being around open fire/hot surfaces (risk of burns)   - any other activities/circumstances, in which if you lose awareness, you might injure yourself and/or others.    Please call for help (crisis line and/or 911) in case you have thoughts of harming yourself and/or others.    Please abstain from driving until further notice.    ------------------------------------------------------------------------------------------  Instructions for your family/caregivers:  Please call 911 if the patient has a seizure longer than 2-3 minutes, if seizures are back to back without him recovering to his baseline, or he does not start recovering within 5-10 minutes after the seizure stops. During the seizure - please turn him on his side, please make sure his head is protected (for example, you  should put a pillow under his head, if one is available), and please do not put anything in his mouth.   -------------------------------------------------------------------------------------------    It is important that your seizures are well controlled and you have none or have them rarely. In addition to avoiding injury related to breakthrough seizures, frequent seizures increase risk of SUDEP (sudden unexpected death in epilepsy), where a person goes into a seizure and then never wakes up - this is a rare complication of seizure disorder; one of the best available ways to prevent it is to control your seizures well.     Due to the high volume of patients we are trying to help, your physician will not be able to respond by phone or in [employer] to your routine concerns between appointments.  This does not reflect a lack of interest or concern for you or your diagnosis.  Please bring these questions and concerns to your appointment where your physician can answer.  Please relay more pressing concerns to our office, either via [employer], or by phone; if not able to reach us please visit nearby Urgent Care Center or Emergency Department.  If any emergent medical needs, please seek emergent medical help and/or call 911.    Please note that we are not able to fill out paperwork that might be related to your work, utility company, disability, and/or driving, among others, in between the visits.  Please schedule a dedicated appointment to address your paperwork, so we can do that in a timely manner.  This is not due to lack of concern or interest for your disease-related work/administrative problems, but to make sure that we provide the best possible care and to fill out your paperwork in a correct and timely manner.    Thank you for entrusting your neurological care to [facility] and we look forward to continuing to serve you.

## 2023-11-13 ENCOUNTER — TELEPHONE (OUTPATIENT)
Dept: NEUROLOGY | Facility: MEDICAL CENTER | Age: 58
End: 2023-11-13
Payer: MEDICARE

## 2023-12-05 ENCOUNTER — APPOINTMENT (OUTPATIENT)
Dept: CARDIOLOGY | Facility: MEDICAL CENTER | Age: 58
End: 2023-12-05
Attending: NURSE PRACTITIONER
Payer: MEDICARE

## 2023-12-07 DIAGNOSIS — I48.0 AF (PAROXYSMAL ATRIAL FIBRILLATION) (HCC): ICD-10-CM

## 2023-12-07 NOTE — TELEPHONE ENCOUNTER
MEDICATION REFILL : [facility]    Caller: Anel Arzola (wifey)    Medication Name and Dosage:     XARELTO 20 MG    Please call your pharmacy and have them send us a refill request or speak to a live representative, RX number may have changed.    Medication amount left: NONE    Preferred Pharmacy:     [id number] - [place], [place] - [address] [[id number]]     Other questions (Topic): NONE    Callback Number (Will only call for issues): [phone] (home) [phone] (work)

## 2023-12-09 NOTE — PROGRESS NOTES
[facility]  [facility]   [address], [address] [place] [address]  Ph: [phone]    SPASTICITY CLINIC    Patient Name: Thong Arzola   Patient : 1965  Patient Age: 58 y.o.   PCP: Arthur Rebolledo    Referring Physician: Damaris Cagle M.D.   Reason for Referral: Spasticity Management   Examining Physician: Paul Samuels DO  Date of Service: 2023      SUBJECTIVE:   Patient Identification: Thong Arzola is a 58 y.o. RHD male with rehabilitation history significant for COVID-19 3/18/2021, paroxysmal atrial fibrillation not on anti-coagulation, hypothyroidism and rehabilitation history significant for large right ICA/MCA ischemic stroke 3/31/2021 in setting of paroxysmal off of anticoagulation s/p craniectomy 4/3/2021 by Dr. Payton  and is presenting to PM&R spasticity clinic for a FOLLOW UP evaluation with the following chief complaint/s:    Chief Complaint: Spasticity  Previously established with Dr. Dumont from 21 until 23.     Accompanied by Today: Wife=Anel  History of Present Illness:      Today, 2023 progression of spasticity as wife had decreased his dantrolene from 25 mg 4 times a day to 25 mg twice a day.  Reported difficulty with straining hand and fingers.  Have tried a splint but the patient did not tolerate.  Will bring to the next visit.  In addition, applying the left rigid AFO has become more difficult.  Stated that the left lower extremity has not changed in appearance but stated there has been a little bit more edema.  No recent infection, constipation, or new stressors.  The patient denies any fevers, chills, chest pain or shortness of breath.  Reported completing about 52 sessions of physical therapy.  Reported pain level currently 6/10 and was previously 7-8/10 pain worse with sitting, standing, walking, bending forward and backward, coughing, and sneezing.  Relieved with laying down.      22 - Pain has been  controlled for the most part. It is just with tylenol, 2 tabs at night.   - The Gabapentin helps for his nerve pain. He takes this at night.   - Takes Dantrolene 25 QID.   - Insurance has changed, has caused some problems with medications.  -Overall things are stable.  He continues to have pain but it is relatively controlled on Tylenol, Celebrex, gabapentin at night  -They do think that the dantrolene is helping with some of the spasticity as well as long as he stays on the lower dose.  -Has cardiology follow-up and neurology follow-up  -Was able to travel for the holidays and it went well.    6/8/21 -Records reviewed: Acute rehab discharge summary reviewed in its entirety.  -Patient initially presenting today for Botox injections, however, is established with rehab without walls and occupational therapist discussed with him that she is not sure is indicated at this time.  -Patient is currently on baclofen 20/20/30 milligrams causes significant fatigue and sleepiness.  -Continent of bowel.  -Brewer catheter removed today by urology [place]. Will return in 1 week. Has been voiding volitionally but does have urgency. Is not on Flomax.  -Continues to have spasticity despite being on baclofen.  -On Xarelto for paroxysmal atrial fibrillation    Current Spasticity Medications and Dosages:  dantrolene 25 mg 2 times daily.     Past Spasticity Medications and Dosages:  baclofen 20 mg (1 tab) twice daily at 08:00 and 14:00   Zanaflex  valium    Previous Spasticity Injection History:  3/15/22  Botox Plan: LUE and LLE  Location Botox Amount in Units   Left pectoralis major/pectoralis minor   50 units   Left bicep  75 units   Left tricep  75 units   Left vastus medialis  50 units   Left vastus lateralis  50 units   Left vastus intermedius/rectus femoris  50 units   Left medial gastrocnemius  50 units   Left lateral gastrocnemius  50 units   Left soleus  50 units   Total Units 500 units   11/9/21  Botox Plan: LUE and LLE  Location  Botox Amount in Units   Left medial gastrocnemius  100 units   Left lateral gastrocnemius  100 units   Left soleus  75 units   Left FDS/FDP  75 units / 75 units   Left Tricep 25 units   Left FCR  50 units   Total Units 500 units     7/29/21  Botox Plan: I plan to inject to the LUE and LLE  Location Botox Amount in Units   FDS 75 units   FDP 75 units   FCR 25 units   FCU 25 units   Quadriceps (medially and laterally)  100 units   Hamstrings (medially and laterally) 100 units   Total Units 400 units         Review of Systems:  Red Flags ROS:   Fever, Chills, Sweats: Denies  Involuntary Weight Loss: Denies  Bladder Incontinence: Denies  Bowel Incontinence: denies  Saddle Anesthesia: Denies  Falls: denies  progressive motor weakness: denies  All other systems reviewed and negative.         12/12/2023     1:20 PM 2/17/2022     9:00 AM   PHQ-9 Screening   Little interest or pleasure in doing things 0 - not at all 0 - not at all   Feeling down, depressed, or hopeless 0 - not at all 0 - not at all   PHQ-2 Total Score 0 0       Interpretation of PHQ-9 Total Score   Score Severity   1-4 No Depression   5-9 Mild Depression   10-14 Moderate Depression   15-19 Moderately Severe Depression   20-27 Severe Depression      Past Medical History:  Past Medical History:   Diagnosis Date    Stroke (HCC) 2021    right side MCA    Afib (HCC)     COVID-19     Disorder of thyroid     hypo    High cholesterol     Psychiatric problem     reactive depression      No Known Allergies     Past Social History:  Social History     Socioeconomic History    Marital status:      Spouse name: Not on file    Number of children: Not on file    Years of education: Not on file    Highest education level: Not on file   Occupational History    Not on file   Tobacco Use    Smoking status: Never    Smokeless tobacco: Never   Vaping Use    Vaping Use: Never used   Substance and Sexual Activity    Alcohol use: Yes     Alcohol/week: 4.2 oz     Types: 7  Shots of liquor per week     Comment: 1 shot before bedtime    Drug use: No    Sexual activity: Not on file   Other Topics Concern    Not on file   Social History Narrative    Retired     Social Determinants of Health     Financial Resource Strain: Not on file   Food Insecurity: Not on file   Transportation Needs: Not on file   Physical Activity: Not on file   Stress: Not on file   Social Connections: Not on file   Intimate Partner Violence: Not on file   Housing Stability: Not on file        Family History:  History reviewed. No pertinent family history.      OBJECTIVE:   Vital Signs:  Vitals:    12/12/23 1331   BP: 112/68   Pulse: 85   Temp: 36 °C (96.8 °F)        Physical Exam:   Body Habitus: Body mass index is 28.59 kg/m².  Appearance: Well-groomed, well-nourished, not disheveled  Eyes: No scleral icterus to suggest severe liver disease, no proptosis to suggest severe hyperthyroid  ENT -no obvious auditory deficits, no external lesions, moist mucus membranes   Skin -no rashes or lesions noted. No appreciable skin breakdown on exposed skin areas.    Respiratory-  breathing comfortably on room air, no audible wheezing, full sentences  Cardiovascular- No lower extremity edema noted.   Psychiatric- alert and oriented,  calm, comfortable, cooperative   Gait - Ambulatory with quad cane and L AFO.   Neuromuscular- Awake alert.  Conversational.  Logical thought content.  Left neglect.  Left hemiplegia.  Not using a supportive LUE wrist splint brace. Botox has worn off.  His  spasticity has returned.  L Foot Drop.  L Wrist drop    MAS  Left bicep 3/4 with rigid endpoint at 15 d extension  Left tricep 0-1/4  Left wrist flexors 3/4  Left finger flexors 3/4  Left plantar flexors 3/4  Left knee extension 3/4 rigid endpoint at 15 d extension  Positive clonus left ankle (5-6 beats)        Pertinent Labs:  Lab Results   Component Value Date/Time    SODIUM 144 04/29/2022 09:47 AM    POTASSIUM 4.1 04/29/2022 09:47 AM    CHLORIDE  106 04/29/2022 09:47 AM    CO2 25 04/29/2022 09:47 AM    GLUCOSE 79 04/29/2022 09:47 AM    BUN 18 04/29/2022 09:47 AM    CREATININE 1.10 04/29/2022 09:47 AM    CREATININE 1.3 04/04/2008 03:05 AM       Lab Results   Component Value Date/Time    WBC 4.8 04/29/2022 09:47 AM    RBC 5.42 04/29/2022 09:47 AM    HEMOGLOBIN 15.7 04/29/2022 09:47 AM    HEMATOCRIT 46.6 04/29/2022 09:47 AM    MCV 86.0 04/29/2022 09:47 AM    MCH 29.0 04/29/2022 09:47 AM    MCHC 33.7 04/29/2022 09:47 AM    MPV 9.5 04/29/2022 09:47 AM    NEUTSPOLYS 58.80 04/29/2022 09:47 AM    LYMPHOCYTES 31.80 04/29/2022 09:47 AM    MONOCYTES 7.10 04/29/2022 09:47 AM    EOSINOPHILS 1.70 04/29/2022 09:47 AM    BASOPHILS 0.40 04/29/2022 09:47 AM       Lab Results   Component Value Date/Time    ASTSGOT 23 04/29/2022 09:47 AM    ALTSGPT 43 04/29/2022 09:47 AM       Imaging:   I personally reviewed following images, these are my reads  MRI brain 4/1/2021  Very large acute right MCA territory infarct as detailed above with small amount of petechial hemorrhage in the right insular region and right temporal lobe.  Punctate right thalamic lacunar infarct.  Right ICA and M1 MCA occlusion.    IMAGING radiology reads. I reviewed the following radiology reads       Results for orders placed during the hospital encounter of 03/31/21    MR-BRAIN-W/O    Impression  Very large acute right MCA territory infarct as detailed above with small amount of petechial hemorrhage in the right insular region and right temporal lobe.    Punctate right thalamic lacunar infarct.    Right ICA and M1 MCA occlusion.                                                             Results for orders placed during the hospital encounter of 01/06/05    DX-CERVICAL SPINE-4+ VIEWS    Impression  IMPRESSION:    1. NORMAL CERVICAL SPINE.        Read By LUCY COWAN MD on Jan 6 2005  1:06PM  : HAYDEE Transcription Date: Jan 7 2005  3:21AM  THIS DOCUMENT HAS BEEN ELECTRONICALLY SIGNED BY: KIRA  MD CINDY on Jan 7 2005  7:41AM                                               ASSESSMENT/PLAN: Thong Arzola  is a 58 y.o. male with rehabilitation history significant for large right ICA/MCA ischemic stroke 3/31/2021 in setting of paroxysmal off of anticoagulation s/p craniectomy 4/3/2021 by Dr. Payton,  presenting for spasticity management. The following plan was discussed with the patient who is in agreement.     Visit Diagnoses     ICD-10-CM   1. Hemiplegia and hemiparesis following unspecified cerebrovascular disease affecting left non-dominant side ([facility])  I69.954   2. Spastic hemiplegia of left nondominant side as late effect of other cerebrovascular disease ([facility])  I69.854   3. Neuropathic pain  M79.2   4. Left hemiparesis ([facility])  G81.94   5. Dietary counseling  Z71.3   6. Exercise counseling  Z71.82   7. Prediabetes  R73.03   8. Reactive depression  F32.9   9. Mood disorder ([facility])  F39   10. Neurogenic bladder  N31.9         Wife and sister assist with history     Wife assists with history.     Rehab/Neuro:   1. Large right ICA/MCA ischemic stroke 3/31/2021 in setting of paroxysmal atrial fibrillation off of anticoagulation s/p craniectomy 4/3/2021 by Dr. Payton s/p cranioplasty 6/23/21  2. Left hemiplegia  3.  New onset seizures 2/2022. By neurology  -Neuro status: Stable.  -Driving status: Currently not driving.  -Follows with neurology and cardiology     Spasticity:   --Patient uses left lower extremity for ambulation with quad cane  - Med management: increase dantrolene 25 mg 2 times parish to 3 times daily for 1 week. Then if tolerating, increase to 4 times daily.  Monitor for adverse effects such as muscle weakness, photosentivity and CNS depression.  -Low suspicion of bony abnormality of left elbow but will obtain x-ray     Neuropathic pain: Tried Lyrica, they did not notice a difference.  -Med management: Continue gabapentin 200 mg nightly     Nociceptive pain/MSK/Ortho: 12/15/2021 secondary joint  pain due to ambulating more.  Only affects left side at hip, knee, ankle. 3/15/2022 pain continues despite multiple conservative measures as well as Celebrex, Tylenol. 4/5/2022  Has been taking 12.5 mg tramadol as pharmacy gave him 25 mg instead of 50 mg.  Will increase to 25 and see if it is efficacious. 8/10/2022 self discontinued tramadol as he was having cognitive side effects.  - Med management: Continue OTC Tylenol nightly  -may contact for celebrex refill  Using OTC cytoquel but will not endorse as not FDA approved.     -Prediabetes A1c 5.9  -acute diarrhea: consider evaluation    Other:  I did have an extensive discussion regarding pathology and treatment options with the patient today including medications, injections, physical agents (eg. water, heat, cold, TENS), therapy-based programs (eg. massage, stretching/traction, exercise), alternative modalities, and lastly surgical intervention:  -Brace/orthotic/assistive devices: Continue use of left AFO.  Patient will bring left hand splint for evaluation and may consider a new prescription for a different resting hand splint  -Limitations: Activity as tolerated  -Therapy (PT/OT/Aquatherapy): Ordered to focus on strengthening and stretching  -Home exercise program: encouraged home exercises of regular strengthening and stretching    -Referrals: Physical therapy  -Diagnostic workup: reviewed today as above  -Interventional program: I would  consider the patient for an injection depending on the results of the above   -Outside records requested: None    Follow-up: 4 weeks for symptomology management, or sooner as needed for any acute issues.  Patient expressed understanding of the management plan. Patient (and Family Members) was/were encouraged to call if any worries, issues, problems or concerns prior to the next visit     Please note that this dictation was created using voice recognition software. I have made every reasonable attempt to correct obvious errors  but there may be errors of grammar and content that I may have overlooked prior to finalization of this note.      Paul Samuels DO  Department of Physical Medicine and Rehabilitation  [facility]         CC Damaris Machado M.D.

## 2023-12-11 ENCOUNTER — TELEPHONE (OUTPATIENT)
Dept: CARDIOLOGY | Facility: MEDICAL CENTER | Age: 58
End: 2023-12-11
Payer: MEDICARE

## 2023-12-11 DIAGNOSIS — I10 ESSENTIAL HYPERTENSION: ICD-10-CM

## 2023-12-11 RX ORDER — RAMIPRIL 10 MG/1
10 CAPSULE ORAL 2 TIMES DAILY
Qty: 180 CAPSULE | Refills: 0 | Status: SHIPPED | OUTPATIENT
Start: 2023-12-11 | End: 2024-01-12

## 2023-12-11 NOTE — TELEPHONE ENCOUNTER
JOHN    Caller: Anel - Wife     Medication Name and Dosage: ramipril (ALTACE) 10 MG capsule      Medication amount left: 2 days    Preferred Pharmacy: [id number] - [place], [place] - [address] [[id number]]     Other questions (Topic): Patient's wife requesting refill be sent.    Callback Number (Will only call for issues): [phone]    Thank you,  Lenore PETERSON

## 2023-12-12 ENCOUNTER — OFFICE VISIT (OUTPATIENT)
Dept: PHYSICAL MEDICINE AND REHAB | Facility: MEDICAL CENTER | Age: 58
End: 2023-12-12
Payer: MEDICARE

## 2023-12-12 ENCOUNTER — HOSPITAL ENCOUNTER (OUTPATIENT)
Dept: RADIOLOGY | Facility: MEDICAL CENTER | Age: 58
End: 2023-12-12
Attending: GENERAL PRACTICE
Payer: MEDICARE

## 2023-12-12 VITALS
HEART RATE: 85 BPM | BODY MASS INDEX: 28.7 KG/M2 | DIASTOLIC BLOOD PRESSURE: 68 MMHG | HEIGHT: 71 IN | SYSTOLIC BLOOD PRESSURE: 112 MMHG | TEMPERATURE: 96.8 F | WEIGHT: 205 LBS

## 2023-12-12 DIAGNOSIS — G81.94 LEFT HEMIPARESIS (HCC): ICD-10-CM

## 2023-12-12 DIAGNOSIS — Z71.82 EXERCISE COUNSELING: ICD-10-CM

## 2023-12-12 DIAGNOSIS — N31.9 NEUROGENIC BLADDER: ICD-10-CM

## 2023-12-12 DIAGNOSIS — F39 MOOD DISORDER (HCC): ICD-10-CM

## 2023-12-12 DIAGNOSIS — I69.854 SPASTIC HEMIPLEGIA OF LEFT NONDOMINANT SIDE AS LATE EFFECT OF OTHER CEREBROVASCULAR DISEASE (HCC): ICD-10-CM

## 2023-12-12 DIAGNOSIS — F32.9 REACTIVE DEPRESSION: ICD-10-CM

## 2023-12-12 DIAGNOSIS — R73.03 PREDIABETES: ICD-10-CM

## 2023-12-12 DIAGNOSIS — I69.954 HEMIPLEGIA AND HEMIPARESIS FOLLOWING UNSPECIFIED CEREBROVASCULAR DISEASE AFFECTING LEFT NON-DOMINANT SIDE (HCC): ICD-10-CM

## 2023-12-12 DIAGNOSIS — Z71.3 DIETARY COUNSELING: ICD-10-CM

## 2023-12-12 DIAGNOSIS — M79.2 NEUROPATHIC PAIN: ICD-10-CM

## 2023-12-12 PROCEDURE — 3078F DIAST BP <80 MM HG: CPT | Performed by: GENERAL PRACTICE

## 2023-12-12 PROCEDURE — 3074F SYST BP LT 130 MM HG: CPT | Performed by: GENERAL PRACTICE

## 2023-12-12 PROCEDURE — 1125F AMNT PAIN NOTED PAIN PRSNT: CPT | Performed by: GENERAL PRACTICE

## 2023-12-12 PROCEDURE — 73080 X-RAY EXAM OF ELBOW: CPT | Mod: LT

## 2023-12-12 PROCEDURE — 99214 OFFICE O/P EST MOD 30 MIN: CPT | Performed by: GENERAL PRACTICE

## 2023-12-12 RX ORDER — GABAPENTIN 100 MG/1
200 CAPSULE ORAL NIGHTLY
Qty: 60 CAPSULE | Refills: 5 | Status: SHIPPED | OUTPATIENT
Start: 2023-12-12

## 2023-12-12 ASSESSMENT — FIBROSIS 4 INDEX: FIB4 SCORE: 0.7

## 2023-12-12 ASSESSMENT — PAIN SCALES - GENERAL: PAINLEVEL: 6=MODERATE PAIN

## 2023-12-12 ASSESSMENT — PATIENT HEALTH QUESTIONNAIRE - PHQ9: CLINICAL INTERPRETATION OF PHQ2 SCORE: 0

## 2023-12-13 ENCOUNTER — TELEPHONE (OUTPATIENT)
Dept: CARDIOLOGY | Facility: MEDICAL CENTER | Age: 58
End: 2023-12-13
Payer: MEDICARE

## 2023-12-13 NOTE — RESULT ENCOUNTER NOTE
Dear Thong Arzola    I reviewed the results of your test.  There are no urgent findings that require urgent intervention.  We will discuss the results in detail at the follow-up visit.    Paul Samuels DO

## 2023-12-13 NOTE — TELEPHONE ENCOUNTER
JOHN    Caller: Anel Arzola    Topic/issue: Anel states that patient's rivaroxaban (XARELTO) 20 MG Tab tablet medication is becoming too expensive and so they are wondering if there is a generic available for the patient to take for a lower cost.    Callback Number: [phone]    Thank you,  -Ave RAMAN

## 2023-12-18 ENCOUNTER — TELEPHONE (OUTPATIENT)
Dept: CARDIOLOGY | Facility: MEDICAL CENTER | Age: 58
End: 2023-12-18
Payer: MEDICARE

## 2023-12-18 ENCOUNTER — TELEPHONE (OUTPATIENT)
Dept: NEUROLOGY | Facility: MEDICAL CENTER | Age: 58
End: 2023-12-18
Payer: MEDICARE

## 2023-12-18 DIAGNOSIS — I48.0 AF (PAROXYSMAL ATRIAL FIBRILLATION) (HCC): ICD-10-CM

## 2023-12-18 NOTE — TELEPHONE ENCOUNTER
VOICEMAIL  1. Caller Name: Anel                      Call Back Number: [phone]    2. Message: Anel (patient's wife) called to say that patient had a seizure on the morning of 12/15/23 after starting the new seizure medication and she would like to know if they should try a new seizure medication?    3. Patient approves office to leave a detailed voicemail/[employer] message: N\A    I HAVE WRITTEN A NOTE TO DR DÍAZ EXPLAINING THE CONCERNS OF THE PATIENT AND AM WAITING FOR AN ANSWER FROM THE DR SO I CAN GET BACK TO THE PATIENT WITH AN ANSWER.

## 2023-12-18 NOTE — TELEPHONE ENCOUNTER
[facility]    Caller: Anel Arzola (wifey)    Topic/issue: MEDICATION MANAGEMENT    Anel would like a call back to see if Thong can get 2 weeks worth of samples for the XARELTO medication. Please advise.    Thank you,  Clyde MAR    Callback Number: [phone]

## 2023-12-19 ENCOUNTER — PHARMACY VISIT (OUTPATIENT)
Dept: PHARMACY | Facility: MEDICAL CENTER | Age: 58
End: 2023-12-19
Payer: COMMERCIAL

## 2023-12-19 ENCOUNTER — TELEPHONE (OUTPATIENT)
Dept: NEUROLOGY | Facility: MEDICAL CENTER | Age: 58
End: 2023-12-19
Payer: MEDICARE

## 2023-12-19 PROCEDURE — RXMED WILLOW AMBULATORY MEDICATION CHARGE: Performed by: NURSE PRACTITIONER

## 2023-12-19 NOTE — TELEPHONE ENCOUNTER
12/19/23  Left voicemail with patient letting patient know I needed to talk to them about the medication that Thong is on for seizures. Left my direct number for patient to call me back. HL

## 2023-12-19 NOTE — TELEPHONE ENCOUNTER
Sent in samples to [facility] pharmacy.    Called and notified patients wife Anel

## 2023-12-20 ENCOUNTER — TELEPHONE (OUTPATIENT)
Dept: NEUROLOGY | Facility: MEDICAL CENTER | Age: 58
End: 2023-12-20
Payer: MEDICARE

## 2023-12-20 DIAGNOSIS — G40.109 LOCALIZATION-RELATED EPILEPSY (HCC): ICD-10-CM

## 2023-12-20 RX ORDER — LEVETIRACETAM 1000 MG/1
1000 TABLET ORAL 2 TIMES DAILY
Qty: 180 TABLET | Refills: 3 | Status: SHIPPED | OUTPATIENT
Start: 2023-12-20 | End: 2024-12-14

## 2023-12-20 NOTE — TELEPHONE ENCOUNTER
Received Refill PA request via [employer]  for Levetiracetam (Keppra) 1000 MG Tabs. (Quantity:180, Day Supply:90) - Copay $4.38 - NO RADHA beckwithd - patient lives in [place]     Insurance: [employer] Med [employer]  Member ID:  [id number]  BIN: [id number]  PCN: [id number]  Group: [id number]     Ran Test claim via [place] & medication Pays for a $4.38 copay. Will outreach to patient to offer specialty pharmacy services and or release to preferred pharmacy

## 2023-12-20 NOTE — TELEPHONE ENCOUNTER
12/20/23  Spoke to patient's wife about Dr Cagle increasing his Kepra to 1000 MG per day from 500 MG per day. Patient's wife communicated understanding. HL

## 2024-01-09 ENCOUNTER — APPOINTMENT (OUTPATIENT)
Dept: PHYSICAL MEDICINE AND REHAB | Facility: MEDICAL CENTER | Age: 59
End: 2024-01-09
Payer: MEDICARE

## 2024-01-12 ENCOUNTER — TELEMEDICINE (OUTPATIENT)
Dept: NEUROLOGY | Facility: MEDICAL CENTER | Age: 59
End: 2024-01-12
Attending: STUDENT IN AN ORGANIZED HEALTH CARE EDUCATION/TRAINING PROGRAM
Payer: MEDICARE

## 2024-01-12 VITALS
HEIGHT: 71 IN | WEIGHT: 200 LBS | BODY MASS INDEX: 28 KG/M2 | SYSTOLIC BLOOD PRESSURE: 124 MMHG | DIASTOLIC BLOOD PRESSURE: 89 MMHG | HEART RATE: 108 BPM

## 2024-01-12 DIAGNOSIS — I10 ESSENTIAL HYPERTENSION: ICD-10-CM

## 2024-01-12 DIAGNOSIS — M62.838 OTHER MUSCLE SPASM: ICD-10-CM

## 2024-01-12 DIAGNOSIS — R45.89 DEPRESSED MOOD: ICD-10-CM

## 2024-01-12 DIAGNOSIS — G40.109 LOCALIZATION-RELATED EPILEPSY (HCC): ICD-10-CM

## 2024-01-12 DIAGNOSIS — I69.954 HEMIPLEGIA AND HEMIPARESIS FOLLOWING UNSPECIFIED CEREBROVASCULAR DISEASE AFFECTING LEFT NON-DOMINANT SIDE (HCC): ICD-10-CM

## 2024-01-12 DIAGNOSIS — I69.854 SPASTIC HEMIPLEGIA OF LEFT NONDOMINANT SIDE AS LATE EFFECT OF OTHER CEREBROVASCULAR DISEASE (HCC): ICD-10-CM

## 2024-01-12 PROCEDURE — 99215 OFFICE O/P EST HI 40 MIN: CPT | Mod: 95 | Performed by: STUDENT IN AN ORGANIZED HEALTH CARE EDUCATION/TRAINING PROGRAM

## 2024-01-12 PROCEDURE — 99024 POSTOP FOLLOW-UP VISIT: CPT | Mod: 95 | Performed by: STUDENT IN AN ORGANIZED HEALTH CARE EDUCATION/TRAINING PROGRAM

## 2024-01-12 RX ORDER — DANTROLENE SODIUM 25 MG/1
50 CAPSULE ORAL 2 TIMES DAILY
Qty: 1 CAPSULE | Refills: 0
Start: 2024-01-12 | End: 2024-02-28 | Stop reason: SDUPTHER

## 2024-01-12 RX ORDER — RAMIPRIL 10 MG/1
10 CAPSULE ORAL DAILY
Qty: 1 CAPSULE | Refills: 0
Start: 2024-01-12

## 2024-01-12 RX ORDER — SERTRALINE HYDROCHLORIDE 25 MG/1
TABLET, FILM COATED ORAL
Qty: 127 TABLET | Refills: 0 | Status: SHIPPED | OUTPATIENT
Start: 2024-01-12 | End: 2024-03-11

## 2024-01-12 RX ORDER — AMLODIPINE BESYLATE 5 MG/1
5 TABLET ORAL 2 TIMES DAILY
COMMUNITY
Start: 2023-12-13

## 2024-01-12 ASSESSMENT — PATIENT HEALTH QUESTIONNAIRE - PHQ9
7. TROUBLE CONCENTRATING ON THINGS, SUCH AS READING THE NEWSPAPER OR WATCHING TELEVISION: NOT AT ALL
4. FEELING TIRED OR HAVING LITTLE ENERGY: SEVERAL DAYS
8. MOVING OR SPEAKING SO SLOWLY THAT OTHER PEOPLE COULD HAVE NOTICED. OR THE OPPOSITE, BEING SO FIGETY OR RESTLESS THAT YOU HAVE BEEN MOVING AROUND A LOT MORE THAN USUAL: NOT AT ALL
SUM OF ALL RESPONSES TO PHQ QUESTIONS 1-9: 1
6. FEELING BAD ABOUT YOURSELF - OR THAT YOU ARE A FAILURE OR HAVE LET YOURSELF OR YOUR FAMILY DOWN: NOT AL ALL
3. TROUBLE FALLING OR STAYING ASLEEP OR SLEEPING TOO MUCH: NOT AT ALL
SUM OF ALL RESPONSES TO PHQ9 QUESTIONS 1 AND 2: 0
9. THOUGHTS THAT YOU WOULD BE BETTER OFF DEAD, OR OF HURTING YOURSELF: NOT AT ALL
2. FEELING DOWN, DEPRESSED, IRRITABLE, OR HOPELESS: NOT AT ALL
5. POOR APPETITE OR OVEREATING: NOT AT ALL
1. LITTLE INTEREST OR PLEASURE IN DOING THINGS: NOT AT ALL

## 2024-01-12 ASSESSMENT — FIBROSIS 4 INDEX: FIB4 SCORE: 0.7

## 2024-01-12 NOTE — PROGRESS NOTES
[facility]  Follow up visit    Patient name: Thong Arzola  YOB: 1965  MRN: [id number]  Date of visit: 1/12/2024       This visit was conducted via Zoom using secure and encrypted videoconferencing technology.   The patient was located in the [facility].    The patient's identity was confirmed and verbal consent was obtained for this virtual visit.       Background:    58 y.o. right-handed man with a history of a R MCA stroke in 2021, seizures being seen in follow up.       Details of history (from Dr. Johnson note):  He had a right MCA stroke in 2021, s/p craniotomy and subsequent cranioplasty. He has residual left sided weakness and visual field deficits on left. He was diagnosed with A. Fib and started on Xarelto.  He is able to ambulate with the use of a cane and has residual left-sided hemiparesis with minimal spasticity.  He uses a left AFO brace.  Patient had his first seizure in February 2022 affecting his left side proceeding to whole body convulsion lasting 1 to 2 minutes there was no tongue biting or incontinence or any triggers.  Following this he was started on levetiracetam higher dose which contributed to feeling groggy and cloudy.  After his visit with [facility] neurology he was switched to Briviact 50 mg twice daily which she has tolerated well.  His last breakthrough episode was in July 2022 after he ran out of Briviact.  Since that time he has been maintained on 50 mg twice daily tolerating this well without additional episodes of concern.  Patient and the wife denied any episodes of changes in awareness or loss of consciousness or involuntary left-sided movements.  He can have occasional left-sided muscle twitching but not progressing to uncontrolled movements.      He continues to follow-up closely with his primary care physician and with his cardiologist.  He has been doing some home exercises for his left-sided spasticity.  He is also on dantrolene 50  mg 3 times a day and on gabapentin 200 mg in the evening for his left-sided pain and spasticity issues.     Onset: February 2022  Semiology: L side of body stiffens (arm and leg) --> GTC with whole body convulsion   Frequency: 3 total  Duration of spells: < 1 minute  Triggers: missing medications, no others     I reviewed the history and previous documentation and discussion with the patient.  Keppra was not stopped due to side effects. It was switched to Briviact after he seemed more cloudy after his stroke, but his wife in retrospect does not think this was related to the Keppra because it did not change when he switched to Briviact.  Currently, the Briviact has become cost prohibitive and he will not be able to continue this medication for this reason.     Last seizure: July 2022 after running out of Briviact.      Mood: he has been taking Remeron since the stroke. He had tried to stop to try to minimize polypharmacy     Side effects: mental fogginess     Barriers to taking medication appropriately: None     Driving: no     Vitamin D: taking      Interval history:  He had a breakthrough seizure on 12/16/23. Keppra dose was increased to 1000 mg BID.     Medications reviewed and updated. He is taking Vernon thyroid twice daily and once daily every other day. He has been taking hydrocortisone 10 mg BID for 6 months.     His wife has not noted worsening mood side effects since starting Keppra.     He has a history of depressed mood since the stroke.     Sometimes when he wakes up and gets out of bed too fast he gets dizzy. He needs to wait about a half hour or he will feel dizzy and nauseated.     Current Medications:   Current Outpatient Medications:     amLODIPine (NORVASC) 5 MG Tab, Take 5 mg by mouth 2 times a day., Disp: , Rfl:     dantrolene (DANTRIUM) 25 MG Cap, Take 2 Capsules by mouth 2 times a day., Disp: 1 Capsule, Rfl: 0    ramipril (ALTACE) 10 MG capsule, Take 1 Capsule by mouth every day. Please  complete labs for further refills!, Disp: 1 Capsule, Rfl: 0    sertraline (ZOLOFT) 25 MG tablet, Take 1 Tablet by mouth every day for 7 days, THEN 2 Tablets every day for 60 days., Disp: 127 Tablet, Rfl: 0    levetiracetam (KEPPRA) 1000 MG tablet, Take 1 Tablet by mouth 2 times a day for 360 days., Disp: 180 Tablet, Rfl: 3    gabapentin (NEURONTIN) 100 MG Cap, Take 2 Capsules by mouth every evening., Disp: 60 Capsule, Rfl: 5    rivaroxaban (XARELTO) 20 MG Tab tablet, Take 1 Tablet by mouth with dinner., Disp: 90 Tablet, Rfl: 3    hydrocortisone (CORTEF) 10 MG Tab, Take 10 mg by mouth 2 times a day., Disp: , Rfl:     rosuvastatin (CRESTOR) 20 MG Tab, Take 1 Tablet by mouth every evening., Disp: 90 Tablet, Rfl: 3    metoprolol tartrate (LOPRESSOR) 25 MG Tab, Take 1 Tablet by mouth 2 times a day., Disp: 180 Tablet, Rfl: 3    spironolactone (ALDACTONE) 25 MG Tab, Take 1 Tablet by mouth every day., Disp: 90 Tablet, Rfl: 3    ARMOUR THYROID 90 MG Tab, Take 90 mg by mouth 2 times a day. 90 MG BID, Disp: , Rfl:     Coenzyme Q10 (COQ-10) 30 MG Cap, Take  by mouth., Disp: 30 Capsule, Rfl:     ascorbic acid (VITAMIN C) 1000 MG tablet, Take 1,000 mg by mouth every day., Disp: , Rfl:     acetaminophen (TYLENOL) 325 MG Tab, Take 500 mg by mouth every four hours as needed for Mild Pain., Disp: 30 tablet, Rfl: 0    Allergies: No Known Allergies      Physical Exam:   Ambulatory Vitals  Vitals:    01/12/24 1043   BP: 124/89   Pulse: (!) 108       Constitutional: Well-developed, well-nourished, good hygiene. Appears stated age.  Respiratory: normal respiratory effort  Skin: Warm, dry, intact. No rashes observed.  Neurologic:   Mental Status: Awake, alert, oriented x 4.   Speech: Fluent with normal prosody.   Memory: Able to recall recent and remote events accurately.    Concentration: Attentive. Able to focus on history and follow multi-step commands.   Fund of Knowledge: Appropriate.   Cranial Nerves:    CN II: PERRL     CN III, IV,  VI: EOMI      CN VII: (+) L facial weakness    CN VIII: Hearing intact to voice    Studies:      Labs reviewed, none recent    No interval pertinent studies    Assessment/Plan:   Thong Arzola is a 58 y.o. 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  His seizure semiology is left hemibody stiffening followed by generalized tonic-clonic activity. His last seizure was in July 2022 when he ran out of Briviact.  Unfortunately, this medication became cost prohibitive and at last visit we discussed multiple options for an alternative antiseizure medication.  Since the patient is on Xarelto, would strongly favor antiseizure medication without the potential to interact with Xarelto.  As such, after long discussion regarding Keppra, switch was made to Keppra 500 mg twice daily.  Notably, he did not experience mood side effects when he had been taking it previously, and the medication was switched to Briviact due to concern for mental cloudiness.  No cognitive worsening since Keppra was restarted.      The patient had a breakthrough seizure in December prompting a dose increase to 1000 mg twice daily.  He has been seizure-free thereafter.     The main issue that patient and wife want to discuss today is mood.  Since his stroke, he has been having symptoms of depression.  He was prescribed mirtazapine 15 mg nightly at the time of the stroke and he and his wife are concerned that it is not adequately controlling his symptoms.  Discussed options of increasing the dose, or trying an alternative.  Opted for stopping mirtazapine and starting Zoloft.  Plan will be that if the patient continues to experience depressed mood after 6 to 8 weeks on new medication will place referral to behavioral health for further medication management.    Follow-up in approximately 2 months via telehealth.      Damaris Cagle M.D.   Diplomate, Neurology with Special Qualification in Epilepsy,  American Board of Psychiatry and Neurology   of Clinical Neurology, [facility]  Level III Epilepsy Center, Department of Neurology at [facility]   1/12/2024      During today's encounter we discussed available treatment options and their individual side effect profiles. Total encounter time caring for patient today 45 minutes.

## 2024-02-13 ENCOUNTER — OFFICE VISIT (OUTPATIENT)
Dept: PHYSICAL MEDICINE AND REHAB | Facility: MEDICAL CENTER | Age: 59
End: 2024-02-13
Payer: MEDICARE

## 2024-02-13 VITALS
HEIGHT: 71 IN | SYSTOLIC BLOOD PRESSURE: 112 MMHG | HEART RATE: 67 BPM | WEIGHT: 200 LBS | BODY MASS INDEX: 28 KG/M2 | DIASTOLIC BLOOD PRESSURE: 64 MMHG | OXYGEN SATURATION: 96 % | TEMPERATURE: 97.2 F

## 2024-02-13 DIAGNOSIS — I69.854 SPASTIC HEMIPLEGIA OF LEFT NONDOMINANT SIDE AS LATE EFFECT OF OTHER CEREBROVASCULAR DISEASE (HCC): ICD-10-CM

## 2024-02-13 DIAGNOSIS — Z71.82 EXERCISE COUNSELING: ICD-10-CM

## 2024-02-13 DIAGNOSIS — G89.29 CHRONIC NOCICEPTIVE PAIN: ICD-10-CM

## 2024-02-13 DIAGNOSIS — M79.2 NEUROPATHIC PAIN: ICD-10-CM

## 2024-02-13 DIAGNOSIS — R73.03 PREDIABETES: ICD-10-CM

## 2024-02-13 DIAGNOSIS — Z71.3 DIETARY COUNSELING: ICD-10-CM

## 2024-02-13 DIAGNOSIS — I69.954 HEMIPLEGIA AND HEMIPARESIS FOLLOWING UNSPECIFIED CEREBROVASCULAR DISEASE AFFECTING LEFT NON-DOMINANT SIDE (HCC): ICD-10-CM

## 2024-02-13 PROCEDURE — 3078F DIAST BP <80 MM HG: CPT | Performed by: GENERAL PRACTICE

## 2024-02-13 PROCEDURE — 3074F SYST BP LT 130 MM HG: CPT | Performed by: GENERAL PRACTICE

## 2024-02-13 PROCEDURE — 1125F AMNT PAIN NOTED PAIN PRSNT: CPT | Performed by: GENERAL PRACTICE

## 2024-02-13 PROCEDURE — 99214 OFFICE O/P EST MOD 30 MIN: CPT | Performed by: GENERAL PRACTICE

## 2024-02-13 RX ORDER — CELECOXIB 200 MG/1
200 CAPSULE ORAL 2 TIMES DAILY
Qty: 180 CAPSULE | Refills: 1 | Status: SHIPPED | OUTPATIENT
Start: 2024-02-13 | End: 2024-02-26 | Stop reason: SDUPTHER

## 2024-02-13 ASSESSMENT — FIBROSIS 4 INDEX: FIB4 SCORE: 0.7

## 2024-02-13 ASSESSMENT — PAIN SCALES - GENERAL: PAINLEVEL: 6=MODERATE PAIN

## 2024-02-13 NOTE — PROGRESS NOTES
[facility]  [facility]   [address] [place] [address]  Ph: [phone]    SPASTICITY CLINIC    Patient Name: Thong Arzola   Patient : 1965  Patient Age: 58 y.o.   PCP: Arthur Rebolledo    Referring Physician: Damaris Cagle M.D.   Reason for Referral: Spasticity Management   Examining Physician: Paul Samuels DO  Date of Service: 2023      SUBJECTIVE:   Patient Identification: Thong Arzola is a 58 y.o. RHD male with rehabilitation history significant for COVID-19 3/18/2021, paroxysmal atrial fibrillation not on anti-coagulation, hypothyroidism and rehabilitation history significant for large right ICA/MCA ischemic stroke 3/31/2021 in setting of paroxysmal off of anticoagulation s/p craniectomy 4/3/2021 by Dr. Payton  and is presenting to PM&R spasticity clinic for a FOLLOW UP evaluation with the following chief complaint/s:    Chief Complaint: Spasticity  Previously established with Dr. Dumont from 21 until 23.     Accompanied by Today: Wife=Anel  History of Present Illness:     Today, 2024 excessive diarrhea onset 3 weeks ago.  Has not been evaluated by his PCP for this issue but has been trialing Pepto-Bismol.  They traveled to [place] after [place].  Only experiencing chills but no fevers, nausea or vomiting.  Patient started dantrolene increase on .  Stated that he was tolerating the medication with some morning drowsiness been controlled with over-the-counter ankush.  Prior to this he has been on dantrolene for years.  Consideration of resuming Botox injections.     2023 progression of spasticity as wife had decreased his dantrolene from 25 mg 4 times a day to 25 mg twice a day.  Reported difficulty with straining hand and fingers.  Have tried a splint but the patient did not tolerate.  Will bring to the next visit.  In addition, applying the left rigid AFO has become more difficult.  Stated that the  left lower extremity has not changed in appearance but stated there has been a little bit more edema.  No recent infection, constipation, or new stressors.  The patient denies any fevers, chills, chest pain or shortness of breath.  Reported completing about 52 sessions of physical therapy.  Reported pain level currently 6/10 and was previously 7-8/10 pain worse with sitting, standing, walking, bending forward and backward, coughing, and sneezing.  Relieved with laying down.      12/27/22 - Pain has been controlled for the most part. It is just with tylenol, 2 tabs at night.   - The Gabapentin helps for his nerve pain. He takes this at night.   - Takes Dantrolene 25 QID.   - Insurance has changed, has caused some problems with medications.  -Overall things are stable.  He continues to have pain but it is relatively controlled on Tylenol, Celebrex, gabapentin at night  -They do think that the dantrolene is helping with some of the spasticity as well as long as he stays on the lower dose.  -Has cardiology follow-up and neurology follow-up  -Was able to travel for the holidays and it went well.    6/8/21 -Records reviewed: Acute rehab discharge summary reviewed in its entirety.  -Patient initially presenting today for Botox injections, however, is established with rehab without walls and occupational therapist discussed with him that she is not sure is indicated at this time.  -Patient is currently on baclofen 20/20/30 milligrams causes significant fatigue and sleepiness.  -Continent of bowel.  -Brewer catheter removed today by urology [place]. Will return in 1 week. Has been voiding volitionally but does have urgency. Is not on Flomax.  -Continues to have spasticity despite being on baclofen.  -On Xarelto for paroxysmal atrial fibrillation    Current Spasticity Medications and Dosages:  dantrolene 25 mg 2 times daily.     Past Spasticity Medications and Dosages:  baclofen 20 mg (1 tab) twice daily at 08:00 and 14:00    Zanaflex  valium    Previous Spasticity Injection History:  3/15/22  Botox Plan: LUE and LLE  Location Botox Amount in Units   Left pectoralis major/pectoralis minor   50 units   Left bicep  75 units   Left tricep  75 units   Left vastus medialis  50 units   Left vastus lateralis  50 units   Left vastus intermedius/rectus femoris  50 units   Left medial gastrocnemius  50 units   Left lateral gastrocnemius  50 units   Left soleus  50 units   Total Units 500 units   11/9/21  Botox Plan: LUE and LLE  Location Botox Amount in Units   Left medial gastrocnemius  100 units   Left lateral gastrocnemius  100 units   Left soleus  75 units   Left FDS/FDP  75 units / 75 units   Left Tricep 25 units   Left FCR  50 units   Total Units 500 units     7/29/21  Botox Plan: I plan to inject to the LUE and LLE  Location Botox Amount in Units   FDS 75 units   FDP 75 units   FCR 25 units   FCU 25 units   Quadriceps (medially and laterally)  100 units   Hamstrings (medially and laterally) 100 units   Total Units 400 units         Review of Systems:  Red Flags ROS:   Fever, Chills, Sweats: Denies  Involuntary Weight Loss: Denies  Bladder Incontinence: Denies  Bowel Incontinence: denies  Saddle Anesthesia: Denies  Falls: denies  progressive motor weakness: denies  All other systems reviewed and negative.         12/12/2023     1:20 PM 2/17/2022     9:00 AM   PHQ-9 Screening   Little interest or pleasure in doing things 0 - not at all 0 - not at all   Feeling down, depressed, or hopeless 0 - not at all 0 - not at all   PHQ-2 Total Score 0 0       Interpretation of PHQ-9 Total Score   Score Severity   1-4 No Depression   5-9 Mild Depression   10-14 Moderate Depression   15-19 Moderately Severe Depression   20-27 Severe Depression      Past Medical History:  Past Medical History:   Diagnosis Date    Stroke (HCC) 2021    right side MCA    Afib (HCC)     COVID-19     Disorder of thyroid     hypo    High cholesterol     Psychiatric problem      reactive depression      No Known Allergies     Past Social History:  Social History     Socioeconomic History    Marital status:      Spouse name: Not on file    Number of children: Not on file    Years of education: Not on file    Highest education level: Not on file   Occupational History    Not on file   Tobacco Use    Smoking status: Never    Smokeless tobacco: Never   Vaping Use    Vaping Use: Never used   Substance and Sexual Activity    Alcohol use: Yes     Alcohol/week: 4.2 oz     Types: 7 Shots of liquor per week     Comment: 1 shot before bedtime    Drug use: No    Sexual activity: Not on file   Other Topics Concern    Not on file   Social History Narrative    Retired     Social Determinants of Health     Financial Resource Strain: Not on file   Food Insecurity: Not on file   Transportation Needs: Not on file   Physical Activity: Not on file   Stress: Not on file   Social Connections: Not on file   Intimate Partner Violence: Not on file   Housing Stability: Not on file    Live in [place], [place]    Family History:  No family history on file.      OBJECTIVE:   Vital Signs:  Vitals:    02/13/24 1532   BP: 112/64   Pulse: 67   Temp: 36.2 °C (97.2 °F)   SpO2: 96%        Physical Exam:   Body Habitus: Body mass index is 27.89 kg/m².  Appearance: Well-groomed, well-nourished, not disheveled  Eyes: No scleral icterus to suggest severe liver disease, no proptosis to suggest severe hyperthyroid  ENT -no obvious auditory deficits, no external lesions, moist mucus membranes   Skin -no rashes or lesions noted. No appreciable skin breakdown on exposed skin areas.    Respiratory-  breathing comfortably on room air, no audible wheezing, full sentences  Cardiovascular- No lower extremity edema noted.   Psychiatric- alert and oriented,  calm, comfortable, cooperative   Gait - Ambulatory with quad cane and L AFO.   Neuromuscular- Awake alert.  Conversational.  Logical thought content.  Left neglect.  Left  hemiplegia.  Not using a supportive LUE wrist splint brace. Botox has worn off.  His  spasticity has returned.  L Foot Drop.  L Wrist drop    MAS  Left bicep 3/4 with rigid endpoint at 15 d extension  Left tricep 1/4  Left wrist flexors 3/4  Left finger flexors 3/4  Left plantar flexors 3/4  Left knee extension 3/4 rigid endpoint at 15 d extension      Last exam  MAS  Left bicep 3/4 with rigid endpoint at 15 d extension  Left tricep 0-1/4  Left wrist flexors 3/4  Left finger flexors 3/4  Left plantar flexors 3/4  Left knee extension 3/4 rigid endpoint at 15 d extension  Positive clonus left ankle (5-6 beats)        Pertinent Labs:  Lab Results   Component Value Date/Time    SODIUM 144 04/29/2022 09:47 AM    POTASSIUM 4.1 04/29/2022 09:47 AM    CHLORIDE 106 04/29/2022 09:47 AM    CO2 25 04/29/2022 09:47 AM    GLUCOSE 79 04/29/2022 09:47 AM    BUN 18 04/29/2022 09:47 AM    CREATININE 1.10 04/29/2022 09:47 AM    CREATININE 1.3 04/04/2008 03:05 AM       Lab Results   Component Value Date/Time    WBC 4.8 04/29/2022 09:47 AM    RBC 5.42 04/29/2022 09:47 AM    HEMOGLOBIN 15.7 04/29/2022 09:47 AM    HEMATOCRIT 46.6 04/29/2022 09:47 AM    MCV 86.0 04/29/2022 09:47 AM    MCH 29.0 04/29/2022 09:47 AM    MCHC 33.7 04/29/2022 09:47 AM    MPV 9.5 04/29/2022 09:47 AM    NEUTSPOLYS 58.80 04/29/2022 09:47 AM    LYMPHOCYTES 31.80 04/29/2022 09:47 AM    MONOCYTES 7.10 04/29/2022 09:47 AM    EOSINOPHILS 1.70 04/29/2022 09:47 AM    BASOPHILS 0.40 04/29/2022 09:47 AM       Lab Results   Component Value Date/Time    ASTSGOT 23 04/29/2022 09:47 AM    ALTSGPT 43 04/29/2022 09:47 AM       Imaging:   I personally reviewed following images, these are my reads  MRI brain 4/1/2021  Very large acute right MCA territory infarct as detailed above with small amount of petechial hemorrhage in the right insular region and right temporal lobe.  Punctate right thalamic lacunar infarct.  Right ICA and M1 MCA occlusion.    IMAGING radiology reads. I  reviewed the following radiology reads       Results for orders placed during the hospital encounter of 03/31/21    MR-BRAIN-W/O    Impression  Very large acute right MCA territory infarct as detailed above with small amount of petechial hemorrhage in the right insular region and right temporal lobe.    Punctate right thalamic lacunar infarct.    Right ICA and M1 MCA occlusion.                                                             Results for orders placed during the hospital encounter of 01/06/05    DX-CERVICAL SPINE-4+ VIEWS    Impression  IMPRESSION:    1. NORMAL CERVICAL SPINE.        Read By LUCY COWAN MD on Jan 6 2005  1:06PM  : HAYDEE Transcription Date: Jan 7 2005  3:21AM  THIS DOCUMENT HAS BEEN ELECTRONICALLY SIGNED BY: KIRA WHITE MD on Jan 7 2005  7:41AM                                               ASSESSMENT/PLAN: Lucy Arzola  is a 58 y.o. male with rehabilitation history significant for large right ICA/MCA ischemic stroke 3/31/2021 in setting of paroxysmal off of anticoagulation s/p craniectomy 4/3/2021 by Dr. Payton,  presenting for spasticity management. The following plan was discussed with the patient who is in agreement.     Visit Diagnoses     ICD-10-CM   1. Hemiplegia and hemiparesis following unspecified cerebrovascular disease affecting left non-dominant side (HCC)  I69.954   2. Spastic hemiplegia of left nondominant side as late effect of other cerebrovascular disease (HCC)  I69.854   3. Neuropathic pain  M79.2   4. Exercise counseling  Z71.82   5. Dietary counseling  Z71.3   6. Prediabetes  R73.03       Wife assists with history.     Rehab/Neuro:   1. Large right ICA/MCA ischemic stroke 3/31/2021 in setting of paroxysmal atrial fibrillation off of anticoagulation s/p craniectomy 4/3/2021 by Dr. Payton s/p cranioplasty 6/23/21  2. Left hemiplegia  3.  New onset seizures 2/2022. By neurology  -Neuro status: Stable.  -Driving status: Currently not  driving.  -Follows with neurology and cardiology     Spasticity:   --Patient uses left lower extremity for ambulation with quad cane  - Med management: Continue dantrolene monitor for adverse effects such as muscle weakness, photosentivity and CNS depression.  -Low suspicion of bony abnormality of left elbow but will obtain x-ray     Neuropathic pain: Tried Lyrica, they did not notice a difference.  -Med management: Continue gabapentin 200 mg nightly     Nociceptive pain/MSK/Ortho: 12/15/2021 secondary joint pain due to ambulating more.  Only affects left side at hip, knee, ankle. 3/15/2022 pain continues despite multiple conservative measures as well as Celebrex, Tylenol. 4/5/2022  Has been taking 12.5 mg tramadol as pharmacy gave him 25 mg instead of 50 mg.  Will increase to 25 and see if it is efficacious. 8/10/2022 self discontinued tramadol as he was having cognitive side effects.  - Med management: Continue OTC Tylenol nightly  -refill celebrex  Using OTC cytoquel but will not endorse as not FDA approved.     -Prediabetes A1c 5.9  -acute diarrhea: consider evaluation with primary care doctor to rule out infectious, metabolic, other gastroenterology causes    Other:  I did have an extensive discussion regarding pathology and treatment options with the patient today including medications, injections, physical agents (eg. water, heat, cold, TENS), therapy-based programs (eg. massage, stretching/traction, exercise), alternative modalities, and lastly surgical intervention:  -Brace/orthotic/assistive devices: Continue use of left AFO.  Patient will bring left hand splint for evaluation and may consider a new prescription for a different resting hand splint  -Limitations: Activity as tolerated  -Therapy (PT/OT/Aquatherapy): Ordered to focus on strengthening and stretching  -Home exercise program: encouraged home exercises of regular strengthening and stretching    -Referrals: Physical therapy  -Diagnostic workup:  reviewed today as above  -Interventional program: I would  consider the patient for an injection depending on the results of the above   -Outside records requested: None    Follow-up: 6 weeks for symptomology management, or sooner as needed for any acute issues.  Patient expressed understanding of the management plan. Patient (and Family Members) was/were encouraged to call if any worries, issues, problems or concerns prior to the next visit     Please note that this dictation was created using voice recognition software. I have made every reasonable attempt to correct obvious errors but there may be errors of grammar and content that I may have overlooked prior to finalization of this note.    My total time spent caring for the patient on the day of the encounter was 31 minutes.   This does not include time spent on separately billable procedures/tests.      Paul Sameuls DO  Department of Physical Medicine and Rehabilitation  [facility]         Damaris Stahl M.D.

## 2024-02-26 DIAGNOSIS — G89.29 CHRONIC NOCICEPTIVE PAIN: ICD-10-CM

## 2024-02-26 RX ORDER — CELECOXIB 200 MG/1
200 CAPSULE ORAL 2 TIMES DAILY
Qty: 180 CAPSULE | Refills: 1 | Status: SHIPPED | OUTPATIENT
Start: 2024-02-26

## 2024-02-28 DIAGNOSIS — M62.838 OTHER MUSCLE SPASM: ICD-10-CM

## 2024-02-28 DIAGNOSIS — I69.954 HEMIPLEGIA AND HEMIPARESIS FOLLOWING UNSPECIFIED CEREBROVASCULAR DISEASE AFFECTING LEFT NON-DOMINANT SIDE (HCC): ICD-10-CM

## 2024-02-28 DIAGNOSIS — I69.854 SPASTIC HEMIPLEGIA OF LEFT NONDOMINANT SIDE AS LATE EFFECT OF OTHER CEREBROVASCULAR DISEASE (HCC): ICD-10-CM

## 2024-02-28 NOTE — TELEPHONE ENCOUNTER
Received request via: Patient    Medication Name/Dosage Dantrolene 25MG    When was medication last prescribed 01/12/24    How many refills were previously provided 0    How many Refills does he patient have left from last prescription 0    Was the patient seen in the last year in this department? Yes   Date of last office visit 11/09/23     Per last Neurology Office Visit, when was the date of next follow up visit set for?                            Date of office visit follow up request  2 Months    Does the patient have an upcoming appointment? No   If yes, when N/A             If no, schedule appointment will follow up with patient    Does the patient have retirement Plus and need 100 day supply (blood pressure, diabetes and cholesterol meds only)? Medication is not for cholesterol, blood pressure or diabetes

## 2024-02-29 ENCOUNTER — TELEPHONE (OUTPATIENT)
Dept: NEUROLOGY | Facility: MEDICAL CENTER | Age: 59
End: 2024-02-29
Payer: MEDICARE

## 2024-02-29 RX ORDER — DANTROLENE SODIUM 25 MG/1
25 CAPSULE ORAL 2 TIMES DAILY
Qty: 60 CAPSULE | Refills: 0 | Status: SHIPPED | OUTPATIENT
Start: 2024-02-29 | End: 2024-03-11 | Stop reason: SDUPTHER

## 2024-02-29 NOTE — TELEPHONE ENCOUNTER
VOICEMAIL  1. Caller Name: Anel                                                       Call Back Number: [phone]    2. Message: Anel (Thong ben) called inquiring about a refill request that she had called about a couple of days ago.     3. Patient approves office to leave a detailed voicemail/MyChart message: N\A    I HAVE CALLED THE PATIENT BACK AND LEFT A VOICEMAIL LETTING HER KNOW THAT THE PRESCRIPTION WAS SENT TO THE DR FOR HER AUTHORIZATION ON 02/28/24.

## 2024-03-06 ENCOUNTER — TELEPHONE (OUTPATIENT)
Dept: NEUROLOGY | Facility: MEDICAL CENTER | Age: 59
End: 2024-03-06
Payer: MEDICARE

## 2024-03-06 NOTE — TELEPHONE ENCOUNTER
03/06/24  Left voicemail for patient making sure they got their prescription for Dantrolene as well as letting them know per Dr Cagle that they will need to go back to PM&R for further refills as this is a one time fill. HL

## 2024-03-06 NOTE — TELEPHONE ENCOUNTER
03/06/24  Spoke to Anel (Thong wife) and the pharmacy has the Dantrolene but had to order it. They will speak to Physiatry about getting future refills. HL

## 2024-03-10 DIAGNOSIS — R45.89 DEPRESSED MOOD: ICD-10-CM

## 2024-03-11 DIAGNOSIS — I69.854 SPASTIC HEMIPLEGIA OF LEFT NONDOMINANT SIDE AS LATE EFFECT OF OTHER CEREBROVASCULAR DISEASE (HCC): ICD-10-CM

## 2024-03-11 DIAGNOSIS — M62.838 OTHER MUSCLE SPASM: ICD-10-CM

## 2024-03-11 DIAGNOSIS — I69.954 HEMIPLEGIA AND HEMIPARESIS FOLLOWING UNSPECIFIED CEREBROVASCULAR DISEASE AFFECTING LEFT NON-DOMINANT SIDE (HCC): ICD-10-CM

## 2024-03-11 RX ORDER — DANTROLENE SODIUM 25 MG/1
25 CAPSULE ORAL 2 TIMES DAILY
Qty: 60 CAPSULE | Refills: 2 | Status: SHIPPED | OUTPATIENT
Start: 2024-03-30 | End: 2024-03-20

## 2024-03-11 RX ORDER — SERTRALINE HYDROCHLORIDE 25 MG/1
50 TABLET, FILM COATED ORAL DAILY
Qty: 180 TABLET | Refills: 0 | Status: SHIPPED | OUTPATIENT
Start: 2024-03-11 | End: 2024-06-09

## 2024-03-11 NOTE — TELEPHONE ENCOUNTER
Received request via: Pharmacy    Medication Name/Dosage Sertraline 25MG    When was medication last prescribed 11/12/23    How many refills were previously provided 0    How many Refills does he patient have left from last prescription 0    Was the patient seen in the last year in this department? Yes   Date of last office visit 11/09/23     Per last Neurology Office Visit, when was the date of next follow up visit set for?                            Date of office visit follow up request 2 months     Does the patient have an upcoming appointment? No   If yes, when N/A             If no, schedule appointment will follow up with patient    Does the patient have [facility] Plus and need 100 day supply (blood pressure, diabetes and cholesterol meds only)? Medication is not for cholesterol, blood pressure or diabetes      PHARMACY IS REQUESTING A DX CODE AS WELL AS A 90 DAY SUPPLY FOR THE PATIENT.

## 2024-03-20 DIAGNOSIS — M62.838 OTHER MUSCLE SPASM: ICD-10-CM

## 2024-03-20 DIAGNOSIS — I69.954 HEMIPLEGIA AND HEMIPARESIS FOLLOWING UNSPECIFIED CEREBROVASCULAR DISEASE AFFECTING LEFT NON-DOMINANT SIDE (HCC): ICD-10-CM

## 2024-03-20 DIAGNOSIS — I69.854 SPASTIC HEMIPLEGIA OF LEFT NONDOMINANT SIDE AS LATE EFFECT OF OTHER CEREBROVASCULAR DISEASE (HCC): ICD-10-CM

## 2024-03-20 RX ORDER — DANTROLENE SODIUM 25 MG/1
50 CAPSULE ORAL 2 TIMES DAILY
Qty: 120 CAPSULE | Refills: 2 | Status: SHIPPED | OUTPATIENT
Start: 2024-03-20 | End: 2024-06-18

## 2024-04-02 ENCOUNTER — APPOINTMENT (OUTPATIENT)
Dept: PHYSICAL MEDICINE AND REHAB | Facility: MEDICAL CENTER | Age: 59
End: 2024-04-02
Payer: MEDICARE

## 2024-05-13 ENCOUNTER — APPOINTMENT (OUTPATIENT)
Dept: NEUROLOGY | Facility: MEDICAL CENTER | Age: 59
End: 2024-05-13
Attending: STUDENT IN AN ORGANIZED HEALTH CARE EDUCATION/TRAINING PROGRAM
Payer: MEDICARE

## 2024-06-09 DIAGNOSIS — R45.89 DEPRESSED MOOD: ICD-10-CM

## 2024-06-10 NOTE — TELEPHONE ENCOUNTER
Received request via: Pharmacy    Medication Name/Dosage sertraline (ZOLOFT) 25 MG table     When was medication last prescribed 03/11/2024    How many refills were previously provided 0    How many Refills does he patient have left from last prescription 0    Was the patient seen in the last year in this department? Yes   Date of last office visit 01/12/2024     Per last Neurology Office Visit, when was the date of next follow up visit set for?                            Date of office visit follow up request 2 mo     Does the patient have an upcoming appointment? No   If yes, when none per provider             If no, schedule appointment clm    Does the patient have [facility] Plus and need 100 day supply (blood pressure, diabetes and cholesterol meds only)? Patient does not have SCP

## 2024-06-11 RX ORDER — SERTRALINE HYDROCHLORIDE 25 MG/1
50 TABLET, FILM COATED ORAL
Qty: 180 TABLET | Refills: 1 | Status: SHIPPED | OUTPATIENT
Start: 2024-06-11 | End: 2024-12-08

## 2024-06-13 ENCOUNTER — APPOINTMENT (OUTPATIENT)
Dept: NEUROLOGY | Facility: MEDICAL CENTER | Age: 59
End: 2024-06-13
Attending: STUDENT IN AN ORGANIZED HEALTH CARE EDUCATION/TRAINING PROGRAM
Payer: MEDICARE

## 2024-06-14 ENCOUNTER — PHARMACY VISIT (OUTPATIENT)
Dept: PHARMACY | Facility: MEDICAL CENTER | Age: 59
End: 2024-06-14
Payer: COMMERCIAL

## 2024-06-14 ENCOUNTER — TELEPHONE (OUTPATIENT)
Dept: PHARMACY | Facility: MEDICAL CENTER | Age: 59
End: 2024-06-14
Payer: MEDICARE

## 2024-06-14 DIAGNOSIS — R56.9 SEIZURES (HCC): ICD-10-CM

## 2024-06-14 PROCEDURE — RXMED WILLOW AMBULATORY MEDICATION CHARGE: Performed by: STUDENT IN AN ORGANIZED HEALTH CARE EDUCATION/TRAINING PROGRAM

## 2024-06-14 NOTE — PROGRESS NOTES
Patient developed diarrhea after increasing the Keppra dose to 1000 mg BID. The dose was increased after he had a breakthrough seizure on 12/16/2023.     New RX for Briviact 100 mg BID (equivalent dose) to [facility] on [address]     Rx for samples while pending PA sent to [facility] pharmacy.

## 2024-06-14 NOTE — TELEPHONE ENCOUNTER
Prior Authorization for BRIVIACT TABLET 100 MG has been approved for a quantity of 180 , day supply 90    Prior Authorization reference number: [id number]  Insurance: optum  Effective dates: 06/14/2024 thru 12/31/2024  Copay: $1241.73     Is patient eligible to fill with [facility]? Yes    Next Steps: The patient's copay exceeds $5.00. Proceed with contacting patient to offer financial assistance.

## 2024-06-14 NOTE — TELEPHONE ENCOUNTER
Prior Authorization for BRIVIACT TABLET 100 MG (Quantity: 180, Days: 90) has been submitted via Cover My Meds: Key ([id number])    Insurance: OPTUM    Will follow up in 24-48 business hours.

## 2024-06-15 DIAGNOSIS — M62.838 OTHER MUSCLE SPASM: ICD-10-CM

## 2024-06-15 DIAGNOSIS — I69.854 SPASTIC HEMIPLEGIA OF LEFT NONDOMINANT SIDE AS LATE EFFECT OF OTHER CEREBROVASCULAR DISEASE (HCC): ICD-10-CM

## 2024-06-15 DIAGNOSIS — I69.954 HEMIPLEGIA AND HEMIPARESIS FOLLOWING UNSPECIFIED CEREBROVASCULAR DISEASE AFFECTING LEFT NON-DOMINANT SIDE (HCC): ICD-10-CM

## 2024-06-17 DIAGNOSIS — I69.854 SPASTIC HEMIPLEGIA OF LEFT NONDOMINANT SIDE AS LATE EFFECT OF OTHER CEREBROVASCULAR DISEASE (HCC): ICD-10-CM

## 2024-06-17 RX ORDER — DANTROLENE SODIUM 25 MG/1
50 CAPSULE ORAL 2 TIMES DAILY
Qty: 120 CAPSULE | Refills: 2 | Status: SHIPPED | OUTPATIENT
Start: 2024-06-17

## 2024-06-20 ENCOUNTER — TELEPHONE (OUTPATIENT)
Dept: NEUROLOGY | Facility: MEDICAL CENTER | Age: 59
End: 2024-06-20
Payer: MEDICARE

## 2024-06-25 NOTE — TELEPHONE ENCOUNTER
BRIVIACT TABLET    FA     Called [facility] at [phone] for FA status.     spoke to Ty stated they are missing the provider info and prescription, they sent a section 3 provider info fax letter with a cover letter with pt name on it and also need a prescription and was sent to [phone].  We received the fax letter and they need that faxed letter back with a prescription. if faxing need to be signed by provider but if e scribed no signature required.    Pharmacy info:  [facility] pharmacy - [facility]. [phone]    I emailed liaison the FA letter

## 2024-07-03 ENCOUNTER — HOSPITAL ENCOUNTER (OUTPATIENT)
Dept: LAB | Facility: MEDICAL CENTER | Age: 59
End: 2024-07-03
Attending: NURSE PRACTITIONER
Payer: MEDICARE

## 2024-07-03 ENCOUNTER — HOSPITAL ENCOUNTER (OUTPATIENT)
Dept: LAB | Facility: MEDICAL CENTER | Age: 59
End: 2024-07-03
Attending: GENERAL PRACTICE
Payer: MEDICARE

## 2024-07-03 ENCOUNTER — HOSPITAL ENCOUNTER (OUTPATIENT)
Dept: LAB | Facility: MEDICAL CENTER | Age: 59
End: 2024-07-03
Attending: HOMEOPATH
Payer: MEDICARE

## 2024-07-03 ENCOUNTER — TELEPHONE (OUTPATIENT)
Dept: NEUROLOGY | Facility: MEDICAL CENTER | Age: 59
End: 2024-07-03
Payer: COMMERCIAL

## 2024-07-03 DIAGNOSIS — I69.854 SPASTIC HEMIPLEGIA OF LEFT NONDOMINANT SIDE AS LATE EFFECT OF OTHER CEREBROVASCULAR DISEASE (HCC): ICD-10-CM

## 2024-07-03 DIAGNOSIS — I10 ESSENTIAL HYPERTENSION: ICD-10-CM

## 2024-07-03 DIAGNOSIS — E78.5 DYSLIPIDEMIA: ICD-10-CM

## 2024-07-03 DIAGNOSIS — D68.69 SECONDARY HYPERCOAGULABLE STATE (HCC): ICD-10-CM

## 2024-07-03 DIAGNOSIS — I25.10 CORONARY ARTERY CALCIFICATION: ICD-10-CM

## 2024-07-03 DIAGNOSIS — I48.0 PAROXYSMAL ATRIAL FIBRILLATION (HCC): ICD-10-CM

## 2024-07-03 DIAGNOSIS — I25.84 CORONARY ARTERY CALCIFICATION: ICD-10-CM

## 2024-07-03 LAB
ALBUMIN SERPL BCP-MCNC: 4.5 G/DL (ref 3.2–4.9)
ALBUMIN/GLOB SERPL: 1.3 G/DL
ALBUMIN/GLOB SERPL: 1.3 G/DL
ALBUMIN/GLOB SERPL: 1.4 G/DL
ALP SERPL-CCNC: 69 U/L (ref 30–99)
ALP SERPL-CCNC: 69 U/L (ref 30–99)
ALP SERPL-CCNC: 70 U/L (ref 30–99)
ALT SERPL-CCNC: 34 U/L (ref 2–50)
ALT SERPL-CCNC: 35 U/L (ref 2–50)
ALT SERPL-CCNC: 36 U/L (ref 2–50)
ANION GAP SERPL CALC-SCNC: 14 MMOL/L (ref 7–16)
ANION GAP SERPL CALC-SCNC: 14 MMOL/L (ref 7–16)
ANION GAP SERPL CALC-SCNC: 15 MMOL/L (ref 7–16)
AST SERPL-CCNC: 21 U/L (ref 12–45)
AST SERPL-CCNC: 22 U/L (ref 12–45)
AST SERPL-CCNC: 23 U/L (ref 12–45)
BASOPHILS # BLD AUTO: 0.3 % (ref 0–1.8)
BASOPHILS # BLD AUTO: 0.3 % (ref 0–1.8)
BASOPHILS # BLD: 0.02 K/UL (ref 0–0.12)
BASOPHILS # BLD: 0.02 K/UL (ref 0–0.12)
BILIRUB SERPL-MCNC: 0.3 MG/DL (ref 0.1–1.5)
BILIRUB SERPL-MCNC: 0.3 MG/DL (ref 0.1–1.5)
BILIRUB SERPL-MCNC: 0.4 MG/DL (ref 0.1–1.5)
BUN SERPL-MCNC: 18 MG/DL (ref 8–22)
BUN SERPL-MCNC: 19 MG/DL (ref 8–22)
BUN SERPL-MCNC: 19 MG/DL (ref 8–22)
CALCIUM ALBUM COR SERPL-MCNC: 10 MG/DL (ref 8.5–10.5)
CALCIUM ALBUM COR SERPL-MCNC: 10.2 MG/DL (ref 8.5–10.5)
CALCIUM ALBUM COR SERPL-MCNC: 10.2 MG/DL (ref 8.5–10.5)
CALCIUM SERPL-MCNC: 10.4 MG/DL (ref 8.5–10.5)
CALCIUM SERPL-MCNC: 10.6 MG/DL (ref 8.5–10.5)
CALCIUM SERPL-MCNC: 10.6 MG/DL (ref 8.5–10.5)
CHLORIDE SERPL-SCNC: 102 MMOL/L (ref 96–112)
CHOLEST SERPL-MCNC: 287 MG/DL (ref 100–199)
CHOLEST SERPL-MCNC: 288 MG/DL (ref 100–199)
CO2 SERPL-SCNC: 22 MMOL/L (ref 20–33)
CO2 SERPL-SCNC: 23 MMOL/L (ref 20–33)
CO2 SERPL-SCNC: 23 MMOL/L (ref 20–33)
CREAT SERPL-MCNC: 1.4 MG/DL (ref 0.5–1.4)
CREAT SERPL-MCNC: 1.44 MG/DL (ref 0.5–1.4)
CREAT SERPL-MCNC: 1.5 MG/DL (ref 0.5–1.4)
EOSINOPHIL # BLD AUTO: 0.06 K/UL (ref 0–0.51)
EOSINOPHIL # BLD AUTO: 0.08 K/UL (ref 0–0.51)
EOSINOPHIL NFR BLD: 0.8 % (ref 0–6.9)
EOSINOPHIL NFR BLD: 1.1 % (ref 0–6.9)
ERYTHROCYTE [DISTWIDTH] IN BLOOD BY AUTOMATED COUNT: 43.3 FL (ref 35.9–50)
ERYTHROCYTE [DISTWIDTH] IN BLOOD BY AUTOMATED COUNT: 43.4 FL (ref 35.9–50)
EST. AVERAGE GLUCOSE BLD GHB EST-MCNC: 105 MG/DL
FASTING STATUS PATIENT QL REPORTED: NORMAL
GFR SERPLBLD CREATININE-BSD FMLA CKD-EPI: 53 ML/MIN/1.73 M 2
GFR SERPLBLD CREATININE-BSD FMLA CKD-EPI: 56 ML/MIN/1.73 M 2
GFR SERPLBLD CREATININE-BSD FMLA CKD-EPI: 58 ML/MIN/1.73 M 2
GGT SERPL-CCNC: 37 U/L (ref 7–51)
GLOBULIN SER CALC-MCNC: 3.2 G/DL (ref 1.9–3.5)
GLOBULIN SER CALC-MCNC: 3.4 G/DL (ref 1.9–3.5)
GLOBULIN SER CALC-MCNC: 3.4 G/DL (ref 1.9–3.5)
GLUCOSE SERPL-MCNC: 82 MG/DL (ref 65–99)
GLUCOSE SERPL-MCNC: 83 MG/DL (ref 65–99)
GLUCOSE SERPL-MCNC: 85 MG/DL (ref 65–99)
HBA1C MFR BLD: 5.3 % (ref 4–5.6)
HCT VFR BLD AUTO: 53.1 % (ref 42–52)
HCT VFR BLD AUTO: 53.3 % (ref 42–52)
HDLC SERPL-MCNC: 41 MG/DL
HDLC SERPL-MCNC: 41 MG/DL
HGB BLD-MCNC: 17.8 G/DL (ref 14–18)
HGB BLD-MCNC: 18.1 G/DL (ref 14–18)
IMM GRANULOCYTES # BLD AUTO: 0.02 K/UL (ref 0–0.11)
IMM GRANULOCYTES # BLD AUTO: 0.04 K/UL (ref 0–0.11)
IMM GRANULOCYTES NFR BLD AUTO: 0.3 % (ref 0–0.9)
IMM GRANULOCYTES NFR BLD AUTO: 0.6 % (ref 0–0.9)
IRON SERPL-MCNC: 114 UG/DL (ref 50–180)
LDH SERPL L TO P-CCNC: 177 U/L (ref 107–266)
LDLC SERPL CALC-MCNC: 179 MG/DL
LDLC SERPL CALC-MCNC: 180 MG/DL
LYMPHOCYTES # BLD AUTO: 1.21 K/UL (ref 1–4.8)
LYMPHOCYTES # BLD AUTO: 1.33 K/UL (ref 1–4.8)
LYMPHOCYTES NFR BLD: 17.4 % (ref 22–41)
LYMPHOCYTES NFR BLD: 18.1 % (ref 22–41)
MCH RBC QN AUTO: 29.9 PG (ref 27–33)
MCH RBC QN AUTO: 30.8 PG (ref 27–33)
MCHC RBC AUTO-ENTMCNC: 33.5 G/DL (ref 32.3–36.5)
MCHC RBC AUTO-ENTMCNC: 34 G/DL (ref 32.3–36.5)
MCV RBC AUTO: 89.2 FL (ref 81.4–97.8)
MCV RBC AUTO: 90.6 FL (ref 81.4–97.8)
MONOCYTES # BLD AUTO: 0.49 K/UL (ref 0–0.85)
MONOCYTES # BLD AUTO: 0.52 K/UL (ref 0–0.85)
MONOCYTES NFR BLD AUTO: 7 % (ref 0–13.4)
MONOCYTES NFR BLD AUTO: 7.1 % (ref 0–13.4)
NEUTROPHILS # BLD AUTO: 5.13 K/UL (ref 1.82–7.42)
NEUTROPHILS # BLD AUTO: 5.4 K/UL (ref 1.82–7.42)
NEUTROPHILS NFR BLD: 73.4 % (ref 44–72)
NEUTROPHILS NFR BLD: 73.6 % (ref 44–72)
NRBC # BLD AUTO: 0 K/UL
NRBC # BLD AUTO: 0 K/UL
NRBC BLD-RTO: 0 /100 WBC (ref 0–0.2)
NRBC BLD-RTO: 0 /100 WBC (ref 0–0.2)
PHOSPHATE SERPL-MCNC: 2.8 MG/DL (ref 2.5–4.5)
PLATELET # BLD AUTO: 340 K/UL (ref 164–446)
PLATELET # BLD AUTO: 341 K/UL (ref 164–446)
PMV BLD AUTO: 9.2 FL (ref 9–12.9)
PMV BLD AUTO: 9.3 FL (ref 9–12.9)
POTASSIUM SERPL-SCNC: 4.6 MMOL/L (ref 3.6–5.5)
POTASSIUM SERPL-SCNC: 4.8 MMOL/L (ref 3.6–5.5)
POTASSIUM SERPL-SCNC: 4.9 MMOL/L (ref 3.6–5.5)
PROT SERPL-MCNC: 7.7 G/DL (ref 6–8.2)
PROT SERPL-MCNC: 7.9 G/DL (ref 6–8.2)
PROT SERPL-MCNC: 7.9 G/DL (ref 6–8.2)
RBC # BLD AUTO: 5.88 M/UL (ref 4.7–6.1)
RBC # BLD AUTO: 5.95 M/UL (ref 4.7–6.1)
SODIUM SERPL-SCNC: 138 MMOL/L (ref 135–145)
SODIUM SERPL-SCNC: 139 MMOL/L (ref 135–145)
SODIUM SERPL-SCNC: 140 MMOL/L (ref 135–145)
T3FREE SERPL-MCNC: 5.25 PG/ML (ref 2–4.4)
T4 FREE SERPL-MCNC: 1.11 NG/DL (ref 0.93–1.7)
TRIGL SERPL-MCNC: 334 MG/DL (ref 0–149)
TRIGL SERPL-MCNC: 335 MG/DL (ref 0–149)
TSH SERPL DL<=0.005 MIU/L-ACNC: 3.28 UIU/ML (ref 0.38–5.33)
URATE SERPL-MCNC: 9 MG/DL (ref 2.5–8.3)
WBC # BLD AUTO: 7 K/UL (ref 4.8–10.8)
WBC # BLD AUTO: 7.4 K/UL (ref 4.8–10.8)

## 2024-07-03 PROCEDURE — 80061 LIPID PANEL: CPT | Mod: 91

## 2024-07-03 PROCEDURE — 84481 FREE ASSAY (FT-3): CPT

## 2024-07-03 PROCEDURE — 80053 COMPREHEN METABOLIC PANEL: CPT

## 2024-07-03 PROCEDURE — 80053 COMPREHEN METABOLIC PANEL: CPT | Mod: 91

## 2024-07-03 PROCEDURE — 80061 LIPID PANEL: CPT

## 2024-07-03 PROCEDURE — 84100 ASSAY OF PHOSPHORUS: CPT

## 2024-07-03 PROCEDURE — 84439 ASSAY OF FREE THYROXINE: CPT | Mod: GA

## 2024-07-03 PROCEDURE — 84550 ASSAY OF BLOOD/URIC ACID: CPT

## 2024-07-03 PROCEDURE — 83036 HEMOGLOBIN GLYCOSYLATED A1C: CPT | Mod: GA

## 2024-07-03 PROCEDURE — 83540 ASSAY OF IRON: CPT

## 2024-07-03 PROCEDURE — 85025 COMPLETE CBC W/AUTO DIFF WBC: CPT

## 2024-07-03 PROCEDURE — 84443 ASSAY THYROID STIM HORMONE: CPT | Mod: GA

## 2024-07-03 PROCEDURE — 85025 COMPLETE CBC W/AUTO DIFF WBC: CPT | Mod: 91

## 2024-07-03 PROCEDURE — 82977 ASSAY OF GGT: CPT | Mod: GA

## 2024-07-03 PROCEDURE — 36415 COLL VENOUS BLD VENIPUNCTURE: CPT | Mod: GA

## 2024-07-03 PROCEDURE — 83615 LACTATE (LD) (LDH) ENZYME: CPT

## 2024-07-05 ENCOUNTER — TELEPHONE (OUTPATIENT)
Dept: CARDIOLOGY | Facility: MEDICAL CENTER | Age: 59
End: 2024-07-05
Payer: COMMERCIAL

## 2024-07-05 ENCOUNTER — PATIENT MESSAGE (OUTPATIENT)
Dept: CARDIOLOGY | Facility: MEDICAL CENTER | Age: 59
End: 2024-07-05
Payer: COMMERCIAL

## 2024-07-05 DIAGNOSIS — E78.5 DYSLIPIDEMIA: ICD-10-CM

## 2024-07-22 DIAGNOSIS — M79.2 NEUROPATHIC PAIN: ICD-10-CM

## 2024-07-22 DIAGNOSIS — I69.954 HEMIPLEGIA AND HEMIPARESIS FOLLOWING UNSPECIFIED CEREBROVASCULAR DISEASE AFFECTING LEFT NON-DOMINANT SIDE (HCC): ICD-10-CM

## 2024-07-22 DIAGNOSIS — I69.854 SPASTIC HEMIPLEGIA OF LEFT NONDOMINANT SIDE AS LATE EFFECT OF OTHER CEREBROVASCULAR DISEASE (HCC): ICD-10-CM

## 2024-07-23 RX ORDER — GABAPENTIN 100 MG/1
200 CAPSULE ORAL NIGHTLY
Qty: 60 CAPSULE | Refills: 5 | Status: SHIPPED | OUTPATIENT
Start: 2024-07-23

## 2024-07-25 RX ORDER — EZETIMIBE 10 MG/1
10 TABLET ORAL DAILY
Qty: 90 TABLET | Refills: 0 | Status: SHIPPED | OUTPATIENT
Start: 2024-07-25

## 2024-09-05 ENCOUNTER — TELEPHONE (OUTPATIENT)
Dept: CARDIOLOGY | Facility: MEDICAL CENTER | Age: 59
End: 2024-09-05
Payer: MEDICARE

## 2024-09-05 DIAGNOSIS — I10 ESSENTIAL HYPERTENSION: ICD-10-CM

## 2024-09-05 RX ORDER — RAMIPRIL 10 MG/1
10 CAPSULE ORAL 2 TIMES DAILY
Qty: 180 CAPSULE | Refills: 0 | Status: SHIPPED | OUTPATIENT
Start: 2024-09-05

## 2024-09-05 NOTE — TELEPHONE ENCOUNTER
To Schedulers, Last OV 9/2023 please schedule FV, thank you!    Upon chart review last seen 9/2023, no FV scheduled.   90 day courtesy refill sent.

## 2024-09-08 DIAGNOSIS — I10 ESSENTIAL HYPERTENSION: ICD-10-CM

## 2024-09-09 RX ORDER — SPIRONOLACTONE 25 MG/1
25 TABLET ORAL DAILY
Qty: 90 TABLET | Refills: 0 | Status: SHIPPED | OUTPATIENT
Start: 2024-09-09

## 2024-09-09 RX ORDER — METOPROLOL TARTRATE 25 MG/1
25 TABLET, FILM COATED ORAL 2 TIMES DAILY
Qty: 180 TABLET | Refills: 0 | Status: SHIPPED | OUTPATIENT
Start: 2024-09-09

## 2024-09-14 DIAGNOSIS — I69.954 HEMIPLEGIA AND HEMIPARESIS FOLLOWING UNSPECIFIED CEREBROVASCULAR DISEASE AFFECTING LEFT NON-DOMINANT SIDE (HCC): ICD-10-CM

## 2024-09-14 DIAGNOSIS — I69.854 SPASTIC HEMIPLEGIA OF LEFT NONDOMINANT SIDE AS LATE EFFECT OF OTHER CEREBROVASCULAR DISEASE (HCC): ICD-10-CM

## 2024-09-14 DIAGNOSIS — M62.838 OTHER MUSCLE SPASM: ICD-10-CM

## 2024-09-17 NOTE — TELEPHONE ENCOUNTER
DANTROLENE SODIUM 25 MG CAP     Received request via: Pharmacy    Was the patient seen in the last year in this department? Yes    Does the patient have an active prescription (recently filled or refills available) for medication(s) requested?  Yes    [facility]    Does the patient have [facility] Plus and need 100-day supply? (This applies to ALL medications) Patient does not have SCP

## 2024-09-18 RX ORDER — DANTROLENE SODIUM 25 MG/1
50 CAPSULE ORAL 2 TIMES DAILY
Qty: 120 CAPSULE | Refills: 2 | Status: SHIPPED | OUTPATIENT
Start: 2024-09-18

## 2024-09-26 DIAGNOSIS — G89.29 CHRONIC NOCICEPTIVE PAIN: ICD-10-CM

## 2024-09-26 RX ORDER — CELECOXIB 200 MG/1
200 CAPSULE ORAL 2 TIMES DAILY
Qty: 180 CAPSULE | Refills: 0 | Status: SHIPPED | OUTPATIENT
Start: 2024-09-26

## 2024-09-26 NOTE — TELEPHONE ENCOUNTER
celecoxib (CELEBREX) 200 MG Cap     Received request via: Patient    Was the patient seen in the last year in this department? Yes    Does the patient have an active prescription (recently filled or refills available) for medication(s) requested? No    Pharmacy Name: [place] [id number]    Does the patient have [facility] Plus and need 100-day supply? (This applies to ALL medications) Patient does not have SCP    Patient Comment: Dr. Martinez, this prescription has mever been filled. Will you please fill it at [facility]

## 2024-10-21 ENCOUNTER — TELEPHONE (OUTPATIENT)
Dept: CARDIOLOGY | Facility: MEDICAL CENTER | Age: 59
End: 2024-10-21
Payer: MEDICARE

## 2024-11-24 DIAGNOSIS — E78.5 DYSLIPIDEMIA: ICD-10-CM

## 2024-11-26 ENCOUNTER — TELEPHONE (OUTPATIENT)
Dept: NEUROLOGY | Facility: MEDICAL CENTER | Age: 59
End: 2024-11-26
Payer: MEDICARE

## 2024-11-26 RX ORDER — EZETIMIBE 10 MG/1
10 TABLET ORAL DAILY
Qty: 90 TABLET | Refills: 0 | OUTPATIENT
Start: 2024-11-26

## 2024-11-26 NOTE — TELEPHONE ENCOUNTER
Called PT to schedule a follow-up appointment for medication refills - Unable to leave VM - Sent MyChart letter - CR

## 2024-11-27 DIAGNOSIS — R56.9 SEIZURES (HCC): ICD-10-CM

## 2024-12-04 DIAGNOSIS — I10 ESSENTIAL HYPERTENSION: ICD-10-CM

## 2024-12-04 RX ORDER — RAMIPRIL 10 MG/1
10 CAPSULE ORAL 2 TIMES DAILY
Qty: 60 CAPSULE | Refills: 0 | Status: SHIPPED | OUTPATIENT
Start: 2024-12-04

## 2024-12-04 RX ORDER — SPIRONOLACTONE 25 MG/1
25 TABLET ORAL DAILY
Qty: 30 TABLET | Refills: 0 | Status: SHIPPED | OUTPATIENT
Start: 2024-12-04 | End: 2024-12-27

## 2024-12-04 NOTE — TELEPHONE ENCOUNTER
Received request via: Patient    Was the patient seen in the last year in this department? Yes    Does the patient have an active prescription (recently filled or refills available) for medication(s) requested? No    Pharmacy Name:     [facility][id number] - [place] - [address] [[id number]]     Does the patient have [facility] Plus and need 100-day supply? (This applies to ALL medications) Patient does not have SCP

## 2024-12-07 DIAGNOSIS — I10 ESSENTIAL HYPERTENSION: ICD-10-CM

## 2024-12-11 RX ORDER — METOPROLOL TARTRATE 25 MG/1
25 TABLET, FILM COATED ORAL 2 TIMES DAILY
Qty: 60 TABLET | Refills: 0 | Status: SHIPPED | OUTPATIENT
Start: 2024-12-11

## 2024-12-21 DIAGNOSIS — I69.854 SPASTIC HEMIPLEGIA OF LEFT NONDOMINANT SIDE AS LATE EFFECT OF OTHER CEREBROVASCULAR DISEASE (HCC): ICD-10-CM

## 2024-12-21 DIAGNOSIS — I69.954 HEMIPLEGIA AND HEMIPARESIS FOLLOWING UNSPECIFIED CEREBROVASCULAR DISEASE AFFECTING LEFT NON-DOMINANT SIDE (HCC): ICD-10-CM

## 2024-12-21 DIAGNOSIS — M62.838 OTHER MUSCLE SPASM: ICD-10-CM

## 2024-12-23 RX ORDER — DANTROLENE SODIUM 25 MG/1
50 CAPSULE ORAL 2 TIMES DAILY
Qty: 120 CAPSULE | Refills: 0 | Status: SHIPPED | OUTPATIENT
Start: 2024-12-23

## 2024-12-23 NOTE — TELEPHONE ENCOUNTER
DANTROLENE SODIUM 25 MG CAP    Received request via: Pharmacy    Was the patient seen in the last year in this department? Yes    Does the patient have an active prescription (recently filled or refills available) for medication(s) requested? No    Pharmacy Name: [id number]    Does the patient have [facility] Plus and need 100-day supply? (This applies to ALL medications) Patient does not have SCP

## 2024-12-27 DIAGNOSIS — I10 ESSENTIAL HYPERTENSION: ICD-10-CM

## 2024-12-27 RX ORDER — SPIRONOLACTONE 25 MG/1
25 TABLET ORAL DAILY
Qty: 30 TABLET | Refills: 0 | Status: SHIPPED | OUTPATIENT
Start: 2024-12-27

## 2025-01-02 ENCOUNTER — OFFICE VISIT (OUTPATIENT)
Dept: CARDIOLOGY | Facility: MEDICAL CENTER | Age: 60
End: 2025-01-02
Payer: MEDICARE

## 2025-01-02 VITALS
BODY MASS INDEX: 28 KG/M2 | HEIGHT: 71 IN | SYSTOLIC BLOOD PRESSURE: 100 MMHG | WEIGHT: 200 LBS | DIASTOLIC BLOOD PRESSURE: 56 MMHG | OXYGEN SATURATION: 94 % | HEART RATE: 81 BPM

## 2025-01-02 DIAGNOSIS — I48.0 HYPERCOAGULABLE STATE DUE TO PAROXYSMAL ATRIAL FIBRILLATION (HCC): ICD-10-CM

## 2025-01-02 DIAGNOSIS — I25.10 CORONARY ARTERY DISEASE INVOLVING NATIVE CORONARY ARTERY OF NATIVE HEART WITHOUT ANGINA PECTORIS: ICD-10-CM

## 2025-01-02 DIAGNOSIS — E78.5 DYSLIPIDEMIA: ICD-10-CM

## 2025-01-02 DIAGNOSIS — R56.9 SEIZURES (HCC): ICD-10-CM

## 2025-01-02 DIAGNOSIS — D68.69 HYPERCOAGULABLE STATE DUE TO PAROXYSMAL ATRIAL FIBRILLATION (HCC): ICD-10-CM

## 2025-01-02 DIAGNOSIS — I48.0 AF (PAROXYSMAL ATRIAL FIBRILLATION) (HCC): ICD-10-CM

## 2025-01-02 DIAGNOSIS — I10 ESSENTIAL HYPERTENSION: ICD-10-CM

## 2025-01-02 PROCEDURE — 99214 OFFICE O/P EST MOD 30 MIN: CPT

## 2025-01-02 PROCEDURE — 93005 ELECTROCARDIOGRAM TRACING: CPT | Mod: TC

## 2025-01-02 PROCEDURE — 99213 OFFICE O/P EST LOW 20 MIN: CPT

## 2025-01-02 PROCEDURE — 3074F SYST BP LT 130 MM HG: CPT

## 2025-01-02 PROCEDURE — 2023F DILAT RTA XM W/O RTNOPTHY: CPT

## 2025-01-02 PROCEDURE — 3078F DIAST BP <80 MM HG: CPT

## 2025-01-02 RX ORDER — EZETIMIBE 10 MG/1
10 TABLET ORAL DAILY
Qty: 90 TABLET | Refills: 3 | Status: SHIPPED | OUTPATIENT
Start: 2025-01-02

## 2025-01-02 RX ORDER — AMLODIPINE BESYLATE 5 MG/1
5 TABLET ORAL 2 TIMES DAILY
Qty: 180 TABLET | Refills: 3 | Status: SHIPPED | OUTPATIENT
Start: 2025-01-02

## 2025-01-02 RX ORDER — SPIRONOLACTONE 25 MG/1
25 TABLET ORAL DAILY
Qty: 90 TABLET | Refills: 3 | Status: SHIPPED | OUTPATIENT
Start: 2025-01-02

## 2025-01-02 RX ORDER — RAMIPRIL 10 MG/1
10 CAPSULE ORAL DAILY
Qty: 90 CAPSULE | Refills: 3 | Status: SHIPPED | OUTPATIENT
Start: 2025-01-02

## 2025-01-02 RX ORDER — METOPROLOL TARTRATE 25 MG/1
25 TABLET, FILM COATED ORAL 2 TIMES DAILY
Qty: 180 TABLET | Refills: 3 | Status: SHIPPED | OUTPATIENT
Start: 2025-01-02

## 2025-01-02 ASSESSMENT — ENCOUNTER SYMPTOMS
HEADACHES: 0
DYSPNEA ON EXERTION: 0
SYNCOPE: 0
SHORTNESS OF BREATH: 0
PALPITATIONS: 0
NEAR-SYNCOPE: 0
DIZZINESS: 0
LIGHT-HEADEDNESS: 0
PND: 0
ORTHOPNEA: 0

## 2025-01-02 ASSESSMENT — FIBROSIS 4 INDEX: FIB4 SCORE: 0.63

## 2025-01-02 NOTE — TELEPHONE ENCOUNTER
Received request via: Patient    Medication Name/Dosage Brivaracetam (Briviact), Take 1 Tablet by mouth 2 times a day for 180 days. Indications: Partial Onset Seizure     When was medication last prescribed 6/14/24    How many refills were previously provided 1    How many Refills does he patient have left from last prescription 0    Was the patient seen in the last year in this department? Yes   Date of last office visit 1/12/24     Per last Neurology Office Visit, when was the date of next follow up visit set for?  2 months                          Date of office visit follow up request 3/12/24     Does the patient have an upcoming appointment? Yes   If yes, when 2/20/25             If no, schedule appointment Scheduled     Does the patient have long-term Plus and need 100 day supply (blood pressure, diabetes and cholesterol meds only)? Patient does not have SCP    Patient wife called and patient is out this medication today and  hoping to get refilled today as to not miss a dosage. Patient was scheduled for follow up

## 2025-01-02 NOTE — PROGRESS NOTES
Chief Complaint   Patient presents with    Follow-Up     Dyslipidemia    Hypertension    Atrial Fibrillation          Subjective:   Thong Arzola is a 59 y.o. male who presents today for follow-up.     Patient of Dr. Nieves.  Current medical problems include paroxysmal A. fib, dyslipidemia, hypertension, coronary artery calcification, and CVA (large right MCA infarct due to A. fib) in 3/2021. Their last clinic visit was 9/12/2023 with Emelina Ernst.    Today's visit:  Patient reports since last follow-up he is feeling overall well from a cardiac perspective.  He denies chest pain, palpitations, orthopnea, PND, edema, lightheaded/dizziness, or syncope.  He has been taking his blood pressures at home and reports they are improved with average of 107 over 70s.  He reports taking all his medications as prescribed without any missed doses of Xarelto.  He denies being able to feel when he goes in and out of A-fib.  Patient has not been taking rosuvastatin or ezetimibe due to continued GI side effects and diarrhea.  Since stopping both medications patient still continues to have diarrhea so unsure if that the medication is the cause.  Patient has not followed up with GI specialist at this time.  Patient is taking amlodipine in the evenings to help combat diarrhea.  Patient is minimally active due to residual side effects and hemiparesis after his stroke.  He does have a physiatrist who he plans on following back up with to hopefully get more activities and exercises that he is able to do.    Cardiovascular Risk Factors:  1. Smoking status: Never  2. Type II Diabetes Mellitus: No   Lab Results   Component Value Date/Time    HBA1C 5.3 07/03/2024 09:21 AM    HBA1C 5.9 (H) 04/01/2021 02:45 AM     3. Hypertension: Yes  4. Dyslipidemia: Yes   Cholesterol,Tot   Date Value Ref Range Status   07/03/2024 287 (H) 100 - 199 mg/dL Final     LDL   Date Value Ref Range Status   07/03/2024 179 (H) <100 mg/dL Final     HDL   Date Value  Ref Range Status   07/03/2024 41 >=40 mg/dL Final     Triglycerides   Date Value Ref Range Status   07/03/2024 335 (H) 0 - 149 mg/dL Final       Past Medical History:   Diagnosis Date    Afib (HCC)     COVID-19     Disorder of thyroid     hypo    High cholesterol     Psychiatric problem     reactive depression    Stroke (HCC) 2021    right side MCA         No family history on file.      Social History     Tobacco Use    Smoking status: Never    Smokeless tobacco: Never   Vaping Use    Vaping status: Never Used   Substance Use Topics    Alcohol use: Yes     Alcohol/week: 4.2 oz     Types: 7 Shots of liquor per week     Comment: 1 shot before bedtime    Drug use: No         No Known Allergies      Current Outpatient Medications   Medication Sig    brivaracetam (BRIVIACT) 100 MG Tab tablet Take 100 mg by mouth 2 times a day.    sertraline (ZOLOFT) 50 MG Tab Take 50 mg by mouth every day.    ramipril (ALTACE) 10 MG capsule Take 1 Capsule by mouth every day. Taking 1 tab one time daily    metoprolol tartrate (LOPRESSOR) 25 MG Tab Take 1 Tablet by mouth 2 times a day.    spironolactone (ALDACTONE) 25 MG Tab Take 1 Tablet by mouth every day.    amLODIPine (NORVASC) 5 MG Tab Take 1 Tablet by mouth 2 times a day.    ezetimibe (ZETIA) 10 MG Tab Take 1 Tablet by mouth every day.    dantrolene (DANTRIUM) 25 MG Cap TAKE 2 CAPSULES BY MOUTH TWICE A DAY    gabapentin (NEURONTIN) 100 MG Cap TAKE 2 CAPSULES BY MOUTH EVERY EVENING    rivaroxaban (XARELTO) 20 MG Tab tablet Take 1 Tablet by mouth with dinner.    ARMOUR THYROID 90 MG Tab Take 90 mg by mouth 2 times a day. 90 MG taking 2 tabs daily    acetaminophen (TYLENOL) 325 MG Tab Take 500 mg by mouth every four hours as needed for Mild Pain.         Review of Systems   Constitutional: Positive for malaise/fatigue.   Cardiovascular:  Negative for chest pain, dyspnea on exertion, leg swelling, near-syncope, orthopnea, palpitations, paroxysmal nocturnal dyspnea and syncope.  "  Respiratory:  Negative for shortness of breath.    Neurological:  Negative for dizziness, headaches and light-headedness.           Objective:   /56 (BP Location: Right arm, Patient Position: Sitting, BP Cuff Size: Adult)   Pulse 81   Ht 1.803 m (5' 11\")   Wt 90.7 kg (200 lb)   SpO2 94%  Body mass index is 27.89 kg/m².         Physical Exam  Constitutional:       General: He is not in acute distress.     Appearance: Normal appearance.   HENT:      Head: Normocephalic and atraumatic.   Cardiovascular:      Rate and Rhythm: Tachycardia present. Rhythm irregular.      Pulses: Normal pulses.      Heart sounds: No murmur heard.  Pulmonary:      Effort: Pulmonary effort is normal. No respiratory distress.      Breath sounds: Normal breath sounds.   Musculoskeletal:      Right lower leg: No edema.      Left lower leg: No edema.   Neurological:      Mental Status: He is alert and oriented to person, place, and time.      Gait: Gait normal.   Psychiatric:         Behavior: Behavior normal.             Lab Results   Component Value Date/Time    SODIUM 140 07/03/2024 09:21 AM    POTASSIUM 4.9 07/03/2024 09:21 AM    CHLORIDE 102 07/03/2024 09:21 AM    CO2 23 07/03/2024 09:21 AM    GLUCOSE 85 07/03/2024 09:21 AM    BUN 19 07/03/2024 09:21 AM    CREATININE 1.50 (H) 07/03/2024 09:21 AM    CREATININE 1.3 04/04/2008 03:05 AM      Lab Results   Component Value Date/Time    WBC 7.0 07/03/2024 09:21 AM    RBC 5.95 07/03/2024 09:21 AM    HEMOGLOBIN 17.8 07/03/2024 09:21 AM    HEMATOCRIT 53.1 (H) 07/03/2024 09:21 AM    MCV 89.2 07/03/2024 09:21 AM    MCH 29.9 07/03/2024 09:21 AM    MCHC 33.5 07/03/2024 09:21 AM    MPV 9.2 07/03/2024 09:21 AM    NEUTSPOLYS 73.60 (H) 07/03/2024 09:21 AM    LYMPHOCYTES 17.40 (L) 07/03/2024 09:21 AM    MONOCYTES 7.00 07/03/2024 09:21 AM    EOSINOPHILS 1.10 07/03/2024 09:21 AM    BASOPHILS 0.30 07/03/2024 09:21 AM      Lab Results   Component Value Date/Time    CHOLSTRLTOT 287 (H) 07/03/2024 09:21 " "AM     (H) 07/03/2024 09:21 AM    HDL 41 07/03/2024 09:21 AM    TRIGLYCERIDE 335 (H) 07/03/2024 09:21 AM       Lab Results   Component Value Date/Time    TROPONINT 11 04/08/2021 1945     No results found for: \"NTPROBNP\"  Assessment:   1. Dyslipidemia  - ezetimibe (ZETIA) 10 MG Tab; Take 1 Tablet by mouth every day.  Dispense: 90 Tablet; Refill: 3  - Lipid Profile; Future  - Lipoprotein (a); Future  - APOLIPOPROTEIN B; Future  - Comp Metabolic Panel; Future    2. Coronary artery disease involving native coronary artery of native heart without angina pectoris    3. AF (paroxysmal atrial fibrillation) ([facility])  - EKG - Clinic Performed    4. Hypercoagulable state due to paroxysmal atrial fibrillation ([facility])    5. Essential hypertension  - ramipril (ALTACE) 10 MG capsule; Take 1 Capsule by mouth every day. Taking 1 tab one time daily  Dispense: 90 Capsule; Refill: 3  - metoprolol tartrate (LOPRESSOR) 25 MG Tab; Take 1 Tablet by mouth 2 times a day.  Dispense: 180 Tablet; Refill: 3  - spironolactone (ALDACTONE) 25 MG Tab; Take 1 Tablet by mouth every day.  Dispense: 90 Tablet; Refill: 3  - amLODIPine (NORVASC) 5 MG Tab; Take 1 Tablet by mouth 2 times a day.  Dispense: 180 Tablet; Refill: 3    Other orders  - brivaracetam (BRIVIACT) 100 MG Tab tablet; Take 100 mg by mouth 2 times a day.  - sertraline (ZOLOFT) 50 MG Tab; Take 50 mg by mouth every day.        Medical Decision Making:  Today's Assessment / Plan:   Hyperlipidemia  -Most recent   -Patient has not been on statin therapy or zetia due to thoughts of GI side effects. Discussed importance of starting on cholesterol lowering medication and possible referral to lipid clinic for PCSK9i or inclisiran. Patient and wife not wanting to move forward at this time despite elevated cholesterol and previous stroke. Patient wants to restart zetia and repeat labs. Discussed that I do not think zetia alone will lower patients cholesterol to goal of less than 70. If " cholesterol still above goal recommend starting repatha.   -Goal of less than 70  -Check lipid panel in 1 month including LP (a) and apolipoprotein b    Atrial fibrillation  Hypercoagulability due to atrial fibrillation  -Patient in atrial fibrillation in office today. EKG interpreted by me as atrial fibrillation rate 138. Patient not aware he was in atrial fibrillation and not sure when he went back into afib. He has not missed any doses of xarelto. Discussed recommendation of rhythm control with cardioversion and antiarrythmic medication. Patient and wife not wanting to move forward with more medication or cardioversion at this time. Patient to go home and increase dose of metoprolol to 50 mg twice a day for rate control. Repeat EKG inoffice for non provider visit to see if still in atrial fibrillation. If still in atrial fibrillation patient ok with moving forward with cardioversion and referral to EP for discussion of AAD vs ablation.   -Increase metoprolol 50 mg twice a day  -ER precautions for atrial fibrillation with RVR or if patient becomes symptomatic    Hypertension  - Good control  - continue ramipril, amlodipine and spirolactone  -Increase metoprolol 50 mg twice a day  - goal < 130/80      Return for Follow up pending repeat EKG.  Sooner if problems.    CRISTÓBAL Lopez.

## 2025-01-03 ENCOUNTER — TELEPHONE (OUTPATIENT)
Dept: CARDIOLOGY | Facility: MEDICAL CENTER | Age: 60
End: 2025-01-03
Payer: MEDICARE

## 2025-01-03 ENCOUNTER — TELEPHONE (OUTPATIENT)
Dept: NEUROLOGY | Facility: MEDICAL CENTER | Age: 60
End: 2025-01-03
Payer: MEDICARE

## 2025-01-03 LAB — EKG IMPRESSION: NORMAL

## 2025-01-03 PROCEDURE — 93010 ELECTROCARDIOGRAM REPORT: CPT | Performed by: INTERNAL MEDICINE

## 2025-01-03 RX ORDER — BRIVARACETAM 100 MG/1
TABLET, FILM COATED ORAL
Qty: 60 TABLET | Refills: 0 | Status: SHIPPED | OUTPATIENT
Start: 2025-01-03 | End: 2025-03-04

## 2025-01-03 NOTE — TELEPHONE ENCOUNTER
Caller: Anel - Wife    Topic/issue: Patient got the pulse oximeter as directed in office visit yesterday and states that the readings are all over the place. Go up to 109 and then down to 70's. Wife states that patient was in afib yesterday and is requesting return call to advise.    Callback Number: [phone]    Thank you,  Lenore PETERSON

## 2025-01-03 NOTE — TELEPHONE ENCOUNTER
Pt wife called and pt has been out of Briviact for a couple of days so the patient has been taking Keppra 500mg twice daily for 1000mg daily. The wife states that if she needs to she will drive from [place] to  medication, but will not be back in [place] till Sunday to  prescription. Wife wants to know if she needs to  prescription asap. Please advise

## 2025-01-03 NOTE — TELEPHONE ENCOUNTER
Phone number called: [phone] (home) [phone] (work)     To HL: Just FYI. pt's wife reports that pt's HR would jump from 70s to 80 then up to 90s to low 100s but not sustaining. Pt denies any symptoms of dizziness, lightheadedness, feeling of passing out, SOB, and/or CP. Wife reports that they were instructed to take metoprolol 50 mg BID. Pt's wife  will contact our office and will keep us updated on pt's symptoms.     BP: 109/71

## 2025-01-04 NOTE — TELEPHONE ENCOUNTER
Attempted to call patient wife back about messages. Did leave a voicemail discussing the pulse ox and how with atrial fibrillation it may seem to bounce up and down but if his heart rate is controlled with increased dose of metoprolol and patient continues to be asymptomatic while in atrial fibrillation ok to continue with metoprolol 50 mg twice a day and plan for repeat EKG in office on 1/14/2024. If patient becomes symptomatic or atrial fibrillation with RVR ER precautions.

## 2025-01-06 NOTE — TELEPHONE ENCOUNTER
Phone number called: [phone] (home) [phone] (work)     Call outcome: I tried to contact pt but there was not answer. Left Vm for pt to call back. Dipity message sent to pt.

## 2025-01-07 DIAGNOSIS — I10 ESSENTIAL HYPERTENSION: ICD-10-CM

## 2025-01-07 RX ORDER — METOPROLOL TARTRATE 25 MG/1
TABLET, FILM COATED ORAL
Qty: 60 TABLET | OUTPATIENT
Start: 2025-01-07

## 2025-01-14 ENCOUNTER — NON-PROVIDER VISIT (OUTPATIENT)
Dept: CARDIOLOGY | Facility: MEDICAL CENTER | Age: 60
End: 2025-01-14
Payer: MEDICARE

## 2025-01-14 ENCOUNTER — TELEPHONE (OUTPATIENT)
Dept: CARDIOLOGY | Facility: MEDICAL CENTER | Age: 60
End: 2025-01-14

## 2025-01-14 DIAGNOSIS — I48.0 AF (PAROXYSMAL ATRIAL FIBRILLATION) (HCC): ICD-10-CM

## 2025-01-14 LAB — EKG IMPRESSION: NORMAL

## 2025-01-14 PROCEDURE — 93010 ELECTROCARDIOGRAM REPORT: CPT | Performed by: INTERNAL MEDICINE

## 2025-01-14 PROCEDURE — 93005 ELECTROCARDIOGRAM TRACING: CPT | Mod: TC

## 2025-01-14 RX ORDER — AMIODARONE HYDROCHLORIDE 200 MG/1
TABLET ORAL
Qty: 58 TABLET | Refills: 0 | Status: SHIPPED | OUTPATIENT
Start: 2025-01-14 | End: 2025-02-27

## 2025-01-14 NOTE — NON-PROVIDER
Patient was here today for EKG. EKG performed and transferred into patients chart. Paper copy of EKG given to Faith KHALIL. RN will contact patient to follow up unless patient is symptomatic.   Is patient reporting any symptoms? Yes  If yes, RN to visit exam room

## 2025-01-14 NOTE — TELEPHONE ENCOUNTER
To HL, please review EKG.  Pt states he's experiencing SOB but is not sure if it's from the afib.  Please advise, thank you!

## 2025-01-14 NOTE — TELEPHONE ENCOUNTER
----- Message from Medical Assistant Fidelina MIKE Med Ass't sent at 1/14/2025  2:32 PM PST -----  Non-Pro EKG, thank you!

## 2025-01-14 NOTE — TELEPHONE ENCOUNTER
CRISTÓBAL Lopez.  Rboyn Zheng,    Please schedule patient for MARK/cardioversion with Dr. Nivees.    Thank you!

## 2025-01-15 ENCOUNTER — TELEPHONE (OUTPATIENT)
Dept: CARDIOLOGY | Facility: MEDICAL CENTER | Age: 60
End: 2025-01-15
Payer: MEDICARE

## 2025-01-15 NOTE — TELEPHONE ENCOUNTER
----- Message from Nurse Practitioner SHERRIE Monique sent at 1/14/2025  3:07 PM PST -----  Hi,    I called and spoke to the patient and his wife about his EKG. Patient to continue with metoprolol 50 mg twice a day. Placing an order for amiodarone and MARK/cardioversion to get patient to normal rhythm. Patient to follow up with Dr. Nieves following procedure.

## 2025-01-16 NOTE — TELEPHONE ENCOUNTER
Spoke with wife, unavailable at the moment, will call her back in a little bit to get procedure scheduled. Looking at 02-14

## 2025-01-19 ENCOUNTER — PATIENT MESSAGE (OUTPATIENT)
Dept: CARDIOLOGY | Facility: MEDICAL CENTER | Age: 60
End: 2025-01-19
Payer: MEDICARE

## 2025-01-20 NOTE — TELEPHONE ENCOUNTER
Patient is scheduled for a MARK/Cardioversion with anesthesia on 02- with Dr. Nieves Patient   has been instructed to check in at 9:00 am for 11:00 procedure. No meds to hold. Patient has been advised they will need a  home and with them after since they are sedated. Message sent to authorizations. Sent [employer] message to pt with instructions.  FYI sent to Ezequiel

## 2025-01-21 ENCOUNTER — APPOINTMENT (OUTPATIENT)
Dept: ADMISSIONS | Facility: MEDICAL CENTER | Age: 60
End: 2025-01-21
Attending: STUDENT IN AN ORGANIZED HEALTH CARE EDUCATION/TRAINING PROGRAM
Payer: MEDICARE

## 2025-01-21 ENCOUNTER — HOSPITAL ENCOUNTER (OUTPATIENT)
Facility: MEDICAL CENTER | Age: 60
End: 2025-01-21
Attending: STUDENT IN AN ORGANIZED HEALTH CARE EDUCATION/TRAINING PROGRAM | Admitting: STUDENT IN AN ORGANIZED HEALTH CARE EDUCATION/TRAINING PROGRAM
Payer: MEDICARE

## 2025-01-26 DIAGNOSIS — I69.854 SPASTIC HEMIPLEGIA OF LEFT NONDOMINANT SIDE AS LATE EFFECT OF OTHER CEREBROVASCULAR DISEASE (HCC): ICD-10-CM

## 2025-01-26 DIAGNOSIS — M79.2 NEUROPATHIC PAIN: ICD-10-CM

## 2025-01-26 DIAGNOSIS — I69.954 HEMIPLEGIA AND HEMIPARESIS FOLLOWING UNSPECIFIED CEREBROVASCULAR DISEASE AFFECTING LEFT NON-DOMINANT SIDE (HCC): ICD-10-CM

## 2025-01-26 DIAGNOSIS — I48.0 AF (PAROXYSMAL ATRIAL FIBRILLATION) (HCC): ICD-10-CM

## 2025-01-26 DIAGNOSIS — R45.89 DEPRESSED MOOD: ICD-10-CM

## 2025-01-27 RX ORDER — SERTRALINE HYDROCHLORIDE 25 MG/1
50 TABLET, FILM COATED ORAL
Qty: 180 TABLET | Refills: 1 | Status: SHIPPED | OUTPATIENT
Start: 2025-01-27 | End: 2025-07-26

## 2025-01-28 RX ORDER — RIVAROXABAN 20 MG/1
20 TABLET, FILM COATED ORAL
Qty: 90 TABLET | Refills: 3 | Status: SHIPPED | OUTPATIENT
Start: 2025-01-28

## 2025-01-29 ENCOUNTER — PRE-ADMISSION TESTING (OUTPATIENT)
Dept: ADMISSIONS | Facility: MEDICAL CENTER | Age: 60
End: 2025-01-29
Attending: STUDENT IN AN ORGANIZED HEALTH CARE EDUCATION/TRAINING PROGRAM
Payer: MEDICARE

## 2025-01-29 DIAGNOSIS — I48.0 AF (PAROXYSMAL ATRIAL FIBRILLATION) (HCC): ICD-10-CM

## 2025-01-29 RX ORDER — ACETAMINOPHEN 500 MG
1000 TABLET ORAL NIGHTLY
COMMUNITY

## 2025-01-29 RX ORDER — LEVETIRACETAM 1000 MG/1
1000 TABLET ORAL
COMMUNITY

## 2025-01-29 RX ORDER — RIVAROXABAN 20 MG/1
20 TABLET, FILM COATED ORAL
Qty: 90 TABLET | Refills: 3 | OUTPATIENT
Start: 2025-01-29

## 2025-01-29 NOTE — PREADMIT AVS NOTE
Current Medications   Medication Instructions    acetaminophen (TYLENOL) 500 MG Tab Continue  to take as prescribed    levetiracetam (KEPPRA) 1000 MG tablet Continue taking medication as prescribed, including morning of procedure     XARELTO 20 MG Tab tablet Continue to take as prescribed per cardiology instructions    sertraline (ZOLOFT) 25 MG tablet Continue taking medication as prescribed, including morning of procedure     amiodarone (CORDARONE) 200 MG Tab Continue taking medication as prescribed, including morning of procedure     ramipril (ALTACE) 10 MG capsule Stop 24 hours before surgery    metoprolol tartrate (LOPRESSOR) 25 MG Tab Continue taking medication as prescribed, including morning of procedure     spironolactone (ALDACTONE) 25 MG Tab Hold medication day of procedure    amLODIPine (NORVASC) 5 MG Tab Continue taking medication as prescribed, including morning of procedure     ezetimibe (ZETIA) 10 MG Tab Stop 24 hours before surgery    dantrolene (DANTRIUM) 25 MG Cap Continue taking medication as prescribed, including morning of procedure     gabapentin (NEURONTIN) 100 MG Cap Continue to take as prescribed    ARMOUR THYROID 90 MG Tab Continue taking medication as prescribed, including morning of procedure

## 2025-01-29 NOTE — TELEPHONE ENCOUNTER
Upon chart review, 90 tab 3 refills sent 1/28/25. Duplicate request.  Medication refill request refused.

## 2025-01-30 RX ORDER — GABAPENTIN 100 MG/1
200 CAPSULE ORAL NIGHTLY
Qty: 60 CAPSULE | Refills: 5 | Status: SHIPPED | OUTPATIENT
Start: 2025-01-30

## 2025-01-30 NOTE — TELEPHONE ENCOUNTER
GABAPENTIN 100 MG CAPSULE     Received request via: Pharmacy    Was the patient seen in the last year in this department? Last visit 2/13/24    Does the patient have an active prescription (recently filled or refills available) for medication(s) requested?  Yes    [facility]    Does the patient have [facility] Plus and need 100-day supply? (This applies to ALL medications) Patient does not have SCP

## 2025-02-04 DIAGNOSIS — I69.854 SPASTIC HEMIPLEGIA OF LEFT NONDOMINANT SIDE AS LATE EFFECT OF OTHER CEREBROVASCULAR DISEASE (HCC): ICD-10-CM

## 2025-02-04 DIAGNOSIS — I69.954 HEMIPLEGIA AND HEMIPARESIS FOLLOWING UNSPECIFIED CEREBROVASCULAR DISEASE AFFECTING LEFT NON-DOMINANT SIDE (HCC): ICD-10-CM

## 2025-02-04 DIAGNOSIS — M62.838 OTHER MUSCLE SPASM: ICD-10-CM

## 2025-02-06 RX ORDER — DANTROLENE SODIUM 25 MG/1
50 CAPSULE ORAL 2 TIMES DAILY
Qty: 360 CAPSULE | Refills: 1 | Status: SHIPPED | OUTPATIENT
Start: 2025-02-06 | End: 2025-08-05

## 2025-02-06 NOTE — TELEPHONE ENCOUNTER
dantrolene (DANTRIUM) 25 MG Cap     Received request via: Patient    Was the patient seen in the last year in this department? Last seen 2/13/2024    Does the patient have an active prescription (recently filled or refills available) for medication(s) requested?  yes    [facility]    Does the patient have [facility] Plus and need 100-day supply? (This applies to ALL medications) Patient does not have SCP

## 2025-02-10 ENCOUNTER — APPOINTMENT (OUTPATIENT)
Dept: RADIOLOGY | Facility: MEDICAL CENTER | Age: 60
DRG: 683 | End: 2025-02-10
Attending: GENERAL PRACTICE
Payer: MEDICARE

## 2025-02-10 ENCOUNTER — OFFICE VISIT (OUTPATIENT)
Dept: URGENT CARE | Facility: PHYSICIAN GROUP | Age: 60
End: 2025-02-10
Payer: MEDICARE

## 2025-02-10 ENCOUNTER — HOSPITAL ENCOUNTER (INPATIENT)
Facility: MEDICAL CENTER | Age: 60
LOS: 1 days | DRG: 683 | End: 2025-02-12
Attending: EMERGENCY MEDICINE | Admitting: GENERAL PRACTICE
Payer: MEDICARE

## 2025-02-10 ENCOUNTER — APPOINTMENT (OUTPATIENT)
Dept: RADIOLOGY | Facility: MEDICAL CENTER | Age: 60
DRG: 683 | End: 2025-02-10
Attending: EMERGENCY MEDICINE
Payer: MEDICARE

## 2025-02-10 VITALS
WEIGHT: 200 LBS | SYSTOLIC BLOOD PRESSURE: 62 MMHG | RESPIRATION RATE: 14 BRPM | TEMPERATURE: 97.8 F | HEART RATE: 60 BPM | BODY MASS INDEX: 28 KG/M2 | DIASTOLIC BLOOD PRESSURE: 44 MMHG | HEIGHT: 71 IN | OXYGEN SATURATION: 93 %

## 2025-02-10 DIAGNOSIS — R11.0 NAUSEA: ICD-10-CM

## 2025-02-10 DIAGNOSIS — I95.9 HYPOTENSION, UNSPECIFIED HYPOTENSION TYPE: ICD-10-CM

## 2025-02-10 DIAGNOSIS — I48.0 AF (PAROXYSMAL ATRIAL FIBRILLATION) (HCC): ICD-10-CM

## 2025-02-10 DIAGNOSIS — Z74.09 IMPAIRED MOBILITY AND ADLS: ICD-10-CM

## 2025-02-10 DIAGNOSIS — J10.1 INFLUENZA A: ICD-10-CM

## 2025-02-10 DIAGNOSIS — I48.91 ATRIAL FIBRILLATION, UNSPECIFIED TYPE (HCC): ICD-10-CM

## 2025-02-10 DIAGNOSIS — I69.369 PARALYTIC SYNDROME NONDOMINANT SIDE S/P CVA (CEREBROVASCULAR ACCIDENT) (HCC): ICD-10-CM

## 2025-02-10 DIAGNOSIS — Z78.9 IMPAIRED MOBILITY AND ADLS: ICD-10-CM

## 2025-02-10 DIAGNOSIS — I63.10 CEREBROVASCULAR ACCIDENT (CVA) DUE TO EMBOLISM OF PRECEREBRAL ARTERY (HCC): ICD-10-CM

## 2025-02-10 PROBLEM — E86.1 HYPOTENSION DUE TO HYPOVOLEMIA: Status: ACTIVE | Noted: 2025-02-10

## 2025-02-10 PROBLEM — N17.9 AKI (ACUTE KIDNEY INJURY) (HCC): Status: ACTIVE | Noted: 2025-02-10

## 2025-02-10 LAB
ALBUMIN SERPL BCP-MCNC: 3.7 G/DL (ref 3.2–4.9)
ALBUMIN/GLOB SERPL: 1.3 G/DL
ALP SERPL-CCNC: 58 U/L (ref 30–99)
ALT SERPL-CCNC: 57 U/L (ref 2–50)
ANION GAP SERPL CALC-SCNC: 11 MMOL/L (ref 7–16)
AST SERPL-CCNC: 46 U/L (ref 12–45)
BASOPHILS # BLD AUTO: 0.3 % (ref 0–1.8)
BASOPHILS # BLD: 0.01 K/UL (ref 0–0.12)
BILIRUB SERPL-MCNC: 0.2 MG/DL (ref 0.1–1.5)
BUN SERPL-MCNC: 21 MG/DL (ref 8–22)
CALCIUM ALBUM COR SERPL-MCNC: 9.9 MG/DL (ref 8.5–10.5)
CALCIUM SERPL-MCNC: 9.7 MG/DL (ref 8.5–10.5)
CHLORIDE SERPL-SCNC: 102 MMOL/L (ref 96–112)
CO2 SERPL-SCNC: 21 MMOL/L (ref 20–33)
CREAT SERPL-MCNC: 1.97 MG/DL (ref 0.5–1.4)
EKG IMPRESSION: NORMAL
EOSINOPHIL # BLD AUTO: 0 K/UL (ref 0–0.51)
EOSINOPHIL NFR BLD: 0 % (ref 0–6.9)
ERYTHROCYTE [DISTWIDTH] IN BLOOD BY AUTOMATED COUNT: 39.8 FL (ref 35.9–50)
FLUAV RNA SPEC QL NAA+PROBE: POSITIVE
FLUBV RNA SPEC QL NAA+PROBE: NEGATIVE
GFR SERPLBLD CREATININE-BSD FMLA CKD-EPI: 38 ML/MIN/1.73 M 2
GLOBULIN SER CALC-MCNC: 2.8 G/DL (ref 1.9–3.5)
GLUCOSE SERPL-MCNC: 97 MG/DL (ref 65–99)
HCT VFR BLD AUTO: 47.4 % (ref 42–52)
HGB BLD-MCNC: 15.7 G/DL (ref 14–18)
IMM GRANULOCYTES # BLD AUTO: 0.01 K/UL (ref 0–0.11)
IMM GRANULOCYTES NFR BLD AUTO: 0.3 % (ref 0–0.9)
LYMPHOCYTES # BLD AUTO: 0.38 K/UL (ref 1–4.8)
LYMPHOCYTES NFR BLD: 10 % (ref 22–41)
MCH RBC QN AUTO: 28.6 PG (ref 27–33)
MCHC RBC AUTO-ENTMCNC: 33.1 G/DL (ref 32.3–36.5)
MCV RBC AUTO: 86.3 FL (ref 81.4–97.8)
MONOCYTES # BLD AUTO: 0.77 K/UL (ref 0–0.85)
MONOCYTES NFR BLD AUTO: 20.3 % (ref 0–13.4)
NEUTROPHILS # BLD AUTO: 2.62 K/UL (ref 1.82–7.42)
NEUTROPHILS NFR BLD: 69.1 % (ref 44–72)
NRBC # BLD AUTO: 0 K/UL
NRBC BLD-RTO: 0 /100 WBC (ref 0–0.2)
NT-PROBNP SERPL IA-MCNC: 3351 PG/ML (ref 0–125)
PLATELET # BLD AUTO: 201 K/UL (ref 164–446)
PMV BLD AUTO: 8.6 FL (ref 9–12.9)
POTASSIUM SERPL-SCNC: 4.9 MMOL/L (ref 3.6–5.5)
PROT SERPL-MCNC: 6.5 G/DL (ref 6–8.2)
RBC # BLD AUTO: 5.49 M/UL (ref 4.7–6.1)
RSV RNA SPEC QL NAA+PROBE: NEGATIVE
SARS-COV-2 RNA RESP QL NAA+PROBE: NOTDETECTED
SODIUM SERPL-SCNC: 134 MMOL/L (ref 135–145)
TROPONIN T SERPL-MCNC: 18 NG/L (ref 6–19)
WBC # BLD AUTO: 3.8 K/UL (ref 4.8–10.8)

## 2025-02-10 PROCEDURE — 71045 X-RAY EXAM CHEST 1 VIEW: CPT

## 2025-02-10 PROCEDURE — 93000 ELECTROCARDIOGRAM COMPLETE: CPT | Performed by: PHYSICIAN ASSISTANT

## 2025-02-10 PROCEDURE — 84484 ASSAY OF TROPONIN QUANT: CPT

## 2025-02-10 PROCEDURE — A9270 NON-COVERED ITEM OR SERVICE: HCPCS | Performed by: EMERGENCY MEDICINE

## 2025-02-10 PROCEDURE — 93005 ELECTROCARDIOGRAM TRACING: CPT | Mod: TC

## 2025-02-10 PROCEDURE — 80053 COMPREHEN METABOLIC PANEL: CPT

## 2025-02-10 PROCEDURE — 700105 HCHG RX REV CODE 258: Performed by: EMERGENCY MEDICINE

## 2025-02-10 PROCEDURE — 2023F DILAT RTA XM W/O RTNOPTHY: CPT | Performed by: PHYSICIAN ASSISTANT

## 2025-02-10 PROCEDURE — 3074F SYST BP LT 130 MM HG: CPT | Performed by: PHYSICIAN ASSISTANT

## 2025-02-10 PROCEDURE — 0241U HCHG SARS-COV-2 COVID-19 NFCT DS RESP RNA 4 TRGT ED POC: CPT

## 2025-02-10 PROCEDURE — 99215 OFFICE O/P EST HI 40 MIN: CPT | Performed by: PHYSICIAN ASSISTANT

## 2025-02-10 PROCEDURE — G0378 HOSPITAL OBSERVATION PER HR: HCPCS

## 2025-02-10 PROCEDURE — A9270 NON-COVERED ITEM OR SERVICE: HCPCS | Performed by: GENERAL PRACTICE

## 2025-02-10 PROCEDURE — 99285 EMERGENCY DEPT VISIT HI MDM: CPT

## 2025-02-10 PROCEDURE — 85025 COMPLETE CBC W/AUTO DIFF WBC: CPT

## 2025-02-10 PROCEDURE — 3078F DIAST BP <80 MM HG: CPT | Performed by: PHYSICIAN ASSISTANT

## 2025-02-10 PROCEDURE — 83880 ASSAY OF NATRIURETIC PEPTIDE: CPT

## 2025-02-10 PROCEDURE — 700102 HCHG RX REV CODE 250 W/ 637 OVERRIDE(OP): Performed by: GENERAL PRACTICE

## 2025-02-10 PROCEDURE — 93005 ELECTROCARDIOGRAM TRACING: CPT | Mod: TC | Performed by: EMERGENCY MEDICINE

## 2025-02-10 PROCEDURE — 76775 US EXAM ABDO BACK WALL LIM: CPT

## 2025-02-10 PROCEDURE — 700102 HCHG RX REV CODE 250 W/ 637 OVERRIDE(OP): Performed by: EMERGENCY MEDICINE

## 2025-02-10 PROCEDURE — 99223 1ST HOSP IP/OBS HIGH 75: CPT | Performed by: GENERAL PRACTICE

## 2025-02-10 PROCEDURE — 36415 COLL VENOUS BLD VENIPUNCTURE: CPT

## 2025-02-10 RX ORDER — OXYCODONE AND ACETAMINOPHEN 5; 325 MG/1; MG/1
1 TABLET ORAL EVERY 4 HOURS PRN
Status: DISCONTINUED | OUTPATIENT
Start: 2025-02-10 | End: 2025-02-12 | Stop reason: HOSPADM

## 2025-02-10 RX ORDER — POLYETHYLENE GLYCOL 3350 17 G/17G
1 POWDER, FOR SOLUTION ORAL
Status: DISCONTINUED | OUTPATIENT
Start: 2025-02-10 | End: 2025-02-12 | Stop reason: HOSPADM

## 2025-02-10 RX ORDER — GABAPENTIN 100 MG/1
200 CAPSULE ORAL NIGHTLY
Status: DISCONTINUED | OUTPATIENT
Start: 2025-02-10 | End: 2025-02-12 | Stop reason: HOSPADM

## 2025-02-10 RX ORDER — POLYETHYLENE GLYCOL 3350 17 G/17G
1 POWDER, FOR SOLUTION ORAL
Status: DISCONTINUED | OUTPATIENT
Start: 2025-02-10 | End: 2025-02-10

## 2025-02-10 RX ORDER — TRAMADOL HYDROCHLORIDE 50 MG/1
50 TABLET ORAL EVERY 6 HOURS PRN
Status: DISCONTINUED | OUTPATIENT
Start: 2025-02-10 | End: 2025-02-10

## 2025-02-10 RX ORDER — AMOXICILLIN 250 MG
2 CAPSULE ORAL EVERY EVENING
Status: DISCONTINUED | OUTPATIENT
Start: 2025-02-10 | End: 2025-02-10

## 2025-02-10 RX ORDER — SODIUM CHLORIDE, SODIUM LACTATE, POTASSIUM CHLORIDE, CALCIUM CHLORIDE 600; 310; 30; 20 MG/100ML; MG/100ML; MG/100ML; MG/100ML
1000 INJECTION, SOLUTION INTRAVENOUS ONCE
Status: COMPLETED | OUTPATIENT
Start: 2025-02-10 | End: 2025-02-11

## 2025-02-10 RX ORDER — AMIODARONE HYDROCHLORIDE 200 MG/1
200 TABLET ORAL DAILY
COMMUNITY
End: 2025-02-28 | Stop reason: SDUPTHER

## 2025-02-10 RX ORDER — PROMETHAZINE HYDROCHLORIDE 25 MG/1
12.5-25 TABLET ORAL EVERY 4 HOURS PRN
Status: DISCONTINUED | OUTPATIENT
Start: 2025-02-10 | End: 2025-02-12 | Stop reason: HOSPADM

## 2025-02-10 RX ORDER — GUAIFENESIN 600 MG/1
1200 TABLET, EXTENDED RELEASE ORAL EVERY 12 HOURS
Status: DISCONTINUED | OUTPATIENT
Start: 2025-02-10 | End: 2025-02-12 | Stop reason: HOSPADM

## 2025-02-10 RX ORDER — LEVETIRACETAM 500 MG/1
1000 TABLET ORAL 2 TIMES DAILY
Status: DISCONTINUED | OUTPATIENT
Start: 2025-02-10 | End: 2025-02-12 | Stop reason: HOSPADM

## 2025-02-10 RX ORDER — OSELTAMIVIR PHOSPHATE 30 MG/1
30 CAPSULE ORAL 2 TIMES DAILY
Status: DISCONTINUED | OUTPATIENT
Start: 2025-02-11 | End: 2025-02-12 | Stop reason: HOSPADM

## 2025-02-10 RX ORDER — OSELTAMIVIR PHOSPHATE 75 MG/1
75 CAPSULE ORAL ONCE
Status: COMPLETED | OUTPATIENT
Start: 2025-02-10 | End: 2025-02-10

## 2025-02-10 RX ORDER — ACETAMINOPHEN 325 MG/1
650 TABLET ORAL EVERY 6 HOURS PRN
Status: DISCONTINUED | OUTPATIENT
Start: 2025-02-10 | End: 2025-02-12 | Stop reason: HOSPADM

## 2025-02-10 RX ORDER — ACETAMINOPHEN 500 MG
1000 TABLET ORAL ONCE
Status: COMPLETED | OUTPATIENT
Start: 2025-02-10 | End: 2025-02-10

## 2025-02-10 RX ORDER — PROCHLORPERAZINE EDISYLATE 5 MG/ML
5-10 INJECTION INTRAMUSCULAR; INTRAVENOUS EVERY 4 HOURS PRN
Status: DISCONTINUED | OUTPATIENT
Start: 2025-02-10 | End: 2025-02-12 | Stop reason: HOSPADM

## 2025-02-10 RX ORDER — AMIODARONE HYDROCHLORIDE 200 MG/1
200 TABLET ORAL DAILY
Status: DISCONTINUED | OUTPATIENT
Start: 2025-02-11 | End: 2025-02-12 | Stop reason: HOSPADM

## 2025-02-10 RX ORDER — OSELTAMIVIR PHOSPHATE 75 MG/1
75 CAPSULE ORAL 2 TIMES DAILY
Status: DISCONTINUED | OUTPATIENT
Start: 2025-02-10 | End: 2025-02-10

## 2025-02-10 RX ORDER — ONDANSETRON 2 MG/ML
4 INJECTION INTRAMUSCULAR; INTRAVENOUS EVERY 4 HOURS PRN
Status: DISCONTINUED | OUTPATIENT
Start: 2025-02-10 | End: 2025-02-10

## 2025-02-10 RX ORDER — BENZONATATE 100 MG/1
100 CAPSULE ORAL 3 TIMES DAILY PRN
Status: DISCONTINUED | OUTPATIENT
Start: 2025-02-10 | End: 2025-02-12 | Stop reason: HOSPADM

## 2025-02-10 RX ORDER — METOPROLOL TARTRATE 50 MG
50 TABLET ORAL 2 TIMES DAILY
Status: DISCONTINUED | OUTPATIENT
Start: 2025-02-10 | End: 2025-02-12 | Stop reason: HOSPADM

## 2025-02-10 RX ORDER — EZETIMIBE 10 MG/1
10 TABLET ORAL DAILY
Status: DISCONTINUED | OUTPATIENT
Start: 2025-02-11 | End: 2025-02-12 | Stop reason: HOSPADM

## 2025-02-10 RX ORDER — AMOXICILLIN 250 MG
2 CAPSULE ORAL 2 TIMES DAILY PRN
Status: DISCONTINUED | OUTPATIENT
Start: 2025-02-10 | End: 2025-02-12 | Stop reason: HOSPADM

## 2025-02-10 RX ORDER — PROMETHAZINE HYDROCHLORIDE 25 MG/1
12.5-25 SUPPOSITORY RECTAL EVERY 4 HOURS PRN
Status: DISCONTINUED | OUTPATIENT
Start: 2025-02-10 | End: 2025-02-12 | Stop reason: HOSPADM

## 2025-02-10 RX ORDER — ONDANSETRON 4 MG/1
4 TABLET, ORALLY DISINTEGRATING ORAL EVERY 4 HOURS PRN
Status: DISCONTINUED | OUTPATIENT
Start: 2025-02-10 | End: 2025-02-10

## 2025-02-10 RX ADMIN — GABAPENTIN 200 MG: 100 CAPSULE ORAL at 21:12

## 2025-02-10 RX ADMIN — ACETAMINOPHEN 650 MG: 325 TABLET ORAL at 21:12

## 2025-02-10 RX ADMIN — METOPROLOL TARTRATE 50 MG: 50 TABLET, FILM COATED ORAL at 17:37

## 2025-02-10 RX ADMIN — ACETAMINOPHEN 1000 MG: 500 TABLET ORAL at 16:05

## 2025-02-10 RX ADMIN — RIVAROXABAN 20 MG: 20 TABLET, FILM COATED ORAL at 17:37

## 2025-02-10 RX ADMIN — OSELTAMIVIR PHOSPHATE 75 MG: 75 CAPSULE ORAL at 17:37

## 2025-02-10 RX ADMIN — LEVETIRACETAM 1000 MG: 500 TABLET, FILM COATED ORAL at 17:37

## 2025-02-10 RX ADMIN — GUAIFENESIN 1200 MG: 600 TABLET, EXTENDED RELEASE ORAL at 17:36

## 2025-02-10 RX ADMIN — SODIUM CHLORIDE, POTASSIUM CHLORIDE, SODIUM LACTATE AND CALCIUM CHLORIDE 1000 ML: 600; 310; 30; 20 INJECTION, SOLUTION INTRAVENOUS at 15:45

## 2025-02-10 ASSESSMENT — CHA2DS2 SCORE
DIABETES: NO
SEX: MALE
CHA2DS2 VASC SCORE: 3
HYPERTENSION: YES
AGE 75 OR GREATER: NO
CHF OR LEFT VENTRICULAR DYSFUNCTION: NO
PRIOR STROKE OR TIA OR THROMBOEMBOLISM: YES
AGE 65 TO 74: NO
VASCULAR DISEASE: NO

## 2025-02-10 ASSESSMENT — LIFESTYLE VARIABLES
ALCOHOL_USE: NO
TOTAL SCORE: 0
ON A TYPICAL DAY WHEN YOU DRINK ALCOHOL HOW MANY DRINKS DO YOU HAVE: 0
TOTAL SCORE: 0
EVER HAD A DRINK FIRST THING IN THE MORNING TO STEADY YOUR NERVES TO GET RID OF A HANGOVER: NO
HAVE PEOPLE ANNOYED YOU BY CRITICIZING YOUR DRINKING: NO
HAVE YOU EVER FELT YOU SHOULD CUT DOWN ON YOUR DRINKING: NO
EVER FELT BAD OR GUILTY ABOUT YOUR DRINKING: NO
TOTAL SCORE: 0
CONSUMPTION TOTAL: NEGATIVE
HOW MANY TIMES IN THE PAST YEAR HAVE YOU HAD 5 OR MORE DRINKS IN A DAY: 0
AVERAGE NUMBER OF DAYS PER WEEK YOU HAVE A DRINK CONTAINING ALCOHOL: 0

## 2025-02-10 ASSESSMENT — PATIENT HEALTH QUESTIONNAIRE - PHQ9
SUM OF ALL RESPONSES TO PHQ9 QUESTIONS 1 AND 2: 0
1. LITTLE INTEREST OR PLEASURE IN DOING THINGS: NOT AT ALL
2. FEELING DOWN, DEPRESSED, IRRITABLE, OR HOPELESS: NOT AT ALL

## 2025-02-10 ASSESSMENT — ENCOUNTER SYMPTOMS
WEAKNESS: 1
NAUSEA: 1
COUGH: 1
SHORTNESS OF BREATH: 1
COUGH: 1
DIZZINESS: 1
VOMITING: 1

## 2025-02-10 ASSESSMENT — HEART SCORE
AGE: 45-64
RISK FACTORS: 1-2 RISK FACTORS
ECG: NON-SPECIFIC REPOLARIZATION DISTURBANCE
HISTORY: SLIGHTLY SUSPICIOUS
TROPONIN: LESS THAN OR EQUAL TO NORMAL LIMIT
HEART SCORE: 3

## 2025-02-10 ASSESSMENT — SOCIAL DETERMINANTS OF HEALTH (SDOH)
WITHIN THE LAST YEAR, HAVE YOU BEEN HUMILIATED OR EMOTIONALLY ABUSED IN OTHER WAYS BY YOUR PARTNER OR EX-PARTNER?: NO
WITHIN THE LAST YEAR, HAVE YOU BEEN AFRAID OF YOUR PARTNER OR EX-PARTNER?: NO
WITHIN THE LAST YEAR, HAVE TO BEEN RAPED OR FORCED TO HAVE ANY KIND OF SEXUAL ACTIVITY BY YOUR PARTNER OR EX-PARTNER?: NO
WITHIN THE LAST YEAR, HAVE YOU BEEN KICKED, HIT, SLAPPED, OR OTHERWISE PHYSICALLY HURT BY YOUR PARTNER OR EX-PARTNER?: NO

## 2025-02-10 ASSESSMENT — PAIN DESCRIPTION - PAIN TYPE
TYPE: ACUTE PAIN

## 2025-02-10 ASSESSMENT — FIBROSIS 4 INDEX
FIB4 SCORE: 1.79
FIB4 SCORE: 0.63
FIB4 SCORE: 0.63

## 2025-02-10 NOTE — ED PROVIDER NOTES
ED Provider Note    CHIEF COMPLAINT  Chief Complaint   Patient presents with    Flu Like Symptoms     Pt reports generalized weakness, dizziness, headaches, nonproductive cough, CP, and fevers x 1 wk    Blood Pressure Problem     Pt was hypotensive @ UC, 60/40       EXTERNAL RECORDS REVIEWED  Outpatient Notes patient was seen at [facility] care and sent here for further evaluation for paroxysmal atrial fibrillation he has a history of atrial fibrillation and saw cardiology at the beginning of the month he is scheduled for cardioversion Friday of this week his wife's been sick for last 2 to 3 days and he has began feeling dizzy with fatigue and cough he was sent here for further care as he was ill-appearing and his ADLs are impaired because of his illness.    HPI/ROS  LIMITATION TO HISTORY   Select: : None  OUTSIDE HISTORIAN(S):  Significant other states that he has been coughing the last couple of days and dizzy and was found to be hypotensive at urgent care    Thong Arzola is a 59 y.o. male who presents complaining of fevers cough and cold symptoms for the last week.  He states that he has been having some dizziness and headaches as well.  He has had some nausea and vomiting.  The patient has a history of a stroke that has left him with left-sided arm and leg weakness.  He has not had any falls or trauma.  His cough is nonproductive.  He has atrial fibrillation that is chronic and is on blood thinning medications.  He is supposed to have a cardioversion performed on Friday but that was before he became ill with this illness.  He has not been flu vaccinated this year.  He denies any chest pain other than when he coughs.  He has a normal amount of swelling in his lower legs.  At urgent care he was found to be hypotensive at 60/40.  He states his appetite has been decreased and he has been dizzy the last couple of days.    PAST MEDICAL HISTORY   has a past medical history of Afib (HCC), Bowel habit changes,  "COVID-19, Disorder of thyroid, Heart murmur, Hemorrhagic disorder (HCC), High cholesterol, Hypertension, Pain, Psychiatric problem, Seizure (HCC), and Stroke (HCC) (2021).    SURGICAL HISTORY   has a past surgical history that includes craniotomy (Right, 04/03/2021); cranioplasty (Right, 06/23/2021); arthroscopy, knee (Left); and other orthopedic surgery.    FAMILY HISTORY  Family History   Family history unknown: Yes       SOCIAL HISTORY  Social History     Tobacco Use    Smoking status: Never    Smokeless tobacco: Never   Vaping Use    Vaping status: Never Used   Substance and Sexual Activity    Alcohol use: Yes     Alcohol/week: 4.2 oz     Types: 7 Shots of liquor per week     Comment: 1 shot before bedtime    Drug use: No    Sexual activity: Not on file       CURRENT MEDICATIONS  Home Medications       Reviewed by Kristen Rubin R.N. (Registered Nurse) on 02/10/25 at 1425  Med List Status: Not Addressed     Medication Last Dose Status   acetaminophen (TYLENOL) 500 MG Tab  Active   amiodarone (CORDARONE) 200 MG Tab  Active   amLODIPine (NORVASC) 5 MG Tab  Active   ARMOUR THYROID 90 MG Tab  Active   BRIVIACT 100 MG Tab tablet  Active   dantrolene (DANTRIUM) 25 MG Cap  Active   ezetimibe (ZETIA) 10 MG Tab  Active   gabapentin (NEURONTIN) 100 MG Cap  Active   levetiracetam (KEPPRA) 1000 MG tablet  Active   metoprolol tartrate (LOPRESSOR) 25 MG Tab  Active   ramipril (ALTACE) 10 MG capsule  Active   sertraline (ZOLOFT) 25 MG tablet  Active   spironolactone (ALDACTONE) 25 MG Tab  Active   XARELTO 20 MG Tab tablet  Active                  Audit from Redirected Encounters    **Home medications have not yet been reviewed for this encounter**         ALLERGIES  No Known Allergies    PHYSICAL EXAM  VITAL SIGNS: /74   Pulse (!) 116   Temp 36.6 °C (97.8 °F)   Resp 15   Ht 1.803 m (5' 11\")   Wt 90.7 kg (200 lb)   SpO2 93%   BMI 27.89 kg/m²      Constitutional: Well developed, Well nourished, ill-appearing, " Non-toxic appearance.   HEENT: Normocephalic, Atraumatic,  external ears normal, pharynx pink,  Mucous  Membranes dry, No rhinorrhea or mucosal edema  Eyes: PERRL, EOMI, Conjunctiva normal, No discharge.   Neck: Normal range of motion, No tenderness, Supple, No stridor.   Lymphatic: No lymphadenopathy    Cardiovascular: Irregularly irregular rate and rhythm no murmurs rubs or gallops.   Thorax & Lungs: Lungs clear to auscultation bilaterally, No respiratory distress, No wheezes, rhales or rhonchi, No chest wall tenderness.   Abdomen: Bowel sounds normal, Soft, non tender, non distended,  No pulsatile masses., no rebound guarding or peritoneal signs.   Skin: Warm, Dry, No erythema, No rash,   Back:  No CVA tenderness,  No spinal tenderness, bony crepitance step offs or instability.   Extremities: Equal, intact distal pulses, No cyanosis, clubbing or edema,  No tenderness.   Musculoskeletal: Good range of motion in all major joints. No tenderness to palpation or major deformities noted.   Neurologic: Alert & oriented No focal deficits noted.  Psychiatric: Affect normal, Judgment normal, Mood normal.      EKG/LABS  Results for orders placed or performed during the hospital encounter of 02/10/25   CBC with Differential    Collection Time: 02/10/25  2:23 PM   Result Value Ref Range    WBC 3.8 (L) 4.8 - 10.8 K/uL    RBC 5.49 4.70 - 6.10 M/uL    Hemoglobin 15.7 14.0 - 18.0 g/dL    Hematocrit 47.4 42.0 - 52.0 %    MCV 86.3 81.4 - 97.8 fL    MCH 28.6 27.0 - 33.0 pg    MCHC 33.1 32.3 - 36.5 g/dL    RDW 39.8 35.9 - 50.0 fL    Platelet Count 201 164 - 446 K/uL    MPV 8.6 (L) 9.0 - 12.9 fL    Neutrophils-Polys 69.10 44.00 - 72.00 %    Lymphocytes 10.00 (L) 22.00 - 41.00 %    Monocytes 20.30 (H) 0.00 - 13.40 %    Eosinophils 0.00 0.00 - 6.90 %    Basophils 0.30 0.00 - 1.80 %    Immature Granulocytes 0.30 0.00 - 0.90 %    Nucleated RBC 0.00 0.00 - 0.20 /100 WBC    Neutrophils (Absolute) 2.62 1.82 - 7.42 K/uL    Lymphs (Absolute)  0.38 (L) 1.00 - 4.80 K/uL    Monos (Absolute) 0.77 0.00 - 0.85 K/uL    Eos (Absolute) 0.00 0.00 - 0.51 K/uL    Baso (Absolute) 0.01 0.00 - 0.12 K/uL    Immature Granulocytes (abs) 0.01 0.00 - 0.11 K/uL    NRBC (Absolute) 0.00 K/uL   Complete Metabolic Panel (CMP)    Collection Time: 02/10/25  2:23 PM   Result Value Ref Range    Sodium 134 (L) 135 - 145 mmol/L    Potassium 4.9 3.6 - 5.5 mmol/L    Chloride 102 96 - 112 mmol/L    Co2 21 20 - 33 mmol/L    Anion Gap 11.0 7.0 - 16.0    Glucose 97 65 - 99 mg/dL    Bun 21 8 - 22 mg/dL    Creatinine 1.97 (H) 0.50 - 1.40 mg/dL    Calcium 9.7 8.5 - 10.5 mg/dL    Correct Calcium 9.9 8.5 - 10.5 mg/dL    AST(SGOT) 46 (H) 12 - 45 U/L    ALT(SGPT) 57 (H) 2 - 50 U/L    Alkaline Phosphatase 58 30 - 99 U/L    Total Bilirubin 0.2 0.1 - 1.5 mg/dL    Albumin 3.7 3.2 - 4.9 g/dL    Total Protein 6.5 6.0 - 8.2 g/dL    Globulin 2.8 1.9 - 3.5 g/dL    A-G Ratio 1.3 g/dL   proBrain Natriuretic Peptide, NT (BNP)    Collection Time: 02/10/25  2:23 PM   Result Value Ref Range    NT-proBNP 3351 (H) 0 - 125 pg/mL   Troponins NOW    Collection Time: 02/10/25  2:23 PM   Result Value Ref Range    Troponin T 18 6 - 19 ng/L   ESTIMATED GFR    Collection Time: 02/10/25  2:23 PM   Result Value Ref Range    GFR (CKD-EPI) 38 (A) >60 mL/min/1.73 m 2   EKG    Collection Time: 02/10/25  2:27 PM   Result Value Ref Range    Report       [facility] Emergency Dept.    Test Date:  2025-02-10  Pt Name:    LUCY JOHNSON                 Department: ER  MRN:        [id number]                      Room:       RD 10  Gender:     Male                         Technician: [id number]  :        1965                   Requested By:ER TRIAGE PROTOCOL  Order #:    [id number]                    Reading MD:    Measurements  Intervals                                Axis  Rate:       129                          P:          0  MT:         0                            QRS:        -43  QRSD:       63                            T:          57  QT:         360  QTc:        528    Interpretive Statements  Atrial fibrillation  Ventricular premature complex  Left axis deviation  Borderline low voltage, extremity leads  Abnormal R-wave progression, late transition  Minimal ST depression, anterolateral leads  Prolonged QT interval  Compared to ECG 01/14/2025 14:17:14  Ventricular premature complex(es) now present  Left-axis devia tion now present  ST (T wave) deviation now present  Prolonged QT interval now present  Myocardial infarct finding no longer present     POC CoV-2, FLU A/B, RSV by PCR    Collection Time: 02/10/25  2:33 PM   Result Value Ref Range    POC Influenza A RNA, PCR POSITIVE (A) Negative    POC Influenza B RNA, PCR Negative Negative    POC RSV, by PCR Negative Negative    POC SARS-CoV-2, PCR NotDetected NotDetected     *Note: Due to a large number of results and/or encounters for the requested time period, some results have not been displayed. A complete set of results can be found in Results Review.       I have independently interpreted this EKG    RADIOLOGY/PROCEDURES   I have independently interpreted the diagnostic imaging associated with this visit and am waiting the final reading from the radiologist.   My preliminary interpretation is as follows: pcxr no infiltrate or pleural effusion.    Radiologist interpretation:  DX-CHEST-PORTABLE (1 VIEW)   Final Result      No acute cardiac or pulmonary abnormalities are identified.      US-RENAL    (Results Pending)       COURSE & MEDICAL DECISION MAKING    ASSESSMENT, COURSE AND PLAN  Care Narrative: Thong Arzola is a 59 y.o. male who presents complaining of fevers cough and cold symptoms for the last week.  He states that he has been having some dizziness and headaches as well.  He has had some nausea and vomiting.  The patient has a history of a stroke that has left him with left-sided arm and leg weakness.  He has not had any falls or trauma.  His cough is  nonproductive.  He has atrial fibrillation that is chronic and is on blood thinning medications.  He is supposed to have a cardioversion performed on Friday but that was before he became ill with this illness.  He has not been flu vaccinated this year.  He denies any chest pain other than when he coughs.  He has a normal amount of swelling in his lower legs.  At urgent care he was found to be hypotensive at 60/40.  He states his appetite has been decreased and he has been dizzy the last couple of days.    Hydration: Based on the patient's presentation of Acute Vomiting and Hypotension the patient was given IV fluids. IV Hydration was used because oral hydration was not adequate alone. Upon recheck following hydration, the patient was improved.      CHEST PAIN:   HEART Score for Major Cardiac Events  HEART Score     History: Slightly suspicious  ECG: Non-specific repolarization disturbance  Age: 45-64  Risk Factors: 1-2 risk factors  Troponin: Less than or equal to normal limit    Heart Score: 3    Total Score   0-3 Points = Low Score, risk of MACE 0.9-1.7%.  4-6 Points = Moderate Score, risk of MACE 12-16.6%  7-10 Points = High Score, risk of MACE 50-65%     ADDITIONAL PROBLEMS MANAGED  Patient has chronic atrial fibrillation for which she is to receive a cardioversion on Friday and he has a history of stroke which limits his mobility    DISPOSITION AND DISCUSSIONS    Blood pressure here is 109/71 with a pulse of 91.  I ordered a liter of lactated Ringer's for rehydration as he does appear dehydrated.  White count is low at 3.8 hemoglobin 15.7 platelet count 201 with 10 lymphocytes.  Comprehensive metabolic panel is a sodium of 134 potassium 4.9 BUN 21 creatinine 1.97 AST is slightly evaded at 46 ALT slightly evaded at 57 troponin is 18 BNP is 3351.  EKG shows atrial fibrillation with some PVCs and no acute ischemic changes chest x-ray shows no infiltrate or pleural effusion.    COVID flu RSV shows positive  influenza A    Even the patient's hypotensive and urgent care his decreased mobility due to the stroke and his dehydration with atrial fibrillation and some volume overload at baseline I believe it is best to admit the patient for hydration and further observation care.  I spoke with the hospitalist who accepts him for admission.  I have discussed management of the patient with the following physicians and DESMOND's: Hospitalist Dr Villegas    Discussion of management with other Q or appropriate source(s): None     Escalation of care considered, and ultimately not performed: none    Barriers to care at this time, including but not limited to: Patient has decreased mobility secondary to a stroke and is in chronic atrial fibrillation with some volume overload.     Decision tools and prescription drugs considered including, but not limited to: heart score is 3.    Patient will be admitted in guarded condition    FINAL DIAGNOSIS  1. Influenza A    2. Hypotension, unspecified hypotension type    3. Atrial fibrillation, unspecified type (HCC)    4. Paralytic syndrome nondominant side S/P CVA (cerebrovascular accident) (HCC)         Electronically signed by: Milagros Ross M.D., 2/10/2025 2:38 PM

## 2025-02-10 NOTE — ED TRIAGE NOTES
Chief Complaint   Patient presents with    Flu Like Symptoms     Pt reports generalized weakness, dizziness, headaches, nonproductive cough, CP, and fevers x 1 wk    Blood Pressure Problem     Pt was hypotensive @ , 60/40       Pt BIB [facility] from [facility] for above complaint. Per report, pt has been in afoib for about a month. Pt reports his wife is currently ill. On arrival, pt is alert and oriented, speaking in full sentences, follows commands and responds appropriately to questions. NAD. Resp are even and unlabored.  Pt received 600 mL NS en route.

## 2025-02-10 NOTE — PROGRESS NOTES
"Subjective:   Thong Arzola is a 59 y.o. male who presents for Cough (Lung pain, headache, fever, nausea, vomiting, x2 days, dizziness started today )      History of A-fib, has asymptomatic while in A-fib.  Saw cardiology at the beginning of the month, tried medication management however repeat EKG on 1/14 showed continued A-fib and he was scheduled for cardioversion for Friday of this week.  However his wife has been sick for 2 or 3 days and over the last 24 hours he began noticing dizziness, fatigue, cough, nausea/vomiting and headache.  He has been compliant with his Eliquis however missed his morning medications this morning as he was quite sick to his stomach.  He is brought into urgent care by his wife.  History of complications from prior cerebrovascular accident secondary to asymptomatic A-fib    Review of Systems   Constitutional:  Positive for malaise/fatigue.   Respiratory:  Positive for cough.    Cardiovascular:  Positive for chest pain.   Gastrointestinal:  Positive for nausea and vomiting.   Neurological:  Positive for dizziness.       Medications, Allergies, and current problem list reviewed today in Epic.     Objective:     BP (!) 62/44 (BP Location: Left arm, Patient Position: Sitting, BP Cuff Size: Adult)   Pulse 60   Temp 36.6 °C (97.8 °F) (Temporal)   Resp 14   Ht 1.803 m (5' 11\")   Wt 90.7 kg (200 lb)   SpO2 93%     Physical Exam  Vitals reviewed.   Constitutional:       Appearance: Normal appearance. He is ill-appearing.   HENT:      Head: Normocephalic and atraumatic.      Right Ear: External ear normal.      Left Ear: External ear normal.      Nose: Nose normal.      Mouth/Throat:      Mouth: Mucous membranes are moist.   Eyes:      Conjunctiva/sclera: Conjunctivae normal.   Cardiovascular:      Rate and Rhythm: Normal rate. Rhythm irregular.   Pulmonary:      Effort: Pulmonary effort is normal.   Skin:     General: Skin is warm and dry.      Capillary Refill: Capillary refill " takes more than 3 seconds.      Coloration: Skin is pale.   Neurological:      Mental Status: He is alert and oriented to person, place, and time.         Assessment/Plan:     Diagnosis and associated orders:     1. AF (paroxysmal atrial fibrillation) (HCC)  EKG - Clinic Performed      2. Cerebrovascular accident (CVA) due to embolism of precerebral artery (HCC)        3. Impaired mobility and ADLs        4. Nausea           Comments/MDM:     EKG interpreted by me at 1320 is nonsinus atrial fibrillation with borderline prolonged QTc.  No evidence of RVR  Patient with concerning low blood pressure, discussed need for higher level care and emergent management is it appears he is no longer compensating with his A-fib and there might be a compounding viral illness or other etiology.  His wife was in agreement and patient demonstrated understanding.  I contacted the transfer center, EMS was also contacted and returned rapidly to clinic.  Plan is to have him taken to [facility] for further evaluation and management.  Currently he is conscious and responding appropriately to questions appears to be at his neurologic baseline but is ill-appearing with pallor, prolonged cap refill, and irregular rhythm         Differential diagnosis, natural history, supportive care, and indications for immediate follow-up discussed.    Advised the patient to follow-up with the primary care physician for recheck, reevaluation, and consideration of further management.    Please note that this dictation was created using voice recognition software. I have made a reasonable attempt to correct obvious errors, but I expect that there are errors of grammar and possibly content that I did not discover before finalizing the note.    This note was electronically signed by Freddie Correa PA-C

## 2025-02-11 PROBLEM — D68.69 SECONDARY HYPERCOAGULABLE STATE (HCC): Status: ACTIVE | Noted: 2025-02-11

## 2025-02-11 LAB
ANION GAP SERPL CALC-SCNC: 12 MMOL/L (ref 7–16)
BUN SERPL-MCNC: 21 MG/DL (ref 8–22)
CALCIUM SERPL-MCNC: 9.4 MG/DL (ref 8.5–10.5)
CHLORIDE SERPL-SCNC: 103 MMOL/L (ref 96–112)
CO2 SERPL-SCNC: 22 MMOL/L (ref 20–33)
CREAT SERPL-MCNC: 1.69 MG/DL (ref 0.5–1.4)
ERYTHROCYTE [DISTWIDTH] IN BLOOD BY AUTOMATED COUNT: 40 FL (ref 35.9–50)
GFR SERPLBLD CREATININE-BSD FMLA CKD-EPI: 46 ML/MIN/1.73 M 2
GLUCOSE SERPL-MCNC: 83 MG/DL (ref 65–99)
HCT VFR BLD AUTO: 45.4 % (ref 42–52)
HGB BLD-MCNC: 15.1 G/DL (ref 14–18)
MAGNESIUM SERPL-MCNC: 1.8 MG/DL (ref 1.5–2.5)
MCH RBC QN AUTO: 28.3 PG (ref 27–33)
MCHC RBC AUTO-ENTMCNC: 33.3 G/DL (ref 32.3–36.5)
MCV RBC AUTO: 85 FL (ref 81.4–97.8)
PHOSPHATE SERPL-MCNC: 4.8 MG/DL (ref 2.5–4.5)
PLATELET # BLD AUTO: 181 K/UL (ref 164–446)
PMV BLD AUTO: 8.8 FL (ref 9–12.9)
POTASSIUM SERPL-SCNC: 4.6 MMOL/L (ref 3.6–5.5)
RBC # BLD AUTO: 5.34 M/UL (ref 4.7–6.1)
SODIUM SERPL-SCNC: 137 MMOL/L (ref 135–145)
WBC # BLD AUTO: 3.2 K/UL (ref 4.8–10.8)

## 2025-02-11 PROCEDURE — 700105 HCHG RX REV CODE 258: Performed by: INTERNAL MEDICINE

## 2025-02-11 PROCEDURE — 99233 SBSQ HOSP IP/OBS HIGH 50: CPT | Performed by: INTERNAL MEDICINE

## 2025-02-11 PROCEDURE — 80048 BASIC METABOLIC PNL TOTAL CA: CPT

## 2025-02-11 PROCEDURE — 84100 ASSAY OF PHOSPHORUS: CPT

## 2025-02-11 PROCEDURE — 83735 ASSAY OF MAGNESIUM: CPT

## 2025-02-11 PROCEDURE — 700102 HCHG RX REV CODE 250 W/ 637 OVERRIDE(OP): Performed by: GENERAL PRACTICE

## 2025-02-11 PROCEDURE — A9270 NON-COVERED ITEM OR SERVICE: HCPCS | Performed by: GENERAL PRACTICE

## 2025-02-11 PROCEDURE — 700102 HCHG RX REV CODE 250 W/ 637 OVERRIDE(OP): Performed by: INTERNAL MEDICINE

## 2025-02-11 PROCEDURE — A9270 NON-COVERED ITEM OR SERVICE: HCPCS | Performed by: INTERNAL MEDICINE

## 2025-02-11 PROCEDURE — 85027 COMPLETE CBC AUTOMATED: CPT

## 2025-02-11 PROCEDURE — 770006 HCHG ROOM/CARE - MED/SURG/GYN SEMI*

## 2025-02-11 RX ORDER — LOPERAMIDE HYDROCHLORIDE 2 MG/1
2 CAPSULE ORAL 4 TIMES DAILY PRN
Status: DISCONTINUED | OUTPATIENT
Start: 2025-02-11 | End: 2025-02-12 | Stop reason: HOSPADM

## 2025-02-11 RX ORDER — SODIUM CHLORIDE, SODIUM LACTATE, POTASSIUM CHLORIDE, CALCIUM CHLORIDE 600; 310; 30; 20 MG/100ML; MG/100ML; MG/100ML; MG/100ML
INJECTION, SOLUTION INTRAVENOUS CONTINUOUS
Status: DISCONTINUED | OUTPATIENT
Start: 2025-02-11 | End: 2025-02-12 | Stop reason: HOSPADM

## 2025-02-11 RX ADMIN — SODIUM CHLORIDE, POTASSIUM CHLORIDE, SODIUM LACTATE AND CALCIUM CHLORIDE: 600; 310; 30; 20 INJECTION, SOLUTION INTRAVENOUS at 10:30

## 2025-02-11 RX ADMIN — THYROID 180 MG: 120 TABLET ORAL at 10:41

## 2025-02-11 RX ADMIN — LEVETIRACETAM 1000 MG: 500 TABLET, FILM COATED ORAL at 05:20

## 2025-02-11 RX ADMIN — OSELTAMIVIR PHOSPHATE 30 MG: 30 CAPSULE ORAL at 17:38

## 2025-02-11 RX ADMIN — GUAIFENESIN 1200 MG: 600 TABLET, EXTENDED RELEASE ORAL at 05:21

## 2025-02-11 RX ADMIN — RIVAROXABAN 20 MG: 20 TABLET, FILM COATED ORAL at 17:37

## 2025-02-11 RX ADMIN — SODIUM CHLORIDE, POTASSIUM CHLORIDE, SODIUM LACTATE AND CALCIUM CHLORIDE: 600; 310; 30; 20 INJECTION, SOLUTION INTRAVENOUS at 09:02

## 2025-02-11 RX ADMIN — OSELTAMIVIR PHOSPHATE 30 MG: 30 CAPSULE ORAL at 05:21

## 2025-02-11 RX ADMIN — ACETAMINOPHEN 650 MG: 325 TABLET ORAL at 17:41

## 2025-02-11 RX ADMIN — METOPROLOL TARTRATE 50 MG: 50 TABLET, FILM COATED ORAL at 05:21

## 2025-02-11 RX ADMIN — EZETIMIBE 10 MG: 10 TABLET ORAL at 05:21

## 2025-02-11 RX ADMIN — AMIODARONE HYDROCHLORIDE 200 MG: 200 TABLET ORAL at 05:21

## 2025-02-11 RX ADMIN — GUAIFENESIN 1200 MG: 600 TABLET, EXTENDED RELEASE ORAL at 17:37

## 2025-02-11 RX ADMIN — SODIUM CHLORIDE, POTASSIUM CHLORIDE, SODIUM LACTATE AND CALCIUM CHLORIDE: 600; 310; 30; 20 INJECTION, SOLUTION INTRAVENOUS at 23:52

## 2025-02-11 RX ADMIN — GABAPENTIN 200 MG: 100 CAPSULE ORAL at 19:54

## 2025-02-11 RX ADMIN — SERTRALINE HYDROCHLORIDE 50 MG: 50 TABLET ORAL at 05:21

## 2025-02-11 RX ADMIN — METOPROLOL TARTRATE 50 MG: 50 TABLET, FILM COATED ORAL at 17:37

## 2025-02-11 RX ADMIN — LOPERAMIDE HYDROCHLORIDE 2 MG: 2 CAPSULE ORAL at 09:02

## 2025-02-11 RX ADMIN — LEVETIRACETAM 1000 MG: 500 TABLET, FILM COATED ORAL at 17:38

## 2025-02-11 SDOH — ECONOMIC STABILITY: TRANSPORTATION INSECURITY
IN THE PAST 12 MONTHS, HAS LACK OF RELIABLE TRANSPORTATION KEPT YOU FROM MEDICAL APPOINTMENTS, MEETINGS, WORK OR FROM GETTING THINGS NEEDED FOR DAILY LIVING?: NO

## 2025-02-11 SDOH — ECONOMIC STABILITY: TRANSPORTATION INSECURITY
IN THE PAST 12 MONTHS, HAS THE LACK OF TRANSPORTATION KEPT YOU FROM MEDICAL APPOINTMENTS OR FROM GETTING MEDICATIONS?: NO

## 2025-02-11 ASSESSMENT — SOCIAL DETERMINANTS OF HEALTH (SDOH)
IN THE PAST 12 MONTHS, HAS THE ELECTRIC, GAS, OIL, OR WATER COMPANY THREATENED TO SHUT OFF SERVICE IN YOUR HOME?: NO
WITHIN THE PAST 12 MONTHS, THE FOOD YOU BOUGHT JUST DIDN'T LAST AND YOU DIDN'T HAVE MONEY TO GET MORE: NEVER TRUE
WITHIN THE PAST 12 MONTHS, YOU WORRIED THAT YOUR FOOD WOULD RUN OUT BEFORE YOU GOT THE MONEY TO BUY MORE: NEVER TRUE

## 2025-02-11 ASSESSMENT — COGNITIVE AND FUNCTIONAL STATUS - GENERAL
SUGGESTED CMS G CODE MODIFIER MOBILITY: CK
WALKING IN HOSPITAL ROOM: A LITTLE
HELP NEEDED FOR BATHING: A LITTLE
CLIMB 3 TO 5 STEPS WITH RAILING: A LITTLE
STANDING UP FROM CHAIR USING ARMS: A LITTLE
TOILETING: A LITTLE
DRESSING REGULAR UPPER BODY CLOTHING: A LITTLE
MOBILITY SCORE: 19
MOVING TO AND FROM BED TO CHAIR: A LITTLE
DRESSING REGULAR LOWER BODY CLOTHING: A LITTLE
SUGGESTED CMS G CODE MODIFIER DAILY ACTIVITY: CJ
DAILY ACTIVITIY SCORE: 20
MOVING FROM LYING ON BACK TO SITTING ON SIDE OF FLAT BED: A LITTLE

## 2025-02-11 ASSESSMENT — PAIN DESCRIPTION - PAIN TYPE
TYPE: ACUTE PAIN
TYPE: ACUTE PAIN

## 2025-02-11 NOTE — ASSESSMENT & PLAN NOTE
- Maintaining rate controlled atrial fibrillation.  -Continue amiodarone, metoprolol, along with anticoagulation with Xarelto.  -Has outpatient follow-up for MARK and cardioversion on 2/14.  Hopefully he will be out of the hospital by then to make this appointment.

## 2025-02-11 NOTE — ASSESSMENT & PLAN NOTE
- He is not hypoxic.  No focal consolidation on chest x-ray.  Procalcitonin is low.  -Continue renally dosed Tamiflu, complete 5 days course.  -Continue supportive care.  -Continue respiratory support with RT protocol.  Continue supplemental oxygen, keep saturations above 88%..

## 2025-02-11 NOTE — PROGRESS NOTES
Hospital Medicine Daily Progress Note    Date of Service  2/11/2025    Chief Complaint  Flulike symptoms, low BP    Hospital Course  Thong Arzola is a 59-year-old male with chronic atrial fibrillation on Xarelto, hypertension, dyslipidemia, history of right MCA CVA, CAD, mood disorder and seizure disorder, admitted 2/10/2025 with generalized weakness, dizziness, cough, and intermittent fevers x 1 week. Patient initially presented himself to an urgent care and was noted to be hypotensive.  Workup showed WBC of 2800, sodium of 134, creatinine of 1.97, with AST of 46, ALT of 57, normal bilirubin and alkaline phosphatase.  Troponin was negative.  BNP 3351.  He tested positive for influenza A.  Chest x-ray showed nothing acute.  He was not hypoxic.  He was given IV fluids with improvement in blood pressure.  He was started on Tamiflu.      Interval Problem Update  2/11/2025 - I reviewed the patient's chart. There were no significant overnight events. Remains hemodynamically stable and afebrile. Stable on RA.  WBC 2200.  Hemoglobin is stable.  Electrolytes are normal.  Creatinine 1.67.  Phosphorus 4.8, magnesium 1.8. Renal ultrasound showed no hydronephrosis echogenic kidneys with medical renal disease. He maintained atrial fibrillation on telemetry monitoring.    > I have personally seen and examined the patient today.  He is not short of breath.  Had episodes of nausea and vomiting.  He is also having loose watery stools.  No abdominal pain.  No chest pain.  Still having cough.    I personally reviewed all lab results mentioned above. Prior medical records from this institution and outside facilities were independently reviewed as noted. I also personally reviewed all ER physician and consultant recommendations and plans as documented above. History was independently obtained by myself. I have discussed this patient's plan of care and discharge plan at IDT rounds today with Case Management, Nursing, Nursing  leadership, and other members of the IDT team.    Consultants/Specialty  None    Code Status  Full Code    Disposition  The patient is not medically cleared for discharge to home or a post-acute facility.      Anticipate discharge to home once medically cleared.    I have placed the appropriate orders for post-discharge needs.    Review of Systems  ROS     Pertinent positives/negatives as mentioned above.     A complete review of systems was personally done by me. All other systems were negative.       Physical Exam  Temp:  [36.3 °C (97.3 °F)-37.3 °C (99.2 °F)] 37.1 °C (98.8 °F)  Pulse:  [] 89  Resp:  [14-20] 16  BP: ()/(44-84) 117/75  SpO2:  [91 %-94 %] 92 %    Physical Exam  Vitals reviewed.   Constitutional:       General: He is not in acute distress.     Appearance: Normal appearance. He is not toxic-appearing or diaphoretic.   HENT:      Head: Normocephalic and atraumatic.      Mouth/Throat:      Mouth: Mucous membranes are dry.      Pharynx: No oropharyngeal exudate.   Eyes:      General: No scleral icterus.     Extraocular Movements: Extraocular movements intact.      Conjunctiva/sclera: Conjunctivae normal.      Pupils: Pupils are equal, round, and reactive to light.   Cardiovascular:      Rate and Rhythm: Normal rate and regular rhythm.      Heart sounds: Normal heart sounds. No murmur heard.     No gallop.   Pulmonary:      Effort: Pulmonary effort is normal. No respiratory distress.      Breath sounds: Normal breath sounds. No stridor. No wheezing, rhonchi or rales.   Chest:      Chest wall: No tenderness.   Abdominal:      General: Bowel sounds are normal. There is no distension.      Palpations: Abdomen is soft. There is no mass.      Tenderness: There is no abdominal tenderness. There is no guarding or rebound.   Musculoskeletal:         General: No swelling. Normal range of motion.      Cervical back: Normal range of motion and neck supple.      Right lower leg: No edema.      Left lower  leg: No edema.   Lymphadenopathy:      Cervical: No cervical adenopathy.   Skin:     General: Skin is warm and dry.      Coloration: Skin is not jaundiced.      Findings: No rash.   Neurological:      General: No focal deficit present.      Mental Status: He is alert and oriented to person, place, and time.      Cranial Nerves: No cranial nerve deficit.   Psychiatric:         Mood and Affect: Mood normal.         Behavior: Behavior normal.         Thought Content: Thought content normal.         Judgment: Judgment normal.         Fluids    Intake/Output Summary (Last 24 hours) at 2/11/2025 0934  Last data filed at 2/10/2025 2000  Gross per 24 hour   Intake 75 ml   Output --   Net 75 ml        Laboratory  Recent Labs     02/10/25  1423 02/11/25  0321   WBC 3.8* 3.2*   RBC 5.49 5.34   HEMOGLOBIN 15.7 15.1   HEMATOCRIT 47.4 45.4   MCV 86.3 85.0   MCH 28.6 28.3   MCHC 33.1 33.3   RDW 39.8 40.0   PLATELETCT 201 181   MPV 8.6* 8.8*     Recent Labs     02/10/25  1423 02/11/25  0321   SODIUM 134* 137   POTASSIUM 4.9 4.6   CHLORIDE 102 103   CO2 21 22   GLUCOSE 97 83   BUN 21 21   CREATININE 1.97* 1.69*   CALCIUM 9.7 9.4                   Imaging  US-RENAL   Final Result         1.  Echogenic kidneys, nonspecific but can be associated with medical renal disease.      DX-CHEST-PORTABLE (1 VIEW)   Final Result      No acute cardiac or pulmonary abnormalities are identified.           Assessment/Plan  * Influenza A  Assessment & Plan  - He is not hypoxic.  No focal consolidation on chest x-ray.  Procalcitonin is low.  -Continue renally dosed Tamiflu, complete 5 days course.  -Continue supportive care.  -Continue respiratory support with RT protocol.  Continue supplemental oxygen, keep saturations above 88%..    KAISER (acute kidney injury) (HCC)- (present on admission)  Assessment & Plan  - Most likely prerenal in setting of dehydration and initial hypotension.  No obstructive uropathy on renal ultrasound.  -Will continue to  hydrate.  Restart IV LR at 125 cc/h.  -Monitor renal function and electrolytes closely.  BMP in the morning.  - avoid nephrotoxins, and continue to renally dose all medications.    Hypotension due to hypovolemia- (present on admission)  Assessment & Plan  -Better.  Responded well to IV fluids.  -Continue to hold off on most antihypertensives.  -Continue to hydrate with IV  cc/h.  -Close hemodynamic monitoring.    Secondary hypercoagulable state (HCC)- (present on admission)  Assessment & Plan  - Continue home Xarelto.    CAD in native artery- (present on admission)  Assessment & Plan  - Nonobstructive CAD.  No complaints of chest pain.  -Continue Xarelto and metoprolol.    Essential hypertension- (present on admission)  Assessment & Plan  - Was hypotensive initially, with KAISER related to hypotension.  BP better but could still be tenuous.  Hold home Norvasc, ramipril, and Aldactone for now.  Continue metoprolol.   - Monitor blood pressure trend closely. Optimize blood pressure control.      Prediabetes- (present on admission)  Assessment & Plan  - A1c 5.9  -Continue to monitor.  Hold off on sliding scale insulin coverage.    Hyperlipidemia- (present on admission)  Assessment & Plan  - Continue home statin.    AF (paroxysmal atrial fibrillation) ([facility])- (present on admission)  Assessment & Plan  - Maintaining rate controlled atrial fibrillation.  -Continue amiodarone, metoprolol, along with anticoagulation with Xarelto.  -Has outpatient follow-up for MARK and cardioversion on 2/14.  Hopefully he will be out of the hospital by then to make this appointment.    CVA (cerebral vascular accident) ([facility])- (present on admission)  Assessment & Plan  - History of.  Has residual left-sided weakness.  No new symptoms.  -Continue to monitor.    Acquired hypothyroidism- (present on admission)  Assessment & Plan  - Resume [place] thyroid.         VTE prophylaxis: Xarelto

## 2025-02-11 NOTE — PROGRESS NOTES
Fall Risk Score: MODERATE RISK  Fall risk interventions in place: Provide patient/family education based on risk assessment, Educate patient/family to call staff for assistance when getting out of bed, Place fall precaution signage outside patient door, Place patient in room close to nursing station, Utilize bed/chair fall alarm, and Bed alarm connected correctly  Bed type: Regular (Rafy Score less than 17 interventions in place)  Patient on cardiac monitor: Yes  IVF/IV medications: Not Applicable   Oxygen: Room Air  Bedside sitter: Not Applicable   Isolation: Isolation precautions in place droplet - Flu

## 2025-02-11 NOTE — CARE PLAN
The patient is Watcher - Medium risk of patient condition declining or worsening    Shift Goals  Clinical Goals: Monitor VS and BP,  Patient Goals: rest/ get better  Family Goals: updates    Progress made toward(s) clinical / shift goals:    Problem: Knowledge Deficit - Standard  Goal: Patient and family/care givers will demonstrate understanding of plan of care, disease process/condition, diagnostic tests and medications  Outcome: Progressing  Note: Discussed plan of care, pt able to actively participate in the learning process and demonstrates understanding by return demonstration or return explanation. Improved ability to communicate regarding their healthcare and current admission. Improved ability to identify barriers to learning and care.       Problem: Pain - Standard  Goal: Alleviation of pain or a reduction in pain to the patient’s comfort goal  Outcome: Progressing  Flowsheets (Taken 2/11/2025 0032)  OB Pain Intervention: Medication - See MAR  Note: Pt reports pain well controlled with current therapy. Additional non-pharmacologic interventions implemented. Education on pain reduction goals, pain rating scale, and potential side effects of pharmacologic interventions. Demonstrates use of appropriate diversional activities and/or relaxation techniques.       Problem: Fall Risk  Goal: Patient will remain free from falls  Outcome: Progressing  Note: Fall prevention measures in place, bed alarm on and connected correctly, call light within reach, hourly rounding, Education provided on fall prevention. Pt instructed to use call light prior to exiting the bed, educated on use of bed alarms, and risks and complications related to falls. Medication orders evaluated for fall risk, gait/mobility assessed, sensory deficits assessed, mental status assessed, assessed for hypotension, hypovolemia, weakness, fatigue, elimination, and confusion.

## 2025-02-11 NOTE — ASSESSMENT & PLAN NOTE
- Most likely prerenal in setting of dehydration and initial hypotension.  No obstructive uropathy on renal ultrasound.  -Will continue to hydrate.  Restart IV LR at 125 cc/h.  -Monitor renal function and electrolytes closely.  BMP in the morning.  - avoid nephrotoxins, and continue to renally dose all medications.

## 2025-02-11 NOTE — PROGRESS NOTES
Report received from SIMRAN Blackmon.  Assumed care of patient.  Assessment complete.  Plan of care gone over with the patient and all concerns addressed.  Patient resting in bed.  Patient A & O  x 4.  No apparent signs of distress.  Safety precautions in place.  Patient educated to call for assistance.  Hourly rounding in place.

## 2025-02-11 NOTE — PROGRESS NOTES
4 Eyes Skin Assessment Completed by SIMRAN Blackmon and SIMRAN Reilly.    Head WDL  Ears WDL  Nose WDL  Mouth WDL  Neck WDL  Breast/Chest WDL  Shoulder Blades WDL  Spine WDL  (R) Arm/Elbow/Hand WDL  (L) Arm/Elbow/Hand Redness and Blanching  Abdomen WDL  Groin WDL  Scrotum/Coccyx/Buttocks Redness and Blanching  (R) Leg WDL  (L) Leg WDL  (R) Heel/Foot/Toe Redness and Blanching  (L) Heel/Foot/Toe Redness, Blanching, and Discoloration          Devices In Places Tele Box and Pulse Ox      Interventions In Place Pillows    Possible Skin Injury No    Pictures Uploaded Into Epic N/A  Wound Consult Placed N/A  RN Wound Prevention Protocol Ordered No

## 2025-02-11 NOTE — H&P
Hospital Medicine History & Physical Note    Date of Service  2/10/2025    Primary Care Physician  Arthur Rebolledo    Consultants  None    Specialist Names: None    Code Status  Full Code    Chief Complaint  Chief Complaint   Patient presents with    Flu Like Symptoms     Pt reports generalized weakness, dizziness, headaches, nonproductive cough, CP, and fevers x 1 wk    Blood Pressure Problem     Pt was hypotensive @ UC, 60/40       History of Presenting Illness  This is a 59-year-old male with past medical history of chronic atrial fibrillation on Xarelto, hypertension, dyslipidemia, history of right MCA CVA with residual left-sided hemiparesis, CAD, mood disorder and seizure disorder who presented to the ED on 2/10 with generalized weakness, dizziness and fever x 1 week.    Patient initially presented himself to an urgent care and noted to be hypotensive, dizzy, headache, nonproductive cough, intermittent fevers for about a week now.    Labs significant for KAISER, BNP of 3300, tested positive for influenza A.  Chest x-ray with no evidence of pulmonary infiltrate or effusion.  Patient is currently saturating well on room air.    Patient's wife was at bedside, updated on plan of care, all questions answered.    I discussed the plan of care with patient, family, and bedside RN.    Review of Systems  Review of Systems   Constitutional:  Positive for malaise/fatigue.   Respiratory:  Positive for cough and shortness of breath.    Neurological:  Positive for weakness.   All other systems reviewed and are negative.      Past Medical History   has a past medical history of Afib ([facility]), Bowel habit changes, COVID-19, Disorder of thyroid, Heart murmur, Hemorrhagic disorder ([facility]), High cholesterol, Hypertension, Pain, Psychiatric problem, Seizure ([facility]), and Stroke ([facility]) (2021).    Surgical History   has a past surgical history that includes craniotomy (Right, 04/03/2021); cranioplasty (Right, 06/23/2021); arthroscopy, knee  (Left); and other orthopedic surgery.     Family History  Family history is unknown by patient.   Family history reviewed with patient. There is no family history that is pertinent to the chief complaint.     Social History   reports that he has never smoked. He has never used smokeless tobacco. He reports current alcohol use of about 4.2 oz of alcohol per week. He reports that he does not use drugs.    Allergies  No Known Allergies    Medications  Prior to Admission Medications   Prescriptions Last Dose Informant Patient Reported? Taking?   ARMOUR THYROID 90 MG Tab  Patient Yes No   Sig: Take 180 mg by mouth every morning. 90 MG taking 2 tabs daily   BRIVIACT 100 MG Tab tablet  Patient No No   Sig: TAKE 1 TABLET BY MOUTH 2 TIMES A DAY FOR 30 DAYS. INDICATIONS: PARTIAL ONSET SEIZURE   Patient not taking: Reported on 1/29/2025   XARELTO 20 MG Tab tablet  Patient No No   Sig: TAKE 1 TABLET BY MOUTH EVERY DAY WITH DINNER   acetaminophen (TYLENOL) 500 MG Tab  Patient Yes No   Sig: Take 1,000 mg by mouth every evening.   amLODIPine (NORVASC) 5 MG Tab  Patient No No   Sig: Take 1 Tablet by mouth 2 times a day.   amiodarone (CORDARONE) 200 MG Tab  Patient No No   Sig: Take 1 Tablet by mouth 2 (two) times a day for 14 days, THEN 1 Tablet every day for 30 days.   dantrolene (DANTRIUM) 25 MG Cap   No No   Sig: Take 2 Capsules by mouth 2 times a day for 180 days.   ezetimibe (ZETIA) 10 MG Tab  Patient No No   Sig: Take 1 Tablet by mouth every day.   gabapentin (NEURONTIN) 100 MG Cap   No No   Sig: TAKE 2 CAPSULES BY MOUTH EVERY EVENING   levetiracetam (KEPPRA) 1000 MG tablet  Patient Yes No   Sig: Take 1 Tablet by mouth 2 times a day.   metoprolol tartrate (LOPRESSOR) 25 MG Tab  Patient No No   Sig: Take 1 Tablet by mouth 2 times a day.   Patient taking differently: Take 50 mg by mouth 2 times a day.   ramipril (ALTACE) 10 MG capsule  Patient No No   Sig: Take 1 Capsule by mouth every day. Taking 1 tab one time daily   Patient  taking differently: Take 10 mg by mouth every morning. Taking 1 tab one time daily   sertraline (ZOLOFT) 25 MG tablet  Patient No No   Sig: TAKE 2 TABLETS BY MOUTH EVERY DAY  DAYS   Patient taking differently: Take 50 mg by mouth every morning.   spironolactone (ALDACTONE) 25 MG Tab  Patient No No   Sig: Take 1 Tablet by mouth every day.      Facility-Administered Medications: None       Physical Exam  Temp:  [36.6 °C (97.8 °F)] 36.6 °C (97.8 °F)  Pulse:  [] 91  Resp:  [14-17] 17  BP: ()/(44-74) 109/71  SpO2:  [92 %-93 %] 92 %  Blood Pressure: 109/71   Temperature: 36.6 °C (97.8 °F)   Pulse: 91   Respiration: 17   Pulse Oximetry: 92 %       Physical Exam  Vitals and nursing note reviewed.   Constitutional:       General: He is not in acute distress.     Appearance: He is ill-appearing.   HENT:      Head: Normocephalic and atraumatic.   Eyes:      Extraocular Movements: Extraocular movements intact.      Conjunctiva/sclera: Conjunctivae normal.      Pupils: Pupils are equal, round, and reactive to light.   Cardiovascular:      Rate and Rhythm: Tachycardia present. Rhythm irregular.      Pulses: Normal pulses.      Heart sounds: No murmur heard.     No friction rub. No gallop.   Pulmonary:      Effort: Pulmonary effort is normal. No respiratory distress.      Breath sounds: Normal breath sounds. No wheezing, rhonchi or rales.   Abdominal:      General: Bowel sounds are normal. There is no distension.      Palpations: Abdomen is soft.      Tenderness: There is no abdominal tenderness.   Musculoskeletal:         General: No swelling or tenderness. Normal range of motion.      Cervical back: Normal range of motion and neck supple. No muscular tenderness.      Right lower leg: No edema.      Left lower leg: No edema.   Skin:     General: Skin is warm and dry.      Capillary Refill: Capillary refill takes less than 2 seconds.      Findings: No bruising, erythema or rash.   Neurological:      General: No  focal deficit present.      Mental Status: He is alert and oriented to person, place, and time.      Comments: Residual left-sided hemiparesis secondary to CVA         Laboratory:  Recent Labs     02/10/25  1423   WBC 3.8*   RBC 5.49   HEMOGLOBIN 15.7   HEMATOCRIT 47.4   MCV 86.3   MCH 28.6   MCHC 33.1   RDW 39.8   PLATELETCT 201   MPV 8.6*     Recent Labs     02/10/25  1423   SODIUM 134*   POTASSIUM 4.9   CHLORIDE 102   CO2 21   GLUCOSE 97   BUN 21   CREATININE 1.97*   CALCIUM 9.7     Recent Labs     02/10/25  1423   ALTSGPT 57*   ASTSGOT 46*   ALKPHOSPHAT 58   TBILIRUBIN 0.2   GLUCOSE 97         Recent Labs     02/10/25  1423   NTPROBNP 3351*         Recent Labs     02/10/25  1423   TROPONINT 18       Imaging:  DX-CHEST-PORTABLE (1 VIEW)   Final Result      No acute cardiac or pulmonary abnormalities are identified.      US-RENAL    (Results Pending)       X-Ray:  I have personally reviewed the images and compared with prior images.  EKG:  I have personally reviewed the images and compared with prior images.    Assessment/Plan:  Justification for Admission Status  I anticipate this patient is appropriate for observation status at this time because will require overnight observation to ensure hypotension resolves and KAISER improves    Patient will need a Telemetry bed on EMERGENCY service .  The need is secondary to will require overnight observation to ensure hypotension resolves and KAISER improves.    * Influenza A  Assessment & Plan  Currently saturating well on room air  Started patient on Tamiflu, renally dose now due to KAISER  Symptomatic management    Hypotension due to hypovolemia  Assessment & Plan  Patient noted to be hypotensive on admission  Responded well to fluid resuscitation  Holding off on antihypertensives at this time  Likely secondary to influenza A  Monitor blood pressure    [facility]ISER (acute kidney injury) (HCC)  Assessment & Plan  Noted to be dry on exam  Holding off on diuretics  Given 1 L bolus in the  ER  Bladder scan and straight cath as needed  Renal ultrasound pending  Monitor intake and output  Serial BMP    CAD in native artery- (present on admission)  Assessment & Plan  Nonobstructive CAD    Essential hypertension- (present on admission)  Assessment & Plan  Will hold off on antihypertensives as he was hypotensive on admission  Restart if clinically indicated    Prediabetes- (present on admission)  Assessment & Plan  A1c 5.9    Hyperlipidemia- (present on admission)  Assessment & Plan  Resume statin therapy    AF (paroxysmal atrial fibrillation) (HCC)- (present on admission)  Assessment & Plan  Resume patient's metoprolol and Xarelto  Patient is for MARK and cardioversion scheduled on 2/14    CVA (cerebral vascular accident) (HCC)- (present on admission)  Assessment & Plan  History of  with residual left-sided hemiparesis    Acquired hypothyroidism- (present on admission)  Assessment & Plan  Resume Synthroid        VTE prophylaxis: therapeutic anticoagulation with Xarelto

## 2025-02-11 NOTE — PROGRESS NOTES
Report was called to SIMRAN Cardoso on Justine 6.  All questions and concerns have been addressed. Pt's chart, medication, and belongings have been gathered to go to his new room S603/2.  Patient will be taken to his new room via gurney by transport.

## 2025-02-11 NOTE — HOSPITAL COURSE
This is a 59-year-old male with past medical history of chronic atrial fibrillation on Xarelto, hypertension, dyslipidemia, history of right MCA CVA, CAD, mood disorder and seizure disorder who presented to the ED on 2/10 with generalized weakness, dizziness and fever x 1 week.    Patient initially presented himself to an urgent care and noted to be hypotensive, dizzy, headache, nonproductive cough, intermittent fevers for about a week now.    Labs significant for KAISER, BNP of 3300, tested positive for influenza A.  Chest x-ray with no evidence of pulmonary infiltrate or effusion.  Patient is currently saturating well on room air.

## 2025-02-11 NOTE — PROGRESS NOTES
Monitor Summary:  Rhythm: Afib  Rate:   Ectopy: rare to occasional PVCs, HR up to 140s-170s    -/0.06/-    Per monitor tech interpretation

## 2025-02-11 NOTE — ASSESSMENT & PLAN NOTE
-Better.  Responded well to IV fluids.  -Continue to hold off on most antihypertensives.  -Continue to hydrate with IV  cc/h.  -Close hemodynamic monitoring.

## 2025-02-11 NOTE — ASSESSMENT & PLAN NOTE
- A1c 5.9  -Continue to monitor.  Hold off on sliding scale insulin coverage.   about how to care for your child, or you have questions or concerns. Where can you learn more? Go to [email or web address] and sign in to your [employer] account. Enter Z647 in the Enhanced Medical Decisions box to learn more about \"Child's Well Visit, 12 Months: Care Instructions. \"     If you do not have an account, please click on the \"Sign Up Now\" link. Current as of: May 12, 2017  Content Version: 11.5  © 9481-5982 [employer]. Care instructions adapted under license by [facility] ([facility]). If you have questions about a medical condition or this instruction, always ask your healthcare professional. [address] any warranty or liability for your use of this information.

## 2025-02-11 NOTE — ASSESSMENT & PLAN NOTE
- Was hypotensive initially, with K[facility]ISER related to hypotension.  BP better but could still be tenuous.  Hold home Norvasc, ramipril, and Aldactone for now.  Continue metoprolol.   - Monitor blood pressure trend closely. Optimize blood pressure control.

## 2025-02-11 NOTE — CARE PLAN
The patient is Stable - Low risk of patient condition declining or worsening    Shift Goals  Clinical Goals: Pt will tollerate GI soft diet throughout shift  Patient Goals: geel better  Family Goals: updates    Progress made toward(s) clinical / shift goals:  Pt was able to tolerate a GI soft diet throughout shift. Pt has not vomited since transfer to [place] [id number]; pt states that his BM are more formed than liquid.     Patient is not progressing towards the following goals: NA

## 2025-02-11 NOTE — ED NOTES
Med rec updated and complete. Allergies reviewed.      Interviewed ( family) at bedside.   Wife reports that pt did not take any medications today.     Last dose of Rivaroxaban 02/09/25.    No outpatient antibiotic use in last 30 days.    Recent increase in  Metoprolol from 25 mg to 50 mg.  Pt is no longer taking briviact.    Preferred pharmacy  [facility]   [phone]

## 2025-02-12 ENCOUNTER — TELEPHONE (OUTPATIENT)
Dept: CARDIOLOGY | Facility: MEDICAL CENTER | Age: 60
End: 2025-02-12
Payer: MEDICARE

## 2025-02-12 ENCOUNTER — PHARMACY VISIT (OUTPATIENT)
Dept: PHARMACY | Facility: MEDICAL CENTER | Age: 60
End: 2025-02-12
Payer: COMMERCIAL

## 2025-02-12 ENCOUNTER — PATIENT OUTREACH (OUTPATIENT)
Dept: SCHEDULING | Facility: IMAGING CENTER | Age: 60
End: 2025-02-12
Payer: MEDICARE

## 2025-02-12 VITALS
DIASTOLIC BLOOD PRESSURE: 83 MMHG | RESPIRATION RATE: 18 BRPM | SYSTOLIC BLOOD PRESSURE: 114 MMHG | TEMPERATURE: 96.9 F | WEIGHT: 200 LBS | OXYGEN SATURATION: 92 % | HEIGHT: 71 IN | BODY MASS INDEX: 28 KG/M2 | HEART RATE: 82 BPM

## 2025-02-12 LAB
ANION GAP SERPL CALC-SCNC: 11 MMOL/L (ref 7–16)
BUN SERPL-MCNC: 25 MG/DL (ref 8–22)
CALCIUM SERPL-MCNC: 8.8 MG/DL (ref 8.5–10.5)
CHLORIDE SERPL-SCNC: 103 MMOL/L (ref 96–112)
CO2 SERPL-SCNC: 20 MMOL/L (ref 20–33)
CREAT SERPL-MCNC: 1.55 MG/DL (ref 0.5–1.4)
ERYTHROCYTE [DISTWIDTH] IN BLOOD BY AUTOMATED COUNT: 38.6 FL (ref 35.9–50)
GFR SERPLBLD CREATININE-BSD FMLA CKD-EPI: 51 ML/MIN/1.73 M 2
GLUCOSE SERPL-MCNC: 122 MG/DL (ref 65–99)
HCT VFR BLD AUTO: 44 % (ref 42–52)
HGB BLD-MCNC: 14.9 G/DL (ref 14–18)
MCH RBC QN AUTO: 28.5 PG (ref 27–33)
MCHC RBC AUTO-ENTMCNC: 33.9 G/DL (ref 32.3–36.5)
MCV RBC AUTO: 84.3 FL (ref 81.4–97.8)
PLATELET # BLD AUTO: 163 K/UL (ref 164–446)
PMV BLD AUTO: 9 FL (ref 9–12.9)
POTASSIUM SERPL-SCNC: 3.9 MMOL/L (ref 3.6–5.5)
RBC # BLD AUTO: 5.22 M/UL (ref 4.7–6.1)
SODIUM SERPL-SCNC: 134 MMOL/L (ref 135–145)
WBC # BLD AUTO: 2.5 K/UL (ref 4.8–10.8)

## 2025-02-12 PROCEDURE — A9270 NON-COVERED ITEM OR SERVICE: HCPCS | Performed by: INTERNAL MEDICINE

## 2025-02-12 PROCEDURE — 700102 HCHG RX REV CODE 250 W/ 637 OVERRIDE(OP): Performed by: INTERNAL MEDICINE

## 2025-02-12 PROCEDURE — RXMED WILLOW AMBULATORY MEDICATION CHARGE: Performed by: STUDENT IN AN ORGANIZED HEALTH CARE EDUCATION/TRAINING PROGRAM

## 2025-02-12 PROCEDURE — 85027 COMPLETE CBC AUTOMATED: CPT

## 2025-02-12 PROCEDURE — A9270 NON-COVERED ITEM OR SERVICE: HCPCS | Performed by: GENERAL PRACTICE

## 2025-02-12 PROCEDURE — 99239 HOSP IP/OBS DSCHRG MGMT >30: CPT | Performed by: STUDENT IN AN ORGANIZED HEALTH CARE EDUCATION/TRAINING PROGRAM

## 2025-02-12 PROCEDURE — 80048 BASIC METABOLIC PNL TOTAL CA: CPT

## 2025-02-12 PROCEDURE — 700102 HCHG RX REV CODE 250 W/ 637 OVERRIDE(OP): Performed by: GENERAL PRACTICE

## 2025-02-12 RX ORDER — OSELTAMIVIR PHOSPHATE 30 MG/1
30 CAPSULE ORAL 2 TIMES DAILY
Qty: 6 CAPSULE | Refills: 0 | Status: ACTIVE | OUTPATIENT
Start: 2025-02-12 | End: 2025-02-15

## 2025-02-12 RX ADMIN — GUAIFENESIN 1200 MG: 600 TABLET, EXTENDED RELEASE ORAL at 04:38

## 2025-02-12 RX ADMIN — SERTRALINE HYDROCHLORIDE 50 MG: 50 TABLET ORAL at 04:39

## 2025-02-12 RX ADMIN — OSELTAMIVIR PHOSPHATE 30 MG: 30 CAPSULE ORAL at 04:38

## 2025-02-12 RX ADMIN — METOPROLOL TARTRATE 50 MG: 50 TABLET, FILM COATED ORAL at 04:38

## 2025-02-12 RX ADMIN — THYROID 180 MG: 120 TABLET ORAL at 04:38

## 2025-02-12 RX ADMIN — AMIODARONE HYDROCHLORIDE 200 MG: 200 TABLET ORAL at 04:38

## 2025-02-12 RX ADMIN — LEVETIRACETAM 1000 MG: 500 TABLET, FILM COATED ORAL at 04:38

## 2025-02-12 RX ADMIN — EZETIMIBE 10 MG: 10 TABLET ORAL at 04:38

## 2025-02-12 NOTE — PROGRESS NOTES
Thong Arzola has been provided discharge instructions, to include follow up care, home medications, and activity/diet reviewed. Copy of discharge instructions in patient chart, signed and reviewed. Patient verbalizes the understanding of the discharge instructions. PIV removed, tip intact, pressure dressing applied. Arm band removed. Patient did not have home meds during admit. Meds to Beds given. Questions and concerns addressed prior to leaving the discharge lounge. Transported via car by wife. Patient discharge to home.

## 2025-02-12 NOTE — TELEPHONE ENCOUNTER
Please see Robyn's tele encounter dated 1/14. Notes dated today. Already addressed by ABHINAV and Robyn.

## 2025-02-12 NOTE — CARE PLAN
The patient is Stable - Low risk of patient condition declining or worsening    Shift Goals  Clinical Goals: patient safety  and comfort /New IV line  Patient Goals: Rest  Family Goals: REGINALD    Progress made toward(s) clinical / shift goals:  Patient on ISO for Influenza, On continuous IVF, Resting comfortably, call light within reach.     Patient is not progressing towards the following goals:

## 2025-02-12 NOTE — DISCHARGE SUMMARY
Discharge Summary    CHIEF COMPLAINT ON ADMISSION  Chief Complaint   Patient presents with    Flu Like Symptoms     Pt reports generalized weakness, dizziness, headaches, nonproductive cough, CP, and fevers x 1 wk    Blood Pressure Problem     Pt was hypotensive @ [facility], 60/40       Reason for Admission  ems     Admission Date  2/10/2025    CODE STATUS  Full Code    HPI & HOSPITAL COURSE  This is a 59-year-old male with chronic atrial fibrillation on Xarelto, hypertension, dyslipidemia, history of right MCA CVA with left side deficit, CAD, mood disorder and seizure disorder, admitted 2/10/2025 with generalized weakness, dizziness, cough, and intermittent fevers x 1 week. Patient initially presented himself to an urgent care and was noted to be hypotensive and was transferred here for further evaluation.     He was given IVF which has improved Bp significantly. Flu was positive. He was also noted to have KAISER on CKD3. He received renal dosing tamiflu. US renal unremarkable. Patient's Bp and renal function were significantly improved after IVF.  He is on room air.  Denies any shortness of breath.  Hemodynamically stable.  Renal function back to baseline Cr 1.55.  Patient will be discharged with Tamiflu 30 mg twice daily with total 5 days course per renal dosing after discussed with pharmacy.      Patient has scheduled MARK with cardiology on 2/14 as outpatient.  I have advised patient and his wife to discuss with cardiology if procedure needs to be postponed. They voiced understanding.     Patient has chronic diarrhea.  I advised patient to avoid NSAID and keep hydration.  Follow-up with PCP in 1 week to repeat renal function.  Discuss with GI about chronic diarrhea workup as outpatient.    Therefore, he is discharged in good and stable condition to home with close outpatient follow-up.    The patient met 2-midnight criteria for an inpatient stay at the time of discharge.    Discharge Date  2/12/25    FOLLOW UP ITEMS POST  DISCHARGE  PCP  Cardiology  GI    DISCHARGE DIAGNOSES  Principal Problem:    Influenza A (POA: Unknown)  Active Problems:    Acquired hypothyroidism (Chronic) (POA: Yes)    CVA (cerebral vascular accident) (HCC) (POA: Yes)    AF (paroxysmal atrial fibrillation) (HCC) (POA: Yes)    Hyperlipidemia (POA: Yes)    Prediabetes (POA: Yes)    Essential hypertension (POA: Yes)    CAD in native artery (POA: Yes)    KAISER (acute kidney injury) (HCC) (POA: Yes)    Hypotension due to hypovolemia (POA: Yes)    Secondary hypercoagulable state (HCC) (POA: Yes)  Resolved Problems:    * No resolved hospital problems. *      FOLLOW UP  Future Appointments   Date Time Provider Department Center   2/14/2025 11:00 AM [facility] EXAM 3 [facility]   2/20/2025  3:20 PM RADHA Arroyo None   2/28/2025 11:00 AM Paul Samuels D.O. [facility] None     Arthur Rebolledo  [address] [place] [id number]  [phone]    Follow up in 1 week(s)        MEDICATIONS ON DISCHARGE     Medication List        START taking these medications        Instructions   oseltamivir 30 MG Caps  Commonly known as: Tamiflu   Take 1 Capsule by mouth 2 times a day for 3 days.  Dose: 30 mg            CHANGE how you take these medications        Instructions   ramipril 10 MG capsule  What changed: when to take this  Commonly known as: Altace   Take 1 Capsule by mouth every day. Taking 1 tab one time daily  Dose: 10 mg     sertraline 25 MG tablet  What changed: when to take this  Commonly known as: Zoloft   TAKE 2 TABLETS BY MOUTH EVERY DAY  DAYS  Dose: 50 mg            CONTINUE taking these medications        Instructions   acetaminophen 500 MG Tabs  Commonly known as: Tylenol   Take 1,000 mg by mouth every evening. 500 mg x 2 tablets = 1000 mg  Dose: 1,000 mg     amiodarone 200 MG Tabs  Commonly known as: Cordarone   Take 200 mg by mouth every day.  Dose: 200 mg     amLODIPine 5 MG Tabs  Commonly known as: Norvasc   Take 1 Tablet by mouth 2 times a  day.  Dose: 5 mg     [place] Thyroid 90 MG Tabs  Generic drug: thyroid   Take 180 mg by mouth every morning. 90 mg x 2 tablets = 180 mg  Dose: 180 mg     dantrolene 25 MG Caps  Commonly known as: Dantrium   Take 2 Capsules by mouth 2 times a day for 180 days.  Dose: 50 mg     ezetimibe 10 MG Tabs  Commonly known as: Zetia   Take 1 Tablet by mouth every day.  Dose: 10 mg     gabapentin 100 MG Caps  Commonly known as: Neurontin   TAKE 2 CAPSULES BY MOUTH EVERY EVENING  Dose: 200 mg     Keppra 1000 MG tablet  Generic drug: levetiracetam   Take 1,000 mg by mouth 2 times a day.  Dose: 1,000 mg     metoprolol tartrate 25 MG Tabs  Commonly known as: Lopressor   Take 1 Tablet by mouth 2 times a day.  Dose: 25 mg     spironolactone 25 MG Tabs  Commonly known as: Aldactone   Take 1 Tablet by mouth every day.  Dose: 25 mg     Xarelto 20 MG Tabs tablet  Generic drug: rivaroxaban   TAKE 1 TABLET BY MOUTH EVERY DAY WITH DINNER  Dose: 20 mg              Allergies  No Known Allergies    DIET  Orders Placed This Encounter   Procedures    Diet Order Diet: Low Fiber(GI Soft)     Standing Status:   Standing     Number of Occurrences:   1     Diet::   Low Fiber(GI Soft) [2]       ACTIVITY  As tolerated.  Weight bearing as tolerated    CONSULTATIONS  na    PROCEDURES  na    LABORATORY  Lab Results   Component Value Date    SODIUM 134 (L) 02/12/2025    POTASSIUM 3.9 02/12/2025    CHLORIDE 103 02/12/2025    CO2 20 02/12/2025    GLUCOSE 122 (H) 02/12/2025    BUN 25 (H) 02/12/2025    CREATININE 1.55 (H) 02/12/2025    CREATININE 1.3 04/04/2008        Lab Results   Component Value Date    WBC 2.5 (L) 02/12/2025    HEMOGLOBIN 14.9 02/12/2025    HEMATOCRIT 44.0 02/12/2025    PLATELETCT 163 (L) 02/12/2025      US-RENAL   Final Result         1.  Echogenic kidneys, nonspecific but can be associated with medical renal disease.      DX-CHEST-PORTABLE (1 VIEW)   Final Result      No acute cardiac or pulmonary abnormalities are identified.           Total time of the discharge process 34 minutes.

## 2025-02-12 NOTE — PROGRESS NOTES
4 Eyes Skin Assessment Completed by SIMRAN jorge     Head WDL  Ears WDL  Nose WDL  Mouth WDL  Neck WDL  Breast/Chest WDL  Shoulder Blades WDL  Spine WDL  (R) Arm/Elbow/Hand WDL  (L) Arm/Elbow/Hand Redness and Blanching contracted hand  Abdomen WDL  Groin WDL  Scrotum/Coccyx/Buttocks Redness and Blanching  (R) Leg WDL  (L) Leg Redness, Blanching, and Swelling  (R) Heel/Foot/Toe Redness and Blanching  (L) Heel/Foot/Toe Redness and Blanching          Devices In Places Blood Pressure Cuff      Interventions In Place Pillows    Possible Skin Injury No    Pictures Uploaded Into Epic N/A  Wound Consult Placed N/A  RN Wound Prevention Protocol Ordered No

## 2025-02-12 NOTE — CARE PLAN
The patient is Stable - Low risk of patient condition declining or worsening    Shift Goals  Clinical Goals: imporvement in lab values  Patient Goals: rest; discharge  Family Goals: evangelista    Progress made toward(s) clinical / shift goals:  Pt alert and able to make needs known. Call light and personal belongings in reach. Pt able to ambulate to bathroom with SB assist, still c/o diarrhea. All needs met at this time.     Problem: Knowledge Deficit - Standard  Goal: Patient and family/care givers will demonstrate understanding of plan of care, disease process/condition, diagnostic tests and medications  Outcome: Met     Problem: Pain - Standard  Goal: Alleviation of pain or a reduction in pain to the patient’s comfort goal  Outcome: Met     Problem: Fall Risk  Goal: Patient will remain free from falls  Outcome: Met       Patient is not progressing towards the following goals:

## 2025-02-12 NOTE — DISCHARGE INSTRUCTIONS
Discharge Instructions per Tiffany Naik M.D.    Please follow-up with PCP as outpatient.  Continue taking Tamiflu twice a day as prescribed  Keep hydration.   Avoid any NSAIDs such as ibuprofen, Aleve, Motrin or Naproxen. Take tylenol over the counter if have mild headache or mild pain.   Recommends to repeat renal function in one week  Discuss with your cardiologist about your upcoming cardioversion procedure     Return to ER in the event of new or worsening symptoms. Please note importance of compliance and the patient has agreed to proceed with all medical recommendations and follow up plan indicated above. All medications come with benefits and risks. Risks may include permanent injury or death and these risks can be minimized with close reassessment and monitoring. Please make it to your scheduled follow ups with PCP, cardiology

## 2025-02-12 NOTE — TELEPHONE ENCOUNTER
Caller: Anel(spouse)      Topic/issue: Patient's wife was calling regarding her  being admitted to the [facility] for influenza A and he has an appt coming up on Friday for a procedure and he was advised by the doctors to push the appointment out to a later date to allow him time to recover and she asked for a call back. Please advise      Callback Number: [phone]      Thank you    -Roney RAMAN

## 2025-02-14 ENCOUNTER — APPOINTMENT (OUTPATIENT)
Dept: CARDIOLOGY | Facility: MEDICAL CENTER | Age: 60
End: 2025-02-14
Attending: STUDENT IN AN ORGANIZED HEALTH CARE EDUCATION/TRAINING PROGRAM
Payer: MEDICARE

## 2025-02-20 ENCOUNTER — APPOINTMENT (OUTPATIENT)
Dept: NEUROLOGY | Facility: MEDICAL CENTER | Age: 60
End: 2025-02-20
Attending: STUDENT IN AN ORGANIZED HEALTH CARE EDUCATION/TRAINING PROGRAM
Payer: MEDICARE

## 2025-02-28 ENCOUNTER — APPOINTMENT (OUTPATIENT)
Dept: ADMISSIONS | Facility: MEDICAL CENTER | Age: 60
End: 2025-02-28
Attending: INTERNAL MEDICINE
Payer: MEDICARE

## 2025-02-28 ENCOUNTER — OFFICE VISIT (OUTPATIENT)
Dept: PHYSICAL MEDICINE AND REHAB | Facility: MEDICAL CENTER | Age: 60
End: 2025-02-28
Payer: MEDICARE

## 2025-02-28 ENCOUNTER — RESULTS FOLLOW-UP (OUTPATIENT)
Dept: PHYSICAL MEDICINE AND REHAB | Facility: MEDICAL CENTER | Age: 60
End: 2025-02-28

## 2025-02-28 ENCOUNTER — HOSPITAL ENCOUNTER (OUTPATIENT)
Dept: RADIOLOGY | Facility: MEDICAL CENTER | Age: 60
End: 2025-02-28
Attending: GENERAL PRACTICE
Payer: MEDICARE

## 2025-02-28 ENCOUNTER — TELEPHONE (OUTPATIENT)
Dept: CARDIOLOGY | Facility: MEDICAL CENTER | Age: 60
End: 2025-02-28

## 2025-02-28 VITALS
SYSTOLIC BLOOD PRESSURE: 102 MMHG | RESPIRATION RATE: 16 BRPM | BODY MASS INDEX: 28 KG/M2 | DIASTOLIC BLOOD PRESSURE: 72 MMHG | OXYGEN SATURATION: 95 % | HEART RATE: 76 BPM | TEMPERATURE: 97.4 F | WEIGHT: 200 LBS | HEIGHT: 71 IN

## 2025-02-28 DIAGNOSIS — S43.002D ACQUIRED SUBLUXATION OF LEFT SHOULDER, SUBSEQUENT ENCOUNTER: ICD-10-CM

## 2025-02-28 DIAGNOSIS — Z91.81 RISK FOR FALLS: ICD-10-CM

## 2025-02-28 DIAGNOSIS — I69.854 SPASTIC HEMIPLEGIA OF LEFT NONDOMINANT SIDE AS LATE EFFECT OF OTHER CEREBROVASCULAR DISEASE (HCC): ICD-10-CM

## 2025-02-28 DIAGNOSIS — I69.954 HEMIPLEGIA AND HEMIPARESIS FOLLOWING UNSPECIFIED CEREBROVASCULAR DISEASE AFFECTING LEFT NON-DOMINANT SIDE (HCC): ICD-10-CM

## 2025-02-28 DIAGNOSIS — I48.0 AF (PAROXYSMAL ATRIAL FIBRILLATION) (HCC): ICD-10-CM

## 2025-02-28 DIAGNOSIS — Z71.3 DIETARY COUNSELING: ICD-10-CM

## 2025-02-28 DIAGNOSIS — M79.2 NEUROPATHIC PAIN: ICD-10-CM

## 2025-02-28 DIAGNOSIS — Z71.82 EXERCISE COUNSELING: ICD-10-CM

## 2025-02-28 PROCEDURE — 73030 X-RAY EXAM OF SHOULDER: CPT | Mod: LT

## 2025-02-28 RX ORDER — GABAPENTIN 100 MG/1
200 CAPSULE ORAL NIGHTLY
Qty: 180 CAPSULE | Refills: 3 | Status: SHIPPED | OUTPATIENT
Start: 2025-02-28

## 2025-02-28 RX ORDER — AMIODARONE HYDROCHLORIDE 200 MG/1
200 TABLET ORAL DAILY
Qty: 90 TABLET | Refills: 0 | Status: SHIPPED | OUTPATIENT
Start: 2025-02-28

## 2025-02-28 ASSESSMENT — FIBROSIS 4 INDEX: FIB4 SCORE: 2.21

## 2025-02-28 NOTE — RESULT ENCOUNTER NOTE
Dear Thong Arzola      I reviewed the results of your test.  There are no urgent findings that require urgent intervention.   We will discuss results at your follow-up appointment for the corticosteroid injection    Paul Samuels DO

## 2025-02-28 NOTE — TELEPHONE ENCOUNTER
[facility]      Caller: Anel (Wife)    Topic/issue: Pt has an upcoming cardioversion and he is almost out of the amiodarone (CORDARONE) 200 MG Tab, pharmacy will not refill and they think he is still in AFIB, does HL want pt to continue medication? If she does please refill pt uses [facility] on [address] - Please advise     Callback Number: [phone]    Thank You   Maria Eugenia SEARS

## 2025-02-28 NOTE — TELEPHONE ENCOUNTER
CRISTÓBAL Lopez.  You5 minutes ago (12:50 PM)     [facility]  Please refill prescription for amiodarone.    Thank you,

## 2025-02-28 NOTE — TELEPHONE ENCOUNTER
[facility]- Please see patient call below, do you want patient still on amio? Was last ordered by outside provider. I was unsure based off your last note and wanted to clarify. Patient has cardioversion scheduled 3/11/25. Thank you.

## 2025-02-28 NOTE — PROGRESS NOTES
[facility]  [facility]   [address], [address] [place] [address]  Ph: [phone]    SPASTICITY CLINIC    Patient Name: Thong Arzola   Patient : 1965  PCP: Arthur Rebolledo    Examining Physician: Paul Samuels DO  Date of Service: see epic        Patient Identification: Thong Arzola is a 59 y.o. RIGHT hand dominant male with rehabilitation history significant for COVID-19 3/18/2021, paroxysmal atrial fibrillation not on anti-coagulation, hypothyroidism and rehabilitation history significant for large right ICA/MCA ischemic stroke 3/31/2021 in setting of paroxysmal off of anticoagulation s/p craniectomy 4/3/2021 by Dr. Payton  and is presenting to PM&R spasticity clinic for a FOLLOW UP evaluation with the following chief complaint/s:    SUBJECTIVE:   Chief Complaint: Spasticity    Accompanied by Today: Nael, Wife    Reviewed Prior notes  Previously established with Dr. Dumont from 21 until 23.   2/10/25-25 d/c note flu like s/s    History of Present Illness:     Verbal consent was obtained for Mike copilot: Yes      History of Present Illness  The patient, a 59-year-old male, presents for a follow-up evaluation of spasticity, accompanied by his wife.    During the previous consultation, an increase in his dantrolene dosage from twice daily to three times daily, and if necessary, to four times daily, was discussed. The patient reports that his current regimen of dantrolene, administered as 2 capsules twice daily, has been effective in managing his spasticity. However, he experienced a lapse in medication due to a lack of refills at [facility], prompting this consultation. His wife notes occasional involuntary muscle contractions, which are infrequent when dantrolene is taken consistently. The patient reports no significant sleep disturbances or exacerbation of spasticity symptoms following a recent hospitalization. Additionally, he reports no issues with  skin breakdown, urinary retention, constipation, or cold weather exacerbating his symptoms. He continues to use a quad cane and ankle-foot orthosis (AFO) effectively, maintaining sufficient mobility to reach his car and drive to appointments. He is planning a trip to [place] to visit his grandchildren. The patient reports persistent pain in his left shoulder, with no noticeable changes in intensity. His wife assists him with dressing, and he typically sleeps in a supine position. A therapist had previously recommended using a pillow under his shoulder for support during sleep, but he found this uncomfortable. He is not currently engaged in physical or occupational therapy, and his wife encourages him to ambulate around the house and outdoors during her summer vacation. He reports an inability to move his hand or fingers, even for stretching exercises. A nurse had suggested placing a cloth in his hand to prevent nail clenching and scratching, but this has not been implemented. He performs stretching exercises with his left leg each morning and is considering using compression stockings during air travel to aid circulation. His wife assists him with lifting his left leg and applying resistance to engage the muscles. He has been prescribed gabapentin 200 mg at night, which he finds effective, but notes that his insurance only covers a 30-day supply. He has been experiencing atrial fibrillation for several weeks and is inquiring about potential links between brain damage and heart rhythm disturbances.    Supplemental Information  His primary care physician, Dr. Arthur Rebolledo, had previously prescribed prednisone following an episode of influenza.    MEDICATIONS  Current: Dantrolene, gabapentin    No recent infection, skin breakdown, urinary retention, constipation, or new stressors.      Prior notes  -left rigid AFO   -Reported completing about 52 sessions of physical therapy.   -prior meds:: Tylenol, Celebrex,  gabapentin at night     Current Spasticity Medications and Dosages:  dantrolene 50 mg 2 times daily.     Past Spasticity Medications and Dosages:  baclofen 20 mg (1 tab) twice daily at 08:00 and 14:00   Zanaflex  valium    Previous Spasticity Injection History:  3/15/22  Location Botox Amount in Units   Left pectoralis major/pectoralis minor   50 units   Left bicep  75 units   Left tricep  75 units   Left vastus medialis  50 units   Left vastus lateralis  50 units   Left vastus intermedius/rectus femoris  50 units   Left medial gastrocnemius  50 units   Left lateral gastrocnemius  50 units   Left soleus  50 units   Total Units 500 units   11/9/21  Location Botox Amount in Units   Left medial gastrocnemius  100 units   Left lateral gastrocnemius  100 units   Left soleus  75 units   Left FDS/FDP  75 units / 75 units   Left Tricep 25 units   Left FCR  50 units   Total Units 500 units      7/29/21  Location LUE and LLE Botox Amount in Units   FDS 75 units   FDP 75 units   FCR 25 units   FCU 25 units   Quadriceps (medially and laterally)  100 units   Hamstrings (medially and laterally) 100 units   Total Units 400 units       Review of Systems:  Red Flags ROS:   Fever, Chills, Sweats: Denies  Involuntary Weight Loss: Denies  See HPI    Past Medical History:  Past Medical History:   Diagnosis Date    Stroke ([facility]) 2021    right side MCA    Afib ([facility])     Bowel habit changes     Diarrhea post stroke    COVID-19     3/2021    Disorder of thyroid     hypothyroid on medication    Heart murmur     as a child    Hemorrhagic disorder ([facility])     takes Xarelto    High cholesterol     on medication    Hypertension     on medication    Pain     left sided pain    Psychiatric problem     reactive depression, on medication    Seizure ([facility])     takes Keppra states last seizure 2022      No Known Allergies     Past Social History:  Social History     Socioeconomic History    Marital status:      Spouse name: Not on file    Number of  "children: Not on file    Years of education: Not on file    Highest education level: Not on file   Occupational History    Not on file   Tobacco Use    Smoking status: Never    Smokeless tobacco: Never   Vaping Use    Vaping status: Never Used   Substance and Sexual Activity    Alcohol use: Yes     Alcohol/week: 4.2 oz     Types: 7 Shots of liquor per week     Comment: 1 shot before bedtime    Drug use: No    Sexual activity: Not on file   Other Topics Concern    Not on file   Social History Narrative    Retired     Social Drivers of Health     Financial Resource Strain: Not on file   Food Insecurity: No Food Insecurity (2/11/2025)    Hunger Vital Sign     Worried About Running Out of Food in the Last Year: Never true     Ran Out of Food in the Last Year: Never true   Transportation Needs: No Transportation Needs (2/11/2025)    PRAPARE - Transportation     Lack of Transportation (Medical): No     Lack of Transportation (Non-Medical): No   Physical Activity: Not on file   Stress: Not on file   Social Connections: Not on file   Intimate Partner Violence: Not At Risk (2/10/2025)    Humiliation, Afraid, Rape, and Kick questionnaire     Fear of Current or Ex-Partner: No     Emotionally Abused: No     Physically Abused: No     Sexually Abused: No   Housing Stability: Low Risk  (2/11/2025)    Housing Stability Vital Sign     Unable to Pay for Housing in the Last Year: No     Number of Times Moved in the Last Year: 0     Homeless in the Last Year: No        Family History:  Family History   Family history unknown: Yes         OBJECTIVE:   Vital Signs:  /72 (BP Location: Right arm, Patient Position: Sitting, BP Cuff Size: Adult)   Pulse 76   Temp 36.3 °C (97.4 °F) (Temporal)   Resp 16   Ht 1.803 m (5' 11\")   Wt 90.7 kg (200 lb)   SpO2 95%         Physical Exam:   Body Habitus: Body mass index is 27.89 kg/m².  Appearance: Well-groomed, well-nourished, not disheveled  Eyes: No scleral icterus to suggest severe " liver disease, no proptosis to suggest severe hyperthyroid  ENT -no obvious auditory deficits, no external lesions, moist mucus membranes   Skin -no rashes or lesions noted. No appreciable skin breakdown on exposed skin areas.    Respiratory-  breathing comfortably on room air, no audible wheezing, full sentences  Cardiovascular- No lower extremity edema noted.   Psychiatric- alert and oriented,  calm, comfortable, cooperative   Gait - Ambulatory with quad cane and Left AFO. Wearing supportive LUE shoulder stabilizer.  Neuromuscular- Awake alert.  Conversational.  Logical thought content. Spasticity present. L Foot Drop.  L Wrist drop    Physical Exam      2/28/2025  Tone on Modified Jeremiah Scale    R L  R L   Elbow extension (testing tone of elbow flexors)   <1 Hip extension (testing hip flexors)     Elbow flexion (testing tone of elbow extensors)  1 Hip abduction (testing adductors)     Wrist extension (testing tone of wrist flexors)   2 Knee extension (testing knee flexors)  1   Finger extension (testing tone of finger flexors)  2 Knee flexion (testing knee extensors)     Supination (testing forearm pronators)  0 Dorsiflexion (testing plantarflexors)  2   Shoulder Abduction (testing pectoralis, teres)  2 Plantarflexion (testing dorsiflexors)       Elbow extension rigid endpoint at 15 d extension    2/13/24 MAS  Left bicep 3/4 with   Left tricep 1/4  Left wrist flexors 3/4  Left finger flexors 3/4  Left plantar flexors 3/4  Left knee extension 3/4 rigid endpoint at 15 d extension    Pertinent Labs:  Lab Results   Component Value Date/Time    SODIUM 134 (L) 02/12/2025 12:15 AM    POTASSIUM 3.9 02/12/2025 12:15 AM    CHLORIDE 103 02/12/2025 12:15 AM    CO2 20 02/12/2025 12:15 AM    GLUCOSE 122 (H) 02/12/2025 12:15 AM    BUN 25 (H) 02/12/2025 12:15 AM    CREATININE 1.55 (H) 02/12/2025 12:15 AM    CREATININE 1.3 04/04/2008 03:05 AM       Lab Results   Component Value Date/Time    WBC 2.5 (L) 02/12/2025 12:15 AM     RBC 5.22 02/12/2025 12:15 AM    HEMOGLOBIN 14.9 02/12/2025 12:15 AM    HEMATOCRIT 44.0 02/12/2025 12:15 AM    MCV 84.3 02/12/2025 12:15 AM    MCH 28.5 02/12/2025 12:15 AM    MCHC 33.9 02/12/2025 12:15 AM    MPV 9.0 02/12/2025 12:15 AM    NEUTSPOLYS 69.10 02/10/2025 02:23 PM    LYMPHOCYTES 10.00 (L) 02/10/2025 02:23 PM    MONOCYTES 20.30 (H) 02/10/2025 02:23 PM    EOSINOPHILS 0.00 02/10/2025 02:23 PM    BASOPHILS 0.30 02/10/2025 02:23 PM       Lab Results   Component Value Date/Time    ASTSGOT 46 (H) 02/10/2025 02:23 PM    ALTSGPT 57 (H) 02/10/2025 02:23 PM       Imaging:   I personally reviewed following images, these are my reads  MRI brain 4/1/2021  Very large acute right MCA territory infarct as detailed above with small amount of petechial hemorrhage in the right insular region and right temporal lobe.  Punctate right thalamic lacunar infarct.  Right ICA and M1 MCA occlusion.    IMAGING radiology reads. I reviewed the following radiology reads       Results for orders placed during the hospital encounter of 03/31/21    MR-BRAIN-W/O    Impression  Very large acute right MCA territory infarct as detailed above with small amount of petechial hemorrhage in the right insular region and right temporal lobe.    Punctate right thalamic lacunar infarct.    Right ICA and M1 MCA occlusion.                                                             Results for orders placed during the hospital encounter of 01/06/05    DX-CERVICAL SPINE-4+ VIEWS    Impression  IMPRESSION:    1. NORMAL CERVICAL SPINE.        Read By LUCY COWAN MD on Jan 6 2005  1:06PM  : HAYDEE Transcription Date: Jan 7 2005  3:21AM  THIS DOCUMENT HAS BEEN ELECTRONICALLY SIGNED BY: KIRA WHITE MD on Jan 7 2005  7:41AM       Results for orders placed during the hospital encounter of 12/12/23    DX-ELBOW-COMPLETE 3+ LEFT    Impression  1.  No fracture identified    2.  osteoarthritis                                           ASSESSMENT/PLAN: Thong Arzola  is a 59 y.o. male with rehabilitation history significant for COVID-19 3/18/2021, paroxysmal atrial fibrillation not on anti-coagulation, hypothyroidism and rehabilitation history significant for large right ICA/MCA ischemic stroke 3/31/2021 in setting of paroxysmal off of anticoagulation s/p craniectomy 4/3/2021 by Dr. Payton  presenting for spasticity management. The following plan was discussed with the patient who is in agreement.     Visit Diagnoses     ICD-10-CM   1. Hemiplegia and hemiparesis following unspecified cerebrovascular disease affecting left non-dominant side (HCC)  I69.954   2. Spastic hemiplegia of left nondominant side as late effect of other cerebrovascular disease ([facility])  I69.854   3. Neuropathic pain  M79.2   4. Exercise counseling  Z71.82   5. Dietary counseling  Z71.3   6. Acquired subluxation of left shoulder, subsequent encounter  S43.002D   7. Risk for falls  Z91.81     Assessment & Plan  Spasticity  - Continue current dantrolene regimen (2 capsules twice a day)    - Dosage can be increased to 3 times a day or up to 4 times a day if needed  - Engage in simple hand exercises at home  - Continue using e-stim if beneficial  - Perform range of motion exercises for hand and legs (10 to 15 minutes per day, 4 to 5 days per week)  - Use compression socks during air travel to aid circulation  - Strengthen extensor muscles through range of motion exercises and triceps extensions to counteract biceps  - Prescription for gabapentin 200 mg at night    - Notify if any changes are observed  - Shoulder subluxation left    - x-ray ordered    - Corticosteroid injection to be administered if results are normal  - Informed about potential side effects of systemic steroids (flushing, weight gain, increased glucose levels, anxiety, sleep disturbances, hormonal imbalances)  - Provided printouts of hand exercises  -deferred OT/PT since they live a distance away from  [place]    Follow-up  - Patient to follow up in 2 weeks to assess effectiveness of shoulder injection    PROCEDURE  A corticosteroid injection was administered into the shoulder.        Spasticity secondary to CVA  Medication Management: dantrolene  Ambulatory quad cane.  Orthotics L rigid AFO. Left shoulder brace  Patient in HEP        -Neuropathic pain:  ineffective with  Lyrica; Continue gabapentin 200 mg nightly  -Prediabetes A1c 5.9 -->5.3  -acute diarrhea: consider evaluation with primary care doctor to rule out infectious, metabolic, other gastroenterology causes    Other:  Diet and exercise counseling and fall prevention provided    Orders Placed This Encounter    DX-SHOULDER 2+ LEFT    Referral to Pain Clinic    gabapentin (NEURONTIN) 100 MG Cap       -Medications/Modalities: No changes in medications   -Diagnostic workup: reviewed today as above; ordered X-ray  -Interventional program: will consider at a later time for L shoulder subluxation with GH injection  -Referrals: none required at this time    Follow-up: L shoulder GH injection s/p XR    Patient expressed understanding of the management plan. Patient and Family Member were encouraged to call if any worries, issues, problems or concerns prior to the next visit     Please note that this dictation was created using voice recognition software. I have made every reasonable attempt to correct obvious errors but there may be errors of grammar and content that I may have overlooked prior to finalization of this note.    My total time spent caring for the patient on the day of the encounter was 41 minutes.   This does not include time spent on separately billable procedures/tests.        Paul Samuels DO  Department of Physical Medicine and Rehabilitation  [facility]         CC Arthur Rebolledo

## 2025-03-04 ENCOUNTER — PRE-ADMISSION TESTING (OUTPATIENT)
Dept: ADMISSIONS | Facility: MEDICAL CENTER | Age: 60
End: 2025-03-04
Attending: INTERNAL MEDICINE
Payer: MEDICARE

## 2025-03-04 NOTE — OR NURSING
RN tele pre admit appointment completed with spouse, Anel:  Spouse provided medication and fasting instructions, per Cardiology Procedure Instruction Sheet and because patient is having general anesthesia instructions also given according to the Guideline for Pre-Operative Medication.  Spouse aware medication instructions can be reviewed in the pre admit AVS note.  Preparing For Your Procedure packet reviewed with spouse, including stopping all vitamins and herbal supplements 7 days prior to procedure, stopping NSAIDS 5 days prior to procedure unless otherwise instructed by medical provider and bathing guidelines.  Procedure date 3/11/2025.    During pre admit appointment this RN instructed spouse to encourage patient to increase fluid intake the day prior to surgery including intake of electrolyte drinks such as Gatorade or electrolyte water and patient may have clear liquids until 2 hours prior to procedure.     Spouse verbalizes understanding of all instructions given. No further questions at this time.

## 2025-03-04 NOTE — PREADMIT AVS NOTE
Current Medications   Medication Instructions    amiodarone (CORDARONE) 200 MG Tab Continue taking medication as prescribed, including morning of procedure     gabapentin (NEURONTIN) 100 MG Cap Continue taking medication as prescribed, including night before procedure.    dantrolene (DANTRIUM) 25 MG Cap Continue taking medication as prescribed, including morning of procedure     acetaminophen (TYLENOL) 500 MG Tab As needed medication, may take if needed, including morning of procedure     levetiracetam (KEPPRA) 1000 MG tablet Continue taking medication as prescribed, including morning of procedure     XARELTO 20 MG Tab tablet Follow instructions from surgeon or specialist.    sertraline (ZOLOFT) 25 MG tablet Continue taking medication as prescribed, including morning of procedure     ramipril (ALTACE) 10 MG capsule Hold for 24 hours prior to procedure    metoprolol tartrate (LOPRESSOR) 25 MG Tab Continue taking medication as prescribed, including morning of procedure     spironolactone (ALDACTONE) 25 MG Tab Hold medication day of procedure    amLODIPine (NORVASC) 5 MG Tab Continue taking medication as prescribed, including morning of procedure     ezetimibe (ZETIA) 10 MG Tab Hold for 24 hours prior to procedure    ARMOUR THYROID 90 MG Tab Continue taking medication as prescribed, including morning of procedure

## 2025-03-05 NOTE — Clinical Note
REFERRAL APPROVAL NOTICE         Sent on March 5, 2025                   Thong Arzola  [address] [address]                   Dear Mr. Arzola,    After a careful review of the medical information and benefit coverage, [facility] has processed your referral. See below for additional details.    If applicable, you must be actively enrolled with your insurance for coverage of the authorized service. If you have any questions regarding your coverage, please contact your insurance directly.    REFERRAL INFORMATION   Referral #:  [id number]  Referred-To Department    Referred-By Provider:  Physical Medicine and Rehab    Paul Samuels D.O.   [facility]      [address]  [address]  [place] [id number] [phone] [address]  [place] [id number] [phone]    Referral Start Date:  02/28/2025  Referral End Date:   06/05/2025             SCHEDULING  If you do not already have an appointment, please call [phone] to make an appointment.     MORE INFORMATION  If you do not already have a [facility] account, sign up at: [email or web address]  You can access your medical information, make appointments, see lab results, billing information, and more.  If you have questions regarding this referral, please contact  the [facility] Referrals department at:             [phone]. Monday - Friday 8:00AM - 5:00PM.     Sincerely,    [facility]

## 2025-03-11 ENCOUNTER — ANESTHESIA (OUTPATIENT)
Dept: CARDIOLOGY | Facility: MEDICAL CENTER | Age: 60
End: 2025-03-11
Payer: MEDICARE

## 2025-03-11 ENCOUNTER — APPOINTMENT (OUTPATIENT)
Dept: CARDIOLOGY | Facility: MEDICAL CENTER | Age: 60
End: 2025-03-11
Attending: STUDENT IN AN ORGANIZED HEALTH CARE EDUCATION/TRAINING PROGRAM
Payer: MEDICARE

## 2025-03-11 ENCOUNTER — ANESTHESIA EVENT (OUTPATIENT)
Dept: CARDIOLOGY | Facility: MEDICAL CENTER | Age: 60
End: 2025-03-11
Payer: MEDICARE

## 2025-03-11 ENCOUNTER — HOSPITAL ENCOUNTER (OUTPATIENT)
Facility: MEDICAL CENTER | Age: 60
End: 2025-03-11
Attending: INTERNAL MEDICINE | Admitting: INTERNAL MEDICINE
Payer: MEDICARE

## 2025-03-11 VITALS
OXYGEN SATURATION: 90 % | RESPIRATION RATE: 18 BRPM | DIASTOLIC BLOOD PRESSURE: 70 MMHG | WEIGHT: 205.03 LBS | HEIGHT: 71 IN | SYSTOLIC BLOOD PRESSURE: 103 MMHG | TEMPERATURE: 97.3 F | BODY MASS INDEX: 28.7 KG/M2 | HEART RATE: 67 BPM

## 2025-03-11 DIAGNOSIS — I48.0 AF (PAROXYSMAL ATRIAL FIBRILLATION) (HCC): ICD-10-CM

## 2025-03-11 PROBLEM — I48.19 PERSISTENT ATRIAL FIBRILLATION (HCC): Status: ACTIVE | Noted: 2021-03-31

## 2025-03-11 LAB
EKG IMPRESSION: NORMAL
EKG IMPRESSION: NORMAL

## 2025-03-11 PROCEDURE — 93010 ELECTROCARDIOGRAM REPORT: CPT | Mod: 76,59 | Performed by: INTERNAL MEDICINE

## 2025-03-11 PROCEDURE — 93005 ELECTROCARDIOGRAM TRACING: CPT | Mod: TC | Performed by: INTERNAL MEDICINE

## 2025-03-11 PROCEDURE — 160015 HCHG STAT PREOP MINUTES

## 2025-03-11 PROCEDURE — 92960 CARDIOVERSION ELECTRIC EXT: CPT | Performed by: INTERNAL MEDICINE

## 2025-03-11 PROCEDURE — 160035 HCHG PACU - 1ST 60 MINS PHASE I

## 2025-03-11 PROCEDURE — 700101 HCHG RX REV CODE 250: Performed by: ANESTHESIOLOGY

## 2025-03-11 PROCEDURE — 160002 HCHG RECOVERY MINUTES (STAT)

## 2025-03-11 PROCEDURE — 160046 HCHG PACU - 1ST 60 MINS PHASE II

## 2025-03-11 PROCEDURE — 700105 HCHG RX REV CODE 258: Performed by: INTERNAL MEDICINE

## 2025-03-11 PROCEDURE — 4410588 CL-CARDIOVERSION

## 2025-03-11 PROCEDURE — 700111 HCHG RX REV CODE 636 W/ 250 OVERRIDE (IP): Performed by: ANESTHESIOLOGY

## 2025-03-11 RX ORDER — ALBUTEROL SULFATE 5 MG/ML
2.5 SOLUTION RESPIRATORY (INHALATION)
Status: DISCONTINUED | OUTPATIENT
Start: 2025-03-11 | End: 2025-03-11 | Stop reason: HOSPADM

## 2025-03-11 RX ORDER — LABETALOL HYDROCHLORIDE 5 MG/ML
5 INJECTION, SOLUTION INTRAVENOUS
Status: DISCONTINUED | OUTPATIENT
Start: 2025-03-11 | End: 2025-03-11 | Stop reason: HOSPADM

## 2025-03-11 RX ORDER — METOPROLOL TARTRATE 1 MG/ML
1 INJECTION, SOLUTION INTRAVENOUS
Status: DISCONTINUED | OUTPATIENT
Start: 2025-03-11 | End: 2025-03-11 | Stop reason: HOSPADM

## 2025-03-11 RX ORDER — HYDRALAZINE HYDROCHLORIDE 20 MG/ML
5 INJECTION INTRAMUSCULAR; INTRAVENOUS
Status: DISCONTINUED | OUTPATIENT
Start: 2025-03-11 | End: 2025-03-11 | Stop reason: HOSPADM

## 2025-03-11 RX ORDER — ONDANSETRON 2 MG/ML
4 INJECTION INTRAMUSCULAR; INTRAVENOUS
Status: DISCONTINUED | OUTPATIENT
Start: 2025-03-11 | End: 2025-03-11 | Stop reason: HOSPADM

## 2025-03-11 RX ORDER — DIPHENHYDRAMINE HYDROCHLORIDE 50 MG/ML
12.5 INJECTION, SOLUTION INTRAMUSCULAR; INTRAVENOUS
Status: DISCONTINUED | OUTPATIENT
Start: 2025-03-11 | End: 2025-03-11 | Stop reason: HOSPADM

## 2025-03-11 RX ORDER — HALOPERIDOL 5 MG/ML
1 INJECTION INTRAMUSCULAR
Status: DISCONTINUED | OUTPATIENT
Start: 2025-03-11 | End: 2025-03-11 | Stop reason: HOSPADM

## 2025-03-11 RX ORDER — SODIUM CHLORIDE, SODIUM LACTATE, POTASSIUM CHLORIDE, CALCIUM CHLORIDE 600; 310; 30; 20 MG/100ML; MG/100ML; MG/100ML; MG/100ML
INJECTION, SOLUTION INTRAVENOUS CONTINUOUS
Status: DISCONTINUED | OUTPATIENT
Start: 2025-03-11 | End: 2025-03-11 | Stop reason: HOSPADM

## 2025-03-11 RX ORDER — MIDAZOLAM HYDROCHLORIDE 1 MG/ML
1 INJECTION INTRAMUSCULAR; INTRAVENOUS
Status: DISCONTINUED | OUTPATIENT
Start: 2025-03-11 | End: 2025-03-11 | Stop reason: HOSPADM

## 2025-03-11 RX ORDER — EPHEDRINE SULFATE 50 MG/ML
5 INJECTION, SOLUTION INTRAVENOUS
Status: DISCONTINUED | OUTPATIENT
Start: 2025-03-11 | End: 2025-03-11 | Stop reason: HOSPADM

## 2025-03-11 RX ORDER — LIDOCAINE HYDROCHLORIDE 20 MG/ML
INJECTION, SOLUTION EPIDURAL; INFILTRATION; INTRACAUDAL; PERINEURAL PRN
Status: DISCONTINUED | OUTPATIENT
Start: 2025-03-11 | End: 2025-03-11 | Stop reason: SURG

## 2025-03-11 RX ADMIN — EPHEDRINE SULFATE 5 MG: 50 INJECTION, SOLUTION INTRAVENOUS at 11:50

## 2025-03-11 RX ADMIN — SODIUM CHLORIDE, POTASSIUM CHLORIDE, SODIUM LACTATE AND CALCIUM CHLORIDE: 600; 310; 30; 20 INJECTION, SOLUTION INTRAVENOUS at 11:31

## 2025-03-11 RX ADMIN — PROPOFOL 100 MG: 10 INJECTION, EMULSION INTRAVENOUS at 11:37

## 2025-03-11 RX ADMIN — LIDOCAINE HYDROCHLORIDE 100 MG: 20 INJECTION, SOLUTION EPIDURAL; INFILTRATION; INTRACAUDAL; PERINEURAL at 11:37

## 2025-03-11 ASSESSMENT — FIBROSIS 4 INDEX: FIB4 SCORE: 2.21

## 2025-03-11 ASSESSMENT — PAIN DESCRIPTION - PAIN TYPE
TYPE: SURGICAL PAIN
TYPE: CHRONIC PAIN

## 2025-03-11 ASSESSMENT — PAIN SCALES - GENERAL: PAIN_LEVEL: 3

## 2025-03-11 NOTE — ANESTHESIA POSTPROCEDURE EVALUATION
Patient: Thong Arzola    Procedure Summary       Date: 03/11/25 Room / Location: [facility] - Echocardiology [facility]    Anesthesia Start: 1131 Anesthesia Stop: 1147    Procedure: CL-CARDIOVERSION Diagnosis:       AF (paroxysmal atrial fibrillation) (HCC)      (See Associated Dx)    Scheduled Providers: Davonte Clark M.D.; Aries Norris M.D. Responsible Provider: Aries Norris M.D.    Anesthesia Type: MAC ASA Status: 3            Final Anesthesia Type: MAC  Last vitals  BP   Blood Pressure: 103/70    Temp   36.3 °C (97.3 °F)    Pulse   67   Resp   18    SpO2   90 %      Anesthesia Post Evaluation    Patient location during evaluation: PACU  Patient participation: complete - patient participated  Level of consciousness: awake and alert  Pain score: 3    Airway patency: patent  Anesthetic complications: no  Cardiovascular status: hemodynamically stable  Respiratory status: acceptable  Hydration status: euvolemic    PONV: none          No notable events documented.     Nurse Pain Score: 3 (NPRS)

## 2025-03-11 NOTE — ANESTHESIA PREPROCEDURE EVALUATION
Date/Time: 03/11/25 1100    Scheduled providers: Davonte Clark M.D.; Aries Norris M.D.    Procedure: CL-CARDIOVERSION    Diagnosis: AF (paroxysmal atrial fibrillation) ([facility]) [I48.0]    Indications: See Associated Dx    Location: [facility] - Echocardiology [facility]            Relevant Problems   NEURO   (positive) CVA (cerebral vascular accident) ([facility])   (positive) Localization-related epilepsy (HCC)      CARDIAC   (positive) CAD in native artery   (positive) Essential hypertension   (positive) Persistent atrial fibrillation (HCC)         (positive) KAISER (acute kidney injury) ([facility])      ENDO   (positive) Acquired hypothyroidism       Physical Exam    Airway   Mallampati: II  TM distance: >3 FB  Neck ROM: full       Cardiovascular - normal exam  Rhythm: irregular  Rate: abnormal  (-) murmur     Dental - normal exam        Facial Hair   Pulmonary - normal exam  Breath sounds clear to auscultation     Abdominal    Neurological - normal exam                   Anesthesia Plan    ASA 3   ASA physical status 3 criteria: CVA or TIA - history (> 3 months)    Plan - MAC               Induction: intravenous      Pertinent diagnostic labs and testing reviewed    Informed Consent:    Anesthetic plan and risks discussed with patient.

## 2025-03-11 NOTE — ANESTHESIA TIME REPORT
Anesthesia Start and Stop Event Times       Date Time Event    3/11/2025 1126 Ready for Procedure     1131 Anesthesia Start     1147 Anesthesia Stop          Responsible Staff  03/11/25      Name Role Begin End    Aries Norris M.D. Anesth 1131 1147          Overtime Reason:  no overtime (within assigned shift)    Comments:

## 2025-03-11 NOTE — H&P
Patient's PCP: Arthur Rebolledo    CC: Here for cardioversion      HPI: 58 yo with persistent afib h/o CVA here for cardioversion      Has been compliant with Xarelto    Medications / Drug list prior to admission:  No current facility-administered medications on file prior to encounter.     Current Outpatient Medications on File Prior to Encounter   Medication Sig Dispense Refill    dantrolene (DANTRIUM) 25 MG Cap Take 2 Capsules by mouth 2 times a day for 180 days. 360 Capsule 1    acetaminophen (TYLENOL) 500 MG Tab Take 1,000 mg by mouth every evening. 500 mg x 2 tablets = 1000 mg      levetiracetam (KEPPRA) 1000 MG tablet Take 1,000 mg by mouth 2 times a day.      XARELTO 20 MG Tab tablet TAKE 1 TABLET BY MOUTH EVERY DAY WITH DINNER 90 Tablet 3    sertraline (ZOLOFT) 25 MG tablet TAKE 2 TABLETS BY MOUTH EVERY DAY  DAYS (Patient taking differently: Take 50 mg by mouth every morning.) 180 Tablet 1    ramipril (ALTACE) 10 MG capsule Take 1 Capsule by mouth every day. Taking 1 tab one time daily (Patient taking differently: Take 10 mg by mouth every morning. Taking 1 tab one time daily) 90 Capsule 3    metoprolol tartrate (LOPRESSOR) 25 MG Tab Take 1 Tablet by mouth 2 times a day. (Patient taking differently: Take 50 mg by mouth 2 times a day. 25 mg x 2 tablets = 50mg) 180 Tablet 3    spironolactone (ALDACTONE) 25 MG Tab Take 1 Tablet by mouth every day. 90 Tablet 3    amLODIPine (NORVASC) 5 MG Tab Take 1 Tablet by mouth 2 times a day. 180 Tablet 3    ezetimibe (ZETIA) 10 MG Tab Take 1 Tablet by mouth every day. (Patient taking differently: Take 10 mg by mouth every evening.) 90 Tablet 3    ARMOUR THYROID 90 MG Tab Take 180 mg by mouth every morning. 90 mg x 2 tablets = 180 mg         Current list of administered Medications:    Current Facility-Administered Medications:     lidocaine (Xylocaine) 1 % injection 0.5 mL, 0.5 mL, Intradermal, Once PRN, Davonte Clark M.D.    lactated ringers infusion, ,  "Intravenous, Continuous, Davonte Clark M.D.    Past Medical History:   Diagnosis Date    Afib (HCC)     Bowel habit changes     Diarrhea post stroke    COVID-19     3/2021    Disorder of thyroid     hypothyroid on medication    Heart murmur     as a child    Hemorrhagic disorder (HCC)     takes Xarelto    High cholesterol     on medication    Hypertension     on medication    Pain     left sided pain    Psychiatric problem     reactive depression, on medication    Seizure (HCC)     takes Keppra states last seizure 2022    Stroke ([facility]) 2021    Right side MCA.       Past Surgical History:   Procedure Laterality Date    CRANIOPLASTY Right 06/23/2021    Procedure: CRANIOPLASTY - FOR SKULL DEFECT;  Surgeon: Arthur Payton M.D.;  Location: SURGERY [facility];  Service: Neurosurgery    CRANIOTOMY Right 04/03/2021    Procedure: CRANIOTOMY;  Surgeon: Arthur Payton M.D.;  Location: SURGERY [facility];  Service: Neurosurgery    ARTHROSCOPY, KNEE Left     OTHER ORTHOPEDIC SURGERY      Shoulder surgery around 18 years ago       Family History   Family history unknown: Yes     Patient family history was personally reviewed, no pertinent family history to current presentation    Social History     Tobacco Use    Smoking status: Never    Smokeless tobacco: Never   Vaping Use    Vaping status: Never Used   Substance Use Topics    Alcohol use: Yes     Alcohol/week: 4.2 oz     Types: 7 Shots of liquor per week     Comment: 1 shot before bedtime    Drug use: No       ALLERGIES:  No Known Allergies    Review of systems:  A presentation focused review of symptoms was reviewed with patient. This is reviewed in H&P and PMH. ALL OTHERS reviewed and negative    Physical exam:  Patient Vitals for the past 24 hrs:   BP Temp Temp src Pulse Resp SpO2 Height Weight   03/11/25 0929 111/63 36.3 °C (97.4 °F) Temporal 63 12 95 % 1.803 m (5' 11\") 93 kg (205 lb 0.4 oz)     General: No acute distress.   EYES: no jaundice  HEENT: OP clear "   Neck:  No JVD.   CVS:  irreg  Resp: Normal respiratory effort,   Abdomen: ND,  Skin: Grossly nothing acute no obvious rashes  Neurological: Alert, Moves all extremities  Extremities:   [  moderate edema. No cyanosis.       Data:  Laboratory studies personally reviewed by me:  Recent Results (from the past 24 hours)   ECG    Collection Time: 25  9:20 AM   Result Value Ref Range    Report       [facility]    Test Date:  2025  Pt Name:    LUCY JOHNSON                 Department: [facility]  MRN:        [id number]                      Room:       [facility]  Gender:     Male                         Technician: [facility]  :        1965                   Requested By:VICK GAMINO  Order #:    [id number]                    Reading MD:    Measurements  Intervals                                Axis  Rate:       107                          P:          0  MD:         0                            QRS:        -35  QRSD:       98                           T:          15  QT:         369  QTc:        493    Interpretive Statements  Atrial fibrillation  Abnormal R-wave progression, late transition  Inferior infarct, old  Compared to ECG 02/10/2025 14:27:00  Myocardial infarct finding now present  Ventricular premature complex(es) no longer present  Left-axis deviation no longer present  ST (T wave) deviation no longer present  Prolonged QT interval no longer present         Imaging:  EC-MARK W/O CONT    (Results Pending)   CL-CARDIOVERSION    (Results Pending)           EKG tracings personally reviewed by me afib    Echocardiogram reviewed by me show from  normal EF no valve disease    All pertinent features of laboratory and imaging reviewed including primary images where applicable      Active Problems:    Persistent atrial fibrillation (HCC) (POA: Yes)    Secondary hypercoagulable state (HCC) (POA: Yes)  Resolved Problems:    * No resolved hospital problems. *      Assessment / Plan:  Persistent afib    The  risks, benefits, and alternatives to electrical cardioversion were discussed in great detail. We discussed that conversion of atrial fibrillation to normal rhythm, at least transiently, is successful in 90 to 95% of patients. However, maintaining a normal rhythm depends on a number of factors, including underlying heart disease and antiarrhythmic medications. Atrial fibrillation often recurs with time and other treatments may be necessary. Risks of cardioversion are low as long as anticoagulation issues are handled appropriately. There is a small (less than 1%) risk of embolic events, including stroke. Risks of electrical shock include mild muscle soreness and mild skin burning at the site of electrode placement. There is also a risk that cardioversion can stimulate more dangerous arrhythmias. The patient verbalized understanding of these potential complications and wishes to proceed with this procedure.      Future Appointments   Date Time Provider Department [place]   3/11/2025 11:00 AM [facility] EXAM 3 MARK [facility]   3/20/2025 10:40 [facility]   4/7/2025  1:40 PM Damaris Cagle M.D. [facility] None       It is my pleasure to participate in the care of Mr. Arzola.  Please do not hesitate to contact me with questions or concerns.    Davonte Clark MD PhD [facility]  Cardiologist [facility]    3/11/2025    Please note that this dictation was created using voice recognition software. There may be errors I did not discover before finalizing the note.

## 2025-03-11 NOTE — DISCHARGE INSTRUCTIONS
Electrical Cardioversion, Care After     This sheet gives you information about how to care for yourself after your procedure. Your health care provider may also give you more specific instructions. If you have problems or questions, contact your health care provider.     What can I expect after the procedure?     After the procedure, it is common to have:      Some redness on the skin where the shocks were given.    Follow these instructions at home:      Do not drive for 24 hours if you were given a medicine to help you relax (sedative).     Take over-the-counter and prescription medicines only as told by your health care provider.     Ask your health care provider how to check your pulse. Check it often.     Rest for 48 hours after the procedure or as told by your health care provider.     Avoid or limit your caffeine use as told by your health care provider.     If you received anesthesia, need to have someone stay with you for 24 hours.    Contact a health care provider if:      You feel like your heart is beating too quickly or your pulse is not regular.     You have a serious muscle cramp that does not go away.    Get help right away if:      You have discomfort in your chest.     You are dizzy or you feel faint.     You have trouble breathing or you are short of breath.     Your speech is slurred.     You have trouble moving an arm or leg on one side of your body.     Your fingers or toes turn cold or blue.    Call to schedule follow-up appointment with cardiologist if one is not already made     What to Expect Post Anesthesia    Rest and take it easy for the first 24 hours.  A responsible adult is recommended to remain with you during that time.  It is normal to feel sleepy.  We encourage you to not do anything that requires balance, judgment or coordination.    FOR 24 HOURS DO NOT:  Drive, operate machinery or run household appliances.  Drink beer or alcoholic beverages.  Make important decisions or sign  legal documents.    To avoid nausea, slowly advance diet as tolerated, avoiding spicy or greasy foods for the first day.  Add more substantial food to your diet according to your provider's instructions.  INCREASE FLUIDS AND FIBER TO AVOID CONSTIPATION.

## 2025-03-11 NOTE — PROCEDURES
Procedure performed: External Direct Current Cardioversion    : Davonte Clark MD PhD FACC    Assistant: None    Anesthesia: per Anesthesia services    Indication: Atrial Fibrillation    Preprocedural Diagnosis:   Atrial Fibrillation  Postprocedural Diagnosis: Sinus Rhythm      Description of procedure:  Mr. Arzola was brought to the pre/post procedure area of the cath lab. Informed consent was obtained. Defibrillator pads were placed in the anterior and posterior position.  A TIME-OUT was performed. Adequate sedation was obtained with assistance of anesthesia. The patient was successfully cardioverted with  200 J synchronized biphasic energy into sinus rhythm. He was monitored in the recovery area until criteria met.    Conclusion: Successful DC cardioversion    Complications: None apparent      Electronically signed: Davonte Clark MD PhD FACC  Cardiologist [facility]

## 2025-03-11 NOTE — OR NURSING
1145 Arrived from cath lab ID verified report received attached to monitors. Sleep. BP low anesthesiologist at the bedside.     1150 Ephedrine given as ordered.     1209 POc update given to wife all questions answered.     1221 Fully awake. Denies pain, denies nausea.    1228 Post EKG completed.     1251 discharge instructions given to patient and family both verbalize understanding reviewed activity, worsening symptoms/interventions, follow up, medications, dressing care. Copy of instructions given to family.     1253 escorted via w/c to responsible adult with all personal belongings.

## 2025-03-20 ENCOUNTER — OFFICE VISIT (OUTPATIENT)
Dept: PHYSICAL MEDICINE AND REHAB | Facility: MEDICAL CENTER | Age: 60
End: 2025-03-20
Payer: MEDICARE

## 2025-03-20 VITALS
HEART RATE: 62 BPM | BODY MASS INDEX: 28.7 KG/M2 | HEIGHT: 71 IN | SYSTOLIC BLOOD PRESSURE: 104 MMHG | TEMPERATURE: 98.2 F | OXYGEN SATURATION: 97 % | DIASTOLIC BLOOD PRESSURE: 65 MMHG | WEIGHT: 205.03 LBS

## 2025-03-20 DIAGNOSIS — S43.002D ACQUIRED SUBLUXATION OF LEFT SHOULDER, SUBSEQUENT ENCOUNTER: ICD-10-CM

## 2025-03-20 DIAGNOSIS — I69.954 HEMIPLEGIA AND HEMIPARESIS FOLLOWING UNSPECIFIED CEREBROVASCULAR DISEASE AFFECTING LEFT NON-DOMINANT SIDE (HCC): ICD-10-CM

## 2025-03-20 DIAGNOSIS — I69.854 SPASTIC HEMIPLEGIA OF LEFT NONDOMINANT SIDE AS LATE EFFECT OF OTHER CEREBROVASCULAR DISEASE (HCC): ICD-10-CM

## 2025-03-20 PROCEDURE — 20611 DRAIN/INJ JOINT/BURSA W/US: CPT | Mod: LT | Performed by: GENERAL PRACTICE

## 2025-03-20 PROCEDURE — 3074F SYST BP LT 130 MM HG: CPT | Performed by: GENERAL PRACTICE

## 2025-03-20 PROCEDURE — 3078F DIAST BP <80 MM HG: CPT | Performed by: GENERAL PRACTICE

## 2025-03-20 PROCEDURE — 1125F AMNT PAIN NOTED PAIN PRSNT: CPT | Performed by: GENERAL PRACTICE

## 2025-03-20 RX ORDER — DEXAMETHASONE SODIUM PHOSPHATE 4 MG/ML
4 INJECTION, SOLUTION INTRA-ARTICULAR; INTRALESIONAL; INTRAMUSCULAR; INTRAVENOUS; SOFT TISSUE ONCE
Status: COMPLETED | OUTPATIENT
Start: 2025-03-20 | End: 2025-03-20

## 2025-03-20 RX ADMIN — Medication 13 ML: at 11:00

## 2025-03-20 RX ADMIN — DEXAMETHASONE SODIUM PHOSPHATE 4 MG: 4 INJECTION, SOLUTION INTRA-ARTICULAR; INTRALESIONAL; INTRAMUSCULAR; INTRAVENOUS; SOFT TISSUE at 11:00

## 2025-03-20 ASSESSMENT — PAIN SCALES - GENERAL: PAINLEVEL_OUTOF10: 3=SLIGHT PAIN

## 2025-03-20 ASSESSMENT — FIBROSIS 4 INDEX: FIB4 SCORE: 2.21

## 2025-03-20 NOTE — PROCEDURES
Date of Service: 3/20/2025    Physician/s: Paul Samuels D.O.      Pre-operative Diagnosis: LEFT rotator cuff syndrome, glenohumeral joint arthritis, adhesive capsulitis    Post-operative Diagnosis: LEFT  rotator cuff syndrome, glenohumeral joint arthritis, adhesive capsulitis    Procedure: LEFT glenohumeral joint injection ultrasound-guided     Description of procedure:    The risks, benefits, and alternatives of the procedure were reviewed and discussed with the patient.  Written informed consent was freely obtained. A pre-procedural time-out was conducted by the physician verifying patient’s identity, procedure to be performed, procedure site and side, and allergy verification. Appropriate equipment was determined to be in place for the procedure.      No sedation was used for this procedure.     In the office suite the patient was placed in a sitting position with his LEFT shoulder exposed. The ultrasound probe was placed over the  lateral aspect of the head of the humerus, and the rotator cuff and humeral head were identified. The skin was prepped and draped in the usual sterile fashion. Following negative aspiration, 2 mL of 1% lidocaine was injected. A 25g 3.5 inch needle was placed into skin via a advanced under ultrasound guidance, in-plane approach, into the glenohumeral capsule. Following negative aspiration, approx 0 ml bupivicaine, 9mL of 1% lidocaine, and 1mL 4mg/mL of dexamethasone was then injected, and the needle was subsequently removed intact. The patient's shoulder was wiped with a 4x4 gauze, the area was cleansed with alcohol prep, and a bandaid was applied. There were no complications noted.     Preprocedural pain: 8/10      forward flexion to 80°, abduction to 90°, external  rotation 15°internal rotation 75° parallel to the ground with the arm in abduction at 90°        Postprocedural pain: 8/10    forward flexion to 80°, abduction to 90°, internal rotation 15° parallel to the ground with the  arm in abduction at 90°      Postprocedure care explained to the patient regarding icing as well as stretching activity.      Instructed patient to avoid submerging in water for at least 24 to 48 hours.    Instructed patient to monitor injection site for signs of infection.    Ultrasound guidance and Major joint/bursa:  89820 (knee, hip, shoulder, trochanteric bursa, subacromial bursa, pes anserine bursa)        Offered to attempt anterior approach but patient would like to monitor the affects of this 1st injection.       Paul Samuels,   Physical Medicine and Rehabilitation  [facility]

## 2025-03-25 DIAGNOSIS — I10 ESSENTIAL HYPERTENSION: ICD-10-CM

## 2025-03-25 RX ORDER — METOPROLOL TARTRATE 25 MG/1
25 TABLET, FILM COATED ORAL 2 TIMES DAILY
Qty: 180 TABLET | Refills: 3 | OUTPATIENT
Start: 2025-03-25

## 2025-03-25 NOTE — TELEPHONE ENCOUNTER
[facility]    Received request via: Patient    Was the patient seen in the last year in this department? Yes 01.02.25    Does the patient have an active prescription (recently filled or refills available) for medication(s) requested? No    Pharmacy Name: [facility] on [address]     Does the patient have MCC Plus and need 100-day supply? (This applies to ALL medications) Patient does not have SCP     Patient is going out of town on Thursday and will be out of medication by next week.     Thank you,     Geri ARIAS

## 2025-03-26 RX ORDER — METOPROLOL TARTRATE 50 MG
50 TABLET ORAL 2 TIMES DAILY
Qty: 180 TABLET | Refills: 1 | Status: SHIPPED | OUTPATIENT
Start: 2025-03-26

## 2025-03-26 NOTE — TELEPHONE ENCOUNTER
[facility]    Caller: Anel (Spouse)    Topic/issue: Pt is almost out of metoprolol tartrate (LOPRESSOR) 25 MG Tab - pt is out so soon because [facility] doubled dosage-they are leaving out of town tomorrow morning- please fill     Callback Number: [phone]    Thank You    Maria Eugenia SEARS

## 2025-03-26 NOTE — TELEPHONE ENCOUNTER
Metoprolol changed from 25mg BID to 50mg BID per  recommendations. gDine message sent to patient.       -----------------------    View All Conversations on this Encounter  SHERRIE Lopez to Me       3/26/25  1:21 PM   Yes, thank you  Me to SHERRIE Lopez   SD    3/26/25  8:30 AM   To:     Okay to change prescription for Metoprolol to 50mg BID as current order is for 25mg BID and patient is going out of town. Needs refill. Please advise. Thank you!    ----------------------    SHERRIE Lopez Regarding result: EKG       1/14/25  3:07 PM  Result Note  Hi, I called and spoke to the patient and his wife about his EKG. Patient to continue with metoprolol 50 mg twice a day. Placing an order for amiodarone and MARK/cardioversion to get patient to normal rhythm. Patient to follow up with Dr. Nieves following procedure.

## 2025-04-07 ENCOUNTER — PHARMACY VISIT (OUTPATIENT)
Dept: PHARMACY | Facility: MEDICAL CENTER | Age: 60
End: 2025-04-07
Payer: COMMERCIAL

## 2025-04-07 ENCOUNTER — OFFICE VISIT (OUTPATIENT)
Dept: NEUROLOGY | Facility: MEDICAL CENTER | Age: 60
End: 2025-04-07
Attending: STUDENT IN AN ORGANIZED HEALTH CARE EDUCATION/TRAINING PROGRAM
Payer: MEDICARE

## 2025-04-07 VITALS
SYSTOLIC BLOOD PRESSURE: 110 MMHG | HEIGHT: 71 IN | DIASTOLIC BLOOD PRESSURE: 56 MMHG | WEIGHT: 208.56 LBS | HEART RATE: 96 BPM | RESPIRATION RATE: 17 BRPM | OXYGEN SATURATION: 97 % | BODY MASS INDEX: 29.2 KG/M2

## 2025-04-07 DIAGNOSIS — R45.89 DEPRESSED MOOD: ICD-10-CM

## 2025-04-07 DIAGNOSIS — I69.954 HEMIPLEGIA AND HEMIPARESIS FOLLOWING UNSPECIFIED CEREBROVASCULAR DISEASE AFFECTING LEFT NON-DOMINANT SIDE (HCC): ICD-10-CM

## 2025-04-07 DIAGNOSIS — G40.109 LOCALIZATION-RELATED EPILEPSY (HCC): Primary | ICD-10-CM

## 2025-04-07 PROCEDURE — 99212 OFFICE O/P EST SF 10 MIN: CPT | Performed by: STUDENT IN AN ORGANIZED HEALTH CARE EDUCATION/TRAINING PROGRAM

## 2025-04-07 PROCEDURE — 3078F DIAST BP <80 MM HG: CPT | Performed by: STUDENT IN AN ORGANIZED HEALTH CARE EDUCATION/TRAINING PROGRAM

## 2025-04-07 PROCEDURE — 3074F SYST BP LT 130 MM HG: CPT | Performed by: STUDENT IN AN ORGANIZED HEALTH CARE EDUCATION/TRAINING PROGRAM

## 2025-04-07 PROCEDURE — 99215 OFFICE O/P EST HI 40 MIN: CPT | Performed by: STUDENT IN AN ORGANIZED HEALTH CARE EDUCATION/TRAINING PROGRAM

## 2025-04-07 PROCEDURE — RXMED WILLOW AMBULATORY MEDICATION CHARGE: Performed by: STUDENT IN AN ORGANIZED HEALTH CARE EDUCATION/TRAINING PROGRAM

## 2025-04-07 RX ORDER — CLONAZEPAM 0.25 MG/1
0.25 TABLET, ORALLY DISINTEGRATING ORAL
Qty: 6 TABLET | Refills: 1 | Status: SHIPPED | OUTPATIENT
Start: 2025-04-07 | End: 2025-05-07

## 2025-04-07 RX ORDER — LEVETIRACETAM 1000 MG/1
1000 TABLET ORAL
Qty: 180 TABLET | Refills: 0 | Status: SHIPPED | OUTPATIENT
Start: 2025-04-07 | End: 2025-07-06

## 2025-04-07 RX ORDER — LAMOTRIGINE 25 MG/1
TABLET ORAL
Qty: 98 TABLET | Refills: 0 | Status: SHIPPED | OUTPATIENT
Start: 2025-04-07 | End: 2025-05-19

## 2025-04-07 RX ORDER — LEVETIRACETAM 1000 MG/1
1000 TABLET ORAL 2 TIMES DAILY
Qty: 28 TABLET | Refills: 0 | Status: SHIPPED | OUTPATIENT
Start: 2025-04-07 | End: 2025-04-21

## 2025-04-07 RX ORDER — LAMOTRIGINE 100 MG/1
TABLET ORAL
Qty: 70 TABLET | Refills: 0 | Status: SHIPPED | OUTPATIENT
Start: 2025-05-19 | End: 2025-06-16

## 2025-04-07 ASSESSMENT — FIBROSIS 4 INDEX: FIB4 SCORE: 2.24

## 2025-04-07 ASSESSMENT — PATIENT HEALTH QUESTIONNAIRE - PHQ9: CLINICAL INTERPRETATION OF PHQ2 SCORE: 0

## 2025-04-07 NOTE — PATIENT INSTRUCTIONS
Lamotrigine (Lamictal) Plan    Weeks 1 & 2: Lamictal 25 mg nightly   Weeks 3 & 4: Lamictal 25 mg twice daily (1 tablet in morning and at night)    Weeks 5 & 6: Lamictal 50 mg twice daily (1/2 tablet of 100 mg in the morning and at night)  Weeks 7 & 8: Lamictal 100 mg twice daily (one 100 mg tablet in the morning and at night)  Weeks 9 & 10: Lamictal 150 mg twice daily (one and a half tablets in the morning and at night)    At this time, please have a lab draw to check your lamotrigine level (after 9 weeks). We may need to increase the medication further if the level is low.     Make no changes to Keppra until we verify you have a therapeutic blood level of Lamotrigine. Once you have a therapeutic blood level of Lamotrigine, you can stop Keppra. We will have a visit prior to that.   Once you are on a steady dose of Lamotrigine you will have your ambulatory EEG.     Please call our office immediately if you develop a rash, itching, or fever. If you have any new mouth blistering and a skin rash go to the ER immediately. These symptoms can be signs of a serious reaction to the medication. We increase the dose slowly because doing so typically prevents these issues from happening.     Common side effects could include dizziness, headache, blurred vision, nausea, sleepiness, nasal congestion. Please let us know if you experience any bothersome side effects.    When you are getting low on the pills, please call or message the office for a refill.

## 2025-04-07 NOTE — PROGRESS NOTES
[facility]  Follow up visit    Patient name: Thong Arzola  YOB: 1965  MRN: [id number]  Date of visit: 4/7/2025      Background:    60 y.o. right-handed man with a history of a R MCA stroke in 2021, seizures being seen in follow up. Last visit 1/12/2024.      Details of history (from Dr. Johnson note):  He had a right MCA stroke in 2021, s/p craniotomy and subsequent cranioplasty. He has residual left sided weakness and visual field deficits on left. He was diagnosed with A. Fib and started on Xarelto.  He is able to ambulate with the use of a cane and has residual left-sided hemiparesis with minimal spasticity.  He uses a left AFO brace.  Patient had his first seizure in February 2022 affecting his left side proceeding to whole body convulsion lasting 1 to 2 minutes there was no tongue biting or incontinence or any triggers.  Following this he was started on levetiracetam higher dose which contributed to feeling groggy and cloudy.  After his visit with [facility] neurology he was switched to Briviact 50 mg twice daily which she has tolerated well.  His last breakthrough episode was in July 2022 after he ran out of Briviact.  Since that time he has been maintained on 50 mg twice daily tolerating this well without additional episodes of concern.  Patient and the wife denied any episodes of changes in awareness or loss of consciousness or involuntary left-sided movements.  He can have occasional left-sided muscle twitching but not progressing to uncontrolled movements.      He continues to follow-up closely with his primary care physician and with his cardiologist.  He has been doing some home exercises for his left-sided spasticity.  He is also on dantrolene 50 mg 3 times a day and on gabapentin 200 mg in the evening for his left-sided pain and spasticity issues.     Onset: February 2022  Semiology: L side of body stiffens (arm and leg) --> GTC with whole body convulsion    Frequency: 3 total  Duration of spells: < 1 minute  Triggers: missing medications, no others     I reviewed the history and previous documentation and discussion with the patient.  Keppra was not stopped due to side effects. It was switched to Briviact after he seemed more cloudy after his stroke, but his wife in retrospect does not think this was related to the Keppra because it did not change when he switched to Briviact.  Briviact was cost prohibitive so he was switched back to Keppra.    He had a breakthrough seizure on 12/16/23. Keppra dose was increased to 1000 mg BID.     Interval history:    He is accompanied to clinic by his wife, Anel.     He arrived home 2 days ago around 1 AM from a trip to [place].     R hand went numb then about a minute later left hand/arm went numb and developed clonic movements, then left leg started jerking. After that he went to bed. He had a headache and mild nausea.     Last night his hand went numb. He also felt that his speech was slightly slurred.     Recently had a cardioversion for Afib 3/11/2025. GI symptoms have been better since that.     His wife has not noted worsening mood side effects since starting Keppra.     He has a history of depressed mood since the stroke.     Sometimes when he wakes up and gets out of bed too fast he gets dizzy. He needs to wait about a half hour or he will feel dizzy and nauseated.     Last seizure: last night (mild), prior to that 12/16/23    Mood: History of depressed mood after stroke, currently taking Zoloft      4/7/2025     1:40 PM 12/12/2023     1:20 PM 2/17/2022     9:00 AM   PHQ-9 Screening   Little interest or pleasure in doing things 0 - not at all 0 - not at all 0 - not at all   Feeling down, depressed, or hopeless 0 - not at all 0 - not at all 0 - not at all   PHQ-2 Total Score 0 0 0          Side effects: mental fogginess     Barriers to taking medication appropriately: None     Driving: no     Vitamin D: taking    Drinks 1  drink/day on average.    Current Medications:   Current Outpatient Medications:     metoprolol tartrate (LOPRESSOR) 50 MG Tab, Take 1 Tablet by mouth 2 times a day., Disp: 180 Tablet, Rfl: 1    gabapentin (NEURONTIN) 100 MG Cap, Take 2 Capsules by mouth every evening., Disp: 180 Capsule, Rfl: 3    amiodarone (CORDARONE) 200 MG Tab, Take 1 Tablet by mouth every day., Disp: 90 Tablet, Rfl: 0    dantrolene (DANTRIUM) 25 MG Cap, Take 2 Capsules by mouth 2 times a day for 180 days., Disp: 360 Capsule, Rfl: 1    acetaminophen (TYLENOL) 500 MG Tab, Take 1,000 mg by mouth every evening. 500 mg x 2 tablets = 1000 mg, Disp: , Rfl:     levetiracetam (KEPPRA) 1000 MG tablet, Take 1,000 mg by mouth 2 times a day., Disp: , Rfl:     XARELTO 20 MG Tab tablet, TAKE 1 TABLET BY MOUTH EVERY DAY WITH DINNER, Disp: 90 Tablet, Rfl: 3    sertraline (ZOLOFT) 25 MG tablet, TAKE 2 TABLETS BY MOUTH EVERY DAY  DAYS (Patient taking differently: Take 50 mg by mouth every morning.), Disp: 180 Tablet, Rfl: 1    ramipril (ALTACE) 10 MG capsule, Take 1 Capsule by mouth every day. Taking 1 tab one time daily (Patient taking differently: Take 10 mg by mouth every morning. Taking 1 tab one time daily), Disp: 90 Capsule, Rfl: 3    spironolactone (ALDACTONE) 25 MG Tab, Take 1 Tablet by mouth every day., Disp: 90 Tablet, Rfl: 3    amLODIPine (NORVASC) 5 MG Tab, Take 1 Tablet by mouth 2 times a day., Disp: 180 Tablet, Rfl: 3    ezetimibe (ZETIA) 10 MG Tab, Take 1 Tablet by mouth every day. (Patient taking differently: Take 10 mg by mouth every evening.), Disp: 90 Tablet, Rfl: 3    ARMOUR THYROID 90 MG Tab, Take 180 mg by mouth every morning. 90 mg x 2 tablets = 180 mg, Disp: , Rfl:     Allergies: No Known Allergies      Physical Exam:   Ambulatory Vitals  Vitals:    04/07/25 1347   BP: 110/56   Pulse: 96   Resp: 17   SpO2: 97%         Constitutional: Well-developed, well-nourished, good hygiene. Appears stated age.  Respiratory: normal respiratory  effort  Skin: Warm, dry, intact. No rashes observed.  Neurologic:   Mental Status: Awake, alert, oriented x 4.   Speech: Fluent with normal prosody.   Memory: Able to recall recent and remote events accurately.    Concentration: Attentive. Able to focus on history and follow multi-step commands.   Fund of Knowledge: Appropriate.   Cranial Nerves:    CN II: PERRL     CN III, IV, VI: EOMI      CN VII: (+) L facial weakness    CN VIII: Hearing intact to voice    Studies:      Labs reviewed, none recent    No interval pertinent studies    Assessment/Plan:   Thong Arzola is a 60 y.o. 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      His seizure semiology is left hemibody stiffening followed by generalized tonic-clonic activity.     He had a breakthrough seizure running out of Briviact in 7/2022. Briviact was switched to Keppra 500 mg BID after it became cost prohibitive. He had a breakthrough seizure in 12/2023 and dose was increased to 1000 mg BID. He was seizure-free thereafter.     The patient had a breakthrough seizure in December 2023 prompting a dose increase to 1000 mg twice daily.  He had a breakthrough seizure 2 days ago, and a milder 1 last night with a semiology of right hand numbness--> left hand/arm numbness--> additional left lower extremity numbness--> clonic jerking of the left hemibody.  Potential provoking factors were travel across time zones and sleep deprivation.    The patient has had depressed mood since stroke.  Discussed options.  It is not clear whether the Keppra could be contributing since it was started immediately after his stroke.  He is interested in a trial of a different medication with fewer mood side effect, thus counseled patient on lamotrigine uptitration with extensive discussion regarding potential side effects.  Counseled patient to remain on Keppra until a therapeutic blood level of lamotrigine is established.  Will  plan for ambulatory EEG in mid June after he is on either a therapeutic/close to therapeutic dose of lamotrigine.      1. Localization-related epilepsy (HCC) (Primary)  - Referral to Neurodiagnostics (EEG,EP,EMG/NCS/DBS) -24-hour ambulatory EEG  - CBC WITH DIFFERENTIAL; Future  - Comp Metabolic Panel; Future  - VITAMIN D,25 HYDROXY (DEFICIENCY); Future  - levetiracetam (KEPPRA) 1000 MG tablet; Take 1 Tablet by mouth 2 times a day for 90 days.  Dispense: 180 Tablet; Refill: 0  - lamoTRIgine (LAMICTAL) 25 MG Tab; Take 1 Tablet by mouth every day for 14 days, THEN 1 Tablet 2 times a day for 14 days, THEN 2 Tablets 2 times a day for 14 days.  Dispense: 98 Tablet; Refill: 0  - lamoTRIgine (LAMICTAL) 100 MG Tab; Take 1 Tablet by mouth 2 times a day for 14 days, THEN 1.5 Tablets 2 times a day for 14 days.  Dispense: 70 Tablet; Refill: 0  - LAMOTRIGINE; Future  - levetiracetam (KEPPRA) 1000 MG tablet; Take 1 Tablet by mouth 2 times a day for 14 days.  Dispense: 28 Tablet; Refill: 0  - Clonazepam 0.25 MG TABLET DISPERSIBLE; Take 1 Tablet by mouth 1 time a day as needed (for seizure > 5 minutes) for up to 6 doses. Indications: Simple Partial Seizure  Dispense: 6 Tablet; Refill: 1    2. Hemiplegia and hemiparesis following unspecified cerebrovascular disease affecting left non-dominant side (HCC)  -Stable/baseline    3. Depressed mood  -Continue Zoloft 50 mg every morning      Patient instructions:  Lamotrigine (Lamictal) Plan    Weeks 1 & 2: Lamictal 25 mg nightly   Weeks 3 & 4: Lamictal 25 mg twice daily (1 tablet in morning and at night)    Weeks 5 & 6: Lamictal 50 mg twice daily (1/2 tablet of 100 mg in the morning and at night)  Weeks 7 & 8: Lamictal 100 mg twice daily (one 100 mg tablet in the morning and at night)  Weeks 9 & 10: Lamictal 150 mg twice daily (one and a half tablets in the morning and at night)    At this time, please have a lab draw to check your lamotrigine level (after 9 weeks). We may need to increase  the medication further if the level is low.     Make no changes to Keppra until we verify you have a therapeutic blood level of Lamotrigine. Once you have a therapeutic blood level of Lamotrigine, you can stop Keppra. We will have a visit prior to that.   Once you are on a steady dose of Lamotrigine you will have your ambulatory EEG.     Please call our office immediately if you develop a rash, itching, or fever. If you have any new mouth blistering and a skin rash go to the ER immediately. These symptoms can be signs of a serious reaction to the medication. We increase the dose slowly because doing so typically prevents these issues from happening.     Common side effects could include dizziness, headache, blurred vision, nausea, sleepiness, nasal congestion. Please let us know if you experience any bothersome side effects.    When you are getting low on the pills, please call or message the office for a refill.      Follow-up in approximately 8 weeks.    Damaris Cagle M.D.   Diplomate, Neurology with Special Qualification in Epilepsy, American Board of Psychiatry and Neurology   of Clinical Neurology, [facility]        During today's encounter we discussed available treatment options and their individual side effect profiles. Total encounter time caring for patient today 53 minutes.

## 2025-04-11 ENCOUNTER — APPOINTMENT (OUTPATIENT)
Dept: PHYSICAL MEDICINE AND REHAB | Facility: MEDICAL CENTER | Age: 60
End: 2025-04-11
Payer: MEDICARE

## 2025-05-02 ENCOUNTER — PATIENT MESSAGE (OUTPATIENT)
Dept: NEUROLOGY | Facility: MEDICAL CENTER | Age: 60
End: 2025-05-02

## 2025-05-02 DIAGNOSIS — R56.9 SEIZURES (HCC): Primary | ICD-10-CM

## 2025-05-14 DIAGNOSIS — I48.0 AF (PAROXYSMAL ATRIAL FIBRILLATION) (HCC): ICD-10-CM

## 2025-05-14 DIAGNOSIS — I48.0 PAROXYSMAL ATRIAL FIBRILLATION (HCC): Primary | ICD-10-CM

## 2025-05-15 RX ORDER — AMIODARONE HYDROCHLORIDE 200 MG/1
200 TABLET ORAL DAILY
Qty: 90 TABLET | Refills: 1 | Status: SHIPPED | OUTPATIENT
Start: 2025-05-15

## 2025-05-16 ENCOUNTER — OFFICE VISIT (OUTPATIENT)
Dept: CARDIOLOGY | Facility: MEDICAL CENTER | Age: 60
End: 2025-05-16
Attending: INTERNAL MEDICINE
Payer: MEDICARE

## 2025-05-16 VITALS
RESPIRATION RATE: 16 BRPM | HEART RATE: 63 BPM | OXYGEN SATURATION: 94 % | SYSTOLIC BLOOD PRESSURE: 100 MMHG | HEIGHT: 71 IN | BODY MASS INDEX: 29.09 KG/M2 | DIASTOLIC BLOOD PRESSURE: 60 MMHG

## 2025-05-16 DIAGNOSIS — D68.69 SECONDARY HYPERCOAGULABLE STATE (HCC): ICD-10-CM

## 2025-05-16 DIAGNOSIS — I48.19 PERSISTENT ATRIAL FIBRILLATION (HCC): ICD-10-CM

## 2025-05-16 DIAGNOSIS — I10 ESSENTIAL HYPERTENSION: ICD-10-CM

## 2025-05-16 DIAGNOSIS — I63.10 CEREBROVASCULAR ACCIDENT (CVA) DUE TO EMBOLISM OF PRECEREBRAL ARTERY (HCC): ICD-10-CM

## 2025-05-16 DIAGNOSIS — I48.0 AF (PAROXYSMAL ATRIAL FIBRILLATION) (HCC): Primary | ICD-10-CM

## 2025-05-16 LAB — EKG IMPRESSION: NORMAL

## 2025-05-16 PROCEDURE — 3074F SYST BP LT 130 MM HG: CPT | Performed by: INTERNAL MEDICINE

## 2025-05-16 PROCEDURE — 3078F DIAST BP <80 MM HG: CPT | Performed by: INTERNAL MEDICINE

## 2025-05-16 PROCEDURE — 99212 OFFICE O/P EST SF 10 MIN: CPT | Performed by: INTERNAL MEDICINE

## 2025-05-16 PROCEDURE — 93005 ELECTROCARDIOGRAM TRACING: CPT | Mod: TC | Performed by: INTERNAL MEDICINE

## 2025-05-16 PROCEDURE — 99204 OFFICE O/P NEW MOD 45 MIN: CPT | Performed by: INTERNAL MEDICINE

## 2025-05-16 PROCEDURE — 99214 OFFICE O/P EST MOD 30 MIN: CPT | Performed by: INTERNAL MEDICINE

## 2025-05-16 PROCEDURE — 93010 ELECTROCARDIOGRAM REPORT: CPT | Performed by: INTERNAL MEDICINE

## 2025-05-16 NOTE — LETTER
[facility] - Oper[facility] by [facility]   [address], [address]  [place] [address]  Phone: [phone]  Fax: [phone]              Thong Arzola  1965    Encounter Date: 5/16/2025    Jose Diaz M.D.          PROGRESS NOTE:  Chief Complaint   Patient presents with    Atrial Fibrillation     NP REF: CW Dx: AF       Subjective    Thong Arzola is a 60 y.o. male who presents today with previous paroxysmal atrial fibrillation.  Status post right MCA CVA.  Hemorrhagic requiring drainage.  Dense hemiplegia on left side.  Recent persistent atrial fibrillation.  Fatigue and palpitations.  Placed on amiodarone and cardioverted.  On Eliquis.  No recent echo.  Recent chest x-ray unremarkable.  LFTs unremarkable.  Mild azotemia.  On beta-blockers.    Past Medical History[1]  Past Surgical History[2]  Family History   Family history unknown: Yes     Social History     Socioeconomic History    Marital status:      Spouse name: Not on file    Number of children: Not on file    Years of education: Not on file    Highest education level: Not on file   Occupational History    Not on file   Tobacco Use    Smoking status: Never    Smokeless tobacco: Never   Vaping Use    Vaping status: Never Used   Substance and Sexual Activity    Alcohol use: Yes     Alcohol/week: 4.2 oz     Types: 7 Shots of liquor per week     Comment: weekly    Drug use: No    Sexual activity: Not on file   Other Topics Concern    Not on file   Social History Narrative    Retired     Social Drivers of Health     Financial Resource Strain: Not on file   Food Insecurity: No Food Insecurity (2/11/2025)    Hunger Vital Sign     Worried About Running Out of Food in the Last Year: Never true     Ran Out of Food in the Last Year: Never true   Transportation Needs: No Transportation Needs (2/11/2025)    PRAPARE - Transportation     Lack of Transportation (Medical): No     Lack  "of Transportation (Non-Medical): No   Physical Activity: Not on file   Stress: Not on file   Social Connections: Not on file   Intimate Partner Violence: Not At Risk (2/10/2025)    Humiliation, Afraid, Rape, and Kick questionnaire     Fear of Current or Ex-Partner: No     Emotionally Abused: No     Physically Abused: No     Sexually Abused: No   Housing Stability: Low Risk  (2/11/2025)    Housing Stability Vital Sign     Unable to Pay for Housing in the Last Year: No     Number of Times Moved in the Last Year: 0     Homeless in the Last Year: No     Allergies[3]  Encounter Medications[4]  ROS           Objective    /60 (BP Location: Left arm, Patient Position: Sitting, BP Cuff Size: Large adult)   Pulse 63   Resp 16   Ht 1.803 m (5' 11\")   SpO2 94%   BMI 29.09 kg/m²     Physical Exam  Constitutional:       Appearance: He is well-developed.   HENT:      Head: Normocephalic and atraumatic.   Cardiovascular:      Rate and Rhythm: Normal rate and regular rhythm.      Heart sounds: No murmur heard.     No friction rub. No gallop.   Pulmonary:      Effort: Pulmonary effort is normal.      Breath sounds: Normal breath sounds.   Abdominal:      Palpations: Abdomen is soft.   Musculoskeletal:         General: Normal range of motion.      Cervical back: Normal range of motion and neck supple.   Skin:     General: Skin is warm and dry.   Neurological:      Mental Status: He is alert and oriented to person, place, and time.      Comments: Left hemiplegia   Psychiatric:         Behavior: Behavior normal.         Thought Content: Thought content normal.         Judgment: Judgment normal.                Assessment & Plan    1. AF (paroxysmal atrial fibrillation) (HCC)  EKG    EC-ECHOCARDIOGRAM COMPLETE W/O CONT      2. Persistent atrial fibrillation (HCC)        3. Essential hypertension        4. Cerebrovascular accident (CVA) due to embolism of precerebral artery (HCC)        5. Secondary hypercoagulable state (HCC)   "          Medical Decision Making: Today's Assessment/Status/Plan:   1.  Paroxysmal atrial fibrillation and persistent atrial fibrillation status post cardioversion.  Discussed ablation versus continued on amiodarone he wishes to continue on amiodarone.  Screening PFTs.  On thyroid replacement needs to be checked a little more frequently.  2.  Anticoagulation continue Xarelto.  3.  Previous CVA.  4.  Check echocardiogram.  5.  Follow-up with general cardiology and follow-up with the EP APN in 3 months.                             Davonte Clark M.D.  [address]  [address]  [place] [address]  Via In Basket                   [1]  Past Medical History:  Diagnosis Date    Afib (HCC)     Bowel habit changes     Diarrhea post stroke    COVID-19     3/2021    Disorder of thyroid     hypothyroid on medication    Heart murmur     as a child    Hemorrhagic disorder (HCC)     takes Xarelto    High cholesterol     on medication    Hypertension     on medication    Pain     left sided pain    Psychiatric problem     reactive depression, on medication    Seizure (HCC)     takes Keppra states last seizure 2022    Stroke (HCC) 2021    Right side MCA.   [2]  Past Surgical History:  Procedure Laterality Date    CRANIOPLASTY Right 06/23/2021    Procedure: CRANIOPLASTY - FOR SKULL DEFECT;  Surgeon: Arthur Payton M.D.;  Location: SURGERY [facility];  Service: Neurosurgery    CRANIOTOMY Right 04/03/2021    Procedure: CRANIOTOMY;  Surgeon: Arthur Payton M.D.;  Location: SURGERY [facility];  Service: Neurosurgery    ARTHROSCOPY, KNEE Left     OTHER ORTHOPEDIC SURGERY      Shoulder surgery around 18 years ago   [3]  No Known Allergies  [4]  Outpatient Encounter Medications as of 5/16/2025   Medication Sig Dispense Refill    amiodarone (CORDARONE) 200 MG Tab TAKE 1 TABLET BY MOUTH EVERY DAY 90 Tablet 1    levetiracetam (KEPPRA) 1000 MG tablet Take 1 Tablet by mouth 2 times a day for 90 days. 180 Tablet 0    lamoTRIgine  (LAMICTAL) 25 MG Tab Take 1 Tablet by mouth every day for 14 days, THEN 1 Tablet 2 times a day for 14 days, THEN 2 Tablets 2 times a day for 14 days. 98 Tablet 0    [START ON 5/19/2025] lamoTRIgine (LAMICTAL) 100 MG Tab Take 1 Tablet by mouth 2 times a day for 14 days, THEN 1.5 Tablets 2 times a day for 14 days. 70 Tablet 0    metoprolol tartrate (LOPRESSOR) 50 MG Tab Take 1 Tablet by mouth 2 times a day. 180 Tablet 1    gabapentin (NEURONTIN) 100 MG Cap Take 2 Capsules by mouth every evening. 180 Capsule 3    dantrolene (DANTRIUM) 25 MG Cap Take 2 Capsules by mouth 2 times a day for 180 days. 360 Capsule 1    acetaminophen (TYLENOL) 500 MG Tab Take 1,000 mg by mouth every evening. 500 mg x 2 tablets = 1000 mg      XARELTO 20 MG Tab tablet TAKE 1 TABLET BY MOUTH EVERY DAY WITH DINNER 90 Tablet 3    sertraline (ZOLOFT) 25 MG tablet TAKE 2 TABLETS BY MOUTH EVERY DAY  DAYS 180 Tablet 1    ramipril (ALTACE) 10 MG capsule Take 1 Capsule by mouth every day. Taking 1 tab one time daily 90 Capsule 3    spironolactone (ALDACTONE) 25 MG Tab Take 1 Tablet by mouth every day. 90 Tablet 3    amLODIPine (NORVASC) 5 MG Tab Take 1 Tablet by mouth 2 times a day. 180 Tablet 3    ezetimibe (ZETIA) 10 MG Tab Take 1 Tablet by mouth every day. 90 Tablet 3    [DISCONTINUED] ARMOUR THYROID 90 MG Tab Take 180 mg by mouth every morning. 90 mg x 2 tablets = 180 mg (Patient not taking: Reported on 5/16/2025)       No facility-administered encounter medications on file as of 5/16/2025.

## 2025-05-16 NOTE — PROGRESS NOTES
Chief Complaint   Patient presents with    Atrial Fibrillation     NP REF: CW Dx: AF       Subjective     Thong Arzola is a 60 y.o. male who presents today with previous paroxysmal atrial fibrillation and recent persistent.  Status post right MCA CVA.  Hemorrhagic requiring drainage.  Dense hemiplegia on left side.  Recent persistent atrial fibrillation.  Fatigue and palpitations.  Placed on amiodarone and cardioverted.  On Eliquis.  No recent echo.  Recent chest x-ray unremarkable.  LFTs unremarkable.  Mild azotemia.  On beta-blockers.    Past Medical History[1]  Past Surgical History[2]  Family History   Family history unknown: Yes     Social History     Socioeconomic History    Marital status:      Spouse name: Not on file    Number of children: Not on file    Years of education: Not on file    Highest education level: Not on file   Occupational History    Not on file   Tobacco Use    Smoking status: Never    Smokeless tobacco: Never   Vaping Use    Vaping status: Never Used   Substance and Sexual Activity    Alcohol use: Yes     Alcohol/week: 4.2 oz     Types: 7 Shots of liquor per week     Comment: weekly    Drug use: No    Sexual activity: Not on file   Other Topics Concern    Not on file   Social History Narrative    Retired     Social Drivers of Health     Financial Resource Strain: Not on file   Food Insecurity: No Food Insecurity (2/11/2025)    Hunger Vital Sign     Worried About Running Out of Food in the Last Year: Never true     Ran Out of Food in the Last Year: Never true   Transportation Needs: No Transportation Needs (2/11/2025)    PRAPARE - Transportation     Lack of Transportation (Medical): No     Lack of Transportation (Non-Medical): No   Physical Activity: Not on file   Stress: Not on file   Social Connections: Not on file   Intimate Partner Violence: Not At Risk (2/10/2025)    Humiliation, Afraid, Rape, and Kick questionnaire     Fear of Current or Ex-Partner: No     Emotionally  "Abused: No     Physically Abused: No     Sexually Abused: No   Housing Stability: Low Risk  (2/11/2025)    Housing Stability Vital Sign     Unable to Pay for Housing in the Last Year: No     Number of Times Moved in the Last Year: 0     Homeless in the Last Year: No     Allergies[3]  Encounter Medications[4]  ROS           Objective     /60 (BP Location: Left arm, Patient Position: Sitting, BP Cuff Size: Large adult)   Pulse 63   Resp 16   Ht 1.803 m (5' 11\")   SpO2 94%   BMI 29.09 kg/m²     Physical Exam  Constitutional:       Appearance: He is well-developed.   HENT:      Head: Normocephalic and atraumatic.   Cardiovascular:      Rate and Rhythm: Normal rate and regular rhythm.      Heart sounds: No murmur heard.     No friction rub. No gallop.   Pulmonary:      Effort: Pulmonary effort is normal.      Breath sounds: Normal breath sounds.   Abdominal:      Palpations: Abdomen is soft.   Musculoskeletal:         General: Normal range of motion.      Cervical back: Normal range of motion and neck supple.   Skin:     General: Skin is warm and dry.   Neurological:      Mental Status: He is alert and oriented to person, place, and time.      Comments: Left hemiplegia   Psychiatric:         Behavior: Behavior normal.         Thought Content: Thought content normal.         Judgment: Judgment normal.                Assessment & Plan     1. AF (paroxysmal atrial fibrillation) (HCC)  EKG    EC-ECHOCARDIOGRAM COMPLETE W/O CONT      2. Persistent atrial fibrillation (HCC)        3. Essential hypertension        4. Cerebrovascular accident (CVA) due to embolism of precerebral artery (HCC)        5. Secondary hypercoagulable state (HCC)            Medical Decision Making: Today's Assessment/Status/Plan:   1.  Paroxysmal atrial fibrillation and persistent atrial fibrillation status post cardioversion.  Discussed ablation versus continued on amiodarone he wishes to continue on amiodarone.  Screening PFTs.  On thyroid " replacement needs to be checked a little more frequently. Can possibly go down on amio at next visit.  2.  Anticoagulation continue Xarelto.  3.  Previous CVA.  4.  Check echocardiogram.  5.  Follow-up with general cardiology and follow-up with the EP [facility] in 3 months.                            [1]   Past Medical History:  Diagnosis Date    Afib (HCC)     Bowel habit changes     Diarrhea post stroke    COVID-19     3/2021    Disorder of thyroid     hypothyroid on medication    Heart murmur     as a child    Hemorrhagic disorder (HCC)     takes Xarelto    High cholesterol     on medication    Hypertension     on medication    Pain     left sided pain    Psychiatric problem     reactive depression, on medication    Seizure (HCC)     takes Keppra states last seizure 2022    Stroke ([facility]) 2021    Right side MCA.   [2]   Past Surgical History:  Procedure Laterality Date    CRANIOPLASTY Right 06/23/2021    Procedure: CRANIOPLASTY - FOR SKULL DEFECT;  Surgeon: Arthur Payton M.D.;  Location: SURGERY [facility];  Service: Neurosurgery    CRANIOTOMY Right 04/03/2021    Procedure: CRANIOTOMY;  Surgeon: Arthur Payton M.D.;  Location: SURGERY [facility];  Service: Neurosurgery    ARTHROSCOPY, KNEE Left     OTHER ORTHOPEDIC SURGERY      Shoulder surgery around 18 years ago   [3] No Known Allergies  [4]   Outpatient Encounter Medications as of 5/16/2025   Medication Sig Dispense Refill    amiodarone (CORDARONE) 200 MG Tab TAKE 1 TABLET BY MOUTH EVERY DAY 90 Tablet 1    levetiracetam (KEPPRA) 1000 MG tablet Take 1 Tablet by mouth 2 times a day for 90 days. 180 Tablet 0    lamoTRIgine (LAMICTAL) 25 MG Tab Take 1 Tablet by mouth every day for 14 days, THEN 1 Tablet 2 times a day for 14 days, THEN 2 Tablets 2 times a day for 14 days. 98 Tablet 0    [START ON 5/19/2025] lamoTRIgine (LAMICTAL) 100 MG Tab Take 1 Tablet by mouth 2 times a day for 14 days, THEN 1.5 Tablets 2 times a day for 14 days. 70 Tablet 0    metoprolol  tartrate (LOPRESSOR) 50 MG Tab Take 1 Tablet by mouth 2 times a day. 180 Tablet 1    gabapentin (NEURONTIN) 100 MG Cap Take 2 Capsules by mouth every evening. 180 Capsule 3    dantrolene (DANTRIUM) 25 MG Cap Take 2 Capsules by mouth 2 times a day for 180 days. 360 Capsule 1    acetaminophen (TYLENOL) 500 MG Tab Take 1,000 mg by mouth every evening. 500 mg x 2 tablets = 1000 mg      XARELTO 20 MG Tab tablet TAKE 1 TABLET BY MOUTH EVERY DAY WITH DINNER 90 Tablet 3    sertraline (ZOLOFT) 25 MG tablet TAKE 2 TABLETS BY MOUTH EVERY DAY  DAYS 180 Tablet 1    ramipril (ALTACE) 10 MG capsule Take 1 Capsule by mouth every day. Taking 1 tab one time daily 90 Capsule 3    spironolactone (ALDACTONE) 25 MG Tab Take 1 Tablet by mouth every day. 90 Tablet 3    amLODIPine (NORVASC) 5 MG Tab Take 1 Tablet by mouth 2 times a day. 180 Tablet 3    ezetimibe (ZETIA) 10 MG Tab Take 1 Tablet by mouth every day. 90 Tablet 3    [DISCONTINUED] ARMOUR THYROID 90 MG Tab Take 180 mg by mouth every morning. 90 mg x 2 tablets = 180 mg (Patient not taking: Reported on 5/16/2025)       No facility-administered encounter medications on file as of 5/16/2025.

## 2025-05-19 NOTE — Clinical Note
REFERRAL APPROVAL NOTICE         Sent on May 19, 2025                   Thong Arzola  [address] [address]                   Dear Mr. Arzola,    After a careful review of the medical information and benefit coverage, [facility] has processed your referral. See below for additional details.    If applicable, you must be actively enrolled with your insurance for coverage of the authorized service. If you have any questions regarding your coverage, please contact your insurance directly.    REFERRAL INFORMATION   Referral #:  [id number]  Referred-To Department    Referred-By Provider:  Pulmonary and Sleep Medicine    Jose Diaz M.D.   Pulmonary Rehab [facility]      [address]  [address]  [place] [address]  [phone] [id number]  [place] [address]  [phone]    Referral Start Date:  05/16/2025  Referral End Date:   05/16/2026             SCHEDULING  If you do not already have an appointment, please call [phone] to make an appointment.     MORE INFORMATION  If you do not already have a [employer] account, sign up at: [email or web address]  You can access your medical information, make appointments, see lab results, billing information, and more.  If you have questions regarding this referral, please contact  the [facility] Referrals department at:             [phone]. Monday - Friday 8:00AM - 5:00PM.     Sincerely,    [facility]

## 2025-06-05 RX ORDER — CLONAZEPAM 0.25 MG/1
0.25 TABLET, ORALLY DISINTEGRATING ORAL
Qty: 30 TABLET | Refills: 1 | Status: SHIPPED | OUTPATIENT
Start: 2025-06-05 | End: 2025-07-05

## 2025-06-13 ENCOUNTER — HOSPITAL ENCOUNTER (OUTPATIENT)
Dept: LAB | Facility: MEDICAL CENTER | Age: 60
End: 2025-06-13
Attending: STUDENT IN AN ORGANIZED HEALTH CARE EDUCATION/TRAINING PROGRAM
Payer: MEDICARE

## 2025-06-13 DIAGNOSIS — G40.109 LOCALIZATION-RELATED EPILEPSY (HCC): ICD-10-CM

## 2025-06-13 LAB
25(OH)D3 SERPL-MCNC: 64 NG/ML (ref 30–100)
ALBUMIN SERPL BCP-MCNC: 4.5 G/DL (ref 3.2–4.9)
ALBUMIN/GLOB SERPL: 1.5 G/DL
ALP SERPL-CCNC: 72 U/L (ref 30–99)
ALT SERPL-CCNC: 28 U/L (ref 2–50)
ANION GAP SERPL CALC-SCNC: 13 MMOL/L (ref 7–16)
AST SERPL-CCNC: 27 U/L (ref 12–45)
BASOPHILS # BLD AUTO: 0.4 % (ref 0–1.8)
BASOPHILS # BLD: 0.02 K/UL (ref 0–0.12)
BILIRUB SERPL-MCNC: 0.3 MG/DL (ref 0.1–1.5)
BUN SERPL-MCNC: 29 MG/DL (ref 8–22)
CALCIUM ALBUM COR SERPL-MCNC: 9.2 MG/DL (ref 8.5–10.5)
CALCIUM SERPL-MCNC: 9.6 MG/DL (ref 8.5–10.5)
CHLORIDE SERPL-SCNC: 103 MMOL/L (ref 96–112)
CO2 SERPL-SCNC: 21 MMOL/L (ref 20–33)
CREAT SERPL-MCNC: 1.78 MG/DL (ref 0.5–1.4)
EOSINOPHIL # BLD AUTO: 0.17 K/UL (ref 0–0.51)
EOSINOPHIL NFR BLD: 3.3 % (ref 0–6.9)
ERYTHROCYTE [DISTWIDTH] IN BLOOD BY AUTOMATED COUNT: 43 FL (ref 35.9–50)
GFR SERPLBLD CREATININE-BSD FMLA CKD-EPI: 43 ML/MIN/1.73 M 2
GLOBULIN SER CALC-MCNC: 3.1 G/DL (ref 1.9–3.5)
GLUCOSE SERPL-MCNC: 91 MG/DL (ref 65–99)
HCT VFR BLD AUTO: 51.3 % (ref 42–52)
HGB BLD-MCNC: 17 G/DL (ref 14–18)
IMM GRANULOCYTES # BLD AUTO: 0.02 K/UL (ref 0–0.11)
IMM GRANULOCYTES NFR BLD AUTO: 0.4 % (ref 0–0.9)
LYMPHOCYTES # BLD AUTO: 0.97 K/UL (ref 1–4.8)
LYMPHOCYTES NFR BLD: 18.8 % (ref 22–41)
MCH RBC QN AUTO: 29 PG (ref 27–33)
MCHC RBC AUTO-ENTMCNC: 33.1 G/DL (ref 32.3–36.5)
MCV RBC AUTO: 87.5 FL (ref 81.4–97.8)
MONOCYTES # BLD AUTO: 0.39 K/UL (ref 0–0.85)
MONOCYTES NFR BLD AUTO: 7.5 % (ref 0–13.4)
NEUTROPHILS # BLD AUTO: 3.6 K/UL (ref 1.82–7.42)
NEUTROPHILS NFR BLD: 69.6 % (ref 44–72)
NRBC # BLD AUTO: 0 K/UL
NRBC BLD-RTO: 0 /100 WBC (ref 0–0.2)
PLATELET # BLD AUTO: 307 K/UL (ref 164–446)
PMV BLD AUTO: 9 FL (ref 9–12.9)
POTASSIUM SERPL-SCNC: 4.9 MMOL/L (ref 3.6–5.5)
PROT SERPL-MCNC: 7.6 G/DL (ref 6–8.2)
RBC # BLD AUTO: 5.86 M/UL (ref 4.7–6.1)
SODIUM SERPL-SCNC: 137 MMOL/L (ref 135–145)
WBC # BLD AUTO: 5.2 K/UL (ref 4.8–10.8)

## 2025-06-13 PROCEDURE — 80053 COMPREHEN METABOLIC PANEL: CPT

## 2025-06-13 PROCEDURE — 36415 COLL VENOUS BLD VENIPUNCTURE: CPT

## 2025-06-13 PROCEDURE — 80175 DRUG SCREEN QUAN LAMOTRIGINE: CPT

## 2025-06-13 PROCEDURE — 82306 VITAMIN D 25 HYDROXY: CPT | Mod: GA

## 2025-06-13 PROCEDURE — 85025 COMPLETE CBC W/AUTO DIFF WBC: CPT

## 2025-06-16 LAB — LAMOTRIGINE SERPL-MCNC: 2.4 UG/ML (ref 3–15)

## 2025-06-17 ENCOUNTER — RESULTS FOLLOW-UP (OUTPATIENT)
Dept: NEUROLOGY | Facility: MEDICAL CENTER | Age: 60
End: 2025-06-17

## 2025-06-23 NOTE — OP THERAPY DAILY TREATMENT
Outpatient Physical Therapy  DAILY TREATMENT     [facility] [address]  [address]  [place] [address]  Phone:  [phone]  Fax:  [phone]    Date: 02/03/2022    Patient: Thong Arzola  YOB: 1965  MRN: [id number]     Time Calculation    Start time: 1530  Stop time: 1615 Time Calculation (min): 45 minutes         Chief Complaint: Difficulty Walking    Visit #: 25    SUBJECTIVE:  The pt reports that he was tired after last time, but pleased with his performance after last visit. He is agreeable to PT.     OBJECTIVE:  Current objective measures: Pre-session vitals: 72 bpm, 95% O2, 142/102        Therapeutic Exercises (CPT 81257):       Therapeutic Exercise Summary: VISIT 3/40 before auth on 1/13, note number 20 on epic    Therapeutic Treatments and Modalities:     1. Gait Training (CPT 31264), See below    Therapeutic Treatment and Modalities Summary: Gait Training: LiteGait treadmill training with harness used for pt safety, no BWS utilized  1st round at 1.4 mph with R UE assist x 7:18 minutes, vitals post /98, HR up to 110 bpm, O2 95%  2nd round: 1.4 mph with R UE assist to 10:50 minutes, vitals post /106,  bpm, O2 94%, pt rested until BP dropped to 138/98  3rd round: 1.4 mph with R UE assist to 13:59 minutes,    post /96,  bpm, O2 94% seated rest break with water post     Pt required greater time during rest breaks to return vitals to baseline.     Total walking distance: 1707 ft  Total walking time: 13:59 minutes        Time-based treatments/modalities:    Physical Therapy Timed Treatment Charges  Gait training minutes (CPT 61236): 40 minutes    ASSESSMENT:   Response to treatment: The pt tolerated greater ambulation distance in less time this session, but did express greater fatigue. His blood pressure continues to rise during training, therefore requiring rest breaks between bouts of walking. Pt has discussed blood pressure  Writer received the following message via portal. Please triage.       concerns with cardiologist. He will continue to benefit from skilled PT intervention to maximize his CLOF and independence.     PLAN/RECOMMENDATIONS:   Plan for treatment: therapy treatment to continue next visit.  Planned interventions for next visit: continue with current treatment. Continue with progression of gait training.

## 2025-06-25 ENCOUNTER — OFFICE VISIT (OUTPATIENT)
Dept: NEUROLOGY | Facility: MEDICAL CENTER | Age: 60
End: 2025-06-25
Attending: STUDENT IN AN ORGANIZED HEALTH CARE EDUCATION/TRAINING PROGRAM
Payer: MEDICARE

## 2025-06-25 VITALS
HEIGHT: 71 IN | TEMPERATURE: 98.2 F | SYSTOLIC BLOOD PRESSURE: 118 MMHG | DIASTOLIC BLOOD PRESSURE: 62 MMHG | RESPIRATION RATE: 16 BRPM | OXYGEN SATURATION: 95 % | WEIGHT: 200 LBS | HEART RATE: 60 BPM | BODY MASS INDEX: 28 KG/M2

## 2025-06-25 DIAGNOSIS — M79.2 NEUROPATHIC PAIN: ICD-10-CM

## 2025-06-25 DIAGNOSIS — I69.854 SPASTIC HEMIPLEGIA OF LEFT NONDOMINANT SIDE AS LATE EFFECT OF OTHER CEREBROVASCULAR DISEASE (HCC): ICD-10-CM

## 2025-06-25 DIAGNOSIS — I63.10 CEREBROVASCULAR ACCIDENT (CVA) DUE TO EMBOLISM OF PRECEREBRAL ARTERY (HCC): ICD-10-CM

## 2025-06-25 DIAGNOSIS — G40.109 LOCALIZATION-RELATED EPILEPSY (HCC): Primary | ICD-10-CM

## 2025-06-25 DIAGNOSIS — I69.954 HEMIPLEGIA AND HEMIPARESIS FOLLOWING UNSPECIFIED CEREBROVASCULAR DISEASE AFFECTING LEFT NON-DOMINANT SIDE (HCC): ICD-10-CM

## 2025-06-25 PROCEDURE — 99213 OFFICE O/P EST LOW 20 MIN: CPT | Performed by: STUDENT IN AN ORGANIZED HEALTH CARE EDUCATION/TRAINING PROGRAM

## 2025-06-25 RX ORDER — LAMOTRIGINE 200 MG/1
200 TABLET ORAL 2 TIMES DAILY
COMMUNITY
End: 2025-06-25 | Stop reason: SDUPTHER

## 2025-06-25 RX ORDER — LAMOTRIGINE 200 MG/1
200 TABLET ORAL 2 TIMES DAILY
Qty: 180 TABLET | Refills: 1 | Status: SHIPPED | OUTPATIENT
Start: 2025-06-25 | End: 2025-12-22

## 2025-06-25 RX ORDER — LEVOTHYROXINE, LIOTHYRONINE 57; 13.5 UG/1; UG/1
90 TABLET ORAL 2 TIMES DAILY
COMMUNITY
Start: 2025-06-17

## 2025-06-25 ASSESSMENT — FIBROSIS 4 INDEX: FIB4 SCORE: 1

## 2025-06-25 ASSESSMENT — PATIENT HEALTH QUESTIONNAIRE - PHQ9: CLINICAL INTERPRETATION OF PHQ2 SCORE: 0

## 2025-06-25 NOTE — PATIENT INSTRUCTIONS
-Starting now, you may decrease Keppra to 500 mg twice daily    -In 1 week, increase Lamotrigine to 200 mg twice daily. 2-3 days after taking 200 mg twice daily, you may stop the Keppra

## 2025-06-25 NOTE — PROGRESS NOTES
[facility]  Follow up visit    Patient name: Thong Arzola  YOB: 1965  MRN: [id number]  Date of visit: 6/25/2025        Background:    Thong Arzola is a 60 y.o. right-handed man with a history of a R MCA stroke in 2021, seizures being seen in follow up. Last visit 4/7/2025.    Details of history (from Dr. Johnson note):  He had a right MCA stroke in 2021, s/p craniotomy and subsequent cranioplasty. He has residual left sided weakness and visual field deficits on left. He was diagnosed with A. Fib and started on Xarelto.  He is able to ambulate with the use of a cane and has residual left-sided hemiparesis with minimal spasticity.  He uses a left AFO brace.  Patient had his first seizure in February 2022 affecting his left side proceeding to whole body convulsion lasting 1 to 2 minutes there was no tongue biting or incontinence or any triggers.  Following this he was started on levetiracetam higher dose which contributed to feeling groggy and cloudy.  After his visit with [facility] neurology he was switched to Briviact 50 mg twice daily which she has tolerated well.  His last breakthrough episode was in July 2022 after he ran out of Briviact.  Since that time he has been maintained on 50 mg twice daily tolerating this well without additional episodes of concern.  Patient and the wife denied any episodes of changes in awareness or loss of consciousness or involuntary left-sided movements.  He can have occasional left-sided muscle twitching but not progressing to uncontrolled movements.      He continues to follow-up closely with his primary care physician and with his cardiologist.  He has been doing some home exercises for his left-sided spasticity.  He is also on dantrolene 50 mg 3 times a day and on gabapentin 200 mg in the evening for his left-sided pain and spasticity issues.     Onset: February 2022  Semiology: L side of body stiffens (arm and leg) --> GTC with whole  body convulsion   Frequency: 3 total  Duration of spells: < 1 minute  Triggers: missing medications, no others     I reviewed the history and previous documentation and discussion with the patient.  Keppra was not stopped due to side effects. It was switched to Briviact after he seemed more cloudy after his stroke, but his wife in retrospect does not think this was related to the Keppra because it did not change when he switched to Briviact.  Briviact was cost prohibitive so he was switched back to Keppra.    He had a breakthrough seizure on 12/16/23. Keppra dose was increased to 1000 mg BID.     Interval history:  He is accompanied to clinic by his wife, Anel.     About a week ago patient went up to 150 mg BID of lamotrigine. He is tolerating this well without issue. No noted side effects. Still interested in coming off of Keppra.     Last seizure: 4/6/2025 (not a GTC);  prior to that 12/16/23    Mood: History of depressed mood after stroke, currently taking Zoloft      6/25/2025    11:20 AM 4/7/2025     1:40 PM 12/12/2023     1:20 PM 2/17/2022     9:00 AM   PHQ-9 Screening   Little interest or pleasure in doing things 0 - not at all 0 - not at all 0 - not at all 0 - not at all   Feeling down, depressed, or hopeless 0 - not at all 0 - not at all 0 - not at all 0 - not at all   PHQ-2 Total Score 0 0 0 0          Side effects: mental fogginess 2/2 Keppra     Barriers to taking medication appropriately: None     Driving: no     Vitamin D: taking    Alcohol use: Drinks 1 drink/day on average.    Current Medications:   Current Outpatient Medications:     NP THYROID 90 MG Tab, Take 90 mg by mouth 2 times a day., Disp: , Rfl:     Clonazepam 0.25 MG TABLET DISPERSIBLE, Take 1 Tablet by mouth one time as needed (seizures) for up to 30 days. Not to exceed 2 tablets in 1 day  Indications: Simple Partial Seizure, Disp: 30 Tablet, Rfl: 1    amiodarone (CORDARONE) 200 MG Tab, TAKE 1 TABLET BY MOUTH EVERY DAY, Disp: 90 Tablet,  Rfl: 1    levetiracetam (KEPPRA) 1000 MG tablet, Take 1 Tablet by mouth 2 times a day for 90 days., Disp: 180 Tablet, Rfl: 0    metoprolol tartrate (LOPRESSOR) 50 MG Tab, Take 1 Tablet by mouth 2 times a day., Disp: 180 Tablet, Rfl: 1    gabapentin (NEURONTIN) 100 MG Cap, Take 2 Capsules by mouth every evening., Disp: 180 Capsule, Rfl: 3    dantrolene (DANTRIUM) 25 MG Cap, Take 2 Capsules by mouth 2 times a day for 180 days., Disp: 360 Capsule, Rfl: 1    acetaminophen (TYLENOL) 500 MG Tab, Take 1,000 mg by mouth every evening. 500 mg x 2 tablets = 1000 mg, Disp: , Rfl:     XARELTO 20 MG Tab tablet, TAKE 1 TABLET BY MOUTH EVERY DAY WITH DINNER, Disp: 90 Tablet, Rfl: 3    sertraline (ZOLOFT) 25 MG tablet, TAKE 2 TABLETS BY MOUTH EVERY DAY  DAYS, Disp: 180 Tablet, Rfl: 1    ramipril (ALTACE) 10 MG capsule, Take 1 Capsule by mouth every day. Taking 1 tab one time daily, Disp: 90 Capsule, Rfl: 3    spironolactone (ALDACTONE) 25 MG Tab, Take 1 Tablet by mouth every day., Disp: 90 Tablet, Rfl: 3    amLODIPine (NORVASC) 5 MG Tab, Take 1 Tablet by mouth 2 times a day., Disp: 180 Tablet, Rfl: 3    ezetimibe (ZETIA) 10 MG Tab, Take 1 Tablet by mouth every day., Disp: 90 Tablet, Rfl: 3    Allergies: No Known Allergies      Physical Exam:   Ambulatory Vitals  Vitals:    06/25/25 1119   BP: 118/62   Pulse: 60   Resp: 16   Temp: 36.8 °C (98.2 °F)   SpO2: 95%           Constitutional: Well-developed, well-nourished, good hygiene. Appears stated age.  Respiratory: normal respiratory effort  Skin: Warm, dry, intact. No rashes observed.  Neurologic:   Mental Status: Awake, alert, oriented x 4.   Speech: Fluent with normal prosody.   Memory: Able to recall recent and remote events accurately.    Concentration: Attentive. Able to focus on history and follow multi-step commands.   Fund of Knowledge: Appropriate.   Cranial Nerves:    CN II: PERRL     CN III, IV, VI: EOMI      CN VII: (+) L facial weakness    CN VIII: Hearing intact  to voice    Studies:      Labs reviewed        Component  Ref Range & Units (hover) 12 d ago   Lamotrigine 2.4 Low    Comment: INTERPRETIVE INFORMATION:  Lamotrigine  Therapeutic Range:  3.0-15.0 ug/mL  Toxic:  Greater than or equal to 20 ug/mL      Lab was drawn when patient was taking 100 mg BID          Ambulatory EEG pending       Assessment/Plan:   Thong Arzola is a 60 y.o. 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      His seizure semiology is left hemibody stiffening followed by generalized tonic-clonic activity.     He is undergoing cross-titration from Keppra to Lamictal monotherapy. Keppra has caused fatigue as a side effect and there is also a question as to whether it is contributing to his depressed mood. Plan as below.       1. Localization-related epilepsy (HCC) (Primary)  - Referral to Neurodiagnostics (EEG,EP,EMG/NCS/DBS) -24-hour ambulatory EEG scheduled 9/2/2025  - Increase Lamictal to 200 mg BID then stop Keppra (details below)    2. Hemiplegia and hemiparesis following unspecified cerebrovascular disease affecting left non-dominant side (HCC)  -Stable/baseline    3. Depressed mood  -Continue Zoloft 50 mg every morning    4. Cerebrovascular accident (CVA) due to embolism of precerebral artery (HCC)  - Continues on Xarelto for Afib    5. Neuropathic pain  -Takes gabapentin 200 mg qhs      Patient instructions:  -Starting now, you may decrease Keppra to 500 mg twice daily    -In 1 week, increase Lamotrigine to 200 mg twice daily. 2-3 days after taking 200 mg twice daily, you may stop the Keppra      Follow-up in approximately 4-6 months.     Damaris Cagle M.D.   Diplomate, Neurology with Special Qualification in Epilepsy, American Board of Psychiatry and Neurology   of Clinical Neurology, [facility] [employer]        During today's encounter we discussed available treatment options and  their individual side effect profiles. Total encounter time caring for patient today 55 minutes.

## 2025-07-01 ENCOUNTER — APPOINTMENT (OUTPATIENT)
Dept: CARDIOLOGY | Facility: MEDICAL CENTER | Age: 60
End: 2025-07-01
Attending: INTERNAL MEDICINE
Payer: MEDICARE

## 2025-07-03 ENCOUNTER — HOSPITAL ENCOUNTER (OUTPATIENT)
Dept: CARDIOLOGY | Facility: MEDICAL CENTER | Age: 60
End: 2025-07-03
Attending: INTERNAL MEDICINE
Payer: MEDICARE

## 2025-07-03 ENCOUNTER — RESULTS FOLLOW-UP (OUTPATIENT)
Dept: CARDIOLOGY | Facility: MEDICAL CENTER | Age: 60
End: 2025-07-03

## 2025-07-03 DIAGNOSIS — I48.0 AF (PAROXYSMAL ATRIAL FIBRILLATION) (HCC): ICD-10-CM

## 2025-07-03 LAB
LV EJECT FRACT  99904: 60
LV EJECT FRACT MOD 2C 99903: 55.67
LV EJECT FRACT MOD 4C 99902: 56.93
LV EJECT FRACT MOD BP 99901: 57.75

## 2025-07-03 PROCEDURE — 93306 TTE W/DOPPLER COMPLETE: CPT

## 2025-07-03 PROCEDURE — 93306 TTE W/DOPPLER COMPLETE: CPT | Mod: 26 | Performed by: INTERNAL MEDICINE

## 2025-07-24 DIAGNOSIS — R45.89 DEPRESSED MOOD: ICD-10-CM

## 2025-07-24 RX ORDER — SERTRALINE HYDROCHLORIDE 25 MG/1
50 TABLET, FILM COATED ORAL
Qty: 180 TABLET | Refills: 1 | Status: SHIPPED | OUTPATIENT
Start: 2025-07-24

## 2025-07-24 NOTE — TELEPHONE ENCOUNTER
Received request via: Pharmacy    Medication Name/Dosage sertraline (ZOLOFT) 25 MG tablet     When was medication last prescribed 01/27/2025    How many refills were previously provided 1    How many Refills does he patient have left from last prescription 0    Was the patient seen in the last year in this department? Yes   Date of last office visit 06/25/2025     Per last Neurology Office Visit, when was the date of next follow up visit set for?                            Date of office visit follow up request 4-6 mo     Does the patient have an upcoming appointment? Yes   If yes, when 09/19/2025             If no, schedule appointment done    Does the patient have custodial Plus and need 100 day supply (blood pressure, diabetes and cholesterol meds only)? Patient does not have SCP

## 2025-07-25 DIAGNOSIS — M62.838 OTHER MUSCLE SPASM: ICD-10-CM

## 2025-07-25 DIAGNOSIS — I69.954 HEMIPLEGIA AND HEMIPARESIS FOLLOWING UNSPECIFIED CEREBROVASCULAR DISEASE AFFECTING LEFT NON-DOMINANT SIDE (HCC): ICD-10-CM

## 2025-07-25 DIAGNOSIS — I69.854 SPASTIC HEMIPLEGIA OF LEFT NONDOMINANT SIDE AS LATE EFFECT OF OTHER CEREBROVASCULAR DISEASE (HCC): ICD-10-CM

## 2025-07-25 RX ORDER — DANTROLENE SODIUM 25 MG/1
50 CAPSULE ORAL 2 TIMES DAILY
Qty: 360 CAPSULE | Refills: 1 | Status: SHIPPED | OUTPATIENT
Start: 2025-07-25 | End: 2026-01-21

## 2025-08-17 DIAGNOSIS — I48.0 PAROXYSMAL ATRIAL FIBRILLATION (HCC): ICD-10-CM

## 2025-08-17 DIAGNOSIS — Z51.81 ENCOUNTER FOR MONITORING AMIODARONE THERAPY: ICD-10-CM

## 2025-08-17 DIAGNOSIS — Z79.899 ENCOUNTER FOR MONITORING AMIODARONE THERAPY: ICD-10-CM

## 2025-08-17 DIAGNOSIS — Z79.899 HIGH RISK MEDICATION USE: Primary | ICD-10-CM

## 2025-08-17 DIAGNOSIS — I48.0 AF (PAROXYSMAL ATRIAL FIBRILLATION) (HCC): ICD-10-CM

## 2025-08-18 RX ORDER — AMIODARONE HYDROCHLORIDE 200 MG/1
200 TABLET ORAL DAILY
Qty: 90 TABLET | Refills: 0 | OUTPATIENT
Start: 2025-08-18

## 2025-08-28 DIAGNOSIS — I48.0 PAROXYSMAL ATRIAL FIBRILLATION (HCC): ICD-10-CM

## 2025-08-28 DIAGNOSIS — I48.0 AF (PAROXYSMAL ATRIAL FIBRILLATION) (HCC): ICD-10-CM

## 2025-08-28 RX ORDER — AMIODARONE HYDROCHLORIDE 200 MG/1
200 TABLET ORAL DAILY
Qty: 90 TABLET | Refills: 1 | OUTPATIENT
Start: 2025-08-28

## (undated) DEVICE — ELECTRODE DUAL RETURN W/ CORD - (50/PK)

## (undated) DEVICE — BOVIE BLADE COATED &INSULATED - 25/PK

## (undated) DEVICE — GLOVE BIOGEL INDICATOR SZ 8 SURGICAL PF LTX - (50/BX 4BX/CA)

## (undated) DEVICE — GLOVE SZ 7.5 BIOGEL PI MICRO - PF LF (50PR/BX)

## (undated) DEVICE — TUBING CLEARLINK DUO-VENT - C-FLO (48EA/CA)

## (undated) DEVICE — DISSECT TOOL MIDAS F2/8TA23

## (undated) DEVICE — SPONGE XRAY 8X4 STERL. 12PL - (10EA/TY 80TY/CA)

## (undated) DEVICE — DRESSING TRANSPARENT FILM TEGADERM 2.375 X 2.75"  (100EA/BX)"

## (undated) DEVICE — TRAY SRGPRP PVP IOD WT PRP - (20/CA)

## (undated) DEVICE — CORETEMP DRAPE FORM-FITTED EASY DROPANDGO DRAPE FOR USE ON THE CORETEMP FLUID MANAGEMENT 56IN X 56IN

## (undated) DEVICE — SUTURE 2-0 VICRYL PLUS CT-1 - 8 X 18 INCH(12/BX)

## (undated) DEVICE — FORCEPS IRRIGATING 8 X 1.5MM (5EA/BX)

## (undated) DEVICE — LACTATED RINGERS INJ. 500 ML - (24EA/CA)

## (undated) DEVICE — SURGIFOAM (SIZE 100) - (6EA/CA)

## (undated) DEVICE — GLOVE SZ 7 BIOGEL PI MICRO - PF LF (50PR/BX 4BX/CA)

## (undated) DEVICE — SLEEVE, VASO, THIGH, MED

## (undated) DEVICE — STAPLER SKIN DISP - (6/BX 10BX/CA) VISISTAT

## (undated) DEVICE — DRAPE IOBAN II INCISE 23X17 - (10EA/BX 4BX/CA)

## (undated) DEVICE — SCALP CLIP RANEY 20-1037 (10EA/PK 20PK/CA)

## (undated) DEVICE — DISSECT TOOL MIDAS REX - (M-2)

## (undated) DEVICE — LACTATED RINGERS INJ 1000 ML - (14EA/CA 60CA/PF)

## (undated) DEVICE — MIDAS LUBRICATOR DIFFUSER PACK (4EA/CA)

## (undated) DEVICE — TRAY SKIN SCRUB PVP WET (20EA/CA) PART #DYND70356 DISCONTINUED

## (undated) DEVICE — BALL COTTON NEURO 3/4 INCH - (5/PK50PK/CA)

## (undated) DEVICE — RUBBERBAND STERILE #64 LATEX FREE (100/CA)

## (undated) DEVICE — DRESSING NON-ADHERING 8 X 3 - (50/BX)

## (undated) DEVICE — KIT SURGIFLO W/OUT THROMBIN - (6EA/CA)

## (undated) DEVICE — COVER FOOT UNIVERSAL DISP. - (25EA/CA)

## (undated) DEVICE — TUBING C&T SET FLYING LEADS DRAIN TUBING (10EA/BX)

## (undated) DEVICE — SUCTION INSTRUMENT YANKAUER BULBOUS TIP W/O VENT (50EA/CA)

## (undated) DEVICE — SYRINGE 30 ML LL (56/BX)

## (undated) DEVICE — TRAY CATHETER FOLEY URINE METER W/STATLOCK 350ML (10EA/CA)

## (undated) DEVICE — GLOVE BIOGEL PI INDICATOR SZ 8.0 SURGICAL PF LF -(50/BX 4BX/CA)

## (undated) DEVICE — SENSOR SPO2 NEO LNCS ADHESIVE (20/BX) SEE USER NOTES

## (undated) DEVICE — PACK CRANI - (1EA/CA)

## (undated) DEVICE — GLOVE BIOGEL SZ 7.5 SURGICAL PF LTX - (50PR/BX 4BX/CA)

## (undated) DEVICE — SUTURE 4-0 NUROLON CR/8 TF - (12/BX) ETHICON

## (undated) DEVICE — GLOVE BIOGEL SZ 8 SURGICAL PF LTX - (50PR/BX 4BX/CA)

## (undated) DEVICE — BLADE CLIPPER FITS 2501 CLIPPER (BLUE)  (20EA/CA)

## (undated) DEVICE — SODIUM CHL IRRIGATION 0.9% 1000ML (12EA/CA)

## (undated) DEVICE — PATTIES SURG X-RAYCOTTONOID - 1 X 3 IN (200/CA)

## (undated) DEVICE — DRAPE STRLE REG TOWEL 18X24 - (10/BX 4BX/CA)"

## (undated) DEVICE — SURGIFOAM (12X7) - (12EA/CA)

## (undated) DEVICE — NEPTUNE 4 PORT MANIFOLD - (20/PK)

## (undated) DEVICE — TOWEL STOP TIMEOUT SAFETY FLAG (40EA/CA)

## (undated) DEVICE — SUTURE GENERAL

## (undated) DEVICE — SET EXTENSION WITH 2 PORTS (48EA/CA) ***PART #2C8610 IS A SUBSTITUTE*****

## (undated) DEVICE — PROTECTOR ULNA NERVE - (36PR/CA)

## (undated) DEVICE — MASK ANESTHESIA ADULT  - (100/CA)

## (undated) DEVICE — DRAPE T CRANIOTOMY W/POUCH - (9/CA)

## (undated) DEVICE — GOWN WARMING STANDARD FLEX - (30/CA)

## (undated) DEVICE — TUBE CONNECT SUCTION CLEAR 120 X 1/4" (50EA/CA)"

## (undated) DEVICE — SET LEADWIRE 5 LEAD BEDSIDE DISPOSABLE ECG (1SET OF 5/EA)

## (undated) DEVICE — DRAPE LARGE 3 QUARTER - (20/CA)

## (undated) DEVICE — DRAIN J-VAC 7MM FLAT - (10EA/CA)

## (undated) DEVICE — PATTIES SURG X-RAYCOTTONOID - 1/2 X 3 IN (200/CA)

## (undated) DEVICE — DRESSING XEROFORM 1X8 - (50/BX 4BX/CA)

## (undated) DEVICE — ELECTRODE 850 FOAM ADHESIVE - HYDROGEL RADIOTRNSPRNT (50/PK)

## (undated) DEVICE — CANISTER SUCTION 3000ML MECHANICAL FILTER AUTO SHUTOFF MEDI-VAC NONSTERILE LF DISP  (40EA/CA)

## (undated) DEVICE — GOWN SURGEONS X-LARGE - DISP. (30/CA)

## (undated) DEVICE — BALL COTTON STERILE 5/PK - (5/PK 25PK/CA)

## (undated) DEVICE — SUTURE ETHILON 2-0 FSLX 30 (36PK/BX)"

## (undated) DEVICE — KIT ANESTHESIA W/CIRCUIT & 3/LT BAG W/FILTER (20EA/CA)

## (undated) DEVICE — GRAFT MESH SEPRAFILM PRO PACK - 5/BX CONTAINS 6 3X5 PIECES

## (undated) DEVICE — KIT EVACUATER 3 SPRING PVC LF 1/8 DRAIN SIZE (10EA/CA)"

## (undated) DEVICE — TRAY SURESTEP FOLEY TEMP SENSING 16FR (10EA/CA) ORDER  #18764 FOR TEMP FOLEY ONLY

## (undated) DEVICE — RESERVOIR SUCTION 100 CC - SILICONE (20EA/CA)

## (undated) DEVICE — BALL COTTON NEURO 1 INCH - (5/PK50PK/CA)

## (undated) DEVICE — COVER PROBE STERILE CONE (12EA/CA)

## (undated) DEVICE — GLOVE BIOGEL PI INDICATOR SZ 7.5 SURGICAL PF LF -(50/BX 4BX/CA)